# Patient Record
Sex: MALE | Race: WHITE | NOT HISPANIC OR LATINO | Employment: OTHER | URBAN - METROPOLITAN AREA
[De-identification: names, ages, dates, MRNs, and addresses within clinical notes are randomized per-mention and may not be internally consistent; named-entity substitution may affect disease eponyms.]

---

## 2017-02-06 ENCOUNTER — MYC REFILL (OUTPATIENT)
Dept: FAMILY MEDICINE | Facility: CLINIC | Age: 72
End: 2017-02-06

## 2017-02-06 DIAGNOSIS — N40.1 HYPERTROPHY OF PROSTATE WITH URINARY OBSTRUCTION: Primary | ICD-10-CM

## 2017-02-06 DIAGNOSIS — F41.9 ANXIETY: Primary | ICD-10-CM

## 2017-02-06 DIAGNOSIS — N13.8 HYPERTROPHY OF PROSTATE WITH URINARY OBSTRUCTION: Primary | ICD-10-CM

## 2017-02-06 DIAGNOSIS — F41.9 ANXIETY: ICD-10-CM

## 2017-02-06 DIAGNOSIS — F19.939 WITHDRAWAL FROM OTHER PSYCHOACTIVE SUBSTANCE (H): ICD-10-CM

## 2017-02-06 RX ORDER — ESCITALOPRAM OXALATE 10 MG/1
10 TABLET ORAL DAILY
Qty: 90 TABLET | Refills: 1 | Status: SHIPPED | OUTPATIENT
Start: 2017-02-06 | End: 2017-05-18

## 2017-02-06 RX ORDER — DIAZEPAM 5 MG
5 TABLET ORAL EVERY 12 HOURS PRN
Qty: 20 TABLET | Refills: 0 | Status: SHIPPED | OUTPATIENT
Start: 2017-02-06 | End: 2017-04-13

## 2017-02-06 RX ORDER — ESCITALOPRAM OXALATE 10 MG/1
10 TABLET ORAL DAILY
Qty: 90 TABLET | Refills: 1 | Status: CANCELLED | OUTPATIENT
Start: 2017-02-06

## 2017-02-06 NOTE — TELEPHONE ENCOUNTER
Doxazosin         Last Written Prescription Date: 08/11/2016  Last Fill Quantity: 90, # refills: 1    Last Office Visit with G, P or [facility] prescribing provider:  10/31/2016   Future Office Visit:      BP Readings from Last 3 Encounters:   10/31/16 138/81   03/01/16 136/84   01/08/16 136/83     Isrrael CUELLO (R)

## 2017-02-06 NOTE — TELEPHONE ENCOUNTER
"Message from [employer]:  Original authorizing provider: MD Josemanuel Tee would like a refill of the following medications:  escitalopram (LEXAPRO) 10 MG tablet [AVIS Martínez MD]  diazepam (VALIUM) 5 MG tablet [AVIS Martínez MD]    Preferred pharmacy: [facility] - [facility] [address]    Comment:  Dr Martínez, I didn\"t realize I had no refills for the Lexapro. I am almost out. Would you also prescribe 10 days of Diazepam.? Lots of stress going on right now. Thank you. Gavin  "

## 2017-02-06 NOTE — TELEPHONE ENCOUNTER
Escitalopram      Last Written Prescription Date:  06/08/2016  Last Fill Quantity: 90,   # refills: 1  Last Office Visit with [facility] or  Health prescribing provider: 10/30/2016  Future Office visit:       Routing refill request to provider for review/approval because:  Drug not on the [facility], [facility] or [facility] refill protocol or controlled substance    Isrrael CUELLO (R)

## 2017-02-09 RX ORDER — DOXAZOSIN 4 MG/1
4 TABLET ORAL DAILY
Qty: 90 TABLET | Refills: 1 | Status: SHIPPED | OUTPATIENT
Start: 2017-02-09 | End: 2017-05-18

## 2017-02-13 ENCOUNTER — MYC REFILL (OUTPATIENT)
Dept: FAMILY MEDICINE | Facility: CLINIC | Age: 72
End: 2017-02-13

## 2017-02-13 DIAGNOSIS — F51.05 INSOMNIA DUE TO OTHER MENTAL DISORDER: ICD-10-CM

## 2017-02-13 DIAGNOSIS — F99 INSOMNIA DUE TO OTHER MENTAL DISORDER: ICD-10-CM

## 2017-02-14 RX ORDER — ZOLPIDEM TARTRATE 10 MG/1
10 TABLET ORAL
Qty: 30 TABLET | Refills: 5 | Status: SHIPPED | OUTPATIENT
Start: 2017-02-14 | End: 2017-05-23 | Stop reason: ALTCHOICE

## 2017-02-14 NOTE — TELEPHONE ENCOUNTER
Message from [employer]:  Original authorizing provider: Carmen Abdalla MD    Josemanuel STEWART Feli would like a refill of the following medications:  zolpidem (AMBIEN) 10 MG tablet [Carmen Abdalla MD]    Preferred pharmacy: [facility] - [facility] [address] [address].    Comment:  Is it possible to get this by the 14th. we are leaving for [place]. Thanks Gavin

## 2017-04-13 ENCOUNTER — OFFICE VISIT (OUTPATIENT)
Dept: FAMILY MEDICINE | Facility: CLINIC | Age: 72
End: 2017-04-13
Payer: COMMERCIAL

## 2017-04-13 VITALS
TEMPERATURE: 97.6 F | WEIGHT: 191 LBS | HEIGHT: 71 IN | BODY MASS INDEX: 26.74 KG/M2 | DIASTOLIC BLOOD PRESSURE: 83 MMHG | SYSTOLIC BLOOD PRESSURE: 147 MMHG | HEART RATE: 79 BPM

## 2017-04-13 DIAGNOSIS — Z71.41 ALCOHOL ABUSE COUNSELING AND SURVEILLANCE: ICD-10-CM

## 2017-04-13 DIAGNOSIS — F41.9 ANXIETY: Primary | ICD-10-CM

## 2017-04-13 PROCEDURE — 99214 OFFICE O/P EST MOD 30 MIN: CPT | Performed by: FAMILY MEDICINE

## 2017-04-13 ASSESSMENT — ANXIETY QUESTIONNAIRES
1. FEELING NERVOUS, ANXIOUS, OR ON EDGE: MORE THAN HALF THE DAYS
7. FEELING AFRAID AS IF SOMETHING AWFUL MIGHT HAPPEN: NOT AT ALL
6. BECOMING EASILY ANNOYED OR IRRITABLE: SEVERAL DAYS
GAD7 TOTAL SCORE: 6
IF YOU CHECKED OFF ANY PROBLEMS ON THIS QUESTIONNAIRE, HOW DIFFICULT HAVE THESE PROBLEMS MADE IT FOR YOU TO DO YOUR WORK, TAKE CARE OF THINGS AT HOME, OR GET ALONG WITH OTHER PEOPLE: NOT DIFFICULT AT ALL
2. NOT BEING ABLE TO STOP OR CONTROL WORRYING: SEVERAL DAYS
5. BEING SO RESTLESS THAT IT IS HARD TO SIT STILL: NOT AT ALL
3. WORRYING TOO MUCH ABOUT DIFFERENT THINGS: MORE THAN HALF THE DAYS

## 2017-04-13 ASSESSMENT — PATIENT HEALTH QUESTIONNAIRE - PHQ9: 5. POOR APPETITE OR OVEREATING: NOT AT ALL

## 2017-04-13 NOTE — PATIENT INSTRUCTIONS
Thank you for choosing [facility].  You may be receiving a survey in the mail from Jennifer Minaya regarding your visit today.  Please take a few minutes to complete and return the survey to let us know how we are doing.      If you have questions or concerns, please contact us via [employer] or you can contact your care team at [phone].    Our Clinic hours are:  Monday 6:40 am  to 7:00 pm  Tuesday -Friday 6:40 am to 5:00 pm    The [place] outpatient lab hours are:  Monday - Friday 6:10 am to 4:45 pm  Saturdays 7:00 am to 11:00 am  Appointments are required, call [phone]    If you have clinical questions after hours or would like to schedule an appointment,  call the clinic at [phone].    (F41.9) Anxiety  (primary encounter diagnosis)  Comment:   Plan: We discussed the medications and he is not taking Valium this is removed from the med list. We discussed Ambien and Ativan are in the same family of medications and one should be stopped. Consider trying to use Ativan at bedtime about 30 minutes before the desired sleep time and do not use Ambien if taking Ativan. The Gabapentin could cause drowsiness and if this occurs, then also take this med before bedtime. Use the non drug therapies for insomnia. Avoid stimulants. The Lexapro works for both depression and anxiety. You were on 5 mg daily and this is a very low dose. Increase this to 10 mg daily and it will take several days to start working and several weeks for max effect. Stay on the Wellbutrin at the same dose. Recheck in 3-4 weeks in the clinic for evaluation.     (Z71.41) Alcohol abuse counseling and surveillance  Comment:   Plan: For the alcohol, use your support and counseling. You just had a chemical dependency evaluation about one month ago. You would likely benefit from outpatient treatment.   Consider a sponsor outside of the family.

## 2017-04-13 NOTE — PROGRESS NOTES
SUBJECTIVE:                                                    Josemanuel Edmond is a 72 year old male who presents to clinic today for the following health issues:    PERSONAL PROBLEM:  Here to discuss about a personal concern. He lost his son in 2004 in an MVA.   He started using alcohol after that. He has had trouble drinking and is interested in doing this.   Now he uses 4-5 drinks at night. He tried Corradum, a natural leaf. He did not drink for 8 months on this.   Once he stopped this he started drinking again. There are numerous stresses for him: his son is dealing   With quitting drug addiction. They are living with him and his wife. He is doing counseling with his .   He is starting to exercise daily.     Current Outpatient Prescriptions:      buPROPion (WELLBUTRIN XL) 150 MG 24 hr tablet, Take 1 tablet (150 mg) by mouth every morning, Disp: 90 tablet, Rfl: 0     zolpidem (AMBIEN) 10 MG tablet, Take 1 tablet (10 mg) by mouth nightly as needed for sleep, Disp: 30 tablet, Rfl: 5     doxazosin (CARDURA) 4 MG tablet, Take 1 tablet (4 mg) by mouth daily, Disp: 90 tablet, Rfl: 1     escitalopram (LEXAPRO) 10 MG tablet, Take 1 tablet (10 mg) by mouth daily, Disp: 90 tablet, Rfl: 1     LORazepam (ATIVAN) 1 MG tablet, Take 1 tablet (1 mg) by mouth every 6 hours as needed for anxiety Do not operate a vehicle after taking this medication, Disp: 120 tablet, Rfl: 5     gabapentin (NEURONTIN) 300 MG capsule, One am and midday, 2 at bedtime, Disp: 120 capsule, Rfl: 11     multivitamin, therapeutic with minerals (MULTI-VITAMIN) TABS, Take 1 tablet by mouth daily, Disp: 100 tablet, Rfl: 3     aspirin 81 MG tablet, Take 1 tablet by mouth daily., Disp: 100 tablet, Rfl: 3     CALCIUM + D 600-200 MG-UNIT OR TABS, 2 TABLETS DAILY, Disp: , Rfl:      atorvastatin (LIPITOR) 40 MG tablet, Take 1 tablet (40 mg) by mouth daily (Patient not taking: Reported on 4/13/2017), Disp: 90 tablet, Rfl: 1    Patient Active Problem List  "  Diagnosis     Cerebral infarction due to occlusion or stenosis of carotid artery     Hypertension goal BP (blood pressure) < 140/90     Hypertrophy of prostate with urinary obstruction     Osteoarthrosis, unspecified whether generalized or localized, pelvic region and thigh     Hyperlipidemia LDL goal <100     Allergic rhinitis     Conjunctivitis, allergic     Anxiety     GERD (gastroesophageal reflux disease)     Advanced care planning/counseling discussion     S/P knee replacement     Moderate major depression (H)     Alcohol abuse     ED (erectile dysfunction)       Blood pressure 147/83, pulse 79, temperature 97.6  F (36.4  C), temperature source Tympanic, height 5' 11\" (1.803 m), weight 191 lb (86.6 kg).    Exam:  GENERAL APPEARANCE: healthy, alert and no distress  PSYCH: mentation appears normal, anxious and worried    (F41.9) Anxiety  (primary encounter diagnosis)  Comment:   Plan: We discussed the medications and he is not taking Valium this is removed from the med list. We discussed Ambien and Ativan are in the same family of medications and one should be stopped. Consider trying to use Ativan at bedtime about 30 minutes before the desired sleep time and do not use Ambien if taking Ativan. The Gabapentin could cause drowsiness and if this occurs, then also take this med before bedtime. Use the non drug therapies for insomnia. Avoid stimulants. The Lexapro works for both depression and anxiety. You were on 5 mg daily and this is a very low dose. Increase this to 10 mg daily and it will take several days to start working and several weeks for max effect. Stay on the Wellbutrin at the same dose. Recheck in 3-4 weeks in the clinic for evaluation.     (Z71.41) Alcohol abuse counseling and surveillance  Comment:   Plan: For the alcohol, use your support and counseling. You just had a chemical dependency evaluation about one month ago. You would likely benefit from outpatient treatment.   Consider a sponsor " outside of the family.       Jesus Alcantar

## 2017-04-13 NOTE — MR AVS SNAPSHOT
After Visit Summary   4/13/2017    Josemanuel Edmond    MRN: [id number]           Patient Information     Date Of Birth          1945        Visit Information        Provider Department      4/13/2017 10:00 AM Jesus Alcantar MD [facility]        Today's Diagnoses     Anxiety    -  1    Alcohol abuse counseling and surveillance          Care Instructions          Thank you for choosing [facility].  You may be receiving a survey in the mail from [facility] your visit today.  Please take a few minutes to complete and return the survey to let us know how we are doing.      If you have questions or concerns, please contact us via [employer] or you can contact your care team at [phone].    Our Clinic hours are:  Monday 6:40 am  to 7:00 pm  Tuesday -Friday 6:40 am to 5:00 pm    The [place] outpatient lab hours are:  Monday - Friday 6:10 am to 4:45 pm  Saturdays 7:00 am to 11:00 am  Appointments are required, call [phone]    If you have clinical questions after hours or would like to schedule an appointment,  call the clinic at [phone].    (F41.9) Anxiety  (primary encounter diagnosis)  Comment:   Plan: We discussed the medications and he is not taking Valium this is removed from the med list. We discussed Ambien and Ativan are in the same family of medications and one should be stopped. Consider trying to use Ativan at bedtime about 30 minutes before the desired sleep time and do not use Ambien if taking Ativan. The Gabapentin could cause drowsiness and if this occurs, then also take this med before bedtime. Use the non drug therapies for insomnia. Avoid stimulants. The Lexapro works for both depression and anxiety. You were on 5 mg daily and this is a very low dose. Increase this to 10 mg daily and it will take several days to start working and several weeks for max effect. Stay on the Wellbutrin at the same dose. Recheck in 3-4 weeks in the clinic for  "evaluation.     (Z71.41) Alcohol abuse counseling and surveillance  Comment:   Plan: For the alcohol, use your support and counseling. You just had a chemical dependency evaluation about one month ago. You would likely benefit from outpatient treatment.   Consider a sponsor outside of the family.         Follow-ups after your visit        Who to contact     If you have questions or need follow up information about today's clinic visit or your schedule please contact [facility] directly at [phone].  Normal or non-critical lab and imaging results will be communicated to you by Presenthart, letter or phone within 4 business days after the clinic has received the results. If you do not hear from us within 7 days, please contact the clinic through [employer] or phone. If you have a critical or abnormal lab result, we will notify you by phone as soon as possible.  Submit refill requests through [employer] or call your pharmacy and they will forward the refill request to us. Please allow 3 business days for your refill to be completed.          Additional Information About Your Visit        [employer] Information     [employer] gives you secure access to your electronic health record. If you see a primary care provider, you can also send messages to your care team and make appointments. If you have questions, please call your primary care clinic.  If you do not have a primary care provider, please call [phone] and they will assist you.        Care EveryWhere ID     This is your Care EveryWhere ID. This could be used by other organizations to access your [facility] medical records  [id number]        Your Vitals Were     Pulse Temperature Height BMI (Body Mass Index)          79 97.6  F (36.4  C) (Tympanic) 5' 11\" (1.803 m) 26.64 kg/m2         Blood Pressure from Last 3 Encounters:   04/13/17 147/83   10/31/16 138/81   03/01/16 136/84    Weight from Last 3 Encounters:   04/13/17 191 lb (86.6 kg)   10/31/16 186 lb " (84.4 kg)   03/01/16 203 lb (92.1 kg)              Today, you had the following     No orders found for display         Today's Medication Changes          These changes are accurate as of: 4/13/17 11:09 AM.  If you have any questions, ask your nurse or doctor.               Stop taking these medicines if you haven't already. Please contact your care team if you have questions.     diazepam 5 MG tablet   Commonly known as:  VALIUM   Stopped by:  Jesus Alcantar MD                    Primary Care Provider Office Phone # Fax #    R Terry Martínez [phone]       [address] [address]        Thank you!     Thank you for choosing [facility]  for your care. Our goal is always to provide you with excellent care. Hearing back from our patients is one way we can continue to improve our services. Please take a few minutes to complete the written survey that you may receive in the mail after your visit with us. Thank you!             Your Updated Medication List - Protect others around you: Learn how to safely use, store and throw away your medicines at www.disposemymeds.org.          This list is accurate as of: 4/13/17 11:09 AM.  Always use your most recent med list.                   Brand Name Dispense Instructions for use    aspirin 81 MG tablet     100 tablet    Take 1 tablet by mouth daily.       atorvastatin 40 MG tablet    LIPITOR    90 tablet    Take 1 tablet (40 mg) by mouth daily       buPROPion 150 MG 24 hr tablet    WELLBUTRIN XL    90 tablet    Take 1 tablet (150 mg) by mouth every morning       calcium + D 600-200 MG-UNIT Tabs   Generic drug:  calcium carbonate-vitamin D      2 TABLETS DAILY       doxazosin 4 MG tablet    CARDURA    90 tablet    Take 1 tablet (4 mg) by mouth daily       escitalopram 10 MG tablet    LEXAPRO    90 tablet    Take 1 tablet (10 mg) by mouth daily       gabapentin 300 MG capsule    NEURONTIN    120  capsule    One am and midday, 2 at bedtime       LORazepam 1 MG tablet    ATIVAN    120 tablet    Take 1 tablet (1 mg) by mouth every 6 hours as needed for anxiety Do not operate a vehicle after taking this medication       Multi-vitamin Tabs tablet     100 tablet    Take 1 tablet by mouth daily       zolpidem 10 MG tablet    AMBIEN    30 tablet    Take 1 tablet (10 mg) by mouth nightly as needed for sleep

## 2017-04-14 ASSESSMENT — PATIENT HEALTH QUESTIONNAIRE - PHQ9: SUM OF ALL RESPONSES TO PHQ QUESTIONS 1-9: 4

## 2017-04-14 ASSESSMENT — ANXIETY QUESTIONNAIRES: GAD7 TOTAL SCORE: 6

## 2017-04-17 ENCOUNTER — OFFICE VISIT (OUTPATIENT)
Dept: SURGERY | Facility: CLINIC | Age: 72
End: 2017-04-17
Payer: COMMERCIAL

## 2017-04-17 VITALS
TEMPERATURE: 97.8 F | DIASTOLIC BLOOD PRESSURE: 85 MMHG | SYSTOLIC BLOOD PRESSURE: 150 MMHG | BODY MASS INDEX: 26.74 KG/M2 | HEIGHT: 71 IN | HEART RATE: 76 BPM | WEIGHT: 191 LBS

## 2017-04-17 DIAGNOSIS — K40.20 BILATERAL INGUINAL HERNIA WITHOUT OBSTRUCTION OR GANGRENE, RECURRENCE NOT SPECIFIED: Primary | ICD-10-CM

## 2017-04-17 PROCEDURE — 99214 OFFICE O/P EST MOD 30 MIN: CPT | Performed by: SURGERY

## 2017-04-17 ASSESSMENT — PAIN SCALES - GENERAL: PAINLEVEL: NO PAIN (0)

## 2017-04-17 NOTE — NURSING NOTE
"Initial /85 (BP Location: Right arm, Patient Position: Chair, Cuff Size: Adult Regular)  Pulse 76  Temp 97.8  F (36.6  C) (Oral)  Ht 1.803 m (5' 11\")  Wt 86.6 kg (191 lb)  BMI 26.64 kg/m2 Estimated body mass index is 26.64 kg/(m^2) as calculated from the following:    Height as of this encounter: 1.803 m (5' 11\").    Weight as of this encounter: 86.6 kg (191 lb). .    Angeles Paez CMA    "

## 2017-04-17 NOTE — MR AVS SNAPSHOT
"              After Visit Summary   4/17/2017    Josemanuel Edmond    MRN: [id number]           Patient Information     Date Of Birth          1945        Visit Information        Provider Department      4/17/2017 1:15 PM Jesus Muñoz MD [facility]        Today's Diagnoses     Bilateral inguinal hernia without obstruction or gangrene, recurrence not specified    -  1      Care Instructions    Per Physician's instructions          Follow-ups after your visit        Who to contact     If you have questions or need follow up information about today's clinic visit or your schedule please contact [facility] directly at [phone].  Normal or non-critical lab and imaging results will be communicated to you by MyChart, letter or phone within 4 business days after the clinic has received the results. If you do not hear from us within 7 days, please contact the clinic through [employer] or phone. If you have a critical or abnormal lab result, we will notify you by phone as soon as possible.  Submit refill requests through [employer] or call your pharmacy and they will forward the refill request to us. Please allow 3 business days for your refill to be completed.          Additional Information About Your Visit        MyChart Information     [employer] gives you secure access to your electronic health record. If you see a primary care provider, you can also send messages to your care team and make appointments. If you have questions, please call your primary care clinic.  If you do not have a primary care provider, please call [phone] and they will assist you.        Care EveryWhere ID     This is your Care EveryWhere ID. This could be used by other organizations to access your [facility] medical records  [id number]        Your Vitals Were     Pulse Temperature Height BMI (Body Mass Index)          76 97.8  F (36.6  C) (Oral) 1.803 m (5' 11\") 26.64 kg/m2         Blood Pressure from Last 3 " Encounters:   04/17/17 150/85   04/13/17 147/83   10/31/16 138/81    Weight from Last 3 Encounters:   04/17/17 86.6 kg (191 lb)   04/13/17 86.6 kg (191 lb)   10/31/16 84.4 kg (186 lb)              Today, you had the following     No orders found for display       Primary Care Provider Office Phone # Fax #    R Terry Martínez [phone]       [address] [address]        Thank you!     Thank you for choosing [facility]  for your care. Our goal is always to provide you with excellent care. Hearing back from our patients is one way we can continue to improve our services. Please take a few minutes to complete the written survey that you may receive in the mail after your visit with us. Thank you!             Your Updated Medication List - Protect others around you: Learn how to safely use, store and throw away your medicines at www.disposemymeds.org.          This list is accurate as of: 4/17/17  1:54 PM.  Always use your most recent med list.                   Brand Name Dispense Instructions for use    aspirin 81 MG tablet     100 tablet    Take 1 tablet by mouth daily.       atorvastatin 40 MG tablet    LIPITOR    90 tablet    Take 1 tablet (40 mg) by mouth daily       buPROPion 150 MG 24 hr tablet    WELLBUTRIN XL    90 tablet    Take 1 tablet (150 mg) by mouth every morning       calcium + D 600-200 MG-UNIT Tabs   Generic drug:  calcium carbonate-vitamin D      2 TABLETS DAILY       doxazosin 4 MG tablet    CARDURA    90 tablet    Take 1 tablet (4 mg) by mouth daily       escitalopram 10 MG tablet    LEXAPRO    90 tablet    Take 1 tablet (10 mg) by mouth daily       gabapentin 300 MG capsule    NEURONTIN    120 capsule    One am and midday, 2 at bedtime       LORazepam 1 MG tablet    ATIVAN    120 tablet    Take 1 tablet (1 mg) by mouth every 6 hours as needed for anxiety Do not operate a vehicle after taking this medication        Multi-vitamin Tabs tablet     100 tablet    Take 1 tablet by mouth daily       zolpidem 10 MG tablet    AMBIEN    30 tablet    Take 1 tablet (10 mg) by mouth nightly as needed for sleep

## 2017-04-17 NOTE — PROGRESS NOTES
PCP:  AVIS Martínez    Chief complaint: Inguinal hernia    History of Present Illness: Josemanuel is 72 years old and presents for evaluation of inguinal hernia. He was seen by Dr. Martínez in December 2015 complaining of a bulge in his left groin. He noted when he was drying off after shower 1 day. It has probably gotten a little bit larger over the last year and a half. He's had some issues with the cough and some changes in his bowel habits which have caused him to have to strain more. He thinks that that might have precipitated these hernias.    Dr. Martínez recommended surgical evaluation back in December 2015. The patient has delayed evaluation until now. The hernias don't really bother him a lot, but he is interested in having this repaired to prevent problems.    The patient is a fairly heavy drinker and is admitted that he drinks 4-5 drinks per night.    He had a colonoscopy done recently. He's never had hernia repair on his groin, but has had an umbilical hernia repair. Other surgical history includes joint replacements.    Histories:  Past Medical History:   Diagnosis Date     Benign neoplasm of prostate 2000    Benign Prostate Nodule       Past Surgical History:   Procedure Laterality Date     COLONOSCOPY N/A 12/10/2015    Procedure: COMBINED COLONOSCOPY, SINGLE OR MULTIPLE BIOPSY/POLYPECTOMY BY BIOPSY;  Surgeon: Jyoti Figueroa MD;  Location: [facility]     JOINT REPLACEMENT, HIP RT/LT  10/07    Joint Replacement Hip LT     JOINT REPLACEMTN, KNEE RT/LT  8/2011    Joint Replacement knee /LT, [facility]     SURGICAL HISTORY OF -   12/1/1999    Umbilical Herniorrhaphy with mesh       Family History   Problem Relation Age of Onset     Breast Cancer Mother      Neurologic Disorder Mother      parkinsons     Respiratory Father      emphyzema       Social History   Substance Use Topics     Smoking status: Former Smoker     Packs/day: 1.00     Years: 15.00     Types: Cigarettes     Quit date: 10/13/1975     Smokeless  tobacco: Never Used     Alcohol use Yes      Comment: 2 beers or 2 glasses of wine most evenings       Current Outpatient Prescriptions   Medication Sig Dispense Refill     buPROPion (WELLBUTRIN XL) 150 MG 24 hr tablet Take 1 tablet (150 mg) by mouth every morning 90 tablet 0     zolpidem (AMBIEN) 10 MG tablet Take 1 tablet (10 mg) by mouth nightly as needed for sleep 30 tablet 5     doxazosin (CARDURA) 4 MG tablet Take 1 tablet (4 mg) by mouth daily 90 tablet 1     escitalopram (LEXAPRO) 10 MG tablet Take 1 tablet (10 mg) by mouth daily 90 tablet 1     LORazepam (ATIVAN) 1 MG tablet Take 1 tablet (1 mg) by mouth every 6 hours as needed for anxiety Do not operate a vehicle after taking this medication 120 tablet 5     gabapentin (NEURONTIN) 300 MG capsule One am and midday, 2 at bedtime 120 capsule 11     multivitamin, therapeutic with minerals (MULTI-VITAMIN) TABS Take 1 tablet by mouth daily 100 tablet 3     aspirin 81 MG tablet Take 1 tablet by mouth daily. 100 tablet 3     CALCIUM + D 600-200 MG-UNIT OR TABS 2 TABLETS DAILY       atorvastatin (LIPITOR) 40 MG tablet Take 1 tablet (40 mg) by mouth daily (Patient not taking: Reported on 4/17/2017) 90 tablet 1       Allergies   Allergen Reactions     Bactrim [Sulfamethoxazole W/Trimethoprim] Nausea and Vomiting       Images:  No results found for this or any previous visit (from the past 744 hour(s)).    Labs:  Results for orders placed or performed in visit on 01/08/16   UA reflex to Microscopic and Culture   Result Value Ref Range    Color Urine Yellow     Appearance Urine Clear     Glucose Urine Negative NEG mg/dL    Bilirubin Urine Negative NEG    Ketones Urine Negative NEG mg/dL    Specific Gravity Urine 1.010 1.003 - 1.035    Blood Urine Negative NEG    pH Urine 6.0 5.0 - 7.0 pH    Protein Albumin Urine Negative NEG mg/dL    Urobilinogen Urine 0.2 0.2 - 1.0 EU/dL    Nitrite Urine Negative NEG    Leukocyte Esterase Urine Negative NEG    Source Midstream Urine   "      ROS:  Constitutional - Denies fevers, weight loss, malaise, lethargy  Neuro - Denies tremors or seizures  Pulmon - Denies SOB, dyspnea, hemoptysis, chronic cough or use of an inhaler  CV - Denies CP, SOB, lower extremity edema, difficulty w/ stairs, has never used NTG  GI - Denies hematemesis, BRBPR, melena, chronic diarrhea or epigastric pain   - Denies hematuria, difficulty voiding, h/o STDs  Hematology - Denies blood clotting disorders, chronic anemias  Dermatology - No melanomas or skin cancers  Rheumatology - No h/o RA    /85 (BP Location: Right arm, Patient Position: Chair, Cuff Size: Adult Regular)  Pulse 76  Temp 97.8  F (36.6  C) (Oral)  Ht 1.803 m (5' 11\")  Wt 86.6 kg (191 lb)  BMI 26.64 kg/m2    Exam:  General - Alert and Oriented X4, NAD, well nourished  HEENT - Normocephalic, atraumatic,  Neck - supple, no LAD  Lungs - respirations unlabored  CV - Heart RRR,   Abdomen - Soft, non-tender, +BS, no hepatosplenomegaly, no palpable masses  Groins - obvious visible left inguinal hernia which is reducible. He has a hernia on the right side as well which is reducible, but less obvious. He has small testicles, but otherwise scrotal anatomy is normal.  Rectal - deferred  Neuro - Full ROM, Strength 5/5 and major muscle groups, sensation intact  Extremities - No cyanosis, clubbing or edema.      Assessment and Plan: Bilateral inguinal hernias. I explained to him that these do not have to be repaired as they're asymptomatic, but I feel like he is at some risk of incarceration given that there clearly is bowel present in at least the left one. He had some questions about the laparoscopic approach. I explained how that worked, and that I don't do those. I would be willing and happy to arrange a consultation with one of my colleagues if he so desires.    Relevant anatomy, technical aspects of open repair with mesh, risks, benefits, and alternatives were discussed with the patient in detail. We did " not schedule surgery at this point. He like to think over his options and he will get back to us. I explained the need for a physical.    He will inform us of his decision once he makes it.      Jesus Muñoz MD FACS

## 2017-05-01 DIAGNOSIS — F41.9 ANXIETY: ICD-10-CM

## 2017-05-01 RX ORDER — LORAZEPAM 1 MG/1
1 TABLET ORAL EVERY 6 HOURS PRN
Qty: 120 TABLET | Refills: 5 | Status: SHIPPED | OUTPATIENT
Start: 2017-05-01 | End: 2017-05-23

## 2017-05-01 NOTE — TELEPHONE ENCOUNTER
Lorazepam      Last Written Prescription Date:  12/05/2016  Last Fill Quantity: 120,   # refills: 5  Last Office Visit with [facility], [facility] or  Health prescribing provider: 04/13/2017  Future Office visit:       Routing refill request to provider for review/approval because:  Drug not on the [facility], [facility] or [facility] refill protocol or controlled substance    Isrrael CUELLO (R)

## 2017-05-18 ENCOUNTER — TELEPHONE (OUTPATIENT)
Dept: SURGERY | Facility: CLINIC | Age: 72
End: 2017-05-18

## 2017-05-18 ENCOUNTER — MYC REFILL (OUTPATIENT)
Dept: FAMILY MEDICINE | Facility: CLINIC | Age: 72
End: 2017-05-18

## 2017-05-18 DIAGNOSIS — N13.8 HYPERTROPHY OF PROSTATE WITH URINARY OBSTRUCTION: ICD-10-CM

## 2017-05-18 DIAGNOSIS — F41.9 ANXIETY: ICD-10-CM

## 2017-05-18 DIAGNOSIS — N40.1 HYPERTROPHY OF PROSTATE WITH URINARY OBSTRUCTION: ICD-10-CM

## 2017-05-18 RX ORDER — DOXAZOSIN 4 MG/1
4 TABLET ORAL DAILY
Qty: 90 TABLET | Refills: 3 | Status: SHIPPED | OUTPATIENT
Start: 2017-05-18 | End: 2018-06-21

## 2017-05-18 RX ORDER — ESCITALOPRAM OXALATE 10 MG/1
10 TABLET ORAL DAILY
Qty: 90 TABLET | Refills: 1 | Status: CANCELLED | OUTPATIENT
Start: 2017-05-18

## 2017-05-18 RX ORDER — ESCITALOPRAM OXALATE 10 MG/1
10 TABLET ORAL DAILY
Qty: 90 TABLET | Refills: 1 | Status: SHIPPED | OUTPATIENT
Start: 2017-05-18 | End: 2017-10-09

## 2017-05-18 NOTE — TELEPHONE ENCOUNTER
Escitalopram     Last Written Prescription Date: 02/06/17  Last Fill Quantity: 90, # refills: 1  Last Office Visit with [facility] primary care provider:  10/31/16        Last PHQ-9 score on record=   PHQ-9 SCORE 4/13/2017   Total Score -   Total Score 4

## 2017-05-18 NOTE — TELEPHONE ENCOUNTER
Message from MyChart:  Original authorizing provider: MD Gavin Tee would like a refill of the following medications:  escitalopram (LEXAPRO) 10 MG tablet [AVIS Martínez MD]  doxazosin (CARDURA) 4 MG tablet [AVIS Martínez MD]    Preferred pharmacy: [facility] - [place], [place] - [address]    Comment:

## 2017-05-18 NOTE — TELEPHONE ENCOUNTER
Spoke with pt and discussed surgery options with pt and pt stated he would like to make appt with Dr. Resendiz. Pt stated that he will call back and make appt after making appt with PCP.  Andree MCADAMS RN BSN PHN  Specialty Clinics

## 2017-05-18 NOTE — TELEPHONE ENCOUNTER
Patient notified Cardura sent to pharmacy  Patient due for appt for refill on Lexapro  Appt scheduled 5/23/17  Patient reports he will wait until appt for refill on Lexapro, he does not feel like 10 mg is working any different than 5 mg dose      Mary MUNSON Rn

## 2017-05-18 NOTE — TELEPHONE ENCOUNTER
Reason for Call:  Patient is calling in states that he was in and saw Dr Muñoz for hernia surgery consult and was informed that Dr Muñoz doesn;t do laparoscopic technique    Patient would like to discuss the laparoscopic technique as well as risks and benefit vs open surgery    Detailed comments: see above    Phone Number Patient can be reached at: Home number on file [phone] (home)    Best Time: any    Can we leave a detailed message on this number? YES    Call taken on 5/18/2017 at 9:39 AM by Rose Martinez

## 2017-05-23 ENCOUNTER — OFFICE VISIT (OUTPATIENT)
Dept: FAMILY MEDICINE | Facility: CLINIC | Age: 72
End: 2017-05-23
Payer: COMMERCIAL

## 2017-05-23 VITALS
HEART RATE: 76 BPM | SYSTOLIC BLOOD PRESSURE: 132 MMHG | HEIGHT: 71 IN | WEIGHT: 196 LBS | BODY MASS INDEX: 27.44 KG/M2 | DIASTOLIC BLOOD PRESSURE: 68 MMHG

## 2017-05-23 DIAGNOSIS — F33.1 MAJOR DEPRESSIVE DISORDER, RECURRENT EPISODE, MODERATE (H): ICD-10-CM

## 2017-05-23 DIAGNOSIS — F41.9 ANXIETY: ICD-10-CM

## 2017-05-23 DIAGNOSIS — F10.10 ALCOHOL ABUSE: Primary | ICD-10-CM

## 2017-05-23 PROCEDURE — 99214 OFFICE O/P EST MOD 30 MIN: CPT | Performed by: FAMILY MEDICINE

## 2017-05-23 RX ORDER — LORAZEPAM 1 MG/1
TABLET ORAL
Qty: 120 TABLET | Refills: 5 | Status: SHIPPED | OUTPATIENT
Start: 2017-05-23 | End: 2017-11-28

## 2017-05-23 RX ORDER — BUPROPION HYDROCHLORIDE 150 MG/1
150 TABLET ORAL EVERY MORNING
Qty: 90 TABLET | Refills: 3 | Status: SHIPPED | OUTPATIENT
Start: 2017-05-23 | End: 2017-07-21

## 2017-05-23 NOTE — MR AVS SNAPSHOT
After Visit Summary   5/23/2017    Josemanuel Edmond    MRN: [id number]           Patient Information     Date Of Birth          1945        Visit Information        Provider Department      5/23/2017 10:20 AM Tanya, AVIS Stewart MD [facility]        Today's Diagnoses     Alcohol abuse    -  1    Major depressive disorder, recurrent episode, moderate (H)        Anxiety           Follow-ups after your visit        Your next 10 appointments already scheduled     May 24, 2017  1:00 PM CDT   New Visit with Josemanuel Resendiz MD   [facility]    [address] [address]   [phone]              Who to contact     If you have questions or need follow up information about today's clinic visit or your schedule please contact [facility] directly at [phone].  Normal or non-critical lab and imaging results will be communicated to you by MyChart, letter or phone within 4 business days after the clinic has received the results. If you do not hear from us within 7 days, please contact the clinic through [employer] or phone. If you have a critical or abnormal lab result, we will notify you by phone as soon as possible.  Submit refill requests through [employer] or call your pharmacy and they will forward the refill request to us. Please allow 3 business days for your refill to be completed.          Additional Information About Your Visit        MyChart Information     GoingOn gives you secure access to your electronic health record. If you see a primary care provider, you can also send messages to your care team and make appointments. If you have questions, please call your primary care clinic.  If you do not have a primary care provider, please call [phone] and they will assist you.        Care EveryWhere ID     This is your Care EveryWhere ID. This could be used by other organizations to access your [facility]  "records  [id number]        Your Vitals Were     Pulse Height BMI (Body Mass Index)             76 5' 11\" (1.803 m) 27.34 kg/m2          Blood Pressure from Last 3 Encounters:   05/23/17 132/68   04/17/17 150/85   04/13/17 147/83    Weight from Last 3 Encounters:   05/23/17 196 lb (88.9 kg)   04/17/17 191 lb (86.6 kg)   04/13/17 191 lb (86.6 kg)              Today, you had the following     No orders found for display         Today's Medication Changes          These changes are accurate as of: 5/23/17  4:32 PM.  If you have any questions, ask your nurse or doctor.               These medicines have changed or have updated prescriptions.        Dose/Directions    LORazepam 1 MG tablet   Commonly known as:  ATIVAN   This may have changed:    - how much to take  - how to take this  - when to take this  - reasons to take this  - additional instructions   Used for:  Anxiety   Changed by:  AVIS Martínez MD        1 - 2 tablets every 6 hrs to help with alcohol withdrawal. Then go back to 1 every 6 hrs when off the alcohol. Do not operate a vehicle after taking this medication   Quantity:  120 tablet   Refills:  5         Stop taking these medicines if you haven't already. Please contact your care team if you have questions.     zolpidem 10 MG tablet   Commonly known as:  AMBIEN   Stopped by:  AVIS Martínez MD                Where to get your medicines      Some of these will need a paper prescription and others can be bought over the counter.  Ask your nurse if you have questions.     Bring a paper prescription for each of these medications     buPROPion 150 MG 24 hr tablet    LORazepam 1 MG tablet                Primary Care Provider Office Phone # Fax #    AVIS Martínez [phone]       [address] [address]        Thank you!     Thank you for choosing [facility]  for your care. Our goal is always to provide you with excellent care. " Hearing back from our patients is one way we can continue to improve our services. Please take a few minutes to complete the written survey that you may receive in the mail after your visit with us. Thank you!             Your Updated Medication List - Protect others around you: Learn how to safely use, store and throw away your medicines at www.disposemymeds.org.          This list is accurate as of: 5/23/17  4:32 PM.  Always use your most recent med list.                   Brand Name Dispense Instructions for use    aspirin 81 MG tablet     100 tablet    Take 1 tablet by mouth daily.       buPROPion 150 MG 24 hr tablet    WELLBUTRIN XL    90 tablet    Take 1 tablet (150 mg) by mouth every morning       calcium + D 600-200 MG-UNIT Tabs   Generic drug:  calcium carbonate-vitamin D      2 TABLETS DAILY       doxazosin 4 MG tablet    CARDURA    90 tablet    Take 1 tablet (4 mg) by mouth daily       escitalopram 10 MG tablet    LEXAPRO    90 tablet    Take 1 tablet (10 mg) by mouth daily       gabapentin 300 MG capsule    NEURONTIN    120 capsule    One am and midday, 2 at bedtime       LORazepam 1 MG tablet    ATIVAN    120 tablet    1 - 2 tablets every 6 hrs to help with alcohol withdrawal. Then go back to 1 every 6 hrs when off the alcohol. Do not operate a vehicle after taking this medication       Multi-vitamin Tabs tablet     100 tablet    Take 1 tablet by mouth daily

## 2017-05-23 NOTE — PROGRESS NOTES
Subjective:  Josemanuel Edmond is a 72 year old male   Chief Complaint   Patient presents with     Medication Question     pt. is here today to go over his medicaitons with Dr. Martínez.      Personal Problem     alcohol issues     Anxiety      X ongoing.      He continues to have significant stressors in his personal life (a son with substance abuse issues, conflicts with his son's ex-, concerns about his grandson who may be put into foster care, etc.). He was able to abstain from alcohol for a long period of time using some type of herbal remedy. Then he found he went through withdrawal when he tried to go off the herbal remedy. Now he is back to drinking on a daily basis up to 5 beers and/or drinks of hard liquor. He has a lot of shame and embarrassment about these issues. He is wondering what she could use to help him go through withdrawal if he tries to abstain from the alcohol        Encounter Diagnoses   Name Primary?     Alcohol abuse Yes     Major depressive disorder, recurrent episode, moderate (H)      Anxiety        ROS:other than noted above, general, HEENT, respiratory, cardiac, gastrointestinal systems are negative    Medical, surgical, social, and family histories, medications and allergies reviewed and updated.    Objective:  Exam:    GENERAL APPEARANCE ADULT: Alert, no acute distress  EYES: PERRL, EOM normal, conjunctiva and lids normal  PSYCH: mentation appears normal., affect flat and appears sad      ASSESSMENT:  1. Alcohol abuse    2. Major depressive disorder, recurrent episode, moderate (H)    3. Anxiety        PLAN:  Orders Placed This Encounter     buPROPion (WELLBUTRIN XL) 150 MG 24 hr tablet     LORazepam (ATIVAN) 1 MG tablet     I strongly recommend that he abstain from alcohol, either by stopping cold turkey and using extra doses of his lorazepam for the withdrawal, or by tapering off slowly over a week. I think he needs to get involved in AA or ideally go through outpatient chemical  dependency treatment program. He should not be using zolpidem with lorazepam so I did not renew the zolpidem. He also should not be mixing lorazepam with alcohol.      This was a 30 minute appointment and 25 minutes were spent in education, counseling and coordination of care for this problem.

## 2017-05-23 NOTE — NURSING NOTE
"Chief Complaint   Patient presents with     Medication Question     pt. is here today to go over his medicaitons with Dr. Martínez.      Personal Problem     alcohol issues     Anxiety      X ongoing.        Initial /68 (BP Location: Right arm, Patient Position: Chair, Cuff Size: Adult Regular)  Pulse 76  Ht 5' 11\" (1.803 m)  Wt 196 lb (88.9 kg)  BMI 27.34 kg/m2 Estimated body mass index is 27.34 kg/(m^2) as calculated from the following:    Height as of this encounter: 5' 11\" (1.803 m).    Weight as of this encounter: 196 lb (88.9 kg).  Medication Reconciliation: complete     Gretel Rodriguez, CMA      "

## 2017-05-24 ENCOUNTER — OFFICE VISIT (OUTPATIENT)
Dept: SURGERY | Facility: CLINIC | Age: 72
End: 2017-05-24
Payer: COMMERCIAL

## 2017-05-24 VITALS
WEIGHT: 196 LBS | DIASTOLIC BLOOD PRESSURE: 71 MMHG | SYSTOLIC BLOOD PRESSURE: 145 MMHG | HEIGHT: 71 IN | HEART RATE: 77 BPM | BODY MASS INDEX: 27.44 KG/M2 | TEMPERATURE: 97.5 F

## 2017-05-24 DIAGNOSIS — K40.20 BILATERAL INGUINAL HERNIA WITHOUT OBSTRUCTION OR GANGRENE, RECURRENCE NOT SPECIFIED: Primary | ICD-10-CM

## 2017-05-24 PROCEDURE — 99212 OFFICE O/P EST SF 10 MIN: CPT | Performed by: SURGERY

## 2017-05-24 NOTE — PROGRESS NOTES
"72-year-old male seen with a left groin bulge of several years duration. Patient reports that he noticed this a while back but it is always been reducible and nonpainful. It does not interfere with his activities of daily living. He also reports a small palpable impulse on the right and this is also asymptomatic. Patient is here to discuss surgical options.    Exam:  Patient Vitals for the past 24 hrs:   BP Temp Temp src Pulse Height Weight   05/24/17 1002 145/71 97.5  F (36.4  C) Oral 77 1.803 m (5' 11\") 88.9 kg (196 lb)     Left groin with small palpable reducible mass, right groin with palpable impulse    Assessment and plan: 72-year-old male with reducible left inguinal hernia and possible early hernia on the right. As the patient is asymptomatic, I do not recommend repair at this time. I did discuss the difference between laparoscopic versus open repair as well as incarceration and strangulation. Should the patient become more symptomatic or these hernias circumflex interfere with his activities of daily living, he is also welcome to return at any time and we can discuss surgical options.    Josemanuel Resendiz MD   "

## 2017-05-24 NOTE — NURSING NOTE
"Initial /71 (BP Location: Right arm, Patient Position: Chair, Cuff Size: Adult Regular)  Pulse 77  Temp 97.5  F (36.4  C) (Oral)  Ht 1.803 m (5' 11\")  Wt 88.9 kg (196 lb)  BMI 27.34 kg/m2 Estimated body mass index is 27.34 kg/(m^2) as calculated from the following:    Height as of this encounter: 1.803 m (5' 11\").    Weight as of this encounter: 88.9 kg (196 lb). .    Carmen Morales MA    "

## 2017-05-24 NOTE — MR AVS SNAPSHOT
After Visit Summary   5/24/2017    Josemanuel Edmond    MRN: [id number]           Patient Information     Date Of Birth          1945        Visit Information        Provider Department      5/24/2017 1:00 PM Josemanuel Resendiz MD [facility]        Today's Diagnoses     Bilateral inguinal hernia without obstruction or gangrene, recurrence not specified    -  1      Care Instructions    Per Dr. Resendiz's instructions          Follow-ups after your visit        Your next 10 appointments already scheduled     May 24, 2017  1:00 PM CDT   New Visit with Josemanuel Resendiz MD   [facility]    [address] [address]   [phone]              Who to contact     If you have questions or need follow up information about today's clinic visit or your schedule please contact [facility] directly at [phone].  Normal or non-critical lab and imaging results will be communicated to you by MyChart, letter or phone within 4 business days after the clinic has received the results. If you do not hear from us within 7 days, please contact the clinic through SimpleTuitionhart or phone. If you have a critical or abnormal lab result, we will notify you by phone as soon as possible.  Submit refill requests through [employer] or call your pharmacy and they will forward the refill request to us. Please allow 3 business days for your refill to be completed.          Additional Information About Your Visit        MyChart Information     [employer] gives you secure access to your electronic health record. If you see a primary care provider, you can also send messages to your care team and make appointments. If you have questions, please call your primary care clinic.  If you do not have a primary care provider, please call [phone] and they will assist you.        Care EveryWhere ID     This is your Care EveryWhere ID. This could be used by other  "organizations to access your [place] medical records  [id number]        Your Vitals Were     Pulse Temperature Height BMI (Body Mass Index)          77 97.5  F (36.4  C) (Oral) 1.803 m (5' 11\") 27.34 kg/m2         Blood Pressure from Last 3 Encounters:   05/24/17 145/71   05/23/17 132/68   04/17/17 150/85    Weight from Last 3 Encounters:   05/24/17 88.9 kg (196 lb)   05/23/17 88.9 kg (196 lb)   04/17/17 86.6 kg (191 lb)              Today, you had the following     No orders found for display       Primary Care Provider Office Phone # Fax #    R Terry Martínez [phone]       [place] [address]        Thank you!     Thank you for choosing [facility]  for your care. Our goal is always to provide you with excellent care. Hearing back from our patients is one way we can continue to improve our services. Please take a few minutes to complete the written survey that you may receive in the mail after your visit with us. Thank you!             Your Updated Medication List - Protect others around you: Learn how to safely use, store and throw away your medicines at www.disposemymeds.org.          This list is accurate as of: 5/24/17 10:16 AM.  Always use your most recent med list.                   Brand Name Dispense Instructions for use    aspirin 81 MG tablet     100 tablet    Take 1 tablet by mouth daily.       buPROPion 150 MG 24 hr tablet    WELLBUTRIN XL    90 tablet    Take 1 tablet (150 mg) by mouth every morning       calcium + D 600-200 MG-UNIT Tabs   Generic drug:  calcium carbonate-vitamin D      2 TABLETS DAILY       doxazosin 4 MG tablet    CARDURA    90 tablet    Take 1 tablet (4 mg) by mouth daily       escitalopram 10 MG tablet    LEXAPRO    90 tablet    Take 1 tablet (10 mg) by mouth daily       gabapentin 300 MG capsule    NEURONTIN    120 capsule    One am and midday, 2 at bedtime       LORazepam 1 MG tablet    ATIVAN    120 " tablet    1 - 2 tablets every 6 hrs to help with alcohol withdrawal. Then go back to 1 every 6 hrs when off the alcohol. Do not operate a vehicle after taking this medication       Multi-vitamin Tabs tablet     100 tablet    Take 1 tablet by mouth daily

## 2017-07-11 ENCOUNTER — OFFICE VISIT (OUTPATIENT)
Dept: FAMILY MEDICINE | Facility: CLINIC | Age: 72
End: 2017-07-11
Payer: COMMERCIAL

## 2017-07-11 VITALS
HEIGHT: 71 IN | WEIGHT: 201.4 LBS | SYSTOLIC BLOOD PRESSURE: 120 MMHG | HEART RATE: 88 BPM | BODY MASS INDEX: 28.19 KG/M2 | TEMPERATURE: 98.1 F | DIASTOLIC BLOOD PRESSURE: 62 MMHG

## 2017-07-11 DIAGNOSIS — R13.19 ESOPHAGEAL DYSPHAGIA: ICD-10-CM

## 2017-07-11 DIAGNOSIS — K21.00 GASTROESOPHAGEAL REFLUX DISEASE WITH ESOPHAGITIS: ICD-10-CM

## 2017-07-11 DIAGNOSIS — J20.9 ACUTE BRONCHITIS WITH SYMPTOMS GREATER THAN 10 DAYS: Primary | ICD-10-CM

## 2017-07-11 PROCEDURE — 99214 OFFICE O/P EST MOD 30 MIN: CPT | Performed by: FAMILY MEDICINE

## 2017-07-11 RX ORDER — AZITHROMYCIN 250 MG/1
TABLET, FILM COATED ORAL
Qty: 6 TABLET | Refills: 0 | Status: SHIPPED | OUTPATIENT
Start: 2017-07-11 | End: 2017-10-09

## 2017-07-11 RX ORDER — ALBUTEROL SULFATE 90 UG/1
2 AEROSOL, METERED RESPIRATORY (INHALATION) EVERY 4 HOURS PRN
Qty: 1 INHALER | Refills: 1 | Status: SHIPPED | OUTPATIENT
Start: 2017-07-11 | End: 2017-12-20

## 2017-07-11 NOTE — MR AVS SNAPSHOT
After Visit Summary   7/11/2017    Josemanuel Edmond    MRN: [id number]           Patient Information     Date Of Birth          1945        Visit Information        Provider Department      7/11/2017 2:20 PM AVIS Martínez MD [facility]        Today's Diagnoses     Acute bronchitis with symptoms greater than 10 days    -  1    Gastroesophageal reflux disease with esophagitis        Esophageal dysphagia          Care Instructions    Take the antibiotic as directed, the inhaler to help with the cough.                  Heartburn/GERD   What is heartburn?   Heartburn refers to the symptoms you feel when acids in your stomach flow back into the esophagus. (The esophagus is the tube that carries food from your throat to your stomach.) This backward movement of stomach acid is called reflux. The acid can burn and irritate the esophagus, throat, and vocal cords.   Heartburn is a common problem. Despite its name, it has nothing to do with the heart.   When you have heartburn often, you may have a condition called gastroesophageal reflux disease, or GERD.   How does it occur?   At the bottom of the esophagus there is a ring of muscle called a sphincter. It acts like a valve. When you swallow food, the sphincter opens to let the food pass into the stomach. The ring then closes to keep the stomach contents from going back into the esophagus. If the sphincter is weak or too relaxed, stomach acid and food flow backward into the esophagus. Because the esophagus does not have the protective lining that the stomach has, the acid causes pain.   The sphincter muscle sometimes does not work properly if:   You are overweight.   You are pregnant.   You have a hiatal hernia (a condition in which part of the stomach protrudes through the diaphragm into the chest).   You eat too much.   You lie down soon after eating.   You wear tight clothes that push on your stomach.   Foods that may make heartburn  worse are:   foods high in fat   sugar   chocolate   peppermint   onions   citrus foods such as orange juice   tomato-based foods   spicy foods   coffee and other drinks with caffeine, such as tea and reba   alcohol.   Heartburn can also be made worse by:   taking certain medicines, such as aspirin   smoking cigarettes.   Anyone can have an attack of heartburn from overeating or eating foods that are high in acid. Most of the time heartburn is mild and lasts for a short time. There is usually not a problem when heartburn occurs just once in a while. You should see your healthcare provider if:   You have heartburn nearly every day for 2 weeks.   The heartburn comes back when the antacid wears off.   Heartburn wakes you up at night.   What are the symptoms?   The main symptom of heartburn is a burning pain in the lower chest, usually close to the bottom of the breastbone. Other symptoms you may have are:   acid or sour taste in your mouth   belching   a feeling of bloating or fullness in the stomach.   These symptoms tend to happen after very large meals and especially with activity such as bending or lifting after meals. The symptoms may be made worse by lying down or by wearing tight clothing.   Heartburn is very common during the last few months of pregnancy. The weight of the baby pushes on the stomach and can cause the sphincter to relax and let acid to flow back into the esophagus.   How is it diagnosed?   Usually heartburn can be diagnosed from your medical history.   If there is any question about the diagnosis, you may have the following tests to check for ulcers or other problems that might cause your symptoms:   barium swallow X-ray study of the esophagus   complete upper GI (gastrointestinal) barium X-ray study of the esophagus, stomach, and upper intestine   endoscopy, a procedure in which a thin flexible tube with a tiny camera is placed in your mouth and down into your stomach so your provider can see  your esophagus and stomach.   How is it treated?   To help reduce the symptoms of heartburn you can:   Try not to put a lot of pressure on the sphincter muscle. Eating light meals and wearing loose clothing will help.   Lose weight if you are overweight.   Take nonprescription antacids (tablets or liquid) after meals and at bedtime.   Raise the head of your bed or use more than one pillow so your head is higher than your stomach. This may allow gravity to help keep food from backing up.   If you find that certain foods or drinks seem to cause your symptoms or make them worse, avoid those foods.   If the simple measures described above do not relieve the symptoms, your healthcare provider may prescribe medicine. The prescription medicines help reduce stomach acid. They also help stomach emptying. A very few people who are not helped with medicines may need surgery.   Get emergency care if the following symptoms occur with the heartburn and do not go away within 15 minutes of treatment for heartburn: shortness of breath; sweating; light-headedness, weakness; or jaw, arm, back, or chest pain.   How long will the effects last?   Heartburn symptoms are usually relieved by treatment in just a few hours or less. If you are having heartburn every day, starting treatment will usually relieve the symptoms in a few days. However, the symptoms may come back from time to time, especially if you gain weight.   Heartburn can sometimes make asthma worse. If you have asthma, preventing or controlling heartburn may help control your asthma symptoms.   How can I help prevent heartburn?   The best prevention is to:   Keep a healthy weight. Lose weight if you are overweight.   Sleep with your head elevated at least 4 to 6 inches. (It's usually most comfortable to put the head of your bed on blocks.)   It may also help if you:   Wait an hour or longer after eating before you lie down. If you have to lie down after a meal, lie on your  "left side. Keep your head and shoulders slightly higher than the rest of your body. It's best to not eat for 2 to 3 hours before you go to bed.   Eat smaller, more frequent meals.   Avoid wearing tight clothing or belts.   Don't smoke. Smoking relaxes the sphincter leading to your stomach.   Avoid foods and other things that seem to cause heartburn or make it worse.   Developed by [employer].   Published by [employer].   Last modified: 2009-01-14   Last reviewed: 2008-12-02             Follow-ups after your visit        Who to contact     If you have questions or need follow up information about today's clinic visit or your schedule please contact [facility] directly at [phone].  Normal or non-critical lab and imaging results will be communicated to you by [employer], letter or phone within 4 business days after the clinic has received the results. If you do not hear from us within 7 days, please contact the clinic through [employer] or phone. If you have a critical or abnormal lab result, we will notify you by phone as soon as possible.  Submit refill requests through [employer] or call your pharmacy and they will forward the refill request to us. Please allow 3 business days for your refill to be completed.          Additional Information About Your Visit        [employer] Information     [employer] gives you secure access to your electronic health record. If you see a primary care provider, you can also send messages to your care team and make appointments. If you have questions, please call your primary care clinic.  If you do not have a primary care provider, please call [phone] and they will assist you.        Care EveryWhere ID     This is your Care EveryWhere ID. This could be used by other organizations to access your [place] medical records  [id number]        Your Vitals Were     Pulse Temperature Height BMI (Body Mass Index)          88 98.1  F (36.7  C) (Tympanic) 5' 11\" (1.803 m) " 28.09 kg/m2         Blood Pressure from Last 3 Encounters:   07/11/17 120/62   05/24/17 145/71   05/23/17 132/68    Weight from Last 3 Encounters:   07/11/17 201 lb 6.4 oz (91.4 kg)   05/24/17 196 lb (88.9 kg)   05/23/17 196 lb (88.9 kg)              Today, you had the following     No orders found for display         Today's Medication Changes          These changes are accurate as of: 7/11/17  2:42 PM.  If you have any questions, ask your nurse or doctor.               Start taking these medicines.        Dose/Directions    albuterol 108 (90 BASE) MCG/ACT Inhaler   Commonly known as:  PROAIR HFA/PROVENTIL HFA/VENTOLIN HFA   Used for:  Acute bronchitis with symptoms greater than 10 days   Started by:  AVIS Martínez MD        Dose:  2 puff   Inhale 2 puffs into the lungs every 4 hours as needed for shortness of breath / dyspnea or wheezing   Quantity:  1 Inhaler   Refills:  1       azithromycin 250 MG tablet   Commonly known as:  ZITHROMAX   Used for:  Acute bronchitis with symptoms greater than 10 days   Started by:  AVIS Martínez MD        2 tabs the first day and one daily for 4 days   Quantity:  6 tablet   Refills:  0       omeprazole 20 MG CR capsule   Commonly known as:  priLOSEC   Used for:  Gastroesophageal reflux disease with esophagitis, Esophageal dysphagia   Started by:  AVIS Martínez MD        Dose:  20 mg   Take 1 capsule (20 mg) by mouth daily   Quantity:  30 capsule   Refills:  5            Where to get your medicines      [facility]  [id number]  [address], [place] [address]    H[facility]:  [facility] [place] Phone:  [phone]     albuterol 108 (90 BASE) MCG/ACT Inhaler    azithromycin 250 MG tablet    omeprazole 20 MG CR capsule                Primary Care Provider Office Phone # Fax #    AVIS Martínez [phone]       [address] [address]         Equal Access to Services     [facility] : Hadii aad ku hadricalvin Juiceali, watreda luqadaha, qaybta kakatharineolya diaz, bree silver. So [facility] [phone].    ATENCIÓN: Si habla esprogelio, tiene a sen disposición servicios gratuitos de asistencia lingüística. Dionicioame al [phone].    We comply with applicable federal civil rights laws and [place] laws. We do not discriminate on the basis of race, color, national origin, age, disability sex, sexual orientation or gender identity.            Thank you!     Thank you for choosing [facility]  for your care. Our goal is always to provide you with excellent care. Hearing back from our patients is one way we can continue to improve our services. Please take a few minutes to complete the written survey that you may receive in the mail after your visit with us. Thank you!             Your Updated Medication List - Protect others around you: Learn how to safely use, store and throw away your medicines at www.disposemymeds.org.          This list is accurate as of: 7/11/17  2:42 PM.  Always use your most recent med list.                   Brand Name Dispense Instructions for use Diagnosis    albuterol 108 (90 BASE) MCG/ACT Inhaler    PROAIR HFA/PROVENTIL HFA/VENTOLIN HFA    1 Inhaler    Inhale 2 puffs into the lungs every 4 hours as needed for shortness of breath / dyspnea or wheezing    Acute bronchitis with symptoms greater than 10 days       aspirin 81 MG tablet     100 tablet    Take 1 tablet by mouth daily.    Occlusion and stenosis of carotid artery without mention of cerebral infarction       azithromycin 250 MG tablet    ZITHROMAX    6 tablet    2 tabs the first day and one daily for 4 days    Acute bronchitis with symptoms greater than 10 days       buPROPion 150 MG 24 hr tablet    WELLBUTRIN XL    90 tablet    Take 1 tablet (150 mg) by mouth every morning    Major depressive disorder, recurrent episode, moderate (H)        calcium + D 600-200 MG-UNIT Tabs   Generic drug:  calcium carbonate-vitamin D      2 TABLETS DAILY        doxazosin 4 MG tablet    CARDURA    90 tablet    Take 1 tablet (4 mg) by mouth daily    Hypertrophy of prostate with urinary obstruction       escitalopram 10 MG tablet    LEXAPRO    90 tablet    Take 1 tablet (10 mg) by mouth daily    Anxiety       gabapentin 300 MG capsule    NEURONTIN    120 capsule    One am and midday, 2 at bedtime    Left lumbar radiculopathy       LORazepam 1 MG tablet    ATIVAN    120 tablet    1 - 2 tablets every 6 hrs to help with alcohol withdrawal. Then go back to 1 every 6 hrs when off the alcohol. Do not operate a vehicle after taking this medication    Anxiety       Multi-vitamin Tabs tablet     100 tablet    Take 1 tablet by mouth daily        omeprazole 20 MG CR capsule    priLOSEC    30 capsule    Take 1 capsule (20 mg) by mouth daily    Gastroesophageal reflux disease with esophagitis, Esophageal dysphagia

## 2017-07-11 NOTE — PATIENT INSTRUCTIONS
Take the antibiotic as directed, the inhaler to help with the cough.                  Heartburn/GERD   What is heartburn?   Heartburn refers to the symptoms you feel when acids in your stomach flow back into the esophagus. (The esophagus is the tube that carries food from your throat to your stomach.) This backward movement of stomach acid is called reflux. The acid can burn and irritate the esophagus, throat, and vocal cords.   Heartburn is a common problem. Despite its name, it has nothing to do with the heart.   When you have heartburn often, you may have a condition called gastroesophageal reflux disease, or GERD.   How does it occur?   At the bottom of the esophagus there is a ring of muscle called a sphincter. It acts like a valve. When you swallow food, the sphincter opens to let the food pass into the stomach. The ring then closes to keep the stomach contents from going back into the esophagus. If the sphincter is weak or too relaxed, stomach acid and food flow backward into the esophagus. Because the esophagus does not have the protective lining that the stomach has, the acid causes pain.   The sphincter muscle sometimes does not work properly if:   You are overweight.   You are pregnant.   You have a hiatal hernia (a condition in which part of the stomach protrudes through the diaphragm into the chest).   You eat too much.   You lie down soon after eating.   You wear tight clothes that push on your stomach.   Foods that may make heartburn worse are:   foods high in fat   sugar   chocolate   peppermint   onions   citrus foods such as orange juice   tomato-based foods   spicy foods   coffee and other drinks with caffeine, such as tea and reba   alcohol.   Heartburn can also be made worse by:   taking certain medicines, such as aspirin   smoking cigarettes.   Anyone can have an attack of heartburn from overeating or eating foods that are high in acid. Most of the time heartburn is mild and lasts for a short  time. There is usually not a problem when heartburn occurs just once in a while. You should see your healthcare provider if:   You have heartburn nearly every day for 2 weeks.   The heartburn comes back when the antacid wears off.   Heartburn wakes you up at night.   What are the symptoms?   The main symptom of heartburn is a burning pain in the lower chest, usually close to the bottom of the breastbone. Other symptoms you may have are:   acid or sour taste in your mouth   belching   a feeling of bloating or fullness in the stomach.   These symptoms tend to happen after very large meals and especially with activity such as bending or lifting after meals. The symptoms may be made worse by lying down or by wearing tight clothing.   Heartburn is very common during the last few months of pregnancy. The weight of the baby pushes on the stomach and can cause the sphincter to relax and let acid to flow back into the esophagus.   How is it diagnosed?   Usually heartburn can be diagnosed from your medical history.   If there is any question about the diagnosis, you may have the following tests to check for ulcers or other problems that might cause your symptoms:   barium swallow X-ray study of the esophagus   complete upper GI (gastrointestinal) barium X-ray study of the esophagus, stomach, and upper intestine   endoscopy, a procedure in which a thin flexible tube with a tiny camera is placed in your mouth and down into your stomach so your provider can see your esophagus and stomach.   How is it treated?   To help reduce the symptoms of heartburn you can:   Try not to put a lot of pressure on the sphincter muscle. Eating light meals and wearing loose clothing will help.   Lose weight if you are overweight.   Take nonprescription antacids (tablets or liquid) after meals and at bedtime.   Raise the head of your bed or use more than one pillow so your head is higher than your stomach. This may allow gravity to help keep food  from backing up.   If you find that certain foods or drinks seem to cause your symptoms or make them worse, avoid those foods.   If the simple measures described above do not relieve the symptoms, your healthcare provider may prescribe medicine. The prescription medicines help reduce stomach acid. They also help stomach emptying. A very few people who are not helped with medicines may need surgery.   Get emergency care if the following symptoms occur with the heartburn and do not go away within 15 minutes of treatment for heartburn: shortness of breath; sweating; light-headedness, weakness; or jaw, arm, back, or chest pain.   How long will the effects last?   Heartburn symptoms are usually relieved by treatment in just a few hours or less. If you are having heartburn every day, starting treatment will usually relieve the symptoms in a few days. However, the symptoms may come back from time to time, especially if you gain weight.   Heartburn can sometimes make asthma worse. If you have asthma, preventing or controlling heartburn may help control your asthma symptoms.   How can I help prevent heartburn?   The best prevention is to:   Keep a healthy weight. Lose weight if you are overweight.   Sleep with your head elevated at least 4 to 6 inches. (It's usually most comfortable to put the head of your bed on blocks.)   It may also help if you:   Wait an hour or longer after eating before you lie down. If you have to lie down after a meal, lie on your left side. Keep your head and shoulders slightly higher than the rest of your body. It's best to not eat for 2 to 3 hours before you go to bed.   Eat smaller, more frequent meals.   Avoid wearing tight clothing or belts.   Don't smoke. Smoking relaxes the sphincter leading to your stomach.   Avoid foods and other things that seem to cause heartburn or make it worse.   Developed by [employer].   Published by [employer].   Last modified: 2009-01-14   Last reviewed:  2008-12-02

## 2017-07-11 NOTE — PROGRESS NOTES
SUBJECTIVE:  Josemanuel Edmond is a 72 year old male who presents with the following concerns;              Symptoms: cc Present Absent Comment   Fever/Chills   X    Fatigue  X     Muscle Aches   X    Eye Irritation   X    Sneezing   X    Nasal [place]/Drg  X     Sinus Pressure/Pain   X    Loss of smell   X    Dental pain   X    Sore Throat   X    Swollen Glands   X    Ear Pain/Fullness   X    Cough X X  PRODUCTIVE COUGH   Wheeze   X    Chest Pain   X    Shortness of breath   X    Rash   X    Other         Symptom duration:  X 10 DAYS   Sympom severity:  NOT GETTING ANY BETTER   Treatments tried:  NONE   Contacts:  FAMILY     Additional problem: He has intermittent episodes where dry food (rice or chicken) will get stuck in his lower esophagus. He will need to expel this food and then he seems to be fine. He denies having any symptoms of heartburn, water brash or pain in the esophageal area except when this episode occurs.      Medications updated and reviewed.  Past, family and surgical history is updated and reviewed in the record.  ROS:  Other than noted above, general, HEENT, respiratory, cardiac and gastrointestinal systems are negative.  OBJECTIVE:  GENERAL:  Alert, no acute distress  EYES:  PERRL, EOM normal, conjunctiva and lids normal  HEENT:  Ears and TMs normal, oral mucosa and posterior oropharynx normal  NECK:  No adenopathy,masses or thyromegaly.  RESP: A few subtle rales noted at both lung bases  CV: normal rate, regular rhythm, no murmur or gallop.  ABDOMEN:  Soft, no organomegaly, masses or tenderness  ASSESSMENT:  1. Acute bronchitis with symptoms greater than 10 days    2. Gastroesophageal reflux disease with esophagitis    3. Esophageal dysphagia        PLAN:  Orders Placed This Encounter     azithromycin (ZITHROMAX) 250 MG tablet     albuterol (PROAIR HFA/PROVENTIL HFA/VENTOLIN HFA) 108 (90 BASE) MCG/ACT Inhaler     omeprazole (PRILOSEC) 20 MG CR capsule     I explained to him how there are  people who will get significant esophageal stricture and dysphasia even though they do not have the typical symptoms of heartburn. Will need to treat him with lifestyle changes and the acid blocker and if not improving would need to consider upper GI endoscopy. He continues to have significant stress in his life with a son who is a narcotic addict and having difficulty getting off the narcotics        Patient Instructions   Take the antibiotic as directed, the inhaler to help with the cough.                  Heartburn/GERD   What is heartburn?   Heartburn refers to the symptoms you feel when acids in your stomach flow back into the esophagus. (The esophagus is the tube that carries food from your throat to your stomach.) This backward movement of stomach acid is called reflux. The acid can burn and irritate the esophagus, throat, and vocal cords.   Heartburn is a common problem. Despite its name, it has nothing to do with the heart.   When you have heartburn often, you may have a condition called gastroesophageal reflux disease, or GERD.   How does it occur?   At the bottom of the esophagus there is a ring of muscle called a sphincter. It acts like a valve. When you swallow food, the sphincter opens to let the food pass into the stomach. The ring then closes to keep the stomach contents from going back into the esophagus. If the sphincter is weak or too relaxed, stomach acid and food flow backward into the esophagus. Because the esophagus does not have the protective lining that the stomach has, the acid causes pain.   The sphincter muscle sometimes does not work properly if:   You are overweight.   You are pregnant.   You have a hiatal hernia (a condition in which part of the stomach protrudes through the diaphragm into the chest).   You eat too much.   You lie down soon after eating.   You wear tight clothes that push on your stomach.   Foods that may make heartburn worse are:   foods high in fat   sugar    chocolate   peppermint   onions   citrus foods such as orange juice   tomato-based foods   spicy foods   coffee and other drinks with caffeine, such as tea and reba   alcohol.   Heartburn can also be made worse by:   taking certain medicines, such as aspirin   smoking cigarettes.   Anyone can have an attack of heartburn from overeating or eating foods that are high in acid. Most of the time heartburn is mild and lasts for a short time. There is usually not a problem when heartburn occurs just once in a while. You should see your healthcare provider if:   You have heartburn nearly every day for 2 weeks.   The heartburn comes back when the antacid wears off.   Heartburn wakes you up at night.   What are the symptoms?   The main symptom of heartburn is a burning pain in the lower chest, usually close to the bottom of the breastbone. Other symptoms you may have are:   acid or sour taste in your mouth   belching   a feeling of bloating or fullness in the stomach.   These symptoms tend to happen after very large meals and especially with activity such as bending or lifting after meals. The symptoms may be made worse by lying down or by wearing tight clothing.   Heartburn is very common during the last few months of pregnancy. The weight of the baby pushes on the stomach and can cause the sphincter to relax and let acid to flow back into the esophagus.   How is it diagnosed?   Usually heartburn can be diagnosed from your medical history.   If there is any question about the diagnosis, you may have the following tests to check for ulcers or other problems that might cause your symptoms:   barium swallow X-ray study of the esophagus   complete upper GI (gastrointestinal) barium X-ray study of the esophagus, stomach, and upper intestine   endoscopy, a procedure in which a thin flexible tube with a tiny camera is placed in your mouth and down into your stomach so your provider can see your esophagus and stomach.   How is it  treated?   To help reduce the symptoms of heartburn you can:   Try not to put a lot of pressure on the sphincter muscle. Eating light meals and wearing loose clothing will help.   Lose weight if you are overweight.   Take nonprescription antacids (tablets or liquid) after meals and at bedtime.   Raise the head of your bed or use more than one pillow so your head is higher than your stomach. This may allow gravity to help keep food from backing up.   If you find that certain foods or drinks seem to cause your symptoms or make them worse, avoid those foods.   If the simple measures described above do not relieve the symptoms, your healthcare provider may prescribe medicine. The prescription medicines help reduce stomach acid. They also help stomach emptying. A very few people who are not helped with medicines may need surgery.   Get emergency care if the following symptoms occur with the heartburn and do not go away within 15 minutes of treatment for heartburn: shortness of breath; sweating; light-headedness, weakness; or jaw, arm, back, or chest pain.   How long will the effects last?   Heartburn symptoms are usually relieved by treatment in just a few hours or less. If you are having heartburn every day, starting treatment will usually relieve the symptoms in a few days. However, the symptoms may come back from time to time, especially if you gain weight.   Heartburn can sometimes make asthma worse. If you have asthma, preventing or controlling heartburn may help control your asthma symptoms.   How can I help prevent heartburn?   The best prevention is to:   Keep a healthy weight. Lose weight if you are overweight.   Sleep with your head elevated at least 4 to 6 inches. (It's usually most comfortable to put the head of your bed on blocks.)   It may also help if you:   Wait an hour or longer after eating before you lie down. If you have to lie down after a meal, lie on your left side. Keep your head and shoulders  slightly higher than the rest of your body. It's best to not eat for 2 to 3 hours before you go to bed.   Eat smaller, more frequent meals.   Avoid wearing tight clothing or belts.   Don't smoke. Smoking relaxes the sphincter leading to your stomach.   Avoid foods and other things that seem to cause heartburn or make it worse.   Developed by [employer].   Published by [employer].   Last modified: 2009-01-14   Last reviewed: 2008-12-02

## 2017-07-11 NOTE — NURSING NOTE
"Chief Complaint   Patient presents with     URI     X 10 days. productive cough      Patient Request     concerns with dry foods gettting stuck X 1 month.       Initial /62 (BP Location: Right arm, Patient Position: Chair, Cuff Size: Adult Large)  Pulse 88  Temp 98.1  F (36.7  C) (Tympanic)  Ht 5' 11\" (1.803 m)  Wt 201 lb 6.4 oz (91.4 kg)  BMI 28.09 kg/m2 Estimated body mass index is 28.09 kg/(m^2) as calculated from the following:    Height as of this encounter: 5' 11\" (1.803 m).    Weight as of this encounter: 201 lb 6.4 oz (91.4 kg).  Medication Reconciliation: complete     Gretel Rodriguez, CMA      "

## 2017-07-20 ENCOUNTER — MYC MEDICAL ADVICE (OUTPATIENT)
Dept: FAMILY MEDICINE | Facility: CLINIC | Age: 72
End: 2017-07-20

## 2017-07-20 ENCOUNTER — MYC REFILL (OUTPATIENT)
Dept: FAMILY MEDICINE | Facility: CLINIC | Age: 72
End: 2017-07-20

## 2017-07-20 DIAGNOSIS — M54.16 LEFT LUMBAR RADICULOPATHY: ICD-10-CM

## 2017-07-20 DIAGNOSIS — F33.1 MAJOR DEPRESSIVE DISORDER, RECURRENT EPISODE, MODERATE (H): ICD-10-CM

## 2017-07-21 RX ORDER — GABAPENTIN 300 MG/1
CAPSULE ORAL
Qty: 120 CAPSULE | Refills: 11 | Status: SHIPPED | OUTPATIENT
Start: 2017-07-21 | End: 2018-03-13

## 2017-07-21 RX ORDER — BUPROPION HYDROCHLORIDE 300 MG/1
300 TABLET ORAL EVERY MORNING
Qty: 30 TABLET | Refills: 1 | Status: SHIPPED | OUTPATIENT
Start: 2017-07-21 | End: 2017-09-13

## 2017-07-21 NOTE — TELEPHONE ENCOUNTER
Dr. Martínez,    Patient asking for refill of gabapentin.  LOV 7/11/17.  Routing refill request to provider for review/approval because:  Drug not on the [facility] refill protocol Alexandra QUESADA RN

## 2017-07-21 NOTE — TELEPHONE ENCOUNTER
Message from [employer]:  Original authorizing provider: MD Gavin Ramirez would like a refill of the following medications:  gabapentin (NEURONTIN) 300 MG capsule [Carmen Abdalla MD]    Preferred pharmacy: [facility] - [place], [place] - [address]    Comment:

## 2017-08-09 DIAGNOSIS — F41.9 ANXIETY: ICD-10-CM

## 2017-08-09 RX ORDER — ESCITALOPRAM OXALATE 10 MG/1
10 TABLET ORAL DAILY
Qty: 90 TABLET | Refills: 1 | OUTPATIENT
Start: 2017-08-09

## 2017-08-09 NOTE — TELEPHONE ENCOUNTER
Escitalopram       Last Written Prescription Date: 05/18/2017  Last Fill Quantity: 90; # refills: 1  Last Office Visit with G, P or [facility] prescribing provider:  07/11/2017        Last PHQ-9 score on record=   PHQ-9 SCORE 4/13/2017   Total Score -   Total Score 4     PHQ-9 SCORE 10/1/2015 6/8/2016 4/13/2017   Total Score - - -   Total Score 2 3 4     CAROLYNN-7 SCORE 10/1/2015 6/8/2016 4/13/2017   Total Score - - -   Total Score 4 3 6       Lab Results   Component Value Date    AST 31 07/12/2010     Lab Results   Component Value Date    ALT 30 12/09/2010       Isrrael MajorR)

## 2017-08-16 ENCOUNTER — MYC REFILL (OUTPATIENT)
Dept: FAMILY MEDICINE | Facility: CLINIC | Age: 72
End: 2017-08-16

## 2017-08-16 DIAGNOSIS — M54.16 LEFT LUMBAR RADICULOPATHY: ICD-10-CM

## 2017-08-16 RX ORDER — GABAPENTIN 300 MG/1
CAPSULE ORAL
Qty: 120 CAPSULE | Refills: 11 | Status: CANCELLED | OUTPATIENT
Start: 2017-08-16

## 2017-08-16 NOTE — TELEPHONE ENCOUNTER
Message from [employer]:  Original authorizing provider: MD Gavin Tee would like a refill of the following medications:  gabapentin (NEURONTIN) 300 MG capsule [AVIS Martínez MD]    Preferred pharmacy: [facility] - [place], [place] - [address].    Comment:  also zolpidem

## 2017-09-13 DIAGNOSIS — F33.1 MAJOR DEPRESSIVE DISORDER, RECURRENT EPISODE, MODERATE (H): ICD-10-CM

## 2017-09-13 NOTE — TELEPHONE ENCOUNTER
Bupropion     Last Written Prescription Date: 07/21/2017  Last Fill Quantity: 30, # refills: 1  Last Office Visit with G primary care provider:  07/11/2017        Last PHQ-9 score on record=   PHQ-9 SCORE 4/13/2017   Total Score -   Total Score 4     PHQ-9 SCORE 10/1/2015 6/8/2016 4/13/2017   Total Score - - -   Total Score 2 3 4     CAROLYNN-7 SCORE 10/1/2015 6/8/2016 4/13/2017   Total Score - - -   Total Score 4 3 6       Isrrael CUELLO (R)

## 2017-09-19 RX ORDER — BUPROPION HYDROCHLORIDE 300 MG/1
300 TABLET ORAL EVERY MORNING
Qty: 90 TABLET | Refills: 1 | Status: SHIPPED | OUTPATIENT
Start: 2017-09-19 | End: 2017-11-28

## 2017-10-09 ENCOUNTER — OFFICE VISIT (OUTPATIENT)
Dept: FAMILY MEDICINE | Facility: CLINIC | Age: 72
End: 2017-10-09
Payer: COMMERCIAL

## 2017-10-09 VITALS
HEIGHT: 71 IN | BODY MASS INDEX: 27.35 KG/M2 | WEIGHT: 195.4 LBS | DIASTOLIC BLOOD PRESSURE: 72 MMHG | SYSTOLIC BLOOD PRESSURE: 122 MMHG | HEART RATE: 80 BPM

## 2017-10-09 DIAGNOSIS — F41.9 ANXIETY: ICD-10-CM

## 2017-10-09 DIAGNOSIS — K29.20 ACUTE ALCOHOLIC GASTRITIS WITHOUT HEMORRHAGE: Primary | ICD-10-CM

## 2017-10-09 DIAGNOSIS — K70.9 ALCOHOLIC LIVER DISEASE (H): ICD-10-CM

## 2017-10-09 DIAGNOSIS — I10 HYPERTENSION GOAL BP (BLOOD PRESSURE) < 140/90: ICD-10-CM

## 2017-10-09 DIAGNOSIS — E78.5 HYPERLIPIDEMIA LDL GOAL <100: ICD-10-CM

## 2017-10-09 PROCEDURE — 80076 HEPATIC FUNCTION PANEL: CPT | Performed by: FAMILY MEDICINE

## 2017-10-09 PROCEDURE — 82977 ASSAY OF GGT: CPT | Performed by: FAMILY MEDICINE

## 2017-10-09 PROCEDURE — 99214 OFFICE O/P EST MOD 30 MIN: CPT | Performed by: FAMILY MEDICINE

## 2017-10-09 PROCEDURE — 80048 BASIC METABOLIC PNL TOTAL CA: CPT | Performed by: FAMILY MEDICINE

## 2017-10-09 PROCEDURE — 36415 COLL VENOUS BLD VENIPUNCTURE: CPT | Performed by: FAMILY MEDICINE

## 2017-10-09 PROCEDURE — 80061 LIPID PANEL: CPT | Performed by: FAMILY MEDICINE

## 2017-10-09 RX ORDER — ESCITALOPRAM OXALATE 10 MG/1
10 TABLET ORAL DAILY
Qty: 90 TABLET | Refills: 1 | Status: SHIPPED | OUTPATIENT
Start: 2017-10-09 | End: 2018-03-19

## 2017-10-09 ASSESSMENT — ANXIETY QUESTIONNAIRES
7. FEELING AFRAID AS IF SOMETHING AWFUL MIGHT HAPPEN: NOT AT ALL
GAD7 TOTAL SCORE: 4
3. WORRYING TOO MUCH ABOUT DIFFERENT THINGS: SEVERAL DAYS
5. BEING SO RESTLESS THAT IT IS HARD TO SIT STILL: NOT AT ALL
6. BECOMING EASILY ANNOYED OR IRRITABLE: NOT AT ALL
IF YOU CHECKED OFF ANY PROBLEMS ON THIS QUESTIONNAIRE, HOW DIFFICULT HAVE THESE PROBLEMS MADE IT FOR YOU TO DO YOUR WORK, TAKE CARE OF THINGS AT HOME, OR GET ALONG WITH OTHER PEOPLE: SOMEWHAT DIFFICULT
2. NOT BEING ABLE TO STOP OR CONTROL WORRYING: SEVERAL DAYS
1. FEELING NERVOUS, ANXIOUS, OR ON EDGE: SEVERAL DAYS

## 2017-10-09 ASSESSMENT — PATIENT HEALTH QUESTIONNAIRE - PHQ9
5. POOR APPETITE OR OVEREATING: SEVERAL DAYS
SUM OF ALL RESPONSES TO PHQ QUESTIONS 1-9: 4

## 2017-10-09 NOTE — MR AVS SNAPSHOT
After Visit Summary   10/9/2017    Josemanuel Edmond    MRN: [id number]           Patient Information     Date Of Birth          1945        Visit Information        Provider Department      10/9/2017 4:00 PM AVIS Martínez MD [facility]        Today's Diagnoses     Acute alcoholic gastritis without hemorrhage    -  1    Hypertension goal BP (blood pressure) < 140/90        Hyperlipidemia LDL goal <100        Alcoholic liver disease (H)         Anxiety          Care Instructions    You need to get off the alcohol and probably go to a detox center initially.  Then outpatient treatment. The stomach pain will subside when you are off the alcohol and taking the omeprazole.          Follow-ups after your visit        Who to contact     If you have questions or need follow up information about today's clinic visit or your schedule please contact [facility] directly at [phone].  Normal or non-critical lab and imaging results will be communicated to you by [employer], letter or phone within 4 business days after the clinic has received the results. If you do not hear from us within 7 days, please contact the clinic through [employer] or phone. If you have a critical or abnormal lab result, we will notify you by phone as soon as possible.  Submit refill requests through "[employer]" or call your pharmacy and they will forward the refill request to us. Please allow 3 business days for your refill to be completed.          Additional Information About Your Visit        MyChart Information     "[employer] gives you secure access to your electronic health record. If you see a primary care provider, you can also send messages to your care team and make appointments. If you have questions, please call your primary care clinic.  If you do not have a primary care provider, please call [phone] and they will assist you.        Care EveryWhere ID     This is your Care EveryWhere ID.  "This could be used by other organizations to access your [place] medical records  [id number]        Your Vitals Were     Pulse Height BMI (Body Mass Index)             80 5' 11\" (1.803 m) 27.25 kg/m2          Blood Pressure from Last 3 Encounters:   10/09/17 122/72   07/11/17 120/62   05/24/17 145/71    Weight from Last 3 Encounters:   10/09/17 195 lb 6.4 oz (88.6 kg)   07/11/17 201 lb 6.4 oz (91.4 kg)   05/24/17 196 lb (88.9 kg)              We Performed the Following     Basic metabolic panel     GGT     Hepatic panel     Lipid panel reflex to direct LDL          Where to get your medicines      [facility] [place] - [address]  [address], [place] [address]    Hours:  [facility] [facility] Phone:  [phone]     escitalopram 10 MG tablet          Primary Care Provider Office Phone # Fax #    R Terry Martínez [phone] [address] [facility] [address]        Equal Access to Services     [facility] AH: Hadii aad ku hadasho Soomaali, waaxda luqadaha, qaybta kaalmada adeegyada, bree medina . So [facility] [phone].    ATENCIÓN: Si habla español, tiene a sen disposición servicios gratuitos de asistencia lingüística. [facility] [phone].    We comply with applicable federal civil rights laws and [place] laws. We do not discriminate on the basis of race, color, national origin, age, disability, sex, sexual orientation, or gender identity.            Thank you!     Thank you for choosing [facility]  for your care. Our goal is always to provide you with excellent care. Hearing back from our patients is one way we can continue to improve our services. Please take a few minutes to complete the written survey that you may receive in the mail after your visit with us. Thank you!             Your Updated Medication List - Protect others around you: Learn how to " safely use, store and throw away your medicines at www.disposemymeds.org.          This list is accurate as of: 10/9/17  4:48 PM.  Always use your most recent med list.                   Brand Name Dispense Instructions for use Diagnosis    albuterol 108 (90 BASE) MCG/ACT Inhaler    PROAIR HFA/PROVENTIL HFA/VENTOLIN HFA    1 Inhaler    Inhale 2 puffs into the lungs every 4 hours as needed for shortness of breath / dyspnea or wheezing    Acute bronchitis with symptoms greater than 10 days       aspirin 81 MG tablet     100 tablet    Take 1 tablet by mouth daily.    Occlusion and stenosis of carotid artery without mention of cerebral infarction       buPROPion 300 MG 24 hr tablet    WELLBUTRIN XL    90 tablet    Take 1 tablet (300 mg) by mouth every morning    Major depressive disorder, recurrent episode, moderate (H)       calcium + D 600-200 MG-UNIT Tabs   Generic drug:  calcium carbonate-vitamin D      2 TABLETS DAILY        doxazosin 4 MG tablet    CARDURA    90 tablet    Take 1 tablet (4 mg) by mouth daily    Hypertrophy of prostate with urinary obstruction       escitalopram 10 MG tablet    LEXAPRO    90 tablet    Take 1 tablet (10 mg) by mouth daily    Anxiety       gabapentin 300 MG capsule    NEURONTIN    120 capsule    One am and midday, 2 at bedtime    Left lumbar radiculopathy       LORazepam 1 MG tablet    ATIVAN    120 tablet    1 - 2 tablets every 6 hrs to help with alcohol withdrawal. Then go back to 1 every 6 hrs when off the alcohol. Do not operate a vehicle after taking this medication    Anxiety       Multi-vitamin Tabs tablet     100 tablet    Take 1 tablet by mouth daily        omeprazole 20 MG CR capsule    priLOSEC    30 capsule    Take 1 capsule (20 mg) by mouth daily    Gastroesophageal reflux disease with esophagitis, Esophageal dysphagia       zolpidem 10 MG tablet    AMBIEN    30 tablet    Take 1 tablet (10 mg) by mouth nightly as needed for sleep    Insomnia due to other mental disorder

## 2017-10-09 NOTE — PATIENT INSTRUCTIONS
You need to get off the alcohol and probably go to a detox center initially.  Then outpatient or residential treatment. The stomach pain will subside when you are off the alcohol and taking the omeprazole.

## 2017-10-09 NOTE — NURSING NOTE
"Chief Complaint   Patient presents with     Patient Request     stomach pains X 3-4 months. ? alcohol related. pt. states stomach feels better when he drinks alcohol the pain goes away.        Initial /72 (BP Location: Right arm, Patient Position: Chair, Cuff Size: Adult Regular)  Pulse 80  Ht 5' 11\" (1.803 m)  Wt 195 lb 6.4 oz (88.6 kg)  BMI 27.25 kg/m2 Estimated body mass index is 27.25 kg/(m^2) as calculated from the following:    Height as of this encounter: 5' 11\" (1.803 m).    Weight as of this encounter: 195 lb 6.4 oz (88.6 kg).  Medication Reconciliation: complete     Gretel Rodriguez, KATIE      "

## 2017-10-10 LAB
ALBUMIN SERPL-MCNC: 3.9 G/DL (ref 3.4–5)
ALP SERPL-CCNC: 70 U/L (ref 40–150)
ALT SERPL W P-5'-P-CCNC: 42 U/L (ref 0–70)
ANION GAP SERPL CALCULATED.3IONS-SCNC: 11 MMOL/L (ref 3–14)
AST SERPL W P-5'-P-CCNC: 33 U/L (ref 0–45)
BILIRUB DIRECT SERPL-MCNC: 0.2 MG/DL (ref 0–0.2)
BILIRUB SERPL-MCNC: 0.5 MG/DL (ref 0.2–1.3)
BUN SERPL-MCNC: 11 MG/DL (ref 7–30)
CALCIUM SERPL-MCNC: 8.5 MG/DL (ref 8.5–10.1)
CHLORIDE SERPL-SCNC: 98 MMOL/L (ref 94–109)
CHOLEST SERPL-MCNC: 231 MG/DL
CO2 SERPL-SCNC: 23 MMOL/L (ref 20–32)
CREAT SERPL-MCNC: 1.01 MG/DL (ref 0.66–1.25)
GFR SERPL CREATININE-BSD FRML MDRD: 72 ML/MIN/1.7M2
GGT SERPL-CCNC: 69 U/L (ref 0–75)
GLUCOSE SERPL-MCNC: 85 MG/DL (ref 70–99)
HDLC SERPL-MCNC: 71 MG/DL
LDLC SERPL CALC-MCNC: 112 MG/DL
NONHDLC SERPL-MCNC: 160 MG/DL
POTASSIUM SERPL-SCNC: 3.9 MMOL/L (ref 3.4–5.3)
PROT SERPL-MCNC: 7.4 G/DL (ref 6.8–8.8)
SODIUM SERPL-SCNC: 132 MMOL/L (ref 133–144)
TRIGL SERPL-MCNC: 242 MG/DL

## 2017-10-10 ASSESSMENT — ANXIETY QUESTIONNAIRES: GAD7 TOTAL SCORE: 4

## 2017-10-12 NOTE — PROGRESS NOTES
Josemanuel,  Your labs were all acceptable except for the cholesterol levels. I think you should get back on your cholesterol lowering medication.      Terry Martínez MD

## 2017-10-30 ENCOUNTER — TRANSFERRED RECORDS (OUTPATIENT)
Dept: HEALTH INFORMATION MANAGEMENT | Facility: CLINIC | Age: 72
End: 2017-10-30

## 2017-11-07 ENCOUNTER — TRANSFERRED RECORDS (OUTPATIENT)
Dept: HEALTH INFORMATION MANAGEMENT | Facility: CLINIC | Age: 72
End: 2017-11-07

## 2017-11-28 ENCOUNTER — TELEPHONE (OUTPATIENT)
Dept: CARE COORDINATION | Facility: CLINIC | Age: 72
End: 2017-11-28

## 2017-11-28 ENCOUNTER — OFFICE VISIT (OUTPATIENT)
Dept: FAMILY MEDICINE | Facility: CLINIC | Age: 72
End: 2017-11-28
Payer: COMMERCIAL

## 2017-11-28 VITALS
WEIGHT: 192.5 LBS | DIASTOLIC BLOOD PRESSURE: 60 MMHG | HEIGHT: 71 IN | HEART RATE: 64 BPM | SYSTOLIC BLOOD PRESSURE: 90 MMHG | BODY MASS INDEX: 26.95 KG/M2

## 2017-11-28 DIAGNOSIS — J18.9 COMMUNITY ACQUIRED PNEUMONIA OF LEFT LOWER LOBE OF LUNG: Primary | ICD-10-CM

## 2017-11-28 DIAGNOSIS — F33.1 MAJOR DEPRESSIVE DISORDER, RECURRENT EPISODE, MODERATE (H): ICD-10-CM

## 2017-11-28 DIAGNOSIS — F10.21 ALCOHOLISM IN REMISSION (H): ICD-10-CM

## 2017-11-28 DIAGNOSIS — F51.01 PRIMARY INSOMNIA: ICD-10-CM

## 2017-11-28 DIAGNOSIS — J18.9 COMMUNITY ACQUIRED PNEUMONIA OF LEFT LOWER LOBE OF LUNG: ICD-10-CM

## 2017-11-28 DIAGNOSIS — J86.9 EMPYEMA OF LUNG (H): Primary | ICD-10-CM

## 2017-11-28 PROBLEM — Z71.41 ALCOHOL ABUSE COUNSELING AND SURVEILLANCE: Status: RESOLVED | Noted: 2017-04-13 | Resolved: 2017-11-28

## 2017-11-28 PROCEDURE — 99214 OFFICE O/P EST MOD 30 MIN: CPT | Performed by: FAMILY MEDICINE

## 2017-11-28 RX ORDER — NORTRIPTYLINE HCL 10 MG
CAPSULE ORAL
Qty: 60 CAPSULE | Refills: 5 | Status: SHIPPED | OUTPATIENT
Start: 2017-11-28 | End: 2018-03-30 | Stop reason: ALTCHOICE

## 2017-11-28 RX ORDER — BUPROPION HYDROCHLORIDE 150 MG/1
150 TABLET ORAL EVERY MORNING
Qty: 90 TABLET | Refills: 1
Start: 2017-11-28 | End: 2017-12-11

## 2017-11-28 NOTE — PATIENT INSTRUCTIONS
Attached is the current up to date list of medications.    Recheck in a month for a repeat chest xray

## 2017-11-28 NOTE — TELEPHONE ENCOUNTER
DC'd from [facility] on 11/22 to [facility]   Primary Problem: Community acquired pneumonia of left lower lobe of lung  LOS: 14.8

## 2017-11-28 NOTE — NURSING NOTE
"Chief Complaint   Patient presents with     Hospital F/U     pneumonia and surgery on a pus pocket removal. surgery was at [facility]      Medication Reconciliation     pt. states there has been a few changes in his medications to go over with Dr. Martínez.        Initial BP 90/60 (BP Location: Right arm, Patient Position: Chair, Cuff Size: Adult Regular)  Pulse 64  Ht 5' 11\" (1.803 m)  Wt 192 lb 8 oz (87.3 kg)  BMI 26.85 kg/m2 Estimated body mass index is 26.85 kg/(m^2) as calculated from the following:    Height as of this encounter: 5' 11\" (1.803 m).    Weight as of this encounter: 192 lb 8 oz (87.3 kg).  Medication Reconciliation: complete       Gretel Rodriguez, KATIE      "

## 2017-11-28 NOTE — MR AVS SNAPSHOT
"              After Visit Summary   11/28/2017    Josemanuel Edmond    MRN: [id number]           Patient Information     Date Of Birth          1945        Visit Information        Provider Department      11/28/2017 12:40 PM AVIS Martínez MD [facility]        Today's Diagnoses     Primary insomnia    -  1    Major depressive disorder, recurrent episode, moderate (H)          Care Instructions    Attached is the current up to date list of medications.    Recheck in a month for a repeat chest xray          Follow-ups after your visit        Who to contact     If you have questions or need follow up information about today's clinic visit or your schedule please contact [facility] directly at [phone].  Normal or non-critical lab and imaging results will be communicated to you by [employer], letter or phone within 4 business days after the clinic has received the results. If you do not hear from us within 7 days, please contact the clinic through [employer] or phone. If you have a critical or abnormal lab result, we will notify you by phone as soon as possible.  Submit refill requests through [employer] or call your pharmacy and they will forward the refill request to us. Please allow 3 business days for your refill to be completed.          Additional Information About Your Visit        MyChart Information     [employer] gives you secure access to your electronic health record. If you see a primary care provider, you can also send messages to your care team and make appointments. If you have questions, please call your primary care clinic.  If you do not have a primary care provider, please call [phone] and they will assist you.        Care EveryWhere ID     This is your Care EveryWhere ID. This could be used by other organizations to access your [place] medical records  [id number]        Your Vitals Were     Pulse Height BMI (Body Mass Index)             64 5' 11\" (1.803 m) " 26.85 kg/m2          Blood Pressure from Last 3 Encounters:   11/28/17 90/60   10/09/17 122/72   07/11/17 120/62    Weight from Last 3 Encounters:   11/28/17 192 lb 8 oz (87.3 kg)   10/09/17 195 lb 6.4 oz (88.6 kg)   07/11/17 201 lb 6.4 oz (91.4 kg)              Today, you had the following     No orders found for display         Today's Medication Changes          These changes are accurate as of: 11/28/17  1:27 PM.  If you have any questions, ask your nurse or doctor.               Start taking these medicines.        Dose/Directions    nortriptyline 10 MG capsule   Commonly known as:  PAMELOR   Used for:  Primary insomnia   Started by:  AVIS Martínez MD        2 capsules at bedtime   Quantity:  60 capsule   Refills:  5         These medicines have changed or have updated prescriptions.        Dose/Directions    buPROPion 150 MG 24 hr tablet   Commonly known as:  WELLBUTRIN XL   This may have changed:    - medication strength  - how much to take   Used for:  Major depressive disorder, recurrent episode, moderate (H)   Changed by:  AVIS Martínez MD        Dose:  150 mg   Take 1 tablet (150 mg) by mouth every morning   Quantity:  90 tablet   Refills:  1            Where to get your medicines      These medications were sent to [place] [facility] - [place] - [id number]  [address], [place] [address]    Hours:  [facility] [place] [facility] Phone:  [phone]     nortriptyline 10 MG capsule         Some of these will need a paper prescription and others can be bought over the counter.  Ask your nurse if you have questions.     You don't need a prescription for these medications     buPROPion 150 MG 24 hr tablet                Primary Care Provider Office Phone # Fax #    AVIS Martínez [phone] [address] [facility] [address]        Equal Access to Services     MERARI NASCIMENTO AH: Kyleigh Juarez, jef adamson, paul diaz,  bree dickerson estefany macias'aan ah. So [facility] [phone].    ATENCIÓN: Si richyla ortiz, tiene a sen disposición servicios gratuitos de asistencia lingüística. Saira monaco [phone].    We comply with applicable federal civil rights laws and [place] laws. We do not discriminate on the basis of race, color, national origin, age, disability, sex, sexual orientation, or gender identity.            Thank you!     Thank you for choosing [facility]  for your care. Our goal is always to provide you with excellent care. Hearing back from our patients is one way we can continue to improve our services. Please take a few minutes to complete the written survey that you may receive in the mail after your visit with us. Thank you!             Your Updated Medication List - Protect others around you: Learn how to safely use, store and throw away your medicines at www.disposemymeds.org.          This list is accurate as of: 11/28/17  1:27 PM.  Always use your most recent med list.                   Brand Name Dispense Instructions for use Diagnosis    albuterol 108 (90 BASE) MCG/ACT Inhaler    PROAIR HFA/PROVENTIL HFA/VENTOLIN HFA    1 Inhaler    Inhale 2 puffs into the lungs every 4 hours as needed for shortness of breath / dyspnea or wheezing    Acute bronchitis with symptoms greater than 10 days       aspirin 81 MG tablet     100 tablet    Take 1 tablet by mouth daily.    Occlusion and stenosis of carotid artery without mention of cerebral infarction       buPROPion 150 MG 24 hr tablet    WELLBUTRIN XL    90 tablet    Take 1 tablet (150 mg) by mouth every morning    Major depressive disorder, recurrent episode, moderate (H)       calcium + D 600-200 MG-UNIT Tabs   Generic drug:  calcium carbonate-vitamin D      2 TABLETS DAILY        doxazosin 4 MG tablet    CARDURA    90 tablet    Take 1 tablet (4 mg) by mouth daily    Hypertrophy of prostate with urinary obstruction       escitalopram 10 MG tablet     LEXAPRO    90 tablet    Take 1 tablet (10 mg) by mouth daily    Anxiety       gabapentin 300 MG capsule    NEURONTIN    120 capsule    One am and midday, 2 at bedtime    Left lumbar radiculopathy       Multi-vitamin Tabs tablet     100 tablet    Take 1 tablet by mouth daily        nortriptyline 10 MG capsule    PAMELOR    60 capsule    2 capsules at bedtime    Primary insomnia

## 2017-11-28 NOTE — PROGRESS NOTES
SUBJECTIVE:   Josemanuel Edmond is a 72 year old male who presents to clinic today for the following health issues:          Hospital Follow-up Visit:    Hospital/Nursing Home/IP Rehab Facility: [facility]  Date of Admission: 10/30/2017  Date of Discharge: 11/22/2017  Reason(s) for Admission: alcohol abuse, pneumonia            Problems taking medications regularly:  None       Medication changes since discharge: None       Problems adhering to non-medication therapy:  None    Summary of hospitalization:  See outside records, reviewed and scanned  Diagnostic Tests/Treatments reviewed.  Follow up needed: Follow-up chest x-ray scheduled next week with his surgeon  Other Healthcare Providers Involved in Patient s Care:         Specialist appointment - Chest surgeon December 5  Update since discharge: improved.     Post Discharge Medication Reconciliation: discharge medications reconciled and changed, per note/orders (see AVS).  Plan of care communicated with patient     Coding guidelines for this visit:  Type of Medical   Decision Making Face-to-Face Visit       within 7 Days of discharge Face-to-Face Visit        within 14 days of discharge   Moderate Complexity [id number]   High Complexity [id number]                  Problem list and histories reviewed & adjusted, as indicated.  Additional history: His drinking had gotten out of control so that he was starting to drink in the morning. After discussing with his son and his wife he admitted himself to the residential treatment program at [facility]. This was going well and he was making good progress when he became ill with a fever and cough. He was found to have a severe community-acquired pneumonia and was transferred to [facility]. There he was found to have an empyema on the left side that was not responding to intravenous antibiotics so he was taken to the operating room for a thoracotomy and decortication of empyema. He got home from the hospital  "the day before Thanksgiving and is doing well. He is finally able to take a fairly deep breath without pain. During the inpatient treatment program for his alcohol abuse he was taken off the lorazepam and Ambien, was switched to nortriptyline to help with sleep        Reviewed and updated as needed this visit by clinical staff  Allergies       Reviewed and updated as needed this visit by Provider               ROS:  Constitutional, HEENT, cardiovascular, pulmonary, gi and gu systems are negative, except as otherwise noted.          OBJECTIVE:                                                    BP 90/60 (BP Location: Right arm, Patient Position: Chair, Cuff Size: Adult Regular)  Pulse 64  Ht 5' 11\" (1.803 m)  Wt 192 lb 8 oz (87.3 kg)  BMI 26.85 kg/m2    GENERAL: healthy, alert and no distress  EYES: Eyes grossly normal to inspection, extraocular movements - intact, and PERRL  NECK: no tenderness, no adenopathy, no asymmetry, no masses, no stiffness; thyroid- normal to palpation  RESP: lungs clear to auscultation - no rales, rhonchi or wheezes and healing scar from his thoracotomy on the left posterolateral chest  CV: regular rates and rhythm, normal S1 S2, no S3 or S4 and no murmur, no click or rub -  MS: extremities- no gross deformities noted, no edema  PSYCH: Alert and oriented times 3; coherent speech, normal rate and volume, able to articulate logical thoughts, able to abstract reason, no tangential thoughts, no hallucinations or delusions. Affect is normal.         ASSESSMENT/PLAN:                                                    ASSESSMENT:  1. Empyema of lung (H)    2. Community acquired pneumonia of left lower lobe of lung (H)    3. 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 Major depressive disorder, recurrent episode, moderate (H)    5. Primary insomnia      He is recovering nicely from the severe respiratory infection. He also states he has no craving for alcohol and is encouraged about that    PLAN:  Orders " Placed This Encounter     buPROPion (WELLBUTRIN XL) 150 MG 24 hr tablet     nortriptyline (PAMELOR) 10 MG capsule   He has a small support group that he meets with every Monday night and is also planning to attend some local AA groups. He has the appointment next week with the surgeon for a follow-up chest x-ray.    Strongly reinforced for initiating the steps to get sober. We'll continue to work with him on maintaining his sobriety      Patient Instructions   Attached is the current up to date list of medications.    Recheck in a month for a repeat chest xrpeg [facility]  [facility]

## 2017-12-06 ENCOUNTER — TRANSFERRED RECORDS (OUTPATIENT)
Dept: HEALTH INFORMATION MANAGEMENT | Facility: CLINIC | Age: 72
End: 2017-12-06

## 2017-12-11 DIAGNOSIS — F33.1 MAJOR DEPRESSIVE DISORDER, RECURRENT EPISODE, MODERATE (H): ICD-10-CM

## 2017-12-11 NOTE — TELEPHONE ENCOUNTER
Requested Prescriptions   Pending Prescriptions Disp Refills     buPROPion (WELLBUTRIN XL) 150 MG 24 hr tablet 90 tablet 1     Sig: Take 1 tablet (150 mg) by mouth every morning    There is no refill protocol information for this order      11/28/2017

## 2017-12-12 RX ORDER — BUPROPION HYDROCHLORIDE 150 MG/1
150 TABLET ORAL EVERY MORNING
Qty: 90 TABLET | Refills: 1 | Status: SHIPPED | OUTPATIENT
Start: 2017-12-12 | End: 2017-12-20

## 2017-12-20 ENCOUNTER — HOSPITAL ENCOUNTER (EMERGENCY)
Facility: CLINIC | Age: 72
Discharge: HOME | End: 2017-12-20
Attending: EMERGENCY MEDICINE | Admitting: EMERGENCY MEDICINE
Payer: MEDICARE

## 2017-12-20 ENCOUNTER — APPOINTMENT (OUTPATIENT)
Dept: CT IMAGING | Facility: CLINIC | Age: 72
End: 2017-12-20
Attending: EMERGENCY MEDICINE
Payer: MEDICARE

## 2017-12-20 ENCOUNTER — TELEPHONE (OUTPATIENT)
Dept: NURSING | Facility: CLINIC | Age: 72
End: 2017-12-20

## 2017-12-20 ENCOUNTER — TELEPHONE (OUTPATIENT)
Dept: FAMILY MEDICINE | Facility: CLINIC | Age: 72
End: 2017-12-20

## 2017-12-20 VITALS
SYSTOLIC BLOOD PRESSURE: 148 MMHG | RESPIRATION RATE: 17 BRPM | TEMPERATURE: 97.5 F | HEART RATE: 69 BPM | BODY MASS INDEX: 26.36 KG/M2 | OXYGEN SATURATION: 95 % | WEIGHT: 189 LBS | DIASTOLIC BLOOD PRESSURE: 90 MMHG

## 2017-12-20 DIAGNOSIS — R06.09 DOE (DYSPNEA ON EXERTION): ICD-10-CM

## 2017-12-20 DIAGNOSIS — R93.89 ABNORMAL CT SCAN: ICD-10-CM

## 2017-12-20 DIAGNOSIS — R06.09 DOE (DYSPNEA ON EXERTION): Primary | ICD-10-CM

## 2017-12-20 LAB
ALBUMIN SERPL-MCNC: 3.8 G/DL (ref 3.4–5)
ALP SERPL-CCNC: 66 U/L (ref 40–150)
ALT SERPL W P-5'-P-CCNC: 17 U/L (ref 0–70)
ANION GAP SERPL CALCULATED.3IONS-SCNC: 7 MMOL/L (ref 3–14)
AST SERPL W P-5'-P-CCNC: 24 U/L (ref 0–45)
BASOPHILS # BLD AUTO: 0 10E9/L (ref 0–0.2)
BASOPHILS NFR BLD AUTO: 0.6 %
BILIRUB SERPL-MCNC: 0.7 MG/DL (ref 0.2–1.3)
BUN SERPL-MCNC: 8 MG/DL (ref 7–30)
CALCIUM SERPL-MCNC: 8.2 MG/DL (ref 8.5–10.1)
CHLORIDE SERPL-SCNC: 100 MMOL/L (ref 94–109)
CO2 SERPL-SCNC: 26 MMOL/L (ref 20–32)
CREAT SERPL-MCNC: 0.84 MG/DL (ref 0.66–1.25)
DIFFERENTIAL METHOD BLD: ABNORMAL
DIGOXIN SERPL-MCNC: 0.8 UG/L (ref 0.5–2)
EOSINOPHIL # BLD AUTO: 0.1 10E9/L (ref 0–0.7)
EOSINOPHIL NFR BLD AUTO: 2.3 %
ERYTHROCYTE [DISTWIDTH] IN BLOOD BY AUTOMATED COUNT: 13.8 % (ref 10–15)
GFR SERPL CREATININE-BSD FRML MDRD: 89 ML/MIN/1.7M2
GLUCOSE SERPL-MCNC: 124 MG/DL (ref 70–99)
HCT VFR BLD AUTO: 35.2 % (ref 40–53)
HGB BLD-MCNC: 11.7 G/DL (ref 13.3–17.7)
IMM GRANULOCYTES # BLD: 0 10E9/L (ref 0–0.4)
IMM GRANULOCYTES NFR BLD: 0 %
LYMPHOCYTES # BLD AUTO: 1.4 10E9/L (ref 0.8–5.3)
LYMPHOCYTES NFR BLD AUTO: 26.7 %
MCH RBC QN AUTO: 32.3 PG (ref 26.5–33)
MCHC RBC AUTO-ENTMCNC: 33.2 G/DL (ref 31.5–36.5)
MCV RBC AUTO: 97 FL (ref 78–100)
MONOCYTES # BLD AUTO: 0.5 10E9/L (ref 0–1.3)
MONOCYTES NFR BLD AUTO: 9.1 %
NEUTROPHILS # BLD AUTO: 3.2 10E9/L (ref 1.6–8.3)
NEUTROPHILS NFR BLD AUTO: 61.3 %
NT-PROBNP SERPL-MCNC: 957 PG/ML (ref 0–900)
PLATELET # BLD AUTO: 155 10E9/L (ref 150–450)
POTASSIUM SERPL-SCNC: 4.1 MMOL/L (ref 3.4–5.3)
PROT SERPL-MCNC: 7.5 G/DL (ref 6.8–8.8)
RBC # BLD AUTO: 3.62 10E12/L (ref 4.4–5.9)
SODIUM SERPL-SCNC: 133 MMOL/L (ref 133–144)
TROPONIN I SERPL-MCNC: <0.015 UG/L (ref 0–0.04)
WBC # BLD AUTO: 5.1 10E9/L (ref 4–11)

## 2017-12-20 PROCEDURE — 99285 EMERGENCY DEPT VISIT HI MDM: CPT | Mod: 25 | Performed by: EMERGENCY MEDICINE

## 2017-12-20 PROCEDURE — 85025 COMPLETE CBC W/AUTO DIFF WBC: CPT | Performed by: EMERGENCY MEDICINE

## 2017-12-20 PROCEDURE — 25000125 ZZHC RX 250: Performed by: EMERGENCY MEDICINE

## 2017-12-20 PROCEDURE — 84484 ASSAY OF TROPONIN QUANT: CPT | Performed by: EMERGENCY MEDICINE

## 2017-12-20 PROCEDURE — 80053 COMPREHEN METABOLIC PANEL: CPT | Performed by: EMERGENCY MEDICINE

## 2017-12-20 PROCEDURE — 71260 CT THORAX DX C+: CPT

## 2017-12-20 PROCEDURE — 25000128 H RX IP 250 OP 636: Performed by: EMERGENCY MEDICINE

## 2017-12-20 PROCEDURE — 83880 ASSAY OF NATRIURETIC PEPTIDE: CPT | Performed by: EMERGENCY MEDICINE

## 2017-12-20 PROCEDURE — 96360 HYDRATION IV INFUSION INIT: CPT | Mod: 59 | Performed by: EMERGENCY MEDICINE

## 2017-12-20 PROCEDURE — 80162 ASSAY OF DIGOXIN TOTAL: CPT | Performed by: EMERGENCY MEDICINE

## 2017-12-20 PROCEDURE — 93010 ELECTROCARDIOGRAM REPORT: CPT | Mod: Z6 | Performed by: EMERGENCY MEDICINE

## 2017-12-20 PROCEDURE — 93005 ELECTROCARDIOGRAM TRACING: CPT | Performed by: EMERGENCY MEDICINE

## 2017-12-20 PROCEDURE — 96361 HYDRATE IV INFUSION ADD-ON: CPT | Performed by: EMERGENCY MEDICINE

## 2017-12-20 RX ORDER — IOPAMIDOL 755 MG/ML
81 INJECTION, SOLUTION INTRAVASCULAR ONCE
Status: COMPLETED | OUTPATIENT
Start: 2017-12-20 | End: 2017-12-20

## 2017-12-20 RX ADMIN — SODIUM CHLORIDE 500 ML: 9 INJECTION, SOLUTION INTRAVENOUS at 14:02

## 2017-12-20 RX ADMIN — IOPAMIDOL 81 ML: 755 INJECTION, SOLUTION INTRAVENOUS at 14:34

## 2017-12-20 RX ADMIN — SODIUM CHLORIDE 104 ML: 9 INJECTION, SOLUTION INTRAVENOUS at 14:34

## 2017-12-20 ASSESSMENT — ENCOUNTER SYMPTOMS
EYES NEGATIVE: 1
PSYCHIATRIC NEGATIVE: 1
CARDIOVASCULAR NEGATIVE: 1
ALLERGIC/IMMUNOLOGIC NEGATIVE: 1
SHORTNESS OF BREATH: 1
MUSCULOSKELETAL NEGATIVE: 1
GASTROINTESTINAL NEGATIVE: 1
ENDOCRINE NEGATIVE: 1
HEMATOLOGIC/LYMPHATIC NEGATIVE: 1
NEUROLOGICAL NEGATIVE: 1

## 2017-12-20 NOTE — ED AVS SNAPSHOT
[facility] Emergency Department    [address] [place] [address]    Phone:  [phone]    Fax:  [phone]                                       Josemanuel Edmond   MRN: [id number]    Department:  [facility] Emergency Department   Date of Visit:  12/20/2017           Patient Information     Date Of Birth          1945        Your diagnoses for this visit were:     MONTANA (dyspnea on exertion)     Abnormal CT scan Patient has CT findings suggestive of right heart dysfunction.  He also has perihepatic ascites on CT chest imaging. Ultrasound follow-up is recommended       You were seen by Satinder Souza MD.      Follow-up Information     Follow up with AVIS Martínez MD. Call in 1 day.    Specialty:  Family Practice    Why:  Call Dr Martínez to review your echocardiogram results once completed your symptoms and to help to compare with your echo from [facility] from November 2017    Contact information:    [address] [facility] [address]  [phone]          Follow up with [facility] Emergency Department.    Specialty:  EMERGENCY MEDICINE    Why:  If symptoms worsen, including chest pain, pressure, tightness,.or any new symptoms. An order was placed for an echocardiogram and you need to discuss the result with your primary care doctor    Contact information:    [address] [place] [id number] [phone]    Additional information:    The medical center is located at   [address] [place] [address] (between [address] and   [address] in [place], [place] miles north   of [place]).        Follow up with AVIS Martínez MD.    Specialty:  Family Practice    Why:  May also have an abdominal ultrasound obtained to compare with the last ultrasound from October 2017 for comparison if you continue to feel like you feel bloated or distended    Contact information:    [address] [facility] [id number] [phone]          Discharge Instructions         Shortness of Breath  (Dyspnea)  Shortness of breath is the feeling that you can't catch your breath or get enough air. It is also known as dyspnea.  Dyspnea can be caused by many different conditions. They include:    Acute asthma attack.    Worsening of chronic lung diseases such as chronic bronchitis and emphysema.    Heart failure. This is when weak heart muscle allows extra fluid to collect in the lungs.    Panic attacks or anxiety. Fear can cause rapid breathing (hyperventilation).    Pneumonia, or an infection in the lung tissue.    Exposure to toxic substances, fumes, smoke, or certain medicines.    Blood clot in the lung (pulmonary embolism). This is often from a piece of blood clot in a deep vein of the leg (deep vein thrombosis) that breaks off and travels to the lungs.    Heart attack or heart-related chest pain (angina).    Anemia.    Collapsed lung (pneumothorax).    Dehydration.    Pregnancy.  Based on your visit today, the exact cause of your shortness of breath is not certain. Your tests don t show any of the serious causes of dyspnea. You may need other tests to find out if you have a serious problem. It s important to watch for any new symptoms or symptoms that get worse. Follow up with your healthcare provider as directed.  Home care  Follow these tips to take care of yourself at home:    When your symptoms are better, go back to your usual activities.    If you smoke, you should stop. Join a quit-smoking program or ask your healthcare provider for help.    Eat a healthy diet and get plenty of sleep.    Get regular exercise. Talk with your healthcare provider before starting to exercise, especially if you have other medical problems.    Cut down on the amount of caffeine and stimulants you consume.  Follow-up care  Follow up with your healthcare provider, or as advised.  If tests were done, you will be told if your treatment needs to be changed. You can call as directed for the results.  (Note: If an X-ray was taken,  a specialist will review it. You will be notified of any new findings that may affect your care.)  Call 911 or get immediate medical care  Shortness of breath may be a sign of a serious medical problem. For example, it may be a problem with your heart or lungs. Call 911 if you have worsening shortness of breath or trouble breathing, especially with any of the symptoms below:    You are confused or it s difficult to wake you.    You faint or lose consciousness.    You have a fast heartbeat, or your heartbeat is irregular.    You are coughing up blood.    You have pain in your chest, arm, shoulder, neck, or upper back.    You break out in a sweat.  When to seek medical advice  Call your healthcare provider right away if any of these occur:    Slight shortness of breath or wheezing    Redness, pain or swelling in your leg, arm, or other body area    Swelling in both legs or ankles    Fast weight gain    Dizziness or weakness    Fever of 100.4 F (38 C) or higher, or as directed by your healthcare provider  Date Last Reviewed: 9/13/2015 2000-2017 [facility]. [address] [place] [address]. All rights reserved. This information is not intended as a substitute for professional medical care. Always follow your healthcare professional's instructions.          24 Hour Appointment Hotline       To make an appointment at any [facility] clinic, call [phone] ([phone]). If you don't have a family doctor or clinic, we will help you find one. [facility] are conveniently located to serve the needs of you and your family.          ED Discharge Orders     Echocardiogram Complete       Administration of IV contrast will be tailored to this examination per the appropriate written protocol listed in the Echocardiography department Protocol Book, or by the supervising Cardiologist. This may result in an order change.    Use of contrast is at the discretion of the supervising Cardiologist.                      Review of your medicines      Our records show that you are taking the medicines listed below. If these are incorrect, please call your family doctor or clinic.        Dose / Directions Last dose taken    doxazosin 4 MG tablet   Commonly known as:  CARDURA   Dose:  4 mg   Quantity:  90 tablet        Take 1 tablet (4 mg) by mouth daily   Refills:  3        escitalopram 10 MG tablet   Commonly known as:  LEXAPRO   Dose:  10 mg   Quantity:  90 tablet        Take 1 tablet (10 mg) by mouth daily   Refills:  1        gabapentin 300 MG capsule   Commonly known as:  NEURONTIN   Quantity:  120 capsule        One am and midday, 2 at bedtime   Refills:  11        nortriptyline 10 MG capsule   Commonly known as:  PAMELOR   Quantity:  60 capsule        2 capsules at bedtime   Refills:  5                Procedures and tests performed during your visit     CBC with platelets differential    Chest CT - IV contrast only - PE protocol    Comprehensive metabolic panel    Digoxin level    EKG 12 lead    Nt probnp inpatient    Peripheral IV catheter    Troponin I      Orders Needing Specimen Collection     None      Pending Results     No orders found from 12/18/2017 to 12/21/2017.            Pending Culture Results     No orders found from 12/18/2017 to 12/21/2017.            Pending Results Instructions     If you had any lab results that were not finalized at the time of your Discharge, you can call the ED Lab Result RN at [phone]. You will be contacted by this team for any positive Lab results or changes in treatment. The nurses are available 7 days a week from 10A to 6:30P.  You can leave a message 24 hours per day and they will return your call.        Test Results From Your Hospital Stay        12/20/2017  1:39 PM      Component Results     Component Value Ref Range & Units Status    WBC 5.1 4.0 - 11.0 10e9/L Final    RBC Count 3.62 (L) 4.4 - 5.9 10e12/L Final    Hemoglobin 11.7 (L) 13.3 - 17.7 g/dL Final     Hematocrit 35.2 (L) 40.0 - 53.0 % Final    MCV 97 78 - 100 fl Final    MCH 32.3 26.5 - 33.0 pg Final    MCHC 33.2 31.5 - 36.5 g/dL Final    RDW 13.8 10.0 - 15.0 % Final    Platelet Count 155 150 - 450 10e9/L Final    Diff Method Automated Method  Final    % Neutrophils 61.3 % Final    % Lymphocytes 26.7 % Final    % Monocytes 9.1 % Final    % Eosinophils 2.3 % Final    % Basophils 0.6 % Final    % Immature Granulocytes 0.0 % Final    Absolute Neutrophil 3.2 1.6 - 8.3 10e9/L Final    Absolute Lymphocytes 1.4 0.8 - 5.3 10e9/L Final    Absolute Monocytes 0.5 0.0 - 1.3 10e9/L Final    Absolute Eosinophils 0.1 0.0 - 0.7 10e9/L Final    Absolute Basophils 0.0 0.0 - 0.2 10e9/L Final    Abs Immature Granulocytes 0.0 0 - 0.4 10e9/L Final         12/20/2017  1:48 PM      Component Results     Component Value Ref Range & Units Status    Sodium 133 133 - 144 mmol/L Final    Potassium 4.1 3.4 - 5.3 mmol/L Final    Chloride 100 94 - 109 mmol/L Final    Carbon Dioxide 26 20 - 32 mmol/L Final    Anion Gap 7 3 - 14 mmol/L Final    Glucose 124 (H) 70 - 99 mg/dL Final    Urea Nitrogen 8 7 - 30 mg/dL Final    Creatinine 0.84 0.66 - 1.25 mg/dL Final    GFR Estimate 89 >60 mL/min/1.7m2 Final    Non  GFR Calc    GFR Estimate If Black >90 >60 mL/min/1.7m2 Final    African American GFR Calc    Calcium 8.2 (L) 8.5 - 10.1 mg/dL Final    Bilirubin Total 0.7 0.2 - 1.3 mg/dL Final    Albumin 3.8 3.4 - 5.0 g/dL Final    Protein Total 7.5 6.8 - 8.8 g/dL Final    Alkaline Phosphatase 66 40 - 150 U/L Final    ALT 17 0 - 70 U/L Final    AST 24 0 - 45 U/L Final         12/20/2017  1:48 PM      Component Results     Component Value Ref Range & Units Status    Troponin I ES <0.015 0.000 - 0.045 ug/L Final    The 99th percentile for upper reference range is 0.045 ug/L.  Troponin values   in the range of 0.045 - 0.120 ug/L may be associated with risks of adverse   clinical events.           12/20/2017  2:56 PM      Narrative     CT CHEST  PULMONARY EMBOLISM WITH CONTRAST  12/20/2017 2:41 PM     HISTORY: Dyspnea on exertion. Recent hospitalization for pneumonia and  status post DC cardioversion for persistent atrial fibrillation.  Evaluate for PE vs other acute cardiothoracic process.    TECHNIQUE:  81 mL Isovue 370. Radiation dose for this scan was reduced  using automated exposure control, adjustment of the mA and/or kV  according to patient size, or iterative reconstruction technique.    COMPARISON: None.    FINDINGS:  There is no pulmonary embolism. There are small bilateral  pleural effusions. No pneumothorax. Reflux of contrast noted into the  hepatic veins, compatible with right heart dysfunction. There are  enlarged mediastinal lymph nodes, the largest of which is a right  paratracheal lymph node that measures 1.5 x 1.6 cm (series 4, image  44). No hilar or axillary adenopathy. There are bilateral pleural  calcifications, compatible with prior asbestos exposure. No pulmonary  nodule or mass.    There is a nondisplaced fracture of the left seventh rib laterally.    There is mild haziness about the gallbladder. Trace perihepatic  ascites is present.        Impression     IMPRESSION:  1. No pulmonary embolism.  2. Small bilateral pleural effusions.  3. Evidence of right heart dysfunction.  4. Mildly enlarged mediastinal lymph nodes.  5. Evidence of prior asbestos exposure.  6. Nondisplaced left rib fracture.  7. Trace perihepatic ascites. Mild haziness around the gallbladder may  be related to adjacent liver disease. Consider further evaluation with  ultrasound.    SORAYA FERNANDO MD         12/20/2017  2:36 PM      Component Results     Component Value Ref Range & Units Status    Digoxin Level 0.8 0.5 - 2.0 ug/L Final         12/20/2017  2:22 PM      Component Results     Component Value Ref Range & Units Status    N-Terminal Pro BNP Inpatient 957 (H) 0 - 900 pg/mL Final       Reference range shown and results flagged as abnormal are suggested  inpatient   cut points for confirming diagnosis if CHF in an acute setting. Establishing a   baseline value for each individual patient is useful for follow-up. An   inpatient or emergency department NT-proPBNP <300 pg/mL effectively rules out   acute CHF, with 99% negative predictive value.  The outpatient non-acute reference range for ruling out CHF is:   0-125 pg/mL (age 18 to less than 75)   0-450 pg/mL (age 75 yrs and older)                  Thank you for choosing [facility]       Thank you for choosing [facility] for your care. Our goal is always to provide you with excellent care. Hearing back from our patients is one way we can continue to improve our services. Please take a few minutes to complete the written survey that you may receive in the mail after you visit with us. Thank you!        [employer] Information     [employer] gives you secure access to your electronic health record. If you see a primary care provider, you can also send messages to your care team and make appointments. If you have questions, please call your primary care clinic.  If you do not have a primary care provider, please call [phone] and they will assist you.        Care EveryWhere ID     This is your Care EveryWhere ID. This could be used by other organizations to access your [facility] medical records  [id number]        Equal Access to Services     MERARI NASCIMENTO : Kyleigh Juarez, jef adamson, paul diaz, bree silver. So [facility] [phone].    ATENCIÓN: Si habla español, tiene a sen disposición servicios gratuitos de asistencia lingüística. Llame al [phone].    We comply with applicable federal civil rights laws and [place] laws. We do not discriminate on the basis of race, color, national origin, age, disability, sex, sexual orientation, or gender identity.            After Visit Summary       This is your record. Keep this with you and show to your community  pharmacist(s) and doctor(s) at your next visit.

## 2017-12-20 NOTE — ED PROVIDER NOTES
History     Chief Complaint   Patient presents with     Shortness of Breath     cardiovert monday at [facility] . FEeels weak and tired     HPI  Josemanuel Edmond is a 72 year old male with a history of hypertension and hyperlipidemia who presents to the emergency department for evaluation of shortness of breath. The patient had a cardioversion at [facility] on 12/18/17 and reports that his shortness of breath has been worse since this procedure. He feels short of breath from climbing one flight of stairs or walking short distances. He has a history of alcoholism and has been in remission since November 2017. In November he developed pneumonia and was hospitalized for 3 weeks and required a thoracoscopy. He was started on blood thinners during his hospitalization and he has continued taking this medication. He denies any coughing or swelling in his legs. He did not get the flu vaccine this year.         Social History: The patient lives in [place], [place]. He presents by private vehicle with his wife.      Problem List:    Patient Active Problem List    Diagnosis Date Noted     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) 11/28/2017     Priority: Medium     ED (erectile dysfunction) 12/23/2014     Priority: Medium     Alcohol abuse 07/29/2014     Priority: Medium     Moderate major depression (H) 12/17/2013     Priority: Medium     S/P knee replacement 11/06/2012     Priority: Medium     Advanced care planning/counseling discussion 10/25/2012     Priority: Medium     Patient has completed an Advance/Health Care Directive (HCD), scanned into Epic Media tab, entry date of 2001Macario KANG POST  October 25, 2012         GERD (gastroesophageal reflux disease) 08/03/2012     Priority: Medium     Anxiety 02/16/2012     Priority: Medium     Allergic rhinitis 04/21/2011     Priority: Medium     Conjunctivitis, allergic 04/21/2011     Priority: Medium     Hyperlipidemia LDL goal <100 10/31/2010     Priority: Medium     Osteoarthrosis, unspecified  whether generalized or localized, pelvic region and thigh 09/28/2007     Priority: Medium     Hypertrophy of prostate with urinary obstruction 08/03/2006     Priority: Medium     Problem list name updated by automated process. Provider to review       Cerebral infarction due to occlusion or stenosis of carotid artery 12/12/2005     Priority: Medium     Right Carotid Occlusion  Problem list name updated by automated process. Provider to review       Hypertension goal BP (blood pressure) < 140/90 12/12/2005     Priority: Medium        Past Medical History:    Past Medical History:   Diagnosis Date     Benign neoplasm of prostate 2000       Past Surgical History:    Past Surgical History:   Procedure Laterality Date     COLONOSCOPY N/A 12/10/2015    Procedure: COMBINED COLONOSCOPY, SINGLE OR MULTIPLE BIOPSY/POLYPECTOMY BY BIOPSY;  Surgeon: Jyoti Figueroa MD;  Location: [facility]     JOINT REPLACEMENT, HIP RT/LT  10/07    Joint Replacement Hip LT     JOINT REPLACEMTN, KNEE RT/LT  8/2011    Joint Replacement knee /LT, [facility]     SURGICAL HISTORY OF -   12/1/1999    Umbilical Herniorrhaphy with mesh       Family History:    Family History   Problem Relation Age of Onset     Breast Cancer Mother      Neurologic Disorder Mother      parkinsons     Respiratory Father      emphyzema       Social History:  Marital Status:   [2]  Social History   Substance Use Topics     Smoking status: Former Smoker     Packs/day: 1.00     Years: 15.00     Types: Cigarettes     Quit date: 10/13/1975     Smokeless tobacco: Never Used     Alcohol use Yes      Comment: 6 cans of beer daily or 3-4 drinks daily        Medications:      nortriptyline (PAMELOR) 10 MG capsule   escitalopram (LEXAPRO) 10 MG tablet   gabapentin (NEURONTIN) 300 MG capsule   doxazosin (CARDURA) 4 MG tablet         Review of Systems   Constitutional:        Increasing exercise intolerance with dyspnea on exertion   HENT: Negative.    Eyes: Negative.     Respiratory: Positive for shortness of breath.    Cardiovascular: Negative.    Gastrointestinal: Negative.    Endocrine: Negative.    Genitourinary: Negative.    Musculoskeletal: Negative.    Skin: Negative.    Allergic/Immunologic: Negative.    Neurological: Negative.    Hematological: Negative.    Psychiatric/Behavioral: Negative.        Physical Exam   BP: 134/75  Pulse: 69  Heart Rate: 60  Temp: 97.5  F (36.4  C)  Resp: 16  Weight: 85.7 kg (189 lb)  SpO2: 95 %      Physical Exam   Constitutional: He is oriented to person, place, and time. He appears well-developed and well-nourished. No distress.   HENT:   Head: Normocephalic and atraumatic.   Eyes: Conjunctivae and EOM are normal. Pupils are equal, round, and reactive to light. Right eye exhibits no discharge. Left eye exhibits no discharge. No scleral icterus.   Neck: Normal range of motion. Neck supple. No JVD present. No tracheal deviation present. No thyromegaly present.   Cardiovascular: Normal rate.  Exam reveals no gallop and no friction rub.    Pulmonary/Chest: Effort normal. No stridor. No respiratory distress. He has rhonchi in the left lower field. He has rales in the left lower field.   Abdominal: Soft. Bowel sounds are normal.   Lymphadenopathy:     He has no cervical adenopathy.   Neurological: He is alert and oriented to person, place, and time. He displays normal reflexes. No cranial nerve deficit. He exhibits normal muscle tone. Coordination normal.   Skin: No rash noted. He is not diaphoretic. No erythema. No pallor.   Psychiatric: He has a normal mood and affect. His behavior is normal. Judgment and thought content normal.       ED Course     ED Course     Procedures               EKG Interpretation:      Interpreted by Satinder Souza  Time reviewed:13:15  Symptoms at time of EKG: dyspnea on exertion   Rhythm: normal sinus   Rate: Normal  Axis: Normal  Ectopy: premature ventricular contractions (unifocal)  Conduction: 1st degree AV  block  ST Segments/ T Waves: Non-specific ST-T wave changes  Q Waves: nonspecific  Comparison to prior: when compared with EKG dated 10/25/12-PVCs appear present.    Clinical Impression: no acute changes      Critical Care time:  none               ED Medications:  Medications   0.9% sodium chloride BOLUS (0 mLs Intravenous Stopped 12/20/17 1701)   iopamidol (ISOVUE-370) solution 81 mL (81 mLs Intravenous Given 12/20/17 1434)   sodium chloride 0.9 % bag 500mL for CT scan flush use (104 mLs Intravenous Given 12/20/17 1434)       ED Vitals:  Vitals:    12/20/17 1600 12/20/17 1615 12/20/17 1630 12/20/17 1645   BP: 133/84 140/80 146/88 148/90   Pulse:       Resp:       Temp:       TempSrc:       SpO2: 97% 96% 95%    Weight:           ED Labs and Imaging:  Results for orders placed or performed during the hospital encounter of 12/20/17 (from the past 24 hour(s))   CBC with platelets differential   Result Value Ref Range    WBC 5.1 4.0 - 11.0 10e9/L    RBC Count 3.62 (L) 4.4 - 5.9 10e12/L    Hemoglobin 11.7 (L) 13.3 - 17.7 g/dL    Hematocrit 35.2 (L) 40.0 - 53.0 %    MCV 97 78 - 100 fl    MCH 32.3 26.5 - 33.0 pg    MCHC 33.2 31.5 - 36.5 g/dL    RDW 13.8 10.0 - 15.0 %    Platelet Count 155 150 - 450 10e9/L    Diff Method Automated Method     % Neutrophils 61.3 %    % Lymphocytes 26.7 %    % Monocytes 9.1 %    % Eosinophils 2.3 %    % Basophils 0.6 %    % Immature Granulocytes 0.0 %    Absolute Neutrophil 3.2 1.6 - 8.3 10e9/L    Absolute Lymphocytes 1.4 0.8 - 5.3 10e9/L    Absolute Monocytes 0.5 0.0 - 1.3 10e9/L    Absolute Eosinophils 0.1 0.0 - 0.7 10e9/L    Absolute Basophils 0.0 0.0 - 0.2 10e9/L    Abs Immature Granulocytes 0.0 0 - 0.4 10e9/L   Comprehensive metabolic panel   Result Value Ref Range    Sodium 133 133 - 144 mmol/L    Potassium 4.1 3.4 - 5.3 mmol/L    Chloride 100 94 - 109 mmol/L    Carbon Dioxide 26 20 - 32 mmol/L    Anion Gap 7 3 - 14 mmol/L    Glucose 124 (H) 70 - 99 mg/dL    Urea Nitrogen 8 7 - 30 mg/dL     Creatinine 0.84 0.66 - 1.25 mg/dL    GFR Estimate 89 >60 mL/min/1.7m2    GFR Estimate If Black >90 >60 mL/min/1.7m2    Calcium 8.2 (L) 8.5 - 10.1 mg/dL    Bilirubin Total 0.7 0.2 - 1.3 mg/dL    Albumin 3.8 3.4 - 5.0 g/dL    Protein Total 7.5 6.8 - 8.8 g/dL    Alkaline Phosphatase 66 40 - 150 U/L    ALT 17 0 - 70 U/L    AST 24 0 - 45 U/L   Troponin I   Result Value Ref Range    Troponin I ES <0.015 0.000 - 0.045 ug/L   Digoxin level   Result Value Ref Range    Digoxin Level 0.8 0.5 - 2.0 ug/L   Nt probnp inpatient   Result Value Ref Range    N-Terminal Pro BNP Inpatient 957 (H) 0 - 900 pg/mL   Chest CT - IV contrast only - PE protocol    Narrative    CT CHEST PULMONARY EMBOLISM WITH CONTRAST  12/20/2017 2:41 PM     HISTORY: Dyspnea on exertion. Recent hospitalization for pneumonia and  status post DC cardioversion for persistent atrial fibrillation.  Evaluate for PE vs other acute cardiothoracic process.    TECHNIQUE:  81 mL Isovue 370. Radiation dose for this scan was reduced  using automated exposure control, adjustment of the mA and/or kV  according to patient size, or iterative reconstruction technique.    COMPARISON: None.    FINDINGS:  There is no pulmonary embolism. There are small bilateral  pleural effusions. No pneumothorax. Reflux of contrast noted into the  hepatic veins, compatible with right heart dysfunction. There are  enlarged mediastinal lymph nodes, the largest of which is a right  paratracheal lymph node that measures 1.5 x 1.6 cm (series 4, image  44). No hilar or axillary adenopathy. There are bilateral pleural  calcifications, compatible with prior asbestos exposure. No pulmonary  nodule or mass.    There is a nondisplaced fracture of the left seventh rib laterally.    There is mild haziness about the gallbladder. Trace perihepatic  ascites is present.      Impression    IMPRESSION:  1. No pulmonary embolism.  2. Small bilateral pleural effusions.  3. Evidence of right heart dysfunction.  4.  Mildly enlarged mediastinal lymph nodes.  5. Evidence of prior asbestos exposure.  6. Nondisplaced left rib fracture.  7. Trace perihepatic ascites. Mild haziness around the gallbladder may  be related to adjacent liver disease. Consider further evaluation with  ultrasound.    SORAYA FERNANDO MD         Prior or recent diagnostic imaging and work-up (via [facility]):  ECHO COMPLETE W CONTRAST RUPERTO IRELAND 2017 07:58     [facility]   [address], [address], [place] [address]   Main: [phone]  [email or web address]                                                 Transthoracic Echo Report   RUPERTO IRELAND   Excellian ID: [id number] Age: 72 : 1945 Ordering Provider: CECI HILL   Exam Date: 2017 07:58 Gender: M Sonographer: GELA   Accession #: [id number] Height: 70.9 in BSA: 2.1 m  BP: 119 / 58   Weight: 198 lbs BMI: 27.8 kg/m          Site: [facility]   Location: Inpatient (Portable) Rhythm: Regular   Procedure Components: 2D imaging with contrast, Color Doppler, Spectral Doppler   Indications: Chest pain   Technical Quality: Technically difficult study Contrast: Lumason     Final Conclusion   Technically difficult study - limited conclusions; contrast was used to enhance endocardial   definition due to suboptimal image quality.     Normal left ventricular size; normal to hyperdynamic left ventricular systolic function with   an estimated ejection fraction of 65-70%.   No obvious regional wall motion abnormalities.   Moderate left atrial dilatation.   The right ventricle is normal in size and function.   Mild aortic regurgitation.   Mild aortic dilatation at the level of the sinuses of Valsalva (about 4.1 cm).   Estimated EF: 65-70%   FINDINGS   Left Ventricle Not well visualized. Normal left ventricular size. Normal left ventricular   systolic function. No obvious regional   wall motion abnormalities.  Abnormal  "septal motion consistent with an intraventricular   conduction defect.   Normal left ventricular wall thickness. Normal to hyperdynamic left ventricular systolic   function with an   estimated ejection fraction of 65-70%.   Diastolic Function \"Pseudonormal\" left ventricular filling pattern. E/e' ratio 8-15 is   indeterminate for filling pressure.   Right Ventricle Right ventricle not well visualized. The right ventricle is normal in size and   function.   Left Atrium Left atrium not well visualized. Moderate left atrial dilatation.   Right Atrium Right atrium not well visualized. Normal right atrial size.   Aortic Valve Aortic valve not well visualized. No aortic stenosis. Mild aortic regurgitation.   Mitral Valve Mitral valve not well visualized. Mild thickening/calcification of the anterior   mitral valve leaflet. Structurally   normal mitral valve without significant stenosis or prolapse.  There is no significant mitral   regurgitation.   Tricuspid Valve Tricuspid valve not well visualized. Mild tricuspid regurgitation. Estimated   pulmonary artery pressure of 26.6   mmHg + RA pressure.   Pulmonic Valve Pulmonic valve not visualized. No pulmonic stenosis. No significant pulmonic   regurgitation.   Pericardium Pericardium is not well visualized. No significant pericardial effusion.   Aorta Aortic root is not well visualized. Mild aortic dilatation at the level of the sinuses   of Valsalva (about 4.1 cm).   Ascending aorta was not well visualized.   Inferior Vena Cava Normal inferior vena cava (IVC) size.   Other None.   MEASUREMENTS  (Male / Female) Normal Values   2D MEASUREMENTS AND LV FUNCTION   IVS Diastolic Thickness           0.859 cm              < 1.1 cm / < 1.0 cm   LV Diastolic Diameter PLAX        5.75 cm               4.2 - 5.9 / 3.9 - 5.3 cm   LVPW Diastolic Thickness          0.89 cm               < 1.1 cm / < 1.0 cm   LV Systolic Diameter PLAX         3.7 cm   LVOT Diameter                     2.07 " cm   LVOT Stroke Volume                61.6 ml   LA Systolic Diameter LX           5.08 cm               3.0 - 4.0 / 2.7 - 3.8 cm   Sinuses of Valsalva Diameter(d)   3.18 cm     DIASTOLOGY   Mitral E Point Velocity           0.51 m/sec            0.70 - 1.02 m/sec   Mitral A Point Velocity           0.57 m/sec            0.06 - 1.06 m/sec   Mitral E to A Ratio               0.895                 1.1 - 2.1   MV Deceleration Time              189 msec              167 - 231 msec   LV E' Lateral Velocity            0.107 m/sec   Mitral E to LV E' Lateral Ratio   4.77   LV E' Septal Velocity             0.0932 m/sec   Mitral E to LV E' Septal Ratio    5.47     AORTIC VALVE   LVOT Peak Velocity                1.19 m/sec   LVOT Velocity Time Integral       18.3 cm     TRICUSPID VALVE AND ESTIMATED PRESSURES   TR Peak Velocity                  2.58 m/sec   TR Peak Gradient                  26.6 mmHg       Alondra Montana MD     [facility]   (Electronically Signed)   Final Date: 08 November 2017 10:37        Clinical History: Abdominal pain.    Technique: Complete abdominal ultrasound.    Comparison: None.    Findings:     Liver: The liver is echogenic and difficult to penetrate with sound and also has a coarsened echotexture, consistent with fatty infiltration.  The hepatic surface does not demonstrate any nodularity. No focal masses are present. There is no biliary dilatation. The common duct measures 5.8 mm.    Gallbladder: There is adenomyomatosis of the gallbladder. There is gallbladder sludge. No gallstones are evident. No pericholecystic fluid or sonographic Fatima's sign. The total gallbladder wall is 1.7 mm.    Kidneys: The kidneys have normal size, shape and position. There is no hydronephrosis. There are no renal masses or cysts. Dromedary hump incidentally noted in the left kidney. Tiny echogenic foci in the kidneys are consistent with nonobstructing stones.    Spleen: The spleen appears normal. There  is a small accessory spleen measuring about 2 cm in diameter, of doubtful significance.    Other Findings: The pancreas could not be seen because of overlying bowel gas. The aorta and IVC are not well seen.  No abnormal masses or enlarged nodes are seen. There is no ascites.       Impression:   1. Gallbladder sludge and adenomyomatosis of the gallbladder.  2. Small nonobstructing renal stones.  3. Probable fatty infiltration of the liver.  4. Incidentally noted accessory spleen and dromedary hump in the left kidney.  [facility], [facility].  1:28 PM Patient Assessed    Assessments & Plan (with Medical Decision Making)   Clinical Impression: 72-year-old male with a history of alcohol abuse, persistent atrial fibrillation on digoxin who presented for evaluation for weakness, fatigue, shortness of breath after a cardioversion.  The exact cause of symptoms is unclear, cannot exclude heart failure versus abdominal distention and bloating due to mild ascites from underlying history of chronic alcohol use now sober for about 6 weeks.   Patient had cardioversion at [facility] on December 16 for persistent atrial fibrillation.  Patient also reports he was hospitalized November 2017 for course of pneumonia that required thoracoscopy.  He reports since his cardioversion he has had shortness of breath with activity.  He denies any cough he also reports no fever.  He did not get an influenza vaccine.  He has no lower extremity swelling.  Patient reports no palpitations and no chest tightness or pressure.  On my exam he has a well-healed fluoroscopy incision over the left lateral chest, he has some mild rhonchi with crackles in the left chest.  He is afebrile and otherwise hemodynamically stable he is 95% on room air.  His EKG on ED arrival shows sinus rhythm with first-degree AV block with unifocal PVCs.  Nonspecific T-wave changes but no true acute ischemic changes from EKG compared from October 25,  2012.      ED Course and Plan:   I reviewed his recent DC cardioversion completed at [facility] through care everywhere on December 18.  Please see details in his medical records for additional details.  His last echocardiogram on file was from November 2017 by care everywhere.  See detailed report above.  It was a technically difficult study but there was preserved left ventricular function and no severe acute abnormalities were noted there was mild aortic dilatation at the level of the sinus of Valsalva. (See report above)  Patient admits that he is currently taking Xarelto.  Because he was recently hospitalized and now has dyspnea on exertion without any chest pain or palpitations a CT chest PE protocol was obtained to exclude PE versus residual sequela from his recent pneumonia requiring thorascopic surgery.    Digoxin level is therapeutic today.  BNP is mildly elevated at 957.  Troponin is within normal limits.  CT chest PE protocol today is negative for PE.  CT did show concern for evidence for right heart dysfunction.  There is also trace perihepatic ascites with haziness around the gallbladder which may be related to adjacent liver disease.  Further evaluation with ultrasound was recommended by the radiologist.  Please see detailed report above.  Patient's CT findings were reviewed with him.  Exact cause of his dyspnea on exertion is unclear at this time.  It could be related to underlying heart failure with his prior history of alcoholism (sober since November 2017)and recent evaluation and care for persistent atrial fibrillation.  Patient does not have a primary cardiologist.  I recommended that he would benefit from a non-emergent echocardiogram to compare with his last echo from November 2017.  Patient tells me he has been sober from alcohol use since November 17.     We discussed that he can have his primary care provider- Dr. Martínez- to order a comparison echocardiogram so it can be compared  with the echo from November 2017.  He also complained of some abdominal distention during my course of care I reviewed his last ultrasound completed at [facility] which showed gallbladder sludge with adenomyomatosis of the gallbladder.  There is fatty liver infiltration noted but there is no documentation of any ascites.  With CT today of the chest PE protocol showing concern for possible trace perihepatic ascites and mild haziness around the gallbladder and patient's report of abdominal bloating this may be related to some of his reported symptoms.    Patient is discharged home because he is otherwise stable with an unremarkable course of care in emergency department.  We reviewed reasons to return to the emergency department for care he expressed understanding.  Patient is to call his primary care provider in the morning to review his ED course of care and my recommendations include a comparison echocardiogram for further and complete workup for dyspnea on exertion.  If concern about abdominal bloating and distention persists a comparison ultrasound of his abdomen can also be completed to evaluate for any sequela relating to his liver disease.    I did order an outpatient complete echocardiogram to help facilitate follow-up care.  Results should be forwarded to his primary care provider Dr. Martínez    I have reviewed the nursing notes.    I have reviewed the findings, diagnosis, plan and need for follow up with the patient.       New Prescriptions    No medications on file       Final diagnoses:   MONTANA (dyspnea on exertion)   Abnormal CT scan - Patient has CT findings suggestive of right heart dysfunction.  He also has perihepatic ascites on CT chest imaging. Ultrasound follow-up is recommended     Disclaimer: This note consists of symbols derived from keyboarding, dictation and/or voice recognition software. As a result, there may be errors in the script that have gone undetected. Please consider this when  interpreting information found in this chart.    This document serves as a record of the services and decisions personally performed and made by Satinder Souza, *. The HPI was created on his behalf by Konstantin Reaves, a trained medical scribe. The creation of this document is based the provider's statements to the medical scribe.  Konstantin Reaves 1:28 PM 12/20/2017    Provider:   The information in this document, created by the medical scribe for me, accurately reflects the services I personally performed and the decisions made by me. I have reviewed and approved this document for accuracy prior to leaving the patient care area.  Satinder Souza, Oleksandr 1:28 PM 12/20/2017 12/20/2017   [facility] EMERGENCY DEPARTMENT     Satinder Souza MD  12/20/17 1359

## 2017-12-20 NOTE — TELEPHONE ENCOUNTER
Spoke with pt and suggested he go to f/u on his Cardioversion @ [facility] where it was done 2 days ago.  Pt and wife have care of 1 yo grandchild and to hard to go there.  Recommended [facility] and pt agreed that his wife would drive him there.Santos

## 2017-12-20 NOTE — DISCHARGE INSTRUCTIONS
Shortness of Breath (Dyspnea)  Shortness of breath is the feeling that you can't catch your breath or get enough air. It is also known as dyspnea.  Dyspnea can be caused by many different conditions. They include:    Acute asthma attack.    Worsening of chronic lung diseases such as chronic bronchitis and emphysema.    Heart failure. This is when weak heart muscle allows extra fluid to collect in the lungs.    Panic attacks or anxiety. Fear can cause rapid breathing (hyperventilation).    Pneumonia, or an infection in the lung tissue.    Exposure to toxic substances, fumes, smoke, or certain medicines.    Blood clot in the lung (pulmonary embolism). This is often from a piece of blood clot in a deep vein of the leg (deep vein thrombosis) that breaks off and travels to the lungs.    Heart attack or heart-related chest pain (angina).    Anemia.    Collapsed lung (pneumothorax).    Dehydration.    Pregnancy.  Based on your visit today, the exact cause of your shortness of breath is not certain. Your tests don t show any of the serious causes of dyspnea. You may need other tests to find out if you have a serious problem. It s important to watch for any new symptoms or symptoms that get worse. Follow up with your healthcare provider as directed.  Home care  Follow these tips to take care of yourself at home:    When your symptoms are better, go back to your usual activities.    If you smoke, you should stop. Join a quit-smoking program or ask your healthcare provider for help.    Eat a healthy diet and get plenty of sleep.    Get regular exercise. Talk with your healthcare provider before starting to exercise, especially if you have other medical problems.    Cut down on the amount of caffeine and stimulants you consume.  Follow-up care  Follow up with your healthcare provider, or as advised.  If tests were done, you will be told if your treatment needs to be changed. You can call as directed for the results.  (Note:  If an X-ray was taken, a specialist will review it. You will be notified of any new findings that may affect your care.)  Call 911 or get immediate medical care  Shortness of breath may be a sign of a serious medical problem. For example, it may be a problem with your heart or lungs. Call 911 if you have worsening shortness of breath or trouble breathing, especially with any of the symptoms below:    You are confused or it s difficult to wake you.    You faint or lose consciousness.    You have a fast heartbeat, or your heartbeat is irregular.    You are coughing up blood.    You have pain in your chest, arm, shoulder, neck, or upper back.    You break out in a sweat.  When to seek medical advice  Call your healthcare provider right away if any of these occur:    Slight shortness of breath or wheezing    Redness, pain or swelling in your leg, arm, or other body area    Swelling in both legs or ankles    Fast weight gain    Dizziness or weakness    Fever of 100.4 F (38 C) or higher, or as directed by your healthcare provider  Date Last Reviewed: 9/13/2015 2000-2017 [employer]. [address] [place] [address]. All rights reserved. This information is not intended as a substitute for professional medical care. Always follow your healthcare professional's instructions.

## 2017-12-20 NOTE — ED AVS SNAPSHOT
[facility] Emergency Department    [address] [address]    Phone:  [phone]    Fax:  [phone]                                       Josemanuel Edmond   MRN: [id number]    Department:  [facility] Emergency Department   Date of Visit:  12/20/2017           After Visit Summary Signature Page     I have received my discharge instructions, and my questions have been answered. I have discussed any challenges I see with this plan with the nurse or doctor.    ..........................................................................................................................................  Patient/Patient Representative Signature      ..........................................................................................................................................  Patient Representative Print Name and Relationship to Patient    ..................................................               ................................................  Date                                            Time    ..........................................................................................................................................  Reviewed by Signature/Title    ...................................................              ..............................................  Date                                                            Time

## 2017-12-21 ENCOUNTER — HOSPITAL ENCOUNTER (OUTPATIENT)
Dept: CARDIOLOGY | Facility: CLINIC | Age: 72
Discharge: HOME | End: 2017-12-21
Attending: EMERGENCY MEDICINE | Admitting: EMERGENCY MEDICINE
Payer: MEDICARE

## 2017-12-21 DIAGNOSIS — R93.89 ABNORMAL CT SCAN: ICD-10-CM

## 2017-12-21 DIAGNOSIS — R06.09 DOE (DYSPNEA ON EXERTION): ICD-10-CM

## 2017-12-21 PROCEDURE — 93306 TTE W/DOPPLER COMPLETE: CPT | Mod: 26 | Performed by: INTERNAL MEDICINE

## 2017-12-21 PROCEDURE — 93306 TTE W/DOPPLER COMPLETE: CPT

## 2017-12-21 NOTE — TELEPHONE ENCOUNTER
Patient has echo scheduled for 12-26-17.  Is this too far out?    Routing to provider.  Kayleen MUNSON RN

## 2017-12-21 NOTE — TELEPHONE ENCOUNTER
Left message for patient to return call to clinic.  CSS - ok to deliver msg below.  Kayleen MUNSON RN

## 2017-12-21 NOTE — PROGRESS NOTES
Josemanuel,  Your echocardiogram today appears normal with no decrease in the pumping function of the heart.  I understand this was done to compare to the one you had done at [facility] in November.  Hopefully they gave you a CD of the echo that you can bring with you to the cardiologist      Terry Martínez MD

## 2017-12-21 NOTE — TELEPHONE ENCOUNTER
Patient called back, I had already talked to him and told him to call the Cardiac Clinic to make the ECHO appt. He did and cannot get in until Tuesday. He is wondering if that is soon enough.  Jennifer Stallworth  Clinic Station [place] [facility]

## 2017-12-21 NOTE — TELEPHONE ENCOUNTER
Patient went to ER yesterday for [place].  ER MD recommends echo to compare to echo 11/2017 (ER excerpt below - please review full notes for further information.  Please note the Echo from Nov can be found under care everywhere):  We discussed that he can have his primary care provider- Dr. Martínez- to order a comparison echocardiogram so it can be compared with the echo from November 2017.      Echo ready - there are a few questions MD has to answer.    Routing to provider.  Kayleen MUNSON RN

## 2017-12-22 ENCOUNTER — TELEPHONE (OUTPATIENT)
Dept: FAMILY MEDICINE | Facility: CLINIC | Age: 72
End: 2017-12-22

## 2017-12-22 NOTE — TELEPHONE ENCOUNTER
Patient is contacted.  He is confused on who he is seeing for cardiology but states it is in January.   I do not show an appt here in our system.  I have explained that [facility] and what they do we can not see here at .  Patient will get a copy of the Echo to take to [facility] for comparison. Patient states he does still use my chart but has not looked at it today. Alexandra QUESADA RN

## 2017-12-22 NOTE — TELEPHONE ENCOUNTER
I sent him a my chart message yesterday.  If he does not want to use my chart to get his results he should be deactivated.  At any rate, the echocardiogram was essentially normal with no decrease in the pumping action of the ventricle.  There was nothing on the echo that would explain symptoms of shortness of breath.  I understand he had an echo done at [facility] and they wanted to compare it to the one done yesterday.  He could get a copy of the echo on a CD  and bring it with him when he goes to see the cardiologist again so they can compare the 2.

## 2017-12-22 NOTE — TELEPHONE ENCOUNTER
Dr. Martínez,    Patient seen in the ED on 12/20/17 and a chest CT was performed and negative.  Patient is told the Echo is not read yet.  He continues with SOA and abd bloating (ongoing for a month).  Last BM was yesterday and a little soft for him but he is taking metamucil.  Urinating ok.  Eating and drinking ok.  Still refraining from ETOH.  SOA is all the time and he is out of breath with walking up a set of steps.  No CP or diaphoresis.  No nausea either.  The CT and the ED doctor did mention trace of ascites and maybe a US should be done.  Patient has poor understanding what a Echo is.  Please advise. Alexandra QUESADA RN

## 2017-12-22 NOTE — TELEPHONE ENCOUNTER
Reason for Call:  Abdominal bloating, and results from CT and Echo    Detailed comments: patient is calling, first wanting his results for his CT and the ECHO that was done yesterday, which I looked and it is not resulted yet, wondering why Dr. Martínez has not called with his results. He is stating that he should be seen by Dr. Martínez today, as this is serious, and there are no appt's. I told him someone would contact him, when the results are in, he then stated that he has had abdominal bloating for a month, and needs to know why.    Phone Number Patient can be reached at: Cell number on file:    Telephone Information:   Mobile [phone]       Best Time: any    Can we leave a detailed message on this number? YES   .Jennifer Stallworth  Clinic Station  [facility]      Call taken on 12/22/2017 at 9:03 AM by Jennifer Stallworth

## 2017-12-29 ENCOUNTER — OFFICE VISIT (OUTPATIENT)
Dept: FAMILY MEDICINE | Facility: CLINIC | Age: 72
End: 2017-12-29
Payer: COMMERCIAL

## 2017-12-29 VITALS
SYSTOLIC BLOOD PRESSURE: 128 MMHG | WEIGHT: 183.4 LBS | RESPIRATION RATE: 16 BRPM | BODY MASS INDEX: 25.68 KG/M2 | HEIGHT: 71 IN | DIASTOLIC BLOOD PRESSURE: 76 MMHG | HEART RATE: 76 BPM

## 2017-12-29 DIAGNOSIS — R14.0 ABDOMINAL BLOATING: Primary | ICD-10-CM

## 2017-12-29 DIAGNOSIS — I48.20 CHRONIC ATRIAL FIBRILLATION (H): ICD-10-CM

## 2017-12-29 DIAGNOSIS — I10 HYPERTENSION GOAL BP (BLOOD PRESSURE) < 140/90: ICD-10-CM

## 2017-12-29 DIAGNOSIS — F10.21 ALCOHOLISM IN REMISSION (H): ICD-10-CM

## 2017-12-29 DIAGNOSIS — F32.1 MODERATE MAJOR DEPRESSION (H): ICD-10-CM

## 2017-12-29 PROCEDURE — 99214 OFFICE O/P EST MOD 30 MIN: CPT | Performed by: FAMILY MEDICINE

## 2017-12-29 RX ORDER — ACETAMINOPHEN 500 MG
500-1000 TABLET ORAL EVERY 6 HOURS PRN
COMMUNITY
End: 2018-09-14

## 2017-12-29 NOTE — PROGRESS NOTES
"  SUBJECTIVE:   Josemanuel Edmond is a 72 year old male who presents to clinic today for the following health issues:      ED/UC Followup:    Facility:  [facility]  Date of visit: 12/20/2017  Reason for visit: [place]  Current Status: Just in the last couple days he has noticed that the dyspnea on exertion has improved.  Yesterday he was able to use the snowblower and clean off his driveway and did not get short of breath doing this.  In the ER they had arranged for him to have an echocardiogram and this showed normal LV function, with mild diastolic dysfunction         He also has been complaining of abdominal bloating and distention.  He was put on Metamucil when he left the hospital with his pneumonia and he is having regular stools that are somewhat soft.  He has not noticed excessive burping or flatulence but did read that bloating can be a side effect of the Metamucil    Problem list and histories reviewed & adjusted, as indicated.  Additional history: Alcoholism in remission: He has not had anything to drink for over a month now and does not have any cravings or desire for alcohol.  He has some additional stress related to his grandson who has been living with him.  The  may be trying to take him from gym and his wife and given to a different foster family.        Reviewed and updated as needed this visit by clinical staff  Tobacco  Allergies  Med Hx  Surg Hx  Fam Hx  Soc Hx      Reviewed and updated as needed this visit by Provider               ROS:  Constitutional, HEENT, cardiovascular, pulmonary, gi and gu systems are negative, except as otherwise noted.          OBJECTIVE:                                                    /76  Pulse 76  Resp 16  Ht 5' 11\" (1.803 m)  Wt 183 lb 6.4 oz (83.2 kg)  BMI 25.58 kg/m2    GENERAL: healthy, alert and no distress  EYES: Eyes grossly normal to inspection, extraocular movements - intact, and PERRL  NECK: no tenderness, no adenopathy, no " asymmetry, no masses, no stiffness; thyroid- normal to palpation  RESP: lungs clear to auscultation - no rhonchi, no wheezes; a few coarse rales at both bases  CV: no murmur, click or rub and irregularly irregular rhythm  ABDOMEN: soft, no tenderness, no  hepatosplenomegaly, no masses, normal bowel sounds  MS: extremities- no gross deformities noted, no edema         ASSESSMENT/PLAN:                                                    ASSESSMENT:  1. Abdominal bloating    2. Chronic atrial fibrillation (H)    3. Hypertension goal BP (blood pressure) < 140/90    4. 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)    5. Moderate major depression (H)      It does not appear that he has ascites or significant hepatomegaly.  Review of the CT and ultrasound done at [facility] just showed some mild fatty liver and his liver function tests were just slightly elevated.  I suspect the bloating could be a side effect of the Metamucil    PLAN:  We will have him discontinue the Metamucil and use MiraLAX instead.  The improvement in his shortness of breath is probably continued healing from the pneumonia and empyema which was quite severe.  His conditioning is probably slowly improving also      Orders Placed This Encounter     METOPROLOL TARTRATE PO     BuPROPion HCl (WELLBUTRIN PO)     DIGOXIN PO     rivaroxaban ANTICOAGULANT (XARELTO) 20 MG TABS tablet     acetaminophen (TYLENOL) 500 MG tablet     ASPIRIN NOT PRESCRIBED (INTENTIONAL)     STATIN NOT PRESCRIBED, INTENTIONAL,     Recheck if this does not help      AVIS Martínez  [facility]

## 2017-12-29 NOTE — NURSING NOTE
"Chief Complaint   Patient presents with     ER F/U     12/20/2017      Patient Request     pt. has concerns with ongoing bloating X 1 month.       Initial /76  Pulse 76  Resp 16  Ht 5' 11\" (1.803 m)  Wt 183 lb 6.4 oz (83.2 kg)  BMI 25.58 kg/m2 Estimated body mass index is 25.58 kg/(m^2) as calculated from the following:    Height as of this encounter: 5' 11\" (1.803 m).    Weight as of this encounter: 183 lb 6.4 oz (83.2 kg).  Medication Reconciliation: complete     Gretel Rodriguez, CMA      "

## 2017-12-29 NOTE — MR AVS SNAPSHOT
"              After Visit Summary   12/29/2017    Josemanuel Edmond    MRN: [id number]           Patient Information     Date Of Birth          1945        Visit Information        Provider Department      12/29/2017 3:40 PM Tanya, AVIS Stewart MD [facility][note 2]        Today's Diagnoses     Abdominal bloating    -  1    Chronic atrial fibrillation (H)        Hypertension goal BP (blood pressure) < 140/90        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)        Moderate major depression (H)           Follow-ups after your visit        Who to contact     If you have questions or need follow up information about today's clinic visit or your schedule please contact [facility] directly at [phone].  Normal or non-critical lab and imaging results will be communicated to you by MyChart, letter or phone within 4 business days after the clinic has received the results. If you do not hear from us within 7 days, please contact the clinic through [employer] or phone. If you have a critical or abnormal lab result, we will notify you by phone as soon as possible.  Submit refill requests through [employer] or call your pharmacy and they will forward the refill request to us. Please allow 3 business days for your refill to be completed.          Additional Information About Your Visit        MyChart Information     [employer] gives you secure access to your electronic health record. If you see a primary care provider, you can also send messages to your care team and make appointments. If you have questions, please call your primary care clinic.  If you do not have a primary care provider, please call [phone] and they will assist you.        Care EveryWhere ID     This is your Care EveryWhere ID. This could be used by other organizations to access your [facility] medical records  [id number]        Your Vitals Were     Pulse Respirations Height BMI (Body Mass Index)          76 16 5' 11\" (1.803 m) 25.58 kg/m2         " Blood Pressure from Last 3 Encounters:   12/29/17 128/76   12/20/17 148/90   11/28/17 90/60    Weight from Last 3 Encounters:   12/29/17 183 lb 6.4 oz (83.2 kg)   12/20/17 189 lb (85.7 kg)   11/28/17 192 lb 8 oz (87.3 kg)              Today, you had the following     No orders found for display         Today's Medication Changes          These changes are accurate as of: 12/29/17  5:29 PM.  If you have any questions, ask your nurse or doctor.               Start taking these medicines.        Dose/Directions    ASPIRIN NOT PRESCRIBED   Commonly known as:  INTENTIONAL   Used for:  Chronic atrial fibrillation (H)   Started by:  AVIS Martínez MD        Please choose reason not prescribed, below   Quantity:  1 each   Refills:  0       STATIN NOT PRESCRIBED (INTENTIONAL)   Used for:  Chronic atrial fibrillation (H)   Started by:  AVIS Martínez MD        Please choose reason not prescribed, below   Refills:  0            Where to get your medicines      Some of these will need a paper prescription and others can be bought over the counter.  Ask your nurse if you have questions.     You don't need a prescription for these medications     ASPIRIN NOT PRESCRIBED    STATIN NOT PRESCRIBED (INTENTIONAL)                Primary Care Provider Office Phone # Fax #    AVIS Martínez [phone] [address] [address]        Equal Access to Services     MERARI NASCIMENTO AH: Hadii toney cortez hadasho Soomaali, waaxda luqadaha, qaybta kaalmada adeegyada, bree dickerson haytran silver. So [facility] [phone].    ATENCIÓN: Si habla español, tiene a sen disposición servicios gratuitos de asistencia lingüística. Llame al [phone].    We comply with applicable federal civil rights laws and [place] laws. We do not discriminate on the basis of race, color, national origin, age, disability, sex, sexual orientation, or gender identity.            Thank you!     Thank you for choosing [facility]  for your care. Our goal is always to provide you with excellent care. Hearing back from our patients is one way we can continue to improve our services. Please take a few minutes to complete the written survey that you may receive in the mail after your visit with us. Thank you!             Your Updated Medication List - Protect others around you: Learn how to safely use, store and throw away your medicines at www.disposemymeds.org.          This list is accurate as of: 12/29/17  5:29 PM.  Always use your most recent med list.                   Brand Name Dispense Instructions for use Diagnosis    ASPIRIN NOT PRESCRIBED    INTENTIONAL    1 each    Please choose reason not prescribed, below    Chronic atrial fibrillation (H)       DIGOXIN PO      Take 250 mcg by mouth daily        doxazosin 4 MG tablet    CARDURA    90 tablet    Take 1 tablet (4 mg) by mouth daily    Hypertrophy of prostate with urinary obstruction       escitalopram 10 MG tablet    LEXAPRO    90 tablet    Take 1 tablet (10 mg) by mouth daily    Anxiety       gabapentin 300 MG capsule    NEURONTIN    120 capsule    One am and midday, 2 at bedtime    Left lumbar radiculopathy       METOPROLOL TARTRATE PO      Take 50 mg by mouth 2 times daily        nortriptyline 10 MG capsule    PAMELOR    60 capsule    2 capsules at bedtime    Primary insomnia       rivaroxaban ANTICOAGULANT 20 MG Tabs tablet    XARELTO     Take 20 mg by mouth daily (with dinner)        STATIN NOT PRESCRIBED (INTENTIONAL)      Please choose reason not prescribed, below    Chronic atrial fibrillation (H)       TYLENOL 500 MG tablet   Generic drug:  acetaminophen      Take 500-1,000 mg by mouth every 6 hours as needed for mild pain        WELLBUTRIN PO      Take 150 mg by mouth daily

## 2018-01-08 PROBLEM — I48.20 CHRONIC ATRIAL FIBRILLATION (H): Status: ACTIVE | Noted: 2018-01-08

## 2018-01-09 ENCOUNTER — MYC MEDICAL ADVICE (OUTPATIENT)
Dept: FAMILY MEDICINE | Facility: CLINIC | Age: 73
End: 2018-01-09

## 2018-01-12 NOTE — TELEPHONE ENCOUNTER
I would proceed with the appointment on the 18th.  Come into this clinic sooner if symptoms worsen significantly or if new symptoms develop.    Lucina

## 2018-01-18 ENCOUNTER — TRANSFERRED RECORDS (OUTPATIENT)
Dept: HEALTH INFORMATION MANAGEMENT | Facility: CLINIC | Age: 73
End: 2018-01-18

## 2018-01-22 ENCOUNTER — OFFICE VISIT (OUTPATIENT)
Dept: FAMILY MEDICINE | Facility: CLINIC | Age: 73
End: 2018-01-22
Payer: COMMERCIAL

## 2018-01-22 VITALS
HEART RATE: 67 BPM | DIASTOLIC BLOOD PRESSURE: 63 MMHG | SYSTOLIC BLOOD PRESSURE: 126 MMHG | OXYGEN SATURATION: 98 % | BODY MASS INDEX: 26.36 KG/M2 | TEMPERATURE: 98.2 F | HEIGHT: 69 IN | WEIGHT: 178 LBS

## 2018-01-22 DIAGNOSIS — K40.90 LEFT INGUINAL HERNIA: ICD-10-CM

## 2018-01-22 DIAGNOSIS — R14.0 BLOATING: ICD-10-CM

## 2018-01-22 DIAGNOSIS — B37.0 THRUSH: ICD-10-CM

## 2018-01-22 DIAGNOSIS — R63.4 WEIGHT LOSS: Primary | ICD-10-CM

## 2018-01-22 DIAGNOSIS — R25.1 TREMOR: ICD-10-CM

## 2018-01-22 LAB
ALBUMIN SERPL-MCNC: 3.8 G/DL (ref 3.4–5)
ALP SERPL-CCNC: 60 U/L (ref 40–150)
ALT SERPL W P-5'-P-CCNC: 32 U/L (ref 0–70)
ANION GAP SERPL CALCULATED.3IONS-SCNC: 5 MMOL/L (ref 3–14)
AST SERPL W P-5'-P-CCNC: 15 U/L (ref 0–45)
BILIRUB SERPL-MCNC: 0.5 MG/DL (ref 0.2–1.3)
BUN SERPL-MCNC: 15 MG/DL (ref 7–30)
CALCIUM SERPL-MCNC: 8.1 MG/DL (ref 8.5–10.1)
CHLORIDE SERPL-SCNC: 99 MMOL/L (ref 94–109)
CO2 SERPL-SCNC: 28 MMOL/L (ref 20–32)
CREAT SERPL-MCNC: 0.81 MG/DL (ref 0.66–1.25)
ERYTHROCYTE [DISTWIDTH] IN BLOOD BY AUTOMATED COUNT: 13.8 % (ref 10–15)
GFR SERPL CREATININE-BSD FRML MDRD: >90 ML/MIN/1.7M2
GLUCOSE SERPL-MCNC: 104 MG/DL (ref 70–99)
HCT VFR BLD AUTO: 38.6 % (ref 40–53)
HGB BLD-MCNC: 12.9 G/DL (ref 13.3–17.7)
MCH RBC QN AUTO: 32.3 PG (ref 26.5–33)
MCHC RBC AUTO-ENTMCNC: 33.4 G/DL (ref 31.5–36.5)
MCV RBC AUTO: 97 FL (ref 78–100)
PLATELET # BLD AUTO: 201 10E9/L (ref 150–450)
POTASSIUM SERPL-SCNC: 4.2 MMOL/L (ref 3.4–5.3)
PROT SERPL-MCNC: 7.7 G/DL (ref 6.8–8.8)
RBC # BLD AUTO: 3.99 10E12/L (ref 4.4–5.9)
SODIUM SERPL-SCNC: 132 MMOL/L (ref 133–144)
WBC # BLD AUTO: 8.3 10E9/L (ref 4–11)

## 2018-01-22 PROCEDURE — 36415 COLL VENOUS BLD VENIPUNCTURE: CPT | Performed by: INTERNAL MEDICINE

## 2018-01-22 PROCEDURE — 80053 COMPREHEN METABOLIC PANEL: CPT | Performed by: INTERNAL MEDICINE

## 2018-01-22 PROCEDURE — 85027 COMPLETE CBC AUTOMATED: CPT | Performed by: INTERNAL MEDICINE

## 2018-01-22 PROCEDURE — 83516 IMMUNOASSAY NONANTIBODY: CPT | Mod: 59 | Performed by: INTERNAL MEDICINE

## 2018-01-22 PROCEDURE — 99214 OFFICE O/P EST MOD 30 MIN: CPT | Performed by: INTERNAL MEDICINE

## 2018-01-22 PROCEDURE — 83516 IMMUNOASSAY NONANTIBODY: CPT | Performed by: INTERNAL MEDICINE

## 2018-01-22 RX ORDER — NYSTATIN 100000/ML
500000 SUSPENSION, ORAL (FINAL DOSE FORM) ORAL 4 TIMES DAILY
Qty: 60 ML | Refills: 0 | Status: SHIPPED | OUTPATIENT
Start: 2018-01-22 | End: 2018-02-21

## 2018-01-22 NOTE — PROGRESS NOTES
SUBJECTIVE:   Josemanuel Edmond is a 72 year old male who presents to clinic today for the following health issues:  Chief Complaint   Patient presents with     Weight Loss     x 3 months 198# down to 178# in clinic today     Bloated     x 2 months, no abdominal pain, occasional soft stool      Derm Problem     Referral     hernia      Ear Problem     Concern - weight loss  Onset: constant weight loss since November.      Description:    Started at 198# then and down to 178 in clinic today.  No changes to diet at all.     Intensity: severe    Progression of Symptoms:  worsening    Accompanying Signs & Symptoms:  None     Previous history of similar problem:   None     Precipitating factors:   Worsened by: none     Alleviating factors:  Improved by: none    Therapies Tried and outcome: tries to eat more, drinking nutritional shakes on occasion.      Abdominal bloating and discomfort      Duration: x 2 months     Description (location/character/radiation): abdominal bloating        Associated flank pain: None    Intensity:      Accompanying signs and symptoms:        Fever/Chills: no        Gas/Bloating: YES       Nausea/vomitting: no        Diarrhea: YES- only on occasion.  Stool has been softer other times.  He did have mucus and red blood in the stool last year, spring or summer.         Dysuria or Hematuria: no     History (previous similar pain/trauma/previous testing): none     Precipitating or alleviating factors:       Pain worse with eating/BM/urination: bloating feels worse w/ eating but not with any particular foods.  Stomach feels hard.         Pain relieved by BM: no     Therapies tried and outcome: Miralax, metamucil     LMP:  not applicable      Derm issue       Duration: x 1 month, on/off     Description (location/character/radiation): white spots outside, by corner of mouth.     Patient reports he will take a Q-tip with Vaseline and pull out thick, white discharge.     Intensity:  Mild, almost gone  now.      Accompanying signs and symptoms: none, no pain.     History (similar episodes/previous evaluation): None    Precipitating or alleviating factors: None    Therapies tried and outcome: antibiotic cream, cleans w/ peroxide.        Left inguinal hernia- this is reducible per his report.  He would like to get a hernia belt    Has been having hand tremors with rest and activity and has a family history of Parkinson's.          Problem list and histories reviewed & adjusted, as indicated.  Additional history: as documented    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)     Chronic atrial fibrillation (H)     Past Surgical History:   Procedure Laterality Date     COLONOSCOPY N/A 12/10/2015    Procedure: COMBINED COLONOSCOPY, SINGLE OR MULTIPLE BIOPSY/POLYPECTOMY BY BIOPSY;  Surgeon: Jyoti Figueroa MD;  Location: [facility]     JOINT REPLACEMENT, HIP RT/LT  10/07    Joint Replacement Hip LT     JOINT REPLACEMTN, KNEE RT/LT  8/2011    Joint Replacement knee /LT, [facility]     SURGICAL HISTORY OF -   12/1/1999    Umbilical Herniorrhaphy with mesh       Social History   Substance Use Topics     Smoking status: Former Smoker     Packs/day: 1.00     Years: 15.00     Types: Cigarettes     Quit date: 10/13/1975     Smokeless tobacco: Never Used     Alcohol use No      Comment: quit 11/29/2017     Family History   Problem Relation Age of Onset     Breast Cancer Mother      Neurologic Disorder Mother       "parkinsons     Respiratory Father      emphyzema     DIABETES Brother          Current Outpatient Prescriptions   Medication Sig Dispense Refill     nystatin (MYCOSTATIN) [id number] UNIT/ML suspension Take 5 mLs (500,000 Units) by mouth 4 times daily 60 mL 0     order for DME Equipment being ordered: inguinal hernia belt 1 Units 0     METOPROLOL TARTRATE PO Take 50 mg by mouth 2 times daily       BuPROPion HCl (WELLBUTRIN PO) Take 150 mg by mouth daily       rivaroxaban ANTICOAGULANT (XARELTO) 20 MG TABS tablet Take 20 mg by mouth daily (with dinner)       nortriptyline (PAMELOR) 10 MG capsule 2 capsules at bedtime 60 capsule 5     escitalopram (LEXAPRO) 10 MG tablet Take 1 tablet (10 mg) by mouth daily 90 tablet 1     gabapentin (NEURONTIN) 300 MG capsule One am and midday, 2 at bedtime 120 capsule 11     doxazosin (CARDURA) 4 MG tablet Take 1 tablet (4 mg) by mouth daily 90 tablet 3     DIGOXIN PO Take 250 mcg by mouth daily       acetaminophen (TYLENOL) 500 MG tablet Take 500-1,000 mg by mouth every 6 hours as needed for mild pain       ASPIRIN NOT PRESCRIBED (INTENTIONAL) Please choose reason not prescribed, below 1 each 0     STATIN NOT PRESCRIBED, INTENTIONAL, Please choose reason not prescribed, below       Allergies   Allergen Reactions     Bactrim [Sulfamethoxazole W/Trimethoprim] Nausea and Vomiting         Reviewed and updated as needed this visit by clinical staffTobacco  Allergies  Med Hx  Surg Hx  Fam Hx  Soc Hx      Reviewed and updated as needed this visit by Provider         ROS:  Constitutional, HEENT, cardiovascular, pulmonary, gi and gu systems are negative, except as otherwise noted.      OBJECTIVE:   /63 (BP Location: Right arm, Patient Position: Chair, Cuff Size: Adult Regular)  Pulse 67  Temp 98.2  F (36.8  C) (Tympanic)  Ht 5' 8.5\" (1.74 m)  Wt 178 lb (80.7 kg)  SpO2 98%  BMI 26.67 kg/m2  Body mass index is 26.67 kg/(m^2).  GENERAL: healthy, alert and no distress  HENT: mouth " without ulcers or lesions  RESP: lungs clear to auscultation - no rales, rhonchi or wheezes  CV: regular rate and rhythm, normal S1 S2, no S3 or S4, no murmur, click or rub  ABDOMEN: soft, mild distension without tenderness, no rebound or guarding, no hepatosplenomegaly, no masses and bowel sounds normal, bulging of left abdominal wall with sitting  NEURO: right hand tremor noted at rest      ASSESSMENT/PLAN:       1. Weight loss  2. Bloating    He's had recent abdominal CT and U/S as well as chest imaging after having recent pneumonia with empyema requiring drainage.  No chest or abdominal masses noted.  Did have some fatty liver on imaging.  Does not necessarily seem food related but advised keeping a food diary and we will check TTG.  Will check for H pylori and repeat blood work to see if anything has changed.  Recommend colonoscopy for further evaluation.  He is on Xarelto for atrial fibrillation, but it sounds like he has been in sinus rhythm and CHADS-Vasc score is not high, so okay to hold for colonoscopy.     - GASTROENTEROLOGY ADULT REF PROCEDURE ONLY  - Tissue transglutaminase camilla IgA and IgG  - CBC with platelets  - Comprehensive metabolic panel  - H Pylori antigen stool; Future  - GASTROENTEROLOGY ADULT REF PROCEDURE ONLY    3. Thrush    No lesions present now, but what he describes seems like it may have been thrust.  Rx for nystatin given in case this recurs.      - nystatin (MYCOSTATIN) [id number] UNIT/ML suspension; Take 5 mLs (500,000 Units) by mouth 4 times daily  Dispense: 60 mL; Refill: 0    4. Left inguinal hernia    Reducible but enlarging, he would like to try a hernia belt.  He has seen surgery for this in the past.  I don't think they'd necessarily recommend surgical repair, but he can follow-up with them if he wants to discuss this further.  He is currently not interested in surgery.    - order for DME; Equipment being ordered: inguinal hernia belt  Dispense: 1 Units; Refill: 0    5.  Tremor    Due to time limitation, we were not able to get into this in depth, but he did have a resting tremor in his right hand and has a family history of Parkinson's.  He can either return to  for further eval or schedule with neurology.      - NEUROLOGY ADULT REFERRAL    Derik Aleman MD  [facility]

## 2018-01-22 NOTE — MR AVS SNAPSHOT
After Visit Summary   1/22/2018    Josemanuel Edmond    MRN: [id number]           Patient Information     Date Of Birth          1945        Visit Information        Provider Department      1/22/2018 12:40 PM Derik Aleman MD [facility]        Today's Diagnoses     Thrush    -  1    Tremor        Bloating        Weight loss          Care Instructions    Keep a food diary for a least two weeks to see if you can identify any food triggers of your symptoms.  Write down all of the foods that you eat, and then also right down when you experience symptoms and what those symptoms are.  Try to identify any possible triggers.  If you identify a possible trigger, next eliminate that food from your diet for two weeks and see if symptoms improve.  If they do, you can add that food back into your diet to see if symptoms resume or you can just continue to avoid that food.      You'll have to hold the Xarelto before the colonoscopy.            Follow-ups after your visit        Additional Services     GASTROENTEROLOGY ADULT REF PROCEDURE ONLY       Last Lab Result: Creatinine (mg/dL)       Date                     Value                 12/20/2017               0.84             ----------  Body mass index is 26.67 kg/(m^2).     Needed:  No  Language:  English    Patient will be contacted to schedule procedure.     Please be aware that coverage of these services is subject to the terms and limitations of your health insurance plan.  Call member services at your health plan with any benefit or coverage questions.  Any procedures must be performed at a [place] facility OR coordinated by your clinic's referral office.    Please bring the following with you to your appointment:    (1) Any X-Rays, CTs or MRIs which have been performed.  Contact the facility where they were done to arrange for  prior to your scheduled appointment.    (2) List of current medications   (3) This referral  request   (4) Any documents/labs given to you for this referral            NEUROLOGY ADULT REFERRAL       Your provider has referred you for the following:   [place] [facility] [facility] [phone]   [email or web address]  [facility]: [facility] [phone]   [email or web address]  [facility]: [facility] [phone]   [email or web address]    Please be aware that coverage of these services is subject to the terms and limitations of your health insurance plan.  Call member services at your health plan with any benefit or coverage questions.      Please bring the following with you to your appointment:    (1) Any X-Rays, CTs or MRIs which have been performed.  Contact the facility where they were done to arrange for  prior to your scheduled appointment.    (2) List of current medications  (3) This referral request   (4) Any documents/labs given to you for this referral                  Future tests that were ordered for you today     Open Future Orders        Priority Expected Expires Ordered    H Pylori antigen stool Routine  2/21/2018 1/22/2018            Who to contact     If you have questions or need follow up information about today's clinic visit or your schedule please contact [facility] directly at [phone].  Normal or non-critical lab and imaging results will be communicated to you by MyChart, letter or phone within 4 business days after the clinic has received the results. If you do not hear from us within 7 days, please contact the clinic through MyChart or phone. If you have a critical or abnormal lab result, we will notify you by phone as soon as possible.  Submit refill requests through [employer] or call your pharmacy and they will forward the refill request to us. Please allow 3 business days for  "your refill to be completed.          Additional Information About Your Visit        MyChart Information     [employer] gives you secure access to your electronic health record. If you see a primary care provider, you can also send messages to your care team and make appointments. If you have questions, please call your primary care clinic.  If you do not have a primary care provider, please call [phone] and they will assist you.        Care EveryWhere ID     This is your Care EveryWhere ID. This could be used by other organizations to access your [facility] medical records  [id number]        Your Vitals Were     Pulse Temperature Height Pulse Oximetry BMI (Body Mass Index)       67 98.2  F (36.8  C) (Tympanic) 5' 8.5\" (1.74 m) 98% 26.67 kg/m2        Blood Pressure from Last 3 Encounters:   01/22/18 126/63   12/29/17 128/76   12/20/17 148/90    Weight from Last 3 Encounters:   01/22/18 178 lb (80.7 kg)   12/29/17 183 lb 6.4 oz (83.2 kg)   12/20/17 189 lb (85.7 kg)              We Performed the Following     CBC with platelets     Comprehensive metabolic panel     GASTROENTEROLOGY ADULT REF PROCEDURE ONLY     NEUROLOGY ADULT REFERRAL     Tissue transglutaminase camilla IgA and IgG          Today's Medication Changes          These changes are accurate as of: 1/22/18  1:33 PM.  If you have any questions, ask your nurse or doctor.               Start taking these medicines.        Dose/Directions    nystatin [id number] UNIT/ML suspension   Commonly known as:  MYCOSTATIN   Used for:  Thrush   Started by:  Derik Aleman MD        Dose:  [id number] Units   Take 5 mLs (500,000 Units) by mouth 4 times daily   Quantity:  60 mL   Refills:  0            Where to get your medicines      [facility] [place] - [address]  [address], [place] [address]    Hours:  [facility] Phone:  [phone]     nystatin [id number] UNIT/ML suspension                " Primary Care Provider Office Phone # Fax #    R Terry Martínez [phone] [address] [facility] [address]        Equal Access to Services     SHANAEESEQUIEL AZAM : Kyleigh toney cortez cornelioo Ann, waaxda luqadaha, qaybta kaalmada joe, bree vincent latashabeka adrianne. So [facility] [phone].    ATENCIÓN: Si habla español, tiene a sen disposición servicios gratuitos de asistencia lingüística. Llame al [phone].    We comply with applicable federal civil rights laws and [place] laws. We do not discriminate on the basis of race, color, national origin, age, disability, sex, sexual orientation, or gender identity.            Thank you!     Thank you for choosing [facility]  for your care. Our goal is always to provide you with excellent care. Hearing back from our patients is one way we can continue to improve our services. Please take a few minutes to complete the written survey that you may receive in the mail after your visit with us. Thank you!             Your Updated Medication List - Protect others around you: Learn how to safely use, store and throw away your medicines at www.disposemymeds.org.          This list is accurate as of: 1/22/18  1:33 PM.  Always use your most recent med list.                   Brand Name Dispense Instructions for use Diagnosis    ASPIRIN NOT PRESCRIBED    INTENTIONAL    1 each    Please choose reason not prescribed, below    Chronic atrial fibrillation (H)       DIGOXIN PO      Take 250 mcg by mouth daily        doxazosin 4 MG tablet    CARDURA    90 tablet    Take 1 tablet (4 mg) by mouth daily    Hypertrophy of prostate with urinary obstruction       escitalopram 10 MG tablet    LEXAPRO    90 tablet    Take 1 tablet (10 mg) by mouth daily    Anxiety       gabapentin 300 MG capsule    NEURONTIN    120 capsule    One am and midday, 2 at bedtime    Left lumbar radiculopathy       METOPROLOL TARTRATE PO      Take 50 mg by mouth 2  times daily        nortriptyline 10 MG capsule    PAMELOR    60 capsule    2 capsules at bedtime    Primary insomnia       nystatin [id number] UNIT/ML suspension    MYCOSTATIN    60 mL    Take 5 mLs (500,000 Units) by mouth 4 times daily    Thrush       rivaroxaban ANTICOAGULANT 20 MG Tabs tablet    XARELTO     Take 20 mg by mouth daily (with dinner)        STATIN NOT PRESCRIBED (INTENTIONAL)      Please choose reason not prescribed, below    Chronic atrial fibrillation (H)       TYLENOL 500 MG tablet   Generic drug:  acetaminophen      Take 500-1,000 mg by mouth every 6 hours as needed for mild pain        WELLBUTRIN PO      Take 150 mg by mouth daily

## 2018-01-22 NOTE — PATIENT INSTRUCTIONS
Keep a food diary for a least two weeks to see if you can identify any food triggers of your symptoms.  Write down all of the foods that you eat, and then also right down when you experience symptoms and what those symptoms are.  Try to identify any possible triggers.  If you identify a possible trigger, next eliminate that food from your diet for two weeks and see if symptoms improve.  If they do, you can add that food back into your diet to see if symptoms resume or you can just continue to avoid that food.      You'll have to hold the Xarelto before the colonoscopy.

## 2018-01-22 NOTE — NURSING NOTE
"Chief Complaint   Patient presents with     Weight Loss     x 3 months 198# down to 178# in clinic today     Bloated     x 2 months, no abdominal pain, occasional soft stool      Derm Problem     Referral     hernia      Ear Problem       Initial /63 (BP Location: Right arm, Patient Position: Chair, Cuff Size: Adult Regular)  Pulse 67  Temp 98.2  F (36.8  C) (Tympanic)  Ht 5' 8.5\" (1.74 m)  Wt 178 lb (80.7 kg)  SpO2 98%  BMI 26.67 kg/m2 Estimated body mass index is 26.67 kg/(m^2) as calculated from the following:    Height as of this encounter: 5' 8.5\" (1.74 m).    Weight as of this encounter: 178 lb (80.7 kg).  Medication Reconciliation: complete   Rosita MCADAMS CMA (AAMA)    "

## 2018-01-23 ENCOUNTER — TELEPHONE (OUTPATIENT)
Dept: FAMILY MEDICINE | Facility: CLINIC | Age: 73
End: 2018-01-23

## 2018-01-23 DIAGNOSIS — F99 INSOMNIA DUE TO OTHER MENTAL DISORDER: ICD-10-CM

## 2018-01-23 DIAGNOSIS — F51.05 INSOMNIA DUE TO OTHER MENTAL DISORDER: ICD-10-CM

## 2018-01-23 LAB
TTG IGA SER-ACNC: 1 U/ML
TTG IGG SER-ACNC: 1 U/ML

## 2018-01-23 NOTE — TELEPHONE ENCOUNTER
Last filled 8/17/17; 30 plus 5 refills.    He would be due for refill soon; early Feb but there is a note that pt stopped taking it dated 11/28/17    Pt saw Dr Aleman yesterday.  Routed to provider for consideration.    Becca Ruggiero RN

## 2018-01-23 NOTE — TELEPHONE ENCOUNTER
Zolpidem 10 mg      Last Written Prescription Date:  Not on med list  Last Fill Quantity: 0,   # refills: 0  Last Office Visit: 01/22/2018  Future Office visit:       Routing refill request to provider for review/approval because:  Drug not active on patient's medication list

## 2018-01-23 NOTE — TELEPHONE ENCOUNTER
I did not discuss this med or insomnia with Josemanuel at all yesterday, so I'd recommend this be filled by his PCP if he feels that the refill is appropriate.  Thanks.    Derik Aleman MD

## 2018-01-24 RX ORDER — ZOLPIDEM TARTRATE 10 MG/1
10 TABLET ORAL
Qty: 30 TABLET | Refills: 5 | OUTPATIENT
Start: 2018-01-24

## 2018-01-24 NOTE — TELEPHONE ENCOUNTER
I declined refill. Now that he is in recovery from his alcoholism, should not be using any mood altering or sedating medications. Notify pt and pharmacy

## 2018-01-25 DIAGNOSIS — R14.0 BLOATING: ICD-10-CM

## 2018-01-25 PROCEDURE — 87338 HPYLORI STOOL AG IA: CPT | Performed by: INTERNAL MEDICINE

## 2018-01-25 NOTE — TELEPHONE ENCOUNTER
Patient returned call to [facility].  [facility] unable to transfer call - phones down.  RN unable to call patient - CL phones down.    Will attempt to reach patient later.  Kayleen MUNSON RN

## 2018-01-26 LAB
H PYLORI AG STL QL IA: NORMAL
SPECIMEN SOURCE: NORMAL

## 2018-02-02 ENCOUNTER — OFFICE VISIT (OUTPATIENT)
Dept: FAMILY MEDICINE | Facility: CLINIC | Age: 73
End: 2018-02-02
Payer: COMMERCIAL

## 2018-02-02 VITALS
BODY MASS INDEX: 25.09 KG/M2 | WEIGHT: 179.2 LBS | SYSTOLIC BLOOD PRESSURE: 120 MMHG | HEART RATE: 68 BPM | HEIGHT: 71 IN | RESPIRATION RATE: 16 BRPM | DIASTOLIC BLOOD PRESSURE: 60 MMHG

## 2018-02-02 DIAGNOSIS — R63.4 WEIGHT LOSS: Primary | ICD-10-CM

## 2018-02-02 PROCEDURE — 99214 OFFICE O/P EST MOD 30 MIN: CPT | Performed by: FAMILY MEDICINE

## 2018-02-02 ASSESSMENT — PAIN SCALES - GENERAL: PAINLEVEL: NO PAIN (0)

## 2018-02-02 NOTE — MR AVS SNAPSHOT
After Visit Summary   2/2/2018    Josemanuel Edmond    MRN: [id number]           Patient Information     Date Of Birth          1945        Visit Information        Provider Department      2/2/2018 2:40 PM Post, AVIS Stewart MD [facility]        Today's Diagnoses     Weight loss    -  1      Care Instructions    Call to see if they can work you in for the colonoscopy if someone cancels.  Collect the stool specimens in the meantime.          Follow-ups after your visit        Your next 10 appointments already scheduled     Feb 26, 2018   Procedure with Josemanuel Resendiz MD   [facility] Endoscopy ([facility])    [address] [address]   [phone]           The medical center is located at [address] (between [address] in [place], [place] north of [place]).              Future tests that were ordered for you today     Open Future Orders        Priority Expected Expires Ordered    Giardia antigen Routine  2/2/2019 2/2/2018    Enteric Bacteria and Virus Panel by AURA Stool Routine  2/2/2019 2/2/2018    Ova and Parasite Exam Routine Routine  2/2/2019 2/2/2018            Who to contact     If you have questions or need follow up information about today's clinic visit or your schedule please contact [facility] directly at [phone].  Normal or non-critical lab and imaging results will be communicated to you by MyChart, letter or phone within 4 business days after the clinic has received the results. If you do not hear from us within 7 days, please contact the clinic through MyChart or phone. If you have a critical or abnormal lab result, we will notify you by phone as soon as possible.  Submit refill requests through [employer] or call your pharmacy and they will forward the refill request to us. Please allow 3 business days for your refill to be completed.          Additional Information About Your Visit       "  MyChart Information     [email or web address] gives you secure access to your electronic health record. If you see a primary care provider, you can also send messages to your care team and make appointments. If you have questions, please call your primary care clinic.  If you do not have a primary care provider, please call [phone] and they will assist you.        Care EveryWhere ID     This is your Care EveryWhere ID. This could be used by other organizations to access your [place] medical records  [id number]        Your Vitals Were     Pulse Respirations Height BMI (Body Mass Index)          68 16 5' 11\" (1.803 m) 24.99 kg/m2         Blood Pressure from Last 3 Encounters:   02/02/18 120/60   01/22/18 126/63   12/29/17 128/76    Weight from Last 3 Encounters:   02/02/18 179 lb 3.2 oz (81.3 kg)   01/22/18 178 lb (80.7 kg)   12/29/17 183 lb 6.4 oz (83.2 kg)               Primary Care Provider Office Phone # Fax #    R Terry Martínez [phone] [address] [address]        Equal Access to Services     MERARI NASCIMENTO AH: Hadii aad ku hadasho Soomaali, waaxda luqadaha, qaybta kaalmada adeegyada, waxay veroin hayaan adeeg kerenarajob la'aan ah. So [facility] [phone].    ATENCIÓN: Si habla español, tiene a sen disposición servicios gratuitos de asistencia lingüística. Llame al [phone].    We comply with applicable federal civil rights laws and [place] laws. We do not discriminate on the basis of race, color, national origin, age, disability, sex, sexual orientation, or gender identity.            Thank you!     Thank you for choosing [facility]  for your care. Our goal is always to provide you with excellent care. Hearing back from our patients is one way we can continue to improve our services. Please take a few minutes to complete the written survey that you may receive in the mail after your visit with us. Thank you!             Your Updated Medication List - Protect " others around you: Learn how to safely use, store and throw away your medicines at www.disposemymeds.org.          This list is accurate as of 2/2/18  3:15 PM.  Always use your most recent med list.                   Brand Name Dispense Instructions for use Diagnosis    ASPIRIN NOT PRESCRIBED    INTENTIONAL    1 each    Please choose reason not prescribed, below    Chronic atrial fibrillation (H)       DIGOXIN PO      Take 250 mcg by mouth daily        doxazosin 4 MG tablet    CARDURA    90 tablet    Take 1 tablet (4 mg) by mouth daily    Hypertrophy of prostate with urinary obstruction       escitalopram 10 MG tablet    LEXAPRO    90 tablet    Take 1 tablet (10 mg) by mouth daily    Anxiety       gabapentin 300 MG capsule    NEURONTIN    120 capsule    One am and midday, 2 at bedtime    Left lumbar radiculopathy       METOPROLOL TARTRATE PO      Take 50 mg by mouth 2 times daily        nortriptyline 10 MG capsule    PAMELOR    60 capsule    2 capsules at bedtime    Primary insomnia       nystatin [id number] UNIT/ML suspension    MYCOSTATIN    60 mL    Take 5 mLs (500,000 Units) by mouth 4 times daily    Thrush       order for DME     1 Units    Equipment being ordered: inguinal hernia belt    Left inguinal hernia       rivaroxaban ANTICOAGULANT 20 MG Tabs tablet    XARELTO     Take 20 mg by mouth daily (with dinner)        STATIN NOT PRESCRIBED (INTENTIONAL)      Please choose reason not prescribed, below    Chronic atrial fibrillation (H)       TYLENOL 500 MG tablet   Generic drug:  acetaminophen      Take 500-1,000 mg by mouth every 6 hours as needed for mild pain        WELLBUTRIN PO      Take 150 mg by mouth daily

## 2018-02-02 NOTE — PROGRESS NOTES
Subjective:  Josemanuel Edmond is a 72 year old male   Chief Complaint   Patient presents with     Weight Loss     bloating X 2 1/2 months. weight loss X 1 month, twitching involentary, dizziness, ringing in the ears X 1 month. pt. states he wants colonoscopy.     In November, the patient developed a severe pneumonia with empyema and underwent thoracotomy for drainage of the empyema.  Since that time he has lost about 20 pounds and is puzzled as to why he is losing the weight.  He has discontinued his alcohol consumption, and states that he is eating about the same amount of food that he usually has.  Recently he is tried to increase his food intake because of concerns about the weight loss.  He has a lot of other nonspecific symptoms as discussed above.  He had seen  recently for these problems and a colonoscopy was requested.  Apparently he cannot get this on the schedule until February 22 and was concerned about waiting that long.  She had checked for H pylori and celiac disease with screening test and these were negative.  He states that his stools are fairly formed at this time, had been somewhat soft and mushy earlier          Encounter Diagnosis   Name Primary?     Weight loss Yes       ROS:other than noted above, general, HEENT, respiratory, cardiac, gastrointestinal systems are negative    Medical, surgical, social, and family histories, medications and allergies reviewed and updated.    Objective:  Exam:    GENERAL APPEARANCE ADULT: Alert, no acute distress  EYES: PERRL, EOM normal, conjunctiva and lids normal  NECK: No adenopathy,masses or thyromegaly  RESP: lungs clear to auscultation   CV: normal rate, regular rhythm, no murmur or gallop  ABDOMEN: soft, no organomegaly, masses or tenderness  MS: extremities normal, no peripheral edema  PSYCH: mentation appears normal., anxious      ASSESSMENT:  1. Weight loss    Etiology undetermined at this time.  Differential diagnosis would include a decrease  in caloric intake with his abstaining from alcohol, rule out occult malignancy, gastrointestinal infection, anorexia from persistent alcoholic gastritis    PLAN:  We will collect stool for O&P, enteric pathogens and Giardia  He was reassured that the tremulousness is probably an underlying tendency that is now on mask with being off the alcohol.  Many of his other symptoms are probably related to his anxiety    Patient Instructions   Call to see if they can work you in for the colonoscopy if someone cancels.  Collect the stool specimens in the meantime.

## 2018-02-02 NOTE — PATIENT INSTRUCTIONS
Call to see if they can work you in for the colonoscopy if someone cancels.  Collect the stool specimens in the meantime.

## 2018-02-02 NOTE — NURSING NOTE
"Chief Complaint   Patient presents with     Weight Loss     bloating X 2 1/2 months. weight loss X 1 month, twitching involentary, dizziness, ringing in the ears X 1 month. pt. states he wants colonoscopy.       Initial /60  Pulse 68  Resp 16  Ht 5' 11\" (1.803 m)  Wt 179 lb 3.2 oz (81.3 kg)  BMI 24.99 kg/m2 Estimated body mass index is 24.99 kg/(m^2) as calculated from the following:    Height as of this encounter: 5' 11\" (1.803 m).    Weight as of this encounter: 179 lb 3.2 oz (81.3 kg).  Medication Reconciliation: complete     Gretel Rodriguez, CMA      "

## 2018-02-07 PROCEDURE — 87329 GIARDIA AG IA: CPT | Performed by: FAMILY MEDICINE

## 2018-02-07 PROCEDURE — 87209 SMEAR COMPLEX STAIN: CPT | Performed by: FAMILY MEDICINE

## 2018-02-07 PROCEDURE — 87177 OVA AND PARASITES SMEARS: CPT | Performed by: FAMILY MEDICINE

## 2018-02-07 PROCEDURE — 87506 IADNA-DNA/RNA PROBE TQ 6-11: CPT | Performed by: FAMILY MEDICINE

## 2018-02-08 DIAGNOSIS — R63.4 WEIGHT LOSS: ICD-10-CM

## 2018-02-09 LAB
G LAMBLIA AG STL QL IA: NORMAL
O+P STL MICRO: NORMAL
O+P STL MICRO: NORMAL
SPECIMEN SOURCE: NORMAL
SPECIMEN SOURCE: NORMAL

## 2018-02-09 NOTE — PROGRESS NOTES
Josemanuel,  All of your stool tests came back negative.  This does not appear to be an infectious problem causing the weight loss and change in your stools    Terry Martínez MD

## 2018-02-22 ENCOUNTER — ANESTHESIA EVENT (OUTPATIENT)
Dept: GASTROENTEROLOGY | Facility: CLINIC | Age: 73
End: 2018-02-22
Payer: MEDICARE

## 2018-02-22 ASSESSMENT — LIFESTYLE VARIABLES: TOBACCO_USE: 1

## 2018-02-22 ASSESSMENT — ENCOUNTER SYMPTOMS: DYSRHYTHMIAS: 1

## 2018-02-22 NOTE — ANESTHESIA PREPROCEDURE EVALUATION
Anesthesia Evaluation     .             ROS/MED HX    ENT/Pulmonary:     (+)allergic rhinitis, tobacco use, Past use , . .    Neurologic:       Cardiovascular:     (+) Dyslipidemia, hypertension----. : . . . :. dysrhythmias a-fib, .       METS/Exercise Tolerance:     Hematologic:         Musculoskeletal:   (+) arthritis, , , -       GI/Hepatic:     (+) GERD Other GI/Hepatic ETOH      Renal/Genitourinary:     (+) BPH,       Endo:         Psychiatric:     (+) psychiatric history anxiety and depression      Infectious Disease:         Malignancy:         Other:                     Physical Exam  Normal systems: cardiovascular, pulmonary and dental    Airway   Mallampati: II  TM distance: >3 FB  Neck ROM: full    Dental     Cardiovascular       Pulmonary                     Anesthesia Plan      History & Physical Review  History and physical reviewed and following examination; no interval change.    ASA Status:  3 .    NPO Status:  > 6 hours    Plan for MAC Reason for MAC:  Deep or markedly invasive procedure (G8)         Postoperative Care      Consents  Anesthetic plan, risks, benefits and alternatives discussed with:  Patient..                          .

## 2018-02-26 ENCOUNTER — ANESTHESIA (OUTPATIENT)
Dept: GASTROENTEROLOGY | Facility: CLINIC | Age: 73
End: 2018-02-26
Payer: MEDICARE

## 2018-02-26 ENCOUNTER — SURGERY (OUTPATIENT)
Age: 73
End: 2018-02-26

## 2018-02-26 ENCOUNTER — HOSPITAL ENCOUNTER (OUTPATIENT)
Facility: CLINIC | Age: 73
Discharge: HOME | End: 2018-02-26
Attending: SURGERY | Admitting: SURGERY
Payer: MEDICARE

## 2018-02-26 VITALS
BODY MASS INDEX: 24.22 KG/M2 | DIASTOLIC BLOOD PRESSURE: 84 MMHG | HEIGHT: 71 IN | SYSTOLIC BLOOD PRESSURE: 126 MMHG | RESPIRATION RATE: 16 BRPM | TEMPERATURE: 98 F | OXYGEN SATURATION: 95 % | HEART RATE: 84 BPM | WEIGHT: 173 LBS

## 2018-02-26 LAB — COLONOSCOPY: NORMAL

## 2018-02-26 PROCEDURE — 45384 COLONOSCOPY W/LESION REMOVAL: CPT | Performed by: SURGERY

## 2018-02-26 PROCEDURE — 37000008 ZZH ANESTHESIA TECHNICAL FEE, 1ST 30 MIN: Performed by: SURGERY

## 2018-02-26 PROCEDURE — 25000125 ZZHC RX 250: Performed by: SURGERY

## 2018-02-26 PROCEDURE — 25000128 H RX IP 250 OP 636: Performed by: SURGERY

## 2018-02-26 PROCEDURE — 88305 TISSUE EXAM BY PATHOLOGIST: CPT | Performed by: SURGERY

## 2018-02-26 PROCEDURE — 25000128 H RX IP 250 OP 636: Performed by: NURSE ANESTHETIST, CERTIFIED REGISTERED

## 2018-02-26 PROCEDURE — 88305 TISSUE EXAM BY PATHOLOGIST: CPT | Mod: 26 | Performed by: SURGERY

## 2018-02-26 RX ORDER — SODIUM CHLORIDE, SODIUM LACTATE, POTASSIUM CHLORIDE, CALCIUM CHLORIDE 600; 310; 30; 20 MG/100ML; MG/100ML; MG/100ML; MG/100ML
INJECTION, SOLUTION INTRAVENOUS CONTINUOUS
Status: DISCONTINUED | OUTPATIENT
Start: 2018-02-26 | End: 2018-02-26 | Stop reason: HOSPADM

## 2018-02-26 RX ORDER — LIDOCAINE 40 MG/G
CREAM TOPICAL
Status: DISCONTINUED | OUTPATIENT
Start: 2018-02-26 | End: 2018-02-26 | Stop reason: HOSPADM

## 2018-02-26 RX ORDER — PROPOFOL 10 MG/ML
INJECTION, EMULSION INTRAVENOUS CONTINUOUS PRN
Status: DISCONTINUED | OUTPATIENT
Start: 2018-02-26 | End: 2018-02-26

## 2018-02-26 RX ORDER — PROPOFOL 10 MG/ML
INJECTION, EMULSION INTRAVENOUS PRN
Status: DISCONTINUED | OUTPATIENT
Start: 2018-02-26 | End: 2018-02-26

## 2018-02-26 RX ORDER — ONDANSETRON 2 MG/ML
4 INJECTION INTRAMUSCULAR; INTRAVENOUS
Status: DISCONTINUED | OUTPATIENT
Start: 2018-02-26 | End: 2018-02-26 | Stop reason: HOSPADM

## 2018-02-26 RX ADMIN — SODIUM CHLORIDE, POTASSIUM CHLORIDE, SODIUM LACTATE AND CALCIUM CHLORIDE: 600; 310; 30; 20 INJECTION, SOLUTION INTRAVENOUS at 09:24

## 2018-02-26 RX ADMIN — LIDOCAINE HYDROCHLORIDE: 10 INJECTION, SOLUTION EPIDURAL; INFILTRATION; INTRACAUDAL; PERINEURAL at 09:24

## 2018-02-26 RX ADMIN — PROPOFOL 50 MG: 10 INJECTION, EMULSION INTRAVENOUS at 09:45

## 2018-02-26 RX ADMIN — PROPOFOL 150 MCG/KG/MIN: 10 INJECTION, EMULSION INTRAVENOUS at 09:45

## 2018-02-26 NOTE — ANESTHESIA POSTPROCEDURE EVALUATION
Patient: Josemanuel Edmond    Procedure(s):  colonoscopy - Wound Class: II-Clean Contaminated    Diagnosis:bloating, weight loss    diagnostic  Diagnosis Additional Information: No value filed.    Anesthesia Type:  MAC    Note:  Anesthesia Post Evaluation    Patient location during evaluation: Bedside  Patient participation: Able to fully participate in evaluation  Level of consciousness: awake and alert  Pain management: adequate  Airway patency: patent  Cardiovascular status: acceptable  Respiratory status: acceptable  Hydration status: acceptable  PONV: none     Anesthetic complications: None          Last vitals:  Vitals:    02/26/18 0903   BP: (!) 128/92   Pulse: 100   Resp: 18   Temp: 36.7  C (98  F)   SpO2: 98%         Electronically Signed By: Jose Kat CRNA, KAYLA SHAW  February 26, 2018  10:09 AM

## 2018-02-26 NOTE — ANESTHESIA CARE TRANSFER NOTE
Patient: Josemanuel Edmond    Procedure(s):  colonoscopy - Wound Class: II-Clean Contaminated    Diagnosis: bloating, weight loss    diagnostic  Diagnosis Additional Information: No value filed.    Anesthesia Type:   MAC     Note:    Patient transferred to:Phase II  Handoff Report: Identifed the Patient, Identified the Reponsible Provider, Reviewed the pertinent medical history, Discussed the surgical course, Reviewed Intra-OP anesthesia mangement and issues during anesthesia, Set expectations for post-procedure period and Allowed opportunity for questions and acknowledgement of understanding      Vitals: (Last set prior to Anesthesia Care Transfer)    CRNA VITALS  2/26/2018 0939 - 2/26/2018 1009      2/26/2018             Pulse: 84    SpO2: 98 %                Electronically Signed By: Jose Kat CRNA, KAYLA SHAW  February 26, 2018  10:09 AM

## 2018-02-26 NOTE — H&P
"72 year old year old male here for colonoscopy for changes in bowel habits.    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)     Chronic atrial fibrillation (H)       Past Medical History:   Diagnosis Date     Benign neoplasm of prostate 2000    Benign Prostate Nodule       Past Surgical History:   Procedure Laterality Date     COLONOSCOPY N/A 12/10/2015    Procedure: COMBINED COLONOSCOPY, SINGLE OR MULTIPLE BIOPSY/POLYPECTOMY BY BIOPSY;  Surgeon: Jyoti Figueroa MD;  Location: [facility]     JOINT REPLACEMENT, HIP RT/LT  10/07    Joint Replacement Hip LT     JOINT REPLACEMTN, KNEE RT/LT  8/2011    Joint Replacement knee /LT, [facility]     SURGICAL HISTORY OF -   12/1/1999    Umbilical Herniorrhaphy with mesh     SURGICAL HISTORY OF -  Left 11/2017    Thoracotomy and drainage of empyema       @[facility]@    No current outpatient prescriptions on file.       Allergies   Allergen Reactions     Bactrim [Sulfamethoxazole W/Trimethoprim] Nausea and Vomiting       Pt reports that he quit smoking about 42 years ago. His smoking use included Cigarettes. He has a 15.00 pack-year smoking history. He has never used smokeless tobacco. He reports that he does not drink alcohol or use illicit drugs.    Exam:  BP (!) 128/92 (BP Location: Right arm)  Pulse 100  Temp 98  F (36.7  C) (Oral)  Resp 18  Ht 1.803 m (5' 10.98\")  Wt 78.5 kg (173 lb)  SpO2 98%  BMI 24.14 kg/m2    Awake, Alert OX3  Lungs - CTA bilaterally  CV " - RRR, no murmurs, distal pulses intact  Abd - soft, non-distended, non-tender, +BS  Extr - No cyanosis or edema    A/P 72 year old year old male in need of colonoscopy for changes in bowel habits.. Risks, benefits, alternatives, and complications were discussed including the possibility of perforation and the patient agreed to proceed.    Josemanuel Resendiz MD

## 2018-03-02 ENCOUNTER — TRANSFERRED RECORDS (OUTPATIENT)
Dept: HEALTH INFORMATION MANAGEMENT | Facility: CLINIC | Age: 73
End: 2018-03-02

## 2018-03-02 LAB — COPATH REPORT: NORMAL

## 2018-03-05 PROBLEM — Z86.0100 HISTORY OF COLONIC POLYPS: Status: ACTIVE | Noted: 2018-03-05

## 2018-03-13 ENCOUNTER — OFFICE VISIT (OUTPATIENT)
Dept: FAMILY MEDICINE | Facility: CLINIC | Age: 73
End: 2018-03-13
Payer: COMMERCIAL

## 2018-03-13 VITALS
HEIGHT: 70 IN | HEART RATE: 88 BPM | TEMPERATURE: 98.7 F | WEIGHT: 175.2 LBS | BODY MASS INDEX: 25.08 KG/M2 | SYSTOLIC BLOOD PRESSURE: 118 MMHG | DIASTOLIC BLOOD PRESSURE: 71 MMHG

## 2018-03-13 DIAGNOSIS — H93.13 TINNITUS, BILATERAL: ICD-10-CM

## 2018-03-13 DIAGNOSIS — I10 HYPERTENSION GOAL BP (BLOOD PRESSURE) < 140/90: ICD-10-CM

## 2018-03-13 DIAGNOSIS — J20.9 ACUTE BRONCHITIS, UNSPECIFIED ORGANISM: Primary | ICD-10-CM

## 2018-03-13 DIAGNOSIS — R41.3 SHORT-TERM MEMORY LOSS: ICD-10-CM

## 2018-03-13 DIAGNOSIS — R42 ORTHOSTATIC LIGHTHEADEDNESS: ICD-10-CM

## 2018-03-13 DIAGNOSIS — Z72.821 POOR SLEEP HYGIENE: ICD-10-CM

## 2018-03-13 DIAGNOSIS — F41.9 ANXIETY: ICD-10-CM

## 2018-03-13 DIAGNOSIS — F32.1 MODERATE MAJOR DEPRESSION (H): ICD-10-CM

## 2018-03-13 PROCEDURE — 99214 OFFICE O/P EST MOD 30 MIN: CPT | Performed by: FAMILY MEDICINE

## 2018-03-13 RX ORDER — AZITHROMYCIN 250 MG/1
TABLET, FILM COATED ORAL
Qty: 6 TABLET | Refills: 0 | Status: SHIPPED | OUTPATIENT
Start: 2018-03-13 | End: 2018-03-30

## 2018-03-13 RX ORDER — METOPROLOL TARTRATE 25 MG/1
25 TABLET, FILM COATED ORAL 2 TIMES DAILY
Qty: 180 TABLET | Refills: 3 | Status: SHIPPED | OUTPATIENT
Start: 2018-03-13 | End: 2018-03-30

## 2018-03-13 RX ORDER — BUPROPION HYDROCHLORIDE 150 MG/1
150 TABLET, EXTENDED RELEASE ORAL 2 TIMES DAILY
Qty: 60 TABLET | Refills: 3 | Status: SHIPPED | OUTPATIENT
Start: 2018-03-13 | End: 2018-06-27

## 2018-03-13 ASSESSMENT — PAIN SCALES - GENERAL: PAINLEVEL: NO PAIN (0)

## 2018-03-13 NOTE — MR AVS SNAPSHOT
After Visit Summary   3/13/2018    Josemanuel Edmond    MRN: [id number]           Patient Information     Date Of Birth          1945        Visit Information        Provider Department      3/13/2018 11:20 AM AVIS Martínez MD [facility]        Today's Diagnoses     Anxiety    -  1    Moderate major depression (H)        Acute bronchitis, unspecified organism        Hypertension goal BP (blood pressure) < 140/90          Care Instructions    Artificial tears for the dry eyes, lozenges for dry mouth.    Increasing bupropion to 150 mg twice daily.    Decrease metoprolol to 25 mg twice daily due to the positional lightheadedness    It looks like you have short term memory loss, but not Alzheimers or dementia.    If you wake up at night, go in the other room and read or watch TV until you are drowsy     Taper off the gabapentin by taking 3 per day for 2 weeks, then 2 per day for 2 weeks, then one a day for 2 weeks and then stop    Thank you for choosing [facility].  You may be receiving a survey in the mail from [email or web address] regarding your visit today.  Please take a few minutes to complete and return the survey to let us know how we are doing.      Our Clinic hours are:  Mondays    7:20 am - 7 pm  Tues -  Fri  7:20 am - 5 pm    Clinic Phone: [phone]    The clinic lab opens at 7:30 am Mon - Fri and appointments are required.    [facility]  Ph. [phone]  Monday-Thursday 8 am - 7pm  Tues/Wed/Fri 8 am - 5:30 pm                 Follow-ups after your visit        Who to contact     If you have questions or need follow up information about today's clinic visit or your schedule please contact [facility] directly at [phone].  Normal or non-critical lab and imaging results will be communicated to you by MyChart, letter or phone within 4 business days after the clinic has received the results. If you do not hear from us within 7 days,  "please contact the clinic through [employer] or phone. If you have a critical or abnormal lab result, we will notify you by phone as soon as possible.  Submit refill requests through [employer] or call your pharmacy and they will forward the refill request to us. Please allow 3 business days for your refill to be completed.          Additional Information About Your Visit        [employer] Information     [employer] gives you secure access to your electronic health record. If you see a primary care provider, you can also send messages to your care team and make appointments. If you have questions, please call your primary care clinic.  If you do not have a primary care provider, please call [phone] and they will assist you.        Care EveryWhere ID     This is your Care EveryWhere ID. This could be used by other organizations to access your [place] medical records  [id number]        Your Vitals Were     Pulse Temperature Height BMI (Body Mass Index)          88 98.7  F (37.1  C) (Tympanic) 5' 10\" (1.778 m) 25.14 kg/m2         Blood Pressure from Last 3 Encounters:   03/13/18 118/71   02/26/18 126/84   02/02/18 120/60    Weight from Last 3 Encounters:   03/13/18 175 lb 3.2 oz (79.5 kg)   02/26/18 173 lb (78.5 kg)   02/02/18 179 lb 3.2 oz (81.3 kg)              Today, you had the following     No orders found for display         Today's Medication Changes          These changes are accurate as of 3/13/18 12:08 PM.  If you have any questions, ask your nurse or doctor.               Start taking these medicines.        Dose/Directions    azithromycin 250 MG tablet   Commonly known as:  ZITHROMAX   Used for:  Acute bronchitis, unspecified organism   Started by:  AVIS Martínez MD        2 tabs the first day and one daily for 4 days   Quantity:  6 tablet   Refills:  0         These medicines have changed or have updated prescriptions.        Dose/Directions    metoprolol tartrate 25 MG tablet   Commonly known as:  LOPRESSOR "   This may have changed:  how much to take   Used for:  Hypertension goal BP (blood pressure) < 140/90   Changed by:  AVIS Martínez MD        Dose:  25 mg   Take 1 tablet (25 mg) by mouth 2 times daily   Quantity:  180 tablet   Refills:  3       * WELLBUTRIN PO   This may have changed:  Another medication with the same name was added. Make sure you understand how and when to take each.   Changed by:  AVIS Martínez MD        Dose:  150 mg   Take 150 mg by mouth daily   Refills:  0       * buPROPion 150 MG 12 hr tablet   Commonly known as:  WELLBUTRIN SR   This may have changed:  You were already taking a medication with the same name, and this prescription was added. Make sure you understand how and when to take each.   Used for:  Moderate major depression (H), Anxiety   Changed by:  AVIS Martínez MD        Dose:  150 mg   Take 1 tablet (150 mg) by mouth 2 times daily   Quantity:  60 tablet   Refills:  3       * Notice:  This list has 2 medication(s) that are the same as other medications prescribed for you. Read the directions carefully, and ask your doctor or other care provider to review them with you.         Where to get your medicines      These medications were sent to [place] [place] - [address]  [address], [place] [address]    Hours:  [facility] [place] [facility] Phone:  [phone]     azithromycin 250 MG tablet    buPROPion 150 MG 12 hr tablet    metoprolol tartrate 25 MG tablet                Primary Care Provider Office Phone # Fax #    AVIS Martínez [phone] [address] [address]        Equal Access to Services     [facility] AH: Hadii aad ku hadasho Soomaali, waaxda luqadaha, qaybta kaalmada adeegyada, bree silver. So [facility] [phone].    ATENCIÓN: Si habla español, tiene a sen disposición servicios gratuitos de asistencia lingüística. Llame al [phone].    We comply with  applicable federal civil rights laws and [place] laws. We do not discriminate on the basis of race, color, national origin, age, disability, sex, sexual orientation, or gender identity.            Thank you!     Thank you for choosing [facility]  for your care. Our goal is always to provide you with excellent care. Hearing back from our patients is one way we can continue to improve our services. Please take a few minutes to complete the written survey that you may receive in the mail after your visit with us. Thank you!             Your Updated Medication List - Protect others around you: Learn how to safely use, store and throw away your medicines at www.disposemymeds.org.          This list is accurate as of 3/13/18 12:08 PM.  Always use your most recent med list.                   Brand Name Dispense Instructions for use Diagnosis    ASPIRIN NOT PRESCRIBED    INTENTIONAL    1 each    Please choose reason not prescribed, below    Chronic atrial fibrillation (H)       azithromycin 250 MG tablet    ZITHROMAX    6 tablet    2 tabs the first day and one daily for 4 days    Acute bronchitis, unspecified organism       DIGOXIN PO      Take 250 mcg by mouth daily        doxazosin 4 MG tablet    CARDURA    90 tablet    Take 1 tablet (4 mg) by mouth daily    Hypertrophy of prostate with urinary obstruction       escitalopram 10 MG tablet    LEXAPRO    90 tablet    Take 1 tablet (10 mg) by mouth daily    Anxiety       metoprolol tartrate 25 MG tablet    LOPRESSOR    180 tablet    Take 1 tablet (25 mg) by mouth 2 times daily    Hypertension goal BP (blood pressure) < 140/90       nortriptyline 10 MG capsule    PAMELOR    60 capsule    2 capsules at bedtime    Primary insomnia       order for DME     1 Units    Equipment being ordered: inguinal hernia belt    Left inguinal hernia       rivaroxaban ANTICOAGULANT 20 MG Tabs tablet    XARELTO     Take 20 mg by mouth daily (with dinner)        STATIN NOT  PRESCRIBED (INTENTIONAL)      Please choose reason not prescribed, below    Chronic atrial fibrillation (H)       TYLENOL 500 MG tablet   Generic drug:  acetaminophen      Take 500-1,000 mg by mouth every 6 hours as needed for mild pain        * WELLBUTRIN PO      Take 150 mg by mouth daily        * buPROPion 150 MG 12 hr tablet    WELLBUTRIN SR    60 tablet    Take 1 tablet (150 mg) by mouth 2 times daily    Moderate major depression (H), Anxiety       * Notice:  This list has 2 medication(s) that are the same as other medications prescribed for you. Read the directions carefully, and ask your doctor or other care provider to review them with you.

## 2018-03-13 NOTE — PROGRESS NOTES
"  SUBJECTIVE:   Josemanuel Edmond is a 72 year old male who presents to clinic today for the following health issues:        Chief Complaint   Patient presents with     Cough     x 1 1/2 weeks   & dizziness/ringing in ears x 2 months.     Weight Loss     has noticed weight loss the last 2 months       ENT Symptoms             Symptoms: cc Present Absent Comment   Fever/Chills   x    Fatigue  x     Muscle Aches   x    Eye Irritation   x    Sneezing   x    Nasal [place]ley/Drg  x     Sinus Pressure/Pain   x    Loss of smell   x    Dental pain   x    Sore Throat   x    Swollen Glands   x    Ear Pain/Fullness x x  Ringing in ears x 1 month   Cough x x   productive of yellow sputum   Wheeze  x     Chest Pain   x    Shortness of breath   x    Rash   x    Other  x   orthostatic lightheadedness     Symptom duration:   1 - 2 weeks   Symptom severity:  -   Treatments tried:  delsym, tylenol   Contacts:  grandson has cough         PROBLEMS TO ADD ON...  1) will fall asleep but wake up and have difficulty going back to sleep  2) short-term memory is poor, but has not had trouble with getting lost while driving or carrying out executive functions  3) dry eyes and dry mouth  4) was put on gabapentin because of sciatica pain and that has resolved, wondering if he could go off of it  5) feels like his depression is worse and that his emotions are flat      Problem list and histories reviewed & adjusted, as indicated.  Additional history: none        Reviewed and updated as needed this visit by clinical staff  Tobacco  Allergies  Med Hx  Surg Hx  Fam Hx  Soc Hx      Reviewed and updated as needed this visit by Provider               ROS:  Constitutional, HEENT, cardiovascular, pulmonary, gi and gu systems are negative, except as otherwise noted.        OBJECTIVE:                                                    /71 (BP Location: Right arm, Cuff Size: Adult Regular)  Pulse 88  Temp 98.7  F (37.1  C) (Tympanic)  Ht 5' 10\" " (1.778 m)  Wt 175 lb 3.2 oz (79.5 kg)  BMI 25.14 kg/m2    GENERAL: healthy, alert and no distress  EYES: Eyes grossly normal to inspection, extraocular movements - intact, and PERRL  HENT: ear canals- normal; TMs- normal; Nose- normal; Mouth- no ulcers, no lesions  NECK: no tenderness, no adenopathy, no asymmetry, no masses, no stiffness; thyroid- normal to palpation  RESP: lungs clear to auscultation - no rales, no rhonchi, no wheezes; deep productive sounding cough  CV: regular rates and rhythm, normal S1 S2, no S3 or S4 and no murmur, no click or rub -  MS: extremities- no gross deformities noted, no edema  PSYCH: Alert and oriented times 3; coherent speech, normal rate and volume, able to articulate logical thoughts, able to abstract reason, no tangential thoughts, no hallucinations or delusions. Affect is flat         ASSESSMENT/PLAN:                                                    ASSESSMENT:  1. Acute bronchitis, unspecified organism    2. Anxiety    3. Moderate major depression (H)    4. Hypertension goal BP (blood pressure) < 140/90    5. Orthostatic lightheadedness    6. Short-term memory loss    7. Poor sleep hygiene    8. Tinnitus, bilateral        PLAN:  Orders Placed This Encounter     buPROPion (WELLBUTRIN SR) 150 MG 12 hr tablet     azithromycin (ZITHROMAX) 250 MG tablet     metoprolol tartrate (LOPRESSOR) 25 MG tablet   Will increase the bupropion to 150 mg twice a day to try to improve his depression  We will taper off the gabapentin and lower the dose of metoprolol from 50 mg twice daily to 25 mg twice daily since his blood pressure is lower now with the lower weight and is causing orthostatic lightheadedness.  Reassured about the short-term memory loss and that there is nothing to help prevent that.  Perhaps treating his depression will help improve the memory also    Patient Instructions   Artificial tears for the dry eyes, lozenges for dry mouth.    Increasing bupropion to 150 mg twice  daily.    Decrease metoprolol to 25 mg twice daily due to the positional lightheadedness    It looks like you have short term memory loss, but not Alzheimers or dementia.    If you wake up at night, go in the other room and read or watch TV until you are drowsy     Taper off the gabapentin by taking 3 per day for 2 weeks, then 2 per day for 2 weeks, then one a day for 2 weeks and then stop    Thank you for choosing [facility].  You may be receiving a survey in the mail from [employer] regarding your visit today.  Please take a few minutes to complete and return the survey to let us know how we are doing.      Our Clinic hours are:  Mondays    7:20 am - 7 pm  Tues -  Fri  7:20 am - 5 pm    Clinic Phone: [phone]    The clinic lab opens at 7:30 am Mon - Fri and appointments are required.    [facility] [place]  Ph. [phone]  Monday-Thursday 8 am - 7pm  Tues/Wed/Fri 8 am - 5:30 pm             AVIS Stewart Post  [facility]

## 2018-03-13 NOTE — PATIENT INSTRUCTIONS
Artificial tears for the dry eyes, lozenges for dry mouth.    Increasing bupropion to 150 mg twice daily.    Decrease metoprolol to 25 mg twice daily due to the positional lightheadedness    It looks like you have short term memory loss, but not Alzheimers or dementia.    If you wake up at night, go in the other room and read or watch TV until you are drowsy     Taper off the gabapentin by taking 3 per day for 2 weeks, then 2 per day for 2 weeks, then one a day for 2 weeks and then stop    Thank you for choosing [facility].  You may be receiving a survey in the mail from [employer] regarding your visit today.  Please take a few minutes to complete and return the survey to let us know how we are doing.      Our Clinic hours are:  Mondays    7:20 am - 7 pm  Tues -  Fri  7:20 am - 5 pm    Clinic Phone: [phone]    The clinic lab opens at 7:30 am Mon - Fri and appointments are required.    [facility] [place]  Ph. [phone]  Monday-Thursday 8 am - 7pm  Tues/Wed/Fri 8 am - 5:30 pm

## 2018-03-19 DIAGNOSIS — F41.9 ANXIETY: ICD-10-CM

## 2018-03-19 NOTE — TELEPHONE ENCOUNTER
"Requested Prescriptions   Pending Prescriptions Disp Refills     escitalopram (LEXAPRO) 10 MG tablet  Last Written Prescription Date:  10/09/2017  Last Fill Quantity: 90,  # refills: 1   Last office visit: 3/13/2018 with prescribing provider:  post   Future Office Visit:     90 tablet 1     Sig: Take 1 tablet (10 mg) by mouth daily    SSRIs Protocol Passed    3/19/2018  1:06 PM       Passed - Recent (12 mo) or future (30 days) visit within the authorizing provider's specialty    Patient had office visit in the last 12 months or has a visit in the next 30 days with authorizing provider or within the authorizing provider's specialty.  See \"Patient Info\" tab in inbasket, or \"Choose Columns\" in Meds & Orders section of the refill encounter.           Passed - Patient is age 18 or older        Isrrael CUELLO (R)    "

## 2018-03-20 ENCOUNTER — MYC MEDICAL ADVICE (OUTPATIENT)
Dept: FAMILY MEDICINE | Facility: CLINIC | Age: 73
End: 2018-03-20

## 2018-03-20 DIAGNOSIS — B37.0 ORAL THRUSH: Primary | ICD-10-CM

## 2018-03-20 RX ORDER — NYSTATIN 100000/ML
500000 SUSPENSION, ORAL (FINAL DOSE FORM) ORAL 4 TIMES DAILY
Qty: 280 ML | Refills: 1 | Status: SHIPPED | OUTPATIENT
Start: 2018-03-20 | End: 2018-09-14

## 2018-03-20 NOTE — TELEPHONE ENCOUNTER
Dr. Martínez,    Patient my charted us stating he has thrush asking for Nystatin.  I have pended for your approval.  Alexandra QUESADA RN

## 2018-03-21 NOTE — TELEPHONE ENCOUNTER
Routing refill request to provider for review/approval because:  Not sure if patient is to be on this and the Wellbutrin. Thank you!    Nikki Mancilla RN

## 2018-03-22 RX ORDER — ESCITALOPRAM OXALATE 10 MG/1
10 TABLET ORAL DAILY
Qty: 90 TABLET | Refills: 1 | Status: SHIPPED | OUTPATIENT
Start: 2018-03-22 | End: 2018-09-14

## 2018-03-30 ENCOUNTER — OFFICE VISIT (OUTPATIENT)
Dept: FAMILY MEDICINE | Facility: CLINIC | Age: 73
End: 2018-03-30
Payer: COMMERCIAL

## 2018-03-30 VITALS
TEMPERATURE: 97.9 F | HEIGHT: 70 IN | WEIGHT: 175.8 LBS | DIASTOLIC BLOOD PRESSURE: 83 MMHG | BODY MASS INDEX: 25.17 KG/M2 | RESPIRATION RATE: 16 BRPM | HEART RATE: 77 BPM | SYSTOLIC BLOOD PRESSURE: 131 MMHG

## 2018-03-30 DIAGNOSIS — G47.00 PERSISTENT INSOMNIA: ICD-10-CM

## 2018-03-30 DIAGNOSIS — N40.1 BENIGN PROSTATIC HYPERPLASIA WITH WEAK URINARY STREAM: Primary | ICD-10-CM

## 2018-03-30 DIAGNOSIS — N41.0 ACUTE BACTERIAL PROSTATITIS: ICD-10-CM

## 2018-03-30 DIAGNOSIS — R39.12 BENIGN PROSTATIC HYPERPLASIA WITH WEAK URINARY STREAM: Primary | ICD-10-CM

## 2018-03-30 DIAGNOSIS — F41.9 ANXIETY: ICD-10-CM

## 2018-03-30 PROCEDURE — 99214 OFFICE O/P EST MOD 30 MIN: CPT | Performed by: FAMILY MEDICINE

## 2018-03-30 RX ORDER — NAPROXEN 500 MG/1
500 TABLET ORAL 2 TIMES DAILY WITH MEALS
Qty: 28 TABLET | Refills: 1 | Status: SHIPPED | OUTPATIENT
Start: 2018-03-30 | End: 2018-04-13

## 2018-03-30 RX ORDER — TRAZODONE HYDROCHLORIDE 50 MG/1
25 TABLET, FILM COATED ORAL AT BEDTIME
Qty: 45 TABLET | Refills: 3 | Status: SHIPPED | OUTPATIENT
Start: 2018-03-30 | End: 2018-05-08

## 2018-03-30 RX ORDER — CIPROFLOXACIN 500 MG/1
500 TABLET, FILM COATED ORAL 2 TIMES DAILY
Qty: 28 TABLET | Refills: 0 | Status: SHIPPED | OUTPATIENT
Start: 2018-03-30 | End: 2018-05-07

## 2018-03-30 RX ORDER — FINASTERIDE 5 MG/1
5 TABLET, FILM COATED ORAL DAILY
Qty: 90 TABLET | Refills: 1 | Status: SHIPPED | OUTPATIENT
Start: 2018-03-30 | End: 2018-09-14

## 2018-03-30 ASSESSMENT — ANXIETY QUESTIONNAIRES
1. FEELING NERVOUS, ANXIOUS, OR ON EDGE: SEVERAL DAYS
7. FEELING AFRAID AS IF SOMETHING AWFUL MIGHT HAPPEN: NOT AT ALL
GAD7 TOTAL SCORE: 6
3. WORRYING TOO MUCH ABOUT DIFFERENT THINGS: MORE THAN HALF THE DAYS
2. NOT BEING ABLE TO STOP OR CONTROL WORRYING: MORE THAN HALF THE DAYS
6. BECOMING EASILY ANNOYED OR IRRITABLE: NOT AT ALL
5. BEING SO RESTLESS THAT IT IS HARD TO SIT STILL: NOT AT ALL
IF YOU CHECKED OFF ANY PROBLEMS ON THIS QUESTIONNAIRE, HOW DIFFICULT HAVE THESE PROBLEMS MADE IT FOR YOU TO DO YOUR WORK, TAKE CARE OF THINGS AT HOME, OR GET ALONG WITH OTHER PEOPLE: SOMEWHAT DIFFICULT

## 2018-03-30 ASSESSMENT — PATIENT HEALTH QUESTIONNAIRE - PHQ9: 5. POOR APPETITE OR OVEREATING: SEVERAL DAYS

## 2018-03-30 NOTE — NURSING NOTE
"Chief Complaint   Patient presents with     Patient Request     pt. is here today with concerns about hard time getting started with urinating X 1 week.      Weight Loss     pt. has concerns       Initial /83  Pulse 77  Temp 97.9  F (36.6  C) (Tympanic)  Resp 16  Ht 5' 10\" (1.778 m)  Wt 175 lb 12.8 oz (79.7 kg)  BMI 25.22 kg/m2 Estimated body mass index is 25.22 kg/(m^2) as calculated from the following:    Height as of this encounter: 5' 10\" (1.778 m).    Weight as of this encounter: 175 lb 12.8 oz (79.7 kg).  Medication Reconciliation: complete     Gretel Rodriguez, CMA      "

## 2018-03-30 NOTE — PROGRESS NOTES
"Subjective:  Josemanuel Edmond is a 72 year old male   Chief Complaint   Patient presents with     Patient Request     pt. is here today with concerns about hard time getting started with urinating X 1 week.      Weight Loss     pt. has concerns     He has a history of benign prostatic hyperplasia going back a number of years.  At one point he had a biopsy performed which was negative and was placed on doxazosin which seemed to help improve his urinary stream.  Gradually he has noted a worsening of his symptoms over the last few months and especially in the last week he has had difficulty initiating urinary stream and feels like he does not empty adequately.  His nocturia has increased to 4-5 times per night where as it has been at about 3 times per night for the last few years.  He denies dysuria, urgency or cloudy urine    He continues to worry about weight loss although his weight has stabilized somewhat.  He has been trying to eat as much as he can and yet has not put back any of the weight he lost    He does not seem to be sleeping well with the nortriptyline and wondered about trying trazodone.  This did work for him many years ago but he did not like the fact that he felt somewhat groggy the next day    Encounter Diagnoses   Name Primary?     Benign prostatic hyperplasia with weak urinary stream Yes     Acute bacterial prostatitis      Persistent insomnia      Anxiety        ROS:other than noted above, general, HEENT, respiratory, cardiac, gastrointestinal systems are negative    Medical, surgical, social, and family histories, medications and allergies reviewed and updated.    Objective:  Exam:/83  Pulse 77  Temp 97.9  F (36.6  C) (Tympanic)  Resp 16  Ht 5' 10\" (1.778 m)  Wt 175 lb 12.8 oz (79.7 kg)  BMI 25.22 kg/m2     Wt Readings from Last 5 Encounters:   03/30/18 175 lb 12.8 oz (79.7 kg)   03/13/18 175 lb 3.2 oz (79.5 kg)   02/26/18 173 lb (78.5 kg)   02/02/18 179 lb 3.2 oz (81.3 kg) "   01/22/18 178 lb (80.7 kg)         GENERAL APPEARANCE ADULT: Alert, no acute distress, anxious  EYES: PERRL, EOM normal, conjunctiva and lids normal   (male): prostate abnormal with diffuse enlargement and bogginess/tenderness  SKIN: no suspicious lesions or rashes  PSYCH: mentation appears normal., patient appearance neat and well kempt, speech normal, anxious, oriented X 3      ASSESSMENT:  1. Benign prostatic hyperplasia with weak urinary stream    2. Acute bacterial prostatitis    3. Persistent insomnia    4. Anxiety        PLAN:  Orders Placed This Encounter     finasteride (PROSCAR) 5 MG tablet     traZODone (DESYREL) 50 MG tablet     ciprofloxacin (CIPRO) 500 MG tablet     naproxen (NAPROSYN) 500 MG tablet   We will try a very low-dose of trazodone at 25 mg which will hopefully help him sleep and yet not make him groggy the next day.    We will add finasteride to his regimen to try to shrink the prostate more and improve his voiding; Cipro and naproxen for the acute prostatitis.    He was reassured about the weight loss which seems to have stabilized        Patient Instructions   Stop the nortriptyline and use 1/2 tablet of trazodone instead.     For the prostate infection, take the cipro and naproxen for two weeks with food    To shrink the prostate we are adding finasteride daily.

## 2018-03-30 NOTE — PATIENT INSTRUCTIONS
Stop the nortriptyline and use 1/2 tablet of trazodone instead.     For the prostate infection, take the cipro and naproxen for two weeks with food    To shrink the prostate we are adding finasteride daily.

## 2018-03-30 NOTE — MR AVS SNAPSHOT
After Visit Summary   3/30/2018    Josemanuel Edmond    MRN: [id number]           Patient Information     Date Of Birth          1945        Visit Information        Provider Department      3/30/2018 3:40 PM Tanya, AVIS Stewart MD [facility]        Today's Diagnoses     Benign prostatic hyperplasia with weak urinary stream    -  1    Persistent insomnia        Acute bacterial prostatitis          Care Instructions    Stop the nortriptyline and use 1/2 tablet of trazodone instead.     For the prostate infection, take the cipro and naproxen for two weeks with food    To shrink the prostate we are adding finasteride daily.          Follow-ups after your visit        Who to contact     If you have questions or need follow up information about today's clinic visit or your schedule please contact [facility] directly at [phone].  Normal or non-critical lab and imaging results will be communicated to you by [employer], letter or phone within 4 business days after the clinic has received the results. If you do not hear from us within 7 days, please contact the clinic through [employer] or phone. If you have a critical or abnormal lab result, we will notify you by phone as soon as possible.  Submit refill requests through [employer] or call your pharmacy and they will forward the refill request to us. Please allow 3 business days for your refill to be completed.          Additional Information About Your Visit        MyChart Information     [employer] gives you secure access to your electronic health record. If you see a primary care provider, you can also send messages to your care team and make appointments. If you have questions, please call your primary care clinic.  If you do not have a primary care provider, please call [phone] and they will assist you.        Care EveryWhere ID     This is your Care EveryWhere ID. This could be used by other organizations to access your  "[place] medical records  [id number]        Your Vitals Were     Pulse Temperature Respirations Height BMI (Body Mass Index)       77 97.9  F (36.6  C) (Tympanic) 16 5' 10\" (1.778 m) 25.22 kg/m2        Blood Pressure from Last 3 Encounters:   03/30/18 131/83   03/13/18 118/71   02/26/18 126/84    Weight from Last 3 Encounters:   03/30/18 175 lb 12.8 oz (79.7 kg)   03/13/18 175 lb 3.2 oz (79.5 kg)   02/26/18 173 lb (78.5 kg)              Today, you had the following     No orders found for display         Today's Medication Changes          These changes are accurate as of 3/30/18  4:13 PM.  If you have any questions, ask your nurse or doctor.               Start taking these medicines.        Dose/Directions    ciprofloxacin 500 MG tablet   Commonly known as:  CIPRO   Used for:  Acute bacterial prostatitis   Started by:  AVIS Martínez MD        Dose:  500 mg   Take 1 tablet (500 mg) by mouth 2 times daily   Quantity:  28 tablet   Refills:  0       finasteride 5 MG tablet   Commonly known as:  PROSCAR   Used for:  Benign prostatic hyperplasia with weak urinary stream   Started by:  AVIS Martínez MD        Dose:  5 mg   Take 1 tablet (5 mg) by mouth daily   Quantity:  90 tablet   Refills:  1       naproxen 500 MG tablet   Commonly known as:  NAPROSYN   Used for:  Acute bacterial prostatitis   Started by:  AVIS Martínez MD        Dose:  500 mg   Take 1 tablet (500 mg) by mouth 2 times daily (with meals)   Quantity:  28 tablet   Refills:  1       traZODone 50 MG tablet   Commonly known as:  DESYREL   Used for:  Persistent insomnia   Started by:  AVIS Martínez MD        Dose:  25 mg   Take 0.5 tablets (25 mg) by mouth At Bedtime   Quantity:  45 tablet   Refills:  3         Stop taking these medicines if you haven't already. Please contact your care team if you have questions.     nortriptyline 10 MG capsule   Commonly known as:  PAMELOR   Stopped by:  AVIS Martínez MD                Where to get your medicines    "   These medications were sent to [place] [facility] - [place], [place] - [address]  [address], [place] [address]    Hours:  [facility] [place] [facility] Phone:  [phone]     ciprofloxacin 500 MG tablet    finasteride 5 MG tablet    naproxen 500 MG tablet    traZODone 50 MG tablet                Primary Care Provider Office Phone # Fax #    R Terry Martínez [phone] [address] [facility] [address]        Equal Access to Services     [facility]: Hadii aad ku hadasho Soomaali, waaxda luqadaha, qaybta kaalmada adeegyada, waxay idiin haytran jorge kharajob medina . So [facility] [phone].    ATENCIÓN: Si habla español, tiene a sen disposición servicios gratuitos de asistencia lingüística. [facility] [phone].    We comply with applicable federal civil rights laws and [place] laws. We do not discriminate on the basis of race, color, national origin, age, disability, sex, sexual orientation, or gender identity.            Thank you!     Thank you for choosing [facility]  for your care. Our goal is always to provide you with excellent care. Hearing back from our patients is one way we can continue to improve our services. Please take a few minutes to complete the written survey that you may receive in the mail after your visit with us. Thank you!             Your Updated Medication List - Protect others around you: Learn how to safely use, store and throw away your medicines at www.disposemymeds.org.          This list is accurate as of 3/30/18  4:13 PM.  Always use your most recent med list.                   Brand Name Dispense Instructions for use Diagnosis    ASPIRIN NOT PRESCRIBED    INTENTIONAL    1 each    Please choose reason not prescribed, below    Chronic atrial fibrillation (H)       buPROPion 150 MG 12 hr tablet    WELLBUTRIN SR    60 tablet    Take 1 tablet (150 mg) by mouth 2 times daily    Moderate major depression (H),  Anxiety       ciprofloxacin 500 MG tablet    CIPRO    28 tablet    Take 1 tablet (500 mg) by mouth 2 times daily    Acute bacterial prostatitis       doxazosin 4 MG tablet    CARDURA    90 tablet    Take 1 tablet (4 mg) by mouth daily    Hypertrophy of prostate with urinary obstruction       escitalopram 10 MG tablet    LEXAPRO    90 tablet    Take 1 tablet (10 mg) by mouth daily    Anxiety       finasteride 5 MG tablet    PROSCAR    90 tablet    Take 1 tablet (5 mg) by mouth daily    Benign prostatic hyperplasia with weak urinary stream       naproxen 500 MG tablet    NAPROSYN    28 tablet    Take 1 tablet (500 mg) by mouth 2 times daily (with meals)    Acute bacterial prostatitis       nystatin 379626 UNIT/ML suspension    MYCOSTATIN    280 mL    Take 5 mLs (500,000 Units) by mouth 4 times daily    Oral thrush       order for DME     1 Units    Equipment being ordered: inguinal hernia belt    Left inguinal hernia       rivaroxaban ANTICOAGULANT 20 MG Tabs tablet    XARELTO     Take 20 mg by mouth daily (with dinner)        STATIN NOT PRESCRIBED (INTENTIONAL)      Please choose reason not prescribed, below    Chronic atrial fibrillation (H)       traZODone 50 MG tablet    DESYREL    45 tablet    Take 0.5 tablets (25 mg) by mouth At Bedtime    Persistent insomnia       TYLENOL 500 MG tablet   Generic drug:  acetaminophen      Take 500-1,000 mg by mouth every 6 hours as needed for mild pain

## 2018-03-31 ASSESSMENT — ANXIETY QUESTIONNAIRES: GAD7 TOTAL SCORE: 6

## 2018-03-31 ASSESSMENT — PATIENT HEALTH QUESTIONNAIRE - PHQ9: SUM OF ALL RESPONSES TO PHQ QUESTIONS 1-9: 7

## 2018-04-10 ENCOUNTER — MYC MEDICAL ADVICE (OUTPATIENT)
Dept: FAMILY MEDICINE | Facility: CLINIC | Age: 73
End: 2018-04-10

## 2018-04-10 NOTE — TELEPHONE ENCOUNTER
S-(situation): Patient reports he is having trouble starting urination and the feeling of not emptying the bladder.    B-(background): patient was seen on 3/30/18    A-(assessment): Patient reports that the symptoms have returned yesterday and today. Patient reports the symptoms had improved until then. Patient reports he is still taking the antibiotics.  Patient denies any fevers or pain.  Patient states it is just like when he was seen in clinic.     R-(recommendations): Will send to provider for review and advise.    Thank you  Clara FRANCISCO RN

## 2018-04-11 ENCOUNTER — TELEPHONE (OUTPATIENT)
Dept: UROLOGY | Facility: CLINIC | Age: 73
End: 2018-04-11

## 2018-04-11 ENCOUNTER — OFFICE VISIT (OUTPATIENT)
Dept: SURGERY | Facility: CLINIC | Age: 73
End: 2018-04-11
Payer: COMMERCIAL

## 2018-04-11 VITALS
DIASTOLIC BLOOD PRESSURE: 81 MMHG | HEIGHT: 70 IN | SYSTOLIC BLOOD PRESSURE: 145 MMHG | RESPIRATION RATE: 12 BRPM | HEART RATE: 80 BPM | BODY MASS INDEX: 25.2 KG/M2 | TEMPERATURE: 97.9 F | WEIGHT: 176 LBS

## 2018-04-11 DIAGNOSIS — K40.20 NON-RECURRENT BILATERAL INGUINAL HERNIA WITHOUT OBSTRUCTION OR GANGRENE: Primary | ICD-10-CM

## 2018-04-11 PROCEDURE — 99214 OFFICE O/P EST MOD 30 MIN: CPT | Performed by: SURGERY

## 2018-04-11 NOTE — PROGRESS NOTES
Patient to follow up with Primary Care provider regarding elevated blood pressure.    Asked to see patient by Dr. Martínez regarding his groin masses.    73-year-old male complaining of left groin mass which is been increasing slowly over the past year. Patient reports the mass is reducible but causes localized discomfort 3 out of 10 with radiation to the left testicle. Aggravated by straining and alleviate by rest. Pain is dull and achy. Patient denies fevers chills nausea and vomiting. No other associated symptoms.    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)     Chronic atrial fibrillation (H)     Hyperplastic Polyp       Past Medical History:   Diagnosis Date     Benign neoplasm of prostate 2000    Benign Prostate Nodule       Past Surgical History:   Procedure Laterality Date     COLONOSCOPY N/A 12/10/2015    Procedure: COMBINED COLONOSCOPY, SINGLE OR MULTIPLE BIOPSY/POLYPECTOMY BY BIOPSY;  Surgeon: Jyoti Figueroa MD;  Location: [facility]     JOINT REPLACEMENT, HIP RT/LT  10/07    Joint Replacement Hip LT     JOINT REPLACEMTN, KNEE RT/LT  8/2011    Joint Replacement knee /LT, [facility]     SURGICAL HISTORY OF -   12/1/1999    Umbilical Herniorrhaphy with mesh     SURGICAL HISTORY OF -  Left 11/2017    Thoracotomy and drainage of empyema       Family History   Problem Relation Age of Onset     Breast Cancer Mother      Neurologic Disorder Mother      parkinsons     Respiratory Father       emphyzema     DIABETES Brother        Social History   Substance Use Topics     Smoking status: Former Smoker     Packs/day: 1.00     Years: 15.00     Types: Cigarettes     Quit date: 10/13/1975     Smokeless tobacco: Never Used     Alcohol use No      Comment: quit 11/29/2017        History   Drug Use No       Current Outpatient Prescriptions   Medication Sig Dispense Refill     finasteride (PROSCAR) 5 MG tablet Take 1 tablet (5 mg) by mouth daily 90 tablet 1     traZODone (DESYREL) 50 MG tablet Take 0.5 tablets (25 mg) by mouth At Bedtime 45 tablet 3     ciprofloxacin (CIPRO) 500 MG tablet Take 1 tablet (500 mg) by mouth 2 times daily 28 tablet 0     naproxen (NAPROSYN) 500 MG tablet Take 1 tablet (500 mg) by mouth 2 times daily (with meals) 28 tablet 1     escitalopram (LEXAPRO) 10 MG tablet Take 1 tablet (10 mg) by mouth daily 90 tablet 1     nystatin (MYCOSTATIN) 288887 UNIT/ML suspension Take 5 mLs (500,000 Units) by mouth 4 times daily 280 mL 1     buPROPion (WELLBUTRIN SR) 150 MG 12 hr tablet Take 1 tablet (150 mg) by mouth 2 times daily 60 tablet 3     order for DME Equipment being ordered: inguinal hernia belt 1 Units 0     rivaroxaban ANTICOAGULANT (XARELTO) 20 MG TABS tablet Take 20 mg by mouth daily (with dinner)       acetaminophen (TYLENOL) 500 MG tablet Take 500-1,000 mg by mouth every 6 hours as needed for mild pain       STATIN NOT PRESCRIBED, INTENTIONAL, Please choose reason not prescribed, below       doxazosin (CARDURA) 4 MG tablet Take 1 tablet (4 mg) by mouth daily 90 tablet 3       Allergies   Allergen Reactions     Bactrim [Sulfamethoxazole W/Trimethoprim] Nausea and Vomiting       CBC  Recent Labs   Lab Test  01/22/18   1354   WBC  8.3   RBC  3.99*   HGB  12.9*   HCT  38.6*   MCV  97   MCH  32.3   MCHC  33.4   RDW  13.8   PLT  201       BMP  Recent Labs   Lab Test  01/22/18   1354   NA  132*   POTASSIUM  4.2   LUIS  8.1*   CHLORIDE  99   CO2  28   BUN  15   CR  0.81   GLC  104*  "      LFTs  Recent Labs   Lab Test  01/22/18   1354   PROTTOTAL  7.7   ALBUMIN  3.8   BILITOTAL  0.5   ALKPHOS  60   AST  15   ALT  32     ROS  Constitutional - Denies fevers, weight loss, malaise, lethargy  Neuro - Denies tremors or seizures  Pulmon - Denies SOB, dyspnea, hemoptysis, chronic cough or use of an inhaler  CV - Denies CP, SOB, lower extremity edema, difficulty w/ stairs, has never used NTG  GI - Denies hematemesis, BRBPR, melena, chronic diarrhea or epigastric pain   - Denies hematuria, difficulty voiding, h/o STDs  Hematology - Denies blood clotting disorders, chronic anemias  Dermatology - No melanomas or skin cancers  Rheumatology - No h/o RA  Pysch - Denies depression, bipolar d/o or schizophrenia    Exam:  Patient Vitals for the past 24 hrs:   BP Temp Pulse Resp Height Weight   04/11/18 1534 145/81 97.9  F (36.6  C) 80 12 1.778 m (5' 10\") 79.8 kg (176 lb)       General - Alert and Oriented X4, NAD, well nourished  HEENT - Normocephalic, atraumatic, PERRLA, Nose midline, Throat without lesions  Neck - supple, no LAD, Thyroid normal, Carotids without bruits  Lungs - Clear to auscultation bilaterally with good inspiratory effort, no tactile fremitus  CV - Heart RRR, no lift's, thrills, murmurs, rubs, or gallops. Carotid, radial, and femoral pulses 2+ bilaterally  Abdomen - Soft, non-tender, +BS, no hepatosplenomegaly, no palpable masses  Groins - 2+ pulses bilaterally and no LAD, palpable reducible masses bilaterally  Neuro - Full ROM, Strength 5/5 and major muscle groups, sensation intact  Extremities - No cyanosis, clubbing or edema      Assessment and plan: 73-year-old male with bilateral inguinal hernias. Patient is a good candidate for laparoscopic bilateral inguinal hernia repair. Risks benefits alternatives and complications were discussed with the patient including the possibility of infection bleeding or hernia recurrence. Patient understood and wished to proceed. Patient will need to be " off his Xarelto for at least 5 days before surgery. PATIENT IS CLEARED FOR SURGERY.    Josemanuel Resendiz MD

## 2018-04-11 NOTE — TELEPHONE ENCOUNTER
Type of surgery: Laparoscopic bilateral inguinal hernia repair  Location of surgery: [facility]  Date and time of surgery: 4/24/18 @0900  Surgeon: Dr Josemanuel Resendiz  Pre-Op Appt Date: 4/11/18 completed by Dr Resendiz  Post-Op Appt Date: patient will call to schedule   Packet sent out: Yes  Pre-cert/Authorization completed:  Not Applicable  Date: 4-11-18    Tanisha Rodriguez MA

## 2018-04-11 NOTE — PATIENT INSTRUCTIONS
If you have questions or concerns on any instructions given to you by your provider today or if you need to schedule an appointment, you can reach us at [phone].

## 2018-04-11 NOTE — LETTER
4/11/2018         RE: Josemanuel Edmond  [address] [facility] [address]        Dear Colleague,    Thank you for referring your patient, Josemanuel Edmond, to the [facility]. Please see a copy of my visit note below.    Patient to follow up with Primary Care provider regarding elevated blood pressure.    Asked to see patient by Dr. Martínez regarding his groin masses.    73-year-old male complaining of left groin mass which is been increasing slowly over the past year. Patient reports the mass is reducible but causes localized discomfort 3 out of 10 with radiation to the left testicle. Aggravated by straining and alleviate by rest. Pain is dull and achy. Patient denies fevers chills nausea and vomiting. No other associated symptoms.    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)     Chronic atrial fibrillation (H)     Hyperplastic Polyp       Past Medical History:   Diagnosis Date     Benign neoplasm of prostate 2000    Benign Prostate Nodule       Past Surgical History:   Procedure Laterality Date     COLONOSCOPY N/A 12/10/2015    Procedure: COMBINED COLONOSCOPY, SINGLE OR MULTIPLE BIOPSY/POLYPECTOMY BY BIOPSY;  Surgeon: Jyoti Figueroa MD;  Location: [facility]     JOINT REPLACEMENT, HIP RT/LT  10/07    Joint Replacement Hip LT     JOINT REPLACEMTN, KNEE RT/LT  8/2011    Joint Replacement knee /LT, [facility]     SURGICAL HISTORY OF -   12/1/1999    Umbilical  Herniorrhaphy with mesh     SURGICAL HISTORY OF -  Left 11/2017    Thoracotomy and drainage of empyema       Family History   Problem Relation Age of Onset     Breast Cancer Mother      Neurologic Disorder Mother      parkinsons     Respiratory Father      emphyzema     DIABETES Brother        Social History   Substance Use Topics     Smoking status: Former Smoker     Packs/day: 1.00     Years: 15.00     Types: Cigarettes     Quit date: 10/13/1975     Smokeless tobacco: Never Used     Alcohol use No      Comment: quit 11/29/2017        History   Drug Use No       Current Outpatient Prescriptions   Medication Sig Dispense Refill     finasteride (PROSCAR) 5 MG tablet Take 1 tablet (5 mg) by mouth daily 90 tablet 1     traZODone (DESYREL) 50 MG tablet Take 0.5 tablets (25 mg) by mouth At Bedtime 45 tablet 3     ciprofloxacin (CIPRO) 500 MG tablet Take 1 tablet (500 mg) by mouth 2 times daily 28 tablet 0     naproxen (NAPROSYN) 500 MG tablet Take 1 tablet (500 mg) by mouth 2 times daily (with meals) 28 tablet 1     escitalopram (LEXAPRO) 10 MG tablet Take 1 tablet (10 mg) by mouth daily 90 tablet 1     nystatin (MYCOSTATIN) 959250 UNIT/ML suspension Take 5 mLs (500,000 Units) by mouth 4 times daily 280 mL 1     buPROPion (WELLBUTRIN SR) 150 MG 12 hr tablet Take 1 tablet (150 mg) by mouth 2 times daily 60 tablet 3     order for DME Equipment being ordered: inguinal hernia belt 1 Units 0     rivaroxaban ANTICOAGULANT (XARELTO) 20 MG TABS tablet Take 20 mg by mouth daily (with dinner)       acetaminophen (TYLENOL) 500 MG tablet Take 500-1,000 mg by mouth every 6 hours as needed for mild pain       STATIN NOT PRESCRIBED, INTENTIONAL, Please choose reason not prescribed, below       doxazosin (CARDURA) 4 MG tablet Take 1 tablet (4 mg) by mouth daily 90 tablet 3       Allergies   Allergen Reactions     Bactrim [Sulfamethoxazole W/Trimethoprim] Nausea and Vomiting       CBC  Recent Labs   Lab Test  01/22/18   1354   WBC  8.3  "  RBC  3.99*   HGB  12.9*   HCT  38.6*   MCV  97   MCH  32.3   MCHC  33.4   RDW  13.8   PLT  201       BMP  Recent Labs   Lab Test  01/22/18   1354   NA  132*   POTASSIUM  4.2   LUIS  8.1*   CHLORIDE  99   CO2  28   BUN  15   CR  0.81   GLC  104*       LFTs  Recent Labs   Lab Test  01/22/18   1354   PROTTOTAL  7.7   ALBUMIN  3.8   BILITOTAL  0.5   ALKPHOS  60   AST  15   ALT  32     ROS  Constitutional - Denies fevers, weight loss, malaise, lethargy  Neuro - Denies tremors or seizures  Pulmon - Denies SOB, dyspnea, hemoptysis, chronic cough or use of an inhaler  CV - Denies CP, SOB, lower extremity edema, difficulty w/ stairs, has never used NTG  GI - Denies hematemesis, BRBPR, melena, chronic diarrhea or epigastric pain   - Denies hematuria, difficulty voiding, h/o STDs  Hematology - Denies blood clotting disorders, chronic anemias  Dermatology - No melanomas or skin cancers  Rheumatology - No h/o RA  Pysch - Denies depression, bipolar d/o or schizophrenia    Exam:  Patient Vitals for the past 24 hrs:   BP Temp Pulse Resp Height Weight   04/11/18 1534 145/81 97.9  F (36.6  C) 80 12 1.778 m (5' 10\") 79.8 kg (176 lb)       General - Alert and Oriented X4, NAD, well nourished  HEENT - Normocephalic, atraumatic, PERRLA, Nose midline, Throat without lesions  Neck - supple, no LAD, Thyroid normal, Carotids without bruits  Lungs - Clear to auscultation bilaterally with good inspiratory effort, no tactile fremitus  CV - Heart RRR, no lift's, thrills, murmurs, rubs, or gallops. Carotid, radial, and femoral pulses 2+ bilaterally  Abdomen - Soft, non-tender, +BS, no hepatosplenomegaly, no palpable masses  Groins - 2+ pulses bilaterally and no LAD, palpable reducible masses bilaterally  Neuro - Full ROM, Strength 5/5 and major muscle groups, sensation intact  Extremities - No cyanosis, clubbing or edema      Assessment and plan: 73-year-old male with bilateral inguinal hernias. Patient is a good candidate for laparoscopic " bilateral inguinal hernia repair. Risks benefits alternatives and complications were discussed with the patient including the possibility of infection bleeding or hernia recurrence. Patient understood and wished to proceed. Patient will need to be off his Xarelto for at least 5 days before surgery. PATIENT IS CLEARED FOR SURGERY.    Josemanuel Resendiz MD     Again, thank you for allowing me to participate in the care of your patient.        Sincerely,        Josemanuel Resendiz MD

## 2018-04-13 ENCOUNTER — MYC MEDICAL ADVICE (OUTPATIENT)
Dept: FAMILY MEDICINE | Facility: CLINIC | Age: 73
End: 2018-04-13

## 2018-04-13 DIAGNOSIS — N41.0 ACUTE BACTERIAL PROSTATITIS: ICD-10-CM

## 2018-04-13 NOTE — TELEPHONE ENCOUNTER
Attempted to reach patient to get more information.  Will send to provider to review.    Thank you  Clara FRANCISCO RN

## 2018-04-17 RX ORDER — NAPROXEN 500 MG/1
500 TABLET ORAL 2 TIMES DAILY WITH MEALS
Qty: 28 TABLET | Refills: 1 | Status: SHIPPED | OUTPATIENT
Start: 2018-04-17 | End: 2018-05-07

## 2018-04-23 ENCOUNTER — ANESTHESIA EVENT (OUTPATIENT)
Dept: SURGERY | Facility: CLINIC | Age: 73
End: 2018-04-23
Payer: MEDICARE

## 2018-04-23 ASSESSMENT — LIFESTYLE VARIABLES: TOBACCO_USE: 1

## 2018-04-23 ASSESSMENT — ENCOUNTER SYMPTOMS: DYSRHYTHMIAS: 1

## 2018-04-23 NOTE — ANESTHESIA PREPROCEDURE EVALUATION
Anesthesia Evaluation     . Pt has had prior anesthetic. Type: General and MAC    No history of anesthetic complications          ROS/MED HX    ENT/Pulmonary:     (+)allergic rhinitis, tobacco use, Past use , . .    Neurologic:  - neg neurologic ROS     Cardiovascular:     (+) Dyslipidemia, hypertension----. : . . . :. dysrhythmias a-fib, . Previous cardiac testing Echodate:12/21/2017results:Interpretation Summary     1. Normal left ventricle size and systolic function. LV ejection fraction 60-  65%. Grade 2 diastolic dysfunction.  2. No regional wall motion abnormalities.  3. Normal right ventricular size and systolic function.  4. Trileaflet aortic valve with sclerosis, no stenosis. Mild to moderate  aortic regurgitation.  5. Mild aortic root dilatation (4.0 centimeters). Mild ascending aortic  dilatation (4.0 centimeters).  6. Moderate mitral and tricuspid regurgitation.  7. Severe biatrial enlargement.     There is no comparison study available.  _____________________________________________________________________________  __        Left Ventricle  The left ventricle is normal in size. There is normal left ventricular wall  thickness. Left ventricular systolic function is normal. The visual ejection  fraction is estimated at 60-65%. Grade II or moderate diastolic dysfunction.  No regional wall motion abnormalities noted.     Right Ventricle  The right ventricle is normal in size and function.     Atria  The left atrium is severely dilated. The right atrium is severely dilated.  There is no color Doppler evidence of an atrial shunt.     Mitral Valve  The mitral valve leaflets appear normal. There is no evidence of stenosis,  fluttering, or prolapse. There is moderate (2+) mitral regurgitation.        Tricuspid Valve  Normal tricuspid valve. There is moderate (2+) tricuspid regurgitation. The  right ventricular systolic pressure is approximated at 30.2 mmHg plus the  right atrial pressure.     Aortic Valve  The  aortic valve is trileaflet with aortic valve sclerosis. There is mild to  moderate (1-2+) aortic regurgitation. No aortic stenosis is present.     Pulmonic Valve  The pulmonic valve is not well seen, but is grossly normal. There is trace  pulmonic valvular regurgitation. Normal pulmonic valve velocity.     Vessels  Mild aortic root dilatation. (4.0 centimeters). The ascending aorta is Mildly  dilated. (4.0 centimeters). Dilation of the inferior vena cava is present with  normal respiratory variation in diameter.     Pericardium  There is no pericardial effusion.        Rhythm  Sinus rhythm was noted.  _____________________________________________________________________________  __  MMode/2D Measurements & Calculations  IVSd: 1.1 cm     LVIDd: 5.4 cm  LVIDs: 3.8 cm  LVPWd: 1.1 cm  FS: 29.2 %  EDV(Teich): 139.2 ml  ESV(Teich): 61.9 ml  LV mass(C)d: 233.4 grams  LV mass(C)dI: 160.0 grams/m2  Ao root diam: 4.0 cm  LA dimension: 5.2 cm  asc Aorta Diam: 4.0 cm  LA/Ao: 1.3  LA Volume (BP): 134.0 ml  LA Volume Index (BP): 65.7 ml/m2  RWT: 0.42           Doppler Measurements & Calculations  MV E max ana luisa: 81.8 cm/sec  MV A max ana luisa: 22.5 cm/sec  MV E/A: 3.6  MV dec time: 0.15 sec  Ao V2 max: 114.7 cm/sec  Ao max P.3 mmHg  AI P1/2t: 512.7 msec  LV V1 max P.7 mmHg  LV V1 max: 108.6 cm/sec  LV V1 VTI: 20.4 cm  MR ERO: 0.26 cm2  MR volume: 44.3 ml  TR max ana luisa: 274.7 cm/sec  TR max P.2 mmHg  E/E' av.5  Lateral E/e': 7.8  Medial E/e': 11.3              _____________________________________________________________________________  __        Report approved by: Dr Kelley Wilson 2017 04:38 PMdate: results: date: results: date: results:          METS/Exercise Tolerance:  >4 METS   Hematologic:  - neg hematologic  ROS       Musculoskeletal:   (+) arthritis, , , -       GI/Hepatic:     (+) GERD Other GI/Hepatic history of ETOH abuse      Renal/Genitourinary:  - ROS Renal section negative       Endo:          Psychiatric:     (+) psychiatric history anxiety and depression      Infectious Disease:  - neg infectious disease ROS       Malignancy:      - no malignancy   Other:    - neg other ROS                 Physical Exam  Normal systems: cardiovascular, pulmonary and dental    Airway   Mallampati: II  TM distance: >3 FB  Neck ROM: full    Dental     Cardiovascular   Rhythm and rate: regular and normal      Pulmonary    breath sounds clear to auscultation                    Anesthesia Plan      History & Physical Review  History and physical reviewed and following examination; no interval change.    ASA Status:  2 .    NPO Status:  > 8 hours    Plan for General and ETT with Intravenous induction. Maintenance will be Balanced.    PONV prophylaxis:  Ondansetron (or other 5HT-3) and Dexamethasone or Solumedrol  Additional equipment: Videolaryngoscope      Postoperative Care  Postoperative pain management:  IV analgesics and Oral pain medications.      Consents  Anesthetic plan, risks, benefits and alternatives discussed with:  Patient..                          .

## 2018-04-24 ENCOUNTER — SURGERY (OUTPATIENT)
Age: 73
End: 2018-04-24

## 2018-04-24 ENCOUNTER — HOSPITAL ENCOUNTER (OUTPATIENT)
Facility: CLINIC | Age: 73
Discharge: HOME | End: 2018-04-24
Attending: SURGERY | Admitting: SURGERY
Payer: MEDICARE

## 2018-04-24 ENCOUNTER — ANESTHESIA (OUTPATIENT)
Dept: SURGERY | Facility: CLINIC | Age: 73
End: 2018-04-24
Payer: MEDICARE

## 2018-04-24 VITALS
TEMPERATURE: 98.2 F | RESPIRATION RATE: 16 BRPM | DIASTOLIC BLOOD PRESSURE: 67 MMHG | BODY MASS INDEX: 25.2 KG/M2 | WEIGHT: 176 LBS | OXYGEN SATURATION: 98 % | HEIGHT: 70 IN | SYSTOLIC BLOOD PRESSURE: 125 MMHG

## 2018-04-24 DIAGNOSIS — G89.18 POST-OP PAIN: Primary | ICD-10-CM

## 2018-04-24 PROCEDURE — 25000566 ZZH SEVOFLURANE, EA 15 MIN: Performed by: SURGERY

## 2018-04-24 PROCEDURE — 36000056 ZZH SURGERY LEVEL 3 1ST 30 MIN: Performed by: SURGERY

## 2018-04-24 PROCEDURE — 49650 LAP ING HERNIA REPAIR INIT: CPT | Mod: 50 | Performed by: SURGERY

## 2018-04-24 PROCEDURE — 71000027 ZZH RECOVERY PHASE 2 EACH 15 MINS: Performed by: SURGERY

## 2018-04-24 PROCEDURE — 40000306 ZZH STATISTIC PRE PROC ASSESS II: Performed by: SURGERY

## 2018-04-24 PROCEDURE — 36000058 ZZH SURGERY LEVEL 3 EA 15 ADDTL MIN: Performed by: SURGERY

## 2018-04-24 PROCEDURE — C1781 MESH (IMPLANTABLE): HCPCS | Performed by: SURGERY

## 2018-04-24 PROCEDURE — C9399 UNCLASSIFIED DRUGS OR BIOLOG: HCPCS | Performed by: NURSE ANESTHETIST, CERTIFIED REGISTERED

## 2018-04-24 PROCEDURE — 25000125 ZZHC RX 250: Performed by: SURGERY

## 2018-04-24 PROCEDURE — 25000128 H RX IP 250 OP 636: Performed by: SURGERY

## 2018-04-24 PROCEDURE — 37000008 ZZH ANESTHESIA TECHNICAL FEE, 1ST 30 MIN: Performed by: SURGERY

## 2018-04-24 PROCEDURE — 71000012 ZZH RECOVERY PHASE 1 LEVEL 1 FIRST HR: Performed by: SURGERY

## 2018-04-24 PROCEDURE — 37000009 ZZH ANESTHESIA TECHNICAL FEE, EACH ADDTL 15 MIN: Performed by: SURGERY

## 2018-04-24 PROCEDURE — 49650 LAP ING HERNIA REPAIR INIT: CPT | Mod: 50 | Performed by: PHYSICIAN ASSISTANT

## 2018-04-24 PROCEDURE — 25000128 H RX IP 250 OP 636: Performed by: NURSE ANESTHETIST, CERTIFIED REGISTERED

## 2018-04-24 PROCEDURE — 27210794 ZZH OR GENERAL SUPPLY STERILE: Performed by: SURGERY

## 2018-04-24 DEVICE — MESH PROGRIP LAPAROSCOPIC 5.9X3.9" PARIETEX SELF-FIX LPG1510: Type: IMPLANTABLE DEVICE | Site: INGUINAL | Status: FUNCTIONAL

## 2018-04-24 RX ORDER — BUPIVACAINE HYDROCHLORIDE AND EPINEPHRINE 5; 5 MG/ML; UG/ML
INJECTION, SOLUTION PERINEURAL PRN
Status: DISCONTINUED | OUTPATIENT
Start: 2018-04-24 | End: 2018-04-24 | Stop reason: HOSPADM

## 2018-04-24 RX ORDER — ONDANSETRON 4 MG/1
4 TABLET, ORALLY DISINTEGRATING ORAL EVERY 30 MIN PRN
Status: DISCONTINUED | OUTPATIENT
Start: 2018-04-24 | End: 2018-04-24 | Stop reason: HOSPADM

## 2018-04-24 RX ORDER — FENTANYL CITRATE 50 UG/ML
INJECTION, SOLUTION INTRAMUSCULAR; INTRAVENOUS PRN
Status: DISCONTINUED | OUTPATIENT
Start: 2018-04-24 | End: 2018-04-24

## 2018-04-24 RX ORDER — HYDROCODONE BITARTRATE AND ACETAMINOPHEN 5; 325 MG/1; MG/1
1-2 TABLET ORAL EVERY 4 HOURS PRN
Status: DISCONTINUED | OUTPATIENT
Start: 2018-04-24 | End: 2018-04-24 | Stop reason: HOSPADM

## 2018-04-24 RX ORDER — PROPOFOL 10 MG/ML
INJECTION, EMULSION INTRAVENOUS PRN
Status: DISCONTINUED | OUTPATIENT
Start: 2018-04-24 | End: 2018-04-24

## 2018-04-24 RX ORDER — HYDROMORPHONE HYDROCHLORIDE 1 MG/ML
.3-.5 INJECTION, SOLUTION INTRAMUSCULAR; INTRAVENOUS; SUBCUTANEOUS EVERY 10 MIN PRN
Status: DISCONTINUED | OUTPATIENT
Start: 2018-04-24 | End: 2018-04-24 | Stop reason: HOSPADM

## 2018-04-24 RX ORDER — HYDROCODONE BITARTRATE AND ACETAMINOPHEN 5; 325 MG/1; MG/1
1-2 TABLET ORAL EVERY 4 HOURS PRN
Qty: 45 TABLET | Refills: 0 | Status: SHIPPED | OUTPATIENT
Start: 2018-04-24 | End: 2018-09-14

## 2018-04-24 RX ORDER — SODIUM CHLORIDE, SODIUM LACTATE, POTASSIUM CHLORIDE, CALCIUM CHLORIDE 600; 310; 30; 20 MG/100ML; MG/100ML; MG/100ML; MG/100ML
INJECTION, SOLUTION INTRAVENOUS CONTINUOUS
Status: DISCONTINUED | OUTPATIENT
Start: 2018-04-24 | End: 2018-04-24 | Stop reason: HOSPADM

## 2018-04-24 RX ORDER — DEXAMETHASONE SODIUM PHOSPHATE 4 MG/ML
INJECTION, SOLUTION INTRA-ARTICULAR; INTRALESIONAL; INTRAMUSCULAR; INTRAVENOUS; SOFT TISSUE PRN
Status: DISCONTINUED | OUTPATIENT
Start: 2018-04-24 | End: 2018-04-24

## 2018-04-24 RX ORDER — MEPERIDINE HYDROCHLORIDE 50 MG/ML
12.5 INJECTION INTRAMUSCULAR; INTRAVENOUS; SUBCUTANEOUS
Status: DISCONTINUED | OUTPATIENT
Start: 2018-04-24 | End: 2018-04-24 | Stop reason: HOSPADM

## 2018-04-24 RX ORDER — FENTANYL CITRATE 50 UG/ML
25-50 INJECTION, SOLUTION INTRAMUSCULAR; INTRAVENOUS
Status: DISCONTINUED | OUTPATIENT
Start: 2018-04-24 | End: 2018-04-24 | Stop reason: HOSPADM

## 2018-04-24 RX ORDER — ONDANSETRON 2 MG/ML
INJECTION INTRAMUSCULAR; INTRAVENOUS PRN
Status: DISCONTINUED | OUTPATIENT
Start: 2018-04-24 | End: 2018-04-24

## 2018-04-24 RX ORDER — NALOXONE HYDROCHLORIDE 0.4 MG/ML
.1-.4 INJECTION, SOLUTION INTRAMUSCULAR; INTRAVENOUS; SUBCUTANEOUS
Status: DISCONTINUED | OUTPATIENT
Start: 2018-04-24 | End: 2018-04-24 | Stop reason: HOSPADM

## 2018-04-24 RX ORDER — CEFAZOLIN SODIUM 1 G/50ML
1 INJECTION, SOLUTION INTRAVENOUS SEE ADMIN INSTRUCTIONS
Status: DISCONTINUED | OUTPATIENT
Start: 2018-04-24 | End: 2018-04-24 | Stop reason: HOSPADM

## 2018-04-24 RX ORDER — ONDANSETRON 2 MG/ML
4 INJECTION INTRAMUSCULAR; INTRAVENOUS EVERY 30 MIN PRN
Status: DISCONTINUED | OUTPATIENT
Start: 2018-04-24 | End: 2018-04-24 | Stop reason: HOSPADM

## 2018-04-24 RX ORDER — CEFAZOLIN SODIUM 2 G/100ML
2 INJECTION, SOLUTION INTRAVENOUS
Status: COMPLETED | OUTPATIENT
Start: 2018-04-24 | End: 2018-04-24

## 2018-04-24 RX ADMIN — ONDANSETRON 4 MG: 2 INJECTION INTRAMUSCULAR; INTRAVENOUS at 08:53

## 2018-04-24 RX ADMIN — ROCURONIUM BROMIDE 50 MG: 10 INJECTION INTRAVENOUS at 08:53

## 2018-04-24 RX ADMIN — LIDOCAINE HYDROCHLORIDE 50 MG: 10 INJECTION, SOLUTION EPIDURAL; INFILTRATION; INTRACAUDAL; PERINEURAL at 08:53

## 2018-04-24 RX ADMIN — CEFAZOLIN SODIUM 2 G: 2 INJECTION, SOLUTION INTRAVENOUS at 08:48

## 2018-04-24 RX ADMIN — FENTANYL CITRATE 150 MCG: 50 INJECTION, SOLUTION INTRAMUSCULAR; INTRAVENOUS at 08:53

## 2018-04-24 RX ADMIN — SODIUM CHLORIDE, POTASSIUM CHLORIDE, SODIUM LACTATE AND CALCIUM CHLORIDE: 600; 310; 30; 20 INJECTION, SOLUTION INTRAVENOUS at 08:36

## 2018-04-24 RX ADMIN — DEXAMETHASONE SODIUM PHOSPHATE 8 MG: 4 INJECTION, SOLUTION INTRA-ARTICULAR; INTRALESIONAL; INTRAMUSCULAR; INTRAVENOUS; SOFT TISSUE at 08:53

## 2018-04-24 RX ADMIN — MIDAZOLAM 2 MG: 1 INJECTION INTRAMUSCULAR; INTRAVENOUS at 08:51

## 2018-04-24 RX ADMIN — BUPIVACAINE HYDROCHLORIDE AND EPINEPHRINE BITARTRATE 23 ML: 5; .005 INJECTION, SOLUTION PERINEURAL at 09:41

## 2018-04-24 RX ADMIN — PROPOFOL 200 MG: 10 INJECTION, EMULSION INTRAVENOUS at 08:53

## 2018-04-24 RX ADMIN — FENTANYL CITRATE 100 MCG: 50 INJECTION, SOLUTION INTRAMUSCULAR; INTRAVENOUS at 09:42

## 2018-04-24 RX ADMIN — PHENYLEPHRINE HYDROCHLORIDE 100 MCG: 10 INJECTION, SOLUTION INTRAMUSCULAR; INTRAVENOUS; SUBCUTANEOUS at 09:25

## 2018-04-24 RX ADMIN — LIDOCAINE HYDROCHLORIDE 1 ML: 10 INJECTION, SOLUTION EPIDURAL; INFILTRATION; INTRACAUDAL; PERINEURAL at 08:36

## 2018-04-24 RX ADMIN — PHENYLEPHRINE HYDROCHLORIDE 100 MCG: 10 INJECTION, SOLUTION INTRAMUSCULAR; INTRAVENOUS; SUBCUTANEOUS at 09:42

## 2018-04-24 RX ADMIN — PHENYLEPHRINE HYDROCHLORIDE 100 MCG: 10 INJECTION, SOLUTION INTRAMUSCULAR; INTRAVENOUS; SUBCUTANEOUS at 09:06

## 2018-04-24 RX ADMIN — SUGAMMADEX 200 MG: 100 INJECTION, SOLUTION INTRAVENOUS at 09:40

## 2018-04-24 NOTE — IP AVS SNAPSHOT
[facility] PreOP/Phase II    [address] [facility] [address] [address]    Phone:  [phone]    Fax:  [phone]                                       After Visit Summary   4/24/2018    Josemanuel Edmond    MRN: [id number]           After Visit Summary Signature Page     I have received my discharge instructions, and my questions have been answered. I have discussed any challenges I see with this plan with the nurse or doctor.    ..........................................................................................................................................  Patient/Patient Representative Signature      ..........................................................................................................................................  Patient Representative Print Name and Relationship to Patient    ..................................................               ................................................  Date                                            Time    ..........................................................................................................................................  Reviewed by Signature/Title    ...................................................              ..............................................  Date                                                            Time

## 2018-04-24 NOTE — ANESTHESIA CARE TRANSFER NOTE
Patient: Josemanuel Edmond    Procedure(s):  Laparoscopic bilateral inguinal hernia repair - Wound Class: I-Clean    Diagnosis: Bilateral inguinal hernia  Diagnosis Additional Information: No value filed.    Anesthesia Type:   General, ETT     Note:  Airway :Oral Airway and Face Mask  Patient transferred to:PACU  Handoff Report: Identifed the Patient, Identified the Reponsible Provider, Reviewed the pertinent medical history, Discussed the surgical course, Reviewed Intra-OP anesthesia mangement and issues during anesthesia, Set expectations for post-procedure period and Allowed opportunity for questions and acknowledgement of understanding      Vitals: (Last set prior to Anesthesia Care Transfer)    CRNA VITALS  4/24/2018 0922 - 4/24/2018 0957      4/24/2018             Pulse: 76    SpO2: 98 %                Electronically Signed By: Dirk Olivas CRNA, APRN CRNA  April 24, 2018  9:57 AM

## 2018-04-24 NOTE — DISCHARGE INSTRUCTIONS
Wash incisions daily with soap and water. Some mild redness or swelling is expected. If draining, cover with dry gauze.    Restart Xarelto on Thursday, April 26th, 2018.    No lifting restrictions.  Ok to lift as pain allows.    Okay to use ice pack over wound as necessary for comfort.    Use pain medication as necessary but avoid constipating side effects. Ibuprofen okay but avoid Tylenol as your pain medication already contains this.    Diet as tolerated. No restrictions.    Follow up with Dr Resendiz in 1-2 weeks.    Most people take the rest of the week off and return to work the following Monday. You may return sooner as pain allows. During your follow-up appointment, Dr. Resendiz will give you a formal letter for your work with any restrictions detailed.  All disability or other such paperwork will be addressed at that time.        Break through Bleeding  As instructed per Surgeon or Nurse.    Post Op Infection  Be alert for signs of infection: redness, swelling, heat, drainage of pus, and/or elevated temperature.  Contact your surgeon if these occur.    Nausea   If post op nausea occurs, at first rest your stomach for a few hours by eating nothing solid and sipping only clear liquids.  Call your Surgeon if nausea does not resolve in 24 hours.                        Same Day Surgery Discharge Instructions  Special Precautions After Surgery - Adult    1. It is not unusual to feel lightheaded or faint, up to 24 hours after surgery or while taking pain medication.  If you have these symptoms; sit for a few minutes before standing and have someone assist you when getting up.  2. You should rest and relax for the next 24 hours and must have someone stay with you for at least 24 hours after your discharge.  3. DO NOT DRIVE any vehicle or operate mechanical equipment for 24 hours following the end of your surgery.  DO NOT DRIVE while taking narcotic pain medications that have been prescribed by your physician.  If you had a  limb operated on, you must be able to use it fully to drive.  4. DO NOT drink alcoholic beverages for 24 hours following surgery or while taking prescription pain medication.  5. Drink clear liquids (apple juice, ginger ale, broth, 7-Up, etc.).  Progress to your regular diet as you feel able.  6. Any questions call your physician and do not make important decisions for 24 hours.    Medications:  ? Hydrocodone (Norco, Vicodin):  Next dose: anytime.  ? Follow the instructions on the bottle.    __________________________________________________________________________________________________________________________________  IMPORTANT NUMBERS:    [facility] [place]:  [phone], [phone]  Pharmacy:  [phone]  Same Day Surgery:  [phone], Monday - Friday until 8:30 p.m.  Urgent Care:  [phone]  Emergency Room:  [phone]      [facility]:  [phone]

## 2018-04-24 NOTE — OP NOTE
Preoperative diagnosis: Bilateral inguinal hernias    Postop diagnosis: Same    Procedure: Laparoscopic bilateral inguinal hernia repair    Surgeon: Martín Waldrop. surgeon: Angel Parham PA-C (need for expertise and camera operation retraction hemostasis and suctioning)    Anesthesia: Gen. endotracheal, [place] CRNA    Procedure: Patient's lower abdomen cleaned and draped in a sterile manner. 2 g of Ancef used as perioperative antibiotics. 1/2% Marcaine with epinephrine used to anesthetize all port sites. Small subumbilical curvilinear incision made and subcutaneous tissues dissected to fascia. Anterior fascia of right rectus muscle opened revealing muscle beneath. Dissecting balloon trocar inserted into preperitoneal space and insufflated under direct vision. Balloon trocar removed and 10 mm trocar placed into preperitoneal space. Common dioxide insufflated to pressure of 15 mmHg. Under direct vision, 2 midline 5 mm trochars placed. Patient placed into Trendelenburg position. Attention turned to the midline. The loose areolar tissue covering the pubic arch was  revealing the bony structure. This dissection continued laterally to the right pelvic sidewall musculature and then down to the psoas muscle. The hernia defect in the side was indirect and the cord was  from the sac which was swept to the bottom of the operative field. A piece of Covidien Progrip mesh was then used for repair. It was unrolled from superior to inferior completely covering Hesselbach's triangle as well as the indirect defect. The inferior margin of the mesh tucked easily under the parietal peritoneum. Attention was then turned to the left side. The patient had a direct and indirect hernia on this side. The direct hernia sac was removed from Hesselbach's triangle and swept to the bottom of the operative field. The indirect sac was removed from the cord structures and also swept to the bottom of the field. A small cord lipoma  was removed from the inguinal canal. A second piece of mesh was used for repair. It was unrolled from superior to inferior completely covering the triangle as well as overlapping the right-sided mesh at midline. The indirect defect was covered as well and the inferior margin of the mesh was tucked easily under the parietal peritoneum. An absorbent tack device was used to anchor the mesh along Baljinder's ligament to prevent recurrence at the triangle. This was done bilaterally.  All trochars were removed under direct vision and the air was allowed to desufflate. The fascial defect of the subumbilical port was closed using an 0 Vicryl suture in a figure-of-eight fashion and this was reinjected with Marcaine. All wounds were irrigated with normal saline and the skin closed using 4-0 Vicryl in a subcuticular fashion and dressed with Dermabond.    Estimated blood loss: 5 mL    IV fluid: 500 mL

## 2018-04-24 NOTE — IP AVS SNAPSHOT
MRN:[id number]                      After Visit Summary   4/24/2018    Josemanuel Edmond    MRN: [id number]           Thank you!     Thank you for choosing [facility] for your care. Our goal is always to provide you with excellent care. Hearing back from our patients is one way we can continue to improve our services. Please take a few minutes to complete the written survey that you may receive in the mail after you visit with us. Thank you!        Patient Information     Date Of Birth          1945        About your hospital stay     You were admitted on:  April 24, 2018 You last received care in the:  [facility] PreOP/Phase II    You were discharged on:  April 24, 2018       Who to Call     For medical emergencies, please call 911.  For non-urgent questions about your medical care, please call your primary care provider or clinic, [phone]  For questions related to your surgery, please call your surgery clinic        Attending Provider     Provider Specialty    Josemanuel Resendiz MD Surgery       Primary Care Provider Office Phone # Fax #    R Terry Martínez [phone]      Further instructions from your care team       Wash incisions daily with soap and water. Some mild redness or swelling is expected. If draining, cover with dry gauze.    Restart Xarelto on Thursday, April 26th, 2018.    No lifting restrictions.  Ok to lift as pain allows.    Okay to use ice pack over wound as necessary for comfort.    Use pain medication as necessary but avoid constipating side effects. Ibuprofen okay but avoid Tylenol as your pain medication already contains this.    Diet as tolerated. No restrictions.    Follow up with Dr Resendiz in 1-2 weeks.    Most people take the rest of the week off and return to work the following Monday. You may return sooner as pain allows. During your follow-up appointment, Dr. Resendiz will give you a formal letter for your work with any restrictions detailed.  All  disability or other such paperwork will be addressed at that time.        Break through Bleeding  As instructed per Surgeon or Nurse.    Post Op Infection  Be alert for signs of infection: redness, swelling, heat, drainage of pus, and/or elevated temperature.  Contact your surgeon if these occur.    Nausea   If post op nausea occurs, at first rest your stomach for a few hours by eating nothing solid and sipping only clear liquids.  Call your Surgeon if nausea does not resolve in 24 hours.                        Same Day Surgery Discharge Instructions  Special Precautions After Surgery - Adult    1. It is not unusual to feel lightheaded or faint, up to 24 hours after surgery or while taking pain medication.  If you have these symptoms; sit for a few minutes before standing and have someone assist you when getting up.  2. You should rest and relax for the next 24 hours and must have someone stay with you for at least 24 hours after your discharge.  3. DO NOT DRIVE any vehicle or operate mechanical equipment for 24 hours following the end of your surgery.  DO NOT DRIVE while taking narcotic pain medications that have been prescribed by your physician.  If you had a limb operated on, you must be able to use it fully to drive.  4. DO NOT drink alcoholic beverages for 24 hours following surgery or while taking prescription pain medication.  5. Drink clear liquids (apple juice, ginger ale, broth, 7-Up, etc.).  Progress to your regular diet as you feel able.  6. Any questions call your physician and do not make important decisions for 24 hours.    Medications:  ? Hydrocodone (Norco, Vicodin):  Next dose: anytime.  ? Follow the instructions on the bottle.    __________________________________________________________________________________________________________________________________  IMPORTANT NUMBERS:    [facility] [place]  [phone], [phone]  Pharmacy:  [phone]  Same Day Surgery:  [phone], Monday -  "Friday until 8:30 p.m.  Urgent Care:  [phone]  Emergency Room:  [phone]      Surgery Specialty Clinic:  [phone]         Pending Results     No orders found from 4/22/2018 to 4/25/2018.            Admission Information     Date & Time Provider Department Dept. Phone    4/24/2018 Josemanuel Resendiz MD [facility] PreOP/Phase [phone]      Your Vitals Were     Blood Pressure Temperature Respirations Height Weight Pulse Oximetry    123/64 98.2  F (36.8  C) (Oral) 16 1.778 m (5' 10\") 79.8 kg (176 lb) 98%    BMI (Body Mass Index)                   25.25 kg/m2           Phon[employer] - Touch[employer] Information     [employer] gives you secure access to your electronic health record. If you see a primary care provider, you can also send messages to your care team and make appointments. If you have questions, please call your primary care clinic.  If you do not have a primary care provider, please call [phone] and they will assist you.        Care EveryWhere ID     This is your Care EveryWhere ID. This could be used by other organizations to access your [place] medical records  [id number]        Equal Access to Services     MERARI NASCIMENTO AH: Kyleigh Juarez, wasari adamson, qanavdeepta kaalmada joe, bree silver. So [facility] [place] [facility] [phone].    ATENCIÓN: Si habla español, tiene a sen disposición servicios gratuitos de asistencia lingüística. Llame al [phone].    We comply with applicable federal civil rights laws and [place] laws. We do not discriminate on the basis of race, color, national origin, age, disability, sex, sexual orientation, or gender identity.               Review of your medicines      START taking        Dose / Directions    HYDROcodone-acetaminophen 5-325 MG per tablet   Commonly known as:  NORCO   Used for:  Post-op pain        Dose:  1-2 tablet   Take 1-2 tablets by mouth every 4 hours as needed for pain maximum 10 tablet(s) per day   Quantity:  45 tablet "   Refills:  0         CONTINUE these medicines which have NOT CHANGED        Dose / Directions    buPROPion 150 MG 12 hr tablet   Commonly known as:  WELLBUTRIN SR   Used for:  Moderate major depression (H), Anxiety        Dose:  150 mg   Take 1 tablet (150 mg) by mouth 2 times daily   Quantity:  60 tablet   Refills:  3       ciprofloxacin 500 MG tablet   Commonly known as:  CIPRO   Used for:  Acute bacterial prostatitis        Dose:  500 mg   Take 1 tablet (500 mg) by mouth 2 times daily   Quantity:  28 tablet   Refills:  0       doxazosin 4 MG tablet   Commonly known as:  CARDURA   Used for:  Hypertrophy of prostate with urinary obstruction        Dose:  4 mg   Take 1 tablet (4 mg) by mouth daily   Quantity:  90 tablet   Refills:  3       escitalopram 10 MG tablet   Commonly known as:  LEXAPRO   Used for:  Anxiety        Dose:  10 mg   Take 1 tablet (10 mg) by mouth daily   Quantity:  90 tablet   Refills:  1       finasteride 5 MG tablet   Commonly known as:  PROSCAR   Used for:  Benign prostatic hyperplasia with weak urinary stream        Dose:  5 mg   Take 1 tablet (5 mg) by mouth daily   Quantity:  90 tablet   Refills:  1       naproxen 500 MG tablet   Commonly known as:  NAPROSYN   Used for:  Acute bacterial prostatitis        Dose:  500 mg   Take 1 tablet (500 mg) by mouth 2 times daily (with meals)   Quantity:  28 tablet   Refills:  1       NORTRIPTYLINE HCL PO        Refills:  0       nystatin 428857 UNIT/ML suspension   Commonly known as:  MYCOSTATIN   Used for:  Oral thrush        Dose:  300923 Units   Take 5 mLs (500,000 Units) by mouth 4 times daily   Quantity:  280 mL   Refills:  1       order for DME   Used for:  Left inguinal hernia        Equipment being ordered: inguinal hernia belt   Quantity:  1 Units   Refills:  0       rivaroxaban ANTICOAGULANT 20 MG Tabs tablet   Commonly known as:  XARELTO        Dose:  20 mg   Take 20 mg by mouth daily (with dinner)   Refills:  0       STATIN NOT PRESCRIBED  (INTENTIONAL)   Used for:  Chronic atrial fibrillation (H)        Please choose reason not prescribed, below   Refills:  0       traZODone 50 MG tablet   Commonly known as:  DESYREL   Used for:  Persistent insomnia        Dose:  25 mg   Take 0.5 tablets (25 mg) by mouth At Bedtime   Quantity:  45 tablet   Refills:  3       TYLENOL 500 MG tablet   Generic drug:  acetaminophen        Dose:  500-1000 mg   Take 500-1,000 mg by mouth every 6 hours as needed for mild pain   Refills:  0            Where to get your medicines      Some of these will need a paper prescription and others can be bought over the counter. Ask your nurse if you have questions.     Bring a paper prescription for each of these medications     HYDROcodone-acetaminophen 5-325 MG per tablet                Protect others around you: Learn how to safely use, store and throw away your medicines at www.disposemymeds.org.        Information about OPIOIDS     PRESCRIPTION OPIOIDS: WHAT YOU NEED TO KNOW   You have a prescription for an opioid (narcotic) pain medicine. Opioids can cause addiction. If you have a history of chemical dependency of any type, you are at a higher risk of becoming addicted to opioids. Only take this medicine after all other options have been tried. Take it for as short a time and as few doses as possible.     Do not:    Drive. If you drive while taking these medicines, you could be arrested for driving under the influence (DUI).    Operate heavy machinery    Do any other dangerous activities while taking these medicines.     Drink any alcohol while taking these medicines.      Take with any other medicines that contain acetaminophen. Read all labels carefully. Look for the word  acetaminophen  or  Tylenol.  Ask your pharmacist if you have questions or are unsure.    Store your pills in a secure place, locked if possible. We will not replace any lost or stolen medicine. If you don t finish your medicine, please throw away (dispose)  as directed by your pharmacist. The [place] Pollution [employer] Agency has more information about safe disposal: https://www.pca.[facility].[place]/living-green/managing-unwanted-medications    All opioids tend to cause constipation. Drink plenty of water and eat foods that have a lot of fiber, such as fruits, vegetables, prune juice, apple juice and high-fiber cereal. Take a laxative (Miralax, milk of magnesia, Colace, Senna) if you don t move your bowels at least every other day.              Medication List: This is a list of all your medications and when to take them. Check marks below indicate your daily home schedule. Keep this list as a reference.      Medications           Morning Afternoon Evening Bedtime As Needed    buPROPion 150 MG 12 hr tablet   Commonly known as:  WELLBUTRIN SR   Take 1 tablet (150 mg) by mouth 2 times daily                                ciprofloxacin 500 MG tablet   Commonly known as:  CIPRO   Take 1 tablet (500 mg) by mouth 2 times daily                                doxazosin 4 MG tablet   Commonly known as:  CARDURA   Take 1 tablet (4 mg) by mouth daily                                escitalopram 10 MG tablet   Commonly known as:  LEXAPRO   Take 1 tablet (10 mg) by mouth daily                                finasteride 5 MG tablet   Commonly known as:  PROSCAR   Take 1 tablet (5 mg) by mouth daily                                HYDROcodone-acetaminophen 5-325 MG per tablet   Commonly known as:  NORCO   Take 1-2 tablets by mouth every 4 hours as needed for pain maximum 10 tablet(s) per day                                naproxen 500 MG tablet   Commonly known as:  NAPROSYN   Take 1 tablet (500 mg) by mouth 2 times daily (with meals)                                NORTRIPTYLINE HCL PO                                nystatin 377342 UNIT/ML suspension   Commonly known as:  MYCOSTATIN   Take 5 mLs (500,000 Units) by mouth 4 times daily                                order for DME   Equipment  being ordered: inguinal hernia belt                                rivaroxaban ANTICOAGULANT 20 MG Tabs tablet   Commonly known as:  XARELTO   Take 20 mg by mouth daily (with dinner)                                STATIN NOT PRESCRIBED (INTENTIONAL)   Please choose reason not prescribed, below                                traZODone 50 MG tablet   Commonly known as:  DESYREL   Take 0.5 tablets (25 mg) by mouth At Bedtime                                TYLENOL 500 MG tablet   Take 500-1,000 mg by mouth every 6 hours as needed for mild pain   Generic drug:  acetaminophen

## 2018-04-24 NOTE — ANESTHESIA POSTPROCEDURE EVALUATION
Patient: Josemanuel Edmond    Procedure(s):  Laparoscopic bilateral inguinal hernia repair - Wound Class: I-Clean    Diagnosis:Bilateral inguinal hernia  Diagnosis Additional Information: No value filed.    Anesthesia Type:  General, ETT    Note:  Anesthesia Post Evaluation    Patient location during evaluation: Phase 2 and Bedside  Patient participation: Able to fully participate in evaluation  Level of consciousness: awake and alert  Pain management: adequate  Airway patency: patent  Cardiovascular status: acceptable  Respiratory status: acceptable  Hydration status: acceptable  PONV: none     Anesthetic complications: None          Last vitals:  Vitals:    04/24/18 1000 04/24/18 1015 04/24/18 1030   BP: 121/73 119/78 113/65   Resp: 14 12 22   Temp:   36.4  C (97.6  F)   SpO2: 99% 98% 99%         Electronically Signed By: Dirk Olivas CRNA, APRN CRNA  April 24, 2018  10:43 AM

## 2018-05-07 ENCOUNTER — OFFICE VISIT (OUTPATIENT)
Dept: FAMILY MEDICINE | Facility: CLINIC | Age: 73
End: 2018-05-07
Payer: COMMERCIAL

## 2018-05-07 VITALS
HEART RATE: 73 BPM | TEMPERATURE: 97.7 F | SYSTOLIC BLOOD PRESSURE: 124 MMHG | BODY MASS INDEX: 25.48 KG/M2 | DIASTOLIC BLOOD PRESSURE: 67 MMHG | RESPIRATION RATE: 18 BRPM | HEIGHT: 70 IN | WEIGHT: 178 LBS

## 2018-05-07 DIAGNOSIS — G47.00 INSOMNIA, UNSPECIFIED TYPE: Primary | ICD-10-CM

## 2018-05-07 PROCEDURE — 84443 ASSAY THYROID STIM HORMONE: CPT | Performed by: FAMILY MEDICINE

## 2018-05-07 PROCEDURE — 36415 COLL VENOUS BLD VENIPUNCTURE: CPT | Performed by: FAMILY MEDICINE

## 2018-05-07 PROCEDURE — 99213 OFFICE O/P EST LOW 20 MIN: CPT | Performed by: FAMILY MEDICINE

## 2018-05-07 PROCEDURE — 80048 BASIC METABOLIC PNL TOTAL CA: CPT | Performed by: FAMILY MEDICINE

## 2018-05-07 ASSESSMENT — ANXIETY QUESTIONNAIRES
1. FEELING NERVOUS, ANXIOUS, OR ON EDGE: SEVERAL DAYS
3. WORRYING TOO MUCH ABOUT DIFFERENT THINGS: SEVERAL DAYS
6. BECOMING EASILY ANNOYED OR IRRITABLE: NOT AT ALL
2. NOT BEING ABLE TO STOP OR CONTROL WORRYING: SEVERAL DAYS
5. BEING SO RESTLESS THAT IT IS HARD TO SIT STILL: NOT AT ALL
7. FEELING AFRAID AS IF SOMETHING AWFUL MIGHT HAPPEN: NOT AT ALL
IF YOU CHECKED OFF ANY PROBLEMS ON THIS QUESTIONNAIRE, HOW DIFFICULT HAVE THESE PROBLEMS MADE IT FOR YOU TO DO YOUR WORK, TAKE CARE OF THINGS AT HOME, OR GET ALONG WITH OTHER PEOPLE: SOMEWHAT DIFFICULT
GAD7 TOTAL SCORE: 4

## 2018-05-07 ASSESSMENT — PATIENT HEALTH QUESTIONNAIRE - PHQ9: 5. POOR APPETITE OR OVEREATING: SEVERAL DAYS

## 2018-05-07 NOTE — PROGRESS NOTES
"  SUBJECTIVE:   Josemanuel Edmond is a 73 year old male who presents to clinic today for the following health issues:      Chief Complaint   Patient presents with     Fatigue     patient is feeling tired, not sleeping well, sweating and anxiety x 2 weeks or more .             Problem list and histories reviewed & adjusted, as indicated.  Additional history:         Reviewed and updated as needed this visit by clinical staff  Tobacco  Allergies  Meds       Reviewed and updated as needed this visit by Provider      Further history obtained, clarified or corrected by physician:  He had a distant past history of insomnia apparently related to some anxiety or depression and was put on Wellbutrin and at some point was given zolpidem for about a year and believes that helped things.  Now about a year ago he was taken off zolpidem and he has had trouble sleeping since and in the last month is getting quite a bit worse.  He also is having a little bit of escalation of anxiety but no depressive symptoms and he complains of sweating more than previously.  He says he cannot fall asleep until 3 AM after tossing and turning 4 hours.  Once he falls asleep he stays asleep.    OBJECTIVE:  /67  Pulse 73  Temp 97.7  F (36.5  C)  Resp 18  Ht 5' 10\" (1.778 m)  Wt 178 lb (80.7 kg)  BMI 25.54 kg/m2  LUNGS: clear to auscultation, normal breath sounds  CV: RRR without murmur  ABD: BS+, soft, nontender, no masses, no hepatosplenomegaly  EXTREMITIES: without joint tenderness, swelling or erythema.  No muscle tenderness or abnormality.  SKIN: No rashes or abnormalities  NEURO:non focal exam    ASSESSMENT:  Insomnia, unspecified type    PLAN:  Orders Placed This Encounter     TSH with free T4 reflex     Basic metabolic panel     SLEEP EVALUATION & MANAGEMENT REFERRAL - [facility]  [phone] (Age 2 and up)       "

## 2018-05-07 NOTE — MR AVS SNAPSHOT
After Visit Summary   5/7/2018    Josemanuel Edmond    MRN: [id number]           Patient Information     Date Of Birth          1945        Visit Information        Provider Department      5/7/2018 1:00 PM Dominguez Verdugo MD [facility]        Today's Diagnoses     Insomnia, unspecified type    -  1      Care Instructions          Thank you for choosing [facility].  You may be receiving a survey in the mail from [employer] regarding your visit today.  Please take a few minutes to complete and return the survey to let us know how we are doing.      Our Clinic hours are:  Mondays    7:20 am - 7 pm  Tues -  Fri  7:20 am - 5 pm    Clinic Phone: [phone]    The clinic lab opens at 7:30 am Mon - Fri and appointments are required.    [facility]  Ph. [phone]  Monday-Thursday 8 am - 7pm  Tues/Wed/Fri 8 am - 5:30 pm                 Follow-ups after your visit        Additional Services     SLEEP EVALUATION & MANAGEMENT REFERRAL - [facility]  [phone] (Age 2 and up)       Please be aware that coverage of these services is subject to the terms and limitations of your health insurance plan.  Call member services at your health plan with any benefit or coverage questions.      Please bring the following to your appointment:    >>   List of current medications   >>   This referral request   >>   Any documents/labs given to you for this referral                      Future tests that were ordered for you today     Open Future Orders        Priority Expected Expires Ordered    SLEEP EVALUATION & MANAGEMENT REFERRAL - [facility]  [phone] (Age 2 and up) Routine  5/7/2019 5/7/2018            Who to contact     If you have questions or need follow up information about today's clinic visit or your schedule please contact [facility] directly at [phone].  Normal or non-critical  "lab and imaging results will be communicated to you by MyChart, letter or phone within 4 business days after the clinic has received the results. If you do not hear from us within 7 days, please contact the clinic through [employer] or phone. If you have a critical or abnormal lab result, we will notify you by phone as soon as possible.  Submit refill requests through [employer] or call your pharmacy and they will forward the refill request to us. Please allow 3 business days for your refill to be completed.          Additional Information About Your Visit        GiveNext Information     [employer] gives you secure access to your electronic health record. If you see a primary care provider, you can also send messages to your care team and make appointments. If you have questions, please call your primary care clinic.  If you do not have a primary care provider, please call [phone] and they will assist you.        Care EveryWhere ID     This is your Care EveryWhere ID. This could be used by other organizations to access your [facility] medical records  [id number]        Your Vitals Were     Pulse Temperature Respirations Height BMI (Body Mass Index)       73 97.7  F (36.5  C) 18 5' 10\" (1.778 m) 25.54 kg/m2        Blood Pressure from Last 3 Encounters:   05/07/18 124/67   04/24/18 125/67   04/11/18 145/81    Weight from Last 3 Encounters:   05/07/18 178 lb (80.7 kg)   04/24/18 176 lb (79.8 kg)   04/11/18 176 lb (79.8 kg)              We Performed the Following     Basic metabolic panel     TSH with free T4 reflex          Today's Medication Changes          These changes are accurate as of 5/7/18  1:22 PM.  If you have any questions, ask your nurse or doctor.               Stop taking these medicines if you haven't already. Please contact your care team if you have questions.     ciprofloxacin 500 MG tablet   Commonly known as:  CIPRO   Stopped by:  Dominguez Verdugo MD           naproxen 500 MG tablet   Commonly known as:  " NAPROSYN   Stopped by:  Dominguez Verdugo MD           order for DME   Stopped by:  Dominguez Verdugo MD                    Primary Care Provider Office Phone # Fax #    R Terry Martínez [phone] [address] [facility] [address]        Equal Access to Services     [facility] : Hadii aad ku hadasho Soomaali, waaxda luqadaha, qaybta kaalmada adeegyada, waxay idiin hayaan adeeg melisa la'aan . So [facility] [place] [facility] [phone].    ATENCIÓN: Si habla español, tiene a sen disposición servicios gratuitos de asistencia lingüística. Llame al [phone].    We comply with applicable federal civil rights laws and [place] laws. We do not discriminate on the basis of race, color, national origin, age, disability, sex, sexual orientation, or gender identity.            Thank you!     Thank you for choosing [facility]  for your care. Our goal is always to provide you with excellent care. Hearing back from our patients is one way we can continue to improve our services. Please take a few minutes to complete the written survey that you may receive in the mail after your visit with us. Thank you!             Your Updated Medication List - Protect others around you: Learn how to safely use, store and throw away your medicines at www.disposemymeds.org.          This list is accurate as of 5/7/18  1:22 PM.  Always use your most recent med list.                   Brand Name Dispense Instructions for use Diagnosis    buPROPion 150 MG 12 hr tablet    WELLBUTRIN SR    60 tablet    Take 1 tablet (150 mg) by mouth 2 times daily    Moderate major depression (H), Anxiety       doxazosin 4 MG tablet    CARDURA    90 tablet    Take 1 tablet (4 mg) by mouth daily    Hypertrophy of prostate with urinary obstruction       escitalopram 10 MG tablet    LEXAPRO    90 tablet    Take 1 tablet (10 mg) by mouth daily    Anxiety       finasteride 5 MG tablet    PROSCAR    90 tablet    Take 1 tablet (5 mg) by mouth  daily    Benign prostatic hyperplasia with weak urinary stream       HYDROcodone-acetaminophen 5-325 MG per tablet    NORCO    45 tablet    Take 1-2 tablets by mouth every 4 hours as needed for pain maximum 10 tablet(s) per day    Post-op pain       NORTRIPTYLINE HCL PO           nystatin 629455 UNIT/ML suspension    MYCOSTATIN    280 mL    Take 5 mLs (500,000 Units) by mouth 4 times daily    Oral thrush       rivaroxaban ANTICOAGULANT 20 MG Tabs tablet    XARELTO     Take 20 mg by mouth daily (with dinner)        STATIN NOT PRESCRIBED (INTENTIONAL)      Please choose reason not prescribed, below    Chronic atrial fibrillation (H)       traZODone 50 MG tablet    DESYREL    45 tablet    Take 0.5 tablets (25 mg) by mouth At Bedtime    Persistent insomnia       TYLENOL 500 MG tablet   Generic drug:  acetaminophen      Take 500-1,000 mg by mouth every 6 hours as needed for mild pain

## 2018-05-07 NOTE — PATIENT INSTRUCTIONS
Thank you for choosing [facility].  You may be receiving a survey in the mail from [facility] regarding your visit today.  Please take a few minutes to complete and return the survey to let us know how we are doing.      Our Clinic hours are:  Mondays    7:20 am - 7 pm  Tues -  Fri  7:20 am - 5 pm    Clinic Phone: [phone]    The clinic lab opens at 7:30 am Mon - Fri and appointments are required.    [facility]. [phone]  Monday-Thursday 8 am - 7pm  Tues/Wed/Fri 8 am - 5:30 pm

## 2018-05-07 NOTE — LETTER
May 8, 2018      Josemanuel Edmond  [address] [address] [facility] [address] [facility] [address]        Dear ,    We are writing to inform you of your test results.    Your test results are acceptable.    Results for orders placed or performed in visit on 05/07/18   TSH with free T4 reflex   Result Value Ref Range    TSH 1.86 0.40 - 4.00 mU/L   Basic metabolic panel   Result Value Ref Range    Sodium 135 133 - 144 mmol/L    Potassium 4.4 3.4 - 5.3 mmol/L    Chloride 101 94 - 109 mmol/L    Carbon Dioxide 29 20 - 32 mmol/L    Anion Gap 5 3 - 14 mmol/L    Glucose 99 70 - 99 mg/dL    Urea Nitrogen 21 7 - 30 mg/dL    Creatinine 1.07 0.66 - 1.25 mg/dL    GFR Estimate 67 >60 mL/min/1.7m2    GFR Estimate If Black 82 >60 mL/min/1.7m2    Calcium 8.7 8.5 - 10.1 mg/dL     If you have any questions or concerns, please call the clinic at the number listed above.       Sincerely,      Dominguez Verdugo MD

## 2018-05-08 DIAGNOSIS — F51.01 PRIMARY INSOMNIA: Primary | ICD-10-CM

## 2018-05-08 LAB
ANION GAP SERPL CALCULATED.3IONS-SCNC: 5 MMOL/L (ref 3–14)
BUN SERPL-MCNC: 21 MG/DL (ref 7–30)
CALCIUM SERPL-MCNC: 8.7 MG/DL (ref 8.5–10.1)
CHLORIDE SERPL-SCNC: 101 MMOL/L (ref 94–109)
CO2 SERPL-SCNC: 29 MMOL/L (ref 20–32)
CREAT SERPL-MCNC: 1.07 MG/DL (ref 0.66–1.25)
GFR SERPL CREATININE-BSD FRML MDRD: 67 ML/MIN/1.7M2
GLUCOSE SERPL-MCNC: 99 MG/DL (ref 70–99)
POTASSIUM SERPL-SCNC: 4.4 MMOL/L (ref 3.4–5.3)
SODIUM SERPL-SCNC: 135 MMOL/L (ref 133–144)
TSH SERPL DL<=0.005 MIU/L-ACNC: 1.86 MU/L (ref 0.4–4)

## 2018-05-08 RX ORDER — NORTRIPTYLINE HCL 10 MG
CAPSULE ORAL
Qty: 60 CAPSULE | Refills: 5 | Status: SHIPPED | OUTPATIENT
Start: 2018-05-08 | End: 2018-05-25

## 2018-05-08 ASSESSMENT — ANXIETY QUESTIONNAIRES: GAD7 TOTAL SCORE: 4

## 2018-05-08 NOTE — TELEPHONE ENCOUNTER
Nortriptyline refill.  Last noted as historical on 4/19/18.  Pt states the Trazodone didn't work so he is no longer taking it and went back to the Nortriptyline.  Takes 2 tabs @ .  Advise.  Varghese

## 2018-05-08 NOTE — TELEPHONE ENCOUNTER
Nortriptyline 10 mg      Last Written Prescription Date:  historical  Last Fill Quantity: 0,   # refills: 0  Last Office Visit: 05/07/2018  jaclyn  Future Office visit:       Routing refill request to provider for review/approval because:  Medication is reported/historical      Isrrael CUELLO (R)

## 2018-05-16 ENCOUNTER — TRANSFERRED RECORDS (OUTPATIENT)
Dept: HEALTH INFORMATION MANAGEMENT | Facility: CLINIC | Age: 73
End: 2018-05-16

## 2018-05-22 ENCOUNTER — TRANSFERRED RECORDS (OUTPATIENT)
Dept: HEALTH INFORMATION MANAGEMENT | Facility: CLINIC | Age: 73
End: 2018-05-22

## 2018-05-25 ENCOUNTER — MYC MEDICAL ADVICE (OUTPATIENT)
Dept: FAMILY MEDICINE | Facility: CLINIC | Age: 73
End: 2018-05-25

## 2018-05-25 DIAGNOSIS — F51.01 PRIMARY INSOMNIA: Primary | ICD-10-CM

## 2018-05-25 RX ORDER — ZOLPIDEM TARTRATE 10 MG/1
10 TABLET ORAL
Qty: 30 TABLET | Refills: 5 | Status: SHIPPED | OUTPATIENT
Start: 2018-05-25 | End: 2018-09-14

## 2018-05-25 NOTE — TELEPHONE ENCOUNTER
Dr. Martínez,    Patient sent a my chart stating he is suffering from insomnia.  Looks like he saw Dr. Verdugo a few weeks ago and had some labs and given a sleep study referral.  I do not see any appt for sleep study yet.  Patient taking trazodone and nortriptyline are not helping.  Funny trazodone is not on his medication list.  Any further suggestions?. Alexandra QUESADA RN

## 2018-05-25 NOTE — TELEPHONE ENCOUNTER
Rx signed and faxed to Jeremy Martin in [place]. Patient notified.  Jennifer Stallworth  [facility] [place] [facility]

## 2018-06-18 ENCOUNTER — TRANSFERRED RECORDS (OUTPATIENT)
Dept: HEALTH INFORMATION MANAGEMENT | Facility: CLINIC | Age: 73
End: 2018-06-18

## 2018-06-21 DIAGNOSIS — N40.1 HYPERTROPHY OF PROSTATE WITH URINARY OBSTRUCTION: ICD-10-CM

## 2018-06-21 DIAGNOSIS — N13.8 HYPERTROPHY OF PROSTATE WITH URINARY OBSTRUCTION: ICD-10-CM

## 2018-06-21 NOTE — TELEPHONE ENCOUNTER
"Requested Prescriptions   Pending Prescriptions Disp Refills     doxazosin (CARDURA) 4 MG tablet  Last Written Prescription Date:  05/18/2017  Last Fill Quantity: 90,  # refills: 3   Last office visit: 3/30/2018 with prescribing provider:  Post   Future Office Visit:     90 tablet 3     Sig: Take 1 tablet (4 mg) by mouth daily    Alpha Blockers Passed    6/21/2018  3:04 PM       Passed - Blood pressure under 140/90 in past 12 months    BP Readings from Last 3 Encounters:   05/07/18 124/67   04/24/18 125/67   04/11/18 145/81                Passed - Recent (12 mo) or future (30 days) visit within the authorizing provider's specialty    Patient had office visit in the last 12 months or has a visit in the next 30 days with authorizing provider or within the authorizing provider's specialty.  See \"Patient Info\" tab in inbasket, or \"Choose Columns\" in Meds & Orders section of the refill encounter.           Passed - Patient does not have Tadalafil, Vardenafil, or Sildenafil on their medication list       Passed - Patient is 18 years of age or older          "

## 2018-06-22 RX ORDER — DOXAZOSIN 4 MG/1
4 TABLET ORAL DAILY
Qty: 90 TABLET | Refills: 3 | Status: SHIPPED | OUTPATIENT
Start: 2018-06-22 | End: 2019-02-07

## 2018-06-22 NOTE — TELEPHONE ENCOUNTER
Prescription approved per [facility] Refill Protocol.  Cass MORTON RN

## 2018-08-14 ENCOUNTER — MYC MEDICAL ADVICE (OUTPATIENT)
Dept: FAMILY MEDICINE | Facility: CLINIC | Age: 73
End: 2018-08-14

## 2018-08-14 DIAGNOSIS — I48.20 CHRONIC ATRIAL FIBRILLATION (H): Primary | ICD-10-CM

## 2018-08-15 NOTE — TELEPHONE ENCOUNTER
Routing refill request to provider for review/approval because:  Labs out of range:  Hgb    Cass MORTON RN

## 2018-08-15 NOTE — TELEPHONE ENCOUNTER
"Asking for Dr Martínez to consider \"refills\" of his xarelto. Waiting for detailed response   Lyubov Christy RNC    "

## 2018-08-15 NOTE — TELEPHONE ENCOUNTER
"Requested Prescriptions   Pending Prescriptions Disp Refills     rivaroxaban ANTICOAGULANT (XARELTO) 20 MG TABS tablet  Last Written Prescription Date:  Historical  Last Fill Quantity: 0,  # refills: 0   Last office visit: 5/7/2018 with prescribing provider:  Lucina   Future Office Visit:           Sig: Take 1 tablet (20 mg) by mouth daily (with dinner)    Platelet Inhibitors Failed    8/15/2018  2:10 PM       Failed - Normal HGB on file in past 12 months    Recent Labs   Lab Test  01/22/18   1354   HGB  12.9*              Passed - Normal ALT on file in past 12 months    Recent Labs   Lab Test  01/22/18   1354   ALT  32            Passed - Normal AST on file in past 12 months    Recent Labs   Lab Test  01/22/18   1354   AST  15            Passed - Normal Platelets on file in past 12 months    Recent Labs   Lab Test  01/22/18   1354   PLT  201              Passed - Recent (12 mo) or future (30 days) visit within the authorizing provider's specialty    Patient had office visit in the last 12 months or has a visit in the next 30 days with authorizing provider or within the authorizing provider's specialty.  See \"Patient Info\" tab in inbasket, or \"Choose Columns\" in Meds & Orders section of the refill encounter.           Passed - Patient is age 18 or older       Passed - Normal serum creatinine on file in past 12 months    Recent Labs   Lab Test  05/07/18   1325   CR  1.07               "

## 2018-09-08 ENCOUNTER — NURSE TRIAGE (OUTPATIENT)
Dept: NURSING | Facility: CLINIC | Age: 73
End: 2018-09-08

## 2018-09-08 NOTE — TELEPHONE ENCOUNTER
"  Reason for Disposition    Entire arm is swollen    Additional Information    Negative: Shock suspected (e.g., cold/pale/clammy skin, too weak to stand, low BP, rapid pulse)    Negative: [1] Similar pain previously AND [2] it was from \"heart attack\"    Negative: [1] Similar pain previously AND [2] it was from \"angina\" AND [3] not relieved by nitroglycerin    Negative: Sounds like a life-threatening emergency to the triager    Negative: Difficulty breathing or unusual sweating (e.g., sweating without exertion)    Negative: [1] Age > 40 AND [2] associated chest or jaw pain AND [3] pain lasts > 5 minutes    Negative: [1] Age > 40 AND [2] no obvious cause AND [3] pain even when not moving the arm    (Exception: pain is clearly made worse by moving arm or bending neck)    Negative: [1] SEVERE pain AND [2] not improved 2 hours after pain medicine    Negative: [1] Red area or streak AND [2] fever    Negative: [1] Swollen joint AND [2] fever    Negative: Patient sounds very sick or weak to the triager    Negative: [1] Red area or streak AND [2] large (> 2 in. or 5 cm)    Protocols used: ARM PAIN-ADULT-    "

## 2018-09-08 NOTE — TELEPHONE ENCOUNTER
Josemanuel has a bruise on right arm by shoulder and is swollen and hard and went away and now is back and is deep purple.    Arm is swollen and painful.

## 2018-09-10 ENCOUNTER — OFFICE VISIT (OUTPATIENT)
Dept: FAMILY MEDICINE | Facility: CLINIC | Age: 73
End: 2018-09-10
Payer: COMMERCIAL

## 2018-09-10 ENCOUNTER — TELEPHONE (OUTPATIENT)
Dept: FAMILY MEDICINE | Facility: CLINIC | Age: 73
End: 2018-09-10

## 2018-09-10 VITALS
TEMPERATURE: 97.3 F | SYSTOLIC BLOOD PRESSURE: 107 MMHG | OXYGEN SATURATION: 97 % | BODY MASS INDEX: 24.77 KG/M2 | WEIGHT: 173 LBS | RESPIRATION RATE: 18 BRPM | DIASTOLIC BLOOD PRESSURE: 71 MMHG | HEART RATE: 77 BPM | HEIGHT: 70 IN

## 2018-09-10 DIAGNOSIS — S40.021A SUPERFICIAL BRUISING OF ARM, RIGHT, INITIAL ENCOUNTER: Primary | ICD-10-CM

## 2018-09-10 PROCEDURE — 99213 OFFICE O/P EST LOW 20 MIN: CPT | Performed by: NURSE PRACTITIONER

## 2018-09-10 RX ORDER — GABAPENTIN 300 MG/1
CAPSULE ORAL
Refills: 11 | COMMUNITY
Start: 2018-07-18 | End: 2018-09-14

## 2018-09-10 ASSESSMENT — PAIN SCALES - GENERAL: PAINLEVEL: MILD PAIN (2)

## 2018-09-10 NOTE — PROGRESS NOTES
"  SUBJECTIVE:   Josemanuel Edmond is a 73 year old male who presents to clinic today for the following health issues:      Chief Complaint   Patient presents with     Bleeding/Bruising     patient  has been moving and may have bumped his right arm on something  his right arm is bruised x 3 days              Problem list and histories reviewed & adjusted, as indicated.  Additional history: states he's on Xarelto for atrial fibrillation, he has been moving and did bump his right arm on a box. He's developed \"quite a bruise\" so wanted it looked at.  Denies any pain now with bruise, it hasn't worsened since initial injury 3 days ago. He has full strength of right arm.  He denies numbness. He denies any other evidence of bleeding, nose, gums, etc      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)     Chronic atrial fibrillation (H)     Hyperplastic Polyp     Past Surgical History:   Procedure Laterality Date     COLONOSCOPY N/A 12/10/2015    Procedure: COMBINED COLONOSCOPY, SINGLE OR MULTIPLE BIOPSY/POLYPECTOMY BY BIOPSY;  Surgeon: Jyoti Figueroa MD;  Location: [facility]     JOINT REPLACEMENT, HIP RT/LT  10/07    Joint Replacement Hip LT     JOINT REPLACEMTN, KNEE RT/LT  8/2011    Joint Replacement knee /LT, [facility]     LAPAROSCOPIC HERNIORRHAPHY INGUINAL BILATERAL Bilateral 4/24/2018    Procedure: LAPAROSCOPIC HERNIORRHAPHY INGUINAL BILATERAL;  Laparoscopic bilateral inguinal hernia repair;  Surgeon: " Josemanuel Resendiz MD;  Location: [facility]     SURGICAL HISTORY OF -   12/1/1999    Umbilical Herniorrhaphy with mesh     SURGICAL HISTORY OF -  Left 11/2017    Thoracotomy and drainage of empyema       Social History   Substance Use Topics     Smoking status: Former Smoker     Packs/day: 1.00     Years: 15.00     Types: Cigarettes     Quit date: 10/13/1975     Smokeless tobacco: Never Used     Alcohol use No      Comment: quit 11/29/2017     Family History   Problem Relation Age of Onset     Breast Cancer Mother      Neurologic Disorder Mother      parkinsons     Respiratory Father      emphyzema     Diabetes Brother          Current Outpatient Prescriptions   Medication Sig Dispense Refill     acetaminophen (TYLENOL) 500 MG tablet Take 500-1,000 mg by mouth every 6 hours as needed for mild pain       buPROPion (WELLBUTRIN SR) 150 MG 12 hr tablet Take 1 tablet (150 mg) by mouth 2 times daily 60 tablet 5     doxazosin (CARDURA) 4 MG tablet Take 1 tablet (4 mg) by mouth daily 90 tablet 3     escitalopram (LEXAPRO) 10 MG tablet Take 1 tablet (10 mg) by mouth daily 90 tablet 1     finasteride (PROSCAR) 5 MG tablet Take 1 tablet (5 mg) by mouth daily 90 tablet 1     rivaroxaban ANTICOAGULANT (XARELTO) 20 MG TABS tablet Take 1 tablet (20 mg) by mouth daily (with dinner) 90 tablet 3     zolpidem (AMBIEN) 10 MG tablet Take 1 tablet (10 mg) by mouth nightly as needed for sleep 30 tablet 5     gabapentin (NEURONTIN) 300 MG capsule TAKE 1 CAPSULE BY MOUTH EVERY MORNING 1 AT MIDDAY AND 2 CAPS AT BEDTIME  11     HYDROcodone-acetaminophen (NORCO) 5-325 MG per tablet Take 1-2 tablets by mouth every 4 hours as needed for pain maximum 10 tablet(s) per day (Patient not taking: Reported on 5/7/2018) 45 tablet 0     nystatin (MYCOSTATIN) 358149 UNIT/ML suspension Take 5 mLs (500,000 Units) by mouth 4 times daily (Patient not taking: Reported on 5/7/2018) 280 mL 1     STATIN NOT PRESCRIBED, INTENTIONAL, Please choose reason not prescribed,  "below       Allergies   Allergen Reactions     Bactrim [Sulfamethoxazole W/Trimethoprim] Nausea and Vomiting     BP Readings from Last 3 Encounters:   09/10/18 107/71   05/07/18 124/67   04/24/18 125/67    Wt Readings from Last 3 Encounters:   09/10/18 173 lb (78.5 kg)   05/07/18 178 lb (80.7 kg)   04/24/18 176 lb (79.8 kg)                    Reviewed and updated as needed this visit by clinical staff  Tobacco  Allergies  Meds       Reviewed and updated as needed this visit by Provider          ROS: 10 point ROS neg other than the symptoms noted above in the HPI.    OBJECTIVE:     /71  Pulse 77  Temp 97.3  F (36.3  C)  Resp 18  Ht 5' 10\" (1.778 m)  Wt 173 lb (78.5 kg)  SpO2 97%  BMI 24.82 kg/m2  Body mass index is 24.82 kg/(m^2).  GENERAL: healthy, alert and no distress  NECK: no adenopathy, no asymmetry  RESP: lungs clear to auscultation - no rales, rhonchi or wheezes  CV: regular rate and rhythm, normal S1 S2, no S3 or S4, no murmur  MS: upper right arm with bruise from mid biceps to elbow, no area of firmness or specific swelling, he has FROM of right shoulder and elbow and good biceps strength without limitation, good distal CMS, no pain with palpation over entire right upper arm      Diagnostic Test Results:  No results found for this or any previous visit (from the past 24 hour(s)).    ASSESSMENT/PLAN:             1. Superficial bruising of arm, right, initial encounter    I would anticipate this will resolve without issue. He will continue to monitor and return if any further concerns.      See Patient Instructions  Patient Instructions   Continue to monitor and avoid further injury to arm.  This should resolve in time.  Follow up if symptoms persist or worsen and as needed.        Thank you for choosing [facility].  You may be receiving a survey in the mail from [employer] regarding your visit today.  Please take a few minutes to complete and return the survey to let us know how we are " doing.      Our Clinic hours are:  Mondays    7:20 am - 7 pm  Tues -  Fri  7:20 am - 5 pm    Clinic Phone: [phone]    The clinic lab opens at 7:30 am Mon - Fri and appointments are required.    [facility]  Ph. [phone]  Monday  8 am - 7pm  Tues - Fri 8 am - 5:30 pm             KAYLA Cam [facility]

## 2018-09-10 NOTE — TELEPHONE ENCOUNTER
S-(situation): bruising     B-(background): started 3 days ago and continues to grow    A-(assessment): been moving stuff the last few days off and on- he is moving currently. Showed up 3 days ago and he is bruised from shoulder to wrist. Hurts to move arm or touch. Swelling has decreased. He is on blood thinners. No fever. No dizziness. No shortness of breath. Not more tired than normal. Is icing the area but bruise continues to grow.     R-(recommendations): advised he needs clinic visit. appt scheduled.     Latha Barrett, MARCELN, RN

## 2018-09-10 NOTE — PATIENT INSTRUCTIONS
Continue to monitor and avoid further injury to arm.  This should resolve in time.  Follow up if symptoms persist or worsen and as needed.        Thank you for choosing [facility].  You may be receiving a survey in the mail from Jennifer Minaya regarding your visit today.  Please take a few minutes to complete and return the survey to let us know how we are doing.      Our Clinic hours are:  Mondays    7:20 am - 7 pm  Tues -  Fri  7:20 am - 5 pm    Clinic Phone: [phone]    The clinic lab opens at 7:30 am Mon - Fri and appointments are required.    [facility]. [phone]  Monday  8 am - 7pm  Tues - Fri 8 am - 5:30 pm

## 2018-09-10 NOTE — LETTER
My Depression Action Plan  Name: Josemanuel Edmond   Date of Birth 1945  Date: 9/10/2018    My doctor: AVIS Martínez   My clinic: [facility]  [address]  [facility] [id number] [phone]          GREEN    ZONE   Good Control    What it looks like:     Things are going generally well. You have normal up s and down s. You may even feel depressed from time to time, but bad moods usually last less than a day.   What you need to do:  1. Continue to care for yourself (see self care plan)  2. Check your depression survival kit and update it as needed  3. Follow your physician s recommendations including any medication.  4. Do not stop taking medication unless you consult with your physician first.           YELLOW         ZONE Getting Worse    What it looks like:     Depression is starting to interfere with your life.     It may be hard to get out of bed; you may be starting to isolate yourself from others.    Symptoms of depression are starting to last most all day and this has happened for several days.     You may have suicidal thoughts but they are not constant.   What you need to do:     1. Call your care team, your response to treatment will improve if you keep your care team informed of your progress. Yellow periods are signs an adjustment may need to be made.     2. Continue your self-care, even if you have to fake it!    3. Talk to someone in your support network    4. Open up your depression survival kit           RED    ZONE Medical Alert - Get Help    What it looks like:     Depression is seriously interfering with your life.     You may experience these or other symptoms: You can t get out of bed most days, can t work or engage in other necessary activities, you have trouble taking care of basic hygiene, or basic responsibilities, thoughts of suicide or death that will not go away, self-injurious behavior.     What you need to do:  1. Call your care team and  request a same-day appointment. If they are not available (weekends or after hours) call your local crisis line, emergency room or 911.            Depression Self Care Plan / Survival Kit    Self-Care for Depression  Here s the deal. Your body and mind are really not as separate as most people think.  What you do and think affects how you feel and how you feel influences what you do and think. This means if you do things that people who feel good do, it will help you feel better.  Sometimes this is all it takes.  There is also a place for medication and therapy depending on how severe your depression is, so be sure to consult with your medical provider and/ or Behavioral Health Consultant if your symptoms are worsening or not improving.     In order to better manage my stress, I will:    Exercise  Get some form of exercise, every day. This will help reduce pain and release endorphins, the  feel good  chemicals in your brain. This is almost as good as taking antidepressants!  This is not the same as joining a gym and then never going! (they count on that by the way ) It can be as simple as just going for a walk or doing some gardening, anything that will get you moving.      Hygiene   Maintain good hygiene (Get out of bed in the morning, Make your bed, Brush your teeth, Take a shower, and Get dressed like you were going to work, even if you are unemployed).  If your clothes don't fit try to get ones that do.    Diet  I will strive to eat foods that are good for me, drink plenty of water, and avoid excessive sugar, caffeine, alcohol, and other mood-altering substances.  Some foods that are helpful in depression are: complex carbohydrates, B vitamins, flaxseed, fish or fish oil, fresh fruits and vegetables.    Psychotherapy  I agree to participate in Individual Therapy (if recommended).    Medication  If prescribed medications, I agree to take them.  Missing doses can result in serious side effects.  I understand that  drinking alcohol, or other illicit drug use, may cause potential side effects.  I will not stop my medication abruptly without first discussing it with my provider.    Staying Connected With Others  I will stay in touch with my friends, family members, and my primary care provider/team.    Use your imagination  Be creative.  We all have a creative side; it doesn t matter if it s oil painting, sand castles, or mud pies! This will also kick up the endorphins.    Witness Beauty  (AKA stop and smell the roses) Take a look outside, even in mid-winter. Notice colors, textures. Watch the squirrels and birds.     Service to others  Be of service to others.  There is always someone else in need.  By helping others we can  get out of ourselves  and remember the really important things.  This also provides opportunities for practicing all the other parts of the program.    Humor  Laugh and be silly!  Adjust your TV habits for less news and crime-drama and more comedy.    Control your stress  Try breathing deep, massage therapy, biofeedback, and meditation. Find time to relax each day.     My support system    Clinic Contact:  Phone number:    Contact 1:  Phone number:    Contact 2:  Phone number:    Sikh/:  Phone number:    Therapist:  Phone number:    Local crisis center:    Phone number:    Other community support:  Phone number:

## 2018-09-10 NOTE — MR AVS SNAPSHOT
After Visit Summary   9/10/2018    Josemanuel Edmond    MRN: [id number]           Patient Information     Date Of Birth          1945        Visit Information        Provider Department      9/10/2018 1:20 PM Nikki Brown APRN CNP [facility] [place]        Today's Diagnoses     Superficial bruising of arm, right, initial encounter    -  1      Care Instructions    Continue to monitor and avoid further injury to arm.  This should resolve in time.  Follow up if symptoms persist or worsen and as needed.        Thank you for choosing [facility].  You may be receiving a survey in the mail from [employer] regarding your visit today.  Please take a few minutes to complete and return the survey to let us know how we are doing.      Our Clinic hours are:  Mondays    7:20 am - 7 pm  Tues -  Fri  7:20 am - 5 pm    Clinic Phone: [phone]    The clinic lab opens at 7:30 am Mon - Fri and appointments are required.    [facility]  Ph. [phone]  Monday  8 am - 7pm  Tues - Fri 8 am - 5:30 pm                 Follow-ups after your visit        Your next 10 appointments already scheduled     Sep 28, 2018   Procedure with Jeb Peralta MD   [facility] (--)    [address] [address] [phone]           The medical center is located at [address] (between [address] and [address] in [place], [place] miles north of [place]).              Who to contact     If you have questions or need follow up information about today's clinic visit or your schedule please contact [facility] directly at [phone].  Normal or non-critical lab and imaging results will be communicated to you by MyChart, letter or phone within 4 business days after the clinic has received the results. If you do not hear from us within 7 days, please contact the clinic through MyChart or phone. If you have a critical or abnormal  "lab result, we will notify you by phone as soon as possible.  Submit refill requests through [employer] or call your pharmacy and they will forward the refill request to us. Please allow 3 business days for your refill to be completed.          Additional Information About Your Visit        [facility] Information     [employer] gives you secure access to your electronic health record. If you see a primary care provider, you can also send messages to your care team and make appointments. If you have questions, please call your primary care clinic.  If you do not have a primary care provider, please call [phone] and they will assist you.        Care EveryWhere ID     This is your Care EveryWhere ID. This could be used by other organizations to access your [place] medical records  [id number]        Your Vitals Were     Pulse Temperature Respirations Height Pulse Oximetry BMI (Body Mass Index)    77 97.3  F (36.3  C) 18 5' 10\" (1.778 m) 97% 24.82 kg/m2       Blood Pressure from Last 3 Encounters:   09/10/18 107/71   05/07/18 124/67   04/24/18 125/67    Weight from Last 3 Encounters:   09/10/18 173 lb (78.5 kg)   05/07/18 178 lb (80.7 kg)   04/24/18 176 lb (79.8 kg)              We Performed the Following     DEPRESSION ACTION PLAN (DAP)        Primary Care Provider Office Phone # Fax #    R Terry Martínez [phone] [address] [address]        Equal Access to Services     MERARI NASCIMENTO AH: Hadii toney ku hadasho Soomaali, waaxda luqadaha, qaybta kaalmada luisitoyaolya, bree medina . So [facility] [phone].    ATENCIÓN: Si habla español, tiene a sen disposición servicios gratuitos de asistencia lingüística. Llame al [phone].    We comply with applicable federal civil rights laws and [place] laws. We do not discriminate on the basis of race, color, national origin, age, disability, sex, sexual orientation, or gender identity.            Thank you!     Thank " you for choosing [facility]  for your care. Our goal is always to provide you with excellent care. Hearing back from our patients is one way we can continue to improve our services. Please take a few minutes to complete the written survey that you may receive in the mail after your visit with us. Thank you!             Your Updated Medication List - Protect others around you: Learn how to safely use, store and throw away your medicines at www.disposemymeds.org.          This list is accurate as of 9/10/18  1:35 PM.  Always use your most recent med list.                   Brand Name Dispense Instructions for use Diagnosis    buPROPion 150 MG 12 hr tablet    WELLBUTRIN SR    60 tablet    Take 1 tablet (150 mg) by mouth 2 times daily    Moderate major depression (H), Anxiety       doxazosin 4 MG tablet    CARDURA    90 tablet    Take 1 tablet (4 mg) by mouth daily    Hypertrophy of prostate with urinary obstruction       escitalopram 10 MG tablet    LEXAPRO    90 tablet    Take 1 tablet (10 mg) by mouth daily    Anxiety       finasteride 5 MG tablet    PROSCAR    90 tablet    Take 1 tablet (5 mg) by mouth daily    Benign prostatic hyperplasia with weak urinary stream       gabapentin 300 MG capsule    NEURONTIN     TAKE 1 CAPSULE BY MOUTH EVERY MORNING 1 AT MIDDAY AND 2 CAPS AT BEDTIME        HYDROcodone-acetaminophen 5-325 MG per tablet    NORCO    45 tablet    Take 1-2 tablets by mouth every 4 hours as needed for pain maximum 10 tablet(s) per day    Post-op pain       nystatin [id number] UNIT/ML suspension    MYCOSTATIN    280 mL    Take 5 mLs (500,000 Units) by mouth 4 times daily    Oral thrush       rivaroxaban ANTICOAGULANT 20 MG Tabs tablet    XARELTO    90 tablet    Take 1 tablet (20 mg) by mouth daily (with dinner)    Chronic atrial fibrillation (H)       STATIN NOT PRESCRIBED (INTENTIONAL)      Please choose reason not prescribed, below    Chronic atrial fibrillation (H)       TYLENOL 500 MG  tablet   Generic drug:  acetaminophen      Take 500-1,000 mg by mouth every 6 hours as needed for mild pain        zolpidem 10 MG tablet    AMBIEN    30 tablet    Take 1 tablet (10 mg) by mouth nightly as needed for sleep    Primary insomnia

## 2018-09-11 ENCOUNTER — TELEPHONE (OUTPATIENT)
Dept: FAMILY MEDICINE | Facility: CLINIC | Age: 73
End: 2018-09-11

## 2018-09-12 ENCOUNTER — MYC MEDICAL ADVICE (OUTPATIENT)
Dept: FAMILY MEDICINE | Facility: CLINIC | Age: 73
End: 2018-09-12

## 2018-09-12 NOTE — TELEPHONE ENCOUNTER
Routing refill request to provider for review/approval because:  Drug not on the [facility] refill protocol     Albania Albarado RN

## 2018-09-13 RX ORDER — GABAPENTIN 300 MG/1
CAPSULE ORAL
Qty: 90 CAPSULE | Refills: 11 | Status: CANCELLED | OUTPATIENT
Start: 2018-09-13

## 2018-09-13 NOTE — TELEPHONE ENCOUNTER
This was ordered by a different provider and I am not sure what the indication is.  Please check with the patient to see if he knows

## 2018-09-13 NOTE — TELEPHONE ENCOUNTER
Patient reports:    He was prescribed Gabapentin July of 2017 from [facility] for back pain, he was taking 2 tablets a day and recently ran out of medication  Patient is having back pain that is radiating down his left leg  Having surgery on his knee 9/28/18  Has a bruise on his right arm  Advised patient to make an appointment to see a doctor for these symptoms and need of pre op physical  Patient verbalized understanding  Scheduled appt 9/14/18, patient will discuss refill of gabapentin at this appt    Mary MUNSON Rn

## 2018-09-14 ENCOUNTER — OFFICE VISIT (OUTPATIENT)
Dept: FAMILY MEDICINE | Facility: CLINIC | Age: 73
End: 2018-09-14
Payer: COMMERCIAL

## 2018-09-14 VITALS
DIASTOLIC BLOOD PRESSURE: 72 MMHG | RESPIRATION RATE: 12 BRPM | WEIGHT: 169 LBS | TEMPERATURE: 98.6 F | OXYGEN SATURATION: 98 % | HEIGHT: 70 IN | SYSTOLIC BLOOD PRESSURE: 120 MMHG | BODY MASS INDEX: 24.2 KG/M2 | HEART RATE: 87 BPM

## 2018-09-14 DIAGNOSIS — F32.1 MODERATE MAJOR DEPRESSION (H): ICD-10-CM

## 2018-09-14 DIAGNOSIS — Z01.818 PREOP GENERAL PHYSICAL EXAM: Primary | ICD-10-CM

## 2018-09-14 DIAGNOSIS — F10.21 ALCOHOLISM IN REMISSION (H): ICD-10-CM

## 2018-09-14 DIAGNOSIS — F41.9 ANXIETY: ICD-10-CM

## 2018-09-14 DIAGNOSIS — I48.20 CHRONIC ATRIAL FIBRILLATION (H): ICD-10-CM

## 2018-09-14 DIAGNOSIS — I10 HYPERTENSION GOAL BP (BLOOD PRESSURE) < 140/90: ICD-10-CM

## 2018-09-14 DIAGNOSIS — M17.11 PRIMARY OSTEOARTHRITIS OF RIGHT KNEE: ICD-10-CM

## 2018-09-14 DIAGNOSIS — R39.12 BENIGN PROSTATIC HYPERPLASIA WITH WEAK URINARY STREAM: ICD-10-CM

## 2018-09-14 DIAGNOSIS — N40.1 BENIGN PROSTATIC HYPERPLASIA WITH WEAK URINARY STREAM: ICD-10-CM

## 2018-09-14 DIAGNOSIS — G62.89 OTHER POLYNEUROPATHY: ICD-10-CM

## 2018-09-14 DIAGNOSIS — F51.01 PRIMARY INSOMNIA: ICD-10-CM

## 2018-09-14 LAB
ANION GAP SERPL CALCULATED.3IONS-SCNC: 4 MMOL/L (ref 3–14)
BASOPHILS # BLD AUTO: 0 10E9/L (ref 0–0.2)
BASOPHILS NFR BLD AUTO: 0.3 %
BUN SERPL-MCNC: 14 MG/DL (ref 7–30)
CALCIUM SERPL-MCNC: 9.1 MG/DL (ref 8.5–10.1)
CHLORIDE SERPL-SCNC: 100 MMOL/L (ref 94–109)
CO2 SERPL-SCNC: 30 MMOL/L (ref 20–32)
CREAT SERPL-MCNC: 0.93 MG/DL (ref 0.66–1.25)
DIFFERENTIAL METHOD BLD: ABNORMAL
EOSINOPHIL # BLD AUTO: 0.2 10E9/L (ref 0–0.7)
EOSINOPHIL NFR BLD AUTO: 2.9 %
ERYTHROCYTE [DISTWIDTH] IN BLOOD BY AUTOMATED COUNT: 12.9 % (ref 10–15)
GFR SERPL CREATININE-BSD FRML MDRD: 79 ML/MIN/1.7M2
GLUCOSE SERPL-MCNC: 96 MG/DL (ref 70–99)
HCT VFR BLD AUTO: 35.3 % (ref 40–53)
HGB BLD-MCNC: 12 G/DL (ref 13.3–17.7)
LYMPHOCYTES # BLD AUTO: 2.1 10E9/L (ref 0.8–5.3)
LYMPHOCYTES NFR BLD AUTO: 34 %
MCH RBC QN AUTO: 32.8 PG (ref 26.5–33)
MCHC RBC AUTO-ENTMCNC: 34 G/DL (ref 31.5–36.5)
MCV RBC AUTO: 96 FL (ref 78–100)
MONOCYTES # BLD AUTO: 0.6 10E9/L (ref 0–1.3)
MONOCYTES NFR BLD AUTO: 9.3 %
MRSA DNA SPEC QL NAA+PROBE: NEGATIVE
NEUTROPHILS # BLD AUTO: 3.3 10E9/L (ref 1.6–8.3)
NEUTROPHILS NFR BLD AUTO: 53.5 %
PLATELET # BLD AUTO: 193 10E9/L (ref 150–450)
POTASSIUM SERPL-SCNC: 4.2 MMOL/L (ref 3.4–5.3)
RBC # BLD AUTO: 3.66 10E12/L (ref 4.4–5.9)
SODIUM SERPL-SCNC: 134 MMOL/L (ref 133–144)
SPECIMEN SOURCE: NORMAL
WBC # BLD AUTO: 6.2 10E9/L (ref 4–11)

## 2018-09-14 PROCEDURE — 87640 STAPH A DNA AMP PROBE: CPT | Mod: 59 | Performed by: FAMILY MEDICINE

## 2018-09-14 PROCEDURE — 93000 ELECTROCARDIOGRAM COMPLETE: CPT | Performed by: FAMILY MEDICINE

## 2018-09-14 PROCEDURE — 87641 MR-STAPH DNA AMP PROBE: CPT | Performed by: FAMILY MEDICINE

## 2018-09-14 PROCEDURE — 36415 COLL VENOUS BLD VENIPUNCTURE: CPT | Performed by: FAMILY MEDICINE

## 2018-09-14 PROCEDURE — 80048 BASIC METABOLIC PNL TOTAL CA: CPT | Performed by: FAMILY MEDICINE

## 2018-09-14 PROCEDURE — 85025 COMPLETE CBC W/AUTO DIFF WBC: CPT | Performed by: FAMILY MEDICINE

## 2018-09-14 PROCEDURE — 99214 OFFICE O/P EST MOD 30 MIN: CPT | Performed by: FAMILY MEDICINE

## 2018-09-14 RX ORDER — GABAPENTIN 300 MG/1
300 CAPSULE ORAL
Qty: 60 CAPSULE | Refills: 11 | Status: ON HOLD | OUTPATIENT
Start: 2018-09-14 | End: 2018-10-12

## 2018-09-14 RX ORDER — LAMOTRIGINE 100 MG/1
TABLET ORAL
Qty: 30 TABLET | Refills: 1 | Status: SHIPPED | OUTPATIENT
Start: 2018-09-14 | End: 2018-11-08

## 2018-09-14 RX ORDER — FINASTERIDE 5 MG/1
5 TABLET, FILM COATED ORAL DAILY
Qty: 90 TABLET | Refills: 1 | Status: SHIPPED | OUTPATIENT
Start: 2018-09-14 | End: 2019-03-08

## 2018-09-14 RX ORDER — ZOLPIDEM TARTRATE 10 MG/1
10 TABLET ORAL
Qty: 30 TABLET | Refills: 5 | Status: SHIPPED | OUTPATIENT
Start: 2018-09-14 | End: 2019-03-04

## 2018-09-14 RX ORDER — NORTRIPTYLINE HCL 10 MG
10 CAPSULE ORAL DAILY
Refills: 5 | COMMUNITY
Start: 2018-08-25 | End: 2018-09-14 | Stop reason: ALTCHOICE

## 2018-09-14 RX ORDER — ESCITALOPRAM OXALATE 10 MG/1
10 TABLET ORAL DAILY
Qty: 90 TABLET | Refills: 1 | Status: SHIPPED | OUTPATIENT
Start: 2018-09-14 | End: 2019-04-11

## 2018-09-14 RX ORDER — METOPROLOL TARTRATE 25 MG/1
25 TABLET, FILM COATED ORAL 2 TIMES DAILY
Refills: 3 | COMMUNITY
Start: 2018-06-21 | End: 2019-02-07

## 2018-09-14 NOTE — PATIENT INSTRUCTIONS
Before Your Surgery      Call your surgeon if there is any change in your health. This includes signs of a cold or flu (such as a sore throat, runny nose, cough, rash or fever).    Do not smoke, drink alcohol or take over the counter medicine (unless your surgeon or primary care doctor tells you to) for the 24 hours before and after surgery.    If you take prescribed drugs: Follow your doctor s orders about which medicines to take and which to stop until after surgery. Stop the xarelto 4 days before surgery. Take all the others with a small sip of water    Eating and drinking prior to surgery: follow the instructions from your surgeon    Take a shower or bath the night before surgery. Use the soap your surgeon gave you to gently clean your skin. If you do not have soap from your surgeon, use your regular soap. Do not shave or scrub the surgery site.  Wear clean pajamas and have clean sheets on your bed.

## 2018-09-14 NOTE — PROGRESS NOTES
[facility]  [address]  [facility] [address]  [phone]  Dept: [phone]    PRE-OP EVALUATION:  Today's date: 2018    Josemanuel Edmond (: 1945) presents for pre-operative evaluation assessment as requested by Dr. Peralta.  He requires evaluation and anesthesia risk assessment prior to undergoing surgery/procedure for treatment of severe osteoarthritis, right knee    Proposed Surgery/ Procedure: Right total knee arthroplasty  Date of Surgery/ Procedure: 18  Time of Surgery/ Procedure: 9am  Hospital/Surgical Facility: [facility]   Fax number for surgical facility:   Primary Physician: AVIS Martínez  Type of Anesthesia Anticipated: General    Patient has a Health Care Directive or Living Will:  YES     1. NO - Do you have a history of heart attack, stroke, stent, bypass or surgery on an artery in the head, neck, heart or legs?  2. NO - Do you ever have any pain or discomfort in your chest?  3. NO - Do you have a history of  Heart Failure?  4. NO - Are you troubled by shortness of breath when: walking on the level, up a slight hill or at night?  5. NO - Do you currently have a cold, bronchitis or other respiratory infection?  6. NO - Do you have a cough, shortness of breath or wheezing?  7. NO - Do you sometimes get pains in the calves of your legs when you walk?  8. NO - Do you or anyone in your family have previous history of blood clots?  9. NO - Do you or does anyone in your family have a serious bleeding problem such as prolonged bleeding following surgeries or cuts?  10. NO - Have you ever had problems with anemia or been told to take iron pills?  11. NO - Have you had any abnormal blood loss such as black, tarry or bloody stools, or abnormal vaginal bleeding?  12. NO - Have you ever had a blood transfusion?  13. NO - Have you or any of your relatives ever had problems with anesthesia?  14. NO - Do you have sleep apnea, excessive snoring or daytime  drowsiness?  15. NO - Do you have any prosthetic heart valves?  16. NO - Do you have prosthetic joints?  17. NO - Is there any chance that you may be pregnant?      HPI:     HPI related to upcoming procedure: He has persistent severe pain in the right knee and well-documented osteoarthritis.  Several years ago underwent successful total knee arthroplasty in the left knee and so is requesting the same on the right      See problem list for active medical problems.  Problems all longstanding and stable, except as noted/documented.  See ROS for pertinent symptoms related to these conditions.                                                                                                                                                          .    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) 11/28/2017     Priority: Medium     ED (erectile dysfunction) 12/23/2014     Priority: Medium     Moderate major depression (H) 12/17/2013     Priority: Medium     S/P knee replacement 11/06/2012     Priority: Medium     Advanced care planning/counseling discussion 10/25/2012     Priority: Medium     Patient has completed an Advance/Health Care Directive (HCD), scanned into Epic Media tab, entry date of [address]  October 25, 2012         GERD (gastroesophageal reflux disease) 08/03/2012     Priority: Medium     Anxiety 02/16/2012     Priority: Medium     Allergic rhinitis 04/21/2011     Priority: Medium     Conjunctivitis, allergic 04/21/2011     Priority: Medium     Hyperlipidemia LDL goal <100 10/31/2010     Priority: Medium     Osteoarthrosis, unspecified whether generalized or localized, pelvic region and thigh 09/28/2007     Priority: Medium     Hypertrophy of prostate with urinary obstruction 08/03/2006     Priority: Medium     Problem  list name updated by automated process. Provider to review       Hypertension goal BP (blood pressure) < 140/90 12/12/2005     Priority: Medium      Past Medical History:   Diagnosis Date     Benign neoplasm of prostate 2000    Benign Prostate Nodule     Past Surgical History:   Procedure Laterality Date     COLONOSCOPY N/A 12/10/2015    Procedure: COMBINED COLONOSCOPY, SINGLE OR MULTIPLE BIOPSY/POLYPECTOMY BY BIOPSY;  Surgeon: Jyoti Figueroa MD;  Location: [facility]     JOINT REPLACEMENT, HIP RT/LT  10/07    Joint Replacement Hip LT     JOINT REPLACEMTN, KNEE RT/LT  8/2011    Joint Replacement knee /LT, [facility]     LAPAROSCOPIC HERNIORRHAPHY INGUINAL BILATERAL Bilateral 4/24/2018    Procedure: LAPAROSCOPIC HERNIORRHAPHY INGUINAL BILATERAL;  Laparoscopic bilateral inguinal hernia repair;  Surgeon: Josemanuel Resendiz MD;  Location: [facility]     SURGICAL HISTORY OF -   12/1/1999    Umbilical Herniorrhaphy with mesh     SURGICAL HISTORY OF -  Left 11/2017    Thoracotomy and drainage of empyema     Current Outpatient Prescriptions   Medication Sig Dispense Refill     buPROPion (WELLBUTRIN SR) 150 MG 12 hr tablet Take 1 tablet (150 mg) by mouth 2 times daily 60 tablet 5     doxazosin (CARDURA) 4 MG tablet Take 1 tablet (4 mg) by mouth daily 90 tablet 3     escitalopram (LEXAPRO) 10 MG tablet Take 1 tablet (10 mg) by mouth daily 90 tablet 1     finasteride (PROSCAR) 5 MG tablet Take 1 tablet (5 mg) by mouth daily 90 tablet 1     gabapentin (NEURONTIN) 300 MG capsule Take 1 capsule (300 mg) by mouth 2 times daily 60 capsule 11     lamoTRIgine (LAMICTAL) 100 MG tablet Take 1/2 tablet daily for 2 weeks, then 1/2 tablet twice daily 30 tablet 1     metoprolol tartrate (LOPRESSOR) 25 MG tablet Take 25 mg by mouth 2 times daily  3     rivaroxaban ANTICOAGULANT (XARELTO) 20 MG TABS tablet Take 1 tablet (20 mg) by mouth daily (with dinner) 90 tablet 3     STATIN NOT PRESCRIBED, INTENTIONAL, Please choose reason not  "prescribed, below       zolpidem (AMBIEN) 10 MG tablet Take 1 tablet (10 mg) by mouth nightly as needed for sleep 30 tablet 5        11     OTC products: None, except as noted above    Allergies   Allergen Reactions     Bactrim [Sulfamethoxazole W/Trimethoprim] Nausea and Vomiting      Latex Allergy: NO    Social History   Substance Use Topics     Smoking status: Former Smoker     Packs/day: 1.00     Years: 15.00     Types: Cigarettes     Quit date: 10/13/1975     Smokeless tobacco: Never Used     Alcohol use No      Comment: quit 11/29/2017     History   Drug Use No       REVIEW OF SYSTEMS:   Constitutional, HEENT, cardiovascular, pulmonary, gi and gu systems are negative, except as otherwise noted.    EXAM:   /72 (BP Location: Right arm, Patient Position: Chair, Cuff Size: Adult Regular)  Pulse 87  Temp 98.6  F (37  C) (Tympanic)  Resp 12  Ht 5' 10\" (1.778 m)  Wt 169 lb (76.7 kg)  SpO2 98%  BMI 24.25 kg/m2    GENERAL APPEARANCE: healthy, alert and no distress     EYES: EOMI,  PERRL     HENT: ear canals and TM's normal and nose and mouth without ulcers or lesions     NECK: no adenopathy, no asymmetry, masses, or scars and thyroid normal to palpation     RESP: lungs clear to auscultation - no rales, rhonchi or wheezes     CV: regular rates and rhythm, normal S1 S2, no S3 or S4 and no murmur, click or rub     ABDOMEN:  soft, nontender, no HSM or masses and bowel sounds normal     MS: Right knee has tenderness at the joint lines but no effusion or erythema     SKIN: no suspicious lesions or rashes; resolving ecchymosis in the right upper extremity from apparent muscle tear that caused extensive bleeding from his anticoagulant therapy     NEURO: Normal strength and tone, sensory exam grossly normal, mentation intact and speech normal     PSYCH: mentation appears normal, affect flat and anxious     LYMPHATICS: No cervical adenopathy    DIAGNOSTICS:   EKG: atrial fibrillation, rate 70, normal axis, normal " intervals, no acute ST/T changes c/w ischemia, no LVH by voltage criteria    Recent Labs   Lab Test  05/07/18   1325  01/22/18   1354  12/20/17   1315   11/15/12   1132  11/12/12   1110   HGB   --   12.9*  11.7*   --    --    --    PLT   --   201  155   --    --    --    INR   --    --    --    --   2.73*  2.77*   NA  135  132*  133   < >   --    --    POTASSIUM  4.4  4.2  4.1   < >   --    --    CR  1.07  0.81  0.84   < >   --    --     < > = values in this interval not displayed.   Labs drawn and pending: MRSA swab, CBC, BMP    IMPRESSION:   Reason for surgery/procedure: Severe osteoarthritis right knee  Diagnosis/reason for consult: Evaluate for anesthesia risk    The proposed surgical procedure is considered LOW risk.    REVISED CARDIAC RISK INDEX  The patient has the following serious cardiovascular risks for perioperative complications such as (MI, PE, VFib and 3  AV Block):  No serious cardiac risks  INTERPRETATION: 0 risks: Class I (very low risk - 0.4% complication rate)    The patient has the following additional risks for perioperative complications:  No identified additional risks      ICD-10-CM    1. Preop general physical exam Z01.818    2. Primary osteoarthritis of right knee M17.11 Methicillin Resist/Sens S. aureus PCR   3. Moderate major depression (H) F32.1 lamoTRIgine (LAMICTAL) 100 MG tablet   4. Other polyneuropathy G62.89 gabapentin (NEURONTIN) 300 MG capsule     CBC with platelets differential     Basic metabolic panel   5. Chronic atrial fibrillation (H) I48.2 EKG 12-lead complete w/read - Clinics   6. Anxiety F41.9 escitalopram (LEXAPRO) 10 MG tablet   7. Benign prostatic hyperplasia with weak urinary stream N40.1 finasteride (PROSCAR) 5 MG tablet    R39.12    8. Primary insomnia F51.01 zolpidem (AMBIEN) 10 MG tablet   9. 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) F10.21    10. Hypertension goal BP (blood pressure) < 140/90 I10        RECOMMENDATIONS:         --Patient is to take all scheduled medications  on the day of surgery EXCEPT for modifications listed below.  Stop Xarelto 4 days before surgery    APPROVAL GIVEN to proceed with proposed procedure, without further diagnostic evaluation       Signed Electronically by: AVIS Martínez MD    Copy of this evaluation report is provided to requesting physician.    [facility] Preop Guidelines    Revised Cardiac Risk Index

## 2018-09-14 NOTE — MR AVS SNAPSHOT
After Visit Summary   9/14/2018    Josemanuel Edmond    MRN: [id number]           Patient Information     Date Of Birth          1945        Visit Information        Provider Department      9/14/2018 3:20 PM Post, AVIS Stewart MD [facility]        Today's Diagnoses     Preop general physical exam    -  1    Moderate major depression (H)        Other polyneuropathy        Primary osteoarthritis of right knee        Chronic atrial fibrillation (H)        Anxiety        Benign prostatic hyperplasia with weak urinary stream        Primary insomnia          Care Instructions      Before Your Surgery      Call your surgeon if there is any change in your health. This includes signs of a cold or flu (such as a sore throat, runny nose, cough, rash or fever).    Do not smoke, drink alcohol or take over the counter medicine (unless your surgeon or primary care doctor tells you to) for the 24 hours before and after surgery.    If you take prescribed drugs: Follow your doctor s orders about which medicines to take and which to stop until after surgery. Stop the xarelto 4 days before surgery. Take all the others with a small sip of water    Eating and drinking prior to surgery: follow the instructions from your surgeon    Take a shower or bath the night before surgery. Use the soap your surgeon gave you to gently clean your skin. If you do not have soap from your surgeon, use your regular soap. Do not shave or scrub the surgery site.  Wear clean pajamas and have clean sheets on your bed.           Follow-ups after your visit        Follow-up notes from your care team     Return in about 2 months (around 11/14/2018).      Your next 10 appointments already scheduled     Sep 28, 2018   Procedure with Jeb Peralta MD   [facility] (--)    [address] [place] [id number] [phone]           The medical center is located at [address] [place] [address]. (between [address] and  "[address] in [place], [place] miles north of [place]).            Nov 15, 2018  1:00 PM CST   SHORT with AVIS Martínez MD   [facility] ([facility])    [address]  [facility] [address]   [phone]              Who to contact     If you have questions or need follow up information about today's clinic visit or your schedule please contact [facility] directly at [phone].  Normal or non-critical lab and imaging results will be communicated to you by [employer], letter or phone within 4 business days after the clinic has received the results. If you do not hear from us within 7 days, please contact the clinic through Donayt or phone. If you have a critical or abnormal lab result, we will notify you by phone as soon as possible.  Submit refill requests through ProPlan or call your pharmacy and they will forward the refill request to us. Please allow 3 business days for your refill to be completed.          Additional Information About Your Visit        [employer] Information     ProPlan gives you secure access to your electronic health record. If you see a primary care provider, you can also send messages to your care team and make appointments. If you have questions, please call your primary care clinic.  If you do not have a primary care provider, please call [phone] and they will assist you.        Care EveryWhere ID     This is your Care EveryWhere ID. This could be used by other organizations to access your [place] medical records  [id number]        Your Vitals Were     Pulse Temperature Respirations Height Pulse Oximetry BMI (Body Mass Index)    87 98.6  F (37  C) (Tympanic) 12 5' 10\" (1.778 m) 98% 24.25 kg/m2       Blood Pressure from Last 3 Encounters:   09/14/18 120/72   09/10/18 107/71   05/07/18 124/67    Weight from Last 3 Encounters:   09/14/18 169 lb (76.7 kg)   09/10/18 173 lb (78.5 kg)   05/07/18 178 lb (80.7 kg)            "   We Performed the Following     Basic metabolic panel     CBC with platelets differential     EKG 12-lead complete w/read - Clinics     Methicillin Resist/Sens S. aureus PCR          Today's Medication Changes          These changes are accurate as of 9/14/18  4:24 PM.  If you have any questions, ask your nurse or doctor.               Start taking these medicines.        Dose/Directions    lamoTRIgine 100 MG tablet   Commonly known as:  LAMICTAL   Used for:  Moderate major depression (H)   Started by:  AVIS Martínez MD        Take 1/2 tablet daily for 2 weeks, then 1/2 tablet twice daily   Quantity:  30 tablet   Refills:  1         These medicines have changed or have updated prescriptions.        Dose/Directions    gabapentin 300 MG capsule   Commonly known as:  NEURONTIN   This may have changed:  See the new instructions.   Used for:  Other polyneuropathy   Changed by:  AVIS Martínez MD        Dose:  300 mg   Take 1 capsule (300 mg) by mouth 2 times daily   Quantity:  60 capsule   Refills:  11         Stop taking these medicines if you haven't already. Please contact your care team if you have questions.     nortriptyline 10 MG capsule   Commonly known as:  PAMELOR   Stopped by:  AVIS Martínez MD                Where to get your medicines      These medications were sent to [facility] [id number] [address]    Hours:  [facility] Phone:  [phone]     escitalopram 10 MG tablet    finasteride 5 MG tablet    gabapentin 300 MG capsule    lamoTRIgine 100 MG tablet         Some of these will need a paper prescription and others can be bought over the counter.  Ask your nurse if you have questions.     Bring a paper prescription for each of these medications     zolpidem 10 MG tablet                Primary Care Provider Office Phone # Fax #    AVIS Martínez [phone] [address]  [facility] [address]        Equal Access to Services     MERARI NASCIMENTO : Hadii aad ku hadricalvin Juarez, watreda luqadaha, qaybta megmabree segundo. So [facility] [phone].    ATENCIÓN: Si habla español, tiene a sen disposición servicios gratuitos de asistencia lingüística. Saira al [phone].    We comply with applicable federal civil rights laws and [place] laws. We do not discriminate on the basis of race, color, national origin, age, disability, sex, sexual orientation, or gender identity.            Thank you!     Thank you for choosing [facility]  for your care. Our goal is always to provide you with excellent care. Hearing back from our patients is one way we can continue to improve our services. Please take a few minutes to complete the written survey that you may receive in the mail after your visit with us. Thank you!             Your Updated Medication List - Protect others around you: Learn how to safely use, store and throw away your medicines at www.disposemymeds.org.          This list is accurate as of 9/14/18  4:24 PM.  Always use your most recent med list.                   Brand Name Dispense Instructions for use Diagnosis    buPROPion 150 MG 12 hr tablet    WELLBUTRIN SR    60 tablet    Take 1 tablet (150 mg) by mouth 2 times daily    Moderate major depression (H), Anxiety       doxazosin 4 MG tablet    CARDURA    90 tablet    Take 1 tablet (4 mg) by mouth daily    Hypertrophy of prostate with urinary obstruction       escitalopram 10 MG tablet    LEXAPRO    90 tablet    Take 1 tablet (10 mg) by mouth daily    Anxiety       finasteride 5 MG tablet    PROSCAR    90 tablet    Take 1 tablet (5 mg) by mouth daily    Benign prostatic hyperplasia with weak urinary stream       gabapentin 300 MG capsule    NEURONTIN    60 capsule    Take 1 capsule (300 mg) by mouth 2 times daily    Other polyneuropathy       lamoTRIgine 100 MG tablet     LAMICTAL    30 tablet    Take 1/2 tablet daily for 2 weeks, then 1/2 tablet twice daily    Moderate major depression (H)       metoprolol tartrate 25 MG tablet    LOPRESSOR     Take 25 mg by mouth 2 times daily        rivaroxaban ANTICOAGULANT 20 MG Tabs tablet    XARELTO    90 tablet    Take 1 tablet (20 mg) by mouth daily (with dinner)    Chronic atrial fibrillation (H)       STATIN NOT PRESCRIBED (INTENTIONAL)      Please choose reason not prescribed, below    Chronic atrial fibrillation (H)       zolpidem 10 MG tablet    AMBIEN    30 tablet    Take 1 tablet (10 mg) by mouth nightly as needed for sleep    Primary insomnia

## 2018-09-17 ENCOUNTER — MYC MEDICAL ADVICE (OUTPATIENT)
Dept: FAMILY MEDICINE | Facility: CLINIC | Age: 73
End: 2018-09-17

## 2018-09-18 ENCOUNTER — MYC MEDICAL ADVICE (OUTPATIENT)
Dept: FAMILY MEDICINE | Facility: CLINIC | Age: 73
End: 2018-09-18

## 2018-09-19 ENCOUNTER — OFFICE VISIT (OUTPATIENT)
Dept: FAMILY MEDICINE | Facility: CLINIC | Age: 73
End: 2018-09-19
Payer: COMMERCIAL

## 2018-09-19 ENCOUNTER — RADIANT APPOINTMENT (OUTPATIENT)
Dept: GENERAL RADIOLOGY | Facility: CLINIC | Age: 73
End: 2018-09-19
Attending: NURSE PRACTITIONER
Payer: COMMERCIAL

## 2018-09-19 VITALS
HEART RATE: 73 BPM | OXYGEN SATURATION: 99 % | HEIGHT: 70 IN | TEMPERATURE: 97.5 F | SYSTOLIC BLOOD PRESSURE: 122 MMHG | DIASTOLIC BLOOD PRESSURE: 72 MMHG | WEIGHT: 173 LBS | BODY MASS INDEX: 24.77 KG/M2 | RESPIRATION RATE: 12 BRPM

## 2018-09-19 DIAGNOSIS — M79.89 PAIN AND SWELLING OF UPPER EXTREMITY, RIGHT: ICD-10-CM

## 2018-09-19 DIAGNOSIS — M79.601 PAIN AND SWELLING OF UPPER EXTREMITY, RIGHT: ICD-10-CM

## 2018-09-19 DIAGNOSIS — M79.621 PAIN OF RIGHT UPPER ARM: ICD-10-CM

## 2018-09-19 DIAGNOSIS — S46.201A UNSPECIFIED INJURY OF MUSCLE, FASCIA AND TENDON OF OTHER PARTS OF BICEPS, RIGHT ARM, INITIAL ENCOUNTER: ICD-10-CM

## 2018-09-19 DIAGNOSIS — M79.621 PAIN OF RIGHT UPPER ARM: Primary | ICD-10-CM

## 2018-09-19 PROCEDURE — 73060 X-RAY EXAM OF HUMERUS: CPT | Mod: RT

## 2018-09-19 PROCEDURE — 99213 OFFICE O/P EST LOW 20 MIN: CPT | Performed by: NURSE PRACTITIONER

## 2018-09-19 RX ORDER — GABAPENTIN 300 MG/1
600 CAPSULE ORAL 2 TIMES DAILY
Qty: 270 CAPSULE | Refills: 3 | Status: SHIPPED | OUTPATIENT
Start: 2018-09-19 | End: 2019-02-07

## 2018-09-19 ASSESSMENT — PAIN SCALES - GENERAL: PAINLEVEL: EXTREME PAIN (8)

## 2018-09-19 NOTE — MR AVS SNAPSHOT
After Visit Summary   9/19/2018    Josemanuel Edmond    MRN: [id number]           Patient Information     Date Of Birth          1945        Visit Information        Provider Department      9/19/2018 9:40 AM Carmen Reis APRN CNP [facility]        Today's Diagnoses     Pain of right upper arm    -  1    Pain and swelling of upper extremity, right          Care Instructions    Increase the gabapentin to 600 mg (2 - 300 mg capsules) twice daily.    We will call you with the results of your x ray          Follow-ups after your visit        Follow-up notes from your care team     Return if symptoms worsen or fail to improve.      Your next 10 appointments already scheduled     Oct 12, 2018   Procedure with Jeb Peralta MD   [facility]    [address] [facility] [address] [phone]           The medical center is located at [address] [facility]. (between [address] in [place], four miles north of [place]).            Nov 15, 2018  1:00 PM CST   SHORT with AVIS Martínez MD   [facility] ([facility])    [address] [facility] [id number] [phone]              Future tests that were ordered for you today     Open Future Orders        Priority Expected Expires Ordered    XR Humerus Right G/E 2 Views Routine 9/19/2018 9/19/2019 9/19/2018            Who to contact     If you have questions or need follow up information about today's clinic visit or your schedule please contact [facility] directly at [phone].  Normal or non-critical lab and imaging results will be communicated to you by MyChart, letter or phone within 4 business days after the clinic has received the results. If you do not hear from us within 7 days, please contact the clinic through MyChart or phone. If you have a critical or abnormal lab result, we will notify you by phone as soon  "as possible.  Submit refill requests through Open Silicon or call your pharmacy and they will forward the refill request to us. Please allow 3 business days for your refill to be completed.          Additional Information About Your Visit        [employer] Information     [employer] gives you secure access to your electronic health record. If you see a primary care provider, you can also send messages to your care team and make appointments. If you have questions, please call your primary care clinic.  If you do not have a primary care provider, please call [phone] and they will assist you.        Care EveryWhere ID     This is your Care EveryWhere ID. This could be used by other organizations to access your [place] medical records  [id number]        Your Vitals Were     Pulse Temperature Respirations Height Pulse Oximetry BMI (Body Mass Index)    73 97.5  F (36.4  C) (Tympanic) 12 5' 10\" (1.778 m) 99% 24.82 kg/m2       Blood Pressure from Last 3 Encounters:   09/19/18 122/72   09/14/18 120/72   09/10/18 107/71    Weight from Last 3 Encounters:   09/19/18 173 lb (78.5 kg)   09/14/18 169 lb (76.7 kg)   09/10/18 173 lb (78.5 kg)                 Today's Medication Changes          These changes are accurate as of 9/19/18 10:16 AM.  If you have any questions, ask your nurse or doctor.               These medicines have changed or have updated prescriptions.        Dose/Directions    * gabapentin 300 MG capsule   Commonly known as:  NEURONTIN   This may have changed:  Another medication with the same name was added. Make sure you understand how and when to take each.   Used for:  Other polyneuropathy   Changed by:  Carmen Reis, KAYLA CNP        Dose:  300 mg   Take 1 capsule (300 mg) by mouth 2 times daily   Quantity:  60 capsule   Refills:  11       * gabapentin 300 MG capsule   Commonly known as:  NEURONTIN   This may have changed:  You were already taking a medication with the same name, and this prescription was " added. Make sure you understand how and when to take each.   Used for:  Pain of right upper arm   Changed by:  Carmen Reis APRN CNP        Dose:  600 mg   Take 2 capsules (600 mg) by mouth 2 times daily   Quantity:  270 capsule   Refills:  3       * Notice:  This list has 2 medication(s) that are the same as other medications prescribed for you. Read the directions carefully, and ask your doctor or other care provider to review them with you.         Where to get your medicines      These medications were sent to [facility] [id number] [address]    Hours:  [facility] Phone:  [phone]     gabapentin 300 MG capsule                Primary Care Provider Office Phone # Fax #    R Terry Martínez [phone] [address] [facility] [address]        Equal Access to Services     [facility] Hospital[facility]IS : Hadii toney cortez hadasho Soomaali, waaxda luqadaha, qaybta kaalmada adeegyada, waxay tuan medina . So [facility] [phone].    ATENCIÓN: Si habla español, tiene a sen disposición servicios gratuitos de asistencia lingüística. Ll[facility] [phone].    We comply with applicable federal civil rights laws and [place] laws. We do not discriminate on the basis of race, color, national origin, age, disability, sex, sexual orientation, or gender identity.            Thank you!     Thank you for choosing [facility]  for your care. Our goal is always to provide you with excellent care. Hearing back from our patients is one way we can continue to improve our services. Please take a few minutes to complete the written survey that you may receive in the mail after your visit with us. Thank you!             Your Updated Medication List - Protect others around you: Learn how to safely use, store and throw away your medicines at www.disposemymeds.org.          This list is  accurate as of 9/19/18 10:16 AM.  Always use your most recent med list.                   Brand Name Dispense Instructions for use Diagnosis    buPROPion 150 MG 12 hr tablet    WELLBUTRIN SR    60 tablet    Take 1 tablet (150 mg) by mouth 2 times daily    Moderate major depression (H), Anxiety       doxazosin 4 MG tablet    CARDURA    90 tablet    Take 1 tablet (4 mg) by mouth daily    Hypertrophy of prostate with urinary obstruction       escitalopram 10 MG tablet    LEXAPRO    90 tablet    Take 1 tablet (10 mg) by mouth daily    Anxiety       finasteride 5 MG tablet    PROSCAR    90 tablet    Take 1 tablet (5 mg) by mouth daily    Benign prostatic hyperplasia with weak urinary stream       * gabapentin 300 MG capsule    NEURONTIN    60 capsule    Take 1 capsule (300 mg) by mouth 2 times daily    Other polyneuropathy       * gabapentin 300 MG capsule    NEURONTIN    270 capsule    Take 2 capsules (600 mg) by mouth 2 times daily    Pain of right upper arm       lamoTRIgine 100 MG tablet    LAMICTAL    30 tablet    Take 1/2 tablet daily for 2 weeks, then 1/2 tablet twice daily    Moderate major depression (H)       metoprolol tartrate 25 MG tablet    LOPRESSOR     Take 25 mg by mouth 2 times daily        rivaroxaban ANTICOAGULANT 20 MG Tabs tablet    XARELTO    90 tablet    Take 1 tablet (20 mg) by mouth daily (with dinner)    Chronic atrial fibrillation (H)       STATIN NOT PRESCRIBED (INTENTIONAL)      Please choose reason not prescribed, below    Chronic atrial fibrillation (H)       zolpidem 10 MG tablet    AMBIEN    30 tablet    Take 1 tablet (10 mg) by mouth nightly as needed for sleep    Primary insomnia       * Notice:  This list has 2 medication(s) that are the same as other medications prescribed for you. Read the directions carefully, and ask your doctor or other care provider to review them with you.

## 2018-09-19 NOTE — Clinical Note
Team, please call patient- no fracture on XR, would like to do US.  Nacho JONAS, any other suggestions?

## 2018-09-19 NOTE — PATIENT INSTRUCTIONS
Increase the gabapentin to 600 mg (2 - 300 mg capsules) twice daily.    We will call you with the results of your x ray

## 2018-09-19 NOTE — PROGRESS NOTES
SUBJECTIVE:   Josemanuel Edmond is a 73 year old male who presents to clinic today for the following health issues:      Joint Pain right shoulder pain    Onset: 5 days    Description:   Location: right shoulder  Character: when arm is at rest its ok but when he moves his arm its a sharp apin    Intensity: 8/10    Progression of Symptoms: worse    Accompanying Signs & Symptoms:  Other symptoms: redness and bruising all on right arm    History:   Previous similar pain: no       Precipitating factors:   Trauma or overuse: YES- possible overuse from moving things     Alleviating factors:  Improved by: nothing    Therapies Tried and outcome: tylenol, ice and nothing helps it       Problem list and histories reviewed & adjusted, as indicated.  Further history obtained, clarified or corrected by provider:  Right arm is bruised from shoulder to wrist.  First noticed 5 days ago.  Does not recall any injury or bumping his arm against anything.    He has been working hard with moving so thinks is a possibility he might of injured it in that way.  He is on Xarelto for atrial fibrillation.  When he was seen last week by Dr. Martínez for preop history and physical, he did have some bruising but not to the extent that it is today.  It was felt that the bruising may have been due to a muscle tear that caused excessive bleeding.  Reports that the pain is now worse when he lifts his arm or tries to reach behind his back buckle his seatbelt.  No pain when the arm is stationary.    Reports that he has been under a lot of stress lately with the upcoming surgery and was moving.  He is scheduled to have a right total knee replacement on 9/28/2018.    Patient Active Problem List   Diagnosis     Hypertension goal BP (blood pressure) < 140/90     Hypertrophy of prostate with urinary obstruction     Osteoarthrosis, unspecified whether generalized or localized, pelvic region and thigh     Hyperlipidemia LDL goal <100     Allergic rhinitis      Conjunctivitis, allergic     Anxiety     GERD (gastroesophageal reflux disease)     Advanced care planning/counseling discussion     S/P knee replacement     Moderate major depression (H)     ED (erectile dysfunction)     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)     Chronic atrial fibrillation (H)     Hyperplastic Polyp     Past Surgical History:   Procedure Laterality Date     COLONOSCOPY N/A 12/10/2015    Procedure: COMBINED COLONOSCOPY, SINGLE OR MULTIPLE BIOPSY/POLYPECTOMY BY BIOPSY;  Surgeon: Jyoti Figueroa MD;  Location: [facility]     JOINT REPLACEMENT, HIP RT/LT  10/07    Joint Replacement Hip LT     JOINT REPLACEMTN, KNEE RT/LT  8/2011    Joint Replacement knee /LT, [facility]     LAPAROSCOPIC HERNIORRHAPHY INGUINAL BILATERAL Bilateral 4/24/2018    Procedure: LAPAROSCOPIC HERNIORRHAPHY INGUINAL BILATERAL;  Laparoscopic bilateral inguinal hernia repair;  Surgeon: Josemanuel Resendiz MD;  Location: [facility]     SURGICAL HISTORY OF -   12/1/1999    Umbilical Herniorrhaphy with mesh     SURGICAL HISTORY OF -  Left 11/2017    Thoracotomy and drainage of empyema       Social History   Substance Use Topics     Smoking status: Former Smoker     Packs/day: 1.00     Years: 15.00     Types: Cigarettes     Quit date: 10/13/1975     Smokeless tobacco: Never Used     Alcohol use No      Comment: quit 11/29/2017     Family History   Problem Relation Age of Onset     Breast Cancer Mother      Neurologic Disorder Mother      parkinsons     Respiratory Father      emphyzema     Diabetes Brother          Current Outpatient Prescriptions   Medication Sig Dispense Refill     buPROPion (WELLBUTRIN SR) 150 MG 12 hr tablet Take 1 tablet (150 mg) by mouth 2 times daily 60 tablet 5     doxazosin (CARDURA) 4 MG tablet Take 1 tablet (4 mg) by mouth daily 90 tablet 3     escitalopram (LEXAPRO) 10 MG tablet Take 1 tablet (10 mg) by mouth daily 90 tablet 1     finasteride (PROSCAR) 5 MG tablet Take 1 tablet (5 mg) by mouth daily 90 tablet 1      "gabapentin (NEURONTIN) 300 MG capsule Take 2 capsules (600 mg) by mouth 2 times daily 270 capsule 3     gabapentin (NEURONTIN) 300 MG capsule Take 1 capsule (300 mg) by mouth 2 times daily 60 capsule 11     lamoTRIgine (LAMICTAL) 100 MG tablet Take 1/2 tablet daily for 2 weeks, then 1/2 tablet twice daily 30 tablet 1     rivaroxaban ANTICOAGULANT (XARELTO) 20 MG TABS tablet Take 1 tablet (20 mg) by mouth daily (with dinner) 90 tablet 3     STATIN NOT PRESCRIBED, INTENTIONAL, Please choose reason not prescribed, below       zolpidem (AMBIEN) 10 MG tablet Take 1 tablet (10 mg) by mouth nightly as needed for sleep 30 tablet 5     metoprolol tartrate (LOPRESSOR) 25 MG tablet Take 25 mg by mouth 2 times daily  3       Reviewed and updated as needed this visit by clinical staff  Tobacco  Allergies  Meds  Problems  Med Hx  Surg Hx  Fam Hx  Soc Hx        Reviewed and updated as needed this visit by Provider  Allergies  Meds  Problems           ROS:  Constitutional, HEENT, cardiovascular, pulmonary, GI, , musculoskeletal, neuro, skin, endocrine and psych systems are negative, except as otherwise noted.    OBJECTIVE:     /72 (BP Location: Right arm, Patient Position: Chair, Cuff Size: Adult Regular)  Pulse 73  Temp 97.5  F (36.4  C) (Tympanic)  Resp 12  Ht 5' 10\" (1.778 m)  Wt 173 lb (78.5 kg)  SpO2 99%  BMI 24.82 kg/m2  Body mass index is 24.82 kg/(m^2).  GENERAL: healthy, alert and no distress  EYES: Eyes grossly normal to inspection and conjunctivae and sclerae normal  RESP: lungs clear to auscultation - no rales, rhonchi or wheezes  CV: regular rate and rhythm, normal S1 S2, no S3 or S4, no murmur, click or rub, no peripheral edema and peripheral pulses strong  MS: no gross musculoskeletal defects noted, no edema  SKIN: Large ecchymotic area which extends from shoulder down to forearm.  Ecchymosis is fading and yellow in color from shoulder to mid humerus, remainder of bruising extending to lower " forearm is blue and purple.  Tenderness noted with palpation of upper arm.  No suspicious lesions or rashes    Diagnostic Test Results:  Xray - HUMERUS RIGHT TWO OR MORE VIEWS   9/19/2018 10:27 AM      HISTORY: Pain and swelling of upper extremity.     COMPARISON: None.         IMPRESSION: Negative for fracture or any acute process. There is some  benign bony thickening along the mid shaft humerus which is felt to be  due to a muscular insertion site. No evidence for osteomyelitis or any  intraosseous lesions.     MAYITO SUAREZ MD    ASSESSMENT/PLAN:     ASSESSMENT:  1. Pain of right upper arm.  X-ray results reviewed after clinic hours and is negative for fracture.  As noted above, benign bony thickening along the midshaft humerus felt to be due to a muscular insertion site.  I think we should proceed with an ultrasound.  Will place order and ask staff to call patient in the morning.   2. Pain and swelling of upper extremity, right        PLAN:  Orders Placed This Encounter     XR Humerus Right G/E 2 Views     gabapentin (NEURONTIN) 300 MG capsule       Patient Instructions   Increase the gabapentin to 600 mg (2 - 300 mg capsules) twice daily.    We will call you with the results of your x ray    Patient agrees with plan of care as outlined. Call or return to the clinic with any worsening of symptoms or no resolution. Medication side effects reviewed.      Carmen Reis, DEBO, APRN [facility]

## 2018-09-20 ENCOUNTER — TELEPHONE (OUTPATIENT)
Dept: FAMILY MEDICINE | Facility: CLINIC | Age: 73
End: 2018-09-20

## 2018-09-20 NOTE — TELEPHONE ENCOUNTER
Patient is called and told of negative xray and the need for US. Number to schedule is given. Alexandra QUESADA RN

## 2018-09-20 NOTE — TELEPHONE ENCOUNTER
Message  Received: Yesterday       Carmen Reis, APRN CNP  P Cl Provider Careteam Pool                   Team, please call patient- no fracture on XR, would like to do US.   Nacho JONAS, any other suggestions?

## 2018-09-25 ENCOUNTER — OFFICE VISIT (OUTPATIENT)
Dept: FAMILY MEDICINE | Facility: CLINIC | Age: 73
End: 2018-09-25
Payer: COMMERCIAL

## 2018-09-25 VITALS
TEMPERATURE: 98.6 F | RESPIRATION RATE: 12 BRPM | HEIGHT: 70 IN | HEART RATE: 74 BPM | OXYGEN SATURATION: 97 % | SYSTOLIC BLOOD PRESSURE: 120 MMHG | WEIGHT: 173 LBS | DIASTOLIC BLOOD PRESSURE: 68 MMHG | BODY MASS INDEX: 24.77 KG/M2

## 2018-09-25 DIAGNOSIS — S49.91XD INJURY OF RIGHT UPPER EXTREMITY, SUBSEQUENT ENCOUNTER: Primary | ICD-10-CM

## 2018-09-25 PROCEDURE — 99213 OFFICE O/P EST LOW 20 MIN: CPT | Performed by: FAMILY MEDICINE

## 2018-09-25 NOTE — MR AVS SNAPSHOT
After Visit Summary   9/25/2018    Josemanuel Edmond    MRN: [id number]           Patient Information     Date Of Birth          1945        Visit Information        Provider Department      9/25/2018 11:00 AM Dominguez Verdugo MD [facility]        Today's Diagnoses     Injury of right upper extremity, subsequent encounter    -  1       Follow-ups after your visit        Your next 10 appointments already scheduled     Oct 12, 2018   Procedure with Jeb Peralta MD   [facility] (--)    [address] [id number] [phone]           The medical center is located at [address] (between [address] in [place], [place] north of [place]).            Nov 15, 2018  1:00 PM CST   SHORT with AVIS Martínez MD   [facility] ([facility])    [address]  [place] [address] [phone]              Who to contact     If you have questions or need follow up information about today's clinic visit or your schedule please contact [facility] directly at [phone].  Normal or non-critical lab and imaging results will be communicated to you by MyChart, letter or phone within 4 business days after the clinic has received the results. If you do not hear from us within 7 days, please contact the clinic through MyChart or phone. If you have a critical or abnormal lab result, we will notify you by phone as soon as possible.  Submit refill requests through [employer] or call your pharmacy and they will forward the refill request to us. Please allow 3 business days for your refill to be completed.          Additional Information About Your Visit        MyChart Information     [employer] gives you secure access to your electronic health record. If you see a primary care provider, you can also send messages to your care team and make appointments. If you have questions,  "please call your primary care clinic.  If you do not have a primary care provider, please call [phone] and they will assist you.        Care EveryWhere ID     This is your Care EveryWhere ID. This could be used by other organizations to access your [facility] medical records  [id number]        Your Vitals Were     Pulse Temperature Respirations Height Pulse Oximetry BMI (Body Mass Index)    74 98.6  F (37  C) (Tympanic) 12 5' 10\" (1.778 m) 97% 24.82 kg/m2       Blood Pressure from Last 3 Encounters:   09/25/18 120/68   09/19/18 122/72   09/14/18 120/72    Weight from Last 3 Encounters:   09/25/18 173 lb (78.5 kg)   09/19/18 173 lb (78.5 kg)   09/14/18 169 lb (76.7 kg)              Today, you had the following     No orders found for display         Today's Medication Changes          These changes are accurate as of 9/25/18  1:20 PM.  If you have any questions, ask your nurse or doctor.               Start taking these medicines.        Dose/Directions    acetaminophen-codeine 300-30 MG per tablet   Commonly known as:  TYLENOL #3   Used for:  Injury of right upper extremity, subsequent encounter   Started by:  Dominguez Verdugo MD        Dose:  1-2 tablet   Take 1-2 tablets by mouth every 4 hours as needed for severe pain   Quantity:  20 tablet   Refills:  0            Where to get your medicines      Some of these will need a paper prescription and others can be bought over the counter.  Ask your nurse if you have questions.     Bring a paper prescription for each of these medications     acetaminophen-codeine 300-30 MG per tablet               Information about OPIOIDS     PRESCRIPTION OPIOIDS: WHAT YOU NEED TO KNOW   We gave you an opioid (narcotic) pain medicine. It is important to manage your pain, but opioids are not always the best choice. You should first try all the other options your care team gave you. Take this medicine for as short a time (and as few doses) as possible.    Some activities can " increase your pain, such as bandage changes or therapy sessions. It may help to take your pain medicine 30 to 60 minutes before these activities. Reduce your stress by getting enough sleep, working on hobbies you enjoy and practicing relaxation or meditation. Talk to your care team about ways to manage your pain beyond prescription opioids.    These medicines have risks:    DO NOT drive when on new or higher doses of pain medicine. These medicines can affect your alertness and reaction times, and you could be arrested for driving under the influence (DUI). If you need to use opioids long-term, talk to your care team about driving.    DO NOT operate heavy machinery    DO NOT do any other dangerous activities while taking these medicines.    DO NOT drink any alcohol while taking these medicines.     If the opioid prescribed includes acetaminophen, DO NOT take with any other medicines that contain acetaminophen. Read all labels carefully. Look for the word  acetaminophen  or  Tylenol.  Ask your pharmacist if you have questions or are unsure.    You can get addicted to pain medicines, especially if you have a history of addiction (chemical, alcohol or substance dependence). Talk to your care team about ways to reduce this risk.    All opioids tend to cause constipation. Drink plenty of water and eat foods that have a lot of fiber, such as fruits, vegetables, prune juice, apple juice and high-fiber cereal. Take a laxative (Miralax, milk of magnesia, Colace, Senna) if you don t move your bowels at least every other day. Other side effects include upset stomach, sleepiness, dizziness, throwing up, tolerance (needing more of the medicine to have the same effect), physical dependence and slowed breathing.    Store your pills in a secure place, locked if possible. We will not replace any lost or stolen medicine. If you don t finish your medicine, please throw away (dispose) as directed by your pharmacist. The [place]  Pollution Control Agency has more information about safe disposal: [email or web address]://[email or web address].[email or web address]/living-[email or web address]/managing-unwanted-medications         Primary Care Provider Office Phone # Fax #    R Terry Martínez [phone] [address] [address]        Equal Access to Services     MERARI NASCIMENTO : Hadii aad ku hadasho Soomaali, waaxda luqadaha, qaybta kaalmada adeegyada, waxpeg tuan terin adepraveena vincent adam . So [facility] [phone].    ATENCIÓN: Si habla español, tiene a sen disposición servicios gratuitos de asistencia lingüística. Saira al [phone].    We comply with applicable federal civil rights laws and [place] laws. We do not discriminate on the basis of race, color, national origin, age, disability, sex, sexual orientation, or gender identity.            Thank you!     Thank you for choosing [facility]  for your care. Our goal is always to provide you with excellent care. Hearing back from our patients is one way we can continue to improve our services. Please take a few minutes to complete the written survey that you may receive in the mail after your visit with us. Thank you!             Your Updated Medication List - Protect others around you: Learn how to safely use, store and throw away your medicines at www.disposemymeds.org.          This list is accurate as of 9/25/18  1:20 PM.  Always use your most recent med list.                   Brand Name Dispense Instructions for use Diagnosis    acetaminophen-codeine 300-30 MG per tablet    TYLENOL #3    20 tablet    Take 1-2 tablets by mouth every 4 hours as needed for severe pain    Injury of right upper extremity, subsequent encounter       buPROPion 150 MG 12 hr tablet    WELLBUTRIN SR    60 tablet    Take 1 tablet (150 mg) by mouth 2 times daily    Moderate major depression (H), Anxiety       doxazosin 4 MG tablet    CARDURA    90 tablet    Take 1 tablet (4 mg) by mouth daily    Hypertrophy of  prostate with urinary obstruction       escitalopram 10 MG tablet    LEXAPRO    90 tablet    Take 1 tablet (10 mg) by mouth daily    Anxiety       finasteride 5 MG tablet    PROSCAR    90 tablet    Take 1 tablet (5 mg) by mouth daily    Benign prostatic hyperplasia with weak urinary stream       * gabapentin 300 MG capsule    NEURONTIN    60 capsule    Take 1 capsule (300 mg) by mouth 2 times daily    Other polyneuropathy       * gabapentin 300 MG capsule    NEURONTIN    270 capsule    Take 2 capsules (600 mg) by mouth 2 times daily    Pain of right upper arm       lamoTRIgine 100 MG tablet    LAMICTAL    30 tablet    Take 1/2 tablet daily for 2 weeks, then 1/2 tablet twice daily    Moderate major depression (H)       metoprolol tartrate 25 MG tablet    LOPRESSOR     Take 25 mg by mouth 2 times daily        rivaroxaban ANTICOAGULANT 20 MG Tabs tablet    XARELTO    90 tablet    Take 1 tablet (20 mg) by mouth daily (with dinner)    Chronic atrial fibrillation (H)       STATIN NOT PRESCRIBED (INTENTIONAL)      Please choose reason not prescribed, below    Chronic atrial fibrillation (H)       zolpidem 10 MG tablet    AMBIEN    30 tablet    Take 1 tablet (10 mg) by mouth nightly as needed for sleep    Primary insomnia       * Notice:  This list has 2 medication(s) that are the same as other medications prescribed for you. Read the directions carefully, and ask your doctor or other care provider to review them with you.

## 2018-09-25 NOTE — PROGRESS NOTES
"  SUBJECTIVE:   Josemanuel Edmond is a 73 year old male who presents to clinic today for the following health issues:      Concern - pain in right arm  Onset: 3 weeks    Description:   Follow up on pain in right arm    Intensity: moderate    Progression of Symptoms:  worsening    Accompanying Signs & Symptoms:  Difficult to lift arm    Previous history of similar problem:   Yes, x ray done, and EkG done came back normal    Precipitating factors:   Worsened by: lifting, moving around makes pain in arm worse    Alleviating factors:  Improved by: tylenol helps for awhile    Therapies Tried and outcome: heat makes it worse         Problem list and histories reviewed & adjusted, as indicated.  Additional history:         Reviewed and updated as needed this visit by clinical staff  Tobacco  Allergies  Meds  Med Hx  Surg Hx  Fam Hx  Soc Hx      Reviewed and updated as needed this visit by Provider      Further history obtained, clarified or corrected by physician:  He continues to have pain in the right arm mostly in the upper arm near the glenohumeral joint.    OBJECTIVE:  /68 (BP Location: Right arm, Patient Position: Chair, Cuff Size: Adult Regular)  Pulse 74  Temp 98.6  F (37  C) (Tympanic)  Resp 12  Ht 5' 10\" (1.778 m)  Wt 173 lb (78.5 kg)  SpO2 97%  BMI 24.82 kg/m2  LUNGS: clear to auscultation, normal breath sounds  CV: RRR without murmur  ABD: BS+, soft, nontender, no masses, no hepatosplenomegaly  EXT: Ecchymosis persists in the right upper arm and somewhat in the right forearm.  There is tenderness at the proximal biceps near the glenohumeral joint.  He has full range of motion of the shoulder.    ASSESSMENT:  Injury of right upper extremity, subsequent encounter    PLAN:  I told him I think he has a muscle tear given the amount of bleeding that he had and it is likely that it the injury that is continuing to be so painful though the extravasated blood is likely causing pain also  He can use " Tylenol No. 3 for more severe pain though he is warned about maximal doses of acetaminophen per day  Motion for the arm is okay but he needs to avoid strain  I told him he likely has another 3 weeks for healing of the muscle tear.    Orders Placed This Encounter     acetaminophen-codeine (TYLENOL #3) 300-30 MG per tablet

## 2018-10-03 ENCOUNTER — ANESTHESIA EVENT (OUTPATIENT)
Dept: SURGERY | Facility: CLINIC | Age: 73
DRG: 470 | End: 2018-10-03
Payer: MEDICARE

## 2018-10-12 ENCOUNTER — HOSPITAL ENCOUNTER (INPATIENT)
Facility: CLINIC | Age: 73
LOS: 2 days | Discharge: HOME | DRG: 470 | End: 2018-10-14
Attending: ORTHOPAEDIC SURGERY | Admitting: ORTHOPAEDIC SURGERY
Payer: MEDICARE

## 2018-10-12 ENCOUNTER — ANESTHESIA (OUTPATIENT)
Dept: SURGERY | Facility: CLINIC | Age: 73
DRG: 470 | End: 2018-10-12
Payer: MEDICARE

## 2018-10-12 DIAGNOSIS — K59.03 DRUG-INDUCED CONSTIPATION: ICD-10-CM

## 2018-10-12 DIAGNOSIS — Z96.651 STATUS POST RIGHT KNEE REPLACEMENT: Primary | ICD-10-CM

## 2018-10-12 PROBLEM — M17.11 RIGHT KNEE DJD: Status: ACTIVE | Noted: 2018-10-12

## 2018-10-12 PROBLEM — G62.9 PERIPHERAL NEUROPATHY: Status: ACTIVE | Noted: 2018-10-12

## 2018-10-12 PROBLEM — F10.21 ALCOHOLISM IN REMISSION (H): Status: ACTIVE | Noted: 2017-11-28

## 2018-10-12 PROBLEM — G47.9 SLEEP DISTURBANCE: Status: ACTIVE | Noted: 2018-10-12

## 2018-10-12 PROCEDURE — 25000125 ZZHC RX 250: Performed by: ORTHOPAEDIC SURGERY

## 2018-10-12 PROCEDURE — 25000128 H RX IP 250 OP 636: Performed by: NURSE ANESTHETIST, CERTIFIED REGISTERED

## 2018-10-12 PROCEDURE — A9270 NON-COVERED ITEM OR SERVICE: HCPCS | Mod: GY | Performed by: ORTHOPAEDIC SURGERY

## 2018-10-12 PROCEDURE — 25000128 H RX IP 250 OP 636: Performed by: ORTHOPAEDIC SURGERY

## 2018-10-12 PROCEDURE — 71000015 ZZH RECOVERY PHASE 1 LEVEL 2 EA ADDTL HR: Performed by: ORTHOPAEDIC SURGERY

## 2018-10-12 PROCEDURE — 25000125 ZZHC RX 250: Performed by: NURSE ANESTHETIST, CERTIFIED REGISTERED

## 2018-10-12 PROCEDURE — 25000132 ZZH RX MED GY IP 250 OP 250 PS 637: Mod: GY | Performed by: NURSE ANESTHETIST, CERTIFIED REGISTERED

## 2018-10-12 PROCEDURE — C1776 JOINT DEVICE (IMPLANTABLE): HCPCS | Performed by: ORTHOPAEDIC SURGERY

## 2018-10-12 PROCEDURE — 36000063 ZZH SURGERY LEVEL 4 EA 15 ADDTL MIN: Performed by: ORTHOPAEDIC SURGERY

## 2018-10-12 PROCEDURE — 27810169 ZZH OR IMPLANT GENERAL: Performed by: ORTHOPAEDIC SURGERY

## 2018-10-12 PROCEDURE — 40000306 ZZH STATISTIC PRE PROC ASSESS II: Performed by: ORTHOPAEDIC SURGERY

## 2018-10-12 PROCEDURE — 71000014 ZZH RECOVERY PHASE 1 LEVEL 2 FIRST HR: Performed by: ORTHOPAEDIC SURGERY

## 2018-10-12 PROCEDURE — 0SRC0J9 REPLACEMENT OF RIGHT KNEE JOINT WITH SYNTHETIC SUBSTITUTE, CEMENTED, OPEN APPROACH: ICD-10-PCS | Performed by: ORTHOPAEDIC SURGERY

## 2018-10-12 PROCEDURE — 25000128 H RX IP 250 OP 636: Performed by: PHYSICIAN ASSISTANT

## 2018-10-12 PROCEDURE — 27110028 ZZH OR GENERAL SUPPLY NON-STERILE: Performed by: ORTHOPAEDIC SURGERY

## 2018-10-12 PROCEDURE — 25800025 ZZH RX 258: Performed by: ORTHOPAEDIC SURGERY

## 2018-10-12 PROCEDURE — 37000009 ZZH ANESTHESIA TECHNICAL FEE, EACH ADDTL 15 MIN: Performed by: ORTHOPAEDIC SURGERY

## 2018-10-12 PROCEDURE — A9270 NON-COVERED ITEM OR SERVICE: HCPCS | Mod: GY | Performed by: PHYSICIAN ASSISTANT

## 2018-10-12 PROCEDURE — 25000132 ZZH RX MED GY IP 250 OP 250 PS 637: Mod: GY | Performed by: ORTHOPAEDIC SURGERY

## 2018-10-12 PROCEDURE — 37000008 ZZH ANESTHESIA TECHNICAL FEE, 1ST 30 MIN: Performed by: ORTHOPAEDIC SURGERY

## 2018-10-12 PROCEDURE — 27210794 ZZH OR GENERAL SUPPLY STERILE: Performed by: ORTHOPAEDIC SURGERY

## 2018-10-12 PROCEDURE — 12000007 ZZH R&B INTERMEDIATE

## 2018-10-12 PROCEDURE — A9270 NON-COVERED ITEM OR SERVICE: HCPCS | Mod: GY | Performed by: NURSE ANESTHETIST, CERTIFIED REGISTERED

## 2018-10-12 PROCEDURE — 36000093 ZZH SURGERY LEVEL 4 1ST 30 MIN: Performed by: ORTHOPAEDIC SURGERY

## 2018-10-12 PROCEDURE — 25000132 ZZH RX MED GY IP 250 OP 250 PS 637: Mod: GY | Performed by: PHYSICIAN ASSISTANT

## 2018-10-12 DEVICE — BONE CEMENT DEPUY FAST SET 20GM CMW2: Type: IMPLANTABLE DEVICE | Site: KNEE | Status: FUNCTIONAL

## 2018-10-12 DEVICE — IMPLANTABLE DEVICE: Type: IMPLANTABLE DEVICE | Site: KNEE | Status: FUNCTIONAL

## 2018-10-12 RX ORDER — BUPROPION HYDROCHLORIDE 150 MG/1
150 TABLET, EXTENDED RELEASE ORAL 2 TIMES DAILY
Status: DISCONTINUED | OUTPATIENT
Start: 2018-10-12 | End: 2018-10-14 | Stop reason: HOSPADM

## 2018-10-12 RX ORDER — ONDANSETRON 2 MG/ML
4 INJECTION INTRAMUSCULAR; INTRAVENOUS EVERY 30 MIN PRN
Status: DISCONTINUED | OUTPATIENT
Start: 2018-10-12 | End: 2018-10-12 | Stop reason: HOSPADM

## 2018-10-12 RX ORDER — ONDANSETRON 4 MG/1
4 TABLET, ORALLY DISINTEGRATING ORAL EVERY 30 MIN PRN
Status: DISCONTINUED | OUTPATIENT
Start: 2018-10-12 | End: 2018-10-12 | Stop reason: HOSPADM

## 2018-10-12 RX ORDER — DIMENHYDRINATE 50 MG/ML
25 INJECTION, SOLUTION INTRAMUSCULAR; INTRAVENOUS
Status: DISCONTINUED | OUTPATIENT
Start: 2018-10-12 | End: 2018-10-12 | Stop reason: HOSPADM

## 2018-10-12 RX ORDER — LAMOTRIGINE 50 MG/1
50 TABLET, ORALLY DISINTEGRATING ORAL 2 TIMES DAILY
Status: DISCONTINUED | OUTPATIENT
Start: 2018-10-12 | End: 2018-10-14 | Stop reason: HOSPADM

## 2018-10-12 RX ORDER — DEXAMETHASONE SODIUM PHOSPHATE 4 MG/ML
INJECTION, SOLUTION INTRA-ARTICULAR; INTRALESIONAL; INTRAMUSCULAR; INTRAVENOUS; SOFT TISSUE PRN
Status: DISCONTINUED | OUTPATIENT
Start: 2018-10-12 | End: 2018-10-12

## 2018-10-12 RX ORDER — FENTANYL CITRATE 50 UG/ML
INJECTION, SOLUTION INTRAMUSCULAR; INTRAVENOUS PRN
Status: DISCONTINUED | OUTPATIENT
Start: 2018-10-12 | End: 2018-10-12

## 2018-10-12 RX ORDER — EPINEPHRINE 1 MG/ML
INJECTION, SOLUTION, CONCENTRATE INTRAVENOUS PRN
Status: DISCONTINUED | OUTPATIENT
Start: 2018-10-12 | End: 2018-10-12

## 2018-10-12 RX ORDER — HYDROMORPHONE HYDROCHLORIDE 2 MG/1
2-4 TABLET ORAL EVERY 4 HOURS PRN
Status: DISCONTINUED | OUTPATIENT
Start: 2018-10-12 | End: 2018-10-13 | Stop reason: ALTCHOICE

## 2018-10-12 RX ORDER — ACETAMINOPHEN 325 MG/1
650 TABLET ORAL EVERY 4 HOURS PRN
Status: DISCONTINUED | OUTPATIENT
Start: 2018-10-15 | End: 2018-10-14 | Stop reason: HOSPADM

## 2018-10-12 RX ORDER — LIDOCAINE HYDROCHLORIDE 10 MG/ML
INJECTION, SOLUTION INFILTRATION; PERINEURAL PRN
Status: DISCONTINUED | OUTPATIENT
Start: 2018-10-12 | End: 2018-10-12

## 2018-10-12 RX ORDER — HYDROXYZINE HYDROCHLORIDE 50 MG/1
50 TABLET, FILM COATED ORAL EVERY 6 HOURS PRN
Status: DISCONTINUED | OUTPATIENT
Start: 2018-10-12 | End: 2018-10-12 | Stop reason: DRUGHIGH

## 2018-10-12 RX ORDER — NALOXONE HYDROCHLORIDE 0.4 MG/ML
.1-.4 INJECTION, SOLUTION INTRAMUSCULAR; INTRAVENOUS; SUBCUTANEOUS
Status: ACTIVE | OUTPATIENT
Start: 2018-10-12 | End: 2018-10-13

## 2018-10-12 RX ORDER — CEFAZOLIN SODIUM 2 G/100ML
2 INJECTION, SOLUTION INTRAVENOUS
Status: COMPLETED | OUTPATIENT
Start: 2018-10-12 | End: 2018-10-12

## 2018-10-12 RX ORDER — AMOXICILLIN 250 MG
1 CAPSULE ORAL 2 TIMES DAILY
Status: DISCONTINUED | OUTPATIENT
Start: 2018-10-12 | End: 2018-10-14 | Stop reason: HOSPADM

## 2018-10-12 RX ORDER — FENTANYL CITRATE 50 UG/ML
25-50 INJECTION, SOLUTION INTRAMUSCULAR; INTRAVENOUS
Status: DISCONTINUED | OUTPATIENT
Start: 2018-10-12 | End: 2018-10-12 | Stop reason: HOSPADM

## 2018-10-12 RX ORDER — AMOXICILLIN 250 MG
2 CAPSULE ORAL 2 TIMES DAILY
Status: DISCONTINUED | OUTPATIENT
Start: 2018-10-12 | End: 2018-10-14 | Stop reason: HOSPADM

## 2018-10-12 RX ORDER — DEXAMETHASONE SODIUM PHOSPHATE 4 MG/ML
4 INJECTION, SOLUTION INTRA-ARTICULAR; INTRALESIONAL; INTRAMUSCULAR; INTRAVENOUS; SOFT TISSUE
Status: DISCONTINUED | OUTPATIENT
Start: 2018-10-12 | End: 2018-10-12 | Stop reason: HOSPADM

## 2018-10-12 RX ORDER — HYDROMORPHONE HYDROCHLORIDE 1 MG/ML
.3-.5 INJECTION, SOLUTION INTRAMUSCULAR; INTRAVENOUS; SUBCUTANEOUS EVERY 5 MIN PRN
Status: DISCONTINUED | OUTPATIENT
Start: 2018-10-12 | End: 2018-10-12 | Stop reason: HOSPADM

## 2018-10-12 RX ORDER — GABAPENTIN 100 MG/1
100 CAPSULE ORAL
Status: COMPLETED | OUTPATIENT
Start: 2018-10-12 | End: 2018-10-12

## 2018-10-12 RX ORDER — BUPIVACAINE HYDROCHLORIDE 7.5 MG/ML
INJECTION, SOLUTION INTRASPINAL PRN
Status: DISCONTINUED | OUTPATIENT
Start: 2018-10-12 | End: 2018-10-12

## 2018-10-12 RX ORDER — PROCHLORPERAZINE MALEATE 5 MG
5 TABLET ORAL EVERY 6 HOURS PRN
Status: DISCONTINUED | OUTPATIENT
Start: 2018-10-12 | End: 2018-10-14 | Stop reason: HOSPADM

## 2018-10-12 RX ORDER — CEFAZOLIN SODIUM 1 G/50ML
1 INJECTION, SOLUTION INTRAVENOUS EVERY 8 HOURS
Status: COMPLETED | OUTPATIENT
Start: 2018-10-12 | End: 2018-10-13

## 2018-10-12 RX ORDER — LIDOCAINE 40 MG/G
CREAM TOPICAL
Status: DISCONTINUED | OUTPATIENT
Start: 2018-10-12 | End: 2018-10-14 | Stop reason: HOSPADM

## 2018-10-12 RX ORDER — HYDROXYZINE HYDROCHLORIDE 25 MG/1
25 TABLET, FILM COATED ORAL EVERY 6 HOURS PRN
Status: DISCONTINUED | OUTPATIENT
Start: 2018-10-12 | End: 2018-10-12 | Stop reason: DRUGHIGH

## 2018-10-12 RX ORDER — PROPOFOL 10 MG/ML
INJECTION, EMULSION INTRAVENOUS CONTINUOUS PRN
Status: DISCONTINUED | OUTPATIENT
Start: 2018-10-12 | End: 2018-10-12

## 2018-10-12 RX ORDER — SODIUM CHLORIDE, SODIUM LACTATE, POTASSIUM CHLORIDE, CALCIUM CHLORIDE 600; 310; 30; 20 MG/100ML; MG/100ML; MG/100ML; MG/100ML
INJECTION, SOLUTION INTRAVENOUS CONTINUOUS
Status: DISCONTINUED | OUTPATIENT
Start: 2018-10-12 | End: 2018-10-12 | Stop reason: HOSPADM

## 2018-10-12 RX ORDER — ROPIVACAINE HYDROCHLORIDE 7.5 MG/ML
INJECTION, SOLUTION EPIDURAL; PERINEURAL PRN
Status: DISCONTINUED | OUTPATIENT
Start: 2018-10-12 | End: 2018-10-12

## 2018-10-12 RX ORDER — ACETAMINOPHEN 325 MG/1
975 TABLET ORAL EVERY 8 HOURS
Status: DISCONTINUED | OUTPATIENT
Start: 2018-10-12 | End: 2018-10-14 | Stop reason: HOSPADM

## 2018-10-12 RX ORDER — CEFAZOLIN SODIUM 1 G/50ML
1 INJECTION, SOLUTION INTRAVENOUS SEE ADMIN INSTRUCTIONS
Status: DISCONTINUED | OUTPATIENT
Start: 2018-10-12 | End: 2018-10-12 | Stop reason: HOSPADM

## 2018-10-12 RX ORDER — LIDOCAINE HYDROCHLORIDE AND EPINEPHRINE 15; 5 MG/ML; UG/ML
INJECTION, SOLUTION EPIDURAL PRN
Status: DISCONTINUED | OUTPATIENT
Start: 2018-10-12 | End: 2018-10-12

## 2018-10-12 RX ORDER — PROPOFOL 10 MG/ML
INJECTION, EMULSION INTRAVENOUS PRN
Status: DISCONTINUED | OUTPATIENT
Start: 2018-10-12 | End: 2018-10-12

## 2018-10-12 RX ORDER — ACETAMINOPHEN 500 MG
1000 TABLET ORAL EVERY 8 HOURS PRN
COMMUNITY
End: 2022-11-16

## 2018-10-12 RX ORDER — ONDANSETRON 4 MG/1
4 TABLET, ORALLY DISINTEGRATING ORAL EVERY 6 HOURS PRN
Status: DISCONTINUED | OUTPATIENT
Start: 2018-10-12 | End: 2018-10-14 | Stop reason: HOSPADM

## 2018-10-12 RX ORDER — DEXTROSE MONOHYDRATE, SODIUM CHLORIDE, AND POTASSIUM CHLORIDE 50; 1.49; 4.5 G/1000ML; G/1000ML; G/1000ML
INJECTION, SOLUTION INTRAVENOUS CONTINUOUS
Status: DISCONTINUED | OUTPATIENT
Start: 2018-10-12 | End: 2018-10-13

## 2018-10-12 RX ORDER — DOXAZOSIN 2 MG/1
4 TABLET ORAL DAILY
Status: DISCONTINUED | OUTPATIENT
Start: 2018-10-12 | End: 2018-10-14 | Stop reason: HOSPADM

## 2018-10-12 RX ORDER — ZOLPIDEM TARTRATE 5 MG/1
5 TABLET ORAL
Status: DISCONTINUED | OUTPATIENT
Start: 2018-10-12 | End: 2018-10-14 | Stop reason: HOSPADM

## 2018-10-12 RX ORDER — ONDANSETRON 2 MG/ML
4 INJECTION INTRAMUSCULAR; INTRAVENOUS EVERY 6 HOURS PRN
Status: DISCONTINUED | OUTPATIENT
Start: 2018-10-12 | End: 2018-10-14 | Stop reason: HOSPADM

## 2018-10-12 RX ORDER — LIDOCAINE 40 MG/G
CREAM TOPICAL
Status: DISCONTINUED | OUTPATIENT
Start: 2018-10-12 | End: 2018-10-12 | Stop reason: HOSPADM

## 2018-10-12 RX ORDER — HYDROMORPHONE HYDROCHLORIDE 1 MG/ML
.3-.5 INJECTION, SOLUTION INTRAMUSCULAR; INTRAVENOUS; SUBCUTANEOUS
Status: DISCONTINUED | OUTPATIENT
Start: 2018-10-12 | End: 2018-10-14 | Stop reason: HOSPADM

## 2018-10-12 RX ORDER — GABAPENTIN 100 MG/1
100 CAPSULE ORAL 3 TIMES DAILY
Status: DISCONTINUED | OUTPATIENT
Start: 2018-10-12 | End: 2018-10-12

## 2018-10-12 RX ORDER — MEPERIDINE HYDROCHLORIDE 25 MG/ML
12.5 INJECTION INTRAMUSCULAR; INTRAVENOUS; SUBCUTANEOUS EVERY 5 MIN PRN
Status: DISCONTINUED | OUTPATIENT
Start: 2018-10-12 | End: 2018-10-12 | Stop reason: HOSPADM

## 2018-10-12 RX ORDER — GABAPENTIN 300 MG/1
600 CAPSULE ORAL 2 TIMES DAILY
Status: DISCONTINUED | OUTPATIENT
Start: 2018-10-12 | End: 2018-10-14 | Stop reason: HOSPADM

## 2018-10-12 RX ORDER — NALOXONE HYDROCHLORIDE 0.4 MG/ML
.1-.4 INJECTION, SOLUTION INTRAMUSCULAR; INTRAVENOUS; SUBCUTANEOUS
Status: DISCONTINUED | OUTPATIENT
Start: 2018-10-12 | End: 2018-10-14 | Stop reason: HOSPADM

## 2018-10-12 RX ORDER — ONDANSETRON 2 MG/ML
INJECTION INTRAMUSCULAR; INTRAVENOUS PRN
Status: DISCONTINUED | OUTPATIENT
Start: 2018-10-12 | End: 2018-10-12

## 2018-10-12 RX ORDER — HYDROXYZINE HYDROCHLORIDE 10 MG/1
10 TABLET, FILM COATED ORAL EVERY 6 HOURS PRN
Status: DISCONTINUED | OUTPATIENT
Start: 2018-10-12 | End: 2018-10-14 | Stop reason: HOSPADM

## 2018-10-12 RX ORDER — LAMOTRIGINE 100 MG/1
50 TABLET ORAL 2 TIMES DAILY
Status: DISCONTINUED | OUTPATIENT
Start: 2018-10-12 | End: 2018-10-12

## 2018-10-12 RX ORDER — FINASTERIDE 5 MG/1
5 TABLET, FILM COATED ORAL DAILY
Status: DISCONTINUED | OUTPATIENT
Start: 2018-10-12 | End: 2018-10-14 | Stop reason: HOSPADM

## 2018-10-12 RX ORDER — ESCITALOPRAM OXALATE 10 MG/1
10 TABLET ORAL DAILY
Status: DISCONTINUED | OUTPATIENT
Start: 2018-10-12 | End: 2018-10-14 | Stop reason: HOSPADM

## 2018-10-12 RX ORDER — METOPROLOL TARTRATE 25 MG/1
25 TABLET, FILM COATED ORAL 2 TIMES DAILY
Status: DISCONTINUED | OUTPATIENT
Start: 2018-10-12 | End: 2018-10-14 | Stop reason: HOSPADM

## 2018-10-12 RX ADMIN — HYDROMORPHONE HYDROCHLORIDE 0.5 MG: 1 INJECTION, SOLUTION INTRAMUSCULAR; INTRAVENOUS; SUBCUTANEOUS at 13:33

## 2018-10-12 RX ADMIN — GABAPENTIN 100 MG: 100 CAPSULE ORAL at 15:32

## 2018-10-12 RX ADMIN — CEFAZOLIN SODIUM 1 G: 1 INJECTION, SOLUTION INTRAVENOUS at 20:42

## 2018-10-12 RX ADMIN — DEXAMETHASONE SODIUM PHOSPHATE 4 MG: 4 INJECTION, SOLUTION INTRA-ARTICULAR; INTRALESIONAL; INTRAMUSCULAR; INTRAVENOUS; SOFT TISSUE at 12:20

## 2018-10-12 RX ADMIN — HYDROXYZINE HYDROCHLORIDE 50 MG: 50 TABLET, FILM COATED ORAL at 13:51

## 2018-10-12 RX ADMIN — SODIUM CHLORIDE, POTASSIUM CHLORIDE, SODIUM LACTATE AND CALCIUM CHLORIDE: 600; 310; 30; 20 INJECTION, SOLUTION INTRAVENOUS at 12:41

## 2018-10-12 RX ADMIN — HYDROMORPHONE HYDROCHLORIDE 0.5 MG: 1 INJECTION, SOLUTION INTRAMUSCULAR; INTRAVENOUS; SUBCUTANEOUS at 18:54

## 2018-10-12 RX ADMIN — PROPOFOL 100 MCG/KG/MIN: 10 INJECTION, EMULSION INTRAVENOUS at 12:20

## 2018-10-12 RX ADMIN — BUPROPION HYDROCHLORIDE 150 MG: 150 TABLET, FILM COATED, EXTENDED RELEASE ORAL at 20:38

## 2018-10-12 RX ADMIN — FENTANYL CITRATE 50 MCG: 50 INJECTION, SOLUTION INTRAMUSCULAR; INTRAVENOUS at 13:33

## 2018-10-12 RX ADMIN — HYDROMORPHONE HYDROCHLORIDE 0.5 MG: 1 INJECTION, SOLUTION INTRAMUSCULAR; INTRAVENOUS; SUBCUTANEOUS at 20:36

## 2018-10-12 RX ADMIN — LIDOCAINE HYDROCHLORIDE AND EPINEPHRINE 5 ML: 15; 5 INJECTION, SOLUTION EPIDURAL at 13:25

## 2018-10-12 RX ADMIN — BUPIVACAINE HYDROCHLORIDE IN DEXTROSE 1.6 ML: 7.5 INJECTION, SOLUTION SUBARACHNOID at 12:17

## 2018-10-12 RX ADMIN — LAMOTRIGINE 50 MG: 50 TABLET, ORALLY DISINTEGRATING ORAL at 20:40

## 2018-10-12 RX ADMIN — ONDANSETRON 4 MG: 2 INJECTION INTRAMUSCULAR; INTRAVENOUS at 12:05

## 2018-10-12 RX ADMIN — FINASTERIDE 5 MG: 5 TABLET, FILM COATED ORAL at 16:09

## 2018-10-12 RX ADMIN — HYDROMORPHONE HYDROCHLORIDE 0.5 MG: 1 INJECTION, SOLUTION INTRAMUSCULAR; INTRAVENOUS; SUBCUTANEOUS at 13:51

## 2018-10-12 RX ADMIN — LIDOCAINE HYDROCHLORIDE 100 MG: 10 INJECTION, SOLUTION INFILTRATION; PERINEURAL at 12:20

## 2018-10-12 RX ADMIN — MIDAZOLAM 2 MG: 1 INJECTION INTRAMUSCULAR; INTRAVENOUS at 12:39

## 2018-10-12 RX ADMIN — ACETAMINOPHEN 975 MG: 325 TABLET, FILM COATED ORAL at 15:32

## 2018-10-12 RX ADMIN — CEFAZOLIN SODIUM 2 G: 2 INJECTION, SOLUTION INTRAVENOUS at 11:57

## 2018-10-12 RX ADMIN — MIDAZOLAM 2 MG: 1 INJECTION INTRAMUSCULAR; INTRAVENOUS at 11:57

## 2018-10-12 RX ADMIN — LIDOCAINE HYDROCHLORIDE 0.1 ML: 10 INJECTION, SOLUTION EPIDURAL; INFILTRATION; INTRACAUDAL; PERINEURAL at 10:58

## 2018-10-12 RX ADMIN — ROPIVACAINE HYDROCHLORIDE 20 ML: 7.5 INJECTION, SOLUTION EPIDURAL; PERINEURAL at 13:26

## 2018-10-12 RX ADMIN — FENTANYL CITRATE 50 MCG: 50 INJECTION, SOLUTION INTRAMUSCULAR; INTRAVENOUS at 13:36

## 2018-10-12 RX ADMIN — GABAPENTIN 100 MG: 100 CAPSULE ORAL at 11:23

## 2018-10-12 RX ADMIN — HYDROMORPHONE HYDROCHLORIDE 4 MG: 2 TABLET ORAL at 22:41

## 2018-10-12 RX ADMIN — POTASSIUM CHLORIDE, DEXTROSE MONOHYDRATE AND SODIUM CHLORIDE: 150; 5; 450 INJECTION, SOLUTION INTRAVENOUS at 15:33

## 2018-10-12 RX ADMIN — HYDROMORPHONE HYDROCHLORIDE 2 MG: 2 TABLET ORAL at 18:11

## 2018-10-12 RX ADMIN — GABAPENTIN 600 MG: 300 CAPSULE ORAL at 20:40

## 2018-10-12 RX ADMIN — ESCITALOPRAM OXALATE 10 MG: 10 TABLET ORAL at 16:09

## 2018-10-12 RX ADMIN — SENNOSIDES AND DOCUSATE SODIUM 2 TABLET: 8.6; 5 TABLET ORAL at 17:53

## 2018-10-12 RX ADMIN — EPINEPHRINE 0.2 MG: 1 INJECTION, SOLUTION INTRAMUSCULAR; SUBCUTANEOUS at 12:17

## 2018-10-12 RX ADMIN — PROPOFOL 40 MG: 10 INJECTION, EMULSION INTRAVENOUS at 12:20

## 2018-10-12 RX ADMIN — ACETAMINOPHEN 975 MG: 325 TABLET, FILM COATED ORAL at 22:40

## 2018-10-12 RX ADMIN — SODIUM CHLORIDE, POTASSIUM CHLORIDE, SODIUM LACTATE AND CALCIUM CHLORIDE: 600; 310; 30; 20 INJECTION, SOLUTION INTRAVENOUS at 10:57

## 2018-10-12 RX ADMIN — METOPROLOL TARTRATE 25 MG: 25 TABLET ORAL at 20:38

## 2018-10-12 RX ADMIN — DOXAZOSIN 4 MG: 2 TABLET ORAL at 16:09

## 2018-10-12 RX ADMIN — FENTANYL CITRATE 100 MCG: 50 INJECTION, SOLUTION INTRAMUSCULAR; INTRAVENOUS at 12:07

## 2018-10-12 ASSESSMENT — LIFESTYLE VARIABLES: TOBACCO_USE: 1

## 2018-10-12 ASSESSMENT — ACTIVITIES OF DAILY LIVING (ADL)
ADLS_ACUITY_SCORE: 12
RETIRED_EATING: 0-->INDEPENDENT
ADLS_ACUITY_SCORE: 10
WHICH_OF_THE_ABOVE_FUNCTIONAL_RISKS_HAD_A_RECENT_ONSET_OR_CHANGE?: AMBULATION;TRANSFERRING

## 2018-10-12 ASSESSMENT — ENCOUNTER SYMPTOMS: DYSRHYTHMIAS: 1

## 2018-10-12 NOTE — IP AVS SNAPSHOT
[facility]    [address] [address]    Phone:  [phone]    Fax:  [phone]                                       After Visit Summary   10/12/2018    Josemanuel Edmond    MRN: [id number]           After Visit Summary Signature Page     I have received my discharge instructions, and my questions have been answered. I have discussed any challenges I see with this plan with the nurse or doctor.    ..........................................................................................................................................  Patient/Patient Representative Signature      ..........................................................................................................................................  Patient Representative Print Name and Relationship to Patient    ..................................................               ................................................  Date                                   Time    ..........................................................................................................................................  Reviewed by Signature/Title    ...................................................              ..............................................  Date                                               Time          22EPIC Rev 08/18

## 2018-10-12 NOTE — ANESTHESIA CARE TRANSFER NOTE
Patient: Josemanuel Edmond    Procedure(s):  Right Total Knee Arthroplasty - Wound Class: I-Clean    Diagnosis: osteoarthritis right knee  Diagnosis Additional Information: No value filed.    Anesthesia Type:   Spinal, Periph. Nerve Block for postop pain     Note:  Airway :Nasal Cannula  Patient transferred to:PACU  Handoff Report: Identifed the Patient, Identified the Reponsible Provider, Reviewed the pertinent medical history, Discussed the surgical course, Reviewed Intra-OP anesthesia mangement and issues during anesthesia, Set expectations for post-procedure period and Allowed opportunity for questions and acknowledgement of understanding      Vitals: (Last set prior to Anesthesia Care Transfer)    CRNA VITALS  10/12/2018 1244 - 10/12/2018 1315      10/12/2018             Pulse: 68    SpO2: 98 %                Electronically Signed By: KAYLA Hargrove CRNA  October 12, 2018  1:15 PM

## 2018-10-12 NOTE — ANESTHESIA PREPROCEDURE EVALUATION
Anesthesia Evaluation     . Pt has had prior anesthetic. Type: General, Regional and MAC    No history of anesthetic complications          ROS/MED HX    ENT/Pulmonary:     (+)allergic rhinitis, tobacco use, Past use , . Other pulmonary disease Thoracotomy for tharacentesis secondary to pneumonia.    Neurologic:  - neg neurologic ROS     Cardiovascular:     (+) Dyslipidemia, hypertension----. Taking blood thinners : . . . :. dysrhythmias a-fib, . Previous cardiac testing Echodate:12/21/17results:Interpretation Summary     1. Normal left ventricle size and systolic function. LV ejection fraction 60-  65%. Grade 2 diastolic dysfunction.  2. No regional wall motion abnormalities.  3. Normal right ventricular size and systolic function.  4. Trileaflet aortic valve with sclerosis, no stenosis. Mild to moderate  aortic regurgitation.  5. Mild aortic root dilatation (4.0 centimeters). Mild ascending aortic  dilatation (4.0 centimeters).  6. Moderate mitral and tricuspid regurgitation.  7. Severe biatrial enlargement.     There is no comparison study available.  _____________________________________________________________________________  __        Left Ventricle  The left ventricle is normal in size. There is normal left ventricular wall  thickness. Left ventricular systolic function is normal. The visual ejection  fraction is estimated at 60-65%. Grade II or moderate diastolic dysfunction.  No regional wall motion abnormalities noted.     Right Ventricle  The right ventricle is normal in size and function.     Atria  The left atrium is severely dilated. The right atrium is severely dilated.  There is no color Doppler evidence of an atrial shunt.     Mitral Valve  The mitral valve leaflets appear normal. There is no evidence of stenosis,  fluttering, or prolapse. There is moderate (2+) mitral regurgitation.        Tricuspid Valve  Normal tricuspid valve. There is moderate (2+) tricuspid regurgitation. The  right  ventricular systolic pressure is approximated at 30.2 mmHg plus the  right atrial pressure.     Aortic Valve  The aortic valve is trileaflet with aortic valve sclerosis. There is mild to  moderate (1-2+) aortic regurgitation. No aortic stenosis is present.     Pulmonic Valve  The pulmonic valve is not well seen, but is grossly normal. There is trace  pulmonic valvular regurgitation. Normal pulmonic valve velocity.     Vessels  Mild aortic root dilatation. (4.0 centimeters). The ascending aorta is Mildly  dilated. (4.0 centimeters). Dilation of the inferior vena cava is present with  normal respiratory variation in diameter.     Pericardium  There is no pericardial effusion.        Rhythm  Sinus rhythm was noted.date: results:ECG reviewed date:12/20/17 results:Atrial Rhythm -First degree A-V block - occasional ectopic ventricular beat     P:QRS - 1:1, Abnormal P axis, H Rate 62   Judy = 294  -Nonspecific QRS widening.    -  Nonspecific T-abnormality. date: results:          METS/Exercise Tolerance:  >4 METS   Hematologic:  - neg hematologic  ROS       Musculoskeletal:   (+) arthritis, , , -       GI/Hepatic:     (+) GERD liver disease, Other GI/Hepatic ETOH remission      Renal/Genitourinary:     (+) BPH,       Endo:  - neg endo ROS       Psychiatric:     (+) psychiatric history anxiety and depression      Infectious Disease:  - neg infectious disease ROS       Malignancy:   (+) Malignancy History of Prostate  Prostate CA status post Surgery,         Other:    - neg other ROS                 Physical Exam  Normal systems: cardiovascular, pulmonary and dental    Airway   Mallampati: II  TM distance: >3 FB  Neck ROM: full    Dental     Cardiovascular       Pulmonary                     Anesthesia Plan      History & Physical Review  History and physical reviewed and following examination; no interval change.    ASA Status:  3 .    NPO Status:  > 6 hours    Plan for Spinal and Periph. Nerve Block for postop pain   PONV  prophylaxis:  Ondansetron (or other 5HT-3) and Dexamethasone or Solumedrol       Postoperative Care  Postoperative pain management:  IV analgesics, Oral pain medications, Multi-modal analgesia and Peripheral nerve block (Single Shot).      Consents  Anesthetic plan, risks, benefits and alternatives discussed with:  Patient..                          .

## 2018-10-12 NOTE — H&P (VIEW-ONLY)
[facility]  [address]  [facility] [address]  [phone]  Dept: [phone]    PRE-OP EVALUATION:  Today's date: 2018    Josemanuel Edmond (: 1945) presents for pre-operative evaluation assessment as requested by Dr. Peralta.  He requires evaluation and anesthesia risk assessment prior to undergoing surgery/procedure for treatment of severe osteoarthritis, right knee    Proposed Surgery/ Procedure: Right total knee arthroplasty  Date of Surgery/ Procedure: 18  Time of Surgery/ Procedure: 9am  Hospital/Surgical Facility: [facility]   Fax number for surgical facility:   Primary Physician: AVIS Martínez  Type of Anesthesia Anticipated: General    Patient has a Health Care Directive or Living Will:  YES     1. NO - Do you have a history of heart attack, stroke, stent, bypass or surgery on an artery in the head, neck, heart or legs?  2. NO - Do you ever have any pain or discomfort in your chest?  3. NO - Do you have a history of  Heart Failure?  4. NO - Are you troubled by shortness of breath when: walking on the level, up a slight hill or at night?  5. NO - Do you currently have a cold, bronchitis or other respiratory infection?  6. NO - Do you have a cough, shortness of breath or wheezing?  7. NO - Do you sometimes get pains in the calves of your legs when you walk?  8. NO - Do you or anyone in your family have previous history of blood clots?  9. NO - Do you or does anyone in your family have a serious bleeding problem such as prolonged bleeding following surgeries or cuts?  10. NO - Have you ever had problems with anemia or been told to take iron pills?  11. NO - Have you had any abnormal blood loss such as black, tarry or bloody stools, or abnormal vaginal bleeding?  12. NO - Have you ever had a blood transfusion?  13. NO - Have you or any of your relatives ever had problems with anesthesia?  14. NO - Do you have sleep apnea, excessive snoring or daytime  drowsiness?  15. NO - Do you have any prosthetic heart valves?  16. NO - Do you have prosthetic joints?  17. NO - Is there any chance that you may be pregnant?      HPI:     HPI related to upcoming procedure: He has persistent severe pain in the right knee and well-documented osteoarthritis.  Several years ago underwent successful total knee arthroplasty in the left knee and so is requesting the same on the right      See problem list for active medical problems.  Problems all longstanding and stable, except as noted/documented.  See ROS for pertinent symptoms related to these conditions.                                                                                                                                                          .    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) 11/28/2017     Priority: Medium     ED (erectile dysfunction) 12/23/2014     Priority: Medium     Moderate major depression (H) 12/17/2013     Priority: Medium     S/P knee replacement 11/06/2012     Priority: Medium     Advanced care planning/counseling discussion 10/25/2012     Priority: Medium     Patient has completed an Advance/Health Care Directive (HCD), scanned into Epic Media tab, entry date of 2001Macario KANG POST  October 25, 2012         GERD (gastroesophageal reflux disease) 08/03/2012     Priority: Medium     Anxiety 02/16/2012     Priority: Medium     Allergic rhinitis 04/21/2011     Priority: Medium     Conjunctivitis, allergic 04/21/2011     Priority: Medium     Hyperlipidemia LDL goal <100 10/31/2010     Priority: Medium     Osteoarthrosis, unspecified whether generalized or localized, pelvic region and thigh 09/28/2007     Priority: Medium     Hypertrophy of prostate with urinary obstruction 08/03/2006     Priority: Medium     Problem  list name updated by automated process. Provider to review       Hypertension goal BP (blood pressure) < 140/90 12/12/2005     Priority: Medium      Past Medical History:   Diagnosis Date     Benign neoplasm of prostate 2000    Benign Prostate Nodule     Past Surgical History:   Procedure Laterality Date     COLONOSCOPY N/A 12/10/2015    Procedure: COMBINED COLONOSCOPY, SINGLE OR MULTIPLE BIOPSY/POLYPECTOMY BY BIOPSY;  Surgeon: Jyoti Figueroa MD;  Location: [facility]     JOINT REPLACEMENT, HIP RT/LT  10/07    Joint Replacement Hip LT     JOINT REPLACEMTN, KNEE RT/LT  8/2011    Joint Replacement knee /LT, [facility]     LAPAROSCOPIC HERNIORRHAPHY INGUINAL BILATERAL Bilateral 4/24/2018    Procedure: LAPAROSCOPIC HERNIORRHAPHY INGUINAL BILATERAL;  Laparoscopic bilateral inguinal hernia repair;  Surgeon: Josemanuel Resendiz MD;  Location: [facility]     SURGICAL HISTORY OF -   12/1/1999    Umbilical Herniorrhaphy with mesh     SURGICAL HISTORY OF -  Left 11/2017    Thoracotomy and drainage of empyema     Current Outpatient Prescriptions   Medication Sig Dispense Refill     buPROPion (WELLBUTRIN SR) 150 MG 12 hr tablet Take 1 tablet (150 mg) by mouth 2 times daily 60 tablet 5     doxazosin (CARDURA) 4 MG tablet Take 1 tablet (4 mg) by mouth daily 90 tablet 3     escitalopram (LEXAPRO) 10 MG tablet Take 1 tablet (10 mg) by mouth daily 90 tablet 1     finasteride (PROSCAR) 5 MG tablet Take 1 tablet (5 mg) by mouth daily 90 tablet 1     gabapentin (NEURONTIN) 300 MG capsule Take 1 capsule (300 mg) by mouth 2 times daily 60 capsule 11     lamoTRIgine (LAMICTAL) 100 MG tablet Take 1/2 tablet daily for 2 weeks, then 1/2 tablet twice daily 30 tablet 1     metoprolol tartrate (LOPRESSOR) 25 MG tablet Take 25 mg by mouth 2 times daily  3     rivaroxaban ANTICOAGULANT (XARELTO) 20 MG TABS tablet Take 1 tablet (20 mg) by mouth daily (with dinner) 90 tablet 3     STATIN NOT PRESCRIBED, INTENTIONAL, Please choose reason not  "prescribed, below       zolpidem (AMBIEN) 10 MG tablet Take 1 tablet (10 mg) by mouth nightly as needed for sleep 30 tablet 5        11     OTC products: None, except as noted above    Allergies   Allergen Reactions     Bactrim [Sulfamethoxazole W/Trimethoprim] Nausea and Vomiting      Latex Allergy: NO    Social History   Substance Use Topics     Smoking status: Former Smoker     Packs/day: 1.00     Years: 15.00     Types: Cigarettes     Quit date: 10/13/1975     Smokeless tobacco: Never Used     Alcohol use No      Comment: quit 11/29/2017     History   Drug Use No       REVIEW OF SYSTEMS:   Constitutional, HEENT, cardiovascular, pulmonary, gi and gu systems are negative, except as otherwise noted.    EXAM:   /72 (BP Location: Right arm, Patient Position: Chair, Cuff Size: Adult Regular)  Pulse 87  Temp 98.6  F (37  C) (Tympanic)  Resp 12  Ht 5' 10\" (1.778 m)  Wt 169 lb (76.7 kg)  SpO2 98%  BMI 24.25 kg/m2    GENERAL APPEARANCE: healthy, alert and no distress     EYES: EOMI,  PERRL     HENT: ear canals and TM's normal and nose and mouth without ulcers or lesions     NECK: no adenopathy, no asymmetry, masses, or scars and thyroid normal to palpation     RESP: lungs clear to auscultation - no rales, rhonchi or wheezes     CV: regular rates and rhythm, normal S1 S2, no S3 or S4 and no murmur, click or rub     ABDOMEN:  soft, nontender, no HSM or masses and bowel sounds normal     MS: Right knee has tenderness at the joint lines but no effusion or erythema     SKIN: no suspicious lesions or rashes; resolving ecchymosis in the right upper extremity from apparent muscle tear that caused extensive bleeding from his anticoagulant therapy     NEURO: Normal strength and tone, sensory exam grossly normal, mentation intact and speech normal     PSYCH: mentation appears normal, affect flat and anxious     LYMPHATICS: No cervical adenopathy    DIAGNOSTICS:   EKG: atrial fibrillation, rate 70, normal axis, normal " intervals, no acute ST/T changes c/w ischemia, no LVH by voltage criteria    Recent Labs   Lab Test  05/07/18   1325  01/22/18   1354  12/20/17   1315   11/15/12   1132  11/12/12   1110   HGB   --   12.9*  11.7*   --    --    --    PLT   --   201  155   --    --    --    INR   --    --    --    --   2.73*  2.77*   NA  135  132*  133   < >   --    --    POTASSIUM  4.4  4.2  4.1   < >   --    --    CR  1.07  0.81  0.84   < >   --    --     < > = values in this interval not displayed.   Labs drawn and pending: MRSA swab, CBC, BMP    IMPRESSION:   Reason for surgery/procedure: Severe osteoarthritis right knee  Diagnosis/reason for consult: Evaluate for anesthesia risk    The proposed surgical procedure is considered LOW risk.    REVISED CARDIAC RISK INDEX  The patient has the following serious cardiovascular risks for perioperative complications such as (MI, PE, VFib and 3  AV Block):  No serious cardiac risks  INTERPRETATION: 0 risks: Class I (very low risk - 0.4% complication rate)    The patient has the following additional risks for perioperative complications:  No identified additional risks      ICD-10-CM    1. Preop general physical exam Z01.818    2. Primary osteoarthritis of right knee M17.11 Methicillin Resist/Sens S. aureus PCR   3. Moderate major depression (H) F32.1 lamoTRIgine (LAMICTAL) 100 MG tablet   4. Other polyneuropathy G62.89 gabapentin (NEURONTIN) 300 MG capsule     CBC with platelets differential     Basic metabolic panel   5. Chronic atrial fibrillation (H) I48.2 EKG 12-lead complete w/read - Clinics   6. Anxiety F41.9 escitalopram (LEXAPRO) 10 MG tablet   7. Benign prostatic hyperplasia with weak urinary stream N40.1 finasteride (PROSCAR) 5 MG tablet    R39.12    8. Primary insomnia F51.01 zolpidem (AMBIEN) 10 MG tablet   9. 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) F10.21    10. Hypertension goal BP (blood pressure) < 140/90 I10        RECOMMENDATIONS:         --Patient is to take all scheduled medications  on the day of surgery EXCEPT for modifications listed below.  Stop Xarelto 4 days before surgery    APPROVAL GIVEN to proceed with proposed procedure, without further diagnostic evaluation       Signed Electronically by: AVIS Martínez MD    Copy of this evaluation report is provided to requesting physician.    [facility] Preop Guidelines    Revised Cardiac Risk Index

## 2018-10-12 NOTE — BRIEF OP NOTE
[facility]  Brief Operative Note      Pre-operative diagnosis: osteoarthritis right knee   Post-operative diagnosis: Same   Procedure: Total knee arthoplasty (Right)   Surgeon: Jeb Pearlta MD     Assistant(s): Johan Turcios PA-C   Anesthesia: Spinal Anesthesia   Estimated blood loss: Minimal               Drains: Hemovac   Specimens: None       Findings: See full dictated operative note for details   Complications: None                   Comments: See dictated operative report for full details     Condition: Stable   Weight bearing status: Weight bearing as tolerated   Activity: Activity as tolerated  Patient may move about with assist as indicated or with supervision   Anticoagulation plan:                 Lovenox inpatient and then  mg daily at discharge  for 42 days  Follow up plan                           Follow up in 2 week(s)

## 2018-10-12 NOTE — OP NOTE
Procedure Date: 10/12/2018      PREOPERATIVE DIAGNOSIS:  Severe primary osteoarthrosis, right knee.      POSTOPERATIVE DIAGNOSIS:  Severe primary osteoarthrosis, right knee.      PROCEDURE:  Depuy right total knee arthroplasty.      COMPONENTS: Femur:  #6 cruciate retaining, cemented.  Tibia:  #7 Fixed bearing S cemented.  Insert:  #10.  Patella:  41.      SURGEON:  Jeb Peralta MD      ASSISTANT:  Lee Turcios PA-C      ANESTHESIA:  Spinal.      ESTIMATED BLOOD LOSS:  Minimal.      TOURNIQUET TIME:  Approximately 45 minutes at 275 mmHg.      COMPLICATIONS:  None apparent.      INDICATIONS:  Josemanuel is a very pleasant 73-year-old gentleman who presented with end-stage primary osteoarthrosis of the right knee recalcitrant to conservative treatment.  After alternatives, risks, and possible complications were carefully discussed, the patient desired surgical intervention; therefore, consent was obtained.      OPERATION:  The patient was brought to main operating room and after spinal anesthesia was obtained, positioned supine.  Tourniquet was placed on the right proximal thigh, 2 grams Ancef were given intravenously, right lower extremity was prepped and draped in the usual sterile fashion.  The limb was elevated and tourniquet inflated.      A linear longitudinal incision was made for an anterior approach to the right knee.  Subcutaneous tissue was divided sharply.  Paramedian arthrotomy was made.  Large effusion was evacuated.  I then resected the undersurface of the patella with oscillating saw, removed bony fragments, placed lug holes for a 41 mm insert.  A metal tray was placed over the cut surface as we retracted the patella laterally.      With the knee in flexion and retractors in place, cut the distal femur with the assistance of an intramedullary guide to fit a #6 femoral component.  Extramedullary guide was used to assist in cutting proximal tibial plateau.  Bony fragment was excised.  I then utilized the  laminar  to assist me in resection of the remains of the meniscus, and posterior osteophytes and debris was removed.      With trial components in place, no soft tissue releases were required.  Medially having good range of motion and good stability.  Therefore, we corrected for rotation, made cruciform cuts in the proximal tibial plateau, placed lug holes in the distal femur, and removed trial components.      While we mixed a batch of bone cement on the back table, we pulse-lavaged surfaces clean and meticulously dried them.  We then sequentially cemented tibial tray, femoral component and patella.  An 8 mm trial insert was placed in the knee and brought out in extension.  Axial compression was held while I removed excess cement in the doughy stage.      Once cement had solidified, we removed the trial spacer, removed the patellar clamp, pulse lavaged the surface clean, removed excess bone and bone cement debris.  Again performed trial reduction, elected to go with a 10-mm insert which was loaded and locked into place.  Once again we had full extension, full flexion, stable varus and valgus stress throughout the arc of motion.  The patella tracked centrally.  Therefore, we soaked the knee in weak Betadine solution, pulse-lavaged surfaces clean, placed a drain deep within the wound.  I then closed deep fascia with #1 Stratafix, closed the subcutaneous tissue with 2-0 Stratafix, and completed closure with 3-0 Stratafix.  Prineo was placed in the wound followed by sterile compression bandage.  Hemovac connected to suction.  Tourniquet was deflated.  The patient was [facility] to [facility] in stable condition, without apparent complication.         VANCE ARTEAGA MD             D: 10/12/2018   T: 10/12/2018   MT: ROBBIE      Name:     RUPERTO IRELAND   MRN:      [id number]        Account:        [id number]   :      1945           Procedure Date: 10/12/2018      Document: [id number]

## 2018-10-12 NOTE — PROGRESS NOTES
"[facility] [place]MISSION NOTE    Patient admitted to room 2401 at approximately 1445 via cart from surgery. Patient was accompanied by transport tech.     Verbal SBAR report received from Maggi LUNDY prior to patient arrival.     Patient trasferred to bed via air sugey. Patient alert and oriented X 3. Pain is controlled with current analgesics.  Medication(s) being used: narcotic analgesics including dilaudid. Admission vital signs: Blood pressure 146/79, pulse 68, temperature 97.4  F (36.3  C), temperature source Oral, resp. rate 16, height 1.803 m (5' 11\"), weight 77.1 kg (170 lb), SpO2 97 %. Patient and spouse were oriented to plan of care, call light, bed controls, tv, telephone, bathroom and visiting hours.     Risk Assessment    The following safety risks were identified during admission: fall. Yellow risk band applied: YES.     Skin Initial Assessment    This writer admitted this patient and completed a full skin assessment and Rex score in the Adult PCS flowsheet. Appropriate interventions initiated as needed.     Secondary skin check completed by RN.    Skin  Inspection of bony prominences: Procedural focused assessment (identify areas inspected)  Procedural focused assessment (identify areas inspected) : Knee, right  Skin WDL:  WDL except: incision right knee (covered), scratches/scabs, bruises.   Skin Color/Characteristics: bruised (ecchymotic) (bruised right upper arm)  Additional Documentation:  (bruise right shoulder)    Rex Risk Assessment  Sensory Perception: 2-->very limited  Moisture: 4-->rarely moist  Activity: 1-->bedfast  Mobility: 2-->very limited  Nutrition: 3-->adequate  Friction and Shear: 3-->no apparent problem  Rex Score: 15  Rex Intervention(s) Implemented: heels suspended, patient /family education on pressure injury prevention, patient education on pressure relief reinforced, repositioned/turned carloshr    Angeles Blas    "

## 2018-10-12 NOTE — ANESTHESIA POSTPROCEDURE EVALUATION
Patient: Josemanuel Edmond    Procedure(s):  Right Total Knee Arthroplasty - Wound Class: I-Clean    Diagnosis:osteoarthritis right knee  Diagnosis Additional Information: No value filed.    Anesthesia Type:  Spinal, Periph. Nerve Block for postop pain    Note:  Anesthesia Post Evaluation    Patient location during evaluation: PACU  Patient participation: Able to participate in evaluation but full recovery from regional anesthesia has not yet ocurrred but is anticipated to occur within 48 hours  Level of consciousness: awake and alert  Pain management: adequate  Airway patency: patent  Cardiovascular status: acceptable and hemodynamically stable  Respiratory status: acceptable  Hydration status: acceptable  PONV: none     Anesthetic complications: None    Comments: Pt C/O shoulder pain, which he injured 3 weeks ago (per pt report). No c/o knee pain        Last vitals:  Vitals:    10/12/18 1400 10/12/18 1415 10/12/18 1430   BP: 120/63 119/72 121/76   Pulse: 65  68   Resp: 16 16 16   Temp:  36.7  C (98  F)    SpO2: 99% 98% 96%         Electronically Signed By: KAYLA Hargrove CRNA  October 12, 2018  2:40 PM

## 2018-10-12 NOTE — PROGRESS NOTES
Skin affirmation note    Admitting nurse completed full skin assessment, Rex score and Rex interventions. This writer agrees with the initial skin assessment findings.

## 2018-10-12 NOTE — PROGRESS NOTES
SPIRITUAL HEALTH SERVICES  SPIRITUAL ASSESSMENT Progress Note  [facility] [place] - OR    Pt, Gavin, had requested  visit during pre-op admission.  Wife, Yoselyn, was present in room as he waited for surgery.  He stated that he attends [employer] and values the importance of zelda in his life.  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  He appreciates the excellent work that Dr Cordero does and welcomed prayer for a successful surgery and speedy / uneventful recovery.    Angel Agarwal M.A., [facility]  Staff   [facility]  Office: [phone]  Cell: [phone]  Pager [phone]

## 2018-10-12 NOTE — ANESTHESIA PROCEDURE NOTES
Peripheral nerve/Neuraxial procedure note : Adductor canal  Pre-Procedure  Performed by  JESUSITA MATUTE   Location: post-op      Pre-Anesthestic Checklist: patient identified, IV checked, site marked, risks and benefits discussed, informed consent, monitors and equipment checked, pre-op evaluation, at physician/surgeon's request and post-op pain management    Timeout  Correct Patient: Yes   Correct Procedure: Yes   Correct Site: Yes   Correct Laterality: Yes   Correct Position: Yes   Site Marked: Yes   .   Procedure Documentation    .    Procedure:  right  Adductor canal.     Ultrasound used to identify targeted nerve, plexus, or vascular marker and placed a needle adjacent to it., Ultrasound was used to visualize the spread of the anesthetic in close proximity to the above stated nerve. A permanent image is entered into the patient's record.  Patient Prep;mask, sterile gloves, chlorhexidine gluconate and isopropyl alcohol, patient draped.  .  Needle: insulated Needle Gauge: 20.    Needle Length (Inches) 4  Insertion Method: Single Shot.       Assessment/Narrative  Paresthesias: No.  .  The placement was negative for: blood aspirated, painful injection and site bleeding.  Bolus given via needle..   Secured via.   Complications: none. Test dose of 5 mL lidocaine 1.5% w/ 1:200,000 epinephrine at 13:25.  Test dose negative for signs of intravascular, subdural or intrathecal injection.

## 2018-10-12 NOTE — IP AVS SNAPSHOT
MRN:[id number]                      After Visit Summary   10/12/2018    Josemanuel Edmond    MRN: [id number]           Thank you!     Thank you for choosing [facility] for your care. Our goal is always to provide you with excellent care. Hearing back from our patients is one way we can continue to improve our services. Please take a few minutes to complete the written survey that you may receive in the mail after you visit with us. Thank you!        Patient Information     Date Of Birth          1945        Designated Caregiver       Most Recent Value    Caregiver    Will someone help with your care after discharge? yes    Name of designated caregiver Yoselyn - wife    Phone number of caregiver [phone]    Caregiver address Same as patient      About your hospital stay     You were admitted on:  October 12, 2018 You last received care in the:  [facility]    You were discharged on:  October 14, 2018        Reason for your hospital stay       TKa                  Who to Call     For medical emergencies, please call 911.  For non-urgent questions about your medical care, please call your primary care provider or clinic, [phone]  For questions related to your surgery, please call your surgery clinic        Attending Provider     Provider Specialty    Jeb Peralta MD Orthopaedic Surgery       Primary Care Provider Office Phone # Fax #    AVIS Martínez [phone]      Follow-up Appointments     Follow-up and recommended labs and tests        TCO 2 weeks                  Your next 10 appointments already scheduled     Nov 15, 2018  1:00 PM CST   SHORT with AVIS Martínez MD   [facility]    [address] [facility] [id number] [phone]              Additional Services     Home Care Referral       Your provider has referred you to: [facility]: [facility] [phone]   [email or web address]    Extended Emergency Contact Information  Primary Emergency Contact: Yoselyn Edmond  Address: [address] [place] [address] [facility]  Home Phone: [phone]  Mobile Phone: [phone]  Relation: Spouse  Secondary Emergency Contact: [place] [address] [facility]  Home Phone: [phone]  Relation: Friend    Patient Anticipated Discharge Date: 10/13/2018   RN, PT, HHA to begin 24 - 48 hours after discharge.  PLEASE EVALUATE AND TREAT (Evaluation timeline is 24 - 48 hrs. Please call if there is need for a variance to this timeline).    REASON FOR REFERRAL: Assessment & Treatment: PT    OTHER PERTINENT INFORMATION: Patient was last seen by provider on 10/13/2018 for S/P knee replacement.    No current outpatient prescriptions on file.    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    This encounter with the patient was in whole, or in part, for the  following medical condition, which is the primary reason for Home Health Care: Right S/P knee replacement.    I certify that, based on my findings, the following services are medically necessary Home Health Services: Physical Therapy    My clinical findings support the need for the above services because: Physical Therapy Services are needed to assess and treat the following functional impairments: S/P knee replacement.    Further, I certify that my clinical findings support that this patient is homebound (i.e. absences from home require considerable and taxing effort and are for medical reasons or Christianity services or infrequently or of short duration when for other reasons) because: Requires assistance of another person or specialized equipment to access medical services because patient: Requires supervision of another for safe transfer..    Based on the above findings, I certify that this patient is confined to the home and needs intermittent skilled nursing care, physical therapy and/or speech therapy.  The patient is under my care, and I have initiated the establishment of the plan of care.  This patient will be followed by a physician who will periodically review the plan of care.    Physician/Provider to provide follow up care: AVIS Martínez certified Physician at time of discharge: Yuri Peralta MD    Please be aware that coverage of these services is subject to the terms and limitations of your health insurance plan.  Call member services at your health plan with any benefit or coverage questions.            Physical Therapy Referral       If you have not heard from the scheduling office within 2 business days, please call [phone] for all locations, with the exception of [place], please call [phone] and [facility], please call [phone].    Please be aware that coverage of these services is subject to the terms and limitations of your health insurance plan.  Call member  services at your health plan with any benefit or coverage questions.                  Further instructions from your care team       Take miralax daily.  Take two senokot twice daily.  Use one dose MOM daily as needed.  When you start stooling regular---back off on the laxatives.                       Further instructions from your care team      Orthopedic Surgery Discharge Instructions     1. Follow up:  Follow up with Dr. Peralta or his Physician's Assistant  in 2 weeks for post op check and x rays as scheduled.  Call [phone] if appointment needed or questions. If you have questions or concerns while at home, please call [facility]. If it is an emergency, call [phone]. You may find the answers to questions in the included discharge packet from the hospital or your pre operative packet you received in clinic prior to surgery.     2. Pain Medication:  Use pain medication as directed. If you are prescribed narcotic pain medications (Oxycodone, Norco, Percocet, Tylenol #3, Dilaudid, or Tramadol) then you should try to wean off of them as tolerated. These are an AS NEEDED medication, so if you are not having significant pain you should try to take fewer pills at a time or spread out the doses out over a longer period of time than is written on the prescription. You should not drive a car or operate machinery if you are taking prescribed narcotic pain medication. You may not receive a refill on this medication by your surgical team until your follow up appointment, so try to wean off your narcotics as soon as possible. If you need a refill on this medication you may call your surgical team to discuss during business hours. Refills are not given on the weekend under any circumstances, so plan accordingly.     3. How to wean narcotics: Within 2-3 days of surgery you should be able to begin weaning your pain medications. If upon leaving the hospital you are taking 2 tabs every 3-4 hours, you should decrease  this to 1 tab every 3-4 hours. Around 4-5 days after surgery you should begin decreasing the frequency of your pain medication to every 4-5 hours. You may also cut your pills in half to decrease the dose as you begin the weaning process. Create a weaning schedule of your pain medication when you get home from the hospital, you may be expected to make the prescription last until your next appointment. Call your surgical team during business hours to discuss your pain medication if you have questions or concerns about the weaning process.     4. Tylenol and pain medications: If your pain pill contains Tylenol (i.e. Percocet, Norco, Tylenol #3) and you are also taking additional Tylenol/acetaminophen as a pain medication, ensure that you are not taking too much tylenol. Prescription narcotic medications that contain Tylenol usually have 325mg of Tylenol per pill and over-the-counter medications have variable doses of Tylenol. You should not exceed 4,000mg of Tylenol in a 24-hour period. Tylenol should be used in combination with your pain medication, if able, to help reduce the amount of pain medication you are taking.     5. Applying ice to your incision: ice can provide additional pain relief at home. Apply  ice in 20 minute intervals. Allow your skin to warm up to room temperature, approximately 40 minutes, before applying another ice pack.     6. Incision instructions:  Keep your incision clean, covered and dry until your post op appointment.  You may shower and get the incision wet if no drainage is present.Aquacel dressing to remain intact until follow up, this is a water proof dressing and should be left on for showering.     7. Physical therapy:  Continue physical therapy as soon as possible.  You will need to call a therapy department of your choice to arrange future appointments.  Your order for physical therapy is included in your discharge paperwork.      8. Blood Clot Prevention: Resume Xarelto 20mg daily.  " If you develop abdominal pain or have signs of bleeding - blood in stool or black stools, stop taking the medication and seek medical care. Walk often at home, walking will help reduce the risk of blood clots. If you have been prescribed ramy stockings or compression stockings, wear them during the day until you follow up with your orthopedic team in clinic. If you were not given Ramy Stockings in the hospital but would like them, they can be purchased over the counter at your local pharmacy.      9. Call the office if you have any questions/concerns or are experiencing the following:  - increasing drainage from the incision  - foul-smelling or malodorous drainage from the incision  - sudden increase or change in pain that is not controlled by your prescribed medications  - inability to urinate  - new onset of weakness, numbness or severe pain in the extremities  - bowel/bladder incontinence  - Fever greater than 101.5 degrees  - Nausea/vomitting causing inability to eat food or medications         Pending Results     No orders found from 10/10/2018 to 10/13/2018.            Statement of Approval     Ordered          10/14/18 0947  I have reviewed and agree with all the recommendations and orders detailed in this document.  EFFECTIVE NOW     Approved and electronically signed by:  Alex Santana MD           10/14/18 0937  I have reviewed and agree with all the recommendations and orders detailed in this document.  EFFECTIVE NOW     Approved and electronically signed by:  Alex Santana MD             Admission Information     Date & Time Provider Department Dept. Phone    10/12/2018 Jeb Peralta MD [facility] [phone]      Your Vitals Were     Blood Pressure Pulse Temperature Respirations Height Weight    148/84 (BP Location: Right arm) 93 97.8  F (36.6  C) (Oral) 18 1.803 m (5' 11\") 77.1 kg (170 lb)    Pulse Oximetry BMI (Body Mass Index)                95% 23.71 " kg/m2          [employer] Information     [employer] gives you secure access to your electronic health record. If you see a primary care provider, you can also send messages to your care team and make appointments. If you have questions, please call your primary care clinic.  If you do not have a primary care provider, please call [phone] and they will assist you.        Care EveryWhere ID     This is your Care EveryWhere ID. This could be used by other organizations to access your [facility] medical records  [id number]        Equal Access to Services     MERARI NASCIMENTO : Hadamairani grimeso Sovineetali, waaxda luqadaha, qaybta kaalmada adepraveenayaolya, bree silver. So [facility] [phone].    ATENCIÓN: Si habla español, tiene a sen disposición servicios gratuitos de asistencia lingüística. Llame al [phone].    We comply with applicable federal civil rights laws and [place] laws. We do not discriminate on the basis of race, color, national origin, age, disability, sex, sexual orientation, or gender identity.               Review of your medicines      START taking        Dose / Directions    hydrOXYzine 10 MG tablet   Commonly known as:  ATARAX   Used for:  Status post right knee replacement        Dose:  10 mg   Take 1 tablet (10 mg) by mouth every 6 hours as needed for itching   Quantity:  120 tablet   Refills:  0       magnesium hydroxide 400 MG/5ML suspension   Commonly known as:  MILK OF MAGNESIA   Used for:  Drug-induced constipation        Dose:  30 mL   Take 30 mLs by mouth daily as needed for constipation or heartburn   Quantity:  30 mL   Refills:  0       oxyCODONE IR 5 MG tablet   Commonly known as:  ROXICODONE   Used for:  Status post right knee replacement        Dose:  5-10 mg   Take 1-2 tablets (5-10 mg) by mouth every 4 hours as needed for moderate to severe pain   Quantity:  60 tablet   Refills:  0       senna-docusate 8.6-50 MG per tablet   Commonly known as:   SENOKOT-S;PERICOLACE        Dose:  2 tablet   Take 2 tablets by mouth 2 times daily   Quantity:  100 tablet   Refills:  0         CONTINUE these medicines which may have CHANGED, or have new prescriptions. If we are uncertain of the size of tablets/capsules you have at home, strength may be listed as something that might have changed.        Dose / Directions    lamoTRIgine 100 MG tablet   Commonly known as:  LAMICTAL   This may have changed:    - how much to take  - how to take this  - when to take this  - additional instructions   Used for:  Moderate major depression (H)        Take 1/2 tablet daily for 2 weeks, then 1/2 tablet twice daily   Quantity:  30 tablet   Refills:  1         CONTINUE these medicines which have NOT CHANGED        Dose / Directions    acetaminophen 500 MG tablet   Commonly known as:  TYLENOL        Dose:  1000 mg   Take 1,000 mg by mouth every 8 hours as needed for mild pain   Refills:  0       buPROPion 150 MG 12 hr tablet   Commonly known as:  WELLBUTRIN SR   Used for:  Moderate major depression (H), Anxiety        Dose:  150 mg   Take 1 tablet (150 mg) by mouth 2 times daily   Quantity:  60 tablet   Refills:  5       doxazosin 4 MG tablet   Commonly known as:  CARDURA   Used for:  Hypertrophy of prostate with urinary obstruction        Dose:  4 mg   Take 1 tablet (4 mg) by mouth daily   Quantity:  90 tablet   Refills:  3       escitalopram 10 MG tablet   Commonly known as:  LEXAPRO   Used for:  Anxiety        Dose:  10 mg   Take 1 tablet (10 mg) by mouth daily   Quantity:  90 tablet   Refills:  1       finasteride 5 MG tablet   Commonly known as:  PROSCAR   Used for:  Benign prostatic hyperplasia with weak urinary stream        Dose:  5 mg   Take 1 tablet (5 mg) by mouth daily   Quantity:  90 tablet   Refills:  1       gabapentin 300 MG capsule   Commonly known as:  NEURONTIN   Used for:  Pain of right upper arm        Dose:  600 mg   Take 2 capsules (600 mg) by mouth 2 times daily    Quantity:  270 capsule   Refills:  3       metoprolol tartrate 25 MG tablet   Commonly known as:  LOPRESSOR        Dose:  25 mg   Take 25 mg by mouth 2 times daily   Refills:  3       rivaroxaban ANTICOAGULANT 20 MG Tabs tablet   Commonly known as:  XARELTO   Used for:  Chronic atrial fibrillation (H)        Dose:  20 mg   Take 1 tablet (20 mg) by mouth daily (with dinner)   Quantity:  90 tablet   Refills:  3       STATIN NOT PRESCRIBED (INTENTIONAL)   Used for:  Chronic atrial fibrillation (H)        Please choose reason not prescribed, below   Refills:  0       zolpidem 10 MG tablet   Commonly known as:  AMBIEN   Used for:  Primary insomnia        Dose:  10 mg   Take 1 tablet (10 mg) by mouth nightly as needed for sleep   Quantity:  30 tablet   Refills:  5            Where to get your medicines      [id number] [address] [address]    Hours:  [facility] Phone:  [phone]     magnesium hydroxide 400 MG/5ML suspension         Some of these will need a paper prescription and others can be bought over the counter. Ask your nurse if you have questions.     Bring a paper prescription for each of these medications     hydrOXYzine 10 MG tablet    oxyCODONE IR 5 MG tablet                Protect others around you: Learn how to safely use, store and throw away your medicines at www.disposemymeds.org.        Information about OPIOIDS     PRESCRIPTION OPIOIDS: WHAT YOU NEED TO KNOW   We gave you an opioid (narcotic) pain medicine. It is important to manage your pain, but opioids are not always the best choice. You should first try all the other options your care team gave you. Take this medicine for as short a time (and as few doses) as possible.    Some activities can increase your pain, such as bandage changes or therapy sessions. It may help to take your pain medicine 30 to 60 minutes  before these activities. Reduce your stress by getting enough sleep, working on hobbies you enjoy and practicing relaxation or meditation. Talk to your care team about ways to manage your pain beyond prescription opioids.    These medicines have risks:    DO NOT drive when on new or higher doses of pain medicine. These medicines can affect your alertness and reaction times, and you could be arrested for driving under the influence (DUI). If you need to use opioids long-term, talk to your care team about driving.    DO NOT operate heavy machinery    DO NOT do any other dangerous activities while taking these medicines.    DO NOT drink any alcohol while taking these medicines.     If the opioid prescribed includes acetaminophen, DO NOT take with any other medicines that contain acetaminophen. Read all labels carefully. Look for the word  acetaminophen  or  Tylenol.  Ask your pharmacist if you have questions or are unsure.    You can get addicted to pain medicines, especially if you have a history of addiction (chemical, alcohol or substance dependence). Talk to your care team about ways to reduce this risk.    All opioids tend to cause constipation. Drink plenty of water and eat foods that have a lot of fiber, such as fruits, vegetables, prune juice, apple juice and high-fiber cereal. Take a laxative (Miralax, milk of magnesia, Colace, Senna) if you don t move your bowels at least every other day. Other side effects include upset stomach, sleepiness, dizziness, throwing up, tolerance (needing more of the medicine to have the same effect), physical dependence and slowed breathing.    Store your pills in a secure place, locked if possible. We will not replace any lost or stolen medicine. If you don t finish your medicine, please throw away (dispose) as directed by your pharmacist. The [place] Pollution Control Agency has more information about safe disposal:  https://www.pca.[facility].[place].us/living-green/managing-unwanted-medications             Medication List: This is a list of all your medications and when to take them. Check marks below indicate your daily home schedule. Keep this list as a reference.      Medications           Morning Afternoon Evening Bedtime As Needed    acetaminophen 500 MG tablet   Commonly known as:  TYLENOL   Take 1,000 mg by mouth every 8 hours as needed for mild pain   Last time this was given:  975 mg on 10/14/2018  6:38 AM                2 PM                   buPROPion 150 MG 12 hr tablet   Commonly known as:  WELLBUTRIN SR   Take 1 tablet (150 mg) by mouth 2 times daily   Last time this was given:  150 mg on 10/14/2018  7:44 AM                                      doxazosin 4 MG tablet   Commonly known as:  CARDURA   Take 1 tablet (4 mg) by mouth daily   Last time this was given:  4 mg on 10/13/2018  5:28 PM                                   escitalopram 10 MG tablet   Commonly known as:  LEXAPRO   Take 1 tablet (10 mg) by mouth daily   Last time this was given:  10 mg on 10/14/2018  7:44 AM                                   finasteride 5 MG tablet   Commonly known as:  PROSCAR   Take 1 tablet (5 mg) by mouth daily   Last time this was given:  5 mg on 10/14/2018  7:44 AM                                   gabapentin 300 MG capsule   Commonly known as:  NEURONTIN   Take 2 capsules (600 mg) by mouth 2 times daily   Last time this was given:  600 mg on 10/14/2018  7:44 AM                                      hydrOXYzine 10 MG tablet   Commonly known as:  ATARAX   Take 1 tablet (10 mg) by mouth every 6 hours as needed for itching   Last time this was given:  10 mg on 10/14/2018 10:14 AM                    4 PM               lamoTRIgine 100 MG tablet   Commonly known as:  LAMICTAL   Take 1/2 tablet daily for 2 weeks, then 1/2 tablet twice daily                                      magnesium hydroxide 400 MG/5ML suspension   Commonly known as:  MILK  OF MAGNESIA   Take 30 mLs by mouth daily as needed for constipation or heartburn   Last time this was given:  30 mLs on 10/14/2018 10:14 AM                                   metoprolol tartrate 25 MG tablet   Commonly known as:  LOPRESSOR   Take 25 mg by mouth 2 times daily   Last time this was given:  25 mg on 10/14/2018  7:44 AM                                   oxyCODONE IR 5 MG tablet   Commonly known as:  ROXICODONE   Take 1-2 tablets (5-10 mg) by mouth every 4 hours as needed for moderate to severe pain   Last time this was given:  10 mg on 10/14/2018 12:12 PM                    4 PM               rivaroxaban ANTICOAGULANT 20 MG Tabs tablet   Commonly known as:  XARELTO   Take 1 tablet (20 mg) by mouth daily (with dinner)   Last time this was given:  10 mg on 10/13/2018  5:28 PM                                   senna-docusate 8.6-50 MG per tablet   Commonly known as:  SENOKOT-S;PERICOLACE   Take 2 tablets by mouth 2 times daily   Last time this was given:  2 tablets on 10/14/2018  7:44 AM                                      STATIN NOT PRESCRIBED (INTENTIONAL)   Please choose reason not prescribed, below                                   zolpidem 10 MG tablet   Commonly known as:  AMBIEN   Take 1 tablet (10 mg) by mouth nightly as needed for sleep   Last time this was given:  5 mg on 10/13/2018 10:06 PM

## 2018-10-12 NOTE — ANESTHESIA PROCEDURE NOTES
Peripheral nerve/Neuraxial procedure note : intrathecal  Pre-Procedure  Performed by  LICHA THEODORE   Location: OR      Pre-Anesthestic Checklist: patient identified, IV checked, risks and benefits discussed, informed consent, monitors and equipment checked and pre-op evaluation    Timeout    Correct Procedure: Yes   Correct Site: Yes   Correct Laterality: N/A   Correct Position: Yes   Site Marked: N/A   .   Procedure Documentation  ASA 3  .    Procedure:    Intrathecal.  Insertion Site:L3-4  (right paramedian approach)      Patient Prep;mask, sterile gloves, povidone-iodine 7.5% surgical scrub, patient draped.  .  Needle: Eamon tip Spinal Needle (gauge): 22  Spinal/LP Needle Length (inches): 3.5 # of attempts: 3 and  # of redirects:  3 Introducer used Introducer: 20 G .       Assessment/Narrative  Paresthesias: No.  .  .  clear CSF fluid removed while sitting   . Time Injected: 12:17  Comments:  Pt has curved spine, found spot on the right and angled

## 2018-10-13 ENCOUNTER — APPOINTMENT (OUTPATIENT)
Dept: PHYSICAL THERAPY | Facility: CLINIC | Age: 73
DRG: 470 | End: 2018-10-13
Attending: ORTHOPAEDIC SURGERY
Payer: MEDICARE

## 2018-10-13 ENCOUNTER — APPOINTMENT (OUTPATIENT)
Dept: OCCUPATIONAL THERAPY | Facility: CLINIC | Age: 73
DRG: 470 | End: 2018-10-13
Attending: ORTHOPAEDIC SURGERY
Payer: MEDICARE

## 2018-10-13 LAB — HGB BLD-MCNC: 9.5 G/DL (ref 13.3–17.7)

## 2018-10-13 PROCEDURE — A9270 NON-COVERED ITEM OR SERVICE: HCPCS | Mod: GY | Performed by: ORTHOPAEDIC SURGERY

## 2018-10-13 PROCEDURE — A9270 NON-COVERED ITEM OR SERVICE: HCPCS | Mod: GY | Performed by: PHYSICIAN ASSISTANT

## 2018-10-13 PROCEDURE — 40000193 ZZH STATISTIC PT WARD VISIT: Performed by: PHYSICAL THERAPIST

## 2018-10-13 PROCEDURE — 36415 COLL VENOUS BLD VENIPUNCTURE: CPT | Performed by: ORTHOPAEDIC SURGERY

## 2018-10-13 PROCEDURE — 25000132 ZZH RX MED GY IP 250 OP 250 PS 637: Mod: GY | Performed by: FAMILY MEDICINE

## 2018-10-13 PROCEDURE — 25000132 ZZH RX MED GY IP 250 OP 250 PS 637: Mod: GY | Performed by: ORTHOPAEDIC SURGERY

## 2018-10-13 PROCEDURE — 97165 OT EVAL LOW COMPLEX 30 MIN: CPT | Mod: GO

## 2018-10-13 PROCEDURE — 40000193 ZZH STATISTIC PT WARD VISIT: Performed by: PHYSICAL THERAPY ASSISTANT

## 2018-10-13 PROCEDURE — A9270 NON-COVERED ITEM OR SERVICE: HCPCS | Mod: GY | Performed by: FAMILY MEDICINE

## 2018-10-13 PROCEDURE — 25000125 ZZHC RX 250

## 2018-10-13 PROCEDURE — 97110 THERAPEUTIC EXERCISES: CPT | Mod: GP | Performed by: PHYSICAL THERAPIST

## 2018-10-13 PROCEDURE — 97110 THERAPEUTIC EXERCISES: CPT | Mod: GP | Performed by: PHYSICAL THERAPY ASSISTANT

## 2018-10-13 PROCEDURE — 25000132 ZZH RX MED GY IP 250 OP 250 PS 637: Mod: GY | Performed by: PHYSICIAN ASSISTANT

## 2018-10-13 PROCEDURE — 99207 ZZC CDG-MDM COMPONENT: MEETS LOW - DOWN CODED: CPT | Performed by: FAMILY MEDICINE

## 2018-10-13 PROCEDURE — 97161 PT EVAL LOW COMPLEX 20 MIN: CPT | Mod: GP | Performed by: PHYSICAL THERAPIST

## 2018-10-13 PROCEDURE — 85018 HEMOGLOBIN: CPT | Performed by: ORTHOPAEDIC SURGERY

## 2018-10-13 PROCEDURE — 25800025 ZZH RX 258: Performed by: ORTHOPAEDIC SURGERY

## 2018-10-13 PROCEDURE — 25000128 H RX IP 250 OP 636: Performed by: ORTHOPAEDIC SURGERY

## 2018-10-13 PROCEDURE — 99232 SBSQ HOSP IP/OBS MODERATE 35: CPT | Performed by: FAMILY MEDICINE

## 2018-10-13 PROCEDURE — 97116 GAIT TRAINING THERAPY: CPT | Mod: GP | Performed by: PHYSICAL THERAPY ASSISTANT

## 2018-10-13 PROCEDURE — 40000133 ZZH STATISTIC OT WARD VISIT

## 2018-10-13 PROCEDURE — 12000000 ZZH R&B MED SURG/OB

## 2018-10-13 RX ORDER — OXYCODONE HYDROCHLORIDE 5 MG/1
5-10 TABLET ORAL EVERY 4 HOURS PRN
Status: DISCONTINUED | OUTPATIENT
Start: 2018-10-13 | End: 2018-10-14 | Stop reason: HOSPADM

## 2018-10-13 RX ORDER — KETOROLAC TROMETHAMINE 30 MG/ML
30 INJECTION, SOLUTION INTRAMUSCULAR; INTRAVENOUS ONCE
Status: COMPLETED | OUTPATIENT
Start: 2018-10-13 | End: 2018-10-13

## 2018-10-13 RX ORDER — POLYETHYLENE GLYCOL 3350 17 G/17G
17 POWDER, FOR SOLUTION ORAL DAILY
Status: DISCONTINUED | OUTPATIENT
Start: 2018-10-13 | End: 2018-10-14 | Stop reason: HOSPADM

## 2018-10-13 RX ADMIN — FINASTERIDE 5 MG: 5 TABLET, FILM COATED ORAL at 08:38

## 2018-10-13 RX ADMIN — HYDROXYZINE HYDROCHLORIDE 10 MG: 10 TABLET ORAL at 10:22

## 2018-10-13 RX ADMIN — LAMOTRIGINE 50 MG: 50 TABLET, ORALLY DISINTEGRATING ORAL at 08:39

## 2018-10-13 RX ADMIN — HYDROMORPHONE HYDROCHLORIDE 0.5 MG: 1 INJECTION, SOLUTION INTRAMUSCULAR; INTRAVENOUS; SUBCUTANEOUS at 10:22

## 2018-10-13 RX ADMIN — BUPROPION HYDROCHLORIDE 150 MG: 150 TABLET, FILM COATED, EXTENDED RELEASE ORAL at 08:38

## 2018-10-13 RX ADMIN — HYDROMORPHONE HYDROCHLORIDE 4 MG: 2 TABLET ORAL at 04:04

## 2018-10-13 RX ADMIN — POLYETHYLENE GLYCOL 3350 17 G: 17 POWDER, FOR SOLUTION ORAL at 13:22

## 2018-10-13 RX ADMIN — ACETAMINOPHEN 975 MG: 325 TABLET, FILM COATED ORAL at 22:37

## 2018-10-13 RX ADMIN — SENNOSIDES AND DOCUSATE SODIUM 2 TABLET: 8.6; 5 TABLET ORAL at 19:48

## 2018-10-13 RX ADMIN — OXYCODONE HYDROCHLORIDE 10 MG: 5 TABLET ORAL at 22:37

## 2018-10-13 RX ADMIN — ACETAMINOPHEN 975 MG: 325 TABLET, FILM COATED ORAL at 07:03

## 2018-10-13 RX ADMIN — DOXAZOSIN 4 MG: 2 TABLET ORAL at 17:28

## 2018-10-13 RX ADMIN — METOPROLOL TARTRATE 25 MG: 25 TABLET ORAL at 08:39

## 2018-10-13 RX ADMIN — ACETAMINOPHEN 975 MG: 325 TABLET, FILM COATED ORAL at 15:32

## 2018-10-13 RX ADMIN — ZOLPIDEM TARTRATE 5 MG: 5 TABLET, FILM COATED ORAL at 22:06

## 2018-10-13 RX ADMIN — LAMOTRIGINE 50 MG: 50 TABLET, ORALLY DISINTEGRATING ORAL at 19:49

## 2018-10-13 RX ADMIN — GABAPENTIN 600 MG: 300 CAPSULE ORAL at 08:38

## 2018-10-13 RX ADMIN — CEFAZOLIN SODIUM 1 G: 1 INJECTION, SOLUTION INTRAVENOUS at 04:37

## 2018-10-13 RX ADMIN — HYDROMORPHONE HYDROCHLORIDE 4 MG: 2 TABLET ORAL at 13:22

## 2018-10-13 RX ADMIN — OXYCODONE HYDROCHLORIDE 10 MG: 5 TABLET ORAL at 18:20

## 2018-10-13 RX ADMIN — HYDROXYZINE HYDROCHLORIDE 10 MG: 10 TABLET ORAL at 16:52

## 2018-10-13 RX ADMIN — GABAPENTIN 600 MG: 300 CAPSULE ORAL at 19:49

## 2018-10-13 RX ADMIN — SENNOSIDES AND DOCUSATE SODIUM 2 TABLET: 8.6; 5 TABLET ORAL at 08:38

## 2018-10-13 RX ADMIN — BUPROPION HYDROCHLORIDE 150 MG: 150 TABLET, FILM COATED, EXTENDED RELEASE ORAL at 19:48

## 2018-10-13 RX ADMIN — ZOLPIDEM TARTRATE 5 MG: 5 TABLET, FILM COATED ORAL at 02:06

## 2018-10-13 RX ADMIN — HYDROMORPHONE HYDROCHLORIDE 4 MG: 2 TABLET ORAL at 08:39

## 2018-10-13 RX ADMIN — RIVAROXABAN 10 MG: 10 TABLET, FILM COATED ORAL at 17:28

## 2018-10-13 RX ADMIN — KETOROLAC TROMETHAMINE 30 MG: 30 INJECTION, SOLUTION INTRAMUSCULAR at 18:20

## 2018-10-13 RX ADMIN — POTASSIUM CHLORIDE, DEXTROSE MONOHYDRATE AND SODIUM CHLORIDE: 150; 5; 450 INJECTION, SOLUTION INTRAVENOUS at 02:02

## 2018-10-13 RX ADMIN — METOPROLOL TARTRATE 25 MG: 25 TABLET ORAL at 19:48

## 2018-10-13 ASSESSMENT — ACTIVITIES OF DAILY LIVING (ADL)
ADLS_ACUITY_SCORE: 11
ADLS_ACUITY_SCORE: 12
ADLS_ACUITY_SCORE: 11

## 2018-10-13 NOTE — PROGRESS NOTES
Pt has been voiding, see I and O, still bs over 300. Pt refuses straight cath at this time, advised to continue to try voiding and will readdress in a few hours.

## 2018-10-13 NOTE — PLAN OF CARE
Problem: Knee Arthroplasty (Total, Partial) (Adult)  Goal: Signs and Symptoms of Listed Potential Problems Will be Absent, Minimized or Managed (Knee Arthroplasty)  Signs and symptoms of listed potential problems will be absent, minimized or managed by discharge/transition of care (reference Knee Arthroplasty (Total, Partial) (Adult) CPG).   Outcome: Improving  A/ox4, up ambulating to bathroom sba, right knee has had several changes of abd pads this shift, drainage has slowed way down, l0 ml out of drain. Pt does not empty bladder completely had to be straight cathed once, refused any more after, voiding about 300 ml frequently, when bs ususally has at least 300 ml still in bladder. Pt did not sleep last night, lots of anxiety and overthinking, even w/ambien. Receiving oral dilaudid 4 mg which brings pain down to 1/10.

## 2018-10-13 NOTE — CONSULTS
Care Transition Initial Assessment - RN  Reason For Consult: discharge planning   Met with: Patient and Family.    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  Resources List: Home Care  Lives With: spouse  Living Arrangements: condominium  Description of Support System: Supportive, Involved   Who is your support system?: Wife   Support Assessment: Adequate family and caregiver support   Insurance Concerns: No Insurance issues identified    This writer met with pt and spouse Yoselyn, introduced self and role.  This writer discussed discharge planning and Medicare guidelines in regards to home care. Pt lives in a condiminium in the community with Yoselyn his spouse. Pt has no services at home and he does have family support. Patients goal is to return upon discharge. Patient is in agreement with receiving home care for PT. Pt was provided with Medicare certified home care list. Pt chooses to use [facility] Phone: [phone]. Transportation provided by family.      PLAN  [facility]-PT        Siena Atkins RN Care Coordinator  [facility] [phone]  [facility] [phone]

## 2018-10-13 NOTE — PROGRESS NOTES
Discharge Planner PT   Patient plan for discharge: Home with Home Care  Current status: SBA with sit<>stands, CGA with amb 75ft with RW with step to gait. Pain issues  Barriers to return to prior living situation: pain control  Recommendations for discharge: home with homecare  Rationale for recommendations: support at home       Entered by: Odessa Tam 10/13/2018 10:58 AM        10/13/18 1000   Quick Adds   Type of Visit Initial PT Evaluation   Living Environment   Lives With spouse   Living Arrangements condominium   Living Environment Comment No steps, 1 level set up   Functional Level Prior   Prior Functional Level Comment Pt indep PLOF    General Information   Onset of Illness/Injury or Date of Surgery - Date 10/12/18   Patient/Family Goals Statement To go home with HC   Pertinent History of Current Problem (include personal factors and/or comorbidities that impact the POC) s/p RTKA, hx of B FAITH and L TKA   Precautions/Limitations fall precautions   Weight-Bearing Status - LLE full weight-bearing   Weight-Bearing Status - RLE weight-bearing as tolerated   General Observations In chair upon arrival   General Info Comments Drain   Cognitive Status Examination   Orientation orientation to person, place and time   Level of Consciousness alert   Follows Commands and Answers Questions 100% of the time   Personal Safety and Judgment intact   Memory intact   Range of Motion (ROM)   ROM Comment R Knee AROM 25-50 degs measured in supine   Transfer Skills   Transfer Comments SBA wiht sit<>stands, VC on keeping RLE forward for comfort   Gait   Gait Comments Pt amb with RW with step to gait and CGA 75ft. Increased fatigue and pain noted   General Therapy Interventions   Planned Therapy Interventions bed mobility training;gait training;ROM;strengthening   Clinical Impression   Criteria for Skilled Therapeutic Intervention yes, treatment indicated   PT Diagnosis Impaired mobility   Influenced by the following impairments  "pain, weakness and limited ROM   Functional limitations due to impairments mobility   Clinical Presentation Stable/Uncomplicated   Clinical Presentation Rationale clinical judgement   Clinical Decision Making (Complexity) Low complexity   Therapy Frequency` 2 times/day   Predicted Duration of Therapy Intervention (days/wks) 2-3 days   Anticipated Discharge Disposition Home with Assist;Home with Home Therapy   Clinical Impression Comments Pt would benefit from skilled PT to address strength, ROM and mobility   [facility] TM \"6 Clicks\"   2016, Trustees of [facility], under license to [employer].  All rights reserved.   6 Clicks Short Forms Basic Mobility Inpatient Short Form   [facility] AM-PAC  \"6 Clicks\" V.2 Basic Mobility Inpatient Short Form   1. Turning from your back to your side while in a flat bed without using bedrails? 3 - A Little   2. Moving from lying on your back to sitting on the side of a flat bed without using bedrails? 3 - A Little   3. Moving to and from a bed to a chair (including a wheelchair)? 3 - A Little   4. Standing up from a chair using your arms (e.g., wheelchair, or bedside chair)? 3 - A Little   5. To walk in hospital room? 3 - A Little   6. Climbing 3-5 steps with a railing? 2 - A Lot   Basic Mobility Raw Score (Score out of 24.Lower scores equate to lower levels of function) 17   Total Evaluation Time   Total Evaluation Time (Minutes) 10     See Care Plan for goals.    "

## 2018-10-13 NOTE — PROGRESS NOTES
Discharge Planner   Discharge Plans in progress: [facility]  Barriers to discharge plan: Medical stability  Follow up plan: PCP/ Ortho       Entered by: Siena Atkins 10/13/2018 2:35 PM

## 2018-10-13 NOTE — PLAN OF CARE
Problem: Patient Care Overview  Goal: Plan of Care/Patient Progress Review  Outcome: Improving  Patient having a moderate to high amount of pain, IV dilaudid given x 1 today prior to therapy.  Otherwise patient has been taking 4mg oral dilaudid about every 4 hours and atarax was added as well.  Ambulating with walker and sba.  Voiding adequate amounts today, bladder scanned following void this afternoon for 250cc.  Patient denies any bladder discomfort.  Dressing was changed and hemovac removed, old dried blood on dressings but no drainage from incision currently.

## 2018-10-13 NOTE — PROGRESS NOTES
[facility]      Subjective:  Had some urinary retention-straight cathed , now refusing further straight caths  No abd pain  Moderate post op pain  Had rectal impaction with first knee replacement    Review of Systems:  CONSTITUTIONAL: NEGATIVE for fever, chills, change in weight  INTEGUMENTARY/SKIN: NEGATIVE for worrisome rashes, moles or lesions  EYES: NEGATIVE for vision changes or irritation  ENT/MOUTH: NEGATIVE for ear, mouth and throat problems  RESP: NEGATIVE for significant cough or SOB  BREAST: NEGATIVE for masses, tenderness or discharge  CV: NEGATIVE for chest pain, palpitations or peripheral edema  GI: NEGATIVE for nausea, abdominal pain, heartburn, or change in bowel habits   male :above  MUSCULOSKELETAL:above  NEURO: NEGATIVE for weakness, dizziness or paresthesias  ENDOCRINE: NEGATIVE for temperature intolerance, skin/hair changes  HEME: NEGATIVE for bleeding problems  PSYCHIATRIC: NEGATIVE for changes in mood or affect    Physical Exam:  Vitals Were Reviewed    Patient Vitals for the past 16 hrs:   BP Temp Temp src Pulse Heart Rate Resp SpO2   10/13/18 1152 114/55 97.4  F (36.3  C) Oral - 67 18 93 %   10/13/18 0728 138/77 97.9  F (36.6  C) Oral 72 - 18 98 %   10/13/18 0348 139/75 97.3  F (36.3  C) Oral 81 - 18 94 %   10/12/18 2244 126/64 97.9  F (36.6  C) Oral - 85 18 97 %         Intake/Output Summary (Last 24 hours) at 10/13/18 1258  Last data filed at 10/13/18 0948   Gross per 24 hour   Intake             2282 ml   Output             3985 ml   Net            -1703 ml       GENERAL APPEARANCE: healthy, alert and no distress  EYES: conjunctiva clear, eyes grossly normal  RESP: lungs clear to auscultation - no rales, rhonchi or wheezes  CV: regular rate and rhythm, normal S1 S2, no S3 or S4 and no murmur, click or rub   ABDOMEN: soft, nontender, no HSM or masses and bowel sounds normal  MS: dressing in place right knee  SKIN: clear without significant rashes or  lesions    Lab:  Recent Labs   Lab Test  09/14/18   1629  05/07/18   1325   NA  134  135   POTASSIUM  4.2  4.4   CHLORIDE  100  101   CO2  30  29   ANIONGAP  4  5   GLC  96  99   BUN  14  21   CR  0.93  1.07   LUIS  9.1  8.7     CBC RESULTS:   Recent Labs   Lab Test  10/13/18   0545  09/14/18   1629  01/22/18   1354   WBC   --   6.2  8.3   RBC   --   3.66*  3.99*   HGB  9.5*  12.0*  12.9*   HCT   --   35.3*  38.6*   PLT   --   193  201       Results for orders placed or performed during the hospital encounter of 10/12/18 (from the past 24 hour(s))   Hemoglobin   Result Value Ref Range    Hemoglobin 9.5 (L) 13.3 - 17.7 g/dL       Assessment and Plan:    Right knee arthroplasty-day 1  hgb 9.5    Hypertension 140/90  metoprolol    Hypertrophy of prostate with urinary obstruction  On proscar and cardura, straight cathed once overnight-refusing more straight caths    Alcoholism in remission     Chronic atrial fibrillation     Peripheral neuropathy  gaqbapentin    Depression/anxiety  Lamictal, lexapro    Insomnia  Uses ambien    Prophylaxis-xarelto--apparently ordered as 10 mg per ortho    Plan- routine cares, add miralax, will talk to ortho tomorrow regarding what they recommend for the xarelto dose tomorrow, follow bladder scans

## 2018-10-13 NOTE — PROGRESS NOTES
"   10/13/18 1300   Quick Adds   Type of Visit Initial Occupational Therapy Evaluation   Living Environment   Lives With spouse   Living Arrangements condominium   Home Accessibility no concerns   General Information   Onset of Illness/Injury or Date of Surgery - Date 10/12/18   Referring Physician Fabian   Patient/Family Goals Statement Return home   Additional Occupational Profile Info/Pertinent History of Current Problem 74 yo M s/p R TKA   Precautions/Limitations no known precautions/limitations   Pain Assessment   Patient Currently in Pain (9/10)   Bed Mobility Skill: Sit to Supine   Level of [place]: Sit/Supine contact guard   Bed Mobility Skill: Supine to Sit   Level of [place]: Supine/Sit stand-by assist   Transfer Skill: Bed to Chair/Chair to Bed   Level of [place]: Bed to Chair stand-by assist   Transfer Skill: Sit to Stand   Level of [place]: Sit/Stand stand-by assist   Transfer Skill: Toilet Transfer   Level of [place]: Toilet stand-by assist   Toileting   Level of [place]: Toilet stand-by assist   Clinical Impression   Criteria for Skilled Therapeutic Interventions Met evaluation only   OT Diagnosis impaired ADLs   Influenced by the following impairments R TKA   Clinical Decision Making (Complexity) Low complexity   Anticipated Discharge Disposition Home with Assist;Home with Outpatient Therapy   Risks and Benefits of Treatment have been explained. Yes   Patient, Family & other staff in agreement with plan of care Yes   Clinical Impression Comments Pt appears to have no OT related skilled needs at this time   [facility] AM-PAC  \"6 Clicks\" Daily Activity Inpatient Short Form   1. Putting on and taking off regular lower body clothing? 3 - A Little   2. Bathing (including washing, rinsing, drying)? 3 - A Little   3. Toileting, which includes using toilet, bedpan or urinal? 4 - None   4. Putting on and taking off regular upper body clothing? 4 - None   5. Taking care of " personal grooming such as brushing teeth? 4 - None   6. Eating meals? 4 - None   Daily Activity Raw Score (Score out of 24.Lower scores equate to lower levels of function) 22   Total Evaluation Time   Total Evaluation Time (Minutes) 25     SUJEY Boateng

## 2018-10-13 NOTE — PLAN OF CARE
Problem: Patient Care Overview  Goal: Plan of Care/Patient Progress Review  Outcome: Improving  Vitals have been stable postop.  No nausea, tolerating a regular diet.  Out of bed and assisted to chair this evening with walker, tolerated activity well.  Attempted to urinate, only able to void 20cc, bladder scanned for >900.  Requesting to try again in a few minutes, otherwise will need to straight cath.  Some blood coming through dressing on back of knee, dressing was reinforced. 2mg oral dilaudid given for pain, then pain went up quickly so IV dilaudid was given, patient is comfortable at this time.

## 2018-10-14 ENCOUNTER — APPOINTMENT (OUTPATIENT)
Dept: PHYSICAL THERAPY | Facility: CLINIC | Age: 73
DRG: 470 | End: 2018-10-14
Attending: ORTHOPAEDIC SURGERY
Payer: MEDICARE

## 2018-10-14 VITALS
DIASTOLIC BLOOD PRESSURE: 84 MMHG | RESPIRATION RATE: 18 BRPM | BODY MASS INDEX: 23.8 KG/M2 | HEIGHT: 71 IN | WEIGHT: 170 LBS | TEMPERATURE: 97.8 F | SYSTOLIC BLOOD PRESSURE: 148 MMHG | HEART RATE: 93 BPM | OXYGEN SATURATION: 95 %

## 2018-10-14 LAB
GLUCOSE SERPL-MCNC: 98 MG/DL (ref 70–99)
HGB BLD-MCNC: 8.8 G/DL (ref 13.3–17.7)

## 2018-10-14 PROCEDURE — 90662 IIV NO PRSV INCREASED AG IM: CPT | Performed by: ORTHOPAEDIC SURGERY

## 2018-10-14 PROCEDURE — 25000128 H RX IP 250 OP 636: Performed by: ORTHOPAEDIC SURGERY

## 2018-10-14 PROCEDURE — 99232 SBSQ HOSP IP/OBS MODERATE 35: CPT | Performed by: FAMILY MEDICINE

## 2018-10-14 PROCEDURE — 85018 HEMOGLOBIN: CPT | Performed by: ORTHOPAEDIC SURGERY

## 2018-10-14 PROCEDURE — 25000132 ZZH RX MED GY IP 250 OP 250 PS 637: Mod: GY | Performed by: PHYSICIAN ASSISTANT

## 2018-10-14 PROCEDURE — 40000193 ZZH STATISTIC PT WARD VISIT: Performed by: PHYSICAL THERAPIST

## 2018-10-14 PROCEDURE — 25000132 ZZH RX MED GY IP 250 OP 250 PS 637: Mod: GY | Performed by: ORTHOPAEDIC SURGERY

## 2018-10-14 PROCEDURE — 25000132 ZZH RX MED GY IP 250 OP 250 PS 637: Mod: GY | Performed by: FAMILY MEDICINE

## 2018-10-14 PROCEDURE — A9270 NON-COVERED ITEM OR SERVICE: HCPCS | Mod: GY | Performed by: PHYSICIAN ASSISTANT

## 2018-10-14 PROCEDURE — 97116 GAIT TRAINING THERAPY: CPT | Mod: GP | Performed by: PHYSICAL THERAPIST

## 2018-10-14 PROCEDURE — A9270 NON-COVERED ITEM OR SERVICE: HCPCS | Mod: GY | Performed by: ORTHOPAEDIC SURGERY

## 2018-10-14 PROCEDURE — 36415 COLL VENOUS BLD VENIPUNCTURE: CPT | Performed by: ORTHOPAEDIC SURGERY

## 2018-10-14 PROCEDURE — 99207 ZZC CDG-MDM COMPONENT: MEETS LOW - DOWN CODED: CPT | Performed by: FAMILY MEDICINE

## 2018-10-14 PROCEDURE — 97110 THERAPEUTIC EXERCISES: CPT | Mod: GP | Performed by: PHYSICAL THERAPIST

## 2018-10-14 PROCEDURE — 82947 ASSAY GLUCOSE BLOOD QUANT: CPT | Performed by: ORTHOPAEDIC SURGERY

## 2018-10-14 PROCEDURE — A9270 NON-COVERED ITEM OR SERVICE: HCPCS | Mod: GY | Performed by: FAMILY MEDICINE

## 2018-10-14 RX ORDER — HYDROXYZINE HYDROCHLORIDE 10 MG/1
10 TABLET, FILM COATED ORAL EVERY 6 HOURS PRN
Qty: 120 TABLET | Refills: 0 | Status: SHIPPED | OUTPATIENT
Start: 2018-10-14 | End: 2018-11-15

## 2018-10-14 RX ORDER — OXYCODONE HYDROCHLORIDE 5 MG/1
5-10 TABLET ORAL EVERY 4 HOURS PRN
Qty: 60 TABLET | Refills: 0 | Status: SHIPPED | OUTPATIENT
Start: 2018-10-14 | End: 2018-11-15

## 2018-10-14 RX ORDER — AMOXICILLIN 250 MG
2 CAPSULE ORAL 2 TIMES DAILY
Qty: 100 TABLET | COMMUNITY
Start: 2018-10-14 | End: 2019-07-17

## 2018-10-14 RX ADMIN — BUPROPION HYDROCHLORIDE 150 MG: 150 TABLET, FILM COATED, EXTENDED RELEASE ORAL at 07:44

## 2018-10-14 RX ADMIN — LAMOTRIGINE 50 MG: 50 TABLET, ORALLY DISINTEGRATING ORAL at 07:47

## 2018-10-14 RX ADMIN — MAGNESIUM HYDROXIDE 30 ML: 400 SUSPENSION ORAL at 10:14

## 2018-10-14 RX ADMIN — ACETAMINOPHEN 975 MG: 325 TABLET, FILM COATED ORAL at 06:38

## 2018-10-14 RX ADMIN — GABAPENTIN 600 MG: 300 CAPSULE ORAL at 07:44

## 2018-10-14 RX ADMIN — HYDROXYZINE HYDROCHLORIDE 10 MG: 10 TABLET ORAL at 10:14

## 2018-10-14 RX ADMIN — ESCITALOPRAM OXALATE 10 MG: 10 TABLET ORAL at 07:44

## 2018-10-14 RX ADMIN — POLYETHYLENE GLYCOL 3350 17 G: 17 POWDER, FOR SOLUTION ORAL at 07:44

## 2018-10-14 RX ADMIN — SENNOSIDES AND DOCUSATE SODIUM 2 TABLET: 8.6; 5 TABLET ORAL at 07:44

## 2018-10-14 RX ADMIN — INFLUENZA A VIRUS A/MICHIGAN/45/2015 X-275 (H1N1) ANTIGEN (FORMALDEHYDE INACTIVATED), INFLUENZA A VIRUS A/SINGAPORE/INFIMH-16-0019/2016 IVR-186 (H3N2) ANTIGEN (FORMALDEHYDE INACTIVATED), AND INFLUENZA B VIRUS B/MARYLAND/15/2016 BX-69A (A B/COLORADO/6/2017-LIKE VIRUS) ANTIGEN (FORMALDEHYDE INACTIVATED) 0.5 ML: 60; 60; 60 INJECTION, SUSPENSION INTRAMUSCULAR at 12:53

## 2018-10-14 RX ADMIN — OXYCODONE HYDROCHLORIDE 10 MG: 5 TABLET ORAL at 12:12

## 2018-10-14 RX ADMIN — METOPROLOL TARTRATE 25 MG: 25 TABLET ORAL at 07:44

## 2018-10-14 RX ADMIN — FINASTERIDE 5 MG: 5 TABLET, FILM COATED ORAL at 07:44

## 2018-10-14 RX ADMIN — OXYCODONE HYDROCHLORIDE 10 MG: 5 TABLET ORAL at 07:44

## 2018-10-14 ASSESSMENT — ACTIVITIES OF DAILY LIVING (ADL)
ADLS_ACUITY_SCORE: 11

## 2018-10-14 NOTE — PLAN OF CARE
Problem: Patient Care Overview  Goal: Plan of Care/Patient Progress Review  Physical Therapy Discharge Summary    Reason for therapy discharge:    Discharged to home with home therapy.    Progress towards therapy goal(s). See goals on Care Plan in [facility] electronic health record for goal details.  Goals met    Therapy recommendation(s):    Continued therapy is recommended.  Rationale/Recommendations:  to progress mobility and ROM.

## 2018-10-14 NOTE — DISCHARGE INSTRUCTIONS
Take miralax daily.  Take two senokot twice daily.  Use one dose MOM daily as needed.  When you start stooling regular---back off on the laxatives.                       Further instructions from your care team      Orthopedic Surgery Discharge Instructions     1. Follow up:  Follow up with Dr. Peralta or his Physician's Assistant  in 2 weeks for post op check and x rays as scheduled.  Call [phone] if appointment needed or questions. If you have questions or concerns while at home, please call [facility]. If it is an emergency, call [phone]. You may find the answers to questions in the included discharge packet from the hospital or your pre operative packet you received in clinic prior to surgery.     2. Pain Medication:  Use pain medication as directed. If you are prescribed narcotic pain medications (Oxycodone, Norco, Percocet, Tylenol #3, Dilaudid, or Tramadol) then you should try to wean off of them as tolerated. These are an AS NEEDED medication, so if you are not having significant pain you should try to take fewer pills at a time or spread out the doses out over a longer period of time than is written on the prescription. You should not drive a car or operate machinery if you are taking prescribed narcotic pain medication. You may not receive a refill on this medication by your surgical team until your follow up appointment, so try to wean off your narcotics as soon as possible. If you need a refill on this medication you may call your surgical team to discuss during business hours. Refills are not given on the weekend under any circumstances, so plan accordingly.     3. How to wean narcotics: Within 2-3 days of surgery you should be able to begin weaning your pain medications. If upon leaving the hospital you are taking 2 tabs every 3-4 hours, you should decrease this to 1 tab every 3-4 hours. Around 4-5 days after surgery you should begin decreasing the frequency of your pain medication to  every 4-5 hours. You may also cut your pills in half to decrease the dose as you begin the weaning process. Create a weaning schedule of your pain medication when you get home from the hospital, you may be expected to make the prescription last until your next appointment. Call your surgical team during business hours to discuss your pain medication if you have questions or concerns about the weaning process.     4. Tylenol and pain medications: If your pain pill contains Tylenol (i.e. Percocet, Norco, Tylenol #3) and you are also taking additional Tylenol/acetaminophen as a pain medication, ensure that you are not taking too much tylenol. Prescription narcotic medications that contain Tylenol usually have 325mg of Tylenol per pill and over-the-counter medications have variable doses of Tylenol. You should not exceed 4,000mg of Tylenol in a 24-hour period. Tylenol should be used in combination with your pain medication, if able, to help reduce the amount of pain medication you are taking.     5. Applying ice to your incision: ice can provide additional pain relief at home. Apply  ice in 20 minute intervals. Allow your skin to warm up to room temperature, approximately 40 minutes, before applying another ice pack.     6. Incision instructions:  Keep your incision clean, covered and dry until your post op appointment.  You may shower and get the incision wet if no drainage is present.Aquacel dressing to remain intact until follow up, this is a water proof dressing and should be left on for showering.     7. Physical therapy:  Continue physical therapy as soon as possible.  You will need to call a therapy department of your choice to arrange future appointments.  Your order for physical therapy is included in your discharge paperwork.      8. Blood Clot Prevention: Resume Xarelto 20mg daily.  If you develop abdominal pain or have signs of bleeding - blood in stool or black stools, stop taking the medication and seek  medical care. Walk often at home, walking will help reduce the risk of blood clots. If you have been prescribed ramy stockings or compression stockings, wear them during the day until you follow up with your orthopedic team in clinic. If you were not given Ramy Stockings in the hospital but would like them, they can be purchased over the counter at your local pharmacy.      9. Call the office if you have any questions/concerns or are experiencing the following:  - increasing drainage from the incision  - foul-smelling or malodorous drainage from the incision  - sudden increase or change in pain that is not controlled by your prescribed medications  - inability to urinate  - new onset of weakness, numbness or severe pain in the extremities  - bowel/bladder incontinence  - Fever greater than 101.5 degrees  - Nausea/vomitting causing inability to eat food or medications

## 2018-10-14 NOTE — PLAN OF CARE
Problem: Knee Arthroplasty (Total, Partial) (Adult)  Goal: Signs and Symptoms of Listed Potential Problems Will be Absent, Minimized or Managed (Knee Arthroplasty)  Signs and symptoms of listed potential problems will be absent, minimized or managed by discharge/transition of care (reference Knee Arthroplasty (Total, Partial) (Adult) CPG).   Outcome: Improving  Patient received Ambien, Oxycdone, Tylenol @ BEDTIME  Patient resting quietly, no concerns at this time  BioFreeze used X1 on right upper shoulder area

## 2018-10-14 NOTE — PROGRESS NOTES
"[facility] Orthopa[facility]ics Progress Note      Post-operative Day: 2 Days Post-Op    Procedure(s):  Right Total Knee Arthroplasty - Wound Class: I-Clean      Subjective:    Pain: minimal  Chest pain, SOB:  No      Objective:  Blood pressure 148/84, pulse 93, temperature 97.8  F (36.6  C), temperature source Oral, resp. rate 18, height 1.803 m (5' 11\"), weight 77.1 kg (170 lb), SpO2 95 %.    Patient Vitals for the past 24 hrs:   BP Temp Temp src Pulse Heart Rate Resp SpO2   10/14/18 0647 148/84 97.8  F (36.6  C) Oral 93 - 18 95 %   10/13/18 2222 116/65 98.4  F (36.9  C) Oral 79 - 18 95 %   10/13/18 1454 125/67 97.8  F (36.6  C) Oral 70 - 18 92 %   10/13/18 1152 114/55 97.4  F (36.3  C) Oral - 67 18 93 %       Wt Readings from Last 4 Encounters:   10/12/18 77.1 kg (170 lb)   09/25/18 78.5 kg (173 lb)   09/19/18 78.5 kg (173 lb)   09/14/18 76.7 kg (169 lb)         Motor function, sensation, and circulation intact   Yes  Wound status: incisions are clean dry and intact. Yes  Calf tenderness: Bilateral  No    Pertinent Labs   Lab Results: personally reviewed.     Recent Labs   Lab Test  10/14/18   0503  10/13/18   0545  09/14/18   1629  05/07/18   1325  01/22/18   1354  12/20/17   1315   11/15/12   1132  11/12/12   1110  11/08/12   1400   INR   --    --    --    --    --    --    --   2.73*  2.77*  2.57*   HGB  8.8*  9.5*  12.0*   --   12.9*  11.7*   --    --    --    --    HCT   --    --   35.3*   --   38.6*  35.2*   --    --    --    --    MCV   --    --   96   --   97  97   --    --    --    --    PLT   --    --   193   --   201  155   --    --    --    --    NA   --    --   134  135  132*  133   < >   --    --    --     < > = values in this interval not displayed.       Plan: Anticoagulation protocol:  mg daily  x 42  days            Pain medications:  oxycodone            Weight bearing status:  WBAT            Disposition:  Home today             Continue cares and rehabilitation     Report completed by:  " Edgar Rudd MD  Date: 10/14/2018  Time: 8:47 AM

## 2018-10-14 NOTE — PLAN OF CARE
Problem: Knee Arthroplasty (Total, Partial) (Adult)  Goal: Signs and Symptoms of Listed Potential Problems Will be Absent, Minimized or Managed (Knee Arthroplasty)  Signs and symptoms of listed potential problems will be absent, minimized or managed by discharge/transition of care (reference Knee Arthroplasty (Total, Partial) (Adult) CPG).   Outcome: No Change  Patient requesting to try oxycodone, does not feel pain relief from oral dilaudid.  Order received for oxycodone and one time dose of toradol, both were given.  Patient reports better pain level at this time.

## 2018-10-14 NOTE — DISCHARGE SUMMARY
[facility] Orthopedics Discharge Summary                                  [facility]     RUPERTO IRELAND [id number]   Age: 73 year old  PCP: AVIS Martínez, [phone] 1945     Date of Admission:  10/12/2018  Date of Discharge::  10/14/2018  Discharge Physician:  Codi Toledo    Code status:  Full Code    Admission Information:  Admission Diagnosis:  osteoarthritis right knee  Right knee DJD    Post-Operative Day: 2 Days Post-Op     Reason for admission:  The patient was admitted for the following:Procedure(s) (LRB):  ARTHROPLASTY KNEE (Right)    Principal Problem:    Right knee DJD  Active Problems:    Hypertension goal BP (blood pressure) < 140/90    Hypertrophy of prostate with urinary obstruction    Anxiety    Moderate major depression (H)    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)    Chronic atrial fibrillation (H)    Peripheral neuropathy    Sleep disturbance      Allergies:  Bactrim [sulfamethoxazole w/trimethoprim]    Following the procedure noted above the patient was transferred to the post-op floor and started on:    Therapy:  physical therapy and occupational therapy  Anticoagulation Plan: Xarelto   for A Fib    Pain Management: oxycodone  Weight bearing status: Weight bearing as tolerated     The patient was followed and co-managed by the hospitalist service during the inpatient treatment course  Complications:  None  Consultations:  None     Pertinent Labs   Lab Results: personally reviewed.     Recent Labs   Lab Test  10/14/18   0503  10/13/18   0545  09/14/18   1629  05/07/18   1325  01/22/18   1354  12/20/17   1315   11/15/12   1132  11/12/12   1110  11/08/12   1400   INR   --    --    --    --    --    --    --   2.73*  2.77*  2.57*   HGB  8.8*  9.5*  12.0*   --   12.9*  11.7*   --    --    --    --    HCT   --    --   35.3*   --   38.6*  35.2*   --    --    --    --    MCV   --    --   96   --   97  97   --    --    --    --    PLT   --    --   193   --   201  155   --    --    --     --    NA   --    --   134  135  132*  133   < >   --    --    --     < > = values in this interval not displayed.          Discharge Information:  Condition at discharge: Stable  Discharge destination:  Discharged to home     Medications at discharge:  Current Discharge Medication List      START taking these medications    Details   hydrOXYzine (ATARAX) 10 MG tablet Take 1 tablet (10 mg) by mouth every 6 hours as needed for itching  Qty: 120 tablet, Refills: 0    Associated Diagnoses: Status post right knee replacement      oxyCODONE IR (ROXICODONE) 5 MG tablet Take 1-2 tablets (5-10 mg) by mouth every 4 hours as needed for moderate to severe pain  Qty: 60 tablet, Refills: 0    Associated Diagnoses: Status post right knee replacement         CONTINUE these medications which have NOT CHANGED    Details   acetaminophen (TYLENOL) 500 MG tablet Take 1,000 mg by mouth every 8 hours as needed for mild pain      buPROPion (WELLBUTRIN SR) 150 MG 12 hr tablet Take 1 tablet (150 mg) by mouth 2 times daily  Qty: 60 tablet, Refills: 5    Associated Diagnoses: Moderate major depression (H); Anxiety      doxazosin (CARDURA) 4 MG tablet Take 1 tablet (4 mg) by mouth daily  Qty: 90 tablet, Refills: 3    Associated Diagnoses: Hypertrophy of prostate with urinary obstruction      escitalopram (LEXAPRO) 10 MG tablet Take 1 tablet (10 mg) by mouth daily  Qty: 90 tablet, Refills: 1    Associated Diagnoses: Anxiety      finasteride (PROSCAR) 5 MG tablet Take 1 tablet (5 mg) by mouth daily  Qty: 90 tablet, Refills: 1    Associated Diagnoses: Benign prostatic hyperplasia with weak urinary stream      gabapentin (NEURONTIN) 300 MG capsule Take 2 capsules (600 mg) by mouth 2 times daily  Qty: 270 capsule, Refills: 3    Associated Diagnoses: Pain of right upper arm      lamoTRIgine (LAMICTAL) 100 MG tablet Take 1/2 tablet daily for 2 weeks, then 1/2 tablet twice daily  Qty: 30 tablet, Refills: 1    Associated Diagnoses: Moderate major  depression (H)      metoprolol tartrate (LOPRESSOR) 25 MG tablet Take 25 mg by mouth 2 times daily  Refills: 3      rivaroxaban ANTICOAGULANT (XARELTO) 20 MG TABS tablet Take 1 tablet (20 mg) by mouth daily (with dinner)  Qty: 90 tablet, Refills: 3    Associated Diagnoses: Chronic atrial fibrillation (H)      zolpidem (AMBIEN) 10 MG tablet Take 1 tablet (10 mg) by mouth nightly as needed for sleep  Qty: 30 tablet, Refills: 5    Associated Diagnoses: Primary insomnia      STATIN NOT PRESCRIBED, INTENTIONAL, Please choose reason not prescribed, below    Associated Diagnoses: Chronic atrial fibrillation (H)                        Follow-Up Care:  Patient should be seen in the office in 14 days by the Orthopedic Surgeon/Physician Assistant.  Call [phone] for appointment or questions.    Codi Toledo

## 2018-10-14 NOTE — PROGRESS NOTES
[facility] Hospitalist Service      Subjective:  No abd pain  No bm  Had impaction last joint replacement  Ortho has discharged    Review of Systems:  CONSTITUTIONAL: NEGATIVE for fever, chills, change in weight  ENT/MOUTH: NEGATIVE for ear, mouth and throat problems  RESP: NEGATIVE for significant cough or SOB  CV: NEGATIVE for chest pain, palpitations or peripheral edema    Physical Exam:  Vitals Were Reviewed    Patient Vitals for the past 16 hrs:   BP Temp Temp src Pulse Resp SpO2   10/14/18 0647 148/84 97.8  F (36.6  C) Oral 93 18 95 %   10/13/18 2222 116/65 98.4  F (36.9  C) Oral 79 18 95 %         Intake/Output Summary (Last 24 hours) at 10/14/18 0933  Last data filed at 10/14/18 0647   Gross per 24 hour   Intake              570 ml   Output             1560 ml   Net             -990 ml       GENERAL APPEARANCE: healthy, alert and no distress  EYES: conjunctiva clear, eyes grossly normal  RESP: lungs clear to auscultation - no rales, rhonchi or wheezes  CV: regular rate and rhythm, normal S1 S2, no S3 or S4 and no murmur, click or rub   ABDOMEN: soft, nontender, no HSM or masses and bowel sounds normal  SKIN: clear without significant rashes or lesions    Lab:  Recent Labs   Lab Test  10/14/18   0503  09/14/18   1629  05/07/18   1325   NA   --   134  135   POTASSIUM   --   4.2  4.4   CHLORIDE   --   100  101   CO2   --   30  29   ANIONGAP   --   4  5   GLC  98  96  99   BUN   --   14  21   CR   --   0.93  1.07   LUIS   --   9.1  8.7     CBC RESULTS:   Recent Labs   Lab Test  10/14/18   0503  10/13/18   0545  09/14/18   1629  01/22/18   1354   WBC   --    --   6.2  8.3   RBC   --    --   3.66*  3.99*   HGB  8.8*  9.5*  12.0*  12.9*   HCT   --    --   35.3*  38.6*   PLT   --    --   193  201       Results for orders placed or performed during the hospital encounter of 10/12/18 (from the past 24 hour(s))   Care Transition RN/SW IP Consult    Narrative    Siena Atkins RN     10/13/2018  2:30 PM  Care  Transition Initial Assessment - RN  Reason For Consult: discharge planning   Met with: Patient and Family.    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  Resources List: Home Care  Lives With: spouse  Living Arrangements: condominium  Description of Support System: Supportive, Involved   Who is your support system?: Wife   Support Assessment: Adequate family and caregiver support   Insurance Concerns: No Insurance issues identified    This writer met with pt and spouse Yoselyn, introduced self and   role.  This writer discussed discharge planning and Medicare   guidelines in regards to home care. Pt lives in a con[place]ium [place]   the community with Yoselyn his spouse. Pt has no services at home   and he does have family support. Patients goal is to return upon   discharge. Patient is in agreement with receiving home care for   PT. Pt was provided with Medicare certified home care list. Pt   chooses to use [facility] [employer] Phone: [phone].   Transportation provided by family.      PLAN  [facility]-PT        Siena Atkins RN Care Coordinator  [facility] [phone]  [facility] [phone]   Hemoglobin   Result Value Ref Range    Hemoglobin 8.8 (L) 13.3 - 17.7 g/dL   Glucose   Result Value Ref Range    Glucose 98 70 - 99 mg/dL       Assessment and Plan:    Right knee arthroplasty-day 2  hgb 8.8     Hypertension 140/90  On metoprolol     Hypertrophy of prostate with urinary obstruction  On proscar and cardura, straight cathed once 10/13/18 -refusing more straight caths     Alcoholism in remission  On Xarelto which was  restarted.     Chronic atrial fibrillation      Peripheral neuropathy  gaqbapentin     Depression/anxiety  Lamictal, lexapro     Insomnia  Uses ambien    Constipation  On miralax and senokot (two bid), add MOM       Prophylaxis-xarelto--apparently ordered as 10 mg per ortho--will change to the pta dose of 20 mg     Plan- discharging

## 2018-10-14 NOTE — PROGRESS NOTES
[facility] DISCHARGE NOTE    Patient discharged to home at 1:44 PM via wheel chair. Accompanied by spouse and staff. Discharge instructions reviewed with patient and spouse, opportunity offered to ask questions. Prescriptions sent to patients preferred pharmacy. All belongings sent with patient.    Angeles Blas

## 2018-10-15 ENCOUNTER — DOCUMENTATION ONLY (OUTPATIENT)
Dept: CARE COORDINATION | Facility: CLINIC | Age: 73
End: 2018-10-15

## 2018-10-15 NOTE — PROGRESS NOTES
[facility] now requests orders and shares plan of care/discharge summaries for some patients through [employer].  Please REPLY TO THIS MESSAGE OR ROUTE BACK TO THE AUTHOR in order to give authorization for orders when needed.  This is considered a verbal order, you will still receive a faxed copy of orders for signature.  Thank you for your assistance in improving collaboration for our patients.    ORDER    MD SUMMARY/PLAN OF CARE    PT 3w2 for ambulation, transfers, exercises, pain management with all other disciplines declined.    SEI pt has medication interactions with pt reporting he stated MD is aware.    Annika Clark MPT

## 2018-10-25 DIAGNOSIS — Z53.9 DIAGNOSIS NOT YET DEFINED: Primary | ICD-10-CM

## 2018-10-25 PROCEDURE — G0180 MD CERTIFICATION HHA PATIENT: HCPCS | Performed by: FAMILY MEDICINE

## 2018-10-29 ENCOUNTER — TRANSFERRED RECORDS (OUTPATIENT)
Dept: HEALTH INFORMATION MANAGEMENT | Facility: CLINIC | Age: 73
End: 2018-10-29

## 2018-10-29 ENCOUNTER — DOCUMENTATION ONLY (OUTPATIENT)
Dept: OTHER | Facility: CLINIC | Age: 73
End: 2018-10-29

## 2018-11-08 ENCOUNTER — TELEPHONE (OUTPATIENT)
Dept: FAMILY MEDICINE | Facility: CLINIC | Age: 73
End: 2018-11-08

## 2018-11-08 DIAGNOSIS — F32.1 MODERATE MAJOR DEPRESSION (H): ICD-10-CM

## 2018-11-08 RX ORDER — LAMOTRIGINE 100 MG/1
TABLET ORAL
Qty: 90 TABLET | Refills: 1 | Status: SHIPPED | OUTPATIENT
Start: 2018-11-08 | End: 2018-11-15

## 2018-11-08 NOTE — TELEPHONE ENCOUNTER
Routing refill request to provider for review/approval because:  Per RN protocol:  Failed - Review Authorizing provider's last note.       Refer to last progress notes: confirm request is for original authorizing provider (cannot be through other providers).     This was first prescribed 9-14-18, OV notes are not clear when patient is to f/u.    Routing to provider.  Kayleen MUNSON RN

## 2018-11-08 NOTE — TELEPHONE ENCOUNTER
"Requested Prescriptions   Pending Prescriptions Disp Refills     lamoTRIgine (LAMICTAL) 100 MG tablet  Last Written Prescription Date:  9/14/2018  Last Fill Quantity: 30,  # refills: 1   Last office visit: 9/25/2018 with prescribing provider:  Lucina   Future Office Visit:   Next 5 appointments (look out 90 days)     Nov 15, 2018  1:00 PM CST   SHORT with AVIS Martínez MD   [facility] ([facility]    [address]  [facility] [address]   [phone]                  30 tablet 1     Sig: Take 1/2 tablet daily for 2 weeks, then 1/2 tablet twice daily    Anti-Seizure Meds Protocol  Failed    11/8/2018  9:06 AM       Failed - Review Authorizing provider's last note.     Refer to last progress notes: confirm request is for original authorizing provider (cannot be through other providers).         Passed - Recent (12 mo) or future (30 days) visit within the authorizing provider's specialty    Patient had office visit in the last 12 months or has a visit in the next 30 days with authorizing provider or within the authorizing provider's specialty.  See \"Patient Info\" tab in inbasket, or \"Choose Columns\" in Meds & Orders section of the refill encounter.             Passed - Normal CBC on file in past 26 months    Recent Labs   Lab Test  10/14/18   0503   09/14/18   1629   WBC   --    --   6.2   RBC   --    --   3.66*   HGB  8.8*   < >  12.0*   HCT   --    --   35.3*   PLT   --    --   193    < > = values in this interval not displayed.                Passed - Normal ALT or AST on file in past 26 months    Recent Labs   Lab Test  01/22/18   1354   ALT  32     Recent Labs   Lab Test  01/22/18   1354   AST  15            Passed - Normal platelet count on file in past 26 months    Recent Labs   Lab Test  09/14/18   1629   PLT  193                 "

## 2018-11-15 ENCOUNTER — OFFICE VISIT (OUTPATIENT)
Dept: FAMILY MEDICINE | Facility: CLINIC | Age: 73
End: 2018-11-15
Payer: COMMERCIAL

## 2018-11-15 VITALS
DIASTOLIC BLOOD PRESSURE: 60 MMHG | HEART RATE: 68 BPM | SYSTOLIC BLOOD PRESSURE: 108 MMHG | TEMPERATURE: 97.8 F | BODY MASS INDEX: 24.28 KG/M2 | HEIGHT: 70 IN | WEIGHT: 169.6 LBS | RESPIRATION RATE: 16 BRPM | OXYGEN SATURATION: 99 %

## 2018-11-15 DIAGNOSIS — F10.21 ALCOHOLISM IN REMISSION (H): ICD-10-CM

## 2018-11-15 DIAGNOSIS — F32.1 MODERATE MAJOR DEPRESSION (H): ICD-10-CM

## 2018-11-15 DIAGNOSIS — F41.9 ANXIETY: Primary | ICD-10-CM

## 2018-11-15 PROCEDURE — 99214 OFFICE O/P EST MOD 30 MIN: CPT | Performed by: FAMILY MEDICINE

## 2018-11-15 RX ORDER — BUPROPION HYDROCHLORIDE 150 MG/1
150 TABLET, EXTENDED RELEASE ORAL 2 TIMES DAILY
Qty: 60 TABLET | Refills: 5 | Status: SHIPPED | OUTPATIENT
Start: 2018-11-15 | End: 2019-02-07

## 2018-11-15 RX ORDER — LAMOTRIGINE 100 MG/1
TABLET ORAL
Qty: 180 TABLET | Refills: 1 | Status: SHIPPED | OUTPATIENT
Start: 2018-11-15 | End: 2019-02-07

## 2018-11-15 ASSESSMENT — ANXIETY QUESTIONNAIRES
GAD7 TOTAL SCORE: 6
7. FEELING AFRAID AS IF SOMETHING AWFUL MIGHT HAPPEN: NOT AT ALL
2. NOT BEING ABLE TO STOP OR CONTROL WORRYING: SEVERAL DAYS
6. BECOMING EASILY ANNOYED OR IRRITABLE: SEVERAL DAYS
1. FEELING NERVOUS, ANXIOUS, OR ON EDGE: MORE THAN HALF THE DAYS
IF YOU CHECKED OFF ANY PROBLEMS ON THIS QUESTIONNAIRE, HOW DIFFICULT HAVE THESE PROBLEMS MADE IT FOR YOU TO DO YOUR WORK, TAKE CARE OF THINGS AT HOME, OR GET ALONG WITH OTHER PEOPLE: SOMEWHAT DIFFICULT
3. WORRYING TOO MUCH ABOUT DIFFERENT THINGS: SEVERAL DAYS
5. BEING SO RESTLESS THAT IT IS HARD TO SIT STILL: NOT AT ALL

## 2018-11-15 ASSESSMENT — PATIENT HEALTH QUESTIONNAIRE - PHQ9
SUM OF ALL RESPONSES TO PHQ QUESTIONS 1-9: 9
5. POOR APPETITE OR OVEREATING: SEVERAL DAYS

## 2018-11-15 NOTE — PROGRESS NOTES
Subjective:  Josemanuel Edmond is a 73 year old male   Chief Complaint   Patient presents with     Depression     Anxiety     He continues to have significant anxiety and depression.  Recently he has had more symptoms of social anxiety disorder where he does not want to go to public settings or social gatherings, things that he used to enjoy in the past.  There have been some additional stressors with his son getting in legal trouble.  He has not really noticed any significant improvement with adding the lamotrigine.  He does tolerate it without side effects.  Current symptoms include:    CAROLYNN-7   [employer], 2002; Used with Permission) 11/15/2018   Over the last 2 weeks, how often have you been bothered by feeling nervous, anxious or on edge?    Over the last 2 weeks, how often have you been bothered by not being able to stop or control worrying?    Over the last 2 weeks, how often have you been bothered by worrying too much about different things?    Over the last 2 weeks, how often have you been bothered by trouble relaxing?    Over the last 2 weeks, how often have you been bothered by being so restless that it is hard to sit still?    Over the last 2 weeks, how often have you been bothered by becoming easily annoyed or irritable?    Over the last 2 weeks, how often have you been bothered by feeling afraid as if something awful might happen?    CAROLYNN-7 Total Score =     1. Feeling nervous, anxious, or on edge 2   2. Not being able to stop or control worrying 1   3. Worrying too much about different things 1   4. Trouble relaxing 1   5. Being so restless that it is hard to sit still 0   6. Becoming easily annoyed or irritable 1   7. Feeling afraid, as if something awful might happen 0   CAROLYNN-7 Total Score 6   If you checked any problems, how difficult have they made it for you to do your work, take care of things at home, or get along with other people? Somewhat difficult     PHQ-9 ([employer]) 11/15/2018   No Interest In  Doing Things    Feeling Depressed    Trouble Sleeping    Tired / No Energy    No appetite or Over-Eating    Feeling Bad about Self    Trouble Concentrating    Moving Slow or Restless    Suicidal Thoughts    Total Score    1.  Little interest or pleasure in doing things 2   2.  Feeling down, depressed, or hopeless 2   3.  Trouble falling or staying asleep, or sleeping too much 3   4.  Feeling tired or having little energy 1   5.  Poor appetite or overeating 0   6.  Feeling bad about yourself 1   7.  Trouble concentrating 0   8.  Moving slowly or restless 0   9.  Suicidal or self-harm thoughts 0   PHQ-9 Total Score 9   Difficulty at work, home, or with people Somewhat difficult     Encounter Diagnoses   Name Primary?     Moderate major depression (H)      Anxiety      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) Yes       ROS:other than noted above, general, HEENT, respiratory, cardiac, gastrointestinal systems are negative    Medical, surgical, social, and family histories, medications and allergies reviewed and updated.    Objective:  Exam:    GENERAL APPEARANCE ADULT: Alert, no acute distress  EYES: PERRL, EOM normal, conjunctiva and lids normal  PSYCH: mentation appears normal., affect and mood normal      ASSESSMENT:  1. Anxiety    2. Moderate major depression (H)    3. 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)        PLAN:    We will try increasing the dose of lamotrigine to 100 mg twice a day and see if that will help.  Continue the other medications the same    Orders Placed This Encounter     buPROPion (WELLBUTRIN SR) 150 MG 12 hr tablet     lamoTRIgine (LAMICTAL) 100 MG tablet       Patient Instructions   Recheck in 3 months

## 2018-11-15 NOTE — MR AVS SNAPSHOT
"              After Visit Summary   11/15/2018    Josemanuel Edmond    MRN: [id number]           Patient Information     Date Of Birth          1945        Visit Information        Provider Department      11/15/2018 1:00 PM Tanya, AVIS Stewart MD [facility]        Today's Diagnoses     Moderate major depression (H)        Anxiety          Care Instructions    Recheck in 3 months          Follow-ups after your visit        Who to contact     If you have questions or need follow up information about today's clinic visit or your schedule please contact [facility] directly at [phone].  Normal or non-critical lab and imaging results will be communicated to you by Hello [employer]ichart, letter or phone within 4 business days after the clinic has received the results. If you do not hear from us within 7 days, please contact the clinic through Hello Musichart or phone. If you have a critical or abnormal lab result, we will notify you by phone as soon as possible.  Submit refill requests through trustedsafe or call your pharmacy and they will forward the refill request to us. Please allow 3 business days for your refill to be completed.          Additional Information About Your Visit        MyChart Information     trustedsafe gives you secure access to your electronic health record. If you see a primary care provider, you can also send messages to your care team and make appointments. If you have questions, please call your primary care clinic.  If you do not have a primary care provider, please call [phone] and they will assist you.        Care EveryWhere ID     This is your Care EveryWhere ID. This could be used by other organizations to access your [facility] medical records  [id number]        Your Vitals Were     Pulse Temperature Respirations Height Pulse Oximetry BMI (Body Mass Index)    68 97.8  F (36.6  C) (Tympanic) 16 5' 10\" (1.778 m) 99% 24.34 kg/m2       Blood Pressure from Last 3 Encounters: "   11/15/18 108/60   10/14/18 148/84   09/25/18 120/68    Weight from Last 3 Encounters:   11/15/18 169 lb 9.6 oz (76.9 kg)   10/12/18 170 lb (77.1 kg)   09/25/18 173 lb (78.5 kg)              Today, you had the following     No orders found for display         Today's Medication Changes          These changes are accurate as of 11/15/18  1:35 PM.  If you have any questions, ask your nurse or doctor.               These medicines have changed or have updated prescriptions.        Dose/Directions    lamoTRIgine 100 MG tablet   Commonly known as:  LAMICTAL   This may have changed:  additional instructions   Used for:  Moderate major depression (H)   Changed by:  AVIS Martínez MD        1 tablet twice daily   Quantity:  180 tablet   Refills:  1            Where to get your medicines      These medications were sent to [facility] - - [place], [place] - [address] [address]    Hours:  JABIER [facility] Phone:  [phone]     buPROPion 150 MG 12 hr tablet    lamoTRIgine 100 MG tablet                Primary Care Provider Office Phone # Fax #    AVIS Martínez [phone] [address] [facility] [address]        Equal Access to Services     MERARI NASCIMENTO AH: Hadii toney ku hadasho Soomaali, waaxda luqadaha, qaybta kaalmada adeegyada, waxay idiin hayaan luisito silver. So [facility] [phone].    ATENCIÓN: Si habla español, tiene a sen disposición servicios gratuitos de asistencia lingüística. Saira al [phone].    We comply with applicable federal civil rights laws and [place] laws. We do not discriminate on the basis of race, color, national origin, age, disability, sex, sexual orientation, or gender identity.            Thank you!     Thank you for choosing [facility]  for your care. Our goal is always to provide you with excellent care. Hearing back from our patients is one way we can continue  to improve our services. Please take a few minutes to complete the written survey that you may receive in the mail after your visit with us. Thank you!             Your Updated Medication List - Protect others around you: Learn how to safely use, store and throw away your medicines at www.disposemymeds.org.          This list is accurate as of 11/15/18  1:35 PM.  Always use your most recent med list.                   Brand Name Dispense Instructions for use Diagnosis    acetaminophen 500 MG tablet    TYLENOL     Take 1,000 mg by mouth every 8 hours as needed for mild pain        buPROPion 150 MG 12 hr tablet    WELLBUTRIN SR    60 tablet    Take 1 tablet (150 mg) by mouth 2 times daily    Moderate major depression (H), Anxiety       doxazosin 4 MG tablet    CARDURA    90 tablet    Take 1 tablet (4 mg) by mouth daily    Hypertrophy of prostate with urinary obstruction       escitalopram 10 MG tablet    LEXAPRO    90 tablet    Take 1 tablet (10 mg) by mouth daily    Anxiety       finasteride 5 MG tablet    PROSCAR    90 tablet    Take 1 tablet (5 mg) by mouth daily    Benign prostatic hyperplasia with weak urinary stream       gabapentin 300 MG capsule    NEURONTIN    270 capsule    Take 2 capsules (600 mg) by mouth 2 times daily    Pain of right upper arm       lamoTRIgine 100 MG tablet    LAMICTAL    180 tablet    1 tablet twice daily    Moderate major depression (H)       magnesium hydroxide 400 MG/5ML suspension    MILK OF MAGNESIA    30 mL    Take 30 mLs by mouth daily as needed for constipation or heartburn    Drug-induced constipation       metoprolol tartrate 25 MG tablet    LOPRESSOR     Take 25 mg by mouth 2 times daily        rivaroxaban ANTICOAGULANT 20 MG Tabs tablet    XARELTO    90 tablet    Take 1 tablet (20 mg) by mouth daily (with dinner)    Chronic atrial fibrillation (H)       senna-docusate 8.6-50 MG per tablet    SENOKOT-S;PERICOLACE    100 tablet    Take 2 tablets by mouth 2 times daily         STATIN NOT PRESCRIBED (INTENTIONAL)      Please choose reason not prescribed, below    Chronic atrial fibrillation (H)       zolpidem 10 MG tablet    AMBIEN    30 tablet    Take 1 tablet (10 mg) by mouth nightly as needed for sleep    Primary insomnia

## 2018-11-16 ASSESSMENT — ANXIETY QUESTIONNAIRES: GAD7 TOTAL SCORE: 6

## 2019-01-01 ENCOUNTER — MYC REFILL (OUTPATIENT)
Dept: FAMILY MEDICINE | Facility: CLINIC | Age: 74
End: 2019-01-01

## 2019-01-01 DIAGNOSIS — N40.1 BENIGN PROSTATIC HYPERPLASIA WITH WEAK URINARY STREAM: ICD-10-CM

## 2019-01-01 DIAGNOSIS — R39.12 BENIGN PROSTATIC HYPERPLASIA WITH WEAK URINARY STREAM: ICD-10-CM

## 2019-01-01 RX ORDER — FINASTERIDE 5 MG/1
5 TABLET, FILM COATED ORAL DAILY
Qty: 90 TABLET | Refills: 1 | Status: CANCELLED | OUTPATIENT
Start: 2019-01-01

## 2019-01-11 ENCOUNTER — TRANSFERRED RECORDS (OUTPATIENT)
Dept: HEALTH INFORMATION MANAGEMENT | Facility: CLINIC | Age: 74
End: 2019-01-11

## 2019-02-07 ENCOUNTER — MYC MEDICAL ADVICE (OUTPATIENT)
Dept: FAMILY MEDICINE | Facility: CLINIC | Age: 74
End: 2019-02-07

## 2019-02-07 DIAGNOSIS — N13.8 HYPERTROPHY OF PROSTATE WITH URINARY OBSTRUCTION: ICD-10-CM

## 2019-02-07 DIAGNOSIS — N40.1 HYPERTROPHY OF PROSTATE WITH URINARY OBSTRUCTION: ICD-10-CM

## 2019-02-07 DIAGNOSIS — F41.9 ANXIETY: ICD-10-CM

## 2019-02-07 DIAGNOSIS — F32.1 MODERATE MAJOR DEPRESSION (H): ICD-10-CM

## 2019-02-07 DIAGNOSIS — M79.621 PAIN OF RIGHT UPPER ARM: ICD-10-CM

## 2019-02-07 DIAGNOSIS — I48.20 CHRONIC ATRIAL FIBRILLATION (H): ICD-10-CM

## 2019-02-07 RX ORDER — METOPROLOL TARTRATE 25 MG/1
25 TABLET, FILM COATED ORAL 2 TIMES DAILY
Qty: 180 TABLET | Refills: 1 | Status: SHIPPED | OUTPATIENT
Start: 2019-02-07 | End: 2019-10-02

## 2019-02-07 RX ORDER — GABAPENTIN 300 MG/1
600 CAPSULE ORAL 2 TIMES DAILY
Qty: 270 CAPSULE | Refills: 1 | Status: SHIPPED | OUTPATIENT
Start: 2019-02-07 | End: 2019-08-27

## 2019-02-07 RX ORDER — LAMOTRIGINE 100 MG/1
TABLET ORAL
Qty: 180 TABLET | Refills: 0 | Status: SHIPPED | OUTPATIENT
Start: 2019-02-07 | End: 2019-04-03

## 2019-02-07 RX ORDER — DOXAZOSIN 4 MG/1
4 TABLET ORAL DAILY
Qty: 90 TABLET | Refills: 1 | Status: SHIPPED | OUTPATIENT
Start: 2019-02-07 | End: 2019-08-28

## 2019-02-07 RX ORDER — BUPROPION HYDROCHLORIDE 150 MG/1
150 TABLET, EXTENDED RELEASE ORAL 2 TIMES DAILY
Qty: 180 TABLET | Refills: 0 | Status: SHIPPED | OUTPATIENT
Start: 2019-02-07 | End: 2019-04-03

## 2019-02-26 ENCOUNTER — OFFICE VISIT (OUTPATIENT)
Dept: FAMILY MEDICINE | Facility: CLINIC | Age: 74
End: 2019-02-26
Payer: COMMERCIAL

## 2019-02-26 VITALS
HEIGHT: 70 IN | HEART RATE: 82 BPM | WEIGHT: 174 LBS | DIASTOLIC BLOOD PRESSURE: 70 MMHG | SYSTOLIC BLOOD PRESSURE: 128 MMHG | BODY MASS INDEX: 24.91 KG/M2 | RESPIRATION RATE: 18 BRPM | TEMPERATURE: 98.3 F

## 2019-02-26 DIAGNOSIS — F41.9 ANXIETY: ICD-10-CM

## 2019-02-26 DIAGNOSIS — F32.1 MODERATE MAJOR DEPRESSION (H): Primary | ICD-10-CM

## 2019-02-26 PROCEDURE — 99213 OFFICE O/P EST LOW 20 MIN: CPT | Performed by: FAMILY MEDICINE

## 2019-02-26 ASSESSMENT — MIFFLIN-ST. JEOR: SCORE: 1530.51

## 2019-02-26 ASSESSMENT — ANXIETY QUESTIONNAIRES
7. FEELING AFRAID AS IF SOMETHING AWFUL MIGHT HAPPEN: NOT AT ALL
3. WORRYING TOO MUCH ABOUT DIFFERENT THINGS: SEVERAL DAYS
5. BEING SO RESTLESS THAT IT IS HARD TO SIT STILL: SEVERAL DAYS
1. FEELING NERVOUS, ANXIOUS, OR ON EDGE: MORE THAN HALF THE DAYS
6. BECOMING EASILY ANNOYED OR IRRITABLE: NOT AT ALL
IF YOU CHECKED OFF ANY PROBLEMS ON THIS QUESTIONNAIRE, HOW DIFFICULT HAVE THESE PROBLEMS MADE IT FOR YOU TO DO YOUR WORK, TAKE CARE OF THINGS AT HOME, OR GET ALONG WITH OTHER PEOPLE: SOMEWHAT DIFFICULT
2. NOT BEING ABLE TO STOP OR CONTROL WORRYING: SEVERAL DAYS
GAD7 TOTAL SCORE: 5

## 2019-02-26 ASSESSMENT — PATIENT HEALTH QUESTIONNAIRE - PHQ9
SUM OF ALL RESPONSES TO PHQ QUESTIONS 1-9: 3
5. POOR APPETITE OR OVEREATING: NOT AT ALL

## 2019-02-26 NOTE — PROGRESS NOTES
"  SUBJECTIVE:   Josemanuel Edmond is a 73 year old male who presents to clinic today for the following health issues:      New Patient/Transfer of Care    Depression and Anxiety Follow-Up    Status since last visit: Worsened     Other associated symptoms:None    Complicating factors:     Significant life event: Yes-   Family      Current substance abuse: None    PHQ 7/2/2018 11/15/2018 2/26/2019   PHQ-9 Total Score 5 9 3   Q9: Suicide Ideation Not at all Not at all Not at all     CAROLYNN-7 SCORE 5/7/2018 11/15/2018 2/26/2019   Total Score - - -   Total Score 4 6 5       PHQ-9  English  PHQ-9   Any Language  CAROLYNN-7  Suicide Assessment Five-step Evaluation and Treatment (SAFE-T)    Problem list and histories reviewed & adjusted, as indicated.  Additional history:         Reviewed and updated as needed this visit by clinical staff  Tobacco  Allergies  Meds       Reviewed and updated as needed this visit by Provider       Further history obtained, clarified or corrected by physician:  He has had problems for several years with depression and anxiety.  He has had been on numerous medications and currently is taking Wellbutrin, Lexapro, and lamotrigine without enough benefit to his liking.  He continues to have mostly anxiety issues.  These have been going on for a number of years and he reports that prior to that he had struggled with alcohol and went through treatment.  That was all prompted after the death of his son 16 years ago.    OBJECTIVE:  /70   Pulse 82   Temp 98.3  F (36.8  C)   Resp 18   Ht 1.778 m (5' 10\")   Wt 78.9 kg (174 lb)   BMI 24.97 kg/m    LUNGS: clear to auscultation, normal breath sounds  CV: RRR without murmur  ABD: BS+, soft, nontender, no masses, no hepatosplenomegaly  EXTREMITIES: without joint tenderness, swelling or erythema.  No muscle tenderness or abnormality.  SKIN: No rashes or abnormalities  NEURO:non focal exam    ASSESSMENT:     Moderate major depression " (H)  Anxiety    PLAN:  He is never seen psychiatry about these issues and given his multiple medication trials he has agreed that appointment would make sense for consultation.    Orders Placed This Encounter     MENTAL HEALTH REFERRAL  - Adult; Psychiatry and Medication Management; Psychiatry; [facility]: [facility] [phone].  Medication management & future refills will be returned to [facility] PCP upon completion of evaluation; We nico...

## 2019-02-27 ASSESSMENT — ANXIETY QUESTIONNAIRES: GAD7 TOTAL SCORE: 5

## 2019-03-04 DIAGNOSIS — F51.01 PRIMARY INSOMNIA: ICD-10-CM

## 2019-03-04 RX ORDER — ZOLPIDEM TARTRATE 10 MG/1
10 TABLET ORAL
Qty: 30 TABLET | Refills: 5 | Status: SHIPPED | OUTPATIENT
Start: 2019-03-04 | End: 2019-09-02

## 2019-03-04 NOTE — TELEPHONE ENCOUNTER
Rx signed and faxed to [facility] in [place], patient notified.  Jennifer Stallworth  Clinic Station  [facility]

## 2019-03-08 DIAGNOSIS — N40.1 BENIGN PROSTATIC HYPERPLASIA WITH WEAK URINARY STREAM: ICD-10-CM

## 2019-03-08 DIAGNOSIS — R39.12 BENIGN PROSTATIC HYPERPLASIA WITH WEAK URINARY STREAM: ICD-10-CM

## 2019-03-08 RX ORDER — FINASTERIDE 5 MG/1
5 TABLET, FILM COATED ORAL DAILY
Qty: 90 TABLET | Refills: 1 | Status: SHIPPED | OUTPATIENT
Start: 2019-03-08 | End: 2019-10-02

## 2019-03-08 NOTE — TELEPHONE ENCOUNTER
Requested Prescriptions   Pending Prescriptions Disp Refills     finasteride (PROSCAR) 5 MG tablet 90 tablet 1     Sig: Take 1 tablet (5 mg) by mouth daily    There is no refill protocol information for this order        Last Written Prescription Date:  9/14/18  Last Fill Quantity: 90,  # refills: 1   Last office visit: 2/26/2019 with prescribing provider:  Dominguez Verdugo     Future Office Visit:

## 2019-03-08 NOTE — TELEPHONE ENCOUNTER
"Requested Prescriptions   Pending Prescriptions Disp Refills     finasteride (PROSCAR) 5 MG tablet 90 tablet 1     Sig: Take 1 tablet (5 mg) by mouth daily    Alpha Blockers Passed - 3/8/2019  9:24 AM       Passed - Blood pressure under 140/90 in past 12 months    BP Readings from Last 3 Encounters:   02/26/19 128/70   11/15/18 108/60   10/14/18 148/84                Passed - Recent (12 mo) or future (30 days) visit within the authorizing provider's specialty    Patient had office visit in the last 12 months or has a visit in the next 30 days with authorizing provider or within the authorizing provider's specialty.  See \"Patient Info\" tab in inbasket, or \"Choose Columns\" in Meds & Orders section of the refill encounter.             Passed - Patient does not have Tadalafil, Vardenafil, or Sildenafil on their medication list       Passed - Medication is active on med list       Passed - Patient is 18 years of age or older        Routing refill request to provider for review/approval because:  Prescribing provider has retired.        "

## 2019-04-03 DIAGNOSIS — F32.1 MODERATE MAJOR DEPRESSION (H): ICD-10-CM

## 2019-04-03 DIAGNOSIS — F41.9 ANXIETY: ICD-10-CM

## 2019-04-03 NOTE — TELEPHONE ENCOUNTER
"Requested Prescriptions   Pending Prescriptions Disp Refills     lamoTRIgine (LAMICTAL) 100 MG tablet [Pharmacy Med Name: LAMOTRIGINE  TAB 100MG] 180 tablet 0     Sig: TAKE 1 TABLET TWICE A DAY    Anti-Seizure Meds Protocol  Failed - 4/3/2019  1:01 AM       Failed - Review Authorizing provider's last note.     Refer to last progress notes: confirm request is for original authorizing provider (cannot be through other providers).         Failed - Normal CBC on file in past 26 months    Recent Labs   Lab Test 10/14/18  0503  09/14/18  1629   WBC  --   --  6.2   RBC  --   --  3.66*   HGB 8.8*   < > 12.0*   HCT  --   --  35.3*   PLT  --   --  193    < > = values in this interval not displayed.                Passed - Recent (12 mo) or future (30 days) visit within the authorizing provider's specialty    Patient had office visit in the last 12 months or has a visit in the next 30 days with authorizing provider or within the authorizing provider's specialty.  See \"Patient Info\" tab in inbasket, or \"Choose Columns\" in Meds & Orders section of the refill encounter.             Passed - Normal ALT or AST on file in past 26 months    Recent Labs   Lab Test 01/22/18  1354   ALT 32     Recent Labs   Lab Test 01/22/18  1354   AST 15            Passed - Normal platelet count on file in past 26 months    Recent Labs   Lab Test 09/14/18  1629                 Passed - Medication is active on med list   Last Written Prescription Date:  2/7/19  Last Fill Quantity: 180,  # refills: 0   Last office visit: 2/26/2019 with prescribing provider:  Lucina   Future Office Visit:         buPROPion (WELLBUTRIN SR) 150 MG 12 hr tablet [Pharmacy Med Name: BUPROP 12 SR TAB 150MG(W)] 180 tablet 0     Sig: TAKE 1 TABLET TWICE A DAY    SSRIs Protocol Passed - 4/3/2019  1:01 AM       Passed - PHQ-9 score less than 5 in past 6 months    Please review last PHQ-9 score.          Passed - Medication is Bupropion    If the medication is Bupropion " "(Wellbutrin), and the patient is taking for smoking cessation; OK to refill.         Passed - Medication is active on med list       Passed - Patient is age 18 or older       Passed - Recent (6 mo) or future (30 days) visit within the authorizing provider's specialty    Patient had office visit in the last 6 months or has a visit in the next 30 days with authorizing provider or within the authorizing provider's specialty.  See \"Patient Info\" tab in inbasket, or \"Choose Columns\" in Meds & Orders section of the refill encounter.          Last Written Prescription Date:  2/7/19  Last Fill Quantity: 180,  # refills: 0   Last office visit: 2/26/2019 with prescribing provider:  Lucina   Future Office Visit:        "

## 2019-04-04 RX ORDER — BUPROPION HYDROCHLORIDE 150 MG/1
TABLET, EXTENDED RELEASE ORAL
Qty: 180 TABLET | Refills: 1 | Status: SHIPPED | OUTPATIENT
Start: 2019-04-04 | End: 2020-01-07

## 2019-04-04 RX ORDER — LAMOTRIGINE 100 MG/1
TABLET ORAL
Qty: 180 TABLET | Refills: 3 | Status: SHIPPED | OUTPATIENT
Start: 2019-04-04 | End: 2020-04-17

## 2019-04-04 NOTE — TELEPHONE ENCOUNTER
Routing refill request to provider for review/approval because:  Labs out of range:  CBC  Associated Diagnoses   Moderate major depression (H) [F32.1]            MARCEL AlbertN, RN

## 2019-04-10 DIAGNOSIS — F41.9 ANXIETY: ICD-10-CM

## 2019-04-10 NOTE — TELEPHONE ENCOUNTER
"Requested Prescriptions   Pending Prescriptions Disp Refills     escitalopram (LEXAPRO) 10 MG tablet  Last Written Prescription Date:  9/14/18  Last Fill Quantity: 90,  # refills: 1   Last office visit: 2/26/2019 with prescribing provider:  Lucina   Future Office Visit:     90 tablet 1     Sig: Take 1 tablet (10 mg) by mouth daily       SSRIs Protocol Passed - 4/10/2019  4:29 PM        Passed - Recent (12 mo) or future (30 days) visit within the authorizing provider's specialty     Patient had office visit in the last 12 months or has a visit in the next 30 days with authorizing provider or within the authorizing provider's specialty.  See \"Patient Info\" tab in inbasket, or \"Choose Columns\" in Meds & Orders section of the refill encounter.              Passed - Medication is active on med list        Passed - Patient is age 18 or older          "

## 2019-04-11 RX ORDER — ESCITALOPRAM OXALATE 10 MG/1
10 TABLET ORAL DAILY
Qty: 90 TABLET | Refills: 1 | Status: SHIPPED | OUTPATIENT
Start: 2019-04-11 | End: 2019-09-29

## 2019-07-17 ENCOUNTER — TELEPHONE (OUTPATIENT)
Dept: FAMILY MEDICINE | Facility: CLINIC | Age: 74
End: 2019-07-17

## 2019-07-17 ENCOUNTER — OFFICE VISIT (OUTPATIENT)
Dept: FAMILY MEDICINE | Facility: CLINIC | Age: 74
End: 2019-07-17
Payer: COMMERCIAL

## 2019-07-17 VITALS
BODY MASS INDEX: 25.05 KG/M2 | OXYGEN SATURATION: 98 % | TEMPERATURE: 97.2 F | RESPIRATION RATE: 18 BRPM | WEIGHT: 175 LBS | HEIGHT: 70 IN | SYSTOLIC BLOOD PRESSURE: 130 MMHG | DIASTOLIC BLOOD PRESSURE: 74 MMHG | HEART RATE: 60 BPM

## 2019-07-17 DIAGNOSIS — D48.5 NEOPLASM OF UNCERTAIN BEHAVIOR OF SKIN: ICD-10-CM

## 2019-07-17 DIAGNOSIS — T14.8XXA HEMATOMA OF SKIN: Primary | ICD-10-CM

## 2019-07-17 DIAGNOSIS — S30.0XXA TRAUMATIC ECCHYMOSIS OF BUTTOCK, INITIAL ENCOUNTER: ICD-10-CM

## 2019-07-17 PROCEDURE — 99214 OFFICE O/P EST MOD 30 MIN: CPT | Performed by: FAMILY MEDICINE

## 2019-07-17 ASSESSMENT — PAIN SCALES - GENERAL: PAINLEVEL: SEVERE PAIN (6)

## 2019-07-17 ASSESSMENT — MIFFLIN-ST. JEOR: SCORE: 1530.04

## 2019-07-17 NOTE — PATIENT INSTRUCTIONS
Our Clinic hours are:  Mondays    7:20 am - 7 pm  Tues -  Fri  7:20 am - 5 pm    Clinic Phone: [phone]    The clinic lab opens at 7:30 am Mon - Fri and appointments are required.    [facility]. [phone]  Monday  8 am - 7pm  Tues - Fri 8 am - 5:30 pm

## 2019-07-17 NOTE — TELEPHONE ENCOUNTER
Reason for Call: left hip pain    Detailed comments: patient is calling and stating that 4 or 5 days ago he fell and landed on his left side, now his hip has a very large black and blue bump or lump, that will not go away. He would like to be seen if possible. He is up for UC if no one can see him.    Phone Number Patient can be reached at: Cell number on file:    Telephone Information:   Mobile [phone]       Best Time: any    Can we leave a detailed message on this number? YES   Jennifer Stallworth  Clinic Station [facility]      Call taken on 7/17/2019 at 8:49 AM by Jennifer Stallworth

## 2019-07-17 NOTE — PROGRESS NOTES
"Subjective     Josemanuel Edmond is a 74 year old male who presents to clinic today for the following health issues:    HPI   Chief Complaint   Patient presents with     Fall     Patient fell backwards off of pontoon and hit the dock x 5 days ago. Patient has bruising and pain on left hip. Patient rates discomfort at a 6/10. Patient is having some trouble getting up and moving around.             Review of Systems   ROS COMP: Constitutional, HEENT, cardiovascular, pulmonary, gi and gu systems are negative, except as otherwise noted.      Objective    /74   Pulse 60   Temp 97.2  F (36.2  C) (Tympanic)   Resp 18   Ht 1.778 m (5' 10\")   Wt 79.4 kg (175 lb)   SpO2 98%   BMI 25.11 kg/m    Body mass index is 25.11 kg/m .  Physical Exam   GENERAL: healthy, alert and no distress  MS: no pain on pressure of the pelvis.  There is full range of motion of the hip without pain.     SKIN: very large area of ecchymosis over the lumbar spine, left buttocks and hip.  There is a 8 cm hematoma of the left upper buttocks.      Right helix non-healing ulceration          Assessment & Plan       ICD-10-CM    1. Hematoma of skin T14.8XXA    2. Traumatic ecchymosis of buttock, initial encounter S30.0XXA    3. Neoplasm of uncertain behavior of skin D48.5 DERMATOLOGY REFERRAL     Hematoma of the left buttocks.  Ecchymosis.  No evidence of fracture, xrays not done today as not clinically indicated.  The bruising/hematoma made worse certainly by the anticoagulant.    No head injury or reason to image there either.      Follow up if there is evidence of compression on neural structures (radicular symptoms, etc).   Follow up if there is redness/warmth, etc.     Derm apt for the right helix lesion    BMI:   Estimated body mass index is 25.11 kg/m  as calculated from the following:    Height as of this encounter: 1.778 m (5' 10\").    Weight as of this encounter: 79.4 kg (175 lb).               No follow-ups on file.    Carmen STUART" MD Rm  [facility]

## 2019-07-17 NOTE — TELEPHONE ENCOUNTER
Patient states that he stepped off the edge of the pontoon and slipped.  Hit his left hip on the side of the dock.  Not super painful.  Large bruise and bump getting worse and not going away. He is concerned about a fracture.  He is able to walk.  Did not hit head or lose consciousness.  Appointment made for this afternoon.    Camilla Santos RN

## 2019-08-26 DIAGNOSIS — M79.621 PAIN OF RIGHT UPPER ARM: ICD-10-CM

## 2019-08-26 NOTE — TELEPHONE ENCOUNTER
Requested Prescriptions   Pending Prescriptions Disp Refills     gabapentin (NEURONTIN) 300 MG capsule 270 capsule 1     Sig: Take 2 capsules (600 mg) by mouth 2 times daily       There is no refill protocol information for this order        Last Written Prescription Date:  2/7/2019  Last Fill Quantity: 270,  # refills: 1   Last office visit: 2/26/2019 with prescribing provider:  Lucina  7/17/2019 with Rm    Future Office Visit:

## 2019-08-27 RX ORDER — GABAPENTIN 300 MG/1
600 CAPSULE ORAL 2 TIMES DAILY
Qty: 360 CAPSULE | Refills: 1 | Status: SHIPPED | OUTPATIENT
Start: 2019-08-27 | End: 2019-12-10

## 2019-08-27 NOTE — TELEPHONE ENCOUNTER
Gabapentin refill.  Pt was seen by  on 2/26/19 and seen by  on 7/17/19.  Visits were for other reasons.  Please review and advise on refill.  Santos

## 2019-08-28 DIAGNOSIS — N40.1 HYPERTROPHY OF PROSTATE WITH URINARY OBSTRUCTION: ICD-10-CM

## 2019-08-28 DIAGNOSIS — N13.8 HYPERTROPHY OF PROSTATE WITH URINARY OBSTRUCTION: ICD-10-CM

## 2019-08-28 RX ORDER — DOXAZOSIN 4 MG/1
4 TABLET ORAL DAILY
Qty: 90 TABLET | Refills: 1 | Status: SHIPPED | OUTPATIENT
Start: 2019-08-28 | End: 2020-01-14

## 2019-08-28 NOTE — TELEPHONE ENCOUNTER
"Requested Prescriptions   Pending Prescriptions Disp Refills     doxazosin (CARDURA) 4 MG tablet 90 tablet 1     Sig: Take 1 tablet (4 mg) by mouth daily   Last Written Prescription Date:  2/7/19  Last Fill Quantity: 90 tab,  # refills: 1   Last office visit: 7/17/2019 with prescribing provider:  CHRISTIANO Abdalla   Future Office Visit:        Alpha Blockers Passed - 8/28/2019 11:06 AM        Passed - Blood pressure under 140/90 in past 12 months     BP Readings from Last 3 Encounters:   07/17/19 130/74   02/26/19 128/70   11/15/18 108/60                 Passed - Recent (12 mo) or future (30 days) visit within the authorizing provider's specialty     Patient had office visit in the last 12 months or has a visit in the next 30 days with authorizing provider or within the authorizing provider's specialty.  See \"Patient Info\" tab in inbasket, or \"Choose Columns\" in Meds & Orders section of the refill encounter.              Passed - Patient does not have Tadalafil, Vardenafil, or Sildenafil on their medication list        Passed - Medication is active on med list        Passed - Patient is 18 years of age or older          "

## 2019-09-02 DIAGNOSIS — F51.01 PRIMARY INSOMNIA: ICD-10-CM

## 2019-09-03 NOTE — TELEPHONE ENCOUNTER
Requested Prescriptions   Pending Prescriptions Disp Refills     zolpidem (AMBIEN) 10 MG tablet [Pharmacy Med Name: ZOLPIDEM TARTRATE 10 MG TABLET] 30 tablet      Sig: TAKE 1 TABLET BY MOUTH nightly AS NEEDED for SLEEP       There is no refill protocol information for this order              Last Written Prescription Date:  3/4/2019  Last Fill Quantity: 30,   # refills: 5  Last Office Visit: 2/26/2019 with Lucina,   Future Office visit:       Routing refill request to provider for review/approval because:  Drug not on the [facility], [facility] or [facility] refill protocol or controlled substance

## 2019-09-04 ENCOUNTER — APPOINTMENT (OUTPATIENT)
Dept: GENERAL RADIOLOGY | Facility: CLINIC | Age: 74
End: 2019-09-04
Attending: EMERGENCY MEDICINE
Payer: COMMERCIAL

## 2019-09-04 ENCOUNTER — APPOINTMENT (OUTPATIENT)
Dept: CT IMAGING | Facility: CLINIC | Age: 74
End: 2019-09-04
Attending: EMERGENCY MEDICINE
Payer: COMMERCIAL

## 2019-09-04 ENCOUNTER — HOSPITAL ENCOUNTER (EMERGENCY)
Facility: CLINIC | Age: 74
Discharge: HOME | End: 2019-09-04
Attending: EMERGENCY MEDICINE | Admitting: EMERGENCY MEDICINE
Payer: COMMERCIAL

## 2019-09-04 VITALS
TEMPERATURE: 97.4 F | DIASTOLIC BLOOD PRESSURE: 79 MMHG | HEIGHT: 69 IN | OXYGEN SATURATION: 99 % | WEIGHT: 173 LBS | BODY MASS INDEX: 25.62 KG/M2 | SYSTOLIC BLOOD PRESSURE: 144 MMHG

## 2019-09-04 DIAGNOSIS — S20.211A CONTUSION OF RIGHT CHEST WALL, INITIAL ENCOUNTER: ICD-10-CM

## 2019-09-04 DIAGNOSIS — S30.1XXA CONTUSION OF ABDOMINAL WALL, INITIAL ENCOUNTER: ICD-10-CM

## 2019-09-04 DIAGNOSIS — S40.011A CONTUSION OF RIGHT SHOULDER, INITIAL ENCOUNTER: ICD-10-CM
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
## 2019-09-04 LAB
ANION GAP SERPL CALCULATED.3IONS-SCNC: 5 MMOL/L (ref 3–14)
BASOPHILS # BLD AUTO: 0 10E9/L (ref 0–0.2)
BASOPHILS NFR BLD AUTO: 0.6 %
BUN SERPL-MCNC: 13 MG/DL (ref 7–30)
CALCIUM SERPL-MCNC: 8.6 MG/DL (ref 8.5–10.1)
CHLORIDE SERPL-SCNC: 101 MMOL/L (ref 94–109)
CO2 SERPL-SCNC: 27 MMOL/L (ref 20–32)
CREAT SERPL-MCNC: 0.81 MG/DL (ref 0.66–1.25)
DIFFERENTIAL METHOD BLD: ABNORMAL
EOSINOPHIL # BLD AUTO: 0.1 10E9/L (ref 0–0.7)
EOSINOPHIL NFR BLD AUTO: 2.6 %
ERYTHROCYTE [DISTWIDTH] IN BLOOD BY AUTOMATED COUNT: 12 % (ref 10–15)
GFR SERPL CREATININE-BSD FRML MDRD: 86 ML/MIN/{1.73_M2}
GLUCOSE SERPL-MCNC: 111 MG/DL (ref 70–99)
HCT VFR BLD AUTO: 37.6 % (ref 40–53)
HGB BLD-MCNC: 12.7 G/DL (ref 13.3–17.7)
IMM GRANULOCYTES # BLD: 0 10E9/L (ref 0–0.4)
IMM GRANULOCYTES NFR BLD: 0.2 %
LYMPHOCYTES # BLD AUTO: 1.2 10E9/L (ref 0.8–5.3)
LYMPHOCYTES NFR BLD AUTO: 21.8 %
MCH RBC QN AUTO: 33.5 PG (ref 26.5–33)
MCHC RBC AUTO-ENTMCNC: 33.8 G/DL (ref 31.5–36.5)
MCV RBC AUTO: 99 FL (ref 78–100)
MONOCYTES # BLD AUTO: 0.5 10E9/L (ref 0–1.3)
MONOCYTES NFR BLD AUTO: 8.8 %
NEUTROPHILS # BLD AUTO: 3.6 10E9/L (ref 1.6–8.3)
NEUTROPHILS NFR BLD AUTO: 66 %
NRBC # BLD AUTO: 0 10*3/UL
NRBC BLD AUTO-RTO: 0 /100
PLATELET # BLD AUTO: 162 10E9/L (ref 150–450)
POTASSIUM SERPL-SCNC: 3.8 MMOL/L (ref 3.4–5.3)
RBC # BLD AUTO: 3.79 10E12/L (ref 4.4–5.9)
SODIUM SERPL-SCNC: 133 MMOL/L (ref 133–144)
WBC # BLD AUTO: 5.5 10E9/L (ref 4–11)

## 2019-09-04 PROCEDURE — 99284 EMERGENCY DEPT VISIT MOD MDM: CPT | Mod: Z6 | Performed by: EMERGENCY MEDICINE

## 2019-09-04 PROCEDURE — 85025 COMPLETE CBC W/AUTO DIFF WBC: CPT | Performed by: EMERGENCY MEDICINE

## 2019-09-04 PROCEDURE — 99285 EMERGENCY DEPT VISIT HI MDM: CPT | Mod: 25 | Performed by: EMERGENCY MEDICINE

## 2019-09-04 PROCEDURE — 25000128 H RX IP 250 OP 636: Performed by: EMERGENCY MEDICINE

## 2019-09-04 PROCEDURE — 25000125 ZZHC RX 250: Performed by: EMERGENCY MEDICINE

## 2019-09-04 PROCEDURE — 71260 CT THORAX DX C+: CPT

## 2019-09-04 PROCEDURE — 80048 BASIC METABOLIC PNL TOTAL CA: CPT | Performed by: EMERGENCY MEDICINE

## 2019-09-04 PROCEDURE — 74177 CT ABD & PELVIS W/CONTRAST: CPT

## 2019-09-04 PROCEDURE — 73030 X-RAY EXAM OF SHOULDER: CPT | Mod: RT

## 2019-09-04 RX ORDER — OXYCODONE AND ACETAMINOPHEN 5; 325 MG/1; MG/1
1 TABLET ORAL EVERY 4 HOURS PRN
Qty: 4 TABLET | Refills: 0 | Status: SHIPPED | OUTPATIENT
Start: 2019-09-04 | End: 2019-12-10

## 2019-09-04 RX ORDER — IOPAMIDOL 755 MG/ML
84 INJECTION, SOLUTION INTRAVASCULAR ONCE
Status: COMPLETED | OUTPATIENT
Start: 2019-09-04 | End: 2019-09-04

## 2019-09-04 RX ADMIN — SODIUM CHLORIDE 61 ML: 9 INJECTION, SOLUTION INTRAVENOUS at 11:12

## 2019-09-04 RX ADMIN — IOPAMIDOL 84 ML: 755 INJECTION, SOLUTION INTRAVENOUS at 11:11

## 2019-09-04 ASSESSMENT — ENCOUNTER SYMPTOMS
FEVER: 0
HEADACHES: 0
PALPITATIONS: 0
NECK PAIN: 0
VOMITING: 0
WEAKNESS: 0
CHILLS: 0
COUGH: 0
NUMBNESS: 0
NAUSEA: 0
BACK PAIN: 0
ARTHRALGIAS: 1
SHORTNESS OF BREATH: 0
ABDOMINAL PAIN: 0

## 2019-09-04 ASSESSMENT — MIFFLIN-ST. JEOR: SCORE: 1505.1

## 2019-09-04 NOTE — DISCHARGE INSTRUCTIONS
Return if symptoms worsen or new symptoms develop.  Follow-up with primary care physician next available.  Drink plenty of fluids.  If increased pain nausea vomiting shortness of breath chest pain abdominal pain focal numbness weakness any extremity care please return for further evaluation and care.

## 2019-09-04 NOTE — ED AVS SNAPSHOT
[facility] Emergency Department  [address] [address]  Phone:  [phone]  Fax:  [phone]                                    Josemanuel Edmond   MRN: [id number]    Department:  [facility] Emergency Department   Date of Visit:  9/4/2019           After Visit Summary Signature Page    I have received my discharge instructions, and my questions have been answered. I have discussed any challenges I see with this plan with the nurse or doctor.    ..........................................................................................................................................  Patient/Patient Representative Signature      ..........................................................................................................................................  Patient Representative Print Name and Relationship to Patient    ..................................................               ................................................  Date                                   Time    ..........................................................................................................................................  Reviewed by Signature/Title    ...................................................              ..............................................  Date                                               Time          22EPIC Rev 08/18

## 2019-09-04 NOTE — ED PROVIDER NOTES
History     Chief Complaint   Patient presents with     Fall     fell on rt shoulder and rt lateral rib area last pm on concrete      rt arm limited rom with pain   on blood thinner-did not hit head     HPI  Josemanuel Edmond is a 74 year old male with a history of chronic atrial fibrillation on Xarelto, hypertension, hyperlipidemia, alcoholism in remission, and osteoarthritis who presents to the Emergency Department for evaluation of right shoulder and right lateral rib pain. Patient had a mechanical fall while playing pickle ball last night. He fell landed on his right shoulder, and had his arm pinned under his body which pushed into his right lateral ribs. He complains of right shoulder pain and lateral rib pain. Pain with movement of the arm. Patient reports he did not have a head injury and denies LOC. He denies abdominal pain, neck, back or hip pain. He did strike his right knee which was very swollen last night, but seems to have improved. He is able to ambulate without significant difficulty. He does have a prior right knee replacement.  He has not had any nausea vomiting.  He denies any urinary symptoms.  He has not had any focal numbness weakness any extremity.  Currently rates his pain a 3 out of 10.    Allergies:  Allergies   Allergen Reactions     Bactrim [Sulfamethoxazole W/Trimethoprim] Nausea and Vomiting       Problem List:    Patient Active Problem List    Diagnosis Date Noted     Hematoma of skin 07/17/2019     Priority: Medium     Traumatic ecchymosis of buttock, initial encounter 07/17/2019     Priority: Medium     Right knee DJD 10/12/2018     Priority: Medium     Peripheral neuropathy 10/12/2018     Priority: Medium     Sleep disturbance 10/12/2018     Priority: Medium     Hyperplastic Polyp 03/05/2018     Priority: Medium     Chronic atrial fibrillation (H) 01/08/2018     Priority: Medium     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) 11/28/2017     Priority: Medium     ED (erectile dysfunction)  12/23/2014     Priority: Medium     Moderate major depression (H) 12/17/2013     Priority: Medium     S/P knee replacement 11/06/2012     Priority: Medium     GERD (gastroesophageal reflux disease) 08/03/2012     Priority: Medium     Anxiety 02/16/2012     Priority: Medium     Allergic rhinitis 04/21/2011     Priority: Medium     Conjunctivitis, allergic 04/21/2011     Priority: Medium     Hyperlipidemia LDL goal <100 10/31/2010     Priority: Medium     Osteoarthrosis, unspecified whether generalized or localized, pelvic region and thigh 09/28/2007     Priority: Medium     Hypertrophy of prostate with urinary obstruction 08/03/2006     Priority: Medium     Problem list name updated by automated process. Provider to review       Hypertension goal BP (blood pressure) < 140/90 12/12/2005     Priority: Medium        Past Medical History:    Past Medical History:   Diagnosis Date     Benign neoplasm of prostate 2000       Past Surgical History:    Past Surgical History:   Procedure Laterality Date     ARTHROPLASTY KNEE Right 10/12/2018    Procedure: ARTHROPLASTY KNEE;  Right Total Knee Arthroplasty;  Surgeon: Jeb Peralta MD;  Location: [facility]     COLONOSCOPY N/A 12/10/2015    Procedure: COMBINED COLONOSCOPY, SINGLE OR MULTIPLE BIOPSY/POLYPECTOMY BY BIOPSY;  Surgeon: Jyoti Figueroa MD;  Location: [facility]     JOINT REPLACEMENT, HIP RT/LT  10/07    Joint Replacement Hip LT     JOINT REPLACEMTN, KNEE RT/LT  8/2011    Joint Replacement knee /LT, [facility]     LAPAROSCOPIC HERNIORRHAPHY INGUINAL BILATERAL Bilateral 4/24/2018    Procedure: LAPAROSCOPIC HERNIORRHAPHY INGUINAL BILATERAL;  Laparoscopic bilateral inguinal hernia repair;  Surgeon: Josemanuel Resendiz MD;  Location: [facility] OR     SURGICAL HISTORY OF -   12/1/1999    Umbilical Herniorrhaphy with mesh     SURGICAL HISTORY OF -  Left 11/2017    Thoracotomy and drainage of empyema       Family History:    Family History   Problem Relation Age of Onset      "Breast Cancer Mother      Neurologic Disorder Mother         parkinsons     Respiratory Father         emphyzema     Diabetes Brother        Social History:  Marital Status:   [2]  Social History     Tobacco Use     Smoking status: Former Smoker     Packs/day: 1.00     Years: 15.00     Pack years: 15.00     Types: Cigarettes     Last attempt to quit: 10/13/1975     Years since quittin.9     Smokeless tobacco: Never Used   Substance Use Topics     Alcohol use: No     Comment: quit 2017     Drug use: No        Medications:      acetaminophen (TYLENOL) 500 MG tablet   buPROPion (WELLBUTRIN SR) 150 MG 12 hr tablet   doxazosin (CARDURA) 4 MG tablet   escitalopram (LEXAPRO) 10 MG tablet   finasteride (PROSCAR) 5 MG tablet   gabapentin (NEURONTIN) 300 MG capsule   lamoTRIgine (LAMICTAL) 100 MG tablet   metoprolol tartrate (LOPRESSOR) 25 MG tablet   STATIN NOT PRESCRIBED, INTENTIONAL,   XARELTO 20 MG TABS tablet   zolpidem (AMBIEN) 10 MG tablet     Review of Systems   Constitutional: Negative for chills and fever.   Respiratory: Negative for cough and shortness of breath.    Cardiovascular: Negative for palpitations and leg swelling.        Positive for right lateral chest wall/rib tenderness.    Gastrointestinal: Negative for abdominal pain, nausea and vomiting.   Musculoskeletal: Positive for arthralgias (right shoulder pain). Negative for back pain and neck pain.   Skin: Negative for rash.   Neurological: Negative for weakness, numbness and headaches.   All other systems reviewed and unremarkable.  Physical Exam   BP: (!) 144/79  Heart Rate: 68  Temp: 97.4  F (36.3  C)  Height: 175.3 cm (5' 9\")  Weight: 78.5 kg (173 lb)  SpO2: 99 %    Physical Exam   Constitutional: He appears well-developed and well-nourished. No distress.   Eyes: Conjunctivae are normal.   Psychiatric: He has a normal mood and affect.   Nursing note reviewed.    HENT: Atraumatic normocephalic. Oral mucosa moist. No lesions.  Neck: " Supple. No posterior neck tenderness.   Pulmonary/Chest: Lungs are clear to auscultation bilaterally. Tenderness to palpation of right anterolateral chest wall.   Cardiovascular: Heart is regular rate and rhythm. No murmur.  Abdomen: Soft, non-distended, tenderness to palpation of right upper lateral abdominal region.   Musculoskeletal: Right shoulder with limited movement and pain with any movement. No peripheral edema. Pulses and sensation symmetrical. No midline back tenderness.   Neurological: Alert. No focal neurologic deficit.   Skin: No rash. Abrasions noted to right shoulder and right lateral knee.     ED Course        Procedures               Critical Care time:  none               Results for orders placed or performed during the hospital encounter of 09/04/19   CT Chest/Abdomen/Pelvis w Contrast    Narrative    CT CHEST/ABDOMEN/PELVIS WITH CONTRAST  9/4/2019 11:23 AM    HISTORY:  Fall. Right rib pain and upper abdominal pain. On coumadin.    TECHNIQUE: CT scan obtained of the chest, abdomen, and pelvis with  oral and IV contrast. 84 mL Isovue-370 IV injected. Radiation dose for  this scan was reduced using automated exposure control, adjustment of  the mA and/or kV according to patient size, or iterative  reconstruction technique.    COMPARISON:  CT chest on 10/20/2017.    FINDINGS:  Chest/mediastinum: Mild cardiomegaly. No pericardial effusion. Few  prominent mediastinal and hilar lymph nodes, for example 1.1 cm short  axis right precarinal lymph node (series 2 image 27) and 1.0 cm short  axis right hilar lymph node (series 2 image 29).    Lung/pleura: No pleural effusion or pneumothorax. Mild bibasilar  atelectasis. Few calcified and noncalcified pleural plaques.    Abdomen/pelvis: Subcentimeter hypoattenuating focus in hepatic segment  VII/VIII (series 2 image 53), too small to characterize. Otherwise no  suspicious focal hepatic mass. Cholelithiasis. No CT evidence of acute  cholecystitis. No  splenomegaly. Small splenule near the splenic hilum.  No main pancreatic ductal dilatation or solid pancreatic mass. No  adrenal nodules. No radiodense kidney or ureteric stones. No  hydronephrosis in either kidney.    No abnormally dilated bowel loops. No significant free fluid in the  abdomen or pelvis. Limited evaluation of the pelvic organs due to  streak artifact from adjacent bilateral hip prostheses. Colonic  diverticulosis without definite CT evidence of acute diverticulitis.  Scattered predominantly aortobiiliac atherosclerotic vascular  calcification.    Bones and soft tissue: Postsurgical changes of bilateral hip  arthroplasties. No acute osseous pathology. Degenerative changes of  the shoulder joints.      Impression    IMPRESSION:   1. No acute osseous pathology.  2. Degenerative changes of the bilateral shoulder joints.  3. Calcified and noncalcified pleural plaques, likely related to prior  asbestos exposure.  4. Cholelithiasis. No CT evidence of acute cholecystitis.    FABRICIO HESS MD   Shoulder XR, 2 view, right    Narrative    RIGHT SHOULDER TWO VIEWS  9/4/2019 11:27 AM     HISTORY: Fall. Right shoulder pain.    COMPARISON: None.      Impression    IMPRESSION: Moderate subacromial spur. Acromioclavicular degenerative  changes. Glenohumeral joint is unremarkable. No acute fracture or  subluxation. Pleural calcifications seen in the upper lobe on the  right.    ASH JAVED MD         Medications   iopamidol (ISOVUE-370) solution 84 mL (84 mLs Intravenous Given 9/4/19 1111)   sodium chloride 0.9 % bag 500mL for CT scan flush use (61 mLs As instructed Given 9/4/19 1112)       10:05 AM Patient assessed.     Assessments & Plan (with Medical Decision Making) records were reviewed.  Labs were obtained.  Due to his fall CT scan of chest abdomen and pelvis was obtained.  There is also a right shoulder x-ray obtained.  Patient did not hit his head and did not lose consciousness.  He is not complaining  of any posterior neck pain is able to move his neck without difficulty.  Patient is white count was 5.5 hemoglobin 12.7 platelet count was 162.  There is no left shift.  Basic metabolic panel without any abnormality.  Patient had been offered pain medication but did not want any at this time.  CT scan of the chest abdomen and pelvis revealed no obvious acute abnormality.  Patient does have cholelithiasis without cholecystitis.  Right shoulder x-ray without fracture dislocation or other significant abnormality.  Findings were discussed in detail with the patient and his wife.  We will place the patient in a right arm sling and follow-up with orthopedics as needed.  He will be given a few pain pills.  He should return if symptoms worsen or new symptoms develop and follow-up with his primary care physician next available.  Patient understands if he has a headache, vomiting, visual changes, chest pain, shortness of breath, abdominal pain or focal numbness weakness in extremity he should return for further evaluation and care.     I have reviewed the nursing notes.    I have reviewed the findings, diagnosis, plan and need for follow up with the patient.       Discharge Medication List as of 9/4/2019  1:21 PM      START taking these medications    Details   oxyCODONE-acetaminophen (PERCOCET) 5-325 MG tablet Take 1 tablet by mouth every 4 hours as needed for breakthrough pain, Disp-4 tablet, R-0, Local Print             Final diagnoses:   Fall, initial encounter   Contusion of right shoulder, initial encounter   Contusion of right chest wall, initial encounter   Contusion of abdominal wall, initial encounter     This document serves as a record of the services and decisions personally performed and made by Jesus Gordon MD. It was created on his behalf by Anjana Grayson, a trained medical scribe. The creation of this document is based the provider's statements to the medical scribe.  Anjana Grayson  10:23 AM 9/4/2019    Provider:   The information in this document, created by the medical scribe for me, accurately reflects the services I personally performed and the decisions made by me. I have reviewed and approved this document for accuracy prior to leaving the patient care area.  Jesus Gordon MD 10:23 AM 9/4/2019 9/4/2019   [facility] EMERGENCY DEPARTMENT     Jesus Gordon MD  09/08/19 1050

## 2019-09-04 NOTE — TELEPHONE ENCOUNTER
Routing refill request to provider for review/approval because:  Drug not on the [facility] refill protocol     Kayleen MUNSON RN

## 2019-09-06 RX ORDER — ZOLPIDEM TARTRATE 10 MG/1
TABLET ORAL
Qty: 30 TABLET | Refills: 0 | Status: SHIPPED | OUTPATIENT
Start: 2019-09-06 | End: 2019-10-07

## 2019-09-06 NOTE — TELEPHONE ENCOUNTER
Rx signed and faxed to  in [place] at [phone]. Attempted to call patient, no answer.  Jennifer Stallworth  [facility]  Flex

## 2019-09-29 DIAGNOSIS — F41.9 ANXIETY: ICD-10-CM

## 2019-09-30 RX ORDER — ESCITALOPRAM OXALATE 10 MG/1
TABLET ORAL
Qty: 90 TABLET | Refills: 1 | Status: SHIPPED | OUTPATIENT
Start: 2019-09-30 | End: 2020-01-07

## 2019-09-30 NOTE — TELEPHONE ENCOUNTER
"Requested Prescriptions   Pending Prescriptions Disp Refills     escitalopram (LEXAPRO) 10 MG tablet [Pharmacy Med Name: ESCITALOPRAM TAB 10MG] 90 tablet 1     Sig: TAKE 1 TABLET DAILY       SSRIs Protocol Passed - 9/29/2019  6:52 AM  CAROLYNN-7 SCORE 5/7/2018 11/15/2018 2/26/2019   Total Score - - -   Total Score 4 6 5     PHQ-9 SCORE 7/2/2018 11/15/2018 2/26/2019   PHQ-9 Total Score - - -   PHQ-9 Total Score 5 9 3           Passed - Recent (12 mo) or future (30 days) visit within the authorizing provider's specialty     Patient has had an office visit with the authorizing provider or a provider within the authorizing providers department within the previous 12 mos or has a future within next 30 days. See \"Patient Info\" tab in inbasket, or \"Choose Columns\" in Meds & Orders section of the refill encounter.              Passed - Medication is active on med list        Passed - Patient is age 18 or older        Last Written Prescription Date:  4/11/2019  Last Fill Quantity: 90,  # refills: 1   Last office visit: 7/17/2019 with prescribing provider:  Rm   Future Office Visit:      "

## 2019-10-02 DIAGNOSIS — N40.1 BENIGN PROSTATIC HYPERPLASIA WITH WEAK URINARY STREAM: ICD-10-CM

## 2019-10-02 DIAGNOSIS — R39.12 BENIGN PROSTATIC HYPERPLASIA WITH WEAK URINARY STREAM: ICD-10-CM

## 2019-10-02 DIAGNOSIS — I48.20 CHRONIC ATRIAL FIBRILLATION (H): ICD-10-CM

## 2019-10-02 NOTE — TELEPHONE ENCOUNTER
"Requested Prescriptions   Pending Prescriptions Disp Refills     metoprolol tartrate (LOPRESSOR) 25 MG tablet [Pharmacy Med Name: METOPROLOL TAB TAR 25MG] 180 tablet 1     Sig: TAKE 1 TABLET TWICE A DAY       Beta-Blockers Protocol Failed - 10/2/2019  3:27 PM        Failed - Blood pressure under 140/90 in past 12 months     BP Readings from Last 3 Encounters:   09/04/19 (!) 144/79   07/17/19 130/74   02/26/19 128/70                 Passed - Patient is age 6 or older        Passed - Recent (12 mo) or future (30 days) visit within the authorizing provider's specialty     Patient has had an office visit with the authorizing provider or a provider within the authorizing providers department within the previous 12 mos or has a future within next 30 days. See \"Patient Info\" tab in inbasket, or \"Choose Columns\" in Meds & Orders section of the refill encounter.              Passed - Medication is active on med list        Last Written Prescription Date:  2/7/19  Last Fill Quantity: 180,  # refills: 1   Last office visit: 7/17/2019 with prescribing provider:  Lucina   Future Office Visit:      "

## 2019-10-03 RX ORDER — METOPROLOL TARTRATE 25 MG/1
TABLET, FILM COATED ORAL
Qty: 180 TABLET | Refills: 0 | Status: SHIPPED | OUTPATIENT
Start: 2019-10-03 | End: 2019-12-10

## 2019-10-03 RX ORDER — FINASTERIDE 5 MG/1
TABLET, FILM COATED ORAL
Qty: 90 TABLET | Refills: 0 | Status: SHIPPED | OUTPATIENT
Start: 2019-10-03 | End: 2019-12-10

## 2019-10-03 NOTE — TELEPHONE ENCOUNTER
Prescription faxed to pharmacy.  Patient due for office visit to re-check medications. Further refills will be addressed at office visit.

## 2019-10-03 NOTE — TELEPHONE ENCOUNTER
Routing refill request to provider for review/approval because: Last recorded BP elevated - however this was taken in ED after sustaining a fall.    BP Readings from Last 6 Encounters:   09/04/19 (!) 144/79   07/17/19 130/74   02/26/19 128/70   11/15/18 108/60   10/14/18 148/84   09/25/18 120/68       Cass MORTON RN

## 2019-10-03 NOTE — TELEPHONE ENCOUNTER
Routing refill request to provider for review/approval because: Last recorded BP elevated - however this was taken in ED after sustaining a fall.    BP Readings from Last 6 Encounters:   09/04/19 (!) 144/79   07/17/19 130/74   02/26/19 128/70   11/15/18 108/60   10/14/18 148/84   09/25/18 120/68       Cass MORTNO RN

## 2019-10-07 DIAGNOSIS — F51.01 PRIMARY INSOMNIA: ICD-10-CM

## 2019-10-07 NOTE — TELEPHONE ENCOUNTER
Requested Prescriptions   Pending Prescriptions Disp Refills     zolpidem (AMBIEN) 10 MG tablet [Pharmacy Med Name: ZOLPIDEM TARTRATE 10 MG TABLET] 30 tablet      Sig: TAKE 1 TABLET BY MOUTH AT BEDTIME AS NEEDED for SLEEP       There is no refill protocol information for this order              Last Written Prescription Date:  9/6/2019  Last Fill Quantity: 30,   # refills: 0  Last Office Visit: 2/26/2019 with Lucina  7/17/2019 with Rm   Future Office visit:       Routing refill request to provider for review/approval because:    Drug not on the [facility], [facility] or [facility] refill protocol or controlled substance

## 2019-10-08 ENCOUNTER — TELEPHONE (OUTPATIENT)
Dept: FAMILY MEDICINE | Facility: CLINIC | Age: 74
End: 2019-10-08

## 2019-10-08 RX ORDER — ZOLPIDEM TARTRATE 10 MG/1
TABLET ORAL
Qty: 30 TABLET | Refills: 0 | Status: SHIPPED | OUTPATIENT
Start: 2019-10-08 | End: 2019-11-05

## 2019-10-08 NOTE — TELEPHONE ENCOUNTER
Faxed prescription to patient's pharmacy.  Tried to notified patient, no answer. Jennifer Cruz on 10/8/2019 at 2:55 PM

## 2019-11-03 ENCOUNTER — HEALTH MAINTENANCE LETTER (OUTPATIENT)
Age: 74
End: 2019-11-03

## 2019-11-05 DIAGNOSIS — F51.01 PRIMARY INSOMNIA: ICD-10-CM

## 2019-11-05 RX ORDER — ZOLPIDEM TARTRATE 10 MG/1
TABLET ORAL
Qty: 30 TABLET | Refills: 0 | Status: SHIPPED | OUTPATIENT
Start: 2019-11-05 | End: 2019-12-10

## 2019-11-25 ENCOUNTER — TELEPHONE (OUTPATIENT)
Dept: FAMILY MEDICINE | Facility: CLINIC | Age: 74
End: 2019-11-25

## 2019-11-25 NOTE — TELEPHONE ENCOUNTER
Reason for call:  Patient reporting a symptom    Symptom or request: Pt feels that his anxiety med may no longer be working.  Please call patient and advise.      Duration (how long have symptoms been present): ongoing    Have you been treated for this before? Yes    Additional comments:     Phone Number patient can be reached at:  Home number on file [phone] (home)    Best Time:  any    Can we leave a detailed message on this number:  YES    Call taken on 11/25/2019 at 12:29 PM by Paola Moscoso

## 2019-11-26 NOTE — TELEPHONE ENCOUNTER
"Patient has appointment on 12/23 with Dr. Verdguo but is wondering if he can be seen any sooner?  Would like to discuss his meds with PCP and not another provider.  He is feeling increased depression/anxiety but states \"its been like this for awhile, I think I just need an adjustment\".  Declines PHQ 9 and CAROLYNN 7 over the phone, would rather do it in person.    Dr. Verdugo can you fit him in anywhere before Dec 23?    Camilla Santos RN    "

## 2019-12-09 ENCOUNTER — TRANSFERRED RECORDS (OUTPATIENT)
Dept: HEALTH INFORMATION MANAGEMENT | Facility: CLINIC | Age: 74
End: 2019-12-09

## 2019-12-10 ENCOUNTER — OFFICE VISIT (OUTPATIENT)
Dept: FAMILY MEDICINE | Facility: CLINIC | Age: 74
End: 2019-12-10
Payer: COMMERCIAL

## 2019-12-10 VITALS
TEMPERATURE: 97.3 F | BODY MASS INDEX: 25.48 KG/M2 | HEART RATE: 93 BPM | RESPIRATION RATE: 16 BRPM | WEIGHT: 172 LBS | HEIGHT: 69 IN | SYSTOLIC BLOOD PRESSURE: 128 MMHG | OXYGEN SATURATION: 97 % | DIASTOLIC BLOOD PRESSURE: 60 MMHG

## 2019-12-10 DIAGNOSIS — G47.00 INSOMNIA, UNSPECIFIED TYPE: ICD-10-CM

## 2019-12-10 DIAGNOSIS — F51.01 PRIMARY INSOMNIA: ICD-10-CM

## 2019-12-10 DIAGNOSIS — M79.621 PAIN OF RIGHT UPPER ARM: ICD-10-CM

## 2019-12-10 DIAGNOSIS — Z00.00 ENCOUNTER FOR MEDICARE ANNUAL WELLNESS EXAM: Primary | ICD-10-CM

## 2019-12-10 DIAGNOSIS — R39.12 BENIGN PROSTATIC HYPERPLASIA WITH WEAK URINARY STREAM: ICD-10-CM

## 2019-12-10 DIAGNOSIS — N40.1 BENIGN PROSTATIC HYPERPLASIA WITH WEAK URINARY STREAM: ICD-10-CM

## 2019-12-10 DIAGNOSIS — I48.20 CHRONIC ATRIAL FIBRILLATION (H): ICD-10-CM

## 2019-12-10 PROCEDURE — 90662 IIV NO PRSV INCREASED AG IM: CPT | Performed by: FAMILY MEDICINE

## 2019-12-10 PROCEDURE — G0008 ADMIN INFLUENZA VIRUS VAC: HCPCS | Performed by: FAMILY MEDICINE

## 2019-12-10 PROCEDURE — 99397 PER PM REEVAL EST PAT 65+ YR: CPT | Mod: 25 | Performed by: FAMILY MEDICINE

## 2019-12-10 PROCEDURE — 99207 C PAF COMPLETED  NO CHARGE: CPT | Mod: 25 | Performed by: FAMILY MEDICINE

## 2019-12-10 RX ORDER — ZOLPIDEM TARTRATE 10 MG/1
TABLET ORAL
Qty: 30 TABLET | Refills: 0 | Status: SHIPPED | OUTPATIENT
Start: 2019-12-10 | End: 2020-01-07

## 2019-12-10 RX ORDER — GABAPENTIN 300 MG/1
600 CAPSULE ORAL 2 TIMES DAILY
Qty: 360 CAPSULE | Refills: 3 | Status: SHIPPED | OUTPATIENT
Start: 2019-12-10 | End: 2020-11-19 | Stop reason: DRUGHIGH

## 2019-12-10 RX ORDER — FINASTERIDE 5 MG/1
1 TABLET, FILM COATED ORAL DAILY
Qty: 90 TABLET | Refills: 3 | Status: SHIPPED | OUTPATIENT
Start: 2019-12-10 | End: 2020-12-29

## 2019-12-10 RX ORDER — METOPROLOL TARTRATE 25 MG/1
25 TABLET, FILM COATED ORAL 2 TIMES DAILY
Qty: 180 TABLET | Refills: 3 | Status: SHIPPED | OUTPATIENT
Start: 2019-12-10 | End: 2020-12-29

## 2019-12-10 ASSESSMENT — MIFFLIN-ST. JEOR: SCORE: 1500.57

## 2019-12-10 ASSESSMENT — ANXIETY QUESTIONNAIRES
GAD7 TOTAL SCORE: 2
7. FEELING AFRAID AS IF SOMETHING AWFUL MIGHT HAPPEN: NOT AT ALL
1. FEELING NERVOUS, ANXIOUS, OR ON EDGE: SEVERAL DAYS
3. WORRYING TOO MUCH ABOUT DIFFERENT THINGS: SEVERAL DAYS
IF YOU CHECKED OFF ANY PROBLEMS ON THIS QUESTIONNAIRE, HOW DIFFICULT HAVE THESE PROBLEMS MADE IT FOR YOU TO DO YOUR WORK, TAKE CARE OF THINGS AT HOME, OR GET ALONG WITH OTHER PEOPLE: NOT DIFFICULT AT ALL
6. BECOMING EASILY ANNOYED OR IRRITABLE: NOT AT ALL
5. BEING SO RESTLESS THAT IT IS HARD TO SIT STILL: NOT AT ALL
2. NOT BEING ABLE TO STOP OR CONTROL WORRYING: NOT AT ALL

## 2019-12-10 ASSESSMENT — PATIENT HEALTH QUESTIONNAIRE - PHQ9
5. POOR APPETITE OR OVEREATING: NOT AT ALL
SUM OF ALL RESPONSES TO PHQ QUESTIONS 1-9: 4

## 2019-12-10 NOTE — PATIENT INSTRUCTIONS
Patient Education   Personalized Prevention Plan  You are due for the preventive services outlined below.  Your care team is available to assist you in scheduling these services.  If you have already completed any of these items, please share that information with your care team to update in your medical record.  Health Maintenance Due   Topic Date Due     Zoster (Shingles) Vaccine (1 of 2) 04/05/1993     Annual Wellness Visit  04/05/2008     Pneumococcal Vaccine (2 of 2 - PCV13) 10/25/2013     Depression Assessment  08/26/2019     Flu Vaccine (1) 09/01/2019

## 2019-12-10 NOTE — PROGRESS NOTES
"  SUBJECTIVE:   Josemanuel Edmond is a 74 year old male who presents for Preventive Visit.      Are you in the first 12 months of your Medicare Part B coverage?  No    Physical Health:    In general, how would you rate your overall physical health? fair    Outside of work, how many days during the week do you exercise? 1 day/week    Outside of work, approximately how many minutes a day do you exercise?less than 15 minutes    If you drink alcohol do you typically have >3 drinks per day or >7 drinks per week? No    Do you usually eat at least 4 servings of fruit and vegetables a day, include whole grains & fiber and avoid regularly eating high fat or \"junk\" foods? Yes    Do you have any problems taking medications regularly? No     Do you have any side effects from medications  Yes -Shaky, forgetful      Needs assistance for the following daily activities: no assistance needed    Which of the following safety concerns are present in your home?  none identified     Hearing impairment: No    In the past 6 months, have you been bothered by leaking of urine? no    Mental Health:    In general, how would you rate your overall mental or emotional health? fair  PHQ-2 Score:      Do you feel safe in your environment? Yes    Have you ever done Advance Care Planning? (For example, a Health Directive, POLST, or a discussion with a medical provider or your loved ones about your wishes): Yes, patient states has an Advance Care Planning document and will bring a copy to the clinic.    Additional concerns to address?  No    Fall risk:       Cognitive Screenin) Repeat 3 items (Leader, Season, Table)    2) Clock draw: NORMAL  3) 3 item recall: Recalls 3 objects  Results: 3 items recalled: COGNITIVE IMPAIRMENT LESS LIKELY    Mini-CogTM Copyright NICHOLE Cano. Licensed by the author for use in [facility]; reprinted with permission (charito@.[facility]). All rights reserved.      Do you have sleep apnea, excessive snoring or " daytime drowsiness?: no        Hypertension Follow-up      Do you check your blood pressure regularly outside of the clinic? No     Are you following a low salt diet? No    Are your blood pressures ever more than 140 on the top number (systolic) OR more   than 90 on the bottom number (diastolic), for example 140/90? No    Depression and Anxiety Follow-Up    How are you doing with your depression since your last visit? No change    How are you doing with your anxiety since your last visit?  No change    Are you having other symptoms that might be associated with depression or anxiety? No    Have you had a significant life event? No     Do you have any concerns with your use of alcohol or other drugs? No    Social History     Tobacco Use     Smoking status: Former Smoker     Packs/day: 1.00     Years: 15.00     Pack years: 15.00     Types: Cigarettes     Last attempt to quit: 10/13/1975     Years since quittin.1     Smokeless tobacco: Never Used   Substance Use Topics     Alcohol use: No     Comment: quit 2017     Drug use: No     PHQ 11/15/2018 2019 12/10/2019   PHQ-9 Total Score 9 3 4   Q9: Thoughts of better off dead/self-harm past 2 weeks Not at all Not at all Not at all     CAROLYNN-7 SCORE 11/15/2018 2019 12/10/2019   Total Score - - -   Total Score 6 5 2           Suicide Assessment Five-step Evaluation and Treatment (SAFE-T)      Reviewed and updated as needed this visit by clinical staff  Tobacco  Allergies  Meds         Reviewed and updated as needed this visit by Provider        Social History     Tobacco Use     Smoking status: Former Smoker     Packs/day: 1.00     Years: 15.00     Pack years: 15.00     Types: Cigarettes     Last attempt to quit: 10/13/1975     Years since quittin.1     Smokeless tobacco: Never Used   Substance Use Topics     Alcohol use: No     Comment: quit 2017                           Current providers sharing in care for this patient include:   Patient  "Care Team:  Dominguez Verdugo MD as PCP - General (Family Practice)  Dominguez Verdugo MD as Assigned PCP    The following health maintenance items are reviewed in Epic and correct as of today:  Health Maintenance   Topic Date Due     ZOSTER IMMUNIZATION (1 of 2) 04/05/1993     MEDICARE ANNUAL WELLNESS VISIT  04/05/2008     PNEUMOCOCCAL IMMUNIZATION 65+ LOW/MEDIUM RISK (2 of 2 - PCV13) 10/25/2013     PHQ-9  08/26/2019     INFLUENZA VACCINE (1) 09/01/2019     FALL RISK ASSESSMENT  07/17/2020     DTAP/TDAP/TD IMMUNIZATION (2 - Td) 08/03/2022     LIPID  10/09/2022     ADVANCE CARE PLANNING  10/29/2023     COLONOSCOPY  02/26/2028     DEPRESSION ACTION PLAN  Completed     IPV IMMUNIZATION  Aged Out     MENINGITIS IMMUNIZATION  Aged Out     Labs reviewed in EPIC      ROS:  Constitutional, HEENT, cardiovascular, pulmonary, GI, , musculoskeletal, neuro, skin, endocrine and psych systems are negative, except as otherwise noted.    OBJECTIVE:   /60   Pulse 93   Temp 97.3  F (36.3  C)   Resp 16   Ht 1.753 m (5' 9\")   Wt 78 kg (172 lb)   SpO2 97%   BMI 25.40 kg/m   Estimated body mass index is 25.4 kg/m  as calculated from the following:    Height as of this encounter: 1.753 m (5' 9\").    Weight as of this encounter: 78 kg (172 lb).  EXAM:   GENERAL: healthy, alert and no distress  EYES: Eyes grossly normal to inspection, PERRL and conjunctivae and sclerae normal  HENT: ear canals and TM's normal, nose and mouth without ulcers or lesions  NECK: no adenopathy, no asymmetry, masses, or scars and thyroid normal to palpation  RESP: lungs clear to auscultation - no rales, rhonchi or wheezes  CV: regular rate and rhythm, normal S1 S2, no S3 or S4, no murmur, click or rub, no peripheral edema and peripheral pulses strong  ABDOMEN: soft, nontender, no hepatosplenomegaly, no masses and bowel sounds normal  MS: no gross musculoskeletal defects noted, no edema  SKIN: no suspicious lesions or rashes  NEURO: Normal strength and " "tone, mentation intact and speech normal  PSYCH: mentation appears normal, affect normal/bright    Diagnostic Test Results:  Labs reviewed in Epic    ASSESSMENT / PLAN:   Josemanuel was seen today for depression, recheck medication and wellness visit.    Diagnoses and all orders for this visit:    Encounter for Medicare annual wellness exam    Pain of right upper arm  -     gabapentin (NEURONTIN) 300 MG capsule; Take 2 capsules (600 mg) by mouth 2 times daily    Chronic atrial fibrillation  -     metoprolol tartrate (LOPRESSOR) 25 MG tablet; Take 1 tablet (25 mg) by mouth 2 times daily    Primary insomnia  -     zolpidem (AMBIEN) 10 MG tablet; TAKE 1 TABLET BY MOUTH AT BEDTIME AS NEEDED for SLEEP    Benign prostatic hyperplasia with weak urinary stream  -     finasteride (PROSCAR) 5 MG tablet; Take 1 tablet (5 mg) by mouth daily    Insomnia, unspecified type  -     SLEEP EVALUATION & MANAGEMENT REFERRAL - [facility]  [phone] (Age 2 and up); Future    Other orders  -     HC FLU VACCINE, INCREASED ANTIGEN, PRESV FREE [[id number]]        COUNSELING:  Reviewed preventive health counseling, as reflected in patient instructions       Regular exercise       Healthy diet/nutrition    Estimated body mass index is 25.4 kg/m  as calculated from the following:    Height as of this encounter: 1.753 m (5' 9\").    Weight as of this encounter: 78 kg (172 lb).         reports that he quit smoking about 44 years ago. His smoking use included cigarettes. He has a 15.00 pack-year smoking history. He has never used smokeless tobacco.      Appropriate preventive services were discussed with this patient, including applicable screening as appropriate for cardiovascular disease, diabetes, osteopenia/osteoporosis, and glaucoma.  As appropriate for age/gender, discussed screening for colorectal cancer, prostate cancer, breast cancer, and cervical cancer. Checklist reviewing preventive services available has been given " to the patient.    Reviewed patients plan of care and provided an AVS. The Intermediate Care Plan ( asthma action plan, low back pain action plan, and migraine action plan) for Josemanuel meets the Care Plan requirement. This Care Plan has been established and reviewed with the Patient.    Counseling Resources:  ATP IV Guidelines  Pooled Cohorts Equation Calculator  Breast Cancer Risk Calculator  FRAX Risk Assessment  ICSI Preventive Guidelines  Dietary Guidelines for Americans, 2010  USDA's MyPlate  ASA Prophylaxis  Lung CA Screening    Dominguez Verdugo MD  [facility]

## 2019-12-11 ASSESSMENT — ANXIETY QUESTIONNAIRES: GAD7 TOTAL SCORE: 2

## 2020-01-07 DIAGNOSIS — F51.01 PRIMARY INSOMNIA: ICD-10-CM

## 2020-01-07 DIAGNOSIS — F41.9 ANXIETY: ICD-10-CM

## 2020-01-07 DIAGNOSIS — F32.1 MODERATE MAJOR DEPRESSION (H): ICD-10-CM

## 2020-01-07 RX ORDER — ZOLPIDEM TARTRATE 10 MG/1
TABLET ORAL
Qty: 30 TABLET | Refills: 0 | Status: SHIPPED | OUTPATIENT
Start: 2020-01-07 | End: 2020-01-31

## 2020-01-07 RX ORDER — ESCITALOPRAM OXALATE 10 MG/1
10 TABLET ORAL DAILY
Qty: 90 TABLET | Refills: 1 | Status: SHIPPED | OUTPATIENT
Start: 2020-01-07 | End: 2020-07-31

## 2020-01-07 RX ORDER — BUPROPION HYDROCHLORIDE 150 MG/1
150 TABLET, EXTENDED RELEASE ORAL 2 TIMES DAILY
Qty: 180 TABLET | Refills: 1 | Status: SHIPPED | OUTPATIENT
Start: 2020-01-07 | End: 2020-01-14

## 2020-01-07 NOTE — TELEPHONE ENCOUNTER
"Requested Prescriptions   Pending Prescriptions Disp Refills     buPROPion (WELLBUTRIN SR) 150 MG 12 hr tablet 180 tablet 1     Sig: Take 1 tablet (150 mg) by mouth 2 times daily  Last Written Prescription Date:  4/4/2019  Last Fill Quantity: 180,  # refills: 1   Last office visit: 12/10/2019 with prescribing provider:  Arnaud   Future Office Visit:           SSRIs Protocol Passed - 1/7/2020 12:13 PM  CAROLYNN-7 SCORE 11/15/2018 2/26/2019 12/10/2019   Total Score - - -   Total Score 6 5 2             Passed - PHQ-9 score less than 5 in past 6 months     Please review last PHQ-9 score.   PHQ-9 SCORE 11/15/2018 2/26/2019 12/10/2019   PHQ-9 Total Score - - -   PHQ-9 Total Score 9 3 4             Passed - Medication is Bupropion     If the medication is Bupropion (Wellbutrin), and the patient is taking for smoking cessation; OK to refill.          Passed - Medication is active on med list        Passed - Patient is age 18 or older        Passed - Recent (6 mo) or future (30 days) visit within the authorizing provider's specialty     Patient had office visit in the last 6 months or has a visit in the next 30 days with authorizing provider or within the authorizing provider's specialty.  See \"Patient Info\" tab in inbasket, or \"Choose Columns\" in Meds & Orders section of the refill encounter.            zolpidem (AMBIEN) 10 MG tablet 30 tablet 0     Sig: TAKE 1 TABLET BY MOUTH AT BEDTIME AS NEEDED for SLEEP       There is no refill protocol information for this order        Last Written Prescription Date:  12/10/2019  Last Fill Quantity: 30,   # refills: 0  Last Office Visit: 12/10/2019 with arnaud   Future Office visit:       Routing refill request to provider for review/approval because:  Drug not on the G, P or  Health refill protocol or controlled substance          escitalopram (LEXAPRO) 10 MG tablet 90 tablet 1     Sig: Take 1 tablet (10 mg) by mouth daily  Last Written Prescription Date:  9/30/2019  Last Fill Quantity: " "90,  # refills: 1   Last office visit: 12/10/2019 with prescribing provider:  Arnaud   Future Office Visit:           SSRIs Protocol Passed - 1/7/2020 12:13 PM  CAROLYNN-7 SCORE 11/15/2018 2/26/2019 12/10/2019   Total Score - - -   Total Score 6 5 2             Passed - PHQ-9 score less than 5 in past 6 months     Please review last PHQ-9 score.   PHQ-9 SCORE 11/15/2018 2/26/2019 12/10/2019   PHQ-9 Total Score - - -   PHQ-9 Total Score 9 3 4             Passed - Medication is Bupropion     If the medication is Bupropion (Wellbutrin), and the patient is taking for smoking cessation; OK to refill.          Passed - Medication is active on med list        Passed - Patient is age 18 or older        Passed - Recent (6 mo) or future (30 days) visit within the authorizing provider's specialty     Patient had office visit in the last 6 months or has a visit in the next 30 days with authorizing provider or within the authorizing provider's specialty.  See \"Patient Info\" tab in inbasket, or \"Choose Columns\" in Meds & Orders section of the refill encounter.              "

## 2020-01-07 NOTE — TELEPHONE ENCOUNTER
Routing refill request for zolpidem to provider for review/approval because: Drug not on the [facility] refill protocol     Prescriptions for bupropion and escitalopram approved per [facility] Refill Protocol.    Cass MORTON RN

## 2020-01-14 DIAGNOSIS — F41.9 ANXIETY: ICD-10-CM

## 2020-01-14 DIAGNOSIS — N13.8 HYPERTROPHY OF PROSTATE WITH URINARY OBSTRUCTION: ICD-10-CM

## 2020-01-14 DIAGNOSIS — F32.1 MODERATE MAJOR DEPRESSION (H): ICD-10-CM

## 2020-01-14 DIAGNOSIS — N40.1 HYPERTROPHY OF PROSTATE WITH URINARY OBSTRUCTION: ICD-10-CM

## 2020-01-14 RX ORDER — BUPROPION HYDROCHLORIDE 150 MG/1
150 TABLET, EXTENDED RELEASE ORAL 2 TIMES DAILY
Qty: 60 TABLET | Refills: 0 | Status: SHIPPED | OUTPATIENT
Start: 2020-01-14 | End: 2020-02-04

## 2020-01-14 RX ORDER — DOXAZOSIN 4 MG/1
4 TABLET ORAL DAILY
Qty: 90 TABLET | Refills: 2 | Status: SHIPPED | OUTPATIENT
Start: 2020-01-14 | End: 2020-12-29

## 2020-01-14 NOTE — TELEPHONE ENCOUNTER
Bupropion - Small refill to get him through until his mail order Rx refill come s- [facility]

## 2020-01-14 NOTE — TELEPHONE ENCOUNTER
Small refill sent to local pharm while pt awaits delivery from mail order.     PHQ-9 SCORE 11/15/2018 2/26/2019 12/10/2019   PHQ-9 Total Score - - -   PHQ-9 Total Score 9 3 4     CAROLYNN-7 SCORE 11/15/2018 2/26/2019 12/10/2019   Total Score - - -   Total Score 6 5 2       Cass MORTON RN

## 2020-01-14 NOTE — TELEPHONE ENCOUNTER
Reason for Call:  Medication or medication refill:    Do you use a [facility]?  Name of the pharmacy and phone number for the current request:  [facility] [phone]    Name of the medication requested:   Pt calling for 2 refills:  1.  Bupropion - Small refill to get him through until his mail order Rx refill come [facility]  2.   Doxazosin - Refill to [facility]  Please call patient and advise.       Doxazosin   Last Written Prescription Date:  8/28/19  Last Fill Quantity: 90,  # refills: 1   Last office visit: 12/10/2019 with prescribing provider:     Future Office Visit:      Other request:     Can we leave a detailed message on this number? YES    Phone number patient can be reached at: Cell number on file:    Telephone Information:   Mobile [phone]     Best Time: any    Call taken on 1/14/2020 at 2:59 PM by Paola Moscoso

## 2020-01-14 NOTE — TELEPHONE ENCOUNTER
Routing refill request to provider for review/approval because:  Drug not on the [facility] refill protocol     Requested Prescriptions   Pending Prescriptions Disp Refills     doxazosin (CARDURA) 4 MG tablet 90 tablet 1     Sig: Take 1 tablet (4 mg) by mouth daily       There is no refill protocol information for this order        Last Written Prescription Date: 8/28/19  Last Fill Quantity: 90 ,  # refills: 1   Last office visit: 12/10/2019 with prescribing provider:  Dr. Verdugo   Future Office Visit:      Cass MORTON RN

## 2020-01-20 DIAGNOSIS — F51.01 PRIMARY INSOMNIA: ICD-10-CM

## 2020-01-20 NOTE — TELEPHONE ENCOUNTER
Pharmacy is requesting 90 day supply    Requested Prescriptions   Pending Prescriptions Disp Refills     zolpidem (AMBIEN) 10 MG tablet 30 tablet 0     Sig: TAKE 1 TABLET BY MOUTH AT BEDTIME AS NEEDED for SLEEP       There is no refill protocol information for this order              Last Written Prescription Date:  1/7/2020  Last Fill Quantity: 30,   # refills: 0  Last Office Visit: 12/10/2019 with Lucina   Future Office visit:       Routing refill request to provider for review/approval because:  Drug not on the [facility], P or [facility] refill protocol or controlled substance

## 2020-01-23 ENCOUNTER — TELEPHONE (OUTPATIENT)
Dept: PSYCHIATRY | Facility: CLINIC | Age: 75
End: 2020-01-23

## 2020-01-23 NOTE — TELEPHONE ENCOUNTER
PSYCHIATRY CLINIC PHONE INTAKE     SERVICES REQUESTED / INTERESTED IN          Med Management    Presenting Problem and Brief History                              What would you like to be seen for? (brief description): Pt was recommended by a friend to see a psychiatrist. Pt's primary doctor has been prescribing medications.  Medications on file are to up date. Medications don't seem to work. He;s experiencing depression, anxiety, and worry. He's bothered in social situations and seeing friends, he wasn't like that before. If his wife suggests having friends around, he panics at the thought. He gets panic attacks, rarely and feels like he can't escape. No issues with sleep.   Have you received a mental health diagnosis? No   Which one (s): NA  Is there any history of developmental delay?  No   Are you currently seeing a mental health provider?  No            Who / month last seen:  NA  Do you have mental health records elsewhere?  No  Will you sign a release so we can obtain them?  No    Have you ever been hospitalized for psychiatric reasons?  No  Describe:  NA    Do you have current thoughts of self-harm?  No    Do you currently have thoughts of harming others?  No       Substance Use History     Do you have any history of alcohol / illicit drug use?  Yes  Describe: Alcohol - Its been 2 and half years since having a drink.   Have you ever received treatment for this?  Yes    Describe:  [facility]     Social History     Who is the patient's a guardian?  No    Name / number: NA  Have you had an ACT team in last 12 months?  No  Describe: NA   Do you have any current or past legal issues?  No  Describe: NA   OK to leave a detailed voicemail?  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)     Chronic atrial fibrillation     Hyperplastic Polyp     Right knee DJD     Peripheral neuropathy     Sleep disturbance     Hematoma of skin     Traumatic ecchymosis of buttock, initial encounter          Medications             Current Outpatient Medications   Medication Sig Dispense Refill     acetaminophen (TYLENOL) 500 MG tablet Take 1,000 mg by mouth every 8 hours as needed for mild pain       buPROPion (WELLBUTRIN SR) 150 MG 12 hr tablet Take 1 tablet (150 mg) by mouth 2 times daily 60 tablet 0     doxazosin (CARDURA) 4 MG tablet Take 1 tablet (4 mg) by mouth daily 90 tablet 2     escitalopram (LEXAPRO) 10 MG tablet Take 1 tablet (10 mg) by mouth daily 90 tablet 1     finasteride (PROSCAR) 5 MG tablet Take 1 tablet (5 mg) by mouth daily 90 tablet 3     gabapentin (NEURONTIN) 300 MG capsule Take 2 capsules (600 mg) by mouth 2 times daily 360 capsule 3     lamoTRIgine (LAMICTAL) 100 MG tablet TAKE 1 TABLET TWICE A  tablet 3     metoprolol tartrate (LOPRESSOR) 25 MG tablet Take 1 tablet (25 mg) by mouth 2 times daily 180 tablet 3     STATIN NOT PRESCRIBED, INTENTIONAL, Please choose reason not prescribed, below (Patient not taking: Reported on 12/10/2019)       XARELTO 20 MG TABS tablet TAKE 1 TABLET DAILY WITH   DINNER 90 tablet 3     zolpidem (AMBIEN) 10 MG tablet TAKE 1 TABLET BY MOUTH AT BEDTIME AS NEEDED for SLEEP 30 tablet 0         DISPOSITION      1/23/20 Intake completed. Prefers to see Rylee Kyle. Adding to resident/NP REILLY Gamino,

## 2020-01-31 RX ORDER — ZOLPIDEM TARTRATE 10 MG/1
TABLET ORAL
Qty: 30 TABLET | Refills: 0 | Status: SHIPPED | OUTPATIENT
Start: 2020-01-31 | End: 2020-02-07

## 2020-02-07 DIAGNOSIS — F51.01 PRIMARY INSOMNIA: ICD-10-CM

## 2020-02-07 RX ORDER — ZOLPIDEM TARTRATE 10 MG/1
TABLET ORAL
Qty: 30 TABLET | Refills: 0 | Status: SHIPPED | OUTPATIENT
Start: 2020-02-07 | End: 2020-03-20

## 2020-02-07 NOTE — TELEPHONE ENCOUNTER
Routing refill request to provider for review/approval because:  Drug not on the [facility] refill protocol   Needs to be ordered for x90 days at a time, mail order pharmacy      Last OV 12/10/19: Return in about 1 year (around 12/10/2020) for Annual Wellness Visit.     Cass MORTON RN

## 2020-02-07 NOTE — TELEPHONE ENCOUNTER
Requested Prescriptions   Pending Prescriptions Disp Refills     zolpidem (AMBIEN) 10 MG tablet 30 tablet 0     Sig: TAKE 1 TABLET BY MOUTH AT BEDTIME AS NEEDED for SLEEP       There is no refill protocol information for this order              Last Written Prescription Date:  1/31/2020  Last Fill Quantity: 30,   # refills: 0  Last Office Visit: 12/10/2019 with Lucina   Future Office visit:       Routing refill request to provider for review/approval because:  Drug not on the G, P or [facility] refill protocol or controlled substance

## 2020-02-27 ENCOUNTER — OFFICE VISIT (OUTPATIENT)
Dept: FAMILY MEDICINE | Facility: CLINIC | Age: 75
End: 2020-02-27
Payer: COMMERCIAL

## 2020-02-27 ENCOUNTER — TELEPHONE (OUTPATIENT)
Dept: FAMILY MEDICINE | Facility: CLINIC | Age: 75
End: 2020-02-27

## 2020-02-27 VITALS
SYSTOLIC BLOOD PRESSURE: 106 MMHG | RESPIRATION RATE: 16 BRPM | DIASTOLIC BLOOD PRESSURE: 72 MMHG | TEMPERATURE: 97.1 F | HEART RATE: 68 BPM | BODY MASS INDEX: 24.34 KG/M2 | WEIGHT: 170 LBS | HEIGHT: 70 IN | OXYGEN SATURATION: 98 %

## 2020-02-27 DIAGNOSIS — L60.0 INGROWN LEFT GREATER TOENAIL: Primary | ICD-10-CM

## 2020-02-27 PROCEDURE — 99213 OFFICE O/P EST LOW 20 MIN: CPT | Performed by: FAMILY MEDICINE

## 2020-02-27 RX ORDER — CEPHALEXIN 500 MG/1
500 CAPSULE ORAL 3 TIMES DAILY
Qty: 30 CAPSULE | Refills: 0 | Status: SHIPPED | OUTPATIENT
Start: 2020-02-27 | End: 2020-04-17

## 2020-02-27 ASSESSMENT — MIFFLIN-ST. JEOR: SCORE: 1507.36

## 2020-02-27 NOTE — PROGRESS NOTES
Subjective     Josemanuel Edmond is a 74 year old male who presents to clinic today for the following health issues:   74 yr old male here for ingrown toe nail infection . This has been ongoing for a couple of weeks. It is red and swollen and there has been some drainage from the toe. Patient has tried over the counter medication with no relief.    HPI   Concern - ingrown toenail  Onset: 3-4 weeks ago     Description:   Patient has an ingrown toenail L toe is red with drainage has had for 3-4 weeks and is not getting any better      Intensity: moderate, severe with touching     Progression of Symptoms:  worsening    Accompanying Signs & Symptoms:  Soreness on toe     Previous history of similar problem:   None     Precipitating factors:   Worsened by: touching and walking     Alleviating factors:  Improved by: toe spacers    Therapies Tried and outcome: Patient has been using Vaseline and toe spacers       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)     Chronic atrial fibrillation     Hyperplastic Polyp     Right knee DJD     Peripheral neuropathy     Sleep disturbance     Hematoma of skin     Traumatic ecchymosis of buttock, initial encounter     Past Surgical History:   Procedure Laterality Date     ARTHROPLASTY KNEE Right 10/12/2018    Procedure: ARTHROPLASTY KNEE;  Right Total Knee Arthroplasty;  Surgeon: Jeb Peralta MD;  Location: [facility]     COLONOSCOPY N/A 12/10/2015    Procedure: COMBINED COLONOSCOPY, SINGLE OR MULTIPLE BIOPSY/POLYPECTOMY BY BIOPSY;   Surgeon: Jyoti Figueroa MD;  Location: [facility]     JOINT REPLACEMENT, HIP RT/LT  10/07    Joint Replacement Hip LT     JOINT REPLACEMTN, KNEE RT/LT  2011    Joint Replacement knee /LT, [facility]     LAPAROSCOPIC HERNIORRHAPHY INGUINAL BILATERAL Bilateral 2018    Procedure: LAPAROSCOPIC HERNIORRHAPHY INGUINAL BILATERAL;  Laparoscopic bilateral inguinal hernia repair;  Surgeon: Josemanuel Resendiz MD;  Location: [facility]     SURGICAL HISTORY OF -   1999    Umbilical Herniorrhaphy with mesh     SURGICAL HISTORY OF -  Left 2017    Thoracotomy and drainage of empyema       Social History     Tobacco Use     Smoking status: Former Smoker     Packs/day: 1.00     Years: 15.00     Pack years: 15.00     Types: Cigarettes     Last attempt to quit: 10/13/1975     Years since quittin.4     Smokeless tobacco: Never Used   Substance Use Topics     Alcohol use: No     Comment: quit 2017     Family History   Problem Relation Age of Onset     Breast Cancer Mother      Neurologic Disorder Mother         parkinsons     Respiratory Father         emphyzema     Diabetes Brother          Current Outpatient Medications   Medication Sig Dispense Refill     acetaminophen (TYLENOL) 500 MG tablet Take 1,000 mg by mouth every 8 hours as needed for mild pain       buPROPion (WELLBUTRIN SR) 150 MG 12 hr tablet Take 1 tablet (150 mg) by mouth 2 times daily 180 tablet 1     cephALEXin (KEFLEX) 500 MG capsule Take 1 capsule (500 mg) by mouth 3 times daily 30 capsule 0     doxazosin (CARDURA) 4 MG tablet Take 1 tablet (4 mg) by mouth daily 90 tablet 2     escitalopram (LEXAPRO) 10 MG tablet Take 1 tablet (10 mg) by mouth daily 90 tablet 1     finasteride (PROSCAR) 5 MG tablet Take 1 tablet (5 mg) by mouth daily 90 tablet 3     gabapentin (NEURONTIN) 300 MG capsule Take 2 capsules (600 mg) by mouth 2 times daily 360 capsule 3     lamoTRIgine (LAMICTAL) 100 MG tablet TAKE 1 TABLET TWICE A  tablet 3     metoprolol  "tartrate (LOPRESSOR) 25 MG tablet Take 1 tablet (25 mg) by mouth 2 times daily 180 tablet 3     XARELTO 20 MG TABS tablet TAKE 1 TABLET DAILY WITH   DINNER 90 tablet 3     zolpidem (AMBIEN) 10 MG tablet TAKE 1 TABLET BY MOUTH AT BEDTIME AS NEEDED for SLEEP 30 tablet 0     STATIN NOT PRESCRIBED, INTENTIONAL, Please choose reason not prescribed, below (Patient not taking: Reported on 12/10/2019)       Allergies   Allergen Reactions     Bactrim [Sulfamethoxazole W/Trimethoprim] Nausea and Vomiting     BP Readings from Last 3 Encounters:   02/27/20 106/72   12/10/19 128/60   09/04/19 (!) 144/79    Wt Readings from Last 3 Encounters:   02/27/20 77.1 kg (170 lb)   12/10/19 78 kg (172 lb)   09/04/19 78.5 kg (173 lb)                      Reviewed and updated as needed this visit by Provider         Review of Systems   ROS COMP: Constitutional, HEENT, cardiovascular, pulmonary, gi and gu systems are negative, except as otherwise noted.      Objective    /72   Pulse 68   Temp 97.1  F (36.2  C)   Resp 16   Ht 1.778 m (5' 10\")   Wt 77.1 kg (170 lb)   SpO2 98%   BMI 24.39 kg/m    Body mass index is 24.39 kg/m .  Physical Exam   GENERAL: healthy, alert and no distress  NECK: no adenopathy, no asymmetry, masses, or scars and thyroid normal to palpation  RESP: lungs clear to auscultation - no rales, rhonchi or wheezes  CV: regular rate and rhythm, normal S1 S2, no S3 or S4, no murmur, click or rub, no peripheral edema and peripheral pulses strong  MS: Great toe- red and swollen , oozing some mild serosanguinous fluid.     Diagnostic Test Results:  none         Assessment & Plan       ICD-10-CM    1. Ingrown left greater toenail L60.0 cephALEXin (KEFLEX) 500 MG capsule     Orthopedic & Spine  Referral   Antibiotics faxed- also referred to podiatry       FUTURE APPOINTMENTS:       - Follow-up visit in one month   Patient Instructions         Thank you for choosing [facility].  You may be receiving an email " and/or telephone survey request from [facility] Customer Experience regarding your visit today.  Please take a few minutes to respond to the survey to let us know how we are doing.      If you have questions or concerns, please contact us via [employer] or you can contact your care team at [phone].    Our Clinic hours are:  Monday 6:40 am  to 7:00 pm  Tuesday -Friday 6:40 am to 5:00 pm    The [place] outpatient lab hours are:  Monday - Friday 6:10 am to 4:45 pm  Saturdays 7:00 am to 11:00 am  Appointments are required, call [phone]    If you have clinical questions after hours or would like to schedule an appointment,  call the clinic at [phone].  Patient Education     Ingrown Toenail, Infected (Antibiotics, No Excision)  An ingrown toenail occurs when the nail grows sideways into the skin alongside the nail. This can cause pain. It can also lead to an infection with redness, swelling, and sometimes drainage.  The most common cause of an ingrown toenail is trimming your nails wrong. Most people trim the nails too close to the skin and try to round the nail too tightly around the shape of the toe. When you do this, the nail can grow into the skin of your toe. It is safer to trim the nail ending in a straight line rather than a curve.  Other causes include injury or wearing shoes that are too short or tight. This can cause the same problem that happens when trimming your nails. Your genetics can also make this more likely to happen.  The following are the most common symptoms of an ingrown toenail:     Pain    Redness    Swelling    Drainage  If the infection is mild, you may be able to take care of it at home with the following measures:    Frequent warm water soaks    Keeping it clean    Wearing loose, comfortable shoes or sandals  Another method involves using a small piece of cotton or waxed dental floss to gently lift up the corner of the problem nail. Change the cotton or floss frequently, especially  if it gets dirty.  If your infection is mild, and the above methods aren t working, or if the infection gets worse, see your healthcare provider. Signs of worsening infection include:    Swelling    Redness    Pus drainage  In some cases, you may need antibiotics along with warm soaks. If after 2 to 3 days of antibiotics the toenail doesn't get better or gets worse, part of the nail may need to be removed to drain the infection. With treatment, it can take 1 to 2 weeks to clear up completely.  Home care  Wound care  For the next 3 days, soak and clean your toe in warm water a few times a day.    Twice a day for the first 3 days, clean and soak the toe as follows:  1. Soak your foot in a tub of warm water for 5 minutes. Or, hold your toe under a faucet of warm running water for 5 minute  2. Clean any remaining crust away with soap and water using a cotton swab.  3. Put a small amount of antibiotic ointment on the infected area.    Change the dressing or bandage every time you soak or clean it, or whenever it becomes wet or dirty.    If you were prescribed antibiotics, take them as directed until they are all gone.    Wear comfortable shoes with a lot of toe room, or open-toe sandals, while your toe is healing.  Medicines    You can take over-the-counter medicine for pain, unless you were given a different pain medicine to use. Note: Talk with your provider before using these medicines if you have chronic liver or kidney disease, ever had a stomach ulcer or GI (gastrointestinal) bleeding, or are taking blood thinner medicines.    If you were given antibiotics, take them until they are used up or your provider tells you to stop, even if the wound looks better. This ensures that the infection clears up.  Prevention  To prevent ingrown toenails:    Wear shoes that fit well. Avoid shoes that pinch the toes together.    When you trim your toenails, do not cut them too short. Cut straight across at the top and don t round  the edges.    Don t use a sharp object to clean under your nail since this might cause an infection.    If the toenail starts to grow into the skin again, put a small piece of waxed dental floss or cotton under that side of the nail to help it grow out straight.  Follow-up care  Follow up with your healthcare provider, or as advised. If the antibiotic doesn't work, or if the condition recurs, you may need a minor procedure to have part of the nail removed.  When to seek medical advice  Call your healthcare provider right away if any of the following occur:    Increasing redness, pain, or swelling of the toe    Red streaks in the skin leading away from the wound    Pus or fluid drainage    Fever of 100.4 F (38 C) or higher, or as directed by your provider  Date Last Reviewed: 12/1/2016 2000-2019 [employer]. [address] [place] [address]. All rights reserved. This information is not intended as a substitute for professional medical care. Always follow your healthcare professional's instructions.               No follow-ups on file.    Iesha Thomas MD  [facility]

## 2020-02-27 NOTE — PATIENT INSTRUCTIONS
Thank you for choosing [facility].  You may be receiving an email and/or telephone survey request from [facility] Customer [facility] regarding your visit today.  Please take a few minutes to respond to the survey to let us know how we are doing.      If you have questions or concerns, please contact us via [employer] or you can contact your care team at [phone].    Our Clinic hours are:  Monday 6:40 am  to 7:00 pm  Tuesday -Friday 6:40 am to 5:00 pm    The [place] outpatient lab hours are:  Monday - Friday 6:10 am to 4:45 pm  Saturdays 7:00 am to 11:00 am  Appointments are required, call [phone]    If you have clinical questions after hours or would like to schedule an appointment,  call the clinic at [phone].  Patient Education     Ingrown Toenail, Infected (Antibiotics, No Excision)  An ingrown toenail occurs when the nail grows sideways into the skin alongside the nail. This can cause pain. It can also lead to an infection with redness, swelling, and sometimes drainage.  The most common cause of an ingrown toenail is trimming your nails wrong. Most people trim the nails too close to the skin and try to round the nail too tightly around the shape of the toe. When you do this, the nail can grow into the skin of your toe. It is safer to trim the nail ending in a straight line rather than a curve.  Other causes include injury or wearing shoes that are too short or tight. This can cause the same problem that happens when trimming your nails. Your genetics can also make this more likely to happen.  The following are the most common symptoms of an ingrown toenail:     Pain    Redness    Swelling    Drainage  If the infection is mild, you may be able to take care of it at home with the following measures:    Frequent warm water soaks    Keeping it clean    Wearing loose, comfortable shoes or sandals  Another method involves using a small piece of cotton or waxed dental floss to gently lift up the  corner of the problem nail. Change the cotton or floss frequently, especially if it gets dirty.  If your infection is mild, and the above methods aren t working, or if the infection gets worse, see your healthcare provider. Signs of worsening infection include:    Swelling    Redness    Pus drainage  In some cases, you may need antibiotics along with warm soaks. If after 2 to 3 days of antibiotics the toenail doesn't get better or gets worse, part of the nail may need to be removed to drain the infection. With treatment, it can take 1 to 2 weeks to clear up completely.  Home care  Wound care  For the next 3 days, soak and clean your toe in warm water a few times a day.    Twice a day for the first 3 days, clean and soak the toe as follows:  1. Soak your foot in a tub of warm water for 5 minutes. Or, hold your toe under a faucet of warm running water for 5 minute  2. Clean any remaining crust away with soap and water using a cotton swab.  3. Put a small amount of antibiotic ointment on the infected area.    Change the dressing or bandage every time you soak or clean it, or whenever it becomes wet or dirty.    If you were prescribed antibiotics, take them as directed until they are all gone.    Wear comfortable shoes with a lot of toe room, or open-toe sandals, while your toe is healing.  Medicines    You can take over-the-counter medicine for pain, unless you were given a different pain medicine to use. Note: Talk with your provider before using these medicines if you have chronic liver or kidney disease, ever had a stomach ulcer or GI (gastrointestinal) bleeding, or are taking blood thinner medicines.    If you were given antibiotics, take them until they are used up or your provider tells you to stop, even if the wound looks better. This ensures that the infection clears up.  Prevention  To prevent ingrown toenails:    Wear shoes that fit well. Avoid shoes that pinch the toes together.    When you trim your  toenails, do not cut them too short. Cut straight across at the top and don t round the edges.    Don t use a sharp object to clean under your nail since this might cause an infection.    If the toenail starts to grow into the skin again, put a small piece of waxed dental floss or cotton under that side of the nail to help it grow out straight.  Follow-up care  Follow up with your healthcare provider, or as advised. If the antibiotic doesn't work, or if the condition recurs, you may need a minor procedure to have part of the nail removed.  When to seek medical advice  Call your healthcare provider right away if any of the following occur:    Increasing redness, pain, or swelling of the toe    Red streaks in the skin leading away from the wound    Pus or fluid drainage    Fever of 100.4 F (38 C) or higher, or as directed by your provider  Date Last Reviewed: 12/1/2016 2000-2019 [employer]. [address] [place] [address]. All rights reserved. This information is not intended as a substitute for professional medical care. Always follow your healthcare professional's instructions.

## 2020-03-19 DIAGNOSIS — F51.01 PRIMARY INSOMNIA: ICD-10-CM

## 2020-03-20 RX ORDER — ZOLPIDEM TARTRATE 10 MG/1
TABLET ORAL
Qty: 30 TABLET | Refills: 0 | Status: SHIPPED | OUTPATIENT
Start: 2020-03-20 | End: 2020-04-29

## 2020-03-20 NOTE — TELEPHONE ENCOUNTER
Routing refill request to provider for review/approval because:  Drug not on the [facility] refill protocol   Last OV 12/10/19 with Dr. Verdugo: Return in about 1 year (around 12/10/2020) for Annual Wellness Visit.   Cass MORTON RN

## 2020-04-14 ENCOUNTER — TELEPHONE (OUTPATIENT)
Dept: FAMILY MEDICINE | Facility: CLINIC | Age: 75
End: 2020-04-14

## 2020-04-14 DIAGNOSIS — F32.1 MODERATE MAJOR DEPRESSION (H): ICD-10-CM

## 2020-04-14 NOTE — TELEPHONE ENCOUNTER
"Requested Prescriptions   Pending Prescriptions Disp Refills     lamoTRIgine (LAMICTAL) 100 MG tablet [Pharmacy Med Name: LAMOTRIGINE TABS 100MG] 180 tablet 3     Sig: TAKE 1 TABLET TWICE A DAY       Anti-Seizure Meds Protocol  Failed - 4/14/2020 12:05 PM        Failed - Review Authorizing provider's last note.      Refer to last progress notes: confirm request is for original authorizing provider (cannot be through other providers).          Failed - Normal CBC on file in past 26 months     Recent Labs   Lab Test 09/04/19  1020   WBC 5.5   RBC 3.79*   HGB 12.7*   HCT 37.6*                    Failed - Normal ALT or AST on file in past 26 months     Recent Labs   Lab Test 01/22/18  1354   ALT 32     Recent Labs   Lab Test 01/22/18  1354   AST 15             Passed - Recent (12 mo) or future (30 days) visit within the authorizing provider's specialty     Patient has had an office visit with the authorizing provider or a provider within the authorizing providers department within the previous 12 mos or has a future within next 30 days. See \"Patient Info\" tab in inbasket, or \"Choose Columns\" in Meds & Orders section of the refill encounter.              Passed - Normal platelet count on file in past 26 months     Recent Labs   Lab Test 09/04/19  1020                  Passed - Medication is active on med list           Last Written Prescription Date:  4/4/19  Last Fill Quantity: 180,  # refills: 3   Last office visit: 2/27/2020 with prescribing provider:  Lucina   Future Office Visit:      "

## 2020-04-15 NOTE — TELEPHONE ENCOUNTER
Looks like this has not been formally addressed via an office visit on some time. Please schedule for virtual visit with PCP or covering provider.

## 2020-04-15 NOTE — TELEPHONE ENCOUNTER
Routing refill request to provider for review/approval because:  Drug not on the [facility] refill protocol     Nikki Mancilla RN

## 2020-04-16 RX ORDER — LAMOTRIGINE 100 MG/1
TABLET ORAL
Qty: 180 TABLET | Refills: 3 | OUTPATIENT
Start: 2020-04-16

## 2020-04-16 NOTE — TELEPHONE ENCOUNTER
LM to call clinic/RN to schedule virtual appt to address refill.[email or web address]  Virtual visit scheduled for tomorrow concerning Lamictal refill.  KPaana luisaRN

## 2020-04-17 ENCOUNTER — VIRTUAL VISIT (OUTPATIENT)
Dept: FAMILY MEDICINE | Facility: CLINIC | Age: 75
End: 2020-04-17
Payer: COMMERCIAL

## 2020-04-17 DIAGNOSIS — F32.1 MODERATE MAJOR DEPRESSION (H): ICD-10-CM

## 2020-04-17 DIAGNOSIS — F41.9 ANXIETY: Primary | ICD-10-CM

## 2020-04-17 PROCEDURE — 99214 OFFICE O/P EST MOD 30 MIN: CPT | Mod: 95 | Performed by: NURSE PRACTITIONER

## 2020-04-17 RX ORDER — BUSPIRONE HYDROCHLORIDE 7.5 MG/1
TABLET ORAL
Qty: 60 TABLET | Refills: 1 | Status: SHIPPED | OUTPATIENT
Start: 2020-04-17 | End: 2020-05-07 | Stop reason: DRUGHIGH

## 2020-04-17 RX ORDER — LAMOTRIGINE 100 MG/1
TABLET ORAL
Qty: 180 TABLET | Refills: 3 | Status: SHIPPED | OUTPATIENT
Start: 2020-04-17 | End: 2020-12-02

## 2020-04-17 NOTE — PROGRESS NOTES
"Josemanuel Edmond is a 75 year old male who is being evaluated via a billable telephone visit.      The patient has been notified of following:     \"This telephone visit will be conducted via a call between you and your physician/provider. We have found that certain health care needs can be provided without the need for a physical exam.  This service lets us provide the care you need with a short phone conversation.  If a prescription is necessary we can send it directly to your pharmacy.  If lab work is needed we can place an order for that and you can then stop by our lab to have the test done at a later time.    Telephone visits are billed at different rates depending on your insurance coverage. During this emergency period, for some insurers they may be billed the same as an in-person visit.  Please reach out to your insurance provider with any questions.    If during the course of the call the physician/provider feels a telephone visit is not appropriate, you will not be charged for this service.\"    Patient has given verbal consent for Telephone visit?  Yes    How would you like to obtain your AVS? Mail a copy     This patient was recommended a virtual visit as opposed to an office visit as part of the social distancing recommendations to prevent the spread of COVID19 per CDC recommendations.       Subjective     Josemanuel Edmond is a 75 year old male who presents to clinic today for the following health issues:    Depression Followup    How are you doing with your depression since your last visit? No change    Are you having other symptoms that might be associated with depression? No    Have you had a significant life event?  No     Are you feeling anxious or having panic attacks?   Yes:  social anxiety, doesn't want to see people or be around anyone    Do you have any concerns with your use of alcohol or other drugs? No    Social History     Tobacco Use     Smoking status: Former Smoker     Packs/day: 1.00 "     Years: 15.00     Pack years: 15.00     Types: Cigarettes     Last attempt to quit: 10/13/1975     Years since quittin.5     Smokeless tobacco: Never Used   Substance Use Topics     Alcohol use: No     Comment: quit 2017     Drug use: No     PHQ 11/15/2018 2019 12/10/2019   PHQ-9 Total Score 9 3 4   Q9: Thoughts of better off dead/self-harm past 2 weeks Not at all Not at all Not at all     CAROLYNN-7 SCORE 11/15/2018 2019 12/10/2019   Total Score - - -   Total Score 6 5 2     Last PHQ-9 12/10/2019   1.  Little interest or pleasure in doing things 1   2.  Feeling down, depressed, or hopeless 1   3.  Trouble falling or staying asleep, or sleeping too much 1   4.  Feeling tired or having little energy 0   5.  Poor appetite or overeating 0   6.  Feeling bad about yourself 1   7.  Trouble concentrating 0   8.  Moving slowly or restless 0   Q9: Thoughts of better off dead/self-harm past 2 weeks 0   PHQ-9 Total Score 4   Difficulty at work, home, or with people Somewhat difficult     CAROLYNN-7  12/10/2019   1. Feeling nervous, anxious, or on edge 1   2. Not being able to stop or control worrying 0   3. Worrying too much about different things 1   4. Trouble relaxing 0   5. Being so restless that it is hard to sit still 0   6. Becoming easily annoyed or irritable 0   7. Feeling afraid, as if something awful might happen 0   CAROLYNN-7 Total Score 2   If you checked any problems, how difficult have they made it for you to do your work, take care of things at home, or get along with other people? Not difficult at all     In the past two weeks have you had thoughts of suicide or self-harm?  No.    Do you have concerns about your personal safety or the safety of others?   No    Suicide Assessment Five-step Evaluation and Treatment (SAFE-T)      How many servings of fruits and vegetables do you eat daily?  0-1    On average, how many sweetened beverages do you drink each day (Examples: soda, juice, sweet tea, etc.  Do  NOT count diet or artificially sweetened beverages)?   0    How many days per week do you exercise enough to make your heart beat faster? 3 or less    How many minutes a day do you exercise enough to make your heart beat faster? 9 or less    How many days per week do you miss taking your medication? 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)     Chronic atrial fibrillation     Hyperplastic Polyp     Right knee DJD     Peripheral neuropathy     Sleep disturbance     Hematoma of skin     Traumatic ecchymosis of buttock, initial encounter     Past Surgical History:   Procedure Laterality Date     ARTHROPLASTY KNEE Right 10/12/2018    Procedure: ARTHROPLASTY KNEE;  Right Total Knee Arthroplasty;  Surgeon: Jeb Peralta MD;  Location: [facility]     COLONOSCOPY N/A 12/10/2015    Procedure: COMBINED COLONOSCOPY, SINGLE OR MULTIPLE BIOPSY/POLYPECTOMY BY BIOPSY;  Surgeon: Jyoti Figueroa MD;  Location: [facility]     JOINT REPLACEMENT, HIP RT/LT  10/07    Joint Replacement Hip LT     JOINT REPLACEMTN, KNEE RT/LT  8/2011    Joint Replacement knee /LT, [facility]     LAPAROSCOPIC HERNIORRHAPHY INGUINAL BILATERAL Bilateral 4/24/2018    Procedure: LAPAROSCOPIC HERNIORRHAPHY INGUINAL BILATERAL;  Laparoscopic bilateral inguinal hernia repair;  Surgeon: Josemanuel Resendiz MD;  Location: [facility]     SURGICAL HISTORY OF -   12/1/1999    Umbilical Herniorrhaphy with mesh     SURGICAL HISTORY OF -  Left 11/2017    Thoracotomy and drainage of empyema       Social History      Tobacco Use     Smoking status: Former Smoker     Packs/day: 1.00     Years: 15.00     Pack years: 15.00     Types: Cigarettes     Last attempt to quit: 10/13/1975     Years since quittin.5     Smokeless tobacco: Never Used   Substance Use Topics     Alcohol use: No     Comment: quit 2017     Family History   Problem Relation Age of Onset     Breast Cancer Mother      Neurologic Disorder Mother         parkinsons     Respiratory Father         emphyzema     Diabetes Brother            Reviewed and updated as needed this visit by Provider         Review of Systems   ROS COMP: Constitutional, HEENT, cardiovascular, pulmonary, gi and gu systems are negative, except as otherwise noted.       Objective   Reported vitals:  There were no vitals taken for this visit.   alert and no distress  PSYCH: Alert and oriented times 3; coherent speech, normal   rate and volume, able to articulate logical thoughts, able   to abstract reason, no tangential thoughts, no hallucinations   or delusions  His affect is normal and pleasant  RESP: No cough, no audible wheezing, able to talk in full sentences  Remainder of exam unable to be completed due to telephone visits    Diagnostic Test Results:  Labs reviewed in Epic        Assessment/Plan:  1. Moderate major depression (H)    - lamoTRIgine (LAMICTAL) 100 MG tablet; TAKE 1 TABLET TWICE A DAY  Dispense: 180 tablet; Refill: 3  - busPIRone (BUSPAR) 7.5 MG tablet; Take 1 tab by mouth daily for one week, then take 1 tab by mouth twice daily.  Dispense: 60 tablet; Refill: 1  - MENTAL HEALTH REFERRAL  - Adult; Psychiatry; Psychiatry; [facility]: Collaborative Care Psychiatry Service/Bridge to Long-Term Psychiatry as indicated ([phone]); No - Patient must be evaluated prior to placing referral OR document reason for refer...    2. Anxiety    - busPIRone (BUSPAR) 7.5 MG tablet; Take 1 tab by mouth daily for one week, then take 1 tab by mouth twice daily.  Dispense: 60 tablet;  Refill: 1  - MENTAL HEALTH REFERRAL  - Adult; Psychiatry; Psychiatry; [facility]: Collaborative Care Psychiatry Service/Bridge to Long-Term Psychiatry as indicated ([phone]); No - Patient must be evaluated prior to placing referral OR document reason for refer...    No follow-ups on file.    Uncontrolled anxiety, depression in partial remission. Trial of Buspar for anxiety. Will have psychiatry review depression medications and see if we can wean off, patient is asking about weaning off Lamictal, appreciate recommendations.     Phone call duration:  15 minutes    KAYLA Valle CNP

## 2020-04-17 NOTE — PATIENT INSTRUCTIONS
Patient Education     Depression: Tips to Help Yourself    As your healthcare providers help treat your depression, you can also help yourself. Keep in mind that your illness affects you emotionally, physically, mentally, and socially. So full recovery will take time. Take care of your body and your soul, and be patient with yourself as you get better.  Self-care    Educate yourself. Read about treatment and medicine options. If you have the energy, attend local conferences or support groups. Keep a list of useful websites and helpful books and use them as needed. This illness is not your fault. Don t blame yourself for your depression.    Manage early symptoms. If you notice symptoms returning, experience triggers, or identify other factors that may lead to a depressive episode, get help as soon as possible. Ask trusted friends and family to monitor your behavior and let you know if they see anything of concern.    Work with your provider. Find a provider you can trust. Communicate honestly with that person and share information on your treatment for depression and your reaction to medicines.    Be prepared for a crisis. Know what to do if you experience a crisis. Keep the phone number of a crisis hotline and know the location of your community's urgent care centers and the closest emergency department.    Hold off on big decisions. Depression can cloud your judgment. So wait until you feel better before making major life decisions, such as changing jobs, moving, or getting  or .    Be patient. Recovering from depression is a process. Don t be discouraged if it takes some time to feel better.    Keep it simple. Depression saps your energy and concentration. So you won t be able to do all the things you used to do. Set small goals and do what you can.    Be with others. Don t isolate yourself--you ll only feel worse. Try to be with other people. And take part in fun activities when you can. Go to a  movie, ballgame, Religion service, or social event. Talk openly with people you can trust. And accept help when it s offered.  Take care of your body  People with depression often lose the desire to take care of themselves. That only makes their problems worse. During treatment and afterward, make a point to:    Exercise. It s a great way to take care of your body. And studies have shown that exercise helps fight depression.    Avoid drugs and alcohol. These may ease the pain in the short term. But they ll only make your problems worse in the long run.    Get relief from stress. Ask your healthcare provider for relaxation exercises and techniques to help relieve stress.    Eat right. A balanced and healthy diet helps keep your body healthy.  Date Last Reviewed: 1/1/2017 2000-2019 [employer]. [address] [place] [address], [place] [address]. All rights reserved. This information is not intended as a substitute for professional medical care. Always follow your healthcare professional's instructions.           Patient Education     Understanding Anxiety Disorders  Almost everyone gets nervous now and then. It s normal to have knots in your stomach before a test, or for your heart to race on a first date. But an anxiety disorder is much more than a case of nerves. In fact, its symptoms may be overwhelming. But treatment can relieve many of these symptoms. Talking to your healthcare provider is the first step.    What are anxiety disorders?  An anxiety disorder causes intense feelings of panic and fear. These feelings may arise for no apparent reason. And they tend to recur again and again. They may prevent you from coping with life and cause you great distress. As a result, you may avoid anything that triggers your fear. In extreme cases, you may never leave the house. Anxiety disorders may cause other symptoms, such as:    Obsessive thoughts that are unwanted and you can t control    Constant nightmares or  painful thoughts of the past    Nausea, sweating, and muscle tension    Trouble sleeping or concentrating  What causes anxiety disorders?  Anxiety disorders tend to run in families. For some people, childhood abuse or neglect may play a role. For others, stressful life events or trauma may trigger anxiety disorders. Anxiety can trigger low self-esteem and poor coping skills.    Panic disorder. This causes an intense fear of being in danger.    Phobias. These are extreme fears of certain objects, places, or events.    Obsessive-compulsive disorder. This causes you to have unwanted thoughts and urges. You also may perform certain actions over and over.    Posttraumatic stress disorder. This occurs in people who have survived a terrible ordeal. It can cause nightmares and flashbacks about the event.    Generalized anxiety disorder. This causes constant worry that can greatly disrupt your life.    Getting better  You may believe that nothing can help you. Or, you might fear what others may think. But most anxiety symptoms can be eased. Having an anxiety disorder is nothing to be ashamed of. Most people do best with treatment that combines medicine and individual and group therapy. These aren t cures. But they can help you live a healthier life.  Date Last Reviewed: 2/1/2017 2000-2019 [employer]. [address] [place] [address], [place] [address]. All rights reserved. This information is not intended as a substitute for professional medical care. Always follow your healthcare professional's instructions.           Patient Education     Treating Anxiety Disorders with Medicine  An anxiety disorder can make you feel nervous or apprehensive, even without a clear reason. In people age 65 and older, generalized anxiety disorder is one of the most commonly diagnosed anxiety disorders. Many times it occurs with depression. Certain anxiety disorders can cause intense feelings of fear or panic. You may even have physical  symptoms such as a racing heartbeat, sweating, or dizziness. If you have these feelings, you don t have to suffer anymore. Treatment to help you overcome your fears will likely include therapy (also called counseling). Medicine may also be prescribed to help control your symptoms.    Medicines  Certain medicines may be prescribed to help control your symptoms. So you may feel less anxious. You may also feel able to move forward with therapy. At first, medicines and dosages may need to be adjusted to find what works best for you. Try to be patient. Tell your healthcare provider how a medicine makes you feel. This way, you can work together to find the treatment that s best for you. Keep in mind that medicines can have side effects. Talk with your provider about any side effects that are bothering you. Changing the dose or type of medicine may help. Don t stop taking medicine on your own. That can cause symptoms to come back or cause dangerous withdrawal symptoms.    Anti-anxiety medicine. This medicine eases symptoms and helps you relax. Your healthcare provider will explain when and how to use it. It may be prescribed for use before situations that make you anxious. You may also be told to take medicine on a regular schedule. Anti-anxiety medicine may make you feel a little sleepy or  out of it.  Don t drive a car or operate machinery while on this medicine, until you know how it affects you.  Never use alcohol or other drugs with anti-anxiety medicines. This could result in loss of muscular control, sedation, coma, or death. Also, use only the amount of medicine prescribed for you. If you think you may have taken too much, get emergency care right away. Never share your medications with others. Store these medications in a safe place that can't be accessed by children or visitors.  Keep taking medicines as prescribed  Never change your dosage, share or use another person's medicine, or stop taking your medicines  without talking to your healthcare provider first. Keep the following in mind:    Some medicines must be taken on a schedule. Make this part of your daily routine. For instance, always take your pill before brushing your teeth. A pillbox can help you remember if you ve taken your medicine each day.    Medicines are often taken for 6 to 12 months. Your healthcare provider will then evaluate whether you need to stay on them. Many people who have also had therapy may no longer need medicine to manage anxiety.    You may need to stop taking medicine slowly to give your body time to adjust. When it s time to stop, your healthcare provider will tell you more. Remember: Never stop taking your medicine without talking to your provider first.    If symptoms return, you may need to start taking medicines again. This isn t your fault. It s just the nature of your anxiety disorder.    Side effects. Medicines may cause side effects. Ask your healthcare provider or pharmacist what you can expect. They may have ideas for avoiding some side effects.    Sexual problems. Some antidepressants can affect your desire for sex or your ability to have an orgasm. A change in dosage or medicine often solves the problem. If you have a sexual side effect that concerns you, tell your healthcare provider.    Addiction. If you ve never had a problem with drugs or alcohol, you may not have a problem with medicines used to treat anxiety disorders. But always discuss the medicines with your healthcare provider before taking them. If you have a history of addiction, you may not be able to use certain medicines used to treat anxiety disorders.    Medicine interactions. Always check with your pharmacist before using any over-the-counter medicines (OTCs), including herbal supplements. Some OTCs may interact with your anti-anxiety medications and increase or decrease their effectiveness.    Date Last Reviewed: 5/1/2017 2000-2019 [employer],  [employer]. [address] [place] [address]. All rights reserved. This information is not intended as a substitute for professional medical care. Always follow your healthcare professional's instructions.           Patient Education     Treating Anxiety Disorders with Therapy    If you have an anxiety disorder, you don t have to suffer anymore. Treatment is available. Therapy (also called counseling) is often a helpful treatment for anxiety disorders. With therapy, a specially trained professional (therapist) helps you face and learn to manage your anxiety. Therapy can be short-term or long-term depending on your needs. In some cases, medicine may also be prescribed with therapy. It may take time before you notice how much therapy is helping, but stick with it. With therapy, you can feel better.  Cognitive behavioral therapy (CBT)  Cognitive behavioral therapy (CBT) teaches you to manage anxiety. It does this by helping you understand how you think and act when you re anxious. Research has shown CBT to be a very effective treatment for anxiety disorders. How CBT is run is almost like a class. It involves homework and activities to build skills that teach you to cope with anxiety step by step. It can be done in a group or one-on-one, and often takes place for a set number of sessions. CBT has two main parts:    Cognitive therapy helps you identify the negative, irrational thoughts that occur with your anxiety. You ll learn to replace these with more positive, realistic thoughts.    Behavioral therapy helps you change how you react to anxiety. You ll learn coping skills and methods for relaxing to help you better deal with anxiety.  Other forms of therapy  Other therapy methods may work better for you than CBT. Or, you may move from CBT to another form of therapy as your treatment needs change. This may mean meeting with a therapist by yourself or in a group. Therapy can also help you work through problems in your  life, such as drug or alcohol dependence, that may be making your anxiety worse.  Getting better takes time  Therapy will help you feel better and teach you skills to help manage anxiety long term. But change doesn t happen right away. It takes a commitment from you. And treatment only works if you learn to face the causes of your anxiety. So, you might feel worse before you feel better. This can sometimes make it hard to stick with it. But remember: Therapy is a very effective treatment. The results will be well worth it.  Helping yourself  If anxiety is wearing you down, here are some things you can do to cope:    Check with your doctor and rule out any physical problems that may be causing the anxiety symptoms.    If an anxiety disorder is diagnosed, seek mental healthcare. This is an illness and it can respond to treatment. Most types of anxiety disorders will respond to talk therapy and medicine.    Educate yourself about anxiety disorders. Keep track of helpful online resources and books you can use during stressful periods.    Try stress management techniques such as meditation.    Consider online or in-person support groups.    Don t fight your feelings. Anxiety feeds itself. The more you worry about it, the worse it gets. Instead, try to identify what might have triggered your anxiety. Then try to put this threat in perspective.    Keep in mind that you can t control everything about a situation. Change what you can and let the rest take its course.    Exercise -- it s a great way to relieve tension and help your body feel relaxed.    Examine your life for stress, and try to find ways to reduce it.    Avoid caffeine and nicotine, which can make anxiety symptoms worse.    Fight the temptation to turn to alcohol or unprescribed drugs for relief. They only make things worse in the long run.   Date Last Reviewed: 1/1/2017 2000-2019 [employer]. [address] [facility], [place] [address]. All  rights reserved. This information is not intended as a substitute for professional medical care. Always follow your healthcare professional's instructions.

## 2020-04-28 DIAGNOSIS — F51.01 PRIMARY INSOMNIA: ICD-10-CM

## 2020-04-28 NOTE — TELEPHONE ENCOUNTER
Reason for Call:  Medication or medication refill:    Do you use a [facility]?  Name of the pharmacy and phone number for the current request:  [facility] [phone]    Name of the medication requested: Zolpidem  Last Written Prescription Date:  3/20/20  Last Fill Quantity: 30,  # refills: 0   Last office visit: 4/17/2020 with prescribing provider:     Future Office Visit:   Next 5 appointments (look out 90 days)    May 13, 2020  1:45 PM CDT  Telephone Visit with Arleth Armstrong NP  [facility] [address] [facility] [id number]  [phone]         Other request:     Call taken on 4/28/2020 at 9:09 AM by Paola Moscoso

## 2020-04-29 RX ORDER — ZOLPIDEM TARTRATE 10 MG/1
TABLET ORAL
Qty: 30 TABLET | Refills: 0 | Status: SHIPPED | OUTPATIENT
Start: 2020-04-29 | End: 2020-05-07

## 2020-04-29 NOTE — TELEPHONE ENCOUNTER
Routing refill request to provider for review/approval because:  Drug not on the [facility] refill protocol   Last Written Prescription Date:  3/20/20  Last Fill Quantity: 30,  # refills: 0   Last office visit: 4/17/2020 with prescribing provider:     Future Office Visit:       Next 5 appointments (look out 90 days)    May 13, 2020  1:45 PM CDT  Telephone Visit with Arleth Armstrong NP  [facility]      MARCEL AlbertN, RN

## 2020-05-07 ENCOUNTER — VIRTUAL VISIT (OUTPATIENT)
Dept: PSYCHIATRY | Facility: CLINIC | Age: 75
End: 2020-05-07
Attending: NURSE PRACTITIONER
Payer: COMMERCIAL

## 2020-05-07 DIAGNOSIS — F33.41 RECURRENT MAJOR DEPRESSIVE DISORDER, IN PARTIAL REMISSION (H): ICD-10-CM

## 2020-05-07 DIAGNOSIS — F40.10 SOCIAL ANXIETY DISORDER: Primary | ICD-10-CM

## 2020-05-07 DIAGNOSIS — F51.01 PRIMARY INSOMNIA: ICD-10-CM

## 2020-05-07 RX ORDER — BUSPIRONE HYDROCHLORIDE 10 MG/1
10 TABLET ORAL 2 TIMES DAILY
Qty: 60 TABLET | Refills: 1 | Status: SHIPPED | OUTPATIENT
Start: 2020-05-07 | End: 2020-05-28 | Stop reason: DRUGHIGH

## 2020-05-07 RX ORDER — ZOLPIDEM TARTRATE 10 MG/1
5-10 TABLET ORAL
Qty: 30 TABLET | Refills: 0 | COMMUNITY
Start: 2020-05-07 | End: 2020-05-28

## 2020-05-07 ASSESSMENT — PAIN SCALES - GENERAL: PAINLEVEL: NO PAIN (0)

## 2020-05-07 NOTE — PROGRESS NOTES
"VIDEO VISIT  Josemanuel Edmond is a 75 year old patient who is being evaluated via a billable video visit.      The patient has been notified of following:   \"This video visit will be conducted via a call between you and your physician/provider. We have found that certain health care needs can be provided without the need for an in-person physical exam. This service lets us provide the care you need with a video conversation. If a prescription is necessary we can send it directly to your pharmacy. If lab work is needed we can place an order for that and you can then stop by our lab to have the test done at a later time. Insurers are generally covering virtual visits as they would in-office visits so billing should not be different than normal.  If for some reason you do get billed incorrectly, you should contact the billing office to correct it and that number is in the AVS .    Patient has given verbal consent for video visit?:  Yes     How would you like to obtain your AVS?:  [employer]    Video invitation for patient:  DOXY provider, invite not needed      AVS SmartPhrase [PsychAVS] has been placed in 'Patient Instructions':  Yes     Start Time: 1055         End Time: 1145    Telemedicine Visit: The patient's condition can be safely assessed and treated via synchronous audio and visual telemedicine encounter.      Reason for Telemedicine Visit: Due to COVID 19 pandemic, clinic switching all appointments to telemedicine     Originating Site (Patient Location): Patient's home    Distant Site (Provider Location): Provider Remote Setting    Consent:  The patient/guardian has verbally consented to: the potential risks and benefits of telemedicine (video visit) versus in person care; bill my insurance or make self-payment for services provided; and responsibility for payment of non-covered services.     Mode of Communication:  Video Conference via Doxy.    As the provider I attest to compliance with applicable laws and " "regulations related to telemedicine.    Outpatient Psychiatry Diagnostic Assessment       Josemanuel Edmond is a 75 year old person assigned male at birth, identifies as cisgender male who uses the name Gavin and pronoun pat, presenting for Diagnostic Assessment.       Therapist: None  PCP: Dominguez Verdugo.  Other Providers: None  Referred by self for evaluation of anxiety.     History was provided by patient who was a fair historian.       Chief Complaint                                                                                                        \" My social anxiety is bad.\"     History of Present Illness                                                                                4, 4     Pertinent Background:  Reports depression started 2002 after his son was killed in MVA.  Anxiety also started around the same time, but social anxiety started 6-7 years ago.  Denies any hx of SI, SIB, HI, psych hospitalization.  After son was killed, pt had heavy ETOH use on and off.      Most Recent History: Notes social anxiety is significant despite of being on numerous medications managed by PCP.  Pt notes when his spouse suggests having friends over, going out or even going to Jew that he enjoys, he gets significant anxiety.  Pt initially reports he did not have any patterns of anxiety changes throughout the day, but later noted that he feels generally better in AM.  Pt does not have problem with going to grocery stores and other chores.   Pt is concerned about this because he does not hold up his spouse, if this is not for his spouse, he would be happy staying home and this will not affect him.  PCP added Buspar 7.5 mg daily to taper up to BID, but has not noted any difference.  Pt is prescribed Gabapentin for neuropathy of leg in the past and since he no longer has this condition, reports being tapered off of this and only taking 600 mg daily \"for a while.\"  Pt denies anxiety exacerbation with tapering off " "Gabapentin.    Notes depression is present, but this is fairly well managed though he reports \"I guess I'm kind of down\" x 5-6 years.  Pt also noted that he does not have \"much emotion\" for couple years.  Though pt does not feel significantly depressed or sad, pt also does not feel much jamie.  Denies anhedonia.  Continues to enjoy yard work and work in [employer].  Pt is unsure why his Wellbutrin was changed from XR to SR at one point, thought that was just dose change.  Currently taking Wellbutrin  mg BID at 9am and 6pm.  Notes he goes to bed around 11:30pm and takes Ambien 10 mg HS PRN every night, able to fall asleep easily as long as he takes Ambien, but wakes up around 5am and cannot go back to sleep frequently, so just start his day.  Also notes he wakes up x2-3/night to use bathroom and easily goes back to sleep.  PCP note indicates pt wants to be tapered off Lamictal as he is unsure why he is taking the medication.  Pt denies having seizure.  Pt notes he had lost 20 lbs without trying to lose weight last 3-4 years, but finally \"leveled off\" where he is comfortable currently.    Pt denies any symptoms of PTSD.    Yoselyn, his spouse, notes that she is concerned about pt's random shaking/twitch in hands x 1 year that is worsening.  This almost appears also like tapping and his writing has been \"awful.\"  Yoselyn also notes his poor balance and had few falls or near miss falls over 1 year now.  This concern has been addressed to PCP, but they could not find any reason for this.  Pt also notes some memory declining x1-2 year.  Does not have any problem with driving, missing personal items and Yoselyn is also not concerned about this, feels this is age appropriate memory declining.    Denies any symptoms suggestive of hypomania or psychosis.    Medication current trials: Wellbutrin  mg BID, Lexapro 10 mg daily, Gabapentin 600 mg daily (for neuropathy), Lamictal 100 mg BID, Ambien 10 mg HS PRN, Buspar 7.5 mg " "BID  Current Suicidality/Hx of Suicide Attempts: Denies both  CoCominent Medical concerns: occasional fall and random shaking/twitching of bilateral hands      Medical Review of Systems      Apart from the symptoms mentioned int he HPI, the 14 point review of systems, including constitutional, HEENT, cardiovascular, respiratory, gastrointestinal, genitourinary, musculoskeletal, skin, endocrine, neurologic, hematologic and allergic is entirely negative except for occasional fall and random shaking/twitching of bilateral hands.       Past Psychiatric History     Past Diagnosis and Age of Onset: Depression:2002 and Anxiety:2002  Outpatient Programs [ DBT, Day Treatment, Eating Disorder Tx etc]- None   Previous  admissions:  None  Previous providers:  None, PCP managed  Current Therapist:  None  Previous Psychiatric Meds: Wellbutrin XL, Xanax, Klonopin, Valium, Ativan, Remeron, Nortriptyline and Vistaril  ECT: None  Suicidal ideation: None   Suicide Attempt: None  Most Recent- N/A  Self-injurious behavior: None  Violent behavior: None       Substance Use History     Denies any use of alcohol x 3 years.  Had heavy ETOH use on and off after his son was killed by MVA in 2002.  Pt just stopped drinking \"it was spiritual moment.\"  The patient has not had treatment for chemical dependence nor have gone to AA or any other support group.        Past Medical/Surgical History      The patient s primary care provider is as listed in the medical record.    Allergies are listed in the medical record.       Prior hospitalization:  Past Surgical History:   Procedure Laterality Date     ARTHROPLASTY KNEE Right 10/12/2018    Procedure: ARTHROPLASTY KNEE;  Right Total Knee Arthroplasty;  Surgeon: Jeb Peralta MD;  Location: [facility]     COLONOSCOPY N/A 12/10/2015    Procedure: COMBINED COLONOSCOPY, SINGLE OR MULTIPLE BIOPSY/POLYPECTOMY BY BIOPSY;  Surgeon: Jyoti Figueroa MD;  Location: [facility]     JOINT REPLACEMENT, " HIP RT/LT  10/07    Joint Replacement Hip LT     JOINT REPLACEMTN, KNEE RT/LT  8/2011    Joint Replacement knee /LT, [facility]     LAPAROSCOPIC HERNIORRHAPHY INGUINAL BILATERAL Bilateral 4/24/2018    Procedure: LAPAROSCOPIC HERNIORRHAPHY INGUINAL BILATERAL;  Laparoscopic bilateral inguinal hernia repair;  Surgeon: Josemanuel Resendiz MD;  Location: [facility]     SURGICAL HISTORY OF -   12/1/1999    Umbilical Herniorrhaphy with mesh     SURGICAL HISTORY OF -  Left 11/2017    Thoracotomy and drainage of empyema        The patient reports no history of head injury.   The patient reports no history of loss of consciousness.   The patient reports no history of seizures.   The patient reports no history of of other neurological concerns.        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)     Chronic atrial fibrillation     Hyperplastic Polyp     Right knee DJD     Peripheral neuropathy     Sleep disturbance     Hematoma of skin     Traumatic ecchymosis of buttock, initial encounter     Current Outpatient Medications Ordered in Epic   Medication Sig Dispense Refill     acetaminophen (TYLENOL) 500 MG tablet Take 1,000 mg by mouth every 8 hours as needed for mild pain       buPROPion (WELLBUTRIN SR) 150 MG 12 hr tablet Take 1 tablet (150 mg) by mouth 2 times daily 180 tablet 1     busPIRone (BUSPAR) 7.5 MG tablet Take 1 tab by mouth daily for one week, then take 1 tab by mouth twice daily. 60 tablet 1     doxazosin (CARDURA) 4 MG tablet Take 1 tablet (4 mg) by mouth daily 90 tablet 2      escitalopram (LEXAPRO) 10 MG tablet Take 1 tablet (10 mg) by mouth daily 90 tablet 1     finasteride (PROSCAR) 5 MG tablet Take 1 tablet (5 mg) by mouth daily 90 tablet 3     gabapentin (NEURONTIN) 300 MG capsule Take 2 capsules (600 mg) by mouth 2 times daily 360 capsule 3     lamoTRIgine (LAMICTAL) 100 MG tablet TAKE 1 TABLET TWICE A  tablet 3     metoprolol tartrate (LOPRESSOR) 25 MG tablet Take 1 tablet (25 mg) by mouth 2 times daily 180 tablet 3     XARELTO 20 MG TABS tablet TAKE 1 TABLET DAILY WITH   DINNER 90 tablet 3     zolpidem (AMBIEN) 10 MG tablet TAKE 1 TABLET BY MOUTH AT BEDTIME AS NEEDED for SLEEP 30 tablet 0     STATIN NOT PRESCRIBED, INTENTIONAL, Please choose reason not prescribed, below (Patient not taking: Reported on 12/10/2019)       No current [facility]-ordered facility-administered medications on file.            Social History       The patient was raised in [place]. Grew up with 2 parents and 2 significant older brothers (+20 yrs with middle brother), pt is the youngest of 3.  Reports he almost grew up as an only child as age difference were significant with 2 older brothers, reports growing up felt safe.  Trauma history includes loss of son by MVA in .   The patient is  and has 2 children (1  in , 1 living child in 30's).    The patient s social support system includes spouse.  The patient lives with spouse and feels safe.    The patient completed high school and did not participate in special education classes.   The patient is currently unemployed, retired in .  The patient has not had involvement with the legal system.   The patient has not served in the .   Access to Gun: yes, owns a gun  The patient reports the following spiritual and/or cultural history related to care: Religious.  Finances are comfortable and basic needs are met.      Family History      Psychiatric:  None  Chemical Dependency:  None  Suicide:  None  Hereditary Major Medical:  DM:  "B, Stroke: M (80's), Cancer: M (breast >40)    Family History   Problem Relation Age of Onset     Breast Cancer Mother      Neurologic Disorder Mother         parkinsons     Respiratory Father         emphyzema     Diabetes Brother           Allergy   Bactrim [sulfamethoxazole w/trimethoprim]     Current Medications     Current Outpatient Medications   Medication Sig Dispense Refill     acetaminophen (TYLENOL) 500 MG tablet Take 1,000 mg by mouth every 8 hours as needed for mild pain       buPROPion (WELLBUTRIN SR) 150 MG 12 hr tablet Take 1 tablet (150 mg) by mouth 2 times daily 180 tablet 1     busPIRone (BUSPAR) 7.5 MG tablet Take 1 tab by mouth daily for one week, then take 1 tab by mouth twice daily. 60 tablet 1     doxazosin (CARDURA) 4 MG tablet Take 1 tablet (4 mg) by mouth daily 90 tablet 2     escitalopram (LEXAPRO) 10 MG tablet Take 1 tablet (10 mg) by mouth daily 90 tablet 1     finasteride (PROSCAR) 5 MG tablet Take 1 tablet (5 mg) by mouth daily 90 tablet 3     gabapentin (NEURONTIN) 300 MG capsule Take 2 capsules (600 mg) by mouth 2 times daily 360 capsule 3     lamoTRIgine (LAMICTAL) 100 MG tablet TAKE 1 TABLET TWICE A  tablet 3     metoprolol tartrate (LOPRESSOR) 25 MG tablet Take 1 tablet (25 mg) by mouth 2 times daily 180 tablet 3     XARELTO 20 MG TABS tablet TAKE 1 TABLET DAILY WITH   DINNER 90 tablet 3     zolpidem (AMBIEN) 10 MG tablet TAKE 1 TABLET BY MOUTH AT BEDTIME AS NEEDED for SLEEP 30 tablet 0     STATIN NOT PRESCRIBED, INTENTIONAL, Please choose reason not prescribed, below (Patient not taking: Reported on 12/10/2019)          Mental Status Exam                                                                                   9, 14 cog        Alertness: alert  and oriented  Appearance:  Casually dressed and Adequately groomed  Behavior/Demeanor: cooperative and calm, with fair  eye contact   Speech: regular rate and rhythm  Mood :  \"okay now, but anxious if I need to " "socialize\"  Affect: slightly blunted; was not congruent to mood; was not congruent to content  Thought Process (Associations):  Linear and Goal directed  Thought process (Rate):  Normal  Thought content:  no overt psychosis, denies suicidal ideation, intent or thoughts and patient does not appear to be responding to internal stimuli  Perception:  Reports none;  Denies auditory hallucinations, visual hallucinations, depersonalization and derealization  Attention/Concentration:  Fair  Memory:  Immediate recall intact and Short-term memory intact  Language: intact  Fund of Knowledge/Intelligence:  Average  Abstraction:  Normal  Insight:  Fair  Judgment:  Fair  Cognition: (6) does  appear grossly intact; formal cognitive testing was not done    Physical Exam     Motor activity/EPS:  Normal  Gait:  Normal  Psychomotor: normal or unremarkable    Labs and Results      Pertinent findings on review include: Review of records with relevant information reported in the HPI.  Reviewed pt's past medical record and obtained collateral information.    [place] PRESCRIPTION MONITORING PROGRAM [] was checked today:  indicates Ambien 3/22/2020, 2/18/2020, 2/3/2020, 1/10/2020, 12/10/2019, Gabapentin 3/19/2020, 12/10/2019.    PHQ9 Today:  N/A  PHQ 11/15/2018 2/26/2019 12/10/2019   PHQ-9 Total Score 9 3 4   Q9: Thoughts of better off dead/self-harm past 2 weeks Not at all Not at all Not at all       CAROLYNN 7 Today: N/A    Recent Labs   Lab Test 09/04/19  1020 09/14/18  1629 05/07/18  1325   CR 0.81 0.93 1.07   GFRESTIMATED 86 79 67     Recent Labs   Lab Test 01/22/18  1354 12/20/17  1315   AST 15 24   ALT 32 17   ALKPHOS 60 66       PSYCHOTROPIC DRUG INTERACTIONS:   Buspar---Gabapentin: Concurrent use of GABAPENTIN and CNS DEPRESSANTS may result in respiratory depression.    Buspar---Lexapro: Concurrent use of ESCITALOPRAM and SEROTONERGIC DRUGS may result in increased risk of serotonin syndrome.   Wellbutrin---Lexapro: Concurrent use of " BUPROPION and AGENTS LOWERING SEIZURE THRESHOLD may result in lower seizure threshold.   LExapro---Xarelto: Concurrent use of RIVAROXABAN and SEROTONIN NOREPINEPHRINE REUPTAKE INHIBITORS AND SSRIS may result in increased risk of bleeding  Ambien---Buspar---Gabapentin: Concurrent use of ZOLPIDEM and SEDATIVES OR HYPNOTICS may result in an increase in CNS depressant effects.   Doxazosin---Metoprolol: Concurrent use of ALPHA-1 ADRENERGIC BLOCKERS and BETA-ADRENERGIC BLOCKERS may result in an exaggerated hypotensive response to the first dose of the alpha blocker.   Metoprolol---Wellbutrin: Concurrent use of METOPROLOL and CYP2D6 INHIBITORS may result in increased metoprolol exposure.   Ambien---Wellbutrin: Concurrent use of ZOLPIDEM and BUPROPION may result in an increased risk of hallucinations.     MANAGEMENT:  Monitoring for adverse effects, routine vitals and patient is aware of risks    Impression/Assessment      Gavin Edmond is a 75 year old adult  who presents for diagnostic assessment and establishment of care.  Pt appears somewhat depressed, but not anxious today, denies SI, SIB or HI.  Pt notes anxiety is only in context of pressure of needing to socialize.  Pt did not have any bilateral hand twitching or shaking during the appointment today and pt's gait appears to be stable when he was walking towards his spouse during the appointment.  However, pt is on numerous medications that could be increasing fall such as Gabapentin, Ambien and 2 BP medications could be causing hypotensive symptom.  Pt's BP is WNL currently, but maybe need to monitor closely by PCP.  Pt denied, but previous record indicates family hx of Parkinson's with mother, if symptoms can be observed, pt may benefit from neurological consult.    Also polypharmacy could be causing possible cognitive worsening though this may be also age related memory change.  Concurrent use of 2 anti seizure medications could be also contributing to memory  change.  Pt reports PCP is tapering down/off of Gabapentin as pt no longer is having neuropathy.  Pt is also open for tapering off Lamictal as pt is unsure why he is taking this mediation.        Pt also reported emotional numbness x couple years, this may be due to depression not well controlled or due to side effects.  Concerning his age, it will be recommended to avoid polypharmacy.  However, until his anxiety is better controlled, will not taper off/down any other medication.  Since pt was started on Buspar 1 month ago, will optimize Buspar first and will increase Buspar to 10 mg BID at this time.  Buspar increase may augment Lexapro to help with both anxiety and depression.  However, pt was instructed to monitor any sign of bleeding as this could cause increased risk of bleeding.    Also instructed pt to change administration time of Wellbutrin SR as he is currently taking the medication in AM and 6pm.  Pt was encourage to take 2nd dose no later than noon to avoid sleep difficulties.  If this change could improve his sleep, pt may not need Ambien, thus Ambien was changed to 5-10 mg HS PRN.  Pt was instructed to try Ambien 5 mg if needed and if this is not sufficient, can try additional 5 mg.  Discussed Ambien could be causing increased fall risk.      In the future, it may be beneficial to explore if Wellbutrin could be exacerbating social anxiety.  Pt has not tried SNRI or other serotonin specific reuptake inhibitor except Lexapro, thus may be worth trying while his BP appears to be WNL currently.  It may be also helpful to talk to PCP to switch Metoprolol to Propranolol if pt has hand tremor which was not witnessed today.    It was not discussed today, but therapy may be also helpful to manage anxiety.      Diagnosis                                                                   social anxiety disorder  MDD    Treatment Recommendation & Plan       Medication Ordered/Consults/Labs/tests Ordered:      Medication:   -Change Bupropion (Wellbutrin SR) administration time to 9am and no later than noon to avoid difficulties with sleep  -May change Zolpidem (Ambien) to 5-10 mg at bedtime as needed for sleep.  As administration time of Bupropion changes, you may find it easier to sleep without Zolpidem.  You may want to try 5 mg first and if this is not helping you, you may add another 5 mg for sleep   -Increase Buspar to 10 mg 2 times a day for anxiety.  Monitor any sign of bleeding such as bruising.  -Continue all other medications for now  OTC Recommendations: none  Lab Orders:  none  Referrals: none  Release of Information: none  Future Treatment Considerations: per symptoms.   Return for Follow Up: in 3 weeks    -Discussed safety plan for suicidal thoughts  -Discussed plan for suicidality  -Discussed available emergency services  -Patient agrees with the treatment plan  -Encouraged to continue outpatient therapy to gain more coping mechanism for stress.    Treatment Risk Statement: Discussed with the patient my impressions, as well as recommended studies. I educated patient on the differential diagnosis and prognosis. I discussed with the patient the risks and benefits of medications versus no interventions, including efficacy, dose, possible side effects and length of treatment and the importance of medication compliance.  The patient understands the risks, benefits, adverse effects and alternatives. Agrees to treatment with the capacity to do so. No medical contraindications to treatment. The patient also understands the risks of using street drugs or alcohol.    CRISIS NUMBERS:   Provided routinely in AVS.      Rylee Kyle CNP,  5/7/2020

## 2020-05-07 NOTE — PATIENT INSTRUCTIONS
-Change Bupropion (Wellbutrin SR) administration time to 9am and no later than noon to avoid difficulties with sleep  -May change Zolpidem (Ambien) to 5-10 mg at bedtime as needed for sleep.  As administration time of Bupropion changes, you may find it easier to sleep without Zolpidem.  You may want to try 5 mg first and if this is not helping you, you may add another 5 mg for sleep   -Increase Buspar to 10 mg 2 times a day for anxiety.  Monitor any sign of bleeding such as bruising.  -Continue all other medications for now    Your next appointment is scheduled on 5/28 (Thu) at 10:30am.    To access your telemedicine visit:     Open a web browser, like [employer], and type [email or web address]     You will see a box asking you to check in to let Rylee Saroj know that you are here.     Type in your name and press Check In. That will let Rylee see you in the virtual waiting room. At your scheduled appointment time, your provider will initiate the visit and connect you.     When your visit is done, you can simply close the browser window.        Please Note:  Ideally, you will connect from a desktop, laptop, or tablet with a WiFi connection. Your computer/tablet must have a camera and microphone. You can use a cell phone, if it has a camera, and if you can connect to WiFi. However, if you connect your phone over a cellular network, it is of lower quality and less reliable      Thank you for coming to the PSYCHIATRY CLINIC.    Lab Testing:  If you had lab testing today and your results are reassuring or normal they will be mailed to you or sent through TRA within 7 days.   If the lab tests need quick action we will call you with the results.  The phone number we will call with results is # [phone] (home) . If this is not the best number please call our clinic and change the number.    Medication Refills:  If you need any refills please call your pharmacy and they will contact us. Our fax number for refills  is [phone]. Please allow three business for refill processing.   If you need to  your refill at a new pharmacy, please contact the new pharmacy directly. The new pharmacy will help you get your medications transferred.     Scheduling:  If you have any concerns about today's visit or wish to schedule another appointment please call our office during normal business hours [phone] (8-5:00 M-F)    Contact Us:  Please call [phone] during business hours (8-5:00 M-F).  If after clinic hours, or on the weekend, please call [phone].    Financial Assistance: [phone]  [phone]  [place] Billing [facility], [place]: [phone]  [phone]  Medical Records: [phone]    MENTAL HEALTH CRISIS NUMBERS:  [facility]:   [facility]: [phone]  Crisis Residence [facility] Residence: [phone]   Walk-In Counseling Center [facility]: [phone]   COPE  [place] Mobile Team: [phone] (adults) [phone] (child)     [facility]:   [facility]: [phone]   Walk-in counseling [facility]: [phone]   Walk-in counseling [facility]: [phone]   Crisis Residence [facility] Residence: [phone]   Urgent Care Adult Mental Health: [phone] Mobile team AND  crisis line    Other Crisis Numbers:   National Suicide Prevention Lifeline: 365-549-GCRC (988-038-1028)  CRISIS TEXT LINE: Text to 238694 for any crisis ; OR www.crisistextline.org   Poison Control Center: 1-998.521.8485  CHILD: [facility] needs assessment team: [phone]   Trans Lifeline: 1-552.867.8753  [facility] Lifeline: 1-857.445.9396    For a medical emergency please call 911 or go to the nearest ER.         _____________________________________________    Again thank you for choosing PSYCHIATRY CLINIC and please let us know how we can best partner with you to improve you and your  family's health.    You may be receiving a survey regarding this appointment. We would love to have your feedback, both positive and negative. The survey is done by an external company, so your answers are anonymous.

## 2020-05-28 ENCOUNTER — VIRTUAL VISIT (OUTPATIENT)
Dept: PSYCHIATRY | Facility: CLINIC | Age: 75
End: 2020-05-28
Attending: NURSE PRACTITIONER
Payer: COMMERCIAL

## 2020-05-28 DIAGNOSIS — F40.10 SOCIAL ANXIETY DISORDER: Primary | ICD-10-CM

## 2020-05-28 DIAGNOSIS — F51.01 PRIMARY INSOMNIA: ICD-10-CM

## 2020-05-28 DIAGNOSIS — F33.41 RECURRENT MAJOR DEPRESSIVE DISORDER, IN PARTIAL REMISSION (H): ICD-10-CM

## 2020-05-28 RX ORDER — BUSPIRONE HYDROCHLORIDE 15 MG/1
15 TABLET ORAL 2 TIMES DAILY
Qty: 60 TABLET | Refills: 1 | Status: SHIPPED | OUTPATIENT
Start: 2020-05-28 | End: 2020-07-02 | Stop reason: DRUGHIGH

## 2020-05-28 RX ORDER — ZOLPIDEM TARTRATE 10 MG/1
5-10 TABLET ORAL
Qty: 30 TABLET | Refills: 1 | Status: SHIPPED | OUTPATIENT
Start: 2020-05-28 | End: 2020-07-13

## 2020-05-28 ASSESSMENT — PAIN SCALES - GENERAL: PAINLEVEL: NO PAIN (0)

## 2020-05-28 NOTE — PROGRESS NOTES
"VIDEO VISIT  Josemanuel Edmond is a 75 year old patient who is being evaluated via a billable video visit.      The patient has been notified of following:   \"We have found that certain health care needs can be provided without the need for an in-person physical exam. This service lets us provide the care you need with a video conversation. If a prescription is necessary we can send it directly to your pharmacy. If lab work is needed we can place an order for that and you can then stop by our lab to have the test done at a later time. Insurers are generally covering virtual visits as they would in-office visits so billing should not be different than normal.  If for some reason you do get billed incorrectly, you should contact the billing office to correct it and that number is in the AVS .    Patient has given verbal consent for video visit?: Yes   How would you like to obtain your AVS?: [employer] SmartPhrase [PsychAVS] has been placed in 'Patient Instructions': Yes      Video- Visit Details  Type of service:  video visit for medication management      Start Time:  1028         End Time: 1047    Telemedicine Visit: The patient's condition can be safely assessed and treated via synchronous audio and visual telemedicine encounter.      Reason for Telemedicine Visit: Due to COVID 19 pandemic, clinic switching all appointments to telemedicine     Originating Site (Patient Location): Patient's home    Distant Site (Provider Location): Provider Remote Setting    Consent:  The patient/guardian has verbally consented to: the potential risks and benefits of telemedicine (video visit) versus in person care; bill my insurance or make self-payment for services provided; and responsibility for payment of non-covered services.     Mode of Communication:  Video Conference via Doxy.    As the provider I attest to compliance with applicable laws and regulations related to telemedicine.    Psychiatry Clinic Progress Note          "                                                         Patient Name: Josemanuel Edmond  YOB: 1945  MRN: [id number]  Date of Service:  5/28/2020  Last Seen:5/7/2020    Josemanuel Edmond is a 75 year old male who uses the name Gavin and pronoun pat.        Josemanuel Edmond is a 75 year old year old adult who presents for ongoing psychiatric care.  Josemanuel Edmond was last seen on 5/7/2020.     At that time,     Medication Ordered/Consults/Labs/tests Ordered:      Medication:   -Change Bupropion (Wellbutrin SR) administration time to 9am and no later than noon to avoid difficulties with sleep  -May change Zolpidem (Ambien) to 5-10 mg at bedtime as needed for sleep.  As administration time of Bupropion changes, you may find it easier to sleep without Zolpidem.  You may want to try 5 mg first and if this is not helping you, you may add another 5 mg for sleep   -Increase Buspar to 10 mg 2 times a day for anxiety.  Monitor any sign of bleeding such as bruising.  -Continue all other medications for now  OTC Recommendations: none  Lab Orders:  none  Referrals: none  Release of Information: none  Future Treatment Considerations: per symptoms.   Return for Follow Up: in 3 weeks      Pertinent Background:  Diagnoses include social anxiety disorder and MDD.  Psych critical item history includes [no critical items].     Previous Psychiatric Meds: Wellbutrin XL, Xanax, Klonopin, Valium, Ativan, Remeron, Nortriptyline and Vistaril      Interim History                                                                                                        4, 4     Since the last visit, reports sleeping better, continues to take Ambien 10 mg most of the nights.  Notes no significant changes in anxiety initially, but later reported slight improvement of anxiety in AM.  Taking Buspar AM and HS.  Denies SI, SIB or HI.    Denies any symptoms suggestive of hypomania or psychosis.    Current Suicidality/Hx of Suicide Attempts:  Denies both  CoCominent Medical concerns: occasional fall and random shaking/twitching of bilateral hands    Medication Side Effects: The patient denies all medication side effects.      Medical Review of Systems     Apart from the symptoms mentioned int he HPI, the 14 point review of systems, including constitutional, HEENT, cardiovascular, respiratory, gastrointestinal, genitourinary, musculoskeletal, integumentary, endocrine, neurological, hematologic and allergic is entirely negative except occasional fall and random shaking/twitching of bilateral hands.      Substance Use   Pt has been staying substance free since last seen.     Medical / Surgical History                                                                                                                  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)     Chronic atrial fibrillation     Hyperplastic Polyp     Right knee DJD     Peripheral neuropathy     Sleep disturbance     Hematoma of skin     Traumatic ecchymosis of buttock, initial encounter       Past Surgical History:   Procedure Laterality Date     ARTHROPLASTY KNEE Right 10/12/2018    Procedure: ARTHROPLASTY KNEE;  Right Total Knee Arthroplasty;  Surgeon: Jeb Peralta MD;  Location: [facility] OR     COLONOSCOPY N/A 12/10/2015    Procedure: COMBINED COLONOSCOPY, SINGLE OR MULTIPLE BIOPSY/POLYPECTOMY BY BIOPSY;  Surgeon: Jyoti Figueroa MD;  Location: [facility] GI     JOINT REPLACEMENT, HIP RT/LT  10/07    Joint Replacement Hip LT     JOINT  REPLACEMTN, KNEE RT/LT  8/2011    Joint Replacement knee /LT, [facility]     LAPAROSCOPIC HERNIORRHAPHY INGUINAL BILATERAL Bilateral 4/24/2018    Procedure: LAPAROSCOPIC HERNIORRHAPHY INGUINAL BILATERAL;  Laparoscopic bilateral inguinal hernia repair;  Surgeon: Josemanuel Resendiz MD;  Location: [facility] OR     SURGICAL HISTORY OF -   12/1/1999    Umbilical Herniorrhaphy with mesh     SURGICAL HISTORY OF -  Left 11/2017    Thoracotomy and drainage of empyema        Social/ Family History                                  [per patient report]                                 1ea,1ea     Living arrangements: lives with spouse and feels safe  Social Support: spouse  Access to gun: owns a gun  Retired in 2008.    Allergy                                Bactrim [sulfamethoxazole w/trimethoprim]    Current Medications                                                                                                       Current Outpatient Medications   Medication Sig Dispense Refill     acetaminophen (TYLENOL) 500 MG tablet Take 1,000 mg by mouth every 8 hours as needed for mild pain       buPROPion (WELLBUTRIN SR) 150 MG 12 hr tablet Take 1 tablet (150 mg) by mouth 2 times daily 180 tablet 1     busPIRone (BUSPAR) 10 MG tablet Take 1 tablet (10 mg) by mouth 2 times daily 60 tablet 1     doxazosin (CARDURA) 4 MG tablet Take 1 tablet (4 mg) by mouth daily 90 tablet 2     escitalopram (LEXAPRO) 10 MG tablet Take 1 tablet (10 mg) by mouth daily 90 tablet 1     finasteride (PROSCAR) 5 MG tablet Take 1 tablet (5 mg) by mouth daily 90 tablet 3     gabapentin (NEURONTIN) 300 MG capsule Take 2 capsules (600 mg) by mouth 2 times daily 360 capsule 3     lamoTRIgine (LAMICTAL) 100 MG tablet TAKE 1 TABLET TWICE A  tablet 3     metoprolol tartrate (LOPRESSOR) 25 MG tablet Take 1 tablet (25 mg) by mouth 2 times daily 180 tablet 3     XARELTO 20 MG TABS tablet TAKE 1 TABLET DAILY WITH   DINNER 90 tablet 3     zolpidem (AMBIEN) 10 MG tablet Take  "0.5-1 tablets (5-10 mg) by mouth nightly as needed for sleep 30 tablet 0     STATIN NOT PRESCRIBED, INTENTIONAL, Please choose reason not prescribed, below (Patient not taking: Reported on 12/10/2019)            Mental Status Exam                                                                                   9, 14 cog        Alertness: alert  and oriented  Appearance:  Casually dressed and Well groomed  Behavior/Demeanor: cooperative, pleasant and calm, with good  eye contact   Speech: regular rate and rhythm  Mood :  \"not much change, maybe little less anxious\"  Affect: slightly subdued, but not anxious, mostly full range; was not congruent to mood; was not congruent to content  Thought Process (Associations):  Linear and Goal directed  Thought process (Rate):  Normal  Thought content:  no overt psychosis, denies suicidal ideation, intent or thoughts and patient does not appear to be responding to internal stimuli  Perception:  Reports none;  Denies auditory hallucinations and visual hallucinations  Attention/Concentration:  Fair  Memory:  Immediate recall intact  Language: intact  Fund of Knowledge/Intelligence:  Average  Abstraction:  Normal  Insight:  Fair  Judgment:  Fair  Cognition: (6) does  appear grossly intact; formal cognitive testing was not done    Physical Exam     Motor activity/EPS:  Normal  Gait:  Normal  Psychomotor: normal or unremarkable    Labs and Results      Pertinent findings on review include: Review of records with relevant information reported in the HPI.  Reviewed pt's past medical record and obtained collateral information.      [place] PRESCRIPTION MONITORING PROGRAM [] was checked today:  indicates no refills since last seen.    PHQ9 Today:  N/A  PHQ 11/15/2018 2/26/2019 12/10/2019   PHQ-9 Total Score 9 3 4   Q9: Thoughts of better off dead/self-harm past 2 weeks Not at all Not at all Not at all       CAROLYNN 7 Today: N/A  CAROLYNN-7 SCORE 11/15/2018 2/26/2019 12/10/2019   Total Score - - - "   Total Score 6 5 2       Recent Labs   Lab Test 09/04/19  1020 09/14/18  1629 05/07/18  1325   CR 0.81 0.93 1.07   GFRESTIMATED 86 79 67     Recent Labs   Lab Test 01/22/18  1354 12/20/17  1315   AST 15 24   ALT 32 17   ALKPHOS 60 66     PSYCHOTROPIC DRUG INTERACTIONS:   Buspar---Gabapentin: Concurrent use of GABAPENTIN and CNS DEPRESSANTS may result in respiratory depression.    Buspar---Lexapro: Concurrent use of ESCITALOPRAM and SEROTONERGIC DRUGS may result in increased risk of serotonin syndrome.   Wellbutrin---Lexapro: Concurrent use of BUPROPION and AGENTS LOWERING SEIZURE THRESHOLD may result in lower seizure threshold.   LExapro---Xarelto: Concurrent use of RIVAROXABAN and SEROTONIN NOREPINEPHRINE REUPTAKE INHIBITORS AND SSRIS may result in increased risk of bleeding  Ambien---Buspar---Gabapentin: Concurrent use of ZOLPIDEM and SEDATIVES OR HYPNOTICS may result in an increase in CNS depressant effects.   Doxazosin---Metoprolol: Concurrent use of ALPHA-1 ADRENERGIC BLOCKERS and BETA-ADRENERGIC BLOCKERS may result in an exaggerated hypotensive response to the first dose of the alpha blocker.   Metoprolol---Wellbutrin: Concurrent use of METOPROLOL and CYP2D6 INHIBITORS may result in increased metoprolol exposure.   Ambien---Wellbutrin: Concurrent use of ZOLPIDEM and BUPROPION may result in an increased risk of hallucinations.      MANAGEMENT:  Monitoring for adverse effects, routine vitals and patient is aware of risks       Impression/Assessment      Gavin Edmond is a 75 year old adult  who presents for med management follow up.  Pt appears to be better mood than since last seen, more pleasant, does not appear anxious, denies SI, SIB or HI.  Pt noted no significant changes, maybe slight improvement in anxiety since last seen and sleep may be slightly better.  Since pt had some improvement in anxiety, will increase Buspar to 15 mg BID and change administration time to AM and afternoon as his anxiety appear  to exacerbate in AM to early afternoon.  Will continue all other mediations at this time, but to prevent polypharmacy that could be clouding his cognition, may consider tapering of Lamictal while Gabapentin is tapered down/off by PCP.  Also, Wellbutrin may be exacerbating anxiety and may be beneficial to cross taper Wellbutrin to SNRI or other SSRIs as he has not tried anything except Lexapro.      Diagnosis                                                                   social anxiety disorder  MDD    Treatment Recommendation & Plan       Medication Ordered/Consults/Labs/tests Ordered:     Medication:   -Increase Buspar to 15 mg 2 times a day (AM and afternoon)  -Continue all other medications  OTC Recommendations: none  Lab Orders:  none  Referrals: none  Release of Information: none  Future Treatment Considerations: Per symptoms. May consider taper off Lamictal (pt is not taking this for seizure), Wellbutrin, try another serotonin specific reuptake inhibitor or SNRI?  Return for Follow Up: in 3-4 weeks, but this writer does not have any opening until 6 weeks, pt will be placed on waitlist for earlier follow up.    -Discussed safety plan for suicidal thoughts  -Discussed plan for suicidality  -Discussed available emergency services  -Patient agrees with the treatment plan  -Encouraged to continue outpatient therapy to gain more coping mechanism for stress.      Treatment Risk Statement: Discussed with the patient my impressions, as well as recommended studies. I educated patient on the differential diagnosis and prognosis. I discussed with the patient the risks and benefits of medications versus no interventions, including efficacy, dose, possible side effects and length of treatment and the importance of medication compliance.  The patient understands the risks, benefits, adverse effects and alternatives. Agrees to treatment with the capacity to do so. No medical contraindications to treatment. The patient also  understands the risks of using street drugs or alcohol.   CRISIS NUMBERS:   Provided routinely in AVS.      Rylee Kyle, MONICA,  5/28/2020

## 2020-05-28 NOTE — PATIENT INSTRUCTIONS
-Increase Buspar to 15 mg 2 times a day (AM and afternoon)  -Continue all other medications    Your next appointment is scheduled on 7/2 (Thu) at 10:30am.    To access your telemedicine visit:     Open a web browser, like [employer] and type [email or web address]     You will see a box asking you to check in to let Rylee Kyle know that you are here.     Type in your name and press Check In. That will let Rylee see you in the virtual waiting room. At your scheduled appointment time, your provider will initiate the visit and connect you.     When your visit is done, you can simply close the browser window.        Please Note:  Ideally, you will connect from a desktop, laptop, or tablet with a WiFi connection. Your computer/tablet must have a camera and microphone. You can use a cell phone, if it has a camera, and if you can connect to WiFi. However, if you connect your phone over a cellular network, it is of lower quality and less reliable.    Thank you for coming to the PSYCHIATRY CLINIC.    Lab Testing:  **If you had lab testing today and your results are reassuring or normal they will be mailed to you or sent through Fotolia within 7 days.   **If the lab tests need quick action we will call you with the results.  The phone number we will call with results is # [phone] (home) . If this is not the best number please call our clinic and change the number.    Medication Refills:  If you need any refills please call your pharmacy and they will contact us. Please allow three business for refill processing.   If you need to  your refill at a new pharmacy, please contact the new pharmacy directly. The new pharmacy will help you get your medications transferred.     Scheduling:  If you have any concerns about today's visit or wish to schedule another appointment please call our office during normal business hours [phone] (8-5:00 M-F)    Contact Us:  Please call [phone] during business hours  (8-5:00 M-F).  If after clinic hours, or on the weekend, please call  [phone].      MENTAL HEALTH CRISIS NUMBERS:  [facility]:   [facility] - [phone]   Crisis Residence [facility] [place] [facility] - [phone]   Walk-In Counseling Center [facility] - [phone]   COPE 24/7 [place] Mobile Team for Adults - [[phone]]; Child - [[phone]]     Crisis Connection - [phone]     [facility]:   [facility] - [phone]   Walk-in counseling [facility] - [phone]   Walk-in counseling [facility] - [phone]   Crisis Residence [facility] - [phone]   Urgent Care Adult Mental Health:   --Drop-in, 24/7 crisis line, and hospitals Mobile Team [[phone]]    CRISIS TEXT LINE: Text 741-743 from anywhere, anytime, any crisis 24/7;    OR SEE www.crisistextline.org     Poison Control Center - [phone]    CHILD: [facility] needs assessment team - [phone]     [facility] - [phone]; or [facility] - [phone]    If you have a medical emergency please call 911or go to the nearest ER.                    _____________________________________________    Again thank you for choosing PSYCHIATRY CLINIC and please let us know how we can best partner with you to improve you and your family's health.  You may be receiving a survey in the mail regarding this appointment. We would love to have your feedback, both positive and negative, so please fill out the survey and return it using the provided envolpe. The survey is done by an external company, so your answers are anonymous.

## 2020-06-02 ENCOUNTER — TELEPHONE (OUTPATIENT)
Dept: FAMILY MEDICINE | Facility: CLINIC | Age: 75
End: 2020-06-02

## 2020-06-02 DIAGNOSIS — F51.01 PRIMARY INSOMNIA: ICD-10-CM

## 2020-06-02 RX ORDER — ZOLPIDEM TARTRATE 10 MG/1
5-10 TABLET ORAL
Qty: 90 TABLET | Refills: 1 | Status: CANCELLED | OUTPATIENT
Start: 2020-06-02

## 2020-06-02 NOTE — TELEPHONE ENCOUNTER
Reason for Call:  Medication or medication refill:    Do you use a [facility]?  Name of the pharmacy and phone number for the current request:  [facility] [phone]    Name of the medication requested: Zolpidem - Pt calling to ask that 90-day Rx be sent to [facility].  No need to call patient back, unless there are questions or problems.      Zolpidem  Last Written Prescription Date:  5/28/20 - Local Pharmacy  Last Fill Quantity: 30,  # refills: 1   Last office visit: 12/10/2019 with prescribing provider:     Future Office Visit:      Other request:     Can we leave a detailed message on this number? YES    Phone number patient can be reached at: Home number on file [phone] (home)    Best Time: any    Call taken on 6/2/2020 at 11:01 AM by Paola Moscoso

## 2020-06-24 DIAGNOSIS — F40.10 SOCIAL ANXIETY DISORDER: ICD-10-CM

## 2020-06-25 DIAGNOSIS — F51.01 PRIMARY INSOMNIA: ICD-10-CM

## 2020-06-25 NOTE — TELEPHONE ENCOUNTER
Last Written Prescription Date:  Ambien 5/28/20  Last Fill Quantity: 30,  # refills: 1   Last office visit: 12/10/2019 with prescribing provider:  Lucina   Future Office Visit:          Incoming fax from [facility]. I see the last refill was sent to [facility] in [place]. [facility] is asking for a 90 day fill.

## 2020-06-26 RX ORDER — BUSPIRONE HYDROCHLORIDE 10 MG/1
10 TABLET ORAL 2 TIMES DAILY
Qty: 60 TABLET | Refills: 1 | OUTPATIENT
Start: 2020-06-26

## 2020-06-26 RX ORDER — ZOLPIDEM TARTRATE 10 MG/1
5-10 TABLET ORAL
Qty: 30 TABLET | Refills: 1 | OUTPATIENT
Start: 2020-06-26

## 2020-06-26 NOTE — TELEPHONE ENCOUNTER
This was prescribed by another provider on 5/28/20 with 1 refill and sent to local pharmacy.      Camilla Santos RN

## 2020-07-01 DIAGNOSIS — F40.10 SOCIAL ANXIETY DISORDER: ICD-10-CM

## 2020-07-02 ENCOUNTER — VIRTUAL VISIT (OUTPATIENT)
Dept: PSYCHIATRY | Facility: CLINIC | Age: 75
End: 2020-07-02
Attending: NURSE PRACTITIONER
Payer: COMMERCIAL

## 2020-07-02 DIAGNOSIS — F51.01 PRIMARY INSOMNIA: ICD-10-CM

## 2020-07-02 DIAGNOSIS — F40.10 SOCIAL ANXIETY DISORDER: Primary | ICD-10-CM

## 2020-07-02 DIAGNOSIS — F33.41 RECURRENT MAJOR DEPRESSIVE DISORDER, IN PARTIAL REMISSION (H): ICD-10-CM

## 2020-07-02 RX ORDER — BUSPIRONE HYDROCHLORIDE 30 MG/1
30 TABLET ORAL 2 TIMES DAILY
Qty: 60 TABLET | Refills: 1 | Status: SHIPPED | OUTPATIENT
Start: 2020-07-02 | End: 2020-07-31

## 2020-07-02 RX ORDER — BUSPIRONE HYDROCHLORIDE 15 MG/1
15 TABLET ORAL 2 TIMES DAILY
Qty: 60 TABLET | Refills: 1 | OUTPATIENT
Start: 2020-07-02

## 2020-07-02 ASSESSMENT — PAIN SCALES - GENERAL: PAINLEVEL: NO PAIN (0)

## 2020-07-02 NOTE — PROGRESS NOTES
"VIDEO VISIT  Josemanuel Edmond is a 75 year old patient who is being evaluated via a billable video visit.      The patient has been notified of following:   \"This video visit will be conducted via a call between you and your physician/provider. We have found that certain health care needs can be provided without the need for an in-person physical exam. This service lets us provide the care you need with a video conversation. If a prescription is necessary we can send it directly to your pharmacy. If lab work is needed we can place an order for that and you can then stop by our lab to have the test done at a later time. Insurers are generally covering virtual visits as they would in-office visits so billing should not be different than normal.  If for some reason you do get billed incorrectly, you should contact the billing office to correct it and that number is in the AVS .    Patient has given verbal consent for video visit?:  Yes    How would you like to obtain your AVS?:  Ezequiel    Patient would prefer that any video invitations be sent by: Send to e-mail at: [email or web address]      AVS SmartPhrase [PsychAVS] has been placed in 'Patient Instructions':  Yes     Start Time:  1033         End Time: 1052    Telemedicine Visit: The patient's condition can be safely assessed and treated via synchronous audio and visual telemedicine encounter.      Reason for Telemedicine Visit: Due to COVID 19 pandemic, clinic switching all appointments to telemedicine     Originating Site (Patient Location): Patient's home    Distant Site (Provider Location): Provider Remote Setting    Consent:  The patient/guardian has verbally consented to: the potential risks and benefits of telemedicine (video visit) versus in person care; bill my insurance or make self-payment for services provided; and responsibility for payment of non-covered services.     Mode of Communication:  Video Conference via Doxy.    As the provider I attest to " compliance with applicable laws and regulations related to telemedicine.    Psychiatry Clinic Progress Note                                                                  Patient Name: Josemanuel Edmond  YOB: 1945  MRN: [id number]  Date of Service:  7/2/2020  Last Seen:5/28/2020    Josemanuel Edmond is a 75 year old male who uses the name Gavin and pronoun pat.        Gavin Edmond is a 75 year old year old adult who presents for ongoing psychiatric care.  Gavin Edmond was last seen on 5/28/2020.     At that time,     Medication:   -Increase Buspar to 15 mg 2 times a day (AM and afternoon)  -Continue all other medications  OTC Recommendations: none  Lab Orders:  none  Referrals: none  Release of Information: none  Future Treatment Considerations: Per symptoms. May consider taper off Lamictal (pt is not taking this for seizure), Wellbutrin, try another serotonin specific reuptake inhibitor or SNRI?  Return for Follow Up: in 3-4 weeks, but this writer does not have any opening until 6 weeks, pt will be placed on waitlist for earlier follow up.    Pertinent Background:  Diagnoses include social anxiety disorder and MDD.  Psych critical item history includes [no critical items].      Previous Psychiatric Meds: Wellbutrin XL, Xanax, Klonopin, Valium, Ativan, Remeron, Nortriptyline and Vistaril      Interim History                                                                                                        4, 4     Since the last visit, reports no changes in anxiety.  Continues to occur when he needs to socialize with wife's friends or go out. Tried Ambien 5 mg, but reduced dose disturb sleep, thus has been taking 10 mg mostly.  Denies SI, SIB or HI.  Pt has not discussed PCP about Gabapentin taper off/down as he does not think this works well for pain.    Denies any symptoms suggestive of hypomania or psychosis.    Current Suicidality/Hx of Suicide Attempts: Denies both  CoCominent Medical  concerns: occasional fall and random shaking/twitching of bilateral hands     Medication Side Effects: The patient denies all medication side effects.      Medical Review of Systems     Apart from the symptoms mentioned int he HPI, the 14 point review of systems, including constitutional, HEENT, cardiovascular, respiratory, gastrointestinal, genitourinary, musculoskeletal, integumentary, endocrine, neurological, hematologic and allergic is entirely negative except occasional fall and random shaking/twitching of bilateral hands.      Substance Use   Pt has been staying substance free since last seen.      Medical / Surgical History                                                                                                                  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)     Chronic atrial fibrillation (H)     Hyperplastic Polyp     Right knee DJD     Peripheral neuropathy     Sleep disturbance     Hematoma of skin     Traumatic ecchymosis of buttock, initial encounter       Past Surgical History:   Procedure Laterality Date     ARTHROPLASTY KNEE Right 10/12/2018    Procedure: ARTHROPLASTY KNEE;  Right Total Knee Arthroplasty;  Surgeon: Jeb Peralta MD;  Location: [facility]     COLONOSCOPY N/A 12/10/2015    Procedure: COMBINED COLONOSCOPY, SINGLE OR MULTIPLE BIOPSY/POLYPECTOMY BY BIOPSY;  Surgeon: Jyoti Figueroa MD;  Location: [facility]     JOINT REPLACEMENT, HIP RT/LT  10/07    Joint Replacement Hip LT     JOINT REPLACEMTN, KNEE RT/LT  8/2011     Joint Replacement knee /LT, [facility]     LAPAROSCOPIC HERNIORRHAPHY INGUINAL BILATERAL Bilateral 4/24/2018    Procedure: LAPAROSCOPIC HERNIORRHAPHY INGUINAL BILATERAL;  Laparoscopic bilateral inguinal hernia repair;  Surgeon: Josemanuel Resendiz MD;  Location: [facility]     SURGICAL HISTORY OF -   12/1/1999    Umbilical Herniorrhaphy with mesh     SURGICAL HISTORY OF -  Left 11/2017    Thoracotomy and drainage of empyema        Social/ Family History                                  [per patient report]                                 1ea,1ea   Living arrangements: lives with spouse and feels safe  Social Support: spouse  Access to gun: owns a gun  Retired in 2008.      Allergy                                Bactrim [sulfamethoxazole w/trimethoprim]    Current Medications                                                                                                       Current Outpatient Medications   Medication Sig Dispense Refill     acetaminophen (TYLENOL) 500 MG tablet Take 1,000 mg by mouth every 8 hours as needed for mild pain       buPROPion (WELLBUTRIN SR) 150 MG 12 hr tablet Take 1 tablet (150 mg) by mouth 2 times daily 180 tablet 1     busPIRone (BUSPAR) 15 MG tablet Take 1 tablet (15 mg) by mouth 2 times daily 60 tablet 1     doxazosin (CARDURA) 4 MG tablet Take 1 tablet (4 mg) by mouth daily 90 tablet 2     escitalopram (LEXAPRO) 10 MG tablet Take 1 tablet (10 mg) by mouth daily 90 tablet 1     finasteride (PROSCAR) 5 MG tablet Take 1 tablet (5 mg) by mouth daily 90 tablet 3     gabapentin (NEURONTIN) 300 MG capsule Take 2 capsules (600 mg) by mouth 2 times daily 360 capsule 3     lamoTRIgine (LAMICTAL) 100 MG tablet TAKE 1 TABLET TWICE A  tablet 3     metoprolol tartrate (LOPRESSOR) 25 MG tablet Take 1 tablet (25 mg) by mouth 2 times daily 180 tablet 3     XARELTO 20 MG TABS tablet TAKE 1 TABLET DAILY WITH   DINNER 90 tablet 3     zolpidem (AMBIEN) 10 MG tablet Take 0.5-1 tablets (5-10 mg) by mouth  "nightly as needed for sleep 30 tablet 1     STATIN NOT PRESCRIBED, INTENTIONAL, Please choose reason not prescribed, below (Patient not taking: Reported on 12/10/2019)          Mental Status Exam                                                                                   9, 14 cog        Alertness: alert  and oriented  Appearance:  Casually dressed and Well groomed  Behavior/Demeanor: cooperative, pleasant and calm, with fair  eye contact   Speech: regular rate and rhythm  Mood :  \"okay\"  Affect: slightly subdued, but not anxious; was congruent to mood; was congruent to content  Thought Process (Associations):  Linear and Goal directed  Thought process (Rate):  Normal  Thought content:  no overt psychosis, denies suicidal ideation, intent or thoughts and patient does not appear to be responding to internal stimuli  Perception:  Reports none;  Denies auditory hallucinations, visual hallucinations, depersonalization and derealization  Attention/Concentration:  Fair  Memory:  Immediate recall intact and Short-term memory intact  Language: intact  Fund of Knowledge/Intelligence:  Average  Abstraction:  [facility]  Insight:  Fair  Judgment:  Fair  Cognition: (6) does  appear grossly intact; formal cognitive testing was not done    Physical Exam     Motor activity/EPS:  Normal  Gait:  Normal  Psychomotor: normal or unremarkable    Labs and Results      Pertinent findings on review include: Review of records with relevant information reported in the HPI.  Reviewed pt's past medical record and obtained collateral information.      [place] PRESCRIPTION MONITORING PROGRAM [] was checked today:  indicates Ambien 6/2/2020..    PHQ9 Today:  N/A  PHQ 11/15/2018 2/26/2019 12/10/2019   PHQ-9 Total Score 9 3 4   Q9: Thoughts of better off dead/self-harm past 2 weeks Not at all Not at all Not at all       CAROLYNN 7 Today: N/A  CAROLYNN-7 SCORE 11/15/2018 2/26/2019 12/10/2019   Total Score - - -   Total Score 6 5 2       Recent Labs   Lab " Test 09/04/19  1020 09/14/18  1629 05/07/18  1325   CR 0.81 0.93 1.07   GFRESTIMATED 86 79 67     Recent Labs   Lab Test 01/22/18  1354 12/20/17  1315   AST 15 24   ALT 32 17   ALKPHOS 60 66     PSYCHOTROPIC DRUG INTERACTIONS:   Buspar---Gabapentin: Concurrent use of GABAPENTIN and CNS DEPRESSANTS may result in respiratory depression.    Buspar---Lexapro: Concurrent use of ESCITALOPRAM and SEROTONERGIC DRUGS may result in increased risk of serotonin syndrome.   Wellbutrin---Lexapro: Concurrent use of BUPROPION and AGENTS LOWERING SEIZURE THRESHOLD may result in lower seizure threshold.   LExapro---Xarelto: Concurrent use of RIVAROXABAN and SEROTONIN NOREPINEPHRINE REUPTAKE INHIBITORS AND SSRIS may result in increased risk of bleeding  Ambien---Buspar---Gabapentin: Concurrent use of ZOLPIDEM and SEDATIVES OR HYPNOTICS may result in an increase in CNS depressant effects.   Doxazosin---Metoprolol: Concurrent use of ALPHA-1 ADRENERGIC BLOCKERS and BETA-ADRENERGIC BLOCKERS may result in an exaggerated hypotensive response to the first dose of the alpha blocker.   Metoprolol---Wellbutrin: Concurrent use of METOPROLOL and CYP2D6 INHIBITORS may result in increased metoprolol exposure.   Ambien---Wellbutrin: Concurrent use of ZOLPIDEM and BUPROPION may result in an increased risk of hallucinations.      MANAGEMENT:  Monitoring for adverse effects, routine vitals and patient is aware of risks      Impression/Assessment      Gavin Edmond is a 75 year old adult  who presents for med management follow up.  Pt appears not anxious, stable in his mood, denies SI, SIB or HI.  Pt denies any changes in anxiety and this is only in context of socializing with spouse's friends.  Discussed pharmacological approach with therapy would be strongly recommended to help with social anxiety.  Pt lives outside of [facility] thus instructed pt to contact his insurance to explore therapist nearby that is covered.  Also discussed to  maximize Buspar to 30 mg BID to help with anxiety.    Discussed ok to continue Ambien 10 mg since 5 mg was not helpful for his sleep, however, with his occasional fall and medication interactions, strongly recommended to talk to PCP to possibly change Gabapentin as he does not feel this was helpful for pain.  Discussed risk of polypharmacy which may be clouding his cognition, may consider tapering of Lamictal while Gabapentin is tapered down/off by PCP.  Also, Wellbutrin may be exacerbating anxiety and may be beneficial to cross taper Wellbutrin to SNRI or other SSRIs as he has not tried anything except Lexapro in the future.      Diagnosis                                                                    social anxiety disorder  MDD    Treatment Recommendation & Plan       Medication Ordered/Consults/Labs/tests Ordered:     Medication:   -Increase Buspar to 30 mg 2 times a day, I ordered 30 mg tablet for next refill, please make sure to read the label carefully  -Continue all other medication regimen for now  OTC Recommendations: none  Lab Orders:  none  Referrals: therapy  Release of Information: none  Future Treatment Considerations: Per symptoms.   Return for Follow Up: in 1 month    -Discussed safety plan for suicidal thoughts  -Discussed plan for suicidality  -Discussed available emergency services  -Patient agrees with the treatment plan  -Encouraged to continue outpatient therapy to gain more coping mechanism for stress.      Treatment Risk Statement: Discussed with the patient my impressions, as well as recommended studies. I educated patient on the differential diagnosis and prognosis. I discussed with the patient the risks and benefits of medications versus no interventions, including efficacy, dose, possible side effects and length of treatment and the importance of medication compliance.  The patient understands the risks, benefits, adverse effects and alternatives. Agrees to treatment with the  capacity to do so. No medical contraindications to treatment. The patient also understands the risks of using street drugs or alcohol.   CRISIS NUMBERS:   Provided routinely in AVS.           Rylee Kyle CNP,  7/2/2020

## 2020-07-02 NOTE — PATIENT INSTRUCTIONS
-Increase Buspar to 30 mg 2 times a day, I ordered 30 mg tablet for next refill, please make sure to read the label carefully  -Continue all other medication regimen for now  -Contact your insurance to inquire therapist nearby    Your next appointment is scheduled on 7/31 (Fri) at 9:30am.    To access your telemedicine visit:     Open a web browser, like [employer], and type [email or web address]     You will see a box asking you to check in to let Rylee Kyle know that you are here.     Type in your name and press Check In. That will let Rylee see you in the virtual waiting room. At your scheduled appointment time, your provider will initiate the visit and connect you.     When your visit is done, you can simply close the browser window.        Please Note:  Ideally, you will connect from a desktop, laptop, or tablet with a WiFi connection. Your computer/tablet must have a camera and microphone. You can use a cell phone, if it has a camera, and if you can connect to WiFi. However, if you connect your phone over a cellular network, it is of lower quality and less reliable        Thank you for coming to the PSYCHIATRY CLINIC.    Lab Testing:  If you had lab testing today and your results are reassuring or normal they will be mailed to you or sent through [place] within 7 days. If the lab tests need quick action we will call you with the results. The phone number we will call with results is # [phone] (home) . If this is not the best number please call our clinic and change the number.    Medication Refills:  If you need any refills please call your pharmacy and they will contact us. Our fax number for refills is [phone]. Please allow three business for refill processing. If you need to  your refill at a new pharmacy, please contact the new pharmacy directly. The new pharmacy will help you get your medications transferred.     Scheduling:  If you have any concerns about today's visit or wish to  schedule another appointment please call our office during normal business hours [phone] (8-5:00 M-F)    Contact Us:  Please call [phone] during business hours (8-5:00 M-F).  If after clinic hours, or on the weekend, please call  [phone].    Financial Assistance [phone]  [facility] Billing [phone]  [place] Billing Office, [facility]: [phone]  [place] Billing [phone]  Medical Records [phone]      MENTAL HEALTH CRISIS NUMBERS:  For a medical emergency please call  911 or go to the nearest ER.     [facility]:   [facility] -[phone]   Crisis Residence [facility]idence -[phone]   Walk-In Counseling [facility] -[phone]   COPE 24/7 [place] Mobile Team -[phone] (adults)/[phone] (child)  CH[facility]: [facility] needs assessment team - [phone]      The Medical Center:   [facility] - [phone]   Walk-in counseling [facility] - [phone]   Walk-in counseling  - [phone]   Crisis Residence [facility] - [phone]  Urgent Care Adult Mental Txikkc-[phone] mobile unit/ 24/7 crisis line    National Crisis Numbers:   National Suicide Prevention Lifeline: 1-720-287-TALK (560-469-4702)  Poison Control Center - 1-402.250.3992  [employer]Schoo/resources for a list of additional resources (SOS)  Trans Lifeline a hotline for transgender people 1-459.375.9904  The [employer] a hotline for LGBT youth 1-254.911.9484  Crisis Text Line: For any crisis 24/7   To: 284284  see www.crisistextline.org  - IF MAKING A CALL FEELS TOO HARD, send a text!         Again thank you for choosing PSYCHIATRY CLINIC and please let us know how we can best partner with you to improve you and your family's health.    You may be receiving a survey regarding this appointment. We would love to have your feedback, both positive and negative. The survey is done by an external  company, so your answers are anonymous.

## 2020-07-03 DIAGNOSIS — F41.9 ANXIETY: ICD-10-CM

## 2020-07-03 DIAGNOSIS — F32.1 MODERATE MAJOR DEPRESSION (H): ICD-10-CM

## 2020-07-03 RX ORDER — BUPROPION HYDROCHLORIDE 150 MG/1
TABLET, EXTENDED RELEASE ORAL
Qty: 180 TABLET | Refills: 2 | Status: SHIPPED | OUTPATIENT
Start: 2020-07-03 | End: 2021-03-09

## 2020-07-03 NOTE — TELEPHONE ENCOUNTER
Followed by psychiatry.  See in 4/20 in clinic for anxiety and depression.  Will refill for 9 months.  Will need to follow-up in 9 months for refills or sooner if any changes in mental health.  Clara Kirkland NP on 7/3/2020 at 9:42 AM

## 2020-07-13 DIAGNOSIS — F51.01 PRIMARY INSOMNIA: ICD-10-CM

## 2020-07-13 RX ORDER — ZOLPIDEM TARTRATE 10 MG/1
5-10 TABLET ORAL
Qty: 90 TABLET | Refills: 0 | Status: SHIPPED | OUTPATIENT
Start: 2020-07-13 | End: 2020-08-06

## 2020-07-13 NOTE — TELEPHONE ENCOUNTER
Reason for Call:  Medication or medication refill:    Do you use a [facility]?  Name of the pharmacy and phone number for the current request:  [facility]    Name of the medication requested:   Zolpidem  Last Written Prescription Date:  5/28/20  Last Fill Quantity: 30,  # refills: 1  Last office visit: 12/10/2019 with prescribing provider:     Future Office Visit:      Other request:     Call taken on 7/13/2020 at 8:23 AM by Paola Moscoso

## 2020-07-16 DIAGNOSIS — F51.01 PRIMARY INSOMNIA: ICD-10-CM

## 2020-07-16 NOTE — TELEPHONE ENCOUNTER
Last seen: 7/2/20  RTC: 1 month  Cancel: none  No-show: none  Next appt: 7/31/20     Incoming refill from [facility] pharmacy via electronic request     Medication requested: zolpidem (AMBIEN) 10 MG tablet   Directions: Take 0.5-1 tablets (5-10 mg) by mouth nightly as needed for sleep  Qty: 90  Last refilled: per [place]  6/29/20 (Saroj), 6/2/20 (Saroj), and 5/5/20 (Dominguez Verdugo)    Reviewed pt's chart and it appears Clara Kirkland NP filled Zolpidem 10 mg tabs on 7/13 and [facility]. Appears this provider may work with pt's PCP, Dominguez Verdugo.    Will discuss with psychiatric provider further.

## 2020-07-17 RX ORDER — ZOLPIDEM TARTRATE 10 MG/1
5-10 TABLET ORAL
Qty: 30 TABLET | Refills: 1 | OUTPATIENT
Start: 2020-07-17

## 2020-07-17 NOTE — TELEPHONE ENCOUNTER
Rylee Kyle, Heather Trammell CNP, RN    Caller: Unspecified (Yesterday,  8:00 AM)               It's up to pt.  If pt [facility], then I would not refill this to local pharmacy unless for some reason he is not able [facility].  Would you ask pt where he should be getting Ambien from (both provider and location).  If he needs local, [facility] need to [facility].     thanks      Writer called pt. No answer. LVM requesting a c/b to confirm if he needs this medication e-prescribed to his local pharmacy.

## 2020-07-17 NOTE — TELEPHONE ENCOUNTER
Received return phone call from pt. He does not need Rx to go to [facility] pharmacy. He is fine with just filling the Rx sent to [facility] by PCP on Monday, 7/13.    Refused request in this encounter with note that pt will fill med with [facility].

## 2020-07-31 ENCOUNTER — VIRTUAL VISIT (OUTPATIENT)
Dept: PSYCHIATRY | Facility: CLINIC | Age: 75
End: 2020-07-31
Attending: NURSE PRACTITIONER
Payer: COMMERCIAL

## 2020-07-31 ENCOUNTER — TELEPHONE (OUTPATIENT)
Dept: PSYCHIATRY | Facility: CLINIC | Age: 75
End: 2020-07-31

## 2020-07-31 DIAGNOSIS — F40.10 SOCIAL ANXIETY DISORDER: ICD-10-CM

## 2020-07-31 DIAGNOSIS — F41.9 ANXIETY: ICD-10-CM

## 2020-07-31 DIAGNOSIS — F51.01 PRIMARY INSOMNIA: Primary | ICD-10-CM

## 2020-07-31 RX ORDER — ESCITALOPRAM OXALATE 10 MG/1
10 TABLET ORAL DAILY
Qty: 90 TABLET | Refills: 0 | Status: SHIPPED | OUTPATIENT
Start: 2020-07-31 | End: 2020-10-15

## 2020-07-31 RX ORDER — MIRTAZAPINE 15 MG/1
7.5-15 TABLET, FILM COATED ORAL
Qty: 30 TABLET | Refills: 1 | Status: SHIPPED | OUTPATIENT
Start: 2020-07-31 | End: 2020-08-06

## 2020-07-31 RX ORDER — BUSPIRONE HYDROCHLORIDE 30 MG/1
30 TABLET ORAL 2 TIMES DAILY
Qty: 180 TABLET | Refills: 0 | Status: SHIPPED | OUTPATIENT
Start: 2020-07-31 | End: 2020-11-19

## 2020-07-31 NOTE — PATIENT INSTRUCTIONS
-Try Remeron (Mirtazapine) 7.5-15 mg at bedtime as needed for sleep.  Please do not take it together with Ambien.  If this does not work for sleep, you may restart Ambien.  -Continue all other medications for now    Your next appointment is scheduled on 8/6 (Thu) at 8am.    To access your telemedicine visit:     Open a web browser, like wiseri, and type [email or web address]     You will see a box asking you to check in to let Rylee Kyle know that you are here.     Type in your name and press Check In. That will let Rylee see you in the virtual waiting room. At your scheduled appointment time, your provider will initiate the visit and connect you.     When your visit is done, you can simply close the browser window.        Please Note:  Ideally, you will connect from a desktop, laptop, or tablet with a WiFi connection. Your computer/tablet must have a camera and microphone. You can use a cell phone, if it has a camera, and if you can connect to WiFi. However, if you connect your phone over a cellular network, it is of lower quality and less reliable.    Thank you for coming to the PSYCHIATRY CLINIC.    Lab Testing:  If you had lab testing today and your results are reassuring or normal they will be mailed to you or sent through [employer] within 7 days. If the lab tests need quick action we will call you with the results. The phone number we will call with results is # [phone] (home) . If this is not the best number please call our clinic and change the number.    Medication Refills:  If you need any refills please call your pharmacy and they will contact us. Our fax number for refills is [phone]. Please allow three business for refill processing. If you need to  your refill at a new pharmacy, please contact the new pharmacy directly. The new pharmacy will help you get your medications transferred.     Scheduling:  If you have any concerns about today's visit or wish to schedule another  appointment please call our office during normal business hours [phone] (8-5:00 M-F)    Contact Us:  Please call [phone] during business hours (8-5:00 M-F).  If after clinic hours, or on the weekend, please call  [phone].    Financial Assistance [phone]  [facility] Billing [phone]  [place] Billing [facility], [facility]: [phone]  [place] Billing [phone]  Medical Records [phone]      MENTAL HEALTH CRISIS NUMBERS:  For a medical emergency please call  911 or go to the nearest ER.     [facility]:   [facility] -[phone]   Crisis Residence [facility] -[phone]   Walk-In Counseling [facility] -[phone]   COPE 24/7 [place] Mobile Team -[phone] (adults)/[phone] (child)  [facility]: [facility] - [phone]      [facility]:   [facility] - [phone]   Walk-in counseling [facility] - [phone]   Walk-in counseling [facility] - [phone]   Crisis Residence [facility] - [phone]  Urgent Care Adult Mental [facility]pt[facility]-[phone] mobile unit/ 24/7 crisis line    National Crisis Numbers:   National Suicide Prevention Lifeline: 8-153-824-TALK (641-639-6029)  Poison Control Center - 1-552.706.9760  [employer]/resources for a list of additional resources (SOS)  Trans Lifeline a hotline for transgender people 1-538.728.1443  [employer] a hotline for LGBT youth 1-912.253.2172  Crisis Text Line: For any crisis 24/7   To: 633302  see www.crisistextline.org  - IF MAKING A CALL FEELS TOO HARD, send a text!         Again thank you for choosing PSYCHIATRY CLINIC and please let us know how we can best partner with you to improve you and your family's health.    You may be receiving a survey regarding this appointment. We would love to have your feedback, both positive and negative. The survey is done by an external company, so your  answers are anonymous.

## 2020-07-31 NOTE — PROGRESS NOTES
Start Time:  0939         End Time: 1000    Telemedicine Visit: The patient's condition can be safely assessed and treated via synchronous audio and visual telemedicine encounter.      Reason for Telemedicine Visit: Due to COVID 19 pandemic, clinic switching all appointments to telemedicine     Originating Site (Patient Location): Patient's home    Distant Site (Provider Location): Provider Remote Setting    Consent:  The patient/guardian has verbally consented to: the potential risks and benefits of telemedicine (video visit) versus in person care; bill my insurance or make self-payment for services provided; and responsibility for payment of non-covered services.     Mode of Communication:  Video Conference via Doxy.    As the provider I attest to compliance with applicable laws and regulations related to telemedicine.    Psychiatry Clinic Progress Note                                                                  Patient Name: Josemanuel Edmond  YOB: 1945  MRN: [id number]  Date of Service:  7/31/2020  Last Seen:7/2/2020    Josemanuel Edmond is a 75 year old male who uses the name Gavin and pronoun pat.        Gavin Edmond is a 75 year old year old adult who presents for ongoing psychiatric care.  Gavin Edmond was last seen on 7/2/2020.     At that time,     Medication Ordered/Consults/Labs/tests Ordered:      Medication:   -Increase Buspar to 30 mg 2 times a day, I ordered 30 mg tablet for next refill, please make sure to read the label carefully  -Continue all other medication regimen for now  OTC Recommendations: none  Lab Orders:  none  Referrals: therapy  Release of Information: none  Future Treatment Considerations: Per symptoms.   Return for Follow Up: in 1 month    Pertinent Background:  Diagnoses include social anxiety disorder and MDD.  Psych critical item history includes [no critical items].     Previous Psychiatric Meds: Wellbutrin XL, Xanax, Klonopin, Valium, Ativan, Remeron,  "Nortriptyline and Vistaril    Interim History                                                                                                        4, 4     Since the last visit, reports he had \"a few good days\" where he was able to socialize with new people without any anxiety.  Then, anxiety recurred with worry about what to say and how to say things to people when socializing.  Pt could not identify any difference when things went well.  Pt has been taking Ambien 7.5 mg to sleep every night.  Pt also noted difficulties with lack of emotion for over 10 years.  Denies SI, SIB or HI.    Denies any symptoms suggestive of hypomania or psychosis.    Current Suicidality/Hx of Suicide Attempts: Denies both  CoCominent Medical concerns: occasional fall and random shaking/twitching of bilateral hands       Medication Side Effects: The patient denies all medication side effects.      Medical Review of Systems     Apart from the symptoms mentioned int he HPI, the 14 point review of systems, including constitutional, HEENT, cardiovascular, respiratory, gastrointestinal, genitourinary, musculoskeletal, integumentary, endocrine, neurological, hematologic and allergic is entirely negative except occasional fall and random shaking/twitching of bilateral hands.      Substance Use   Pt has been staying substance free since last seen.      Medical / Surgical History                                                                                                                  Patient Active Problem List   Diagnosis     Hypertension goal BP (blood pressure) < 140/90     Hypertrophy of prostate with urinary obstruction     Osteoarthrosis, unspecified whether generalized or localized, pelvic region and thigh     Hyperlipidemia LDL goal <100     Allergic rhinitis     Conjunctivitis, allergic     Anxiety     GERD (gastroesophageal reflux disease)     S/P knee replacement     Moderate major depression (H)     ED (erectile dysfunction) "     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)     Chronic atrial fibrillation (H)     Hyperplastic Polyp     Right knee DJD     Peripheral neuropathy     Sleep disturbance     Hematoma of skin     Traumatic ecchymosis of buttock, initial encounter       Past Surgical History:   Procedure Laterality Date     ARTHROPLASTY KNEE Right 10/12/2018    Procedure: ARTHROPLASTY KNEE;  Right Total Knee Arthroplasty;  Surgeon: Jeb Peralta MD;  Location: [facility] OR     COLONOSCOPY N/A 12/10/2015    Procedure: COMBINED COLONOSCOPY, SINGLE OR MULTIPLE BIOPSY/POLYPECTOMY BY BIOPSY;  Surgeon: Jyoti Figueroa MD;  Location: [facility]     JOINT REPLACEMENT, HIP RT/LT  10/07    Joint Replacement Hip LT     JOINT REPLACEMTN, KNEE RT/LT  8/2011    Joint Replacement knee /LT, [facility]     LAPAROSCOPIC HERNIORRHAPHY INGUINAL BILATERAL Bilateral 4/24/2018    Procedure: LAPAROSCOPIC HERNIORRHAPHY INGUINAL BILATERAL;  Laparoscopic bilateral inguinal hernia repair;  Surgeon: Josemanuel Resendiz MD;  Location: [facility] OR     SURGICAL HISTORY OF -   12/1/1999    Umbilical Herniorrhaphy with mesh     SURGICAL HISTORY OF -  Left 11/2017    Thoracotomy and drainage of empyema        Social/ Family History                                  [per patient report]                                 1ea,1ea   Living arrangements: lives with spouse and feels safe  Social Support: spouse  Access to gun: owns a gun  Retired in 2008.    Allergy                                Bactrim [sulfamethoxazole w/trimethoprim]    Current Medications                                                                                                       Current Outpatient Medications   Medication Sig Dispense Refill     acetaminophen (TYLENOL) 500 MG tablet Take 1,000 mg by mouth every 8 hours as needed for mild pain       buPROPion (WELLBUTRIN SR) 150 MG 12 hr tablet TAKE 1 TABLET TWICE A  tablet 2     busPIRone HCl (BUSPAR) 30 MG tablet Take 1 tablet (30 mg) by mouth 2 times  "daily 60 tablet 1     doxazosin (CARDURA) 4 MG tablet Take 1 tablet (4 mg) by mouth daily 90 tablet 2     escitalopram (LEXAPRO) 10 MG tablet Take 1 tablet (10 mg) by mouth daily 90 tablet 1     finasteride (PROSCAR) 5 MG tablet Take 1 tablet (5 mg) by mouth daily 90 tablet 3     gabapentin (NEURONTIN) 300 MG capsule Take 2 capsules (600 mg) by mouth 2 times daily 360 capsule 3     lamoTRIgine (LAMICTAL) 100 MG tablet TAKE 1 TABLET TWICE A  tablet 3     metoprolol tartrate (LOPRESSOR) 25 MG tablet Take 1 tablet (25 mg) by mouth 2 times daily 180 tablet 3     STATIN NOT PRESCRIBED, INTENTIONAL, Please choose reason not prescribed, below (Patient not taking: Reported on 12/10/2019)       XARELTO 20 MG TABS tablet TAKE 1 TABLET DAILY WITH   DINNER 90 tablet 3     zolpidem (AMBIEN) 10 MG tablet Take 0.5-1 tablets (5-10 mg) by mouth nightly as needed for sleep 90 tablet 0        Mental Status Exam                                                                                   9, 14 cog        Alertness: alert  and oriented  Appearance:  Casually dressed and Adequately groomed  Behavior/Demeanor: cooperative, pleasant and calm, with more spontaneous smile with fair  eye contact   Speech: regular rate and rhythm  Mood :  \"no change, except I had few good days with anxiety\"  Affect: slightly subdued, but with more spontaneous smile, not anxious; was congruent to mood; was congruent to content  Thought Process (Associations):  Linear and Goal directed  Thought process (Rate):  Normal  Thought content:  no overt psychosis, denies suicidal ideation, intent or thoughts and patient does not appear to be responding to internal stimuli  Perception:  Reports none;  Denies auditory hallucinations, visual hallucinations, depersonalization and derealization  Attention/Concentration:  Fair  Memory:  Immediate recall intact and Short-term memory intact  Language: intact  Fund of Knowledge/Intelligence:  Average  Abstraction:  " Normal  Insight:  Fair  Judgment:  Fair  Cognition: (6) does  appear grossly intact; formal cognitive testing was not done    Physical Exam     Motor activity/EPS:  Normal  Gait:  Normal  Psychomotor: normal or unremarkable    Labs and Results      Pertinent findings on review include: Review of records with relevant information reported in the HPI.  Reviewed pt's past medical record and obtained collateral information.      [place] PRESCRIPTION MONITORING PROGRAM [] was checked today:  indicates Gabapentin 7/6/2020.    PHQ9 Today:  N/A  PHQ 11/15/2018 2/26/2019 12/10/2019   PHQ-9 Total Score 9 3 4   Q9: Thoughts of better off dead/self-harm past 2 weeks Not at all Not at all Not at all       CAROLYNN 7 Today: N/A  CAROLYNN-7 SCORE 11/15/2018 2/26/2019 12/10/2019   Total Score - - -   Total Score 6 5 2       Recent Labs   Lab Test 09/04/19  1020 09/14/18  1629 05/07/18  1325   CR 0.81 0.93 1.07   GFRESTIMATED 86 79 67     Recent Labs   Lab Test 01/22/18  1354 12/20/17  1315   AST 15 24   ALT 32 17   ALKPHOS 60 66     PSYCHOTROPIC DRUG INTERACTIONS:   Buspar---Gabapentin: Concurrent use of GABAPENTIN and CNS DEPRESSANTS may result in respiratory depression.    Buspar---Lexapro: Concurrent use of ESCITALOPRAM and SEROTONERGIC DRUGS may result in increased risk of serotonin syndrome.   Wellbutrin---Lexapro: Concurrent use of BUPROPION and AGENTS LOWERING SEIZURE THRESHOLD may result in lower seizure threshold.   LExapro---Xarelto: Concurrent use of RIVAROXABAN and SEROTONIN NOREPINEPHRINE REUPTAKE INHIBITORS AND SSRIS may result in increased risk of bleeding  Ambien---Buspar---Gabapentin: Concurrent use of ZOLPIDEM and SEDATIVES OR HYPNOTICS may result in an increase in CNS depressant effects.   Doxazosin---Metoprolol: Concurrent use of ALPHA-1 ADRENERGIC BLOCKERS and BETA-ADRENERGIC BLOCKERS may result in an exaggerated hypotensive response to the first dose of the alpha blocker.   Metoprolol---Wellbutrin: Concurrent use of  METOPROLOL and CYP2D6 INHIBITORS may result in increased metoprolol exposure.   Ambien---Wellbutrin: Concurrent use of ZOLPIDEM and BUPROPION may result in an increased risk of hallucinations.      MANAGEMENT:  Monitoring for adverse effects, routine vitals and patient is aware of risks      Impression/Assessment      Gavin Edmond is a 75 year old adult  who presents for med management follow up.  Pt appears stable in his mood, does not appear anxious during the appointment, but had more spontaneous appropriate smile today, denies SI, SIB or HI.  Pt noted some improvement in anxiety for few days though that did not last.  Pt also continues to take Ambien 7.5 mg for sleep.  Discussed that Buspar may be finally working and pt may be noticing some improvement for anxiety.  Reiterated risks of long term Ambien use particularly since he had occasional fall.  Though pt has tried Remeron in the past, looks like this was only tried with 30 mg dose in [id number], discussed possibility of trying Remeron in lower dose to help with sleep and possibly with anxiety, but don't use together with Ambien.  Pt is open to try Remeron 7.5-15 mg HS PRN while monitoring oversedation and dizziness and was instructed not to take it with Ambien, but if Remeron does not work for sleep, may switch back to Ambien.  Propranolol was not recommended as he has lower WNL BP for social anxiety.    Also discussed long term lack of emotion.  Discussed possibility of overmedication.  Pt does not have seizure and taking Lamictal for depression.  Discussed possibility of medication reconciliation in the future since we have been focused on social anxiety at this time.  Discussed possibility of tapering off/down Wellbutrin as first adjustment as this may be exacerbating anxiety.  May also consider tapering of Lamictal while Gabapentin is tapered down/off by PCP.  Also, Wellbutrin may be exacerbating anxiety and may be beneficial to cross taper Wellbutrin  to SNRI or other SSRIs as he has not tried anything except Lexapro in the future.       Diagnosis                                                                    social anxiety disorder  MDD    Treatment Recommendation & Plan       Medication Ordered/Consults/Labs/tests Ordered:     Medication:   -Try Remeron (Mirtazapine) 7.5-15 mg at bedtime as needed for sleep.  Please do not take it together with Ambien.  If this does not work for sleep, you may restart Ambien.  -Continue all other medications for now  OTC Recommendations: none  Lab Orders:  none  Referrals: none  Release of Information: none  Future Treatment Considerations: Per symptoms.   Return for Follow Up: on 8/6 and 8/28    -Discussed safety plan for suicidal thoughts  -Discussed plan for suicidality  -Discussed available emergency services  -Patient agrees with the treatment plan  -Encouraged to continue outpatient therapy to gain more coping mechanism for stress.      Treatment Risk Statement: Discussed with the patient my impressions, as well as recommended studies. I educated patient on the differential diagnosis and prognosis. I discussed with the patient the risks and benefits of medications versus no interventions, including efficacy, dose, possible side effects and length of treatment and the importance of medication compliance.  The patient understands the risks, benefits, adverse effects and alternatives. Agrees to treatment with the capacity to do so. No medical contraindications to treatment. The patient also understands the risks of using street drugs or alcohol.    CRISIS NUMBERS:   Provided routinely in AVS.         Rylee Kyle CNP,  7/31/2020

## 2020-07-31 NOTE — TELEPHONE ENCOUNTER
On 7/31/2020, at 9:07, writer called patient at [phone] to confirm Virtual Visit. Writer unable to make contact with patient. Writer left detailed voice message for call back. [phone] left as call back number. Olga Lidia Campbell, [facility]

## 2020-08-06 ENCOUNTER — VIRTUAL VISIT (OUTPATIENT)
Dept: PSYCHIATRY | Facility: CLINIC | Age: 75
End: 2020-08-06
Attending: NURSE PRACTITIONER
Payer: COMMERCIAL

## 2020-08-06 DIAGNOSIS — F33.41 RECURRENT MAJOR DEPRESSIVE DISORDER, IN PARTIAL REMISSION (H): ICD-10-CM

## 2020-08-06 DIAGNOSIS — F51.01 PRIMARY INSOMNIA: Primary | ICD-10-CM

## 2020-08-06 DIAGNOSIS — F40.10 SOCIAL ANXIETY DISORDER: ICD-10-CM

## 2020-08-06 RX ORDER — MIRTAZAPINE 15 MG/1
15 TABLET, FILM COATED ORAL
Qty: 30 TABLET | Refills: 1 | Status: SHIPPED | OUTPATIENT
Start: 2020-08-06 | End: 2020-08-28

## 2020-08-06 NOTE — PROGRESS NOTES
Start Time:  0759         End Time: 0807    Telemedicine Visit: The patient's condition can be safely assessed and treated via synchronous audio and visual telemedicine encounter.      Reason for Telemedicine Visit: Due to COVID 19 pandemic, clinic switching all appointments to telemedicine     Originating Site (Patient Location): Patient's home    Distant Site (Provider Location): Provider Remote Setting    Consent:  The patient/guardian has verbally consented to: the potential risks and benefits of telemedicine (video visit) versus in person care; bill my insurance or make self-payment for services provided; and responsibility for payment of non-covered services.     Mode of Communication:  Video Conference via Doxy.    As the provider I attest to compliance with applicable laws and regulations related to telemedicine.    Psychiatry Clinic Progress Note                                                                  Patient Name: Josemanuel Edmond  YOB: 1945  MRN: [id number]  Date of Service:  8/6/2020  Last Seen:7/31/2020    Josemanuel Edmond is a 75 year old male who uses the name Chalino and pronoun pat.        Chalino Edmond is a 75 year old year old adult who presents for ongoing psychiatric care.  Chalino Edmond was last seen on 7/31/2020.     At that time,     Medication Ordered/Consults/Labs/tests Ordered:      Medication:   -Try Remeron (Mirtazapine) 7.5-15 mg at bedtime as needed for sleep.  Please do not take it together with Ambien.  If this does not work for sleep, you may restart Ambien.  -Continue all other medications for now  OTC Recommendations: none  Lab Orders:  none  Referrals: none  Release of Information: none  Future Treatment Considerations: Per symptoms.   Return for Follow Up: on 8/6 and 8/28      Pertinent Background:  Diagnoses include social anxiety disorder and MDD.  Psych critical item history includes [no critical items].      Previous Psychiatric Meds: Wellbutrin XL,  "Xanax, Klonopin, Valium, Ativan, Remeron (30 mg only), Nortriptyline and Vistaril    Interim History                                                                                                        4, 4     Since the last visit, pt reports stopped taking Ambien and tried Remeron 15 mg HS, it oversedated him.  Then next day when he tried 7.5 mg, he could not sleep.  However, on 2nd trial of 15 mg, he slept well without oversedation on next day.  Since off of Ambien, he reports sleeps better and feeling better overall sleeps better though there are \"weird\" dreams though this is not scary and bother some.    Denies any symptoms suggestive of hypomania or psychosis.    Current Suicidality/Hx of Suicide Attempts: Denies both  CoCominent Medical concerns: Denies    Medication Side Effects: The patient denies all medication side effects.      Medical Review of Systems     Apart from the symptoms mentioned int he HPI, the 14 point review of systems, including constitutional, HEENT, cardiovascular, respiratory, gastrointestinal, genitourinary, musculoskeletal, integumentary, endocrine, neurological, hematologic and allergic is entirely negative.      Substance Use   Pt has been staying substance free since last seen.      Medical / Surgical History                                                                                                                  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)     Chronic atrial fibrillation (H)     Hyperplastic Polyp     Right knee " DJD     Peripheral neuropathy     Sleep disturbance     Hematoma of skin     Traumatic ecchymosis of buttock, initial encounter       Past Surgical History:   Procedure Laterality Date     ARTHROPLASTY KNEE Right 10/12/2018    Procedure: ARTHROPLASTY KNEE;  Right Total Knee Arthroplasty;  Surgeon: Jeb Peralta MD;  Location: [facility]     COLONOSCOPY N/A 12/10/2015    Procedure: COMBINED COLONOSCOPY, SINGLE OR MULTIPLE BIOPSY/POLYPECTOMY BY BIOPSY;  Surgeon: Jyoti Figueroa MD;  Location: [facility]     JOINT REPLACEMENT, HIP RT/LT  10/07    Joint Replacement Hip LT     JOINT REPLACEMTN, KNEE RT/LT  8/2011    Joint Replacement knee /LT, [facility]     LAPAROSCOPIC HERNIORRHAPHY INGUINAL BILATERAL Bilateral 4/24/2018    Procedure: LAPAROSCOPIC HERNIORRHAPHY INGUINAL BILATERAL;  Laparoscopic bilateral inguinal hernia repair;  Surgeon: Josemanuel Resendiz MD;  Location: [facility] OR     SURGICAL HISTORY OF -   12/1/1999    Umbilical Herniorrhaphy with mesh     SURGICAL HISTORY OF -  Left 11/2017    Thoracotomy and drainage of empyema        Social/ Family History                                  [per patient report]                                 1ea,1ea   Living arrangements: lives with spouse and feels safe  Social Support: spouse  Access to gun: owns a gun  Retired in 2008.    Allergy                                Bactrim [sulfamethoxazole w/trimethoprim]    Current Medications                                                                                                       Current Outpatient Medications   Medication Sig Dispense Refill     acetaminophen (TYLENOL) 500 MG tablet Take 1,000 mg by mouth every 8 hours as needed for mild pain       buPROPion (WELLBUTRIN SR) 150 MG 12 hr tablet TAKE 1 TABLET TWICE A  tablet 2     busPIRone HCl (BUSPAR) 30 MG tablet Take 1 tablet (30 mg) by mouth 2 times daily 180 tablet 0     doxazosin (CARDURA) 4 MG tablet Take 1 tablet (4 mg) by mouth daily 90 tablet 2      escitalopram (LEXAPRO) 10 MG tablet Take 1 tablet (10 mg) by mouth daily 90 tablet 0     finasteride (PROSCAR) 5 MG tablet Take 1 tablet (5 mg) by mouth daily 90 tablet 3     gabapentin (NEURONTIN) 300 MG capsule Take 2 capsules (600 mg) by mouth 2 times daily 360 capsule 3     lamoTRIgine (LAMICTAL) 100 MG tablet TAKE 1 TABLET TWICE A  tablet 3     metoprolol tartrate (LOPRESSOR) 25 MG tablet Take 1 tablet (25 mg) by mouth 2 times daily 180 tablet 3     mirtazapine (REMERON) 15 MG tablet Take 0.5-1 tablets (7.5-15 mg) by mouth nightly as needed (sleep and anxiety, don't take it with ambien) 30 tablet 1     STATIN NOT PRESCRIBED, INTENTIONAL, Please choose reason not prescribed, below (Patient not taking: Reported on 12/10/2019)       XARELTO 20 MG TABS tablet TAKE 1 TABLET DAILY WITH   DINNER 90 tablet 3     zolpidem (AMBIEN) 10 MG tablet Take 0.5-1 tablets (5-10 mg) by mouth nightly as needed for sleep 90 tablet 0          Mental Status Exam                                                                                   9, 14 cog        Alertness: alert  and oriented  Appearance:  Casually dressed and Well groomed  Behavior/Demeanor: cooperative, pleasant and calm, with good  eye contact   Speech: regular rate and rhythm  Mood :  better  Affect: full range and appropriate; was congruent to mood; was congruent to content  Thought Process (Associations):  Logical, Linear and Goal directed  Thought process (Rate):  Normal  Thought content:  no overt psychosis, denies suicidal ideation, intent or thoughts and patient does not appear to be responding to internal stimuli  Perception:  Reports none;  Denies depersonalization and derealization  Attention/Concentration:  Normal  Memory:  Immediate recall intact and Short-term memory intact  Language: intact  Fund of Knowledge/Intelligence:  Average  Abstraction:  Normal  Insight:  Good  Judgment:  Good  Cognition: (6) does  appear grossly intact; formal cognitive  testing was not done    Physical Exam     Motor activity/EPS:  Normal  Gait:  Normal  Psychomotor: normal or unremarkable    Labs and Results      Pertinent findings on review include: Review of records with relevant information reported in the HPI.  Reviewed pt's past medical record and obtained collateral information.      [place] PRESCRIPTION MONITORING PROGRAM [] was checked today:  indicates no refills since last seen.    PHQ9 Today:  N/A  PHQ 11/15/2018 2/26/2019 12/10/2019   PHQ-9 Total Score 9 3 4   Q9: Thoughts of better off dead/self-harm past 2 weeks Not at all Not at all Not at all       CAROLYNN 7 Today: N/A  CAROLYNN-7 SCORE 11/15/2018 2/26/2019 12/10/2019   Total Score - - -   Total Score 6 5 2       Recent Labs   Lab Test 09/04/19  1020 09/14/18  1629 05/07/18  1325   CR 0.81 0.93 1.07   GFRESTIMATED 86 79 67     Recent Labs   Lab Test 01/22/18  1354 12/20/17  1315   AST 15 24   ALT 32 17   ALKPHOS 60 66     PSYCHOTROPIC DRUG INTERACTIONS:   Buspar---Gabapentin: Concurrent use of GABAPENTIN and CNS DEPRESSANTS may result in respiratory depression.    Buspar---Lexapro: Concurrent use of ESCITALOPRAM and SEROTONERGIC DRUGS may result in increased risk of serotonin syndrome.   Wellbutrin---Lexapro: Concurrent use of BUPROPION and AGENTS LOWERING SEIZURE THRESHOLD may result in lower seizure threshold.   LExapro---Xarelto: Concurrent use of RIVAROXABAN and SEROTONIN NOREPINEPHRINE REUPTAKE INHIBITORS AND SSRIS may result in increased risk of bleeding  Doxazosin---Metoprolol: Concurrent use of ALPHA-1 ADRENERGIC BLOCKERS and BETA-ADRENERGIC BLOCKERS may result in an exaggerated hypotensive response to the first dose of the alpha blocker.   Metoprolol---Wellbutrin: Concurrent use of METOPROLOL and CYP2D6 INHIBITORS may result in increased metoprolol exposure.        MANAGEMENT:  Monitoring for adverse effects, routine vitals and patient is aware of risks         Impression/Assessment      Gavin Edmond is a 75 year  "old adult  who presents for med management follow up.  Pt appears stable in his mood and not anxious, denies SI, SIB or HI.  Pt reports stopping Ambien and has been sleeping well with Remeron 15 mg HS though first trial of 15 mg oversedated him and 7.5 mg did not sufficiently help with sleep.  Though he has \"weird\" dream, this does not bother him and sleep quality is better and overall happy with the change as he noted his mood is better as well.  Reiterated though we are using this as sleep medication, this may also help with mood and anxiety if taking consistently.  In next visit, will reassess need for other medications such as Wellbutrin, Lamictal and even consider cross tapering Lexapro to other SSRI or SNRI in the future while Remeron could stabilize his anxiety and depression better.  Will discontinue Ambien at this time and change Remeron to 15 mg HS PRN.      Diagnosis                                                                    social anxiety disorder  MDD       Treatment Recommendation & Plan       Medication Ordered/Consults/Labs/tests Ordered:     Medication:   -Discontinue Ambien as you no longer need the medication  -Continue all other medications for now  OTC Recommendations: none  Lab Orders:  none  Referrals: none  Release of Information: none  Future Treatment Considerations: Per symptoms. Discontinue Wellbutrin?  Lamictal?  Cross taper Lexapro?  Return for Follow Up: on 8/28/2020    -Discussed safety plan for suicidal thoughts  -Discussed plan for suicidality  -Discussed available emergency services  -Patient agrees with the treatment plan  -Encouraged to continue outpatient therapy to gain more coping mechanism for stress.      Treatment Risk Statement: Discussed with the patient my impressions, as well as recommended studies. I educated patient on the differential diagnosis and prognosis. I discussed with the patient the risks and benefits of medications versus no interventions, including " efficacy, dose, possible side effects and length of treatment and the importance of medication compliance.  The patient understands the risks, benefits, adverse effects and alternatives. Agrees to treatment with the capacity to do so. No medical contraindications to treatment. The patient also understands the risks of using street drugs or alcohol.    CRISIS NUMBERS:   Provided routinely in AVS.      Rylee Kyle CNP,  8/6/2020

## 2020-08-06 NOTE — PATIENT INSTRUCTIONS
-Discontinue Ambien as you no longer need the medication  -Continue all other medications for now    Your next appointment is scheduled on 8/28 (Fri) at 10:30am.    To access your telemedicine visit:     Open a web browser, like [facility], and type [email or web address]     You will see a box asking you to check in to let Rylee Kyle know that you are here.     Type in your name and press Check In. That will let Rylee see you in the virtual waiting room. At your scheduled appointment time, your provider will initiate the visit and connect you.     When your visit is done, you can simply close the browser window.        Please Note:  Ideally, you will connect from a desktop, laptop, or tablet with a WiFi connection. Your computer/tablet must have a camera and microphone. You can use a cell phone, if it has a camera, and if you can connect to WiFi. However, if you connect your phone over a cellular network, it is of lower quality and less reliable.    Thank you for coming to the PSYCHIATRY CLINIC.    Lab Testing:  If you had lab testing today and your results are reassuring or normal they will be mailed to you or sent through Loudr within 7 days. If the lab tests need quick action we will call you with the results. The phone number we will call with results is # [phone] (home) . If this is not the best number please call our clinic and change the number.    Medication Refills:  If you need any refills please call your pharmacy and they will contact us. Our fax number for refills is [phone]. Please allow three business for refill processing. If you need to  your refill at a new pharmacy, please contact the new pharmacy directly. The new pharmacy will help you get your medications transferred.     Scheduling:  If you have any concerns about today's visit or wish to schedule another appointment please call our office during normal business hours [phone] (8-5:00 M-F)    Contact Us:  Please call  [phone] during business hours (8-5:00 M-F).  If after clinic hours, or on the weekend, please call  [phone].    Financial Assistance [phone]  [facility] Billing [phone]  [facility] Billing Office, [facility]: [phone]  [facility] Billing [phone]  Medical Records [phone]      MENTAL HEALTH CRISIS NUMBERS:  For a medical emergency please call  911 or go to the nearest ER.     [facility]:   [facility] -[phone]   Crisis Residence [facility] Residence -[phone]   Walk-In Counseling Center [facility] -[phone]   COPE 24/7 [facility] Mobile Team -[phone] (adults)/[phone] (child)  CHILD: [facility] needs assessment team - [phone]      [facility]:   [facility] - [phone]   Walk-in counseling [facility] - [phone]   Walk-in counseling [facility] - [phone]   Crisis Residence [facility] Residence - [phone]  Urgent Care Adult Mental [facility]-[phone] mobile unit/ 24/7 crisis line    National Crisis Numbers:   National Suicide Prevention Lifeline: 0-521-097-TALK (478-044-5005)  Poison Control Center - 1-929.819.8216  [employer] Online/resources for a list of additional resources (SOS)  Trans Lifeline a hotline for transgender people 1-981.225.6736  The [employer] a hotline for LGBT youth 1-478.360.4188  Crisis Text Line: For any crisis 24/7   To: 737115  see www.crisistextline.org  - IF MAKING A CALL FEELS TOO HARD, send a text!         Again thank you for choosing PSYCHIATRY CLINIC and please let us know how we can best partner with you to improve you and your family's health.    You may be receiving a survey regarding this appointment. We would love to have your feedback, both positive and negative. The survey is done by an external company, so your answers are anonymous.

## 2020-08-22 DIAGNOSIS — I48.20 CHRONIC ATRIAL FIBRILLATION (H): ICD-10-CM

## 2020-08-24 RX ORDER — RIVAROXABAN 20 MG/1
TABLET, FILM COATED ORAL
Qty: 90 TABLET | Refills: 3 | Status: SHIPPED | OUTPATIENT
Start: 2020-08-24 | End: 2021-09-30

## 2020-08-28 ENCOUNTER — VIRTUAL VISIT (OUTPATIENT)
Dept: PSYCHIATRY | Facility: CLINIC | Age: 75
End: 2020-08-28
Attending: NURSE PRACTITIONER
Payer: COMMERCIAL

## 2020-08-28 ENCOUNTER — VIRTUAL VISIT (OUTPATIENT)
Dept: FAMILY MEDICINE | Facility: OTHER | Age: 75
End: 2020-08-28
Payer: COMMERCIAL

## 2020-08-28 DIAGNOSIS — F40.10 SOCIAL ANXIETY DISORDER: ICD-10-CM

## 2020-08-28 DIAGNOSIS — F33.41 RECURRENT MAJOR DEPRESSIVE DISORDER, IN PARTIAL REMISSION (H): ICD-10-CM

## 2020-08-28 DIAGNOSIS — Z20.822 SUSPECTED COVID-19 VIRUS INFECTION: Primary | ICD-10-CM

## 2020-08-28 DIAGNOSIS — F51.01 PRIMARY INSOMNIA: Primary | ICD-10-CM

## 2020-08-28 PROCEDURE — 99421 OL DIG E/M SVC 5-10 MIN: CPT | Performed by: FAMILY MEDICINE

## 2020-08-28 RX ORDER — MIRTAZAPINE 15 MG/1
30 TABLET, FILM COATED ORAL
Qty: 60 TABLET | Refills: 1 | Status: SHIPPED | OUTPATIENT
Start: 2020-08-28 | End: 2020-09-18 | Stop reason: DRUGHIGH

## 2020-08-28 NOTE — PROGRESS NOTES
"Date: 2020 10:15:30  Clinician: Bryan Wooten  Clinician NPI: [id number]  Patient: RUPERTO EDMOND  Patient : 1945  [place] [id number]  Patient Phone: [phone]  Visit Protocol: URI  Patient Summary:  RUPERTO is a 75 year old ( : 1945 ) male who initiated a Visit for COVID-19 (Coronavirus) evaluation and screening. When asked the question \"Please sign me up to receive news, health information and promotions from [employer].\", RUPERTO responded \"No\".    RUPERTO states his symptoms started 1-2 days ago.   His symptoms consist of malaise and myalgia. RUPERTO also feels feverish.   Symptom details   Temperature: His current temperature is 100.4 degrees Fahrenheit. RUPERTO has had a temperature over 100 degrees Fahrenheit for 1-2 days.    RUPERTO denies having ear pain, headache, chills, enlarged lymph nodes, wheezing, sore throat, teeth pain, ageusia, diarrhea, cough, nasal congestion, vomiting, rhinitis, nausea, anosmia, and facial pain or pressure. He also denies having recent facial or sinus surgery in the past 60 days and taking antibiotic medication in the past month. He is not experiencing dyspnea.   Precipitating events  He has not recently been exposed to someone with influenza. RUPERTO has been in close contact with the following high risk individuals: adults 65 or older.   Pertinent COVID-19 (Coronavirus) information  In the past 14 days, RUPERTO has not worked in a congregate living setting.   He does not work or volunteer as healthcare worker or a  and does not work or volunteer in a healthcare facility.   RUPERTO also has not lived in a congregate living setting in the past 14 days. He does not live with a healthcare worker.   RUPERTO has not had a close contact with a laboratory-confirmed COVID-19 patient within 14 days of symptom onset.   Since 2019, RUPERTO and has not had upper respiratory infection or influenza-like illness. Has not been " diagnosed with lab-confirmed COVID-19 test   Pertinent medical history  RUPERTO does not need a return to work/school note.   Weight: 174 lbs   RUPERTO does not smoke or use smokeless tobacco.   Weight: 174 lbs    MEDICATIONS: Remeron oral, doxazosin oral, gabapentin oral, Lexapro oral, ALLERGIES: NKDA  Clinician Response:  Dear RUPERTO,   Your symptoms show that you may have coronavirus (COVID-19). This illness can cause fever, cough and trouble breathing. Many people get a mild case and get better on their own. Some people can get very sick.  What should I do?  We would like to test you for this virus.   1. Please call [phone] to schedule your visit. Explain that you were referred by [facility] to have a COVID-19 test. Be ready to share your [facility] visit ID number.  The following will serve as your written order for this COVID Test, ordered by me, for the indication of suspected COVID [Z20.828]: The test will be ordered in [employer], our electronic health record, after you are scheduled. It will show as ordered and authorized by Ray More MD.  Order: COVID-19 (Coronavirus) PCR for SYMPTOMATIC testing from [facility].      2. When it's time for your COVID test:  Stay at least 6 feet away from others. (If someone will drive you to your test, stay in the backseat, as far away from the  as you can.)   Cover your mouth and nose with a mask, tissue or washcloth.  Go straight to the testing site. Don't make any stops on the way there or back.      3.Starting now: Stay home and away from others (self-isolate) until:   You've had no fever---and no medicine that reduces fever---for one full day (24 hours). And...   Your other symptoms have gotten better. For example, your cough or breathing has improved. And...   At least 10 days have passed since your symptoms started.       During this time, don't leave the house except for testing or medical care.   Stay in your own room, even for meals. Use your own bathroom if you can.    "Stay away from others in your home. No hugging, kissing or shaking hands. No visitors.  Don't go to work, school or anywhere else.    Clean \"high touch\" surfaces often (doorknobs, counters, handles, etc.). Use a household cleaning spray or wipes. You'll find a full list of  on the EPA website: www.epa.gov/pesticide-registration/list-n-disinfectants-use-against-sars-cov-2.   Cover your mouth and nose with a mask, tissue or washcloth to avoid spreading germs.  Wash your hands and face often. Use soap and water.  Caregivers in these groups are at risk for severe illness due to COVID-19:  o People 65 years and older  o People who live in a nursing home or long-term care facility  o People with chronic disease (lung, heart, cancer, diabetes, kidney, liver, immunologic)  o People who have a weakened immune system, including those who:   Are in cancer treatment  Take medicine that weakens the immune system, such as corticosteroids  Had a bone marrow or organ transplant  Have an immune deficiency  Have poorly controlled HIV or AIDS  Are obese (body mass index of 40 or higher)  Smoke regularly   o Caregivers should wear gloves while washing dishes, handling laundry and cleaning bedrooms and bathrooms.  o Use caution when washing and drying laundry: Don't shake dirty laundry, and use the warmest water setting that you can.  o For more tips, go to www.cdc.gov/coronavirus/2019-ncov/downloads/10Things.pdf.    4.Sign up for [employer]. We know it's scary to hear that you might have COVID-19. We want to track your symptoms to make sure you're okay over the next 2 weeks. Please look for an email from [employer]---this is a free, online program that we'll use to keep in touch. To sign up, follow the link in the email. Learn more at [email or web address]  How can I take care of myself?   Get lots of rest. Drink extra fluids (unless a doctor has told you not to).   Take Tylenol (acetaminophen) for fever or " pain. If you have liver or kidney problems, ask your family doctor if it's okay to take Tylenol.   Adults can take either:    650 mg (two 325 mg pills) every 4 to 6 hours, or...   1,000 mg (two 500 mg pills) every 8 hours as needed.    Note: Don't take more than 3,000 mg in one day. Acetaminophen is found in many medicines (both prescribed and over-the-counter medicines). Read all labels to be sure you don't take too much.   For children, check the Tylenol bottle for the right dose. The dose is based on the child's age or weight.    If you have other health problems (like cancer, heart failure, an organ transplant or severe kidney disease): Call your specialty clinic if you don't feel better in the next 2 days.       Know when to call 911. Emergency warning signs include:    Trouble breathing or shortness of breath Pain or pressure in the chest that doesn't go away Feeling confused like you haven't felt before, or not being able to wake up Bluish-colored lips or face.  Where can I get more information?   [facility] -- About COVID-19: [email or web address]   CDC -- What to Do If You're Sick: www.cdc.gov/coronavirus/2019-ncov/about/steps-when-sick.html   [facility] -- Ending Home Isolation: www.cdc.gov/coronavirus/2019-ncov/hcp/disposition-in-home-patients.html   [facility] -- Caring for Someone: www.cdc.gov/coronavirus/2019-ncov/if-you-are-sick/care-for-someone.html   [facility] -- Interim Guidance for Hospital Discharge to Home: www.health.[facility].[place].us/diseases/coronavirus/hcp/hospdischarge.pdf   [facility] clinical trials (COVID-19 research studies): clinicalaffairs.[facility]/umn-clinical-trials    Below are the COVID-19 hotlines at the [place] Department of Health ([facility]). Interpreters are available.    For health questions: Call 755-715-3352 or 1-796.385.2407 (7 a.m. to 7 p.m.) For questions about schools and childcare: Call 107-492-6312 or 1-105.794.6335 (7 a.m. to 7 p.m.)    COVID-19 (Coronavirus)  General Information  Because there is currently no vaccine to prevent infection, the best way to protect yourself is to avoid being exposed to this virus. Common symptoms of COVID-19 include but are not limited to fever, cough, and shortness of breath. These symptoms appear 2-14 days after you are exposed to the virus that causes COVID-19. Click here for more information from the CDC on how to protect yourself.  If you are sick with COVID-19 or suspect you are infected with the virus that causes COVID-19, follow the steps here from the CDC to help prevent the disease from spreading to people in your home and community.  Click here for general information from the CDC on testing.  If you develop any of these emergency warning signs for COVID-19, get medical attention immediately:     Trouble breathing    Persistent pain or pressure in the chest    New confusion or inability to arouse    Bluish lips or face      Call your doctor or clinic before going in. Call 911 if you have a medical emergency and notify the  you have or think you may have COVID-19.  For more detailed and up to date information on COVID-19 (Coronavirus), please visit the CDC website.   Diagnosis: Other malaise  Diagnosis ICD: R53.81

## 2020-08-28 NOTE — PROGRESS NOTES
Josemanuel Edmond is a 75 year old year old adult who is being evaluated via a billable telephone visit for ongoing psychiatric care.     The patient has been notified of the following:    We have found that certain health care needs can be provided without the need for a physical exam. This service lets us provide the care you need with a short phone conversation. If a prescription is necessary we can send it directly to your pharmacy. If lab work is needed we can place an order for that and you can then stop by our lab to have the test done at a later time. Insurers are generally covering virtual visits as they would in-office visits so billing should not be different than normal.  If for some reason you do get billed incorrectly, you should contact the billing office to correct it and that number is in the AVS .      Patient has given verbal consent for Telephone visit?: Yes    Time Service Began: 1036    Time Service Ended: 1052    Phone Call Duration:  16 minutes    Psychiatry Clinic Progress Note                                                                  Patient Name: Josemanuel Edmond  YOB: 1945  MRN: [id number]  Date of Service:  8/28/2020  Last Seen:8/6/2020    Josemanuel Edmond is a 75 year old male who uses the name Gavin and pronoun pat.        Gavin Edmond is a 75 year old year old adult who presents for ongoing psychiatric care.  Gavin Edmond was last seen on 8/6/2020.     At that time,     Medication Ordered/Consults/Labs/tests Ordered:      Medication:   -Discontinue Ambien as you no longer need the medication  -Continue all other medications for now  OTC Recommendations: none  Lab Orders:  none  Referrals: none  Release of Information: none  Future Treatment Considerations: Per symptoms. Discontinue Wellbutrin?  Lamictal?  Cross taper Lexapro?  Return for Follow Up: on 8/28/2020        Pertinent Background:  Diagnoses include social anxiety disorder and MDD.  Psych critical  item history includes [no critical items].      Previous Psychiatric Meds: Wellbutrin XL, Xanax, Klonopin, Valium, Ativan, Remeron (30 mg only), Nortriptyline and Vistaril    Interim History                                                                                                        4, 4     Since the last visit, feeling body ache x 2 days and feels he is having a flu.  Pt is following up with PCP on this and waiting for call back.  Pt reports taking Remeron 15 mg daily around 10:45pm with target bedtime of 11:30pm.  Since last seen, having occasional difficulties with sleeping throughout the night.  Also notes he had good mood with good anxiety x 1 day last week, but since then, almost feels like his social anxiety has been worse. Denies SI, SIB or HI. Pt has not established therapist.    Denies any symptoms suggestive of hypomania or psychosis.    Current Suicidality/Hx of Suicide Attempts: Denies both  CoCominent Medical concerns: Denies    Medication Side Effects: The patient denies all medication side effects.      Medical Review of Systems     Apart from the symptoms mentioned int he HPI, the 14 point review of systems, including constitutional, HEENT, cardiovascular, respiratory, gastrointestinal, genitourinary, musculoskeletal, integumentary, endocrine, neurological, hematologic and allergic is entirely negative.      Substance Use   Pt has been staying substance free since last seen.        Medical / Surgical History                                                                                                                  Patient Active Problem List   Diagnosis     Hypertension goal BP (blood pressure) < 140/90     Hypertrophy of prostate with urinary obstruction     Osteoarthrosis, unspecified whether generalized or localized, pelvic region and thigh     Hyperlipidemia LDL goal <100     Allergic rhinitis     Conjunctivitis, allergic     Anxiety     GERD (gastroesophageal reflux disease)      S/P knee replacement     Moderate major depression (H)     ED (erectile dysfunction)     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)     Chronic atrial fibrillation (H)     Hyperplastic Polyp     Right knee DJD     Peripheral neuropathy     Sleep disturbance     Hematoma of skin     Traumatic ecchymosis of buttock, initial encounter       Past Surgical History:   Procedure Laterality Date     ARTHROPLASTY KNEE Right 10/12/2018    Procedure: ARTHROPLASTY KNEE;  Right Total Knee Arthroplasty;  Surgeon: Jeb Peralta MD;  Location: [facility]     COLONOSCOPY N/A 12/10/2015    Procedure: COMBINED COLONOSCOPY, SINGLE OR MULTIPLE BIOPSY/POLYPECTOMY BY BIOPSY;  Surgeon: Jyoti Figueroa MD;  Location: [facility]     JOINT REPLACEMENT, HIP RT/LT  10/07    Joint Replacement Hip LT     JOINT REPLACEMTN, KNEE RT/LT  8/2011    Joint Replacement knee /LT, [facility]     LAPAROSCOPIC HERNIORRHAPHY INGUINAL BILATERAL Bilateral 4/24/2018    Procedure: LAPAROSCOPIC HERNIORRHAPHY INGUINAL BILATERAL;  Laparoscopic bilateral inguinal hernia repair;  Surgeon: Josemanuel Resendiz MD;  Location: [facility] OR     SURGICAL HISTORY OF -   12/1/1999    Umbilical Herniorrhaphy with mesh     SURGICAL HISTORY OF -  Left 11/2017    Thoracotomy and drainage of empyema        Social/ Family History                                  [per patient report]                                 1ea,1ea   Living arrangements: lives with spouse and feels safe  Social Support: spouse  Access to gun: owns a gun  Retired in 2008.    Allergy                                Bactrim [sulfamethoxazole w/trimethoprim]    Current Medications                                                                                                       Current Outpatient Medications   Medication Sig Dispense Refill     acetaminophen (TYLENOL) 500 MG tablet Take 1,000 mg by mouth every 8 hours as needed for mild pain       buPROPion (WELLBUTRIN SR) 150 MG 12 hr tablet TAKE 1 TABLET TWICE A   "tablet 2     busPIRone HCl (BUSPAR) 30 MG tablet Take 1 tablet (30 mg) by mouth 2 times daily 180 tablet 0     doxazosin (CARDURA) 4 MG tablet Take 1 tablet (4 mg) by mouth daily 90 tablet 2     escitalopram (LEXAPRO) 10 MG tablet Take 1 tablet (10 mg) by mouth daily 90 tablet 0     finasteride (PROSCAR) 5 MG tablet Take 1 tablet (5 mg) by mouth daily 90 tablet 3     gabapentin (NEURONTIN) 300 MG capsule Take 2 capsules (600 mg) by mouth 2 times daily 360 capsule 3     lamoTRIgine (LAMICTAL) 100 MG tablet TAKE 1 TABLET TWICE A  tablet 3     metoprolol tartrate (LOPRESSOR) 25 MG tablet Take 1 tablet (25 mg) by mouth 2 times daily 180 tablet 3     mirtazapine (REMERON) 15 MG tablet Take 1 tablet (15 mg) by mouth nightly as needed (sleep and anxiety, don't take it with ambien) 30 tablet 1     STATIN NOT PRESCRIBED, INTENTIONAL, Please choose reason not prescribed, below (Patient not taking: Reported on 12/10/2019)       XARELTO ANTICOAGULANT 20 MG TABS tablet TAKE 1 TABLET DAILY WITH   DINNER 90 tablet 3        Mental Status Exam                                                                                   9, 14 cog        Alertness: alert  and oriented  Behavior/Demeanor: cooperative, pleasant and calm  Speech: regular rate and rhythm  Mood :  \"feels my social anxiety is worse\"  Affect: full range and appropriate; was not congruent to mood; was not congruent to content  Thought Process (Associations):  Linear and Goal directed  Thought process (Rate):  Normal  Thought content:  no overt psychosis, denies suicidal ideation, intent or thoughts and patient does not appear to be responding to internal stimuli  Perception:  Reports none;  Denies auditory hallucinations, visual hallucinations, depersonalization and derealization  Attention/Concentration:  Normal  Memory:  Immediate recall intact and Short-term memory intact  Language: intact  Fund of Knowledge/Intelligence:  Average  Abstraction:  " Normal  Insight:  Fair  Judgment:  Fair  Cognition: (6) does  appear grossly intact; formal cognitive testing was not done    Labs and Results      Pertinent findings on review include: Review of records with relevant information reported in the HPI.  Reviewed pt's past medical record and obtained collateral information.      [place] PRESCRIPTION MONITORING PROGRAM [] was checked today:  indicates Ambien 7/28 (PCP).    PHQ9 Today:  N/A  PHQ 11/15/2018 2/26/2019 12/10/2019   PHQ-9 Total Score 9 3 4   Q9: Thoughts of better off dead/self-harm past 2 weeks Not at all Not at all Not at all       CAROLYNN 7 Today: N/A  CAROLYNN-7 SCORE 11/15/2018 2/26/2019 12/10/2019   Total Score - - -   Total Score 6 5 2       Recent Labs   Lab Test 09/04/19  1020 09/14/18  1629 05/07/18  1325   CR 0.81 0.93 1.07   GFRESTIMATED 86 79 67     Recent Labs   Lab Test 01/22/18  1354 12/20/17  1315   AST 15 24   ALT 32 17   ALKPHOS 60 66     PSYCHOTROPIC DRUG INTERACTIONS:   Buspar---Gabapentin: Concurrent use of GABAPENTIN and CNS DEPRESSANTS may result in respiratory depression.    Buspar---Lexapro---Remeron: Concurrent use of ESCITALOPRAM and SEROTONERGIC DRUGS may result in increased risk of serotonin syndrome.   Wellbutrin---Lexapro: Concurrent use of BUPROPION and AGENTS LOWERING SEIZURE THRESHOLD may result in lower seizure threshold.   LExapro---Remeron---Xarelto: Concurrent use of RIVAROXABAN and SEROTONIN NOREPINEPHRINE REUPTAKE INHIBITORS AND SSRIS may result in increased risk of bleeding  Doxazosin---Metoprolol: Concurrent use of ALPHA-1 ADRENERGIC BLOCKERS and BETA-ADRENERGIC BLOCKERS may result in an exaggerated hypotensive response to the first dose of the alpha blocker.   Metoprolol---Wellbutrin: Concurrent use of METOPROLOL and CYP2D6 INHIBITORS may result in increased metoprolol exposure.         MANAGEMENT:  Monitoring for adverse effects, routine vitals and patient is aware of risks      Impression/Assessment      Gavin Edmond is  a 75 year old adult  who presents for med management follow up.  Pt sounds stable in his mood and anxiety, denies SI, SIB or HI.  However, pt noted expect one day last week, feels his mood and anxiety are worse while also his middle insomnia recurred since last seen.  Pt also notes not feeling well physically x 2 days and following up with PCP.  Discussed possibility of increasing Remeron to 30 mg HS PRN and see if this would continue to improve his sleep consistently.  Also strongly recommended establishing therapist to manage social anxiety.  Once when he is stable on his sleep with Remeron, may consider medication reconciliation soon.      Diagnosis                                                                   Social anxiety disorder  MDD    Treatment Recommendation & Plan       Medication Ordered/Consults/Labs/tests Ordered:     Medication:   -Increase Mirtazapine to 30 mg at bedtime as needed for sleep and anxiety.  If 30 mg is oversedating, may take 22.5 mg (1.5 tabs)  -Continue all other medications for now  OTC Recommendations: none  Lab Orders:  none  Referrals: none  Release of Information: none  Future Treatment Considerations: Per symptoms.   Return for Follow Up: in 1 week    -Discussed safety plan for suicidal thoughts  -Discussed plan for suicidality  -Discussed available emergency services  -Patient agrees with the treatment plan  -Encouraged to continue outpatient therapy to gain more coping mechanism for stress.      Treatment Risk Statement: Discussed with the patient my impressions, as well as recommended studies. I educated patient on the differential diagnosis and prognosis. I discussed with the patient the risks and benefits of medications versus no interventions, including efficacy, dose, possible side effects and length of treatment and the importance of medication compliance.  The patient understands the risks, benefits, adverse effects and alternatives. Agrees to treatment with the  capacity to do so. No medical contraindications to treatment. The patient also understands the risks of using street drugs or alcohol.     CRISIS NUMBERS:   Provided routinely in AVS.       Rylee Kyle CNP,  8/28/2020

## 2020-08-28 NOTE — PATIENT INSTRUCTIONS
-Increase Mirtazapine to 30 mg at bedtime as needed for sleep and anxiety.  If 30 mg is oversedating, may take 22.5 mg (1.5 tabs)  -Continue all other medications for now    Your next appointment is scheduled on 9/4 (Fri) at 10am.    To access your telemedicine visit:     Open a web browser, like [employer], and type [email or web address]     You will see a box asking you to check in to let Rylee Kyle know that you are here.     Type in your name and press Check In. That will let Rylee see you in the virtual waiting room. At your scheduled appointment time, your provider will initiate the visit and connect you.     When your visit is done, you can simply close the browser window.        Please Note:  Ideally, you will connect from a desktop, laptop, or tablet with a WiFi connection. Your computer/tablet must have a camera and microphone. You can use a cell phone, if it has a camera, and if you can connect to WiFi. However, if you connect your phone over a cellular network, it is of lower quality and less reliable.    Thank you for coming to the PSYCHIATRY CLINIC.    Lab Testing:  If you had lab testing today and your results are reassuring or normal they will be mailed to you or sent through [place] within 7 days. If the lab tests need quick action we will call you with the results. The phone number we will call with results is # [phone] (home) . If this is not the best number please call our clinic and change the number.    Medication Refills:  If you need any refills please call your pharmacy and they will contact us. Our fax number for refills is [phone]. Please allow three business for refill processing. If you need to  your refill at a new pharmacy, please contact the new pharmacy directly. The new pharmacy will help you get your medications transferred.     Scheduling:  If you have any concerns about today's visit or wish to schedule another appointment please call our office during normal  business hours [phone] (8-5:00 M-F)    Contact Us:  Please call [phone] during business hours (8-5:00 M-F).  If after clinic hours, or on the weekend, please call  [phone].    Financial Assistance [phone]  [facility] Billing [phone]  [place] Billing Office, [facility]: [phone]  [place] Billing [phone]  Medical Records [phone]      MENTAL HEALTH CRISIS NUMBERS:  For a medical emergency please call  911 or go to the nearest ER.     [facility]:   [facility] -[phone]   Crisis Residence [facility]idence -[phone]   Walk-In Counseling Center [facility] -[phone]   COPE 24/7 [place] Mobile Team -[phone] (adults)/[phone] (child)  CHILD: [facility] needs assessment team - [phone]      [facility] - [phone]   Walk-in counseling [facility] - [phone]   Walk-in counseling [facility] - [phone]   Crisis Residence [facility] - [phone]  Urgent Care Adult Mental Wsumrs-[phone] mobile unit/ 24/7 crisis line    National Crisis Numbers:   National Suicide Prevention Lifeline: 4-209-776-TALK (540-978-9796)  Poison Control Center - 1-544.182.4911  Bravofly.BioTime/resources for a list of additional resources (SOS)  Trans Lifeline a hotline for transgender people 1-629.270.5421  The Armando Project a hotline for LGBT youth 1-628.917.5718  Crisis Text Line: For any crisis 24/7   To: 433187  see www.crisistextline.org  - IF MAKING A CALL FEELS TOO HARD, send a text!         Again thank you for choosing PSYCHIATRY CLINIC and please let us know how we can best partner with you to improve you and your family's health.    You may be receiving a survey regarding this appointment. We would love to have your feedback, both positive and negative. The survey is done by an external company, so your answers are anonymous.

## 2020-08-29 DIAGNOSIS — Z20.822 ENCOUNTER FOR LABORATORY TESTING FOR COVID-19 VIRUS: Primary | ICD-10-CM

## 2020-08-29 DIAGNOSIS — Z20.822 SUSPECTED COVID-19 VIRUS INFECTION: ICD-10-CM

## 2020-08-29 PROCEDURE — U0003 INFECTIOUS AGENT DETECTION BY NUCLEIC ACID (DNA OR RNA); SEVERE ACUTE RESPIRATORY SYNDROME CORONAVIRUS 2 (SARS-COV-2) (CORONAVIRUS DISEASE [COVID-19]), AMPLIFIED PROBE TECHNIQUE, MAKING USE OF HIGH THROUGHPUT TECHNOLOGIES AS DESCRIBED BY CMS-2020-01-R: HCPCS | Performed by: FAMILY MEDICINE

## 2020-08-30 LAB
SARS-COV-2 RNA SPEC QL NAA+PROBE: NOT DETECTED
SPECIMEN SOURCE: NORMAL

## 2020-09-04 ENCOUNTER — VIRTUAL VISIT (OUTPATIENT)
Dept: PSYCHIATRY | Facility: CLINIC | Age: 75
End: 2020-09-04
Attending: NURSE PRACTITIONER
Payer: COMMERCIAL

## 2020-09-04 DIAGNOSIS — F40.10 SOCIAL ANXIETY DISORDER: Primary | ICD-10-CM

## 2020-09-04 DIAGNOSIS — F33.41 RECURRENT MAJOR DEPRESSIVE DISORDER, IN PARTIAL REMISSION (H): ICD-10-CM

## 2020-09-04 ASSESSMENT — PAIN SCALES - GENERAL: PAINLEVEL: NO PAIN (0)

## 2020-09-04 NOTE — PROGRESS NOTES
"VIDEO VISIT  Josemanuel Edmond is a 75 year old patient who is being evaluated via a billable video visit.      The patient has been notified of following:   \"This video visit will be conducted via a call between you and your physician/provider. We have found that certain health care needs can be provided without the need for an in-person physical exam. This service lets us provide the care you need with a video conversation. If a prescription is necessary we can send it directly to your pharmacy. If lab work is needed we can place an order for that and you can then stop by our lab to have the test done at a later time. Insurers are generally covering virtual visits as they would in-office visits so billing should not be different than normal.  If for some reason you do get billed incorrectly, you should contact the billing office to correct it and that number is in the AVS .    Video Conference to be completed via:  [email or web address]    Patient has given verbal consent for video visit?:  Yes    Patient would prefer that any video invitations be sent by: Send to e-mail at: [email or web address]      How would patient like to obtain AVS?:  Ezequiel    AVS SmartPhrase [PsychAVS] has been placed in 'Patient Instructions':  Yes    "

## 2020-09-04 NOTE — PATIENT INSTRUCTIONS
-Continue on current medication regimen for now    Therapist    Jessica Simons  [email or web address]  [facility]  [address] [place] [address]  [phone]    Clara Kahler [email or web address]  [address] [address] [address]  [phone]    Your next appointment is scheduled on 9/18 (Fri) at 11:15am.    To access your telemedicine visit:     Open a web browser, like [employer], and type [email or web address]     You will see a box asking you to check in to let Rylee Kyle know that you are here.     Type in your name and press Check In. That will let Rylee see you in the virtual waiting room. At your scheduled appointment time, your provider will initiate the visit and connect you.     When your visit is done, you can simply close the browser window.        Please Note:  Ideally, you will connect from a desktop, laptop, or tablet with a WiFi connection. Your computer/tablet must have a camera and microphone. You can use a cell phone, if it has a camera, and if you can connect to WiFi. However, if you connect your phone over a cellular network, it is of lower quality and less reliable      Thank you for coming to the PSYCHIATRY CLINIC.    Lab Testing:  If you had lab testing today and your results are reassuring or normal they will be mailed to you or sent through [employer] within 7 days. If the lab tests need quick action we will call you with the results. The phone number we will call with results is # [phone] (home) . If this is not the best number please call our clinic and change the number.    Medication Refills:  If you need any refills please call your pharmacy and they will contact us. Our fax number for refills is  [phone]. Please allow three business for refill processing. If you need to  your refill at a new pharmacy, please contact the new pharmacy directly. The new pharmacy will help you get your medications transferred.     Scheduling:  If you have any concerns about today's visit or wish to schedule another appointment please call our office during normal business hours [phone] (8-5:00 M-F)    Contact Us:  Please call [phone] during business hours (8-5:00 M-F).  If after clinic hours, or on the weekend, please call  [phone].    Financial Assistance [phone]  [facility] Billing [phone]  [place] Billing Office, [employer]: [phone]  [place] Billing [phone]  Medical Records [phone]      MENTAL HEALTH CRISIS NUMBERS:  For a medical emergency please call  911 or go to the nearest ER.     [facility]:   [facility] -[phone]   Crisis Residence [facility] -[phone]   Walk-In Counseling [facility] -[phone]   COPE 24/7 [place] Mobile Team -[phone] (adults)/[phone] (child)  CHILD: [facility] needs assessment team - [phone]      [facility]:   [facility] - [phone]   Walk-in counseling [facility] - [phone]   Walk-in counseling [facility] - [phone]   Crisis Residence [facility] Residence - [phone]  Urgent Care Adult Mental Dvkurr-[phone] mobile unit/ 24/7 crisis line    National Crisis Numbers:   National Suicide Prevention Lifeline: 9-190-660-TALK (017-720-5168)  Poison Control Center - 1-618.382.8963  bop.fm.Ofuz/resources for a list of additional resources (SOS)  Trans Lifeline a hotline for transgender people 4-959-329-5595  The Armando Project a hotline for LGBT youth 1-993.643.4286  Crisis Text Line: For any crisis 24/7   To: 416991  see www.crisistextline.org  - IF MAKING A CALL FEELS TOO HARD, send a text!         Again  thank you for choosing PSYCHIATRY CLINIC and please let us know how we can best partner with you to improve you and your family's health.    You may be receiving a survey regarding this appointment. We would love to have your feedback, both positive and negative. The survey is done by an external company, so your answers are anonymous.
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
## 2020-09-18 ENCOUNTER — VIRTUAL VISIT (OUTPATIENT)
Dept: PSYCHIATRY | Facility: CLINIC | Age: 75
End: 2020-09-18
Attending: NURSE PRACTITIONER
Payer: COMMERCIAL

## 2020-09-18 DIAGNOSIS — F51.01 PRIMARY INSOMNIA: Primary | ICD-10-CM

## 2020-09-18 DIAGNOSIS — F40.10 SOCIAL ANXIETY DISORDER: ICD-10-CM

## 2020-09-18 DIAGNOSIS — F33.41 RECURRENT MAJOR DEPRESSIVE DISORDER, IN PARTIAL REMISSION (H): ICD-10-CM

## 2020-09-18 RX ORDER — MIRTAZAPINE 45 MG/1
45 TABLET, FILM COATED ORAL AT BEDTIME
Qty: 30 TABLET | Refills: 1 | Status: SHIPPED | OUTPATIENT
Start: 2020-09-18 | End: 2020-11-19 | Stop reason: DRUGHIGH

## 2020-09-18 ASSESSMENT — PAIN SCALES - GENERAL: PAINLEVEL: NO PAIN (0)

## 2020-09-18 NOTE — PATIENT INSTRUCTIONS
-Increase Remeron to 45 mg at bedtime for sleep and anxiety.  Monitor for sign of bleeding such as new bruise, nose bleeding, bleeding from gum  -Continue all other medications for now    Your next appointment is scheduled on 10/15 (Thu) at 10am.    To access your telemedicine visit:     Open a web browser, like Xeround, and type [email or web address]     You will see a box asking you to check in to let Rylee Kyle know that you are here.     Type in your name and press Check In. That will let Rylee see you in the virtual waiting room. At your scheduled appointment time, your provider will initiate the visit and connect you.     When your visit is done, you can simply close the browser window.        Please Note:  Ideally, you will connect from a desktop, laptop, or tablet with a WiFi connection. Your computer/tablet must have a camera and microphone. You can use a cell phone, if it has a camera, and if you can connect to WiFi. However, if you connect your phone over a cellular network, it is of lower quality and less reliable      Thank you for coming to the PSYCHIATRY CLINIC.    Lab Testing:  If you had lab testing today and your results are reassuring or normal they will be mailed to you or sent through idiag within 7 days. If the lab tests need quick action we will call you with the results. The phone number we will call with results is # [phone] (home) . If this is not the best number please call our clinic and change the number.    Medication Refills:  If you need any refills please call your pharmacy and they will contact us. Our fax number for refills is [phone]. Please allow three business for refill processing. If you need to  your refill at a new pharmacy, please contact the new pharmacy directly. The new pharmacy will help you get your medications transferred.     Scheduling:  If you have any concerns about today's visit or wish to schedule another appointment please call our  office during normal business hours [phone] (8-5:00 M-F)    Contact Us:  Please call [phone] during business hours (8-5:00 M-F).  If after clinic hours, or on the weekend, please call  [phone].    Financial Assistance [phone]  [facility] Billing [phone]  [place] Billing [facility], [facility]: [phone]  [place] Billing [phone]  Medical Records [phone]      MENTAL HEALTH CRISIS NUMBERS:  For a medical emergency please call  911 or go to the nearest ER.     [facility]:   [facility] -[phone]   Crisis Residence [facility] -[phone]   Walk-In Counseling Center [facility] -[phone]   COPE 24/7 [place] Mobile Team -[phone] (adults)/[phone] (child)  CHILD: [facility] needs assessment team - [phone]      [facility]:   [facility] - [phone]   Walk-in counseling [facility] - [phone]   Walk-in counseling [facility] - [phone]   Crisis Residence [facility] - [phone]  Urgent Care Adult Mental [place]-[phone] mobile unit/ 24/7 crisis line    National Crisis Numbers:   National Suicide Prevention Lifeline: 8-032-259-TALK (590-817-1539)  Poison Control Center - 1-317.739.5129  [place]RAVIDI/resources for a list of additional resources (SOS)  Trans Lifeline a hotline for transgender people 1-435.378.4243  The [employer] Project a hotline for LGBT youth 1-633.464.8787  Crisis Text Line: For any crisis 24/7   To: 108592  see www.crisistextline.org  - IF MAKING A CALL FEELS TOO HARD, send a text!         Again thank you for choosing PSYCHIATRY CLINIC and please let us know how we can best partner with you to improve you and your family's health.    You may be receiving a survey regarding this appointment. We would love to have your feedback, both positive and negative. The survey is done by an external company, so your answers are anonymous.

## 2020-09-18 NOTE — PROGRESS NOTES
"VIDEO VISIT  Josemanuel Edmond is a 75 year old patient who is being evaluated via a billable video visit.      The patient has been notified of following:   \"This video visit will be conducted via a call between you and your physician/provider. We have found that certain health care needs can be provided without the need for an in-person physical exam. This service lets us provide the care you need with a video conversation. If a prescription is necessary we can send it directly to your pharmacy. If lab work is needed we can place an order for that and you can then stop by our lab to have the test done at a later time. Insurers are generally covering virtual visits as they would in-office visits so billing should not be different than normal.  If for some reason you do get billed incorrectly, you should contact the billing office to correct it and that number is in the AVS .    Video Conference to be completed via:  [email or web address]    Patient has given verbal consent for video visit?:  Yes    Patient would prefer that any video invitations be sent by: Send to e-mail at: [email or web address]      How would patient like to obtain AVS?:  [employer]    AVS SmartPhrase [PsychAVS] has been placed in 'Patient Instructions':  Yes     Start Time:  1120         End Time: 1140    Telemedicine Visit: The patient's condition can be safely assessed and treated via synchronous audio and visual telemedicine encounter.      Reason for Telemedicine Visit: Due to COVID 19 pandemic, clinic switching all appointments to telemedicine     Originating Site (Patient Location): Patient's home    Distant Site (Provider Location): Provider Remote Setting    Consent:  The patient/guardian has verbally consented to: the potential risks and benefits of telemedicine (video visit) versus in person care; bill my insurance or make self-payment for services provided; and responsibility for payment of non-covered services.     Mode of Communication:  Video Conference " via Doxy.    As the provider I attest to compliance with applicable laws and regulations related to telemedicine.    Psychiatry Clinic Progress Note                                                                  Patient Name: Josemanuel Edmond  YOB: 1945  MRN: [id number]  Date of Service:  9/18/2020  Last Seen:9/4/2020    Josemanuel Edmond is a 75 year old person assigned male at birth, identifies as cisgender male who uses the name Gavin and pronoun pat.      Gavin Edmond is a 75 year old year old adult who presents for ongoing psychiatric care.  Gavin Edmond was last seen on 9/4/2020.     At that time,     Medication Ordered/Consults/Labs/tests Ordered:      Medication: Continue on current medication regimen for now  OTC Recommendations: none  Lab Orders:  none  Referrals:  Jessica Simons  [email or web address]  [facility]  [address] [place] [address]  [phone]     Michelle Kahler [email or web address]  [address] [address] [address]  [phone]        Release of Information: none  Future Treatment Considerations: Per symptoms.   Return for Follow Up: in 2 weeks      Pertinent Background:  Reports depression started 2002 after his son was killed in MVA.  Anxiety also started around the same time, but social anxiety started 6-7 years ago.  Denies any hx of SI, SIB, HI, psych hospitalization.  After son was killed, pt had heavy ETOH use on and off. Psych critical item history includes [no critical items].      Previous Psychiatric Meds: Wellbutrin XL, Xanax, Klonopin, Valium, Ativan, Remeron (30 mg only), Nortriptyline and Vistaril    Interim History                                                                                                         4, 4     Since the last visit, no significant changes in anxiety and mood.  Was anxious about the plan to visit friend in [facility] this weekend and staying over night, but now feels ok to try this.  Has not established therapist, but considering [facility] close home, but has not checked with insurance and availability.  Noted Remeron 30 mg worked well for sleep first 2-3 nights and then started not to work.  Feels Ambien worked the best for sleep, having initial insomnia x 3 years, middle insomnia has resolved.  Denies SI, SIB or HI.    Denies any symptoms suggestive of hypomania or psychosis.    Current Suicidality/Hx of Suicide Attempts: Denies both  CoCominent Medical concerns: Denies    Medication Side Effects: The patient denies all medication side effects.      Medical Review of Systems     Apart from the symptoms mentioned int he HPI, the 14 point review of systems, including constitutional, HEENT, cardiovascular, respiratory, gastrointestinal, genitourinary, musculoskeletal, integumentary, endocrine, neurological, hematologic and allergic is entirely negative.      Substance Use   Pt has been staying substance free since last seen.      Medical / Surgical History                                                                                                                  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)     Chronic atrial fibrillation (H)     Hyperplastic Polyp     Right knee DJD     Peripheral  neuropathy     Sleep disturbance     Hematoma of skin     Traumatic ecchymosis of buttock, initial encounter       Past Surgical History:   Procedure Laterality Date     ARTHROPLASTY KNEE Right 10/12/2018    Procedure: ARTHROPLASTY KNEE;  Right Total Knee Arthroplasty;  Surgeon: Jeb Peralta MD;  Location: [facility] OR     COLONOSCOPY N/A 12/10/2015    Procedure: COMBINED COLONOSCOPY, SINGLE OR MULTIPLE BIOPSY/POLYPECTOMY BY BIOPSY;  Surgeon: Jyoti Figueroa MD;  Location: [facility] GI     JOINT REPLACEMENT, HIP RT/LT  10/07    Joint Replacement Hip LT     JOINT REPLACEMTN, KNEE RT/LT  8/2011    Joint Replacement knee /LT, [facility]     LAPAROSCOPIC HERNIORRHAPHY INGUINAL BILATERAL Bilateral 4/24/2018    Procedure: LAPAROSCOPIC HERNIORRHAPHY INGUINAL BILATERAL;  Laparoscopic bilateral inguinal hernia repair;  Surgeon: Josemanuel Resendiz MD;  Location: [facility] OR     SURGICAL HISTORY OF -   12/1/1999    Umbilical Herniorrhaphy with mesh     SURGICAL HISTORY OF -  Left 11/2017    Thoracotomy and drainage of empyema        Social/ Family History                                  [per patient report]                                 1ea,1ea   Living arrangements: lives with spouse and feels safe  Social Support: spouse  Access to gun: owns a gun  Retired in 2008.  Trauma history includes loss of son by MVA in 2002.       Allergy                                Bactrim [sulfamethoxazole w/trimethoprim]    Current Medications                                                                                                       Current Outpatient Medications   Medication Sig Dispense Refill     acetaminophen (TYLENOL) 500 MG tablet Take 1,000 mg by mouth every 8 hours as needed for mild pain       buPROPion (WELLBUTRIN SR) 150 MG 12 hr tablet TAKE 1 TABLET TWICE A  tablet 2     busPIRone HCl (BUSPAR) 30 MG tablet Take 1 tablet (30 mg) by mouth 2 times daily 180 tablet 0     doxazosin (CARDURA) 4 MG tablet Take 1 tablet  "(4 mg) by mouth daily 90 tablet 2     escitalopram (LEXAPRO) 10 MG tablet Take 1 tablet (10 mg) by mouth daily 90 tablet 0     finasteride (PROSCAR) 5 MG tablet Take 1 tablet (5 mg) by mouth daily 90 tablet 3     gabapentin (NEURONTIN) 300 MG capsule Take 2 capsules (600 mg) by mouth 2 times daily 360 capsule 3     lamoTRIgine (LAMICTAL) 100 MG tablet TAKE 1 TABLET TWICE A  tablet 3     metoprolol tartrate (LOPRESSOR) 25 MG tablet Take 1 tablet (25 mg) by mouth 2 times daily 180 tablet 3     mirtazapine (REMERON) 15 MG tablet Take 2 tablets (30 mg) by mouth nightly as needed (sleep and anxiety) 60 tablet 1     STATIN NOT PRESCRIBED, INTENTIONAL, Please choose reason not prescribed, below       XARELTO ANTICOAGULANT 20 MG TABS tablet TAKE 1 TABLET DAILY WITH   DINNER 90 tablet 3          Mental Status Exam                                                                                   9, 14 cog        Alertness: alert  and oriented  Appearance:  Casually dressed and Adequately groomed  Behavior/Demeanor: cooperative, pleasant and calm, with good  eye contact   Speech: regular rate and rhythm  Mood :  \"okay\"  Affect: full range and appropriate; was congruent to mood; was congruent to content  Thought Process (Associations):  Linear and Goal directed  Thought process (Rate):  Normal  Thought content:  no overt psychosis, denies suicidal ideation, intent or thoughts and patient does not appear to be responding to internal stimuli  Perception:  Reports none;  Denies depersonalization and derealization  Attention/Concentration:  Normal  Memory:  Immediate recall intact and Short-term memory intact  Language: intact  Fund of Knowledge/Intelligence:  Average  Abstraction:  Normal  Insight:  Fair  Judgment:  Fair  Cognition: (6) does  appear grossly intact; formal cognitive testing was not done    Physical Exam     Motor activity/EPS:  Normal  Psychomotor: normal or unremarkable    Labs and Results      Pertinent " findings on review include: Review of records with relevant information reported in the HPI.  Reviewed pt's past medical record and obtained collateral information.      [place] PRESCRIPTION MONITORING PROGRAM [] was checked today:  indicates no refills since last seen.    PHQ9 Today:  N/A  PHQ 11/15/2018 2/26/2019 12/10/2019   PHQ-9 Total Score 9 3 4   Q9: Thoughts of better off dead/self-harm past 2 weeks Not at all Not at all Not at all       CAROLYNN 7 Today: N/A  CAROLYNN-7 SCORE 11/15/2018 2/26/2019 12/10/2019   Total Score - - -   Total Score 6 5 2       Recent Labs   Lab Test 09/04/19  1020 09/14/18  1629 05/07/18  1325   CR 0.81 0.93 1.07   GFRESTIMATED 86 79 67     Recent Labs   Lab Test 01/22/18  1354 12/20/17  1315   AST 15 24   ALT 32 17   ALKPHOS 60 66     PSYCHOTROPIC DRUG INTERACTIONS:   Buspar---Gabapentin: Concurrent use of GABAPENTIN and CNS DEPRESSANTS may result in respiratory depression.    Buspar---Lexapro---Remeron: Concurrent use of ESCITALOPRAM and SEROTONERGIC DRUGS may result in increased risk of serotonin syndrome.   Wellbutrin---Lexapro: Concurrent use of BUPROPION and AGENTS LOWERING SEIZURE THRESHOLD may result in lower seizure threshold.   LExapro---Remeron---Xarelto: Concurrent use of RIVAROXABAN and SEROTONIN NOREPINEPHRINE REUPTAKE INHIBITORS AND SSRIS may result in increased risk of bleeding  Doxazosin---Metoprolol: Concurrent use of ALPHA-1 ADRENERGIC BLOCKERS and BETA-ADRENERGIC BLOCKERS may result in an exaggerated hypotensive response to the first dose of the alpha blocker.   Metoprolol---Wellbutrin: Concurrent use of METOPROLOL and CYP2D6 INHIBITORS may result in increased metoprolol exposure.         MANAGEMENT:  Monitoring for adverse effects, routine vitals and patient is aware of risks      Impression/Assessment      Gavin Edmond is a 75 year old adult  who presents for med management follow up.  Pt appears fairly stable in his mood and anxiety, denies SI, SIB or HI.  Pt noted he  has not noted significant improvement in his mood or anxiety, but has a plan to stay overnight at friend's this weekend.  Initially anxious about this, but now feels ok to go and try.  Pt has not established therapist, but found a zelda based therapy that he is considering.  Encouraged strongly to follow up with therapist establishment.  Pt also noted Remeron 30 mg helped with sleep for first few days, but then initial insomnia recurred though continues to not to have middle insomnia.  Discussed pt had best luck with Ambien for initial and middle insomnia.  Discussed risks of sedative and fall due to his age though there's also risk in polypharmacy.  If Remeron 45 mg does not work, may consider referral to sleep medicine since he has been having difficulties with initial and middle insomnia x 3 years.  However, for the mood and anxiety, maybe helpful to keep Remeron and add Effexor in the future while medication reconciliation of other medications.  In depth discussed about possible increased risk of bleeding with Xalerto and serotonergic medication.      Diagnosis                                                                    Social anxiety disorder  MDD  Insomnia    Treatment Recommendation & Plan       Medication Ordered/Consults/Labs/tests Ordered:     Medication:   -Increase Remeron to 45 mg at bedtime for sleep and anxiety  -Continue all other medications for now  OTC Recommendations: none  Lab Orders:  none  Referrals: follow up with establishing therapist  Release of Information: none  Future Treatment Considerations: Per symptoms. Sleep medicine consult?  Effexor?  Return for Follow Up: in 1 month    -Discussed safety plan for suicidal thoughts  -Discussed plan for suicidality  -Discussed available emergency services  -Patient agrees with the treatment plan  -Encouraged to continue outpatient therapy to gain more coping mechanism for stress.    Treatment Risk Statement: Discussed with the patient my  impressions, as well as recommended studies. I educated patient on the differential diagnosis and prognosis. I discussed with the patient the risks and benefits of medications versus no interventions, including efficacy, dose, possible side effects and length of treatment and the importance of medication compliance.  The patient understands the risks, benefits, adverse effects and alternatives. Agrees to treatment with the capacity to do so. No medical contraindications to treatment. The patient also understands the risks of using street drugs or alcohol.     CRISIS NUMBERS:   Provided routinely in AVS.    Rylee Kyle CNP,  9/18/2020

## 2020-10-05 ENCOUNTER — MYC MEDICAL ADVICE (OUTPATIENT)
Dept: FAMILY MEDICINE | Facility: CLINIC | Age: 75
End: 2020-10-05

## 2020-10-05 DIAGNOSIS — Z20.822 ENCOUNTER FOR LABORATORY TESTING FOR COVID-19 VIRUS: ICD-10-CM

## 2020-10-05 DIAGNOSIS — Z71.84 COUNSELING ABOUT TRAVEL: Primary | ICD-10-CM

## 2020-10-15 ENCOUNTER — TELEPHONE (OUTPATIENT)
Dept: PSYCHIATRY | Facility: CLINIC | Age: 75
End: 2020-10-15

## 2020-10-15 ENCOUNTER — VIRTUAL VISIT (OUTPATIENT)
Dept: PSYCHIATRY | Facility: CLINIC | Age: 75
End: 2020-10-15
Attending: NURSE PRACTITIONER
Payer: COMMERCIAL

## 2020-10-15 DIAGNOSIS — F33.41 RECURRENT MAJOR DEPRESSIVE DISORDER, IN PARTIAL REMISSION (H): ICD-10-CM

## 2020-10-15 DIAGNOSIS — F41.9 ANXIETY: Primary | ICD-10-CM

## 2020-10-15 DIAGNOSIS — F51.01 PRIMARY INSOMNIA: ICD-10-CM

## 2020-10-15 PROCEDURE — 99214 OFFICE O/P EST MOD 30 MIN: CPT | Mod: 95 | Performed by: NURSE PRACTITIONER

## 2020-10-15 RX ORDER — ESCITALOPRAM OXALATE 10 MG/1
10 TABLET ORAL DAILY
Qty: 90 TABLET | Refills: 0 | Status: SHIPPED | OUTPATIENT
Start: 2020-10-15 | End: 2020-12-29

## 2020-10-15 NOTE — PATIENT INSTRUCTIONS
-Continue on current medication regimen    Your next appointment is scheduled on 11/19 (Thu) at 10am.    To access your telemedicine visit:     Open a web browser, like IDbyME, and type [email or web address]     You will see a box asking you to check in to let Rylee Kyle know that you are here.     Type in your name and press Check In. That will let Rylee see you in the virtual waiting room. At your scheduled appointment time, your provider will initiate the visit and connect you.     When your visit is done, you can simply close the browser window.        Please Note:  Ideally, you will connect from a desktop, laptop, or tablet with a WiFi connection. Your computer/tablet must have a camera and microphone. You can use a cell phone, if it has a camera, and if you can connect to WiFi. However, if you connect your phone over a cellular network, it is of lower quality and less reliable.    Thank you for coming to the [facility] MENTAL HEALTH & ADDICTION [place] [facility].    Lab Testing:  If you had lab testing today and your results are reassuring or normal they will be mailed to you or sent through Semafone within 7 days. If the lab tests need quick action we will call you with the results. The phone number we will call with results is # [phone] (home) . If this is not the best number please call our clinic and change the number.    Medication Refills:  If you need any refills please call your pharmacy and they will contact us. Our fax number for refills is [phone]. Please allow three business for refill processing. If you need to  your refill at a new pharmacy, please contact the new pharmacy directly. The new pharmacy will help you get your medications transferred.     Scheduling:  If you have any concerns about today's visit or wish to schedule another appointment please call our office during normal business hours [phone] (8-5:00 M-F)    Contact Us:  Please call [phone]  during business hours (8-5:00 M-F).  If after clinic hours, or on the weekend, please call  [phone].    Financial Assistance [phone]  [facility] Billing [phone]  [facility] Billing [facility], [facility]: [phone]  [place] Billing [phone]  Medical Records [phone]      MENTAL HEALTH CRISIS NUMBERS:  For a medical emergency please call  911 or go to the nearest ER.     [facility]:   [facility] -[phone]   Crisis Residence [facility] -[phone]   Walk-In Counseling Center \[facility] [place] [facility]\"" -[phone]   COPE 24/7 [place] Mobile Team -[phone] (adults)/[phone] (child)  [facility] - [phone]      [facility]:   [facility] - [phone]   Walk-in counseling [facility] - [phone]   Walk-in counseling [facility] - [phone]   Crisis Residence [facility] - [phone]  Urgent Care Adult Mental [place]-[phone] mobile unit/ 24/7 crisis line    National Crisis Numbers:   National Suicide Prevention Lifeline: 7-575-477-TALK (511-285-5171)  Poison Control Center - 1-149.646.5302  [employer]/resources for a list of additional resources (SOS)  Trans Lifeline a hotline for transgender people 1-374.752.5343  The Armando Project a hotline for LGBT youth 1-830.772.4177  Crisis Text Line: For any crisis 24/7   To: 361833  see www.crisistextline.org  - IF MAKING A CALL FEELS TOO HARD, send a text!         Again thank you for choosing [facility] MENTAL HEALTH & ADDICTION [facility] and please let us know how we can best partner with you to improve you and your family's health.    You may be receiving a survey regarding this appointment. We would love to have your feedback, both positive and negative. The survey is done by an external company, so your answers are anonymous.
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
## 2020-10-15 NOTE — TELEPHONE ENCOUNTER
On 10/15/2020, at 0946, writer called patient at [phone] to confirm Virtual Visit. Writer unable to make contact with patient. Writer left detailed voice message for call back. [phone] left as call back number. Rasheeda Handy MA

## 2020-10-22 DIAGNOSIS — F41.9 ANXIETY: Primary | ICD-10-CM

## 2020-10-22 RX ORDER — QUETIAPINE FUMARATE 25 MG/1
25-50 TABLET, FILM COATED ORAL 2 TIMES DAILY PRN
Qty: 100 TABLET | Refills: 0 | Status: SHIPPED | OUTPATIENT
Start: 2020-10-22 | End: 2020-10-23

## 2020-11-16 ENCOUNTER — HEALTH MAINTENANCE LETTER (OUTPATIENT)
Age: 75
End: 2020-11-16

## 2020-11-19 ENCOUNTER — TELEPHONE (OUTPATIENT)
Dept: PSYCHIATRY | Facility: CLINIC | Age: 75
End: 2020-11-19

## 2020-11-19 ENCOUNTER — VIRTUAL VISIT (OUTPATIENT)
Dept: PSYCHIATRY | Facility: CLINIC | Age: 75
End: 2020-11-19
Attending: NURSE PRACTITIONER
Payer: COMMERCIAL

## 2020-11-19 DIAGNOSIS — M79.621 PAIN OF RIGHT UPPER ARM: ICD-10-CM

## 2020-11-19 DIAGNOSIS — F40.10 SOCIAL ANXIETY DISORDER: ICD-10-CM

## 2020-11-19 DIAGNOSIS — F41.9 ANXIETY: ICD-10-CM

## 2020-11-19 DIAGNOSIS — F51.01 PRIMARY INSOMNIA: Primary | ICD-10-CM

## 2020-11-19 DIAGNOSIS — F33.41 RECURRENT MAJOR DEPRESSIVE DISORDER, IN PARTIAL REMISSION (H): ICD-10-CM

## 2020-11-19 PROCEDURE — 99214 OFFICE O/P EST MOD 30 MIN: CPT | Mod: 95 | Performed by: NURSE PRACTITIONER

## 2020-11-19 RX ORDER — MIRTAZAPINE 15 MG/1
15 TABLET, FILM COATED ORAL
Qty: 30 TABLET | Refills: 1 | Status: SHIPPED | OUTPATIENT
Start: 2020-11-19 | End: 2020-12-02

## 2020-11-19 RX ORDER — GABAPENTIN 600 MG/1
600 TABLET ORAL 3 TIMES DAILY
Qty: 270 TABLET | Refills: 0 | Status: SHIPPED | OUTPATIENT
Start: 2020-11-19 | End: 2020-12-02
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
## 2020-11-19 NOTE — PATIENT INSTRUCTIONS
-Change Gabapentin to 600 mg 3 times a day for anxiety.  Please note I ordered the refill with 600 mg tablet, so please read the label carefully  -Start Belsomra 5 mg at bedtime for sleep.  If your insurance does not cover the medication, please contact Rylee via [employer]  -Change Mirtazapine to 15 mg at bedtime as needed.  If you do not receive Belsomra due to insurance coverage, continue on 15 mg at bedtime for sleep, however, if you can start Belsomra, then stop Mirtazapine.  If Belsomra is not sufficient, you may also take Mirtazapine together with Belsomra while monitoring for oversedation and dizziness  -Continue all other medications for now    Your next appointment is scheduled on 12/16 (Wed) at 3:30pm, but will also put you on a waitlist to be seen sooner if there's any cancellation.    To access your telemedicine visit:     Open a web browser, like [employer], and type [email or web address]     You will see a box asking you to check in to let Rylee Kyle know that you are here.     Type in your name and press Check In. That will let Rylee see you in the virtual waiting room. At your scheduled appointment time, your provider will initiate the visit and connect you.     When your visit is done, you can simply close the browser window.        Please Note:  Ideally, you will connect from a desktop, laptop, or tablet with a WiFi connection. Your computer/tablet must have a camera and microphone. You can use a cell phone, if it has a camera, and if you can connect to WiFi. However, if you connect your phone over a cellular network, it is of lower quality and less reliable

## 2020-11-19 NOTE — TELEPHONE ENCOUNTER
On November 19, 2020, at 9:38 AM, writer called patient at [phone] to confirm Virtual Visit. Writer unable to make contact with patient. Writer left detailed voice message for call back. [phone] left as call back number. Rasheeda Handy, CMA

## 2020-11-29 DIAGNOSIS — Z11.59 ENCOUNTER FOR SCREENING FOR OTHER VIRAL DISEASES: Primary | ICD-10-CM

## 2020-12-01 DIAGNOSIS — Z11.59 ENCOUNTER FOR SCREENING FOR OTHER VIRAL DISEASES: Primary | ICD-10-CM

## 2020-12-02 ENCOUNTER — VIRTUAL VISIT (OUTPATIENT)
Dept: PSYCHIATRY | Facility: CLINIC | Age: 75
End: 2020-12-02
Attending: NURSE PRACTITIONER
Payer: COMMERCIAL

## 2020-12-02 ENCOUNTER — TELEPHONE (OUTPATIENT)
Dept: PSYCHIATRY | Facility: CLINIC | Age: 75
End: 2020-12-02

## 2020-12-02 DIAGNOSIS — F40.10 SOCIAL ANXIETY DISORDER: ICD-10-CM

## 2020-12-02 DIAGNOSIS — F51.01 PRIMARY INSOMNIA: Primary | ICD-10-CM

## 2020-12-02 DIAGNOSIS — F41.9 ANXIETY: ICD-10-CM

## 2020-12-02 DIAGNOSIS — F32.1 MODERATE MAJOR DEPRESSION (H): ICD-10-CM

## 2020-12-02 PROCEDURE — 99214 OFFICE O/P EST MOD 30 MIN: CPT | Mod: 95 | Performed by: NURSE PRACTITIONER

## 2020-12-02 RX ORDER — MIRTAZAPINE 15 MG/1
7.5 TABLET, FILM COATED ORAL
Qty: 30 TABLET | Refills: 1 | Status: SHIPPED | DISCHARGE
Start: 2020-12-02 | End: 2020-12-29

## 2020-12-02 RX ORDER — GABAPENTIN 600 MG/1
TABLET ORAL
Qty: 270 TABLET | Refills: 0 | Status: SHIPPED | DISCHARGE
Start: 2020-12-02 | End: 2020-12-29

## 2020-12-02 RX ORDER — LAMOTRIGINE 100 MG/1
TABLET ORAL
Qty: 180 TABLET | Refills: 3 | Status: SHIPPED | DISCHARGE
Start: 2020-12-02 | End: 2020-12-29

## 2020-12-02 RX ORDER — MIRTAZAPINE 15 MG/1
7.5-15 TABLET, FILM COATED ORAL
Qty: 30 TABLET | Refills: 1 | Status: SHIPPED | DISCHARGE
Start: 2020-12-02 | End: 2020-12-02

## 2020-12-02 NOTE — TELEPHONE ENCOUNTER
On 12/2/2020, at 1441, writer called patient at mobile to confirm Virtual Visit. Writer made contact with patient, who then hung up. Writer unable to confirm appointment. JR Counsell, EMT

## 2020-12-02 NOTE — PROGRESS NOTES
Start Time:  1504         End Time: 1530    Telemedicine Visit: The patient's condition can be safely assessed and treated via synchronous audio and visual telemedicine encounter.      Reason for Telemedicine Visit: Due to COVID 19 pandemic, clinic switching all appointments to telemedicine     Originating Site (Patient Location): Patient's home    Distant Site (Provider Location): Provider Remote Setting    Consent:  The patient/guardian has verbally consented to: the potential risks and benefits of telemedicine (video visit) versus in person care; bill my insurance or make self-payment for services provided; and responsibility for payment of non-covered services.     Mode of Communication:  Video Conference via [email or web address]    As the provider I attest to compliance with applicable laws and regulations related to telemedicine.    Psychiatry Clinic Progress Note                                                                  Patient Name: Josemanuel Edmond  YOB: 1945  MRN: [id number]  Date of Service:  12/2/2020  Last Seen:11/19/2020    Josemanuel Edmond is a 75 year old person assigned male at birth, identifies as cisgender male who uses the name Gavin and pronoun pat.      Gavin Edmond is a 75 year old year old adult who presents for ongoing psychiatric care.  Gavin Edmond was last seen on 11/19/2020.    At that time,     Medication Ordered/Consults/Labs/tests Ordered:     Medication:   -Change Gabapentin to 600 mg 3 times a day for anxiety.  Please note I ordered the refill with 600 mg tablet, so please read the label carefully  -Start Belsomra 5 mg at bedtime for sleep.  If your insurance does not cover the medication, please contact [facility] via [employer]  -Change Mirtazapine to 15 mg at bedtime as needed.  If you do not receive Belsomra due to insurance coverage, continue on 15 mg at bedtime for sleep, however, if you can start Belsomra, then stop Mirtazapine.  If Belsomra is not sufficient, you may  also take Mirtazapine together with Belsomra while monitoring for oversedation and dizziness  -Continue all other medications for now  OTC Recommendations: none  Lab Orders:  none  Referrals: none  Release of Information: none  Future Treatment Considerations: Per symptoms. Sleep consult if Belsomra is not effective?  Return for Follow Up: in 2 weeks, pt is placed on waitlist      Pertinent Background:  Reports depression started 2002 after his son was killed in MVA.  Anxiety also started around the same time, but social anxiety started 6-7 years ago.  Denies any hx of SI, SIB, HI, psych hospitalization.  After son was killed, pt had heavy ETOH use on and off. Psych critical item history includes [no critical items].     Previous Psychiatric Meds: Wellbutrin XL, Xanax, Klonopin, Valium, Ativan, Remeron (30 mg only), Nortriptyline, Vistaril, Remeron (oversedation at 45 mg, 22.5mg and 15 mg and wt gain)    Interim History                                                                                                        4, 4     Since the last visit, noted anxiety fluctuates.  Couple days, feeling fine, socializing with others without any problem and some days have significant anxiety socializling with others.  Did not notice significant improvement in anxiety with Gabapentin increase.  Noted has not taken Belsomra as he was told his copay is $90/month and wondering if it would be worth to try.  Since has not tried Belsomra, has been taking Remeron 7.5-15 mg and sleeps OK, but is bothered by 6 lb weight gain that he has not lost.  Oversedation seemed to be better with lower dose of Remeron.  Has not seen therapist and notes it was uncomfortable and does not plan to return to see therapist, but has been considering to go to AA meeting as some of friends in AA group seemed to find it beneficial.  Has not used any alcohol x 3 years, but wondering if he misses out on socialization without alcohol and need to learn  different ways to socialize.    Also wondering if his memory is diminishing as he ages, denies specific memory loss, but noted sometimes when he goes to across the room, he forgets what he was going there for.    Pt wants to reduce number of medications and feels he is ready to come off Lamictal as he reconfirms that he is not taking the medication for seizure.    Denies any symptoms suggestive of hypomania or psychosis.    Current Suicidality/Hx of Suicide Attempts: Denies both  CoCominent Medical concerns: Denies    Medication Side Effects: weight gain with Remeron    Medical Review of Systems     Apart from the symptoms mentioned int he HPI, the 14 point review of systems, including constitutional, HEENT, cardiovascular, respiratory, gastrointestinal, genitourinary, musculoskeletal, integumentary, endocrine, neurological, hematologic and allergic is entirely negative.      Substance Use   Pt has been staying substance free since last seen.  Notes has not used any ETOH x 3 years.      Medical / Surgical History                                                                                                                  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)     Chronic atrial fibrillation (H)     Hyperplastic Polyp     Right knee DJD     Peripheral neuropathy     Sleep disturbance     Hematoma of skin     Traumatic ecchymosis of buttock, initial encounter       Past Surgical History:   Procedure Laterality Date     ARTHROPLASTY KNEE Right 10/12/2018    Procedure:  ARTHROPLASTY KNEE;  Right Total Knee Arthroplasty;  Surgeon: Jeb Peralta MD;  Location: [facility]     COLONOSCOPY N/A 12/10/2015    Procedure: COMBINED COLONOSCOPY, SINGLE OR MULTIPLE BIOPSY/POLYPECTOMY BY BIOPSY;  Surgeon: Jyoti Figueroa MD;  Location: [facility]     JOINT REPLACEMENT, HIP RT/LT  10/07    Joint Replacement Hip LT     JOINT REPLACEMTN, KNEE RT/LT  8/2011    Joint Replacement knee /LT, [facility]     LAPAROSCOPIC HERNIORRHAPHY INGUINAL BILATERAL Bilateral 4/24/2018    Procedure: LAPAROSCOPIC HERNIORRHAPHY INGUINAL BILATERAL;  Laparoscopic bilateral inguinal hernia repair;  Surgeon: Josemanuel Resendiz MD;  Location: [facility]     SURGICAL HISTORY OF -   12/1/1999    Umbilical Herniorrhaphy with mesh     SURGICAL HISTORY OF -  Left 11/2017    Thoracotomy and drainage of empyema        Social/ Family History                                  [per patient report]                                 1ea,1ea   Living arrangements: lives with spouse and feels safe  Social Support: spouse  Access to gun: owns a gun  Retired in 2008.  Trauma history includes loss of son by MVA in 2002.     Allergy                                Bactrim [sulfamethoxazole w/trimethoprim]    Current Medications                                                                                                       Current Outpatient Medications   Medication Sig Dispense Refill     acetaminophen (TYLENOL) 500 MG tablet Take 1,000 mg by mouth every 8 hours as needed for mild pain       buPROPion (WELLBUTRIN SR) 150 MG 12 hr tablet TAKE 1 TABLET TWICE A  tablet 2     busPIRone HCl (BUSPAR) 30 MG tablet Take 1 tablet (30 mg) by mouth 2 times daily 60 tablet 1     doxazosin (CARDURA) 4 MG tablet Take 1 tablet (4 mg) by mouth daily 90 tablet 2     escitalopram (LEXAPRO) 10 MG tablet Take 1 tablet (10 mg) by mouth daily 90 tablet 0     finasteride (PROSCAR) 5 MG tablet Take 1 tablet (5 mg) by mouth daily 90 tablet 3      "gabapentin (NEURONTIN) 600 MG tablet Take 1 tablet (600 mg) by mouth 3 times daily 270 tablet 0     lamoTRIgine (LAMICTAL) 100 MG tablet TAKE 1 TABLET TWICE A  tablet 3     metoprolol tartrate (LOPRESSOR) 25 MG tablet Take 1 tablet (25 mg) by mouth 2 times daily 180 tablet 3     mirtazapine (REMERON) 15 MG tablet Take 1 tablet (15 mg) by mouth nightly as needed (sleep) 30 tablet 1     STATIN NOT PRESCRIBED, INTENTIONAL, Please choose reason not prescribed, below       Suvorexant (BELSOMRA) 5 MG tablet Take 1 tablet (5 mg) by mouth nightly as needed for sleep 30 tablet 1     XARELTO ANTICOAGULANT 20 MG TABS tablet TAKE 1 TABLET DAILY WITH   DINNER 90 tablet 3        Mental Status Exam                                                                                   9, 14 cog        Alertness: alert  and oriented  Appearance:  Casually dressed and Adequately groomed  Behavior/Demeanor: cooperative, pleasant and calm, with good  eye contact   Speech: regular rate and rhythm  Mood :  \"okay\"  Affect: almost full range; was congruent to mood; was congruent to content  Thought Process (Associations):  Linear and Goal directed  Thought process (Rate):  Normal  Thought content:  no overt psychosis, denies suicidal ideation, intent or thoughts and patient does not appear to be responding to internal stimuli  Perception:  Reports none;  Denies depersonalization and derealization  Attention/Concentration:  Normal  Memory:  Immediate recall intact, Short-term memory intact and Long-term memory intact  Language: intact  Fund of Knowledge/Intelligence:  Average  Abstraction:  Normal  Insight:  Fair, Good  Judgment:  Fair, Good  Cognition: (6) does  appear grossly intact; formal cognitive testing was not done    Physical Exam     Motor activity/EPS:  Normal  Gait:  Normal  Psychomotor: normal or unremarkable    Labs and Results      Pertinent findings on review include: Review of records with relevant information reported in " the HPI.  Reviewed pt's past medical record and obtained collateral information.      [place] PRESCRIPTION MONITORING PROGRAM [] was checked today:  indicates Gabapentin 11/19.    PHQ9 Today:  N/A  PHQ 11/15/2018 2/26/2019 12/10/2019   PHQ-9 Total Score 9 3 4   Q9: Thoughts of better off dead/self-harm past 2 weeks Not at all Not at all Not at all       CAROLYNN 7 Today: N/A  CAROLYNN-7 SCORE 11/15/2018 2/26/2019 12/10/2019   Total Score - - -   Total Score 6 5 2       Recent Labs   Lab Test 09/04/19  1020 09/14/18  1629 05/07/18  1325   CR 0.81 0.93 1.07   GFRESTIMATED 86 79 67     Recent Labs   Lab Test 01/22/18  1354 12/20/17  1315   AST 15 24   ALT 32 17   ALKPHOS 60 66     PSYCHOTROPIC DRUG INTERACTIONS:   Buspar---Gabapentin: Concurrent use of GABAPENTIN and CNS DEPRESSANTS may result in respiratory depression.    Buspar---Lexapro---Remeron: Concurrent use of ESCITALOPRAM and SEROTONERGIC DRUGS may result in increased risk of serotonin syndrome.   Wellbutrin---Lexapro: Concurrent use of BUPROPION and AGENTS LOWERING SEIZURE THRESHOLD may result in lower seizure threshold.   LExapro---Remeron---Xarelto: Concurrent use of RIVAROXABAN and SEROTONIN NOREPINEPHRINE REUPTAKE INHIBITORS AND SSRIS may result in increased risk of bleeding  Doxazosin---Metoprolol: Concurrent use of ALPHA-1 ADRENERGIC BLOCKERS and BETA-ADRENERGIC BLOCKERS may result in an exaggerated hypotensive response to the first dose of the alpha blocker.   Metoprolol---Wellbutrin: Concurrent use of METOPROLOL and CYP2D6 INHIBITORS may result in increased metoprolol exposure.         MANAGEMENT:  Monitoring for adverse effects, routine vitals and patient is aware of risks      Impression/Assessment      Gavin Edmond is a 75 year old adult  who presents for med management follow up.  Pt appears mostly stable in his mood and anxiety, denies SI, SIB or HI.  Notes his social anxiety fluctuates and wondering if it would be helpful to attend AA to talk with others  though he does not want to go to individual therapy.  Discussed this may be very helpful and encouraged attendance.  Discussed some meeting may be online, make sure to check before going to actual location.    Discussed Belsomra will be cost prohibitive, thus recommended not to  the medication and discontinued.  Pt has not tried higher dose of Gabapentin at HS, thus encouraged to try 900 mg HS while continuing 600 mg in AM and afternoon.  If Gabapentin sufficiently manages sleep, pt was instructed not to take Remeron, but if it's not sufficiently helpful, then pt may take Remeron 7.5 mg HS PRN together with Gabapentin 900 mg HS.  If this does not work for sleep, then will send to sleep medicine consult.    Also discussed polypharmacy may be causing some memory difficulties while certainly neuropsych testing could be ordered.  At this time, pt feels he is stable enough in his mood, thus will try to taper off Lamictal while monitoring his mood and anxiety.  Will start from decreasing Lamictal to 100 mg daily x 2 weeks, then further decrease it to 50 mg daily x 2 weeks and then stop the medication.  Will continue all other medications for now.      Diagnosis                                                                    Social Anxiety disorder  MDD  Insomnia    Treatment Recommendation & Plan       Medication Ordered/Consults/Labs/tests Ordered:     Medication:   -Increase bedtime Gabapentin to 900 mg while continuing 600 mg in AM and afternoon for sleep and anxiety  -If increased Gabapentin dose is sufficient, you don't have to take Mirtazapine.  If increased dose of Gabapentin is not helpful, may take Mirtazapine 7.5 mg at bedtime as needed for sleep  -Decrease Lamotrigine to 100 mg daily for 2 weeks, then further decrease it to 50 mg daily for 2 weeks then stop the medication.  Monitor your mood and anxiety  -Discontinued Belsomra  OTC Recommendations: none  Lab Orders:  none  Referrals: none  Release of  Information: none  Future Treatment Considerations: Per symptoms. Sleep consult?  neuropsych testing?  Return for Follow Up: in 2 weeks    -Discussed safety plan for suicidal thoughts  -Discussed plan for suicidality  -Discussed available emergency services  -Patient agrees with the treatment plan  -Encouraged to continue outpatient therapy to gain more coping mechanism for stress.      Treatment Risk Statement: Discussed with the patient my impressions, as well as recommended studies. I educated patient on the differential diagnosis and prognosis. I discussed with the patient the risks and benefits of medications versus no interventions, including efficacy, dose, possible side effects and length of treatment and the importance of medication compliance.  The patient understands the risks, benefits, adverse effects and alternatives. Agrees to treatment with the capacity to do so. No medical contraindications to treatment. The patient also understands the risks of using street drugs or alcohol.    CRISIS NUMBERS:   Provided routinely in AVS.      Rylee Kyle CNP,  12/2/2020

## 2020-12-02 NOTE — PATIENT INSTRUCTIONS
-Increase bedtime Gabapentin to 900 mg while continuing 600 mg in AM and afternoon for sleep and anxiety  -If increased Gabapentin dose is sufficient, you don't have to take Mirtazapine.  If increased dose of Gabapentin is not helpful, may take Mirtazapine 7.5 mg at bedtime as needed for sleep  -Decrease Lamotrigine to 100 mg daily for 2 weeks, then further decrease it to 50 mg daily for 2 weeks then stop the medication.  Monitor your mood and anxiety  -Do not  Belsomra, this was discontinued as it is cost prohibitive    Your next appointment is scheduled on 12/16 (Wed) 3:30pm and 12/29 (Tue) at 10am.    To access your telemedicine visit:     Open a web browser, like [employer], and type [email or web address]     You will see a box asking you to check in to let Rylee Kyle know that you are here.     Type in your name and press Check In. That will let Rylee see you in the virtual waiting room. At your scheduled appointment time, your provider will initiate the visit and connect you.     When your visit is done, you can simply close the browser window.        Please Note:  Ideally, you will connect from a desktop, laptop, or tablet with a WiFi connection. Your computer/tablet must have a camera and microphone. You can use a cell phone, if it has a camera, and if you can connect to WiFi. However, if you connect your phone over a cellular network, it is of lower quality and less reliable.    Thank you for coming to the [facility] MENTAL HEALTH & ADDICTION [place] [facility].    Lab Testing:  If you had lab testing today and your results are reassuring or normal they will be mailed to you or sent through NumberFour within 7 days. If the lab tests need quick action we will call you with the results. The phone number we will call with results is # [phone] (home) . If this is not the best number please call our clinic and change the number.    Medication Refills:  If you need any refills please call your  pharmacy and they will contact us. Our fax number for refills is [phone]. Please allow three business for refill processing. If you need to  your refill at a new pharmacy, please contact the new pharmacy directly. The new pharmacy will help you get your medications transferred.     Scheduling:  If you have any concerns about today's visit or wish to schedule another appointment please call our office during normal business hours [phone] (8-5:00 M-F)    Contact Us:  Please call [phone] during business hours (8-5:00 M-F).  If after clinic hours, or on the weekend, please call  [phone].    Financial Assistance [phone]  [facility] Billing [phone]  [facility] Billing Office, [facility]: [phone]  [facility]illing [phone]  Medical Records [phone]      MENTAL HEALTH CRISIS NUMBERS:  For a medical emergency please call  911 or go to the nearest ER.     [facility]:   [facility] -[phone]   Crisis Residence [facility] Residence -[phone]   Walk-In Counseling [facility] -[phone]   COPE 24/7 [facility] Mobile Team -[phone] (adults)/[phone] (child)  CHILD: [facility] needs assessment team - [phone]      [facility]:   [facility] - [phone]   Walk-in counseling [facility] - [phone]   Walk-in counseling [facility] - [phone]   Crisis Residence [facility] Residence - [phone]  Urgent Care Adult Mental Vqwerx-[phone] mobile unit/ 24/7 crisis line    National Crisis Numbers:   National Suicide Prevention Lifeline: 0-146-186-TALK (375-767-0287)  Poison Control Center - 1-303.188.9757  [employer]/resources for a list of additional resources (SOS)  Trans Lifeline a hotline for transgender people 0-194-604-4128  The [employer] a hotline for LGBT youth 2-193-967-3060  Crisis Text Line: For any crisis 24/7   To: 696590  see www.crisistextline.org  -  IF MAKING A CALL FEELS TOO HARD, send a text!         Again thank you for choosing [facility] MENTAL HEALTH & ADDICTION [facility] and please let us know how we can best partner with you to improve you and your family's health.    You may be receiving a survey regarding this appointment. We would love to have your feedback, both positive and negative. The survey is done by an external company, so your answers are anonymous.

## 2020-12-03 DIAGNOSIS — F40.10 SOCIAL ANXIETY DISORDER: ICD-10-CM

## 2020-12-03 DIAGNOSIS — F51.01 PRIMARY INSOMNIA: ICD-10-CM

## 2020-12-07 RX ORDER — MIRTAZAPINE 45 MG/1
45 TABLET, FILM COATED ORAL AT BEDTIME
Qty: 30 TABLET | Refills: 1 | OUTPATIENT
Start: 2020-12-07

## 2020-12-08 ENCOUNTER — VIRTUAL VISIT (OUTPATIENT)
Dept: PSYCHIATRY | Facility: CLINIC | Age: 75
End: 2020-12-08
Attending: NURSE PRACTITIONER
Payer: COMMERCIAL

## 2020-12-08 DIAGNOSIS — F51.01 PRIMARY INSOMNIA: Primary | ICD-10-CM

## 2020-12-08 DIAGNOSIS — F33.41 RECURRENT MAJOR DEPRESSIVE DISORDER, IN PARTIAL REMISSION (H): ICD-10-CM

## 2020-12-08 DIAGNOSIS — F40.10 SOCIAL ANXIETY DISORDER: ICD-10-CM

## 2020-12-08 PROCEDURE — 99214 OFFICE O/P EST MOD 30 MIN: CPT | Mod: 95 | Performed by: NURSE PRACTITIONER

## 2020-12-08 RX ORDER — TRAZODONE HYDROCHLORIDE 50 MG/1
50 TABLET, FILM COATED ORAL AT BEDTIME
COMMUNITY
End: 2020-12-08

## 2020-12-08 RX ORDER — TRAZODONE HYDROCHLORIDE 50 MG/1
25-50 TABLET, FILM COATED ORAL
COMMUNITY
End: 2020-12-29

## 2020-12-08 NOTE — PATIENT INSTRUCTIONS
-Try Trazodone 25-50 mg at bedtime as needed for sleep.  Do not take Trazodone with Mirtazapine.  If Trazodone is not working well for sleep, then you may switch back to Mirtazapine 15 mg at bedtime as needed for sleep  -Continue all other medications for now    Your next appointment is scheduled on 12/16 (Wed) at 3:30pm.    To access your telemedicine visit:     Open a web browser, like [employer], and type [email or web address]     You will see a box asking you to check in to let Rylee Kyle know that you are here.     Type in your name and press Check In. That will let Rylee see you in the virtual waiting room. At your scheduled appointment time, your provider will initiate the visit and connect you.     When your visit is done, you can simply close the browser window.        Please Note:  Ideally, you will connect from a desktop, laptop, or tablet with a WiFi connection. Your computer/tablet must have a camera and microphone. You can use a cell phone, if it has a camera, and if you can connect to WiFi. However, if you connect your phone over a cellular network, it is of lower quality and less reliable.    Thank you for coming to the [facility] MENTAL HEALTH & ADDICTION [place] [facility].    Lab Testing:  If you had lab testing today and your results are reassuring or normal they will be mailed to you or sent through [employer] within 7 days. If the lab tests need quick action we will call you with the results. The phone number we will call with results is # [phone] (home) . If this is not the best number please call our clinic and change the number.    Medication Refills:  If you need any refills please call your pharmacy and they will contact us. Our fax number for refills is [phone]. Please allow three business for refill processing. If you need to  your refill at a new pharmacy, please contact the new pharmacy directly. The new pharmacy will help you get your medications transferred.      Scheduling:  If you have any concerns about today's visit or wish to schedule another appointment please call our office during normal business hours [phone] (8-5:00 M-F)    Contact Us:  Please call [phone] during business hours (8-5:00 M-F).  If after clinic hours, or on the weekend, please call  [phone].    Financial Assistance [phone]  [facility] Billing [phone]  [facility] Billing Office, [facility]: [phone]  [facility] Billing [phone]  Medical Records [phone]      MENTAL HEALTH CRISIS NUMBERS:  For a medical emergency please call  911 or go to the nearest ER.     [facility]:   [facility] -[phone]   Crisis Residence [facility] Residence -[phone]   Walk-In Counseling [facility] -[phone]   COPE 24/7 [place] Mobile Team -[phone] (adults)/[phone] (child)  CHILD: [facility] team - [phone]      [facility]:   [facility] - [phone]   Walk-in counseling [facility] - [phone]   Walk-in counseling [facility] - [phone]   Crisis Residence [facility] Residence - [phone]  Urgent Care Adult Mental Fgkffd-[phone] mobile unit/ 24/7 crisis line    National Crisis Numbers:   National Suicide Prevention Lifeline: 7-097-547-TALK (972-213-8676)  Poison Control Center - 7-130-760-9845  [employer]/resources for a list of additional resources (SOS)  Trans Lifeline a hotline for transgender people 1-958.858.8548  The [employer] a hotline for LGBT youth 1-417.398.6253  Crisis Text Line: For any crisis 24/7   To: 999347  see www.crisistextline.org  - IF MAKING A CALL FEELS TOO HARD, send a text!         Again thank you for choosing [facility] MENTAL HEALTH & ADDICTION [facility] and please let us know how we can best partner with you to improve you and your family's health.    You may be receiving a survey regarding this  appointment. We would love to have your feedback, both positive and negative. The survey is done by an external company, so your answers are anonymous.

## 2020-12-08 NOTE — PROGRESS NOTES
Start Time:  1430         End Time: 1444    Telemedicine Visit: The patient's condition can be safely assessed and treated via synchronous audio and visual telemedicine encounter.      Reason for Telemedicine Visit: Due to COVID 19 pandemic, clinic switching all appointments to telemedicine     Originating Site (Patient Location): Patient's home    Distant Site (Provider Location): Provider Remote Setting    Consent:  The patient/guardian has verbally consented to: the potential risks and benefits of telemedicine (video visit) versus in person care; bill my insurance or make self-payment for services provided; and responsibility for payment of non-covered services.     Mode of Communication:  Video Conference via [email or web address]    As the provider I attest to compliance with applicable laws and regulations related to telemedicine.    Psychiatry Clinic Progress Note                                                                  Patient Name: Josemanuel Edmond  YOB: 1945  MRN: [id number]  Date of Service:  12/8/2020  Last Seen:12/2/2020    Josemanuel Edmond is a 75 year old person assigned male at birth, identifies as cisgender male who uses the name Gavin and pronoun pat.       Gavin Edmond is a 75 year old year old adult who presents for ongoing psychiatric care.  Gavin Edmond was last seen on 12/2/2020.    At that time,     Medication Ordered/Consults/Labs/tests Ordered:      Medication:   -Increase bedtime Gabapentin to 900 mg while continuing 600 mg in AM and afternoon for sleep and anxiety  -If increased Gabapentin dose is sufficient, you don't have to take Mirtazapine.  If increased dose of Gabapentin is not helpful, may take Mirtazapine 7.5 mg at bedtime as needed for sleep  -Decrease Lamotrigine to 100 mg daily for 2 weeks, then further decrease it to 50 mg daily for 2 weeks then stop the medication.  Monitor your mood and anxiety  -Discontinued Belsomra  OTC Recommendations: none  Lab Orders:   none  Referrals: none  Release of Information: none  Future Treatment Considerations: Per symptoms. Sleep consult?  neuropsych testing?  Return for Follow Up: in 2 weeks    Pertinent Background:  Reports depression started 2002 after his son was killed in MVA.  Anxiety also started around the same time, but social anxiety started 6-7 years ago.  Denies any hx of SI, SIB, HI, psych hospitalization.  After son was killed, pt had heavy ETOH use on and off. Psych critical item history includes [no critical items].      Previous Psychiatric Meds: Wellbutrin XL, Xanax, Klonopin, Valium, Ativan, Remeron (30 mg only), Nortriptyline, Vistaril, Remeron (oversedation at 45 mg, 22.5mg and 15 mg and wt gain), Trazodone (oversedate at 50 mg), Ambien (fall)    Interim History                                                                                                        4, 4     Since the last visit, noted Gabapentin 900 mg HS only does not help with sleep, thus continued to take Remeron 15 mg.  Noted with this combination, pt sleeps well and anxiety seemed to decrease, but anxiety fluctuates and pt is concerned about weight gain with Remeron.  Pt noted that he has also tried Trazodone 50 mg HS in the past, but had oversedation in AM, but open to trying as he has numerous medications still left.    Has decreased Lamictal to 100 mg daily for 1 week, has not noticed any changes in mood or anxiety.  Reports mood continues to be well, denies SI, SIB or HI.  Has not gone to AA meeting as he learned this changed to online only. Also noted he plans to start walking this week as he needs exercise.    Denies any symptoms suggestive of hypomania or psychosis.    Current Suicidality/Hx of Suicide Attempts: Denies both  CoCominent Medical concerns: Denies    Medication Side Effects: weight gain with Remeron      Medical Review of Systems     Apart from the symptoms mentioned int he HPI, the 14 point review of systems, including  constitutional, HEENT, cardiovascular, respiratory, gastrointestinal, genitourinary, musculoskeletal, integumentary, endocrine, neurological, hematologic and allergic is entirely negative.    Substance Use   Pt has been staying substance free since last seen.  Notes has not used any ETOH x 3 years.      Medical / Surgical History                                                                                                                  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)     Chronic atrial fibrillation (H)     Hyperplastic Polyp     Right knee DJD     Peripheral neuropathy     Sleep disturbance     Hematoma of skin     Traumatic ecchymosis of buttock, initial encounter       Past Surgical History:   Procedure Laterality Date     ARTHROPLASTY KNEE Right 10/12/2018    Procedure: ARTHROPLASTY KNEE;  Right Total Knee Arthroplasty;  Surgeon: Jeb Peralta MD;  Location: [facility]     COLONOSCOPY N/A 12/10/2015    Procedure: COMBINED COLONOSCOPY, SINGLE OR MULTIPLE BIOPSY/POLYPECTOMY BY BIOPSY;  Surgeon: Jyoti Figueroa MD;  Location: [facility]     JOINT REPLACEMENT, HIP RT/LT  10/07    Joint Replacement Hip LT     JOINT REPLACEMTN, KNEE RT/LT  8/2011    Joint Replacement knee /LT, [facility]     LAPAROSCOPIC HERNIORRHAPHY INGUINAL BILATERAL Bilateral 4/24/2018    Procedure: LAPAROSCOPIC HERNIORRHAPHY INGUINAL BILATERAL;  Laparoscopic bilateral inguinal hernia repair;  Surgeon: Josemanuel Resendiz MD;  Location: [facility]     SURGICAL HISTORY OF -   12/1/1999     Umbilical Herniorrhaphy with mesh     SURGICAL HISTORY OF -  Left 11/2017    Thoracotomy and drainage of empyema        Social/ Family History                                  [per patient report]                                 1ea,1ea     Living arrangements: living with spouse and feels safe  Social Support: spouse  Access to gun: owns a gun  Retired in 2008.  Trauma hx includes loss of son by MVA in 2002.    Allergy                                Bactrim [sulfamethoxazole w/trimethoprim]    Current Medications                                                                                                       Current Outpatient Medications   Medication Sig Dispense Refill     acetaminophen (TYLENOL) 500 MG tablet Take 1,000 mg by mouth every 8 hours as needed for mild pain       buPROPion (WELLBUTRIN SR) 150 MG 12 hr tablet TAKE 1 TABLET TWICE A  tablet 2     busPIRone HCl (BUSPAR) 30 MG tablet Take 1 tablet (30 mg) by mouth 2 times daily 60 tablet 1     doxazosin (CARDURA) 4 MG tablet Take 1 tablet (4 mg) by mouth daily 90 tablet 2     escitalopram (LEXAPRO) 10 MG tablet Take 1 tablet (10 mg) by mouth daily 90 tablet 0     finasteride (PROSCAR) 5 MG tablet Take 1 tablet (5 mg) by mouth daily 90 tablet 3     gabapentin (NEURONTIN) 600 MG tablet Take 1 tab in AM and afternoon and 1.5 tab at bedtime 270 tablet 0     lamoTRIgine (LAMICTAL) 100 MG tablet Take 1 tab daily x 2 weeks, then decrease it to 0.5 tab daily x 2 weeks, then stop the medication 180 tablet 3     metoprolol tartrate (LOPRESSOR) 25 MG tablet Take 1 tablet (25 mg) by mouth 2 times daily 180 tablet 3     mirtazapine (REMERON) 15 MG tablet Take 0.5 tablets (7.5 mg) by mouth nightly as needed (sleep) 30 tablet 1     STATIN NOT PRESCRIBED, INTENTIONAL, Please choose reason not prescribed, below       XARELTO ANTICOAGULANT 20 MG TABS tablet TAKE 1 TABLET DAILY WITH   DINNER 90 tablet 3        Mental Status Exam                                    "                                                9, 14 cog        Alertness: alert  and oriented  Appearance:  Casually dressed and Adequately groomed  Behavior/Demeanor: cooperative, pleasant and calm, with good  eye contact   Speech: regular rate and rhythm  Mood :  \"fine\"  Affect: almost full range; was congruent to mood; was congruent to content  Thought Process (Associations):  Linear and Goal directed  Thought process (Rate):  Normal  Thought content:  no overt psychosis, denies suicidal ideation, intent or thoughts and patient does not appear to be responding to internal stimuli  Perception:  Reports none;  Denies depersonalization and derealization  Attention/Concentration:  Normal  Memory:  Immediate recall intact and Short-term memory intact  Language: intact  Fund of Knowledge/Intelligence:  Average  Abstraction:  Normal  Insight:  Good and Fair  Judgment:  Good and Fair  Cognition: (6) does  appear grossly intact; formal cognitive testing was not done    Physical Exam     Motor activity/EPS:  Normal  Gait:  Normal  Psychomotor: normal or unremarkable    Labs and Results      Pertinent findings on review include: Review of records with relevant information reported in the HPI.  Reviewed pt's past medical record and obtained collateral information.      [place] PRESCRIPTION MONITORING PROGRAM [] was checked today:  indicates Gabapentin 11/19.    PHQ9 Today:  N/A  PHQ 11/15/2018 2/26/2019 12/10/2019   PHQ-9 Total Score 9 3 4   Q9: Thoughts of better off dead/self-harm past 2 weeks Not at all Not at all Not at all       CAROLYNN 7 Today: N/A  CAROLYNN-7 SCORE 11/15/2018 2/26/2019 12/10/2019   Total Score - - -   Total Score 6 5 2       Recent Labs   Lab Test 09/04/19  1020 09/14/18  1629 05/07/18  1325   CR 0.81 0.93 1.07   GFRESTIMATED 86 79 67     Recent Labs   Lab Test 01/22/18  1354 12/20/17  1315   AST 15 24   ALT 32 17   ALKPHOS 60 66     PSYCHOTROPIC DRUG INTERACTIONS:   Buspar---Gabapentin: Concurrent use of " GABAPENTIN and CNS DEPRESSANTS may result in respiratory depression.    Buspar---Lexapro---Remeron: Concurrent use of ESCITALOPRAM and SEROTONERGIC DRUGS may result in increased risk of serotonin syndrome.   Wellbutrin---Lexapro: Concurrent use of BUPROPION and AGENTS LOWERING SEIZURE THRESHOLD may result in lower seizure threshold.   LExapro---Remeron---Xarelto: Concurrent use of RIVAROXABAN and SEROTONIN NOREPINEPHRINE REUPTAKE INHIBITORS AND SSRIS may result in increased risk of bleeding  Doxazosin---Metoprolol: Concurrent use of ALPHA-1 ADRENERGIC BLOCKERS and BETA-ADRENERGIC BLOCKERS may result in an exaggerated hypotensive response to the first dose of the alpha blocker.   Metoprolol---Wellbutrin: Concurrent use of METOPROLOL and CYP2D6 INHIBITORS may result in increased metoprolol exposure.         MANAGEMENT:  Monitoring for adverse effects, routine vitals and patient is aware of risks      Impression/Assessment      Gavin Edmond is a 75 year old adult  who presents for med management follow up.  Pt appears mostly stable in his mood and anxiety, denies SI, SIB or HI.  Notes his social anxiety continues fluctuates.  Has not attended AA since it was changed to remote only.  Pt noted Gabapentin 900 mg HS only does not sufficiently help with sleep, so has been taking Remeron 15 mg which helps sleep, but pt is concerned with weight gain.  Pt noted he has tried Trazodone 50 mg in the past with oversedation.  Discussed pt may try Trazodone 25-50 mg HS PRN for sleep, but not to take it with Remeron.  If Trazodone sufficiently helps with sleep, then will discontinue Remeron.  If this is not helpful, will send to sleep medicine consult.    Pt is doing OK with Lamictal titration although it has been only 1 week.  Pt already has appointments weekly in next 2 weeks to see how he does with Lamictal taper.  Will continue all other medications for now.      Diagnosis                                                                    Social Anxiety disorder  MDD  Insomnia    Treatment Recommendation & Plan       Medication Ordered/Consults/Labs/tests Ordered:     Medication:   -Try Trazodone 25-50 mg at bedtime as needed for sleep.  Do not take Trazodone with Mirtazapine.  If Trazodone is not working well for sleep, then you may switch back to Mirtazapine 15 mg at bedtime as needed for sleep  -Continue all other medications for now  OTC Recommendations: none  Lab Orders:  none  Referrals: none  Release of Information: none  Future Treatment Considerations: Per symptoms.   Return for Follow Up: in 1 week    -Discussed safety plan for suicidal thoughts  -Discussed plan for suicidality  -Discussed available emergency services  -Patient agrees with the treatment plan  -Encouraged to continue outpatient therapy to gain more coping mechanism for stress.    Treatment Risk Statement: Discussed with the patient my impressions, as well as recommended studies. I educated patient on the differential diagnosis and prognosis. I discussed with the patient the risks and benefits of medications versus no interventions, including efficacy, dose, possible side effects and length of treatment and the importance of medication compliance.  The patient understands the risks, benefits, adverse effects and alternatives. Agrees to treatment with the capacity to do so. No medical contraindications to treatment. The patient also understands the risks of using street drugs or alcohol.   CRISIS NUMBERS:   Provided routinely in AVS.             Rylee Kyle CNP,  12/8/2020

## 2020-12-25 DIAGNOSIS — R39.12 BENIGN PROSTATIC HYPERPLASIA WITH WEAK URINARY STREAM: ICD-10-CM

## 2020-12-25 DIAGNOSIS — N40.1 BENIGN PROSTATIC HYPERPLASIA WITH WEAK URINARY STREAM: ICD-10-CM

## 2020-12-26 DIAGNOSIS — I48.20 CHRONIC ATRIAL FIBRILLATION (H): ICD-10-CM

## 2020-12-26 DIAGNOSIS — N40.1 HYPERTROPHY OF PROSTATE WITH URINARY OBSTRUCTION: ICD-10-CM

## 2020-12-26 DIAGNOSIS — N13.8 HYPERTROPHY OF PROSTATE WITH URINARY OBSTRUCTION: ICD-10-CM

## 2020-12-28 NOTE — TELEPHONE ENCOUNTER
"Requested Prescriptions   Pending Prescriptions Disp Refills     doxazosin (CARDURA) 4 MG tablet [Pharmacy Med Name: DOXAZOSIN MESYLATE TABS 4MG] 90 tablet 3     Sig: TAKE 1 TABLET DAILY       Antiadrenergic Antihypertensives Failed - 12/26/2020  1:11 PM        Failed - Blood pressure less than 140/90 in past 6 months     BP Readings from Last 3 Encounters:   02/27/20 106/72   12/10/19 128/60   09/04/19 (!) 144/79                 Failed - Normal serum creatinine on file in past 12 months     Recent Labs   Lab Test 09/04/19  1020   CR 0.81       Ok to refill medication if creatinine is low          Passed - Medication is active on med list        Passed - Patient is age 18 or older        Passed - Recent (6 mo) or future (30 days) visit within the authorizing provider's specialty     Patient had office visit in the last 6 months or has a visit in the next 30 days with authorizing provider or within the authorizing provider's specialty.  See \"Patient Info\" tab in inbasket, or \"Choose Columns\" in Meds & Orders section of the refill encounter.           Alpha Blockers Passed - 12/26/2020  1:11 PM        Passed - Blood pressure under 140/90 in past 12 months     BP Readings from Last 3 Encounters:   02/27/20 106/72   12/10/19 128/60   09/04/19 (!) 144/79                 Passed - Recent (12 mo) or future (30 days) visit within the authorizing provider's specialty     Patient has had an office visit with the authorizing provider or a provider within the authorizing providers department within the previous 12 mos or has a future within next 30 days. See \"Patient Info\" tab in inbasket, or \"Choose Columns\" in Meds & Orders section of the refill encounter.              Passed - Patient does not have Tadalafil, Vardenafil, or Sildenafil on their medication list        Passed - Medication is active on med list        Passed - Patient is 18 years of age or older           metoprolol tartrate (LOPRESSOR) 25 MG tablet [Pharmacy Med " "Name: METOPROLOL TARTRATE TABS 25MG] 180 tablet 3     Sig: TAKE 1 TABLET TWICE A DAY       Beta-Blockers Protocol Passed - 12/26/2020  1:11 PM        Passed - Blood pressure under 140/90 in past 12 months     BP Readings from Last 3 Encounters:   02/27/20 106/72   12/10/19 128/60   09/04/19 (!) 144/79                 Passed - Patient is age 6 or older        Passed - Recent (12 mo) or future (30 days) visit within the authorizing provider's specialty     Patient has had an office visit with the authorizing provider or a provider within the authorizing providers department within the previous 12 mos or has a future within next 30 days. See \"Patient Info\" tab in inbasket, or \"Choose Columns\" in Meds & Orders section of the refill encounter.              Passed - Medication is active on med list             "

## 2020-12-28 NOTE — TELEPHONE ENCOUNTER
"Requested Prescriptions   Pending Prescriptions Disp Refills     finasteride (PROSCAR) 5 MG tablet [Pharmacy Med Name: finasteride 5 mg tablet] 90 tablet 3     Sig: Take 1 tablet (5 mg) by mouth daily       BPH Agents Passed - 12/25/2020  8:00 AM        Passed - Recent (12 mo) or future (30 days) visit within the authorizing provider's department     Patient has had an office visit with the authorizing provider or a provider within the authorizing providers department within the previous 12 mos or has a future within next 30 days. See \"Patient Info\" tab in inbasket, or \"Choose Columns\" in Meds & Orders section of the refill encounter.              Passed - Medication is active on med list        Passed - Patient is 18 years of age or older             "

## 2020-12-29 ENCOUNTER — TELEPHONE (OUTPATIENT)
Dept: PSYCHIATRY | Facility: CLINIC | Age: 75
End: 2020-12-29

## 2020-12-29 ENCOUNTER — VIRTUAL VISIT (OUTPATIENT)
Dept: PSYCHIATRY | Facility: CLINIC | Age: 75
End: 2020-12-29
Attending: NURSE PRACTITIONER
Payer: COMMERCIAL

## 2020-12-29 DIAGNOSIS — F32.1 MODERATE MAJOR DEPRESSION (H): ICD-10-CM

## 2020-12-29 DIAGNOSIS — F41.9 ANXIETY: ICD-10-CM

## 2020-12-29 DIAGNOSIS — F33.41 RECURRENT MAJOR DEPRESSIVE DISORDER, IN PARTIAL REMISSION (H): Primary | ICD-10-CM

## 2020-12-29 DIAGNOSIS — F40.10 SOCIAL ANXIETY DISORDER: ICD-10-CM

## 2020-12-29 PROCEDURE — 99214 OFFICE O/P EST MOD 30 MIN: CPT | Mod: 95 | Performed by: NURSE PRACTITIONER

## 2020-12-29 RX ORDER — DOXAZOSIN 4 MG/1
4 TABLET ORAL DAILY
Qty: 90 TABLET | Refills: 0 | Status: SHIPPED | OUTPATIENT
Start: 2020-12-29 | End: 2021-04-27

## 2020-12-29 RX ORDER — BUSPIRONE HYDROCHLORIDE 30 MG/1
30 TABLET ORAL 2 TIMES DAILY
Qty: 180 TABLET | Refills: 0 | Status: SHIPPED | OUTPATIENT
Start: 2020-12-29 | End: 2021-04-09

## 2020-12-29 RX ORDER — ESCITALOPRAM OXALATE 10 MG/1
5 TABLET ORAL DAILY
Qty: 90 TABLET | Refills: 0
Start: 2020-12-29 | End: 2021-02-11

## 2020-12-29 RX ORDER — GABAPENTIN 600 MG/1
TABLET ORAL
Qty: 315 TABLET | Refills: 0 | Status: SHIPPED | OUTPATIENT
Start: 2020-12-29 | End: 2021-03-19

## 2020-12-29 RX ORDER — FINASTERIDE 5 MG/1
1 TABLET, FILM COATED ORAL DAILY
Qty: 30 TABLET | Refills: 0 | Status: SHIPPED | OUTPATIENT
Start: 2020-12-29 | End: 2021-02-11

## 2020-12-29 RX ORDER — METOPROLOL TARTRATE 25 MG/1
25 TABLET, FILM COATED ORAL 2 TIMES DAILY
Qty: 180 TABLET | Refills: 0 | Status: SHIPPED | OUTPATIENT
Start: 2020-12-29 | End: 2021-03-22

## 2020-12-29 RX ORDER — LAMOTRIGINE 100 MG/1
50 TABLET ORAL 2 TIMES DAILY
Qty: 180 TABLET | Refills: 3
Start: 2020-12-29 | End: 2021-03-09

## 2020-12-29 RX ORDER — FLUOXETINE 10 MG/1
10 TABLET, FILM COATED ORAL DAILY
Qty: 30 TABLET | Refills: 1 | Status: SHIPPED | OUTPATIENT
Start: 2020-12-29 | End: 2021-02-11 | Stop reason: DRUGHIGH

## 2020-12-29 NOTE — TELEPHONE ENCOUNTER
On December 29, 2020, at 9:17 AM, writer called patient at [phone] to confirm Virtual Visit. Writer unable to make contact with patient. Writer left detailed voice message for call back. [phone] left as call back number. Rasheeda Handy, [facility]

## 2020-12-29 NOTE — TELEPHONE ENCOUNTER
Medication is being filled for 1 time refill only due to:  Patient needs to be seen because due for Medicare Annual Wellness with Dr. Verudgo. [employer] message sent,.   Cass MORTON RN, BSN

## 2020-12-29 NOTE — PROGRESS NOTES
Start Time:  1000         End Time: 1024    Telemedicine Visit: The patient's condition can be safely assessed and treated via synchronous audio and visual telemedicine encounter.      Reason for Telemedicine Visit: Due to COVID 19 pandemic, clinic switching all appointments to telemedicine     Originating Site (Patient Location): Patient's home    Distant Site (Provider Location): Provider Remote Setting    Consent:  The patient/guardian has verbally consented to: the potential risks and benefits of telemedicine (video visit) versus in person care; bill my insurance or make self-payment for services provided; and responsibility for payment of non-covered services.     Mode of Communication:  Video Conference via [email or web address]    As the provider I attest to compliance with applicable laws and regulations related to telemedicine.    Psychiatry Clinic Progress Note                                                                  Patient Name: Josemanuel Edmond  YOB: 1945  MRN: [id number]  Date of Service:  12/29/2020  Last Seen:12/8/2020    Josemanuel Edmond is a 75 year old person assigned male at birth, identifies as cisgender male who uses the name Gavin and pronoun pat.       Gavin Edmond is a 75 year old year old adult who presents for ongoing psychiatric care.  Gavin Edmond was last seen on 12/8/2020.      At that time,     Medication Ordered/Consults/Labs/tests Ordered:      Medication:   -Try Trazodone 25-50 mg at bedtime as needed for sleep.  Do not take Trazodone with Mirtazapine.  If Trazodone is not working well for sleep, then you may switch back to Mirtazapine 15 mg at bedtime as needed for sleep  -Continue all other medications for now  OTC Recommendations: none  Lab Orders:  none  Referrals: none  Release of Information: none  Future Treatment Considerations: Per symptoms.   Return for Follow Up: in 1 week      Pertinent Background:  Reports depression started 2002 after his son was killed  "in MVA.  Anxiety also started around the same time, but social anxiety started 6-7 years ago.  Denies any hx of SI, SIB, HI, psych hospitalization.  After son was killed, pt had heavy ETOH use on and off. Psych critical item history includes [no critical items].      Previous Psychiatric Meds: Wellbutrin XL, Xanax, Klonopin, Valium, Ativan, Remeron (30 mg only, worked well for sleep and anxiety, but weight gain), Nortriptyline, Vistaril, Remeron (oversedation at 45 mg, 22.5mg and 15 mg and wt gain), Trazodone (oversedate at 50 mg), Ambien (fall)    Interim History                                                                                                        4, 4     Since the last visit, notes he continues to take Lamictal 50 mg BID.  Notes some increase in depressed mood.  Notes he has been feeling like \"under the cloud\" for long time, but no exacerbation since Lamictal was decreased. Denies SI, SIB or HI.  Has been sleeping well with OTC Unisom 25 mg HS with slight oversedation in AM for couple weeks.  Denies any dizziness or fall.  Also denies any dryness or difficulties with urination. Has not taken Remeron or Trazodone.  Sleeping 8 hours/night.  Anxiety has been fairly stable.  Has heard that Prozac works well for people and wants to try.  Will be in [place] 1/26/2021-2/11/2021, but wants to start medication adjustment before he leaves for the trip.    Denies any symptoms suggestive of hypomania or psychosis.    Current Suicidality/Hx of Suicide Attempts: Denies both  CoCominent Medical concerns: Denies      Medication Side Effects: The patient denies all medication side effects.      Medical Review of Systems     Apart from the symptoms mentioned int he HPI, the 14 point review of systems, including constitutional, HEENT, cardiovascular, respiratory, gastrointestinal, genitourinary, musculoskeletal, integumentary, endocrine, neurological, hematologic and allergic is entirely negative.    Substance Use "   Pt has been staying substance free since last seen.  Notes has not used any ETOH x 3 years.    Medical / Surgical History                                                                                                                  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)     Chronic atrial fibrillation (H)     Hyperplastic Polyp     Right knee DJD     Peripheral neuropathy     Sleep disturbance     Hematoma of skin     Traumatic ecchymosis of buttock, initial encounter       Past Surgical History:   Procedure Laterality Date     ARTHROPLASTY KNEE Right 10/12/2018    Procedure: ARTHROPLASTY KNEE;  Right Total Knee Arthroplasty;  Surgeon: Jeb Peralta MD;  Location: [facility]     COLONOSCOPY N/A 12/10/2015    Procedure: COMBINED COLONOSCOPY, SINGLE OR MULTIPLE BIOPSY/POLYPECTOMY BY BIOPSY;  Surgeon: Jyoti Figueroa MD;  Location: [facility]     JOINT REPLACEMENT, HIP RT/LT  10/07    Joint Replacement Hip LT     JOINT REPLACEMTN, KNEE RT/LT  8/2011    Joint Replacement knee /LT, [facility]     LAPAROSCOPIC HERNIORRHAPHY INGUINAL BILATERAL Bilateral 4/24/2018    Procedure: LAPAROSCOPIC HERNIORRHAPHY INGUINAL BILATERAL;  Laparoscopic bilateral inguinal hernia repair;  Surgeon: Josemanuel Resendiz MD;  Location: [facility]     SURGICAL HISTORY OF -   12/1/1999    Umbilical Herniorrhaphy with mesh     SURGICAL HISTORY OF -  Left 11/2017    Thoracotomy and drainage of empyema        Social/ Family History                                  [per patient report]                                  1ea,1ea   Living arrangements: living with spouse and feels safe  Social Support: spouse  Access to gun: owns a gun  Retired in 2008.  Trauma hx includes loss of son by MVA in 2002.    Allergy                                Bactrim [sulfamethoxazole w/trimethoprim]    Current Medications                                                                                                       Current Outpatient Medications   Medication Sig Dispense Refill     acetaminophen (TYLENOL) 500 MG tablet Take 1,000 mg by mouth every 8 hours as needed for mild pain       buPROPion (WELLBUTRIN SR) 150 MG 12 hr tablet TAKE 1 TABLET TWICE A  tablet 2     busPIRone HCl (BUSPAR) 30 MG tablet Take 1 tablet (30 mg) by mouth 2 times daily 60 tablet 1     doxazosin (CARDURA) 4 MG tablet Take 1 tablet (4 mg) by mouth daily 90 tablet 2     escitalopram (LEXAPRO) 10 MG tablet Take 1 tablet (10 mg) by mouth daily 90 tablet 0     finasteride (PROSCAR) 5 MG tablet Take 1 tablet (5 mg) by mouth daily 90 tablet 3     gabapentin (NEURONTIN) 600 MG tablet Take 1 tab in AM and afternoon and 1.5 tab at bedtime 270 tablet 0     lamoTRIgine (LAMICTAL) 100 MG tablet Take 1 tab daily x 2 weeks, then decrease it to 0.5 tab daily x 2 weeks, then stop the medication 180 tablet 3     metoprolol tartrate (LOPRESSOR) 25 MG tablet Take 1 tablet (25 mg) by mouth 2 times daily 180 tablet 3     mirtazapine (REMERON) 15 MG tablet Take 0.5 tablets (7.5 mg) by mouth nightly as needed (sleep) 30 tablet 1     STATIN NOT PRESCRIBED, INTENTIONAL, Please choose reason not prescribed, below       traZODone (DESYREL) 50 MG tablet Take 25-50 mg by mouth nightly as needed       XARELTO ANTICOAGULANT 20 MG TABS tablet TAKE 1 TABLET DAILY WITH   DINNER 90 tablet 3        Mental Status Exam                                                                                   9, 14 cog        Alertness: alert  and oriented  Appearance:  Casually dressed and  "Well groomed  Behavior/Demeanor: cooperative, pleasant and calm, with good  eye contact   Speech: regular rate and rhythm  Mood :  \"feels under the cloud for long time\" and \"okay\"  Affect: full range and appropriate; was congruent to mood; was congruent to content  Thought Process (Associations):  Linear and Goal directed  Thought process (Rate):  Normal  Thought content:  no overt psychosis, denies suicidal ideation, intent or thoughts and patient does not appear to be responding to internal stimuli  Perception:  Reports none;  Denies depersonalization and derealization  Attention/Concentration:  Fair  Memory:  Immediate recall intact and Short-term memory intact  Language: intact  Fund of Knowledge/Intelligence:  Average  Abstraction:  Normal  Insight:  Fair  Judgment:  Fair  Cognition: (6) does  appear grossly intact; formal cognitive testing was not done    Physical Exam     Motor activity/EPS:  Normal  Psychomotor: normal or unremarkable    Labs and Results      Pertinent findings on review include: Review of records with relevant information reported in the HPI.  Reviewed pt's past medical record and obtained collateral information.      [place] PRESCRIPTION MONITORING PROGRAM [] was checked today:  indicates no refills since last seen.    PHQ9 Today:  N/A  PHQ 11/15/2018 2/26/2019 12/10/2019   PHQ-9 Total Score 9 3 4   Q9: Thoughts of better off dead/self-harm past 2 weeks Not at all Not at all Not at all       CAROLYNN 7 Today: N/A  CAROLYNN-7 SCORE 11/15/2018 2/26/2019 12/10/2019   Total Score - - -   Total Score 6 5 2       Recent Labs   Lab Test 09/04/19  1020 09/14/18  1629 05/07/18  1325   CR 0.81 0.93 1.07   GFRESTIMATED 86 79 67     Recent Labs   Lab Test 01/22/18  1354 12/20/17  1315   AST 15 24   ALT 32 17   ALKPHOS 60 66     PSYCHOTROPIC DRUG INTERACTIONS:   Buspar---Gabapentin: Concurrent use of GABAPENTIN and CNS DEPRESSANTS may result in respiratory depression.    Buspar---Lexapro---: Concurrent use of " ESCITALOPRAM and SEROTONERGIC DRUGS may result in increased risk of serotonin syndrome.   Wellbutrin---Lexapro: Concurrent use of BUPROPION and AGENTS LOWERING SEIZURE THRESHOLD may result in lower seizure threshold.   LExapro---Prozac---Xarelto: Concurrent use of RIVAROXABAN and SEROTONIN NOREPINEPHRINE REUPTAKE INHIBITORS AND SSRIS may result in increased risk of bleeding  Doxazosin---Metoprolol: Concurrent use of ALPHA-1 ADRENERGIC BLOCKERS and BETA-ADRENERGIC BLOCKERS may result in an exaggerated hypotensive response to the first dose of the alpha blocker.   Metoprolol---Wellbutrin---Prozac: Concurrent use of METOPROLOL and CYP2D6 INHIBITORS may result in increased metoprolol exposure.   Lexapro---Prozac: Concurrent use of ESCITALOPRAM and FLUOXETINE may result in an increased risk of QT-interval prolongation and serotonin syndrome (hypertension, hyperthermia, myoclonus, mental status changes).   Prozac---Wellbutrin: Concurrent use of BUPROPION and SELECTED SEIZURE THRESHOLD LOWERING CYP2D6 SUBSTRATES may result in increased exposure of CYP2D6 substrates; increased risk of seizure.   Prozac---Buspar: Concurrent use of FLUOXETINE and BUSPIRONE may result in worsening of psychiatric symptoms.         MANAGEMENT:  Monitoring for adverse effects, routine vitals and patient is aware of risks      Impression/Assessment      Gavin Edmond is a 75 year old adult  who presents for med management follow up.  Pt appears mostly stable in his mood and anxiety, denies SI, SIB or HI.  Pt continued on Lamictal 50 mg BID with slight increase in depressed mood.  Pt initially wanted medication reconciliation to discontinue Lamictal, however, wanted to try Prozac as he heard this is helpful.  Discussed that Prozac may be helpful for depression, but it could be activating and exacerbate anxiety.  Also discussed medication interactions.  Pt wants to try Prozac prior to his trip to [place] at the end of January.  Thus will start  cross taper of Lexapro to Prozac, but due to his age and medication interaction, will start this slow.  Will decrease Lexapro 5 mg daily and start Prozac 10 mg daily while monitoring anxiety and serotonin syndrome closely.  Since pt is sleeping well without Trazodone or Remeron, both medications are discontinued.  Though was working on discontinuing Lacmital, since pt noted some exacerbation of depression and pt wants to try Prozac, will continue on Lacmital 50 mg BID at this time.  Will continue all other medications at this time to make one change at a time.      Diagnosis                                                                   Social Anxiety disorder  MDD  Insomnia    Treatment Recommendation & Plan       Medication Ordered/Consults/Labs/tests Ordered:     Medication:   -Start Fluoxetine 10 mg daily for your mood and anxiety.  Monitor your anxiety, confusion, difficulties managing your body temperature, muscle rigidity  -Decrease Escitalopram to 5 mg daily  -Trazodone and Mirtazapine are discontinued as you are not taking them  -Continue on Lamotrigine 50 mg 2 times a day for now  -Continue all other medication regimen  OTC Recommendations: none  Lab Orders:  none  Referrals: none  Release of Information: none  Future Treatment Considerations: Per symptoms.   Return for Follow Up: in 3 weeks    -Discussed safety plan for suicidal thoughts  -Discussed plan for suicidality  -Discussed available emergency services  -Patient agrees with the treatment plan  -Encouraged to continue outpatient therapy to gain more coping mechanism for stress.      Treatment Risk Statement: Discussed with the patient my impressions, as well as recommended studies. I educated patient on the differential diagnosis and prognosis. I discussed with the patient the risks and benefits of medications versus no interventions, including efficacy, dose, possible side effects and length of treatment and the importance of medication  compliance.  The patient understands the risks, benefits, adverse effects and alternatives. Agrees to treatment with the capacity to do so. No medical contraindications to treatment. The patient also understands the risks of using street drugs or alcohol.   CRISIS NUMBERS:   Provided routinely in AVS.      Rylee Kyle CNP,  12/29/2020

## 2020-12-29 NOTE — TELEPHONE ENCOUNTER
Medication is being filled for 1 time refill only due to:  Patient needs to be seen because due for Medicare Annual Wellness with Dr. Verdugo. [employer] system[employer] message sent,.   Nereyda Lynn RN

## 2020-12-29 NOTE — PATIENT INSTRUCTIONS
-Start Fluoxetine 10 mg daily for your mood and anxiety.  Monitor your anxiety, confusion, difficulties managing your body temperature, muscle rigidity  -Decrease Escitalopram to 5 mg daily  -Trazodone and Mirtazapine are discontinued as you are not taking them  -Continue on Lamotrigine 50 mg 2 times a day for now  -Continue all other medication regimen    Your next appointment is scheduled on 1/26/2021 (Tue) at 9:30am.    To access your telemedicine visit:     Open a web browser, like [employer], and type [email or web address]     You will see a box asking you to check in to let Rylee Kyle know that you are here.     Type in your name and press Check In. That will let Rylee see you in the virtual waiting room. At your scheduled appointment time, your provider will initiate the visit and connect you.     When your visit is done, you can simply close the browser window.        Please Note:  Ideally, you will connect from a desktop, laptop, or tablet with a WiFi connection. Your computer/tablet must have a camera and microphone. You can use a cell phone, if it has a camera, and if you can connect to WiFi. However, if you connect your phone over a cellular network, it is of lower quality and less reliable

## 2021-01-02 DIAGNOSIS — Z11.59 ENCOUNTER FOR SCREENING FOR OTHER VIRAL DISEASES: ICD-10-CM

## 2021-01-02 PROCEDURE — U0003 INFECTIOUS AGENT DETECTION BY NUCLEIC ACID (DNA OR RNA); SEVERE ACUTE RESPIRATORY SYNDROME CORONAVIRUS 2 (SARS-COV-2) (CORONAVIRUS DISEASE [COVID-19]), AMPLIFIED PROBE TECHNIQUE, MAKING USE OF HIGH THROUGHPUT TECHNOLOGIES AS DESCRIBED BY CMS-2020-01-R: HCPCS | Performed by: OPHTHALMOLOGY

## 2021-01-02 PROCEDURE — U0005 INFEC AGEN DETEC AMPLI PROBE: HCPCS | Performed by: OPHTHALMOLOGY

## 2021-01-03 LAB
SARS-COV-2 RNA SPEC QL NAA+PROBE: NOT DETECTED
SPECIMEN SOURCE: NORMAL

## 2021-01-04 ENCOUNTER — ANESTHESIA EVENT (OUTPATIENT)
Dept: SURGERY | Facility: CLINIC | Age: 76
End: 2021-01-04
Payer: COMMERCIAL

## 2021-01-04 DIAGNOSIS — F51.01 PRIMARY INSOMNIA: ICD-10-CM

## 2021-01-05 NOTE — H&P
[facility]  [facility]  [address]  [facility] [address]  [phone]  Dept: [phone]    OPHTHALMOLOGY PRE-OPERATIVE  HISTORY AND PHYSICAL    DATE OF H/P:  2020    DATE OF SURGERY:  2021  PROCEDURE:  Procedure(s):  Cataract Removal with Implant, Right Eye  LENS IMPLANT:  ZCB00 +22.0  REFRACTIVE GOAL:  PL Sph  SURGEON:  Regan Kaufman MD    ANESTHESIA:  TOPICAL / MAC    OR CASE REQUIREMENTS:    DEMOGRAPHICS:  Demographic Information on Josemanuel Edmond:    Josemanuel Edmond  Gender: male  : 1945  [address]   [place] [address]  [phone] (home)     Medical Record: [id number]  Social Security Number: [id number]  Pharmacy:    [facility] - [facility] [address] [facility] - [facility] [address] PHARMACY [facility] - [place] [address] NO.  [facility] - [place] [address] AT PORTAL TO REGISTERED [facility] SITES  [facility] - [address]  Primary Care Provider: Dominguez Verdugo    Parent's names are: Data Unavailable (mother) and Data Unavailable (father).    Insurance: Payor: MEDICA / Plan: MED[employer] ADVANT[employer] OLD / Product Type: HMO /     OCULAR HISTORY:  Cataracts, each eye.    HISTORIES:  Past Medical History:   Diagnosis Date     Benign neoplasm of prostate 2000    Benign Prostate Nodule       Past Surgical History:   Procedure Laterality Date     ARTHROPLASTY KNEE Right 10/12/2018    Procedure: ARTHROPLASTY KNEE;  Right Total Knee Arthroplasty;  Surgeon: Jeb Peralta MD;  Location: [facility]     COLONOSCOPY N/A 12/10/2015    Procedure: COMBINED COLONOSCOPY, SINGLE OR MULTIPLE BIOPSY/POLYPECTOMY BY BIOPSY;  Surgeon: Jyoti Figueroa MD;  Location: [facility]     JOINT REPLACEMENT, HIP RT/LT  10/07    Joint Replacement Hip LT     JOINT REPLACEMTN,  KNEE RT/LT  2011    Joint Replacement knee /LT, [facility]     LAPAROSCOPIC HERNIORRHAPHY INGUINAL BILATERAL Bilateral 2018    Procedure: LAPAROSCOPIC HERNIORRHAPHY INGUINAL BILATERAL;  Laparoscopic bilateral inguinal hernia repair;  Surgeon: Josemanuel Resendiz MD;  Location: [facility]     SURGICAL HISTORY OF -   1999    Umbilical Herniorrhaphy with mesh     SURGICAL HISTORY OF -  Left 2017    Thoracotomy and drainage of empyema       Family History   Problem Relation Age of Onset     Breast Cancer Mother      Neurologic Disorder Mother         parkinsons     Respiratory Father         emphyzema     Diabetes Brother        Social History     Tobacco Use     Smoking status: Former Smoker     Packs/day: 1.00     Years: 15.00     Pack years: 15.00     Types: Cigarettes     Quit date: 10/13/1975     Years since quittin.2     Smokeless tobacco: Never Used   Substance Use Topics     Alcohol use: No     Comment: quit 2017       MEDICATIONS:  No current facility-administered medications for this encounter.      Current Outpatient Medications   Medication Sig     acetaminophen (TYLENOL) 500 MG tablet Take 1,000 mg by mouth every 8 hours as needed for mild pain     buPROPion (WELLBUTRIN SR) 150 MG 12 hr tablet TAKE 1 TABLET TWICE A DAY     busPIRone HCl (BUSPAR) 30 MG tablet Take 1 tablet (30 mg) by mouth 2 times daily     doxazosin (CARDURA) 4 MG tablet Take 1 tablet (4 mg) by mouth daily No further refills. Needs appt     escitalopram (LEXAPRO) 10 MG tablet Take 0.5 tablets (5 mg) by mouth daily     finasteride (PROSCAR) 5 MG tablet Take 1 tablet (5 mg) by mouth daily     FLUoxetine (PROZAC) 10 MG tablet Take 1 tablet (10 mg) by mouth daily     gabapentin (NEURONTIN) 600 MG tablet Take 1 tab in AM and afternoon and 1.5 tab at bedtime     lamoTRIgine (LAMICTAL) 100 MG tablet Take 0.5 tablets (50 mg) by mouth 2 times daily     metoprolol tartrate (LOPRESSOR) 25 MG tablet Take 1 tablet (25 mg) by mouth 2  times daily No further refills. Needs appt     XARELTO ANTICOAGULANT 20 MG TABS tablet TAKE 1 TABLET DAILY WITH   DINNER     STATIN NOT PRESCRIBED, INTENTIONAL, Please choose reason not prescribed, below       ALLERGIES:     Allergies   Allergen Reactions     Bactrim [Sulfamethoxazole W/Trimethoprim] Nausea and Vomiting       PERTINENT SYSTEMS REVIEW:    1. No - Do you have a history of heart attack, stroke, stent, bypass or surgery on an artery in the head, neck, heart or legs?  2. No - Do you ever have any pain or discomfort in your chest?  3. No - Do you have a history of  Heart Failure?  4. No - Are you troubled by shortness of breath when walking: On the level, up a slight hill or at night?  5. No - Do you currently have a cold, bronchitis or other respiratory infection?  6. No - Do you have a cough, shortness of breath or wheezing?  7. No - Do you sometimes get pains in the calves of your legs when you walk?  8. No - Do you or anyone in your family have previous history of blood clots?  9. No - Do you or does anyone in your family have a serious bleeding problem such as prolonged bleeding following surgeries or cuts?  10. No - Have you ever had problems with anemia or been told to take iron pills?  11. No - Have you had any abnormal blood loss such as black, tarry or bloody stools, or abnormal vaginal bleeding?  12. No - Have you ever had a blood transfusion?  13. No - Have you or any of your relatives ever had problems with anesthesia?  14. No - Do you have sleep apnea, excessive snoring or daytime drowsiness?  15. No - Do you have any prosthetic heart valves?  16. Yes: hip / knee replacements - Do you have prosthetic joints?    EXAMINATION:  Vitals were reviewed                     Vison:  Va, right - 20/30, left - 20/25;   BAT, right - 20/80, left - 20/70;  HEENT:  Cataract, otherwise unremarkable.  LUNGS:  Clear  CV:  Regular rate and rhythm without murmur  ABD:  Soft and nontender  NEURO:  Alert and  nonfocal    IMPRESSION:  Patient cleared for ophthalmic surgery.  Low risk with monitored, light sedation.  I have assessed the patient's DVT risk, and no additional orders necessary.    PLAN:  Procedure(s):  Cataract Removal with Implant, Right Eye      Regan Kaufman MD

## 2021-01-06 ENCOUNTER — ANESTHESIA (OUTPATIENT)
Dept: SURGERY | Facility: CLINIC | Age: 76
End: 2021-01-06
Payer: COMMERCIAL

## 2021-01-06 ENCOUNTER — HOSPITAL ENCOUNTER (OUTPATIENT)
Facility: CLINIC | Age: 76
Discharge: HOME | End: 2021-01-06
Attending: OPHTHALMOLOGY | Admitting: OPHTHALMOLOGY
Payer: COMMERCIAL

## 2021-01-06 VITALS
HEIGHT: 70 IN | HEART RATE: 86 BPM | WEIGHT: 170 LBS | RESPIRATION RATE: 14 BRPM | DIASTOLIC BLOOD PRESSURE: 70 MMHG | TEMPERATURE: 98.9 F | OXYGEN SATURATION: 97 % | BODY MASS INDEX: 24.34 KG/M2 | SYSTOLIC BLOOD PRESSURE: 132 MMHG

## 2021-01-06 PROCEDURE — 370N000004 HC ANESTHESIA CATARACT PACKAGE: Performed by: OPHTHALMOLOGY

## 2021-01-06 PROCEDURE — V2632 POST CHMBR INTRAOCULAR LENS: HCPCS | Performed by: OPHTHALMOLOGY

## 2021-01-06 PROCEDURE — 250N000011 HC RX IP 250 OP 636: Performed by: NURSE ANESTHETIST, CERTIFIED REGISTERED

## 2021-01-06 PROCEDURE — 360N000007 HC CATARACT SURGICAL PACKAGE: Performed by: OPHTHALMOLOGY

## 2021-01-06 PROCEDURE — 250N000009 HC RX 250: Performed by: OPHTHALMOLOGY

## 2021-01-06 PROCEDURE — 250N000011 HC RX IP 250 OP 636: Performed by: OPHTHALMOLOGY

## 2021-01-06 PROCEDURE — 761N000008 HC RECOVERY CATRACT PACKAGE: Performed by: OPHTHALMOLOGY

## 2021-01-06 PROCEDURE — 258N000003 HC RX IP 258 OP 636: Performed by: NURSE ANESTHETIST, CERTIFIED REGISTERED

## 2021-01-06 PROCEDURE — 250N000009 HC RX 250: Performed by: NURSE ANESTHETIST, CERTIFIED REGISTERED

## 2021-01-06 DEVICE — EYE IMP IOL AMO PCL TECNIS ZCB00 22.0: Type: IMPLANTABLE DEVICE | Site: EYE | Status: FUNCTIONAL

## 2021-01-06 RX ORDER — CYCLOPENTOLATE HYDROCHLORIDE 10 MG/ML
1 SOLUTION/ DROPS OPHTHALMIC
Status: DISCONTINUED | OUTPATIENT
Start: 2021-01-06 | End: 2021-01-06 | Stop reason: HOSPADM

## 2021-01-06 RX ORDER — LIDOCAINE HYDROCHLORIDE 20 MG/ML
JELLY TOPICAL PRN
Status: DISCONTINUED | OUTPATIENT
Start: 2021-01-06 | End: 2021-01-06 | Stop reason: HOSPADM

## 2021-01-06 RX ORDER — TROPICAMIDE 10 MG/ML
1 SOLUTION/ DROPS OPHTHALMIC
Status: COMPLETED | OUTPATIENT
Start: 2021-01-06 | End: 2021-01-06

## 2021-01-06 RX ORDER — PHENYLEPHRINE HYDROCHLORIDE 25 MG/ML
1 SOLUTION/ DROPS OPHTHALMIC
Status: COMPLETED | OUTPATIENT
Start: 2021-01-06 | End: 2021-01-06

## 2021-01-06 RX ORDER — SODIUM CHLORIDE, SODIUM LACTATE, POTASSIUM CHLORIDE, CALCIUM CHLORIDE 600; 310; 30; 20 MG/100ML; MG/100ML; MG/100ML; MG/100ML
INJECTION, SOLUTION INTRAVENOUS CONTINUOUS
Status: DISCONTINUED | OUTPATIENT
Start: 2021-01-06 | End: 2021-01-06 | Stop reason: HOSPADM

## 2021-01-06 RX ORDER — LIDOCAINE 40 MG/G
CREAM TOPICAL
Status: DISCONTINUED | OUTPATIENT
Start: 2021-01-06 | End: 2021-01-06 | Stop reason: HOSPADM

## 2021-01-06 RX ORDER — BALANCED SALT SOLUTION 6.4; .75; .48; .3; 3.9; 1.7 MG/ML; MG/ML; MG/ML; MG/ML; MG/ML; MG/ML
SOLUTION OPHTHALMIC PRN
Status: DISCONTINUED | OUTPATIENT
Start: 2021-01-06 | End: 2021-01-06 | Stop reason: HOSPADM

## 2021-01-06 RX ORDER — PROPARACAINE HYDROCHLORIDE 5 MG/ML
SOLUTION/ DROPS OPHTHALMIC PRN
Status: DISCONTINUED | OUTPATIENT
Start: 2021-01-06 | End: 2021-01-06 | Stop reason: HOSPADM

## 2021-01-06 RX ADMIN — PHENYLEPHRINE HYDROCHLORIDE 1 DROP: 25 SOLUTION/ DROPS OPHTHALMIC at 09:42

## 2021-01-06 RX ADMIN — SODIUM CHLORIDE, POTASSIUM CHLORIDE, SODIUM LACTATE AND CALCIUM CHLORIDE: 600; 310; 30; 20 INJECTION, SOLUTION INTRAVENOUS at 09:42

## 2021-01-06 RX ADMIN — MIDAZOLAM 2 MG: 1 INJECTION INTRAMUSCULAR; INTRAVENOUS at 10:44

## 2021-01-06 RX ADMIN — TROPICAMIDE 1 DROP: 10 SOLUTION/ DROPS OPHTHALMIC at 09:54

## 2021-01-06 RX ADMIN — TROPICAMIDE 1 DROP: 10 SOLUTION/ DROPS OPHTHALMIC at 09:42

## 2021-01-06 RX ADMIN — TROPICAMIDE 1 DROP: 10 SOLUTION/ DROPS OPHTHALMIC at 10:12

## 2021-01-06 RX ADMIN — LIDOCAINE HYDROCHLORIDE 0.1 ML: 10 INJECTION, SOLUTION EPIDURAL; INFILTRATION; INTRACAUDAL; PERINEURAL at 09:42

## 2021-01-06 RX ADMIN — PHENYLEPHRINE HYDROCHLORIDE 1 DROP: 25 SOLUTION/ DROPS OPHTHALMIC at 10:11

## 2021-01-06 RX ADMIN — PHENYLEPHRINE HYDROCHLORIDE 1 DROP: 25 SOLUTION/ DROPS OPHTHALMIC at 09:54

## 2021-01-06 ASSESSMENT — LIFESTYLE VARIABLES: TOBACCO_USE: 1

## 2021-01-06 ASSESSMENT — ENCOUNTER SYMPTOMS: DYSRHYTHMIAS: 1

## 2021-01-06 ASSESSMENT — MIFFLIN-ST. JEOR: SCORE: 1502.36

## 2021-01-06 NOTE — ANESTHESIA PREPROCEDURE EVALUATION
Anesthesia Pre-Procedure Evaluation    Patient: Josemanuel Edmond   MRN: [id number] : 1945          Preoperative Diagnosis: Cataract [H26.9]    Procedure(s):  Cataract Removal with Implant    Past Medical History:   Diagnosis Date     Benign neoplasm of prostate     Benign Prostate Nodule     Past Surgical History:   Procedure Laterality Date     ARTHROPLASTY KNEE Right 10/12/2018    Procedure: ARTHROPLASTY KNEE;  Right Total Knee Arthroplasty;  Surgeon: Jeb Peralta MD;  Location: [facility]     COLONOSCOPY N/A 12/10/2015    Procedure: COMBINED COLONOSCOPY, SINGLE OR MULTIPLE BIOPSY/POLYPECTOMY BY BIOPSY;  Surgeon: Jyoti Figueroa MD;  Location: [facility]     JOINT REPLACEMENT, HIP RT/LT  10/07    Joint Replacement Hip LT     JOINT REPLACEMTN, KNEE RT/LT  2011    Joint Replacement knee /LT, [facility]     LAPAROSCOPIC HERNIORRHAPHY INGUINAL BILATERAL Bilateral 2018    Procedure: LAPAROSCOPIC HERNIORRHAPHY INGUINAL BILATERAL;  Laparoscopic bilateral inguinal hernia repair;  Surgeon: Josemanuel Resendiz MD;  Location: [facility] OR     SURGICAL HISTORY OF -   1999    Umbilical Herniorrhaphy with mesh     SURGICAL HISTORY OF -  Left 2017    Thoracotomy and drainage of empyema       Anesthesia Evaluation     . Pt has had prior anesthetic. Type: General, Regional and MAC    No history of anesthetic complications          ROS/MED HX    ENT/Pulmonary:     (+)allergic rhinitis, tobacco use, Past use , . Other pulmonary disease Thoracotomy for tharacentesis secondary to pneumonia.    Neurologic:  - neg neurologic ROS     Cardiovascular:     (+) Dyslipidemia, hypertension----. Taking blood thinners : . . . :. dysrhythmias a-fib, . Previous cardiac testing Echodate:17results:Interpretation Summary     1. Normal left ventricle size and systolic function. LV ejection fraction 60-  65%. Grade 2 diastolic dysfunction.  2. No regional wall motion abnormalities.  3. Normal right ventricular size and  systolic function.  4. Trileaflet aortic valve with sclerosis, no stenosis. Mild to moderate  aortic regurgitation.  5. Mild aortic root dilatation (4.0 centimeters). Mild ascending aortic  dilatation (4.0 centimeters).  6. Moderate mitral and tricuspid regurgitation.  7. Severe biatrial enlargement.     There is no comparison study available.  _____________________________________________________________________________  __        Left Ventricle  The left ventricle is normal in size. There is normal left ventricular wall  thickness. Left ventricular systolic function is normal. The visual ejection  fraction is estimated at 60-65%. Grade II or moderate diastolic dysfunction.  No regional wall motion abnormalities noted.     Right Ventricle  The right ventricle is normal in size and function.     Atria  The left atrium is severely dilated. The right atrium is severely dilated.  There is no color Doppler evidence of an atrial shunt.     Mitral Valve  The mitral valve leaflets appear normal. There is no evidence of stenosis,  fluttering, or prolapse. There is moderate (2+) mitral regurgitation.        Tricuspid Valve  Normal tricuspid valve. There is moderate (2+) tricuspid regurgitation. The  right ventricular systolic pressure is approximated at 30.2 mmHg plus the  right atrial pressure.     Aortic Valve  The aortic valve is trileaflet with aortic valve sclerosis. There is mild to  moderate (1-2+) aortic regurgitation. No aortic stenosis is present.     Pulmonic Valve  The pulmonic valve is not well seen, but is grossly normal. There is trace  pulmonic valvular regurgitation. Normal pulmonic valve velocity.     Vessels  Mild aortic root dilatation. (4.0 centimeters). The ascending aorta is Mildly  dilated. (4.0 centimeters). Dilation of the inferior vena cava is present with  normal respiratory variation in diameter.     Pericardium  There is no pericardial effusion.        Rhythm  Sinus rhythm was noted.date:  results:ECG reviewed date:12/20/17 results:Atrial Rhythm -First degree A-V block - occasional ectopic ventricular beat     P:QRS - 1:1, Abnormal P axis, H Rate 62   Judy = 294  -Nonspecific QRS widening.    -  Nonspecific T-abnormality. date: results:          METS/Exercise Tolerance:  >4 METS   Hematologic:  - neg hematologic  ROS       Musculoskeletal:   (+) arthritis,  -       GI/Hepatic:     (+) GERD liver disease, Other GI/Hepatic ETOH remission      Renal/Genitourinary:     (+) BPH,       Endo:  - neg endo ROS       Psychiatric:     (+) psychiatric history anxiety and depression      Infectious Disease:  - neg infectious disease ROS       Malignancy:   (+) Malignancy History of Prostate  Prostate CA status post Surgery,         Other:    - neg other ROS                      Physical Exam  Normal systems: cardiovascular, pulmonary and dental    Airway   Mallampati: II  TM distance: >3 FB  Neck ROM: full    Dental     Cardiovascular       Pulmonary             Lab Results   Component Value Date    WBC 5.5 09/04/2019    HGB 12.7 (L) 09/04/2019    HCT 37.6 (L) 09/04/2019     09/04/2019    SED 10 10/25/2012     09/04/2019    POTASSIUM 3.8 09/04/2019    CHLORIDE 101 09/04/2019    CO2 27 09/04/2019    BUN 13 09/04/2019    CR 0.81 09/04/2019     (H) 09/04/2019    LUIS 8.6 09/04/2019    ALBUMIN 3.8 01/22/2018    PROTTOTAL 7.7 01/22/2018    ALT 32 01/22/2018    AST 15 01/22/2018    GGT 69 10/09/2017    ALKPHOS 60 01/22/2018    BILITOTAL 0.5 01/22/2018    PTT 30 02/09/2005    INR 2.73 (H) 11/15/2012    TSH 1.86 05/07/2018       Preop Vitals  BP Readings from Last 3 Encounters:   02/27/20 106/72   12/10/19 128/60   09/04/19 (!) 144/79    Pulse Readings from Last 3 Encounters:   02/27/20 68   12/10/19 93   07/17/19 60      Resp Readings from Last 3 Encounters:   02/27/20 16   12/10/19 16   07/17/19 18    SpO2 Readings from Last 3 Encounters:   02/27/20 98%   12/10/19 97%   09/04/19 99%      Temp Readings  "from Last 1 Encounters:   02/27/20 36.2  C (97.1  F)    Ht Readings from Last 1 Encounters:   02/27/20 1.778 m (5' 10\")      Wt Readings from Last 1 Encounters:   02/27/20 77.1 kg (170 lb)    Estimated body mass index is 24.39 kg/m  as calculated from the following:    Height as of 2/27/20: 1.778 m (5' 10\").    Weight as of 2/27/20: 77.1 kg (170 lb).       Anesthesia Plan      History & Physical Review  History and physical reviewed and following examination; no interval change.    ASA Status:  3 .    NPO Status:  > 6 hours    Plan for MAC Reason for MAC:  Procedure to face, neck, head or breast           Postoperative Care      Consents  Anesthetic plan, risks, benefits and alternatives discussed with:  Patient..                 Jose Kat CRNA, APRN CRNA  "

## 2021-01-06 NOTE — OP NOTE
OPHTHALMOLOGY OPERATIVE NOTE    PATIENT: Josemanuel Edmond  DATE OF SURGERY: 1/6/2021  PREOPERATIVE DIAGNOSIS:  Senile Nuclear Cataract, Right eye  POSTOPERATIVE DIAGNOSIS:  Senile Nuclear Cataract, Right eye  OPERATIVE PROCEDURE:  Phacoemulsification with placement of intraocular lens  SURGEON:  Regan Kaufman MD  ANESTHESIA:  Topical / MAC  EBL:  None  SPECIMENS:  None  COMPLICATIONS:  None    PROCEDURE:  The patient was brought to the operating room at [facility].  The right eye was prepped and draped in the usual fashion for cataract surgery.  A wire lid speculum was inserted.  A super sharp blade was used to make a paracentesis at the 11 O'clock position.  The super sharp blade was used to make a partial thickness temporal groove, which was 3 mm in length.  0.8 mL of non-preserved epi-Shugarcaine was injected into the anterior chamber.  Viscoelastic was used to inflate the anterior chamber through a cannula.  A 2.5 mm microkeratome was used to make a temporal clear corneal incision in a two-plane fashion.  A cystotome needle and forceps were used to make a capsulorrhexis.  Hydrodissection and hydrodelineation were performed with Balance Salt Solution.  The lens was then phacoemulsified and removed without complications.  The cortical material was removed with bimanual irrigation and aspiration.  The capsular bag was filled with viscoelastic.  A posterior chamber intraocular lens, preselected and recorded, was folded and inserted into the capsular bag.  The viscoelastic was removed with the irrigation and aspiration tip.  Balanced Salt Solution with Vigamox, 150mg/0.1mL, was used to refill the anterior chamber.  The wounds were checked for water tightness and required no suture.  The wire lid speculum was removed.  The patient's right eye was cleaned and a drop of each post-operative drop was placed, followed by a ervin shield.  The patient tolerated the procedure well, and there  were no complications.      Regan Kaufman MD

## 2021-01-06 NOTE — ANESTHESIA POSTPROCEDURE EVALUATION
Patient: Josemanuel Edmond    Procedure(s):  Cataract Removal with Implant    Diagnosis:Cataract [H26.9]  Diagnosis Additional Information: No value filed.    Anesthesia Type:  MAC    Note:  Anesthesia Post Evaluation    Patient location during evaluation: Bedside  Patient participation: Able to fully participate in evaluation  Level of consciousness: awake and alert  Pain management: adequate  Airway patency: patent  Cardiovascular status: acceptable  Respiratory status: acceptable  Hydration status: acceptable  PONV: none     Anesthetic complications: None          Last vitals:  Vitals:    01/06/21 0920   BP: (!) 151/82   Pulse: 86   Resp: 18   Temp: 37.2  C (98.9  F)   SpO2: 99%         Electronically Signed By: Jose Kat CRNA, APRN CRNA  January 6, 2021  10:51 AM

## 2021-01-06 NOTE — ANESTHESIA CARE TRANSFER NOTE
Patient: Josemanuel Edmond    Procedure(s):  Cataract Removal with Implant    Diagnosis: Cataract [H26.9]  Diagnosis Additional Information: No value filed.    Anesthesia Type:   MAC     Note:  Airway :Room Air  Patient transferred to:Phase II  Handoff Report: Identifed the Patient, Identified the Reponsible Provider, Reviewed the pertinent medical history, Discussed the surgical course, Reviewed Intra-OP anesthesia mangement and issues during anesthesia, Set expectations for post-procedure period and Allowed opportunity for questions and acknowledgement of understanding      Vitals: (Last set prior to Anesthesia Care Transfer)    CRNA VITALS  1/6/2021 1033 - 1/6/2021 1103      1/6/2021             Pulse:  63    SpO2:  96 %    EKG:  NSR                Electronically Signed By: Jose Kat CRNA, APRN CRNA  January 6, 2021  11:03 AM

## 2021-01-07 RX ORDER — MIRTAZAPINE 15 MG/1
15 TABLET, FILM COATED ORAL
Qty: 30 TABLET | Refills: 1 | OUTPATIENT
Start: 2021-01-07

## 2021-01-14 ENCOUNTER — VIRTUAL VISIT (OUTPATIENT)
Dept: PSYCHIATRY | Facility: CLINIC | Age: 76
End: 2021-01-14
Attending: NURSE PRACTITIONER
Payer: COMMERCIAL

## 2021-01-14 DIAGNOSIS — F40.10 SOCIAL ANXIETY DISORDER: Primary | ICD-10-CM

## 2021-01-14 DIAGNOSIS — F33.41 RECURRENT MAJOR DEPRESSIVE DISORDER, IN PARTIAL REMISSION (H): ICD-10-CM

## 2021-01-14 PROCEDURE — 99214 OFFICE O/P EST MOD 30 MIN: CPT | Mod: 95 | Performed by: NURSE PRACTITIONER

## 2021-01-14 ASSESSMENT — PAIN SCALES - GENERAL: PAINLEVEL: NO PAIN (0)

## 2021-01-14 NOTE — PATIENT INSTRUCTIONS
-Continue on current medication regimen.  You may take Gabapentin up to 900 mg 3 times a day for anxiety during your trip.    Your next appointment is scheduled on 2/11/2021 (Thu) at 1pm, your 1/19 appointment is cancelled.    To access your telemedicine visit:     Open a web browser, like 2U, and type [email or web address]     You will see a box asking you to check in to let Rylee Kyle know that you are here.     Type in your name and press Check In. That will let Rylee see you in the virtual waiting room. At your scheduled appointment time, your provider will initiate the visit and connect you.     When your visit is done, you can simply close the browser window.        Please Note:  Ideally, you will connect from a desktop, laptop, or tablet with a WiFi connection. Your computer/tablet must have a camera and microphone. You can use a cell phone, if it has a camera, and if you can connect to WiFi. However, if you connect your phone over a cellular network, it is of lower quality and less reliable      Thank you for coming to the [facility] [place] [facility].    Lab Testing:  If you had lab testing today and your results are reassuring or normal they will be mailed to you or sent through [employer] within 7 days. If the lab tests need quick action we will call you with the results. The phone number we will call with results is # [phone] (home) . If this is not the best number please call our clinic and change the number.    Medication Refills:  If you need any refills please call your pharmacy and they will contact us. Our fax number for refills is [phone]. Please allow three business for refill processing. If you need to  your refill at a new pharmacy, please contact the new pharmacy directly. The new pharmacy will help you get your medications transferred.     Scheduling:  If you have any concerns about today's visit or wish to schedule another  appointment please call our office during normal business hours [phone] (8-5:00 M-F)    Contact Us:  Please call [phone] during business hours (8-5:00 M-F).  If after clinic hours, or on the weekend, please call  [phone].    Financial Assistance [phone]  [facility] Billing [phone]  Central Billing Office, [facility]: [phone]  [facility] Billing [phone]  Medical Records [phone]  [facility] Patient Bill of Rights [email or web address]       MENTAL HEALTH CRISIS NUMBERS:  For a medical emergency please call  911 or go to the nearest ER.     [facility]:   [facility] -[phone]   Crisis Residence [facility] -[phone]   Walk-In Counseling [facility] -[phone]   COPE 24/7 [place] Mobile Team -[phone] (adults)/[phone] (child)  CHILD: [facility] needs assessment team - [phone]      [facility]:   [facility] - [phone]   Walk-in counseling [facility] - [phone]   Walk-in counseling [facility] - [phone]   Crisis Residence [facility] - [phone]  Urgent Care Adult Mental Eqzsse-[phone] mobile unit/ 24/7 crisis line    National Crisis Numbers:   National Suicide Prevention Lifeline: 7-654-134-TALK (560-963-3476)  Poison Control Center - 1-660.443.4416  [employer]/resources for a list of additional resources (SOS)  Trans Lifeline a hotline for transgender people 6-785-812-3344  The [employer] a hotline for LGBT youth 1-707.417.8763  Crisis Text Line: For any crisis 24/7   To: 988537  see www.crisistextline.org  - IF MAKING A CALL FEELS TOO HARD, send a text!         Again thank you for choosing [facility] and please let us know how we can best partner with you to improve you and your family's health.    You may be  receiving a survey regarding this appointment. We would love to have your feedback, both positive and negative. The survey is done by an external company, so your answers are anonymous.

## 2021-01-14 NOTE — PROGRESS NOTES
"VIDEO VISIT  Josemanuel Edmond is a 75 year old patient who is being evaluated via a billable video visit.      The patient has been notified of following:   \"This video visit will be conducted via a call between you and your physician/provider. We have found that certain health care needs can be provided without the need for an in-person physical exam. This service lets us provide the care you need with a video conversation. If a prescription is necessary we can send it directly to your pharmacy. If lab work is needed we can place an order for that and you can then stop by our lab to have the test done at a later time. Insurers are generally covering virtual visits as they would in-office visits so billing should not be different than normal.  If for some reason you do get billed incorrectly, you should contact the billing office to correct it and that number is in the AVS .    Video Conference to be completed via:  [email or web address]    Patient has given verbal consent for video visit?:  Yes    Patient would prefer that any video invitations be sent by: Send to e-mail at: [email or web address]      How would patient like to obtain AVS?:  Soundl.ly    AVS SmartPhrase [PsychAVS] has been placed in 'Patient Instructions':  Yes     Start Time:  1304         End Time: 1324    Telemedicine Visit: The patient's condition can be safely assessed and treated via synchronous audio and visual telemedicine encounter.      Reason for Telemedicine Visit: Due to COVID 19 pandemic, clinic switching all appointments to telemedicine     Originating Site (Patient Location): Patient's home    Distant Site (Provider Location): Provider Remote Setting    Consent:  The patient/guardian has verbally consented to: the potential risks and benefits of telemedicine (video visit) versus in person care; bill my insurance or make self-payment for services provided; and responsibility for payment of non-covered services.     Mode of Communication:  Video Conference " via [email or web address]    As the provider I attest to compliance with applicable laws and regulations related to telemedicine.    Psychiatry Clinic Progress Note                                                                  Patient Name: Josemanuel Edmond  YOB: 1945  MRN: [id number]  Date of Service:  1/14/2021  Last Seen:12/29/2020    Josemanuel Edmond is a 75 year old person assigned male at birth, identifies as cisgender male who uses the name Gavin and pronoun pat.       Gavin Edmond is a 75 year old year old adult who presents for ongoing psychiatric care.  Gavin Edmond was last seen on 12/29/2020.    At that time,     Medication Ordered/Consults/Labs/tests Ordered:      Medication:   -Start Fluoxetine 10 mg daily for your mood and anxiety.  Monitor your anxiety, confusion, difficulties managing your body temperature, muscle rigidity  -Decrease Escitalopram to 5 mg daily  -Trazodone and Mirtazapine are discontinued as you are not taking them  -Continue on Lamotrigine 50 mg 2 times a day for now  -Continue all other medication regimen  OTC Recommendations: none  Lab Orders:  none  Referrals: none  Release of Information: none  Future Treatment Considerations: Per symptoms.   Return for Follow Up: in 3 weeks    Pertinent Background:  Reports depression started 2002 after his son was killed in MVA.  Anxiety also started around the same time, but social anxiety started 6-7 years ago.  Denies any hx of SI, SIB, HI, psych hospitalization.  After son was killed, pt had heavy ETOH use on and off. Psych critical item history includes [no critical items].      Previous Psychiatric Meds: Wellbutrin XL, Xanax, Klonopin, Valium, Ativan, Remeron (30 mg only, worked well for sleep and anxiety, but weight gain), Nortriptyline, Vistaril, Remeron (oversedation at 45 mg, 22.5mg and 15 mg and wt gain), Trazodone (oversedate at 50 mg), Ambien (fall)    Interim History                                                                                                         4, 4     On 1/5/2021, pt sent [employer] message continuation of depression anxiety, particularly for upcoming [place] trip. This appeared to be very similar to his reaction when initially [place] trip was scheduled and cancelled in Oct 2020.    Since the last visit, notes anxiety and depression fluctuates day to day, not consistent.  Denies SI, SIB or HI.  Pt wondering why his symptoms have not improved.  Leaving for [place] on 1/25 and returning on 2/8.  Pt also had a cataract surgery on 1/6 and also have another one on 2/17.  Notes healing well from surgery.  Sleeping well and eating consistently. Has not taken Remeron or Trazodone from old prescription.    Denies any symptoms suggestive of hypomania or psychosis.    Current Suicidality/Hx of Suicide Attempts: Denies both  CoCominent Medical concerns: Denies    Medication Side Effects: The patient denies all medication side effects.      Medical Review of Systems     Apart from the symptoms mentioned int he HPI, the 14 point review of systems, including constitutional, HEENT, cardiovascular, respiratory, gastrointestinal, genitourinary, musculoskeletal, integumentary, endocrine, neurological, hematologic and allergic is entirely negative.      Substance Use   Pt has been staying substance free since last seen.  Notes has not used any ETOH x 3 years.    Social/ Family History                                  [per patient report]                                 1ea,1ea   Living arrangements: living with spouse and feels safe  Social Support: spouse  Access to gun: owns a gun  Retired in 2008.  Trauma hx includes loss of son by MVA in 2002.    Allergy                                Bactrim [sulfamethoxazole w/trimethoprim]    Current Medications                                                                                                       Current Outpatient Medications   Medication Sig Dispense Refill      "acetaminophen (TYLENOL) 500 MG tablet Take 1,000 mg by mouth every 8 hours as needed for mild pain       buPROPion (WELLBUTRIN SR) 150 MG 12 hr tablet TAKE 1 TABLET TWICE A  tablet 2     busPIRone HCl (BUSPAR) 30 MG tablet Take 1 tablet (30 mg) by mouth 2 times daily 180 tablet 0     doxazosin (CARDURA) 4 MG tablet Take 1 tablet (4 mg) by mouth daily No further refills. Needs appt 90 tablet 0     escitalopram (LEXAPRO) 10 MG tablet Take 0.5 tablets (5 mg) by mouth daily 90 tablet 0     finasteride (PROSCAR) 5 MG tablet Take 1 tablet (5 mg) by mouth daily 30 tablet 0     FLUoxetine (PROZAC) 10 MG tablet Take 1 tablet (10 mg) by mouth daily 30 tablet 1     gabapentin (NEURONTIN) 600 MG tablet Take 1 tab in AM and afternoon and 1.5 tab at bedtime 315 tablet 0     lamoTRIgine (LAMICTAL) 100 MG tablet Take 0.5 tablets (50 mg) by mouth 2 times daily 180 tablet 3     metoprolol tartrate (LOPRESSOR) 25 MG tablet Take 1 tablet (25 mg) by mouth 2 times daily No further refills. Needs appt 180 tablet 0     STATIN NOT PRESCRIBED, INTENTIONAL, Please choose reason not prescribed, below       XARELTO ANTICOAGULANT 20 MG TABS tablet TAKE 1 TABLET DAILY WITH   DINNER 90 tablet 3        Mental Status Exam                                                                                   9, 14 cog        Alertness: alert  and oriented  Appearance:  Casually dressed and Adequately groomed  Behavior/Demeanor: cooperative, pleasant and calm, with good  eye contact   Speech: regular rate and rhythm  Mood :  \"up and down\" and \"okay\"  Affect: mostly full range; was congruent to mood; was congruent to content  Thought Process (Associations):  Linear and Goal directed  Thought process (Rate):  Normal  Thought content:  no overt psychosis, denies suicidal ideation, intent or thoughts and patient does not appear to be responding to internal stimuli  Perception:  Reports none;  Denies auditory hallucinations, visual hallucinations, " depersonalization and derealization  Attention/Concentration:  Fair  Memory:  Immediate recall intact and Short-term memory intact  Language: intact  Fund of Knowledge/Intelligence:  Average  Abstraction:  [place]  Insight:  Fair  Judgment:  Fair  Cognition: (6) does  appear grossly intact; formal cognitive testing was not done    Physical Exam     Motor activity/EPS:  Normal  Psychomotor: normal or unremarkable    Labs and Results      Pertinent findings on review include: Review of records with relevant information reported in the HPI.  Reviewed pt's past medical record and obtained collateral information.      [place] PRESCRIPTION MONITORING PROGRAM [] was checked today:  indicates Gabapentin 12/29.    PHQ9 Today:  N/A  PHQ 11/15/2018 2/26/2019 12/10/2019   PHQ-9 Total Score 9 3 4   Q9: Thoughts of better off dead/self-harm past 2 weeks Not at all Not at all Not at all       CAROLYNN 7 Today: N/A  CAROLYNN-7 SCORE 11/15/2018 2/26/2019 12/10/2019   Total Score - - -   Total Score 6 5 2       Recent Labs   Lab Test 09/04/19  1020 09/14/18  1629 05/07/18  1325   CR 0.81 0.93 1.07   GFRESTIMATED 86 79 67     Recent Labs   Lab Test 01/22/18  1354 12/20/17  1315   AST 15 24   ALT 32 17   ALKPHOS 60 66     PSYCHOTROPIC DRUG INTERACTIONS:   Buspar---Gabapentin: Concurrent use of GABAPENTIN and CNS DEPRESSANTS may result in respiratory depression.    Buspar---Lexapro---: Concurrent use of ESCITALOPRAM and SEROTONERGIC DRUGS may result in increased risk of serotonin syndrome.   Wellbutrin---Lexapro: Concurrent use of BUPROPION and AGENTS LOWERING SEIZURE THRESHOLD may result in lower seizure threshold.   LExapro---Prozac---Xarelto: Concurrent use of RIVAROXABAN and SEROTONIN NOREPINEPHRINE REUPTAKE INHIBITORS AND SSRIS may result in increased risk of bleeding  Doxazosin---Metoprolol: Concurrent use of ALPHA-1 ADRENERGIC BLOCKERS and BETA-ADRENERGIC BLOCKERS may result in an exaggerated hypotensive response to the first dose of the  alpha blocker.   Metoprolol---Wellbutrin---Prozac: Concurrent use of METOPROLOL and CYP2D6 INHIBITORS may result in increased metoprolol exposure.   Lexapro---Prozac: Concurrent use of ESCITALOPRAM and FLUOXETINE may result in an increased risk of QT-interval prolongation and serotonin syndrome (hypertension, hyperthermia, myoclonus, mental status changes).   Prozac---Wellbutrin: Concurrent use of BUPROPION and SELECTED SEIZURE THRESHOLD LOWERING CYP2D6 SUBSTRATES may result in increased exposure of CYP2D6 substrates; increased risk of seizure.   Prozac---Buspar: Concurrent use of FLUOXETINE and BUSPIRONE may result in worsening of psychiatric symptoms.         MANAGEMENT:  Monitoring for adverse effects, routine vitals and patient is aware of risks      Impression/Assessment      Gavin Edmond is a 75 year old adult  who presents for med management follow up.  Pt appears mostly stable in his mood and anxiety, denies SI, SIB or HI during the appointment.  Pt noted fluctuation of mood and anxiety since last seen.  Reiterated that Prozac does not become effective 4-6 weeks and if he is not consistently having anxiety, it's unlikely Prozac causing anxiety.  Reminded pt that prior to his initial plan of [place] trip, pt became very anxious prior to leaving.  Reiterated due to his age and risk of fall in the past, will not recommend benzodiazapine unless this minimum use such as flight.  Pt does not have anxiety about flight, but social interaction with friends that are going to [employer] with.  Also discussed many medications for anxiety that works quickly tends to be sedating which pt does not want.  Discussed that though we do not have recent lab, if it's for temporary during the trip, pt may take Gabapentin up to 900 mg TID for anxiety while continuing all other medications.    Also discussed during the healing process post surgery, there may be fluctuation of BG that could fluctuate anxiety and mood.  Since pt just  had a cataract surgery, fluctuation of mood and anxiety may be due to this.  Encouraged pt to eat consistent protein intake and reduce sugar/carbohydrate intake if possible to stabilize blood glucose.  Pt also has not had any TSH checked since 2018.  May benefit from checking this in the future to explore medical r/out.      Diagnosis                                                                   Social Anxiety disorder  MDD    Treatment Recommendation & Plan       Medication Ordered/Consults/Labs/tests Ordered:     Medication: Continue on current medication regimen.  You may take Gabapentin up to 900 mg 3 times a day for anxiety during your trip.  OTC Recommendations: none  Lab Orders:  none  Referrals: none  Release of Information: none  Future Treatment Considerations: Per symptoms.   Return for Follow Up: in 4 weeks after [place] trip    -Discussed safety plan for suicidal thoughts  -Discussed plan for suicidality  -Discussed available emergency services  -Patient agrees with the treatment plan  -Encouraged to continue outpatient therapy to gain more coping mechanism for stress.      Treatment Risk Statement: Discussed with the patient my impressions, as well as recommended studies. I educated patient on the differential diagnosis and prognosis. I discussed with the patient the risks and benefits of medications versus no interventions, including efficacy, dose, possible side effects and length of treatment and the importance of medication compliance.  The patient understands the risks, benefits, adverse effects and alternatives. Agrees to treatment with the capacity to do so. No medical contraindications to treatment. The patient also understands the risks of using street drugs or alcohol.    CRISIS NUMBERS:   Provided routinely in AVS.      28 minutes spent on the date of the encounter doing chart review, history and exam, documentation and further activities as noted above      Rylee Kyle CNP,   1/14/2021

## 2021-01-15 ENCOUNTER — HEALTH MAINTENANCE LETTER (OUTPATIENT)
Age: 76
End: 2021-01-15

## 2021-02-01 ENCOUNTER — DOCUMENTATION ONLY (OUTPATIENT)
Dept: OTHER | Facility: CLINIC | Age: 76
End: 2021-02-01

## 2021-02-10 DIAGNOSIS — R39.12 BENIGN PROSTATIC HYPERPLASIA WITH WEAK URINARY STREAM: ICD-10-CM

## 2021-02-10 DIAGNOSIS — N40.1 BENIGN PROSTATIC HYPERPLASIA WITH WEAK URINARY STREAM: ICD-10-CM

## 2021-02-10 NOTE — TELEPHONE ENCOUNTER
"Requested Prescriptions   Pending Prescriptions Disp Refills     finasteride (PROSCAR) 5 MG tablet [Pharmacy Med Name: finasteride 5 mg tablet] 30 tablet 0     Sig: Take 1 tablet (5 mg) by mouth daily       BPH Agents Passed - 2/10/2021  4:16 PM        Passed - Recent (12 mo) or future (30 days) visit within the authorizing provider's department     Patient has had an office visit with the authorizing provider or a provider within the authorizing providers department within the previous 12 mos or has a future within next 30 days. See \"Patient Info\" tab in inbasket, or \"Choose Columns\" in Meds & Orders section of the refill encounter.              Passed - Medication is active on med list        Passed - Patient is 18 years of age or older             "

## 2021-02-11 ENCOUNTER — TELEPHONE (OUTPATIENT)
Dept: PSYCHIATRY | Facility: CLINIC | Age: 76
End: 2021-02-11

## 2021-02-11 ENCOUNTER — VIRTUAL VISIT (OUTPATIENT)
Dept: PSYCHIATRY | Facility: CLINIC | Age: 76
End: 2021-02-11
Attending: NURSE PRACTITIONER
Payer: COMMERCIAL

## 2021-02-11 DIAGNOSIS — F33.41 RECURRENT MAJOR DEPRESSIVE DISORDER, IN PARTIAL REMISSION (H): ICD-10-CM

## 2021-02-11 DIAGNOSIS — F40.10 SOCIAL ANXIETY DISORDER: Primary | ICD-10-CM

## 2021-02-11 PROCEDURE — 99214 OFFICE O/P EST MOD 30 MIN: CPT | Mod: 95 | Performed by: NURSE PRACTITIONER

## 2021-02-11 RX ORDER — FINASTERIDE 5 MG/1
1 TABLET, FILM COATED ORAL DAILY
Qty: 30 TABLET | Refills: 0 | Status: SHIPPED | OUTPATIENT
Start: 2021-02-11 | End: 2021-03-09

## 2021-02-11 NOTE — PATIENT INSTRUCTIONS
-Increase Fluoxetine to 20 mg daily for your mood and anxiety.  Please note new dose is ordered with 20 mg capsule.  Please read the label well.  -Discontinue Escitalopram  -Continue all other medications for now    Your next appointment is scheduled on 3/9/2021 (Tue) at 2pm.    To access your telemedicine visit:     Open a web browser, like [employer], and type [email or web address]     You will see a box asking you to check in to let Rylee Kyle know that you are here.     Type in your name and press Check In. That will let Rylee see you in the virtual waiting room. At your scheduled appointment time, your provider will initiate the visit and connect you.     When your visit is done, you can simply close the browser window.        Please Note:  Ideally, you will connect from a desktop, laptop, or tablet with a WiFi connection. Your computer/tablet must have a camera and microphone. You can use a cell phone, if it has a camera, and if you can connect to WiFi. However, if you connect your phone over a cellular network, it is of lower quality and less reliable.    Thank you for coming to the [facility] [place] [facility].    Lab Testing:  If you had lab testing today and your results are reassuring or normal they will be mailed to you or sent through [employer] within 7 days. If the lab tests need quick action we will call you with the results. The phone number we will call with results is # [phone] (home) . If this is not the best number please call our clinic and change the number.    Medication Refills:  If you need any refills please call your pharmacy and they will contact us. Our fax number for refills is [phone]. Please allow three business for refill processing. If you need to  your refill at a new pharmacy, please contact the new pharmacy directly. The new pharmacy will help you get your medications transferred.     Scheduling:  If you have any concerns about  today's visit or wish to schedule another appointment please call our office during normal business hours [phone] (8-5:00 M-F)    Contact Us:  Please call [phone] during business hours (8-5:00 M-F).  If after clinic hours, or on the weekend, please call  [phone].    Financial Assistance [phone]  [facility] Billing [phone]  [facility] Billing Office, [facility]: [phone]  [facility] Billing [phone]  Medical Records [phone]      MENTAL HEALTH CRISIS NUMBERS:  For a medical emergency please call  911 or go to the nearest ER.     [facility]:   [facility] -[phone]   Crisis Residence [facility] Residence -[phone]   Walk-In Counseling [facility] -[phone]   COPE 24/7 [place] Mobile Team -[phone] (adults)/[phone] (child)  CHILD: [facility] needs assessment team - [phone]      [facility]:   [facility] - [phone]   Walk-in counseling [facility] - [phone]   Walk-in counseling [facility] - [phone]   Crisis Residence [facility] Residence - [phone]  Urgent Care Adult Mental [facility]-[phone] mobile unit/ 24/7 crisis line    National Crisis Numbers:   National Suicide Prevention Lifeline: 1-012-865-TALK (210-958-8899)  Poison Control Center - 4-049-371-3779  159.com.Mad Mimi/resources for a list of additional resources (SOS)  Trans Lifeline a hotline for transgender people 1-505.186.3518  [employer] a hotline for LGBT youth 1-292.824.5168  Crisis Text Line: For any crisis 24/7   To: 321540  see www.crisistextline.org  - IF MAKING A CALL FEELS TOO HARD, send a text!         Again thank you for choosing [facility] [place] [facility] and please let us know how we can best partner with you to improve you and your family's health.    You may be receiving a survey regarding this appointment. We would love to have your  feedback, both positive and negative. The survey is done by an external company, so your answers are anonymous.

## 2021-02-11 NOTE — PROGRESS NOTES
Pharmacy faxed in request for medication refill. Medication(s) set up as pending orders from medication list.    Preferred pharmacy has been set up and verified      NOTE:  Pharmacy is asking for a 90 day supply.      Form is being faxed to [phone] at [facility]'s      Preferred contact number#    Mobile:    Mobile [phone]            Start Time:  1300         End Time: 1322    Telemedicine Visit: The patient's condition can be safely assessed and treated via synchronous audio and visual telemedicine encounter.      Reason for Telemedicine Visit: Due to COVID 19 pandemic, clinic switching all appointments to telemedicine     Originating Site (Patient Location): Patient's home    Distant Site (Provider Location): Provider Remote Setting    Consent:  The patient/guardian has verbally consented to: the potential risks and benefits of telemedicine (video visit) versus in person care; bill my insurance or make self-payment for services provided; and responsibility for payment of non-covered services.     Mode of Communication:  Video Conference via [email or web address]    As the provider I attest to compliance with applicable laws and regulations related to telemedicine.    Psychiatry Clinic Progress Note                                                                  Patient Name: Josemanuel Edmond  YOB: 1945  MRN: [id number]  Date of Service:  2/11/2021  Last Seen:1/14/2021    Josemanuel Edmond is a 75 year old person assigned male at birth, identifies as cisgender male who uses the name Gavin and pronoun pat.       Gavin Edmond is a 75 year old year old adult who presents for ongoing psychiatric care.  Gavin Edmond was last seen on 1/14/2021.    At that time,     Medication Ordered/Consults/Labs/tests Ordered:      Medication: Continue on current medication regimen.  You may take Gabapentin up to 900 mg 3 times a day for anxiety during your trip.  OTC Recommendations: none  Lab Orders:  none  Referrals: none  Release of Information: none  Future Treatment Considerations: Per symptoms.   [facility]    Pertinent Background:  Reports depression started 2002 after his son was killed in MVA.  Anxiety also started around the same time, but social anxiety started 6-7 years ago.  Denies any hx of SI, SIB, HI,  psych hospitalization.  After son was killed, pt had heavy ETOH use on and off. Psych critical item history includes [no critical items].      Previous Psychiatric Meds: Wellbutrin XL, Xanax, Klonopin, Valium, Ativan, Remeron (30 mg only, worked well for sleep and anxiety, but weight gain), Nortriptyline, Vistaril, Remeron (oversedation at 45 mg, 22.5mg and 15 mg and wt gain), Trazodone (oversedate at 50 mg), Ambien (fall)    Interim History                                                                                                        4, 4     Since the last visit,  -Returned from [place] trip on 2/9.  Though he was anxious prior to trip, flying was great, had some moments of anxiety participating in conversation during the trip, but mostly went well.  -Has been taking Gabapentin [phone] mg pre and after trip, but does not think this is working for anxiety and wants to taper down/off.  -Mood is OK.  Has been on Prozac 10 mg daily since 12/29/2020, unsure if this is working or not.  Denies SI, SIB or HI.  -[facility] well [facility]  Ace trip was really great.    Denies any symptoms suggestive of hypomania or psychosis.    Current Suicidality/Hx of Suicide Attempts: Denies both  CoCominent Medical concerns: Denies    Medication Side Effects: The patient denies all medication side effects.      Medical Review of Systems     Apart from the symptoms mentioned int he HPI, the 14 point review of systems, including constitutional, HEENT, cardiovascular, respiratory, gastrointestinal, genitourinary, musculoskeletal, integumentary, endocrine, neurological, hematologic and allergic is entirely negative.    Substance Use   Pt has been staying substance free since last seen.  Notes has not used any ETOH x 3 years.    Social/ Family History                                  [per patient report]                                 1ea,1ea   Living arrangements: living with spouse and feels  "safe  Social Support: spouse  Access to gun: owns a gun  Retired in 2008.  Trauma hx includes loss of son by MVA in 2002.    Allergy                                Bactrim [sulfamethoxazole w/trimethoprim]    Current Medications                                                                                                       Current Outpatient Medications   Medication Sig Dispense Refill     acetaminophen (TYLENOL) 500 MG tablet Take 1,000 mg by mouth every 8 hours as needed for mild pain       buPROPion (WELLBUTRIN SR) 150 MG 12 hr tablet TAKE 1 TABLET TWICE A  tablet 2     busPIRone HCl (BUSPAR) 30 MG tablet Take 1 tablet (30 mg) by mouth 2 times daily 180 tablet 0     doxazosin (CARDURA) 4 MG tablet Take 1 tablet (4 mg) by mouth daily No further refills. Needs appt 90 tablet 0     escitalopram (LEXAPRO) 10 MG tablet Take 0.5 tablets (5 mg) by mouth daily 90 tablet 0     finasteride (PROSCAR) 5 MG tablet Take 1 tablet (5 mg) by mouth daily 30 tablet 0     FLUoxetine (PROZAC) 10 MG tablet Take 1 tablet (10 mg) by mouth daily 30 tablet 1     gabapentin (NEURONTIN) 600 MG tablet Take 1 tab in AM and afternoon and 1.5 tab at bedtime 315 tablet 0     lamoTRIgine (LAMICTAL) 100 MG tablet Take 0.5 tablets (50 mg) by mouth 2 times daily 180 tablet 3     metoprolol tartrate (LOPRESSOR) 25 MG tablet Take 1 tablet (25 mg) by mouth 2 times daily No further refills. Needs appt 180 tablet 0     STATIN NOT PRESCRIBED, INTENTIONAL, Please choose reason not prescribed, below       XARELTO ANTICOAGULANT 20 MG TABS tablet TAKE 1 TABLET DAILY WITH   DINNER 90 tablet 3        Mental Status Exam                                                                                   9, 14 cog        Alertness: alert  and oriented  Appearance:  Casually dressed and Adequately groomed  Behavior/Demeanor: cooperative, pleasant and calm, with good  eye contact   Speech: regular rate and rhythm  Mood :  \"okay\"  Affect: full range and " appropriate; was congruent to mood; was congruent to content  Thought Process (Associations):  Linear and Goal directed  Thought process (Rate):  Normal  Thought content:  no overt psychosis, denies suicidal ideation, intent or thoughts and patient does not appear to be responding to internal stimuli  Perception:  Reports none;  Denies auditory hallucinations and visual hallucinations  Attention/Concentration:  Fair  Memory:  Immediate recall intact and Short-term memory intact  Language: intact  Fund of Knowledge/Intelligence:  Average  Abstraction:  Normal  Insight:  Fair  Judgment:  Fair  Cognition: (6) does  appear grossly intact; formal cognitive testing was not done    Physical Exam     Motor activity/EPS:  Normal  Gait:  Normal  Psychomotor: normal or unremarkable    Labs and Results      Pertinent findings on review include: Review of records with relevant information reported in the HPI.  Reviewed pt's past medical record and obtained collateral information.      [place] PRESCRIPTION MONITORING PROGRAM [] was checked today:  indicates Gabapentin 12/31 (600mg), 12/10 (300mg).    PHQ9 Today:  N/A  PHQ 11/15/2018 2/26/2019 12/10/2019   PHQ-9 Total Score 9 3 4   Q9: Thoughts of better off dead/self-harm past 2 weeks Not at all Not at all Not at all       CAROLYNN 7 Today: N/A  CAROLYNN-7 SCORE 11/15/2018 2/26/2019 12/10/2019   Total Score - - -   Total Score 6 5 2       Recent Labs   Lab Test 09/04/19  1020 09/14/18  1629 05/07/18  1325   CR 0.81 0.93 1.07   GFRESTIMATED 86 79 67     Recent Labs   Lab Test 01/22/18  1354 12/20/17  1315   AST 15 24   ALT 32 17   ALKPHOS 60 66     PSYCHOTROPIC DRUG INTERACTIONS:   Buspar---Gabapentin: Concurrent use of GABAPENTIN and CNS DEPRESSANTS may result in respiratory depression.    Buspar---Lexapro---: Concurrent use of ESCITALOPRAM and SEROTONERGIC DRUGS may result in increased risk of serotonin syndrome.   Wellbutrin---Lexapro: Concurrent use of BUPROPION and AGENTS LOWERING  SEIZURE THRESHOLD may result in lower seizure threshold.   LExapro---Prozac---Xarelto: Concurrent use of RIVAROXABAN and SEROTONIN NOREPINEPHRINE REUPTAKE INHIBITORS AND SSRIS may result in increased risk of bleeding  Doxazosin---Metoprolol: Concurrent use of ALPHA-1 ADRENERGIC BLOCKERS and BETA-ADRENERGIC BLOCKERS may result in an exaggerated hypotensive response to the first dose of the alpha blocker.   Metoprolol---Wellbutrin---Prozac: Concurrent use of METOPROLOL and CYP2D6 INHIBITORS may result in increased metoprolol exposure.   Lexapro---Prozac: Concurrent use of ESCITALOPRAM and FLUOXETINE may result in an increased risk of QT-interval prolongation and serotonin syndrome (hypertension, hyperthermia, myoclonus, mental status changes).   Prozac---Wellbutrin: Concurrent use of BUPROPION and SELECTED SEIZURE THRESHOLD LOWERING CYP2D6 SUBSTRATES may result in increased exposure of CYP2D6 substrates; increased risk of seizure.   Prozac---Buspar: Concurrent use of FLUOXETINE and BUSPIRONE may result in worsening of psychiatric symptoms.         MANAGEMENT:  Monitoring for adverse effects, routine vitals and patient is aware of risks      Impression/Assessment      Gavin Edmond is a 75 year old adult  who presents for med management follow up.  Pt appears stable in his mood and anxiety, denies Si, SIB or HI.  Pt noted though there were moments of social anxiety during [place] trip, trip went well.  Pt noted though this went well, does not think Gabapentin is helping anxiety and wants to taper off while also considering to increase Prozac further.  Discussed to make one change at a time and start from increasing Prozac to 20 mg daily and discontinue Lexapro.  Pt was instructed to monitor possible anxiety exacerbation with Prozac increase.  Will continue all other medications at this time, but to avoid polypharmacy, would likely either reduce Gabapentin or taper off Lamictal next.    May also be beneficial to check  TSH since this has not checked since 2018.      Diagnosis                                                                    Social Anxiety disorder  MDD    Treatment Recommendation & Plan       Medication Ordered/Consults/Labs/tests Ordered:     Medication:   -Increase Fluoxetine to 20 mg daily for your mood and anxiety.  Please note new dose is ordered with 20 mg capsule.  Please read the label well.  -Discontinue Escitalopram  -Continue all other medications for now  OTC Recommendations: none  Lab Orders:  TSH in the future  Referrals: none  Release of Information: none  Future Treatment Considerations: Per symptoms.   Return for Follow Up: in 4 weeks    -Discussed safety plan for suicidal thoughts  -Discussed plan for suicidality  -Discussed available emergency services  -Patient agrees with the treatment plan  -Encouraged to continue outpatient therapy to gain more coping mechanism for stress.    Treatment Risk Statement: Discussed with the patient my impressions, as well as recommended studies. I educated patient on the differential diagnosis and prognosis. I discussed with the patient the risks and benefits of medications versus no interventions, including efficacy, dose, possible side effects and length of treatment and the importance of medication compliance.  The patient understands the risks, benefits, adverse effects and alternatives. Agrees to treatment with the capacity to do so. No medical contraindications to treatment. The patient also understands the risks of using street drugs or alcohol.     CRISIS NUMBERS:   Provided routinely in AVS.    31 minutes spent on the date of the encounter doing chart review, history and exam, documentation and further activities as noted above      Rylee Kyle CNP,  2/11/2021

## 2021-02-11 NOTE — TELEPHONE ENCOUNTER
On 2/11/2021, at 1208, writer called patient at mobile to confirm Virtual Visit. Writer unable to make contact with patient. Writer left detailed voice message for callback. [phone], left as call back number. JR Olivas, EMT

## 2021-02-11 NOTE — TELEPHONE ENCOUNTER
Medication is being filled for 1 time refill only due to:  Patient needs to be seen because due to see Dr. Verdugo. MyChart sent..     Cass MORTON RN, BSN

## 2021-02-13 DIAGNOSIS — Z11.59 ENCOUNTER FOR SCREENING FOR OTHER VIRAL DISEASES: ICD-10-CM

## 2021-02-13 LAB
SARS-COV-2 RNA RESP QL NAA+PROBE: NORMAL
SPECIMEN SOURCE: NORMAL

## 2021-02-13 PROCEDURE — U0003 INFECTIOUS AGENT DETECTION BY NUCLEIC ACID (DNA OR RNA); SEVERE ACUTE RESPIRATORY SYNDROME CORONAVIRUS 2 (SARS-COV-2) (CORONAVIRUS DISEASE [COVID-19]), AMPLIFIED PROBE TECHNIQUE, MAKING USE OF HIGH THROUGHPUT TECHNOLOGIES AS DESCRIBED BY CMS-2020-01-R: HCPCS | Performed by: OPHTHALMOLOGY

## 2021-02-13 PROCEDURE — U0005 INFEC AGEN DETEC AMPLI PROBE: HCPCS | Performed by: OPHTHALMOLOGY

## 2021-02-14 LAB
LABORATORY COMMENT REPORT: NORMAL
SARS-COV-2 RNA RESP QL NAA+PROBE: NEGATIVE
SPECIMEN SOURCE: NORMAL

## 2021-02-14 RX ORDER — FLUOXETINE 10 MG/1
10 TABLET, FILM COATED ORAL DAILY
Qty: 30 TABLET | Refills: 1 | OUTPATIENT
Start: 2021-02-14

## 2021-02-16 ENCOUNTER — ANESTHESIA EVENT (OUTPATIENT)
Dept: SURGERY | Facility: CLINIC | Age: 76
End: 2021-02-16
Payer: COMMERCIAL

## 2021-02-16 ASSESSMENT — ENCOUNTER SYMPTOMS: DYSRHYTHMIAS: 1

## 2021-02-16 ASSESSMENT — LIFESTYLE VARIABLES: TOBACCO_USE: 1

## 2021-02-16 NOTE — H&P
[facility]  [facility]  [address]  [facility] [address]  [phone]  Dept: [phone]    OPHTHALMOLOGY PRE-OPERATIVE  HISTORY AND PHYSICAL    DATE OF H/P:  2021    DATE OF SURGERY:  2021  PROCEDURE:  Procedure(s):  Cataract Removal with Implant, Left Eye  LENS IMPLANT:  [id number] +22.0  REFRACTIVE GOAL:  PL Sph  SURGEON:  Regan Kaufman MD    ANESTHESIA:  TOPICAL / MAC    OR CASE REQUIREMENTS:    DEMOGRAPHICS:  Demographic Information on Josemanuel Edmond:    Josemanuel Edmond  Gender: male  : 1945  [address] [address] [facility]   [place] [place] [address]  [phone] (home)     Medical Record: [id number]  Social Security Number: [id number]  Pharmacy:    [facility] - [facility] [address] [facility] - [facility] [address] PH[facility] 1364 - [facility] [address] [place] [address] NO.  [facility] [place] [address] AT PORTAL TO REGISTERED [facility] SITES  [facility] - [address]  Primary Care Provider: Dominguez Verdugo    Parent's names are: Data Unavailable (mother) and Data Unavailable (father).    Insurance: Payor: MEDICA / Plan: MEDICA ADVANT[employer] SOLUTIONS OLD / Product Type: HMO /     OCULAR HISTORY:  Cataracts, s/p IOL right eye.    HISTORIES:  Past Medical History:   Diagnosis Date     Benign neoplasm of prostate 2000    Benign Prostate Nodule       Past Surgical History:   Procedure Laterality Date     ARTHROPLASTY KNEE Right 10/12/2018    Procedure: ARTHROPLASTY KNEE;  Right Total Knee Arthroplasty;  Surgeon: Jeb Peralta MD;  Location: [facility] OR     COLONOSCOPY N/A 12/10/2015    Procedure: COMBINED COLONOSCOPY, SINGLE OR MULTIPLE BIOPSY/POLYPECTOMY BY BIOPSY;  Surgeon: Jyoti Figueroa MD;  Location: [facility] GI     JOINT REPLACEMENT, HIP RT/LT  10/07    Joint Replacement Hip LT     JOINT  REPLACEMTN, KNEE RT/LT  2011    Joint Replacement knee /LT, [facility]     LAPAROSCOPIC HERNIORRHAPHY INGUINAL BILATERAL Bilateral 2018    Procedure: LAPAROSCOPIC HERNIORRHAPHY INGUINAL BILATERAL;  Laparoscopic bilateral inguinal hernia repair;  Surgeon: Josemanuel Resendiz MD;  Location: [facility] OR     PHACOEMULSIFICATION WITH STANDARD INTRAOCULAR LENS IMPLANT Right 2021    Procedure: Cataract Removal with Implant;  Surgeon: Regan Kaufman MD;  Location: [facility] OR     SURGICAL HISTORY OF -   1999    Umbilical Herniorrhaphy with mesh     SURGICAL HISTORY OF -  Left 2017    Thoracotomy and drainage of empyema       Family History   Problem Relation Age of Onset     Breast Cancer Mother      Neurologic Disorder Mother         parkinsons     Respiratory Father         emphyzema     Diabetes Brother        Social History     Tobacco Use     Smoking status: Former Smoker     Packs/day: 1.00     Years: 15.00     Pack years: 15.00     Types: Cigarettes     Quit date: 10/13/1975     Years since quittin.3     Smokeless tobacco: Never Used   Substance Use Topics     Alcohol use: No     Comment: quit 2017       MEDICATIONS:  No current facility-administered medications for this encounter.      Current Outpatient Medications   Medication Sig     acetaminophen (TYLENOL) 500 MG tablet Take 1,000 mg by mouth every 8 hours as needed for mild pain     buPROPion (WELLBUTRIN SR) 150 MG 12 hr tablet TAKE 1 TABLET TWICE A DAY     busPIRone HCl (BUSPAR) 30 MG tablet Take 1 tablet (30 mg) by mouth 2 times daily     doxazosin (CARDURA) 4 MG tablet Take 1 tablet (4 mg) by mouth daily No further refills. Needs appt     finasteride (PROSCAR) 5 MG tablet Take 1 tablet (5 mg) by mouth daily     FLUoxetine (PROZAC) 20 MG capsule Take 1 capsule (20 mg) by mouth daily     gabapentin (NEURONTIN) 600 MG tablet Take 1 tab in AM and afternoon and 1.5 tab at bedtime     lamoTRIgine (LAMICTAL) 100 MG tablet Take 0.5 tablets (50  mg) by mouth 2 times daily     metoprolol tartrate (LOPRESSOR) 25 MG tablet Take 1 tablet (25 mg) by mouth 2 times daily No further refills. Needs appt     STATIN NOT PRESCRIBED, INTENTIONAL, Please choose reason not prescribed, below     XARELTO ANTICOAGULANT 20 MG TABS tablet TAKE 1 TABLET DAILY WITH   DINNER       ALLERGIES:     Allergies   Allergen Reactions     Bactrim [Sulfamethoxazole W/Trimethoprim] Nausea and Vomiting       PERTINENT SYSTEMS REVIEW:    1. No - Do you have a history of heart attack, stroke, stent, bypass or surgery on an artery in the head, neck, heart or legs?  2. No - Do you ever have any pain or discomfort in your chest?  3. No - Do you have a history of  Heart Failure?  4. No - Are you troubled by shortness of breath when walking: On the level, up a slight hill or at night?  5. No - Do you currently have a cold, bronchitis or other respiratory infection?  6. No - Do you have a cough, shortness of breath or wheezing?  7. No - Do you sometimes get pains in the calves of your legs when you walk?  8. No - Do you or anyone in your family have previous history of blood clots?  9. No - Do you or does anyone in your family have a serious bleeding problem such as prolonged bleeding following surgeries or cuts?  10. No - Have you ever had problems with anemia or been told to take iron pills?  11. No - Have you had any abnormal blood loss such as black, tarry or bloody stools, or abnormal vaginal bleeding?  12. No - Have you ever had a blood transfusion?  13. No - Have you or any of your relatives ever had problems with anesthesia?  14. No - Do you have sleep apnea, excessive snoring or daytime drowsiness?  15. No - Do you have any prosthetic heart valves?  16. Yes: hip / knee replacements - Do you have prosthetic joints?    EXAMINATION:  Vitals were reviewed  Vison:  Va, left - 20/25;   BAT, left - 20/70;  HEENT:  Cataract, otherwise unremarkable.  LUNGS:  Clear  CV:  Regular rate and rhythm  without murmur  ABD:  Soft and nontender  NEURO:  Alert and nonfocal    IMPRESSION:  Patient cleared for ophthalmic surgery.  Low risk with monitored, light sedation.  I have assessed the patient's DVT risk, and no additional orders necessary.    PLAN:  Procedure(s):  Cataract Removal with Implant, Left Eye      Regan Kaufman MD

## 2021-02-16 NOTE — ANESTHESIA PREPROCEDURE EVALUATION
Anesthesia Pre-Procedure Evaluation    Patient: Josemanuel Edmond   MRN: [id number] : 1945          Preoperative Diagnosis: Cataract [H26.9]    Procedure(s):  Cataract Removal with Implant    Past Medical History:   Diagnosis Date     Benign neoplasm of prostate 2000    Benign Prostate Nodule     Past Surgical History:   Procedure Laterality Date     ARTHROPLASTY KNEE Right 10/12/2018    Procedure: ARTHROPLASTY KNEE;  Right Total Knee Arthroplasty;  Surgeon: Jeb Peralta MD;  Location: [facility]     COLONOSCOPY N/A 12/10/2015    Procedure: COMBINED COLONOSCOPY, SINGLE OR MULTIPLE BIOPSY/POLYPECTOMY BY BIOPSY;  Surgeon: Jyoti Figueroa MD;  Location: [facility]     JOINT REPLACEMENT, HIP RT/LT  10/07    Joint Replacement Hip LT     JOINT REPLACEMTN, KNEE RT/LT  2011    Joint Replacement knee /LT, [facility]     LAPAROSCOPIC HERNIORRHAPHY INGUINAL BILATERAL Bilateral 2018    Procedure: LAPAROSCOPIC HERNIORRHAPHY INGUINAL BILATERAL;  Laparoscopic bilateral inguinal hernia repair;  Surgeon: Josemanuel Resendiz MD;  Location: [facility]     PHACOEMULSIFICATION WITH STANDARD INTRAOCULAR LENS IMPLANT Right 2021    Procedure: Cataract Removal with Implant;  Surgeon: Regan Kaufman MD;  Location: [facility]     SURGICAL HISTORY OF -   1999    Umbilical Herniorrhaphy with mesh     SURGICAL HISTORY OF -  Left 2017    Thoracotomy and drainage of empyema       Anesthesia Evaluation     . Pt has had prior anesthetic. Type: Regional, MAC and General.    No history of anesthetic complications          ROS/MED HX    ENT/Pulmonary: Comment: Cataract      (+) allergic rhinitis, tobacco use, Past use, Other pulmonary disease, Thoracotomy for thoracentesis secondary to pneumonia.    Neurologic:  - neg neurologic ROS   (+) peripheral neuropathy,     Cardiovascular:     (+) Dyslipidemia hypertension-----Taking blood thinners dysrhythmias, a-fib, Previous cardiac testing   Echo: Date: 17  Results:  Interpretation Summary     1. Normal left ventricle size and systolic function. LV ejection fraction 60-  65%. Grade 2 diastolic dysfunction.  2. No regional wall motion abnormalities.  3. Normal right ventricular size and systolic function.  4. Trileaflet aortic valve with sclerosis, no stenosis. Mild to moderate  aortic regurgitation.  5. Mild aortic root dilatation (4.0 centimeters). Mild ascending aortic  dilatation (4.0 centimeters).  6. Moderate mitral and tricuspid regurgitation.  7. Severe biatrial enlargement.     There is no comparison study available.  _____________________________________________________________________________  __        Left Ventricle  The left ventricle is normal in size. There is normal left ventricular wall  thickness. Left ventricular systolic function is normal. The visual ejection  fraction is estimated at 60-65%. Grade II or moderate diastolic dysfunction.  No regional wall motion abnormalities noted.     Right Ventricle  The right ventricle is normal in size and function.     Atria  The left atrium is severely dilated. The right atrium is severely dilated.  There is no color Doppler evidence of an atrial shunt.     Mitral Valve  The mitral valve leaflets appear normal. There is no evidence of stenosis,  fluttering, or prolapse. There is moderate (2+) mitral regurgitation.        Tricuspid Valve  Normal tricuspid valve. There is moderate (2+) tricuspid regurgitation. The  right ventricular systolic pressure is approximated at 30.2 mmHg plus the  right atrial pressure.     Aortic Valve  The aortic valve is trileaflet with aortic valve sclerosis. There is mild to  moderate (1-2+) aortic regurgitation. No aortic stenosis is present.     Pulmonic Valve  The pulmonic valve is not well seen, but is grossly normal. There is trace  pulmonic valvular regurgitation. Normal pulmonic valve velocity.     Vessels  Mild aortic root dilatation. (4.0 centimeters). The ascending aorta  is Mildly  dilated. (4.0 centimeters). Dilation of the inferior vena cava is present with  normal respiratory variation in diameter.     Pericardium  There is no pericardial effusion.        Rhythm  Sinus rhythm was noted.  Stress Test: Date: Results:    ECG Reviewed: Date: 12/20/17 Results:  Atrial Rhythm -First degree A-V block  - occasional ectopic ventricular beat     P:QRS - 1:1, Abnormal P axis, H Rate 62   Judy = 294  -Nonspecific QRS widening.    -  Nonspecific T-abnormality.   Cath:  Date: Results:        METS/Exercise Tolerance:  >4 METS   Hematologic:  - neg hematologic  ROS       Musculoskeletal:   (+) arthritis,       GI/Hepatic:     (+) GERD, liver disease, Other GI/Hepatic ETOH remission       Renal/Genitourinary:     (+) BPH,       Endo:  - neg endo ROS       Psychiatric:     (+) psychiatric history anxiety and depression       Infectious Disease:  - neg infectious disease ROS       Malignancy:       Other:    - neg other ROS                      Physical Exam  Normal systems: cardiovascular, pulmonary and dental    Airway   Mallampati: II  TM distance: >3 FB  Neck ROM: full    Dental     Cardiovascular       Pulmonary             Lab Results   Component Value Date    WBC 5.5 09/04/2019    HGB 12.7 (L) 09/04/2019    HCT 37.6 (L) 09/04/2019     09/04/2019    SED 10 10/25/2012     09/04/2019    POTASSIUM 3.8 09/04/2019    CHLORIDE 101 09/04/2019    CO2 27 09/04/2019    BUN 13 09/04/2019    CR 0.81 09/04/2019     (H) 09/04/2019    LUIS 8.6 09/04/2019    ALBUMIN 3.8 01/22/2018    PROTTOTAL 7.7 01/22/2018    ALT 32 01/22/2018    AST 15 01/22/2018    GGT 69 10/09/2017    ALKPHOS 60 01/22/2018    BILITOTAL 0.5 01/22/2018    PTT 30 02/09/2005    INR 2.73 (H) 11/15/2012    TSH 1.86 05/07/2018       Preop Vitals  BP Readings from Last 3 Encounters:   01/06/21 132/70   02/27/20 106/72   12/10/19 128/60    Pulse Readings from Last 3 Encounters:   01/06/21 86   02/27/20 68   12/10/19 93     "  Resp Readings from Last 3 Encounters:   01/06/21 14   02/27/20 16   12/10/19 16    SpO2 Readings from Last 3 Encounters:   01/06/21 97%   02/27/20 98%   12/10/19 97%      Temp Readings from Last 1 Encounters:   01/06/21 37.2  C (98.9  F) (Oral)    Ht Readings from Last 1 Encounters:   01/06/21 1.778 m (5' 10\")      Wt Readings from Last 1 Encounters:   01/06/21 77.1 kg (170 lb)    Estimated body mass index is 24.39 kg/m  as calculated from the following:    Height as of 1/6/21: 1.778 m (5' 10\").    Weight as of 1/6/21: 77.1 kg (170 lb).       Anesthesia Plan     ASA Status:  3  H&P review: History and physical reviewed and following examination; no interval change.     NPO Status: > 8 hours  Anesthesia Plan Type Discussed: MAC  Justification for MAC: Procedure to face, neck, head or breastMaintenance: TIVA.  PONV Management: Ondansetron (or other 5HT-3) and Dexamethasone or Solumedrol      Postoperative Care  Pain management: IV analgesics, Oral pain medications.    Consents  Anesthetic plan, risks, benefits and alternatives discussed with:  Patient..                 KAYLA Cano CRNA    Anesthesia Evaluation   Pt has had prior anesthetic. Type: Regional, MAC and General.    No history of anesthetic complications       ROS/MED HX  ENT/Pulmonary: Comment: Cataract      (+) allergic rhinitis, tobacco use, Past use, Other pulmonary disease, Thoracotomy for thoracentesis secondary to pneumonia.    Neurologic:  - neg neurologic ROS   (+) peripheral neuropathy,     Cardiovascular:     (+) Dyslipidemia hypertension-----Taking blood thinners dysrhythmias, a-fib, Previous cardiac testing   Echo: Date: 12/21/17 Results:  Interpretation Summary     1. Normal left ventricle size and systolic function. LV ejection fraction 60-  65%. Grade 2 diastolic dysfunction.  2. No regional wall motion abnormalities.  3. Normal right ventricular size and systolic function.  4. Trileaflet aortic valve with sclerosis, no stenosis. " Mild to moderate  aortic regurgitation.  5. Mild aortic root dilatation (4.0 centimeters). Mild ascending aortic  dilatation (4.0 centimeters).  6. Moderate mitral and tricuspid regurgitation.  7. Severe biatrial enlargement.     There is no comparison study available.  _____________________________________________________________________________  __        Left Ventricle  The left ventricle is normal in size. There is normal left ventricular wall  thickness. Left ventricular systolic function is normal. The visual ejection  fraction is estimated at 60-65%. Grade II or moderate diastolic dysfunction.  No regional wall motion abnormalities noted.     Right Ventricle  The right ventricle is normal in size and function.     Atria  The left atrium is severely dilated. The right atrium is severely dilated.  There is no color Doppler evidence of an atrial shunt.     Mitral Valve  The mitral valve leaflets appear normal. There is no evidence of stenosis,  fluttering, or prolapse. There is moderate (2+) mitral regurgitation.        Tricuspid Valve  Normal tricuspid valve. There is moderate (2+) tricuspid regurgitation. The  right ventricular systolic pressure is approximated at 30.2 mmHg plus the  right atrial pressure.     Aortic Valve  The aortic valve is trileaflet with aortic valve sclerosis. There is mild to  moderate (1-2+) aortic regurgitation. No aortic stenosis is present.     Pulmonic Valve  The pulmonic valve is not well seen, but is grossly normal. There is trace  pulmonic valvular regurgitation. Normal pulmonic valve velocity.     Vessels  Mild aortic root dilatation. (4.0 centimeters). The ascending aorta is Mildly  dilated. (4.0 centimeters). Dilation of the inferior vena cava is present with  normal respiratory variation in diameter.     Pericardium  There is no pericardial effusion.        Rhythm  Sinus rhythm was noted.  Stress Test: Date: Results:    ECG Reviewed: Date: 12/20/17 Results:  Atrial Rhythm  -First degree A-V block  - occasional ectopic ventricular beat     P:QRS - 1:1, Abnormal P axis, H Rate 62   Judy = 294  -Nonspecific QRS widening.    -  Nonspecific T-abnormality.   Cath:  Date: Results:      METS/Exercise Tolerance: >4 METS    Hematologic:  - neg hematologic  ROS     Musculoskeletal:   (+) arthritis,     GI/Hepatic:     (+) GERD, liver disease, Other GI/Hepatic ETOH remission     Renal/Genitourinary:     (+) BPH,     Endo:  - neg endo ROS     Psychiatric/Substance Use:     (+) psychiatric history anxiety and depression     Infectious Disease:  - neg infectious disease ROS     Malignancy:  - neg malignancy ROS     Other:  - neg other ROS            Physical Exam    Airway        Mallampati: II   TM distance: >3 FB   Neck ROM: full     Respiratory Devices and Support         Dental  no notable dental history         Cardiovascular   cardiovascular exam normal          Pulmonary   pulmonary exam normal                  Anesthesia Plan    ASA Status:  3      Anesthesia Type: MAC.     - Reason for MAC: Procedure to face, neck, head or breast      Maintenance: TIVA.        Consents    Anesthesia Plan(s) and associated risks, benefits, and realistic alternatives discussed. Questions answered and patient/representative(s) expressed understanding.     - Discussed with:  Patient         Postoperative Care    Pain management: IV analgesics, Oral pain medications.   PONV prophylaxis: Ondansetron (or other 5HT-3), Dexamethasone or Solumedrol     Comments:       H&P review: History and physical reviewed and following examination; no interval change.

## 2021-02-17 ENCOUNTER — HOSPITAL ENCOUNTER (OUTPATIENT)
Facility: CLINIC | Age: 76
Discharge: HOME | End: 2021-02-17
Attending: OPHTHALMOLOGY | Admitting: OPHTHALMOLOGY
Payer: COMMERCIAL

## 2021-02-17 ENCOUNTER — ANESTHESIA (OUTPATIENT)
Dept: SURGERY | Facility: CLINIC | Age: 76
End: 2021-02-17
Payer: COMMERCIAL

## 2021-02-17 VITALS
SYSTOLIC BLOOD PRESSURE: 114 MMHG | OXYGEN SATURATION: 96 % | WEIGHT: 170 LBS | HEIGHT: 70 IN | BODY MASS INDEX: 24.34 KG/M2 | RESPIRATION RATE: 16 BRPM | TEMPERATURE: 98.7 F | HEART RATE: 60 BPM | DIASTOLIC BLOOD PRESSURE: 68 MMHG

## 2021-02-17 PROCEDURE — 250N000011 HC RX IP 250 OP 636: Performed by: OPHTHALMOLOGY

## 2021-02-17 PROCEDURE — 250N000009 HC RX 250: Performed by: NURSE ANESTHETIST, CERTIFIED REGISTERED

## 2021-02-17 PROCEDURE — 370N000004 HC ANESTHESIA CATARACT PACKAGE: Performed by: OPHTHALMOLOGY

## 2021-02-17 PROCEDURE — 761N000008 HC RECOVERY CATRACT PACKAGE: Performed by: OPHTHALMOLOGY

## 2021-02-17 PROCEDURE — V2632 POST CHMBR INTRAOCULAR LENS: HCPCS | Performed by: OPHTHALMOLOGY

## 2021-02-17 PROCEDURE — 250N000011 HC RX IP 250 OP 636: Performed by: NURSE ANESTHETIST, CERTIFIED REGISTERED

## 2021-02-17 PROCEDURE — 258N000003 HC RX IP 258 OP 636: Performed by: NURSE ANESTHETIST, CERTIFIED REGISTERED

## 2021-02-17 PROCEDURE — 360N000007 HC CATARACT SURGICAL PACKAGE: Performed by: OPHTHALMOLOGY

## 2021-02-17 PROCEDURE — 250N000009 HC RX 250: Performed by: OPHTHALMOLOGY

## 2021-02-17 DEVICE — EYE IMP IOL AMO PCL TECNIS ZCB00 22.0: Type: IMPLANTABLE DEVICE | Site: EYE | Status: FUNCTIONAL

## 2021-02-17 RX ORDER — SODIUM CHLORIDE, SODIUM LACTATE, POTASSIUM CHLORIDE, CALCIUM CHLORIDE 600; 310; 30; 20 MG/100ML; MG/100ML; MG/100ML; MG/100ML
INJECTION, SOLUTION INTRAVENOUS CONTINUOUS
Status: DISCONTINUED | OUTPATIENT
Start: 2021-02-17 | End: 2021-02-17 | Stop reason: HOSPADM

## 2021-02-17 RX ORDER — LIDOCAINE HYDROCHLORIDE 20 MG/ML
JELLY TOPICAL PRN
Status: DISCONTINUED | OUTPATIENT
Start: 2021-02-17 | End: 2021-02-17 | Stop reason: HOSPADM

## 2021-02-17 RX ORDER — LIDOCAINE 40 MG/G
CREAM TOPICAL
Status: DISCONTINUED | OUTPATIENT
Start: 2021-02-17 | End: 2021-02-17 | Stop reason: HOSPADM

## 2021-02-17 RX ORDER — PROPARACAINE HYDROCHLORIDE 5 MG/ML
SOLUTION/ DROPS OPHTHALMIC PRN
Status: DISCONTINUED | OUTPATIENT
Start: 2021-02-17 | End: 2021-02-17 | Stop reason: HOSPADM

## 2021-02-17 RX ORDER — BALANCED SALT SOLUTION 6.4; .75; .48; .3; 3.9; 1.7 MG/ML; MG/ML; MG/ML; MG/ML; MG/ML; MG/ML
SOLUTION OPHTHALMIC PRN
Status: DISCONTINUED | OUTPATIENT
Start: 2021-02-17 | End: 2021-02-17 | Stop reason: HOSPADM

## 2021-02-17 RX ORDER — TROPICAMIDE 10 MG/ML
1 SOLUTION/ DROPS OPHTHALMIC
Status: COMPLETED | OUTPATIENT
Start: 2021-02-17 | End: 2021-02-17

## 2021-02-17 RX ADMIN — TROPICAMIDE 1 DROP: 10 SOLUTION/ DROPS OPHTHALMIC at 08:53

## 2021-02-17 RX ADMIN — TROPICAMIDE 1 DROP: 10 SOLUTION/ DROPS OPHTHALMIC at 09:03

## 2021-02-17 RX ADMIN — CYCLOPENTOLATE HYDROCHLORIDE AND PHENYLEPHRINE HYDROCHLORIDE 1 DROP: 2; 10 SOLUTION/ DROPS OPHTHALMIC at 08:53

## 2021-02-17 RX ADMIN — CYCLOPENTOLATE HYDROCHLORIDE AND PHENYLEPHRINE HYDROCHLORIDE 1 DROP: 2; 10 SOLUTION/ DROPS OPHTHALMIC at 08:58

## 2021-02-17 RX ADMIN — LIDOCAINE HYDROCHLORIDE 0.1 ML: 10 INJECTION, SOLUTION EPIDURAL; INFILTRATION; INTRACAUDAL; PERINEURAL at 09:02

## 2021-02-17 RX ADMIN — MIDAZOLAM 1 MG: 1 INJECTION INTRAMUSCULAR; INTRAVENOUS at 09:43

## 2021-02-17 RX ADMIN — MIDAZOLAM 1 MG: 1 INJECTION INTRAMUSCULAR; INTRAVENOUS at 09:49

## 2021-02-17 RX ADMIN — SODIUM CHLORIDE, POTASSIUM CHLORIDE, SODIUM LACTATE AND CALCIUM CHLORIDE: 600; 310; 30; 20 INJECTION, SOLUTION INTRAVENOUS at 09:02

## 2021-02-17 RX ADMIN — TROPICAMIDE 1 DROP: 10 SOLUTION/ DROPS OPHTHALMIC at 08:58

## 2021-02-17 RX ADMIN — CYCLOPENTOLATE HYDROCHLORIDE AND PHENYLEPHRINE HYDROCHLORIDE 1 DROP: 2; 10 SOLUTION/ DROPS OPHTHALMIC at 09:03

## 2021-02-17 ASSESSMENT — MIFFLIN-ST. JEOR: SCORE: 1502.36

## 2021-02-17 NOTE — ANESTHESIA CARE TRANSFER NOTE
Patient: Josemanuel Edmond    Procedure(s):  Cataract Removal with Implant    Diagnosis: Cataract [H26.9]  Diagnosis Additional Information: No value filed.    Anesthesia Type:   MAC     Note:    Oropharynx: spontaneously breathing  Level of Consciousness: awake  Oxygen Supplementation: room air    Independent Airway: airway patency satisfactory and stable  Dentition: dentition unchanged  Vital Signs Stable: post-procedure vital signs reviewed and stable  Report to RN Given: handoff report given  Patient transferred to: Phase II          Vitals: (Last set prior to Anesthesia Care Transfer)  CRNA VITALS  2/17/2021 0923 - 2/17/2021 0953      2/17/2021             Pulse:  58    SpO2:  97 %        Electronically Signed By: KAYLA Sprague CRNA  February 17, 2021  9:53 AM

## 2021-02-17 NOTE — ANESTHESIA POSTPROCEDURE EVALUATION
Patient: Josemanuel Edmond    Procedure(s):  Cataract Removal with Implant    Diagnosis:Cataract [H26.9]  Diagnosis Additional Information: No value filed.    Anesthesia Type:  MAC    Note:  Disposition: Outpatient   Postop Pain Control: Uneventful            Sign Out: Well controlled pain   PONV: No   Neuro/Psych: Uneventful            Sign Out: Acceptable/Baseline neuro status   Airway/Respiratory: Uneventful            Sign Out: Acceptable/Baseline resp. status   CV/Hemodynamics: Uneventful            Sign Out: Acceptable CV status   Other NRE: NONE   DID A NON-ROUTINE EVENT OCCUR? No         Last vitals:  Vitals:    02/17/21 0835   BP: (!) 142/77   Pulse: 59   Temp: 36.5  C (97.7  F)   SpO2: 100%       Last vitals prior to Anesthesia Care Transfer:  CRNA VITALS  2/17/2021 0923 - 2/17/2021 0953      2/17/2021             Pulse:  58    SpO2:  97 %          Electronically Signed By: KAYLA Sprague CRNA  February 17, 2021  9:53 AM

## 2021-02-17 NOTE — OP NOTE
CATARACT OPERATIVE NOTE    PATIENT: Josemanuel Edmond  DATE OF SURGERY: 2/17/2021  PREOPERATIVE DIAGNOSIS:  Senile Nuclear Cataract, Left eye  POSTOPERATIVE DIAGNOSIS:  Senile Nuclear Cataract, Left eye  OPERATIVE PROCEDURE:  Phacoemulsification and placement of intraocular lens  SURGEON:  Regan Kaufman MD  ANESTHESIA:  Topical / MAC  EBL:  None  SPECIMENS:  None  COMPLICATIONS:  None    PROCEDURE:  The patient was brought to the operating room at [facility].  The left eye was prepped and draped in the usual fashion for cataract surgery.  A wire lid speculum was inserted.  A super sharp blade was used to make a paracentesis at the 5 O'clock position.  The super sharp blade was used to make a partial thickness temporal groove, which was 3 mm in length.  0.8 mL of non-preserved epi-Shugarcaine was injected into the anterior chamber.  Viscoelastic was used to inflate the anterior chamber through a cannula.  A 2.5 mm microkeratome was used to make a temporal clear corneal incision in a two-plane fashion.  A cystotome needle and forceps were used to make a capsulorrhexis.  Hydrodissection and hydrodelineation were performed with Balance Salt Solution.  The lens was then phacoemulsified and removed without complications.  The cortical material was removed with bimanual irrigation and aspiration.  The capsular bag was filled with viscoelastic.  A posterior chamber intraocular lens, preselected and recorded, was folded and inserted into the capsular bag.  The viscoelastic was removed with the irrigation and aspiration tip.  Balanced Salt Solution with Vigamox, 150mg/0.1mL, was used to refill the anterior chamber.  The wounds were checked for water tightness and required no suture.  The wire lid speculum was removed.  The patient's left eye was cleaned and a drop of each post-operative drop was placed, followed by a ervin shield.  The patient tolerated the procedure well, and there were no  complications.      Regan Kaufman MD

## 2021-03-01 ENCOUNTER — TRANSFERRED RECORDS (OUTPATIENT)
Dept: HEALTH INFORMATION MANAGEMENT | Facility: CLINIC | Age: 76
End: 2021-03-01

## 2021-03-02 ENCOUNTER — MYC MEDICAL ADVICE (OUTPATIENT)
Dept: FAMILY MEDICINE | Facility: CLINIC | Age: 76
End: 2021-03-02

## 2021-03-05 NOTE — TELEPHONE ENCOUNTER
On going medication? [id number]  
Requested Prescriptions   Pending Prescriptions Disp Refills     naproxen (NAPROSYN) 500 MG tablet 28 tablet 1     Sig: Take 1 tablet (500 mg) by mouth 2 times daily (with meals)    There is no refill protocol information for this order        Last Written Prescription Date:  3/30/18  Last Fill Quantity: 28,  # refills: 1   Last office visit: 3/30/2018 with prescribing provider:  3/30/18   Future Office Visit:      
yes

## 2021-03-09 ENCOUNTER — VIRTUAL VISIT (OUTPATIENT)
Dept: PSYCHIATRY | Facility: CLINIC | Age: 76
End: 2021-03-09
Attending: NURSE PRACTITIONER
Payer: COMMERCIAL

## 2021-03-09 DIAGNOSIS — F40.10 SOCIAL ANXIETY DISORDER: ICD-10-CM

## 2021-03-09 DIAGNOSIS — F32.1 MODERATE MAJOR DEPRESSION (H): ICD-10-CM

## 2021-03-09 DIAGNOSIS — N40.1 BENIGN PROSTATIC HYPERPLASIA WITH WEAK URINARY STREAM: ICD-10-CM

## 2021-03-09 DIAGNOSIS — R39.12 BENIGN PROSTATIC HYPERPLASIA WITH WEAK URINARY STREAM: ICD-10-CM

## 2021-03-09 DIAGNOSIS — F33.1 MODERATE EPISODE OF RECURRENT MAJOR DEPRESSIVE DISORDER (H): Primary | ICD-10-CM

## 2021-03-09 DIAGNOSIS — F41.9 ANXIETY: ICD-10-CM

## 2021-03-09 PROCEDURE — 99215 OFFICE O/P EST HI 40 MIN: CPT | Mod: 95 | Performed by: NURSE PRACTITIONER

## 2021-03-09 RX ORDER — FINASTERIDE 5 MG/1
1 TABLET, FILM COATED ORAL DAILY
Qty: 30 TABLET | Refills: 0 | Status: SHIPPED | OUTPATIENT
Start: 2021-03-09 | End: 2021-04-08

## 2021-03-09 RX ORDER — LAMOTRIGINE 100 MG/1
50 TABLET ORAL 2 TIMES DAILY
Qty: 30 TABLET | Refills: 1 | Status: SHIPPED | OUTPATIENT
Start: 2021-03-09 | End: 2021-04-09

## 2021-03-09 RX ORDER — ARIPIPRAZOLE 2 MG/1
2 TABLET ORAL DAILY
Qty: 30 TABLET | Refills: 1 | Status: SHIPPED | OUTPATIENT
Start: 2021-03-09 | End: 2021-04-27

## 2021-03-09 RX ORDER — FLUOXETINE 10 MG/1
10 TABLET, FILM COATED ORAL DAILY
Qty: 30 TABLET | Refills: 1 | Status: SHIPPED | OUTPATIENT
Start: 2021-03-09 | End: 2021-04-27

## 2021-03-09 RX ORDER — BUPROPION HYDROCHLORIDE 150 MG/1
150 TABLET, EXTENDED RELEASE ORAL 2 TIMES DAILY
Qty: 180 TABLET | Refills: 0 | Status: SHIPPED | OUTPATIENT
Start: 2021-03-09 | End: 2021-05-14

## 2021-03-09 ASSESSMENT — PAIN SCALES - GENERAL: PAINLEVEL: NO PAIN (0)

## 2021-03-09 NOTE — PROGRESS NOTES
"VIDEO VISIT  Josemanuel Edmond is a 75 year old patient who is being evaluated via a billable video visit.      The patient has been notified of following:   \"This video visit will be conducted via a call between you and your physician/provider. We have found that certain health care needs can be provided without the need for an in-person physical exam. This service lets us provide the care you need with a video conversation. If a prescription is necessary we can send it directly to your pharmacy. If lab work is needed we can place an order for that and you can then stop by our lab to have the test done at a later time. Insurers are generally covering virtual visits as they would in-office visits so billing should not be different than normal.  If for some reason you do get billed incorrectly, you should contact the billing office to correct it and that number is in the AVS .    Video Conference to be completed via:  [email or web address]    Patient has given verbal consent for video visit?:  Yes    Patient would prefer that any video invitations be sent by: Send to e-mail at: [email or web address]      How would patient like to obtain AVS?:  Ezequiel    AVS SmartPhrase [PsychAVS] has been placed in 'Patient Instructions':  Yes    "

## 2021-03-09 NOTE — PATIENT INSTRUCTIONS
-Start Abilify 2 mg daily for mood and anxiety.  Monitor anxiety and depression and if you are not feeling well prior to trip, you may stop the medication  -Continue all other medications for now.  Fluoxetine (Prozac) is back to 10 mg daily as this is how you have been taking the medication    Your next appointment is scheduled on 4/9/2021 (Fri) at 9:30am.    To access your telemedicine visit:     Open a web browser, like [employer], and type [email or web address]     You will see a box asking you to check in to let Rylee Kyle know that you are here.     Type in your name and press Check In. That will let Rylee see you in the virtual waiting room. At your scheduled appointment time, your provider will initiate the visit and connect you.     When your visit is done, you can simply close the browser window.        Please Note:  Ideally, you will connect from a desktop, laptop, or tablet with a WiFi connection. Your computer/tablet must have a camera and microphone. You can use a cell phone, if it has a camera, and if you can connect to WiFi. However, if you connect your phone over a cellular network, it is of lower quality and less reliable      **For crisis resources, please see the information at the end of this document**     Patient Education      Thank you for coming to the [facility] [place] [facility].    Lab Testing:  If you had lab testing today and your results are reassuring or normal they will be mailed to you or sent through [employer] within 7 days. If the lab tests need quick action we will call you with the results. The phone number we will call with results is # [phone] (home) . If this is not the best number please call our clinic and change the number.    Medication Refills:  If you need any refills please call your pharmacy and they will contact us. Our fax number for refills is [phone]. Please allow three business for refill processing. If you need to   your refill at a new pharmacy, please contact the new pharmacy directly. The new pharmacy will help you get your medications transferred.     Scheduling:  If you have any concerns about today's visit or wish to schedule another appointment please call our office during normal business hours [phone] (8-5:00 M-F)    Contact Us:  Please call [phone] during business hours (8-5:00 M-F).  If after clinic hours, or on the weekend, please call  [phone].    Financial Assistance [phone]  [facility] Billing [phone]  Central Billing Office, [facility]: [phone]  [facility] Billing [phone]  Medical Records [phone]  [facility] Patient Bill of Rights [email or web address]       MENTAL HEALTH CRISIS NUMBERS:  For a medical emergency please call  911 or go to the nearest ER.     [facility]:   [facility] -[phone]   Crisis Residence [facility] Residence -[phone]   Walk-In Counseling [facility] -[phone]   COPE 24/7 [place] Mobile Team -[phone] (adults)/[phone] (child)  CHILD: [facility] needs assessment team - [phone]      [facility]:   [facility] - [phone]   Walk-in counseling [facility] - [phone]   Walk-in counseling [facility] - [phone]   Crisis Residence [facility] Residence - [phone]  Urgent Care Adult Mental Jlrxcy-[phone] mobile unit/ 24/7 crisis line    National Crisis Numbers:   National Suicide Prevention Lifeline: 0-157-398-TALK (771-896-1342)  Poison Control Center - 1-659.937.7440  [employer]/resources for a list of additional resources (SOS)  Trans Lifeline a hotline for transgender people 1-153.424.8857  The [employer] a hotline for LGBT youth 8-775-735-0652  Crisis Text Line: For any crisis 24/7   To: 866822  see www.crisistextline.org  - IF MAKING A CALL FEELS  TOO HARD, send a text!         Again thank you for choosing [facility] and please let us know how we can best partner with you to improve you and your family's health.    You may be receiving a survey regarding this appointment. We would love to have your feedback, both positive and negative. The survey is done by an external company, so your answers are anonymous.

## 2021-03-09 NOTE — PROGRESS NOTES
Start Time:  1359         End Time: 1429    Telemedicine Visit: The patient's condition can be safely assessed and treated via synchronous audio and visual telemedicine encounter.      Reason for Telemedicine Visit: Due to COVID 19 pandemic, clinic switching all appointments to telemedicine     Originating Site (Patient Location): Patient's home    Distant Site (Provider Location): Provider Remote Setting    Consent:  The patient/guardian has verbally consented to: the potential risks and benefits of telemedicine (video visit) versus in person care; bill my insurance or make self-payment for services provided; and responsibility for payment of non-covered services.     Mode of Communication:  Video Conference via [email or web address]    As the provider I attest to compliance with applicable laws and regulations related to telemedicine.    Psychiatry Clinic Progress Note                                                                  Patient Name: Josemanuel Edmond  YOB: 1945  MRN: [id number]  Date of Service:  3/9/2021  Last Seen:2/11/2020    Josemanuel Edmond is a 75 year old person assigned male at birth, identifies as cisgender male who uses the name Gavin and pronoun pat.       Gavin Edmond is a 75 year old year old adult who presents for ongoing psychiatric care.  Gavin Edmond was last seen on 2/11/2020.    At that time,     Medication Ordered/Consults/Labs/tests Ordered:      Medication:   -Increase Fluoxetine to 20 mg daily for your mood and anxiety.  Please note new dose is ordered with 20 mg capsule.  Please read the label well.  -Discontinue Escitalopram  -Continue all other medications for now  OTC Recommendations: none  Lab Orders:  TSH in the future  Referrals: none  Release of Information: none  Future Treatment Considerations: Per symptoms.   Return for Follow Up: in 4 weeks    Pertinent Background:  Reports depression started 2002 after his son was killed in MVA.  Anxiety also started around the  "same time, but social anxiety started 6-7 years ago.  Denies any hx of SI, SIB, HI, psych hospitalization.  After son was killed, pt had heavy ETOH use on and off. Psych critical item history includes [no critical items].      Previous Psychiatric Meds: Wellbutrin XL, Xanax, Klonopin, Valium, Ativan, Remeron (30 mg only, worked well for sleep and anxiety, but weight gain), Nortriptyline, Vistaril, Remeron (oversedation at 45 mg, 22.5mg and 15 mg and wt gain), Trazodone (oversedate at 50 mg), Ambien (fall)    Interim History                                                                                                        4, 4     On 2/23/2021, pt contacted via [employer] noting worsening depression or anxiety though he can't tell what it is.  Replied that Prozac increase may not have taken effect yet, but anxiety exacerbation may be due to Prozac and recommend either to change medication if anxiety exacerbation is significant and strongly recommended long term therapy for anxiety as he was doing well last time seen (this was after his vacation to [place]).    Since the last visit,  -Decreased Prozac to 10 mg daily due to worsening depression and anxiety 4 days ago.  Feels \"something is better\" but can't tell if he is having depression and/or anxiety.  -Notes some anhedonia, baseline sense of doom and social anxiety, feels depression and anxiety comes on and off.  Notes usually feels good in AM, but after noon, symptoms start to return.  Also notes started to have specific time in AM to talk with wife and this helps.  Started this in [place] as friend in [place] has been doing it and recommended.  -Sleeps about 7 hour night, does not have difficulties with initial insomnia, but wakes up around 4:30/5am, sometimes cannot go back to sleep.  -Started to take Vitamin D couple days ago, also takes Vitamin B12, Fish oil, Fe, Citracal (Mg and Ca).  -Is going to [place] to see niece from 3/22-4/9.    Denies any symptoms " suggestive of hypomania or psychosis.    Current Suicidality/Hx of Suicide Attempts: Denies both  CoCominent Medical concerns: Denies    Medication Side Effects: The patient denies all medication side effects.      Medical Review of Systems     Apart from the symptoms mentioned int he HPI, the 14 point review of systems, including constitutional, HEENT, cardiovascular, respiratory, gastrointestinal, genitourinary, musculoskeletal, integumentary, endocrine, neurological, hematologic and allergic is entirely negative.    Substance Use   Pt has been staying substance free since last seen.  Notes has not used any ETOH x 3 years.    Social/ Family History                                  [per patient report]                                 1ea,1ea   Living arrangements: living with spouse and feels safe  Social Support: spouse  Access to gun: owns a gun  Retired in 2008.  Trauma hx includes loss of son by MVA in 2002.    Allergy                                Bactrim [sulfamethoxazole w/trimethoprim]    Current Medications                                                                                                       Current Outpatient Medications   Medication Sig Dispense Refill     acetaminophen (TYLENOL) 500 MG tablet Take 1,000 mg by mouth every 8 hours as needed for mild pain       buPROPion (WELLBUTRIN SR) 150 MG 12 hr tablet TAKE 1 TABLET TWICE A  tablet 2     busPIRone HCl (BUSPAR) 30 MG tablet Take 1 tablet (30 mg) by mouth 2 times daily 180 tablet 0     doxazosin (CARDURA) 4 MG tablet Take 1 tablet (4 mg) by mouth daily No further refills. Needs appt 90 tablet 0     finasteride (PROSCAR) 5 MG tablet Take 1 tablet (5 mg) by mouth daily 30 tablet 0     FLUoxetine (PROZAC) 20 MG capsule Take 1 capsule (20 mg) by mouth daily 30 capsule 1     gabapentin (NEURONTIN) 600 MG tablet Take 1 tab in AM and afternoon and 1.5 tab at bedtime 315 tablet 0     lamoTRIgine (LAMICTAL) 100 MG tablet Take 0.5 tablets  "(50 mg) by mouth 2 times daily 180 tablet 3     metoprolol tartrate (LOPRESSOR) 25 MG tablet Take 1 tablet (25 mg) by mouth 2 times daily No further refills. Needs appt 180 tablet 0     STATIN NOT PRESCRIBED, INTENTIONAL, Please choose reason not prescribed, below       XARELTO ANTICOAGULANT 20 MG TABS tablet TAKE 1 TABLET DAILY WITH   DINNER 90 tablet 3        Mental Status Exam                                                                                   9, 14 cog        Alertness: alert  and oriented  Appearance:  Casually dressed and Adequately groomed  Behavior/Demeanor: cooperative, pleasant and calm, with good  eye contact   Speech: regular rate and rhythm  Mood :  \"depressed and anxious on and off\"  Affect: slightly subdued; was congruent to mood; was congruent to content  Thought Process (Associations):  Linear and Goal directed  Thought process (Rate):  Normal  Thought content:  no overt psychosis, denies suicidal ideation, intent or thoughts and patient does not appear to be responding to internal stimuli  Perception:  Reports none;  Denies auditory hallucinations and visual hallucinations  Attention/Concentration:  Fair  Memory:  Immediate recall intact and Short-term memory intact  Language: intact  Fund of Knowledge/Intelligence:  Average  Abstraction:  Normal  Insight:  Fair  Judgment:  Fair  Cognition: (6) does  appear grossly intact; formal cognitive testing was not done    Physical Exam     Motor activity/EPS:  Normal  Gait:  Normal  Psychomotor: normal or unremarkable    Labs and Results      Pertinent findings on review include: Review of records with relevant information reported in the HPI.  Reviewed pt's past medical record and obtained collateral information.      [place] PRESCRIPTION MONITORING PROGRAM [] was checked today:  indicates Gabapentin 12/31.    PHQ9 Today:  N/A  PHQ 11/15/2018 2/26/2019 12/10/2019   PHQ-9 Total Score 9 3 4   Q9: Thoughts of better off dead/self-harm past 2 weeks " Not at all Not at all Not at all       CAROLYNN 7 Today: N/A  CAROLYNN-7 SCORE 11/15/2018 2/26/2019 12/10/2019   Total Score - - -   Total Score 6 5 2       Recent Labs   Lab Test 09/04/19  1020 09/14/18  1629 05/07/18  1325   CR 0.81 0.93 1.07   GFRESTIMATED 86 79 67     Recent Labs   Lab Test 01/22/18  1354 12/20/17  1315   AST 15 24   ALT 32 17   ALKPHOS 60 66       PSYCHOTROPIC DRUG INTERACTIONS:   Abilify---Buspar---Gabapentin: Concurrent use of GABAPENTIN and CNS DEPRESSANTS may result in respiratory depression.    Prozac---Xarelto: Concurrent use of RIVAROXABAN and SEROTONIN NOREPINEPHRINE REUPTAKE INHIBITORS AND SSRIS may result in increased risk of bleeding  Prozac---Wellbutrin: Concurrent use of BUPROPION and SELECTED SEIZURE THRESHOLD LOWERING CYP2D6 SUBSTRATES may result in increased exposure of CYP2D6 substrates; increased risk of seizure.   Prozac---Buspar: Concurrent use of FLUOXETINE and BUSPIRONE may result in worsening of psychiatric symptoms.   Abilify---Prozac: Concurrent use of ARIPIPRAZOLE and STRONG CYP2D6 INHIBITORS WITH QT-INTERVAL PROLONGATION may result in increased exposure of aripiprazole and increased risk of QT prolongation or torsades de pointes.   Abilify---Buspar: Concurrent use of ARIPIPRAZOLE and STRONG CYP2D6 INHIBITORS may result in increased exposure of aripiprazole  Abilify---Wellbutrin: Concurrent use of ARIPIPRAZOLE and STRONG CYP2D6 INHIBITORS may result in increased exposure of aripiprazole.     MANAGEMENT:  Monitoring for adverse effects, routine vitals and patient is aware of risks      Impression/Assessment      Gavin Edmond is a 75 year old adult  who presents for med management follow up.  Pt appears slightly depressed, but not anxious, denies SI, SIB or HI.  Pt did not tolerate Prozac 20 mg daily as this exacerbated depression and anxiety.  Reviewed previous medication trials and pt has not had positive response from serotonin modulators.  Since pt is only tolerating Prozac  10 mg daily with minimum improvement, discussed possible trial of Abilify 2 mg daily to see if this would improve anxiety and depression.  Will continue all other medications for now, but will plan to taper off Prozac as pt can only tolerate minimum dosage and to prevent polypharmacy.  Will also look into tapering off/down Lamictal and Gabapentin in the future to prevent polypharmacy.    Pt declined a refill of Gabapentin today.      Diagnosis                                                                   Social Anxiety disorder  MDD    Treatment Recommendation & Plan       Medication Ordered/Consults/Labs/tests Ordered:     Medication:   -Start Abilify 2 mg daily for mood and anxiety.  Monitor anxiety and depression and if you are not feeling well prior to trip, you may stop the medication  -Continue all other medications for now.  Fluoxetine (Prozac) is back to 10 mg daily as this is how you have been taking the medication  OTC Recommendations: none  Lab Orders:  none  Referrals: none  Release of Information: none  Future Treatment Considerations: Per symptoms.   Return for Follow Up: in 4 weeks (due to out of state trip)    -Discussed safety plan for suicidal thoughts  -Discussed plan for suicidality  -Discussed available emergency services  -Patient agrees with the treatment plan  -Encouraged to continue outpatient therapy to gain more coping mechanism for stress.      Treatment Risk Statement: Discussed with the patient my impressions, as well as recommended studies. I educated patient on the differential diagnosis and prognosis. I discussed with the patient the risks and benefits of medications versus no interventions, including efficacy, dose, possible side effects and length of treatment and the importance of medication compliance.  The patient understands the risks, benefits, adverse effects and alternatives. Agrees to treatment with the capacity to do so. No medical contraindications to treatment. The  patient also understands the risks of using street drugs or alcohol. I also discussed the potential metabolic side effects of antipsychotics including weight gain, diabetes and lipid abnormalities, risk of tardive dyskinesia and indicates understanding of this and agrees to regular medical monitoring      CRISIS NUMBERS:   Provided routinely in AVS.      42 minutes spent on the date of the encounter doing chart review, history and exam, documentation and further activities as noted above      Rylee Kyle CNP,  3/9/2021

## 2021-03-19 ENCOUNTER — MYC MEDICAL ADVICE (OUTPATIENT)
Dept: PSYCHIATRY | Facility: CLINIC | Age: 76
End: 2021-03-19

## 2021-03-19 DIAGNOSIS — F41.9 ANXIETY: ICD-10-CM

## 2021-03-19 RX ORDER — GABAPENTIN 600 MG/1
TABLET ORAL
Qty: 315 TABLET | Refills: 0 | Status: SHIPPED | OUTPATIENT
Start: 2021-03-19 | End: 2021-04-09

## 2021-03-19 NOTE — TELEPHONE ENCOUNTER
Last seen: 3/9  RTC: 4 weeks   Cancel: none   No-show: none   Next appt: 4/9    Incoming refill from patient via [employer]     Medication requested: gabapentin (NEURONTIN) 600 MG tablet  Directions: Take 1 tab in AM and afternoon and 1.5 tab at bedtime  Qty: 315  Last refilled: 12/31 #315, 11/19 #270, 10/11 # 360    Will route to provider to confirm the dose and approval to refill

## 2021-03-21 DIAGNOSIS — I48.20 CHRONIC ATRIAL FIBRILLATION (H): ICD-10-CM

## 2021-03-22 RX ORDER — METOPROLOL TARTRATE 25 MG/1
TABLET, FILM COATED ORAL
Qty: 60 TABLET | Refills: 0 | Status: SHIPPED | OUTPATIENT
Start: 2021-03-22 | End: 2021-05-11

## 2021-03-22 NOTE — TELEPHONE ENCOUNTER
"Requested Prescriptions   Pending Prescriptions Disp Refills     metoprolol tartrate (LOPRESSOR) 25 MG tablet [Pharmacy Med Name: METOPROLOL TARTRATE TABS 25MG] 180 tablet 3     Sig: TAKE 1 TABLET TWICE A DAY (NO FURTHER REFILLS. NEED APPOINTMENT)       Beta-Blockers Protocol Passed - 3/21/2021  4:05 PM        Passed - Blood pressure under 140/90 in past 12 months     BP Readings from Last 3 Encounters:   02/17/21 114/68   01/06/21 132/70   02/27/20 106/72                 Passed - Patient is age 6 or older        Passed - Recent (12 mo) or future (30 days) visit within the authorizing provider's specialty     Patient has had an office visit with the authorizing provider or a provider within the authorizing providers department within the previous 12 mos or has a future within next 30 days. See \"Patient Info\" tab in inbasket, or \"Choose Columns\" in Meds & Orders section of the refill encounter.              Passed - Medication is active on med list             " You may receive a survey regarding the care you received during your visit. Your input is valuable to us. We encourage you to complete and return your survey. We hope you will choose us in the future for your healthcare needs.

## 2021-04-08 DIAGNOSIS — R39.12 BENIGN PROSTATIC HYPERPLASIA WITH WEAK URINARY STREAM: ICD-10-CM

## 2021-04-08 DIAGNOSIS — N40.1 BENIGN PROSTATIC HYPERPLASIA WITH WEAK URINARY STREAM: ICD-10-CM

## 2021-04-08 RX ORDER — FINASTERIDE 5 MG/1
1 TABLET, FILM COATED ORAL DAILY
Qty: 30 TABLET | Refills: 0 | Status: SHIPPED | OUTPATIENT
Start: 2021-04-08 | End: 2021-05-17

## 2021-04-08 NOTE — TELEPHONE ENCOUNTER
finasteride 5 mg tablet  Last Written Prescription Date:  3/9/2021  Last Fill Quantity: 30,  # refills: 0   Last office visit: 8/28/2020 with prescribing provider:  soham   Future Office Visit:

## 2021-04-09 ENCOUNTER — VIRTUAL VISIT (OUTPATIENT)
Dept: PSYCHIATRY | Facility: CLINIC | Age: 76
End: 2021-04-09
Attending: NURSE PRACTITIONER
Payer: COMMERCIAL

## 2021-04-09 ENCOUNTER — TELEPHONE (OUTPATIENT)
Dept: PSYCHIATRY | Facility: CLINIC | Age: 76
End: 2021-04-09

## 2021-04-09 DIAGNOSIS — F40.10 SOCIAL ANXIETY DISORDER: ICD-10-CM

## 2021-04-09 DIAGNOSIS — F33.1 MODERATE EPISODE OF RECURRENT MAJOR DEPRESSIVE DISORDER (H): ICD-10-CM

## 2021-04-09 DIAGNOSIS — F41.9 ANXIETY: ICD-10-CM

## 2021-04-09 DIAGNOSIS — F51.01 PRIMARY INSOMNIA: Primary | ICD-10-CM

## 2021-04-09 PROCEDURE — 99215 OFFICE O/P EST HI 40 MIN: CPT | Mod: 95 | Performed by: NURSE PRACTITIONER

## 2021-04-09 RX ORDER — BUSPIRONE HYDROCHLORIDE 30 MG/1
30 TABLET ORAL 2 TIMES DAILY
Qty: 180 TABLET | Refills: 0 | Status: SHIPPED | OUTPATIENT
Start: 2021-04-09 | End: 2021-07-15

## 2021-04-09 RX ORDER — GABAPENTIN 600 MG/1
TABLET ORAL
Qty: 360 TABLET | Refills: 0 | Status: SHIPPED | OUTPATIENT
Start: 2021-04-09 | End: 2021-06-14

## 2021-04-09 NOTE — TELEPHONE ENCOUNTER
On April 9, 2021 at 9:07 AM writer called patient at [phone] to confirm Virtual Visit. Writer unable to make contact with patient. No voice mail left due to call back. Writer  Rasheeda Handy CMA
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
## 2021-04-09 NOTE — PATIENT INSTRUCTIONS
-Change evening Gabapentin administration time to 30 min before bedtime for sleep  -Change evening Buspirone administration time from 7pm to 3pm to cover early evening anxiety  -Discontinue Lamotrigine.  Monitor changes in anxiety and/or mood.  -Continue all other medications for now.    Your next appointment is scheduled on 4/27/2021 (Tue) at 11:30am.    To access your telemedicine visit:     Open a web browser, like [employer], and type [email or web address]     You will see a box asking you to check in to let Ryleekarley Kyle know that you are here.     Type in your name and press Check In. That will let Rylee see you in the virtual waiting room. At your scheduled appointment time, your provider will initiate the visit and connect you.     When your visit is done, you can simply close the browser window.        Please Note:  Ideally, you will connect from a desktop, laptop, or tablet with a WiFi connection. Your computer/tablet must have a camera and microphone. You can use a cell phone, if it has a camera, and if you can connect to WiFi. However, if you connect your phone over a cellular network, it is of lower quality and less reliable.    Thank you for coming to the [facility] [place] [facility].    Lab Testing:  If you had lab testing today and your results are reassuring or normal they will be mailed to you or sent through [employer] within 7 days. If the lab tests need quick action we will call you with the results. The phone number we will call with results is # [phone] (home) . If this is not the best number please call our clinic and change the number.    Medication Refills:  If you need any refills please call your pharmacy and they will contact us. Our fax number for refills is [phone]. Please allow three business for refill processing. If you need to  your refill at a new pharmacy, please contact the new pharmacy directly. The new pharmacy will help you get  your medications transferred.     Scheduling:  If you have any concerns about today's visit or wish to schedule another appointment please call our office during normal business hours [phone] (8-5:00 M-F)    Contact Us:  Please call [phone] during business hours (8-5:00 M-F).  If after clinic hours, or on the weekend, please call  [phone].    Financial Assistance [phone]  [facility] Billing [phone]  Central Billing Office, [facility]: [phone]  [place] Billing [phone]  Medical Records [phone]      MENTAL HEALTH CRISIS NUMBERS:  For a medical emergency please call  911 or go to the nearest ER.     [facility]:   [facility] -[phone]   Crisis Residence [facility] -[phone]   Walk-In Counseling [facility] -[phone]   COPE 24/7 [place] Mobile Team -[phone] (adults)/[phone] (child)  CHILD: [facility] needs assessment team - [phone]      [facility]:   [facility] - [phone]   Walk-in counseling [facility] - [phone]   Walk-in counseling [facility] - [phone]   Crisis Residence [facility] - [phone]  Urgent Care Adult Mental [facility]-[phone] mobile unit/ 24/7 crisis line    National Crisis Numbers:   National Suicide Prevention Lifeline: 6-448-250-TALK (456-282-5760)  Poison Control Center - 7-078-385-8260  [employer] Radio/resources for a list of additional resources (SOS)  Trans Lifeline a hotline for transgender people 1-559.168.4870  The [employer] a hotline for LGBT youth 1-821.706.3967  Crisis Text Line: For any crisis 24/7   To: 850493  see www.crisistextline.org  - IF MAKING A CALL FEELS TOO HARD, send a text!         Again thank you for choosing [facility] and please let us know how we can best partner with you to improve you and your family's health.    You may be  receiving a survey regarding this appointment. We would love to have your feedback, both positive and negative. The survey is done by an external company, so your answers are anonymous.

## 2021-04-09 NOTE — TELEPHONE ENCOUNTER
On April 9, 2021, at 8:42 AM, writer called patient at [phone] to confirm Virtual Visit. Writer unable to make contact with patient. Writer unable to leave detailed voice mail message due to voice mail box full. Rasheeda Handy, [facility]

## 2021-04-12 ENCOUNTER — TRANSFERRED RECORDS (OUTPATIENT)
Dept: HEALTH INFORMATION MANAGEMENT | Facility: CLINIC | Age: 76
End: 2021-04-12

## 2021-04-22 ENCOUNTER — HOSPITAL ENCOUNTER (OUTPATIENT)
Dept: PHYSICAL THERAPY | Facility: CLINIC | Age: 76
Setting detail: THERAPIES SERIES
End: 2021-04-22
Attending: ORTHOPAEDIC SURGERY
Payer: COMMERCIAL

## 2021-04-22 PROCEDURE — 97110 THERAPEUTIC EXERCISES: CPT | Mod: GP

## 2021-04-22 PROCEDURE — 97140 MANUAL THERAPY 1/> REGIONS: CPT | Mod: GP

## 2021-04-22 PROCEDURE — 97161 PT EVAL LOW COMPLEX 20 MIN: CPT | Mod: GP
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
## 2021-04-24 DIAGNOSIS — N13.8 HYPERTROPHY OF PROSTATE WITH URINARY OBSTRUCTION: ICD-10-CM

## 2021-04-24 DIAGNOSIS — N40.1 HYPERTROPHY OF PROSTATE WITH URINARY OBSTRUCTION: ICD-10-CM

## 2021-04-24 NOTE — LETTER
[facility]  [address]  [place] [address]  [phone]        04/28/21    Josemanuel Edmond    [address]   [place] [address]            Dear Josemanuel Edmond       APPOINTMENT REMINDER:   Our records indicates that it is time for you to be seen for a recheck.     Your current medication request will be approved for one refill but you will need to be seen before any additional refills can be approved.    Taking care of your health is important to us, and ongoing visits with your provider are vital to your care.    We look forward to seeing you in the near future.  You may call our office at [phone] to schedule a visit.    Please disregard this notice if you have already made an appointment.          Sincerely,        KAYLA Thomas CNP /klp

## 2021-04-27 ENCOUNTER — VIRTUAL VISIT (OUTPATIENT)
Dept: PSYCHIATRY | Facility: CLINIC | Age: 76
End: 2021-04-27
Attending: NURSE PRACTITIONER
Payer: COMMERCIAL

## 2021-04-27 DIAGNOSIS — F33.1 MODERATE EPISODE OF RECURRENT MAJOR DEPRESSIVE DISORDER (H): ICD-10-CM

## 2021-04-27 DIAGNOSIS — F41.9 ANXIETY: ICD-10-CM

## 2021-04-27 DIAGNOSIS — F51.01 PRIMARY INSOMNIA: Primary | ICD-10-CM

## 2021-04-27 PROCEDURE — 99215 OFFICE O/P EST HI 40 MIN: CPT | Mod: 95 | Performed by: NURSE PRACTITIONER

## 2021-04-27 RX ORDER — ARIPIPRAZOLE 2 MG/1
2 TABLET ORAL DAILY
Qty: 90 TABLET | Refills: 0 | Status: SHIPPED | OUTPATIENT
Start: 2021-04-27 | End: 2021-07-15 | Stop reason: DRUGHIGH

## 2021-04-27 RX ORDER — DOXAZOSIN 4 MG/1
TABLET ORAL
Qty: 30 TABLET | Refills: 0 | Status: SHIPPED | OUTPATIENT
Start: 2021-04-27 | End: 2021-05-11

## 2021-04-27 RX ORDER — FLUOXETINE 10 MG/1
10 TABLET, FILM COATED ORAL DAILY
Qty: 30 TABLET | Refills: 1 | Status: SHIPPED | OUTPATIENT
Start: 2021-04-27 | End: 2021-06-03

## 2021-04-27 ASSESSMENT — PAIN SCALES - GENERAL: PAINLEVEL: NO PAIN (0)

## 2021-04-27 NOTE — PATIENT INSTRUCTIONS
-Continue on current medication regimen.  -May take over the counter Unisom half dose when you wake up in 4-5AM and cannot return to sleep.  Monitor oversedation.    -Make an appointment for sleep medicine consult.  [phone]  There's Dr Ranjith Yanez at [facility] [email or web address]    Your next appointment is scheduled on 5/14/2021 (Fri) at 10:30am.    To access your telemedicine visit:     Open a web browser, like [employer], and type [email or web address]     You will see a box asking you to check in to let Rylee Saroj know that you are here.     Type in your name and press Check In. That will let Rylee see you in the virtual waiting room. At your scheduled appointment time, your provider will initiate the visit and connect you.     When your visit is done, you can simply close the browser window.        Please Note:  Ideally, you will connect from a desktop, laptop, or tablet with a WiFi connection. Your computer/tablet must have a camera and microphone. You can use a cell phone, if it has a camera, and if you can connect to WiFi. However, if you connect your phone over a cellular network, it is of lower quality and less reliable      **For crisis resources, please see the information at the end of this document**     Patient Education      Thank you for coming to the [facility] [place] [facility].    Lab Testing:  If you had lab testing today and your results are reassuring or normal they will be mailed to you or sent through [employer] within 7 days. If the lab tests need quick action we will call you with the results. The phone number we will call with results is # [phone] (home) . If this is not the best number please call our clinic and change the number.    Medication Refills:  If you need any refills please call your pharmacy and they will contact us. Our fax number for refills is [phone]. Please allow three  business for refill processing. If you need to  your refill at a new pharmacy, please contact the new pharmacy directly. The new pharmacy will help you get your medications transferred.     Scheduling:  If you have any concerns about today's visit or wish to schedule another appointment please call our office during normal business hours [phone] (8-5:00 M-F)    Contact Us:  Please call [phone] during business hours (8-5:00 M-F).  If after clinic hours, or on the weekend, please call  [phone].    Financial Assistance [phone]  [facility] Billing [phone]  Central Billing Office, [facility]: [phone]  [place] Billing [phone]  Medical Records [phone]  [place] Patient Bill of Rights [email or web address]       MENTAL HEALTH CRISIS NUMBERS:  For a medical emergency please call  911 or go to the nearest ER.     [facility]:   [facility] -[phone]   Crisis Residence [facility] Residence -[phone]   Walk-In Counseling [facility] -[phone]   COPE 24/7 [place] Mobile Team -[phone] (adults)/[phone] (child)  CHILD: [facility] needs assessment team - [phone]      [facility]:   [facility] - [phone]   Walk-in counseling [facility] - [phone]   Walk-in counseling [facility] - [phone]   Crisis Residence [facility] Residence - [phone]  Urgent Care Adult Mental Hpmgnm-[phone] mobile unit/ 24/7 crisis line    National Crisis Numbers:   National Suicide Prevention Lifeline: 3-981-949-TALK (401-948-0690)  Poison Control Center - 1-332.446.8027  [employer]/resources for a list of additional resources (SOS)  Trans Lifeline a hotline for transgender people 2-107-313-4668  The [employer] a hotline for LGBT youth 1-566.382.4596  Crisis Text Line: For any crisis 24/7   To: 091913  see  www.crisistextline.org  - IF MAKING A CALL FEELS TOO HARD, send a text!         Again thank you for choosing [facility] M[facility] HE[facility] & ADDI[facility] and please let us know how we can best partner with you to improve you and your family's health.    You may be receiving a survey regarding this appointment. We would love to have your feedback, both positive and negative. The survey is done by an external company, so your answers are anonymous.

## 2021-04-27 NOTE — PROGRESS NOTES
"VIDEO VISIT  Josemanuel Edmond is a 76 year old patient who is being evaluated via a billable video visit.      The patient has been notified of following:   \"This video visit will be conducted via a call between you and your physician/provider. We have found that certain health care needs can be provided without the need for an in-person physical exam. This service lets us provide the care you need with a video conversation. If a prescription is necessary we can send it directly to your pharmacy. If lab work is needed we can place an order for that and you can then stop by our lab to have the test done at a later time. Insurers are generally covering virtual visits as they would in-office visits so billing should not be different than normal.  If for some reason you do get billed incorrectly, you should contact the billing office to correct it and that number is in the AVS .    Video Conference to be completed via:  [email or web address]    Patient has given verbal consent for video visit?:  Yes    Patient would prefer that any video invitations be sent by: Send to e-mail at: [email or web address]      How would patient like to obtain AVS?:  Ezequiel    AVS SmartPhrase [PsychAVS] has been placed in 'Patient Instructions':  Yes  "

## 2021-04-27 NOTE — PROGRESS NOTES
Start Time:  1142         End Time: 1206    Telemedicine Visit: The patient's condition can be safely assessed and treated via synchronous audio and visual telemedicine encounter.      Reason for Telemedicine Visit: Due to COVID 19 pandemic, clinic switching all appointments to telemedicine     Originating Site (Patient Location): Patient's home    Distant Site (Provider Location): Provider Remote Setting    Consent:  The patient/guardian has verbally consented to: the potential risks and benefits of telemedicine (video visit) versus in person care; bill my insurance or make self-payment for services provided; and responsibility for payment of non-covered services.     Mode of Communication:  Video Conference via [email or web address]    As the provider I attest to compliance with applicable laws and regulations related to telemedicine.    Psychiatry Clinic Progress Note                                                                  Patient Name: Josemanuel Edmond  YOB: 1945  MRN: [id number]  Date of Service:  4/27/2021  Last Seen:4/9/2021    Josemanuel Edmond is a 76 year old person assigned male at birth, identifies as cisgender male who uses the name Gavin and pronoun pat.       Gavin Edmond is a 76 year old year old adult who presents for ongoing psychiatric care.  Gavin Edmond was last seen on 4/9/2021.    At that time,     Medication Ordered/Consults/Labs/tests Ordered:      Medication:   -Change evening Gabapentin administration time to 30 min before bedtime for sleep  -Change evening Buspirone administration time from 7pm to 3pm to cover early evening anxiety  -Discontinue Lamotrigine.  Monitor changes in anxiety and/or mood.  -Continue all other medications for now.  OTC Recommendations: none  Lab Orders:  none  Referrals: none  Release of Information: none  Future Treatment Considerations: Per symptoms.   Return for Follow Up: in 3 weeks      Pertinent Background:  Reports depression started 2002  "after his son was killed in MVA.  Anxiety also started around the same time, but social anxiety started 6-7 years ago.  Denies any hx of SI, SIB, HI, psych hospitalization.  After son was killed, pt had heavy ETOH use on and off. Psych critical item history includes [no critical items].      Previous Psychiatric Meds: Wellbutrin XL, Xanax, Klonopin, Valium, Ativan, Remeron (30 mg only, worked well for sleep and anxiety, but weight gain), Nortriptyline, Vistaril (dry mouth, nightmares), Remeron (oversedation at 45 mg, 22.5mg and 15 mg and wt gain), Trazodone (oversedate at 50 mg), Ambien (effective for sleep, fall), Belsomra not covered with insurance.    Interim History                                                                                                        4, 4     Since the last visit,  -Had some \"really good days and just go down.\"  Notes having really good day where his mood and anxiety are well managed during daytime and next day has depression.  Notes x1-2/week of good day.  Denies SI, SIB or HI.  Has not noted exacerbation of mood since discontinued Lamictal.  -thinks depression not well managed is partly because he is still not sleeping well.  Continues to wakes up 4:30/5am and cannot gall back to sleep while going to bed 11:30pm and falls asleep easily.  Taking OTC Unisom and wondering if taking additional dose when he wakes up too early would be helpful.  -Adjusted HS Gabapentin to 30 min prior to HS.  -Evening anxiety is better since adjusted Buspar 2nd dose.    Denies any symptoms suggestive of hypomania or psychosis.    Current Suicidality/Hx of Suicide Attempts: Denies both  CoCominent Medical concerns: Denies    Medication Side Effects: The patient denies all medication side effects.      Medical Review of Systems     Apart from the symptoms mentioned int he HPI, the 14 point review of systems, including constitutional, HEENT, cardiovascular, respiratory, gastrointestinal, genitourinary, " musculoskeletal, integumentary, endocrine, neurological, hematologic and allergic is entirely negative.    Substance Use   Pt has been staying substance free since last seen.  Notes has not used any ETOH x 3 years.    Social/ Family History                                  [per patient report]                                 1ea,1ea   Living arrangements: living with spouse and feels safe  Social Support: spouse  Access to gun: owns a gun  Retired in 2008.  Trauma hx includes loss of son by MVA in 2002.    Allergy                                Bactrim [sulfamethoxazole w/trimethoprim]    Current Medications                                                                                                       Current Outpatient Medications   Medication Sig Dispense Refill     acetaminophen (TYLENOL) 500 MG tablet Take 1,000 mg by mouth every 8 hours as needed for mild pain       ARIPiprazole (ABILIFY) 2 MG tablet Take 1 tablet (2 mg) by mouth daily 30 tablet 1     buPROPion (WELLBUTRIN SR) 150 MG 12 hr tablet Take 1 tablet (150 mg) by mouth 2 times daily 180 tablet 0     busPIRone HCl (BUSPAR) 30 MG tablet Take 1 tablet (30 mg) by mouth 2 times daily 180 tablet 0     doxazosin (CARDURA) 4 MG tablet TAKE 1 TABLET DAILY (NO FURTHER REFILLS. NEED APPOINTMENT) 30 tablet 0     finasteride (PROSCAR) 5 MG tablet Take 1 tablet (5 mg) by mouth daily 30 tablet 0     FLUoxetine (PROZAC) 10 MG tablet Take 1 tablet (10 mg) by mouth daily 30 tablet 1     gabapentin (NEURONTIN) 600 MG tablet Take 1.5 tab in AM and bedtime and take 1 tab afternoon. 360 tablet 0     metoprolol tartrate (LOPRESSOR) 25 MG tablet TAKE 1 TABLET TWICE A DAY (NO FURTHER REFILLS. NEED APPOINTMENT) 60 tablet 0     STATIN NOT PRESCRIBED, INTENTIONAL, Please choose reason not prescribed, below       XARELTO ANTICOAGULANT 20 MG TABS tablet TAKE 1 TABLET DAILY WITH   DINNER 90 tablet 3        Mental Status Exam                                                      "                              9, 14 cog        Alertness: alert  and oriented  Appearance:  Casually dressed and Adequately groomed  Behavior/Demeanor: cooperative, pleasant and calm, with good  eye contact   Speech: regular rate and rhythm  Mood :  \"better some days and not good some days\"  Affect: mostly full range; was congruent to mood; was congruent to content  Thought Process (Associations):  Linear and Goal directed  Thought process (Rate):  Normal  Thought content:  no overt psychosis, denies suicidal ideation, intent or thoughts and patient does not appear to be responding to internal stimuli  Perception:  Reports none;  Denies depersonalization and derealization  Attention/Concentration:  Normal and Fair  Memory:  Immediate recall intact, Short-term memory intact and Long-term memory intact  Language: intact  Fund of Knowledge/Intelligence:  Average  Abstraction:  Normal  Insight:  Good and Fair  Judgment:  Good and Fair  Cognition: (6) does  appear grossly intact; formal cognitive testing was not done    Physical Exam     Motor activity/EPS:  Normal  Psychomotor: normal or unremarkable    Labs and Results      Pertinent findings on review include: Review of records with relevant information reported in the HPI.  Reviewed pt's past medical record and obtained collateral information.      [place] PRESCRIPTION MONITORING PROGRAM [] was checked today:  indicates Gabapentin 4/12.    PHQ9 Today:  N/A  PHQ 11/15/2018 2/26/2019 12/10/2019   PHQ-9 Total Score 9 3 4   Q9: Thoughts of better off dead/self-harm past 2 weeks Not at all Not at all Not at all       CAROLYNN 7 Today: N/A  CAROLYNN-7 SCORE 11/15/2018 2/26/2019 12/10/2019   Total Score - - -   Total Score 6 5 2       Recent Labs   Lab Test 09/04/19  1020 09/14/18  1629 05/07/18  1325   CR 0.81 0.93 1.07   GFRESTIMATED 86 79 67     Recent Labs   Lab Test 01/22/18  1354 12/20/17  1315   AST 15 24   ALT 32 17   ALKPHOS 60 66     PSYCHOTROPIC DRUG " "INTERACTIONS:   Abilify---Buspar---Gabapentin: Concurrent use of GABAPENTIN and CNS DEPRESSANTS may result in respiratory depression.    Prozac---Xarelto: Concurrent use of RIVAROXABAN and SEROTONIN NOREPINEPHRINE REUPTAKE INHIBITORS AND SSRIS may result in increased risk of bleeding  Prozac---Wellbutrin: Concurrent use of BUPROPION and SELECTED SEIZURE THRESHOLD LOWERING CYP2D6 SUBSTRATES may result in increased exposure of CYP2D6 substrates; increased risk of seizure.   Prozac---Buspar: Concurrent use of FLUOXETINE and BUSPIRONE may result in worsening of psychiatric symptoms.   Abilify---Prozac: Concurrent use of ARIPIPRAZOLE and STRONG CYP2D6 INHIBITORS WITH QT-INTERVAL PROLONGATION may result in increased exposure of aripiprazole and increased risk of QT prolongation or torsades de pointes.   Abilify---Buspar: Concurrent use of ARIPIPRAZOLE and STRONG CYP2D6 INHIBITORS may result in increased exposure of aripiprazole  Abilify---Wellbutrin: Concurrent use of ARIPIPRAZOLE and STRONG CYP2D6 INHIBITORS may result in increased exposure of aripiprazole.      MANAGEMENT:  Monitoring for adverse effects, routine vitals and patient is aware of risks    Impression/Assessment      Gavin Edmond is a 76 year old adult  who presents for med management follow up.  Pt appears mostly full range, not anxious, denies SI, SI or HI during the appointment.  Pt notes fluctuation from \"very good day\" to depression from day to day with some sundown like depression.  Pt thinks part of the reason is that he is still not sleeping well.  Reviewed previous medication trials, insurance not covering Belsomra and discussed at this time to have sleep medicine consult.  But pt only wants it to closer to his home.  Sleep medicine consult ordered for pt to see Dr Ranjith Yanez.  Referral information given. For the meantime, ok to take OTC Unisom when he wakes up 4/5am while monitoring for oversedation for next day.  Will continue all other " medications for now.     Diagnosis                                                                    Social Anxiety disorder  MDD  Terminal insomnia    Treatment Recommendation & Plan       Medication Ordered/Consults/Labs/tests Ordered:     Medication: Continue on current medication regimen.  OTC Recommendations: May take over the counter Unisom half dose when you wake up in 4-5AM and cannot return to sleep.  Monitor oversedation.  Lab Orders:  none  Referrals: sleep medicine consult  Release of Information: none  Future Treatment Considerations: Per symptoms.   Return for Follow Up: in 3 weeks (soonest availability)    -Discussed safety plan for suicidal thoughts  -Discussed plan for suicidality  -Discussed available emergency services  -Patient agrees with the treatment plan  -Encouraged to continue outpatient therapy to gain more coping mechanism for stress.    Treatment Risk Statement: Discussed with the patient my impressions, as well as recommended studies. I educated patient on the differential diagnosis and prognosis. I discussed with the patient the risks and benefits of medications versus no interventions, including efficacy, dose, possible side effects and length of treatment and the importance of medication compliance.  The patient understands the risks, benefits, adverse effects and alternatives. Agrees to treatment with the capacity to do so. No medical contraindications to treatment. The patient also understands the risks of using street drugs or alcohol. I also discussed the potential metabolic side effects of antipsychotics including weight gain, diabetes and lipid abnormalities, risk of tardive dyskinesia and indicates understanding of this and agrees to regular medical monitoring      CRISIS NUMBERS:   Provided routinely in AVS.    41 minutes spent on the date of the encounter doing chart review, history and exam, documentation and further activities per the note      Rylee Kyle CNP,   4/27/2021

## 2021-04-27 NOTE — TELEPHONE ENCOUNTER
Filled Rx for 1 month. Patient is due for annual office visit, please notify. Thank you. KAYLA Thomas CNP

## 2021-05-07 ENCOUNTER — TELEPHONE (OUTPATIENT)
Dept: FAMILY MEDICINE | Facility: CLINIC | Age: 76
End: 2021-05-07

## 2021-05-07 DIAGNOSIS — I48.20 CHRONIC ATRIAL FIBRILLATION (H): ICD-10-CM

## 2021-05-07 DIAGNOSIS — N13.8 HYPERTROPHY OF PROSTATE WITH URINARY OBSTRUCTION: ICD-10-CM

## 2021-05-07 DIAGNOSIS — F32.1 MODERATE MAJOR DEPRESSION (H): ICD-10-CM

## 2021-05-07 DIAGNOSIS — N40.1 HYPERTROPHY OF PROSTATE WITH URINARY OBSTRUCTION: ICD-10-CM

## 2021-05-11 RX ORDER — DOXAZOSIN 4 MG/1
TABLET ORAL
Qty: 30 TABLET | Refills: 0 | Status: SHIPPED | OUTPATIENT
Start: 2021-05-11 | End: 2021-06-03

## 2021-05-11 RX ORDER — LAMOTRIGINE 100 MG/1
TABLET ORAL
Qty: 180 TABLET | Refills: 3 | OUTPATIENT
Start: 2021-05-11

## 2021-05-11 RX ORDER — METOPROLOL TARTRATE 25 MG/1
TABLET, FILM COATED ORAL
Qty: 60 TABLET | Refills: 0 | Status: SHIPPED | OUTPATIENT
Start: 2021-05-11 | End: 2021-06-03

## 2021-05-11 NOTE — TELEPHONE ENCOUNTER
"Requested Prescriptions   Pending Prescriptions Disp Refills     doxazosin (CARDURA) 4 MG tablet [Pharmacy Med Name: DOXAZOSIN MESYLATE TABS 4MG] 30 tablet 11     Sig: TAKE 1 TABLET DAILY (NO FURTHER REFILLS. NEED APPOINTMENT)       Antiadrenergic Antihypertensives Failed - 5/7/2021  9:56 AM        Failed - Normal serum creatinine on file in past 12 months     Recent Labs   Lab Test 09/04/19  1020   CR 0.81       Ok to refill medication if creatinine is low          Failed - Recent (6 mo) or future (30 days) visit within the authorizing provider's specialty     Patient had office visit in the last 6 months or has a visit in the next 30 days with authorizing provider or within the authorizing provider's specialty.  See \"Patient Info\" tab in inbasket, or \"Choose Columns\" in Meds & Orders section of the refill encounter.            Passed - Blood pressure less than 140/90 in past 6 months     BP Readings from Last 3 Encounters:   02/17/21 114/68   01/06/21 132/70   02/27/20 106/72                 Passed - Medication is active on med list        Passed - Patient is age 18 or older       Alpha Blockers Passed - 5/7/2021  9:56 AM        Passed - Blood pressure under 140/90 in past 12 months     BP Readings from Last 3 Encounters:   02/17/21 114/68   01/06/21 132/70   02/27/20 106/72                 Passed - Recent (12 mo) or future (30 days) visit within the authorizing provider's specialty     Patient has had an office visit with the authorizing provider or a provider within the authorizing providers department within the previous 12 mos or has a future within next 30 days. See \"Patient Info\" tab in inbasket, or \"Choose Columns\" in Meds & Orders section of the refill encounter.              Passed - Patient does not have Tadalafil, Vardenafil, or Sildenafil on their medication list        Passed - Medication is active on med list        Passed - Patient is 18 years of age or older           metoprolol tartrate " "(LOPRESSOR) 25 MG tablet [Pharmacy Med Name: METOPROLOL TARTRATE TABS 25MG] 60 tablet 11     Sig: TAKE 1 TABLET TWICE A DAY (NO FURTHER REFILLS, NEED APPOINTMENT)       Beta-Blockers Protocol Passed - 5/7/2021  9:56 AM        Passed - Blood pressure under 140/90 in past 12 months     BP Readings from Last 3 Encounters:   02/17/21 114/68   01/06/21 132/70   02/27/20 106/72                 Passed - Patient is age 6 or older        Passed - Recent (12 mo) or future (30 days) visit within the authorizing provider's specialty     Patient has had an office visit with the authorizing provider or a provider within the authorizing providers department within the previous 12 mos or has a future within next 30 days. See \"Patient Info\" tab in inbasket, or \"Choose Columns\" in Meds & Orders section of the refill encounter.              Passed - Medication is active on med list         '  "

## 2021-05-14 ENCOUNTER — TELEPHONE (OUTPATIENT)
Dept: PSYCHIATRY | Facility: CLINIC | Age: 76
End: 2021-05-14

## 2021-05-14 ENCOUNTER — VIRTUAL VISIT (OUTPATIENT)
Dept: PSYCHIATRY | Facility: CLINIC | Age: 76
End: 2021-05-14
Attending: NURSE PRACTITIONER
Payer: COMMERCIAL

## 2021-05-14 DIAGNOSIS — F41.9 ANXIETY: ICD-10-CM

## 2021-05-14 DIAGNOSIS — F32.1 MODERATE MAJOR DEPRESSION (H): ICD-10-CM

## 2021-05-14 DIAGNOSIS — F40.10 SOCIAL ANXIETY DISORDER: Primary | ICD-10-CM

## 2021-05-14 PROCEDURE — 99215 OFFICE O/P EST HI 40 MIN: CPT | Mod: GT | Performed by: NURSE PRACTITIONER

## 2021-05-14 RX ORDER — BUPROPION HYDROCHLORIDE 150 MG/1
150 TABLET, EXTENDED RELEASE ORAL 2 TIMES DAILY
Qty: 180 TABLET | Refills: 0 | Status: SHIPPED | OUTPATIENT
Start: 2021-05-14 | End: 2021-07-15

## 2021-05-14 NOTE — PROGRESS NOTES
Start Time:  1030         End Time: 1056    Telemedicine Visit: The patient's condition can be safely assessed and treated via synchronous audio and visual telemedicine encounter.      Reason for Telemedicine Visit: Due to COVID 19 pandemic, clinic switching all appointments to telemedicine     Originating Site (Patient Location): Patient's home    Distant Site (Provider Location): Provider Remote Setting    Consent:  The patient/guardian has verbally consented to: the potential risks and benefits of telemedicine (video visit) versus in person care; bill my insurance or make self-payment for services provided; and responsibility for payment of non-covered services.     Mode of Communication:  Video Conference via Doxy.me    As the provider I attest to compliance with applicable laws and regulations related to telemedicine.    Psychiatry Clinic Progress Note                                                                  Patient Name: Josemanuel Edmond  YOB: 1945  MRN: [id number]  Date of Service:  5/14/2021  Last Seen:4/27/2021    Josemanuel Edmond is a 76 year old person assigned male at birth, identifies as cisgender male who uses the name Gavin and pronoun pat.       Gavin Edmond is a 76 year old year old adult who presents for ongoing psychiatric care.  Gavin Edmond was last seen on 4/27/2021.    At that time,     Medication Ordered/Consults/Labs/tests Ordered:      Medication: Continue on current medication regimen.  OTC Recommendations: May take over the counter Unisom half dose when you wake up in 4-5AM and cannot return to sleep.  Monitor oversedation.  Lab Orders:  none  Referrals: sleep medicine consult  Release of Information: none  Future Treatment Considerations: Per symptoms.   Return for Follow Up: in 3 weeks (soonest availability)    Pertinent Background:  Reports depression started 2002 after his son was killed in MVA.  Anxiety also started around the same time, but social anxiety  started 6-7 years ago.  Denies any hx of SI, SIB, HI, psych hospitalization.  After son was killed, pt had heavy ETOH use on and off. Psych critical item history includes [no critical items].      Previous Psychiatric Meds: Wellbutrin XL, Xanax, Klonopin, Valium, Ativan, Remeron (30 mg only, worked well for sleep and anxiety, but weight gain), Nortriptyline, Vistaril (dry mouth, nightmares), Remeron (oversedation at 45 mg, 22.5mg and 15 mg and wt gain), Trazodone (oversedate at 50 mg), Ambien (effective for sleep, fall), Belsomra not covered with insurance.    Interim History                                                                                                        4, 4     On 5/12/2021, pt requested refill of Lamictal as he is taking 50 mg BID.  Instructed pt to discontinue the medication as this was discontinued on 4/9/2021 and pt reported he was doing well without the medication on 4/27/2021.    Since the last visit,  -Stopped taking Lacmital couple days ago, has not noted any changes in mood or anxiety.  -Has been taking Gabapentin [id number] mg for 2 weeks as he thought this worked better for anxiety.  This was not changed by any prescriber.  -Notes anxiety is mostly social anxiety.  For example, pt has [employer] pancake breakfast this weekend and has been anxious.  Does not think baseline anxiety is high.  -Notes has been only sleeping 5 hours, waking up at 3:30am, changes location, has not been to go back to sleep well.  Found a sleep medicine nearby him, is going to call this afternoon to make an appointment.  -Also notes depression may be also not well managed and maybe this is the reason he is feeling anxious.  -Finally open to therapy as he realized there's no magical medication that fixes anxiety immediately.    Denies any symptoms suggestive of hypomania or psychosis.    Current Suicidality/Hx of Suicide Attempts: Denies both  CoCominent Medical concerns: Denies    Medication Side  Effects: The patient denies all medication side effects.      Medical Review of Systems     Apart from the symptoms mentioned int he HPI, the 14 point review of systems, including constitutional, HEENT, cardiovascular, respiratory, gastrointestinal, genitourinary, musculoskeletal, integumentary, endocrine, neurological, hematologic and allergic is entirely negative.    Substance Use   Pt has been staying substance free since last seen.  Notes has not used any ETOH x 3 years.    Social/ Family History                                  [per patient report]                                 1ea,1ea   Living arrangements: living with spouse and feels safe  Social Support: spouse  Access to gun: owns a gun  Retired in 2008.  Trauma hx includes loss of son by MVA in 2002.    Allergy                                Bactrim [sulfamethoxazole w/trimethoprim]    Current Medications                                                                                                       Current Outpatient Medications   Medication Sig Dispense Refill     acetaminophen (TYLENOL) 500 MG tablet Take 1,000 mg by mouth every 8 hours as needed for mild pain       ARIPiprazole (ABILIFY) 2 MG tablet Take 1 tablet (2 mg) by mouth daily 90 tablet 0     buPROPion (WELLBUTRIN SR) 150 MG 12 hr tablet Take 1 tablet (150 mg) by mouth 2 times daily 180 tablet 0     busPIRone HCl (BUSPAR) 30 MG tablet Take 1 tablet (30 mg) by mouth 2 times daily 180 tablet 0     doxazosin (CARDURA) 4 MG tablet TAKE 1 TABLET DAILY (NO FURTHER REFILLS. NEED APPOINTMENT) 30 tablet 0     finasteride (PROSCAR) 5 MG tablet Take 1 tablet (5 mg) by mouth daily 30 tablet 0     FLUoxetine (PROZAC) 10 MG tablet Take 1 tablet (10 mg) by mouth daily 30 tablet 1     gabapentin (NEURONTIN) 600 MG tablet Take 1.5 tab in AM and bedtime and take 1 tab afternoon. 360 tablet 0     metoprolol tartrate (LOPRESSOR) 25 MG tablet TAKE 1 TABLET TWICE A DAY (NO FURTHER REFILLS, NEED APPOINTMENT)  "60 tablet 0     STATIN NOT PRESCRIBED, INTENTIONAL, Please choose reason not prescribed, below       XARELTO ANTICOAGULANT 20 MG TABS tablet TAKE 1 TABLET DAILY WITH   DINNER 90 tablet 3        Mental Status Exam                                                                                   9, 14 cog        Alertness: alert  and oriented  Appearance:  Casually dressed and Adequately groomed  Behavior/Demeanor: cooperative, pleasant and calm, with good  eye contact   Speech: regular rate and rhythm  Mood :  \"maybe depressed than anxious, I don't know\"  Affect: mostly full range; was congruent to mood; was congruent to content  Thought Process (Associations):  Linear and Goal directed  Thought process (Rate):  Normal  Thought content:  no overt psychosis, denies suicidal ideation, intent or thoughts and patient does not appear to be responding to internal stimuli  Perception:  Reports none;  Denies auditory hallucinations and visual hallucinations  Attention/Concentration:  Fair  Memory:  Immediate recall intact  Language: intact  Fund of Knowledge/Intelligence:  Average  Abstraction:  [place]  Insight:  Fair  Judgment:  Fair  Cognition: (6) does  appear grossly intact; formal cognitive testing was not done    Physical Exam     Motor activity/EPS:  Normal  Psychomotor: normal or unremarkable    Labs and Results      Pertinent findings on review include: Review of records with relevant information reported in the HPI.  Reviewed pt's past medical record and obtained collateral information.      [place] PRESCRIPTION MONITORING PROGRAM [] was checked today:  indicates Gabapentin 4/12.    PHQ9 Today:  N/A  PHQ 11/15/2018 2/26/2019 12/10/2019   PHQ-9 Total Score 9 3 4   Q9: Thoughts of better off dead/self-harm past 2 weeks Not at all Not at all Not at all       CAROLYNN 7 Today: N/A  CAROLYNN-7 SCORE 11/15/2018 2/26/2019 12/10/2019   Total Score - - -   Total Score 6 5 2       Recent Labs   Lab Test 09/04/19  1020 09/14/18  1629 " 05/07/18  1325   CR 0.81 0.93 1.07   GFRESTIMATED 86 79 67     Recent Labs   Lab Test 01/22/18  1354 12/20/17  1315   AST 15 24   ALT 32 17   ALKPHOS 60 66     PSYCHOTROPIC DRUG INTERACTIONS:   Abilify---Buspar---Gabapentin: Concurrent use of GABAPENTIN and CNS DEPRESSANTS may result in respiratory depression.    Prozac---Xarelto: Concurrent use of RIVAROXABAN and SEROTONIN NOREPINEPHRINE REUPTAKE INHIBITORS AND SSRIS may result in increased risk of bleeding  Prozac---Wellbutrin: Concurrent use of BUPROPION and SELECTED SEIZURE THRESHOLD LOWERING CYP2D6 SUBSTRATES may result in increased exposure of CYP2D6 substrates; increased risk of seizure.   Prozac---Buspar: Concurrent use of FLUOXETINE and BUSPIRONE may result in worsening of psychiatric symptoms.   Abilify---Prozac: Concurrent use of ARIPIPRAZOLE and STRONG CYP2D6 INHIBITORS WITH QT-INTERVAL PROLONGATION may result in increased exposure of aripiprazole and increased risk of QT prolongation or torsades de pointes.   Abilify---Buspar: Concurrent use of ARIPIPRAZOLE and STRONG CYP2D6 INHIBITORS may result in increased exposure of aripiprazole  Abilify---Wellbutrin: Concurrent use of ARIPIPRAZOLE and STRONG CYP2D6 INHIBITORS may result in increased exposure of aripiprazole.      MANAGEMENT:  Monitoring for adverse effects, routine vitals and patient is aware of risks    Impression/Assessment      Gavin Edmond is a 76 year old adult  who presents for med management follow up.  Pt appears mostly stable in his mood and anxiety, denies SI, SIB or HI during the appointment.  However, he has been taking more than prescribed dose of Gabapentin.  Discussed this is not safe and recommended to taper down Gabapentin.  Pt agreed with reducing to [phone] mg x 3 days and 900 mg TID x3 days and 900-600-900 mg.  Will continue all other medications for now to make only one change at a time. Discussed since pt did not tolerate higher than Prozac 10 mg daily due to  anxiety, in the future, likely discontinue Prozac.  May further increase Abilify and taper off Wellbutrin if his mood and anxiety improve with Abilify taper in the future.      Since pt is finally agreeing with therapy, pt requested information on therapist on [facility] and/or [place].  Pt also reported he had one therapist he liked in [place].  Also recommended to contact her as well.    Diagnosis                                                                    Social Anxiety disorder  MDD    Treatment Recommendation & Plan       Medication Ordered/Consults/Labs/tests Ordered:     Medication:   -Decrease Gabapentin to [phone] mg x 3 days, then  Decrease further to 900-900-900 mg x 3 days, then  Decrease further to 900-600-900 mg while monitoring anxiety.  -Change 2nd dose of Bupriopion administration time to latest 3pm to help with sleep.  -Continue all other medications for now (do not restart Lamotrigine)  OTC Recommendations: none  Lab Orders:  none  Referrals:   -Follow up with making an appointment with sleep medicine  -Make an appointment with therapist.    [email or web address]  [facility] or [place] Therapist [phone]  Release of Information: none  Future Treatment Considerations: Per symptoms.  Taper off Prozac, increase Abilify, taper off Wellbutrin if Abilify improves his sxs.  Return for Follow Up: in 3 weeks    -Discussed safety plan for suicidal thoughts  -Discussed plan for suicidality  -Discussed available emergency services  -Patient agrees with the treatment plan  -Encouraged to continue outpatient therapy to gain more coping mechanism for stress.    Treatment Risk Statement: Discussed with the patient my impressions, as well as recommended studies. I educated patient on the differential diagnosis and prognosis. I discussed with the patient the risks and benefits of medications versus no interventions,  including efficacy, dose, possible side effects and length of treatment and the importance of medication compliance.  The patient understands the risks, benefits, adverse effects and alternatives. Agrees to treatment with the capacity to do so. No medical contraindications to treatment. The patient also understands the risks of using street drugs or alcohol. I also discussed the potential metabolic side effects of antipsychotics including weight gain, diabetes and lipid abnormalities, risk of tardive dyskinesia and indicates understanding of this and agrees to regular medical monitoring      CRISIS NUMBERS:   Provided routinely in AVS.      41 minutes spent on the date of the encounter doing chart review, history and exam, documentation and further activities per the note      Rylee Kyle, MONICA,  5/14/2021

## 2021-05-14 NOTE — TELEPHONE ENCOUNTER
On May 14, 2021, at 10:03 AM, writer called patient at [phone] to confirm Virtual Visit. Writer unable to make contact with patient. Writer left detailed voice message for call back. [phone] left as call back number. Rasheeda Handy, [facility]

## 2021-05-14 NOTE — PATIENT INSTRUCTIONS
-Decrease Gabapentin to [phone] mg x 3 days, then  Decrease further to 900-900-900 mg x 3 days, then  Decrease further to 900-600-900 mg while monitoring anxiety.  -Change 2nd dose of Bupriopion administration time to latest 3pm to help with sleep.  -Continue all other medications for now (do not restart Lamotrigine)    -Follow up with making an appointment with sleep medicine  -Make an appointment with therapist.    [email or web address]  [facility] or [place] Therapist [phone]    Your next appointment is scheduled on 6/3/2021 (Thu) at 9am.    To access your telemedicine visit:     Open a web browser, like Spare to Share, and type [email or web address]     You will see a box asking you to check in to let Rylee Kyle know that you are here.     Type in your name and press Check In. That will let Rylee see you in the virtual waiting room. At your scheduled appointment time, your provider will initiate the visit and connect you.     When your visit is done, you can simply close the browser window.        Please Note:  Ideally, you will connect from a desktop, laptop, or tablet with a WiFi connection. Your computer/tablet must have a camera and microphone. You can use a cell phone, if it has a camera, and if you can connect to WiFi. However, if you connect your phone over a cellular network, it is of lower quality and less reliable.    Thank you for coming to the [facility] MENTAL HEALTH & ADDICTION [place] CLINIC.    Lab Testing:  If you had lab testing today and your results are reassuring or normal they will be mailed to you or sent through Cellrox within 7 days. If the lab tests need quick action we will call you with the results. The phone number we will call with results is # [phone] (home) . If this is not the best number please call our clinic and change the number.    Medication Refills:  If you need any refills please call  your pharmacy and they will contact us. Our fax number for refills is [phone]. Please allow three business for refill processing. If you need to  your refill at a new pharmacy, please contact the new pharmacy directly. The new pharmacy will help you get your medications transferred.     Scheduling:  If you have any concerns about today's visit or wish to schedule another appointment please call our office during normal business hours [phone] (8-5:00 M-F)    Contact Us:  Please call [phone] during business hours (8-5:00 M-F).  If after clinic hours, or on the weekend, please call  [phone].    Financial Assistance [phone]  [facility] Billing [phone]  [place] Billing Office, [facility]: [phone]  [place] Billing [phone]  Medical Records [phone]      MENTAL HEALTH CRISIS NUMBERS:  For a medical emergency please call  911 or go to the nearest ER.     [facility]:   [facility] -[phone]   Crisis Residence [facility] Residence -[phone]   Walk-In Counseling [facility] -[phone]   COPE 24/7 [place] Mobile Team -[phone] (adults)/[phone] (child)  CHILD: [facility] needs assessment team - [phone]      [facility]:   [facility] - [phone]   Walk-in counseling [facility] - [phone]   Walk-in counseling [facility] - [phone]   Crisis Residence [facility] Residence - [phone]  Urgent Care Adult Mental [place]-[phone] mobile unit/ 24/7 crisis line    National Crisis Numbers:   National Suicide Prevention Lifeline: 1-501-094-TALK (204-753-5036)  Poison Control Center - 1-652.334.2113  [employer]/resources for a list of additional resources (SOS)  Trans Lifeline a hotline for transgender people 4-603-547-9786  The Armando Project a hotline for LGBT youth 5-375-183-0506  Crisis Text Line: For any crisis 24/7   To: 266061  see  www.crisistextline.org  - IF MAKING A CALL FEELS TOO HARD, send a text!         Again thank you for choosing [facility] MENTAL HEALTH & ADDICTION [facility] and please let us know how we can best partner with you to improve you and your family's health.    You may be receiving a survey regarding this appointment. We would love to have your feedback, both positive and negative. The survey is done by an external company, so your answers are anonymous.

## 2021-05-17 ENCOUNTER — TELEPHONE (OUTPATIENT)
Dept: NURSING | Facility: CLINIC | Age: 76
End: 2021-05-17

## 2021-05-17 DIAGNOSIS — N40.1 BENIGN PROSTATIC HYPERPLASIA WITH WEAK URINARY STREAM: ICD-10-CM

## 2021-05-17 DIAGNOSIS — R39.12 BENIGN PROSTATIC HYPERPLASIA WITH WEAK URINARY STREAM: ICD-10-CM

## 2021-05-17 RX ORDER — FINASTERIDE 5 MG/1
1 TABLET, FILM COATED ORAL DAILY
Qty: 30 TABLET | Refills: 0 | OUTPATIENT
Start: 2021-05-17

## 2021-05-17 RX ORDER — FINASTERIDE 5 MG/1
1 TABLET, FILM COATED ORAL DAILY
Qty: 30 TABLET | Refills: 0 | Status: SHIPPED | OUTPATIENT
Start: 2021-05-17 | End: 2021-06-03

## 2021-05-17 NOTE — TELEPHONE ENCOUNTER
Patient calls for a refill of Finasteride 5 mg tablet. Patient does have an appointment set up for June 3rd. He is completely out of medication and is requesting a refill to get him by until upcoming appointment. Pharmacy verified. Please advise.    Beatrice Vera RN  [facility]

## 2021-05-19 DIAGNOSIS — F32.1 MODERATE MAJOR DEPRESSION (H): Primary | ICD-10-CM

## 2021-05-19 RX ORDER — LAMOTRIGINE 25 MG/1
25 TABLET ORAL DAILY
Qty: 30 TABLET | Refills: 1 | Status: SHIPPED | OUTPATIENT
Start: 2021-05-19 | End: 2021-06-03

## 2021-06-03 ENCOUNTER — VIRTUAL VISIT (OUTPATIENT)
Dept: PSYCHIATRY | Facility: CLINIC | Age: 76
End: 2021-06-03
Attending: NURSE PRACTITIONER
Payer: COMMERCIAL

## 2021-06-03 ENCOUNTER — TELEPHONE (OUTPATIENT)
Dept: PSYCHIATRY | Facility: CLINIC | Age: 76
End: 2021-06-03

## 2021-06-03 ENCOUNTER — OFFICE VISIT (OUTPATIENT)
Dept: FAMILY MEDICINE | Facility: CLINIC | Age: 76
End: 2021-06-03
Payer: COMMERCIAL

## 2021-06-03 ENCOUNTER — TELEPHONE (OUTPATIENT)
Dept: FAMILY MEDICINE | Facility: CLINIC | Age: 76
End: 2021-06-03

## 2021-06-03 VITALS
OXYGEN SATURATION: 97 % | SYSTOLIC BLOOD PRESSURE: 134 MMHG | HEART RATE: 68 BPM | BODY MASS INDEX: 25.91 KG/M2 | TEMPERATURE: 98.1 F | RESPIRATION RATE: 18 BRPM | HEIGHT: 70 IN | WEIGHT: 181 LBS | DIASTOLIC BLOOD PRESSURE: 80 MMHG

## 2021-06-03 DIAGNOSIS — R39.12 BENIGN PROSTATIC HYPERPLASIA WITH WEAK URINARY STREAM: ICD-10-CM

## 2021-06-03 DIAGNOSIS — Z00.00 MEDICARE ANNUAL WELLNESS VISIT, SUBSEQUENT: Primary | ICD-10-CM

## 2021-06-03 DIAGNOSIS — F40.10 SOCIAL ANXIETY DISORDER: ICD-10-CM

## 2021-06-03 DIAGNOSIS — Z79.899 HIGH RISK MEDICATIONS (NOT ANTICOAGULANTS) LONG-TERM USE: ICD-10-CM

## 2021-06-03 DIAGNOSIS — F40.10 SOCIAL ANXIETY DISORDER: Primary | ICD-10-CM

## 2021-06-03 DIAGNOSIS — F10.21 ALCOHOLISM IN REMISSION (H): ICD-10-CM

## 2021-06-03 DIAGNOSIS — N13.8 HYPERTROPHY OF PROSTATE WITH URINARY OBSTRUCTION: ICD-10-CM

## 2021-06-03 DIAGNOSIS — N40.1 HYPERTROPHY OF PROSTATE WITH URINARY OBSTRUCTION: ICD-10-CM

## 2021-06-03 DIAGNOSIS — N40.1 BENIGN PROSTATIC HYPERPLASIA WITH WEAK URINARY STREAM: ICD-10-CM

## 2021-06-03 DIAGNOSIS — I48.20 CHRONIC ATRIAL FIBRILLATION (H): ICD-10-CM

## 2021-06-03 DIAGNOSIS — F32.1 MODERATE MAJOR DEPRESSION (H): ICD-10-CM

## 2021-06-03 LAB
ALBUMIN SERPL-MCNC: 3.9 G/DL (ref 3.4–5)
ALP SERPL-CCNC: 51 U/L (ref 40–150)
ALT SERPL W P-5'-P-CCNC: 23 U/L (ref 0–70)
ANION GAP SERPL CALCULATED.3IONS-SCNC: 5 MMOL/L (ref 3–14)
AST SERPL W P-5'-P-CCNC: 18 U/L (ref 0–45)
BILIRUB SERPL-MCNC: 0.6 MG/DL (ref 0.2–1.3)
BUN SERPL-MCNC: 14 MG/DL (ref 7–30)
CALCIUM SERPL-MCNC: 8.9 MG/DL (ref 8.5–10.1)
CHLORIDE SERPL-SCNC: 97 MMOL/L (ref 94–109)
CO2 SERPL-SCNC: 30 MMOL/L (ref 20–32)
CREAT SERPL-MCNC: 0.91 MG/DL (ref 0.66–1.25)
ERYTHROCYTE [DISTWIDTH] IN BLOOD BY AUTOMATED COUNT: 13.5 % (ref 10–15)
GFR SERPL CREATININE-BSD FRML MDRD: 80 ML/MIN/{1.73_M2}
GLUCOSE SERPL-MCNC: 96 MG/DL (ref 70–99)
HCT VFR BLD AUTO: 35.6 % (ref 40–53)
HGB BLD-MCNC: 12.3 G/DL (ref 13.3–17.7)
MCH RBC QN AUTO: 34 PG (ref 26.5–33)
MCHC RBC AUTO-ENTMCNC: 34.6 G/DL (ref 31.5–36.5)
MCV RBC AUTO: 98 FL (ref 78–100)
PLATELET # BLD AUTO: 184 10E9/L (ref 150–450)
POTASSIUM SERPL-SCNC: 4.5 MMOL/L (ref 3.4–5.3)
PROT SERPL-MCNC: 7.1 G/DL (ref 6.8–8.8)
RBC # BLD AUTO: 3.62 10E12/L (ref 4.4–5.9)
SODIUM SERPL-SCNC: 132 MMOL/L (ref 133–144)
WBC # BLD AUTO: 5.7 10E9/L (ref 4–11)

## 2021-06-03 PROCEDURE — 99214 OFFICE O/P EST MOD 30 MIN: CPT | Mod: 25 | Performed by: NURSE PRACTITIONER

## 2021-06-03 PROCEDURE — 85027 COMPLETE CBC AUTOMATED: CPT | Performed by: NURSE PRACTITIONER

## 2021-06-03 PROCEDURE — 36415 COLL VENOUS BLD VENIPUNCTURE: CPT | Performed by: NURSE PRACTITIONER

## 2021-06-03 PROCEDURE — 80053 COMPREHEN METABOLIC PANEL: CPT | Performed by: NURSE PRACTITIONER

## 2021-06-03 PROCEDURE — 99214 OFFICE O/P EST MOD 30 MIN: CPT | Mod: GT | Performed by: NURSE PRACTITIONER

## 2021-06-03 PROCEDURE — G0438 PPPS, INITIAL VISIT: HCPCS | Performed by: NURSE PRACTITIONER

## 2021-06-03 RX ORDER — FLUOXETINE 10 MG/1
10 TABLET, FILM COATED ORAL DAILY
COMMUNITY
Start: 2021-04-30 | End: 2021-07-02

## 2021-06-03 RX ORDER — FINASTERIDE 5 MG/1
1 TABLET, FILM COATED ORAL DAILY
Qty: 90 TABLET | Refills: 3 | Status: SHIPPED | OUTPATIENT
Start: 2021-06-03 | End: 2021-09-09

## 2021-06-03 RX ORDER — LAMOTRIGINE 25 MG/1
25 TABLET ORAL 2 TIMES DAILY
Qty: 180 TABLET | Refills: 0 | Status: SHIPPED | OUTPATIENT
Start: 2021-06-03 | End: 2021-08-05

## 2021-06-03 RX ORDER — DOXAZOSIN 4 MG/1
TABLET ORAL
Qty: 30 TABLET | Refills: 11 | OUTPATIENT
Start: 2021-06-03

## 2021-06-03 RX ORDER — METOPROLOL TARTRATE 25 MG/1
TABLET, FILM COATED ORAL
Qty: 180 TABLET | Refills: 3 | Status: SHIPPED | OUTPATIENT
Start: 2021-06-03 | End: 2021-10-29

## 2021-06-03 RX ORDER — DOXAZOSIN 8 MG/1
8 TABLET ORAL AT BEDTIME
Qty: 90 TABLET | Refills: 3 | Status: SHIPPED | OUTPATIENT
Start: 2021-06-03 | End: 2022-02-28

## 2021-06-03 RX ORDER — HYDROXYZINE HYDROCHLORIDE 50 MG/1
50 TABLET, FILM COATED ORAL 3 TIMES DAILY PRN
Qty: 90 TABLET | Refills: 1 | Status: SHIPPED | OUTPATIENT
Start: 2021-06-03 | End: 2021-07-01 | Stop reason: SINTOL

## 2021-06-03 RX ORDER — DOXAZOSIN 4 MG/1
TABLET ORAL
Qty: 90 TABLET | Refills: 3 | Status: SHIPPED | OUTPATIENT
Start: 2021-06-03 | End: 2021-06-03

## 2021-06-03 ASSESSMENT — ANXIETY QUESTIONNAIRES
GAD7 TOTAL SCORE: 2
7. FEELING AFRAID AS IF SOMETHING AWFUL MIGHT HAPPEN: NOT AT ALL
3. WORRYING TOO MUCH ABOUT DIFFERENT THINGS: NOT AT ALL
2. NOT BEING ABLE TO STOP OR CONTROL WORRYING: SEVERAL DAYS
6. BECOMING EASILY ANNOYED OR IRRITABLE: NOT AT ALL
5. BEING SO RESTLESS THAT IT IS HARD TO SIT STILL: NOT AT ALL
1. FEELING NERVOUS, ANXIOUS, OR ON EDGE: SEVERAL DAYS

## 2021-06-03 ASSESSMENT — PATIENT HEALTH QUESTIONNAIRE - PHQ9
SUM OF ALL RESPONSES TO PHQ QUESTIONS 1-9: 3
5. POOR APPETITE OR OVEREATING: NOT AT ALL

## 2021-06-03 ASSESSMENT — MIFFLIN-ST. JEOR: SCORE: 1547.26

## 2021-06-03 NOTE — TELEPHONE ENCOUNTER
Question set received. Placed in [facility] folder for completion.    Prior Authorization Retail Medication Request    Medication/Dose: hydrOXYzine (ATARAX) 50 MG tablet  ICD code (if different than what is on RX):  Social anxiety disorder [F40.10]   Previously Tried and Failed:    Rationale:      Insurance Name:  [employer]  Insurance ID: [id number]    Pharmacy Information (if different than what is on RX)  Name: [facility] - [address]  Phone:  [phone]

## 2021-06-03 NOTE — PATIENT INSTRUCTIONS
-Discontinue Prozac (fluoxetine).  Monitor changes in mood and anxiety  -Continue all other medications.  You can continue Lamotrigine 25 mg 2 times a day.    -Follow up with sleep medicine nearby you to see if they are covered under your insurance.  If they are not covered, contact  [phone] for sleep medicine consult.   There's Dr Ranjith Yanez at [facility] [email or web address]  -Follow up with your preferred therapist, if this is not financially feasible, you may also try [facility] or [place] Therapist [phone]    Your next appointment is scheduled on 7/1/2021 (Thu) at 10am.    To access your telemedicine visit:     Open a web browser, like [employer], and type [email or web address]     You will see a box asking you to check in to let Rylee Kyle know that you are here.     Type in your name and press Check In. That will let Rylee see you in the virtual waiting room. At your scheduled appointment time, your provider will initiate the visit and connect you.     When your visit is done, you can simply close the browser window.        Please Note:  Ideally, you will connect from a desktop, laptop, or tablet with a WiFi connection. Your computer/tablet must have a camera and microphone. You can use a cell phone, if it has a camera, and if you can connect to WiFi. However, if you connect your phone over a cellular network, it is of lower quality and less reliable.    Thank you for coming to the [facility] MENTAL HEALTH & ADDICTION [place] CLINIC.    Lab Testing:  If you had lab testing today and your results are reassuring or normal they will be mailed to you or sent through Rewardable within 7 days. If the lab tests need quick action we will call you with the results. The phone number we will call with results is # [phone] (home) . If this is not the best number please call our clinic and change the number.    Medication Refills:  If  you need any refills please call your pharmacy and they will contact us. Our fax number for refills is [phone]. Please allow three business for refill processing. If you need to  your refill at a new pharmacy, please contact the new pharmacy directly. The new pharmacy will help you get your medications transferred.     Scheduling:  If you have any concerns about today's visit or wish to schedule another appointment please call our office during normal business hours [phone] (8-5:00 M-F)    Contact Us:  Please call [phone] during business hours (8-5:00 M-F).  If after clinic hours, or on the weekend, please call  [phone].    Financial Assistance [phone]  [facility] Billing [phone]  [place] Billing Office, [facility]: [phone]  [place] Billing [phone]  Medical Records [phone]      MENTAL HEALTH CRISIS NUMBERS:  For a medical emergency please call  911 or go to the nearest ER.     [facility]:   [facility] -[phone]   Crisis Residence [facility] Residence -[phone]   Walk-In Counseling [facility] -[phone]   COPE 24/7 [place] Mobile Team -[phone] (adults)/[phone] (child)  CHILD: [facility] needs assessment team - [phone]      [facility]:   [facility] - [phone]   Walk-in counseling [facility] - [phone]   Walk-in counseling [facility] - [phone]   Crisis Residence [facility] Residence - [phone]  Urgent Care Adult Mental [place]-[phone] mobile unit/ 24/7 crisis line    National Crisis Numbers:   National Suicide Prevention Lifeline: 3-652-513-TALK (943-359-8485)  Poison Control Center - 1-733.562.9207  [employer]/resources for a list of additional resources (SOS)  Trans Lifeline a hotline for transgender people 2-236-134-0309  The Armando Project a hotline for LGBT youth 1-295.328.3023  Crisis Text Line: For any crisis 24/7   To:  303169  see www.crisistextline.org  - IF MAKING A CALL FEELS TOO HARD, send a text!         Again thank you for choosing [facility] MENTAL HEALTH & ADDICTION [facility] and please let us know how we can best partner with you to improve you and your family's health.    You may be receiving a survey regarding this appointment. We would love to have your feedback, both positive and negative. The survey is done by an external company, so your answers are anonymous.

## 2021-06-03 NOTE — PATIENT INSTRUCTIONS
Patient Education     Treating Anxiety Disorders with Therapy    If you have an anxiety disorder, you don t have to suffer needlessly. Treatment is available. Therapy (also called counseling) is often a helpful treatment for anxiety disorders. With therapy, a trained professional (therapist) helps you face and learn to manage your anxiety. Therapy can be short-term or long-term based on your needs. In some cases, medicine may also be prescribed with therapy. It may take time before you notice how much therapy is helping, but stick with it. With therapy, you can feel better.   Cognitive behavioral therapy (CBT)  Cognitive behavioral therapy (CBT) teaches you to manage anxiety. It does this by helping you understand how you think and act when you re anxious. Research has shown CBT to be a very effective treatment for anxiety disorders. CBT involves homework and activities to build skills that teach you to cope with anxiety step by step. It can be done in a group or 1-on-1, and often takes place for a set number of sessions. CBT has 2 main parts:     Cognitive therapy. This helps you identify the negative, irrational thoughts that occur with your anxiety. You ll learn to replace these with more positive, realistic thoughts.    Behavioral therapy. This helps you change how you react to anxiety. You ll learn coping skills and methods for relaxing to help you better deal with anxiety.  Other forms of therapy  Other therapy methods may work better for you than CBT. Or, you may move from CBT to another form of therapy as your treatment needs change. This may mean meeting with a therapist by yourself or in a group. Therapy can also help you work through problems in your life, such as drug or alcohol dependence, that may be making your anxiety worse.   Getting better takes time  Therapy will help you feel better and teach you skills to help manage anxiety long term. But change doesn t happen right away. It takes a  commitment from you. And treatment only works if you learn to face the causes of your anxiety. So, you might feel worse before you feel better. This can sometimes make it hard to stick with it. But remember: Therapy is a very effective treatment. The results will be well worth it.   Helping yourself  If anxiety is wearing you down, here are some things you can do to cope:    Check with your healthcare provider and rule out any physical problems that may be causing the anxiety symptoms.    If you are diagnosed with an anxiety disorder, seek mental healthcare. This is an illness and it can respond to treatment. Most types of anxiety disorders will respond to talk therapy and medicine.    Educate yourself about anxiety disorders. Keep track of helpful online resources and books you can use during stressful periods.    Try stress management methods such as meditation.    Consider online or in-person support groups.    Don t fight your feelings. Anxiety feeds itself. The more you worry about it, the worse it gets. Instead, try to identify what might have triggered your anxiety. Then try to put this threat in perspective.    Keep in mind that you can t control everything about a situation. Change what you can and let the rest take its course.    Exercise -- it s a great way to relieve tension and help your body feel relaxed.    Examine your life for stress, and try to find ways to reduce it.    Avoid caffeine and nicotine. These can make anxiety symptoms worse.    Don't turn to alcohol or unprescribed medicines for relief. They only make things worse in the long run.  [employer] last reviewed this educational content on 5/1/2020 2000-2021 [employer]. All rights reserved. This information is not intended as a substitute for professional medical care. Always follow your healthcare professional's instructions.           Patient Education     Treating Anxiety Disorders with Medicine  An anxiety disorder can  make you feel nervous or apprehensive, even without a clear reason. In people age 65 and older, generalized anxiety disorder is one of the most commonly diagnosed anxiety disorders. Many times it occurs with depression. Certain anxiety disorders can cause intense feelings of fear or panic. You may even have physical symptoms such as a racing heartbeat, sweating, or dizziness. If you have these feelings, you don t have to suffer anymore. Treatment to help you overcome your fears will likely include therapy (also called counseling). Medicine may also be prescribed to help control your symptoms.     Medicines  Certain medicines may be prescribed to help control your symptoms. So you may feel less anxious. You may also feel able to move forward with therapy. At first, medicines and dosages may need to be adjusted to find what works best for you. Try to be patient. Tell your healthcare provider how a medicine makes you feel. This way, you can work together to find the treatment that s best for you. Keep in mind that medicines can have side effects. Talk with your provider about any side effects that are bothering you. Changing the dose or type of medicine may help. Don t stop taking medicine on your own. That can cause symptoms to come back or cause dangerous withdrawal symptoms.     Anti-anxiety medicine. This medicine eases symptoms and helps you relax. Your healthcare provider will explain when and how to use it. It may be prescribed for use before situations that make you anxious. You may also be told to take medicine on a regular schedule. Anti-anxiety medicine may make you feel a little sleepy or  out of it.  Don t drive a car or operate machinery while on this medicine, until you know how it affects you.  Never use alcohol or other drugs with anti-anxiety medicines. This could result in loss of muscular control, sedation, coma, or death. Also, use only the amount of medicine prescribed for you. If you think you  may have taken too much, get emergency care right away. Never share your medicines with others. Store these medicines in a safe place that can't be reached by children or visitors.   Keep taking medicines as prescribed  Never change your dosage, share or use another person's medicine, or stop taking your medicines without talking to your healthcare provider first. Keep the following in mind:     Some medicines must be taken on a schedule. Make this part of your daily routine. For instance, always take your pill before brushing your teeth. A pillbox can help you remember if you ve taken your medicine each day.    Medicines are often taken for 6 to 12 months. Your healthcare provider will then evaluate whether you need to stay on them. Many people who have also had therapy may no longer need medicine to manage anxiety.    You may need to stop taking medicine slowly to give your body time to adjust. When it s time to stop, your healthcare provider will tell you more. Remember: Never stop taking your medicine without talking to your provider first.    If symptoms return, you may need to start taking medicines again.  This isn t your fault. It s just the nature of your anxiety disorder.  What to think about    Side effects. Medicines may cause side effects. Ask your healthcare provider or pharmacist what you can expect. They may have ideas for avoiding some side effects.    Sexual problems. Some antidepressants can affect your desire for sex or your ability to have an orgasm. A change in dosage or medicine often solves the problem. If you have a sexual side effect that concerns you, tell your healthcare provider.    Addiction. If you ve never had a problem with drugs or alcohol, you may not have a problem with medicines used to treat anxiety disorders. But always discuss the medicines with your healthcare provider before taking them. If you have a history of addiction, you may not be able to use certain medicines used to  treat anxiety disorders.    Medicine interactions. Always check with your pharmacist before using any over-the-counter medicines (OTCs), including herbal supplements. Some OTCs may interact with your anti-anxiety medicines and increase or decrease their effectiveness.    [employer] last reviewed this educational content on 12/1/2019 2000-2021 [employer]. All rights reserved. This information is not intended as a substitute for professional medical care. Always follow your healthcare professional's instructions.

## 2021-06-03 NOTE — TELEPHONE ENCOUNTER
On Marialuisa 3, 2021, at 9:01 AM, writer called patient at [phone] to confirm Virtual Visit. Writer unable to make contact with patient. Writer left detailed voice message for call back. [phone] left as call back number. Rasheeda Handy, [facility]

## 2021-06-03 NOTE — PROGRESS NOTES
[facility]          OUTPATIENT PHYSICAL THERAPY ORTHOPEDIC EVALUATION  PLAN OF TREATMENT FOR OUTPATIENT REHABILITATION  (COMPLETE FOR INITIAL CLAIMS ONLY)  Patient's Last Name, First Name, M.I.  YOB: 1945  Josemanuel Edmond    Provider s Name:  [facility]   Medical Record No.  [id number]   Start of Care Date:  04/22/21   Onset Date:  04/01/21   Type:     _X__PT   ___OT   ___SLP Medical Diagnosis:  L SI pain     PT Diagnosis:  L LBP   Visits from SOC:  1      _________________________________________________________________________________  Plan of Treatment/Functional Goals:  manual therapy, ROM, strengthening, stretching           Goals  Goal Identifier: 1  Goal Description: Pt will be able to sleep through the night without waking with pain.  Target Date: 05/20/21    Goal Identifier: 2  Goal Description: Pt will be able to lift heavy weights with < 2/10 pain.  Target Date: 06/03/21    Goal Identifier: 3  Goal Description: Pt will be able to sit to stand with < 2/10 pain.  Target Date: 06/17/21    Goal Identifier: 4  Goal Description: Pt will be independent with HEP for optimal functional recovery.  Target Date: 06/17/21                                                Therapy Frequency:  1 time/week  Predicted Duration of Therapy Intervention:  8 weeks    Jennifer Simons, PT                 I CERTIFY THE NEED FOR THESE SERVICES FURNISHED UNDER        THIS PLAN OF TREATMENT AND WHILE UNDER MY CARE .             Physician Signature               Date    X_____________________________________________________                             Certification Date From:  04/22/21   Certification Date To:  06/17/21    Referring Provider:  Dr Peralta    Initial Assessment        See Epic Evaluation Start of Care Date: 04/22/21

## 2021-06-03 NOTE — TELEPHONE ENCOUNTER
Central Prior Authorization Team   Phone: [phone]      PA Initiation    Medication: hydrOXYzine (ATARAX) 50 MG tablet  Insurance Company: [employer] - Phone [phone] Fax [phone]  Pharmacy Filling the Rx: [facility] - [address]  Filling Pharmacy Phone: [phone]  Filling Pharmacy Fax:    Start Date: 6/3/2021

## 2021-06-03 NOTE — PROGRESS NOTES
Start Time:  0930         End Time: 0948    Telemedicine Visit: The patient's condition can be safely assessed and treated via synchronous audio and visual telemedicine encounter.      Reason for Telemedicine Visit: Due to COVID 19 pandemic, clinic switching all appointments to telemedicine     Originating Site (Patient Location): Patient's home    Distant Site (Provider Location): Provider Remote Setting    Consent:  The patient/guardian has verbally consented to: the potential risks and benefits of telemedicine (video visit) versus in person care; bill my insurance or make self-payment for services provided; and responsibility for payment of non-covered services.     Mode of Communication:  Video Conference via [email or web address]    As the provider I attest to compliance with applicable laws and regulations related to telemedicine.    Psychiatry Clinic Progress Note                                                                  Patient Name: Josemanuel Edmond  YOB: 1945  MRN: [id number]  Date of Service:  6/3/2021  Last Seen:5/14/2021    Josemanuel Edmond is a 76 year old person assigned male at birth, identifies as cisgender male who uses the name Gavin and pronoun pat.       Gavin Edmond is a 76 year old year old adult who presents for ongoing psychiatric care.  Gavin Edmond was last seen on 5/14/2021.      At that time,     Medication Ordered/Consults/Labs/tests Ordered:      Medication:   -Decrease Gabapentin to [phone] mg x 3 days, then  Decrease further to 900-900-900 mg x 3 days, then  Decrease further to 900-600-900 mg while monitoring anxiety.  -Change 2nd dose of Bupriopion administration time to latest 3pm to help with sleep.  -Continue all other medications for now (do not restart Lamotrigine)  OTC Recommendations: none  Lab Orders:  none  Referrals:   -Follow up with making an appointment with sleep medicine  -Make an appointment with  therapist.     [email or web address]  [facility] or [place] [facility] [phone]  Release of Information: none  Future Treatment Considerations: Per symptoms.  Taper off Prozac, increase Abilify, taper off Wellbutrin if Abilify improves his sxs.  Return for Follow Up: in 3 weeks    Pertinent Background:  Reports depression started 2002 after his son was killed in MVA.  Anxiety also started around the same time, but social anxiety started 6-7 years ago.  Denies any hx of SI, SIB, HI, psych hospitalization.  After son was killed, pt had heavy ETOH use on and off. Psych critical item history includes [no critical items].      Previous Psychiatric Meds: Wellbutrin XL, Xanax, Klonopin, Valium, Ativan, Remeron (30 mg only, worked well for sleep and anxiety, but weight gain), Nortriptyline, Vistaril (dry mouth, nightmares), Remeron (oversedation at 45 mg, 22.5mg and 15 mg and wt gain), Trazodone (oversedate at 50 mg), Ambien (effective for sleep, fall), Belsomra not covered with insurance.    Interim History                                                                                                        4, 4     On 5/19/2021, pt contacted via [employer] noting exacerbated depression without Lamictal.  Restarted Lamictal 25 mg daily.    Since the last visit,  -Noted he has been taking Lamictal 25 mg BID, did not start from 25 mg daily.  Denies any rash or burn like sxs.  -Since restarted Lamictal, mood has been optimally managed.  -Taking Gabapentin 900-600-900 mg x2-3 weeks.  Some anxiety is present and notes when he was taking [phone] mg, his anxiety was better.  Notes anxiety fluctuates, some days anxiety are well managed, some days, not well managed.  -Saw one therapist he wanted to see near his residence, but found his insurance does not cover this service.  Therapist is open to seeing him with sliding scale, wondering if this is  sustainable.  -Also has not contacted sleep medicine clinic nearby as he is not sure if insurance covers their practice.  -Changed 2nd Wellbutrin dose to afternoon, sleep improved slightly only.  Going to bed 11pm, wakes up 4:30am, difficulties going back to bed, sleep on couch for about 1 hour after waking up.      Denies any symptoms suggestive of hypomania or psychosis.    Current Suicidality/Hx of Suicide Attempts: Denies both  CoCominent Medical concerns: Denies    Medication Side Effects: The patient denies all medication side effects.      Medical Review of Systems     Apart from the symptoms mentioned int he HPI, the 14 point review of systems, including constitutional, HEENT, cardiovascular, respiratory, gastrointestinal, genitourinary, musculoskeletal, integumentary, endocrine, neurological, hematologic and allergic is entirely negative.    Substance Use   Pt has been staying substance free since last seen.  Notes has not used any ETOH x 3 years.    Social/ Family History                                  [per patient report]                                 1ea,1ea   Living arrangements: living with spouse and feels safe  Social Support: spouse  Access to gun: owns a gun  Retired in 2008.  Trauma hx includes loss of son by MVA in 2002.    Allergy                                Bactrim [sulfamethoxazole w/trimethoprim]    Current Medications                                                                                                       Current Outpatient Medications   Medication Sig Dispense Refill     acetaminophen (TYLENOL) 500 MG tablet Take 1,000 mg by mouth every 8 hours as needed for mild pain       ARIPiprazole (ABILIFY) 2 MG tablet Take 1 tablet (2 mg) by mouth daily 90 tablet 0     buPROPion (WELLBUTRIN SR) 150 MG 12 hr tablet Take 1 tablet (150 mg) by mouth 2 times daily 180 tablet 0     busPIRone HCl (BUSPAR) 30 MG tablet Take 1 tablet (30 mg) by mouth 2 times daily 180 tablet 0      "doxazosin (CARDURA) 4 MG tablet TAKE 1 TABLET DAILY (NO FURTHER REFILLS. NEED APPOINTMENT) 30 tablet 0     finasteride (PROSCAR) 5 MG tablet Take 1 tablet (5 mg) by mouth daily 30 tablet 0     FLUoxetine (PROZAC) 10 MG tablet Take 1 tablet (10 mg) by mouth daily 30 tablet 1     gabapentin (NEURONTIN) 600 MG tablet Take 1.5 tab in AM and bedtime and take 1 tab afternoon. 360 tablet 0     lamoTRIgine (LAMICTAL) 25 MG tablet Take 1 tablet (25 mg) by mouth daily 30 tablet 1     metoprolol tartrate (LOPRESSOR) 25 MG tablet TAKE 1 TABLET TWICE A DAY (NO FURTHER REFILLS, NEED APPOINTMENT) 60 tablet 0     STATIN NOT PRESCRIBED, INTENTIONAL, Please choose reason not prescribed, below       XARELTO ANTICOAGULANT 20 MG TABS tablet TAKE 1 TABLET DAILY WITH   DINNER 90 tablet 3        Mental Status Exam                                                                                   9, 14 cog      Alertness: alert  and oriented  Appearance:  Casually dressed and Adequately groomed  Behavior/Demeanor: cooperative, pleasant and calm, with good  eye contact   Speech: regular rate and rhythm  Mood :  \"okay\"  Affect: mostly full range; was congruent to mood; was congruent to content  Thought Process (Associations):  Linear and Goal directed  Thought process (Rate):  Normal  Thought content:  no overt psychosis, denies suicidal ideation, intent or thoughts and patient does not appear to be responding to internal stimuli  Perception:  Reports none;  Denies auditory hallucinations, visual hallucinations, depersonalization and derealization  Attention/Concentration:  Fair  Memory:  Immediate recall intact  Language: intact  Fund of Knowledge/Intelligence:  Average  Abstraction:  [place]  Insight:  Adequate and Fair  Judgment:  Fair and Adequate for safety  Cognition: (6) does  appear grossly intact; formal cognitive testing was not done    Physical Exam     Motor activity/EPS:  Normal  Psychomotor: normal or unremarkable    Labs and " Results      Pertinent findings on review include: Review of records with relevant information reported in the HPI.  Reviewed pt's past medical record and obtained collateral information.      [place] PRESCRIPTION MONITORING PROGRAM [] was checked today:  indicates No refills since last seen.    PHQ9 Today:  N/A  PHQ 11/15/2018 2/26/2019 12/10/2019   PHQ-9 Total Score 9 3 4   Q9: Thoughts of better off dead/self-harm past 2 weeks Not at all Not at all Not at all       CAROLYNN 7 Today: N/A  CAROLYNN-7 SCORE 11/15/2018 2/26/2019 12/10/2019   Total Score - - -   Total Score 6 5 2       Recent Labs   Lab Test 09/04/19  1020 09/14/18  1629 05/07/18  1325   CR 0.81 0.93 1.07   GFRESTIMATED 86 79 67     Recent Labs   Lab Test 01/22/18  1354 12/20/17  1315   AST 15 24   ALT 32 17   ALKPHOS 60 66     PSYCHOTROPIC DRUG INTERACTIONS:   Abilify---Buspar---Gabapentin: Concurrent use of GABAPENTIN and CNS DEPRESSANTS may result in respiratory depression.    Prozac---Xarelto: Concurrent use of RIVAROXABAN and SEROTONIN NOREPINEPHRINE REUPTAKE INHIBITORS AND SSRIS may result in increased risk of bleeding  Prozac---Wellbutrin: Concurrent use of BUPROPION and SELECTED SEIZURE THRESHOLD LOWERING CYP2D6 SUBSTRATES may result in increased exposure of CYP2D6 substrates; increased risk of seizure.   Prozac---Buspar: Concurrent use of FLUOXETINE and BUSPIRONE may result in worsening of psychiatric symptoms.   Abilify---Prozac: Concurrent use of ARIPIPRAZOLE and STRONG CYP2D6 INHIBITORS WITH QT-INTERVAL PROLONGATION may result in increased exposure of aripiprazole and increased risk of QT prolongation or torsades de pointes.   Abilify---Buspar: Concurrent use of ARIPIPRAZOLE and STRONG CYP2D6 INHIBITORS may result in increased exposure of aripiprazole  Abilify---Wellbutrin: Concurrent use of ARIPIPRAZOLE and STRONG CYP2D6 INHIBITORS may result in increased exposure of aripiprazole.      MANAGEMENT:  Monitoring for adverse effects, routine vitals and  patient is aware of risks    Impression/Assessment      Gavin Edmond is a 76 year old adult  who presents for med management follow up.  Pt appears mostly stable in his mood and anxiety, denies SI, SIB or HI during the appointment.   Instead of restarting Lamictal 25 mg daily, pt started Lamictal 25 mg BID.  Pt denied any rash, in detail discussed about risk of Shaggy Ronak syndrome and strongly reminded that he cannot change Lamictal dose on his own and if he forgets to take Lamictal 3 days in a row, he needs to contact this writer for restart as it should be started from 25 mg daily only.  Pt noted improvement of mood with Lamictal restart. Since pt did not tolerate Prozac higher than current dose and Prozac could be causing anxiety exacerbation, discussed to discontinue Prozac at this time while monitoring for mood.  Will continue all other medications for now.    Strongly recommended to follow up with therapist he wanted to work with or seek another therapist in [place] system.  Also strongly recommended to follow up with sleep medicine.    Diagnosis                                                                   Social Anxiety disorder  MDD    Treatment Recommendation & Plan       Medication Ordered/Consults/Labs/tests Ordered:     Medication:   -Discontinue Prozac (fluoxetine).  Monitor changes in mood and anxiety  -Continue all other medications.  You can continue Lamotrigine 25 mg 2 times a day.  OTC Recommendations: none  Lab Orders:  none  Referrals:   -Follow up with sleep medicine nearby you to see if they are covered under your insurance.  If they are not covered, contact  [phone] for sleep medicine consult.   There's Dr Ranjith Yanez at [facility] [email or web address]  -Follow up with your preferred therapist, if this is not financially feasible, you may also try [facility] or [place] [facility] [phone]  Release of Information:  none  Future Treatment Considerations: Per symptoms.   Return for Follow Up: in 4 weeks    -Discussed safety plan for suicidal thoughts  -Discussed plan for suicidality  -Discussed available emergency services  -Patient agrees with the treatment plan  -Encouraged to continue outpatient therapy to gain more coping mechanism for stress.    Treatment Risk Statement: Discussed with the patient my impressions, as well as recommended studies. I educated patient on the differential diagnosis and prognosis. I discussed with the patient the risks and benefits of medications versus no interventions, including efficacy, dose, possible side effects and length of treatment and the importance of medication compliance.  The patient understands the risks, benefits, adverse effects and alternatives. Agrees to treatment with the capacity to do so. No medical contraindications to treatment. The patient also understands the risks of using street drugs or alcohol. I also discussed the potential metabolic side effects of antipsychotics including weight gain, diabetes and lipid abnormalities, risk of tardive dyskinesia and indicates understanding of this and agrees to regular medical monitoring      CRISIS NUMBERS:   Provided routinely in AVS.    Diagnosis or treatment significantly limited by social determinants of health.    36 minutes spent on the date of the encounter doing chart review, history and exam, documentation and further activities per the note      Rylee Kyle, MONICA,  6/3/2021

## 2021-06-03 NOTE — PROGRESS NOTES
"    Assessment & Plan     Medicare annual wellness visit, subsequent      Benign prostatic hyperplasia with weak urinary stream  Continue:  - finasteride (PROSCAR) 5 MG tablet; Take 1 tablet (5 mg) by mouth daily    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)  In remission, monitoring    Chronic atrial fibrillation (H)  On rate control and Xarelto  - metoprolol tartrate (LOPRESSOR) 25 MG tablet; 1 tab by mouth twice daily    Hypertrophy of prostate with urinary obstruction  Increase dose:  - doxazosin (CARDURA) 8 MG tablet; Take 1 tablet (8 mg) by mouth At Bedtime    High risk medications (not anticoagulants) long-term use  Monitoring labs for psych meds  - Comprehensive metabolic panel (BMP + Alb, Alk Phos, ALT, AST, Total. Bili, TP)  - CBC with platelets    Social anxiety disorder  Trial:  - hydrOXYzine (ATARAX) 50 MG tablet; Take 1 tablet (50 mg) by mouth 3 times daily as needed for anxiety             BMI:   Estimated body mass index is 25.97 kg/m  as calculated from the following:    Height as of this encounter: 1.778 m (5' 10\").    Weight as of this encounter: 82.1 kg (181 lb).       FUTURE APPOINTMENTS:       - Follow-up for annual visit or as needed. Encouraged therapy, patient states he has a contact he would use.     See Patient Instructions    No follow-ups on file.    KAYLA Valle CNP  [facility]    Greg Alonso is a 76 year old who presents for the following health issues     HPI     Hypertension Follow-up      Do you check your blood pressure regularly outside of the clinic? No     Are you following a low salt diet? No    Are your blood pressures ever more than 140 on the top number (systolic) OR more   than 90 on the bottom number (diastolic), for example 140/90? unknown    Depression Followup    How are you doing with your depression since your last visit? Improved     Are you having other symptoms that might be associated with depression? No    Have you had a significant " life event?  No     Are you feeling anxious or having panic attacks?   Yes:  occasional anxiety- social situations    Do you have any concerns with your use of alcohol or other drugs? No    Social History     Tobacco Use     Smoking status: Former Smoker     Packs/day: 1.00     Years: 15.00     Pack years: 15.00     Types: Cigarettes     Quit date: 10/13/1975     Years since quittin.6     Smokeless tobacco: Never Used   Substance Use Topics     Alcohol use: No     Comment: quit 2017     Drug use: No     PHQ 2019 12/10/2019 6/3/2021   PHQ-9 Total Score 3 4 3   Q9: Thoughts of better off dead/self-harm past 2 weeks Not at all Not at all Not at all     CAROLYNN-7 SCORE 2019 12/10/2019 6/3/2021   Total Score - - -   Total Score 5 2 2         Suicide Assessment Five-step Evaluation and Treatment (SAFE-T)      How many servings of fruits and vegetables do you eat daily?  0-1    On average, how many sweetened beverages do you drink each day (Examples: soda, juice, sweet tea, etc.  Do NOT count diet or artificially sweetened beverages)?   0    How many days per week do you exercise enough to make your heart beat faster? 3 or less    How many minutes a day do you exercise enough to make your heart beat faster? 9 or less    How many days per week do you miss taking your medication? 0    Annual Wellness Visit    Patient has been advised of split billing requirements and indicates understanding: Yes     Are you in the first 12 months of your Medicare Part B coverage?  No    Physical Health:    In general, how would you rate your overall physical health? good    Outside of work, how many days during the week do you exercise?none    Outside of work, approximately how many minutes a day do you exercise?not applicable    If you drink alcohol do you typically have >3 drinks per day or >7 drinks per week? No    Do you usually eat at least 4 servings of fruit and vegetables a day, include whole grains & fiber and avoid  "regularly eating high fat or \"junk\" foods? NO    Do you have any problems taking medications regularly? No    Do you have any side effects from medications? none    Needs assistance for the following daily activities: no assistance needed    Which of the following safety concerns are present in your home?  none identified     Hearing impairment: Yes, Difficulty understanding soft or whispered speech.    In the past 6 months, have you been bothered by leaking of urine? no    Mental Health:    In general, how would you rate your overall mental or emotional health? Fair- currently seeing psychiatry, wants to move his med fills to primary care, feels stable, not in therapy  PHQ-2 Score:  0    Do you feel safe in your environment? Yes    Have you ever done Advance Care Planning? (For example, a Health Directive, POLST, or a discussion with a medical provider or your loved ones about your wishes)? Yes, patient states has an Advance Care Planning document and will bring a copy to the clinic.    Fall risk:  Fallen 2 or more times in the past year?: Yes  Any fall with injury in the past year?: No    Cognitive Screenin) Repeat 3 items (Leader, Season, Table)    2) Clock draw: NORMAL  3) 3 item recall: Recalls NO objects   Results: 0 items recalled: PROBABLE COGNITIVE IMPAIRMENT, **INFORM PROVIDER**    Mini-CogTM Copyright NICHOLE Cano. Licensed by the author for use in [facility]; reprinted with permission ([email or web address]). All rights reserved.      Do you have sleep apnea, excessive snoring or daytime drowsiness?: no    Current providers sharing in care for this patient include:   Patient Care Team:  Marilynn Bliss APRN CNP as PCP - General (Family Medicine)  Rylee Kyle APRN CNP as Nurse Practitioner (Nurse Practitioner)  Rylee Kyle APRN CNP as Assigned Behavioral Health Provider  Dominguez Verdugo MD as Assigned PCP    Patient has been advised of split billing requirements and indicates " "understanding: Yes  Medication Followup of Doxazosin and Proscar    Taking Medication as prescribed: yes    Side Effects:  None    Medication Helping Symptoms:  Somewhat- could be better but much improved from before he was on meds       Review of Systems   Constitutional, HEENT, cardiovascular, pulmonary, gi and gu systems are negative, except as otherwise noted.      Objective    /80   Pulse 68   Temp 98.1  F (36.7  C) (Tympanic)   Resp 18   Ht 1.778 m (5' 10\")   Wt 82.1 kg (181 lb)   SpO2 97%   BMI 25.97 kg/m    Body mass index is 25.97 kg/m .  Physical Exam   GENERAL: alert and no distress  RESP: lungs clear to auscultation - no rales, rhonchi or wheezes  CV: regular rates and rhythm, normal S1 S2, no S3 or S4, no murmur, click or rub and no peripheral edema  MS: no gross musculoskeletal defects noted, no edema  NEURO: Normal strength and tone, mentation intact and speech normal  PSYCH: mentation appears normal, affect normal/bright                "

## 2021-06-04 ENCOUNTER — MYC MEDICAL ADVICE (OUTPATIENT)
Dept: FAMILY MEDICINE | Facility: CLINIC | Age: 76
End: 2021-06-04

## 2021-06-04 ASSESSMENT — ANXIETY QUESTIONNAIRES: GAD7 TOTAL SCORE: 2

## 2021-06-04 NOTE — RESULT ENCOUNTER NOTE
Susana Abernathy    Your lab results came back within normal/acceptable limits. Blood counts are stable. Please let us know if you have any questions.     Take care,    KAYLA Thomas CNP

## 2021-06-07 NOTE — TELEPHONE ENCOUNTER
Prior Authorization Approval    Authorization Effective Date: 5/4/2021  Authorization Expiration Date: 6/3/2022  Medication: hydrOXYzine (ATARAX) 50 MG tablet  Approved Dose/Quantity:    Reference #:     Insurance Company: [employer] - Phone [phone] Fax [phone]  Expected CoPay:       CoPay Card Available:      Foundation Assistance Needed:    Which Pharmacy is filling the prescription (Not needed for infusion/clinic administered): [facility] - [address]  Pharmacy Notified: Yes  Patient Notified: Yes  **Instructed pharmacy to notify patient when script is ready to /ship.**

## 2021-06-08 ENCOUNTER — HOSPITAL ENCOUNTER (OUTPATIENT)
Dept: PHYSICAL THERAPY | Facility: CLINIC | Age: 76
Setting detail: THERAPIES SERIES
End: 2021-06-08
Attending: ORTHOPAEDIC SURGERY
Payer: COMMERCIAL

## 2021-06-08 PROCEDURE — 97140 MANUAL THERAPY 1/> REGIONS: CPT | Mod: GP

## 2021-06-08 PROCEDURE — 97110 THERAPEUTIC EXERCISES: CPT | Mod: GP

## 2021-06-15 ENCOUNTER — HOSPITAL ENCOUNTER (OUTPATIENT)
Dept: PHYSICAL THERAPY | Facility: CLINIC | Age: 76
Setting detail: THERAPIES SERIES
End: 2021-06-15
Attending: ORTHOPAEDIC SURGERY
Payer: COMMERCIAL

## 2021-06-15 PROCEDURE — 97140 MANUAL THERAPY 1/> REGIONS: CPT | Mod: GP

## 2021-06-15 PROCEDURE — 97110 THERAPEUTIC EXERCISES: CPT | Mod: GP

## 2021-06-15 NOTE — PROGRESS NOTES
Rehabilitation Services      OUTPATIENT PHYSICAL THERAPY  PLAN OF TREATMENT FOR OUTPATIENT REHABILITATION    Patient's Last Name, First Name, M.I.                YOB: 1945  Josemanuel Edmond                        Provider's Name  Jennifer Simons, PT Medical Record No.  [id number]                               Onset Date: 4/1/2021   Start of Care Date: 4/12/2021   Type:     _X_PT   ___OT   ___SLP Medical Diagnosis: L SI pain                       PT Diagnosis: L LBP      _________________________________________________________________________________  Plan of Treatment:    Frequency/Duration: 1x/week     Goals:  Goal Identifier 1   Goal Description Pt will be able to sleep through the night without waking with pain.   Target Date 05/20/21   Date Met  06/08/21   Progress:     Goal Identifier 2   Goal Description Pt will be able to lift heavy weights with < 2/10 pain.   Target Date 07/06/21   Date Met      Progress:     Goal Identifier 3   Goal Description Pt will be able to sit to stand with < 2/10 pain.   Target Date 07/27/21   Date Met      Progress:     Goal Identifier 4   Goal Description Pt will be independent with HEP for optimal functional recovery.   Target Date 07/27/21   Date Met      Progress:         Progress Toward Goals:   Progress this reporting period: Pt was unable to come to PT for 6 weeks due to  various conflicts, so he has only been seen for 3 sessions. He is making some progress towards decreased pain and increased function. Target dates for the unmet goals have been extended as pt would benefit from more PT to address his goals.          Certification date from 6/15/2021 to 7/27/2021.    Jennifer Simons, PT          I CERTIFY THE NEED FOR THESE SERVICES FURNISHED UNDER        THIS PLAN OF TREATMENT AND WHILE UNDER MY CARE .             Physician Signature                Date    X_____________________________________________________                      Referring Provider: Dr Peralta       06/15/21 1000   Signing Clinician's Name / Credentials   Signing clinician's name / credentials Jennifer Simons PT   Session Number   Session Number 3 Medica   Progress Note/Recertification   Progress Note Due Date 06/17/21   Recertification Due Date 06/17/21   Adult Goals   PT Ortho Eval Goals 1;2;3;4   Ortho Goal 1   Goal Identifier 1   Goal Description Pt will be able to sleep through the night without waking with pain.   Target Date 05/20/21   Date Met 06/08/21   Ortho Goal 2   Goal Identifier 2   Goal Description Pt will be able to lift heavy weights with < 2/10 pain.   Target Date 07/06/21   Ortho Goal 3   Goal Identifier 3   Goal Description Pt will be able to sit to stand with < 2/10 pain.   Target Date 07/27/21   Ortho Goal 4   Goal Identifier 4   Goal Description Pt will be independent with HEP for optimal functional recovery.   Target Date 07/27/21   Subjective Report   Subjective Report Pt reports less pain standing from sitting (rates pain at 4/10), less pain with walking. Would like to continue PT.   Objective Measures   Objective Measures Objective Measure 1;Objective Measure 2   Objective Measure 1   Objective Measure Trunk ROM   Details WFL with slight pain with L SBing and with sitting flex   Objective Measure 2   Objective Measure Spring testing   Details painful with PA glides to L5 SP and R TP   Treatment Interventions   Interventions Therapeutic Procedure/Exercise;Manual Therapy   Therapeutic Procedure/exercise   Therapeutic Procedures: strength, endurance, ROM, flexibillity minutes (86241) 10   Skilled Intervention strengthening   Patient Response demo well   Treatment Detail Li exs #1-4 x 10. Added LTR, partial sit up, diagonal sit up   Manual Therapy   Manual Therapy: Mobilization, MFR, MLD, friction massage minutes (61458) 10   Skilled Intervention MET   Patient  Response less pain after   Treatment Detail MET correction to L5 R ERS x 2   Self Care/home Management   ADL/Home Mgmt Training (33562) 10   Skilled Intervention posture ed   Patient Response understood and agreed   Treatment Detail Educated pt in neutral spine in stand, sitting and driving. Discussed the patient's pathology and how maintaining neutral spine would help alleviate his pain. Instructed patient to use a towel roll in sitting and driving.   Education   Learner Patient   Readiness Acceptance   Method Booklet/handout;Explanation;Demonstration   Response Verbalizes Understanding;Demonstrates Understanding   Plan   Home program above exs   Plan for next session assess L post innominate, ham stretch, hip ext   Total Session Time   Timed Code Treatment Minutes 30   Total Treatment Time (sum of timed and untimed services) 30   AMBULATORY CLINICS ONLY-MEDICAL AND TREATMENT DIAGNOSIS   PT Diagnosis L LBP

## 2021-06-22 ENCOUNTER — HOSPITAL ENCOUNTER (OUTPATIENT)
Dept: PHYSICAL THERAPY | Facility: CLINIC | Age: 76
Setting detail: THERAPIES SERIES
End: 2021-06-22
Attending: ORTHOPAEDIC SURGERY
Payer: COMMERCIAL

## 2021-06-22 PROCEDURE — 97110 THERAPEUTIC EXERCISES: CPT | Mod: GP

## 2021-06-22 PROCEDURE — 97140 MANUAL THERAPY 1/> REGIONS: CPT | Mod: GP

## 2021-06-23 ENCOUNTER — OFFICE VISIT (OUTPATIENT)
Dept: FAMILY MEDICINE | Facility: CLINIC | Age: 76
End: 2021-06-23
Payer: COMMERCIAL

## 2021-06-23 VITALS
BODY MASS INDEX: 25.91 KG/M2 | SYSTOLIC BLOOD PRESSURE: 138 MMHG | HEART RATE: 66 BPM | WEIGHT: 181 LBS | DIASTOLIC BLOOD PRESSURE: 78 MMHG | OXYGEN SATURATION: 97 % | TEMPERATURE: 97.6 F | HEIGHT: 70 IN | RESPIRATION RATE: 16 BRPM

## 2021-06-23 DIAGNOSIS — F10.21 ALCOHOLISM IN REMISSION (H): ICD-10-CM

## 2021-06-23 DIAGNOSIS — K64.8 HEMORRHOIDS, INTERNAL: ICD-10-CM

## 2021-06-23 DIAGNOSIS — F32.1 MODERATE MAJOR DEPRESSION (H): Primary | ICD-10-CM

## 2021-06-23 DIAGNOSIS — F41.9 ANXIETY: ICD-10-CM

## 2021-06-23 PROCEDURE — 99214 OFFICE O/P EST MOD 30 MIN: CPT | Performed by: FAMILY MEDICINE

## 2021-06-23 RX ORDER — HYDROCORTISONE 25 MG/G
CREAM TOPICAL 2 TIMES DAILY PRN
Qty: 28 G | Refills: 1 | Status: SHIPPED | OUTPATIENT
Start: 2021-06-23 | End: 2021-06-23

## 2021-06-23 RX ORDER — HYDROCORTISONE 25 MG/G
CREAM TOPICAL 2 TIMES DAILY PRN
Qty: 28 G | Refills: 1 | Status: SHIPPED | OUTPATIENT
Start: 2021-06-23 | End: 2022-04-06

## 2021-06-23 ASSESSMENT — MIFFLIN-ST. JEOR: SCORE: 1547.26

## 2021-06-23 NOTE — NURSING NOTE
"Initial /78   Pulse 66   Temp 97.6  F (36.4  C) (Tympanic)   Resp 16   Ht 1.778 m (5' 10\")   Wt 82.1 kg (181 lb)   SpO2 97%   BMI 25.97 kg/m   Estimated body mass index is 25.97 kg/m  as calculated from the following:    Height as of this encounter: 1.778 m (5' 10\").    Weight as of this encounter: 82.1 kg (181 lb). .      "

## 2021-06-23 NOTE — PROGRESS NOTES
Assessment & Plan     Moderate major depression (H)  Following with psychiatry although he is wondering about switching to primary care for management. Discussed since symptoms not well controlled and multiple failed medications and currently on 5 medications 2 of which I don't frequently use, I recommend or now he continue to follow with psychiatry. He understands this rationale. I did recommend he start psychotherapy. Also recommend he follow through with sleep study that psychiatry recommended.    Anxiety  See above.     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     Hemorrhoids, internal  Trial hydrocortisone. Limited time due to length of visit regarding above issues. Did not examine today. Will reassess at follow-up. Normal colonoscopy 2018.  - hydrocortisone, Perianal, (HYDROCORTISONE) 2.5 % cream; Place rectally 2 times daily as needed for hemorrhoids    Briefly discussed tremor today but did not go into depth due to time constraints. Will reassess this at follow-up as well.           Return in about 1 month (around 7/23/2021) for re-check.    Lizzy Leiva MD  [facility]    Subjective   Gavin is a 76 year old who presents for the following health issues  accompanied by his :    HPI     He has noticed a tremor. Mom had Parkinson's Tremor is worse with intention. Witting has gotten worse.    Also wanting referral for hemorrhoid treatment discussed symptoms. No bleeding, pain, itching. Feels a tissue bulge protruding from anus after straining. Can manually reduce this.      Depression and Anxiety Follow-Up    How are you doing with your depression since your last visit? Worsened     How are you doing with your anxiety since your last visit?  Worsened     Are you having other symptoms that might be associated with depression or anxiety? No    Have you had a significant life event?  No     Do you have any concerns with your use of alcohol or other drugs? No    Social History     Tobacco Use     Smoking status: Former Smoker     Packs/day: 1.00     Years: 15.00     Pack years: 15.00     Types: Cigarettes     Quit date: 10/13/1975     Years since quittin.7     Smokeless tobacco: Never Used   Substance Use Topics     Alcohol use: No     Comment: quit 2017     Drug use: No     PHQ 2019 12/10/2019 6/3/2021   PHQ-9 Total Score 3 4 3   Q9: Thoughts of better off dead/self-harm past 2 weeks Not at all Not at all Not at all     CAROLYNN-7 SCORE 2019 12/10/2019 6/3/2021   Total Score - - -   Total Score 5 2 2     Last PHQ-9 6/3/2021   1.  Little interest or pleasure in doing things 0   2.  Feeling down, depressed, or hopeless 0   3.  Trouble falling or staying asleep, or sleeping too much 2   4.  Feeling tired or having little energy 1   5.  Poor appetite or overeating 0   6.  Feeling bad about yourself 0   7.  Trouble concentrating 0   8.  Moving slowly or restless 0   Q9: Thoughts of better off dead/self-harm past 2 weeks 0   PHQ-9 Total Score 3   Difficulty at work, home, or with people -     CAROLYNN-7  6/3/2021   1. Feeling nervous, anxious, or on edge 1   2. Not being able to stop or control worrying 1   3. Worrying too much about different things 0   4. Trouble relaxing 0   5. Being so restless that it is hard to sit still 0   6. Becoming easily annoyed or irritable 0   7. Feeling afraid, as if something awful might happen 0   CAROLYNN-7 Total Score 2   If you checked any problems, how difficult have they made it for you to do your work, take care of things at home, or get along with other people? -       Suicide Assessment Five-step Evaluation and Treatment (SAFE-T)      How many servings of fruits and vegetables do you eat daily?  0-1    On average, how many sweetened beverages do you drink each day (Examples: soda, juice, sweet tea, etc.  Do NOT count diet or artificially sweetened  "beverages)?   0    How many days per week do you exercise enough to make your heart beat faster? 3 or less    How many minutes a day do you exercise enough to make your heart beat faster? 9 or less    How many days per week do you miss taking your medication? 0    Patient also has concerns with his weight and  reports having some concerns with his memory.    Review of Systems   Constitutional, neuro, ENT, endocrine, pulmonary, cardiac, gastrointestinal, genitourinary, musculoskeletal, integument and psychiatric systems are negative, except as otherwise noted.       Objective    /78   Pulse 66   Temp 97.6  F (36.4  C) (Tympanic)   Resp 16   Ht 1.778 m (5' 10\")   Wt 82.1 kg (181 lb)   SpO2 97%   BMI 25.97 kg/m    Body mass index is 25.97 kg/m .  Physical Exam   GENERAL: Pleasant, well appearing male.  PSYCH: Alert and oriented x3, neatly dressed and well groomed, makes good eye contact, fluid speech - non-pressured, no psychomotor agitation or slowing, good memory, judgement and insight, no auditory or visual hallucinations, flat affect which is mood congruent. No suicidal ideation.                 "

## 2021-06-23 NOTE — PATIENT INSTRUCTIONS
Call to schedule with counselor Nichelle Smith [phone].     Call the sleep clinic to set up an appointment [phone].

## 2021-06-25 DIAGNOSIS — F33.1 MAJOR DEPRESSIVE DISORDER, RECURRENT, MODERATE (H): ICD-10-CM

## 2021-06-28 RX ORDER — FLUOXETINE 10 MG/1
TABLET, FILM COATED ORAL
Qty: 30 TABLET | Refills: 1 | OUTPATIENT
Start: 2021-06-28

## 2021-07-01 ENCOUNTER — VIRTUAL VISIT (OUTPATIENT)
Dept: PSYCHIATRY | Facility: CLINIC | Age: 76
End: 2021-07-01
Attending: NURSE PRACTITIONER
Payer: COMMERCIAL

## 2021-07-01 DIAGNOSIS — F33.1 MAJOR DEPRESSIVE DISORDER, RECURRENT, MODERATE (H): Primary | ICD-10-CM

## 2021-07-01 DIAGNOSIS — F40.10 SOCIAL ANXIETY DISORDER: ICD-10-CM

## 2021-07-01 PROCEDURE — 99214 OFFICE O/P EST MOD 30 MIN: CPT | Mod: 95 | Performed by: NURSE PRACTITIONER

## 2021-07-01 ASSESSMENT — PAIN SCALES - GENERAL: PAINLEVEL: NO PAIN (0)

## 2021-07-01 NOTE — PROGRESS NOTES
Start Time:  1000         End Time: 1015    Telemedicine Visit: The patient's condition can be safely assessed and treated via synchronous audio and visual telemedicine encounter.      Reason for Telemedicine Visit: Due to COVID 19 pandemic, clinic switching all appointments to telemedicine     Originating Site (Patient Location): Patient's home    Distant Site (Provider Location): Provider Remote Setting    Consent:  The patient/guardian has verbally consented to: the potential risks and benefits of telemedicine (video visit) versus in person care; bill my insurance or make self-payment for services provided; and responsibility for payment of non-covered services.     Mode of Communication:  Video Conference via [email or web address]    As the provider I attest to compliance with applicable laws and regulations related to telemedicine.    Psychiatry Clinic Progress Note                                                                  Patient Name: Josemanuel Edmond  YOB: 1945  MRN: [id number]  Date of Service:  7/1/2021  Last Seen:6/3/2021    Josemanuel Edmond is a 76 year old person assigned male at birth, identifies as cisgender male who uses the name Gavin and pronoun pat.       Gavin Edmond is a 76 year old year old adult who presents for ongoing psychiatric care.  Gavin Edmond was last seen on 6/3/2021.    At that time,     Medication Ordered/Consults/Labs/tests Ordered:      Medication:   -Discontinue Prozac (fluoxetine).  Monitor changes in mood and anxiety  -Continue all other medications.  You can continue Lamotrigine 25 mg 2 times a day.  OTC Recommendations: none  Lab Orders:  none  Referrals:   -Follow up with sleep medicine nearby you to see if they are covered under your insurance.  If they are not covered, contact  [phone] for sleep medicine consult.   There's Dr Ranjith Yanez at [facility] [email or web address]  -Follow up with your preferred  therapist, if this is not financially feasible, you may also try [facility] [place] or [place] [facility] [phone]  Release of Information: none  Future Treatment Considerations: Per symptoms.   Return for Follow Up: in 4 weeks      Pertinent Background:  Reports depression started 2002 after his son was killed in MVA.  Anxiety also started around the same time, but social anxiety started 6-7 years ago.  Denies any hx of SI, SIB, HI, psych hospitalization.  After son was killed, pt had heavy ETOH use on and off. Psych critical item history includes [no critical items].      Previous Psychiatric Meds: Hydroxyzine (oversedation, PCP trial), Wellbutrin XL, Xanax, Klonopin, Valium, Ativan, Remeron (30 mg only, worked well for sleep and anxiety, but weight gain), Nortriptyline, Vistaril (dry mouth, nightmares), Remeron (oversedation at 45 mg, 22.5mg and 15 mg and wt gain), Trazodone (oversedate at 50 mg), Ambien (effective for sleep, fall), Belsomra not covered with insurance.    Interim History                                                                                                        4, 4     Since the last visit,  -Notes feeling up and down.  -Did not stop Prozac, but taking 5 mg daily.  Has not noted significant changes in mood or anxiety.  Denies SI, SIB or  HI.  -Reduced Gabapentin to 600 mg TID x 1.5 week as he thought he was taking too much.  Has not noticed any changes in anxiety.  -Has not used Hydroxyzine.  This was prescribed by PCP to help with anxiety, but caused oversedation.  -Was told sleep medicine consult initially will be telehealth, but if recommended sleep study, there's no place nearby, the closest is [place].  -Sleep continues to be difficult.  Going to bed around 11pm, wakes up 4:30am, then goes to couch to sleep for additional 1 hour.  -Finally made an appointment for therapy at [facility], but appt is not until 8/2.    Denies any symptoms suggestive of hypomania or  psychosis.    Current Suicidality/Hx of Suicide Attempts: Denies both  CoCominent Medical concerns: Denies    Medication Side Effects: The patient denies all medication side effects.      Medical Review of Systems     Apart from the symptoms mentioned int he HPI, the 14 point review of systems, including constitutional, HEENT, cardiovascular, respiratory, gastrointestinal, genitourinary, musculoskeletal, integumentary, endocrine, neurological, hematologic and allergic is entirely negative.    Substance Use     Pt has been staying substance free since last seen.  Notes has not used any ETOH x 3 years.    Social/ Family History                                  [per patient report]                                 1ea,1ea   Living arrangements: living with spouse and feels safe  Social Support: spouse  Access to gun: owns a gun  Retired in 2008.  Trauma hx includes loss of son by MVA in 2002.    Allergy                                Bactrim [sulfamethoxazole w/trimethoprim]    Current Medications                                                                                                       Current Outpatient Medications   Medication Sig Dispense Refill     acetaminophen (TYLENOL) 500 MG tablet Take 1,000 mg by mouth every 8 hours as needed for mild pain       ARIPiprazole (ABILIFY) 2 MG tablet Take 1 tablet (2 mg) by mouth daily 90 tablet 0     buPROPion (WELLBUTRIN SR) 150 MG 12 hr tablet Take 1 tablet (150 mg) by mouth 2 times daily 180 tablet 0     busPIRone HCl (BUSPAR) 30 MG tablet Take 1 tablet (30 mg) by mouth 2 times daily 180 tablet 0     doxazosin (CARDURA) 8 MG tablet Take 1 tablet (8 mg) by mouth At Bedtime 90 tablet 3     finasteride (PROSCAR) 5 MG tablet Take 1 tablet (5 mg) by mouth daily 90 tablet 3     FLUoxetine (PROZAC) 10 MG tablet Take 10 mg by mouth daily       gabapentin (NEURONTIN) 600 MG tablet Take one and one-half tablets (900 mg) by mouth every morning and at bedtime and take one  "tablet (600 mg) every afternoon 360 tablet 0     hydrocortisone, Perianal, (HYDROCORTISONE) 2.5 % cream Place rectally 2 times daily as needed for hemorrhoids 28 g 1     hydrOXYzine (ATARAX) 50 MG tablet Take 1 tablet (50 mg) by mouth 3 times daily as needed for anxiety (Patient not taking: Reported on 6/23/2021) 90 tablet 1     lamoTRIgine (LAMICTAL) 25 MG tablet Take 1 tablet (25 mg) by mouth 2 times daily 180 tablet 0     metoprolol tartrate (LOPRESSOR) 25 MG tablet 1 tab by mouth twice daily 180 tablet 3     XARELTO ANTICOAGULANT 20 MG TABS tablet TAKE 1 TABLET DAILY WITH   DINNER 90 tablet 3        Mental Status Exam                                                                                   9, 14 cog      Alertness: alert  and oriented  Appearance:  Casually dressed and Adequately groomed  Behavior/Demeanor: cooperative, pleasant and calm, with good  eye contact   Speech: regular rate and rhythm  Mood :  \"up and down\"  Affect: mostly full range; was congruent to mood; was congruent to content  Thought Process (Associations):  Linear and Goal directed  Thought process (Rate):  Normal  Thought content:  no overt psychosis, denies suicidal ideation, intent or thoughts and patient does not appear to be responding to internal stimuli  Perception:  Reports none;  Denies auditory hallucinations, visual hallucinations, depersonalization and derealization  Attention/Concentration:  Fair  Memory:  Immediate recall intact and Short-term memory intact  Language: intact  Fund of Knowledge/Intelligence:  Average  Abstraction:  [place]  Insight:  Fair  Judgment:  Fair  Cognition: (6) does  appear grossly intact; formal cognitive testing was not done    Physical Exam     Motor activity/EPS:  Normal  Psychomotor: normal or unremarkable    Labs and Results      Pertinent findings on review include: Review of records with relevant information reported in the HPI.  Reviewed pt's past medical record and obtained collateral " information.      [place] PRESCRIPTION MONITORING PROGRAM [] was checked today:  indicates Gabapentin 6/15.    PHQ9 Today:  N/A  PHQ 2/26/2019 12/10/2019 6/3/2021   PHQ-9 Total Score 3 4 3   Q9: Thoughts of better off dead/self-harm past 2 weeks Not at all Not at all Not at all       CAROLYNN 7 Today: N/A  CAROLYNN-7 SCORE 2/26/2019 12/10/2019 6/3/2021   Total Score - - -   Total Score 5 2 2       Recent Labs   Lab Test 06/03/21  1402 09/04/19  1020 09/14/18  1629   CR 0.91 0.81 0.93   GFRESTIMATED 80 86 79     Recent Labs   Lab Test 06/03/21  1402 01/22/18  1354   AST 18 15   ALT 23 32   ALKPHOS 51 60     PSYCHOTROPIC DRUG INTERACTIONS:   Abilify---Buspar---Gabapentin: Concurrent use of GABAPENTIN and CNS DEPRESSANTS may result in respiratory depression.    Prozac---Xarelto: Concurrent use of RIVAROXABAN and SEROTONIN NOREPINEPHRINE REUPTAKE INHIBITORS AND SSRIS may result in increased risk of bleeding  Prozac---Wellbutrin: Concurrent use of BUPROPION and SELECTED SEIZURE THRESHOLD LOWERING CYP2D6 SUBSTRATES may result in increased exposure of CYP2D6 substrates; increased risk of seizure.   Prozac---Buspar: Concurrent use of FLUOXETINE and BUSPIRONE may result in worsening of psychiatric symptoms.   Abilify---Prozac: Concurrent use of ARIPIPRAZOLE and STRONG CYP2D6 INHIBITORS WITH QT-INTERVAL PROLONGATION may result in increased exposure of aripiprazole and increased risk of QT prolongation or torsades de pointes.   Abilify---Buspar: Concurrent use of ARIPIPRAZOLE and STRONG CYP2D6 INHIBITORS may result in increased exposure of aripiprazole  Abilify---Wellbutrin: Concurrent use of ARIPIPRAZOLE and STRONG CYP2D6 INHIBITORS may result in increased exposure of aripiprazole.      MANAGEMENT:  Monitoring for adverse effects, routine vitals and patient is aware of risks    Impression/Assessment      Gavin Edmond is a 76 year old adult  who presents for med management follow up.  Pt appears mostly stable in his mood and anxiety,  denies SI, SIB or HI during the appointment.   However, pt noted mood fluctuation though anxiety seemed to be fairly well managed at this point, but this may be due to lack of social interactions.  Pt did not discontinue Prozac, but continued at 5 mg daily and also changed Gabapentin to 600 mg TID.  Discussed importance of following recommendations and if he does not like how he feels to contact this writer immediately.  At this time, since Prozac may be causing exacerbation in anxiety, will discontinue Prozac. Discussed to monitor sign of bleeding with Prozac discontinue. Will also Epic message PCP as pt is taking Xalerto. Will also change Gabapentin dose to how pt has been taking; 600 mg TID.  Will also discontinue Hydroxyzine prescribed by PCP as he felt oversedated.  Will continue all other medications for now.  If pt's mood feels worse, may consider increase Abilify in the future.  Pt declined Buspar refill today.    Also strongly recommended to follow up with sleep medicine.      Diagnosis                                                                    Social Anxiety disorder  MDD    Treatment Recommendation & Plan       Medication Ordered/Consults/Labs/tests Ordered:     Medication:   -Discontinue Fluoxetine.  Monitor your mood, if mood is exacerbating, please contact mk  -Hydroxyzine is also discontinued as you are not taking the medication  -Continue all other medication.  Gabapentin is now changed to 600 mg 3 times a day as this is how you have been taking the medication.  OTC Recommendations: none  Lab Orders:  none  Referrals: none  Release of Information: none  Future Treatment Considerations: Per symptoms.   Return for Follow Up: in 1 month    -Discussed safety plan for suicidal thoughts  -Discussed plan for suicidality  -Discussed available emergency services  -Patient agrees with the treatment plan  -Encouraged to continue outpatient therapy to gain more coping mechanism for  stress.    Treatment Risk Statement: Discussed with the patient my impressions, as well as recommended studies. I educated patient on the differential diagnosis and prognosis. I discussed with the patient the risks and benefits of medications versus no interventions, including efficacy, dose, possible side effects and length of treatment and the importance of medication compliance.  The patient understands the risks, benefits, adverse effects and alternatives. Agrees to treatment with the capacity to do so. No medical contraindications to treatment. The patient also understands the risks of using street drugs or alcohol. I also discussed the potential metabolic side effects of antipsychotics including weight gain, diabetes and lipid abnormalities, risk of tardive dyskinesia and indicates understanding of this and agrees to regular medical monitoring      CRISIS NUMBERS:   Provided routinely in AVS.      Diagnosis or treatment significantly limited by social determinants of health.        Rylee Kyle, MONICA,  7/1/2021

## 2021-07-01 NOTE — PATIENT INSTRUCTIONS
-Discontinue Fluoxetine.  Monitor your mood, if mood is exacerbating, please contact mk  -Hydroxyzine is also discontinued as you are not taking the medication  -Continue all other medication.  Gabapentin is now changed to 600 mg 3 times a day as this is how you have been taking the medication.    Your next appointment is scheduled on 7/21/2021 (Wed) at 9am.    To access your telemedicine visit:     Open a web browser, like L8 SmartLight, and type [email or web address]     You will see a box asking you to check in to let Mkkarley Kyle know that you are here.     Type in your name and press Check In. That will let Mk see you in the virtual waiting room. At your scheduled appointment time, your provider will initiate the visit and connect you.     When your visit is done, you can simply close the browser window.        Please Note:  Ideally, you will connect from a desktop, laptop, or tablet with a WiFi connection. Your computer/tablet must have a camera and microphone. You can use a cell phone, if it has a camera, and if you can connect to WiFi. However, if you connect your phone over a cellular network, it is of lower quality and less reliable    **For crisis resources, please see the information at the end of this document**     Patient Education      Thank you for coming to the [facility] MENTAL HEALTH & ADDICTION [place] [facility].    Lab Testing:  If you had lab testing today and your results are reassuring or normal they will be mailed to you or sent through Brandcast within 7 days. If the lab tests need quick action we will call you with the results. The phone number we will call with results is # [phone] (home) . If this is not the best number please call our clinic and change the number.    Medication Refills:  If you need any refills please call your pharmacy and they will contact us. Our fax number for refills is [phone]. Please allow three business for refill processing. If you need  to  your refill at a new pharmacy, please contact the new pharmacy directly. The new pharmacy will help you get your medications transferred.     Scheduling:  If you have any concerns about today's visit or wish to schedule another appointment please call our office during normal business hours [phone] (8-5:00 M-F)    Contact Us:  Please call [phone] during business hours (8-5:00 M-F).  If after clinic hours, or on the weekend, please call  [phone].    Financial Assistance [phone]  [facility] Billing [phone]  [facility]: [phone]  [place] Billing [phone]  Medical Records [phone]  [place] Patient Bill of Rights [email or web address]       MENTAL HEALTH CRISIS NUMBERS:  For a medical emergency please call  911 or go to the nearest ER.     [facility]:   [facility] -[phone]   Crisis Residence [facility]idence -[phone]   Walk-In Counseling [facility] -[phone]   COPE 24/7 [place] Mobile Team -[phone] (adults)/[phone] (child)  CHILD: [facility] needs assessment team - [phone]      [facility]:   [facility] - [phone]   Walk-in counseling [facility] - [phone]   Walk-in counseling [facility] - [phone]   Crisis Residence [facility] Residence - [phone]  Urgent Care Adult Mental Sdnpdf-[phone] mobile unit/ 24/7 crisis line    National Crisis Numbers:   National Suicide Prevention Lifeline: 4-802-881-TALK (597-618-9612)  Poison Control Center - 1-990.988.6669  Mobiplex.Zostel/resources for a list of additional resources (SOS)  Trans Lifeline a hotline for transgender people 1-761.108.2763  The Armando Project a hotline for LGBT youth 2-659-914-8065  Crisis Text Line: For any crisis 24/7   To: 890144  see www.crisistextline.org  - IF MAKING A CALL  FEELS TOO HARD, send a text!         Again thank you for choosing [facility] MENTAL HEALTH & ADDICTION [facility] and please let us know how we can best partner with you to improve you and your family's health.    You may be receiving a survey regarding this appointment. We would love to have your feedback, both positive and negative. The survey is done by an external company, so your answers are anonymous.

## 2021-07-01 NOTE — PROGRESS NOTES
"VIDEO VISIT  Josemanuel Edmond is a 76 year old patient who is being evaluated via a billable video visit.      The patient has been notified of following:   \"This video visit will be conducted via a call between you and your physician/provider. We have found that certain health care needs can be provided without the need for an in-person physical exam. This service lets us provide the care you need with a video conversation. If a prescription is necessary we can send it directly to your pharmacy. If lab work is needed we can place an order for that and you can then stop by our lab to have the test done at a later time. Insurers are generally covering virtual visits as they would in-office visits so billing should not be different than normal.  If for some reason you do get billed incorrectly, you should contact the billing office to correct it and that number is in the AVS .    Video Conference to be completed via:  [email or web address]    Patient has given verbal consent for video visit?:  Yes    Patient would prefer that any video invitations be sent by: Send to e-mail at: [email or web address]      How would patient like to obtain AVS?:  Ezequiel    AVS SmartPhrase [PsychAVS] has been placed in 'Patient Instructions':  Yes    "

## 2021-07-02 RX ORDER — GABAPENTIN 600 MG/1
600 TABLET ORAL 3 TIMES DAILY
Qty: 360 TABLET | Refills: 0 | Status: SHIPPED | DISCHARGE
Start: 2021-07-02 | End: 2021-09-27

## 2021-07-15 ENCOUNTER — VIRTUAL VISIT (OUTPATIENT)
Dept: PSYCHIATRY | Facility: CLINIC | Age: 76
End: 2021-07-15
Attending: NURSE PRACTITIONER
Payer: COMMERCIAL

## 2021-07-15 DIAGNOSIS — F33.1 MAJOR DEPRESSIVE DISORDER, RECURRENT, MODERATE (H): Primary | ICD-10-CM

## 2021-07-15 DIAGNOSIS — F40.10 SOCIAL ANXIETY DISORDER: ICD-10-CM

## 2021-07-15 DIAGNOSIS — F41.9 ANXIETY: ICD-10-CM

## 2021-07-15 DIAGNOSIS — F32.1 MODERATE MAJOR DEPRESSION (H): ICD-10-CM

## 2021-07-15 PROCEDURE — 99214 OFFICE O/P EST MOD 30 MIN: CPT | Mod: 95 | Performed by: NURSE PRACTITIONER

## 2021-07-15 RX ORDER — ARIPIPRAZOLE 5 MG/1
2.5 TABLET ORAL DAILY
Qty: 15 TABLET | Refills: 1 | Status: SHIPPED | OUTPATIENT
Start: 2021-07-15 | End: 2021-08-05

## 2021-07-15 RX ORDER — BUSPIRONE HYDROCHLORIDE 30 MG/1
30 TABLET ORAL 2 TIMES DAILY
Qty: 180 TABLET | Refills: 0 | Status: SHIPPED | OUTPATIENT
Start: 2021-07-15 | End: 2021-10-27

## 2021-07-15 RX ORDER — BUPROPION HYDROCHLORIDE 150 MG/1
150 TABLET, EXTENDED RELEASE ORAL 2 TIMES DAILY
Qty: 180 TABLET | Refills: 0 | Status: SHIPPED | OUTPATIENT
Start: 2021-07-15 | End: 2021-12-21

## 2021-07-15 ASSESSMENT — PAIN SCALES - GENERAL: PAINLEVEL: NO PAIN (0)

## 2021-07-15 NOTE — PROGRESS NOTES
Start Time:  1030         End Time: 1051    Telemedicine Visit: The patient's condition can be safely assessed and treated via synchronous audio and visual telemedicine encounter.      Reason for Telemedicine Visit: Due to COVID 19 pandemic, clinic switching all appointments to telemedicine     Originating Site (Patient Location): Patient's home    Distant Site (Provider Location): Provider Remote Setting    Consent:  The patient/guardian has verbally consented to: the potential risks and benefits of telemedicine (video visit) versus in person care; bill my insurance or make self-payment for services provided; and responsibility for payment of non-covered services.     Mode of Communication:  Video Conference via [email or web address]    As the provider I attest to compliance with applicable laws and regulations related to telemedicine.    Psychiatry Clinic Progress Note                                                                  Patient Name: Josemanuel Edmond  YOB: 1945  MRN: [id number]  Date of Service:  7/15/2021  Last Seen:7/1/2021    Josemanuel Edmond is a 76 year old person assigned male at birth, identifies as cisgender male who uses the name Gavin and pronoun pat.       Gavin Edmond is a 76 year old year old adult who presents for ongoing psychiatric care.  Gavin Edmond was last seen on 7/1/2021.    At that time,     Medication Ordered/Consults/Labs/tests Ordered:      Medication:   -Discontinue Fluoxetine.  Monitor your mood, if mood is exacerbating, please contact mk  -Hydroxyzine is also discontinued as you are not taking the medication  -Continue all other medication.  Gabapentin is now changed to 600 mg 3 times a day as this is how you have been taking the medication.  OTC Recommendations: none  Lab Orders:  none  Referrals: none  Release of Information: none  Future Treatment Considerations: Per symptoms.   Return for Follow Up: in 1 month    Pertinent Background:  Reports depression  "started 2002 after his son was killed in MVA.  Anxiety also started around the same time, but social anxiety started 6-7 years ago.  Denies any hx of SI, SIB, HI, psych hospitalization.  After son was killed, pt had heavy ETOH use on and off. Psych critical item history includes [no critical items].      Previous Psychiatric Meds: Lexapro (ineffective), Prozac (helps depression, but anxiety increase), Hydroxyzine (oversedation, PCP trial), Wellbutrin XL, Xanax, Klonopin, Valium, Ativan, Remeron (30 mg only, worked well for sleep and anxiety, but weight gain), Nortriptyline, Vistaril (dry mouth, nightmares), Remeron (oversedation at 45 mg, 22.5mg and 15 mg and wt gain), Trazodone (oversedate at 50 mg), Ambien (effective for sleep, fall), Belsomra not covered with insurance.    Interim History                                                                                                        4, 4     On 7/9/2021, pt contacted via [employer] noting exacerbation of depression after discontinuing Prozac. While anxiety seemed to be not significant though pt is anxious about exacerbation of depression. Discussed pt may resume Prozac 5 mg daily until seen next.      Since the last visit,  -No longer thinks depression is worse, it is about as same as when he was taking Prozac 5 mg daily.  Anxiety level is about the same for social anxiety.  Baseline anxiety is OK.  Denies Si, SIB or HI.  -Did not restart Prozac 5 mg as he did not think depression is worse.  -However, notes he no longer has very good days, but mood is more ok and depressed.  -Notes having brain zap since discontinued Prozac 5 mg daily.  Has some more dosage.  -States \"I should be happy with good wife and home and I'm just not happy.\"  -Wondering if increase in Abilify would help.    Denies any symptoms suggestive of hypomania or psychosis.    Current Suicidality/Hx of Suicide Attempts: Denies both  CoCominent Medical concerns: Denies    Medication Side " Effects: The patient denies all medication side effects.      Medical Review of Systems     Apart from the symptoms mentioned int he HPI, the 14 point review of systems, including constitutional, HEENT, cardiovascular, respiratory, gastrointestinal, genitourinary, musculoskeletal, integumentary, endocrine, neurological, hematologic and allergic is entirely negative.    Substance Use   Pt has been staying substance free since last seen.  Notes has not used any ETOH x 3 years.    Social/ Family History                                  [per patient report]                                 1ea,1ea     Living arrangements: living with spouse and feels safe  Social Support: spouse  Access to gun: owns a gun  Retired in 2008.  Trauma hx includes loss of son by MVA in 2002.    Allergy                                Bactrim [sulfamethoxazole w/trimethoprim]    Current Medications                                                                                                       Current Outpatient Medications   Medication Sig Dispense Refill     acetaminophen (TYLENOL) 500 MG tablet Take 1,000 mg by mouth every 8 hours as needed for mild pain       ARIPiprazole (ABILIFY) 2 MG tablet Take 1 tablet (2 mg) by mouth daily 90 tablet 0     buPROPion (WELLBUTRIN SR) 150 MG 12 hr tablet Take 1 tablet (150 mg) by mouth 2 times daily 180 tablet 0     busPIRone HCl (BUSPAR) 30 MG tablet Take 1 tablet (30 mg) by mouth 2 times daily 180 tablet 0     doxazosin (CARDURA) 8 MG tablet Take 1 tablet (8 mg) by mouth At Bedtime 90 tablet 3     finasteride (PROSCAR) 5 MG tablet Take 1 tablet (5 mg) by mouth daily 90 tablet 3     gabapentin (NEURONTIN) 600 MG tablet Take 1 tablet (600 mg) by mouth 3 times daily 360 tablet 0     hydrocortisone, Perianal, (HYDROCORTISONE) 2.5 % cream Place rectally 2 times daily as needed for hemorrhoids 28 g 1     lamoTRIgine (LAMICTAL) 25 MG tablet Take 1 tablet (25 mg) by mouth 2 times daily 180 tablet 0      "metoprolol tartrate (LOPRESSOR) 25 MG tablet 1 tab by mouth twice daily 180 tablet 3     XARELTO ANTICOAGULANT 20 MG TABS tablet TAKE 1 TABLET DAILY WITH   DINNER 90 tablet 3        Mental Status Exam                                                                                   9, 14 cog        Alertness: alert  and oriented  Appearance:  Casually dressed and Adequately groomed  Behavior/Demeanor: cooperative, pleasant and calm, with good  eye contact   Speech: regular rate and rhythm  Mood :  \"okay\"  Affect: mostly full range; was congruent to mood; was congruent to content  Thought Process (Associations):  Linear and Goal directed  Thought process (Rate):  Normal  Thought content:  no overt psychosis, denies suicidal ideation, intent or thoughts and patient does not appear to be responding to internal stimuli  Perception:  Reports none;  Denies depersonalization and derealization  Attention/Concentration:  Fair  Memory:  Immediate recall intact, Short-term memory intact and Long-term memory intact  Language: intact  Fund of Knowledge/Intelligence:  Average  Abstraction:  [place]  Insight:  Fair  Judgment:  Fair  Cognition: (6) does  appear grossly intact; formal cognitive testing was not done    Physical Exam     Motor activity/EPS:  Normal  Psychomotor: normal or unremarkable    Labs and Results      Pertinent findings on review include: Review of records with relevant information reported in the HPI.  Reviewed pt's past medical record and obtained collateral information.      [place] PRESCRIPTION MONITORING PROGRAM [] was checked today:  indicates no refills since last seen.    PHQ9 Today:  N/A  PHQ 2/26/2019 12/10/2019 6/3/2021   PHQ-9 Total Score 3 4 3   Q9: Thoughts of better off dead/self-harm past 2 weeks Not at all Not at all Not at all       CAROLYNN 7 Today: N/A  CAROLYNN-7 SCORE 2/26/2019 12/10/2019 6/3/2021   Total Score - - -   Total Score 5 2 2       Recent Labs   Lab Test 06/03/21  1402 09/04/19  1020 " "09/14/18  1629   CR 0.91 0.81 0.93   GFRESTIMATED 80 86 79     Recent Labs   Lab Test 06/03/21  1402 01/22/18  1354   AST 18 15   ALT 23 32   ALKPHOS 51 60     PSYCHOTROPIC DRUG INTERACTIONS:   Abilify---Buspar---Gabapentin: Concurrent use of GABAPENTIN and CNS DEPRESSANTS may result in respiratory depression.    Prozac---Xarelto: Concurrent use of RIVAROXABAN and SEROTONIN NOREPINEPHRINE REUPTAKE INHIBITORS AND SSRIS may result in increased risk of bleeding  Prozac---Wellbutrin: Concurrent use of BUPROPION and SELECTED SEIZURE THRESHOLD LOWERING CYP2D6 SUBSTRATES may result in increased exposure of CYP2D6 substrates; increased risk of seizure.   Prozac---Buspar: Concurrent use of FLUOXETINE and BUSPIRONE may result in worsening of psychiatric symptoms.   Abilify---Prozac: Concurrent use of ARIPIPRAZOLE and STRONG CYP2D6 INHIBITORS WITH QT-INTERVAL PROLONGATION may result in increased exposure of aripiprazole and increased risk of QT prolongation or torsades de pointes.   Abilify---Buspar: Concurrent use of ARIPIPRAZOLE and STRONG CYP2D6 INHIBITORS may result in increased exposure of aripiprazole  Abilify---Wellbutrin: Concurrent use of ARIPIPRAZOLE and STRONG CYP2D6 INHIBITORS may result in increased exposure of aripiprazole.      MANAGEMENT:  Monitoring for adverse effects, routine vitals and patient is aware of risks    Impression/Assessment      Gavin Edmond is a 76 year old adult  who presents for med management follow up.  Pt appears mostly stable in his mood and anxiety, denies SI, SIB or HI.  Pt no longer feels exacerbation of depression since discontinued Prozac 5 mg, feels mood and anxiety are about the same when he was taking Prozac, but reports he no longer has \"very good days.\"  Pt is also reporting brain zaps after discontinuing Prozac 5 mg daily.  Discussed Prozac does not come in smaller dosage than 5 mg, but he can resume 5 mg every other day for 1 week, every 3 days for 1 week and once a week on " 3rd week and discontinue to see if this would improve brain zap.  Pt also wanted to try Abilify increase to see if this would improve his mood and anxiety.  Discussed Abilify increase may help with mood and baseline anxiety, but social anxiety may not resolve and this is where therapy may be helpful.  Pt agreed to increase Abilify to 2.5 mg daily on the 2nd week Prozac taper so that we will only make one change at a time.  Will continue all other medications for now.    Diagnosis                                                                   Social Anxiety disorder  MDD    Treatment Recommendation & Plan       Medication Ordered/Consults/Labs/tests Ordered:     Medication:   -Restart Prozac 5 mg daily for every other day for this week.    Next week: Take Prozac 5 mg daily every 3 days.  3rd week: Take Prozac 5 mg daily x 1 and discontinue  Monitor for brain zap and send Dotflux message to mk to see if this improves brain zap.  -Increase Abilify to 2.5 mg daily for mood and anxiety.  Make this change when you finish Prozac 5 mg daily every other day.  -Continue all other medications for now  OTC Recommendations: none  Lab Orders:  none  Referrals: none  Release of Information: none  Future Treatment Considerations: Per symptoms.   Return for Follow Up: in 3 weeks per pt's request    -Discussed safety plan for suicidal thoughts  -Discussed plan for suicidality  -Discussed available emergency services  -Patient agrees with the treatment plan  -Encouraged to continue outpatient therapy to gain more coping mechanism for stress.      Treatment Risk Statement: Discussed with the patient my impressions, as well as recommended studies. I educated patient on the differential diagnosis and prognosis. I discussed with the patient the risks and benefits of medications versus no interventions, including efficacy, dose, possible side effects and length of treatment and the importance of medication compliance.  The patient  understands the risks, benefits, adverse effects and alternatives. Agrees to treatment with the capacity to do so. No medical contraindications to treatment. The patient also understands the risks of using street drugs or alcohol. I also discussed the potential metabolic side effects of antipsychotics including weight gain, diabetes and lipid abnormalities, risk of tardive dyskinesia and indicates understanding of this and agrees to regular medical monitoring      CRISIS NUMBERS:   Provided routinely in AVS.    Diagnosis or treatment significantly limited by social determinants of health.    Rylee Kyle, CNP,  7/15/2021

## 2021-07-15 NOTE — PATIENT INSTRUCTIONS
-Restart Prozac 5 mg daily for every other day for this week.    Next week: Take Prozac 5 mg daily every 3 days.  3rd week: Take Prozac 5 mg daily x 1 and discontinue  Monitor for brain zap and send Viewsy message to mk to see if this improves brain zap.  -Increase Abilify to 2.5 mg daily for mood and anxiety.  Make this change when you finish Prozac 5 mg daily every other day.  -Continue all other medications for now    Your next appointment is scheduled on 8/5/2021 (Thu) at 9:30am.    To access your telemedicine visit:     Open a web browser, like Argyle [employer], and type [email or web address]     You will see a box asking you to check in to let Mk Kyle know that you are here.     Type in your name and press Check In. That will let kM see you in the virtual waiting room. At your scheduled appointment time, your provider will initiate the visit and connect you.     When your visit is done, you can simply close the browser window.        Please Note:  Ideally, you will connect from a desktop, laptop, or tablet with a WiFi connection. Your computer/tablet must have a camera and microphone. You can use a cell phone, if it has a camera, and if you can connect to WiFi. However, if you connect your phone over a cellular network, it is of lower quality and less reliable      **For crisis resources, please see the information at the end of this document**     Patient Education      Thank you for coming to the [facility] MENTAL HEALTH & ADDICTION [place] [facility].    Lab Testing:  If you had lab testing today and your results are reassuring or normal they will be mailed to you or sent through Jentro [employer] within 7 days. If the lab tests need quick action we will call you with the results. The phone number we will call with results is # [phone] (home) . If this is not the best number please call our clinic and change the number.    Medication Refills:  If you need any refills please call your pharmacy and  they will contact us. Our fax number for refills is [phone]. Please allow three business for refill processing. If you need to  your refill at a new pharmacy, please contact the new pharmacy directly. The new pharmacy will help you get your medications transferred.     Scheduling:  If you have any concerns about today's visit or wish to schedule another appointment please call our office during normal business hours [phone] (8-5:00 M-F)    Contact Us:  Please call [phone] during business hours (8-5:00 M-F).  If after clinic hours, or on the weekend, please call  [phone].    Financial Assistance [phone]  [facility] Billing [phone]  Central Billing Office, [facility]: [phone]  [place] Billing [phone]  Medical Records [phone]  [place] Patient Bill of Rights [email or web address]       MENTAL HEALTH CRISIS NUMBERS:  For a medical emergency please call  911 or go to the nearest ER.     [facility]:   [facility] -[phone]   Crisis Residence [facility] -[phone]   Walk-In Counseling [facility] -[phone]   COPE 24/7 [place] Mobile Team -[phone] (adults)/[phone] (child)  CHILD: [facility] needs assessment team - [phone]      [facility]:   [facility] - [phone]   Walk-in counseling [facility] - [phone]   Walk-in counseling [facility] - [phone]   Crisis Residence [facility] Residence - [phone]  Urgent Care Adult Mental Cgqjlq-[phone] mobile unit/ 24/7 crisis line    National Crisis Numbers:   National Suicide Prevention Lifeline: 6-331-213-TALK (318-387-8150)  Poison Control Center - 1-887.788.8866  Medicine in Practice/resources for a list of additional resources (SOS)  Trans Lifeline a hotline for transgender people 1-813.636.1418  The Armando Project a hotline for  LGBT youth 2-065-978-7908  Crisis Text Line: For any crisis 24/7   To: 204348  see www.crisistextline.org  - IF MAKING A CALL FEELS TOO HARD, send a text!         Again thank you for choosing [facility] MENTAL HEALTH & ADDICTION [facility] and please let us know how we can best partner with you to improve you and your family's health.    You may be receiving a survey regarding this appointment. We would love to have your feedback, both positive and negative. The survey is done by an external company, so your answers are anonymous.

## 2021-07-15 NOTE — PROGRESS NOTES
"VIDEO VISIT  Josemanuel Edmond is a 76 year old patient who is being evaluated via a billable video visit.      The patient has been notified of following:   \"This video visit will be conducted via a call between you and your physician/provider. We have found that certain health care needs can be provided without the need for an in-person physical exam. This service lets us provide the care you need with a video conversation. If a prescription is necessary we can send it directly to your pharmacy. If lab work is needed we can place an order for that and you can then stop by our lab to have the test done at a later time. Insurers are generally covering virtual visits as they would in-office visits so billing should not be different than normal.  If for some reason you do get billed incorrectly, you should contact the billing office to correct it and that number is in the AVS .    Video Conference to be completed via:  [email or web address]    Patient has given verbal consent for video visit?:  Yes    Patient would prefer that any video invitations be sent by: Send to e-mail at: [email or web address]      How would patient like to obtain AVS?:  Ezequiel    AVS SmartPhrase [PsychAVS] has been placed in 'Patient Instructions':  Yes    "

## 2021-07-29 ASSESSMENT — ANXIETY QUESTIONNAIRES
3. WORRYING TOO MUCH ABOUT DIFFERENT THINGS: SEVERAL DAYS
7. FEELING AFRAID AS IF SOMETHING AWFUL MIGHT HAPPEN: NOT AT ALL
GAD7 TOTAL SCORE: 4
GAD7 TOTAL SCORE: 4
1. FEELING NERVOUS, ANXIOUS, OR ON EDGE: SEVERAL DAYS
8. IF YOU CHECKED OFF ANY PROBLEMS, HOW DIFFICULT HAVE THESE MADE IT FOR YOU TO DO YOUR WORK, TAKE CARE OF THINGS AT HOME, OR GET ALONG WITH OTHER PEOPLE?: NOT DIFFICULT AT ALL
GAD7 TOTAL SCORE: 4
7. FEELING AFRAID AS IF SOMETHING AWFUL MIGHT HAPPEN: NOT AT ALL
5. BEING SO RESTLESS THAT IT IS HARD TO SIT STILL: NOT AT ALL
4. TROUBLE RELAXING: NOT AT ALL
2. NOT BEING ABLE TO STOP OR CONTROL WORRYING: SEVERAL DAYS
6. BECOMING EASILY ANNOYED OR IRRITABLE: SEVERAL DAYS

## 2021-07-30 ASSESSMENT — ANXIETY QUESTIONNAIRES: GAD7 TOTAL SCORE: 4

## 2021-08-02 ENCOUNTER — VIRTUAL VISIT (OUTPATIENT)
Dept: BEHAVIORAL HEALTH | Facility: CLINIC | Age: 76
End: 2021-08-02
Payer: COMMERCIAL

## 2021-08-02 DIAGNOSIS — F10.21 ALCOHOLISM IN REMISSION (H): ICD-10-CM

## 2021-08-02 DIAGNOSIS — F41.1 GAD (GENERALIZED ANXIETY DISORDER): ICD-10-CM

## 2021-08-02 DIAGNOSIS — F32.1 MODERATE MAJOR DEPRESSION (H): Primary | ICD-10-CM

## 2021-08-02 PROCEDURE — 90834 PSYTX W PT 45 MINUTES: CPT | Mod: 95 | Performed by: SOCIAL WORKER

## 2021-08-02 ASSESSMENT — PATIENT HEALTH QUESTIONNAIRE - PHQ9
SUM OF ALL RESPONSES TO PHQ QUESTIONS 1-9: 3
SUM OF ALL RESPONSES TO PHQ QUESTIONS 1-9: 3
10. IF YOU CHECKED OFF ANY PROBLEMS, HOW DIFFICULT HAVE THESE PROBLEMS MADE IT FOR YOU TO DO YOUR WORK, TAKE CARE OF THINGS AT HOME, OR GET ALONG WITH OTHER PEOPLE: SOMEWHAT DIFFICULT

## 2021-08-02 ASSESSMENT — COLUMBIA-SUICIDE SEVERITY RATING SCALE - C-SSRS
2. HAVE YOU ACTUALLY HAD ANY THOUGHTS OF KILLING YOURSELF?: NO
6. HAVE YOU EVER DONE ANYTHING, STARTED TO DO ANYTHING, OR PREPARED TO DO ANYTHING TO END YOUR LIFE?: NO
ATTEMPT PAST THREE MONTHS: NO
1. IN THE PAST MONTH, HAVE YOU WISHED YOU WERE DEAD OR WISHED YOU COULD GO TO SLEEP AND NOT WAKE UP?: NO
1. IN THE PAST MONTH, HAVE YOU WISHED YOU WERE DEAD OR WISHED YOU COULD GO TO SLEEP AND NOT WAKE UP?: NO
TOTAL  NUMBER OF ABORTED OR SELF INTERRUPTED ATTEMPTS PAST LIFETIME: NO
ATTEMPT LIFETIME: NO
TOTAL  NUMBER OF INTERRUPTED ATTEMPTS PAST 3 MONTHS: NO
TOTAL  NUMBER OF INTERRUPTED ATTEMPTS LIFETIME: NO
TOTAL  NUMBER OF ABORTED OR SELF INTERRUPTED ATTEMPTS PAST 3 MONTHS: NO
2. HAVE YOU ACTUALLY HAD ANY THOUGHTS OF KILLING YOURSELF LIFETIME?: NO
6. HAVE YOU EVER DONE ANYTHING, STARTED TO DO ANYTHING, OR PREPARED TO DO ANYTHING TO END YOUR LIFE?: NO

## 2021-08-02 NOTE — PROGRESS NOTES
"Josemanuel Edmond is a 76 year old male who is being evaluated via a telephone visit.      The patient has been notified of the following:     \"We have found that certain health care needs can be provided without the need for a face to face visit.  This service lets us provide the care you need with a short phone conversation.      I will have full access to your [place] medical record during this entire phone call.   I will be taking notes for your medical record.     Since this is like an office visit, we will bill your insurance company for this service.  Please check with your medical insurance if this type of telephone visit/virtual care is covered.  You may be responsible for the cost of this service if insurance coverage is denied.      There are potential benefits and risks of telephone visits (e.g. limits to patient confidentiality) that differ from in-person visits.?  Confidentiality still applies for telephone services, and nobody will record the visit.  It is important to be in a quiet, private space that is free of distractions (including cell phone or other devices) during the visit.??     If during the course of the call I believe a telephone visit is not appropriate, you will not be charged for this service\"    Consent has been obtained for this service by care team member: yes.    Start time: 1040 am    End time: 1125 am    [facility] Primary Care: Integrated Behavioral Health  August 3, 2021      Behavioral Health Clinician Progress Note    Patient Name: Josemanuel Edmond           Service Type: Phone Visit      Service Location:  at the patient's home      Session Start Time: 1040 am  Session End Time: 1125 am      Session Length: 38 - 52      Attendees: Patient    Visit Activities (Refresh list every visit): [facility] and [facility] Only    Diagnostic Assessment Date: not completed  Treatment Plan Review Date: not completed  See Flowsheets for today's PHQ-9 and CAROLYNN-7 results  Previous " "PHQ-9:   PHQ-9 SCORE 12/10/2019 6/3/2021 8/2/2021   PHQ-9 Total Score - - -   PHQ-9 Total Score MyChart - - 3 (Minimal depression)   PHQ-9 Total Score 4 3 3     Previous CAROLYNN-7:   CAROLYNN-7 SCORE 12/10/2019 6/3/2021 7/29/2021   Total Score - - -   Total Score - - 4 (minimal anxiety)   Total Score 2 2 4       BALDEMAR LEVEL:  BALDEMAR Score (Last Two) 12/9/2010   BALDEMAR Raw Score 38   Activation Score 52.9   BALDEMAR Level 2       DATA  Extended Session (60+ minutes): No  Interactive Complexity: No  Crisis: No    Treatment Objective(s) Addressed in This Session:  Target Behavior(s): disease management/lifestyle changes decrease depression    Depressed Mood: Increase interest, engagement, and pleasure in doing things  Decrease frequency and intensity of feeling down, depressed, hopeless  Identify negative self-talk and behaviors: challenge core beliefs, myths, and actions  Improve concentration, focus, and mindfulness in daily activities   Anxiety: will experience a reduction in anxiety, will develop more effective coping skills to manage anxiety symptoms, will develop healthy cognitive patterns and beliefs and will increase ability to function adaptively    Current Stressors / Issues:  First session with patient. He is referred by PCP to increase coping skills to manage anxiety. Patient reports his depression and anxiety are worse since he stopped using alcohol and ativan. Patient reports he would like to \"feel good\" again. Discussed situations where his anxiety gets in the way of his ability to feel comfortable and enjoy what is happening. Discussed possible referral for [facility] therapist Alex Batista. Provided contact information along with making referral. Patient will continue to follow up with PCP regarding his medication.       Progress on Treatment Objective(s) / Homework:  none established    Motivational Interviewing    MI Intervention: Expressed Empathy/Understanding, Supported Autonomy, Collaboration, Evocation, Open-ended " questions, Reflections: simple and complex and Reframe     Change Talk Expressed by the Patient: Desire to change Reasons to change Need to change    Provider Response to Change Talk: E - Evoked more info from patient about behavior change, A - Affirmed patient's thoughts, decisions, or attempts at behavior change, R - Reflected patient's change talk and S - Summarized patient's change talk statements      Care Plan review completed: No    Medication Review:  No changes to current psychiatric medication(s)    Medication Compliance:  Yes    Changes in Health Issues:   None reported    Chemical Use Review:   Substance Use: Chemical use reviewed, no active concerns identified      Tobacco Use: No current tobacco use.      Assessment: Current Emotional / Mental Status (status of significant symptoms):  Risk status (Self / Other harm or suicidal ideation)  Patient denies a history of suicidal ideation, suicide attempts, self-injurious behavior, homicidal ideation, homicidal behavior and and other safety concerns  Patient denies current fears or concerns for personal safety.  Patient denies current or recent suicidal ideation or behaviors.  Patient denies current or recent homicidal ideation or behaviors.  Patient denies current or recent self injurious behavior or ideation.  Patient denies other safety concerns.  A safety and risk management plan has not been developed at this time, however patient was encouraged to call [place] [phone] should there be a change in any of these risk factors.    Appearance:   phone visit   Eye Contact:   phone visit   Psychomotor Behavior: phone visit   Attitude:   Cooperative  Pleasant Attentive Rudy  Orientation:   All  Speech   Rate / Production: Normal    Volume:  Normal   Mood:    Normal Ambivalence  Affect:    phone visit   Thought Content:  Clear   Thought Form:  Coherent  Logical   Insight:    Good     Diagnoses:  1. Moderate major depression (H)    2. CAROLYNN (generalized anxiety  disorder)    3. 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  He was also given information about mental health symptoms and treatment options .  CD Recommendations: Maintain Sobriety. Wesley)Luis,SH[place],[facility]

## 2021-08-02 NOTE — Clinical Note
Fernando Ball!  This patient reports he will be seeing you soon as his new PCP. He is not interested in returning to me as I questioned his perception of how well  he was really doing in the past when he was taking ativan and using a lot of alcohol. He would like to feel that way again. Have referred to Alex Batista :)  Hope you are doing well! Nichelle

## 2021-08-03 ASSESSMENT — PATIENT HEALTH QUESTIONNAIRE - PHQ9: SUM OF ALL RESPONSES TO PHQ QUESTIONS 1-9: 3

## 2021-08-05 ENCOUNTER — VIRTUAL VISIT (OUTPATIENT)
Dept: PSYCHIATRY | Facility: CLINIC | Age: 76
End: 2021-08-05
Attending: NURSE PRACTITIONER
Payer: COMMERCIAL

## 2021-08-05 DIAGNOSIS — F33.1 MAJOR DEPRESSIVE DISORDER, RECURRENT, MODERATE (H): Primary | ICD-10-CM

## 2021-08-05 DIAGNOSIS — F40.10 SOCIAL ANXIETY DISORDER: ICD-10-CM

## 2021-08-05 PROCEDURE — 99214 OFFICE O/P EST MOD 30 MIN: CPT | Mod: 95 | Performed by: NURSE PRACTITIONER

## 2021-08-05 RX ORDER — LAMOTRIGINE 25 MG/1
25 TABLET ORAL 2 TIMES DAILY
Qty: 180 TABLET | Refills: 0 | Status: SHIPPED | OUTPATIENT
Start: 2021-08-05 | End: 2021-11-09

## 2021-08-05 RX ORDER — ARIPIPRAZOLE 5 MG/1
5 TABLET ORAL DAILY
Qty: 30 TABLET | Refills: 1 | Status: SHIPPED | OUTPATIENT
Start: 2021-08-05 | End: 2021-10-14

## 2021-08-05 ASSESSMENT — PAIN SCALES - GENERAL: PAINLEVEL: NO PAIN (0)

## 2021-08-05 NOTE — PROGRESS NOTES
Start Time:  0930         End Time: 0950    Telemedicine Visit: The patient's condition can be safely assessed and treated via synchronous audio and visual telemedicine encounter.      Reason for Telemedicine Visit: Due to COVID 19 pandemic, clinic switching all appointments to telemedicine     Originating Site (Patient Location): Patient's home    Distant Site (Provider Location): Provider Remote Setting    Consent:  The patient/guardian has verbally consented to: the potential risks and benefits of telemedicine (video visit) versus in person care; bill my insurance or make self-payment for services provided; and responsibility for payment of non-covered services.     Mode of Communication:  Video Conference via Other: pt's microphone was not working, thus changed into phone only visit.    As the provider I attest to compliance with applicable laws and regulations related to telemedicine.    Psychiatry Clinic Progress Note                                                                  Patient Name: Josemanuel Edmond  YOB: 1945  MRN: [id number]  Date of Service:  8/5/2021  Last Seen:7/15/2021    Josemanuel Edmond is a 76 year old person assigned male at birth, identifies as cisgender male who uses the name Gavin and pronoun pat.       Gavin Edmond is a 76 year old year old adult who presents for ongoing psychiatric care.  Gavin Edmond was last seen on 7/15/2021.    At that time,        Medication Ordered/Consults/Labs/tests Ordered:      Medication:   -Restart Prozac 5 mg daily for every other day for this week.    Next week: Take Prozac 5 mg daily every 3 days.  3rd week: Take Prozac 5 mg daily x 1 and discontinue  Monitor for brain zap and send [employer] message to mk to see if this improves brain zap.  -Increase Abilify to 2.5 mg daily for mood and anxiety.  Make this change when you finish Prozac 5 mg daily every other day.  -Continue all other medications for now  OTC Recommendations:  none  Lab Orders:  none  Referrals: none  Release of Information: none  Future Treatment Considerations: Per symptoms.   Return for Follow Up: in 3 weeks per pt's request    Pertinent Background:  Reports depression started 2002 after his son was killed in MVA.  Anxiety also started around the same time, but social anxiety started 6-7 years ago.  Denies any hx of SI, SIB, HI, psych hospitalization.  After son was killed, pt had heavy ETOH use on and off. Psych critical item history includes [no critical items].      Previous Psychiatric Meds: Lexapro (ineffective), Prozac (helps depression, but anxiety increase), Hydroxyzine (oversedation, PCP trial), Wellbutrin XL, Xanax, Klonopin, Valium, Ativan, Remeron (30 mg only, worked well for sleep and anxiety, but weight gain), Nortriptyline, Vistaril (dry mouth, nightmares), Remeron (oversedation at 45 mg, 22.5mg and 15 mg and wt gain), Trazodone (oversedate at 50 mg), Ambien (effective for sleep, fall), Belsomra not covered with insurance.    Interim History                                                                                                        4, 4     Since the last visit,  -Therapist he saw was not a good fit.  Is referred to different therapist.  Need to make an appt.  -Taking Prozac 5 mg every other day and will be off of Prozac after 1 dose next week.  Brain zap has resolved.  -Continues to feel depressed, denies SI, SIB or HI.  Wondering if his social anxiety is actually due to anhedonia from depression.  -Also reports he realized that he started anxiety after stopping ETOH and Ativan.  Wondering if he was masking social anxiety with these.  -Going to AA group every other week.  -Also talking to friend who is trustworthy and this is helpful.  -Wondering if he should stay on Prozac or even go back to Lexapro or try Paxil.    Denies any symptoms suggestive of hypomania or psychosis.    Current Suicidality/Hx of Suicide  Attempts: Denies both  CoCominent Medical concerns: Denies    Medication Side Effects: The patient denies all medication side effects.      Medical Review of Systems     Apart from the symptoms mentioned int he HPI, the 14 point review of systems, including constitutional, HEENT, cardiovascular, respiratory, gastrointestinal, genitourinary, musculoskeletal, integumentary, endocrine, neurological, hematologic and allergic is entirely negative.    Substance Use   Pt has been staying substance free since last seen.  Notes has not used any ETOH x 3 years.    Social/ Family History                                  [per patient report]                                 1ea,1ea   Living arrangements: living with spouse and feels safe  Social Support: spouse  Access to gun: owns a gun  Retired in 2008.  Trauma hx includes loss of son by MVA in 2002.    Allergy                                Bactrim [sulfamethoxazole w/trimethoprim]    Current Medications                                                                                                       Current Outpatient Medications   Medication Sig Dispense Refill     acetaminophen (TYLENOL) 500 MG tablet Take 1,000 mg by mouth every 8 hours as needed for mild pain       ARIPiprazole (ABILIFY) 5 MG tablet Take 0.5 tablets (2.5 mg) by mouth daily 15 tablet 1     buPROPion (WELLBUTRIN SR) 150 MG 12 hr tablet Take 1 tablet (150 mg) by mouth 2 times daily 180 tablet 0     busPIRone HCl (BUSPAR) 30 MG tablet Take 1 tablet (30 mg) by mouth 2 times daily 180 tablet 0     doxazosin (CARDURA) 8 MG tablet Take 1 tablet (8 mg) by mouth At Bedtime 90 tablet 3     finasteride (PROSCAR) 5 MG tablet Take 1 tablet (5 mg) by mouth daily 90 tablet 3     gabapentin (NEURONTIN) 600 MG tablet Take 1 tablet (600 mg) by mouth 3 times daily 360 tablet 0     hydrocortisone, Perianal, (HYDROCORTISONE) 2.5 % cream Place rectally 2 times daily as needed for hemorrhoids 28 g 1     lamoTRIgine  (LAMICTAL) 25 MG tablet Take 1 tablet (25 mg) by mouth 2 times daily 180 tablet 0     metoprolol tartrate (LOPRESSOR) 25 MG tablet 1 tab by mouth twice daily 180 tablet 3     XARELTO ANTICOAGULANT 20 MG TABS tablet TAKE 1 TABLET DAILY WITH   DINNER 90 tablet 3        Mental Status Exam                                                                                   9, 14 cog        Alertness: alert  and oriented  Appearance:  Casually dressed and Adequately groomed  Behavior/Demeanor: cooperative, pleasant and calm, with good  eye contact   Speech: regular rate and rhythm  Mood :  anhedonia  Affect: mostly full range; was congruent to mood; was congruent to content  Thought Process (Associations):  Linear and Goal directed  Thought process (Rate):  Normal  Thought content:  no overt psychosis, denies suicidal ideation, intent or thoughts and patient does not appear to be responding to internal stimuli  Perception:  Reports none;  Denies depersonalization and derealization  Attention/Concentration:  Fair  Memory:  Immediate recall intact, Short-term memory intact and Long-term memory intact  Language: intact  Fund of Knowledge/Intelligence:  Average  Abstraction:  Normal  Insight:  Good and Fair  Judgment:  Good and Fair  Cognition: (6) does  appear grossly intact; formal cognitive testing was not done    Physical Exam     Motor activity/EPS:  Normal  Psychomotor: normal or unremarkable    Labs and Results      Pertinent findings on review include: Review of records with relevant information reported in the HPI.  Reviewed pt's past medical record and obtained collateral information.      [place] PRESCRIPTION MONITORING PROGRAM [] was checked today:  indicates no refills since last seen.    PHQ9 Today:  N/A  PHQ 12/10/2019 6/3/2021 8/2/2021   PHQ-9 Total Score 4 3 3   Q9: Thoughts of better off dead/self-harm past 2 weeks Not at all Not at all Not at all       CAROLYNN 7 Today: N/A  CAROLYNN-7 SCORE 12/10/2019 6/3/2021 7/29/2021    Total Score - - -   Total Score - - 4 (minimal anxiety)   Total Score 2 2 4       Recent Labs   Lab Test 06/03/21  1402 09/04/19  1020 09/14/18  1629   CR 0.91 0.81 0.93   GFRESTIMATED 80 86 79     Recent Labs   Lab Test 06/03/21  1402 01/22/18  1354   AST 18 15   ALT 23 32   ALKPHOS 51 60     PSYCHOTROPIC DRUG INTERACTIONS:   Abilify---Buspar---Gabapentin: Concurrent use of GABAPENTIN and CNS DEPRESSANTS may result in respiratory depression.    Prozac---Xarelto: Concurrent use of RIVAROXABAN and SEROTONIN NOREPINEPHRINE REUPTAKE INHIBITORS AND SSRIS may result in increased risk of bleeding  Prozac---Wellbutrin: Concurrent use of BUPROPION and SELECTED SEIZURE THRESHOLD LOWERING CYP2D6 SUBSTRATES may result in increased exposure of CYP2D6 substrates; increased risk of seizure.   Prozac---Buspar: Concurrent use of FLUOXETINE and BUSPIRONE may result in worsening of psychiatric symptoms.   Abilify---Prozac: Concurrent use of ARIPIPRAZOLE and STRONG CYP2D6 INHIBITORS WITH QT-INTERVAL PROLONGATION may result in increased exposure of aripiprazole and increased risk of QT prolongation or torsades de pointes.   Abilify---Buspar: Concurrent use of ARIPIPRAZOLE and STRONG CYP2D6 INHIBITORS may result in increased exposure of aripiprazole  Abilify---Wellbutrin: Concurrent use of ARIPIPRAZOLE and STRONG CYP2D6 INHIBITORS may result in increased exposure of aripiprazole.      MANAGEMENT:  Monitoring for adverse effects, routine vitals and patient is aware of risks    Impression/Assessment      Gavin Edmond is a 76 year old adult  who presents for med management follow up.  Pt appears mostly stable in his mood and anxiety, denies SI, SIB or HI during the appointment.  However, pt continues to report social anxiety and now wondering if anhedonia is causing social anxiety.  Also noting he started to have social anxiety after stopping ETOH and Ativan.  Discussed good insight onto ETOH masking social anxiety and it is common  people to notice symptoms after stopping ETOH.  Discussed risk of long term Ativan use and also he fell with Ativan.  Pt also wondered about increasing Prozac, retrial of Lexapro and trial of Paxil.  Discussed Prozac exacerbated anxiety while Lexapro was ineffective.  Paxil may be possible trial in the future, but for the meantime, will increase Abilify to see if this would help mood and anxiety as selective serotonin reuptake inhibitor use could increase risk of bleeding with Xarelto.  Pt agreed to increase Abilify to 5 mg daily and will continue on taper off schedule with Prozac.  Will continue on all other medication regimen for now.  Encouraged to make an appt with referred therapist and attend AA meetings.    Diagnosis                                                                   Social Anxiety disorder  MDD     Treatment Recommendation & Plan       Medication Ordered/Consults/Labs/tests Ordered:     Medication:   -Increase Abilify to 5 mg daily for mood and anxiety.  Monitor for restlessness and abnormal muscle movement.  -Continue with taper off of Fluoxetine. If depression gets worse after stopping Fluoxetine, please contact the clinic.  -Continue all other medications for now.  OTC Recommendations: none  Lab Orders:  none  Referrals: none  Release of Information: none  Future Treatment Considerations: Per symptoms.   Return for Follow Up: in 4 weeks    -Discussed safety plan for suicidal thoughts  -Discussed plan for suicidality  -Discussed available emergency services  -Patient agrees with the treatment plan  -Encouraged to continue outpatient therapy to gain more coping mechanism for stress.    Treatment Risk Statement: Discussed with the patient my impressions, as well as recommended studies. I educated patient on the differential diagnosis and prognosis. I discussed with the patient the risks and benefits of medications versus no interventions, including efficacy, dose, possible side effects and  length of treatment and the importance of medication compliance.  The patient understands the risks, benefits, adverse effects and alternatives. Agrees to treatment with the capacity to do so. No medical contraindications to treatment. The patient also understands the risks of using street drugs or alcohol. I also discussed the potential metabolic side effects of antipsychotics including weight gain, diabetes and lipid abnormalities, risk of tardive dyskinesia and indicates understanding of this and agrees to regular medical monitoring      CRISIS NUMBERS:   Provided routinely in AVS.    Diagnosis or treatment significantly limited by social determinants of health.      Rylee Kyle, MONICA,  8/5/2021

## 2021-08-05 NOTE — PATIENT INSTRUCTIONS
-Increase Abilify to 5 mg daily for mood and anxiety.  Monitor for restlessness and abnormal muscle movement.  -Continue with taper off of Fluoxetine. If depression gets worse after stopping Fluoxetine, please contact the clinic.  -Continue all other medications for now.    Your next appointment is scheduled on 9/2/2021 (Thu) at 10am.      **For crisis resources, please see the information at the end of this document**     Patient Education      Thank you for coming to the [facility] [place] [facility].    Lab Testing:  If you had lab testing today and your results are reassuring or normal they will be mailed to you or sent through [employer] within 7 days. If the lab tests need quick action we will call you with the results. The phone number we will call with results is # [phone] (home) . If this is not the best number please call our clinic and change the number.    Medication Refills:  If you need any refills please call your pharmacy and they will contact us. Our fax number for refills is [phone]. Please allow three business for refill processing. If you need to  your refill at a new pharmacy, please contact the new pharmacy directly. The new pharmacy will help you get your medications transferred.     Scheduling:  If you have any concerns about today's visit or wish to schedule another appointment please call our office during normal business hours [phone] (8-5:00 M-F)    Contact Us:  Please call [phone] during business hours (8-5:00 M-F).  If after clinic hours, or on the weekend, please call  [phone].    Financial Assistance [phone]  [facility] Billing [phone]  Central Billing Office, [facility]: [phone]  [place] Billing [phone]  Medical Records [phone]  [place] Patient Bill of Rights [email or web address]       MENTAL HEALTH CRISIS NUMBERS:  For a medical  emergency please call  911 or go to the nearest ER.     [facility]:   [facility] -[phone]   Crisis Residence [facility] -[phone]   Walk-In Counseling Center [facility] -[phone]   COPE 24/7 [place] Mobile Team -[phone] (adults)/[phone] (child)  CHILD: [facility] needs assessment team - [phone]      [facility]:   [facility] - [phone]   Walk-in counseling [facility] - [phone]   Walk-in counseling [facility] - [phone]   Crisis Residence [facility] - [phone]  Urgent Care Adult Mental [facility]-[phone] mobile unit/ 24/7 crisis line    National Crisis Numbers:   National Suicide Prevention Lifeline: 0-720-611-TALK (822-968-1639)  Poison Control Center - 3-815-353-5407  [employer]/resources for a list of additional resources (SOS)  Trans Lifeline a hotline for transgender people 3-686-107-2114  [employer] a hotline for LGBT youth 1-909.191.7141  Crisis Text Line: For any crisis 24/7   To: 299998  see www.crisistextline.org  - IF MAKING A CALL FEELS TOO HARD, send a text!         Again thank you for choosing [facility] and please let us know how we can best partner with you to improve you and your family's health.    You may be receiving a survey regarding this appointment. We would love to have your feedback, both positive and negative. The survey is done by an external company, so your answers are anonymous.

## 2021-08-05 NOTE — PROGRESS NOTES
"VIDEO VISIT  Josemanuel Edmond is a 76 year old patient who is being evaluated via a billable video visit.      The patient has been notified of following:   \"This video visit will be conducted via a call between you and your physician/provider. We have found that certain health care needs can be provided without the need for an in-person physical exam. This service lets us provide the care you need with a video conversation. If a prescription is necessary we can send it directly to your pharmacy. If lab work is needed we can place an order for that and you can then stop by our lab to have the test done at a later time. Insurers are generally covering virtual visits as they would in-office visits so billing should not be different than normal.  If for some reason you do get billed incorrectly, you should contact the billing office to correct it and that number is in the AVS .    Video Conference to be completed via:  [email or web address]    Patient has given verbal consent for video visit?:  Yes    Patient would prefer that any video invitations be sent by: Send to e-mail at: [email or web address]      How would patient like to obtain AVS?:  Ezequiel    AVS SmartPhrase [PsychAVS] has been placed in 'Patient Instructions':  Yes    "

## 2021-08-09 ENCOUNTER — E-VISIT (OUTPATIENT)
Dept: FAMILY MEDICINE | Facility: CLINIC | Age: 76
End: 2021-08-09
Payer: COMMERCIAL

## 2021-08-09 ENCOUNTER — MYC MEDICAL ADVICE (OUTPATIENT)
Dept: FAMILY MEDICINE | Facility: CLINIC | Age: 76
End: 2021-08-09

## 2021-08-09 DIAGNOSIS — B96.89 ACUTE BACTERIAL SINUSITIS: Primary | ICD-10-CM

## 2021-08-09 DIAGNOSIS — J01.90 ACUTE BACTERIAL SINUSITIS: Primary | ICD-10-CM

## 2021-08-09 PROCEDURE — 99421 OL DIG E/M SVC 5-10 MIN: CPT | Performed by: NURSE PRACTITIONER

## 2021-08-09 NOTE — TELEPHONE ENCOUNTER
Pt has had congestion, facial pain,pressure x 7 days.  Mucous is thick and blood tinged.  No fever.  Pt is taking Benadryl, Flonase and pushing fluids.  Pt is asking for ABXs?  Ph[facility]acy is [facility].  Advise.  Santos

## 2021-08-09 NOTE — PATIENT INSTRUCTIONS
Dear Josemanuel Edmond    After reviewing your responses, I've been able to diagnose you with?a sinus infection caused by bacteria.?     Based on your responses and diagnosis, I have prescribed antibiotic to treat your symptoms. I have sent this to your pharmacy.?     It is also important to stay well hydrated, get lots of rest and take over-the-counter decongestants,?tylenol?or ibuprofen if you?are able to?take those medications per your primary care provider to help relieve discomfort.?     It is important that you take?all of?your prescribed medication even if your symptoms are improving after a few doses.? Taking?all of?your medicine helps prevent the symptoms from returning.?     If your symptoms worsen, you develop severe headache, vomiting, high fever (>102), or are not improving in 7 days, please contact your primary care provider for an appointment or visit any of our convenient Walk-in Care or Urgent Care Centers to be seen which can be found on our website?here.?     Thanks again for choosing?us?as your health care partner,?   ?  KAYLA Valle CNP?     When to Use Antibiotics    Antibiotics are medicines used to treat infections caused by bacteria. They don t work for an illness caused by a virus. And they don't work for an allergic reaction. In fact, taking antibiotics for reasons other than an infection by bacteria can cause problems. You may have side effects from the medicine. And if you need an antibiotic in the future, it may not work well. This is because the bacteria can become immune to the medicine. You can also get a type of diarrhea that's hard to treat. This diarrhea is called C. diff.   When antibiotics likely won t help  Your healthcare provider won t usually give you antibiotics for the conditions listed below. You can help by not asking for them if you have:     A cold. This type of illness is caused by a virus. It can cause a runny nose, stuffed-up nose, sneezing, coughing,  and headache. You may also have mild body aches and low fever. A cold gets better on its own in a few days to a week.    The flu (influenza). This is a respiratory illness caused by a virus. The flu usually goes away on its own in a week or so. It can cause fever, body aches, sore throat, and tiredness.    Bronchitis. This is an infection in the lungs. It is most often caused by a virus. You may have coughing, phlegm, body aches, and a low fever. A common type of bronchitis is known as a chest cold. This is called acute bronchitis. This often happens after you have a respiratory infection like a cold. Bronchitis can take weeks to go away. Antibiotics often don t help.    Most sore throats. Sore throats are most often caused by viruses. Your throat may feel scratchy or achy. It may hurt to swallow. You may also have a low fever and body aches. A sore throat usually gets better in a few days.    Most outer ear infections. An ear infection may be caused by a virus or bacteria. It causes pain in the ear. Antibiotics by mouth usually don t help. Low-dose antibiotic ear drops work much better.    Some inner ear infections. An inner ear infection (otitis media) can be caused by a virus in the ear. It can also cause pain and a high fever. Most older children with low-grade fever don't need to be treated with antibiotics.    Most sinus infections. This is also known as sinusitis. This kind of infection causes sinus pain and swelling, and a runny nose. In most cases, it goes away on its own. Antibiotics don t make recovery quicker.    Allergic rhinitis. This is a set of symptoms caused by an allergic reaction. You may have sneezing, a runny nose, itchy or watery eyes, or a sore throat. Allergies are not treated with antibiotics.    Low fever. A mild fever that s less than 100.4 F (38 C) most likely doesn t need to be treated with antibiotics.   When antibiotics can help  Antibiotics can be used to  treat:                                                       Strep throat. This is a throat infection caused by a certain type of bacteria. Symptoms of strep throat include a sore throat, white patches on the tonsils, red spots on the roof of the mouth, fever, body aches, and nausea and vomiting. Strep throat almost never causes a cough.    Urinary tract infection (UTI). This is an infection of the bladder and the tube that takes urine out of the body. It is caused by bacteria. It can cause burning pain and urine that s cloudy or tinted with blood. UTIs are very common. Antibiotics usually help treat them.    Some outer ear infections. In some cases, a healthcare provider may prescribe antibiotics by mouth for an ear infection. You may need a test to show the cause of the ear infection.    Some sinus infections. In some cases, your healthcare provider may give you antibiotics. He or she may first need to make sure your symptoms aren t caused by something else. This may be a virus, fungus, allergies, or air pollutants such as smoke.   Your healthcare provider may give you antibiotics if you have a condition that can affect your immune system. This includes diabetes or cancer.  Self-care at home  If your infection can t be treated with antibiotics, you can take other steps to feel better. Try the remedies below. In general:     Rest and sleep as much as needed.    Drink water and other clear fluids.    Don t smoke. Stay away from smoke from other people.    Use over-the-counter medicine such as acetaminophen or ibuprofen to ease pain or fever, as directed by your healthcare provider.  To treat sinus pain or nasal stuffiness:    Put a warm, moist cloth on your face where you feel sinus pain or pressure.    Try a nasal spray with medicine or saline. Use as directed by your healthcare provider.    Breathe in steam from a hot shower.    Use a humidifier or cool mist vaporizer.   To quiet a cough:     Use a humidifier or  cool mist vaporizer.    Breathe in steam from a hot shower.    Suck on cough lozenges.   To sooth a sore throat:     Suck on ice chips, frozen ice pops, or lozenges.    Use a sore throat spray.    Use a humidifier or cool mist vaporizer.    Gargle with saltwater.    Drink warm liquids.    Take ibuprofen to reduce swelling and pain.  To ease ear pain:     Hold a warm, moist washcloth on the ear for 10 minutes at a time.  [employer] last reviewed this educational content on 12/1/2019 2000-2021 [employer]. All rights reserved. This information is not intended as a substitute for professional medical care. Always follow your healthcare professional's instructions.

## 2021-08-19 NOTE — PROGRESS NOTES
Outpatient Physical Therapy Discharge Note     Patient: Josemanuel Edmond  : 1945    Beginning/End Dates of Reporting Period:  21 to 21    Referring Provider: Dr Peralta    Therapy Diagnosis: L LBP     Client Self Report: Pt reports less pain standing from sitting (rates pain at 2/10) On 21, pt called to report that he is feeling better and is no longer in need of PT.    Objective Measurements:  Objective Measure: Trunk ROM  Details: WFL with slight pain with ext only    Objective Measure: Spring testing  Details: painful with PA glides to L4 SP and L TP, L SI jt            Goals:  Goal Identifier 1   Goal Description Pt will be able to sleep through the night without waking with pain.   Target Date 21   Date Met  21   Progress (detail required for progress note):     Goal Identifier 2   Goal Description Pt will be able to lift heavy weights with < 2/10 pain.   Target Date 21   Date Met      Progress (detail required for progress note):     Goal Identifier 3   Goal Description Pt will be able to sit to stand with < 2/10 pain.   Target Date 21   Date Met      Progress (detail required for progress note):     Goal Identifier 4   Goal Description Pt will be independent with HEP for optimal functional recovery.   Target Date 21   Date Met      Progress (detail required for progress note):               Plan:  Discharge from therapy.    Discharge:    Reason for Discharge: Patient chooses to discontinue therapy.      Discharge Plan: Patient to continue home program.    Jennifer Simons PT

## 2021-08-24 DIAGNOSIS — F40.10 SOCIAL ANXIETY DISORDER: ICD-10-CM

## 2021-08-24 DIAGNOSIS — F33.1 MAJOR DEPRESSIVE DISORDER, RECURRENT, MODERATE (H): Primary | ICD-10-CM

## 2021-08-24 RX ORDER — VILAZODONE HYDROCHLORIDE 10 MG/1
10 TABLET ORAL DAILY
Qty: 30 TABLET | Refills: 1 | Status: SHIPPED | OUTPATIENT
Start: 2021-08-24 | End: 2021-10-14

## 2021-09-02 ENCOUNTER — VIRTUAL VISIT (OUTPATIENT)
Dept: PSYCHIATRY | Facility: CLINIC | Age: 76
End: 2021-09-02
Attending: NURSE PRACTITIONER
Payer: COMMERCIAL

## 2021-09-02 DIAGNOSIS — F40.10 SOCIAL ANXIETY DISORDER: ICD-10-CM

## 2021-09-02 DIAGNOSIS — F33.1 MAJOR DEPRESSIVE DISORDER, RECURRENT, MODERATE (H): Primary | ICD-10-CM

## 2021-09-02 PROCEDURE — 99214 OFFICE O/P EST MOD 30 MIN: CPT | Mod: GT | Performed by: NURSE PRACTITIONER

## 2021-09-02 ASSESSMENT — PAIN SCALES - GENERAL: PAINLEVEL: NO PAIN (0)

## 2021-09-02 NOTE — PROGRESS NOTES
Start Time:  1000         End Time: 1026    Telemedicine Visit: The patient's condition can be safely assessed and treated via synchronous audio and visual telemedicine encounter.      Reason for Telemedicine Visit: Due to COVID 19 pandemic, clinic switching all appointments to telemedicine     Originating Site (Patient Location): Patient's home    Distant Site (Provider Location): Provider Remote Setting    Consent:  The patient/guardian has verbally consented to: the potential risks and benefits of telemedicine (video visit) versus in person care; bill my insurance or make self-payment for services provided; and responsibility for payment of non-covered services.     Mode of Communication:  Video Conference via Other: Doxy audio did not work and turned into audio only visit.    As the provider I attest to compliance with applicable laws and regulations related to telemedicine.    Psychiatry Clinic Progress Note                                                                  Patient Name: Josemanuel Edmond  YOB: 1945  MRN: [id number]  Date of Service:  9/2/2021  Last Seen:8/5/2021    Josemanuel Edmond is a 76 year old person assigned male at birth, identifies as cisgender male who uses the name Gavin and pronoun pat.       Gavin Edmond is a 76 year old year old adult who presents for ongoing psychiatric care.  Gavin Edmond was last seen on 8/5/2021.    At that time,     Medication Ordered/Consults/Labs/tests Ordered:      Medication:   -Increase Abilify to 5 mg daily for mood and anxiety.  Monitor for restlessness and abnormal muscle movement.  -Continue with taper off of Fluoxetine. If depression gets worse after stopping Fluoxetine, please contact the clinic.  -Continue all other medications for now.  OTC Recommendations: none  Lab Orders:  none  Referrals: none  Release of Information: none  Future Treatment Considerations: Per symptoms.   Return for Follow Up: in 4 weeks    Pertinent  "Background:  Reports depression started 2002 after his son was killed in MVA.  Anxiety also started around the same time, but social anxiety started 6-7 years ago.  Denies any hx of SI, SIB, HI, psych hospitalization.  After son was killed, pt had heavy ETOH use on and off. Psych critical item history includes [no critical items].      Previous Psychiatric Meds: Lexapro (ineffective), Prozac (helps depression, but anxiety increase), Hydroxyzine (oversedation, PCP trial), Wellbutrin XL, Xanax, Klonopin, Valium, Ativan, Remeron (30 mg only, worked well for sleep and anxiety, but weight gain), Nortriptyline, Vistaril (dry mouth, nightmares), Remeron (oversedation at 45 mg, 22.5mg and 15 mg and wt gain), Trazodone (oversedate at 50 mg), Ambien (effective for sleep, fall), Belsomra not covered with insurance.    Interim History                                                                                                        4, 4     On 8/17/2021, pt sent [employer] message noting anxiety remains the same, but depression exacerbation.  Also noted 12 lbs wt gain in last 6 weeks with increased appetite.  Discussed possible trial of Viibryd while continuing all other medications for now, but the plan is to discontinue/decrease Abilify.  Started Viibryd 10 mg daily.    Since the last visit,  -Notes he started Viibryd about 1 week ago, tolerating medication OK.  -Notes continued depressed mood, exacerbation of memory difficulties.  Feels memory difficulties have been present for long time, but felt it got worse recently.  Cant' remember why he came into the room.  Does not have concern about fire safety or forgetting to lock the house.  -Taking Gabapentin 600 mg BID only as he felt this did not change his mood.  -Has not contacted new therapist \"just being a bad boy.\"  -Denies SI, SIB or HI.  -Anxiety has been present, but not as significant than before.  -Wondering if going back to Prozac would be helpful as he felt his " mood was better.    Denies any symptoms suggestive of hypomania or psychosis.    Current Suicidality/Hx of Suicide Attempts: Denies both  CoCominent Medical concerns: Denies    Medication Side Effects: The patient denies all medication side effects.      Medical Review of Systems     Apart from the symptoms mentioned int he HPI, the 14 point review of systems, including constitutional, HEENT, cardiovascular, respiratory, gastrointestinal, genitourinary, musculoskeletal, integumentary, endocrine, neurological, hematologic and allergic is entirely negative.    Substance Use   Pt has been staying substance free since last seen.  Notes has not used any ETOH x 3 years.    Social/ Family History                                  [per patient report]                                 1ea,1ea   Living arrangements: living with spouse and feels safe  Social Support: spouse  Access to gun: owns a gun  Retired in 2008.  Trauma hx includes loss of son by MVA in 2002.    Allergy                                Bactrim [sulfamethoxazole w/trimethoprim]    Current Medications                                                                                                       Current Outpatient Medications   Medication Sig Dispense Refill     acetaminophen (TYLENOL) 500 MG tablet Take 1,000 mg by mouth every 8 hours as needed for mild pain       ARIPiprazole (ABILIFY) 5 MG tablet Take 1 tablet (5 mg) by mouth daily 30 tablet 1     buPROPion (WELLBUTRIN SR) 150 MG 12 hr tablet Take 1 tablet (150 mg) by mouth 2 times daily 180 tablet 0     busPIRone HCl (BUSPAR) 30 MG tablet Take 1 tablet (30 mg) by mouth 2 times daily 180 tablet 0     doxazosin (CARDURA) 8 MG tablet Take 1 tablet (8 mg) by mouth At Bedtime 90 tablet 3     finasteride (PROSCAR) 5 MG tablet Take 1 tablet (5 mg) by mouth daily 90 tablet 3     gabapentin (NEURONTIN) 600 MG tablet Take 1 tablet (600 mg) by mouth 3 times daily 360 tablet 0     hydrocortisone, Perianal,  (HYDROCORTISONE) 2.5 % cream Place rectally 2 times daily as needed for hemorrhoids 28 g 1     lamoTRIgine (LAMICTAL) 25 MG tablet Take 1 tablet (25 mg) by mouth 2 times daily 180 tablet 0     metoprolol tartrate (LOPRESSOR) 25 MG tablet 1 tab by mouth twice daily 180 tablet 3     vilazodone (VIIBRYD) 10 MG TABS tablet Take 1 tablet (10 mg) by mouth daily 30 tablet 1     XARELTO ANTICOAGULANT 20 MG TABS tablet TAKE 1 TABLET DAILY WITH   DINNER 90 tablet 3        Mental Status Exam                                                                                   9, 14 cog      Alertness: alert  and oriented  Appearance:  Casually dressed and Adequately groomed  Behavior/Demeanor: cooperative, pleasant and calm, with good  eye contact   Speech: regular rate and rhythm  Mood :  depressed  Affect: slightly subdued; was congruent to mood; was congruent to content  Thought Process (Associations):  Linear and Goal directed  Thought process (Rate):  Normal  Thought content:  no overt psychosis, denies suicidal ideation, intent or thoughts and patient does not appear to be responding to internal stimuli  Perception:  Reports none;  Denies depersonalization and derealization  Attention/Concentration:  Fair  Memory:  Immediate recall intact and Short-term memory intact  Language: intact  Fund of Knowledge/Intelligence:  Average  Abstraction:  Normal  Insight:  Fair  Judgment:  Fair  Cognition: (6) does  appear grossly intact; formal cognitive testing was not done    Labs and Results      Pertinent findings on review include: Review of records with relevant information reported in the HPI.  Reviewed pt's past medical record and obtained collateral information.      [place] [facility] MONITORING PROGRAM [] was checked today:  indicates no refills since last seen.    PHQ9 Today:  N/A  PHQ 12/10/2019 6/3/2021 8/2/2021   PHQ-9 Total Score 4 3 3   Q9: Thoughts of better off dead/self-harm past 2 weeks Not at all Not at all Not at  all       CAROLYNN 7 Today: N/A  CAROLYNN-7 SCORE 12/10/2019 6/3/2021 7/29/2021   Total Score - - -   Total Score - - 4 (minimal anxiety)   Total Score 2 2 4       Recent Labs   Lab Test 06/03/21  1402 09/04/19  1020 09/14/18  1629   CR 0.91 0.81 0.93   GFRESTIMATED 80 86 79     Recent Labs   Lab Test 06/03/21  1402 01/22/18  1354   AST 18 15   ALT 23 32   ALKPHOS 51 60     PSYCHOTROPIC DRUG INTERACTIONS:   Abilify---Buspar---Gabapentin: Concurrent use of GABAPENTIN and CNS DEPRESSANTS may result in respiratory depression.    Viibryd---Xarelto: Concurrent use of RIVAROXABAN and SEROTONIN NOREPINEPHRINE REUPTAKE INHIBITORS AND SSRIS may result in increased risk of bleeding  Prozac---Wellbutrin: Concurrent use of BUPROPION and SELECTED SEIZURE THRESHOLD LOWERING CYP2D6 SUBSTRATES may result in increased exposure of CYP2D6 substrates; increased risk of seizure.   VIibryd---Buspar: Concurrent use of VILAZODONE and SEROTONERGIC AGENTS may result in increased risk for serotonin syndrome (hypertension, hyperthermia, myoclonus, mental status changes).   Abilify---Buspar: Concurrent use of ARIPIPRAZOLE and STRONG CYP2D6 INHIBITORS may result in increased exposure of aripiprazole  Abilify---Wellbutrin: Concurrent use of ARIPIPRAZOLE and STRONG CYP2D6 INHIBITORS may result in increased exposure of aripiprazole.      MANAGEMENT:  Monitoring for adverse effects, routine vitals and patient is aware of risks    Impression/Assessment      Gavin Edmond is a 76 year old adult  who presents for med management follow up.  Pt sounds slightly depressed, but not anxious, denies SI, sIB or HI during the appointment.  Pt has only tried Viibryd 10 mg daily x 1 week and tolerating medication well.  Continues to note difficulties with depressed mood, and today noted exacerbation of memory difficulties.  Notes this is not due to Viibryd as this has been ongoing problem.  Pt revisited restart of Prozac as he felt better.  Reiterated that Prozac  exacerbated anxiety and pt did not tolerate higher than 10 mg daily.  Also discussed we also tried to eliminate polypharmacy with eliminating low Lamictal and Wellbutrin SR, but this was not successful.  Also discussed switching medications frequently also is not helpful and encouraged to continue on one mediation without adjusting it on his own.  At this time, encouraged to continue all medications and plan is once Viibryd becomes effective to discontinue Abilify as he felt he gained wt with Abilify.    Also strongly encouraged to establish therapist.  Discussed he has tried numerous medication and this may not be medication that would help improve his mood.  Also discussed 55+ program, but pt declined.    Also discussed possibility of seeing geriatric psychiatry, but in our clinic, there's no geriatric psych provider available.  Discussed possibility of providers in [facility], but since he needs to establish some care in [facility], he declined today.    Diagnosis                                                                   Social Anxiety disorder  MDD    Treatment Recommendation & Plan       Medication Ordered/Consults/Labs/tests Ordered:     Medication: Continue on current medication regimen.  OTC Recommendations: none  Lab Orders:  none  Referrals: none  Release of Information: none  Future Treatment Considerations: Per symptoms.   Return for Follow Up: in 1 month    -Discussed safety plan for suicidal thoughts  -Discussed plan for suicidality  -Discussed available emergency services  -Patient agrees with the treatment plan  -Encouraged to continue outpatient therapy to gain more coping mechanism for stress.    Treatment Risk Statement: Discussed with the patient my impressions, as well as recommended studies. I educated patient on the differential diagnosis and prognosis. I discussed with the patient the risks and benefits of medications versus no interventions, including efficacy, dose, possible  side effects and length of treatment and the importance of medication compliance.  The patient understands the risks, benefits, adverse effects and alternatives. Agrees to treatment with the capacity to do so. No medical contraindications to treatment. The patient also understands the risks of using street drugs or alcohol. I also discussed the potential metabolic side effects of antipsychotics including weight gain, diabetes and lipid abnormalities, risk of tardive dyskinesia and indicates understanding of this and agrees to regular medical monitoring      CRISIS NUMBERS:   Provided routinely in AVS.    Diagnosis or treatment significantly limited by social determinants of health.    Rylee Kyle, MONICA,  9/2/2021

## 2021-09-02 NOTE — PATIENT INSTRUCTIONS
-Continue on current medication regimen.    Your next appointment is scheduled on 9/29/2021 (Wed) at 8:30am.     **For crisis resources, please see the information at the end of this document**     Patient Education      Thank you for coming to the [facility] [place] [facility].    Lab Testing:  If you had lab testing today and your results are reassuring or normal they will be mailed to you or sent through [employer] within 7 days. If the lab tests need quick action we will call you with the results. The phone number we will call with results is # [phone] (home) . If this is not the best number please call our clinic and change the number.    Medication Refills:  If you need any refills please call your pharmacy and they will contact us. Our fax number for refills is [phone]. Please allow three business for refill processing. If you need to  your refill at a new pharmacy, please contact the new pharmacy directly. The new pharmacy will help you get your medications transferred.     Scheduling:  If you have any concerns about today's visit or wish to schedule another appointment please call our office during normal business hours [phone] (8-5:00 M-F)    Contact Us:  Please call [phone] during business hours (8-5:00 M-F).  If after clinic hours, or on the weekend, please call  [phone].    Financial Assistance [phone]  [facility] Billing [phone]  Central Billing Office, [facility]: [phone]  [facility] Billing [phone]  Medical Records [phone]  [facility] Patient Bill of Rights [email or web address]       MENTAL HEALTH CRISIS NUMBERS:  For a medical emergency please call  911 or go to the nearest ER.     [facility]:   [facility] -[phone]   Crisis Residence [facility] -[phone]   Walk-In Counseling Center [facility] -[phone]   COPE  24/7 [facility] Mobile Team -[phone] (adults)/[phone] (child)  CHILD: [facility] needs assessment team - [phone]      [facility]:   [facility] - [phone]   Walk-in counseling [facility] - [phone]   Walk-in counseling [facility] - [phone]   Crisis Residence [facility] Residence - [phone]  Urgent Care Adult Mental [facility]-[phone] mobile unit/ 24/7 crisis line    National Crisis Numbers:   National Suicide Prevention Lifeline: 3-552-912-TALK (219-475-9271)  Poison Control Center - 8-331-117-1362  [employer]/resources for a list of additional resources (SOS)  Trans Lifeline a hotline for transgender people 8-473-058-8516  The [employer] Project a hotline for LGBT youth 1-599.726.1917  Crisis Text Line: For any crisis 24/7   To: 090743  see www.crisistextline.org  - IF MAKING A CALL FEELS TOO HARD, send a text!         Again thank you for choosing [facility] [place] [facility] and please let us know how we can best partner with you to improve you and your family's health.    You may be receiving a survey regarding this appointment. We would love to have your feedback, both positive and negative. The survey is done by an external company, so your answers are anonymous.

## 2021-09-02 NOTE — PROGRESS NOTES
"VIDEO VISIT  Josemaneul Edmond is a 76 year old patient who is being evaluated via a billable video visit.      The patient has been notified of following:   \"This video visit will be conducted via a call between you and your physician/provider. We have found that certain health care needs can be provided without the need for an in-person physical exam. This service lets us provide the care you need with a video conversation. If a prescription is necessary we can send it directly to your pharmacy. If lab work is needed we can place an order for that and you can then stop by our lab to have the test done at a later time. Insurers are generally covering virtual visits as they would in-office visits so billing should not be different than normal.  If for some reason you do get billed incorrectly, you should contact the billing office to correct it and that number is in the AVS .    Video Conference to be completed via:  [email or web address]    Patient has given verbal consent for video visit?:  Yes    Patient would prefer that any video invitations be sent by: Send to e-mail at: [email or web address]      How would patient like to obtain AVS?:  Ezequiel    AVS SmartPhrase [PsychAVS] has been placed in 'Patient Instructions':  Yes      "

## 2021-09-18 ENCOUNTER — HEALTH MAINTENANCE LETTER (OUTPATIENT)
Age: 76
End: 2021-09-18

## 2021-09-24 ENCOUNTER — MYC MEDICAL ADVICE (OUTPATIENT)
Dept: FAMILY MEDICINE | Facility: CLINIC | Age: 76
End: 2021-09-24

## 2021-09-24 ENCOUNTER — TELEPHONE (OUTPATIENT)
Dept: FAMILY MEDICINE | Facility: CLINIC | Age: 76
End: 2021-09-24

## 2021-09-24 ENCOUNTER — E-VISIT (OUTPATIENT)
Dept: FAMILY MEDICINE | Facility: CLINIC | Age: 76
End: 2021-09-24
Payer: COMMERCIAL

## 2021-09-24 DIAGNOSIS — Z20.822 SUSPECTED COVID-19 VIRUS INFECTION: Primary | ICD-10-CM

## 2021-09-24 DIAGNOSIS — F33.1 MAJOR DEPRESSIVE DISORDER, RECURRENT, MODERATE (H): ICD-10-CM

## 2021-09-24 PROCEDURE — 99421 OL DIG E/M SVC 5-10 MIN: CPT | Performed by: FAMILY MEDICINE

## 2021-09-24 NOTE — TELEPHONE ENCOUNTER
Received call from patient    He is experiencing low grade temp- 99.1, body aches and cough. He is asking how to proceed with covid testing.    No appointments available.    He is advised to go to [facility] urgent care for evaluation. [facility] also [facility] and [facility] sites for covid teating.    He thinks he will go to urgent care.

## 2021-09-24 NOTE — PATIENT INSTRUCTIONS
Instructions for Patients  It is recommended that you have a test for coronavirus (COVID-19). This illness can cause fever, cough and trouble breathing. Many people get a mild case and get better on their own. Some people can get very sick.     Please follow these steps:    1. We will call to schedule your test.  2. A member of our care team will ask you some questions. Then, they will use a swab to collect samples from your nose and throat.     Our testing team will send you your test results.    How can I protect others?    Stay home and away from others (self-isolate) until:    You ve had no fever--and no medicine that reduces fever--for 1 full day (24 hours). And      Your other symptoms have resolved (gotten better). For example, your cough or breathing has improved. And     At least 10 days have passed since your symptoms started.    Stay at least 6 feet away from others. (If someone will drive you to your test, stay in the backseat, as far away from the  as you can.)     Don t go to work, school or anywhere else. When it s time for your test, go straight to the testing site. Don t make any stops on the way there or back.     Wash your hands and face often. Use soap and water.     Cover your mouth and nose with a mask, tissue or washcloth.     Don t touch anyone. No hugging, kissing or handshakes.    How can I take care of myself?    1. Get lots of rest. Drink extra fluids (unless a doctor has told you not to).     2. Take Tylenol (acetaminophen) for fever or pain. If you have liver or kidney problems, ask your family doctor if it's okay to take Tylenol.     Adults can take either:     650 mg (two 325 mg pills) every 4 to 6 hours, or     1,000 mg (two 500 mg pills) every 8 hours as needed.     Note: Don't take more than 3,000 mg in one day.   Acetaminophen is found in many medicines (both prescribed and over-the-counter medicines). Read all labels to be sure you don't take too much.   For children,  check the Tylenol bottle for the right dose. The dose is based on  the child's age or weight.    3. If you have other health problems (like cancer, heart failure, an organ transplant or severe kidney disease): Call your specialty clinic if you don't feel better in the next 2 days.    4. Know when to call 911: If your breathing is so bad that it keeps you from doing normal activities, call 911 or go to the emergency room. Tell them that you've been staying home and may have COVID-19.      Thank you for limiting contact with others, wearing a simple mask to cover your cough, practice good hand hygiene habits and accessing our virtual services where possible to limit the spread of this virus.    For more information about COVID19 and options for caring for yourself at home, please visit the CDC website at https://www.cdc.gov/coronavirus/2019-ncov/about/steps-when-sick.html  For more options for care at [facility], please visit our website at [email or web address]

## 2021-09-26 ENCOUNTER — LAB (OUTPATIENT)
Dept: FAMILY MEDICINE | Facility: CLINIC | Age: 76
End: 2021-09-26
Attending: FAMILY MEDICINE
Payer: COMMERCIAL

## 2021-09-26 DIAGNOSIS — Z20.822 SUSPECTED COVID-19 VIRUS INFECTION: ICD-10-CM

## 2021-09-26 PROCEDURE — U0003 INFECTIOUS AGENT DETECTION BY NUCLEIC ACID (DNA OR RNA); SEVERE ACUTE RESPIRATORY SYNDROME CORONAVIRUS 2 (SARS-COV-2) (CORONAVIRUS DISEASE [COVID-19]), AMPLIFIED PROBE TECHNIQUE, MAKING USE OF HIGH THROUGHPUT TECHNOLOGIES AS DESCRIBED BY CMS-2020-01-R: HCPCS

## 2021-09-26 PROCEDURE — U0005 INFEC AGEN DETEC AMPLI PROBE: HCPCS

## 2021-09-27 ENCOUNTER — MYC MEDICAL ADVICE (OUTPATIENT)
Dept: FAMILY MEDICINE | Facility: CLINIC | Age: 76
End: 2021-09-27

## 2021-09-27 ENCOUNTER — TELEPHONE (OUTPATIENT)
Dept: FAMILY MEDICINE | Facility: CLINIC | Age: 76
End: 2021-09-27

## 2021-09-27 DIAGNOSIS — I48.20 CHRONIC ATRIAL FIBRILLATION (H): ICD-10-CM

## 2021-09-27 DIAGNOSIS — U07.1 INFECTION DUE TO 2019 NOVEL CORONAVIRUS: Primary | ICD-10-CM

## 2021-09-27 DIAGNOSIS — F41.9 ANXIETY: Primary | ICD-10-CM

## 2021-09-27 LAB — SARS-COV-2 RNA RESP QL NAA+PROBE: POSITIVE

## 2021-09-27 RX ORDER — GABAPENTIN 600 MG/1
600 TABLET ORAL 3 TIMES DAILY
Qty: 270 TABLET | Refills: 0 | Status: SHIPPED | OUTPATIENT
Start: 2021-09-27 | End: 2021-12-15

## 2021-09-27 RX ORDER — ARIPIPRAZOLE 5 MG/1
5 TABLET ORAL DAILY
Qty: 30 TABLET | Refills: 1 | OUTPATIENT
Start: 2021-09-27

## 2021-09-27 NOTE — TELEPHONE ENCOUNTER
Last seen: 9/2  RTC: 1 month  Non-provider cancel: None  No-show: None  Next appt: 9/29     Incoming refill from [facility] - [place], [place] - [address] [place] [address] via electronic request     Medication requested:   Disp Refills Start End JOSUE   gabapentin (NEURONTIN) 600 MG tablet 360 tablet 0 7/2/2021  No   Sig - Route: Take 1 tablet (600 mg) by mouth 3 times daily     Class: Transitional   Notes to Pharmacy: Just change of dose, pt does not need refill.  thanks       From 9/2/21 virtual visit note:  -Taking Gabapentin 600 mg BID only as he felt this did not change his mood.    From 7/15/21 virtual visit note:  -Continue all other medication.  Gabapentin is now changed to 600 mg 3 times a day as this is how you have been taking the medication.    From 7/1/21 virtual visit note:  Will also change Gabapentin dose to how pt has been taking; 600 mg TID.     Medication refill approved per refill protocol.  Writer e-prescribed 90-day supply to [facility] ([phone]). Qty 270, refills 0.

## 2021-09-27 NOTE — TELEPHONE ENCOUNTER
Please call. Covid positive. If having shortness of breath then ER. He can apply for monoclonal antibody treatment via the state website. We can try to help set that up if he would like.    [email or web address]     He was also scheduled with me for Oct 1. Please help him reschedule that for a week later or so. Ok to use a same day slot so his visit doesn't get scheduled out too far.

## 2021-09-28 NOTE — TELEPHONE ENCOUNTER
Routing refill request to provider for review/approval because:  Labs out of range:      Creatinine Clearance greater than 50 ml/min on file in past 3 mos    No lab results found.      Serum creatinine less than or equal to 1.4 on file in past 3 mos        Recent Labs   Lab Test 06/03/21  1402   CR 0.91      Brian Martins RN

## 2021-09-30 RX ORDER — RIVAROXABAN 20 MG/1
TABLET, FILM COATED ORAL
Qty: 90 TABLET | Refills: 3 | Status: SHIPPED | OUTPATIENT
Start: 2021-09-30 | End: 2022-09-02

## 2021-10-11 DIAGNOSIS — F33.1 MAJOR DEPRESSIVE DISORDER, RECURRENT, MODERATE (H): ICD-10-CM

## 2021-10-11 DIAGNOSIS — F40.10 SOCIAL ANXIETY DISORDER: ICD-10-CM

## 2021-10-12 DIAGNOSIS — F40.10 SOCIAL ANXIETY DISORDER: ICD-10-CM

## 2021-10-12 DIAGNOSIS — F33.1 MAJOR DEPRESSIVE DISORDER, RECURRENT, MODERATE (H): ICD-10-CM

## 2021-10-14 RX ORDER — ARIPIPRAZOLE 5 MG/1
5 TABLET ORAL DAILY
Qty: 30 TABLET | Refills: 0 | Status: SHIPPED | OUTPATIENT
Start: 2021-10-14 | End: 2021-11-12

## 2021-10-14 RX ORDER — VILAZODONE HYDROCHLORIDE 10 MG/1
10 TABLET ORAL DAILY
Qty: 30 TABLET | Refills: 0 | Status: SHIPPED | OUTPATIENT
Start: 2021-10-14 | End: 2021-11-12

## 2021-10-14 NOTE — TELEPHONE ENCOUNTER
Medication requested:   vilazodone (VIIBRYD) 10 MG TABS tablet  Last refilled: 8/24/21  Qty: 30/1    ARIPiprazole (ABILIFY) 5 MG tablet  Last refilled: 8/5/21  Qty: 30/1      Last seen: 9/2/21  RTC: 1 month  Cancel: 0  No-show: 0  Next appt: 0    Refill decision:   30 day carroll refill sent to the pharmacy - including instructions for patient to call the clinic and schedule an appointment.  Scheduling has been notified to contact the pt for appointment.

## 2021-10-15 RX ORDER — VILAZODONE HYDROCHLORIDE 10 MG/1
TABLET ORAL
Qty: 30 TABLET | Refills: 1 | OUTPATIENT
Start: 2021-10-15

## 2021-10-19 ENCOUNTER — ALLIED HEALTH/NURSE VISIT (OUTPATIENT)
Dept: FAMILY MEDICINE | Facility: CLINIC | Age: 76
End: 2021-10-19
Payer: COMMERCIAL

## 2021-10-19 VITALS — HEART RATE: 73 BPM | SYSTOLIC BLOOD PRESSURE: 99 MMHG | OXYGEN SATURATION: 97 % | DIASTOLIC BLOOD PRESSURE: 65 MMHG

## 2021-10-19 DIAGNOSIS — I49.9 IRREGULAR HEART BEAT: Primary | ICD-10-CM

## 2021-10-19 PROCEDURE — 99207 PR NO CHARGE NURSE ONLY: CPT

## 2021-10-19 PROCEDURE — 93000 ELECTROCARDIOGRAM COMPLETE: CPT

## 2021-10-19 NOTE — PROGRESS NOTES
Josemanuel Edmond is a 76 year old male who presents with heart flutter.  Blood pressure has been low and heart rate high for 4-5 days.  Dizzy when he stands.  HR on arrival 110 BPM.      NURSING ASSESSMENT:  Description:  Low blood pressure today at home.  Onset/duration:  4-5 days ago  Precip. factors:  none  Associated symptoms:  dizzy  Improves/worsens symptoms:  Worse when he stands up  Pain scale (0-10)   0/10  LMP/preg/breast feeding:  n/a  Last exam/Treatment:  June 2021  Allergies:   Allergies   Allergen Reactions     Bactrim [Sulfamethoxazole W/Trimethoprim] Nausea and Vomiting       MEDICATIONS:   Taking medication(s) as prescribed? Yes  Taking over the counter medication(s?) No  Any medication side effects? No significant side effects    Any barriers to taking medication(s) as prescribed?  No  Medication(s) improving/managing symptoms?  Yes  Medication reconciliation completed: Yes      NURSING PLAN: EKG ordered by NARCISO Brown NP.   Huddle with provider, plan includes - D/C to home to monitor    RECOMMENDED DISPOSITION:  Home care advice - follow up with Dr. Leiva 10/29 as scheduled  Will comply with recommendation: Yes  If further questions/concerns or if symptoms do not improve, worsen or new symptoms develop, call your PCP or [facility] as soon as possible.    Camilla Santos RN

## 2021-10-25 DIAGNOSIS — F40.10 SOCIAL ANXIETY DISORDER: ICD-10-CM

## 2021-10-25 DIAGNOSIS — F33.1 MAJOR DEPRESSIVE DISORDER, RECURRENT, MODERATE (H): ICD-10-CM

## 2021-10-27 RX ORDER — FLUOXETINE 10 MG/1
10 CAPSULE ORAL DAILY
Qty: 30 CAPSULE | Refills: 0 | Status: SHIPPED | OUTPATIENT
Start: 2021-10-27 | End: 2021-11-12

## 2021-10-27 RX ORDER — BUSPIRONE HYDROCHLORIDE 30 MG/1
30 TABLET ORAL 2 TIMES DAILY
Qty: 60 TABLET | Refills: 0 | Status: SHIPPED | OUTPATIENT
Start: 2021-10-27 | End: 2021-12-03

## 2021-10-27 NOTE — TELEPHONE ENCOUNTER
Medication requested: BUSPIRONE HCL TABS 30MG  Last refilled: 7/21/21  Qty: 180      Last seen: 9/2/21  RTC: 4 weeks  Cancel: x 2  No-show: 0  Next appt: 11/12/21    Refill decision:   Refill pended and routed to the provider for review/determination due to   Cancel x 2  Scheduled for follow-up 11/12/21

## 2021-10-27 NOTE — TELEPHONE ENCOUNTER
Medication requestedfluoxetine 10 mg capsule:   Last refilled: 9/29/21  Qty: 30/0      Last seen: 9/2/21  RTC: 1 month  Cancel: 1 COVID cancel  No-show: 0  Next appt: 11/12/21    Refill decision:   Refill pended and routed to the provider for review/determination due to  Cancel x 1, MyChart since last appt-? Restlessness due to Prozac. Next appt 11/12/21

## 2021-10-29 ENCOUNTER — OFFICE VISIT (OUTPATIENT)
Dept: FAMILY MEDICINE | Facility: CLINIC | Age: 76
End: 2021-10-29
Payer: COMMERCIAL

## 2021-10-29 VITALS
TEMPERATURE: 97.5 F | SYSTOLIC BLOOD PRESSURE: 128 MMHG | HEIGHT: 70 IN | RESPIRATION RATE: 16 BRPM | DIASTOLIC BLOOD PRESSURE: 88 MMHG | BODY MASS INDEX: 26.34 KG/M2 | OXYGEN SATURATION: 96 % | HEART RATE: 81 BPM | WEIGHT: 184 LBS

## 2021-10-29 DIAGNOSIS — I48.20 CHRONIC ATRIAL FIBRILLATION (H): Primary | ICD-10-CM

## 2021-10-29 DIAGNOSIS — R00.2 PALPITATIONS: ICD-10-CM

## 2021-10-29 PROCEDURE — 93000 ELECTROCARDIOGRAM COMPLETE: CPT | Performed by: FAMILY MEDICINE

## 2021-10-29 PROCEDURE — 99214 OFFICE O/P EST MOD 30 MIN: CPT | Performed by: FAMILY MEDICINE

## 2021-10-29 RX ORDER — METOPROLOL TARTRATE 50 MG
50 TABLET ORAL 2 TIMES DAILY
Qty: 60 TABLET | Refills: 1 | Status: SHIPPED | OUTPATIENT
Start: 2021-10-29 | End: 2021-11-12

## 2021-10-29 ASSESSMENT — MIFFLIN-ST. JEOR: SCORE: 1560.87

## 2021-10-29 ASSESSMENT — PAIN SCALES - GENERAL: PAINLEVEL: NO PAIN (0)

## 2021-10-29 NOTE — NURSING NOTE
"Initial /88   Pulse 81   Temp 97.5  F (36.4  C) (Tympanic)   Resp 16   Ht 1.778 m (5' 10\")   Wt 83.5 kg (184 lb)   SpO2 96%   BMI 26.40 kg/m   Estimated body mass index is 26.4 kg/m  as calculated from the following:    Height as of this encounter: 1.778 m (5' 10\").    Weight as of this encounter: 83.5 kg (184 lb). .      "

## 2021-10-29 NOTE — PROGRESS NOTES
Assessment & Plan     Chronic atrial fibrillation (H)  History of A. fib.  He was cardioverted in 2017 and then ended up in normal sinus rhythm.  It is unclear to me whether he is been in sinus rhythm long-term since then or whether he has had some paroxysmal versus chronic A. fib.  He did come to the clinic on 10/19 with palpitations and nurse triage visit had an EKG done.  This showed atrial fibrillation.  He was scheduled for a follow-up appointment with me as a result.  He is on metoprolol 25 mg twice daily.  We will have him increase this to 50 mg twice daily.  Hopefully this will slow his rate and potentially even revert him to normal sinus rhythm.  I like to see him back in 2 weeks for recheck.  Discussed if at any point he is worse or having associated symptoms such as chest pain, diaphoresis, shortness of breath he should go to the ER.  We did not end up getting an EKG today since symptoms really have not changed since he was seen on the 19th and that EKG already showed atrial fibrillation.  We will plan however get an EKG at the next visit to see if he is perhaps rhythm converted.  - metoprolol tartrate (LOPRESSOR) 50 MG tablet; Take 1 tablet (50 mg) by mouth 2 times daily 1 tab by mouth twice daily    Palpitations  See above.   - EKG 12-lead complete w/read - Clinics                 No follow-ups on file.    Lizzy Leiva MD  [facility]    Greg Alonso is a 76 year old who presents for the following health issues     HPI     Concern - Palpitations  Onset: 3 weeks ago  Description: palpitations  Intensity: moderate  Progression of Symptoms:  constant  Accompanying Signs & Symptoms: Sporatic involuntary gasping   Previous history of similar problem: yes Patient went into Afib 4 years ago  Precipitating factors:        Worsened by: unknown  Alleviating factors:        Improved by: unknown  Therapies tried and outcome: None    Depression and Anxiety Follow-Up    How  are you doing with your depression since your last visit? Worsened in evenings    How are you doing with your anxiety since your last visit?  Worsened in evenings    Are you having other symptoms that might be associated with depression or anxiety? Yes:  heart palpitations    Have you had a significant life event? No     Do you have any concerns with your use of alcohol or other drugs? No    Social History     Tobacco Use     Smoking status: Former Smoker     Packs/day: 1.00     Years: 15.00     Pack years: 15.00     Types: Cigarettes     Quit date: 10/13/1975     Years since quittin.0     Smokeless tobacco: Never Used   Substance Use Topics     Alcohol use: No     Comment: quit 2017     Drug use: No     PHQ 12/10/2019 6/3/2021 2021   PHQ-9 Total Score 4 3 3   Q9: Thoughts of better off dead/self-harm past 2 weeks Not at all Not at all Not at all     CAROLYNN-7 SCORE 12/10/2019 6/3/2021 2021   Total Score - - -   Total Score - - 4 (minimal anxiety)   Total Score 2 2 4     Last PHQ-9 2021   1.  Little interest or pleasure in doing things 1   2.  Feeling down, depressed, or hopeless 1   3.  Trouble falling or staying asleep, or sleeping too much 0   4.  Feeling tired or having little energy 0   5.  Poor appetite or overeating 0   6.  Feeling bad about yourself 0   7.  Trouble concentrating 1   8.  Moving slowly or restless 0   Q9: Thoughts of better off dead/self-harm past 2 weeks 0   PHQ-9 Total Score 3   Difficulty at work, home, or with people -     CAROLYNN-7  2021   1. Feeling nervous, anxious, or on edge 1   2. Not being able to stop or control worrying 1   3. Worrying too much about different things 1   4. Trouble relaxing 0   5. Being so restless that it is hard to sit still 0   6. Becoming easily annoyed or irritable 1   7. Feeling afraid, as if something awful might happen 0   CAROLYNN-7 Total Score 4   If you checked any problems, how difficult have they made it for you to do your work, take  "care of things at home, or get along with other people? -       Suicide Assessment Five-step Evaluation and Treatment (SAFE-T)      How many servings of fruits and vegetables do you eat daily?  2-3    On average, how many sweetened beverages do you drink each day (Examples: soda, juice, sweet tea, etc.  Do NOT count diet or artificially sweetened beverages)?   0    How many days per week do you exercise enough to make your heart beat faster? 3 or less    How many minutes a day do you exercise enough to make your heart beat faster? 9 or less    How many days per week do you miss taking your medication? 0      Review of Systems   Constitutional, neuro, ENT, endocrine, pulmonary, cardiac, gastrointestinal, genitourinary, musculoskeletal, integument and psychiatric systems are negative, except as otherwise noted.       Objective    /88   Pulse 81   Temp 97.5  F (36.4  C) (Tympanic)   Resp 16   Ht 1.778 m (5' 10\")   Wt 83.5 kg (184 lb)   SpO2 96%   BMI 26.40 kg/m    Body mass index is 26.4 kg/m .  Physical Exam   GENERAL: healthy, alert and no distress  RESP: lungs clear to auscultation - no rales, rhonchi or wheezes  CV: irregularly irregular rhythm, normal S1 S2, no S3 or S4, no murmur, click or rub, peripheral pulses strong and no peripheral edema  MS: no gross musculoskeletal defects noted, no edema  SKIN: no suspicious lesions or rashes  NEURO: Normal strength and tone, mentation intact and speech normal  PSYCH: mentation appears normal, affect normal/bright         "

## 2021-10-29 NOTE — PATIENT INSTRUCTIONS
Your BMI is Body mass index is Body mass index is 26.4 kg/m .     A BMI of 18.5 to 24.9 is in the healthy range. A person with a BMI of 25 to 29.9 is considered overweight, and someone with a BMI of 30 or greater is considered obese. More than two-thirds of American adults are considered overweight or obese.  Weight management is a personal decision.  If you are interested in exploring weight loss strategies, the following discussion covers the approaches that may be successful. Body mass index (BMI) is one way to tell whether you are at a healthy weight, overweight, or obese. It measures your weight in relation to your height.  Being overweight or obese increases the risk for further weight gain. Excess weight may lead to heart disease and diabetes.  Creating and following plans for healthy eating and physical activity may help you improve your health.  Weight control is part of healthy lifestyle and includes exercise, emotional health, and healthy eating habits. Careful eating habits lifelong are the mainstay of weight control. Though there are significant health benefits from weight loss, long-term weight loss with diet alone may be very difficult to achieve- studies show long-term success with dietary management alone in less than 10% of people. Attaining a healthy weight may be especially difficult to achieve in those with severe obesity. In some cases, medications, devices and surgical management might be considered.  What can you do?    Keep a food journal to help with mindful eating and finding ways to modify your diet.      Reduce the amount of processed food in your diet. Focus on adding vegetables, and lean proteins.    Reduce dietary carbohydrates. Limiting to  gm of carbohydrates per day has been shows to help boost weight loss.  If you have diabetes or are on diabetic medications do not do this without talking to your physician or healthcare provider.    Diet combined with exercise helps maintain  muscle while optimizing fat loss. Strength training is particularly important for building and maintaining muscle mass. Exercise helps reduce stress, increase energy, and improves fitness. Increasing exercise without diet control, however, may not burn enough calories to loose weight.         Start walking three days a week 10-20 minutes at a time    Work towards walking thirty minutes five days a week      Eventually, increase the speed of your walking for 1-2 minutes at time    In addition, we recommend that you review healthy lifestyles and methods for weight loss available through the National Institutes of Health patient information sites:  http://win.niddk.nih.gov/publications/index.htm    Also look into health and wellness programs that may be available through your health insurance provider, employer, local community center, or nini club.

## 2021-11-07 DIAGNOSIS — F32.1 MODERATE MAJOR DEPRESSION (H): Primary | ICD-10-CM

## 2021-11-09 RX ORDER — LAMOTRIGINE 25 MG/1
TABLET ORAL
Qty: 60 TABLET | Refills: 0 | Status: SHIPPED | OUTPATIENT
Start: 2021-11-09 | End: 2021-12-10

## 2021-11-09 NOTE — TELEPHONE ENCOUNTER
Called and left v/m to rtc to resched appt. cm   lamoTRIgine (LAMICTAL) 25 MG   Last refilled: 8/5/21  Qty: 180    Last seen: 9/2/21  RTC: 1MOS  Cancel: 9/29  No-show: 0  Next appt: 11/12/21  Refill pended and routed to the provider for review/determination due to : Pt outside of RTC timeframe WITH CANCEL X1

## 2021-11-12 ENCOUNTER — TELEPHONE (OUTPATIENT)
Dept: PSYCHIATRY | Facility: CLINIC | Age: 76
End: 2021-11-12
Payer: COMMERCIAL

## 2021-11-12 ENCOUNTER — OFFICE VISIT (OUTPATIENT)
Dept: FAMILY MEDICINE | Facility: CLINIC | Age: 76
End: 2021-11-12
Payer: COMMERCIAL

## 2021-11-12 ENCOUNTER — VIRTUAL VISIT (OUTPATIENT)
Dept: PSYCHIATRY | Facility: CLINIC | Age: 76
End: 2021-11-12
Attending: NURSE PRACTITIONER
Payer: COMMERCIAL

## 2021-11-12 VITALS
SYSTOLIC BLOOD PRESSURE: 134 MMHG | TEMPERATURE: 97.2 F | WEIGHT: 187 LBS | RESPIRATION RATE: 10 BRPM | HEART RATE: 79 BPM | OXYGEN SATURATION: 98 % | DIASTOLIC BLOOD PRESSURE: 88 MMHG | HEIGHT: 70 IN | BODY MASS INDEX: 26.77 KG/M2

## 2021-11-12 DIAGNOSIS — F40.10 SOCIAL ANXIETY DISORDER: ICD-10-CM

## 2021-11-12 DIAGNOSIS — I48.20 CHRONIC ATRIAL FIBRILLATION (H): ICD-10-CM

## 2021-11-12 DIAGNOSIS — F32.1 MODERATE MAJOR DEPRESSION (H): Primary | ICD-10-CM

## 2021-11-12 DIAGNOSIS — F33.1 MAJOR DEPRESSIVE DISORDER, RECURRENT, MODERATE (H): ICD-10-CM

## 2021-11-12 PROCEDURE — 99214 OFFICE O/P EST MOD 30 MIN: CPT | Performed by: FAMILY MEDICINE

## 2021-11-12 PROCEDURE — 99213 OFFICE O/P EST LOW 20 MIN: CPT | Mod: 95 | Performed by: NURSE PRACTITIONER

## 2021-11-12 RX ORDER — METOPROLOL TARTRATE 50 MG
75 TABLET ORAL 2 TIMES DAILY
Qty: 60 TABLET | Refills: 1
Start: 2021-11-12 | End: 2021-12-20

## 2021-11-12 RX ORDER — ARIPIPRAZOLE 10 MG/1
10 TABLET ORAL DAILY
Qty: 30 TABLET | Refills: 1 | Status: SHIPPED | OUTPATIENT
Start: 2021-11-12 | End: 2021-12-10

## 2021-11-12 ASSESSMENT — PAIN SCALES - GENERAL: PAINLEVEL: NO PAIN (0)

## 2021-11-12 ASSESSMENT — MIFFLIN-ST. JEOR: SCORE: 1574.48

## 2021-11-12 NOTE — NURSING NOTE
"Initial /88   Pulse 79   Temp 97.2  F (36.2  C) (Tympanic)   Resp 10   Ht 1.778 m (5' 10\")   Wt 84.8 kg (187 lb)   SpO2 98%   BMI 26.83 kg/m   Estimated body mass index is 26.83 kg/m  as calculated from the following:    Height as of this encounter: 1.778 m (5' 10\").    Weight as of this encounter: 84.8 kg (187 lb). .      "

## 2021-11-12 NOTE — TELEPHONE ENCOUNTER
----- Message from KAYLA Willett CNP sent at 11/12/2021  9:46 AM CST -----  Regarding: transfer to PCP  Schedule: pt is transferring care to PCP.  No follow up with me.    [place]: He wanted to transfer care to his PCP which he has an appt in 1 hour and I guess PCP agreed with the plan.  So all further refill should go to his PCP.  Would you also let refill team know?  I did not refill any medications today, discontinued Prozac as he stopped taking medication.    Thank you.

## 2021-11-12 NOTE — PROGRESS NOTES
Start Time:  0930         End Time: 0944    Telemedicine Visit: The patient's condition can be safely assessed and treated via synchronous audio and visual telemedicine encounter.      Reason for Telemedicine Visit: Due to COVID 19 pandemic, clinic switching all appointments to telemedicine     Originating Site (Patient Location): Patient's home    Distant Site (Provider Location): Provider Remote Setting    Consent:  The patient/guardian has verbally consented to: the potential risks and benefits of telemedicine (video visit) versus in person care; bill my insurance or make self-payment for services provided; and responsibility for payment of non-covered services.     Mode of Communication:  pt requested phone only visit.    As the provider I attest to compliance with applicable laws and regulations related to telemedicine.    Psychiatry Clinic Progress Note                                                                  Patient Name: Josemanuel Edmond  YOB: 1945  MRN: [id number]  Date of Service:  11/12/2021  Last Seen:9/2/2021    Josemanuel Edmond is a 76 year old person assigned male at birth, identifies as cisgender male who uses the name Gavin and pronoun pat.       Gavin Edmond is a 76 year old year old adult who presents for ongoing psychiatric care.  Gavin Edmond was last seen on 9/2/2021.    At that time,     Medication Ordered/Consults/Labs/tests Ordered:     Medication: Continue on current medication regimen.  OTC Recommendations: none  Lab Orders:  none  Referrals: none  Release of Information: none  Future Treatment Considerations: Per symptoms.   Return for Follow Up: in 1 month    Pertinent Background:  Reports depression started 2002 after his son was killed in MVA.  Anxiety also started around the same time, but social anxiety started 6-7 years ago.  Denies any hx of SI, SIB, HI, psych hospitalization.  After son was killed, pt had heavy ETOH use on and off. Psych critical item  history includes [no critical items].      Previous Psychiatric Meds: Lexapro (ineffective), Prozac (helps depression, but anxiety increase), Hydroxyzine (oversedation, PCP trial), Wellbutrin XL, Xanax, Klonopin, Valium, Ativan, Remeron (30 mg only, worked well for sleep and anxiety, but weight gain), Nortriptyline, Vistaril (dry mouth, nightmares), Remeron (oversedation at 45 mg, 22.5mg and 15 mg and wt gain), Trazodone (oversedate at 50 mg), Ambien (effective for sleep, fall), Belsomra not covered with insurance.    Interim History                                                                                                        4, 4     On 11/4/2021, pt sent [employer] message noting he feels very depressed and anhedonia.  Asking if he should stop Prozac.  Discussed that pt did not tolerate Prozac and asked again if he wants to switch Prozac to different medication.    On 10/13/2021, pt sent [employer] message noting restlessness and depression increases around 7pm while sleep improved somewhat.  Discussed restlessness could be due to Prozac and asked even if with this if he wants to continue Prozac.    On 9/28/2021, sent [employer] message for last minute cancellation apology and since he wanted to restart Prozac, ordered Prozac 10 mg daily while monitoring for anxiety and continue Viibryd for now.    On 9/27/2021, pt sent [employer] message noting Viibryd is not working, worsening depression and anhedonia and also having COVID.  Felt Prozac worked the best.  Discussed pt increasing Viibryd may help as his current dose is low while pt felt Prozac felt best, but this exacerbated his anxiety.  Encouraged pt to keep an appt to discuss this.  Pt sent another message same day noting depression is significant.    Since the last visit,  -Stopped Prozac 4 days ago as he did not think medication was working well.  -Notes depression is more significant than anxiety.  Denies SI, SIB or HI.  -Unsure if anxiety improved after  stopping Prozac.  -Does not think his symptoms improved last 1 year while changes of medications  -Established new PCP and has good rapport and wants to transfer care to PCP.  Discussed this with PCP and has an appt in 1 hour.  -Recovered from COVID well.    Denies any symptoms suggestive of hypomania or psychosis.    Current Suicidality/Hx of Suicide Attempts: Denies both  CoCominent Medical concerns: Denies    Medication Side Effects: The patient denies all medication side effects.      Medical Review of Systems     Apart from the symptoms mentioned int he HPI, the 14 point review of systems, including constitutional, HEENT, cardiovascular, respiratory, gastrointestinal, genitourinary, musculoskeletal, integumentary, endocrine, neurological, hematologic and allergic is entirely negative.    Substance Use   Pt has been staying substance free since last seen.  Notes has not used any ETOH x 3 years.    Social/ Family History                                  [per patient report]                                 1ea,1ea   Living arrangements: living with spouse and feels safe  Social Support: spouse  Access to gun: owns a gun  Retired in 2008.  Trauma hx includes loss of son by MVA in 2002.    Allergy                                Bactrim [sulfamethoxazole w/trimethoprim]    Current Medications                                                                                                       Current Outpatient Medications   Medication Sig Dispense Refill     acetaminophen (TYLENOL) 500 MG tablet Take 1,000 mg by mouth every 8 hours as needed for mild pain       ARIPiprazole (ABILIFY) 5 MG tablet Take 1 tablet (5 mg) by mouth daily For more refills,schedule an appointment at [phone] 30 tablet 0     buPROPion (WELLBUTRIN SR) 150 MG 12 hr tablet Take 1 tablet (150 mg) by mouth 2 times daily 180 tablet 0     busPIRone HCl (BUSPAR) 30 MG tablet Take 1 tablet (30 mg) by mouth 2 times daily 60 tablet 0     doxazosin  "(CARDURA) 8 MG tablet Take 1 tablet (8 mg) by mouth At Bedtime 90 tablet 3     finasteride (PROSCAR) 5 MG tablet Take 1 tablet (5 mg) by mouth daily 30 tablet 3     FLUoxetine (PROZAC) 10 MG capsule Take 1 capsule (10 mg) by mouth daily 30 capsule 0     gabapentin (NEURONTIN) 600 MG tablet Take 1 tablet (600 mg) by mouth 3 times daily 270 tablet 0     hydrocortisone, Perianal, (HYDROCORTISONE) 2.5 % cream Place rectally 2 times daily as needed for hemorrhoids (Patient not taking: Reported on 10/29/2021) 28 g 1     lamoTRIgine (LAMICTAL) 25 MG tablet TAKE 1 TABLET TWICE A DAY 60 tablet 0     metoprolol tartrate (LOPRESSOR) 50 MG tablet Take 1 tablet (50 mg) by mouth 2 times daily 1 tab by mouth twice daily 60 tablet 1     vilazodone (VIIBRYD) 10 MG TABS tablet Take 1 tablet (10 mg) by mouth daily For more refills,schedule an appointment at [phone] 30 tablet 0     XARELTO ANTICOAGULANT 20 MG TABS tablet TAKE 1 TABLET DAILY WITH   DINNER 90 tablet 3        Mental Status Exam                                                                                   9, 14 cog      Alertness: alert  and oriented  Behavior/Demeanor: cooperative, pleasant and calm  Speech: regular rate and rhythm  Mood :  \"depressed\"  Affect: slightly subdued; was congruent to mood; was congruent to content  Thought Process (Associations):  Linear and Goal directed  Thought process (Rate):  Normal  Thought content:  no overt psychosis, denies suicidal ideation, intent or thoughts and patient does not appear to be responding to internal stimuli  Perception:  Reports none;  Denies depersonalization and derealization  Attention/Concentration:  Fair  Memory:  Immediate recall intact and Short-term memory intact  Language: intact  Fund of Knowledge/Intelligence:  Average  Abstraction:  Normal  Insight:  Fair  Judgment:  Fair  Cognition: (6) does  appear grossly intact; formal cognitive testing was not done    Labs and Results      Pertinent findings " on review include: Review of records with relevant information reported in the HPI.  Reviewed pt's past medical record and obtained collateral information.      [place] PRESCRIPTION MONITORING PROGRAM [] was checked today: Gabapentin 9/27.    PHQ9 Today:  N/A  PHQ 12/10/2019 6/3/2021 8/2/2021   PHQ-9 Total Score 4 3 3   Q9: Thoughts of better off dead/self-harm past 2 weeks Not at all Not at all Not at all       CAROLYNN 7 Today: N/A  CAROLYNN-7 SCORE 12/10/2019 6/3/2021 7/29/2021   Total Score - - -   Total Score - - 4 (minimal anxiety)   Total Score 2 2 4       Recent Labs   Lab Test 06/03/21  1402 09/04/19  1020 09/14/18  1629   CR 0.91 0.81 0.93   GFRESTIMATED 80 86 79     Recent Labs   Lab Test 06/03/21  1402 01/22/18  1354   AST 18 15   ALT 23 32   ALKPHOS 51 60     PSYCHOTROPIC DRUG INTERACTIONS:   Abilify---Buspar---Gabapentin: Concurrent use of GABAPENTIN and CNS DEPRESSANTS may result in respiratory depression.    Viibryd---Xarelto: Concurrent use of RIVAROXABAN and SEROTONIN NOREPINEPHRINE REUPTAKE INHIBITORS AND SSRIS may result in increased risk of bleeding  Prozac---Wellbutrin: Concurrent use of BUPROPION and SELECTED SEIZURE THRESHOLD LOWERING CYP2D6 SUBSTRATES may result in increased exposure of CYP2D6 substrates; increased risk of seizure.   VIibryd---Buspar: Concurrent use of VILAZODONE and SEROTONERGIC AGENTS may result in increased risk for serotonin syndrome (hypertension, hyperthermia, myoclonus, mental status changes).   Abilify---Buspar: Concurrent use of ARIPIPRAZOLE and STRONG CYP2D6 INHIBITORS may result in increased exposure of aripiprazole  Abilify---Wellbutrin: Concurrent use of ARIPIPRAZOLE and STRONG CYP2D6 INHIBITORS may result in increased exposure of aripiprazole.      MANAGEMENT:  Monitoring for adverse effects, routine vitals and patient is aware of risks    Impression/Assessment      Gavin PAT Edmond is a 76 year old adult  who presents for med management follow up.  Pt sounds slightly  depressed, but not anxious, denies SI, sIB or HI during the appointment.  Pt noted exacerbating depression, but anxiety is OK.  Stopped Prozac 10 mg daily 4 days ago as he thought this was ineffective, but has not noted any changes.  Pt wanted to transfer care to PCP as he did not feel medication changes in last 1 year improved his sxs.  Recommended to discuss cross taper of Viibryd before completely coming off of Viibryd as it could exacerbate depression.  Discontinued Prozac as he stopped taking medication.  Sent an Epic message to his PCP.  No further refills today from this writer as he is transferring care to PCP.    Diagnosis                                                                   Social Anxiety disorder  MDD    Treatment Recommendation & Plan       Medication Ordered/Consults/Labs/tests Ordered:     Medication:  -Discontinued Prozac as you stopped the medication  -Continue all other medications for now until you discuss the medication with Dr Leiva  OTC Recommendations: none  Lab Orders:  none  Referrals: none  Release of Information: none  Future Treatment Considerations: Per symptoms.   Return for Follow Up: transfer care to PCP, no need to follow up with this writer.    -Discussed safety plan for suicidal thoughts  -Discussed plan for suicidality  -Discussed available emergency services  -Patient agrees with the treatment plan  -Encouraged to continue outpatient therapy to gain more coping mechanism for stress.    Treatment Risk Statement: Discussed with the patient my impressions, as well as recommended studies. I educated patient on the differential diagnosis and prognosis. I discussed with the patient the risks and benefits of medications versus no interventions, including efficacy, dose, possible side effects and length of treatment and the importance of medication compliance.  The patient understands the risks, benefits, adverse effects and alternatives. Agrees to treatment with the  capacity to do so. No medical contraindications to treatment. The patient also understands the risks of using street drugs or alcohol. I also discussed the potential metabolic side effects of antipsychotics including weight gain, diabetes and lipid abnormalities, risk of tardive dyskinesia and indicates understanding of this and agrees to regular medical monitoring      CRISIS NUMBERS:   Provided routinely in AVS.    Diagnosis or treatment significantly limited by social determinants of health.    Rylee Kyle, CNP,  11/12/2021

## 2021-11-12 NOTE — PATIENT INSTRUCTIONS
Your BMI is Body mass index is Body mass index is 26.83 kg/m .     A BMI of 18.5 to 24.9 is in the healthy range. A person with a BMI of 25 to 29.9 is considered overweight, and someone with a BMI of 30 or greater is considered obese. More than two-thirds of American adults are considered overweight or obese.  Weight management is a personal decision.  If you are interested in exploring weight loss strategies, the following discussion covers the approaches that may be successful. Body mass index (BMI) is one way to tell whether you are at a healthy weight, overweight, or obese. It measures your weight in relation to your height.  Being overweight or obese increases the risk for further weight gain. Excess weight may lead to heart disease and diabetes.  Creating and following plans for healthy eating and physical activity may help you improve your health.  Weight control is part of healthy lifestyle and includes exercise, emotional health, and healthy eating habits. Careful eating habits lifelong are the mainstay of weight control. Though there are significant health benefits from weight loss, long-term weight loss with diet alone may be very difficult to achieve- studies show long-term success with dietary management alone in less than 10% of people. Attaining a healthy weight may be especially difficult to achieve in those with severe obesity. In some cases, medications, devices and surgical management might be considered.  What can you do?    Keep a food journal to help with mindful eating and finding ways to modify your diet.      Reduce the amount of processed food in your diet. Focus on adding vegetables, and lean proteins.    Reduce dietary carbohydrates. Limiting to  gm of carbohydrates per day has been shows to help boost weight loss.  If you have diabetes or are on diabetic medications do not do this without talking to your physician or healthcare provider.    Diet combined with exercise helps  maintain muscle while optimizing fat loss. Strength training is particularly important for building and maintaining muscle mass. Exercise helps reduce stress, increase energy, and improves fitness. Increasing exercise without diet control, however, may not burn enough calories to loose weight.         Start walking three days a week 10-20 minutes at a time    Work towards walking thirty minutes five days a week      Eventually, increase the speed of your walking for 1-2 minutes at time    In addition, we recommend that you review healthy lifestyles and methods for weight loss available through the National Institutes of Health patient information sites:  http://win.niddk.nih.gov/publications/index.htm    Also look into health and wellness programs that may be available through your health insurance provider, employer, local community center, or nini club.

## 2021-11-12 NOTE — PROGRESS NOTES
"  Assessment & Plan     Major depressive disorder, recurrent, moderate (H)  Uncontrolled. Increase Abilify to 10mg daily. Follow-up for re-check in 1 month.   - ARIPiprazole (ABILIFY) 10 MG tablet; Take 1 tablet (10 mg) by mouth daily    Chronic atrial fibrillation (H)  In a.fib now. Was previously cardioverted,. Unclear how long he's been in a.fib. Will obtain holter monitor to determine a.fib burden. Increase metoprolol to 75mg every day. Titrate up PRN. Needs to follow-up with EP cardiology after holter.  - metoprolol tartrate (LOPRESSOR) 50 MG tablet; Take 1.5 tablets (75 mg) by mouth 2 times daily 1 tab by mouth twice daily  - Adult Cardiology Eval Referral; Future  - Leadless EKG Monitor 3 to 7 Days; Future    Social anxiety disorder                 BMI:   Estimated body mass index is 26.83 kg/m  as calculated from the following:    Height as of this encounter: 1.778 m (5' 10\").    Weight as of this encounter: 84.8 kg (187 lb).           Return in about 1 month (around 12/12/2021) for medication re-check.    Lizzy Leiva MD  [facility]    Greg   Gavin is a 76 year old who presents for the following health issues     HPI     Hypertension Follow-up      Do you check your blood pressure regularly outside of the clinic? Yes     Are you following a low salt diet? Yes    Are your blood pressures ever more than 140 on the top number (systolic) OR more   than 90 on the bottom number (diastolic), for example 140/90? No    Depression and Anxiety Follow-Up    How are you doing with your depression since your last visit? Worsened Are you having other symptoms that might be associated with depression or anxiety? Yes:  lack of interest, wants to be alone    Have you had a significant life event? No     Do you have any concerns with your use of alcohol or other drugs? No    Social History     Tobacco Use     Smoking status: Former Smoker     Packs/day: 1.00     Years: 15.00     Pack " years: 15.00     Types: Cigarettes     Quit date: 10/13/1975     Years since quittin.1     Smokeless tobacco: Never Used   Substance Use Topics     Alcohol use: No     Comment: quit 2017     Drug use: No     PHQ 12/10/2019 6/3/2021 2021   PHQ-9 Total Score 4 3 3   Q9: Thoughts of better off dead/self-harm past 2 weeks Not at all Not at all Not at all     CAROLYNN-7 SCORE 12/10/2019 6/3/2021 2021   Total Score - - -   Total Score - - 4 (minimal anxiety)   Total Score 2 2 4     Last PHQ-9 2021   1.  Little interest or pleasure in doing things 1   2.  Feeling down, depressed, or hopeless 1   3.  Trouble falling or staying asleep, or sleeping too much 0   4.  Feeling tired or having little energy 0   5.  Poor appetite or overeating 0   6.  Feeling bad about yourself 0   7.  Trouble concentrating 1   8.  Moving slowly or restless 0   Q9: Thoughts of better off dead/self-harm past 2 weeks 0   PHQ-9 Total Score 3   Difficulty at work, home, or with people -     CAROLYNN-7  2021   1. Feeling nervous, anxious, or on edge 1   2. Not being able to stop or control worrying 1   3. Worrying too much about different things 1   4. Trouble relaxing 0   5. Being so restless that it is hard to sit still 0   6. Becoming easily annoyed or irritable 1   7. Feeling afraid, as if something awful might happen 0   CAROLYNN-7 Total Score 4   If you checked any problems, how difficult have they made it for you to do your work, take care of things at home, or get along with other people? -       Suicide Assessment Five-step Evaluation and Treatment (SAFE-T)      How many servings of fruits and vegetables do you eat daily?  0-1    On average, how many sweetened beverages do you drink each day (Examples: soda, juice, sweet tea, etc.  Do NOT count diet or artificially sweetened beverages)?   0    How many days per week do you exercise enough to make your heart beat faster? 3 or less    How many minutes a day do you exercise enough to  "make your heart beat faster? 9 or less    How many days per week do you miss taking your medication? 0        Review of Systems   Constitutional, neuro, ENT, endocrine, pulmonary, cardiac, gastrointestinal, genitourinary, musculoskeletal, integument and psychiatric systems are negative, except as otherwise noted.      Objective    /88   Pulse 79   Temp 97.2  F (36.2  C) (Tympanic)   Resp 10   Ht 1.778 m (5' 10\")   Wt 84.8 kg (187 lb)   SpO2 98%   BMI 26.83 kg/m    Body mass index is 26.83 kg/m .  Physical Exam   GENERAL: Pleasant, well appearing male.   CV: irr irr, no murmurs, rubs or gallops.   PSYCH: Alert and oriented x3, neatly dressed and well groomed, makes good eye contact, fluid speech - non-pressured, no psychomotor agitation or slowing, good memory, judgement and insight, no auditory or visual hallucinations, flat affect which is mood congruent. No suicidal ideation.               "

## 2021-11-12 NOTE — PATIENT INSTRUCTIONS
-Discontinued Prozac as you stopped the medication  -Continue all other medications for now until you discuss the medication with Dr Leiva    -Follow up with Dr Leiva as you are transferring care to her.    Thank you for coming to the [facility] [place] [facility].    Lab Testing:  If you had lab testing today and your results are reassuring or normal they will be mailed to you or sent through [email or web address] within 7 days. If the lab tests need quick action we will call you with the results. The phone number we will call with results is # [phone] (home) . If this is not the best number please call our clinic and change the number.    Medication Refills:  If you need any refills please call your pharmacy and they will contact us. Our fax number for refills is [phone]. Please allow three business for refill processing. If you need to  your refill at a new pharmacy, please contact the new pharmacy directly. The new pharmacy will help you get your medications transferred.     Scheduling:  If you have any concerns about today's visit or wish to schedule another appointment please call our office during normal business hours [phone] (8-5:00 M-F)    Contact Us:  Please call [phone] during business hours (8-5:00 M-F).  If after clinic hours, or on the weekend, please call  [phone].    Financial Assistance [phone]  [facility] Billing [phone]  [place] Billing Office, [facility]: [phone]  [facility] Billing [phone]  Medical Records [phone]      MENTAL HEALTH CRISIS NUMBERS:  For a medical emergency please call  911 or go to the nearest ER.     [facility] [place]:   [facility] -[phone]   Crisis Residence [facility] Residence -[phone]   Walk-In Counseling Center [facility] -[phone]   COPE 24/7 [place] Mobile Team -[phone] (adults)/[phone] (child)  CHILD: [facility] needs assessment team - [phone]       [facility]:   [facility] - [phone]   Walk-in counseling [facility] - [phone]   Walk-in counseling [facility] - [phone]   Crisis Residence [facility] - [phone]  [facility]-[phone] mobile unit/ 24/7 crisis line    National Crisis Numbers:   National Suicide Prevention Lifeline: 4-448-230-TALK (861-529-0949)  Poison Control Center - 6-528-771-8640  FABPulous/resources for a list of additional resources (SOS)  Trans Lifeline a hotline for transgender people 4-869-404-1911  The [employer] Project a hotline for LGBT youth 1-653.861.6950  Crisis Text Line: For any crisis 24/7   To: 827151  see www.crisistextline.org  - IF MAKING A CALL FEELS TOO HARD, send a text!         Again thank you for choosing [facility] and please let us know how we can best partner with you to improve you and your family's health.    You may be receiving a survey regarding this appointment. We would love to have your feedback, both positive and negative. The survey is done by an external company, so your answers are anonymous.

## 2021-11-13 ASSESSMENT — PATIENT HEALTH QUESTIONNAIRE - PHQ9: SUM OF ALL RESPONSES TO PHQ QUESTIONS 1-9: 7

## 2021-11-15 DIAGNOSIS — F32.1 MODERATE MAJOR DEPRESSION (H): ICD-10-CM

## 2021-11-16 RX ORDER — LAMOTRIGINE 25 MG/1
TABLET ORAL
Qty: 30 TABLET | Refills: 1 | OUTPATIENT
Start: 2021-11-16

## 2021-11-16 NOTE — TELEPHONE ENCOUNTER
"Medication requested: lamotrigine 25 mg tablet  Take 1 tablet (25 mg) by mouth daily         Last seen:  11/12/21  RTC: 1 month  Cancel: 0  No-show: 0  Next appt: None    Refill decision:   Denied dose change:   lamoTRIgine (LAMICTAL) 25 MG tablet 60 tablet 0 11/9/2021     Per Dr Kyle 11/12/21\"Recommended to discuss cross taper of Viibryd before completely coming off of Viibryd as it could exacerbate depression.  Discontinued Prozac as he stopped taking medication.  Sent an Epic message to his PCP.  No further refills today from this writer as he is transferring care to PCP.\"    "

## 2021-11-17 ENCOUNTER — HOSPITAL ENCOUNTER (OUTPATIENT)
Dept: CARDIOLOGY | Facility: CLINIC | Age: 76
Discharge: HOME | End: 2021-11-17
Attending: FAMILY MEDICINE | Admitting: FAMILY MEDICINE
Payer: COMMERCIAL

## 2021-11-17 DIAGNOSIS — I48.20 CHRONIC ATRIAL FIBRILLATION (H): ICD-10-CM

## 2021-11-17 PROCEDURE — 93242 EXT ECG>48HR<7D RECORDING: CPT

## 2021-11-17 PROCEDURE — 93248 EXT ECG>7D<15D REV&INTERPJ: CPT | Performed by: INTERNAL MEDICINE

## 2021-11-22 ENCOUNTER — MYC MEDICAL ADVICE (OUTPATIENT)
Dept: FAMILY MEDICINE | Facility: CLINIC | Age: 76
End: 2021-11-22
Payer: COMMERCIAL

## 2021-11-26 ENCOUNTER — OFFICE VISIT (OUTPATIENT)
Dept: FAMILY MEDICINE | Facility: CLINIC | Age: 76
End: 2021-11-26
Payer: COMMERCIAL

## 2021-11-26 VITALS
TEMPERATURE: 96.7 F | RESPIRATION RATE: 16 BRPM | DIASTOLIC BLOOD PRESSURE: 84 MMHG | HEIGHT: 70 IN | BODY MASS INDEX: 27.06 KG/M2 | OXYGEN SATURATION: 98 % | HEART RATE: 88 BPM | WEIGHT: 189 LBS | SYSTOLIC BLOOD PRESSURE: 132 MMHG

## 2021-11-26 DIAGNOSIS — F33.1 MAJOR DEPRESSIVE DISORDER, RECURRENT, MODERATE (H): Primary | ICD-10-CM

## 2021-11-26 PROCEDURE — 99214 OFFICE O/P EST MOD 30 MIN: CPT | Performed by: FAMILY MEDICINE

## 2021-11-26 ASSESSMENT — MIFFLIN-ST. JEOR: SCORE: 1583.55

## 2021-11-26 NOTE — NURSING NOTE
"Initial /84   Pulse 88   Temp (!) 96.7  F (35.9  C) (Tympanic)   Resp 16   Ht 1.778 m (5' 10\")   Wt 85.7 kg (189 lb)   SpO2 98%   BMI 27.12 kg/m   Estimated body mass index is 27.12 kg/m  as calculated from the following:    Height as of this encounter: 1.778 m (5' 10\").    Weight as of this encounter: 85.7 kg (189 lb). .      "

## 2021-11-26 NOTE — PATIENT INSTRUCTIONS
Alex Batista - therapist at [place]   Health Maintenance Summary     Topic Due On Due Status Completed On    MAMMOGRAM - BREAST CANCER SCREENING Aug 24, 2019 Not Due Aug 24, 2017    Colorectal Cancer Screening - Colonoscopy Jun 11, 2020 Not Due Jun 11, 2010    Pap Smear - Cervical Cancer Screening  Jun 19, 2022 Not Due Jun 19, 2017    Immunization - TDAP Pregnancy  Hidden     IMMUNIZATION - DTaP/Tdap/Td Aug 29, 2023 Not Due Aug 29, 2013    Immunization-Influenza  Completed Oct 6, 2017     Jun 22, 1971 Overdue     Hepatitis C Screening Jun 22, 2010 Overdue           Patient is due for topics as listed above, she wishes to decline at this time .

## 2021-11-26 NOTE — PROGRESS NOTES
"  Assessment & Plan     Major depressive disorder, recurrent, moderate (H)  Discussing his mood since increasing Abilify 2 weeks ago he reports he has had some good days and some bad days.  Last several days he has had low mood which prompted him to follow-up.  However, on further discussion this really is not any worse than his low mood prior to adjusting the Abilify.  No suicidal ideation.  Discussed having him give it a little bit more time since it 2 weeks I would not expect a response to the dose change yet.  Hopefully mood will start to improve within the next 2-3 weeks.  If at any point mood worsens significantly or he develop suicidal ideation then he is to let me know and we can back off on the Abilify dose and try something else.  Cymbalta would likely be my next go to.  We also discussed at some point possibly increasing Wellbutrin although I do not want to make that change right now in the event that he has worsening mood we do not know whether to attribute that to the Abilify or the Wellbutrin.  Also recommended that he establish with a therapist.  He did see Nichelle but would like to try a different therapist.  Recommended Alex Batista at [place].  - MENTAL HEALTH REFERRAL  - Adult; Outpatient Treatment; Individual/Couples/Family/Group Therapy/Health Psychology; [facility] [phone]; We will contact you to schedule the appointment or please call with any questions; Future             BMI:   Estimated body mass index is 27.12 kg/m  as calculated from the following:    Height as of this encounter: 1.778 m (5' 10\").    Weight as of this encounter: 85.7 kg (189 lb).           No follow-ups on file.    Lizzy Leiva MD  [facility]    Greg Alonso is a 76 year old who presents for the following health issues     HPI     Depression and Anxiety Follow-Up    How are you doing with your depression since your last visit? Worsened     How are you doing " with your anxiety since your last visit?  Worsened     Are you having other symptoms that might be associated with depression or anxiety? Yes:  feeling of panic    Have you had a significant life event? No     Do you have any concerns with your use of alcohol or other drugs? No    Social History     Tobacco Use     Smoking status: Former Smoker     Packs/day: 1.00     Years: 15.00     Pack years: 15.00     Types: Cigarettes     Quit date: 10/13/1975     Years since quittin.1     Smokeless tobacco: Never Used   Substance Use Topics     Alcohol use: No     Comment: quit 2017     Drug use: No     PHQ 6/3/2021 2021 2021   PHQ-9 Total Score 3 3 7   Q9: Thoughts of better off dead/self-harm past 2 weeks Not at all Not at all Not at all     CAROLYNN-7 SCORE 12/10/2019 6/3/2021 2021   Total Score - - -   Total Score - - 4 (minimal anxiety)   Total Score 2 2 4     Last PHQ-9 2021   1.  Little interest or pleasure in doing things 2   2.  Feeling down, depressed, or hopeless 2   3.  Trouble falling or staying asleep, or sleeping too much 0   4.  Feeling tired or having little energy 1   5.  Poor appetite or overeating 0   6.  Feeling bad about yourself 0   7.  Trouble concentrating 2   8.  Moving slowly or restless 0   Q9: Thoughts of better off dead/self-harm past 2 weeks 0   PHQ-9 Total Score 7   Difficulty at work, home, or with people Somewhat difficult     CAROLYNN-7  2021   1. Feeling nervous, anxious, or on edge 1   2. Not being able to stop or control worrying 1   3. Worrying too much about different things 1   4. Trouble relaxing 0   5. Being so restless that it is hard to sit still 0   6. Becoming easily annoyed or irritable 1   7. Feeling afraid, as if something awful might happen 0   CAROLYNN-7 Total Score 4   If you checked any problems, how difficult have they made it for you to do your work, take care of things at home, or get along with other people? -       Suicide Assessment Five-step  "Evaluation and Treatment (SAFE-T)      How many servings of fruits and vegetables do you eat daily?  0-1    On average, how many sweetened beverages do you drink each day (Examples: soda, juice, sweet tea, etc.  Do NOT count diet or artificially sweetened beverages)?   0    How many days per week do you exercise enough to make your heart beat faster? 3 or less    How many minutes a day do you exercise enough to make your heart beat faster? 9 or less    How many days per week do you miss taking your medication? 0        Review of Systems   Constitutional, neuro, ENT, endocrine, pulmonary, cardiac, gastrointestinal, genitourinary, musculoskeletal, integument and psychiatric systems are negative, except as otherwise noted.       Objective    /84   Pulse 88   Temp (!) 96.7  F (35.9  C) (Tympanic)   Resp 16   Ht 1.778 m (5' 10\")   Wt 85.7 kg (189 lb)   SpO2 98%   BMI 27.12 kg/m    Body mass index is 27.12 kg/m .  Physical Exam   GENERAL: Pleasant, well appearing male.  PSYCH: Alert and oriented x3, neatly dressed and well groomed, makes good eye contact, fluid speech - non-pressured, no psychomotor agitation or slowing, good memory, judgement and insight, no auditory or visual hallucinations, flat affect which is mood congruent. No suicidal ideation.                 "

## 2021-11-27 DIAGNOSIS — F40.10 SOCIAL ANXIETY DISORDER: ICD-10-CM

## 2021-11-30 ENCOUNTER — MYC MEDICAL ADVICE (OUTPATIENT)
Dept: FAMILY MEDICINE | Facility: CLINIC | Age: 76
End: 2021-11-30
Payer: COMMERCIAL

## 2021-11-30 RX ORDER — BUSPIRONE HYDROCHLORIDE 30 MG/1
TABLET ORAL
Qty: 60 TABLET | Refills: 11 | OUTPATIENT
Start: 2021-11-30

## 2021-12-02 ENCOUNTER — MYC MEDICAL ADVICE (OUTPATIENT)
Dept: FAMILY MEDICINE | Facility: CLINIC | Age: 76
End: 2021-12-02
Payer: COMMERCIAL

## 2021-12-02 DIAGNOSIS — F40.10 SOCIAL ANXIETY DISORDER: ICD-10-CM

## 2021-12-03 RX ORDER — BUSPIRONE HYDROCHLORIDE 30 MG/1
30 TABLET ORAL 2 TIMES DAILY
Qty: 180 TABLET | Refills: 3 | Status: SHIPPED | OUTPATIENT
Start: 2021-12-03 | End: 2021-12-15

## 2021-12-03 NOTE — TELEPHONE ENCOUNTER
,    Patient sends my chart stating he needs his buspirone refilled.    Routing refill request to provider for review/approval because:  Prescribed by another provider but patient needs it prescribed by you now. Pended for your consideration. Alexandra QUESADA RN

## 2021-12-07 DIAGNOSIS — F32.1 MODERATE MAJOR DEPRESSION (H): ICD-10-CM

## 2021-12-08 RX ORDER — LAMOTRIGINE 25 MG/1
TABLET ORAL
Qty: 60 TABLET | Refills: 11 | OUTPATIENT
Start: 2021-12-08

## 2021-12-10 ENCOUNTER — MYC MEDICAL ADVICE (OUTPATIENT)
Dept: FAMILY MEDICINE | Facility: CLINIC | Age: 76
End: 2021-12-10
Payer: COMMERCIAL

## 2021-12-10 DIAGNOSIS — F32.1 MODERATE MAJOR DEPRESSION (H): ICD-10-CM

## 2021-12-10 RX ORDER — LAMOTRIGINE 25 MG/1
25 TABLET ORAL 2 TIMES DAILY
Qty: 60 TABLET | Refills: 0 | Status: SHIPPED | OUTPATIENT
Start: 2021-12-10 | End: 2022-01-13

## 2021-12-14 ENCOUNTER — MYC MEDICAL ADVICE (OUTPATIENT)
Dept: FAMILY MEDICINE | Facility: CLINIC | Age: 76
End: 2021-12-14
Payer: COMMERCIAL

## 2021-12-14 DIAGNOSIS — F41.9 ANXIETY: ICD-10-CM

## 2021-12-14 DIAGNOSIS — F40.10 SOCIAL ANXIETY DISORDER: ICD-10-CM

## 2021-12-14 NOTE — TELEPHONE ENCOUNTER
Reason for Call:  Medication or medication refill:BUSPAR    Do you use a [facility]?  Name of the pharmacy and phone number for the current request:  [facility]    Name of the medication requested: buspar    Can we leave a detailed message on this number? YES    Phone number patient can be reached at: Home number on file [phone] (home)    Best Time: ANY    Call taken on 12/14/2021 at 9:21 AM by Josselin Sierra

## 2021-12-15 RX ORDER — GABAPENTIN 600 MG/1
TABLET ORAL
Qty: 270 TABLET | Refills: 3 | OUTPATIENT
Start: 2021-12-15

## 2021-12-15 RX ORDER — BUSPIRONE HYDROCHLORIDE 30 MG/1
30 TABLET ORAL 2 TIMES DAILY
Qty: 180 TABLET | Refills: 0 | Status: SHIPPED | OUTPATIENT
Start: 2021-12-15 | End: 2022-06-22

## 2021-12-15 RX ORDER — GABAPENTIN 600 MG/1
600 TABLET ORAL 3 TIMES DAILY
Qty: 270 TABLET | Refills: 0 | Status: SHIPPED | OUTPATIENT
Start: 2021-12-15 | End: 2022-03-09

## 2021-12-15 NOTE — TELEPHONE ENCOUNTER
Pending Prescriptions:                       Disp   Refills    gabapentin (NEURONTIN) 600 MG tablet      270 ta*0            Sig: Take 1 tablet (600 mg) by mouth 3 times daily    Routing refill request to provider for review/approval because:  Drug not on the [facility] refill protocol     Brian Martins RN

## 2021-12-16 NOTE — TELEPHONE ENCOUNTER
Pending Prescriptions:                       Disp   Refills    busPIRone HCl (BUSPAR) 30 MG tablet       180 ta*0            Sig: Take 1 tablet (30 mg) by mouth 2 times daily    Prescription approved per [facility] Refill Protocol.  Becca Ruggiero RN

## 2021-12-19 DIAGNOSIS — I48.20 CHRONIC ATRIAL FIBRILLATION (H): ICD-10-CM

## 2021-12-20 ENCOUNTER — MYC MEDICAL ADVICE (OUTPATIENT)
Dept: FAMILY MEDICINE | Facility: CLINIC | Age: 76
End: 2021-12-20
Payer: COMMERCIAL

## 2021-12-20 DIAGNOSIS — F32.1 MODERATE MAJOR DEPRESSION (H): ICD-10-CM

## 2021-12-20 DIAGNOSIS — F41.9 ANXIETY: ICD-10-CM

## 2021-12-20 RX ORDER — METOPROLOL TARTRATE 50 MG
TABLET ORAL
Qty: 60 TABLET | Refills: 1 | Status: SHIPPED | OUTPATIENT
Start: 2021-12-20 | End: 2022-02-14

## 2021-12-20 RX ORDER — BUPROPION HYDROCHLORIDE 150 MG/1
150 TABLET, EXTENDED RELEASE ORAL 2 TIMES DAILY
Qty: 180 TABLET | Refills: 0 | Status: CANCELLED | OUTPATIENT
Start: 2021-12-20

## 2021-12-20 NOTE — TELEPHONE ENCOUNTER
"Requested Prescriptions   Pending Prescriptions Disp Refills     buPROPion (WELLBUTRIN SR) 150 MG 12 hr tablet 180 tablet 0     Sig: Take 1 tablet (150 mg) by mouth 2 times daily       [facility] Protocol Failed - 12/20/2021 12:55 PM        Failed - PHQ-9 score less than 5 in past 6 months     Please review last PHQ-9 score.           Passed - Medication is Bupropion     If the medication is Bupropion (Wellbutrin), and the patient is taking for smoking cessation; OK to refill.          Passed - Medication is active on med list        Passed - Patient is age 18 or older        Passed - Recent (6 mo) or future (30 days) visit within the authorizing provider's specialty     Patient had office visit in the last 6 months or has a visit in the next 30 days with authorizing provider or within the authorizing provider's specialty.  See \"Patient Info\" tab in inbasket, or \"Choose Columns\" in Meds & Orders section of the refill encounter.                 "

## 2021-12-21 RX ORDER — BUPROPION HYDROCHLORIDE 150 MG/1
TABLET, EXTENDED RELEASE ORAL
Qty: 180 TABLET | Refills: 3 | OUTPATIENT
Start: 2021-12-21

## 2021-12-21 RX ORDER — BUPROPION HYDROCHLORIDE 150 MG/1
150 TABLET, EXTENDED RELEASE ORAL 2 TIMES DAILY
Qty: 180 TABLET | Refills: 0 | Status: SHIPPED | OUTPATIENT
Start: 2021-12-21 | End: 2022-03-24

## 2021-12-23 ENCOUNTER — MYC MEDICAL ADVICE (OUTPATIENT)
Dept: FAMILY MEDICINE | Facility: CLINIC | Age: 76
End: 2021-12-23
Payer: COMMERCIAL

## 2021-12-28 DIAGNOSIS — F33.1 MAJOR DEPRESSIVE DISORDER, RECURRENT, MODERATE (H): ICD-10-CM

## 2021-12-29 RX ORDER — ARIPIPRAZOLE 10 MG/1
TABLET ORAL
Qty: 30 TABLET | Refills: 1 | OUTPATIENT
Start: 2021-12-29

## 2021-12-30 ENCOUNTER — OFFICE VISIT (OUTPATIENT)
Dept: CARDIOLOGY | Facility: CLINIC | Age: 76
End: 2021-12-30
Attending: FAMILY MEDICINE
Payer: COMMERCIAL

## 2021-12-30 VITALS
HEIGHT: 68 IN | TEMPERATURE: 97.4 F | BODY MASS INDEX: 29.34 KG/M2 | HEART RATE: 87 BPM | WEIGHT: 193.6 LBS | DIASTOLIC BLOOD PRESSURE: 91 MMHG | SYSTOLIC BLOOD PRESSURE: 136 MMHG

## 2021-12-30 DIAGNOSIS — R06.09 DYSPNEA ON EXERTION: Primary | ICD-10-CM

## 2021-12-30 DIAGNOSIS — I48.20 CHRONIC ATRIAL FIBRILLATION (H): ICD-10-CM

## 2021-12-30 PROCEDURE — 99204 OFFICE O/P NEW MOD 45 MIN: CPT | Performed by: INTERNAL MEDICINE

## 2021-12-30 RX ORDER — FUROSEMIDE 20 MG
20 TABLET ORAL DAILY
Qty: 60 TABLET | Refills: 3 | Status: SHIPPED | OUTPATIENT
Start: 2021-12-30 | End: 2022-09-22

## 2021-12-30 RX ORDER — FUROSEMIDE 20 MG
20 TABLET ORAL DAILY
Qty: 60 TABLET | Refills: 3 | Status: SHIPPED | OUTPATIENT
Start: 2021-12-30 | End: 2021-12-30

## 2021-12-30 ASSESSMENT — PAIN SCALES - GENERAL: PAINLEVEL: NO PAIN (0)

## 2021-12-30 ASSESSMENT — MIFFLIN-ST. JEOR: SCORE: 1572.66

## 2021-12-30 NOTE — NURSING NOTE
"Initial BP (!) 136/91 (BP Location: Right arm, Patient Position: Sitting, Cuff Size: Adult Regular)   Pulse 87   Temp 97.4  F (36.3  C) (Tympanic)   Ht 1.727 m (5' 8\")   Wt 87.8 kg (193 lb 9.6 oz)   BMI 29.44 kg/m   Estimated body mass index is 29.44 kg/m  as calculated from the following:    Height as of this encounter: 1.727 m (5' 8\").    Weight as of this encounter: 87.8 kg (193 lb 9.6 oz). .    Cass Haynes LPN on 12/30/2021 at 10:03 AM    "

## 2021-12-30 NOTE — PATIENT INSTRUCTIONS
1. Echocardiogram   2. Nuclear stress test  3. Continue current medications, add lasix 20 mg daily   4. Chemistry panel (blood work) in 2 months and then 2 month follow up with KRYSTEN   5. 6 month follow up Dr. Chapin

## 2021-12-30 NOTE — LETTER
12/30/2021    Lizzy Leiva MD  [address]  [facility] [id number]    RE: Josemanuel Edmond       Dear Colleague,     I had the pleasure of seeing Josemanuel Edmond in the [facility].        HPI:     This is a new patient visit for 76 yr old with PMH AF, HTN, HLD, COVID + in September, GERD, prior etoh. Last seen by [facility] 5/22/18--developed AFib after undergoing thoractomy for empyema 11/2017. Was started on Xarelto and cardioverted. Came to clinic 10/19/21 with palpitations--EKG done and showed AFib. PCP in October then increased metoprolol to 50 mg bid then 75 mg bid. Underwent 9 day Zio 11/17/21: AFib (100% burden) with avg HR 83. Echocardiogram last done in 2017, normal LV function. TSH was wnl from past. Stable kidney function.     ROS negative for chest pain. + palpitations, [place], mild LE edema. Had 20 lb weight gain in the past 5 months unexplained.       ASSESSMENT/PLAN:    1. AFIB: Average HR 80s which is tolerable, no significant episodes of RVR but he is getting winded easily   -continue rate control for now, 100% burden   -repeat echocardiogram given [place] and evaluate for tachy-mediated cardiomyopathy   -continue rivaroxaban indefinitely   -if echo shows decreased LV function then would consider rhythm control strategy with antiarrhythmic loading plus cardioversion (compliant with rivaroxaban - been on for a couple years)   -will start 20  Mg po lasix and check chemistry panel    2. HTN: controlled, on metoprolol     Follow up KRYSTEN in 2 months.     Sarah Chapin MD MS      PAST MEDICAL HISTORY  Past Medical History:   Diagnosis Date     Benign neoplasm of prostate 2000    Benign Prostate Nodule       CURRENT MEDICATIONS  Current Outpatient Medications   Medication Sig Dispense Refill     acetaminophen (TYLENOL) 500 MG tablet Take 1,000 mg by mouth every 8 hours as needed for mild pain       buPROPion (WELLBUTRIN SR) 150 MG 12 hr tablet Take 1 tablet (150 mg) by mouth 2  times daily 180 tablet 0     busPIRone HCl (BUSPAR) 30 MG tablet Take 1 tablet (30 mg) by mouth 2 times daily 180 tablet 0     doxazosin (CARDURA) 8 MG tablet Take 1 tablet (8 mg) by mouth At Bedtime 90 tablet 3     DULoxetine (CYMBALTA) 30 MG capsule Take 1 capsule (30 mg) by mouth daily Take 1 capsule daily x1 week and then increase to 2 capsules daily. 60 capsule 1     finasteride (PROSCAR) 5 MG tablet Take 1 tablet (5 mg) by mouth daily 30 tablet 3     gabapentin (NEURONTIN) 600 MG tablet Take 1 tablet (600 mg) by mouth 3 times daily 270 tablet 0     hydrocortisone, Perianal, (HYDROCORTISONE) 2.5 % cream Place rectally 2 times daily as needed for hemorrhoids 28 g 1     lamoTRIgine (LAMICTAL) 25 MG tablet Take 1 tablet (25 mg) by mouth 2 times daily 60 tablet 0     metoprolol tartrate (LOPRESSOR) 50 MG tablet Take 1 tablet (50 mg) by mouth 2 times daily 1 tab by mouth twice daily 60 tablet 1     XARELTO ANTICOAGULANT 20 MG TABS tablet TAKE 1 TABLET DAILY WITH   DINNER 90 tablet 3       PAST SURGICAL HISTORY:  Past Surgical History:   Procedure Laterality Date     ARTHROPLASTY KNEE Right 10/12/2018    Procedure: ARTHROPLASTY KNEE;  Right Total Knee Arthroplasty;  Surgeon: Jeb Peralta MD;  Location: [facility] OR     COLONOSCOPY N/A 12/10/2015    Procedure: COMBINED COLONOSCOPY, SINGLE OR MULTIPLE BIOPSY/POLYPECTOMY BY BIOPSY;  Surgeon: Jyoti Figueroa MD;  Location: [facility]     JOINT REPLACEMENT, HIP RT/LT  10/07    Joint Replacement Hip LT     JOINT REPLACEMTN, KNEE RT/LT  8/2011    Joint Replacement knee /LT, [facility]     LAPAROSCOPIC HERNIORRHAPHY INGUINAL BILATERAL Bilateral 4/24/2018    Procedure: LAPAROSCOPIC HERNIORRHAPHY INGUINAL BILATERAL;  Laparoscopic bilateral inguinal hernia repair;  Surgeon: Josemanuel Resendiz MD;  Location: [facility] OR     PHACOEMULSIFICATION WITH STANDARD INTRAOCULAR LENS IMPLANT Right 1/6/2021    Procedure: Cataract Removal with Implant;  Surgeon: Regan aKufman MD;   Location: [facility] OR     PHACOEMULSIFICATION WITH STANDARD INTRAOCULAR LENS IMPLANT Left 2021    Procedure: Cataract Removal with Implant;  Surgeon: Regan Kaufman MD;  Location: [facility] OR     SURGICAL HISTORY OF -   1999    Umbilical Herniorrhaphy with mesh     SURGICAL HISTORY OF -  Left 2017    Thoracotomy and drainage of empyema       ALLERGIES     Allergies   Allergen Reactions     Bactrim [Sulfamethoxazole W/Trimethoprim] Nausea and Vomiting       FAMILY HISTORY  Family History   Problem Relation Age of Onset     Breast Cancer Mother      Neurologic Disorder Mother         parkinsons     Respiratory Father         emphyzema     Diabetes Brother          SOCIAL HISTORY  Social History     Socioeconomic History     Marital status:      Spouse name: Not on file     Number of children: Not on file     Years of education: Not on file     Highest education level: Not on file   Occupational History     Not on file   Tobacco Use     Smoking status: Former Smoker     Packs/day: 1.00     Years: 15.00     Pack years: 15.00     Types: Cigarettes     Quit date: 10/13/1975     Years since quittin.2     Smokeless tobacco: Never Used   Substance and Sexual Activity     Alcohol use: No     Comment: quit 2017     Drug use: No     Sexual activity: Not Currently   Other Topics Concern     Parent/sibling w/ CABG, MI or angioplasty before 65F 55M? No   Social History Narrative     Not on file     Social Determinants of Health     Financial Resource Strain: Not on file   Food Insecurity: Not on file   Transportation Needs: Not on file   Physical Activity: Not on file   Stress: Not on file   Social Connections: Not on file   Intimate Partner Violence: Not on file   Housing Stability: Not on file       ROS:   Constitutional: No fever, chills, or sweats. No weight gain/loss   ENT: No visual disturbance, ear ache, epistaxis, sore throat  Allergies/Immunologic: Negative  Respiratory: No cough,  "hemoptysia  Cardiovascular: As per HPI  GI: No nausea, vomiting, hematemesis, melena, or hematochezia  : No urinary frequency, dysuria, or hematuria  Integument: Negative  Psychiatric: Negative  Neuro: Negative  Endocrinology: Negative   Musculoskeletal: Negative  Vascular: No walking impairment, claudication, ischemic rest pain or nonhealing wounds    EXAM:  BP (!) 136/91 (BP Location: Right arm, Patient Position: Sitting, Cuff Size: Adult Regular)   Pulse 87   Temp 97.4  F (36.3  C) (Tympanic)   Ht 1.727 m (5' 8\")   Wt 87.8 kg (193 lb 9.6 oz)   BMI 29.44 kg/m    In general, the patient is a pleasant male in no apparent distress.    HEENT: NC/AT.  PERRLA.  EOMI.  Sclerae white, not injected.  Nares clear.   Neck: No adenopathy.  No thyromegaly. Carotids +2/2 bilaterally without bruits.  No jugular venous distension.   Heart: RRR. Normal S1, S2 splits physiologically. No murmur, rub, click, or gallop.   Lungs: CTA.  No ronchi, wheezes, rales.  No dullness to percussion.   Abdomen: Soft, nontender, nondistended.   Extremities: No clubbing, cyanosis, or edema.  No wounds. .   Vascular: No bruits are noted.    Labs:  LIPID RESULTS:  Lab Results   Component Value Date    CHOL 231 (H) 10/09/2017    HDL 71 10/09/2017     (H) 10/09/2017    TRIG 242 (H) 10/09/2017    CHOLHDLRATIO 3.0 10/07/2013    NHDL 160 (H) 10/09/2017       LIVER ENZYME RESULTS:  Lab Results   Component Value Date    AST 18 06/03/2021    ALT 23 06/03/2021       CBC RESULTS:  Lab Results   Component Value Date    WBC 5.7 06/03/2021    RBC 3.62 (L) 06/03/2021    HGB 12.3 (L) 06/03/2021    HCT 35.6 (L) 06/03/2021    MCV 98 06/03/2021    MCH 34.0 (H) 06/03/2021    MCHC 34.6 06/03/2021    RDW 13.5 06/03/2021     06/03/2021       BMP RESULTS:  Lab Results   Component Value Date     (L) 06/03/2021    POTASSIUM 4.5 06/03/2021    CHLORIDE 97 06/03/2021    CO2 30 06/03/2021    ANIONGAP 5 06/03/2021    GLC 96 06/03/2021    BUN 14 " 06/03/2021    CR 0.91 06/03/2021    GFRESTIMATED 80 06/03/2021    GFRESTBLACK >90 06/03/2021    LUIS 8.9 06/03/2021        A1C RESULTS:  No results found for: A1C          Thank you for allowing me to participate in the care of your patient.      Sincerely,     Sarah Chapin MD     [facility]

## 2021-12-30 NOTE — PROGRESS NOTES
HPI:     This is a new patient visit for 76 yr old with PMH AF, HTN, HLD, COVID + in September, GERD, prior etoh. Last seen by [facility] 5/22/18--developed AFib after undergoing thoractomy for empyema 11/2017. Was started on Xarelto and cardioverted. Came to clinic 10/19/21 with palpitations--EKG done and showed AFib. PCP in October then increased metoprolol to 50 mg bid then 75 mg bid. Underwent 9 day Zio 11/17/21: AFib (100% burden) with avg HR 83. Echocardiogram last done in 2017, normal LV function. TSH was wnl from past. Stable kidney function.     ROS negative for chest pain. + palpitations, [place], mild LE edema. Had 20 lb weight gain in the past 5 months unexplained.       ASSESSMENT/PLAN:    1. AFIB: Average HR 80s which is tolerable, no significant episodes of RVR but he is getting winded easily   -continue rate control for now, 100% burden   -repeat echocardiogram given [place] and evaluate for tachy-mediated cardiomyopathy   -continue rivaroxaban indefinitely   -if echo shows decreased LV function then would consider rhythm control strategy with antiarrhythmic loading plus cardioversion (compliant with rivaroxaban - been on for a couple years)   -will start 20  Mg po lasix and check chemistry panel    2. HTN: controlled, on metoprolol     Follow up KRYSTEN in 2 months.     Sarah Chapin MD MS      PAST MEDICAL HISTORY  Past Medical History:   Diagnosis Date     Benign neoplasm of prostate 2000    Benign Prostate Nodule       CURRENT MEDICATIONS  Current Outpatient Medications   Medication Sig Dispense Refill     acetaminophen (TYLENOL) 500 MG tablet Take 1,000 mg by mouth every 8 hours as needed for mild pain       buPROPion (WELLBUTRIN SR) 150 MG 12 hr tablet Take 1 tablet (150 mg) by mouth 2 times daily 180 tablet 0     busPIRone HCl (BUSPAR) 30 MG tablet Take 1 tablet (30 mg) by mouth 2 times daily 180 tablet 0     doxazosin (CARDURA) 8 MG tablet Take 1 tablet (8 mg) by mouth At Bedtime 90 tablet 3      DULoxetine (CYMBALTA) 30 MG capsule Take 1 capsule (30 mg) by mouth daily Take 1 capsule daily x1 week and then increase to 2 capsules daily. 60 capsule 1     finasteride (PROSCAR) 5 MG tablet Take 1 tablet (5 mg) by mouth daily 30 tablet 3     gabapentin (NEURONTIN) 600 MG tablet Take 1 tablet (600 mg) by mouth 3 times daily 270 tablet 0     hydrocortisone, Perianal, (HYDROCORTISONE) 2.5 % cream Place rectally 2 times daily as needed for hemorrhoids 28 g 1     lamoTRIgine (LAMICTAL) 25 MG tablet Take 1 tablet (25 mg) by mouth 2 times daily 60 tablet 0     metoprolol tartrate (LOPRESSOR) 50 MG tablet Take 1 tablet (50 mg) by mouth 2 times daily 1 tab by mouth twice daily 60 tablet 1     XARELTO ANTICOAGULANT 20 MG TABS tablet TAKE 1 TABLET DAILY WITH   DINNER 90 tablet 3       PAST SURGICAL HISTORY:  Past Surgical History:   Procedure Laterality Date     ARTHROPLASTY KNEE Right 10/12/2018    Procedure: ARTHROPLASTY KNEE;  Right Total Knee Arthroplasty;  Surgeon: Jeb Peralta MD;  Location: [facility]     COLONOSCOPY N/A 12/10/2015    Procedure: COMBINED COLONOSCOPY, SINGLE OR MULTIPLE BIOPSY/POLYPECTOMY BY BIOPSY;  Surgeon: Jyoti Figueroa MD;  Location: [facility]     JOINT REPLACEMENT, HIP RT/LT  10/07    Joint Replacement Hip LT     JOINT REPLACEMTN, KNEE RT/LT  8/2011    Joint Replacement knee /LT, [facility]     LAPAROSCOPIC HERNIORRHAPHY INGUINAL BILATERAL Bilateral 4/24/2018    Procedure: LAPAROSCOPIC HERNIORRHAPHY INGUINAL BILATERAL;  Laparoscopic bilateral inguinal hernia repair;  Surgeon: Josemanuel Resendiz MD;  Location: [facility]     PHACOEMULSIFICATION WITH STANDARD INTRAOCULAR LENS IMPLANT Right 1/6/2021    Procedure: Cataract Removal with Implant;  Surgeon: Regan Kaufman MD;  Location: [facility]     PHACOEMULSIFICATION WITH STANDARD INTRAOCULAR LENS IMPLANT Left 2/17/2021    Procedure: Cataract Removal with Implant;  Surgeon: Regan Kaufman MD;  Location: [facility]     SURGICAL  HISTORY OF -   1999    Umbilical Herniorrhaphy with mesh     SURGICAL HISTORY OF -  Left 2017    Thoracotomy and drainage of empyema       ALLERGIES     Allergies   Allergen Reactions     Bactrim [Sulfamethoxazole W/Trimethoprim] Nausea and Vomiting       FAMILY HISTORY  Family History   Problem Relation Age of Onset     Breast Cancer Mother      Neurologic Disorder Mother         parkinsons     Respiratory Father         emphyzema     Diabetes Brother          SOCIAL HISTORY  Social History     Socioeconomic History     Marital status:      Spouse name: Not on file     Number of children: Not on file     Years of education: Not on file     Highest education level: Not on file   Occupational History     Not on file   Tobacco Use     Smoking status: Former Smoker     Packs/day: 1.00     Years: 15.00     Pack years: 15.00     Types: Cigarettes     Quit date: 10/13/1975     Years since quittin.2     Smokeless tobacco: Never Used   Substance and Sexual Activity     Alcohol use: No     Comment: quit 2017     Drug use: No     Sexual activity: Not Currently   Other Topics Concern     Parent/sibling w/ CABG, MI or angioplasty before 65F 55M? No   Social History Narrative     Not on file     Social Determinants of Health     Financial Resource Strain: Not on file   Food Insecurity: Not on file   Transportation Needs: Not on file   Physical Activity: Not on file   Stress: Not on file   Social Connections: Not on file   Intimate Partner Violence: Not on file   Housing Stability: Not on file       ROS:   Constitutional: No fever, chills, or sweats. No weight gain/loss   ENT: No visual disturbance, ear ache, epistaxis, sore throat  Allergies/Immunologic: Negative  Respiratory: No cough, hemoptysia  Cardiovascular: As per HPI  GI: No nausea, vomiting, hematemesis, melena, or hematochezia  : No urinary frequency, dysuria, or hematuria  Integument: Negative  Psychiatric: Negative  Neuro:  "Negative  Endocrinology: Negative   Musculoskeletal: Negative  Vascular: No walking impairment, claudication, ischemic rest pain or nonhealing wounds    EXAM:  BP (!) 136/91 (BP Location: Right arm, Patient Position: Sitting, Cuff Size: Adult Regular)   Pulse 87   Temp 97.4  F (36.3  C) (Tympanic)   Ht 1.727 m (5' 8\")   Wt 87.8 kg (193 lb 9.6 oz)   BMI 29.44 kg/m    In general, the patient is a pleasant male in no apparent distress.    HEENT: NC/AT.  PERRLA.  EOMI.  Sclerae white, not injected.  Nares clear.   Neck: No adenopathy.  No thyromegaly. Carotids +2/2 bilaterally without bruits.  No jugular venous distension.   Heart: RRR. Normal S1, S2 splits physiologically. No murmur, rub, click, or gallop.   Lungs: CTA.  No ronchi, wheezes, rales.  No dullness to percussion.   Abdomen: Soft, nontender, nondistended.   Extremities: No clubbing, cyanosis, or edema.  No wounds. .   Vascular: No bruits are noted.    Labs:  LIPID RESULTS:  Lab Results   Component Value Date    CHOL 231 (H) 10/09/2017    HDL 71 10/09/2017     (H) 10/09/2017    TRIG 242 (H) 10/09/2017    CHOLHDLRATIO 3.0 10/07/2013    NHDL 160 (H) 10/09/2017       LIVER ENZYME RESULTS:  Lab Results   Component Value Date    AST 18 06/03/2021    ALT 23 06/03/2021       CBC RESULTS:  Lab Results   Component Value Date    WBC 5.7 06/03/2021    RBC 3.62 (L) 06/03/2021    HGB 12.3 (L) 06/03/2021    HCT 35.6 (L) 06/03/2021    MCV 98 06/03/2021    MCH 34.0 (H) 06/03/2021    MCHC 34.6 06/03/2021    RDW 13.5 06/03/2021     06/03/2021       BMP RESULTS:  Lab Results   Component Value Date     (L) 06/03/2021    POTASSIUM 4.5 06/03/2021    CHLORIDE 97 06/03/2021    CO2 30 06/03/2021    ANIONGAP 5 06/03/2021    GLC 96 06/03/2021    BUN 14 06/03/2021    CR 0.91 06/03/2021    GFRESTIMATED 80 06/03/2021    GFRESTBLACK >90 06/03/2021    LUIS 8.9 06/03/2021        A1C RESULTS:  No results found for: A1C      "

## 2022-01-10 ENCOUNTER — MYC MEDICAL ADVICE (OUTPATIENT)
Dept: FAMILY MEDICINE | Facility: CLINIC | Age: 77
End: 2022-01-10
Payer: COMMERCIAL

## 2022-01-10 DIAGNOSIS — F32.1 MODERATE MAJOR DEPRESSION (H): ICD-10-CM

## 2022-01-11 ENCOUNTER — MYC MEDICAL ADVICE (OUTPATIENT)
Dept: FAMILY MEDICINE | Facility: CLINIC | Age: 77
End: 2022-01-11
Payer: COMMERCIAL

## 2022-01-12 RX ORDER — LAMOTRIGINE 25 MG/1
TABLET ORAL
Qty: 180 TABLET | Refills: 3 | OUTPATIENT
Start: 2022-01-12

## 2022-01-13 RX ORDER — LAMOTRIGINE 25 MG/1
25 TABLET ORAL 2 TIMES DAILY
Qty: 60 TABLET | Refills: 0 | Status: SHIPPED | OUTPATIENT
Start: 2022-01-13 | End: 2022-01-21

## 2022-01-14 ENCOUNTER — E-VISIT (OUTPATIENT)
Dept: FAMILY MEDICINE | Facility: CLINIC | Age: 77
End: 2022-01-14
Payer: COMMERCIAL

## 2022-01-14 DIAGNOSIS — F33.1 MAJOR DEPRESSIVE DISORDER, RECURRENT, MODERATE (H): Primary | ICD-10-CM

## 2022-01-14 PROCEDURE — 99422 OL DIG E/M SVC 11-20 MIN: CPT | Performed by: FAMILY MEDICINE

## 2022-01-14 ASSESSMENT — ANXIETY QUESTIONNAIRES
GAD7 TOTAL SCORE: 3
3. WORRYING TOO MUCH ABOUT DIFFERENT THINGS: SEVERAL DAYS
GAD7 TOTAL SCORE: 3
4. TROUBLE RELAXING: NOT AT ALL
8. IF YOU CHECKED OFF ANY PROBLEMS, HOW DIFFICULT HAVE THESE MADE IT FOR YOU TO DO YOUR WORK, TAKE CARE OF THINGS AT HOME, OR GET ALONG WITH OTHER PEOPLE?: SOMEWHAT DIFFICULT
5. BEING SO RESTLESS THAT IT IS HARD TO SIT STILL: SEVERAL DAYS
1. FEELING NERVOUS, ANXIOUS, OR ON EDGE: NOT AT ALL
GAD7 TOTAL SCORE: 3
6. BECOMING EASILY ANNOYED OR IRRITABLE: NOT AT ALL
7. FEELING AFRAID AS IF SOMETHING AWFUL MIGHT HAPPEN: NOT AT ALL
7. FEELING AFRAID AS IF SOMETHING AWFUL MIGHT HAPPEN: NOT AT ALL
2. NOT BEING ABLE TO STOP OR CONTROL WORRYING: SEVERAL DAYS

## 2022-01-14 ASSESSMENT — PATIENT HEALTH QUESTIONNAIRE - PHQ9
SUM OF ALL RESPONSES TO PHQ QUESTIONS 1-9: 9
SUM OF ALL RESPONSES TO PHQ QUESTIONS 1-9: 9
10. IF YOU CHECKED OFF ANY PROBLEMS, HOW DIFFICULT HAVE THESE PROBLEMS MADE IT FOR YOU TO DO YOUR WORK, TAKE CARE OF THINGS AT HOME, OR GET ALONG WITH OTHER PEOPLE: SOMEWHAT DIFFICULT

## 2022-01-15 ASSESSMENT — ANXIETY QUESTIONNAIRES: GAD7 TOTAL SCORE: 3

## 2022-01-15 ASSESSMENT — PATIENT HEALTH QUESTIONNAIRE - PHQ9: SUM OF ALL RESPONSES TO PHQ QUESTIONS 1-9: 9

## 2022-01-19 ENCOUNTER — HOSPITAL ENCOUNTER (OUTPATIENT)
Dept: CARDIOLOGY | Facility: CLINIC | Age: 77
End: 2022-01-19
Attending: INTERNAL MEDICINE
Payer: COMMERCIAL

## 2022-01-19 ENCOUNTER — HOSPITAL ENCOUNTER (OUTPATIENT)
Dept: NUCLEAR MEDICINE | Facility: CLINIC | Age: 77
Setting detail: NUCLEAR MEDICINE
End: 2022-01-19
Attending: INTERNAL MEDICINE
Payer: COMMERCIAL

## 2022-01-19 VITALS — WEIGHT: 196 LBS | HEIGHT: 68 IN | BODY MASS INDEX: 29.7 KG/M2

## 2022-01-19 DIAGNOSIS — R06.09 DYSPNEA ON EXERTION: ICD-10-CM

## 2022-01-19 DIAGNOSIS — I48.20 CHRONIC ATRIAL FIBRILLATION (H): ICD-10-CM

## 2022-01-19 LAB
CV BLOOD PRESSURE: 53 MMHG
CV STRESS MAX HR HE: 95
NUC STRESS EJECTION FRACTION: 54 %
RATE PRESSURE PRODUCT: NORMAL
STRESS ECHO BASELINE DIASTOLIC HE: 80
STRESS ECHO BASELINE HR: 82 BPM
STRESS ECHO BASELINE SYSTOLIC BP: 120
STRESS ECHO CALCULATED PERCENT HR: 67 %
STRESS ECHO LAST STRESS DIASTOLIC BP: 70
STRESS ECHO LAST STRESS SYSTOLIC BP: 110
STRESS ECHO TARGET HR: 142

## 2022-01-19 PROCEDURE — 343N000001 HC RX 343: Performed by: INTERNAL MEDICINE

## 2022-01-19 PROCEDURE — 93016 CV STRESS TEST SUPVJ ONLY: CPT | Performed by: INTERNAL MEDICINE

## 2022-01-19 PROCEDURE — 93018 CV STRESS TEST I&R ONLY: CPT | Performed by: INTERNAL MEDICINE

## 2022-01-19 PROCEDURE — 93017 CV STRESS TEST TRACING ONLY: CPT

## 2022-01-19 PROCEDURE — 78452 HT MUSCLE IMAGE SPECT MULT: CPT | Mod: 26 | Performed by: INTERNAL MEDICINE

## 2022-01-19 PROCEDURE — A9502 TC99M TETROFOSMIN: HCPCS | Performed by: INTERNAL MEDICINE

## 2022-01-19 PROCEDURE — 78452 HT MUSCLE IMAGE SPECT MULT: CPT

## 2022-01-19 PROCEDURE — 250N000011 HC RX IP 250 OP 636: Performed by: INTERNAL MEDICINE

## 2022-01-19 RX ORDER — REGADENOSON 0.08 MG/ML
0.4 INJECTION, SOLUTION INTRAVENOUS ONCE
Status: COMPLETED | OUTPATIENT
Start: 2022-01-19 | End: 2022-01-19

## 2022-01-19 RX ADMIN — TETROFOSMIN 11.2 MCI.: 1.38 INJECTION, POWDER, LYOPHILIZED, FOR SOLUTION INTRAVENOUS at 12:45

## 2022-01-19 RX ADMIN — TETROFOSMIN 30.6 MCI.: 1.38 INJECTION, POWDER, LYOPHILIZED, FOR SOLUTION INTRAVENOUS at 14:30

## 2022-01-19 RX ADMIN — REGADENOSON 0.4 MG: 0.08 INJECTION, SOLUTION INTRAVENOUS at 14:24

## 2022-01-19 ASSESSMENT — MIFFLIN-ST. JEOR: SCORE: 1583.55

## 2022-01-19 NOTE — TELEPHONE ENCOUNTER
Care team: Please contact him to get him scheduled for an office visit with me in about 2 weeks to recheck mood symptoms and medication changes.  If I do not have any availability is okay to use my same-day slots.

## 2022-01-21 DIAGNOSIS — F32.1 MODERATE MAJOR DEPRESSION (H): ICD-10-CM

## 2022-01-21 RX ORDER — LAMOTRIGINE 25 MG/1
50 TABLET ORAL 2 TIMES DAILY
Qty: 120 TABLET | Refills: 1 | Status: SHIPPED | OUTPATIENT
Start: 2022-01-21 | End: 2022-02-24

## 2022-01-27 ENCOUNTER — OFFICE VISIT (OUTPATIENT)
Dept: FAMILY MEDICINE | Facility: CLINIC | Age: 77
End: 2022-01-27
Payer: COMMERCIAL

## 2022-01-27 VITALS
SYSTOLIC BLOOD PRESSURE: 122 MMHG | RESPIRATION RATE: 16 BRPM | WEIGHT: 182 LBS | BODY MASS INDEX: 27.58 KG/M2 | OXYGEN SATURATION: 98 % | DIASTOLIC BLOOD PRESSURE: 76 MMHG | TEMPERATURE: 97.2 F | HEART RATE: 70 BPM | HEIGHT: 68 IN

## 2022-01-27 DIAGNOSIS — F33.1 MODERATE EPISODE OF RECURRENT MAJOR DEPRESSIVE DISORDER (H): Primary | ICD-10-CM

## 2022-01-27 PROCEDURE — 99214 OFFICE O/P EST MOD 30 MIN: CPT | Performed by: FAMILY MEDICINE

## 2022-01-27 ASSESSMENT — ANXIETY QUESTIONNAIRES
2. NOT BEING ABLE TO STOP OR CONTROL WORRYING: SEVERAL DAYS
6. BECOMING EASILY ANNOYED OR IRRITABLE: NOT AT ALL
7. FEELING AFRAID AS IF SOMETHING AWFUL MIGHT HAPPEN: NOT AT ALL
GAD7 TOTAL SCORE: 3
GAD7 TOTAL SCORE: 3
7. FEELING AFRAID AS IF SOMETHING AWFUL MIGHT HAPPEN: NOT AT ALL
4. TROUBLE RELAXING: NOT AT ALL
3. WORRYING TOO MUCH ABOUT DIFFERENT THINGS: SEVERAL DAYS
1. FEELING NERVOUS, ANXIOUS, OR ON EDGE: SEVERAL DAYS
GAD7 TOTAL SCORE: 3
5. BEING SO RESTLESS THAT IT IS HARD TO SIT STILL: NOT AT ALL

## 2022-01-27 ASSESSMENT — PAIN SCALES - GENERAL: PAINLEVEL: NO PAIN (0)

## 2022-01-27 ASSESSMENT — MIFFLIN-ST. JEOR: SCORE: 1520.05

## 2022-01-27 ASSESSMENT — PATIENT HEALTH QUESTIONNAIRE - PHQ9
10. IF YOU CHECKED OFF ANY PROBLEMS, HOW DIFFICULT HAVE THESE PROBLEMS MADE IT FOR YOU TO DO YOUR WORK, TAKE CARE OF THINGS AT HOME, OR GET ALONG WITH OTHER PEOPLE: NOT DIFFICULT AT ALL
SUM OF ALL RESPONSES TO PHQ QUESTIONS 1-9: 8
SUM OF ALL RESPONSES TO PHQ QUESTIONS 1-9: 8

## 2022-01-27 NOTE — PROGRESS NOTES
"  Assessment & Plan     Moderate major depression (H)  He has struggled long-term with poor control of depression.  We recently increased his Lamictal.  He is also on Wellbutrin, BuSpar.  Stopped duloxetine secondary to side effects.  Discussed given failure of multiple medications and option might be GeneSight testing.  Swab obtained today and he will check with his insurance prior to sending the sample.  Advised also scheduling a psychiatry consult.  Discussed that those appointments do tend to book out quite a ways and would like to be specific on the books as we have not had much success despite numerous medication adjustments.  - Adult Mental Health  Referral; Future       BMI:   Estimated body mass index is 27.67 kg/m  as calculated from the following:    Height as of this encounter: 1.727 m (5' 8\").    Weight as of this encounter: 82.6 kg (182 lb).       No follow-ups on file.    Lizzy Leiva MD  [facility]    Greg Alonso is a 76 year old who presents for the following health issues   History of Present Illness       Mental Health Follow-up:  Patient presents to follow-up on Depression.Patient's depression since last visit has been:  Bad  The patient is not having other symptoms associated with depression.      Any significant life events: No  Patient is not feeling anxious or having panic attacks.  Patient has no concerns about alcohol or drug use.     Social History  Tobacco Use    Smoking status: Former Smoker      Packs/day: 1.00      Years: 15.00      Pack years: 15      Types: Cigarettes      Quit date: 10/13/1975      Years since quittin.3    Smokeless tobacco: Never Used  Alcohol use: No    Comment: quit 2017  Drug use: No      Today's PHQ-9         PHQ-9 Total Score:     (P) 8   PHQ-9 Q9 Thoughts of better off dead/self-harm past 2 weeks :   (P) Not at all   Thoughts of suicide or self harm:      Self-harm Plan:        Self-harm Action:  " "        Safety concerns for self or others:                   Review of Systems   Constitutional, neuro, ENT, endocrine, pulmonary, cardiac, gastrointestinal, genitourinary, musculoskeletal, integument and psychiatric systems are negative, except as otherwise noted.       Objective    /76   Pulse 70   Temp 97.2  F (36.2  C) (Tympanic)   Resp 16   Ht 1.727 m (5' 8\")   Wt 82.6 kg (182 lb)   SpO2 98%   BMI 27.67 kg/m    Body mass index is 27.67 kg/m .  Physical Exam   GENERAL: Pleasant, well appearing male.  PSYCH: Alert and oriented x3, neatly dressed and well groomed, makes good eye contact, fluid speech - non-pressured, no psychomotor agitation or slowing, good memory, judgement and insight, no auditory or visual hallucinations, flat affect which is mood congruent. No suicidal ideation.                 Answers for HPI/ROS submitted by the patient on 1/27/2022  If you checked off any problems, how difficult have these problems made it for you to do your work, take care of things at home, or get along with other people?: Not difficult at all  PHQ9 TOTAL SCORE: 8  CAROLYNN 7 TOTAL SCORE: 3      "

## 2022-01-27 NOTE — NURSING NOTE
"Initial /76   Pulse 70   Temp 97.2  F (36.2  C) (Tympanic)   Resp 16   Ht 1.727 m (5' 8\")   Wt 82.6 kg (182 lb)   SpO2 98%   BMI 27.67 kg/m   Estimated body mass index is 27.67 kg/m  as calculated from the following:    Height as of this encounter: 1.727 m (5' 8\").    Weight as of this encounter: 82.6 kg (182 lb). .      "

## 2022-01-27 NOTE — PATIENT INSTRUCTIONS
Your BMI is Body mass index is Body mass index is 27.67 kg/m .     A BMI of 18.5 to 24.9 is in the healthy range. A person with a BMI of 25 to 29.9 is considered overweight, and someone with a BMI of 30 or greater is considered obese. More than two-thirds of American adults are considered overweight or obese.  Weight management is a personal decision.  If you are interested in exploring weight loss strategies, the following discussion covers the approaches that may be successful. Body mass index (BMI) is one way to tell whether you are at a healthy weight, overweight, or obese. It measures your weight in relation to your height.  Being overweight or obese increases the risk for further weight gain. Excess weight may lead to heart disease and diabetes.  Creating and following plans for healthy eating and physical activity may help you improve your health.  Weight control is part of healthy lifestyle and includes exercise, emotional health, and healthy eating habits. Careful eating habits lifelong are the mainstay of weight control. Though there are significant health benefits from weight loss, long-term weight loss with diet alone may be very difficult to achieve- studies show long-term success with dietary management alone in less than 10% of people. Attaining a healthy weight may be especially difficult to achieve in those with severe obesity. In some cases, medications, devices and surgical management might be considered.  What can you do?    Keep a food journal to help with mindful eating and finding ways to modify your diet.      Reduce the amount of processed food in your diet. Focus on adding vegetables, and lean proteins.    Reduce dietary carbohydrates. Limiting to  gm of carbohydrates per day has been shows to help boost weight loss.  If you have diabetes or are on diabetic medications do not do this without talking to your physician or healthcare provider.    Diet combined with exercise helps  maintain muscle while optimizing fat loss. Strength training is particularly important for building and maintaining muscle mass. Exercise helps reduce stress, increase energy, and improves fitness. Increasing exercise without diet control, however, may not burn enough calories to loose weight.         Start walking three days a week 10-20 minutes at a time    Work towards walking thirty minutes five days a week      Eventually, increase the speed of your walking for 1-2 minutes at time    In addition, we recommend that you review healthy lifestyles and methods for weight loss available through the National Institutes of Health patient information sites:  http://win.niddk.nih.gov/publications/index.htm    Also look into health and wellness programs that may be available through your health insurance provider, employer, local community center, or nini club.

## 2022-01-28 ASSESSMENT — PATIENT HEALTH QUESTIONNAIRE - PHQ9: SUM OF ALL RESPONSES TO PHQ QUESTIONS 1-9: 8

## 2022-01-28 ASSESSMENT — ANXIETY QUESTIONNAIRES: GAD7 TOTAL SCORE: 3

## 2022-01-31 DIAGNOSIS — F33.1 MAJOR DEPRESSIVE DISORDER, RECURRENT, MODERATE (H): ICD-10-CM

## 2022-01-31 RX ORDER — DULOXETIN HYDROCHLORIDE 30 MG/1
CAPSULE, DELAYED RELEASE ORAL
Qty: 60 CAPSULE | Refills: 1 | OUTPATIENT
Start: 2022-01-31

## 2022-01-31 NOTE — TELEPHONE ENCOUNTER
Left message to call clinic back.  Alivia Castano [facility] on 1/31/2022 at 12:01 PM

## 2022-01-31 NOTE — TELEPHONE ENCOUNTER
This medication is currently not on the patient's medication list.  Recommend follow-up with provider if he would like to discuss medication restart.  Clara Kirkland NP on 1/31/2022 at 8:50 AM

## 2022-02-03 ENCOUNTER — HOSPITAL ENCOUNTER (OUTPATIENT)
Dept: CARDIOLOGY | Facility: CLINIC | Age: 77
Discharge: HOME | End: 2022-02-03
Attending: INTERNAL MEDICINE | Admitting: INTERNAL MEDICINE
Payer: COMMERCIAL

## 2022-02-03 DIAGNOSIS — R06.09 DYSPNEA ON EXERTION: ICD-10-CM

## 2022-02-03 DIAGNOSIS — I48.20 CHRONIC ATRIAL FIBRILLATION (H): ICD-10-CM

## 2022-02-03 LAB — LVEF ECHO: NORMAL

## 2022-02-03 PROCEDURE — 93306 TTE W/DOPPLER COMPLETE: CPT | Mod: 26 | Performed by: INTERNAL MEDICINE

## 2022-02-03 PROCEDURE — 93306 TTE W/DOPPLER COMPLETE: CPT

## 2022-02-04 ENCOUNTER — MYC MEDICAL ADVICE (OUTPATIENT)
Dept: FAMILY MEDICINE | Facility: CLINIC | Age: 77
End: 2022-02-04
Payer: COMMERCIAL

## 2022-02-04 NOTE — RESULT ENCOUNTER NOTE
EF 50-55%; mod MR, TR, mild to mod AI; asc aorta mildly dilated at 4.1 cm; no significant change from previous echo in 2017. Follow up with Kimmy YANES on 2/28/22. Pt notified of results via his [employer] account

## 2022-02-08 ENCOUNTER — TRANSFERRED RECORDS (OUTPATIENT)
Dept: HEALTH INFORMATION MANAGEMENT | Facility: CLINIC | Age: 77
End: 2022-02-08
Payer: COMMERCIAL

## 2022-02-08 ENCOUNTER — MYC MEDICAL ADVICE (OUTPATIENT)
Dept: FAMILY MEDICINE | Facility: CLINIC | Age: 77
End: 2022-02-08
Payer: COMMERCIAL

## 2022-02-08 DIAGNOSIS — F33.1 MAJOR DEPRESSIVE DISORDER, RECURRENT, MODERATE (H): ICD-10-CM

## 2022-02-09 DIAGNOSIS — F33.1 MAJOR DEPRESSIVE DISORDER, RECURRENT, MODERATE (H): ICD-10-CM

## 2022-02-09 RX ORDER — DULOXETIN HYDROCHLORIDE 30 MG/1
30 CAPSULE, DELAYED RELEASE ORAL DAILY
Qty: 30 CAPSULE | Refills: 1 | Status: SHIPPED | OUTPATIENT
Start: 2022-02-09 | End: 2022-02-11

## 2022-02-09 RX ORDER — DULOXETIN HYDROCHLORIDE 30 MG/1
30 CAPSULE, DELAYED RELEASE ORAL DAILY
Qty: 60 CAPSULE | Refills: 1 | Status: CANCELLED | OUTPATIENT
Start: 2022-02-09

## 2022-02-10 DIAGNOSIS — F33.1 MAJOR DEPRESSIVE DISORDER, RECURRENT, MODERATE (H): Primary | ICD-10-CM

## 2022-02-11 ENCOUNTER — MYC MEDICAL ADVICE (OUTPATIENT)
Dept: FAMILY MEDICINE | Facility: CLINIC | Age: 77
End: 2022-02-11
Payer: COMMERCIAL

## 2022-02-11 DIAGNOSIS — F33.1 MAJOR DEPRESSIVE DISORDER, RECURRENT, MODERATE (H): ICD-10-CM

## 2022-02-11 RX ORDER — DULOXETIN HYDROCHLORIDE 30 MG/1
60 CAPSULE, DELAYED RELEASE ORAL DAILY
Qty: 60 CAPSULE | Refills: 1 | Status: SHIPPED | OUTPATIENT
Start: 2022-02-11 | End: 2022-02-24

## 2022-02-14 DIAGNOSIS — I48.20 CHRONIC ATRIAL FIBRILLATION (H): ICD-10-CM

## 2022-02-14 RX ORDER — METOPROLOL TARTRATE 50 MG
TABLET ORAL
Qty: 60 TABLET | Refills: 1 | Status: SHIPPED | OUTPATIENT
Start: 2022-02-14 | End: 2022-02-25

## 2022-02-28 ENCOUNTER — MYC MEDICAL ADVICE (OUTPATIENT)
Dept: FAMILY MEDICINE | Facility: CLINIC | Age: 77
End: 2022-02-28

## 2022-02-28 ENCOUNTER — OFFICE VISIT (OUTPATIENT)
Dept: CARDIOLOGY | Facility: CLINIC | Age: 77
End: 2022-02-28
Attending: INTERNAL MEDICINE
Payer: COMMERCIAL

## 2022-02-28 ENCOUNTER — LAB (OUTPATIENT)
Dept: LAB | Facility: CLINIC | Age: 77
End: 2022-02-28
Payer: COMMERCIAL

## 2022-02-28 VITALS
HEART RATE: 85 BPM | SYSTOLIC BLOOD PRESSURE: 108 MMHG | DIASTOLIC BLOOD PRESSURE: 74 MMHG | OXYGEN SATURATION: 99 % | BODY MASS INDEX: 27.37 KG/M2 | WEIGHT: 180 LBS

## 2022-02-28 DIAGNOSIS — N40.1 HYPERTROPHY OF PROSTATE WITH URINARY OBSTRUCTION: ICD-10-CM

## 2022-02-28 DIAGNOSIS — I48.20 CHRONIC ATRIAL FIBRILLATION (H): ICD-10-CM

## 2022-02-28 DIAGNOSIS — N13.8 HYPERTROPHY OF PROSTATE WITH URINARY OBSTRUCTION: ICD-10-CM

## 2022-02-28 DIAGNOSIS — F33.1 MAJOR DEPRESSIVE DISORDER, RECURRENT, MODERATE (H): ICD-10-CM

## 2022-02-28 DIAGNOSIS — R06.09 DYSPNEA ON EXERTION: ICD-10-CM

## 2022-02-28 PROBLEM — I48.19 PERSISTENT ATRIAL FIBRILLATION (H): Status: ACTIVE | Noted: 2017-12-15

## 2022-02-28 PROBLEM — J86.9 EMPYEMA (H): Status: ACTIVE | Noted: 2022-02-28

## 2022-02-28 PROBLEM — K40.90 UNILATERAL INGUINAL HERNIA WITHOUT OBSTRUCTION OR GANGRENE: Status: ACTIVE | Noted: 2017-11-07

## 2022-02-28 PROBLEM — Z77.090 HISTORY OF ASBESTOS EXPOSURE: Status: ACTIVE | Noted: 2022-02-28

## 2022-02-28 PROBLEM — I48.91 ATRIAL FIBRILLATION STATUS POST CARDIOVERSION (H): Status: ACTIVE | Noted: 2018-01-18

## 2022-02-28 PROBLEM — D64.9 ANEMIA: Status: ACTIVE | Noted: 2022-02-28

## 2022-02-28 PROBLEM — R10.12 LEFT UPPER QUADRANT PAIN: Status: ACTIVE | Noted: 2017-11-07

## 2022-02-28 PROBLEM — J18.9 COMMUNITY ACQUIRED PNEUMONIA: Status: ACTIVE | Noted: 2017-11-05

## 2022-02-28 PROBLEM — Z98.890 STATUS POST LUNG SURGERY: Status: ACTIVE | Noted: 2017-11-18

## 2022-02-28 PROBLEM — F13.20 BENZODIAZEPINE DEPENDENCE (H): Status: ACTIVE | Noted: 2017-10-31

## 2022-02-28 PROBLEM — F41.8 DEPRESSION WITH ANXIETY: Status: ACTIVE | Noted: 2022-02-28

## 2022-02-28 LAB
ALBUMIN SERPL-MCNC: 3.8 G/DL (ref 3.4–5)
ANION GAP SERPL CALCULATED.3IONS-SCNC: 5 MMOL/L (ref 3–14)
BUN SERPL-MCNC: 13 MG/DL (ref 7–30)
CALCIUM SERPL-MCNC: 8.7 MG/DL (ref 8.5–10.1)
CHLORIDE BLD-SCNC: 97 MMOL/L (ref 94–109)
CO2 SERPL-SCNC: 28 MMOL/L (ref 20–32)
CREAT SERPL-MCNC: 1.08 MG/DL (ref 0.66–1.25)
GFR SERPL CREATININE-BSD FRML MDRD: 70 ML/MIN/1.73M2
GLUCOSE BLD-MCNC: 99 MG/DL (ref 70–99)
PHOSPHATE SERPL-MCNC: 3.1 MG/DL (ref 2.5–4.5)
POTASSIUM BLD-SCNC: 4 MMOL/L (ref 3.4–5.3)
SODIUM SERPL-SCNC: 130 MMOL/L (ref 133–144)

## 2022-02-28 PROCEDURE — 99215 OFFICE O/P EST HI 40 MIN: CPT | Performed by: PHYSICIAN ASSISTANT

## 2022-02-28 PROCEDURE — 36415 COLL VENOUS BLD VENIPUNCTURE: CPT

## 2022-02-28 PROCEDURE — 80069 RENAL FUNCTION PANEL: CPT

## 2022-02-28 RX ORDER — DOXAZOSIN 8 MG/1
4 TABLET ORAL AT BEDTIME
Qty: 90 TABLET | Refills: 3 | COMMUNITY
Start: 2022-02-28 | End: 2022-04-13

## 2022-02-28 RX ORDER — METOPROLOL TARTRATE 75 MG/1
75 TABLET, FILM COATED ORAL 2 TIMES DAILY
COMMUNITY
Start: 2022-02-28 | End: 2022-03-07

## 2022-02-28 NOTE — PROGRESS NOTES
Cardiology Clinic Progress Note    Josemanuel Edmond MRN# [id number]   YOB: 1945 Age: 76 year old   Primary cardiologist: Dr. Chapin         Assessment and Plan:     In summary, Josemanuel Edmond presents today for follow-up on exertional dyspnea, peripheral edema and weight gain.    1. Acute on chronic HFpEF.  Symptoms significantly improved following initiation of low-dose furosemide, with a 13 lb wt loss.  2. Recent stress nuclear study negative for ischemia or infarct.  3. Chronic atrial fibrillation, rate controlled and anticoagulated.  4. Reported orthostatic hypotension.   5. Low normal ejection fraction at 50-55%.  6. Moderate MR, TR, mild-mod AI-stable from 2017.    Plan:  - Reduce doxazosin to 4 mg nightly.  He will notify me if either his urination significantly reduces with this change, and likewise if his positional lightheadedness does not improve.  -Continue current dose of furosemide and metoprolol.  -Return to see Dr. Chapin in about 3 months.  -I encouraged him to discuss his noncardiac complaints of depression and tremors with his PCP.  -Repeat echocardiogram in 1 year.        History of Presenting Illness:      Josemanuel Edmond is a pleasant 76 year old patient who presents today for 2-month follow-up visit.    PMH includes AF, HTN, HLD, COVID + in September, GERD, prior etoh. Last seen by I 5/22/18--developed AFib after undergoing thoractomy for empyema 11/2017. Was started on Xarelto and cardioverted. Came to clinic 10/19/21 with palpitations--EKG done and showed AFib. PCP in October then increased metoprolol to 50 mg bid then 75 mg bid. Underwent 9 day Zio 11/17/21: AFib (100% burden) with avg HR 83. Echocardiogram last done in 2017, normal LV function.    In brief, this patient recently established with Dr. Chapin in December, previously followed by I.  He described intermittent palpitations, exertional dyspnea, mild lower extremity edema, and had 20 pound weight gain  "unintentional over the past 5 months.  Echocardiogram, stress nuclear study were ordered, furosemide 20 mg daily was initiated.    Stress nuclear study was negative for ischemia or infarct.  Echocardiogram showed low normal LVEF at 50-55%, normal RV size and function, moderate MR, moderate TR, mild to moderate AI, and mildly dilated ascending aorta at 4.1 cm.  The IVC was dilated with abnormal respiratory variation.  Compared to echocardiogram from 2017, the LVEF is down slightly.    Today, he returns to clinic with his wife Yoselyn stating his breathing has significantly improved. His weight is down 13 lbs from December, at 180 lbs. BMP is stable.  However, he now reports noticing intermittent hypotension and lightheadedness with positional changes. He's taken doxazosin for BPH for \"years\" at 8 mg nightly.  He reports that his home weight over the past week has been stable. No ETOH use since 2017.  Blood pressure in clinic is marginal.  His wife also mentions concerns of daytime somnolence, poor appetite, muscle twitching and tremors, all of which Gavin thinks may be related to his depression.  He used to see a psychiatrist, but is now having these issues followed by his primary care doctor.  They both deny that he snores at night.  He believes he sleeps soundly.         Review of Systems:     12-pt ROS is negative except for as noted in the HPI.          Physical Exam:     Vitals: /74   Pulse 85   Wt 81.6 kg (180 lb)   SpO2 99%   BMI 27.37 kg/m    Wt Readings from Last 10 Encounters:   02/28/22 81.6 kg (180 lb)   01/27/22 82.6 kg (182 lb)   01/19/22 88.9 kg (196 lb)   12/30/21 87.8 kg (193 lb 9.6 oz)   11/26/21 85.7 kg (189 lb)   11/12/21 84.8 kg (187 lb)   10/29/21 83.5 kg (184 lb)   06/23/21 82.1 kg (181 lb)   06/03/21 82.1 kg (181 lb)   02/17/21 77.1 kg (170 lb)       Constitutional:  Patient is pleasant, alert, cooperative, and in NAD.  HEENT:  NCAT. PERRLA. EOM's intact.   Neck:  CVP appears normal. No " carotid bruits.   Pulmonary: Normal respiratory effort. CTAB.   Cardiac: Irregularly irregular rhythm, variable S1, no murmur or rub.   Abdomen:  Non-tender abdomen, no hepatosplenomegaly appreciated.   Vascular: Pulses in the upper and lower extremities are 2+ and equal bilaterally.  Extremities: No edema, erythema, cyanosis or tenderness appreciated.  Skin:  No rashes or lesions appreciated.   Neurological:  No gross motor or sensory deficits.   Psych: Appropriate affect.        Data:     Labs reviewed:  Recent Labs   Lab Test 05/07/18  1325 10/09/17  1646 10/01/15  1032 12/23/14  1309   LDL  --  112* 94 104   HDL  --  71  --   --    NHDL  --  160*  --   --    CHOL  --  231*  --   --    TRIG  --  242*  --   --    TSH 1.86  --   --   --        Lab Results   Component Value Date    WBC 5.7 06/03/2021    RBC 3.62 (L) 06/03/2021    HGB 12.3 (L) 06/03/2021    HCT 35.6 (L) 06/03/2021    MCV 98 06/03/2021    MCH 34.0 (H) 06/03/2021    MCHC 34.6 06/03/2021    RDW 13.5 06/03/2021     06/03/2021       Lab Results   Component Value Date     (L) 06/03/2021    POTASSIUM 4.5 06/03/2021    CHLORIDE 97 06/03/2021    CO2 30 06/03/2021    ANIONGAP 5 06/03/2021    GLC 96 06/03/2021    BUN 14 06/03/2021    CR 0.91 06/03/2021    GFRESTIMATED 80 06/03/2021    GFRESTBLACK >90 06/03/2021    LUIS 8.9 06/03/2021      Lab Results   Component Value Date    AST 18 06/03/2021    ALT 23 06/03/2021       No results found for: A1C    Lab Results   Component Value Date    INR 2.73 (H) 11/15/2012    INR 2.77 (H) 11/12/2012           Problem List:     Patient Active Problem List   Diagnosis     Hypertension goal BP (blood pressure) < 140/90     Hypertrophy of prostate with urinary obstruction     Osteoarthrosis, unspecified whether generalized or localized, pelvic region and thigh     Hyperlipidemia LDL goal <100     Allergic rhinitis     Conjunctivitis, allergic     Anxiety     GERD (gastroesophageal reflux disease)     S/P knee  replacement     Moderate major depression (H)     ED (erectile dysfunction)     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)     Chronic atrial fibrillation (H)     Hyperplastic Polyp     Right knee DJD     Peripheral neuropathy     Sleep disturbance     Hematoma of skin     Traumatic ecchymosis of buttock, initial encounter     Infection due to 2019 novel coronavirus     Anemia     Benzodiazepine dependence (H)     Community acquired pneumonia     Empyema (H)     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     Left upper quadrant pain     Unilateral inguinal hernia without obstruction or gangrene     Atrial fibrillation status post cardioversion (H)     Persistent atrial fibrillation (H)     Depression with anxiety     Hyperlipidemia     S/P left knee arthroscopy     Status post lung surgery           Medications:     Current Outpatient Medications   Medication Sig Dispense Refill     acetaminophen (TYLENOL) 500 MG tablet Take 1,000 mg by mouth every 8 hours as needed for mild pain       buPROPion (WELLBUTRIN SR) 150 MG 12 hr tablet Take 1 tablet (150 mg) by mouth 2 times daily 180 tablet 0     busPIRone HCl (BUSPAR) 30 MG tablet Take 1 tablet (30 mg) by mouth 2 times daily 180 tablet 0     doxazosin (CARDURA) 8 MG tablet Take 1 tablet (8 mg) by mouth At Bedtime 90 tablet 3     DULoxetine (CYMBALTA) 30 MG capsule Take 2 capsules (60 mg) by mouth daily 180 capsule 4     finasteride (PROSCAR) 5 MG tablet Take 1 tablet (5 mg) by mouth daily 30 tablet 3     furosemide (LASIX) 20 MG tablet Take 1 tablet (20 mg) by mouth daily 60 tablet 3     gabapentin (NEURONTIN) 600 MG tablet Take 1 tablet (600 mg) by mouth 3 times daily 270 tablet 0     lamoTRIgine (LAMICTAL) 25 MG tablet Take 2 tablets (50 mg) by mouth 2 times daily 360 tablet 4     metoprolol tartrate (LOPRESSOR) 25 MG tablet Take 1 tablet (25 mg) by mouth 2 times daily 180 tablet 4     XARELTO ANTICOAGULANT 20 MG TABS tablet TAKE 1 TABLET DAILY WITH   DINNER 90 tablet 3     hydrocortisone,  Perianal, (HYDROCORTISONE) 2.5 % cream Place rectally 2 times daily as needed for hemorrhoids (Patient not taking: Reported on 1/27/2022) 28 g 1           Past Medical History:     Past Medical History:   Diagnosis Date     Benign neoplasm of prostate 2000    Benign Prostate Nodule     Past Surgical History:   Procedure Laterality Date     ARTHROPLASTY KNEE Right 10/12/2018    Procedure: ARTHROPLASTY KNEE;  Right Total Knee Arthroplasty;  Surgeon: Jeb Peralta MD;  Location: [facility]     COLONOSCOPY N/A 12/10/2015    Procedure: COMBINED COLONOSCOPY, SINGLE OR MULTIPLE BIOPSY/POLYPECTOMY BY BIOPSY;  Surgeon: Jyoti Figueroa MD;  Location: [facility]     JOINT REPLACEMENT, HIP RT/LT  10/07    Joint Replacement Hip LT     JOINT REPLACEMTN, KNEE RT/LT  8/2011    Joint Replacement knee /LT, [facility]     LAPAROSCOPIC HERNIORRHAPHY INGUINAL BILATERAL Bilateral 4/24/2018    Procedure: LAPAROSCOPIC HERNIORRHAPHY INGUINAL BILATERAL;  Laparoscopic bilateral inguinal hernia repair;  Surgeon: Josemanuel Resendiz MD;  Location: [facility]     PHACOEMULSIFICATION WITH STANDARD INTRAOCULAR LENS IMPLANT Right 1/6/2021    Procedure: Cataract Removal with Implant;  Surgeon: Regan Kaufman MD;  Location: [facility]     PHACOEMULSIFICATION WITH STANDARD INTRAOCULAR LENS IMPLANT Left 2/17/2021    Procedure: Cataract Removal with Implant;  Surgeon: Regan Kaufman MD;  Location: [facility]     SURGICAL HISTORY OF -   12/1/1999    Umbilical Herniorrhaphy with mesh     SURGICAL HISTORY OF -  Left 11/2017    Thoracotomy and drainage of empyema     Family History   Problem Relation Age of Onset     Breast Cancer Mother      Neurologic Disorder Mother         parkinsons     Respiratory Father         emphyzema     Diabetes Brother      Social History     Socioeconomic History     Marital status:      Spouse name: Not on file     Number of children: Not on file     Years of education: Not on file     Highest education  level: Not on file   Occupational History     Not on file   Tobacco Use     Smoking status: Former Smoker     Packs/day: 1.00     Years: 15.00     Pack years: 15.00     Types: Cigarettes     Quit date: 10/13/1975     Years since quittin.4     Smokeless tobacco: Never Used   Substance and Sexual Activity     Alcohol use: No     Comment: quit 2017     Drug use: No     Sexual activity: Not Currently   Other Topics Concern     Parent/sibling w/ CABG, MI or angioplasty before 65F 55M? No   Social History Narrative     Not on file     Social Determinants of Health     Financial Resource Strain: Not on file   Food Insecurity: Not on file   Transportation Needs: Not on file   Physical Activity: Not on file   Stress: Not on file   Social Connections: Not on file   Intimate Partner Violence: Not on file   Housing Stability: Not on file           Allergies:   Bactrim [sulfamethoxazole w/trimethoprim]      Kimmy West PA-C  [facility]  Pager: [phone]    Today's clinic visit entailed:  Review of the result(s) of each unique test - BMP, echocardiogram, stress nuclear study  I spent a total of 45 minutes on the day of the visit.   Time spent doing chart review, history and exam, documentation and further activities per the note  Provider  Link to MDM Help Grid     The level of medical decision making during this visit was of moderate complexity.

## 2022-02-28 NOTE — PATIENT INSTRUCTIONS
Today's Plan:   - Let's try reducing the doxazosin from 8 to 4 mg nightly.    If your urination reduces with this change, please let me know.   If your lightheadedness with positional changes doesn't improve, please let me know.   - Discuss the fatigue and twitching with Dr. Leiva.   - Return to see Dr. Chapin in about 3 months.     If you have questions or concerns please call my nurse team at [phone]  Scheduling phone number: [phone]  Reminder: Please bring in all current medications, over the counter supplements and vitamin bottles to your next appointment.    It was a pleasure seeing you today!     Kimmy West PA-C

## 2022-02-28 NOTE — NURSING NOTE
Patient states that he is taking metoprolol 25 mg 1 tab twice a day.  10:02 AM 02/28/22 M. Clara Sesay, CMA

## 2022-02-28 NOTE — LETTER
2/28/2022    Lizzy Leiva MD  [address]  [facility] [address]    RE: Josemanuel Edmond       Dear Colleague,     I had the pleasure of seeing Josemanuel Edmond in the [facility].    Cardiology Clinic Progress Note    Josemanuel Edmond MRN# [id number]   YOB: 1945 Age: 76 year old   Primary cardiologist: Dr. Chapin         Assessment and Plan:     In summary, Josemanuel Edmond presents today for follow-up on exertional dyspnea, peripheral edema and weight gain.    1. Acute on chronic HFpEF.  Symptoms significantly improved following initiation of low-dose furosemide, with a 13 lb wt loss.  2. Recent stress nuclear study negative for ischemia or infarct.  3. Chronic atrial fibrillation, rate controlled and anticoagulated.  4. Reported orthostatic hypotension.   5. Low normal ejection fraction at 50-55%.  6. Moderate MR, TR, mild-mod AI-stable from 2017.    Plan:  - Reduce doxazosin to 4 mg nightly.  He will notify me if either his urination significantly reduces with this change, and likewise if his positional lightheadedness does not improve.  -Continue current dose of furosemide and metoprolol.  -Return to see Dr. Chapin in about 3 months.  -I encouraged him to discuss his noncardiac complaints of depression and tremors with his PCP.  -Repeat echocardiogram in 1 year.        History of Presenting Illness:      Josemanuel Edmond is a pleasant 76 year old patient who presents today for 2-month follow-up visit.    PMH includes AF, HTN, HLD, COVID + in September, GERD, prior etoh. Last seen by I 5/22/18--developed AFib after undergoing thoractomy for empyema 11/2017. Was started on Xarelto and cardioverted. Came to clinic 10/19/21 with palpitations--EKG done and showed AFib. PCP in October then increased metoprolol to 50 mg bid then 75 mg bid. Underwent 9 day Zio 11/17/21: AFib (100% burden) with avg HR 83. Echocardiogram last done in 2017, normal LV function.    In  "brief, this patient recently established with Dr. Chapin in December, previously followed by [facility].  He described intermittent palpitations, exertional dyspnea, mild lower extremity edema, and had 20 pound weight gain unintentional over the past 5 months.  Echocardiogram, stress nuclear study were ordered, furosemide 20 mg daily was initiated.    Stress nuclear study was negative for ischemia or infarct.  Echocardiogram showed low normal LVEF at 50-55%, normal RV size and function, moderate MR, moderate TR, mild to moderate AI, and mildly dilated ascending aorta at 4.1 cm.  The IVC was dilated with abnormal respiratory variation.  Compared to echocardiogram from 2017, the LVEF is down slightly.    Today, he returns to clinic with his wife Yoselyn stating his breathing has significantly improved. His weight is down 13 lbs from December, at 180 lbs. BMP is stable.  However, he now reports noticing intermittent hypotension and lightheadedness with positional changes. He's taken doxazosin for BPH for \"years\" at 8 mg nightly.  He reports that his home weight over the past week has been stable. No ETOH use since 2017.  Blood pressure in clinic is marginal.  His wife also mentions concerns of daytime somnolence, poor appetite, muscle twitching and tremors, all of which Gavin thinks may be related to his depression.  He used to see a psychiatrist, but is now having these issues followed by his primary care doctor.  They both deny that he snores at night.  He believes he sleeps soundly.         Review of Systems:     12-pt ROS is negative except for as noted in the HPI.          Physical Exam:     Vitals: /74   Pulse 85   Wt 81.6 kg (180 lb)   SpO2 99%   BMI 27.37 kg/m    Wt Readings from Last 10 Encounters:   02/28/22 81.6 kg (180 lb)   01/27/22 82.6 kg (182 lb)   01/19/22 88.9 kg (196 lb)   12/30/21 87.8 kg (193 lb 9.6 oz)   11/26/21 85.7 kg (189 lb)   11/12/21 84.8 kg (187 lb)   10/29/21 83.5 kg (184 lb)   06/23/21 " 82.1 kg (181 lb)   06/03/21 82.1 kg (181 lb)   02/17/21 77.1 kg (170 lb)       Constitutional:  Patient is pleasant, alert, cooperative, and in NAD.  HEENT:  NCAT. PERRLA. EOM's intact.   Neck:  CVP appears normal. No carotid bruits.   Pulmonary: Normal respiratory effort. CTAB.   Cardiac: Irregularly irregular rhythm, variable S1, no murmur or rub.   Abdomen:  Non-tender abdomen, no hepatosplenomegaly appreciated.   Vascular: Pulses in the upper and lower extremities are 2+ and equal bilaterally.  Extremities: No edema, erythema, cyanosis or tenderness appreciated.  Skin:  No rashes or lesions appreciated.   Neurological:  No gross motor or sensory deficits.   Psych: Appropriate affect.        Data:     Labs reviewed:  Recent Labs   Lab Test 05/07/18  1325 10/09/17  1646 10/01/15  1032 12/23/14  1309   LDL  --  112* 94 104   HDL  --  71  --   --    NHDL  --  160*  --   --    CHOL  --  231*  --   --    TRIG  --  242*  --   --    TSH 1.86  --   --   --        Lab Results   Component Value Date    WBC 5.7 06/03/2021    RBC 3.62 (L) 06/03/2021    HGB 12.3 (L) 06/03/2021    HCT 35.6 (L) 06/03/2021    MCV 98 06/03/2021    MCH 34.0 (H) 06/03/2021    MCHC 34.6 06/03/2021    RDW 13.5 06/03/2021     06/03/2021       Lab Results   Component Value Date     (L) 06/03/2021    POTASSIUM 4.5 06/03/2021    CHLORIDE 97 06/03/2021    CO2 30 06/03/2021    ANIONGAP 5 06/03/2021    GLC 96 06/03/2021    BUN 14 06/03/2021    CR 0.91 06/03/2021    GFRESTIMATED 80 06/03/2021    GFRESTBLACK >90 06/03/2021    LUIS 8.9 06/03/2021      Lab Results   Component Value Date    AST 18 06/03/2021    ALT 23 06/03/2021       No results found for: A1C    Lab Results   Component Value Date    INR 2.73 (H) 11/15/2012    INR 2.77 (H) 11/12/2012           Problem List:     Patient Active Problem List   Diagnosis     Hypertension goal BP (blood pressure) < 140/90     Hypertrophy of prostate with urinary obstruction     Osteoarthrosis, unspecified  whether generalized or localized, pelvic region and thigh     Hyperlipidemia LDL goal <100     Allergic rhinitis     Conjunctivitis, allergic     Anxiety     GERD (gastroesophageal reflux disease)     S/P knee replacement     Moderate major depression (H)     ED (erectile dysfunction)     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)     Chronic atrial fibrillation (H)     Hyperplastic Polyp     Right knee DJD     Peripheral neuropathy     Sleep disturbance     Hematoma of skin     Traumatic ecchymosis of buttock, initial encounter     Infection due to 2019 novel coronavirus     Anemia     Benzodiazepine dependence (H)     Community acquired pneumonia     Empyema (H)     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     Left upper quadrant pain     Unilateral inguinal hernia without obstruction or gangrene     Atrial fibrillation status post cardioversion (H)     Persistent atrial fibrillation (H)     Depression with anxiety     Hyperlipidemia     S/P left knee arthroscopy     Status post lung surgery           Medications:     Current Outpatient Medications   Medication Sig Dispense Refill     acetaminophen (TYLENOL) 500 MG tablet Take 1,000 mg by mouth every 8 hours as needed for mild pain       buPROPion (WELLBUTRIN SR) 150 MG 12 hr tablet Take 1 tablet (150 mg) by mouth 2 times daily 180 tablet 0     busPIRone HCl (BUSPAR) 30 MG tablet Take 1 tablet (30 mg) by mouth 2 times daily 180 tablet 0     doxazosin (CARDURA) 8 MG tablet Take 1 tablet (8 mg) by mouth At Bedtime 90 tablet 3     DULoxetine (CYMBALTA) 30 MG capsule Take 2 capsules (60 mg) by mouth daily 180 capsule 4     finasteride (PROSCAR) 5 MG tablet Take 1 tablet (5 mg) by mouth daily 30 tablet 3     furosemide (LASIX) 20 MG tablet Take 1 tablet (20 mg) by mouth daily 60 tablet 3     gabapentin (NEURONTIN) 600 MG tablet Take 1 tablet (600 mg) by mouth 3 times daily 270 tablet 0     lamoTRIgine (LAMICTAL) 25 MG tablet Take 2 tablets (50 mg) by mouth 2 times daily 360 tablet 4      metoprolol tartrate (LOPRESSOR) 25 MG tablet Take 1 tablet (25 mg) by mouth 2 times daily 180 tablet 4     XARELTO ANTICOAGULANT 20 MG TABS tablet TAKE 1 TABLET DAILY WITH   DINNER 90 tablet 3     hydrocortisone, Perianal, (HYDROCORTISONE) 2.5 % cream Place rectally 2 times daily as needed for hemorrhoids (Patient not taking: Reported on 1/27/2022) 28 g 1           Past Medical History:     Past Medical History:   Diagnosis Date     Benign neoplasm of prostate 2000    Benign Prostate Nodule     Past Surgical History:   Procedure Laterality Date     ARTHROPLASTY KNEE Right 10/12/2018    Procedure: ARTHROPLASTY KNEE;  Right Total Knee Arthroplasty;  Surgeon: Jeb Peralta MD;  Location: [facility]     COLONOSCOPY N/A 12/10/2015    Procedure: COMBINED COLONOSCOPY, SINGLE OR MULTIPLE BIOPSY/POLYPECTOMY BY BIOPSY;  Surgeon: Jyoti Figueroa MD;  Location: [facility]     JOINT REPLACEMENT, HIP RT/LT  10/07    Joint Replacement Hip LT     JOINT REPLACEMTN, KNEE RT/LT  8/2011    Joint Replacement knee /LT, [facility]     LAPAROSCOPIC HERNIORRHAPHY INGUINAL BILATERAL Bilateral 4/24/2018    Procedure: LAPAROSCOPIC HERNIORRHAPHY INGUINAL BILATERAL;  Laparoscopic bilateral inguinal hernia repair;  Surgeon: Josemanuel Resendiz MD;  Location: [facility]     PHACOEMULSIFICATION WITH STANDARD INTRAOCULAR LENS IMPLANT Right 1/6/2021    Procedure: Cataract Removal with Implant;  Surgeon: Regan Kaufman MD;  Location: [facility]     PHACOEMULSIFICATION WITH STANDARD INTRAOCULAR LENS IMPLANT Left 2/17/2021    Procedure: Cataract Removal with Implant;  Surgeon: Regan Kaufman MD;  Location: [facility]     SURGICAL HISTORY OF -   12/1/1999    Umbilical Herniorrhaphy with mesh     SURGICAL HISTORY OF -  Left 11/2017    Thoracotomy and drainage of empyema     Family History   Problem Relation Age of Onset     Breast Cancer Mother      Neurologic Disorder Mother         parkinsons     Respiratory Father         emphyzema      Diabetes Brother      Social History     Socioeconomic History     Marital status:      Spouse name: Not on file     Number of children: Not on file     Years of education: Not on file     Highest education level: Not on file   Occupational History     Not on file   Tobacco Use     Smoking status: Former Smoker     Packs/day: 1.00     Years: 15.00     Pack years: 15.00     Types: Cigarettes     Quit date: 10/13/1975     Years since quittin.4     Smokeless tobacco: Never Used   Substance and Sexual Activity     Alcohol use: No     Comment: quit 2017     Drug use: No     Sexual activity: Not Currently   Other Topics Concern     Parent/sibling w/ CABG, MI or angioplasty before 65F 55M? No   Social History Narrative     Not on file     Social Determinants of Health     Financial Resource Strain: Not on file   Food Insecurity: Not on file   Transportation Needs: Not on file   Physical Activity: Not on file   Stress: Not on file   Social Connections: Not on file   Intimate Partner Violence: Not on file   Housing Stability: Not on file           Allergies:   Bactrim [sulfamethoxazole w/trimethoprim]      BHARGAVI Estrada [facility]  Pager: [phone]    Today's clinic visit entailed:  Review of the result(s) of each unique test - BMP, echocardiogram, stress nuclear study  I spent a total of 45 minutes on the day of the visit.   Time spent doing chart review, history and exam, documentation and further activities per the note  Provider  Link to [facility] Help Grid     The level of medical decision making during this visit was of moderate complexity.    Thank you for allowing me to participate in the care of your patient.      Sincerely,     BHARGAVI Estrada [facility]  cc:   Sarah Chapin MD  [address] [place] [id number]

## 2022-02-28 NOTE — TELEPHONE ENCOUNTER
Routing refill request to provider for review/approval because:  A break in medication-see medical message    Pending Prescriptions:                       Disp   Refills    DULoxetine (CYMBALTA) 30 MG capsule       180 ca*4            Sig: Take 2 capsules (60 mg) by mouth daily      Annette Simons RN on 2/28/2022 at 4:02 PM

## 2022-03-01 ENCOUNTER — MYC MEDICAL ADVICE (OUTPATIENT)
Dept: FAMILY MEDICINE | Facility: CLINIC | Age: 77
End: 2022-03-01
Payer: COMMERCIAL

## 2022-03-02 RX ORDER — DULOXETIN HYDROCHLORIDE 30 MG/1
60 CAPSULE, DELAYED RELEASE ORAL DAILY
Qty: 180 CAPSULE | Refills: 4 | Status: SHIPPED | OUTPATIENT
Start: 2022-03-02 | End: 2022-03-03

## 2022-03-03 ENCOUNTER — VIRTUAL VISIT (OUTPATIENT)
Dept: FAMILY MEDICINE | Facility: CLINIC | Age: 77
End: 2022-03-03
Payer: COMMERCIAL

## 2022-03-03 DIAGNOSIS — F33.1 MAJOR DEPRESSIVE DISORDER, RECURRENT, MODERATE (H): ICD-10-CM

## 2022-03-03 DIAGNOSIS — F10.21 ALCOHOLISM IN REMISSION (H): ICD-10-CM

## 2022-03-03 PROCEDURE — 99214 OFFICE O/P EST MOD 30 MIN: CPT | Mod: 95 | Performed by: FAMILY MEDICINE

## 2022-03-03 RX ORDER — DULOXETIN HYDROCHLORIDE 30 MG/1
90 CAPSULE, DELAYED RELEASE ORAL DAILY
Qty: 180 CAPSULE | Refills: 4 | COMMUNITY
Start: 2022-03-03 | End: 2022-05-05

## 2022-03-03 NOTE — PROGRESS NOTES
"Gavin is a 76 year old who is being evaluated via a billable telephone visit.      What phone number would you like to be contacted at? [phone]  How would you like to obtain your AVS? MyChart    Assessment & Plan     Major depressive disorder, recurrent, moderate (H)  Depression and anxiety symptoms still not well controlled.  We did GeneSight testing which showed a very limited use as directed list including Pristiq, Fetzima and Viibryd of the meds he is currently on Wellbutrin and Cymbalta indicated that serum levels may be too low and higher doses may be required.  He is currently on 60 mg of Cymbalta.  Has a large quantity of capsules at home.  Discussed options of tapering off of this onto Pristiq or increasing the Cymbalta dose.  He would like to try increasing the Cymbalta dose first.  We will have him up this to 90 mg daily.  I would like to recheck in 3-4 weeks and we can consider increasing to 120 mg if still not well controlled.  If side effects or suboptimal control can try cross tapering to Pristiq at that point.  - DULoxetine (CYMBALTA) 30 MG capsule; Take 3 capsules (90 mg) by mouth daily       HCC chart reconciliation and known higher risk conditions that I take into account for medical decision making:   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)  Maintains sobriety but feels that when he stopped drinking this was a trigger for his mood symptoms.       BMI:   Estimated body mass index is 27.37 kg/m  as calculated from the following:    Height as of 1/27/22: 1.727 m (5' 8\").    Weight as of 2/28/22: 81.6 kg (180 lb).         No follow-ups on file.    Lizzy Leiva MD  [facility]   Gavin is a 76 year old who presents for the following health issues     HPI     Patient presents to go over recent Genesight testing        Review of Systems   Constitutional, neuro, ENT, endocrine, pulmonary, cardiac, gastrointestinal, genitourinary, musculoskeletal, integument and " psychiatric systems are negative, except as otherwise noted.       Objective           Vitals:  No vitals were obtained today due to virtual visit.    Physical Exam   healthy, alert and no distress  PSYCH: Alert and oriented times 3; coherent speech, normal   rate and volume, able to articulate logical thoughts, able   to abstract reason, no tangential thoughts, no hallucinations   or delusions  His affect is normal  RESP: No cough, no audible wheezing, able to talk in full sentences  Remainder of exam unable to be completed due to telephone visits                Phone call duration: 18 minutes

## 2022-03-03 NOTE — PATIENT INSTRUCTIONS
Your BMI is Body mass index is There is no height or weight on file to calculate BMI.     A BMI of 18.5 to 24.9 is in the healthy range. A person with a BMI of 25 to 29.9 is considered overweight, and someone with a BMI of 30 or greater is considered obese. More than two-thirds of American adults are considered overweight or obese.  Weight management is a personal decision.  If you are interested in exploring weight loss strategies, the following discussion covers the approaches that may be successful. Body mass index (BMI) is one way to tell whether you are at a healthy weight, overweight, or obese. It measures your weight in relation to your height.  Being overweight or obese increases the risk for further weight gain. Excess weight may lead to heart disease and diabetes.  Creating and following plans for healthy eating and physical activity may help you improve your health.  Weight control is part of healthy lifestyle and includes exercise, emotional health, and healthy eating habits. Careful eating habits lifelong are the mainstay of weight control. Though there are significant health benefits from weight loss, long-term weight loss with diet alone may be very difficult to achieve- studies show long-term success with dietary management alone in less than 10% of people. Attaining a healthy weight may be especially difficult to achieve in those with severe obesity. In some cases, medications, devices and surgical management might be considered.  What can you do?    Keep a food journal to help with mindful eating and finding ways to modify your diet.      Reduce the amount of processed food in your diet. Focus on adding vegetables, and lean proteins.    Reduce dietary carbohydrates. Limiting to  gm of carbohydrates per day has been shows to help boost weight loss.  If you have diabetes or are on diabetic medications do not do this without talking to your physician or healthcare provider.    Diet combined with  exercise helps maintain muscle while optimizing fat loss. Strength training is particularly important for building and maintaining muscle mass. Exercise helps reduce stress, increase energy, and improves fitness. Increasing exercise without diet control, however, may not burn enough calories to loose weight.         Start walking three days a week 10-20 minutes at a time    Work towards walking thirty minutes five days a week      Eventually, increase the speed of your walking for 1-2 minutes at time    In addition, we recommend that you review healthy lifestyles and methods for weight loss available through the National Institutes of Health patient information sites:  http://win.niddk.nih.gov/publications/index.htm    Also look into health and wellness programs that may be available through your health insurance provider, employer, local community center, or nini club.

## 2022-03-07 DIAGNOSIS — I48.20 CHRONIC ATRIAL FIBRILLATION (H): ICD-10-CM

## 2022-03-08 ENCOUNTER — MYC MEDICAL ADVICE (OUTPATIENT)
Dept: FAMILY MEDICINE | Facility: CLINIC | Age: 77
End: 2022-03-08
Payer: COMMERCIAL

## 2022-03-08 DIAGNOSIS — F41.9 ANXIETY: ICD-10-CM

## 2022-03-08 RX ORDER — METOPROLOL TARTRATE 75 MG/1
75 TABLET, FILM COATED ORAL 2 TIMES DAILY
Qty: 180 TABLET | Refills: 3 | Status: SHIPPED | OUTPATIENT
Start: 2022-03-08 | End: 2022-05-23

## 2022-03-08 NOTE — TELEPHONE ENCOUNTER
"Routing refill request to provider for review/approval because:  Medication is reported/historical    Requested Prescriptions   Pending Prescriptions Disp Refills     Metoprolol Tartrate 75 MG TABS       Sig: Take 75 mg by mouth 2 times daily       Beta-Blockers Protocol Passed - 3/7/2022  4:08 PM        Passed - Blood pressure under 140/90 in past 12 months     BP Readings from Last 3 Encounters:   02/28/22 108/74   01/27/22 122/76   12/30/21 (!) 136/91                 Passed - Patient is age 6 or older        Passed - Recent (12 mo) or future (30 days) visit within the authorizing provider's specialty     Patient has had an office visit with the authorizing provider or a provider within the authorizing providers department within the previous 12 mos or has a future within next 30 days. See \"Patient Info\" tab in inbasket, or \"Choose Columns\" in Meds & Orders section of the refill encounter.              Passed - Medication is active on med list                   "

## 2022-03-08 NOTE — TELEPHONE ENCOUNTER
Bp is controlled.  Just saw cardiology and recommended no change to this medication.  Sincerely,  Shaggy Chen MD

## 2022-03-08 NOTE — TELEPHONE ENCOUNTER
Called and spoke to Gavin. Gavin has been taking 75mg twice a day. Gavin stated that Dr. Leiva is the one prescribing this. I asked him who a Kimmy West was and he said that was from the heart clinic but Dr. Leiva is the one that prescribed it.     Justyna Bower RN BSN

## 2022-03-08 NOTE — TELEPHONE ENCOUNTER
Reported as historical.  In medication reconciliation from outside sources, it appears that he's been prescribed Metoprolol tartrate 50 mg twice daily.     Please confirm that the medication ordered is the correct dose.     Who has been prescribing this?    Carmen Abdalla M.D.   for. Dr. Leiva

## 2022-03-09 RX ORDER — GABAPENTIN 600 MG/1
600 TABLET ORAL 3 TIMES DAILY
Qty: 270 TABLET | Refills: 0 | Status: SHIPPED | OUTPATIENT
Start: 2022-03-09 | End: 2022-05-20

## 2022-03-14 ENCOUNTER — MYC MEDICAL ADVICE (OUTPATIENT)
Dept: FAMILY MEDICINE | Facility: CLINIC | Age: 77
End: 2022-03-14
Payer: COMMERCIAL

## 2022-03-15 NOTE — TELEPHONE ENCOUNTER
Informed patient of his recent medication refill and asking him questions regarding the rest of his message.     Justyna Bower RN BSN

## 2022-03-22 DIAGNOSIS — F41.9 ANXIETY: ICD-10-CM

## 2022-03-22 DIAGNOSIS — F32.1 MODERATE MAJOR DEPRESSION (H): ICD-10-CM

## 2022-03-24 RX ORDER — BUPROPION HYDROCHLORIDE 150 MG/1
TABLET, EXTENDED RELEASE ORAL
Qty: 180 TABLET | Refills: 3 | Status: SHIPPED | OUTPATIENT
Start: 2022-03-24 | End: 2023-01-03 | Stop reason: DRUGHIGH

## 2022-03-24 NOTE — TELEPHONE ENCOUNTER
Routing refill request to provider for review/approval because:  PHQ9: 8 on 1/2022    Kwadwo Babin RN

## 2022-03-25 RX ORDER — LAMOTRIGINE 25 MG/1
TABLET ORAL
Qty: 120 TABLET | Refills: 1 | OUTPATIENT
Start: 2022-03-25

## 2022-03-31 ENCOUNTER — TELEPHONE (OUTPATIENT)
Dept: FAMILY MEDICINE | Facility: CLINIC | Age: 77
End: 2022-03-31

## 2022-03-31 ENCOUNTER — VIRTUAL VISIT (OUTPATIENT)
Dept: FAMILY MEDICINE | Facility: CLINIC | Age: 77
End: 2022-03-31
Payer: COMMERCIAL

## 2022-03-31 DIAGNOSIS — F32.1 MODERATE MAJOR DEPRESSION (H): Primary | ICD-10-CM

## 2022-03-31 PROCEDURE — 99214 OFFICE O/P EST MOD 30 MIN: CPT | Mod: TEL | Performed by: FAMILY MEDICINE

## 2022-03-31 RX ORDER — DESVENLAFAXINE 50 MG/1
50 TABLET, FILM COATED, EXTENDED RELEASE ORAL DAILY
Qty: 30 TABLET | Refills: 1 | Status: SHIPPED | OUTPATIENT
Start: 2022-03-31 | End: 2022-05-24

## 2022-03-31 NOTE — PATIENT INSTRUCTIONS
Reduce Cymbalta to 1 tablet (30 mg) daily.  At the same time start Pristiq 50 mg daily.  After 2 weeks discontinue Cymbalta and increase Pristiq to 100 mg daily.    Follow-up in 1 month for medication recheck.

## 2022-03-31 NOTE — TELEPHONE ENCOUNTER
Called and spoke to Gavin and was able to help him be scheduled in a month for 40 minutes.     Justyna Bower RN BSN PHN

## 2022-03-31 NOTE — PROGRESS NOTES
"Gavin is a 76 year old who is being evaluated via a billable telephone visit.      What phone number would you like to be contacted at? [phone]  How would you like to obtain your AVS? MyChart    Assessment & Plan     Moderate major depression (H)  Reviewed GeneSight testing results.  We will plan to reduce Cymbalta to 1 tablet (30 mg) daily.  At the same time start Pristiq 50 mg daily.  After 2 weeks discontinue Cymbalta and increase Pristiq to 100 mg daily.  Follow-up in 1 month for medication recheck.  - desvenlafaxine (PRISTIQ) 50 MG 24 hr tablet; Take 1 tablet (50 mg) by mouth daily             BMI:   Estimated body mass index is 27.37 kg/m  as calculated from the following:    Height as of 1/27/22: 1.727 m (5' 8\").    Weight as of 2/28/22: 81.6 kg (180 lb).           Return in about 1 month (around 4/30/2022) for medication re-check.    Lizzy Leiva MD  [facility]    Subjective   Gavin is a 76 year old who presents for the following health issues     HPI       Patient is wanting to discuss ongoing twitching and jerking that has been getting worse recently.     -He also said his anxiety and depression has been bothering him more recently.     Review of Systems   Constitutional, neuro, ENT, endocrine, pulmonary, cardiac, gastrointestinal, genitourinary, musculoskeletal, integument and psychiatric systems are negative, except as otherwise noted.       Objective           Vitals:  No vitals were obtained today due to virtual visit.    Physical Exam   healthy, alert and no distress  PSYCH: Alert and oriented times 3; coherent speech, normal   rate and volume, able to articulate logical thoughts, able   to abstract reason, no tangential thoughts, no hallucinations   or delusions  His affect is normal  RESP: No cough, no audible wheezing, able to talk in full sentences  Remainder of exam unable to be completed due to telephone visits                Phone call duration: 18 " minutes

## 2022-03-31 NOTE — TELEPHONE ENCOUNTER
Patient seen today for virtual visit.  Please call him to get him scheduled for a face-to-face follow-up visit in about a month for medication follow-up and memory testing.  If there are 40-minute appointment slots that would be ideal.  If not okay to use a 20-minute slot.  Okay to use same days if no other appointment times available.

## 2022-04-11 ENCOUNTER — MYC MEDICAL ADVICE (OUTPATIENT)
Dept: FAMILY MEDICINE | Facility: CLINIC | Age: 77
End: 2022-04-11
Payer: COMMERCIAL

## 2022-04-11 DIAGNOSIS — N13.8 HYPERTROPHY OF PROSTATE WITH URINARY OBSTRUCTION: ICD-10-CM

## 2022-04-11 DIAGNOSIS — N40.1 HYPERTROPHY OF PROSTATE WITH URINARY OBSTRUCTION: ICD-10-CM

## 2022-04-11 NOTE — TELEPHONE ENCOUNTER
Routing refill request to provider for review/approval because:  Medication is reported/historical    Pending Prescriptions:                       Disp   Refills    doxazosin (CARDURA) 8 MG tablet            90 tab*3        Sig: Take 0.5 tablets (4 mg) by mouth At Bedtime     Alpha Blockers Passed 04/11/2022 02:14 PM   Protocol Details  Blood pressure under 140/90 in past 12 months    Recent (12 mo) or future (30 days) visit within the authorizing provider's specialty    Patient does not have Tadalafil, Vardenafil, or Sildenafil on their medication list    Medication is active on med list    Patient is 18 years of age or older   Antiadrenergic Antihypertensives Passed   Protocol Details  Blood pressure less than 140/90 in past 6 months    Medication is active on med list    Patient is age 18 or older    Normal serum creatinine on file in past 12 months    Recent (6 mo) or future (30 days) visit within the authorizing provider's specialty        Annette Simons RN on 4/11/2022 at 2:17 PM

## 2022-04-13 RX ORDER — DOXAZOSIN 8 MG/1
4 TABLET ORAL AT BEDTIME
Qty: 90 TABLET | Refills: 3 | Status: SHIPPED | OUTPATIENT
Start: 2022-04-13 | End: 2023-05-05

## 2022-04-14 ENCOUNTER — MYC MEDICAL ADVICE (OUTPATIENT)
Dept: FAMILY MEDICINE | Facility: CLINIC | Age: 77
End: 2022-04-14
Payer: COMMERCIAL

## 2022-04-15 NOTE — TELEPHONE ENCOUNTER
Susana Leiva,     Please see medical message.  There are 2 MH referrals, however I cannot tell if they are for psychiatry.    Annette Simons RN on 4/15/2022 at 7:35 AM

## 2022-04-23 NOTE — PROGRESS NOTES
"[facility] Progress Note      Post-operative Day: 1 Day Post-Op    Procedure(s):  Right Total Knee Arthroplasty - Wound Class: I-Clean      Subjective:    Pain: minimal  Chest pain, SOB:  No      Objective:  Blood pressure 138/77, pulse 72, temperature 97.9  F (36.6  C), temperature source Oral, resp. rate 18, height 1.803 m (5' 11\"), weight 77.1 kg (170 lb), SpO2 98 %.    Patient Vitals for the past 24 hrs:   BP Temp Temp src Pulse Heart Rate Resp SpO2   10/13/18 0728 138/77 97.9  F (36.6  C) Oral 72 - 18 98 %   10/13/18 0348 139/75 97.3  F (36.3  C) Oral 81 - 18 94 %   10/12/18 2244 126/64 97.9  F (36.6  C) Oral - 85 18 97 %   10/12/18 1933 102/61 98.4  F (36.9  C) Oral - 104 16 96 %   10/12/18 1754 133/65 - - - 91 - 95 %   10/12/18 1630 - - - - - - 94 %   10/12/18 1615 148/72 - - - 77 - 96 %   10/12/18 1600 148/68 97  F (36.1  C) Axillary - 84 16 93 %   10/12/18 1545 138/78 - - - - - 92 %   10/12/18 1530 151/74 - - - 72 - 95 %   10/12/18 1515 151/84 - - - - - 97 %   10/12/18 1500 146/79 - - - 69 - 97 %   10/12/18 1455 147/78 97.4  F (36.3  C) Oral - 68 16 99 %   10/12/18 1430 121/76 - - 68 - 16 96 %   10/12/18 1415 119/72 98  F (36.7  C) Oral - 63 16 98 %   10/12/18 1400 120/63 - - 65 65 16 99 %   10/12/18 1351 - - - - 64 12 97 %   10/12/18 1345 107/60 - - 70 - 16 96 %   10/12/18 1336 - - - - - 16 -   10/12/18 1333 - - - - 61 13 97 %   10/12/18 1330 106/63 - - - 60 26 97 %   10/12/18 1322 102/55 - - 60 - 14 96 %   10/12/18 1318 - - - - - - 96 %   10/12/18 1317 95/60 97.4  F (36.3  C) Oral 63 - 16 96 %       Wt Readings from Last 4 Encounters:   10/12/18 77.1 kg (170 lb)   09/25/18 78.5 kg (173 lb)   09/19/18 78.5 kg (173 lb)   09/14/18 76.7 kg (169 lb)         Motor function, sensation, and circulation intact   Yes  Wound status: incisions are clean dry and intact. Yes  Calf tenderness: Bilateral  No    Pertinent Labs   Lab Results: personally reviewed.     Recent Labs   Lab Test  10/13/18   0545  " 09/14/18   1629  05/07/18   1325  01/22/18   1354  12/20/17   1315   11/15/12   1132  11/12/12   1110  11/08/12   1400   INR   --    --    --    --    --    --   2.73*  2.77*  2.57*   HGB  9.5*  12.0*   --   12.9*  11.7*   --    --    --    --    HCT   --   35.3*   --   38.6*  35.2*   --    --    --    --    MCV   --   96   --   97  97   --    --    --    --    PLT   --   193   --   201  155   --    --    --    --    NA   --   134  135  132*  133   < >   --    --    --     < > = values in this interval not displayed.       Plan: Anticoagulation protocol: Xarelto  for AFIb            Pain medications:  Norco            Weight bearing status:  WBAT            Disposition:  Home tomorrow             Continue cares and rehabilitation     Report completed by:  Codi Toledo PA-C  Date: 10/13/2018  Time: 10:55 AM   Fall risk

## 2022-05-05 ENCOUNTER — OFFICE VISIT (OUTPATIENT)
Dept: FAMILY MEDICINE | Facility: CLINIC | Age: 77
End: 2022-05-05
Payer: COMMERCIAL

## 2022-05-05 VITALS
RESPIRATION RATE: 16 BRPM | WEIGHT: 177 LBS | TEMPERATURE: 97.9 F | OXYGEN SATURATION: 96 % | BODY MASS INDEX: 26.83 KG/M2 | HEART RATE: 90 BPM | DIASTOLIC BLOOD PRESSURE: 68 MMHG | SYSTOLIC BLOOD PRESSURE: 116 MMHG | HEIGHT: 68 IN

## 2022-05-05 DIAGNOSIS — F32.1 MODERATE MAJOR DEPRESSION (H): Primary | ICD-10-CM

## 2022-05-05 DIAGNOSIS — R41.3 MEMORY DIFFICULTIES: ICD-10-CM

## 2022-05-05 DIAGNOSIS — R80.9 PROTEINURIA, UNSPECIFIED TYPE: ICD-10-CM

## 2022-05-05 LAB
CREAT UR-MCNC: 74 MG/DL
MICROALBUMIN UR-MCNC: 21 MG/L
MICROALBUMIN/CREAT UR: 28.38 MG/G CR (ref 0–17)

## 2022-05-05 PROCEDURE — 99214 OFFICE O/P EST MOD 30 MIN: CPT | Performed by: FAMILY MEDICINE

## 2022-05-05 PROCEDURE — 82043 UR ALBUMIN QUANTITATIVE: CPT | Performed by: FAMILY MEDICINE

## 2022-05-05 ASSESSMENT — ANXIETY QUESTIONNAIRES
4. TROUBLE RELAXING: NOT AT ALL
GAD7 TOTAL SCORE: 2
5. BEING SO RESTLESS THAT IT IS HARD TO SIT STILL: NOT AT ALL
8. IF YOU CHECKED OFF ANY PROBLEMS, HOW DIFFICULT HAVE THESE MADE IT FOR YOU TO DO YOUR WORK, TAKE CARE OF THINGS AT HOME, OR GET ALONG WITH OTHER PEOPLE?: SOMEWHAT DIFFICULT
1. FEELING NERVOUS, ANXIOUS, OR ON EDGE: NOT AT ALL
7. FEELING AFRAID AS IF SOMETHING AWFUL MIGHT HAPPEN: NOT AT ALL
GAD7 TOTAL SCORE: 2
4. TROUBLE RELAXING: NOT AT ALL
4. TROUBLE RELAXING: NOT AT ALL
6. BECOMING EASILY ANNOYED OR IRRITABLE: NOT AT ALL
5. BEING SO RESTLESS THAT IT IS HARD TO SIT STILL: NOT AT ALL
GAD7 TOTAL SCORE: 2
GAD7 TOTAL SCORE: 2
2. NOT BEING ABLE TO STOP OR CONTROL WORRYING: SEVERAL DAYS
1. FEELING NERVOUS, ANXIOUS, OR ON EDGE: NOT AT ALL
3. WORRYING TOO MUCH ABOUT DIFFERENT THINGS: SEVERAL DAYS
7. FEELING AFRAID AS IF SOMETHING AWFUL MIGHT HAPPEN: NOT AT ALL
5. BEING SO RESTLESS THAT IT IS HARD TO SIT STILL: NOT AT ALL
GAD7 TOTAL SCORE: 2
7. FEELING AFRAID AS IF SOMETHING AWFUL MIGHT HAPPEN: NOT AT ALL
2. NOT BEING ABLE TO STOP OR CONTROL WORRYING: SEVERAL DAYS
7. FEELING AFRAID AS IF SOMETHING AWFUL MIGHT HAPPEN: NOT AT ALL
3. WORRYING TOO MUCH ABOUT DIFFERENT THINGS: SEVERAL DAYS
3. WORRYING TOO MUCH ABOUT DIFFERENT THINGS: SEVERAL DAYS
6. BECOMING EASILY ANNOYED OR IRRITABLE: NOT AT ALL
2. NOT BEING ABLE TO STOP OR CONTROL WORRYING: SEVERAL DAYS
1. FEELING NERVOUS, ANXIOUS, OR ON EDGE: NOT AT ALL
GAD7 TOTAL SCORE: 2
GAD7 TOTAL SCORE: 2
6. BECOMING EASILY ANNOYED OR IRRITABLE: NOT AT ALL

## 2022-05-05 ASSESSMENT — PATIENT HEALTH QUESTIONNAIRE - PHQ9
SUM OF ALL RESPONSES TO PHQ QUESTIONS 1-9: 4

## 2022-05-05 ASSESSMENT — PAIN SCALES - GENERAL: PAINLEVEL: NO PAIN (0)

## 2022-05-05 NOTE — NURSING NOTE
"Initial /68   Pulse 90   Temp 97.9  F (36.6  C) (Tympanic)   Resp 16   Ht 1.727 m (5' 8\")   Wt 80.3 kg (177 lb)   SpO2 96%   BMI 26.91 kg/m   Estimated body mass index is 26.91 kg/m  as calculated from the following:    Height as of this encounter: 1.727 m (5' 8\").    Weight as of this encounter: 80.3 kg (177 lb). .      "

## 2022-05-05 NOTE — PROGRESS NOTES
"  Assessment & Plan     Moderate major depression (H)  He has cross titrated now to Pristiq.  He feels that anxiety has improved.  Depression still not significantly improved.  He does have an appointment with psychiatry tomorrow so we will not make any further adjustments at this time.    Memory difficulties  He scores 21/30 on the MOCA test today.  Discussed that there certainly could be some underlying dementia but with his severe/refractory depression, mood symptoms could certainly be contributing as well.  I would like to see what happens over the next several months with psychiatry interventions to help manage his depression.  Discussed now at least have a baseline/benchmark MOCA and we can repeat testing in the future.  If no significant change then consider additional testing including imaging, labs, neuropsych/neurology follow-up.    Proteinuria, unspecified type  - Albumin Random Urine Quantitative with Creat Ratio; Future  - Albumin Random Urine Quantitative with Creat Ratio           BMI:   Estimated body mass index is 26.91 kg/m  as calculated from the following:    Height as of this encounter: 1.727 m (5' 8\").    Weight as of this encounter: 80.3 kg (177 lb).       Return in about 3 months (around 8/5/2022) for re-check of memory.    Lizzy Leiva MD  [facility]    Greg Alonso is a 77 year old who presents for the following health issues     History of Present Illness       Mental Health Follow-up:  Patient presents to follow-up on Depression & Anxiety.Patient's depression since last visit has been:  No change  The patient is not having other symptoms associated with depression.  Patient's anxiety since last visit has been:  Better  The patient is having other symptoms associated with anxiety.  Any significant life events: grief or loss  Patient is not feeling anxious or having panic attacks.  Patient has no concerns about alcohol or drug use.       Today's " "PHQ-9         PHQ-9 Total Score: 4  PHQ-9 Q9 Thoughts of better off dead/self-harm past 2 weeks :   (P) Not at all        Today's CAROLYNN-7 Score: 2    He eats 2-3 servings of fruits and vegetables daily.He consumes 0 sweetened beverage(s) daily.He exercises with enough effort to increase his heart rate 9 or less minutes per day.  He exercises with enough effort to increase his heart rate 3 or less days per week.   He is taking medications regularly.             Review of Systems   Constitutional, neuro, ENT, endocrine, pulmonary, cardiac, gastrointestinal, genitourinary, musculoskeletal, integument and psychiatric systems are negative, except as otherwise noted.       Objective    /68   Pulse 90   Temp 97.9  F (36.6  C) (Tympanic)   Resp 16   Ht 1.727 m (5' 8\")   Wt 80.3 kg (177 lb)   SpO2 96%   BMI 26.91 kg/m    Body mass index is 26.91 kg/m .  Physical Exam   GENERAL: Pleasant, well appearing male.  PSYCH: Alert and oriented x3, neatly dressed and well groomed, makes good eye contact, fluid speech - non-pressured, no psychomotor agitation or slowing, good memory, judgement and insight, no auditory or visual hallucinations, flat affect which is mood congruent. No suicidal ideation.                 "

## 2022-05-06 ENCOUNTER — VIRTUAL VISIT (OUTPATIENT)
Dept: PSYCHIATRY | Facility: CLINIC | Age: 77
End: 2022-05-06
Attending: FAMILY MEDICINE
Payer: COMMERCIAL

## 2022-05-06 ENCOUNTER — VIRTUAL VISIT (OUTPATIENT)
Dept: PSYCHOLOGY | Facility: CLINIC | Age: 77
End: 2022-05-06
Payer: COMMERCIAL

## 2022-05-06 DIAGNOSIS — F40.10 SOCIAL ANXIETY DISORDER: ICD-10-CM

## 2022-05-06 DIAGNOSIS — F34.1 PERSISTENT DEPRESSIVE DISORDER: Primary | ICD-10-CM

## 2022-05-06 DIAGNOSIS — F33.1 MODERATE EPISODE OF RECURRENT MAJOR DEPRESSIVE DISORDER (H): ICD-10-CM

## 2022-05-06 PROCEDURE — 90791 PSYCH DIAGNOSTIC EVALUATION: CPT | Mod: 95 | Performed by: PSYCHOLOGIST

## 2022-05-06 PROCEDURE — 99214 OFFICE O/P EST MOD 30 MIN: CPT | Mod: 25 | Performed by: PSYCHIATRY & NEUROLOGY

## 2022-05-06 ASSESSMENT — COLUMBIA-SUICIDE SEVERITY RATING SCALE - C-SSRS
TOTAL  NUMBER OF INTERRUPTED ATTEMPTS LIFETIME: NO
6. HAVE YOU EVER DONE ANYTHING, STARTED TO DO ANYTHING, OR PREPARED TO DO ANYTHING TO END YOUR LIFE?: NO
1. HAVE YOU WISHED YOU WERE DEAD OR WISHED YOU COULD GO TO SLEEP AND NOT WAKE UP?: NO
ATTEMPT LIFETIME: NO
2. HAVE YOU ACTUALLY HAD ANY THOUGHTS OF KILLING YOURSELF?: NO
TOTAL  NUMBER OF ABORTED OR SELF INTERRUPTED ATTEMPTS LIFETIME: NO

## 2022-05-06 ASSESSMENT — PATIENT HEALTH QUESTIONNAIRE - PHQ9
SUM OF ALL RESPONSES TO PHQ QUESTIONS 1-9: 4

## 2022-05-06 ASSESSMENT — ANXIETY QUESTIONNAIRES
GAD7 TOTAL SCORE: 2

## 2022-05-06 NOTE — PATIENT INSTRUCTIONS
Treatment Plan:  Continue Wellbutrin  mg twice daily for depression.  Continue BuSpar/buspirone 30 mg twice daily for anxiety.  Continue Pristiq/desvenlafaxine 50 mg daily for depression.  I do not want to increase this medication at this time given psychiatric polypharmacy.  Continue lamotrigine/Lamictal 50 mg twice daily for mood and to augment antidepressants.  Strongly recommend individual psychotherapy for additional support and ongoing development of nonpharmacologic coping skills and strategies.  Referral placed for interventional psychiatry clinic for difficult to treat depression/evaluation for appropriateness of ketamine or TMS therapies.  Continue all other cares per primary care provider.   Continue all other medications as reviewed per electronic medical record today.   Safety plan reviewed. To the Emergency Department as needed or call after hours crisis line at [phone] or [phone]. [place] Crisis Text Line: Text [place] to 833801  or  Suicide LifeLine Chat: suicidepreventionLumetricsline.org/chat  Schedule an appointment with me after you have met with interventional psychiatry clinic providers or sooner as needed.  Call [facility] at [phone] to schedule.  Follow up with primary care provider as planned or sooner if needed for acute medical concerns.  Call the psychiatric nurse line with medication questions or concerns at [phone].  [employer] may be used to communicate with your provider, but this is not intended to be used for emergencies.    Serotonin syndrome (SS) has occurred with serotonergic antidepressants (eg, SSRIs, SNRIs), particularly when used in combination with other serotonergic agents (eg, triptans, TCAs, fentanyl, lithium, tramadol, buspirone, Cosmo's wort, tryptophan) or agents that impair metabolism of serotonin (eg, MAO inhibitors intended to treat psychiatric disorders, other MAO inhibitors [ie, linezolid and intravenous methylene blue]).  "Antipsychotic Agents may also enhance the serotonergic effect of Serotonin Modulators.Signs and symptoms include: agitation or restlessness, confusion, rapid heart rate and high blood pressure, dilated pupils, loss of muscle coordination or twitching muscles, heavy sweating, diarrhea, headaches, shivering and/or goose bumps.  Patient was educated on signs and symptoms of serotonin syndrome.  Symptoms typically occur within several hours of taking a new drug or increasing the dose.  Patient was notified to report symptoms immediately.    Lamictal (lamotrigine) can cause serious rashes including Lora-Ronak syndrome which may requiring hospitalization and discontinuation of treatment. If any signs of a rash occur, please see your Primary Care Provider or a dermatologist immediately.     Patient Education:  Therapy Resources:  Www.psychologytoday.com  Also, see Dr. Galeana's message.    Get Out of Your Mind & Into Your Life   By Shaggy Moreira    The Happiness Trap: How to Stop Struggling and Start Living  By Rigoberto Haley    The Reality Slap: Finding Peace & Fulfillment When Life Hurts  By Rigoberto Haley    Things Might Go Terribly, Horribly Wrong: A Guide to Life Liberated from Anxiety  By Camilla Lakhani, PhD    Stress Less, Live More: How Acceptance and Commitment Therapy Can Help You Live a Busy Yet Balanced Life  By Velasquez Menjivar    Finding Life Beyond Trauma: Using Acceptance and Commitment Therapy to Heal From Post-Traumatic Stress and Trauma-Related Problems  By Sherly Wilkins and Edda Medrano    Other ACT Skills References     Rigoberto Haley on [employer] - Demonstrates several different skills to deal with difficult thoughts and feelings     Book:  \"A Liberated Mind: How to Pivot Toward What Matters\" by Shaggy Moreira     Psychological flexibility: How love turns pain into purpose  Tedx Talk by Dr. Moreira discussing his struggle with anxiety and panic disorder which motivated him to develop ACT therapy " (Acceptance and Commitment Therapy)     You Are Not Your Thoughts      Community Resources:    National Suicide Prevention Lifeline: 449.122.8668 (TTY: 595.811.5881). Call anytime for help.  (www.suicidepreventionlifeline.org)  National [employer] Mental Illness (www.leila.org): 598.198.5973 or 769-780-1402.   Mental Health Association (www.mentalhealth.org): 236.468.8025 or 867-425-9370.  [place] Crisis Text Line: Text [place] to 843111  Suicide LifeLine Chat: suicidepreCleverlizeline.org/chat

## 2022-05-06 NOTE — PROGRESS NOTES
[facility] Psychiatry Service  Provider Name:  Los Galeana     Credentials:  KARMA Braga    PATIENT'S NAME: Josemanuel Edmond  PREFERRED NAME: Gavin  PRONOUNS: he/him/his     MRN: [id number]  : 1945  ADDRESS: [address] [place] [address]   [place] [address]  ACCT. NUMBER:  [id number]  DATE OF SERVICE: 22  START TIME: 08:30am  END TIME: 09:10am  PREFERRED PHONE: [phone]  May we leave a program related message: Yes  SERVICE MODALITY:  Video Visit:      Provider verified identity through the following two step process.  Patient provided:  Patient     Telemedicine Visit: The patient's condition can be safely assessed and treated via synchronous audio and visual telemedicine encounter.      Reason for Telemedicine Visit: Services only offered telehealth    Originating Site (Patient Location): Patient's home    Distant Site (Provider Location): Provider Remote Setting- Home Office    Consent:  The patient/guardian has verbally consented to: the potential risks and benefits of telemedicine (video visit) versus in person care; bill my insurance or make self-payment for services provided; and responsibility for payment of non-covered services.     Patient would like the video invitation sent by:  My Chart    Mode of Communication:  Video Conference via well    As the provider I attest to compliance with applicable laws and regulations related to telemedicine.    UNIVERSAL ADULT Mental Health DIAGNOSTIC ASSESSMENT    Identifying Information:  Patient is a 77 year old,   .  The pronoun use throughout this assessment reflects the patient's chosen pronoun.  Patient was referred for an assessment by  primary care clinic.  Patient attended the session alone.    Chief Complaint:   First appointment with patient in [facility] and was advised of the short-term, team based structure of the model including role of C and provider. Patient indicated understanding of the model and agreed to  "proceed with services as described.    The reason for seeking services at this time is: \"Depressed\". Reported that he is here for help with depression. \"It's not a deep dark hole, but I don't have much jamie.\" He noted that he has been more reluctant to socialize with people, even people he knows. \"That's really not like me. I used to be pretty open and life of the party kind of gisselle.\" He believes some of this started in 10/2017 after he went to alcohol treatment (has stayed sober since). He did not finish because he got pneumonia and was moved to the medical side. He suspects that he misses alcohol helped him be social. In , his son was killed in a motorcycle accident. He had been sober for 20+ years but began drinking after his son's death. \"I don't think that's lingering anymore but I'm not sure.\" He believes his medications were more beneficial in the past than they are now. He has never tried therapy.     The problem(s) began 2022.    Patient has attempted to resolve these concerns in the past through medication.    Social/Family History:  Patient reported they grew up in [facility]  .  They were raised by biological parents  , had 2 older brothers who were 20 years older than him. Parents were always together.  Patient reported that their childhood was \"it was great. Safe neighborhood. It was good.\" He was raised as an only child because his brothers were so much older than him. Patient's parents and brothers are .    The patient describes their cultural background as .  Cultural influences and impact on patient's life structure, values, norms, and healthcare: None.  Contextual influences on patient's health include: Individual Factors difficulty with depression following sobriety.    These factors will be addressed in the Preliminary Treatment plan. Patient identified their preferred language to be English. Patient reported they does not need the assistance of an  or other " "support involved in therapy.     Patient reported had no significant delays in developmental tasks.   Patient's highest education level was high school graduate  .  Patient identified the following learning problems: none reported.  Modifications will not be used to assist communication in therapy. Patient reports they are  able to understand written materials.    Patient reported the following relationship history: Patient's current relationship status is  for 56 years. Patient identified their sexual orientation as heterosexual.  Patient reported having 2 child(samuel). Patient identified spouse as part of their support system.  Patient identified the quality of these relationships as good. He has a lot of friends in [employer]. \"I don't want to socialize with them but I do and I enjoy it when I do.\"    Patient's current living/housing situation involves staying in own home/apartment. Lives in a condo. The immediate members of family and household include Yoselyn Edmond, 77,wife and they report that housing is stable.    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 Patient reports their finances are obtained through other. Patient does not identify finances as a current stressor.      Patient reported that they have not been involved with the legal system. Patient does not report being under probation/ parole/ jurisdiction. They are not under any current court jurisdiction. .    Patient's Strengths and Limitations:  Patient identified the following strengths or resources that will help them succeed in treatment: Anglican / Lutheran, commitment to health and well being, zelda / spirituality, friends / good social support, family support, insight and intelligence. Things that may interfere with the patient's success in treatment include: none identified.     Assessments:  PHQ2:   PHQ-2 ( 1999 [employer]) 5/5/2022 5/5/2022 1/27/2022 6/3/2021 2/27/2020 7/17/2019 11/15/2018   Q1: " Little interest or pleasure in doing things - - - 0 0 0 2   Q2: Feeling down, depressed or hopeless - - - 0 1 0 2   PHQ-2 Score - - - 0 1 0 4   PHQ-2 Total Score (12-17 Years)- Positive if 3 or more points; Administer PHQ-A if positive - - - 0 1 0 4   PHQ-2 Score Incomplete Incomplete Incomplete - - - -     PHQ9:   PHQ-9 SCORE 8/2/2021 11/12/2021 1/14/2022 1/27/2022 5/5/2022 5/5/2022 5/5/2022   PHQ-9 Total Score - - - - - - -   PHQ-9 Total Score MyChart 3 (Minimal depression) - 9 (Mild depression) 8 (Mild depression) - - 4 (Minimal depression)   PHQ-9 Total Score 3 7 9 8 4 4 4     GAD2: No flowsheet data found.  GAD7:   CAROLYNN-7 SCORE 6/3/2021 7/29/2021 1/14/2022 1/27/2022 5/5/2022 5/5/2022 5/5/2022   Total Score - - - - - - -   Total Score - 4 (minimal anxiety) 3 (minimal anxiety) 3 (minimal anxiety) - - 2 (minimal anxiety)   Total Score 2 4 3 3 2 2 2     CAGE-AID:   CAGE-AID Total Score 7/29/2021 5/6/2022 5/6/2022   Total Score 1 0 0   Total Score MyChart 1 (A total score of 2 or greater is considered clinically significant) - 0 (A total score of 2 or greater is considered clinically significant)     PROMIS 10-Global Health (all questions and answers displayed): No flowsheet data found.    PROMIS 10-Global Health (only subscores and total score): No flowsheet data found.    [place] Suicide Severity Rating Scale (Lifetime/Recent)  [place] Suicide Severity Rating (Lifetime/Recent) 8/2/2021 5/6/2022   Wish to be Dead (Lifetime) No -   Non-Specific Active Suicidal Thoughts (Lifetime) No -   RETIRED: 1. Wish to be Dead (Recent) No -   RETIRED: 2. Non-Specific Active Suicidal Thoughts (Recent) No -   Actual Attempt (Lifetime) No -   Actual Attempt (Past 3 Months) No -   Has subject engaged in non-suicidal self-injurious behavior? (Lifetime) No -   Has subject engaged in non-suicidal self-injurious behavior? (Past 3 Months) No -   Interrupted Attempts (Lifetime) No -   Interrupted Attempts (Past 3 Months) No -   Aborted  or Self-Interrupted Attempt (Lifetime) No -   Aborted or Self-Interrupted Attempt (Past 3 Months) No -   Preparatory Acts or Behavior (Lifetime) No -   Preparatory Acts or Behavior (Past 3 Months) No -   1. Wish to be Dead (Lifetime) - 0   2. Non-Specific Active Suicidal Thoughts (Lifetime) - 0   Actual Attempt (Lifetime) - 0   Has subject engaged in non-suicidal self-injurious behavior? (Lifetime) - 0   Interrupted Attempts (Lifetime) - 0   Aborted or Self-Interrupted Attempt (Lifetime) - 0   Preparatory Acts or Behavior (Lifetime) - 0   Calculated C-SSRS Risk Score (Lifetime/Recent) - No Risk Indicated       Personal and Family Medical History:  Patient does report a family history of mental health concerns.  Patient reports family history includes Breast Cancer in his mother; Diabetes in his brother; Neurologic Disorder in his mother; Respiratory in his father..     Patient does report Mental Health Diagnosis and/or Treatment.  Patient Patient reported the following previous diagnoses which include(s): depression .  Patient reported symptoms began 2017.  Patient has received mental health services in the past:  psychiatry  .  Psychiatric Hospitalizations: [facility] when  2017,  ,  ,  ,  ,  ,  ,  ,  ,  ,  .  Patient denies a history of civil commitment.  Currently, patient none  receiving other mental health services.  These include none.         Patient has had a physical exam to rule out medical causes for current symptoms.  Date of last physical exam was within the past year. Client was encouraged to follow up with PCP if symptoms were to develop. The patient has a [facility] Provider, who is named Lizzy Leiva..  Patient reports no current medical concerns.  Patient denies any issues with pain..   There are not significant appetite / nutritional concerns / weight changes.   Patient does not report a history of head injury / trauma / cognitive impairment.    Patient  "reports current meds as:   Outpatient Medications Marked as Taking for the 5/6/22 encounter (Virtual Visit) with Victor Manuel Galeana PsyD   Medication Sig     buPROPion (WELLBUTRIN SR) 150 MG 12 hr tablet TAKE 1 TABLET TWICE A DAY     busPIRone HCl (BUSPAR) 30 MG tablet Take 1 tablet (30 mg) by mouth 2 times daily     desvenlafaxine (PRISTIQ) 50 MG 24 hr tablet Take 1 tablet (50 mg) by mouth daily     gabapentin (NEURONTIN) 600 MG tablet Take 1 tablet (600 mg) by mouth 3 times daily     lamoTRIgine (LAMICTAL) 25 MG tablet Take 2 tablets (50 mg) by mouth 2 times daily       Medication Adherence:  Patient reports taking.  taking prescribed medications as prescribed.    Patient Allergies:    Allergies   Allergen Reactions     Bactrim [Sulfamethoxazole W/Trimethoprim] Nausea and Vomiting       Medical History:    Past Medical History:   Diagnosis Date     Benign neoplasm of prostate 2000    Benign Prostate Nodule         Current Mental Status Exam:   Appearance:  Appropriate    Eye Contact:  Good   Psychomotor:  Normal       Gait / station:  no problem  Attitude / Demeanor: Cooperative   Speech      Rate / Production: Normal/ Responsive      Volume:  Normal  volume      Language:  no problems  Mood:   Dysphoric \"Kind of up and down. Mostly struggling to regain some of my jamie.\"  Affect:   Appropriate    Thought Content: Clear   Thought Process: Goal Directed  Logical       Associations: No loosening of associations  Insight:   Good   Judgment:  Intact   Orientation:  All  Attention/concentration: Good      Substance Use:  Patient did not report a family history of substance use concerns; see medical history section for details.  Patient has received chemical dependency treatment in the past at [facility] in 2017.  Patient has ever been to detox.      Patient is not currently receiving any chemical dependency treatment.           Substance History of use Age of first use Date of last use     Pattern and " duration of use (include amounts and frequency)   Alcohol used in the past   18 10/2/2017 REPORTS SUBSTANCE USE: N/A   Cannabis   never used     REPORTS SUBSTANCE USE: N/A     Amphetamines   never used     REPORTS SUBSTANCE USE: N/A   Cocaine/crack    never used       REPORTS SUBSTANCE USE: N/A   Hallucinogens never used         REPORTS SUBSTANCE USE: N/A   Inhalants never used         REPORTS SUBSTANCE USE: N/A   Heroin never used         REPORTS SUBSTANCE USE: N/A   Other Opiates never used     REPORTS SUBSTANCE USE: N/A   Benzodiazepine   never used     REPORTS SUBSTANCE USE: N/A   Barbiturates never used     REPORTS SUBSTANCE USE: N/A   Over the counter meds never used     REPORTS SUBSTANCE USE: N/A   Caffeine currently use 23   REPORTS SUBSTANCE USE: N/A   Nicotine  never used     REPORTS SUBSTANCE USE: N/A   Other substances not listed above:  Identify:  never used     REPORTS SUBSTANCE USE: N/A     Patient reported the following problems as a result of their substance use: family problems.    Substance Use: No symptoms    Based on the negative CAGE score and clinical interview there  are not indications of drug or alcohol abuse.      Significant Losses / Trauma / Abuse / Neglect Issues:   Patient did not serve in the .  There are indications or report of significant loss, trauma, abuse or neglect issues related to: death of son in 2003.  Concerns for possible neglect are not present.    Safety Assessment:   Patient denies current homicidal ideation and behaviors.  Patient denies current self-injurious ideation and behaviors.    Patient denied risk behaviors associated with substance use.  Patient denies any high risk behaviors associated with mental health symptoms.  Patient reports the following current concerns for their personal safety: None.  Patient reports there are firearms in the house.     yes, they are secured.  .    History of Safety Concerns:  Patient denied a history of homicidal  "ideation.     Patient denied a history of personal safety concerns.    Patient denied a history of assaultive behaviors.    Patient denied a history of sexual assault behaviors.     Patient denied a history of risk behaviors associated with substance use.  Patient denies any history of high risk behaviors associated with mental health symptoms.  Patient reports the following protective factors: forward or future oriented thinking; dedication to family or friends; safe and stable environment; regular sleep; effectively controls impulses; sense of belonging; secure attachment; adherence with prescribed medication; commitment to well being; healthy fear of risky behaviors or pain; financial stability    Risk Plan:  See Recommendations for Safety and Risk Management Plan    Review of Symptoms per patient report:  Depression: Lack of interest, Feeling sad, down, or depressed and Withdrawn  Stacy:  No Symptoms  Psychosis: No Symptoms  Anxiety: No Symptoms  Panic:  No symptoms  Post Traumatic Stress Disorder:  No Symptoms   Eating Disorder: No Symptoms  ADD / ADHD:  No symptoms  Conduct Disorder: No symptoms  Autism Spectrum Disorder: No symptoms  Obsessive Compulsive Disorder: No Symptoms    Patient reports the following compulsive behaviors and treatment history:  .      Sleep: \"I used to have trouble sleeping waking up too early but that's better now.\" Said that he sleeps very well.     Diagnostic Criteria:   Persistent Depressive Disorder  A. Depressed mood for most of the day, for more days than not, as indicated either by subjective account or observation by others, for at least 2 years. Note: In children and adolescents, mood can be irritable and duration must be at least 1 year.   B. Presence, while depressed, of two (or more) of the following:        - low energy or fatigue   C. During the 2-year period (1 year for children or adolescents) of the disturbance, the person has never been without the symptoms in " Criteria A and B for more than 2 months at a time.  D. Criteria for a major depressive disorder may be continously present for 2 years  E. There has never been a Manic Episode, a Mixed Episode, or a Hypomanic Episode, and criteria have never been met for Cyclothymic Disorder.   F. The disturbance is not better explained by a persistent schizoaffective disorder, schizophrenia, delusional disorder, or other specified or unspecified schizophrenia spectrum and other psychotic disorder  G. The symptoms are not attributable to the physiological effects of a substance (e.g., a drug of abuse, a medication) or another medical condition (e.g., hypothyroidism).   H. The symptoms cause clinically significant distress or impairment in social, occupational, or other important areas of functioning.        - Late Onset: if onset is age 21 years or older     Functional Status:  Patient reports the following functional impairments:  home life with wife.     Nonprogrammatic care:  Patient is requesting basic services to address current mental health concerns.    Clinical Summary:  1. Reason for assessment: second opinion regarding depression  .  2. Psychosocial, Cultural and Contextual Factors: chronic low mood and disinterest  .  3. Principal DSM5 Diagnoses  (Sustained by DSM5 Criteria Listed Above):   300.4 (F34.1) Persistent Depressive Disorder, Late onset.  4. Other Diagnoses that is relevant to services:   .  5. Provisional Diagnosis:   as evidenced by  .  6. Prognosis: Expect Improvement.  7. Likely consequences of symptoms if not treated: further deterioration of functoining.  8. Client strengths include:  committed to sobriety, educated, motivated and support of family, friends and providers .     Recommendations:     1. Plan for Safety and Risk Management:   Recommended that patient call 911 or go to the local ED should there be a change in any of these risk factors..          Report to child / adult protection services was  NA.     2. Patient's identified mental health concerns with a cultural influence will be addressed by making reasonable accomodations at the request of the patient.     3. Initial Treatment will focus on:    Depressed Mood -  .     4. Resources/Service Plan:    services are not indicated.   Modifications to assist communication are not indicated.   Additional disability accommodations are not indicated.      5. Collaboration:   Collaboration / coordination of treatment will be initiated with the following  support professionals: psychiatry.      6.  Referrals:   The following referral(s) will be initiated: Outpatient [facility]. Next Scheduled Appointment: TBD.     A Release of Information has been obtained for the following: n/a.    7. LOUISE:    LOUISE:  Discussed the general effects of drugs and alcohol on health and well-being. Provider gave patient printed information about the effects of chemical use on their health and well being. Recommendations:  Maintain sobriety .     8. Records:   These were reviewed at time of assessment.   Information in this assessment was obtained from the medical record and  provided by patient who is a good historian.    Patient will have open access to their mental health medical record.        Provider Name/ Credentials:  Los Galeana PsyD, KARMA  May 6, 2022

## 2022-05-06 NOTE — PROGRESS NOTES
"Josemanuel Edmond is a 77 year old year old who is being evaluated via a billable video visit.      How would you like to obtain your AVS? MyChart  If you are dropped from the video visit, the video invite should be resent to: Text to cell phone: see Epic  Will anyone else be joining your video visit? No       Telemedicine Visit: The patient's condition can be safely assessed and treated via synchronous audio and visual telemedicine encounter.      Reason for Telemedicine Visit: Covid-19 Pandemic    Originating Site (Patient Location): Patient's home     Distant Site (Provider Location): Provider Remote Setting    Mode of Communication:  Video Conference via Towi    As the provider I attest to compliance with applicable laws and regulations related to telemedicine.                                                             Outpatient Psychiatric Evaluation- Standard  Adult    Name:  Josemanuel Edmond  : 1945    Source of Referral:  Primary Care Provider: Lizzy Leiva MD   Current Psychotherapist: None. No past hx of psychotherapy.     Identifying Data:  Patient is a 77 year old,   White Not  or  male  who presents for initial visit with me.  Patient is currently retired. Patient attended the phone/video session alone. Patient prefers to be called: \"Gavin\"    Chief Complaint:  Patient presents with:  Consult      HPI:  Josemanuel Edmond is a 77 year old male with past history including depression, social anxiety, insomnia who presents today for psychiatric assessment. Had recently been seeing Rylee Kyle CNP at the [place] [facility] since 2020.     Patient with long history of psychiatric symptoms.  Has struggled primarily with depression and anxiety over the years.  Has had low-grade depression for as long as he can remember.  When asked when he last felt well he did not have an answer/could not remember.  However, he does report \"I was worse off than I " "am now.\"  He also states \"I am not really in a dark hole.\"  He states his depression has been quite a bit worse in the past.  He has a history of alcohol abuse.  He admits to some social anxiety and states it was easier to deal with when he is drinking alcohol.  \"I have social anxiety, that really bothers me.\"  He will often get nervous before social outings but then states \"once I get there I am better.\"  He also reports \"kind of a lack of emotions.\"  Around 1975 he reports he had \"a zelda journey I started.\"  He did not drink alcohol for 20+ years.  Unfortunately his son passed away in the early 2000's and patient started drinking again.  \"That kind of got me back on the wrong path.\"  He attended chemical dependency treatment in 2017 which was the last time he drank alcohol.  He admits he does miss it at times.  He occasionally has cravings but states \"nothing I do not overcome.\"  Regarding psychiatric medications- he is pretty sure they help at least somewhat.  Apart from Pristiq, he has been on his other psychiatric medications for quite some time.  He states he does feel better on them.  \"We have tried everything.\"  He has not noticed a significant change yet from starting Pristiq a couple months ago.  He tolerates his medications well.  No acute safety concerns.  No SI.  No drug or alcohol use.    Per [facility], Dr. Los Galeana, during today's team-based visit:  First appointment with patient in [facility] and was advised of the short-term, team based structure of the model including role of B[facility] and provider. Patient indicated understanding of the model and agreed to proceed with services as described.     The reason for seeking services at this time is: \"Depressed\". Reported that he is here for help with depression. \"It's not a deep dark hole, but I don't have much jamie.\" He noted that he has been more reluctant to socialize with people, even people he knows. \"That's really not like me. I used to be pretty open and life of " "the party kind of gisselle.\" He believes some of this started in 10/2017 after he went to alcohol treatment (has stayed sober since). He did not finish because he got pneumonia and was moved to the medical side. He suspects that he misses alcohol helped him be social. In 2003, his son was killed in a motorcycle accident. He had been sober for 20+ years but began drinking after his son's death. \"I don't think that's lingering anymore but I'm not sure.\" He believes his medications were more beneficial in the past than they are now. He has never tried therapy.      The problem(s) began 5/5/2022.     Patient has attempted to resolve these concerns in the past through medication.    From primary care provider note 1/27/22:  He has struggled long-term with poor control of depression.  We recently increased his Lamictal.  He is also on Wellbutrin, BuSpar.  Stopped duloxetine secondary to side effects.  Discussed given failure of multiple medications and option might be GeneSight testing.  Swab obtained today and he will check with his insurance prior to sending the sample.  Advised also scheduling a psychiatry consult.  Discussed that those appointments do tend to book out quite a ways and would like to be specific on the books as we have not had much success despite numerous medication adjustments.    HPI from Rylee Kyle CNP, 5/7/2020:  Pertinent Background:  Reports depression started 2002 after his son was killed in MVA.  Anxiety also started around the same time, but social anxiety started 6-7 years ago.  Denies any hx of SI, SIB, HI, psych hospitalization.  After son was killed, pt had heavy ETOH use on and off.        Most Recent History: Notes social anxiety is significant despite of being on numerous medications managed by PCP.  Pt notes when his spouse suggests having friends over, going out or even going to Zoroastrianism that he enjoys, he gets significant anxiety.  Pt initially reports he did not have any patterns of " "anxiety changes throughout the day, but later noted that he feels generally better in AM.  Pt does not have problem with going to grocery stores and other chores.   Pt is concerned about this because he does not hold up his spouse, if this is not for his spouse, he would be happy staying home and this will not affect him.  PCP added Buspar 7.5 mg daily to taper up to BID, but has not noted any difference.  Pt is prescribed Gabapentin for neuropathy of leg in the past and since he no longer has this condition, reports being tapered off of this and only taking 600 mg daily \"for a while.\"  Pt denies anxiety exacerbation with tapering off Gabapentin.     Notes depression is present, but this is fairly well managed though he reports \"I guess I'm kind of down\" x 5-6 years.  Pt also noted that he does not have \"much emotion\" for couple years.  Though pt does not feel significantly depressed or sad, pt also does not feel much jamie.  Denies anhedonia.  Continues to enjoy yard work and work in [employer].  Pt is unsure why his Wellbutrin was changed from XR to SR at one point, thought that was just dose change.  Currently taking Wellbutrin  mg BID at 9am and 6pm.  Notes he goes to bed around 11:30pm and takes Ambien 10 mg HS PRN every night, able to fall asleep easily as long as he takes Ambien, but wakes up around 5am and cannot go back to sleep frequently, so just start his day.  Also notes he wakes up x2-3/night to use bathroom and easily goes back to sleep.  PCP note indicates pt wants to be tapered off Lamictal as he is unsure why he is taking the medication.  Pt denies having seizure.  Pt notes he had lost 20 lbs without trying to lose weight last 3-4 years, but finally \"leveled off\" where he is comfortable currently.     Pt denies any symptoms of PTSD.     Yoselyn, his spouse, notes that she is concerned about pt's random shaking/twitch in hands x 1 year that is worsening.  This almost appears also like tapping and his " "writing has been \"awful.\"  Yoselyn also notes his poor balance and had few falls or near miss falls over 1 year now.  This concern has been addressed to PCP, but they could not find any reason for this.  Pt also notes some memory declining x1-2 year.  Does not have any problem with driving, missing personal items and Yoselyn is also not concerned about this, feels this is age appropriate memory declining.     Denies any symptoms suggestive of hypomania or psychosis.    Past diagnoses include: Depression, social anxiety, insomnia, alcohol use disorder  Current medications include: has a current medication list which includes the following prescription(s): acetaminophen, bupropion, buspirone hcl, desvenlafaxine, doxazosin, finasteride, furosemide, gabapentin, lamotrigine, metoprolol tartrate, and xarelto anticoagulant.   Medication side effects: Denies  Current stressors include: Symptoms and see HPI above  Coping mechanisms and supports include: Family, Hobbies and Friends    Psychiatric Review of Symptoms Per [facility], Dr. Los Galeana, during today's team-based visit:  Depression:     Lack of interest, Feeling sad, down, or depressed and Withdrawn  Stacy:             No Symptoms  Psychosis:       No Symptoms  Anxiety:           No Symptoms  Panic:              No symptoms  Post Traumatic Stress Disorder:  No Symptoms   Eating Disorder:          No Symptoms  ADD / ADHD:              No symptoms  Conduct Disorder:       No symptoms  Autism Spectrum Disorder:     No symptoms  Obsessive Compulsive Disorder:       No Symptoms     Patient reports the following compulsive behaviors and treatment history:  .       Sleep: \"I used to have trouble sleeping waking up too early but that's better now.\" Said that he sleeps very well.      All other ROS negative.     PHQ-9 scores:   PHQ-9 SCORE 5/5/2022 5/5/2022 5/5/2022   PHQ-9 Total Score - - -   PHQ-9 Total Score MyChart - - 4 (Minimal depression)   PHQ-9 Total Score 4 4 4       CAROLYNN-7 " "scores:    CAROLYNN-7 SCORE 5/5/2022 5/5/2022 5/5/2022   Total Score - - -   Total Score - - 2 (minimal anxiety)   Total Score 2 2 2       Medical Review of Systems:  10 systems (general, cardiovascular, respiratory, eyes, ENT, endocrine, GI, , M/S, neurological) were reviewed. Most pertinent finding(s) is/are: Chronic pains. The remaining systems are all unremarkable.    A 12-item WHODAS 2.0 assessment was not completed.    Psychiatric History:   Hospitalizations: None  History of Commitment? No   Past Treatment: medication(s) from physician / PCP and psychiatry  Suicide Attempts: No   Current Suicide Risk: Suicide Assessment Completed Today.  Self-injurious Behavior: Denies  Electroconvulsive Therapy (ECT) or Transcranial Magnetic Stimulation (TMS): No   GeneSight Genetic Testing: No     Past medication trials include but are not limited to:   Psych Meds at Intake:  Pristiq 50 mg daily   BusPar 30 mg twice daily  Wellbutrin  mg BID  Lamotrigine 50 mg BID  Gabapentin 600 mg TID    Past Psych Meds:  Lexapro  Hydroxyzine  Nortriptyline  Trazodone  Duloxetine - had days when I felt fine and on top of things  Alprazolam  Mirtazapine  Ambien - falls  Lorazepam  Ramelteon  Wellbutrin XL  Diazepam  Fluoxetine  Aripiprazole  Viibryd  Effexor-XR - \"it didn't agree with me\"     Additional information Per Rylee Kyle, CNP note:   Lexapro (ineffective), Prozac (helps depression, but anxiety increase), Hydroxyzine (oversedation, PCP trial), Wellbutrin XL, Xanax, Klonopin, Valium, Ativan, Remeron (30 mg only, worked well for sleep and anxiety, but weight gain), Nortriptyline, Vistaril (dry mouth, nightmares), Remeron (oversedation at 45 mg, 22.5mg and 15 mg and wt gain), Trazodone (oversedate at 50 mg), Ambien (effective for sleep, fall), Belsomra not covered with insurance.    Substance Use History:  Current Use of Drugs/Alcohol: Denies   Past Use of Drugs/Alcohol: History of excessive alcohol use: Drank pretty heavily " up until 1975 at which time he quit for about 20+ years; started drinking again after his son passed away in 2003 but then quit again in 2017.  Sober since October 2017.  Patient reported the following problems as a result of drinking: family problems.   Patient has received chemical dependency treatment in the past at [facility]  Recovery Programming Involvement: None    Tobacco use: History quit 1975    Based on the clinical interview, there  Are no indications of current alcohol or drug abuse. Continue to monitor.   Discussed effect of substance use on overall health.     Past Medical History:  Past Medical History:   Diagnosis Date     Benign neoplasm of prostate 2000    Benign Prostate Nodule      Surgery:   Past Surgical History:   Procedure Laterality Date     ARTHROPLASTY KNEE Right 10/12/2018    Procedure: ARTHROPLASTY KNEE;  Right Total Knee Arthroplasty;  Surgeon: Jeb Peralta MD;  Location: [facility]     COLONOSCOPY N/A 12/10/2015    Procedure: COMBINED COLONOSCOPY, SINGLE OR MULTIPLE BIOPSY/POLYPECTOMY BY BIOPSY;  Surgeon: Jyoti Figueroa MD;  Location: [facility]     JOINT REPLACEMENT, HIP RT/LT  10/07    Joint Replacement Hip LT     JOINT REPLACEMTN, KNEE RT/LT  8/2011    Joint Replacement knee /LT, [facility]     LAPAROSCOPIC HERNIORRHAPHY INGUINAL BILATERAL Bilateral 4/24/2018    Procedure: LAPAROSCOPIC HERNIORRHAPHY INGUINAL BILATERAL;  Laparoscopic bilateral inguinal hernia repair;  Surgeon: Josemanuel Resendiz MD;  Location: [facility] OR     PHACOEMULSIFICATION WITH STANDARD INTRAOCULAR LENS IMPLANT Right 1/6/2021    Procedure: Cataract Removal with Implant;  Surgeon: Regan Kaufman MD;  Location: [facility]     PHACOEMULSIFICATION WITH STANDARD INTRAOCULAR LENS IMPLANT Left 2/17/2021    Procedure: Cataract Removal with Implant;  Surgeon: Regan Kaufman MD;  Location: [facility] OR     SURGICAL HISTORY OF -   12/1/1999    Umbilical Herniorrhaphy with mesh     SURGICAL  HISTORY OF -  Left 11/2017    Thoracotomy and drainage of empyema     Food and Medicine Allergies:     Allergies   Allergen Reactions     Bactrim [Sulfamethoxazole W/Trimethoprim] Nausea and Vomiting     Seizures or Head Injury: No  Diet: No Restrictions  Exercise: Not discussed  Supplements: Reviewed per Electronic Medical Record Today    Current Medications:    Current Outpatient Medications:      acetaminophen (TYLENOL) 500 MG tablet, Take 1,000 mg by mouth every 8 hours as needed for mild pain, Disp: , Rfl:      buPROPion (WELLBUTRIN SR) 150 MG 12 hr tablet, TAKE 1 TABLET TWICE A DAY, Disp: 180 tablet, Rfl: 3     busPIRone HCl (BUSPAR) 30 MG tablet, Take 1 tablet (30 mg) by mouth 2 times daily, Disp: 180 tablet, Rfl: 0     desvenlafaxine (PRISTIQ) 50 MG 24 hr tablet, Take 1 tablet (50 mg) by mouth daily, Disp: 30 tablet, Rfl: 1     doxazosin (CARDURA) 8 MG tablet, Take 0.5 tablets (4 mg) by mouth At Bedtime, Disp: 90 tablet, Rfl: 3     finasteride (PROSCAR) 5 MG tablet, Take 1 tablet (5 mg) by mouth daily, Disp: 30 tablet, Rfl: 3     furosemide (LASIX) 20 MG tablet, Take 1 tablet (20 mg) by mouth daily, Disp: 60 tablet, Rfl: 3     gabapentin (NEURONTIN) 600 MG tablet, Take 1 tablet (600 mg) by mouth 3 times daily, Disp: 270 tablet, Rfl: 0     lamoTRIgine (LAMICTAL) 25 MG tablet, Take 2 tablets (50 mg) by mouth 2 times daily, Disp: 360 tablet, Rfl: 4     Metoprolol Tartrate 75 MG TABS, Take 75 mg by mouth 2 times daily, Disp: 180 tablet, Rfl: 3     XARELTO ANTICOAGULANT 20 MG TABS tablet, TAKE 1 TABLET DAILY WITH   DINNER, Disp: 90 tablet, Rfl: 3    Vital Signs:  None since this is a phone/video visit.     Labs:  Most recent laboratory results reviewed and the pertinent results include:   Office Visit on 05/05/2022   Component Date Value Ref Range Status     Creatinine Urine mg/dL 05/05/2022 74  mg/dL Final     Albumin Urine mg/L 05/05/2022 21  mg/L Final     Albumin Urine mg/g Cr 05/05/2022 28.38 (A) 0.00 -  17.00 mg/g Cr Final   Lab on 02/28/2022   Component Date Value Ref Range Status     Sodium 02/28/2022 130 (A) 133 - 144 mmol/L Final     Potassium 02/28/2022 4.0  3.4 - 5.3 mmol/L Final     Chloride 02/28/2022 97  94 - 109 mmol/L Final     Carbon Dioxide (CO2) 02/28/2022 28  20 - 32 mmol/L Final     Anion Gap 02/28/2022 5  3 - 14 mmol/L Final     Urea Nitrogen 02/28/2022 13  7 - 30 mg/dL Final     Creatinine 02/28/2022 1.08  0.66 - 1.25 mg/dL Final     Calcium 02/28/2022 8.7  8.5 - 10.1 mg/dL Final     Glucose 02/28/2022 99  70 - 99 mg/dL Final     Albumin 02/28/2022 3.8  3.4 - 5.0 g/dL Final     Phosphorus 02/28/2022 3.1  2.5 - 4.5 mg/dL Final     GFR Estimate 02/28/2022 70  >60 mL/min/1.73m2 Final    Effective December 21, 2021 eGFRcr in adults is calculated using the 2021 CKD-EPI creatinine equation which includes age and gender (Ludy et al., NEJ, DOI: 10.1056/ZNDEkn6074224)   Lab on 09/26/2021   Component Date Value Ref Range Status     SARS CoV2 PCR 09/26/2021 Positive (A) Negative Final    POSITIVE: SARS-CoV-2 (COVID-19) RNA detected, presumed positive.   Office Visit on 06/03/2021   Component Date Value Ref Range Status     Sodium 06/03/2021 132 (A) 133 - 144 mmol/L Final     Potassium 06/03/2021 4.5  3.4 - 5.3 mmol/L Final     Chloride 06/03/2021 97  94 - 109 mmol/L Final     Carbon Dioxide 06/03/2021 30  20 - 32 mmol/L Final     Anion Gap 06/03/2021 5  3 - 14 mmol/L Final     Glucose 06/03/2021 96  70 - 99 mg/dL Final     Urea Nitrogen 06/03/2021 14  7 - 30 mg/dL Final     Creatinine 06/03/2021 0.91  0.66 - 1.25 mg/dL Final     GFR Estimate 06/03/2021 80  >60 mL/min/[1.73_m2] Final    Comment: Non  GFR Calc  Starting 12/18/2018, serum creatinine based estimated GFR (eGFR) will be   calculated using the Chronic Kidney Disease Epidemiology Collaboration   (CKD-EPI) equation.       GFR Estimate If Black 06/03/2021 >90  >60 mL/min/[1.73_m2] Final    Comment:  GFR Calc  Starting  "2018, serum creatinine based estimated GFR (eGFR) will be   calculated using the Chronic Kidney Disease Epidemiology Collaboration   (CKD-EPI) equation.       Calcium 2021 8.9  8.5 - 10.1 mg/dL Final     Bilirubin Total 2021 0.6  0.2 - 1.3 mg/dL Final     Albumin 2021 3.9  3.4 - 5.0 g/dL Final     Protein Total 2021 7.1  6.8 - 8.8 g/dL Final     Alkaline Phosphatase 2021 51  40 - 150 U/L Final     ALT 2021 23  0 - 70 U/L Final     AST 2021 18  0 - 45 U/L Final     WBC 2021 5.7  4.0 - 11.0 10e9/L Final     RBC Count 2021 3.62 (A) 4.4 - 5.9 10e12/L Final     Hemoglobin 2021 12.3 (A) 13.3 - 17.7 g/dL Final     Hematocrit 2021 35.6 (A) 40.0 - 53.0 % Final     MCV 2021 98  78 - 100 fl Final     MCH 2021 34.0 (A) 26.5 - 33.0 pg Final     MCHC 2021 34.6  31.5 - 36.5 g/dL Final     RDW 2021 13.5  10.0 - 15.0 % Final     Platelet Count 2021 184  150 - 450 10e9/L Final      EKG from 10/19/2021 with atrial fibrillation and .    Family History:   Patient reported family history includes:   Family History   Problem Relation Age of Onset     Breast Cancer Mother      Neurologic Disorder Mother         parkinsons     Respiratory Father         emphyzema     Diabetes Brother      Mental Illness History: Yes: Per EPIC Electronic Medical Record  Substance Abuse History: Denies  Suicide History: Denies  Medications: Unknown     Social History Per [facility], Dr. Los Galeana, during today's team-based visit:   Patient reported they grew up in [facility]  .  They were raised by biological parents  , had 2 older brothers who were 20 years older than him. Parents were always together.  Patient reported that their childhood was \"it was great. Safe neighborhood. It was good.\" He was raised as an only child because his brothers were so much older than him. Patient's parents and brothers are .     The patient describes their " "cultural background as .  Cultural influences and impact on patient's life structure, values, norms, and healthcare: None.  Contextual influences on patient's health include: Individual Factors difficulty with depression following sobriety.    These factors will be addressed in the Preliminary Treatment plan. Patient identified their preferred language to be English. Patient reported they does not need the assistance of an  or other support involved in therapy.      Patient reported had no significant delays in developmental tasks.   Patient's highest education level was high school graduate  .  Patient identified the following learning problems: none reported.  Modifications will not be used to assist communication in therapy. Patient reports they are  able to understand written materials.     Patient reported the following relationship history: Patient's current relationship status is  for 56 years. Patient identified their sexual orientation as heterosexual.  Patient reported having 2 child(samuel). Patient identified spouse as part of their support system.  Patient identified the quality of these relationships as good. He has a lot of friends in [employer]. \"I don't want to socialize with them but I do and I enjoy it when I do.\"     Patient's current living/housing situation involves staying in own home/apartment. Lives in a condo. The immediate members of family and household include Yoselyn Edmond, 77,wife and they report that housing is stable.     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 Patient reports their finances are obtained through other. Patient does not identify finances as a current stressor.      Firearms/Weapons Access: Yes Locked up   Service: No    Legal History:  No: Patient denies any legal history    Significant Losses / Trauma / Abuse / Neglect Issues:  There are indications or report of significant loss, trauma, abuse " "or neglect issues related to: See HPI and social history above.   Issues of possible neglect are not present.     Comprehensive Examination (limited due to virtual visit format, phone/video):  Vital Signs:  Vitals: There were no vitals taken for this visit.  General/Constitutional:  Appearance: awake, alert, adequately groomed, appeared stated age and no apparent distress  Attitude:  cooperative   Eye Contact:  good  Musculoskeletal:  Muscle Strength and Tone: no gross abnormalities by observation  Psychomotor Behavior:  no evidence of tardive dyskinesia, dystonia, or tics; no obvious tremor observed via video-not formally tested  Gait and Station: Sitting during interview, not formally tested and gait not observed   Psychiatric:  Speech:  clear, coherent, regular rate, rhythm, and volume  Associations:  no loose associations  Thought Process:  logical, linear and goal oriented  Thought Content:  no evidence of suicidal ideation or homicidal ideation, no evidence of psychotic thought, no auditory hallucinations present and no visual hallucinations present  Mood:  \" A lack of emotions\"  Affect:  Maybe slightly blunted but overall appropriate and seemingly mood congruent.  Insight:  fair  Judgment:  intact, adequate for safety  Impulse Control:  intact  Neurological:  Oriented to:  person, place, time, and situation  Attention Span and Concentration:  normal  Language: intact  Recent and Remote Memory:  Intact to interview.  Was pretty sharp with historical events and dates in his life and also medication details.  Not formally assessed.  Fund of Knowledge: appropriate    Strengths and Opportunities Per [facility], Dr. Los Galeana, during today's team-based visit:   Patient identified the following strengths or resources that will help them succeed in treatment: Zoroastrianism / Judaism, commitment to health and well being, zelda / spirituality, friends / good social support, family support, insight and intelligence. Things that may " interfere with the patient's success in treatment include: none identified.     Suicide Risk Assessment:  Today Josemanuel Edmond reports no suicidal ideation. Based on all available evidence including the factors cited above, Josemanuel Edmond does not appear to be at imminent risk for self-harm, does not meet criteria for a 72-hr hold, and therefore remains appropriate for ongoing outpatient level of care.  A thorough assessment of risk factors related to suicide and self-harm have been reviewed and are noted above. The patient convincingly denies acute suicidality on several occasions. Local community safety resources reviewed and printed for patient to use if needed. There was no deceit detected, and the patient presented in a manner that was believable.     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 02/28/2022     Priority: Medium     Depression with anxiety 02/28/2022     Priority: Medium     Infection due to 2019 novel coronavirus 09/27/2021     Priority: Medium     Hematoma of skin 07/17/2019     Priority: Medium     Traumatic ecchymosis of buttock, initial encounter 07/17/2019     Priority: Medium     Right knee DJD 10/12/2018     Priority: Medium     Peripheral neuropathy 10/12/2018     Priority: Medium     Sleep disturbance 10/12/2018     Priority: Medium     Hyperplastic Polyp 03/05/2018     Priority: Medium     Atrial fibrillation status post cardioversion (H) 01/18/2018     Priority: Medium     Chronic atrial fibrillation (H) 01/08/2018     Priority: Medium     Persistent atrial fibrillation (H) 12/15/2017     Priority: Medium     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)  11/28/2017     Priority: Medium     Status post lung surgery 11/18/2017     Priority: Medium     Left upper quadrant pain 11/07/2017     Priority: Medium     Unilateral inguinal hernia without obstruction or gangrene 11/07/2017     Priority: Medium     Community acquired pneumonia 11/05/2017     Priority: Medium     Benzodiazepine dependence (H) 10/31/2017     Priority: Medium     ED (erectile dysfunction) 12/23/2014     Priority: Medium     Moderate major depression (H) 12/17/2013     Priority: Medium     S/P knee replacement 11/06/2012     Priority: Medium     GERD (gastroesophageal reflux disease) 08/03/2012     Priority: Medium     Anxiety 02/16/2012     Priority: Medium     Hyperlipidemia 08/15/2011     Priority: Medium     S/P left knee arthroscopy 08/15/2011     Priority: Medium     Allergic rhinitis 04/21/2011     Priority: Medium     Conjunctivitis, allergic 04/21/2011     Priority: Medium     Hyperlipidemia LDL goal <100 10/31/2010     Priority: Medium     Osteoarthrosis, unspecified whether generalized or localized, pelvic region and thigh 09/28/2007     Priority: Medium     Hypertrophy of prostate with urinary obstruction 08/03/2006     Priority: Medium     Problem list name updated by automated process. Provider to review       Hypertension goal BP (blood pressure) < 140/90 12/12/2005     Priority: Medium       Impression:  Josemanuel Edmond is a 77-year-old male with past psychiatric history including depression, social anxiety disorder, and alcohol use disorder-in sustained remission who presents today for psychiatric evaluation.  Patient presents for medication evaluation and ongoing depression symptoms.  Patient meets criteria for persistent depressive disorder.  He feels like he has had pretty low-grade depression for as long as he can remember.  Symptoms are improved on psychiatric medication but still present.  Patient is hopeful to improve further.  He has had multiple psychiatric medication  trials.  He has tried medications from different classes and has also had various augmentation strategies trialed.  Given his medication trial history and his age of 77, I am hesitant to make too many changes at this time since he does feel as though his symptoms have been improved on current regimen.  If anything, it would be nice to decrease psychiatric polypharmacy.  We did discuss referral to interventional psychiatry clinic to discuss possibility of ketamine therapy and/or TMS therapy.  Perhaps one of these treatment modalities could be quite helpful and would allow us to decrease psychiatric polypharmacy.  Patient also has history of pretty chronic and severe insomnia which is currently under good control-another reason I am quite hesitant to make any changes today.  Social anxiety also seems to be a challenge for the patient.  His previous psychiatric prescriber did suggest possibility of switching his metoprolol to propranolol (but pcp or cardiology recommended against).  For patient's ongoing depression symptoms and social anxiety, individual psychotherapy was very strongly recommended, especially since there is no history of psychotherapy.  No major cognitive or memory complaints noted today-no formal testing. Patient was agreeable and referral placed. Could consider possibility of prodromal neurocognitive disease -mother with history of Parkinson's disease and patient has history of falls and tremor within the past couple years (patient also reports essentially ongoing apathy-will let PCP know could consider neurology consult).  No acute safety concerns.  No SI.  No drug or alcohol use.  No psychosis.    Medication side effects and alternatives reviewed. Health promotion activities recommended and reviewed today. All questions addressed. Education and counseling completed regarding risks and benefits of medications and psychotherapy options. Recommend therapy for additional support.     Treatment  Plan:    Continue Wellbutrin  mg twice daily for depression.    Continue BuSpar/buspirone 30 mg twice daily for anxiety.    Continue Pristiq/desvenlafaxine 50 mg daily for depression.  I do not want to increase this medication at this time given psychiatric polypharmacy.    Continue lamotrigine/Lamictal 50 mg twice daily for mood and to augment antidepressants.    Strongly recommend individual psychotherapy for additional support and ongoing development of nonpharmacologic coping skills and strategies.    Referral placed for interventional psychiatry clinic for difficult to treat depression/evaluation for appropriateness of ketamine or TMS therapies.    Continue all other cares per primary care provider.     Continue all other medications as reviewed per electronic medical record today.     Safety plan reviewed. To the Emergency Department as needed or call after hours crisis line at [phone] or [phone]. [place] Crisis Text Line: Text [place] to 421387  or  Suicide LifeLine Chat: suicidepreventionLeanplumline.org/chat    Schedule an appointment with me after you have met with interventional psychiatry clinic providers or sooner as needed.  Call [facility] at [phone] to schedule.    Follow up with primary care provider as planned or sooner if needed for acute medical concerns.    Call the psychiatric nurse line with medication questions or concerns at [phone].    [employer] may be used to communicate with your provider, but this is not intended to be used for emergencies.    Serotonin syndrome (SS) has occurred with serotonergic antidepressants (eg, SSRIs, SNRIs), particularly when used in combination with other serotonergic agents (eg, triptans, TCAs, fentanyl, lithium, tramadol, buspirone, Cosmo's wort, tryptophan) or agents that impair metabolism of serotonin (eg, MAO inhibitors intended to treat psychiatric disorders, other MAO inhibitors [ie, linezolid and intravenous methylene  "blue]). Antipsychotic Agents may also enhance the serotonergic effect of Serotonin Modulators.Signs and symptoms include: agitation or restlessness, confusion, rapid heart rate and high blood pressure, dilated pupils, loss of muscle coordination or twitching muscles, heavy sweating, diarrhea, headaches, shivering and/or goose bumps.  Patient was educated on signs and symptoms of serotonin syndrome.  Symptoms typically occur within several hours of taking a new drug or increasing the dose.  Patient was notified to report symptoms immediately.    Lamictal (lamotrigine) can cause serious rashes including Lora-Ronak syndrome which may requiring hospitalization and discontinuation of treatment. If any signs of a rash occur, please see your Primary Care Provider or a dermatologist immediately.     Patient Education:  Therapy Resources:  Www.psychologytoday.com  Also, see Dr. Galeana's message.    Get Out of Your Mind & Into Your Life   By Shaggy Moreira    The Happiness Trap: How to Stop Struggling and Start Living  By Rigoberto Haley    The Reality Slap: Finding Peace & Fulfillment When Life Hurts  By Rigoberto Haley    Things Might Go Terribly, Horribly Wrong: A Guide to Life Liberated from Anxiety  By Camilla Lakhani, PhD    Stress Less, Live More: How Acceptance and Commitment Therapy Can Help You Live a Busy Yet Balanced Life  By Velasquez Menjivar    Finding Life Beyond Trauma: Using Acceptance and Commitment Therapy to Heal From Post-Traumatic Stress and Trauma-Related Problems  By Sherly Wilkins and Edda Medrano    Other ACT Skills References     Rigoberto Haley on YouTube - Demonstrates several different skills to deal with difficult thoughts and feelings     Book:  \"A Liberated Mind: How to Pivot Toward What Matters\" by Shaggy Moreira     Psychological flexibility: How love turns pain into purpose  Tedx Talk by Dr. Moreira discussing his struggle with anxiety and panic disorder which motivated him to develop ACT " therapy (Acceptance and Commitment Therapy)     You Are Not Your Thoughts      Community Resources:    National Suicide Prevention Lifeline: 386.295.6402 (TTY: 826.770.2742). Call anytime for help.  (www.suicidepreventionlifeline.org)  National [place] [employer] Mental Illness (www.leila.org): 165.125.1639 or 582-169-4843.   Mental Health Association (www.mentalhealth.org): 374.458.9058 or 999-474-4194.  [place] Crisis Text Line: Text [place] to 806904  Suicide LifeLine Chat: suicideTraNet'te.org/chat    Administrative Billing:   Phone Call/Video Duration: 34 Minutes  Start: 9:40a  Stop: 10:14a      Patient Status:  Patient will continue to be seen for ongoing consultation and stabilization.    Signed:   Veronica Carreon DO  [facility]    Disclaimer: This note consists of symbols derived from keyboarding, dictation and/or voice recognition software. As a result, there may be errors in the script that have gone undetected. Please consider this when interpreting information found in this chart.

## 2022-05-06 NOTE — Clinical Note
Hi there! Saw Gavin today. Nice gisselle. I referred to Interventional Psychiatry Clinic and psychotherapy. Any thoughts/concerns about possible Parkinson's with him? His ongoing apathy, hx of tremor, falls, and family history of PD makes me wonder? Just thought I would throw it out there. Let me know if you have any questions or concerns. Thanks!   Veronica Carreon, DO [facility]

## 2022-05-19 ENCOUNTER — MYC MEDICAL ADVICE (OUTPATIENT)
Dept: FAMILY MEDICINE | Facility: CLINIC | Age: 77
End: 2022-05-19
Payer: COMMERCIAL

## 2022-05-19 DIAGNOSIS — F41.9 ANXIETY: ICD-10-CM

## 2022-05-20 RX ORDER — GABAPENTIN 600 MG/1
600 TABLET ORAL 4 TIMES DAILY
Qty: 360 TABLET | Refills: 0 | Status: SHIPPED | OUTPATIENT
Start: 2022-05-20 | End: 2022-08-17

## 2022-05-20 NOTE — TELEPHONE ENCOUNTER
Routing refill request to provider for review/approval because:  Drug not on the [facility] refill protocol   This was sent to Dr. Leiva, please advise if you would like her to take over refill. Covered by you in the past.     Pending Prescriptions:                       Disp   Refills    gabapentin (NEURONTIN) 600 MG tablet      270 ta*0            Sig: Take 1 tablet (600 mg) by mouth 3 times daily      Annette Simons RN on 5/20/2022 at 8:52 AM

## 2022-05-23 ENCOUNTER — LAB (OUTPATIENT)
Dept: LAB | Facility: CLINIC | Age: 77
End: 2022-05-23
Payer: COMMERCIAL

## 2022-05-23 ENCOUNTER — TELEPHONE (OUTPATIENT)
Dept: PSYCHIATRY | Facility: CLINIC | Age: 77
End: 2022-05-23

## 2022-05-23 ENCOUNTER — OFFICE VISIT (OUTPATIENT)
Dept: CARDIOLOGY | Facility: CLINIC | Age: 77
End: 2022-05-23
Attending: PHYSICIAN ASSISTANT
Payer: COMMERCIAL

## 2022-05-23 VITALS
OXYGEN SATURATION: 99 % | WEIGHT: 175.6 LBS | BODY MASS INDEX: 26.7 KG/M2 | HEART RATE: 94 BPM | SYSTOLIC BLOOD PRESSURE: 123 MMHG | DIASTOLIC BLOOD PRESSURE: 80 MMHG

## 2022-05-23 DIAGNOSIS — I48.20 CHRONIC ATRIAL FIBRILLATION (H): ICD-10-CM

## 2022-05-23 DIAGNOSIS — R06.09 DYSPNEA ON EXERTION: ICD-10-CM

## 2022-05-23 LAB
ANION GAP SERPL CALCULATED.3IONS-SCNC: 4 MMOL/L (ref 3–14)
BUN SERPL-MCNC: 12 MG/DL (ref 7–30)
CALCIUM SERPL-MCNC: 8.8 MG/DL (ref 8.5–10.1)
CHLORIDE BLD-SCNC: 103 MMOL/L (ref 94–109)
CO2 SERPL-SCNC: 30 MMOL/L (ref 20–32)
CREAT SERPL-MCNC: 1.05 MG/DL (ref 0.66–1.25)
GFR SERPL CREATININE-BSD FRML MDRD: 72 ML/MIN/1.73M2
GLUCOSE BLD-MCNC: 101 MG/DL (ref 70–99)
POTASSIUM BLD-SCNC: 3.7 MMOL/L (ref 3.4–5.3)
SODIUM SERPL-SCNC: 137 MMOL/L (ref 133–144)

## 2022-05-23 PROCEDURE — 99213 OFFICE O/P EST LOW 20 MIN: CPT | Performed by: INTERNAL MEDICINE

## 2022-05-23 PROCEDURE — 36415 COLL VENOUS BLD VENIPUNCTURE: CPT | Performed by: PHYSICIAN ASSISTANT

## 2022-05-23 PROCEDURE — 80048 BASIC METABOLIC PNL TOTAL CA: CPT | Performed by: PHYSICIAN ASSISTANT

## 2022-05-23 RX ORDER — METOPROLOL TARTRATE 100 MG
100 TABLET ORAL 2 TIMES DAILY
Qty: 120 TABLET | Refills: 3 | Status: SHIPPED | OUTPATIENT
Start: 2022-05-23 | End: 2022-06-16

## 2022-05-23 NOTE — LETTER
5/23/2022    Lizzy Leiva MD  [address]  [facility] [address]    RE: Josemanuel Edmond       Dear Colleague,     I had the pleasure of seeing Josemanuel Edmond in the [facility].        HPI:       PMH reviewed:     1. Acute on chronic HFpEF.  Symptoms significantly improved following initiation of low-dose furosemide, with a 13 lb wt loss.  2. Recent stress nuclear study negative for ischemia or infarct.  3. Chronic atrial fibrillation, rate controlled and anticoagulated.  4. Reported orthostatic hypotension.   5. Low normal ejection fraction at 50-55%.  6. Moderate MR, TR, mild-mod AI-stable from 2017.       This is a pleasant 76 yr old with PMH AF, HTN, HLD, COVID + in September, GERD, prior etoh. Last seen by [facility] 5/22/18--developed AFib after undergoing thoractomy for empyema 11/2017. Was started on Xarelto and cardioverted. Came to clinic 10/19/21 with palpitations--EKG done and showed AFib. PCP in October then increased metoprolol to 50 mg bid then 75 mg bid. Underwent 9 day Zio 11/17/21: AFib (100% burden) with avg HR 83. Echocardiogram last done in 2017, normal LV function. TSH was wnl from past. Stable kidney function.      ROS negative for chest pain. + palpitations, [place], mild LE edema. Today he is here to review studies, prior labs. He is active daily with getting boat ready (has a pontoon). His breathing overall stable since last visit, with mild [place] on exertion. He has noted his heart rates have been climbing, with resting at 98 bpm recently.       ASSESSMENT/PLAN:     1. AFIB: Average heart rate seems to be climbing.   -continue rate control for now, 100% burden and severe LA  -increase metoprolol 100 mg twice a day  -repeated echocardiogram given [place] and evaluate for tachy-mediated cardiomyopathy which he doesn't have  -continue rivaroxaban indefinitely   -will continue torsemide and check chemistry panel q6-12 months     2. HTN: controlled, on metoprolol but  will increase to 100 mg twice a day    3. HLD: on statin, yearly lipids     Follow up KRYSTEN in 2 months.      Sarah Chapin MD MSc  [facility]         PAST MEDICAL HISTORY  Past Medical History:   Diagnosis Date     Benign neoplasm of prostate 2000    Benign Prostate Nodule       CURRENT MEDICATIONS  Current Outpatient Medications   Medication Sig Dispense Refill     acetaminophen (TYLENOL) 500 MG tablet Take 1,000 mg by mouth every 8 hours as needed for mild pain       buPROPion (WELLBUTRIN SR) 150 MG 12 hr tablet TAKE 1 TABLET TWICE A  tablet 3     busPIRone HCl (BUSPAR) 30 MG tablet Take 1 tablet (30 mg) by mouth 2 times daily 180 tablet 0     desvenlafaxine (PRISTIQ) 50 MG 24 hr tablet Take 1 tablet (50 mg) by mouth daily 30 tablet 1     doxazosin (CARDURA) 8 MG tablet Take 0.5 tablets (4 mg) by mouth At Bedtime 90 tablet 3     finasteride (PROSCAR) 5 MG tablet TAKE 1 TABLET DAILY 90 tablet 0     furosemide (LASIX) 20 MG tablet Take 1 tablet (20 mg) by mouth daily 60 tablet 3     gabapentin (NEURONTIN) 600 MG tablet Take 1 tablet (600 mg) by mouth 4 times daily 360 tablet 0     lamoTRIgine (LAMICTAL) 25 MG tablet Take 2 tablets (50 mg) by mouth 2 times daily 360 tablet 4     Metoprolol Tartrate 75 MG TABS Take 75 mg by mouth 2 times daily 180 tablet 3     XARELTO ANTICOAGULANT 20 MG TABS tablet TAKE 1 TABLET DAILY WITH   DINNER 90 tablet 3       PAST SURGICAL HISTORY:  Past Surgical History:   Procedure Laterality Date     ARTHROPLASTY KNEE Right 10/12/2018    Procedure: ARTHROPLASTY KNEE;  Right Total Knee Arthroplasty;  Surgeon: Jeb Peralta MD;  Location: [facility] OR     COLONOSCOPY N/A 12/10/2015    Procedure: COMBINED COLONOSCOPY, SINGLE OR MULTIPLE BIOPSY/POLYPECTOMY BY BIOPSY;  Surgeon: Jyoti Figueroa MD;  Location: [facility]     JOINT REPLACEMENT, HIP RT/LT  10/07    Joint Replacement Hip LT     JOINT REPLACEMTN, KNEE RT/LT  8/2011    Joint Replacement knee /LT, [facility]      LAPAROSCOPIC HERNIORRHAPHY INGUINAL BILATERAL Bilateral 2018    Procedure: LAPAROSCOPIC HERNIORRHAPHY INGUINAL BILATERAL;  Laparoscopic bilateral inguinal hernia repair;  Surgeon: Josemanuel Resendiz MD;  Location: [facility] OR     PHACOEMULSIFICATION WITH STANDARD INTRAOCULAR LENS IMPLANT Right 2021    Procedure: Cataract Removal with Implant;  Surgeon: Regan Kaufman MD;  Location: [facility]     PHACOEMULSIFICATION WITH STANDARD INTRAOCULAR LENS IMPLANT Left 2021    Procedure: Cataract Removal with Implant;  Surgeon: Regan Kaufman MD;  Location: [facility] OR     SURGICAL HISTORY OF -   1999    Umbilical Herniorrhaphy with mesh     SURGICAL HISTORY OF -  Left 2017    Thoracotomy and drainage of empyema       ALLERGIES     Allergies   Allergen Reactions     Bactrim [Sulfamethoxazole W/Trimethoprim] Nausea and Vomiting       FAMILY HISTORY  Family History   Problem Relation Age of Onset     Breast Cancer Mother      Neurologic Disorder Mother         parkinsons     Respiratory Father         emphyzema     Diabetes Brother      SOCIAL HISTORY  Social History     Socioeconomic History     Marital status:      Spouse name: Not on file     Number of children: Not on file     Years of education: Not on file     Highest education level: Not on file   Occupational History     Not on file   Tobacco Use     Smoking status: Former Smoker     Packs/day: 1.00     Years: 15.00     Pack years: 15.00     Types: Cigarettes     Quit date: 10/13/1975     Years since quittin.6     Smokeless tobacco: Never Used   Substance and Sexual Activity     Alcohol use: No     Comment: quit 2017     Drug use: No     Sexual activity: Not Currently   Other Topics Concern     Parent/sibling w/ CABG, MI or angioplasty before 65F 55M? No   Social History Narrative     Not on file     Social Determinants of Health     Financial Resource Strain: Not on file   Food Insecurity: Not on file   Transportation Needs:  Not on file   Physical Activity: Not on file   Stress: Not on file   Social Connections: Not on file   Intimate Partner Violence: Not on file   Housing Stability: Not on file       ROS:   See HPI       EXAM:  /80   Pulse 94   Wt 79.7 kg (175 lb 9.6 oz)   SpO2 99%   BMI 26.70 kg/m    In general, the patient is a pleasant male in no apparent distress.    HEENT: NC/AT.  PERRLA.  EOMI.  Sclerae white, not injected.     Neck: No adenopathy.  No thyromegaly.   Heart: RRR. Normal S1, S2 splits physiologically. No murmur, rub, click, or gallop  Lungs: CTA.  No ronchi, wheezes, rales.  No dullness to percussion.   Abdomen: Soft, nontender, nondistended.   Extremities: No clubbing, cyanosis, or edema.  No wounds.   Vascular: No bruits are noted.    Labs:  LIPID RESULTS:  Lab Results   Component Value Date    CHOL 231 (H) 10/09/2017    HDL 71 10/09/2017     (H) 10/09/2017    TRIG 242 (H) 10/09/2017    CHOLHDLRATIO 3.0 10/07/2013    NHDL 160 (H) 10/09/2017       LIVER ENZYME RESULTS:  Lab Results   Component Value Date    AST 18 06/03/2021    ALT 23 06/03/2021       CBC RESULTS:  Lab Results   Component Value Date    WBC 5.7 06/03/2021    RBC 3.62 (L) 06/03/2021    HGB 12.3 (L) 06/03/2021    HCT 35.6 (L) 06/03/2021    MCV 98 06/03/2021    MCH 34.0 (H) 06/03/2021    MCHC 34.6 06/03/2021    RDW 13.5 06/03/2021     06/03/2021       BMP RESULTS:  Lab Results   Component Value Date     (L) 02/28/2022     (L) 06/03/2021    POTASSIUM 4.0 02/28/2022    POTASSIUM 4.5 06/03/2021    CHLORIDE 97 02/28/2022    CHLORIDE 97 06/03/2021    CO2 28 02/28/2022    CO2 30 06/03/2021    ANIONGAP 5 02/28/2022    ANIONGAP 5 06/03/2021    GLC 99 02/28/2022    GLC 96 06/03/2021    BUN 13 02/28/2022    BUN 14 06/03/2021    CR 1.08 02/28/2022    CR 0.91 06/03/2021    GFRESTIMATED 70 02/28/2022    GFRESTIMATED 80 06/03/2021    GFRESTBLACK >90 06/03/2021    LUIS 8.7 02/28/2022    LUIS 8.9 06/03/2021        A1C RESULTS:  No  results found for: A1C    Thank you for allowing me to participate in the care of your patient.      Sincerely,     Sarah Chapin MD     [facility]  cc:   Kimmy West PA-C  [address]  [place] [address]

## 2022-05-23 NOTE — PATIENT INSTRUCTIONS
Echocardiogram in 6 months   Follow up with Dr. Chapin in 6 months  Increase metoprolol to 100 mg twice a day

## 2022-05-23 NOTE — PROGRESS NOTES
HPI:       PMH reviewed:     1. Acute on chronic HFpEF.  Symptoms significantly improved following initiation of low-dose furosemide, with a 13 lb wt loss.  2. Recent stress nuclear study negative for ischemia or infarct.  3. Chronic atrial fibrillation, rate controlled and anticoagulated.  4. Reported orthostatic hypotension.   5. Low normal ejection fraction at 50-55%.  6. Moderate MR, TR, mild-mod AI-stable from 2017.       This is a pleasant 76 yr old with PMH AF, HTN, HLD, COVID + in September, GERD, prior etoh. Last seen by [facility] 5/22/18--developed AFib after undergoing thoractomy for empyema 11/2017. Was started on Xarelto and cardioverted. Came to clinic 10/19/21 with palpitations--EKG done and showed AFib. PCP in October then increased metoprolol to 50 mg bid then 75 mg bid. Underwent 9 day Zio 11/17/21: AFib (100% burden) with avg HR 83. Echocardiogram last done in 2017, normal LV function. TSH was wnl from past. Stable kidney function.      ROS negative for chest pain. + palpitations, [place], mild LE edema. Today he is here to review studies, prior labs. He is active daily with getting boat ready (has a pontoon). His breathing overall stable since last visit, with mild [place] on exertion. He has noted his heart rates have been climbing, with resting at 98 bpm recently.       ASSESSMENT/PLAN:     1. AFIB: Average heart rate seems to be climbing.   -continue rate control for now, 100% burden and severe LA  -increase metoprolol 100 mg twice a day  -repeated echocardiogram given [place] and evaluate for tachy-mediated cardiomyopathy which he doesn't have  -continue rivaroxaban indefinitely   -will continue torsemide and check chemistry panel q6-12 months     2. HTN: controlled, on metoprolol but will increase to 100 mg twice a day    3. HLD: on statin, yearly lipids     Follow up KRYSTEN in 2 months.      Sarah Chapin MD MSc  [facility]         PAST MEDICAL HISTORY  Past Medical History:   Diagnosis Date      Benign neoplasm of prostate 2000    Benign Prostate Nodule       CURRENT MEDICATIONS  Current Outpatient Medications   Medication Sig Dispense Refill     acetaminophen (TYLENOL) 500 MG tablet Take 1,000 mg by mouth every 8 hours as needed for mild pain       buPROPion (WELLBUTRIN SR) 150 MG 12 hr tablet TAKE 1 TABLET TWICE A  tablet 3     busPIRone HCl (BUSPAR) 30 MG tablet Take 1 tablet (30 mg) by mouth 2 times daily 180 tablet 0     desvenlafaxine (PRISTIQ) 50 MG 24 hr tablet Take 1 tablet (50 mg) by mouth daily 30 tablet 1     doxazosin (CARDURA) 8 MG tablet Take 0.5 tablets (4 mg) by mouth At Bedtime 90 tablet 3     finasteride (PROSCAR) 5 MG tablet TAKE 1 TABLET DAILY 90 tablet 0     furosemide (LASIX) 20 MG tablet Take 1 tablet (20 mg) by mouth daily 60 tablet 3     gabapentin (NEURONTIN) 600 MG tablet Take 1 tablet (600 mg) by mouth 4 times daily 360 tablet 0     lamoTRIgine (LAMICTAL) 25 MG tablet Take 2 tablets (50 mg) by mouth 2 times daily 360 tablet 4     Metoprolol Tartrate 75 MG TABS Take 75 mg by mouth 2 times daily 180 tablet 3     XARELTO ANTICOAGULANT 20 MG TABS tablet TAKE 1 TABLET DAILY WITH   DINNER 90 tablet 3       PAST SURGICAL HISTORY:  Past Surgical History:   Procedure Laterality Date     ARTHROPLASTY KNEE Right 10/12/2018    Procedure: ARTHROPLASTY KNEE;  Right Total Knee Arthroplasty;  Surgeon: Jeb Peralta MD;  Location: [facility] OR     COLONOSCOPY N/A 12/10/2015    Procedure: COMBINED COLONOSCOPY, SINGLE OR MULTIPLE BIOPSY/POLYPECTOMY BY BIOPSY;  Surgeon: Jyoti Figueroa MD;  Location: [facility] GI     JOINT REPLACEMENT, HIP RT/LT  10/07    Joint Replacement Hip LT     JOINT REPLACEMTN, KNEE RT/LT  8/2011    Joint Replacement knee /LT, [facility]     LAPAROSCOPIC HERNIORRHAPHY INGUINAL BILATERAL Bilateral 4/24/2018    Procedure: LAPAROSCOPIC HERNIORRHAPHY INGUINAL BILATERAL;  Laparoscopic bilateral inguinal hernia repair;  Surgeon: Josemanuel Resendiz MD;  Location: [facility]  OR     PHACOEMULSIFICATION WITH STANDARD INTRAOCULAR LENS IMPLANT Right 2021    Procedure: Cataract Removal with Implant;  Surgeon: Regan Kaufman MD;  Location: [facility]     PHACOEMULSIFICATION WITH STANDARD INTRAOCULAR LENS IMPLANT Left 2021    Procedure: Cataract Removal with Implant;  Surgeon: Regan Kaufman MD;  Location: [facility] OR     SURGICAL HISTORY OF -   1999    Umbilical Herniorrhaphy with mesh     SURGICAL HISTORY OF -  Left 2017    Thoracotomy and drainage of empyema       ALLERGIES     Allergies   Allergen Reactions     Bactrim [Sulfamethoxazole W/Trimethoprim] Nausea and Vomiting       FAMILY HISTORY  Family History   Problem Relation Age of Onset     Breast Cancer Mother      Neurologic Disorder Mother         parkinsons     Respiratory Father         emphyzema     Diabetes Brother      SOCIAL HISTORY  Social History     Socioeconomic History     Marital status:      Spouse name: Not on file     Number of children: Not on file     Years of education: Not on file     Highest education level: Not on file   Occupational History     Not on file   Tobacco Use     Smoking status: Former Smoker     Packs/day: 1.00     Years: 15.00     Pack years: 15.00     Types: Cigarettes     Quit date: 10/13/1975     Years since quittin.6     Smokeless tobacco: Never Used   Substance and Sexual Activity     Alcohol use: No     Comment: quit 2017     Drug use: No     Sexual activity: Not Currently   Other Topics Concern     Parent/sibling w/ CABG, MI or angioplasty before 65F 55M? No   Social History Narrative     Not on file     Social Determinants of Health     Financial Resource Strain: Not on file   Food Insecurity: Not on file   Transportation Needs: Not on file   Physical Activity: Not on file   Stress: Not on file   Social Connections: Not on file   Intimate Partner Violence: Not on file   Housing Stability: Not on file       ROS:   See HPI       EXAM:  /80    Pulse 94   Wt 79.7 kg (175 lb 9.6 oz)   SpO2 99%   BMI 26.70 kg/m    In general, the patient is a pleasant male in no apparent distress.    HEENT: NC/AT.  PERRLA.  EOMI.  Sclerae white, not injected.     Neck: No adenopathy.  No thyromegaly.   Heart: RRR. Normal S1, S2 splits physiologically. No murmur, rub, click, or gallop  Lungs: CTA.  No ronchi, wheezes, rales.  No dullness to percussion.   Abdomen: Soft, nontender, nondistended.   Extremities: No clubbing, cyanosis, or edema.  No wounds.   Vascular: No bruits are noted.    Labs:  LIPID RESULTS:  Lab Results   Component Value Date    CHOL 231 (H) 10/09/2017    HDL 71 10/09/2017     (H) 10/09/2017    TRIG 242 (H) 10/09/2017    CHOLHDLRATIO 3.0 10/07/2013    NHDL 160 (H) 10/09/2017       LIVER ENZYME RESULTS:  Lab Results   Component Value Date    AST 18 06/03/2021    ALT 23 06/03/2021       CBC RESULTS:  Lab Results   Component Value Date    WBC 5.7 06/03/2021    RBC 3.62 (L) 06/03/2021    HGB 12.3 (L) 06/03/2021    HCT 35.6 (L) 06/03/2021    MCV 98 06/03/2021    MCH 34.0 (H) 06/03/2021    MCHC 34.6 06/03/2021    RDW 13.5 06/03/2021     06/03/2021       BMP RESULTS:  Lab Results   Component Value Date     (L) 02/28/2022     (L) 06/03/2021    POTASSIUM 4.0 02/28/2022    POTASSIUM 4.5 06/03/2021    CHLORIDE 97 02/28/2022    CHLORIDE 97 06/03/2021    CO2 28 02/28/2022    CO2 30 06/03/2021    ANIONGAP 5 02/28/2022    ANIONGAP 5 06/03/2021    GLC 99 02/28/2022    GLC 96 06/03/2021    BUN 13 02/28/2022    BUN 14 06/03/2021    CR 1.08 02/28/2022    CR 0.91 06/03/2021    GFRESTIMATED 70 02/28/2022    GFRESTIMATED 80 06/03/2021    GFRESTBLACK >90 06/03/2021    LUIS 8.7 02/28/2022    LUIS 8.9 06/03/2021        A1C RESULTS:  No results found for: A1C

## 2022-05-24 DIAGNOSIS — F32.1 MODERATE MAJOR DEPRESSION (H): ICD-10-CM

## 2022-05-24 RX ORDER — DESVENLAFAXINE 50 MG/1
50 TABLET, FILM COATED, EXTENDED RELEASE ORAL DAILY
Qty: 30 TABLET | Refills: 1 | Status: SHIPPED | OUTPATIENT
Start: 2022-05-24 | End: 2022-06-03

## 2022-05-31 ENCOUNTER — MYC MEDICAL ADVICE (OUTPATIENT)
Dept: PSYCHIATRY | Facility: CLINIC | Age: 77
End: 2022-05-31
Payer: COMMERCIAL

## 2022-05-31 DIAGNOSIS — F34.1 PERSISTENT DEPRESSIVE DISORDER: Primary | ICD-10-CM

## 2022-06-03 ENCOUNTER — TELEPHONE (OUTPATIENT)
Dept: FAMILY MEDICINE | Facility: CLINIC | Age: 77
End: 2022-06-03
Payer: COMMERCIAL

## 2022-06-03 ENCOUNTER — MYC MEDICAL ADVICE (OUTPATIENT)
Dept: FAMILY MEDICINE | Facility: CLINIC | Age: 77
End: 2022-06-03
Payer: COMMERCIAL

## 2022-06-03 DIAGNOSIS — F32.1 MODERATE MAJOR DEPRESSION (H): ICD-10-CM

## 2022-06-03 RX ORDER — DESVENLAFAXINE 100 MG/1
100 TABLET, EXTENDED RELEASE ORAL DAILY
Qty: 90 TABLET | Refills: 4 | Status: SHIPPED | OUTPATIENT
Start: 2022-06-03 | End: 2022-08-18

## 2022-06-03 NOTE — TELEPHONE ENCOUNTER
"Patient sent following message to [facility] via [employer].    \"I get wiped out when walking a short distance and doing strenuous activates. I'm trying to get in to see you. They told me to speak to a triage nurse. Do you think this is something to be concerned about? Also you can up thr dose of Pristiq\"      Writer called patient's home and cell number.  Writer left a message requesting a call back to the clinic.    When patient calls back to clinic he should be sent to a Triage Nurse to be triaged for a     Intolerance of exercise  Fatigue    Writer will respond to patient via related [employer] message too.    Santino LNUDY [facility]    "

## 2022-06-06 NOTE — TELEPHONE ENCOUNTER
RN called and read patient the message below as written. RN was able to make him an appointment for next Thursday (6/16/22) at 11:20am to see Dr. Leiva.     Patient was not out of breath when talking with him, and was not slow to respond. He says he is feeling fine, just when he exerts himself he feels worn out which is not a normal feeling for him.     LIN Pastrana, RN, KIKI Leiva, Lizzy Francis MD 3 days ago     SP       He should be evaluated for this shortness of breath.  Okay to use a future same-day appointment.  I did fax a prescription for the higher strength of Pristiq to [facility].  You can refax that elsewhere if he desires.

## 2022-06-06 NOTE — TELEPHONE ENCOUNTER
He should be evaluated for this shortness of breath.  Okay to use a future same-day appointment.  I did fax a prescription for the higher strength of Pristiq to [facility].  You can refax that elsewhere if he desires.  
Please see patient's MyChart message.  Writer did respond to patient.  Writer also created a tele encounter and called patient in an attempt to triage patient, writer left message at both of patient's phone numbers requesting a call back to clinic.    Routed to provider for review.    Santino LUNDY [facility]    
Refer to telephone encounter from 6/3/22    Justyna Bower, MARCELN, RN, PHN    
COVID-19

## 2022-06-16 ENCOUNTER — OFFICE VISIT (OUTPATIENT)
Dept: FAMILY MEDICINE | Facility: CLINIC | Age: 77
End: 2022-06-16
Payer: COMMERCIAL

## 2022-06-16 VITALS
WEIGHT: 177 LBS | OXYGEN SATURATION: 98 % | DIASTOLIC BLOOD PRESSURE: 76 MMHG | TEMPERATURE: 97.6 F | BODY MASS INDEX: 26.83 KG/M2 | RESPIRATION RATE: 16 BRPM | HEART RATE: 76 BPM | HEIGHT: 68 IN | SYSTOLIC BLOOD PRESSURE: 126 MMHG

## 2022-06-16 DIAGNOSIS — D69.6 THROMBOCYTOPENIA (H): ICD-10-CM

## 2022-06-16 DIAGNOSIS — Z79.82 LONG TERM (CURRENT) USE OF ASPIRIN: ICD-10-CM

## 2022-06-16 DIAGNOSIS — Z11.59 NEED FOR HEPATITIS C SCREENING TEST: ICD-10-CM

## 2022-06-16 DIAGNOSIS — R53.1 WEAKNESS: ICD-10-CM

## 2022-06-16 DIAGNOSIS — R06.09 DYSPNEA ON EXERTION: ICD-10-CM

## 2022-06-16 DIAGNOSIS — Z00.00 ENCOUNTER FOR MEDICARE ANNUAL WELLNESS EXAM: Primary | ICD-10-CM

## 2022-06-16 DIAGNOSIS — G47.19 EXCESSIVE DAYTIME SLEEPINESS: ICD-10-CM

## 2022-06-16 DIAGNOSIS — I48.20 CHRONIC ATRIAL FIBRILLATION (H): ICD-10-CM

## 2022-06-16 DIAGNOSIS — R25.1 TREMOR: ICD-10-CM

## 2022-06-16 LAB
BASOPHILS # BLD AUTO: 0 10E3/UL (ref 0–0.2)
BASOPHILS NFR BLD AUTO: 0 %
EOSINOPHIL # BLD AUTO: 0.2 10E3/UL (ref 0–0.7)
EOSINOPHIL NFR BLD AUTO: 3 %
ERYTHROCYTE [DISTWIDTH] IN BLOOD BY AUTOMATED COUNT: 14.4 % (ref 10–15)
HCT VFR BLD AUTO: 38 % (ref 40–53)
HGB BLD-MCNC: 12.4 G/DL (ref 13.3–17.7)
IMM GRANULOCYTES # BLD: 0 10E3/UL
IMM GRANULOCYTES NFR BLD: 0 %
LYMPHOCYTES # BLD AUTO: 1.2 10E3/UL (ref 0.8–5.3)
LYMPHOCYTES NFR BLD AUTO: 22 %
MCH RBC QN AUTO: 32.7 PG (ref 26.5–33)
MCHC RBC AUTO-ENTMCNC: 32.6 G/DL (ref 31.5–36.5)
MCV RBC AUTO: 100 FL (ref 78–100)
MONOCYTES # BLD AUTO: 0.4 10E3/UL (ref 0–1.3)
MONOCYTES NFR BLD AUTO: 8 %
NEUTROPHILS # BLD AUTO: 3.6 10E3/UL (ref 1.6–8.3)
NEUTROPHILS NFR BLD AUTO: 66 %
PLATELET # BLD AUTO: 136 10E3/UL (ref 150–450)
RBC # BLD AUTO: 3.79 10E6/UL (ref 4.4–5.9)
TSH SERPL DL<=0.005 MIU/L-ACNC: 1.78 MU/L (ref 0.4–4)
WBC # BLD AUTO: 5.4 10E3/UL (ref 4–11)

## 2022-06-16 PROCEDURE — 99214 OFFICE O/P EST MOD 30 MIN: CPT | Mod: 25 | Performed by: FAMILY MEDICINE

## 2022-06-16 PROCEDURE — 36415 COLL VENOUS BLD VENIPUNCTURE: CPT | Performed by: FAMILY MEDICINE

## 2022-06-16 PROCEDURE — 85025 COMPLETE CBC W/AUTO DIFF WBC: CPT | Performed by: FAMILY MEDICINE

## 2022-06-16 PROCEDURE — 86803 HEPATITIS C AB TEST: CPT | Performed by: FAMILY MEDICINE

## 2022-06-16 PROCEDURE — 82306 VITAMIN D 25 HYDROXY: CPT | Performed by: FAMILY MEDICINE

## 2022-06-16 PROCEDURE — 82607 VITAMIN B-12: CPT | Performed by: FAMILY MEDICINE

## 2022-06-16 PROCEDURE — 84443 ASSAY THYROID STIM HORMONE: CPT | Performed by: FAMILY MEDICINE

## 2022-06-16 PROCEDURE — 99397 PER PM REEVAL EST PAT 65+ YR: CPT | Performed by: FAMILY MEDICINE

## 2022-06-16 RX ORDER — METOPROLOL TARTRATE 100 MG
50 TABLET ORAL 2 TIMES DAILY
Qty: 120 TABLET | Refills: 3 | COMMUNITY
Start: 2022-06-16 | End: 2022-08-18

## 2022-06-16 ASSESSMENT — ANXIETY QUESTIONNAIRES
1. FEELING NERVOUS, ANXIOUS, OR ON EDGE: SEVERAL DAYS
GAD7 TOTAL SCORE: 3
2. NOT BEING ABLE TO STOP OR CONTROL WORRYING: SEVERAL DAYS
7. FEELING AFRAID AS IF SOMETHING AWFUL MIGHT HAPPEN: NOT AT ALL
3. WORRYING TOO MUCH ABOUT DIFFERENT THINGS: SEVERAL DAYS
7. FEELING AFRAID AS IF SOMETHING AWFUL MIGHT HAPPEN: NOT AT ALL
4. TROUBLE RELAXING: NOT AT ALL
8. IF YOU CHECKED OFF ANY PROBLEMS, HOW DIFFICULT HAVE THESE MADE IT FOR YOU TO DO YOUR WORK, TAKE CARE OF THINGS AT HOME, OR GET ALONG WITH OTHER PEOPLE?: SOMEWHAT DIFFICULT
5. BEING SO RESTLESS THAT IT IS HARD TO SIT STILL: NOT AT ALL
GAD7 TOTAL SCORE: 3
GAD7 TOTAL SCORE: 3
6. BECOMING EASILY ANNOYED OR IRRITABLE: NOT AT ALL

## 2022-06-16 ASSESSMENT — PATIENT HEALTH QUESTIONNAIRE - PHQ9
10. IF YOU CHECKED OFF ANY PROBLEMS, HOW DIFFICULT HAVE THESE PROBLEMS MADE IT FOR YOU TO DO YOUR WORK, TAKE CARE OF THINGS AT HOME, OR GET ALONG WITH OTHER PEOPLE: SOMEWHAT DIFFICULT
SUM OF ALL RESPONSES TO PHQ QUESTIONS 1-9: 12
SUM OF ALL RESPONSES TO PHQ QUESTIONS 1-9: 12

## 2022-06-16 ASSESSMENT — PAIN SCALES - GENERAL: PAINLEVEL: NO PAIN (0)

## 2022-06-16 NOTE — PROGRESS NOTES
"    Assessment & Plan     Encounter for Medicare annual wellness exam    Chronic atrial fibrillation (H)  Stable. Refilled medication.    - metoprolol tartrate (LOPRESSOR) 100 MG tablet; Take 0.5 tablets (50 mg) by mouth 2 times daily    Tremor  Resting tremor noted today.  He says he notices this off and on.  It has gotten a little bit worse more recently.  He said a previous doctor had remarked on it and said it did not appear to be parkinsonian.  However, on exam today it certainly does have pill-rolling parkinsonian features.  He can stop it volitionally.  I recommended that he get a further evaluation through neurology to assess for Parkinson's disease.  Particularly also in light of his mood symptoms.  Psychiatry who sees him had actually commented on the possibility of parkinsonian-like depression.  - Adult Neurology  Referral; Future    Dyspnea on exertion  Chest x-ray today unremarkable.  Check a CBC.  We will also order an echocardiogram given moderate AR.  - CBC with Platelets & Differential; Future  - XR Chest 2 Views; Future  - CBC with Platelets & Differential  - Echocardiogram Complete; Future    Excessive daytime sleepiness  - Adult Sleep Eval & Management  Referral; Future  - Vitamin B12; Future  - Vitamin D Deficiency; Future  - TSH with free T4 reflex; Future  - CBC with Platelets & Differential; Future  - Vitamin B12  - Vitamin D Deficiency  - TSH with free T4 reflex  - CBC with Platelets & Differential    Weakness   - TSH with free T4 reflex; Future  - TSH with free T4 reflex    Long term (current) use of aspirin   - Vitamin D Deficiency; Future  - Vitamin D Deficiency    Need for hepatitis C screening test  - Hepatitis C Screen Reflex to HCV RNA Quant and Genotype; Future  - Hepatitis C Screen Reflex to HCV RNA Quant and Genotype                 BMI:   Estimated body mass index is 26.91 kg/m  as calculated from the following:    Height as of this encounter: 1.727 m (5' 8\").    " Weight as of this encounter: 80.3 kg (177 lb).           Return in about 53 weeks (around 6/22/2023) for Annual Wellness Visit.    Lizzy Leiva MD  [facility]    Greg Alonso is a 77 year old accompanied by his spouse., presenting for the following health issues:  Depression, Anxiety, and Wellness Visit      History of Present Illness       Mental Health Follow-up:  Patient presents to follow-up on Depression & Anxiety.Patient's depression since last visit has been:  Worse  The patient is having other symptoms associated with depression.  Patient's anxiety since last visit has been:  Medium  The patient is not having other symptoms associated with anxiety.  Any significant life events: No  Patient is not feeling anxious or having panic attacks.  Patient has no concerns about alcohol or drug use.    He eats 2-3 servings of fruits and vegetables daily.He consumes 1 sweetened beverage(s) daily.He exercises with enough effort to increase his heart rate 9 or less minutes per day.  He exercises with enough effort to increase his heart rate 3 or less days per week.   He is taking medications regularly.    Today's PHQ-9         PHQ-9 Total Score: 12    PHQ-9 Q9 Thoughts of better off dead/self-harm past 2 weeks :   Not at all    How difficult have these problems made it for you to do your work, take care of things at home, or get along with other people: Somewhat difficult  Today's CAROLYNN-7 Score: 3       Annual Wellness Visit    Patient has been advised of split billing requirements and indicates understanding: Yes     Are you in the first 12 months of your Medicare Part B coverage?  No    Physical Health:    In general, how would you rate your overall physical health? poor    Outside of work, how many days during the week do you exercise?none    Outside of work, approximately how many minutes a day do you exercise?less than 15 minutes    If you drink alcohol do you typically have >3  "drinks per day or >7 drinks per week? No    Do you usually eat at least 4 servings of fruit and vegetables a day, include whole grains & fiber and avoid regularly eating high fat or \"junk\" foods? NO    Do you have any problems taking medications regularly? No    Do you have any side effects from medications? none    Needs assistance for the following daily activities: no assistance needed    Which of the following safety concerns are present in your home?  none identified     Hearing impairment: Yes, patient has hearing aids    In the past 6 months, have you been bothered by leaking of urine? no    Mental Health:    In general, how would you rate your overall mental or emotional health? poor  PHQ-2 Score:      Do you feel safe in your environment? Sometimes    Have you ever done Advance Care Planning? (For example, a Health Directive, POLST, or a discussion with a medical provider or your loved ones about your wishes)? Yes, advance care planning is on file.    Fall risk:       Cognitive Screenin) Repeat 3 items (Leader, Season, Table)    2) Clock draw: NORMAL  3) 3 item recall: Recalls 1 object   Results: NORMAL clock, 1-2 items recalled: COGNITIVE IMPAIRMENT LESS LIKELY    Mini-CogTM Copyright S Jerome. Licensed by the author for use in [facility]; reprinted with permission (charito@.[facility]). All rights reserved.      Do you have sleep apnea, excessive snoring or daytime drowsiness?: no    Current providers sharing in care for this patient include:   Patient Care Team:  Lizzy Leiva MD as PCP - General (Family Medicine)  Rylee Kyle APRN CNP as Nurse Practitioner (Nurse Practitioner)  Rylee Kyle APRN CNP as Assigned Behavioral Health Provider  Lizzy Leiva MD as Assigned PCP  Sarah Chapin MD as Assigned Heart and Vascular Provider  Adriel Hawkins MD as MD (Neurology)    Patient has been advised of split billing requirements and indicates understanding: " "Yes      Review of Systems   Constitutional, neuro, ENT, endocrine, pulmonary, cardiac, gastrointestinal, genitourinary, musculoskeletal, integument and psychiatric systems are negative, except as otherwise noted.       Objective    /76   Pulse 76   Temp 97.6  F (36.4  C) (Tympanic)   Resp 16   Ht 1.727 m (5' 8\")   Wt 80.3 kg (177 lb)   SpO2 98%   BMI 26.91 kg/m    Body mass index is 26.91 kg/m .  Physical Exam   GENERAL: healthy, alert and no distress  EYES: Eyes grossly normal to inspection, PERRL and conjunctivae and sclerae normal  HENT: ear canals and TM's normal, nose and mouth without ulcers or lesions  NECK: no adenopathy, no asymmetry, masses, or scars and thyroid normal to palpation  RESP: lungs clear to auscultation - no rales, rhonchi or wheezes  CV: regular rates and rhythm, normal S1 S2, no S3 or S4, grade 2/6 david murmur heard best over the LUSB, peripheral pulses strong and no peripheral edema  ABDOMEN: soft, nontender, no hepatosplenomegaly, no masses and bowel sounds normal  MS: no gross musculoskeletal defects noted, no edema  SKIN: no suspicious lesions or rashes  NEURO: Normal strength and tone, mentation intact and speech normal there is a pill-rolling resting tremor that stops volitionally.  Otherwise no dysdiadochokinesis  PSYCH: mentation appears normal, affect normal/bright                    .  ..  "

## 2022-06-16 NOTE — NURSING NOTE
"Initial /76   Pulse 76   Temp 97.6  F (36.4  C) (Tympanic)   Resp 16   Ht 1.727 m (5' 8\")   Wt 80.3 kg (177 lb)   SpO2 98%   BMI 26.91 kg/m   Estimated body mass index is 26.91 kg/m  as calculated from the following:    Height as of this encounter: 1.727 m (5' 8\").    Weight as of this encounter: 80.3 kg (177 lb). .      "

## 2022-06-16 NOTE — PATIENT INSTRUCTIONS
"The number to reach the [facility] is [phone].    * * *    Per federal transparency in medicine laws, lab and imaging results are released \"real time\" into My Chart.  This may mean that you see the results before I have a chance to review them. My Chart will alert you again when I review the lab and enter comments.  Sometimes with imaging or labs there may be serious or unexpected results. Critical results are paged to me or the after hours on-call provider so that they can be reviewed immediately.  This is not true of non-critical abnormal results. Unfortunately, this means that it's possible you may be alerted of a serious finding before I have a change to review it.  If you ever receive a result that you are concerned about and I have not already contacted you, please feel free to reach out to me or the care team so that you get the answers you need.    Additionally, it is my goal that you understand the care plan discussed at your visit and that any questions you have are answered.  Please feel free to reach out if you need clarification or explanation of any information addressed at your office visit.       Metoprolol 1/2 tab in AM and 1 tab in PM x 1 week then reduce to 1/2 tab twice daily.  Message me if any flare in a.fib symptoms.    Patient Education      "

## 2022-06-17 LAB
DEPRECATED CALCIDIOL+CALCIFEROL SERPL-MC: 48 UG/L (ref 20–75)
HCV AB SERPL QL IA: NONREACTIVE
VIT B12 SERPL-MCNC: 809 PG/ML (ref 193–986)

## 2022-06-29 PROBLEM — R53.1 WEAKNESS: Status: ACTIVE | Noted: 2022-06-29

## 2022-06-29 PROBLEM — D69.6 THROMBOCYTOPENIA (H): Status: ACTIVE | Noted: 2022-06-29

## 2022-06-29 PROBLEM — R25.1 TREMOR: Status: ACTIVE | Noted: 2022-06-29

## 2022-07-20 ENCOUNTER — MYC MEDICAL ADVICE (OUTPATIENT)
Dept: FAMILY MEDICINE | Facility: CLINIC | Age: 77
End: 2022-07-20

## 2022-07-22 NOTE — TELEPHONE ENCOUNTER
420pm Monday appt was taken.  Patient was scheduled Tue with Ruthy.    Santino LUNDY [facility]

## 2022-07-26 ENCOUNTER — OFFICE VISIT (OUTPATIENT)
Dept: FAMILY MEDICINE | Facility: CLINIC | Age: 77
End: 2022-07-26
Payer: COMMERCIAL

## 2022-07-26 VITALS
SYSTOLIC BLOOD PRESSURE: 132 MMHG | DIASTOLIC BLOOD PRESSURE: 80 MMHG | BODY MASS INDEX: 26.49 KG/M2 | HEART RATE: 84 BPM | RESPIRATION RATE: 16 BRPM | OXYGEN SATURATION: 98 % | WEIGHT: 174.8 LBS | TEMPERATURE: 97.7 F | HEIGHT: 68 IN

## 2022-07-26 DIAGNOSIS — D69.6 THROMBOCYTOPENIA (H): ICD-10-CM

## 2022-07-26 DIAGNOSIS — R10.84 ABDOMINAL PAIN, GENERALIZED: Primary | ICD-10-CM

## 2022-07-26 DIAGNOSIS — R63.4 UNINTENTIONAL WEIGHT LOSS: ICD-10-CM

## 2022-07-26 LAB
ALBUMIN SERPL-MCNC: 3.6 G/DL (ref 3.4–5)
ALP SERPL-CCNC: 91 U/L (ref 40–150)
ALT SERPL W P-5'-P-CCNC: 21 U/L (ref 0–70)
ANION GAP SERPL CALCULATED.3IONS-SCNC: 2 MMOL/L (ref 3–14)
AST SERPL W P-5'-P-CCNC: 21 U/L (ref 0–45)
BASOPHILS # BLD AUTO: 0 10E3/UL (ref 0–0.2)
BASOPHILS NFR BLD AUTO: 1 %
BILIRUB SERPL-MCNC: 0.7 MG/DL (ref 0.2–1.3)
BUN SERPL-MCNC: 12 MG/DL (ref 7–30)
CALCIUM SERPL-MCNC: 9.1 MG/DL (ref 8.5–10.1)
CHLORIDE BLD-SCNC: 103 MMOL/L (ref 94–109)
CO2 SERPL-SCNC: 30 MMOL/L (ref 20–32)
CREAT SERPL-MCNC: 1.15 MG/DL (ref 0.66–1.25)
EOSINOPHIL # BLD AUTO: 0.2 10E3/UL (ref 0–0.7)
EOSINOPHIL NFR BLD AUTO: 5 %
ERYTHROCYTE [DISTWIDTH] IN BLOOD BY AUTOMATED COUNT: 13.8 % (ref 10–15)
GFR SERPL CREATININE-BSD FRML MDRD: 65 ML/MIN/1.73M2
GLUCOSE BLD-MCNC: 111 MG/DL (ref 70–99)
HCT VFR BLD AUTO: 35.5 % (ref 40–53)
HGB BLD-MCNC: 11.6 G/DL (ref 13.3–17.7)
IMM GRANULOCYTES # BLD: 0 10E3/UL
IMM GRANULOCYTES NFR BLD: 0 %
LIPASE SERPL-CCNC: 90 U/L (ref 73–393)
LYMPHOCYTES # BLD AUTO: 1 10E3/UL (ref 0.8–5.3)
LYMPHOCYTES NFR BLD AUTO: 27 %
MCH RBC QN AUTO: 32 PG (ref 26.5–33)
MCHC RBC AUTO-ENTMCNC: 32.7 G/DL (ref 31.5–36.5)
MCV RBC AUTO: 98 FL (ref 78–100)
MONOCYTES # BLD AUTO: 0.4 10E3/UL (ref 0–1.3)
MONOCYTES NFR BLD AUTO: 10 %
NEUTROPHILS # BLD AUTO: 2.1 10E3/UL (ref 1.6–8.3)
NEUTROPHILS NFR BLD AUTO: 58 %
PLATELET # BLD AUTO: 116 10E3/UL (ref 150–450)
POTASSIUM BLD-SCNC: 4.2 MMOL/L (ref 3.4–5.3)
PROT SERPL-MCNC: 7 G/DL (ref 6.8–8.8)
RBC # BLD AUTO: 3.63 10E6/UL (ref 4.4–5.9)
SODIUM SERPL-SCNC: 135 MMOL/L (ref 133–144)
WBC # BLD AUTO: 3.7 10E3/UL (ref 4–11)

## 2022-07-26 PROCEDURE — 90715 TDAP VACCINE 7 YRS/> IM: CPT | Performed by: NURSE PRACTITIONER

## 2022-07-26 PROCEDURE — 83690 ASSAY OF LIPASE: CPT | Performed by: NURSE PRACTITIONER

## 2022-07-26 PROCEDURE — 99213 OFFICE O/P EST LOW 20 MIN: CPT | Mod: 25 | Performed by: NURSE PRACTITIONER

## 2022-07-26 PROCEDURE — 90471 IMMUNIZATION ADMIN: CPT | Performed by: NURSE PRACTITIONER

## 2022-07-26 PROCEDURE — 80053 COMPREHEN METABOLIC PANEL: CPT | Performed by: NURSE PRACTITIONER

## 2022-07-26 PROCEDURE — 36415 COLL VENOUS BLD VENIPUNCTURE: CPT | Performed by: NURSE PRACTITIONER

## 2022-07-26 PROCEDURE — 85025 COMPLETE CBC W/AUTO DIFF WBC: CPT | Performed by: NURSE PRACTITIONER

## 2022-07-26 ASSESSMENT — PATIENT HEALTH QUESTIONNAIRE - PHQ9
SUM OF ALL RESPONSES TO PHQ QUESTIONS 1-9: 5
10. IF YOU CHECKED OFF ANY PROBLEMS, HOW DIFFICULT HAVE THESE PROBLEMS MADE IT FOR YOU TO DO YOUR WORK, TAKE CARE OF THINGS AT HOME, OR GET ALONG WITH OTHER PEOPLE: SOMEWHAT DIFFICULT
SUM OF ALL RESPONSES TO PHQ QUESTIONS 1-9: 5

## 2022-07-26 NOTE — PROGRESS NOTES
"  Assessment & Plan     Abdominal pain, generalized    - Comprehensive metabolic panel (BMP + Alb, Alk Phos, ALT, AST, Total. Bili, TP); Future  - Lipase; Future  - CBC with platelets and differential; Future  - CT Abdomen Pelvis w Contrast; Future  - Comprehensive metabolic panel (BMP + Alb, Alk Phos, ALT, AST, Total. Bili, TP)  - Lipase    Unintentional weight loss    - CT Abdomen Pelvis w Contrast; Future    Thrombocytopenia (H)    - CBC with Platelets & Differential             BMI:   Estimated body mass index is 26.58 kg/m  as calculated from the following:    Height as of this encounter: 1.727 m (5' 8\").    Weight as of this encounter: 79.3 kg (174 lb 12.8 oz).       FURTHER TESTING:       - CT abdomen/pelvis, if unremarkable will plan on colonoscopy    FUTURE APPOINTMENTS:       - Follow-up visit in case of new or worsening symptoms.     No follow-ups on file.    KAYLA Valle CNP  [facility]   Gavin is a 77 year old, presenting for the following health issues:  Abdominal Pain      History of Present Illness       Reason for visit:  Stomach problem  Symptom onset:  1-2 weeks ago  Symptom intensity:  Moderate  Symptom progression:  Staying the same  Had these symptoms before:  No    He eats 0-1 servings of fruits and vegetables daily.He consumes 1 sweetened beverage(s) daily.He exercises with enough effort to increase his heart rate 9 or less minutes per day.  He exercises with enough effort to increase his heart rate 3 or less days per week.   He is taking medications regularly.    Today's PHQ-9         PHQ-9 Total Score: 5    PHQ-9 Q9 Thoughts of better off dead/self-harm past 2 weeks :   Not at all    How difficult have these problems made it for you to do your work, take care of things at home, or get along with other people: Somewhat difficult       Pain History:  When did you first notice your pain? - Acute Pain   Have you seen anyone else for your pain? " "No  Where in your body do you have pain? Abdominal/Flank Pain  Onset/Duration: 3 weeks   Description:   Character: Dull ache  Location: alden-umbilical region  Radiation: None  Intensity: moderate  Progression of Symptoms:  same and constant  Accompanying Signs & Symptoms:  Fever/Chills: No  Gas/Bloating: No  Nausea: No  Vomitting: No  Diarrhea: No  Constipation: No  Dysuria or Hematuria: No  History:   Trauma: No  Previous similar pain: No  Previous tests done: Colonoscopy 2018- hyperplastic polyp- repeat in 5 years  Precipitating factors:   Does the pain change with:     Food: No    Bowel Movement: No    Urination: No   Other factors:  No  Therapies tried and outcome: tums-not effective     Has had at least 2 weeks of dull lower midline abdominal pain. Pain is not changed with eating. Denies n/v/d, constipation or bowel changes. Pain is not changed with lifting/coughing/sneezing. He denies urinary changes or flank pain. Denies fever. Has had a 20 lb unintentional weight loss in the last 6 months. At first he attributed it to a water pill the Cardiologist had him on, that has been discontinued and he has continued to lose some weight but thinks it is stabilizing.         Review of Systems   Constitutional, HEENT, cardiovascular, pulmonary, gi and gu systems are negative, except as otherwise noted.      Objective    /80   Pulse 84   Temp 97.7  F (36.5  C) (Tympanic)   Resp 16   Ht 1.727 m (5' 8\")   Wt 79.3 kg (174 lb 12.8 oz)   SpO2 98%   BMI 26.58 kg/m    Body mass index is 26.58 kg/m .  Physical Exam   GENERAL: alert and no distress  NECK: no adenopathy, no asymmetry, masses, or scars and thyroid normal to palpation  RESP: lungs clear to auscultation - no rales, rhonchi or wheezes  CV: regular rates and rhythm, normal S1 S2, no S3 or S4, no murmur, click or rub and no peripheral edema  ABDOMEN: tenderness RUQ and LUQ, bowel sounds normal, no palpable or pulsatile masses and possible hepatomegaly, no " splenomegaly palpated  MS: no gross musculoskeletal defects noted, no edema  SKIN: no suspicious lesions or rashes  NEURO: Normal strength and tone, mentation intact and speech normal  PSYCH: mentation appears normal, affect normal/bright    Results for orders placed or performed in visit on 07/26/22 (from the past 24 hour(s))   CBC with Platelets & Differential    Narrative    The following orders were created for panel order CBC with Platelets & Differential.  Procedure                               Abnormality         Status                     ---------                               -----------         ------                     CBC with platelets and d...[[id number]]  Abnormal            Final result                 Please view results for these tests on the individual orders.   CBC with platelets and differential   Result Value Ref Range    WBC Count 3.7 (L) 4.0 - 11.0 10e3/uL    RBC Count 3.63 (L) 4.40 - 5.90 10e6/uL    Hemoglobin 11.6 (L) 13.3 - 17.7 g/dL    Hematocrit 35.5 (L) 40.0 - 53.0 %    MCV 98 78 - 100 fL    MCH 32.0 26.5 - 33.0 pg    MCHC 32.7 31.5 - 36.5 g/dL    RDW 13.8 10.0 - 15.0 %    Platelet Count 116 (L) 150 - 450 10e3/uL    % Neutrophils 58 %    % Lymphocytes 27 %    % Monocytes 10 %    % Eosinophils 5 %    % Basophils 1 %    % Immature Granulocytes 0 %    Absolute Neutrophils 2.1 1.6 - 8.3 10e3/uL    Absolute Lymphocytes 1.0 0.8 - 5.3 10e3/uL    Absolute Monocytes 0.4 0.0 - 1.3 10e3/uL    Absolute Eosinophils 0.2 0.0 - 0.7 10e3/uL    Absolute Basophils 0.0 0.0 - 0.2 10e3/uL    Absolute Immature Granulocytes 0.0 <=0.4 10e3/uL                   .  ..

## 2022-07-26 NOTE — PATIENT INSTRUCTIONS
Please call [facility] Imaging Department to schedule your imaging [phone].

## 2022-07-27 NOTE — RESULT ENCOUNTER NOTE
Susana Alonso    Your blood counts are slightly lower than before. There can be many different causes for this. Please schedule your abdomen CT so we can see if your pain and the low blood counts are related. The platelets are low- careful not to fall or cut yourself. The rest of the labs are unremarkable. Please let us know if you have any questions.     Take care,    KAYLA Thomas CNP

## 2022-08-02 ENCOUNTER — HOSPITAL ENCOUNTER (OUTPATIENT)
Dept: CT IMAGING | Facility: CLINIC | Age: 77
Discharge: HOME | End: 2022-08-02
Attending: NURSE PRACTITIONER | Admitting: NURSE PRACTITIONER
Payer: COMMERCIAL

## 2022-08-02 DIAGNOSIS — R63.4 UNINTENTIONAL WEIGHT LOSS: ICD-10-CM

## 2022-08-02 DIAGNOSIS — R10.84 ABDOMINAL PAIN, GENERALIZED: ICD-10-CM

## 2022-08-02 PROCEDURE — 250N000009 HC RX 250: Performed by: RADIOLOGY

## 2022-08-02 PROCEDURE — 74177 CT ABD & PELVIS W/CONTRAST: CPT

## 2022-08-02 PROCEDURE — 250N000011 HC RX IP 250 OP 636: Performed by: RADIOLOGY

## 2022-08-02 RX ORDER — IOPAMIDOL 755 MG/ML
77 INJECTION, SOLUTION INTRAVASCULAR ONCE
Status: COMPLETED | OUTPATIENT
Start: 2022-08-02 | End: 2022-08-02

## 2022-08-02 RX ADMIN — SODIUM CHLORIDE 61 ML: 9 INJECTION, SOLUTION INTRAVENOUS at 14:23

## 2022-08-02 RX ADMIN — IOPAMIDOL 77 ML: 755 INJECTION, SOLUTION INTRAVENOUS at 14:23

## 2022-08-03 ENCOUNTER — TELEPHONE (OUTPATIENT)
Dept: FAMILY MEDICINE | Facility: CLINIC | Age: 77
End: 2022-08-03

## 2022-08-03 ENCOUNTER — PATIENT OUTREACH (OUTPATIENT)
Dept: ONCOLOGY | Facility: CLINIC | Age: 77
End: 2022-08-03

## 2022-08-03 DIAGNOSIS — Z96.649 STATUS POST HIP REPLACEMENT, UNSPECIFIED LATERALITY: ICD-10-CM

## 2022-08-03 DIAGNOSIS — R63.4 UNINTENTIONAL WEIGHT LOSS: ICD-10-CM

## 2022-08-03 DIAGNOSIS — D69.6 THROMBOCYTOPENIA (H): Primary | ICD-10-CM

## 2022-08-03 NOTE — TELEPHONE ENCOUNTER
Please let Gavin know that his CT did not identify any cause for the abnormal blood counts. I want him to see Hematology for further work up of this- they will call. There was a large amount of stool seen in the bowels- his discomfort may be due to that- recommend stool softeners to clean out bowel- goal is 3 soft stools daily. There was a complication of his hip replacement seen on CT- recommend to consult with Ortho before this leads to worsening complication. They will also call unless he wants to follow up with his own/past surgeon. Thank you

## 2022-08-03 NOTE — PROGRESS NOTES
Referral received for benign heme services, see below.    Referral reason: pancytopenia    Current abnormal labs:    Latest Reference Range & Units 09/04/19 10:20 06/03/21 14:02 06/16/22 12:55 07/26/22 16:02   WBC 4.0 - 11.0 10e3/uL 5.5 5.7 5.4 3.7 (L)   Hemoglobin 13.3 - 17.7 g/dL 12.7 (L) 12.3 (L) 12.4 (L) 11.6 (L)   Hematocrit 40.0 - 53.0 % 37.6 (L) 35.6 (L) 38.0 (L) 35.5 (L)   Platelet Count 150 - 450 10e3/uL 162 184 136 (L) 116 (L)   RBC Count 4.40 - 5.90 10e6/uL 3.79 (L) 3.62 (L) 3.79 (L) 3.63 (L)   MCV 78 - 100 fL 99 98 100 98   MCH 26.5 - 33.0 pg 33.5 (H) 34.0 (H) 32.7 32.0   MCHC 31.5 - 36.5 g/dL 33.8 34.6 32.6 32.7   RDW 10.0 - 15.0 % 12.0 13.5 14.4 13.8   Diff Method  Automated Method      % Neutrophils % 66.0  66 58   % Lymphocytes % 21.8  22 27   % Monocytes % 8.8  8 10   % Eosinophils % 2.6  3 5   % Basophils % 0.6  0 1   % Immature Granulocytes % 0.2      Absolute Basophils 0.0 - 0.2 10e3/uL   0.0 0.0   Nucleated RBCs 0 /100 0      Absolute Neutrophil 1.6 - 8.3 10e9/L 3.6      Absolute Lymphocytes 0.8 - 5.3 10e9/L 1.2      Absolute Monocytes 0.0 - 1.3 10e9/L 0.5      Absolute Eosinophils 0.0 - 0.7 10e9/L 0.1      Absolute Eosinophils 0.0 - 0.7 10e3/uL   0.2 0.2   Absolute Basophils 0.0 - 0.2 10e9/L 0.0      Absolute Immature Granulocytes <=0.4 10e3/uL   0.0 0.0   Absolute Lymphocytes 0.8 - 5.3 10e3/uL   1.2 1.0   Absolute Monocytes 0.0 - 1.3 10e3/uL   0.4 0.4   % Immature Granulocytes %   0 0   Absolute Neutrophils 1.6 - 8.3 10e3/uL   3.6 2.1   Abs Immature Granulocytes 0 - 0.4 10e9/L 0.0      Absolute Nucleated RBC  0.0      (L): Data is abnormally low  (H): Data is abnormally high    Outreach: Call not placed to patient regarding referral.    Plan: Internal Referral: No additional work-up needed, scheduling instructions updated, referral transferred to NPS for completion.

## 2022-08-03 NOTE — TELEPHONE ENCOUNTER
Susana Subramanian,     Patient has questions and would like to speak with you.   States he has no bowel issue and believes the CT results I read to him were comparison from 2019.     Please call patient. [phone]    Thank you.     Annette Simons RN on 8/3/2022 at 9:09 AM

## 2022-08-08 ENCOUNTER — MYC MEDICAL ADVICE (OUTPATIENT)
Dept: FAMILY MEDICINE | Facility: CLINIC | Age: 77
End: 2022-08-08

## 2022-08-17 DIAGNOSIS — F41.9 ANXIETY: ICD-10-CM

## 2022-08-17 RX ORDER — GABAPENTIN 600 MG/1
TABLET ORAL
Qty: 360 TABLET | Refills: 3 | Status: ON HOLD | OUTPATIENT
Start: 2022-08-17 | End: 2023-05-30

## 2022-08-17 RX ORDER — GABAPENTIN 600 MG/1
600 TABLET ORAL 4 TIMES DAILY
Qty: 28 TABLET | Refills: 0 | Status: SHIPPED | OUTPATIENT
Start: 2022-08-17 | End: 2022-08-18

## 2022-08-17 NOTE — TELEPHONE ENCOUNTER
[facility] year [facility]  Patient only has two days worth of medications at home, patient does have enough gabapentin to get through tomorrow  Patient is asking for a 7 day supply of gabapentin to be sent to [facility]    Routed to Dr Leiva:  Can patient have short fill of gabapentin as pended?

## 2022-08-17 NOTE — TELEPHONE ENCOUNTER
Routing refill request to provider for review/approval because:  Drea given x1 and patient did not follow up, please advise. Alexandra QUESADA RN

## 2022-08-17 NOTE — TELEPHONE ENCOUNTER
Reason for Call:  Medication refill:    Do you use a [facility]?  Name of the pharmacy and phone number for the current request:  [facility] [place] [phone] [facility]    Name of the medication requested: Gabapentin    Other request: Patient needs 7 day refill of Gabapentin sent to [facility] and a new rx for Gabapentin sent to [facility]. Patient only has 2 days left      Can we leave a detailed message on this number? YES    Phone number patient can be reached at: Cell number on file:    Telephone Information:   Mobile [phone]       Best Time: Any    Call taken on 8/17/2022 at 12:26 PM by Jennifer Cruz

## 2022-08-18 ENCOUNTER — VIRTUAL VISIT (OUTPATIENT)
Dept: FAMILY MEDICINE | Facility: CLINIC | Age: 77
End: 2022-08-18
Payer: COMMERCIAL

## 2022-08-18 DIAGNOSIS — I48.20 CHRONIC ATRIAL FIBRILLATION (H): ICD-10-CM

## 2022-08-18 DIAGNOSIS — D61.818 PANCYTOPENIA (H): Primary | ICD-10-CM

## 2022-08-18 DIAGNOSIS — F32.1 MODERATE MAJOR DEPRESSION (H): ICD-10-CM

## 2022-08-18 DIAGNOSIS — R06.09 DYSPNEA ON EXERTION: ICD-10-CM

## 2022-08-18 PROCEDURE — 99214 OFFICE O/P EST MOD 30 MIN: CPT | Mod: 95 | Performed by: FAMILY MEDICINE

## 2022-08-18 RX ORDER — DESVENLAFAXINE 100 MG/1
100 TABLET, EXTENDED RELEASE ORAL DAILY
Qty: 14 TABLET | Refills: 0 | Status: SHIPPED | OUTPATIENT
Start: 2022-08-18 | End: 2022-09-21

## 2022-08-18 RX ORDER — METOPROLOL TARTRATE 75 MG/1
50 TABLET, FILM COATED ORAL 2 TIMES DAILY
Qty: 180 TABLET | Refills: 1 | Status: SHIPPED | OUTPATIENT
Start: 2022-08-18 | End: 2022-08-18

## 2022-08-18 RX ORDER — METOPROLOL TARTRATE 75 MG/1
75 TABLET, FILM COATED ORAL 2 TIMES DAILY
Qty: 180 TABLET | Refills: 1 | Status: SHIPPED | OUTPATIENT
Start: 2022-08-18 | End: 2023-03-15

## 2022-08-18 ASSESSMENT — ANXIETY QUESTIONNAIRES
GAD7 TOTAL SCORE: 3
3. WORRYING TOO MUCH ABOUT DIFFERENT THINGS: MORE THAN HALF THE DAYS
1. FEELING NERVOUS, ANXIOUS, OR ON EDGE: NOT AT ALL
GAD7 TOTAL SCORE: 3
5. BEING SO RESTLESS THAT IT IS HARD TO SIT STILL: NOT AT ALL
IF YOU CHECKED OFF ANY PROBLEMS ON THIS QUESTIONNAIRE, HOW DIFFICULT HAVE THESE PROBLEMS MADE IT FOR YOU TO DO YOUR WORK, TAKE CARE OF THINGS AT HOME, OR GET ALONG WITH OTHER PEOPLE: SOMEWHAT DIFFICULT
6. BECOMING EASILY ANNOYED OR IRRITABLE: NOT AT ALL
7. FEELING AFRAID AS IF SOMETHING AWFUL MIGHT HAPPEN: NOT AT ALL
2. NOT BEING ABLE TO STOP OR CONTROL WORRYING: SEVERAL DAYS

## 2022-08-18 ASSESSMENT — PATIENT HEALTH QUESTIONNAIRE - PHQ9
5. POOR APPETITE OR OVEREATING: NOT AT ALL
SUM OF ALL RESPONSES TO PHQ QUESTIONS 1-9: 9

## 2022-08-18 NOTE — PATIENT INSTRUCTIONS
"Medication changes:  Reduce Pristiq 50mg to once daily and start Fetzima 20mg once daily x 2 weeks   then stop Pristiq and increase Fetzima to 2 caps (40mg) once daily.    I faxed a prescription for #14 tabs of Pristiq in the event that you do not have 50 mg tablets at home.    Someone from clinic should be contacting you to arrange for the hematology/oncology consult.    Please schedule a lab visit for additional blood work prior to the hematology consult. Future lab order placed. You do not be able to be there.  So sorry!  We are celebrating my brother-in-law's birthday courtney.  Fransisca, when you leaving?  Need to fast for this lab.  You can schedule a lab visit via [facility] or by calling [phone].      Call your orthopedist to schedule an appointment regarding potential loosening of hip replacement hardware.    I will follow-up on your heart ultrasound once you have that done.          * * *    Per federal transparency in medicine laws, lab and imaging results are released \"real time\" into My Chart.  This may mean that you see the results before I have a chance to review them. My Chart will alert you again when I review the lab and enter comments.  Sometimes with imaging or labs there may be serious or unexpected results. Critical results are paged to me or the after hours on-call provider so that they can be reviewed immediately.  This is not true of non-critical abnormal results. Unfortunately, this means that it's possible you may be alerted of a serious finding before I have a change to review it.  If you ever receive a result that you are concerned about and I have not already contacted you, please feel free to reach out to me or the care team so that you get the answers you need.    Additionally, it is my goal that you understand the care plan discussed at your visit and that any questions you have are answered.  Please feel free to reach out if you need clarification or explanation of any information " addressed at your office visit.

## 2022-08-18 NOTE — PROGRESS NOTES
Gavin is a 77 year old who is being evaluated via a billable video visit.      How would you like to obtain your AVS? MyChart  If the video visit is dropped, the invitation should be resent by: Text to cell phone: [phone]  Will anyone else be joining your video visit? No          Assessment & Plan     Pancytopenia (H)  Discussed potential differential diagnosis including malignancy.  He has a hematology/oncology appointment scheduled.  We will have him come in for an SPEP and peripheral smear prior.  This may account for some of his recent weight loss.    Moderate major depression (H)  Poorly controlled.  He has given Pristiq a good try but just is not helping symptoms.  We will cross-taper to Fetzima which was on the green list on his GeneSight testing.  He did see psychiatry but would rather follow with me.  They did not have any additional recommendations at the time that he saw them.  - levomilnacipran (FETZIMA) 20 MG 24 hr capsule; Take 1 capsule (20 mg) by mouth daily  - desvenlafaxine (PRISTIQ) 100 MG 24 hr tablet; Take 1 tablet (100 mg) by mouth daily    Chronic atrial fibrillation (H)  He was to have increase metoprolol to 75 mg twice daily  - metoprolol tartrate 75 MG TABS; Take 50 mg by mouth 2 times daily    Dyspnea on exertion  Abdominal CT showed cardiomegaly with pericardial effusion.  This was not seen on echocardiogram 2/2022.  Recommended repeat echo.  He is already scheduled for this next Monday.  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  Discussed the implications of this and his need to follow-up with Ortho for further evaluation and management.      No follow-ups on file.    Lizzy Leiva MD  [facility]  [facility] [place]    Greg Alonso is a 77 year old, presenting for the following health issues:  Abdominal Pain (Patient was seen in clinic 8/2/2022.  He was ordered to get a CT done of his abdomen.  He would like to discuss results.  He is still having pain intermittently. Tums have been used and are not working. /), Depression (Would like to discuss getting something else for medication.  He is currently taking Pristiq for about 6 months and says it is not helping. Also taking Wellbutrin and Buspar. ), and Results (Would like to discuss Labs that were drawn also from his 8/2/2022 appointment. )      HPI     Chief Complaint   Patient presents with     Abdominal Pain     Patient was seen in clinic 8/2/2022.  He was ordered to get a CT done of his abdomen.  He would like to discuss results.  He is still having pain intermittently. Tums have been used and are not working.        Depression     Would like to discuss getting something else for medication.  He is currently taking Pristiq for about 6 months and says it is not helping. Also taking Wellbutrin and Buspar.      Results     Would like to discuss Labs that were drawn also from his 8/2/2022 appointment.            Review of Systems   Constitutional, neuro, ENT, endocrine, pulmonary, cardiac, gastrointestinal, genitourinary, musculoskeletal, integument and psychiatric systems are negative, except as otherwise noted.       Objective           Vitals:  No vitals were obtained today due to virtual visit.    Physical Exam   GENERAL: Healthy, alert and no distress  EYES: Eyes grossly normal to inspection.  No discharge or erythema, or obvious scleral/conjunctival abnormalities.  RESP: No audible wheeze, cough, or visible cyanosis.  No visible retractions or increased work of breathing.    SKIN: Visible skin clear. No significant rash, abnormal pigmentation or lesions.  NEURO: Cranial nerves grossly intact.  Mentation and speech appropriate for age.  PSYCH:  Mentation appears normal, affect normal/bright, judgement and insight intact, normal speech and appearance well-groomed.        Recent Results (from the past 744 hour(s))   CT Abdomen Pelvis w Contrast    Narrative    CT ABDOMEN AND PELVIS WITH CONTRAST 8/2/2022 2:35 PM    CLINICAL HISTORY: Persistent lower abdomen pain, unexplained weight  loss; Abdominal pain, generalized; Unintentional weight loss.    TECHNIQUE: CT scan of the abdomen and pelvis was performed following  injection of IV contrast. Multiplanar reformats were obtained. Dose  reduction techniques were used.    CONTRAST: 77mL Isovue 370    COMPARISON: 9/14/2019    FINDINGS:   LOWER CHEST: Cardiomegaly is noted. Trace pericardial fluid is  present.    HEPATOBILIARY: Diffuse hepatic steatosis. Dependent stones are seen  within the gallbladder.    PANCREAS: Normal.    SPLEEN: Normal.    ADRENAL GLANDS: Normal.    KIDNEYS/BLADDER: No evidence of hydronephrosis or enhancing mass. The  bladder is obscured by metallic artifact from the prosthetic hips.    BOWEL: No bowel obstruction. Normal appendix. Large amount of stool is  seen throughout the colon.    PELVIC ORGANS: Small amount of fluid is present in the pelvis. The  majority of the pelvis is obscured by metallic artifact from the  prosthetic hips.    ADDITIONAL FINDINGS: None.    MUSCULOSKELETAL: Degenerative changes are noted through the spine.  Lucent area at the superior aspect of the acetabular component of the  left replacement measuring 6.4 x 2.6 x 3.1 cm has increased in size  and is causing some thinning and erosion of the lateral cortex of the  left iliac bone. This may be related to particle disease.      Impression    IMPRESSION:   1.  Majority of the pelvis is obscured by metallic artifact from the  prosthetic hips.  2.  Diffuse hepatic steatosis.  3.  Cholelithiasis.  4.  Large lucent area in the left iliac bone just above the acetabular  component of the hip prosthesis is causing some  erosion of the lateral  cortex and may be related to particle disease.    MAGDALENA MUIR MD         SYSTEM ID:  [id number]         Reviewed abdominal CT scan from 8/2/2022 ordered by Marilynn Bliss.  Reviewed findings and implications with patient.  Discussed further plan based on results of imaging.    Video-Visit Details    Video Start Time: 2:45    Type of service:  Video Visit    Video End Time:3:08 PM    Originating Location (pt. Location): Home    Distant Location (provider location):  [facility]     Platform used for Video Visit: KaitlynnWell    Macario Sorto.

## 2022-08-22 ENCOUNTER — HOSPITAL ENCOUNTER (OUTPATIENT)
Dept: CARDIOLOGY | Facility: CLINIC | Age: 77
Discharge: HOME | End: 2022-08-22
Attending: FAMILY MEDICINE | Admitting: FAMILY MEDICINE
Payer: COMMERCIAL

## 2022-08-22 DIAGNOSIS — R06.09 DYSPNEA ON EXERTION: ICD-10-CM

## 2022-08-22 LAB
BI-PLANE LVEF ECHO: NORMAL
LVEF ECHO: NORMAL

## 2022-08-22 PROCEDURE — 93306 TTE W/DOPPLER COMPLETE: CPT

## 2022-08-22 PROCEDURE — 93306 TTE W/DOPPLER COMPLETE: CPT | Mod: 26 | Performed by: INTERNAL MEDICINE

## 2022-08-23 DIAGNOSIS — N40.1 BENIGN PROSTATIC HYPERPLASIA WITH WEAK URINARY STREAM: ICD-10-CM

## 2022-08-23 DIAGNOSIS — R39.12 BENIGN PROSTATIC HYPERPLASIA WITH WEAK URINARY STREAM: ICD-10-CM

## 2022-08-23 RX ORDER — FINASTERIDE 5 MG/1
TABLET, FILM COATED ORAL
Qty: 90 TABLET | Refills: 3 | Status: SHIPPED | OUTPATIENT
Start: 2022-08-23 | End: 2023-09-28

## 2022-09-01 ENCOUNTER — TRANSFERRED RECORDS (OUTPATIENT)
Dept: FAMILY MEDICINE | Facility: CLINIC | Age: 77
End: 2022-09-01

## 2022-09-01 ENCOUNTER — LAB (OUTPATIENT)
Dept: LAB | Facility: CLINIC | Age: 77
End: 2022-09-01
Payer: COMMERCIAL

## 2022-09-01 DIAGNOSIS — M25.559 PAIN IN JOINT, PELVIC REGION AND THIGH: Primary | ICD-10-CM

## 2022-09-01 DIAGNOSIS — I48.20 CHRONIC ATRIAL FIBRILLATION (H): ICD-10-CM

## 2022-09-01 DIAGNOSIS — D61.818 PANCYTOPENIA (H): ICD-10-CM

## 2022-09-01 LAB
BASOPHILS # BLD AUTO: 0.1 10E3/UL (ref 0–0.2)
BASOPHILS NFR BLD AUTO: 1 %
EOSINOPHIL # BLD AUTO: 0.1 10E3/UL (ref 0–0.7)
EOSINOPHIL NFR BLD AUTO: 3 %
ERYTHROCYTE [DISTWIDTH] IN BLOOD BY AUTOMATED COUNT: 13.5 % (ref 10–15)
HCT VFR BLD AUTO: 36.5 % (ref 40–53)
HGB BLD-MCNC: 11.9 G/DL (ref 13.3–17.7)
LYMPHOCYTES # BLD AUTO: 1.1 10E3/UL (ref 0.8–5.3)
LYMPHOCYTES NFR BLD AUTO: 24 %
MCH RBC QN AUTO: 32.4 PG (ref 26.5–33)
MCHC RBC AUTO-ENTMCNC: 32.6 G/DL (ref 31.5–36.5)
MCV RBC AUTO: 100 FL (ref 78–100)
MONOCYTES # BLD AUTO: 0.4 10E3/UL (ref 0–1.3)
MONOCYTES NFR BLD AUTO: 8 %
NEUTROPHILS # BLD AUTO: 2.9 10E3/UL (ref 1.6–8.3)
NEUTROPHILS NFR BLD AUTO: 64 %
PLATELET # BLD AUTO: 129 10E3/UL (ref 150–450)
RBC # BLD AUTO: 3.67 10E6/UL (ref 4.4–5.9)
RETICS # AUTO: 0.04 10E6/UL (ref 0.03–0.1)
RETICS/RBC NFR AUTO: 1.1 % (ref 0.5–2)
TOTAL PROTEIN SERUM FOR ELP: 6.6 G/DL (ref 6.4–8.3)
WBC # BLD AUTO: 4.6 10E3/UL (ref 4–11)

## 2022-09-01 PROCEDURE — 84165 PROTEIN E-PHORESIS SERUM: CPT | Performed by: PATHOLOGY

## 2022-09-01 PROCEDURE — 99000 SPECIMEN HANDLING OFFICE-LAB: CPT | Mod: GA

## 2022-09-01 PROCEDURE — 82495 ASSAY OF CHROMIUM: CPT | Mod: 90

## 2022-09-01 PROCEDURE — 36415 COLL VENOUS BLD VENIPUNCTURE: CPT

## 2022-09-01 PROCEDURE — 83018 HEAVY METAL QUAN EACH NES: CPT | Mod: 90

## 2022-09-01 PROCEDURE — 84155 ASSAY OF PROTEIN SERUM: CPT

## 2022-09-01 PROCEDURE — 85025 COMPLETE CBC W/AUTO DIFF WBC: CPT

## 2022-09-01 PROCEDURE — 85045 AUTOMATED RETICULOCYTE COUNT: CPT

## 2022-09-02 LAB
ALBUMIN SERPL ELPH-MCNC: 4 G/DL (ref 3.7–5.1)
ALPHA1 GLOB SERPL ELPH-MCNC: 0.3 G/DL (ref 0.2–0.4)
ALPHA2 GLOB SERPL ELPH-MCNC: 0.6 G/DL (ref 0.5–0.9)
B-GLOBULIN SERPL ELPH-MCNC: 0.7 G/DL (ref 0.6–1)
GAMMA GLOB SERPL ELPH-MCNC: 1 G/DL (ref 0.7–1.6)
M PROTEIN SERPL ELPH-MCNC: 0 G/DL
PATH REPORT.COMMENTS IMP SPEC: NORMAL
PATH REPORT.COMMENTS IMP SPEC: NORMAL
PATH REPORT.FINAL DX SPEC: NORMAL
PATH REPORT.MICROSCOPIC SPEC OTHER STN: NORMAL
PATH REPORT.MICROSCOPIC SPEC OTHER STN: NORMAL
PATH REPORT.RELEVANT HX SPEC: NORMAL
PROT PATTERN SERPL ELPH-IMP: NORMAL

## 2022-09-02 RX ORDER — RIVAROXABAN 20 MG/1
TABLET, FILM COATED ORAL
Qty: 90 TABLET | Refills: 3 | Status: SHIPPED | OUTPATIENT
Start: 2022-09-02 | End: 2023-09-28

## 2022-09-02 NOTE — TELEPHONE ENCOUNTER
Requested Prescriptions   Pending Prescriptions Disp Refills    XARELTO ANTICOAGULANT 20 MG TABS tablet [Pharmacy Med Name: XARELTO TABS 20MG] 90 tablet 3     Sig: TAKE 1 TABLET DAILY WITH   DINNER        Direct Oral Anticoagulant Agents Failed - 9/1/2022 11:24 PM        Failed - Normal Platelets on file in past 12 months       Recent Labs   Lab Test 09/01/22  1116   *               Failed - Creatinine Clearance greater than 50 ml/min on file in past 3 mos     No lab results found.            Passed - Medication is active on med list        Passed - Serum creatinine less than or equal to 1.4 on file in past 3 mos     Recent Labs   Lab Test 07/26/22  1602   CR 1.15       Ok to refill medication if creatinine is low          Passed - Patient is 18 years of age or older        Passed - Recent (6 mo) or future (30 days) visit within the authorizing provider's specialty            Alexandra Cason RN

## 2022-09-04 LAB
COBALT SERPL-MCNC: 13.1 UG/L
CR SERPL-MCNC: 9.8 UG/L

## 2022-09-06 ENCOUNTER — MYC MEDICAL ADVICE (OUTPATIENT)
Dept: FAMILY MEDICINE | Facility: CLINIC | Age: 77
End: 2022-09-06

## 2022-09-06 DIAGNOSIS — F32.1 MODERATE MAJOR DEPRESSION (H): ICD-10-CM

## 2022-09-06 NOTE — TELEPHONE ENCOUNTER
Ezequiel message asking for a new R of Fetzima reflecting the new dosage of 2 a day.  [facility].    Routed to Dr Leiva.    Santino LUNDY [facility]

## 2022-09-08 ENCOUNTER — TELEPHONE (OUTPATIENT)
Dept: FAMILY MEDICINE | Facility: CLINIC | Age: 77
End: 2022-09-08

## 2022-09-08 NOTE — TELEPHONE ENCOUNTER
Writer routed to Dr. Leiva for review with Pharmacy listed.    Spoke with patient informing him that message was sent to MD. Jennifer LUNDY

## 2022-09-08 NOTE — TELEPHONE ENCOUNTER
Patient would like to use [facility] in [facility].    Jennifer LUNDY

## 2022-09-08 NOTE — TELEPHONE ENCOUNTER
Prescription for the higher strength 40 mg pended.  Please fax to pharmacy of his choice..  When he picks that up he can take 1 at a time to equal the current 2 x 20 mg dose.

## 2022-09-08 NOTE — TELEPHONE ENCOUNTER
Levomilnacipran (FETZIMA) 20 MG 24 hr capsule - Please send Rx for new dose to [facility]

## 2022-09-13 ENCOUNTER — TELEPHONE (OUTPATIENT)
Dept: FAMILY MEDICINE | Facility: CLINIC | Age: 77
End: 2022-09-13

## 2022-09-13 DIAGNOSIS — F32.1 MODERATE MAJOR DEPRESSION (H): ICD-10-CM

## 2022-09-14 ENCOUNTER — MYC MEDICAL ADVICE (OUTPATIENT)
Dept: FAMILY MEDICINE | Facility: CLINIC | Age: 77
End: 2022-09-14

## 2022-09-14 DIAGNOSIS — F32.1 MODERATE MAJOR DEPRESSION (H): ICD-10-CM

## 2022-09-14 RX ORDER — DESVENLAFAXINE 50 MG/1
50 TABLET, FILM COATED, EXTENDED RELEASE ORAL DAILY
Qty: 30 TABLET | Refills: 1 | Status: SHIPPED | OUTPATIENT
Start: 2022-09-14 | End: 2022-09-21

## 2022-09-14 NOTE — TELEPHONE ENCOUNTER
"Requested Prescriptions   Pending Prescriptions Disp Refills    desvenlafaxine (PRISTIQ) 50 MG 24 hr tablet [Pharmacy Med Name: desvenlafaxine succinate ER 50 mg tablet,extended release 24 hr] 30 tablet 1     Sig: Take 1 tablet (50 mg) by mouth daily        Serotonin-Norepinephrine Reuptake Inhibitors  Failed - 9/13/2022  8:00 AM        Failed - PHQ-9 score of less than 5 in past 6 months     Please review last PHQ-9 score.           Passed - Blood pressure under 140/90 in past 12 months       BP Readings from Last 3 Encounters:   07/26/22 132/80   06/16/22 126/76   05/23/22 123/80                 Passed - Medication is active on med list        Passed - Patient is age 18 or older        Passed - Normal serum creatinine on file in past 12 months     Recent Labs   Lab Test 07/26/22  1602   CR 1.15       Ok to refill medication if creatinine is low          Passed - Recent (6 mo) or future (30 days) visit within the authorizing provider's specialty     Patient had office visit in the last 6 months or has a visit in the next 30 days with authorizing provider or within the authorizing provider's specialty.  See \"Patient Info\" tab in inbasket, or \"Choose Columns\" in Meds & Orders section of the refill encounter.                  "

## 2022-09-14 NOTE — TELEPHONE ENCOUNTER
We were going to be cross tapering him from Pristiq onto Fetzima.  Looks like he called yesterday regarding concerns about coverage.  Triage RN messaged him back that he should check with his insurance company.  Can we actually escalate this and see if we can try for prior authorization of the Fetzima.  He has tried and failed numerous medications and has had GeneSight testing which indicates that Malcolm is one of very few antidepressants that he actually metabolizes appropriately. And he has tried and failed the others.    Also advised him that the Fetzima drug  offers a patient savings card.  If he qualifies cost could be reduced to as low as $10 for a 90-day supply.  He can initiate this process by going to the following website:    [email or web address]/[email or web address]/savings-card    In the meantime I did refill the Pristiq.

## 2022-09-15 NOTE — TELEPHONE ENCOUNTER
"Routing refill request to provider for review/approval because:  PHQ-9 score 9  8/18/22  Patient states he is taking 2 a day now    Pending Prescriptions:                       Disp   Refills    levomilnacipran (FETZIMA) 40 MG 24 hr caps*30 cap*1        Sig: Take 1 capsule (40 mg) by mouth daily    Requested Prescriptions   Pending Prescriptions Disp Refills    levomilnacipran (FETZIMA) 40 MG 24 hr capsule 30 capsule 1     Sig: Take 1 capsule (40 mg) by mouth daily        Serotonin-Norepinephrine Reuptake Inhibitors  Failed - 9/15/2022  7:13 AM        Failed - PHQ-9 score of less than 5 in past 6 months     Please review last PHQ-9 score.           Passed - Blood pressure under 140/90 in past 12 months       BP Readings from Last 3 Encounters:   07/26/22 132/80   06/16/22 126/76   05/23/22 123/80                 Passed - Medication is active on med list        Passed - Patient is age 18 or older        Passed - Recent (6 mo) or future (30 days) visit within the authorizing provider's specialty     Patient had office visit in the last 6 months or has a visit in the next 30 days with authorizing provider or within the authorizing provider's specialty.  See \"Patient Info\" tab in inbasket, or \"Choose Columns\" in Meds & Orders section of the refill encounter.                Annette Simons RN on 9/15/2022 at 7:14 AM    "

## 2022-09-15 NOTE — TELEPHONE ENCOUNTER
Prior Authorization Retail Medication Request    Medication/Dose: Fetzima  ICD code (if different than what is on RX):    Previously Tried and Failed:  wellbutrin xr; wellbutrin  and 300 mg; pristiq 50, 100 mg; cymbalta 30 mg; lexapro 10, 20mg, fetzima 20 mg;     Rationale:  He has tried and failed numerous medications and has had [employer] testing which indicates that Malcolm is one of very few antidepressants that he actually metabolizes appropriately. And he has tried and failed the others. Dr. Leiva    Insurance Name:  [employer]  Insurance ID:  [id number]      Pharmacy Information (if different than what is on RX)  Name:    Phone:

## 2022-09-15 NOTE — TELEPHONE ENCOUNTER
Central Prior Authorization Team   Phone: [phone]      PA NOT NEEDED    Medication: fetzima  Insurance Company: [employer] - Phone [phone] Fax [phone]  Pharmacy Filling the Rx: [facility] [place] [facility] [address] [facility]  Filling Pharmacy Phone: [phone]  Filling Pharmacy Fax:    Start Date: 9/15/2022    Started PA on CMM and a response of Drug is covered by current benefit plan. No further PA activity needed. Called pharmacy, the PA request was for the 20mg but that was discontinued and the 40mg has processed through insurance.

## 2022-09-16 NOTE — TELEPHONE ENCOUNTER
If he is taking 2 of the Fetzima at daily now then he should be off of the Pristiq.  He just, however, call for refills of the Pristiq.  I am a little confused as to exactly what he is doing.  Please clarify this with him.    The cross-taper I provided as his last visit is as follows:  Reduce Pristiq 50mg to once daily and start Fetzima 20mg once daily x 2 weeks   then stop Pristiq and increase Fetzima to 2 caps (40mg) once daily.

## 2022-09-19 ENCOUNTER — TELEPHONE (OUTPATIENT)
Dept: CARDIOLOGY | Facility: CLINIC | Age: 77
End: 2022-09-19

## 2022-09-19 NOTE — TELEPHONE ENCOUNTER
"Requested Prescriptions   Pending Prescriptions Disp Refills    levomilnacipran (FETZIMA) 40 MG 24 hr capsule 180 capsule 1     Sig: Take 2 capsules (80 mg) by mouth daily        Serotonin-Norepinephrine Reuptake Inhibitors  Failed - 9/16/2022  5:46 PM        Failed - PHQ-9 score of less than 5 in past 6 months     Please review last PHQ-9 score.           Passed - Blood pressure under 140/90 in past 12 months       BP Readings from Last 3 Encounters:   07/26/22 132/80   06/16/22 126/76   05/23/22 123/80                 Passed - Medication is active on med list        Passed - Patient is age 18 or older        Passed - Recent (6 mo) or future (30 days) visit within the authorizing provider's specialty     Patient had office visit in the last 6 months or has a visit in the next 30 days with authorizing provider or within the authorizing provider's specialty.  See \"Patient Info\" tab in inbasket, or \"Choose Columns\" in Meds & Orders section of the refill encounter.                  "

## 2022-09-20 DIAGNOSIS — I48.91 ATRIAL FIBRILLATION STATUS POST CARDIOVERSION (H): Primary | ICD-10-CM

## 2022-09-20 DIAGNOSIS — I48.20 CHRONIC ATRIAL FIBRILLATION (H): ICD-10-CM

## 2022-09-20 NOTE — TELEPHONE ENCOUNTER
"Called and spoke with pt. He states his symptoms with A. fib remain about the same. Maybe a little more tired. Denies CP, SOB or dizziness/lightheadedness. Reviewed his last clinic visit with Dr. Chapin on 5/23/22 and she recommended he \"Follow up KRYSTEN in 2 months.\"    Reviewed with pt that he is over due for f/u. Pt scheduled to see Nazanin Federico this week 9/22/22 for f/u.     Alda Mejia RN    "

## 2022-09-20 NOTE — TELEPHONE ENCOUNTER
Writer called patient and left a message requesting a call back to the care team.    When patient calls back please clarify per Dr Leiva's note below.    Santino LUNDY [facility]

## 2022-09-21 ENCOUNTER — TELEPHONE (OUTPATIENT)
Dept: FAMILY MEDICINE | Facility: CLINIC | Age: 77
End: 2022-09-21

## 2022-09-21 DIAGNOSIS — F32.1 MODERATE MAJOR DEPRESSION (H): ICD-10-CM

## 2022-09-21 NOTE — TELEPHONE ENCOUNTER
Patient calling regarding earlier message from 9/14    Patient states he is taking Fetzima two tablets daily 40 mg X2, pended this dose  Listed on med list as 1 tab daily    States he is not taking Pristiq, removed from medication list. He is not sure how this was requested. States he did not place a request.     Annette Simons RN on 9/21/2022 at 11:43 AM

## 2022-09-22 ENCOUNTER — OFFICE VISIT (OUTPATIENT)
Dept: CARDIOLOGY | Facility: CLINIC | Age: 77
End: 2022-09-22

## 2022-09-22 ENCOUNTER — HOSPITAL ENCOUNTER (OUTPATIENT)
Dept: CARDIOLOGY | Facility: CLINIC | Age: 77
Discharge: HOME | End: 2022-09-22
Attending: NURSE PRACTITIONER | Admitting: NURSE PRACTITIONER
Payer: COMMERCIAL

## 2022-09-22 VITALS
HEART RATE: 85 BPM | SYSTOLIC BLOOD PRESSURE: 110 MMHG | BODY MASS INDEX: 25.76 KG/M2 | DIASTOLIC BLOOD PRESSURE: 86 MMHG | OXYGEN SATURATION: 99 % | WEIGHT: 169.4 LBS

## 2022-09-22 DIAGNOSIS — R06.09 DYSPNEA ON EXERTION: ICD-10-CM

## 2022-09-22 DIAGNOSIS — I50.32 CHRONIC DIASTOLIC HEART FAILURE (H): ICD-10-CM

## 2022-09-22 DIAGNOSIS — I48.20 CHRONIC ATRIAL FIBRILLATION (H): Primary | ICD-10-CM

## 2022-09-22 DIAGNOSIS — E78.5 HYPERLIPIDEMIA LDL GOAL <100: ICD-10-CM

## 2022-09-22 DIAGNOSIS — I48.20 CHRONIC ATRIAL FIBRILLATION (H): ICD-10-CM

## 2022-09-22 DIAGNOSIS — I38 VALVULAR HEART DISEASE: ICD-10-CM

## 2022-09-22 DIAGNOSIS — I10 BENIGN ESSENTIAL HYPERTENSION: ICD-10-CM

## 2022-09-22 PROCEDURE — 93005 ELECTROCARDIOGRAM TRACING: CPT

## 2022-09-22 PROCEDURE — 93010 ELECTROCARDIOGRAM REPORT: CPT | Performed by: NURSE PRACTITIONER

## 2022-09-22 PROCEDURE — 99214 OFFICE O/P EST MOD 30 MIN: CPT | Performed by: NURSE PRACTITIONER

## 2022-09-22 RX ORDER — FUROSEMIDE 20 MG
20 TABLET ORAL DAILY PRN
Qty: 60 TABLET | Refills: 3 | COMMUNITY
Start: 2022-09-22 | End: 2022-10-26

## 2022-09-22 NOTE — LETTER
9/22/2022    Lizzy Leiva MD  [address]  [facility] [address]    RE: Josemanuel Edmond       Dear Colleague,     I had the pleasure of seeing Josemanuel Edmond in the [facility].  Cardiology Clinic Progress Note  Josemanuel Edmond MRN# [id number]   YOB: 1945 Age: 77 year old      Primary Cardiologist:   Dr. Chapin           History of Presenting Illness:      Josemanuel Edmond is a pleasant 77 year old patient with a past cardiac history significant for   1. Chronic diastolic heart failure    EF 50-55%  2. Chronic atrial fibrillation    onset 2018 postoperatively    S/p cardioversion 2018    Recurrence 2021 with palpitations    Rate controlled    Anticoagulated  3. Valvular disease    Moderate MR/TR, mild to moderate AI  4. Hypertension  5. Hyperlipidemia  Past medical history significant for GERD, history of empyema s/p thoracotomy, prior EtOH abuse.    He had postoperative atrial fibrillation in 2018.  He underwent successful cardioversion.  However, he had return of his atrial fibrillation in October 2021 and was experiencing palpitations.  PCP uptitrated metoprolol.  Zio patch showed 100% A. fib burden with average heart rate 83.  Echocardiogram 2017 showed normal EF and has had previous normal TSH.    Patient was seen by Dr. Chapin in May 2022 for routine follow-up.  He continued with mild chronic dyspnea on exertion that was stable.  He noted that heart rates were elevated, 98 at rest.  Metoprolol was further increased to 100 mg twice daily and recommended assessing for cardiomyopathy.    Pt presents today for 4-month follow-up.  Echocardiogram August 2022 showing low normal EF 50 to 55%, normal RV, moderate MR/TR, moderate pulmonary hypertension, mild to moderate AI, mild thoracic aortic dilatation, compared to February 2022 the findings were similar, per the reader. BMP July 2022 showing normal renal function and electrolytes.  Results reviewed  today.    His medication list states that he has been taking Lopressor 75 mg twice a day instead of 100 mg twice a day.  It appears there was some confusion on his actual dosing that he takes.  When he saw Dr. Chapin he states he was only taking 50 mg twice a day but medication list stated he was taking 75 mg twice a day.  Therefore, he has actually increased his metoprolol dosing appropriately.  He continues to see heart rates in the 90s at home.  However, clinic visits show 70s and 80s.  He is agreeable to Holter monitor.  He denies any side effects after increasing metoprolol.  He continues with only rare palpitations.    He continues with dyspnea on exertion which is only mild and he feels this has actually improved some.  His weight is down 6 pounds in the last 4 months, unintentionally.  He has not noted any significant weight gain at home.  He has occasional lower extremity edema.  He has only been taking his Lasix as needed for edema and has only been using this every couple weeks.  On exam today he does have RLE 1+ pitting to above the ankle. Patient reports no chest pain,  PND, orthopnea, presyncope, syncope, heart racing.        Labs:  LIPID RESULTS:  Lab Results   Component Value Date    CHOL 231 (H) 10/09/2017    HDL 71 10/09/2017     (H) 10/09/2017    TRIG 242 (H) 10/09/2017    CHOLHDLRATIO 3.0 10/07/2013       LIVER ENZYME RESULTS:  Lab Results   Component Value Date    AST 21 07/26/2022    AST 18 06/03/2021    ALT 21 07/26/2022    ALT 23 06/03/2021       CBC RESULTS:  Lab Results   Component Value Date    WBC 4.6 09/01/2022    WBC 5.7 06/03/2021    RBC 3.67 (L) 09/01/2022    RBC 3.62 (L) 06/03/2021    HGB 11.9 (L) 09/01/2022    HGB 12.3 (L) 06/03/2021    HCT 36.5 (L) 09/01/2022    HCT 35.6 (L) 06/03/2021     09/01/2022    MCV 98 06/03/2021    MCH 32.4 09/01/2022    MCH 34.0 (H) 06/03/2021    MCHC 32.6 09/01/2022    MCHC 34.6 06/03/2021    RDW 13.5 09/01/2022    RDW 13.5 06/03/2021      (L) 09/01/2022     06/03/2021       BMP RESULTS:  Lab Results   Component Value Date     07/26/2022     (L) 06/03/2021    POTASSIUM 4.2 07/26/2022    POTASSIUM 4.5 06/03/2021    CHLORIDE 103 07/26/2022    CHLORIDE 97 06/03/2021    CO2 30 07/26/2022    CO2 30 06/03/2021    ANIONGAP 2 (L) 07/26/2022    ANIONGAP 5 06/03/2021     (H) 07/26/2022    GLC 96 06/03/2021    BUN 12 07/26/2022    BUN 14 06/03/2021    CR 1.15 07/26/2022    CR 0.91 06/03/2021    GFRESTIMATED 65 07/26/2022    GFRESTIMATED 80 06/03/2021    GFRESTBLACK >90 06/03/2021    LUIS 9.1 07/26/2022    LUIS 8.9 06/03/2021        A1C RESULTS:  No results found for: A1C    INR RESULTS:  Lab Results   Component Value Date    INR 2.73 (H) 11/15/2012    INR 2.77 (H) 11/12/2012       Results reviewed today.       Current Cardiac Medications     Doxazosin 4 mg daily had a chest  Lasix 20 mg daily  Lopressor 75 mg twice daily  Xarelto 20 mg daily                   Assessment and Plan:       Plan    Patient Instructions   Medication Changes:  6. None     Recommendations:  1. Call  if heart rates are staying above 80s  2. Check blood pressure at least 1 hour after medications. Call the clinic if your blood pressure is consistently greater than 130/80.    3. Check daily weights and call the clinic if your weight has increased more than 2 lbs in one day or 5 lbs in one week; if you feel more short of breath or have worsening swelling in your legs or abdomen.     Follow-up:  1. 24 hr holter monitor-assess average A. fib rates-we will call with results  2. See Dr. Chapin for cardiology follow up at [facility]: March 2023.   Call 6 months prior, to schedule.     Cardiology Scheduling~[phone]  Cardiology Clinic RN~[phone] (Rosita RN, Alda RN, Brittany RN)                1. Chronic diastolic heart failure    NYHA class I mild [place]     Continue beta-blocker, Lasix      2. Chronic atrial fibrillation    Asymptomatic    Elevated  [id number] score    Continue metoprolol for rate control and Xarelto for anticoagulation      3. Valvular disease    Stable on echo 8/2022    Follow with echo      4. Hypertension    Controlled    Continue metoprolol      5. Hyperlipidemia     in 2017    Not on statin therapy               Thank you for allowing me to participate in this delightful patient's care.      This note was completed in part using Dragon voice recognition software. Although reviewed after completion, some word and grammatical errors may occur.    KAYLA Parikh CNP, APRN, CNP           Data:   All laboratory data reviewed      Total time spent today was 37 mins, reviewing labs, testing, notes, documenting notes, and seeing patient.          Constitutional:  cooperative, alert and oriented, well developed, well nourished, in no acute distress   Skin:  warm and dry to the touch         Head:  normocephalic       Eyes:  pupils equal and round       ENT:  no pallor or cyanosis       Neck:  no stiffness       Respiratory:  clear to auscultation; normal symmetry        Cardiac: regular rate and irregularly irregular rhythm; normal S1 and S2                 pulses full and equal     GI:  abdomen soft, nondistended     Extremities and Muscular Skeletal:  RLE 1+ pitting above the ankle       Neurological:  affect appropriate; no gross motor deficits       Psych:  Alert and Oriented x 3 , appropriate affact  Thank you for allowing me to participate in the care of your patient.      Sincerely,     KAYLA Parikh CNP     [facility]  cc:   No referring provider defined for this encounter.

## 2022-09-22 NOTE — PROGRESS NOTES
Cardiology Clinic Progress Note  Josemanuel Edmond MRN# [id number]   YOB: 1945 Age: 77 year old      Primary Cardiologist:   Dr. Chapin           History of Presenting Illness:      Josemanuel Edmond is a pleasant 77 year old patient with a past cardiac history significant for   1. Chronic diastolic heart failure    EF 50-55%  2. Chronic atrial fibrillation    onset 2018 postoperatively    S/p cardioversion 2018    Recurrence 2021 with palpitations    Rate controlled    Anticoagulated  3. Valvular disease    Moderate MR/TR, mild to moderate AI  4. Hypertension  5. Hyperlipidemia  Past medical history significant for GERD, history of empyema s/p thoracotomy, prior EtOH abuse.    He had postoperative atrial fibrillation in 2018.  He underwent successful cardioversion.  However, he had return of his atrial fibrillation in October 2021 and was experiencing palpitations.  PCP uptitrated metoprolol.  Zio patch showed 100% A. fib burden with average heart rate 83.  Echocardiogram 2017 showed normal EF and has had previous normal TSH.    Patient was seen by Dr. Chapin in May 2022 for routine follow-up.  He continued with mild chronic dyspnea on exertion that was stable.  He noted that heart rates were elevated, 98 at rest.  Metoprolol was further increased to 100 mg twice daily and recommended assessing for cardiomyopathy.    Pt presents today for 4-month follow-up.  Echocardiogram August 2022 showing low normal EF 50 to 55%, normal RV, moderate MR/TR, moderate pulmonary hypertension, mild to moderate AI, mild thoracic aortic dilatation, compared to February 2022 the findings were similar, per the reader. BMP July 2022 showing normal renal function and electrolytes.  Results reviewed today.    His medication list states that he has been taking Lopressor 75 mg twice a day instead of 100 mg twice a day.  It appears there was some confusion on his actual dosing that he takes.  When he saw Dr. Chapin he states he  was only taking 50 mg twice a day but medication list stated he was taking 75 mg twice a day.  Therefore, he has actually increased his metoprolol dosing appropriately.  He continues to see heart rates in the 90s at home.  However, clinic visits show 70s and 80s.  He is agreeable to Holter monitor.  He denies any side effects after increasing metoprolol.  He continues with only rare palpitations.    He continues with dyspnea on exertion which is only mild and he feels this has actually improved some.  His weight is down 6 pounds in the last 4 months, unintentionally.  He has not noted any significant weight gain at home.  He has occasional lower extremity edema.  He has only been taking his Lasix as needed for edema and has only been using this every couple weeks.  On exam today he does have RLE 1+ pitting to above the ankle. Patient reports no chest pain,  PND, orthopnea, presyncope, syncope, heart racing.        Labs:  LIPID RESULTS:  Lab Results   Component Value Date    CHOL 231 (H) 10/09/2017    HDL 71 10/09/2017     (H) 10/09/2017    TRIG 242 (H) 10/09/2017    CHOLHDLRATIO 3.0 10/07/2013       LIVER ENZYME RESULTS:  Lab Results   Component Value Date    AST 21 07/26/2022    AST 18 06/03/2021    ALT 21 07/26/2022    ALT 23 06/03/2021       CBC RESULTS:  Lab Results   Component Value Date    WBC 4.6 09/01/2022    WBC 5.7 06/03/2021    RBC 3.67 (L) 09/01/2022    RBC 3.62 (L) 06/03/2021    HGB 11.9 (L) 09/01/2022    HGB 12.3 (L) 06/03/2021    HCT 36.5 (L) 09/01/2022    HCT 35.6 (L) 06/03/2021     09/01/2022    MCV 98 06/03/2021    MCH 32.4 09/01/2022    MCH 34.0 (H) 06/03/2021    MCHC 32.6 09/01/2022    MCHC 34.6 06/03/2021    RDW 13.5 09/01/2022    RDW 13.5 06/03/2021     (L) 09/01/2022     06/03/2021       BMP RESULTS:  Lab Results   Component Value Date     07/26/2022     (L) 06/03/2021    POTASSIUM 4.2 07/26/2022    POTASSIUM 4.5 06/03/2021    CHLORIDE 103 07/26/2022     CHLORIDE 97 06/03/2021    CO2 30 07/26/2022    CO2 30 06/03/2021    ANIONGAP 2 (L) 07/26/2022    ANIONGAP 5 06/03/2021     (H) 07/26/2022    GLC 96 06/03/2021    BUN 12 07/26/2022    BUN 14 06/03/2021    CR 1.15 07/26/2022    CR 0.91 06/03/2021    GFRESTIMATED 65 07/26/2022    GFRESTIMATED 80 06/03/2021    GFRESTBLACK >90 06/03/2021    LUIS 9.1 07/26/2022    LUIS 8.9 06/03/2021        A1C RESULTS:  No results found for: A1C    INR RESULTS:  Lab Results   Component Value Date    INR 2.73 (H) 11/15/2012    INR 2.77 (H) 11/12/2012       Results reviewed today.       Current Cardiac Medications     Doxazosin 4 mg daily had a chest  Lasix 20 mg daily  Lopressor 75 mg twice daily  Xarelto 20 mg daily                   Assessment and Plan:       Plan    Patient Instructions   Medication Changes:  6. None     Recommendations:  1. Call  if heart rates are staying above 80s  2. Check blood pressure at least 1 hour after medications. Call the clinic if your blood pressure is consistently greater than 130/80.    3. Check daily weights and call the clinic if your weight has increased more than 2 lbs in one day or 5 lbs in one week; if you feel more short of breath or have worsening swelling in your legs or abdomen.     Follow-up:  1. 24 hr holter monitor-assess average A. fib rates-we will call with results  2. See Dr. Chapin for cardiology follow up at [facility]: March 2023.   Call 6 months prior, to schedule.     Cardiology Scheduling~[phone]  Cardiology Clinic RN~[phone] (Rosita RN, Alda RN, Brittany RN)                1. Chronic diastolic heart failure    NYHA class I mild [place]     Continue beta-blocker, Lasix      2. Chronic atrial fibrillation    Asymptomatic    Elevated [id number] score    Continue metoprolol for rate control and Xarelto for anticoagulation      3. Valvular disease    Stable on echo 8/2022    Follow with echo      4. Hypertension    Controlled    Continue  metoprolol      5. Hyperlipidemia     in 2017    Not on statin therapy               Thank you for allowing me to participate in this delightful patient's care.      This note was completed in part using Dragon voice recognition software. Although reviewed after completion, some word and grammatical errors may occur.    Nazanin Tipton, APRN CNP, APRN, CNP           Data:   All laboratory data reviewed      Total time spent today was 37 mins, reviewing labs, testing, notes, documenting notes, and seeing patient.          Constitutional:  cooperative, alert and oriented, well developed, well nourished, in no acute distress   Skin:  warm and dry to the touch         Head:  normocephalic       Eyes:  pupils equal and round       ENT:  no pallor or cyanosis       Neck:  no stiffness       Respiratory:  clear to auscultation; normal symmetry        Cardiac: regular rate and irregularly irregular rhythm; normal S1 and S2                 pulses full and equal     GI:  abdomen soft, nondistended     Extremities and Muscular Skeletal:  RLE 1+ pitting above the ankle       Neurological:  affect appropriate; no gross motor deficits       Psych:  Alert and Oriented x 3 , appropriate affact

## 2022-09-24 ENCOUNTER — MYC MEDICAL ADVICE (OUTPATIENT)
Dept: FAMILY MEDICINE | Facility: CLINIC | Age: 77
End: 2022-09-24

## 2022-09-26 NOTE — TELEPHONE ENCOUNTER
Ezequiel Message.  Routed to Dr gipson:  Can patient use a same day for follow up?  Do you have any suggestions for helping lower cost of Fetzima?   Or   Alternative medications that may be cheaper?    Santino LUNDY [facility]

## 2022-09-28 ENCOUNTER — HOSPITAL ENCOUNTER (OUTPATIENT)
Dept: CARDIOLOGY | Facility: CLINIC | Age: 77
Discharge: HOME | End: 2022-09-28
Attending: NURSE PRACTITIONER | Admitting: NURSE PRACTITIONER
Payer: COMMERCIAL

## 2022-09-28 DIAGNOSIS — I48.20 CHRONIC ATRIAL FIBRILLATION (H): ICD-10-CM

## 2022-09-28 PROCEDURE — 93227 XTRNL ECG REC<48 HR R&I: CPT | Performed by: INTERNAL MEDICINE

## 2022-09-28 PROCEDURE — 93226 XTRNL ECG REC<48 HR SCAN A/R: CPT

## 2022-09-30 NOTE — TELEPHONE ENCOUNTER
Please give patient the [facility] Prescription contact number [phone]  Any other care coordination needs Place a care coordination order     [facility]   Kenzie Barragan RN, Care Coordinator   [facility]'s   E-mail [email or web address]   [phone]

## 2022-09-30 NOTE — TELEPHONE ENCOUNTER
The Fetzima website has a savings card that he can apply for which can lower the cost to $10 if he qualifies.    [email or web address]    Otherwise  The [place] prescription assistance program.

## 2022-09-30 NOTE — TELEPHONE ENCOUNTER
Routed to care coordination for possible prescription assistance program    See my chart messages    Annette Simons RN on 9/30/2022 at 1:04 PM

## 2022-09-30 NOTE — TELEPHONE ENCOUNTER
Annette,     Please give patient the [place] Prescription contact number [phone]  Any other care coordination needs place a care coordination order     [facility]   Kenzie Barragan RN, Care Coordinator   [facility]'s   E-mail [email or web address]   [phone]    Routing comment      Copied from routing comment  Annette Simons RN on 9/30/2022 at 1:39 PM

## 2022-10-03 ASSESSMENT — PATIENT HEALTH QUESTIONNAIRE - PHQ9
10. IF YOU CHECKED OFF ANY PROBLEMS, HOW DIFFICULT HAVE THESE PROBLEMS MADE IT FOR YOU TO DO YOUR WORK, TAKE CARE OF THINGS AT HOME, OR GET ALONG WITH OTHER PEOPLE: SOMEWHAT DIFFICULT
SUM OF ALL RESPONSES TO PHQ QUESTIONS 1-9: 8
SUM OF ALL RESPONSES TO PHQ QUESTIONS 1-9: 8

## 2022-10-05 ENCOUNTER — MEDICAL CORRESPONDENCE (OUTPATIENT)
Dept: HEALTH INFORMATION MANAGEMENT | Facility: CLINIC | Age: 77
End: 2022-10-05

## 2022-10-05 NOTE — RESULT ENCOUNTER NOTE
Pt notified of results and recommendations via [employer]. Pt scheduled to see Dr Chapin Jan 2023.

## 2022-10-10 ENCOUNTER — VIRTUAL VISIT (OUTPATIENT)
Dept: PSYCHIATRY | Facility: CLINIC | Age: 77
End: 2022-10-10
Payer: COMMERCIAL

## 2022-10-10 VITALS — WEIGHT: 165 LBS | HEIGHT: 70 IN | BODY MASS INDEX: 23.62 KG/M2

## 2022-10-10 DIAGNOSIS — F33.1 MODERATE EPISODE OF RECURRENT MAJOR DEPRESSIVE DISORDER (H): Primary | ICD-10-CM

## 2022-10-10 ASSESSMENT — PAIN SCALES - GENERAL: PAINLEVEL: NO PAIN (0)

## 2022-10-10 NOTE — PROGRESS NOTES
"[facility] Treatment Resistant Depression Program  Diagnostic Assessment  A part of the [facility] Psychiatry Mood Disorders Program    Josemanuel Edmond MRN# [id number]   Age: 77 year old YOB: 1945     Date of Evaluation: 10/10/22  Start Time: 10:00; End Time: 11:05    Mode of communication: [employer] (HIPAA compliant, secure platform). Patient consented verbally to this mode of therapy today.  Reason for telehealth: COVID-19. This patient visit was converted to a telehealth visit to minimize exposure to COVID-19.    Originating Location (patient location): home, located in [place], [place]  Distant Location (provider location): Home office, located in [place], [place], using appropriate privacy considerations and procedures         Care Team     PCP- Lzizy Leiva  Specialty Providers- no  Therapist- no  Psychiatric Med Management Provider- PCP is prescribing  Other Mental Health Providers- no    Referred by:  Veronica Carreon  Referred for evaluation of:  depression.         Contributors to the Assessment     Chart Reviewed.   Interview completed with Josemanuel Edmond.  Releases of information signed?  no  Collateral information obtained? no           Chief Complaint     \"Depression and anxiety for quite a few years that prevents me from socializing\"         History of Present Illness      Josemanuel Edmond is a 77 year old male who goes by Gavin and uses he, him, his pronouns.    Gavin reports on, on-going lifetime episode of depression and no psychiatric hospitalization(s). Josemanuel is interested in ketamine treatment.    Gavin's most recent episode of depression started in  when is son .  Gavin may have had symptoms of depression before , but this is when it became acute.    Current psychosocial stressors discussed include symptoms of depression that limit interpersonal connection     Discussed other mental health concerns apart from depression, including anxiety, grief and loss, " "trauma    Psych critical item history includes suicidal ideation, trauma hx and substance use: alcohol         Psychiatric Review of Systems (Completed M.I.N.I. Version 7.0.2     A. DEPRESSION  Past 2 Weeks:  low mood nearly every day, anhedonia most of the time, low energy and difficulty concentrating, thinking or making decisions    Past Episode:  low mood nearly every day, anhedonia most of the time, low energy and difficulty concentrating, thinking or making decisions    B. SUICIDALITY: Current: No, risk Low  -reports 0 lifetime suicide attempts  -reports 0% in response to \"How likely are to you to try to kill yourself within the next 3 months on a scale from 0-100%?\"  -denies current SI, denies plan and denies intent  -denies current SIB/Self Injurious Behavior    C. OLIVIA/HYPOMANIA  Current Episode:  none    Past Episode:  none    D. PANIC:  none    E. AGORAPHOBIA:  none    F. SOCIAL ANXIETY:  marked fear/anxiety in initiating or maintaining a conversation, participating in small groups, attending parties, public speaking and performing in front of others out of fear that he/she will act in a way or show anxiety symptoms that will be negatively evaluated, almost always, with active avoidance, a  or are endured with intense anxiety/fear, at a level out of proportion to the actual danger posed, lasting 6 months or more and causing clinically significant distress or impairement in social, occupation, or other important areas of functioning     G. OBSESSIVE-COMPULSIVE:  none    H. TRAUMA:  experienced traumatic event, persistent avoidance of recollections of trauma, negativity about others or self, negative emotions, detachment from others and inability to experience happiness    I. ALCOHOL & J. NON-ALCOHOL:  See below    K. PSYCHOSIS:   none    L-M. EATING DISORDER: none    N. GENERALIZED ANXIETY:  none    O. RULE OUT MEDICAL, ORGANIC OR DRUG CAUSES FOR ALL DISORDERS  During any current disorder or past " mood episode, patient reports:  A. Substance use or withdrawal: No  B. Medical illness: No    P. ANTISOCIAL PERSONALITY:  none     Other Cluster B Traits Identified (not formally assessed):  none discussed    SUBSTANCE USE HISTORY                                                                 RECENT SUBSTANCE USE:     TOBACCO- quit 50 years ago     CAFFEINE- 1 cups/day of coffee   ALCOHOL- quit 2017     CANNABIS- none            OTHER ILLICIT DRUGS- none    Past Use-   TOBACCO- none       CAFFEINE- 1 cups/day of coffee   ALCOHOL- quit 2017, attended [facility] program    CANNABIS- none            OTHER ILLICIT DRUGS- none    CD Treatment Hx: Yes - 2017  Medical Consequences (eg HIV/Hepatitis)- No  Legal Consequences- No     PSYCHIATRIC HISTORY     Past diagnoses: MDD, [facility], anxiety    Past medication trials: multiple trials    Hospitalizations: none    Commitment: No, Current Kelsey order: No    ECT trials: No    TMS trials:  No      Ketamine:  No    Suicide attempts: No    Self-injurious behavior: No    Violent behavior: No    Outpatient Programs & Services [Psychotherapy, DBT, Day Treatment, Eating Disorder Tx etc]:   Current:  Medication management    Past:  Medication management, [facility] treatment    SOCIAL and FAMILY HISTORY                                                             Living situation: Josemanuel lives with his wife of 50+ years, in a Private Residence.   Guns, weapons, or other means to harm oneself in the home? Yes. locked  Pets at home? 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 He describes a close knit group of friends who are open with one another and supportive about mental  health.    Spiritual considerations: No    Cultural influences: Josemanuel identifies is race as Caucasion. Josemanuel reports no cultural considerations to take into account when providing treatment.     Gender identity:  Josemanuel identifies as male and uses he/him/his pronouns.    Strengths & Coping Strategies:  Gavin is pleasant and easy to engage. He has good insight about his symptoms and is seeking additional care. He has stable housing and relationships and a good support system.    Legal Hx: No    Trauma/Abuse Hx: No     Hx: No    Family Mental Health Hx- none reported    PAST PSYCH MED TRIALS      Will be reviewed during MTM.    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)     Chronic atrial fibrillation (H)     Hyperplastic Polyp     Right knee DJD     Peripheral neuropathy     Sleep disturbance     Hematoma of skin     Traumatic ecchymosis of buttock, initial encounter     Infection due to 2019 novel coronavirus     Pancytopenia (H)     Benzodiazepine dependence (H)     Community acquired pneumonia     Empyema (H)     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     Left upper quadrant pain     Unilateral inguinal hernia without obstruction or gangrene     Atrial fibrillation status post cardioversion (H)     Persistent atrial fibrillation (H)     Depression with anxiety     Hyperlipidemia     S/P left knee arthroscopy     Status post lung surgery     Memory difficulties     Thrombocytopenia (H)     Weakness      Tremor     Dyspnea on exertion      Chronic diastolic heart failure (H)     Valvular heart disease       Past Surgical History:   Procedure Laterality Date     ARTHROPLASTY KNEE Right 10/12/2018    Procedure: ARTHROPLASTY KNEE;  Right Total Knee Arthroplasty;  Surgeon: Jeb Peralta MD;  Location: [facility]     COLONOSCOPY N/A 12/10/2015    Procedure: COMBINED COLONOSCOPY, SINGLE OR MULTIPLE BIOPSY/POLYPECTOMY BY BIOPSY;  Surgeon: Jyoti Figueroa MD;  Location: [facility]     JOINT REPLACEMENT, HIP RT/LT  10/07    Joint Replacement Hip LT     JOINT REPLACEMTN, KNEE RT/LT  8/2011    Joint Replacement knee /LT, [facility]     LAPAROSCOPIC HERNIORRHAPHY INGUINAL BILATERAL Bilateral 4/24/2018    Procedure: LAPAROSCOPIC HERNIORRHAPHY INGUINAL BILATERAL;  Laparoscopic bilateral inguinal hernia repair;  Surgeon: Josemanuel Resendiz MD;  Location: [facility]     PHACOEMULSIFICATION WITH STANDARD INTRAOCULAR LENS IMPLANT Right 1/6/2021    Procedure: Cataract Removal with Implant;  Surgeon: Regan Kaufman MD;  Location: [facility]     PHACOEMULSIFICATION WITH STANDARD INTRAOCULAR LENS IMPLANT Left 2/17/2021    Procedure: Cataract Removal with Implant;  Surgeon: Regan Kaufman MD;  Location: [facility]     SURGICAL HISTORY OF -   12/1/1999    Umbilical Herniorrhaphy with mesh     SURGICAL HISTORY OF -  Left 11/2017    Thoracotomy and drainage of empyema        History of seizures: no   History of head trauma/loss of consciousness: no     ALLERGY                                Bactrim [sulfamethoxazole w/trimethoprim]    MEDICATIONS                               Current Outpatient Medications   Medication Sig Dispense Refill     acetaminophen (TYLENOL) 500 MG tablet Take 1,000 mg by mouth every 8 hours as needed for mild pain       buPROPion (WELLBUTRIN SR) 150 MG 12 hr tablet TAKE 1 TABLET TWICE A  tablet 3     busPIRone HCl (BUSPAR) 30 MG tablet TAKE 1 TABLET TWICE A  tablet 1     doxazosin (CARDURA) 8 MG tablet Take 0.5 tablets (4  "mg) by mouth At Bedtime 90 tablet 3     finasteride (PROSCAR) 5 MG tablet TAKE 1 TABLET DAILY 90 tablet 3     furosemide (LASIX) 20 MG tablet Take 1 tablet (20 mg) by mouth daily as needed 60 tablet 3     gabapentin (NEURONTIN) 600 MG tablet TAKE 1 TABLET FOUR TIMES A  tablet 3     lamoTRIgine (LAMICTAL) 25 MG tablet Take 2 tablets (50 mg) by mouth 2 times daily 360 tablet 4     levomilnacipran (FETZIMA) 40 MG 24 hr capsule Take 2 capsules (80 mg) by mouth daily 60 capsule 5     Metoprolol Tartrate 75 MG TABS Take 75 mg by mouth 2 times daily 180 tablet 1     XARELTO ANTICOAGULANT 20 MG TABS tablet TAKE 1 TABLET DAILY WITH   DINNER 90 tablet 3       VITALS                                                                                                                            3, 3   Ht 1.778 m (5' 10\")   Wt 74.8 kg (165 lb)   BMI 23.68 kg/m       MENTAL STATUS EXAM                                                                                    9, 14 cog gs     Alertness: alert  and oriented  Appearance: well groomed  Behavior/Demeanor: cooperative, pleasant and calm, with good  eye contact   Speech: normal  Language: intact  Psychomotor: normal or unremarkable  Mood: depressed  Affect: restricted; was congruent to mood; was congruent to content  Thought Process/Associations: unremarkable  Thought Content:  Reports none;  Denies suicidal and violent ideation, delusions, preoccupations, obsessions , phobia , magical thinking, over-valued ideas and paranoid ideation  Perception:  Reports none;  Denies auditory hallucinations and visual hallucinations  Insight: good  Judgment: good  Cognition: does  appear grossly intact; formal cognitive testing was not done    PSYCHIATRIC DIAGNOSES                                                                                               Major depressive disorder     ASSESSMENT                                                                                              "             m2, h3     Please note, writer did not receive all pertinent medical records as of the time of this assessment. Josemanuel did not sign CHRISTOS's for additional records.     Josemanuel Edmond is a 77 year old   male with a psychiatric history of depression and substance abuse disorder who presents for a [facility] Treatment Resistant Depression program evaluation. Josemanuel was referred by Veronica Carreon. He has a history of no psychiatric hospitalizations.  Family mental health history is unknown.     Josemanuel's most recent episode of depression started  and has sustained since then without a period of more than a few days without symptoms of depression.    Today, Josemanuel presents as a good2 historian with Good insight. He estimates one lifetime episodes of depression with onset in , after his son . Josemanuel s past and present depressive symptoms seem consistent with a diagnosis of major depressive disorder. Depressive symptoms seem to contribute to impaired functioning in the areas of family / partner relationships , social relationships and emotional wellbeing . Precipitating factors seem to include the unexpected death of Gavin's son. Perpetuating factors may consist of multiple mediation trials without improvement of symptoms. Past treatment approaches include medication managemnet. Josemanuel is presently participating in medication manageement with interest in ketamine treatment.    In addition, the M.I.N.I. Interview scores positively for a diagnosis/diagnoses of social anxiety disorder. Substance use does not seem to be a current problem. Gavin completed part of a substance abuse program and stopped drinking 10/1/2017. Further diagnostic clarification is not needed to rule out diagnoses.     Basic needs (food, housing, transportation, safety, income stability): basic needs are met    Today, Josemaunel denies SI, denies a plan, and denies intent. He has notable risk factors for self-harm including severe  anxiety and male.  However, risk is mitigated by no h/o suicide attempt, no plan or intent, no h/o risky impulsive behavior, none to minimal alcohol use , commitment to family, good social support  , Anglican beliefs and stable housing.  Based on all available evidence he does not appear to be at imminent risk for self-harm therefore does not meet criteria for a 72-hr hold/  involuntary hospitalization. Additional steps to minimize risk include: discussing safety plan, including people to reach out to, crisis numbers and texts, and EmPATH.  24/7 crisis resources were provided verbally.     PLAN                                                                                                                        m2, h3   Next steps are as follows with intention of completing a comprehensive multi-disciplinary assessment utilizing today's evaluation, the expertise of a PharmD, as well as a Psychiatrist. Informed Josemanuel that if deemed appropriate for Interventional Psychiatry treatments, care will be provided with goal of stabilization with subsequent transfer back to the community (I.e. PCP or previous psychiatrist).    Medications: Will be addressed during an MTM visit and new patient medication evaluation with a Psychiatrist.     Therapy:  Yes - Gavin may benefit form working with a therapist who specializes if grief and loss. He could also consider a support group through [employer], [employer], or a grief and loss organization    Crisis Resources provided:  24/7 crisis resources including but not limited to the following:   - National Suicide Prevention Hotline: 9-793-702-TALK (6646)   - Crisis Text Line: Text START to 962-151   - EmPATH at [employer] [facility]    Other Referrals:  No    RTC: For MTM visit.    Martha Blanco, SHUBHAM         _____________________________  Gavin is a 77 year old who is being evaluated via a billable video visit.      How would you like to obtain your AVS? MyChart  Will anyone else be joining your video  visit? No        Video-Visit Details    Video Start Time: 10:00    Type of service:  Video Visit    Video End Time:11:05    Originating Location (pt. Location): Home    Distant Location (provider location):  [facility], [place]    Platform used for Video Visit: Jackson       Medication and allergies have been reviewed.       Atif Cannon, VF

## 2022-10-19 ENCOUNTER — OFFICE VISIT (OUTPATIENT)
Dept: PHARMACY | Facility: CLINIC | Age: 77
End: 2022-10-19
Payer: COMMERCIAL

## 2022-10-19 DIAGNOSIS — I48.20 CHRONIC ATRIAL FIBRILLATION (H): Primary | ICD-10-CM

## 2022-10-19 DIAGNOSIS — I10 BENIGN ESSENTIAL HYPERTENSION: ICD-10-CM

## 2022-10-19 DIAGNOSIS — F41.8 DEPRESSION WITH ANXIETY: ICD-10-CM

## 2022-10-19 DIAGNOSIS — N40.0 BENIGN PROSTATIC HYPERPLASIA, UNSPECIFIED WHETHER LOWER URINARY TRACT SYMPTOMS PRESENT: ICD-10-CM

## 2022-10-19 DIAGNOSIS — G62.89 OTHER POLYNEUROPATHY: ICD-10-CM

## 2022-10-19 PROCEDURE — 99605 MTMS BY PHARM NP 15 MIN: CPT | Performed by: PHARMACIST

## 2022-10-19 PROCEDURE — 99607 MTMS BY PHARM ADDL 15 MIN: CPT | Performed by: PHARMACIST

## 2022-10-19 NOTE — PROGRESS NOTES
Medication Therapy Management (MTM) Encounter    ASSESSMENT:                            Medication Adherence/Access: No issues identified    Hypertension/Atrial Fibrillation: stable    BPH: stable    Neuropathy: Stable - patient doesn't feel needs gabapentin anymore. Decrease gabapentin to 600 mg twice daily, monitor tremor, neuropathy and anxiety.     Depression/Anxiety: Patient has been on bupropion since 2013, has risk for tremor 3-6%, he has been on lamotrigine since 2018, risk for tremor 4-10%, Fetzima has risk for extrapyramidal disease of 2% - could possibly be contributing to worsening of tremor.     Patient has upcoming appointment with neurology in November to rule out Parkinson's.     Trintellix may be an option - has gene-drug interaction of serum level may be too low - higher doses may be required.     Recommend patient make an appointment with psychiatry to determine if ketamine is an option for him. Also recommend patient start seeing a counselor. Decrease Fetzima dose to 40 mg daily - patient does not feel effective, may be contributing to increase in tremor. Monitor anxiety, depression and tremor.     PLAN:                            1. I recommend you make an appointment with a psychiatrist to determine if ketamine is an option for you.     2. Recommend you also start seeing a counselor.    3. We can try lowering your gabapentin dose to see if your tremor improves - since you no longer feel you need it. Start taking gabapentin 600 mg twice daily. Monitor neuropathy, tremor and anxiety.     4. Try going back down to Fetzima 40 mg daily to see if you notice any change in your depression/anxiety. If taper is too fast - go back up to 80 mg daily and then taper at 40 mg one day alternating with 80 mg daily for 1 week, then decrease to 40 mg daily. Monitor tremor, anxiety and depression.     Future considerations: Trintellix may be an option has gene-drug interaction of serum level may be too low -  higher doses may be required.     Follow-up: Tuesday, November 15th at 11:00 AM    SUBJECTIVE/OBJECTIVE:                          Josemanuel Edmond is a 77 year old male coming in for an initial visit. He was referred to me from self.      Reason for visit: med review/side effects - twitching and jerking - in legs and hands. Patient has a tremor that he thinks started about 1 year ago - getting worse lately.    Allergies/ADRs: Reviewed in chart  Past Medical History: Reviewed in chart  Tobacco: He reports that he quit smoking about 47 years ago. His smoking use included cigarettes. He has a 15.00 pack-year smoking history. He has never used smokeless tobacco.  Alcohol: not currently using - used to have a problem    Medication Adherence/Access: no issues reported    Hypertension/Atrial Fibrillation: Current medications include metoprolol 75 mg twice daily and Xarelto 20 mg with dinner. Patient reports no current medication side effects.  BP Readings from Last 3 Encounters:   09/22/22 110/86   07/26/22 132/80   06/16/22 126/76     BPH: taking finasteride 5 mg daily and doxazosin 4 mg at bedtime. Tolerating well.     Neuropathy: Taking gabapentin 600 mg one tablet in the morning and 2 tablets in the evening. Patient doesn't think that he needs gabapentin any longer. He lowered his dose about 6 months ago - no change in tremor, no problem with neuropathy or anxiety. Gabapentin has risk for tremor of 7%.    Depression/Anxiety: Patient states doesn't feel depression and anxiety are doing very good. No change since starting Fetzima about 1 month ago. No longer seeing psychiatry, not seeing a counselor. Patient states has a lot of social anxiety - not on propranolol, he is on metoprolol for atrial fibrillation. States doesn't feel his medications have ever really helped. Wife is a social butterfly - has problems with social anxiety quite often.     Current medications include lamotrigine 50 mg twice daily, bupropion 150 mg  twice daily - patient states he has been on both of these a lot longer than he has had his tremor. Also taking buspirone 30 mg twice daily and Fetzima 80 mg daily. States started Fetzima about 2 months ago - doesn't feel like it has helped at all. He is interested in ketamine. No longer seeing psychiatry - recommended to him by Dr. Carreon. Sleep is good - taking OTC Sleep Aid from [employer] - working very well, used to have a lot of trouble with sleep.     Per patient's chart: Additional information Per Rylee Kyle, CNP note:   Lexapro (ineffective), Prozac (helps depression, but anxiety increase), Hydroxyzine (oversedation, PCP trial), Wellbutrin XL, Xanax, Klonopin, Valium, Ativan, Remeron (30 mg only, worked well for sleep and anxiety, but weight gain), Nortriptyline, Vistaril (dry mouth, nightmares), Remeron (oversedation at 45 mg, 22.5mg and 15 mg and wt gain), Trazodone (oversedate at 50 mg), Ambien (effective for sleep, fall), Belsomra not covered with insurance.    Has also tried duloxetine - side effects and Prestiq - not effective. Venlafaxine didn't agree with him.     CAROLYNN-7 SCORE 5/5/2022 6/16/2022 8/18/2022   Total Score - - -   Total Score 2 (minimal anxiety) 3 (minimal anxiety) -   Total Score 2 3 3       PHQ 7/26/2022 8/18/2022 10/3/2022   PHQ-9 Total Score 5 9 8   Q9: Thoughts of better off dead/self-harm past 2 weeks Not at all Not at all Not at all     Today's Vitals: There were no vitals taken for this visit. Patient declined getting his blood pressure checked today.   ----------------      I spent 60 minutes with this patient today. All changes were made via collaborative practice agreement with Lizzy Leiva MD. A copy of the visit note was provided to the patient's provider(s).    The patient was given a summary of these recommendations.     Marian Correa, PharmD  Medication Therapy Management     Distant Location (provider location):  On-site  Provider has received  verbal consent for a visit from the patient? Yes     Medication Therapy Recommendations  Depression with anxiety    Current Medication: levomilnacipran (FETZIMA) 40 MG 24 hr capsule   Rationale: Undesirable effect - Adverse medication event - Safety   Recommendation: Decrease Dose   Status: Accepted per CPA         Peripheral neuropathy    Current Medication: gabapentin (NEURONTIN) 600 MG tablet   Rationale: No medical indication at this time - Unnecessary medication therapy - Indication   Recommendation: Decrease Dose   Status: Accepted per CPA

## 2022-10-19 NOTE — LETTER
October 19, 2022  Josemanuel Edmond  [address] [place] [address]    Dear J CARLOS [facility]     Thank you for talking with me on Oct 19, 2022 about your health and medications. As a follow-up to our conversation, I have included two documents:      1. Your Recommended To-Do List has steps you should take to get the best results from your medications.  2. Your Medication List will help you keep track of your medications and how to take them.    If you want to talk about these documents, please call Marian Correa RPH at phone: [phone], Monday-Friday 8-4:30pm.    I look forward to working with you and your doctors to make sure your medications work well for you.    Sincerely,  Marian Correa RPH  [facility] Pharmacist, [facility]

## 2022-10-19 NOTE — LETTER
_  Medication List        Prepared on: Oct 19, 2022     Bring your Medication List when you go to the doctor, hospital, or   emergency room. And, share it with your family or caregivers.     Note any changes to how you take your medications.  Cross out medications when you no longer use them.    Medication How I take it Why I use it Prescriber   acetaminophen (TYLENOL) 500 MG tablet Take 1,000 mg by mouth every 8 hours as needed for mild pain Pain Self   buPROPion (WELLBUTRIN SR) 150 MG 12 hr tablet TAKE 1 TABLET TWICE A DAY Moderate Major Depression (H); Anxiety Lizzy Leiva MD   busPIRone HCl (BUSPAR) 30 MG tablet TAKE 1 TABLET TWICE A DAY Social Anxiety Disorder Lizzy Leiva MD   doxazosin (CARDURA) 8 MG tablet Take 0.5 tablets (4 mg) by mouth At Bedtime Hypertrophy of Prostate with Urinary Obstruction Lizzy Leiva MD   finasteride (PROSCAR) 5 MG tablet TAKE 1 TABLET DAILY Benign prostatic hyperplasia with weak urinary stream Marilynn Bliss, APRN CNP   furosemide (LASIX) 20 MG tablet Take 1 tablet (20 mg) by mouth daily as needed Chronic Atrial Fibrillation (H) Nazanin Caballero, APRN CNP   gabapentin (NEURONTIN) 600 MG tablet TAKE 1 TABLET FOUR TIMES A DAY Anxiety Lizzy Leiva MD   lamoTRIgine (LAMICTAL) 25 MG tablet Take 2 tablets (50 mg) by mouth 2 times daily Moderate Major Depression (H) Lizzy Leiva MD   levomilnacipran (FETZIMA) 40 MG 24 hr capsule Take 2 capsules (80 mg) by mouth daily Moderate Major Depression (H) Lizzy Leiva MD   Metoprolol Tartrate 75 MG TABS Take 75 mg by mouth 2 times daily Chronic Atrial Fibrillation (H); Dyspnea on Exertion Lizzy Leiva MD   XARELTO ANTICOAGULANT 20 MG TABS tablet TAKE 1 TABLET DAILY WITH   DINNER Chronic Atrial Fibrillation (H) Lizzy Leiva MD         Add new medications, over-the-counter drugs, herbals, vitamins, or  minerals in the blank rows  below.    Medication How I take it Why I use it Prescriber                          Allergies:      bactrim [sulfamethoxazole w/trimethoprim]        Side effects I have had:               Other Information:              My notes and questions:

## 2022-10-19 NOTE — PATIENT INSTRUCTIONS
"Recommendations from today's MTM visit:                                                    MTM (medication therapy management) is a service provided by a clinical pharmacist designed to help you get the most of out of your medicines.   Today we reviewed what your medicines are for, how to know if they are working, that your medicines are safe and how to make your medicine regimen as easy as possible.      Gavin,    I recommend you make an appointment with a psychiatrist to determine if ketamine is an option for you. Trintellix may be an option for you instead of Fetzima.     2. Recommend you also start seeing a counselor.    3. We can try lowering your gabapentin dose to see if your tremor improves. Start taking gabapentin 600 mg twice daily. Monitor neuropathy, tremor and anxiety.     4. Try going back down to Fetzima 40 mg daily to see if you notice any change in your depression/anxiety. If taper is too fast - go back up to 80 mg daily and then taper at 40 mg one day alternating with 80 mg daily for 1 week, then decrease to 40 mg daily.     Follow-up: Tuesday, November 15th at 11:00 AM    It was great speaking with you today.  I value your experience and would be very thankful for your time in providing feedback in our clinic survey. In the next few days, you may receive an email or text message from [employer] with a link to a survey related to your  clinical pharmacist.\"     To schedule another MTM appointment, please call the clinic directly or you may call the MTM scheduling line at [phone] or toll-free at [phone].     My Clinical Pharmacist's contact information:                                                      Please feel free to contact me with any questions or concerns you have.      Keep up the good work!!    Marian Correa, PharmD  [phone] in clinic on Tuesday and Wednesday          "

## 2022-10-19 NOTE — LETTER
"Recommended To-Do List      Prepared on: Oct 19, 2022       You can get the best results from your medications by completing the items on this \"To-Do List.\"      Bring your To-Do List when you go to your doctor. And, share it with your family or caregivers.    My To-Do List:  What we talked about: What I should do:   A possible side effect with your medication    Decrease your dosage of levomilnacipran (Fetzima) to 40 mg daily - monitor depression, anxiety and tremor.           What we talked about: What I should do:   A medication that you may no longer need    Decrease your dosage of gabapentin (NEURONTIN) to 600 mg twice daily.           What we talked about: What I should do:                       "

## 2022-10-23 DIAGNOSIS — I48.20 CHRONIC ATRIAL FIBRILLATION (H): ICD-10-CM

## 2022-10-26 ENCOUNTER — VIRTUAL VISIT (OUTPATIENT)
Dept: PSYCHIATRY | Facility: CLINIC | Age: 77
End: 2022-10-26
Payer: COMMERCIAL

## 2022-10-26 VITALS — BODY MASS INDEX: 26.22 KG/M2 | WEIGHT: 173 LBS | HEIGHT: 68 IN

## 2022-10-26 DIAGNOSIS — F32.1 MODERATE MAJOR DEPRESSION (H): Primary | ICD-10-CM

## 2022-10-26 RX ORDER — FUROSEMIDE 20 MG
TABLET ORAL
Qty: 90 TABLET | Refills: 3 | Status: SHIPPED | OUTPATIENT
Start: 2022-10-26 | End: 2023-11-13

## 2022-10-26 ASSESSMENT — PAIN SCALES - GENERAL: PAINLEVEL: NO PAIN (0)

## 2022-10-26 ASSESSMENT — PATIENT HEALTH QUESTIONNAIRE - PHQ9: SUM OF ALL RESPONSES TO PHQ QUESTIONS 1-9: 12

## 2022-10-26 NOTE — NURSING NOTE
Patient said all medications are the same since last reviewed on 10/19/22.    Patient scored 12 on PHQ-9    Odessa Akins, VF

## 2022-10-26 NOTE — PROGRESS NOTES
Gavin is a 77 year old who is being evaluated via a billable video visit.      How would you like to obtain your AVS? MyChart  If the video visit is dropped, the invitation should be resent by: Send to e-mail at: [email or web address]  Will anyone else be joining your video visit? No       KARYN Jackson    Depression Response    Patient completed the PHQ-9 assessment for depression and scored >9? Yes  Question 9 on the PHQ-9 was positive for suicidality? Yes  Does patient have current mental health provider? Yes    Is this a virtual visit? Yes   Does patient have suicidal ideation (positive question 9)? Yes (adult) - transfer to [facility] Triage ([phone]) Patient declined transfer.  Notify provider.     I personally notified the following: triage nurse (virtual visit)               Video-Visit Details    Video Start Time: 2:00 pm    Type of service:  Video Visit    Video End Time:2:57 pm    Originating Location (pt. Location): Home        Distant Location (provider location):  Off-site    Platform used for Video Visit: Jackson

## 2022-11-01 ENCOUNTER — MYC MEDICAL ADVICE (OUTPATIENT)
Dept: FAMILY MEDICINE | Facility: CLINIC | Age: 77
End: 2022-11-01
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
## 2022-11-04 ENCOUNTER — OFFICE VISIT (OUTPATIENT)
Dept: FAMILY MEDICINE | Facility: CLINIC | Age: 77
End: 2022-11-04
Payer: COMMERCIAL

## 2022-11-04 VITALS
HEIGHT: 68 IN | DIASTOLIC BLOOD PRESSURE: 82 MMHG | HEART RATE: 81 BPM | RESPIRATION RATE: 16 BRPM | OXYGEN SATURATION: 99 % | TEMPERATURE: 97.2 F | BODY MASS INDEX: 25.61 KG/M2 | WEIGHT: 169 LBS | SYSTOLIC BLOOD PRESSURE: 130 MMHG

## 2022-11-04 DIAGNOSIS — F40.10 SOCIAL ANXIETY DISORDER: Primary | ICD-10-CM

## 2022-11-04 DIAGNOSIS — D61.818 PANCYTOPENIA (H): ICD-10-CM

## 2022-11-04 DIAGNOSIS — R63.4 WEIGHT LOSS: ICD-10-CM

## 2022-11-04 DIAGNOSIS — E78.5 HYPERLIPIDEMIA, UNSPECIFIED HYPERLIPIDEMIA TYPE: ICD-10-CM

## 2022-11-04 DIAGNOSIS — T14.8XXA BRUISING: ICD-10-CM

## 2022-11-04 LAB
ALBUMIN SERPL BCG-MCNC: 4.1 G/DL (ref 3.5–5.2)
ALP SERPL-CCNC: 101 U/L (ref 40–129)
ALT SERPL W P-5'-P-CCNC: 18 U/L (ref 10–50)
ANION GAP SERPL CALCULATED.3IONS-SCNC: 9 MMOL/L (ref 7–15)
AST SERPL W P-5'-P-CCNC: 24 U/L (ref 10–50)
BASOPHILS # BLD AUTO: 0 10E3/UL (ref 0–0.2)
BASOPHILS NFR BLD AUTO: 1 %
BILIRUB SERPL-MCNC: 0.8 MG/DL
BUN SERPL-MCNC: 10.5 MG/DL (ref 8–23)
CALCIUM SERPL-MCNC: 9.2 MG/DL (ref 8.8–10.2)
CHLORIDE SERPL-SCNC: 96 MMOL/L (ref 98–107)
CHOLEST SERPL-MCNC: 139 MG/DL
CREAT SERPL-MCNC: 0.94 MG/DL (ref 0.67–1.17)
DEPRECATED HCO3 PLAS-SCNC: 29 MMOL/L (ref 22–29)
EOSINOPHIL # BLD AUTO: 0.1 10E3/UL (ref 0–0.7)
EOSINOPHIL NFR BLD AUTO: 3 %
ERYTHROCYTE [DISTWIDTH] IN BLOOD BY AUTOMATED COUNT: 14.2 % (ref 10–15)
GFR SERPL CREATININE-BSD FRML MDRD: 82 ML/MIN/1.73M2
GLUCOSE SERPL-MCNC: 85 MG/DL (ref 70–99)
HCT VFR BLD AUTO: 37.4 % (ref 40–53)
HDLC SERPL-MCNC: 37 MG/DL
HGB BLD-MCNC: 12.5 G/DL (ref 13.3–17.7)
IMM GRANULOCYTES # BLD: 0 10E3/UL
IMM GRANULOCYTES NFR BLD: 0 %
INR PPP: 1.39 (ref 0.85–1.15)
LDLC SERPL CALC-MCNC: 88 MG/DL
LYMPHOCYTES # BLD AUTO: 1.1 10E3/UL (ref 0.8–5.3)
LYMPHOCYTES NFR BLD AUTO: 22 %
MCH RBC QN AUTO: 32.7 PG (ref 26.5–33)
MCHC RBC AUTO-ENTMCNC: 33.4 G/DL (ref 31.5–36.5)
MCV RBC AUTO: 98 FL (ref 78–100)
MONOCYTES # BLD AUTO: 0.5 10E3/UL (ref 0–1.3)
MONOCYTES NFR BLD AUTO: 10 %
NEUTROPHILS # BLD AUTO: 3.1 10E3/UL (ref 1.6–8.3)
NEUTROPHILS NFR BLD AUTO: 65 %
NONHDLC SERPL-MCNC: 102 MG/DL
PLATELET # BLD AUTO: 153 10E3/UL (ref 150–450)
POTASSIUM SERPL-SCNC: 4.2 MMOL/L (ref 3.4–5.3)
PROT SERPL-MCNC: 6.8 G/DL (ref 6.4–8.3)
RBC # BLD AUTO: 3.82 10E6/UL (ref 4.4–5.9)
SODIUM SERPL-SCNC: 134 MMOL/L (ref 136–145)
TRIGL SERPL-MCNC: 72 MG/DL
TSH SERPL DL<=0.005 MIU/L-ACNC: 1.54 UIU/ML (ref 0.3–4.2)
WBC # BLD AUTO: 4.8 10E3/UL (ref 4–11)

## 2022-11-04 PROCEDURE — 85610 PROTHROMBIN TIME: CPT | Performed by: FAMILY MEDICINE

## 2022-11-04 PROCEDURE — 80061 LIPID PANEL: CPT | Performed by: FAMILY MEDICINE

## 2022-11-04 PROCEDURE — 85025 COMPLETE CBC W/AUTO DIFF WBC: CPT | Performed by: FAMILY MEDICINE

## 2022-11-04 PROCEDURE — 99214 OFFICE O/P EST MOD 30 MIN: CPT | Performed by: FAMILY MEDICINE

## 2022-11-04 PROCEDURE — 84443 ASSAY THYROID STIM HORMONE: CPT | Performed by: FAMILY MEDICINE

## 2022-11-04 PROCEDURE — 80053 COMPREHEN METABOLIC PANEL: CPT | Performed by: FAMILY MEDICINE

## 2022-11-04 PROCEDURE — 36415 COLL VENOUS BLD VENIPUNCTURE: CPT | Performed by: FAMILY MEDICINE

## 2022-11-04 RX ORDER — BUSPIRONE HYDROCHLORIDE 30 MG/1
30 TABLET ORAL 2 TIMES DAILY
Qty: 180 TABLET | Refills: 1 | Status: CANCELLED | OUTPATIENT
Start: 2022-11-04

## 2022-11-04 ASSESSMENT — ANXIETY QUESTIONNAIRES
7. FEELING AFRAID AS IF SOMETHING AWFUL MIGHT HAPPEN: NOT AT ALL
2. NOT BEING ABLE TO STOP OR CONTROL WORRYING: SEVERAL DAYS
6. BECOMING EASILY ANNOYED OR IRRITABLE: SEVERAL DAYS
3. WORRYING TOO MUCH ABOUT DIFFERENT THINGS: SEVERAL DAYS
GAD7 TOTAL SCORE: 4
4. TROUBLE RELAXING: NOT AT ALL
IF YOU CHECKED OFF ANY PROBLEMS ON THIS QUESTIONNAIRE, HOW DIFFICULT HAVE THESE PROBLEMS MADE IT FOR YOU TO DO YOUR WORK, TAKE CARE OF THINGS AT HOME, OR GET ALONG WITH OTHER PEOPLE: NOT DIFFICULT AT ALL
8. IF YOU CHECKED OFF ANY PROBLEMS, HOW DIFFICULT HAVE THESE MADE IT FOR YOU TO DO YOUR WORK, TAKE CARE OF THINGS AT HOME, OR GET ALONG WITH OTHER PEOPLE?: NOT DIFFICULT AT ALL
1. FEELING NERVOUS, ANXIOUS, OR ON EDGE: SEVERAL DAYS
GAD7 TOTAL SCORE: 4
7. FEELING AFRAID AS IF SOMETHING AWFUL MIGHT HAPPEN: NOT AT ALL
GAD7 TOTAL SCORE: 4
5. BEING SO RESTLESS THAT IT IS HARD TO SIT STILL: NOT AT ALL

## 2022-11-04 ASSESSMENT — PATIENT HEALTH QUESTIONNAIRE - PHQ9
SUM OF ALL RESPONSES TO PHQ QUESTIONS 1-9: 6
SUM OF ALL RESPONSES TO PHQ QUESTIONS 1-9: 6
10. IF YOU CHECKED OFF ANY PROBLEMS, HOW DIFFICULT HAVE THESE PROBLEMS MADE IT FOR YOU TO DO YOUR WORK, TAKE CARE OF THINGS AT HOME, OR GET ALONG WITH OTHER PEOPLE: NOT DIFFICULT AT ALL

## 2022-11-04 ASSESSMENT — PAIN SCALES - GENERAL: PAINLEVEL: NO PAIN (0)

## 2022-11-04 NOTE — NURSING NOTE
"Initial There were no vitals taken for this visit. Estimated body mass index is 26.3 kg/m  as calculated from the following:    Height as of 10/26/22: 1.727 m (5' 8\").    Weight as of 10/26/22: 78.5 kg (173 lb). .      "

## 2022-11-04 NOTE — PROGRESS NOTES
Assessment & Plan     Social anxiety disorder  Has tried and failed numerous medications. Has consult scheduled with psychiatry.  Also ? Parkinsonism. Has neurology consult for tremor coming up as well..     Pancytopenia (H)  Re-check labs as below. Further work-up and management as dictated by results.   - CBC with Platelets & Differential; Future  - Comprehensive metabolic panel; Future  - CBC with Platelets & Differential  - Comprehensive metabolic panel    Weight loss  Check labs.   - TSH with free T4 reflex; Future  - TSH with free T4 reflex    Bruising  - INR; Future  - INR    Hyperlipidemia, unspecified hyperlipidemia type  - Lipid panel reflex to direct LDL Non-fasting; Future  - Lipid panel reflex to direct LDL Non-fasting                 No follow-ups on file.    Lizzy Leiva MD  [facility]    Greg   Gavin is a 77 year old, presenting for the following health issues:  Depression and Anxiety      History of Present Illness       Mental Health Follow-up:  Patient presents to follow-up on Depression & Anxiety.Patient's depression since last visit has been:  No change  The patient is not having other symptoms associated with depression.  Patient's anxiety since last visit has been:  No change  The patient is having other symptoms associated with anxiety.  Any significant life events: grief or loss  Patient is not feeling anxious or having panic attacks.  Patient has no concerns about alcohol or drug use.    He eats 0-1 servings of fruits and vegetables daily.He consumes 1 sweetened beverage(s) daily.He exercises with enough effort to increase his heart rate 9 or less minutes per day.  He exercises with enough effort to increase his heart rate 3 or less days per week.   He is taking medications regularly.    Today's PHQ-9         PHQ-9 Total Score: 6    PHQ-9 Q9 Thoughts of better off dead/self-harm past 2 weeks :   Not at all    How difficult have these problems  "made it for you to do your work, take care of things at home, or get along with other people: Not difficult at all  Today's CAROLYNN-7 Score: 4             Review of Systems   Constitutional, neuro, ENT, endocrine, pulmonary, cardiac, gastrointestinal, genitourinary, musculoskeletal, integument and psychiatric systems are negative, except as otherwise noted.       Objective    /82   Pulse 81   Temp 97.2  F (36.2  C) (Tympanic)   Resp 16   Ht 1.727 m (5' 8\")   Wt 76.7 kg (169 lb)   SpO2 99%   BMI 25.70 kg/m    Body mass index is 25.7 kg/m .  Physical Exam   GENERAL: Pleasant, well appearing male.  PSYCH: Alert and oriented x3, neatly dressed and well groomed, makes good eye contact, fluid speech - non-pressured, no psychomotor agitation or slowing, good memory, judgement and insight, no auditory or visual hallucinations, flat affect which is mood congruent. No suicidal ideation.                   "

## 2022-11-04 NOTE — NURSING NOTE
"Initial /82   Pulse 81   Temp 97.2  F (36.2  C) (Tympanic)   Resp 16   Ht 1.727 m (5' 8\")   Wt 76.7 kg (169 lb)   SpO2 99%   BMI 25.70 kg/m   Estimated body mass index is 25.7 kg/m  as calculated from the following:    Height as of this encounter: 1.727 m (5' 8\").    Weight as of this encounter: 76.7 kg (169 lb). .      "

## 2022-11-07 ENCOUNTER — OFFICE VISIT (OUTPATIENT)
Dept: PSYCHIATRY | Facility: CLINIC | Age: 77
End: 2022-11-07
Payer: COMMERCIAL

## 2022-11-07 VITALS
BODY MASS INDEX: 25.85 KG/M2 | HEIGHT: 68 IN | TEMPERATURE: 97.3 F | SYSTOLIC BLOOD PRESSURE: 122 MMHG | DIASTOLIC BLOOD PRESSURE: 82 MMHG | WEIGHT: 170.6 LBS | HEART RATE: 97 BPM

## 2022-11-07 DIAGNOSIS — F32.0 CURRENT MILD EPISODE OF MAJOR DEPRESSIVE DISORDER WITHOUT PRIOR EPISODE (H): Primary | ICD-10-CM

## 2022-11-07 ASSESSMENT — PATIENT HEALTH QUESTIONNAIRE - PHQ9: SUM OF ALL RESPONSES TO PHQ QUESTIONS 1-9: 9

## 2022-11-07 NOTE — PATIENT INSTRUCTIONS
Today's Recommendations:  - Please discuss with Dr. Carreon about possibly increasing Wellbutrin and / or Fetzima; other considerations include adding a stimulant medication  - Please discuss with Dr. Carreon about starting psychotherapy   - Please discuss with Dr. Carreon about the utility of a brain MRI given your risk factors for microvascular disease   - Let's check a few labs: testosterone, Vitamin D, Vitamin B12, folate, TSH   - Today we discussed transcranial magnetic stimulation (TMS) and ketamine interventions. Please see the below information for these and let me know if you would like to pursue them further or have additional questions.    TMS:  [email or web address]  TMS treatments would be 5 days per week for about 20 minutes for each treatment for 36 treatments.     Ketamine:  The Ketamine pre-authorization process:  1.The process has two steps: 1) getting approval from insurance and 2) being on the wait list for ketamine monitoring in our clinic.  2. The first step takes two weeks if everything goes easily. If your insurance denies and we have to appeal, it could take months.  3. The second step involves waiting for an open treatment slot. When we have one, someone will call you. If you cannot take that specific time, you will go back on the top of the waiting list.  4. It can take several weeks on the waiting list when our service is busy. You are always welcome to call or send a MyChart to ask about status updates.  5. The waiting process can be long and frustrating when you have this severe a symptom level. IF YOU ARE GETTING WORSE, CALL A CRISIS NUMBER (below) OR GO TO AN EMERGENCY ROOM. Ketamine is not an emergency treatment, and the first priority is keeping you stable while we are getting set up.    Ketamine forms and availability:  - We also discussed Spravato (Intranasal ketamine), Intravenous and intramuscular injection forms of ketamine.  - Infusion ketamine can be  "given more gradually than the Intramuscular injection - the [facility] delivers both IM and IN ketamine. Other clinics might deliver the intravenous form and Insurance might not cover the IV ketamine, so if it does not, we can try the Intramuscular injection.     Ketamine effects:  - Ketamine can improve your depression quickly (within 24-48 hours of taking the drug) and this effect can last for 7-10 days but rarely does so in the beginning. It is also reported to help with suicidal ideations. Like ECT, its antidepressant effect can fade away the longer you stay without ketamine. This is why you will have a maintenance treatment of ketamine.   - Acute side effects are nausea, dissociation (a floaty, detached, feeling), hallucinations, and changes in blood pressure, which can lead to you feeling unsteady when you first stand up.  - The risks of long-term esketamine use are unknown. People who use ketamine as a street drug have shown a variety of side effects, including persistent hallucinations, impairment in cognitive abilities, and serious damage to their bladder and kidneys. These do not go away when they stop using ketamine. We do not know if these would occur with using esketamine regularly for depression.  - Because we are concerned about these long-term risks, we do not prescribe esketamine more than monthly for long-term \"maintenance\" use. If the antidepressant effects require dosing more frequently than that, then this is not the right approach to treating your depression    Ketamine labs:  - We will need to get some basic laboratory tests to have a baseline for the cardiac side effects, urine drug screen and kidney and liver functions     The ketamine clinical procedure and experience  - Treatment is usually 8 doses (2 per week) over a month (for esketamine/Spravato) or 6-9 doses (3 per week) over 2-3 weeks. After that, we begin to space things out. Usually, you will have 1 weekly treatment for " "1 month, then 1 treatment given every other week for 1 month, then we will space out to monthly.   - 2 weeks after starting the treatment, if we see that you are not responding (I.e. you have NOT improved more than 50 %), we would stop the treatment.   - With the intramuscular injection, we will need one extra session of injections in the beginning to test the drug out, so you will get 10 instead of 9 sessions with the IM form   - During or right before the treatment, it is advised that you do a joyful positive activity (like listening to music rather than watching a violent movie for example)     Things to keep in mind about ketamine in general:  - You will need to have a plan for how to get home after treatments. You will not be safe to drive, and even taking a taxi/Uber/etc. may be difficult. These effects will likely be gone by the next day.    Research  The \"Measurement Based Care\" research study is being conducted throughout the [facility] Department of Psychiatry and Behavioral Sciences. This provides some additional testing that might be diagnostically helpful or may help us to know what treatments are better suited to different mental health symptoms. This study will enable us how to better incorporate testing into clinical care.    More information about this study is at: [email or web address]    If you are interested in the study you can email, [email or web address].    If you do not want to be contacted about research, please let us know. (Being called does not mean you have to be in a  study.)    Our clinic is also conducting clinical trials of new brain stimulation treatments. Other studies are what we would call \"biomarker\" studies, where we ask you to participate in some kind of measurement while you are undergoing regular treatment. You may be called by one of our clinical research coordinators about these studies.      General Information:  Our " "Treatment-Resistant Disorders/Interventional Psychiatry clinic is what is often called a \"consultation\" or \"tertiary care\" clinic. That means we do not see patients long-term for medication management or talk therapy. We offer thorough evaluations, recommendations about medications/therapies you may have not yet tried, and in some cases, brain stimulation or office-based treatments. If you are likely to benefit from one of those advanced treatments, we will have talked about it today. Once that treatment is complete, we will see you once or twice afterwards to check in, and then you will return to getting ongoing psychiatric care from whomever you were seeing before you came for your evaluation with us. If for some reason you no longer have access to that clinician, we can help with a referral to our main [facility]. The only patients we see long-term are patients with implanted medical devices that require ongoing monitoring and programming.     Our recommendations almost always include some kind of cognitive-behavioral therapy (CBT) if you are not getting it already. Brain and nerve stimulation treatments work best when combined with certain talk therapies. We make these recommendations because we strongly believe that, without the therapy piece, most people will not get better, or will have limited clinical benefit. It is often difficult or inconvenient to add therapy to an already busy schedule, but it is also necessary.    It is important to remember that, like all mental health treatments, our interventional therapies are not perfect. About one third of people will not feel better after treatment, and even when they work, they do not take away the symptoms entirely.If we have recommended something above, it means we think there is a better than even chance of it working, but things are never guaranteed. This is another reason we usually recommend CBT along with our advanced treatments -- it can " address the symptoms that remain after the stimulation/ketamine treatment.       While we are waiting to implement the recommendations you and I have discussed, you should know what to do if your symptoms worsen. A variety of crisis numbers/resources are available. They include:    CRISIS GENERAL NUMBERS   7-310-YLPIJMD (6-011-527-2729)  OR  911     CRISIS INTERVENTION TEAM (CIT) - this is a POLICE UNIT, specially trained.  This website has information for all of [place]'s CITs. Lays out which areas have this team.  [email or web address]  However, one can just call 911 and ask for this special team.   If police need to be called, DO ask for this team.    CRISIS MOBILE TEAMS  [and see end of this phrase*]  [facility] -COPE: 24hrs/7days:    [phone]  (Adults > 17yo)    [phone]  (Child   < 16yo)                                       FRONT DOOR (during the day could call)   [phone]    [facility] -[phone] (Adult)  [phone] (Child)     [facility] -[phone] (Adult and Child)     [facility] -[phone] ([employer] mobile crisis team; Adult and Child; 24hr)     [facility] -[phone] (Adult and Child)     CRISIS HOUSING  [facility]                           [address]              [phone]  [facility]                                [address]                       [phone]  [facility]                               [address]     [phone]   [facility]  [address]                [phone]    [place] EMERGENCY NUMBERS    Crisis Connection:                                [phone]  [facility]:     [phone]  Crisis Intervention:                                [phone] or [phone]   COPE: Mobile Team 24hrs/7days:     "[phone]  (Adults > 17yo)                                                                            [phone]  (Child   < 18yo)  [facility]        [phone] 24/7 line and Mobile Team   [address] [place] [address]  DROP IN:  M-F: 8:00 am - 7:00 pm  Sat: 11:00 am - 3:00 pm   Sun: Closed     WALK-IN COUNSELING:  [facility]       [phone]    [address]:   M, W, F:  1:00-3:00PM    M-Th:  6:30-8:30PM    TRANS and LGBT  Call [employer] at [phone]. This service is staffed by trans people 24/7.   LGBT youth <24 contemplating suicide, call the [employer] [phone].    POISON CONTROL CENTER  [phone]    OR  go to nearest ER    CHILD  \"[facility] has a needs assessment team who will do an intake evaluation. Based on the results of the intake they will recommended inpatient admission, partial hospitalization, intensive outpatient or outpatient care. The number is [phone]. \"    CRISIS TEXT LINE  Http://www.crisistextline.org  FREE SUPPORT AT YOUR FINGERTIPS, 24/7  Crisis Text Line serves anyone, in any type of crisis, providing access to free, 24/7 support and information via the medium people already use and trust: text. Here s how it works:  1)  Text [phone] from anywhere in the [place], anytime, about any type of crisis.  2)  A live, trained Crisis Counselor receives the text and responds quickly.      The volunteer Crisis Counselor will help you move from a 'hot moment to a cool moment'    [facility] EMERGENCY NUMBERS    Crisis Connection:                                [phone]  [facility]:              [phone]  Crisis Intervention:                                [phone] or [phone]   COPE: Mobile Team 24hrs/7days:    [phone]  (Adults > 17yo)                                                                            [phone]  (Child   < 18yo)  [facility]gent [facility] for " "Adult Mental Health        [phone]  CALL 24 hours a day.  [address] [place] [address]  DROP IN:  M-F: 8:00 am - 7:00 pm  Sat: 11:00 am - 3:00 pm   Sun: Closed    WALK-IN COUNSELIN)  [facility]                                  [phone]        [place], [address]:                  M, W:      5:00-7:00PM       2)  [facility]                            [phone]        [place], [address]                T, Th:      6:00-8:00PM    TRANS and LGBT  Call [employer] at [phone]. This service is staffed by trans people .   LGBT youth <24 contemplating suicide, call the [employer] [phone].    POISON CONTROL CENTER  [phone]    OR  go to nearest ER    CHILD  \"[facility] has a needs assessment team who will do an intake evaluation. Based on the results of the intake they will recommended inpatient admission, partial hospitalization, intensive outpatient or outpatient care. The number is [phone] or [phone]. \"    CRISIS TEXT LINE  Http://www.crisistextline.org  FREE SUPPORT AT YOUR FINGERTIPS,   Crisis Text Line serves anyone, in any type of crisis, providing access to free,  support and information via the medium people already use and trust: text. Here s how it works:  1)  Text [phone] from anywhere in the [place], anytime, about any type of crisis.  2)  A live, trained Crisis Counselor receives the text and responds quickly.      The volunteer Crisis Counselor will help you move from a 'hot moment to a cool moment'      * A Community Paramedic (CP) is an advanced paramedic that works to increase access to primary and preventive care and decrease use of emergency departments, which in turn decreases health care costs. Among other things, CPs may play a key role in providing follow-up services after a hospital discharge to prevent hospital readmission. CPs can provide health assessments, chronic disease monitoring " and education, medication management, immunizations and vaccinations, laboratory specimen collection, hospital discharge follow-up care and minor medical procedures. CPs work under the direction of an Ambulance Medical Director.     Preventive measure
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
## 2022-11-15 ENCOUNTER — OFFICE VISIT (OUTPATIENT)
Dept: PHARMACY | Facility: CLINIC | Age: 77
End: 2022-11-15
Payer: COMMERCIAL

## 2022-11-15 DIAGNOSIS — G62.89 OTHER POLYNEUROPATHY: ICD-10-CM

## 2022-11-15 DIAGNOSIS — F41.8 DEPRESSION WITH ANXIETY: Primary | ICD-10-CM

## 2022-11-15 PROCEDURE — 99606 MTMS BY PHARM EST 15 MIN: CPT | Performed by: PHARMACIST

## 2022-11-15 PROCEDURE — 99607 MTMS BY PHARM ADDL 15 MIN: CPT | Performed by: PHARMACIST

## 2022-11-15 RX ORDER — ESCITALOPRAM OXALATE 10 MG/1
10 TABLET ORAL DAILY
Qty: 30 TABLET | Refills: 1 | Status: SHIPPED | OUTPATIENT
Start: 2022-11-15 | End: 2022-12-13 | Stop reason: DRUGHIGH

## 2022-11-15 NOTE — PATIENT INSTRUCTIONS
"Recommendations from today's MTM visit:                                                       Gavin,    It was a pleasure talking with you! We discussed the following:    Start Lexapro 10 mg once daily. Lower Fetzima to 40 mg once daily for one week - after 1 week, lower to 40 mg every other day for 1 week and then stop. I sent a new prescription for Lexapro to your pharmacy.    2. Lower gabapentin to one tablet (600 mg) twice daily - monitor tremor.     3. I recommend you start seeing a counseling - [facility] here in [place].    Follow-up: Tuesday, November 29th at 11:00 AM    It was great speaking with you today.  I value your experience and would be very thankful for your time in providing feedback in our clinic survey. In the next few days, you may receive an email or text message from "[employer] with a link to a survey related to your  clinical pharmacist.\"     To schedule another MTM appointment, please call the clinic directly or you may call the MTM scheduling line at [phone] or toll-free at [phone].     My Clinical Pharmacist's contact information:                                                      Please feel free to contact me with any questions or concerns you have.      Keep up the good work!!    Marian Correa, PharmD  [phone] in clinic on Tuesday and Wednesday        "

## 2022-11-15 NOTE — PROGRESS NOTES
Medication Therapy Management (MTM) Encounter    ASSESSMENT:                            Medication Adherence/Access: See below for considerations    Neuropathy: Lower gabapentin to one tablet (600 mg) twice daily - monitor tremor. Patient has upcoming appointment with neurology to rule out Parkinson's.     Depression/Anxiety: Reviewed patient's chart - Lexapro has worked well for patient in the past - he has stopped it twice because he is doing better and doesn't feel he needs it any longer. There is a gene-drug interaction (difficult to predict dose adjustments due to conflicting variations in metabolism and genotype may impact drug mechanism of action and result in moderately reduced efficacy) - however patient is willing to try again due to working well in the past.     Will start Lexapro 10 mg once daily and cross-taper with Fetzima (see Plan). Fetzima is too expensive for patient and he doesn't feel it's effective. Recommend patient start counseling.         PLAN:                            1. Start Lexapro 10 mg once daily. Lower Fetzima to 40 mg once daily for one week - after 1 week, lower to 40 mg every other day for 1 week and then stop. I sent a new prescription for Lexapro to your pharmacy.    2. Lower gabapentin to one tablet (600 mg) twice daily - monitor tremor.     3. I recommend you start seeing a counseling - [facility] practices here in [place].    Follow-up: Tuesday, November 29th at 11:00 AM      SUBJECTIVE/OBJECTIVE:                          Josemanuel Edmond is a 77 year old male coming in for a follow-up visit.  Today's visit is a follow-up MTM visit from 10/19/2022.     Reason for visit: follow up MTM visit.    Tobacco: He reports that he quit smoking about 47 years ago. His smoking use included cigarettes. He has a 15.00 pack-year smoking history. He has never used smokeless tobacco.  Alcohol: history of alcohol dependence and not currently using    Medication Adherence/Access: no issues  reported    Neuropathy: Last visit we reviewed patient trying to lower his gabapentin dose to see if his tremor improves - he has not done this. Patient continues to take gabapentin 600 mg one tablet in the morning and 2 tablets in the evening. Patient doesn't think that he needs gabapentin any longer. He lowered his dose about 6 months ago - no change in tremor, no problem with neuropathy or anxiety. Gabapentin has risk for tremor of 7%.    Depression/Anxiety: Patient states doesn't feel anxiety is doing very good. He doesn't feel Fetzima is very effective and too expensive for patient - he would like to try something else. He met with psychiatry about starting ketamine - he does not feel this is an option for him as there are no clinics close to him. He does not think he will go back to psychiatry. He has not started counseling yet - reviewed last visit. States his main concern is social anxiety - has problems with social anxiety quite often. Patient asking today about tramadol and lorazapem for anxiety - felt both of these were effective for him. Has history of alcohol abuse.     Current medications include lamotrigine 50 mg twice daily, bupropion 150 mg twice daily - patient states he has been on both of these a lot longer than he has had his tremor. Also taking buspirone 30 mg twice daily and Fetzima 80 mg daily.     CAROLYNN-7 SCORE 6/16/2022 8/18/2022 11/4/2022   Total Score - - -   Total Score 3 (minimal anxiety) - 4 (minimal anxiety)   Total Score 3 3 4     Today's Vitals: There were no vitals taken for this visit. Patient declined getting his blood pressure checked today.   BP Readings from Last 3 Encounters:   11/07/22 122/82   11/04/22 130/82   09/22/22 110/86     ----------------    I spent 30 minutes with this patient today. All changes were made via collaborative practice agreement with Lizzy Leiva MD. A copy of the visit note was provided to the patient's provider(s).    The patient was given  a summary of these recommendations.     Marian Correa, PharmD  Medication Therapy Management     Distant Location (provider location):  On-site  Provider has received verbal consent for a visit from the patient? Yes     Medication Therapy Recommendations  Depression with anxiety    Current Medication: levomilnacipran (FETZIMA) 40 MG 24 hr capsule (Discontinued)   Rationale: More effective medication available - Ineffective medication - Effectiveness   Recommendation: Change Medication - Lexapro 10 MG Tabs   Status: Accepted per CPA

## 2022-11-16 ENCOUNTER — OFFICE VISIT (OUTPATIENT)
Dept: NEUROLOGY | Facility: CLINIC | Age: 77
End: 2022-11-16
Attending: FAMILY MEDICINE
Payer: COMMERCIAL

## 2022-11-16 VITALS
DIASTOLIC BLOOD PRESSURE: 80 MMHG | HEIGHT: 68 IN | SYSTOLIC BLOOD PRESSURE: 116 MMHG | HEART RATE: 88 BPM | BODY MASS INDEX: 26.22 KG/M2 | WEIGHT: 173 LBS

## 2022-11-16 DIAGNOSIS — R74.8 ABNORMAL LEVELS OF OTHER SERUM ENZYMES: ICD-10-CM

## 2022-11-16 DIAGNOSIS — G25.0 BENIGN ESSENTIAL TREMOR: Primary | ICD-10-CM

## 2022-11-16 PROBLEM — R06.09 DYSPNEA ON EXERTION: Status: RESOLVED | Noted: 2022-09-22 | Resolved: 2022-11-16

## 2022-11-16 PROBLEM — R53.1 WEAKNESS: Status: RESOLVED | Noted: 2022-06-29 | Resolved: 2022-11-16

## 2022-11-16 PROBLEM — U07.1 INFECTION DUE TO 2019 NOVEL CORONAVIRUS: Status: RESOLVED | Noted: 2021-09-27 | Resolved: 2022-11-16

## 2022-11-16 PROBLEM — J18.9 COMMUNITY ACQUIRED PNEUMONIA: Status: RESOLVED | Noted: 2017-11-05 | Resolved: 2022-11-16

## 2022-11-16 PROBLEM — D61.818 PANCYTOPENIA (H): Status: RESOLVED | Noted: 2022-02-28 | Resolved: 2022-11-16

## 2022-11-16 PROBLEM — S30.0XXA: Status: RESOLVED | Noted: 2019-07-17 | Resolved: 2022-11-16

## 2022-11-16 PROBLEM — R10.12 LEFT UPPER QUADRANT PAIN: Status: RESOLVED | Noted: 2017-11-07 | Resolved: 2022-11-16

## 2022-11-16 PROBLEM — J86.9 EMPYEMA (H): Status: RESOLVED | Noted: 2022-02-28 | Resolved: 2022-11-16

## 2022-11-16 PROBLEM — G47.9 SLEEP DISTURBANCE: Status: RESOLVED | Noted: 2018-10-12 | Resolved: 2022-11-16

## 2022-11-16 PROCEDURE — 99205 OFFICE O/P NEW HI 60 MIN: CPT | Performed by: PSYCHIATRY & NEUROLOGY

## 2022-11-16 RX ORDER — ACETAMINOPHEN 500 MG
2 TABLET ORAL EVERY 6 HOURS PRN
Status: ON HOLD | COMMUNITY
End: 2023-05-30

## 2022-11-16 NOTE — PROGRESS NOTES
NEUROLOGY CONSULTATION NOTE       [facility] [place]  [address] [place] [address]  Tel: [phone]  Fax: [phone]  [email or web address]     Josemanuel Edmond,  1945, MRN [id number]  PCP: Lizzy Leiva  Date: 2022     ASSESSMENT & PLAN     Visit Diagnosis  Benign essential tremor     Benign essential tremor  77-year-old male with history of depression and anxiety, HTN, HLD, GERD, alcoholism in remission, A. fib s/p cardioversion who has benign essential tremor.  I have reassured him his symptoms do not suggest the diagnosis of Parkinson disease.  I have recommended:    1.  Check copper, ceruloplasmin and TSH  2.  At present patient has elected not to start any medication but if his symptoms bother him we can consider starting him on low-dose primidone.  3.  Follow-up will be on as needed basis    Thank you again for this referral, please feel free to contact me if you have any questions.    Adriel Hawkins MD  [facility]  (Formerly, [facility]     REASON FOR CONSULTATION Tremors        HISTORY OF PRESENT ILLNESS     We have been requested by Dr. Leiva to evaluate Josemanuel Edmond who is a 77 year old  male for tremors    Patient is a 77-year-old male with history of depression and anxiety, HTN, HLD, GERD, alcoholism in remission, A. fib s/p cardioversion who was referred for evaluation of tremors.  According to patient he had these symptoms for some time but progressively got worse.  These tend to get worse with intention.  Dialing phone numbers on phone is problematic also when he reaches for things his hands shake.  He denies any resting tremors or any gait difficulty.  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  He denies any head tremors or voice tremors.  There is no history of any autonomic symptoms, festinating gait or frequent falls     PROBLEM LIST    Patient Active Problem List   Diagnosis Code     Benign essential hypertension I10     Hypertrophy of prostate with urinary obstruction N40.1, N13.8     Osteoarthrosis, unspecified whether generalized or localized, pelvic region and thigh M16.10     Hyperlipidemia LDL goal <100 E78.5     Anxiety F41.9     GERD (gastroesophageal reflux disease) K21.9     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) F10.21     Hyperplastic Polyp Z86.010     Right knee DJD M17.11     Peripheral neuropathy G62.9     Hematoma of skin T14.8XXA     Benzodiazepine dependence (H) F13.20     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 Z77.090     Unilateral inguinal hernia without obstruction or gangrene K40.90     Atrial fibrillation status post cardioversion (H) I48.91     Depression with anxiety F41.8     Status post lung surgery Z98.890     Memory difficulties R41.3     Thrombocytopenia (H) D69.6     Tremor R25.1     Chronic diastolic heart failure (H) I50.32     Valvular heart disease I38         PAST MEDICAL & SURGICAL HISTORY     Past Medical History:   Patient  has a past medical history of Benign neoplasm of prostate (2000), Infection due to 2019 novel coronavirus (9/27/2021), S/P knee replacement (11/6/2012), and S/P left knee arthroscopy (8/15/2011).    Surgical History:  He  has a past surgical history that includes surgical history of -  (12/1/1999); joint replacement, hip rt/lt (10/07); joint replacemtn, knee rt/lt (8/2011); Colonoscopy (N/A, 12/10/2015); surgical history of -  (Left, 11/2017); Laparoscopic herniorrhaphy inguinal bilateral (Bilateral, 4/24/2018); Arthroplasty knee (Right, 10/12/2018); Phacoemulsification with standard intraocular lens implant (Right, 1/6/2021); and Phacoemulsification with standard intraocular lens implant (Left, 2/17/2021).     SOCIAL HISTORY     Reviewed, and he  reports that he quit smoking about 47 years ago. His smoking use included cigarettes. He has a 20.00 pack-year smoking history. He has never used smokeless  tobacco. He reports that he does not currently use alcohol. He reports that he does not use drugs.     FAMILY HISTORY     Reviewed, and family history includes Breast Cancer in his mother; Cerebrovascular Disease in his mother; Depression in his mother; Diabetes in his brother; Neurologic Disorder in his mother; Parkinsonism in his mother; Respiratory in his father.     ALLERGIES     Allergies   Allergen Reactions     Bactrim [Sulfamethoxazole W/Trimethoprim] Nausea and Vomiting         REVIEW OF SYSTEMS     A 12 point review of system was performed and was negative except as outlined in the history of present illness.     HOME MEDICATIONS     Current Outpatient Rx   Medication Sig Dispense Refill     acetaminophen (TYLENOL) 500 MG tablet Take 500-1,000 mg by mouth every 6 hours as needed for mild pain       buPROPion (WELLBUTRIN SR) 150 MG 12 hr tablet TAKE 1 TABLET TWICE A  tablet 3     busPIRone HCl (BUSPAR) 30 MG tablet TAKE 1 TABLET TWICE A  tablet 1     doxazosin (CARDURA) 8 MG tablet Take 0.5 tablets (4 mg) by mouth At Bedtime 90 tablet 3     finasteride (PROSCAR) 5 MG tablet TAKE 1 TABLET DAILY 90 tablet 3     furosemide (LASIX) 20 MG tablet Take 1 tablet (20 mg) by mouth daily 90 tablet 3     gabapentin (NEURONTIN) 600 MG tablet TAKE 1 TABLET FOUR TIMES A DAY (Patient taking differently: Take 600 mg by mouth 2 times daily Taking 1 tab qam and 2 tab at bedtime) 360 tablet 3     lamoTRIgine (LAMICTAL) 25 MG tablet Take 2 tablets (50 mg) by mouth 2 times daily 360 tablet 4     levomilnacipran (FETZIMA ER) 40 MG 24 hr capsule Take 80 mg by mouth daily       Metoprolol Tartrate 75 MG TABS Take 75 mg by mouth 2 times daily 180 tablet 1     XARELTO ANTICOAGULANT 20 MG TABS tablet TAKE 1 TABLET DAILY WITH   DINNER 90 tablet 3     escitalopram (LEXAPRO) 10 MG tablet Take 1 tablet (10 mg) by mouth daily 30 tablet 1         PHYSICAL EXAM     Vital signs  /80 (BP Location: Left arm, Patient  "Position: Sitting)   Pulse 88   Ht 1.727 m (5' 8\")   Wt 78.5 kg (173 lb)   BMI 26.30 kg/m      Weight:   173 lbs 0 oz    Elderly gentleman who is alert and oriented vital signs were reviewed and are documented in electronic medical record.  Neck supple.  Neurologically speech was normal cranial nerves II through XII are intact except he is hard of hearing no cogwheel rigidity he has bilateral intention tremor strength is 5/5 reflexes are 1+ toes equivocal.  He has decreased arm swing on the right but no cogwheel rigidity.  He is not unstable on turning     PERTINENT DIAGNOSTIC STUDIES     Following studies were reviewed:     MRI LUMBAR SPINE 5/1/2015  1. Degenerative disc disease L1-2 with small left central disc  protrusion, increased slightly since prior exam. No direct impingement  on neural structures.  2. Interval development of mild central stenosis L2-3 related to  multiple factors. Mild to moderate left and minimal right foraminal  stenosis at this level.  3. Mild central stenosis L3-4 related to multiple factors, increased  since the prior exam. Mild bilateral foraminal stenosis at this level.  4. Mild central stenosis L4-5 related to multiple factors, including a  grade 1 degenerative anterolisthesis which has increased since the  prior study. Moderate to severe right and mild left foraminal stenosis  at this level are unchanged.  5. Partial sacralization of the L5 vertebral segment on the left. No  impingement on neural structures at this level.     PERTINENT LABS  Following labs were reviewed:  Office Visit on 11/04/2022   Component Date Value     Cholesterol 11/04/2022 139      Triglycerides 11/04/2022 72      Direct Measure HDL 11/04/2022 37 (L)      LDL Cholesterol Calculat* 11/04/2022 88      Non HDL Cholesterol 11/04/2022 102      Sodium 11/04/2022 134 (L)      Potassium 11/04/2022 4.2      Chloride 11/04/2022 96 (L)      Carbon Dioxide (CO2) 11/04/2022 29      Anion Gap 11/04/2022 9      Urea " Nitrogen 11/04/2022 10.5      Creatinine 11/04/2022 0.94      Calcium 11/04/2022 9.2      Glucose 11/04/2022 85      Alkaline Phosphatase 11/04/2022 101      AST 11/04/2022 24      ALT 11/04/2022 18      Protein Total 11/04/2022 6.8      Albumin 11/04/2022 4.1      Bilirubin Total 11/04/2022 0.8      GFR Estimate 11/04/2022 82      TSH 11/04/2022 1.54      INR 11/04/2022 1.39 (H)      WBC Count 11/04/2022 4.8      RBC Count 11/04/2022 3.82 (L)      Hemoglobin 11/04/2022 12.5 (L)      Hematocrit 11/04/2022 37.4 (L)      MCV 11/04/2022 98      MCH 11/04/2022 32.7      MCHC 11/04/2022 33.4      RDW 11/04/2022 14.2      Platelet Count 11/04/2022 153      % Neutrophils 11/04/2022 65      % Lymphocytes 11/04/2022 22      % Monocytes 11/04/2022 10      % Eosinophils 11/04/2022 3      % Basophils 11/04/2022 1      % Immature Granulocytes 11/04/2022 0      Absolute Neutrophils 11/04/2022 3.1      Absolute Lymphocytes 11/04/2022 1.1      Absolute Monocytes 11/04/2022 0.5      Absolute Eosinophils 11/04/2022 0.1      Absolute Basophils 11/04/2022 0.0      Absolute Immature Granul* 11/04/2022 0.0    Lab on 09/01/2022   Component Date Value     Total Protein Serum for * 09/01/2022 6.6      Albumin 09/01/2022 4.0      Alpha 1 09/01/2022 0.3      Alpha 2 09/01/2022 0.6      Beta Globulin 09/01/2022 0.7      Gamma Globulin 09/01/2022 1.0      Monoclonal Peak 09/01/2022 0.0      ELP Interpretation 09/01/2022 Essentially normal electrophoretic pattern. No obvious monoclonal proteins seen. Pathologic significance requires clinical correlation. DENILSON Bruce M.D., Ph.D., Pathologist ().      Final Diagnosis 09/01/2022                      Value:This result contains rich text formatting which cannot be displayed here.     Comment 09/01/2022                      Value:This result contains rich text formatting which cannot be displayed here.     Clinical Information 09/01/2022                      Value:This result contains  rich text formatting which cannot be displayed here.     Peripheral Smear 09/01/2022                      Value:This result contains rich text formatting which cannot be displayed here.     Peripheral Hematologic D* 09/01/2022                      Value:This result contains rich text formatting which cannot be displayed here.     Performing Labs 09/01/2022                      Value:This result contains rich text formatting which cannot be displayed here.     WBC Count 09/01/2022 4.6      RBC Count 09/01/2022 3.67 (L)      Hemoglobin 09/01/2022 11.9 (L)      Hematocrit 09/01/2022 36.5 (L)      MCV 09/01/2022 100      MCH 09/01/2022 32.4      MCHC 09/01/2022 32.6      RDW 09/01/2022 13.5      Platelet Count 09/01/2022 129 (L)      % Neutrophils 09/01/2022 64      % Lymphocytes 09/01/2022 24      % Monocytes 09/01/2022 8      % Eosinophils 09/01/2022 3      % Basophils 09/01/2022 1      Absolute Neutrophils 09/01/2022 2.9      Absolute Lymphocytes 09/01/2022 1.1      Absolute Monocytes 09/01/2022 0.4      Absolute Eosinophils 09/01/2022 0.1      Absolute Basophils 09/01/2022 0.1      % Reticulocyte 09/01/2022 1.1      Absolute Reticulocyte 09/01/2022 0.040      Chromium 09/01/2022 9.8 (H)      [place] 09/01/2022 13.1 (H)    Hospital Outpatient Visit on 08/22/2022   Component Date Value     LVEF  08/22/2022 50-55%      Biplane LVEF 08/22/2022 53%         Total time spent for face to face visit, reviewing labs/imaging studies, counseling and coordination of care was: 1 Hour spent on the date of the encounter doing chart review, review of outside records, review of test results, interpretation of tests, patient visit and documentation     This note was dictated using voice recognition software.  Any grammatical or context distortions are unintentional and inherent to the software.    No orders of the defined types were placed in this encounter.     New Prescriptions    No medications on file      Modified Medications     No medications on file

## 2022-11-19 ENCOUNTER — HEALTH MAINTENANCE LETTER (OUTPATIENT)
Age: 77
End: 2022-11-19

## 2022-11-22 ENCOUNTER — LAB (OUTPATIENT)
Dept: LAB | Facility: CLINIC | Age: 77
End: 2022-11-22
Payer: COMMERCIAL

## 2022-11-22 DIAGNOSIS — F32.0 CURRENT MILD EPISODE OF MAJOR DEPRESSIVE DISORDER WITHOUT PRIOR EPISODE (H): ICD-10-CM

## 2022-11-22 DIAGNOSIS — G25.0 BENIGN ESSENTIAL TREMOR: ICD-10-CM

## 2022-11-22 DIAGNOSIS — R74.8 ABNORMAL LEVELS OF OTHER SERUM ENZYMES: ICD-10-CM

## 2022-11-22 LAB — TSH SERPL DL<=0.005 MIU/L-ACNC: 1.88 UIU/ML (ref 0.3–4.2)

## 2022-11-22 PROCEDURE — 82607 VITAMIN B-12: CPT

## 2022-11-22 PROCEDURE — 36415 COLL VENOUS BLD VENIPUNCTURE: CPT

## 2022-11-22 PROCEDURE — 82306 VITAMIN D 25 HYDROXY: CPT

## 2022-11-22 PROCEDURE — 82525 ASSAY OF COPPER: CPT | Mod: 90

## 2022-11-22 PROCEDURE — 84443 ASSAY THYROID STIM HORMONE: CPT

## 2022-11-22 PROCEDURE — 82390 ASSAY OF CERULOPLASMIN: CPT

## 2022-11-22 PROCEDURE — 99000 SPECIMEN HANDLING OFFICE-LAB: CPT

## 2022-11-22 PROCEDURE — 82746 ASSAY OF FOLIC ACID SERUM: CPT

## 2022-11-22 PROCEDURE — 84270 ASSAY OF SEX HORMONE GLOBUL: CPT

## 2022-11-22 PROCEDURE — 84403 ASSAY OF TOTAL TESTOSTERONE: CPT

## 2022-11-23 LAB
CERULOPLASMIN SERPL-MCNC: 38 MG/DL (ref 20–60)
DEPRECATED CALCIDIOL+CALCIFEROL SERPL-MC: 39 UG/L (ref 20–75)
FOLATE SERPL-MCNC: 17.5 NG/ML (ref 4.6–34.8)
SHBG SERPL-SCNC: 75 NMOL/L (ref 11–80)
VIT B12 SERPL-MCNC: 683 PG/ML (ref 232–1245)

## 2022-11-25 LAB — COPPER SERPL-MCNC: 159.1 UG/DL

## 2022-11-28 LAB
TESTOST FREE SERPL-MCNC: 2.88 NG/DL
TESTOST SERPL-MCNC: 264 NG/DL (ref 240–950)

## 2022-11-29 ENCOUNTER — OFFICE VISIT (OUTPATIENT)
Dept: PHARMACY | Facility: CLINIC | Age: 77
End: 2022-11-29
Payer: COMMERCIAL

## 2022-11-29 VITALS — SYSTOLIC BLOOD PRESSURE: 127 MMHG | HEART RATE: 88 BPM | DIASTOLIC BLOOD PRESSURE: 84 MMHG

## 2022-11-29 DIAGNOSIS — F41.8 DEPRESSION WITH ANXIETY: Primary | ICD-10-CM

## 2022-11-29 DIAGNOSIS — G62.89 OTHER POLYNEUROPATHY: ICD-10-CM

## 2022-11-29 PROCEDURE — 99606 MTMS BY PHARM EST 15 MIN: CPT | Performed by: PHARMACIST

## 2022-11-29 PROCEDURE — 99607 MTMS BY PHARM ADDL 15 MIN: CPT | Performed by: PHARMACIST

## 2022-11-29 RX ORDER — MULTIVITAMIN
1 TABLET ORAL DAILY
Qty: 30 TABLET | Refills: 0 | COMMUNITY
Start: 2022-11-29

## 2022-11-29 NOTE — PATIENT INSTRUCTIONS
"Recommendations from today's MTM visit:                                                       Gavin,    It was a pleasure talking with you! We discussed the following:    Continue with current dose of Lexapro 10 mg once daily. Continue to monitor mood, social anxiety and weight gain.    2.  Continue on current dose of gabapentin 600 mg twice daily - in two weeks we will discuss lowering your dose to 600 mg once daily.     Follow-up: Tuesday, December 13th at 11:30 AM    It was great speaking with you today.  I value your experience and would be very thankful for your time in providing feedback in our clinic survey. In the next few days, you may receive an email or text message from [facility] [employer] with a link to a survey related to your  clinical pharmacist.\"     To schedule another MTM appointment, please call the clinic directly or you may call the MTM scheduling line at [phone] or toll-free at [phone].     My Clinical Pharmacist's contact information:                                                      Please feel free to contact me with any questions or concerns you have.      Keep up the good work!!    Marian Correa, PharmD  [phone] in clinic on Tuesday and Wednesday        "

## 2022-11-29 NOTE — PROGRESS NOTES
Medication Therapy Management (MTM) Encounter    ASSESSMENT:                            Medication Adherence/Access: No issues identified    Neuropathy: stable, continue on current dose of gabapentin 600 mg twice daily - in two weeks we will discuss lowering gabapentin dose to 600 mg once daily.     Depression/Anxiety: depression is improved slightly - no change in social anxiety - continue with Lexapro 10 mg once daily. Continue to monitor social anxiety. Recommend counseling, patient not interested at this time.     PLAN:                            1. Continue with current dose of Lexapro 10 mg once daily. Continue to monitor mood, social anxiety and weight gain.    2.  Continue on current dose of gabapentin 600 mg twice daily - in two weeks we will discuss lowering your dose to 600 mg once daily.     Follow-up: Tuesday, December 13th at 11:30 AM    SUBJECTIVE/OBJECTIVE:                          Josemanuel Edmond is a 77 year old male coming in for a follow-up visit.  Today's visit is a follow-up MTM visit from 11/15/2022.     Reason for visit: follow up MTM visit.    Tobacco: He reports that he quit smoking about 47 years ago. His smoking use included cigarettes. He has a 20.00 pack-year smoking history. He has never used smokeless tobacco.  Alcohol: history of alcohol dependence and not currently using    Medication Adherence/Access: no issues reported    Neuropathy: Patient met with neurology - he does not have parkinson's - he has an essential tremor, he declined starting medication at this time. No change in neuropathy with lower gabapentin dose. Mood has improved, no change in social anxiety. No change in tremor with lower gabapentin dose.     Depression/Anxiety: Patient taking Lexapro 10 mg once daily and is tapering off of Fetzima (currently taking 40 mg every other day - is done the end of this week). Patient states he knows why he went off of Lexapro - he has gained about 4 lbs in 2 weeks. This was not  mentioned anywhere that I was able to find in patient's chart - patient feels this is manageable with diet changes. Patient feels his depression is still there, but it's not quite as bad, he states it tends to vary day by day. There is no change in his social anxiety. He is still undecided about seeing a counselor.     Patient is also taking - lamotrigine 50 mg twice daily, bupropion 150 mg twice daily and buspirone 30 mg twice daily. Tolerating his medications well.     CAROLYNN-7 SCORE 6/16/2022 8/18/2022 11/4/2022   Total Score - - -   Total Score 3 (minimal anxiety) - 4 (minimal anxiety)   Total Score 3 3 4     PHQ 10/26/2022 11/4/2022 11/7/2022   PHQ-9 Total Score 12 6 9   Q9: Thoughts of better off dead/self-harm past 2 weeks Not at all Not at all Not at all     Today's Vitals: /84   Pulse 88   ----------------    I spent 30 minutes with this patient today. All changes were made via collaborative practice agreement with Lizzy Leiva MD. A copy of the visit note was provided to the patient's provider(s).    A summary of these recommendations was given to the patient.    Marian Correa, PharmD  Medication Therapy Management     Distant Location (provider location):  On-site  Provider has received verbal consent for a visit from the patient? Yes     Medication Therapy Recommendations  No medication therapy recommendations to display

## 2022-12-13 ENCOUNTER — VIRTUAL VISIT (OUTPATIENT)
Dept: PHARMACY | Facility: CLINIC | Age: 77
End: 2022-12-13
Payer: COMMERCIAL

## 2022-12-13 DIAGNOSIS — F41.8 DEPRESSION WITH ANXIETY: Primary | ICD-10-CM

## 2022-12-13 PROCEDURE — 99606 MTMS BY PHARM EST 15 MIN: CPT | Performed by: PHARMACIST

## 2022-12-13 PROCEDURE — 99607 MTMS BY PHARM ADDL 15 MIN: CPT | Performed by: PHARMACIST

## 2022-12-13 RX ORDER — ESCITALOPRAM OXALATE 20 MG/1
20 TABLET ORAL DAILY
Qty: 30 TABLET | Refills: 1 | Status: SHIPPED | OUTPATIENT
Start: 2022-12-13 | End: 2023-02-09

## 2022-12-13 NOTE — PROGRESS NOTES
Medication Therapy Management (MTM) Encounter    ASSESSMENT:                            Medication Adherence/Access: No issues identified    Depression/Anxiety: needs improvement. Increase Lexapro to 20 mg once daily - I sent a prescription to patient's pharmacy. Increase gabapentin back up to 2400 mg daily (okay to take 1200 mg twice daily) - helping his anxiety.       PLAN:                            1.  Increase Lexapro to 20 mg once daily - I sent a prescription to patient's pharmacy.    2.  Increase gabapentin back up to 2400 mg daily (okay to take 1200 mg twice daily) - helping his anxiety.     Follow-up: Tuesday, January 3rd at 10:00 AM    SUBJECTIVE/OBJECTIVE:                          Josemanuel Edmond is a 77 year old male called for a follow-up visit.  Today's visit is a follow-up MTM visit from 11/29/2022.     Reason for visit: follow up MTM visit.    Tobacco: He reports that he quit smoking about 47 years ago. His smoking use included cigarettes. He has a 20.00 pack-year smoking history. He has never used smokeless tobacco.  Alcohol: history of alcohol dependence and not currently using    Medication Adherence/Access: no issues reported    Depression/Anxiety: Patient taking Lexapro 10 mg once daily. Weight gain is doing fine - depression and social anxiety unchanged - states not anxious at home, but there all the time when he goes someplace. Feels depression is about the same as when taking Fetzima. He went up on his dose of gabapentin to 600 mg in the morning and 1200 mg in the evening - he feels it helps his anxiety.     Patient is also taking - lamotrigine 50 mg twice daily, bupropion 150 mg twice daily and buspirone 30 mg twice daily. Tolerating his medications well.     CAROYLNN-7 SCORE 6/16/2022 8/18/2022 11/4/2022   Total Score - - -   Total Score 3 (minimal anxiety) - 4 (minimal anxiety)   Total Score 3 3 4     PHQ 10/26/2022 11/4/2022 11/7/2022   PHQ-9 Total Score 12 6 9   Q9: Thoughts of better off  dead/self-harm past 2 weeks Not at all Not at all Not at all     ----------------    I spent 20 minutes with this patient today. All changes were made via collaborative practice agreement with Lizzy Leiva MD. A copy of the visit note was provided to the patient's provider(s).    A summary of these recommendations was sent via [employer].    Fatuma MoctezumaD  Medication Therapy Management     Telemedicine Visit Details  Type of service:  Telephone visit  Start Time: 11:30 AM  End Time: 11:50 AM  Originating Location (pt. Location): Home    Distant Location (provider location):  On-site  Provider has received verbal consent for a visit from the patient? Yes     Medication Therapy Recommendations  Depression with anxiety    Current Medication: escitalopram (LEXAPRO) 20 MG tablet   Rationale: Dose too low - Dosage too low - Effectiveness   Recommendation: Increase Dose   Status: Accepted per CPA          Current Medication: gabapentin (NEURONTIN) 600 MG tablet   Rationale: Dose too low - Dosage too low - Effectiveness   Recommendation: Increase Dose   Status: Accepted per CPA

## 2022-12-13 NOTE — PATIENT INSTRUCTIONS
"Recommendations from today's MTM visit:                                                       Gavin,    It was a pleasure talking with you! We discussed the followin.  Increase Lexapro to 20 mg once daily - I sent a prescription to your pharmacy.    2.  Increase gabapentin back up to 2400 mg daily (okay to take 1200 mg twice daily) - this is helping with your anxiety.     Follow-up:  at 10:00 AM    It was great speaking with you today.  I value your experience and would be very thankful for your time in providing feedback in our clinic survey. In the next few days, you may receive an email or text message from [facility] [employer] with a link to a survey related to your  clinical pharmacist.\"     To schedule another MTM appointment, please call the clinic directly or you may call the MTM scheduling line at [phone] or toll-free at [phone].     My Clinical Pharmacist's contact information:                                                      Please feel free to contact me with any questions or concerns you have.      Keep up the good work!!    Marian Correa, PharmD  [phone] in clinic on Tuesday and Wednesday        "

## 2022-12-25 DIAGNOSIS — F40.10 SOCIAL ANXIETY DISORDER: ICD-10-CM

## 2022-12-26 DIAGNOSIS — F32.1 MODERATE MAJOR DEPRESSION (H): ICD-10-CM

## 2022-12-26 RX ORDER — BUSPIRONE HYDROCHLORIDE 30 MG/1
TABLET ORAL
Qty: 180 TABLET | Refills: 1 | Status: SHIPPED | OUTPATIENT
Start: 2022-12-26 | End: 2023-06-26

## 2022-12-26 NOTE — TELEPHONE ENCOUNTER
"Requested Prescriptions   Pending Prescriptions Disp Refills    lamoTRIgine (LAMICTAL) 25 MG tablet [Pharmacy Med Name: lamotrigine 25 mg tablet] 120 tablet 1     Sig: TAKE 2 TABLETS BY MOUTH TWICE DAILY       Anti-Seizure Meds Protocol  Failed - 12/26/2022  3:51 PM        Failed - Review Authorizing provider's last note.      Refer to last progress notes: confirm request is for original authorizing provider (cannot be through other providers).          Failed - Normal CBC on file in past 26 months     Recent Labs   Lab Test 11/04/22  1027   WBC 4.8   RBC 3.82*   HGB 12.5*   HCT 37.4*                    Passed - Recent (12 mo) or future (30 days) visit within the authorizing provider's specialty     Patient has had an office visit with the authorizing provider or a provider within the authorizing providers department within the previous 12 mos or has a future within next 30 days. See \"Patient Info\" tab in inbasket, or \"Choose Columns\" in Meds & Orders section of the refill encounter.              Passed - Normal ALT or AST on file in past 26 months     Recent Labs   Lab Test 11/04/22  1027   ALT 18     Recent Labs   Lab Test 11/04/22  1027   AST 24             Passed - Normal platelet count on file in past 26 months     Recent Labs   Lab Test 11/04/22  1027                  Passed - Medication is active on med list             "

## 2022-12-27 RX ORDER — LAMOTRIGINE 25 MG/1
TABLET ORAL
Qty: 120 TABLET | Refills: 1 | Status: SHIPPED | OUTPATIENT
Start: 2022-12-27 | End: 2023-02-16

## 2023-01-03 ENCOUNTER — VIRTUAL VISIT (OUTPATIENT)
Dept: PHARMACY | Facility: CLINIC | Age: 78
End: 2023-01-03
Payer: COMMERCIAL

## 2023-01-03 DIAGNOSIS — I10 BENIGN ESSENTIAL HYPERTENSION: ICD-10-CM

## 2023-01-03 DIAGNOSIS — F41.8 DEPRESSION WITH ANXIETY: Primary | ICD-10-CM

## 2023-01-03 DIAGNOSIS — G62.89 OTHER POLYNEUROPATHY: ICD-10-CM

## 2023-01-03 DIAGNOSIS — Z78.9 TAKES DIETARY SUPPLEMENTS: ICD-10-CM

## 2023-01-03 DIAGNOSIS — I48.20 CHRONIC ATRIAL FIBRILLATION (H): ICD-10-CM

## 2023-01-03 DIAGNOSIS — N40.0 BENIGN PROSTATIC HYPERPLASIA, UNSPECIFIED WHETHER LOWER URINARY TRACT SYMPTOMS PRESENT: ICD-10-CM

## 2023-01-03 PROCEDURE — 99605 MTMS BY PHARM NP 15 MIN: CPT | Performed by: PHARMACIST

## 2023-01-03 PROCEDURE — 99607 MTMS BY PHARM ADDL 15 MIN: CPT | Performed by: PHARMACIST

## 2023-01-03 RX ORDER — BUPROPION HYDROCHLORIDE 200 MG/1
200 TABLET, EXTENDED RELEASE ORAL 2 TIMES DAILY
Qty: 180 TABLET | Refills: 1 | Status: SHIPPED | OUTPATIENT
Start: 2023-01-03 | End: 2023-01-31 | Stop reason: DRUGHIGH

## 2023-01-03 NOTE — LETTER
_  Medication List        Prepared on: Chinmay 3, 2023     Bring your Medication List when you go to the doctor, hospital, or   emergency room. And, share it with your family or caregivers.     Note any changes to how you take your medications.  Cross out medications when you no longer use them.    Medication How I take it Why I use it Prescriber   acetaminophen (TYLENOL) 500 MG tablet Take 500-1,000 mg by mouth every 6 hours as needed for mild pain Pain Patient Reported   buPROPion (WELLBUTRIN SR) 200 MG 12 hr tablet Take 1 tablet (200 mg) by mouth 2 times daily Depression with Anxiety Lizzy Leiva MD   busPIRone HCl (BUSPAR) 30 MG tablet TAKE 1 TABLET TWICE A DAY Social Anxiety Disorder Lizzy Leiva MD   doxazosin (CARDURA) 8 MG tablet Take 0.5 tablets (4 mg) by mouth At Bedtime Hypertrophy of Prostate with Urinary Obstruction Lizzy Leiva MD   escitalopram (LEXAPRO) 20 MG tablet Take 1 tablet (20 mg) by mouth daily Depression with Anxiety Lizzy Leiva MD   finasteride (PROSCAR) 5 MG tablet TAKE 1 TABLET DAILY Benign prostatic hyperplasia with weak urinary stream Marilynn Bliss, KAYLA CNP   furosemide (LASIX) 20 MG tablet Take 1 tablet (20 mg) by mouth daily Chronic Atrial Fibrillation (H) Nazanin Caballero, KAYLA CNP   gabapentin (NEURONTIN) 600 MG tablet TAKE 1 TABLET FOUR TIMES A DAY Anxiety Lizzy Leiva MD   lamoTRIgine (LAMICTAL) 25 MG tablet TAKE 2 TABLETS BY MOUTH TWICE DAILY Moderate Major Depression (H) KAYLA Cam CNP   Metoprolol Tartrate 75 MG TABS Take 75 mg by mouth 2 times daily Chronic Atrial Fibrillation (H); Dyspnea on Exertion Lizzy Leiva MD   multivitamin (ONE-DAILY) tablet Take 1 tablet by mouth daily General Health   Lizzy Leiva MD   XARELTO ANTICOAGULANT 20 MG TABS tablet TAKE 1 TABLET DAILY WITH   DINNER Chronic Atrial Fibrillation (H) Lizzy Leiva MD         Add new  medications, over-the-counter drugs, herbals, vitamins, or  minerals in the blank rows below.    Medication How I take it Why I use it Prescriber                          Allergies:      bactrim [sulfamethoxazole w/trimethoprim]        Side effects I have had:               Other Information:              My notes and questions:

## 2023-01-03 NOTE — LETTER
January 4, 2023  Josemanuel Edmond  [address] [place] [address]    Dear J CARLOS [facility]     Thank you for talking with me on Chinmay 3, 2023 about your health and medications. As a follow-up to our conversation, I have included two documents:      1. Your Recommended To-Do List has steps you should take to get the best results from your medications.  2. Your Medication List will help you keep track of your medications and how to take them.    If you want to talk about these documents, please call Marian Correa RPH at phone: [phone], Monday-Friday 8-4:30pm.    I look forward to working with you and your doctors to make sure your medications work well for you.    Sincerely,  Marian Correa RPH  [facility] Pharmacist, [facility]

## 2023-01-03 NOTE — PROGRESS NOTES
Medication Therapy Management (MTM) Encounter    ASSESSMENT:                            Medication Adherence/Access: No issues identified    Depression/Anxiety: Anxiety and depression not controlled. Patient denies history of seizures, per Gene Sight study for patient's gene/drug interaction for bupropion - serum level may be too low, higher doses may be required. Will try increase in bupropion dose, 200 mg twice daily. I sent a new prescription to patient's pharmacy.     Hypertension/Atrial Fibrillation: stable    BPH: stable    Neuropathy: stable    Supplements: stable    PLAN:                            1. Increase bupropion to 200 mg twice daily - I sent a prescription to patient's pharmacy.    Follow-up: Tuesday, January 31st at 10:00 AM    SUBJECTIVE/OBJECTIVE:                          Josemanuel Edmond is a 77 year old male called for a follow-up visit.  Today's visit is a follow-up MTM visit from 12/13/2022.     Reason for visit: follow up MTM visit. Comprehensive Medication Review (CMR).    Allergies/ADRs: Reviewed in chart  Past Medical History: Reviewed in chart  Tobacco: He reports that he quit smoking about 47 years ago. His smoking use included cigarettes. He has a 20.00 pack-year smoking history. He has never used smokeless tobacco.  Alcohol: history of alcohol dependence and not currently using    Medication Adherence/Access: no issues reported    Depression/Anxiety: Patient taking Lexapro 20 mg once daily. Patient states no changes at all - today feeling kind of panicky. States he doesn't feel Fetzima was working either - not enough to make it worth the expense. Has an upcoming social event that he is anxious about. Also states it is difficult for him to tell the difference between depression and anxiety at times. Patient is also taking - lamotrigine 50 mg twice daily, bupropion 150 mg twice daily and buspirone 30 mg twice daily. Tolerating his medications well.     Per patient's chart: Additional  information Per Rylee Kyle, CNP note:   Lexapro (ineffective), Prozac (helps depression, but anxiety increase), Hydroxyzine (oversedation, PCP trial), Wellbutrin XL, Xanax, Klonopin, Valium, Ativan, Remeron (30 mg only, worked well for sleep and anxiety, but weight gain), Nortriptyline, Vistaril (dry mouth, nightmares), Remeron (oversedation at 45 mg, 22.5mg and 15 mg and wt gain), Trazodone (oversedate at 50 mg), Ambien (effective for sleep, fall), Belsomra not covered with insurance.    Has also tried duloxetine - side effects and Prestiq - not effective. Venlafaxine didn't agree with him. Has also tried Fetzima for a short time - states didn't feel effective and too expensive, patient does not qualify for patient assistance. States doesn't feel his medications have ever really helped. Wife is a social butterfly - has problems with social anxiety quite often.     CAROLYNN-7 SCORE 6/16/2022 8/18/2022 11/4/2022   Total Score - - -   Total Score 3 (minimal anxiety) - 4 (minimal anxiety)   Total Score 3 3 4     PHQ 10/26/2022 11/4/2022 11/7/2022   PHQ-9 Total Score 12 6 9   Q9: Thoughts of better off dead/self-harm past 2 weeks Not at all Not at all Not at all       Hypertension/Atrial Fibrillation: Current medications include metoprolol 75 mg twice daily and Xarelto 20 mg with dinner. Patient reports no current medication side effects.    BP Readings from Last 3 Encounters:   11/29/22 127/84   11/16/22 116/80   11/07/22 122/82     BPH: taking finasteride 5 mg daily and doxazosin 4 mg at bedtime. Tolerating well.     Neuropathy: Taking gabapentin 1200 mg twice daily. Patient has tried lowering his gabapentin dose - needed to increase it again, felt it helps his anxiety.     Supplements: taking a daily multivitamin.     ----------------    I spent 20 minutes with this patient today. All changes were made via collaborative practice agreement with Lizzy Leiva MD. A copy of the visit note was provided to the  patient's provider(s).    A summary of these recommendations was sent via [employer].    Marian Correa, PharmD  Medication Therapy Management     Telemedicine Visit Details  Type of service:  Telephone visit  Start Time: 10:00 AM  End Time: 10:20 AM     Medication Therapy Recommendations  Depression with anxiety    Current Medication: buPROPion (WELLBUTRIN SR) 200 MG 12 hr tablet   Rationale: Dose too low - Dosage too low - Effectiveness   Recommendation: Increase Dose   Status: Accepted per CPA

## 2023-01-03 NOTE — LETTER
"Recommended To-Do List      Prepared on: Chinmay 3, 2023       You can get the best results from your medications by completing the items on this \"To-Do List.\"      Bring your To-Do List when you go to your doctor. And, share it with your family or caregivers.    My To-Do List:  What we talked about: What I should do:   Your medication dosage being too low    Increase your dosage of buPROPion (WELLBUTRIN SR) to 200 mg twice daily - I sent a new prescription to your pharmacy          What we talked about: What I should do:                       " Attending with

## 2023-01-04 NOTE — PATIENT INSTRUCTIONS
"Recommendations from today's MTM visit:                                                       Gavin,    It was a pleasure talking with you! We discussed the followin. Increase bupropion to 200 mg twice daily - I sent a prescription to your pharmacy.    Follow-up:  at 10:00 AM    It was great speaking with you today.  I value your experience and would be very thankful for your time in providing feedback in our clinic survey. In the next few days, you may receive an email or text message from [facility] [employer] with a link to a survey related to your  clinical pharmacist.\"     To schedule another MTM appointment, please call the clinic directly or you may call the MTM scheduling line at [phone] or toll-free at [phone].     My Clinical Pharmacist's contact information:                                                      Please feel free to contact me with any questions or concerns you have.      Keep up the good work!!    Marian Correa, PharmD  [phone] in clinic on Tuesday and Wednesday        "

## 2023-01-11 ENCOUNTER — VIRTUAL VISIT (OUTPATIENT)
Dept: CARDIOLOGY | Facility: CLINIC | Age: 78
End: 2023-01-11
Attending: INTERNAL MEDICINE
Payer: COMMERCIAL

## 2023-01-11 VITALS
BODY MASS INDEX: 25.39 KG/M2 | HEART RATE: 81 BPM | SYSTOLIC BLOOD PRESSURE: 108 MMHG | DIASTOLIC BLOOD PRESSURE: 80 MMHG | WEIGHT: 167 LBS

## 2023-01-11 DIAGNOSIS — I48.20 CHRONIC ATRIAL FIBRILLATION (H): ICD-10-CM

## 2023-01-11 DIAGNOSIS — R06.09 DYSPNEA ON EXERTION: ICD-10-CM

## 2023-01-11 PROCEDURE — 99214 OFFICE O/P EST MOD 30 MIN: CPT | Mod: 95 | Performed by: INTERNAL MEDICINE

## 2023-01-11 NOTE — LETTER
1/11/2023    Lizzy Leiva MD  [address]  [facility] [address]    RE: Josemanuel Edmond       Dear Colleague,     I had the pleasure of seeing Josemanuel Edmond in the [facility].      Gavin is a 77 year old who is being evaluated via a billable telephone visit.      What phone number would you like to be contacted at? [phone]  How would you like to obtain your AVS? MyChart    Distant Location (provider location):   Phone call duration: 10:30-11:00 AM       PROVIDER NOTES:     This is a pleasant 76 yr old with PMH AF, HTN, HLD, COVID + in September 2021, Moderate MR, TR here for followup. He developed AFib after undergoing thoractomy for empyema 11/2017. Was started on Xarelto and cardioverted. Came to clinic 10/19/21 with palpitations--EKG done and showed AFib. PCP in October then increased metoprolol to 50 mg bid then 75 mg bid. Underwent 9 day Zio 11/17/21: AFib (100% burden) with avg HR 83. Echocardiogram last done in 2017, normal LV function. TSH was wnl from past. Stable kidney function. He has been on stable metoprolol dosing with rate control       ROS negative for chest pain. + palpitations, [place], mild LE edema. Today he is here to review studies, prior labs. He is active daily in summer with getting boat ready (has a pontoon) and stays active on treadmill in the winter. His breathing overall stable since last visit. No longer taking prn lasix.     ASSESSMENT/PLAN:     1. AFIB: Average heart rates on HOlter monitor in the 80s, max 115 bpm which is well controlled  -continue rate control for now, 100% burden and severe LA  - metoprolol 75 mg twice a day  -repeated echocardiogram given [place] and evaluate for tachy-mediated cardiomyopathy which he doesn't have  -continue rivaroxaban indefinitely   -will continue lasix 20 mg daily as needed, walking on treadmill daily     2. HTN: controlled, on metoprolol 75 mg bid     3. HLD: on statin, yearly lipids - controlled    4.  MR and TR: stable, will follow by echocardiogram once a year     Sarah Chapin MD [facility]    Current Outpatient Medications   Medication     acetaminophen (TYLENOL) 500 MG tablet     buPROPion (WELLBUTRIN SR) 200 MG 12 hr tablet     busPIRone HCl (BUSPAR) 30 MG tablet     doxazosin (CARDURA) 8 MG tablet     escitalopram (LEXAPRO) 20 MG tablet     finasteride (PROSCAR) 5 MG tablet     furosemide (LASIX) 20 MG tablet     gabapentin (NEURONTIN) 600 MG tablet     lamoTRIgine (LAMICTAL) 25 MG tablet     Metoprolol Tartrate 75 MG TABS     multivitamin (ONE-DAILY) tablet     XARELTO ANTICOAGULANT 20 MG TABS tablet     No current facility-administered medications for this visit.           Thank you for allowing me to participate in the care of your patient.      Sincerely,     Sarah Chapin MD     [facility]  cc:   Sarah Chapin MD  [address] [place] [address]

## 2023-01-11 NOTE — PROGRESS NOTES
Gavin is a 77 year old who is being evaluated via a billable telephone visit.      What phone number would you like to be contacted at? [phone]  How would you like to obtain your AVS? Ezequiel    Distant Location (provider location):   Phone call duration: 10:30-11:00 AM       PROVIDER NOTES:     This is a pleasant 76 yr old with PMH AF, HTN, HLD, COVID + in September 2021, Moderate MR, TR here for followup. He developed AFib after undergoing thoractomy for empyema 11/2017. Was started on Xarelto and cardioverted. Came to clinic 10/19/21 with palpitations--EKG done and showed AFib. PCP in October then increased metoprolol to 50 mg bid then 75 mg bid. Underwent 9 day Zio 11/17/21: AFib (100% burden) with avg HR 83. Echocardiogram last done in 2017, normal LV function. TSH was wnl from past. Stable kidney function. He has been on stable metoprolol dosing with rate control       ROS negative for chest pain. + palpitations, [place], mild LE edema. Today he is here to review studies, prior labs. He is active daily in summer with getting boat ready (has a pontoon) and stays active on treadmill in the winter. His breathing overall stable since last visit. No longer taking prn lasix.     ASSESSMENT/PLAN:     1. AFIB: Average heart rates on HOlter monitor in the 80s, max 115 bpm which is well controlled  -continue rate control for now, 100% burden and severe LA  - metoprolol 75 mg twice a day  -repeated echocardiogram given [place] and evaluate for tachy-mediated cardiomyopathy which he doesn't have  -continue rivaroxaban indefinitely   -will continue lasix 20 mg daily as needed, walking on treadmill daily     2. HTN: controlled, on metoprolol 75 mg bid     3. HLD: on statin, yearly lipids - controlled    4. MR and TR: stable, will follow by echocardiogram once a year     Sarah Chapin MD MSc  [facility]    Current Outpatient Medications   Medication     acetaminophen (TYLENOL) 500 MG tablet     buPROPion  (WELLBUTRIN SR) 200 MG 12 hr tablet     busPIRone HCl (BUSPAR) 30 MG tablet     doxazosin (CARDURA) 8 MG tablet     escitalopram (LEXAPRO) 20 MG tablet     finasteride (PROSCAR) 5 MG tablet     furosemide (LASIX) 20 MG tablet     gabapentin (NEURONTIN) 600 MG tablet     lamoTRIgine (LAMICTAL) 25 MG tablet     Metoprolol Tartrate 75 MG TABS     multivitamin (ONE-DAILY) tablet     XARELTO ANTICOAGULANT 20 MG TABS tablet     No current facility-administered medications for this visit.

## 2023-01-31 ENCOUNTER — VIRTUAL VISIT (OUTPATIENT)
Dept: PHARMACY | Facility: CLINIC | Age: 78
End: 2023-01-31
Payer: COMMERCIAL

## 2023-01-31 DIAGNOSIS — F41.8 DEPRESSION WITH ANXIETY: Primary | ICD-10-CM

## 2023-01-31 PROCEDURE — 99607 MTMS BY PHARM ADDL 15 MIN: CPT | Performed by: PHARMACIST

## 2023-01-31 PROCEDURE — 99606 MTMS BY PHARM EST 15 MIN: CPT | Performed by: PHARMACIST

## 2023-01-31 RX ORDER — BUPROPION HYDROCHLORIDE 150 MG/1
150 TABLET, EXTENDED RELEASE ORAL 2 TIMES DAILY
COMMUNITY
End: 2023-07-24

## 2023-01-31 NOTE — PATIENT INSTRUCTIONS
"Recommendations from today's MTM visit:                                                       Gavin,    It was a pleasure talking with you! We discussed the followin. Decrease your bupropion back to 150 mg twice daily - monitor your tremor and falls.     2.  It sounds like your main concern is your social anxiety. Propranolol may help with your tremor and social anxiety, however you are taking metoprolol for your atrial fibrillation. I will discuss with Dr. Leiva - to see if we could consider addition of low dose of propranolol for when you are going out in public or review with cardiology to see if you can switch from metoprolol to propranolol.     Follow-up:  at 1:00 PM    It was great speaking with you today.  I value your experience and would be very thankful for your time in providing feedback in our clinic survey. In the next few days, you may receive an email or text message from [employer] with a link to a survey related to your  clinical pharmacist.\"     To schedule another MTM appointment, please call the clinic directly or you may call the MTM scheduling line at [phone] or toll-free at [phone].     My Clinical Pharmacist's contact information:                                                      Please feel free to contact me with any questions or concerns you have.      Keep up the good work!!    Marian Correa, PharmD  [phone] in clinic on Tuesday and Wednesday        "

## 2023-01-31 NOTE — PROGRESS NOTES
Medication Therapy Management (MTM) Encounter    ASSESSMENT:                            Medication Adherence/Access: No issues identified    Depression/Anxiety: Decrease bupropion back down to 150 mg twice daily (patient has a lot of bupropion 150 mg tablets left, prefers to wait until our next visit for new prescription). Monitor tremor and falls, mood, and anxiety. Bupropion has risk for tremor of 2-21.2% and may be contributing to his tremors, if no change - patient to follow up with neurology. Patient has tried most all anti-depressants with no change in his anxiety. He primarily feels his social anxiety is the biggest limiting factor for him. He has declined counseling and ketamine. He is taking metoprolol so propranolol has not been tried. I will discuss this with Dr. Leiva - may be able to see if it helps by starting with low dose propranolol prior to going out for a social event. If effective, may be able to review with cardiology and switch from metoprolol to propranolol.     PLAN:                            1. Decrease bupropion back to 150 mg twice daily - monitor tremor and falls, mood and anxiety - if tremor is improving but not gone - could consider coming off of bupropion, if no change in tremors, patient to follow up with neurology.     2. Patient declined counseling and Ketamine. His main concern is social anxiety - which is his biggest limiting factor and feels he wouldn't be anxious if not for his social anxiety. Patient is currently taking metoprolol for his atrial fibrillation. I will discuss with Dr. Leiva - propranolol may help with his social anxiety and his tremor, could consider addition of low dose when going out in public or review with cardiology to see if patient can switch from metoprolol to propranolol.     Follow-up: February 7th at 1:00 PM    SUBJECTIVE/OBJECTIVE:                          Josemanuel Edmond is a 77 year old male called for a follow-up visit.  Today's visit is a  "follow-up MT visit from 1/3/2023. Patient's wife Yoselyn joined us for part of patient's visit.     Reason for visit: follow up MT visit.    Tobacco: He reports that he quit smoking about 47 years ago. His smoking use included cigarettes. He has a 20.00 pack-year smoking history. He has never used smokeless tobacco.  Alcohol: history of alcohol dependence and not currently using    Medication Adherence/Access: no issues reported    Depression/Anxiety: Patient taking Lexapro 20 mg once daily, lamotrigine 50 mg twice daily, bupropion 200 mg twice daily (dose increased at our last visit), and buspirone 30 mg twice daily. Patient was previously on Fetzima - did not feel is was working at 80 mg daily and too expensive. He feels that his tremor has gotten worse since increasing bupropion dose - per neurology visit from 11/16/2022 - \"he does not have parkinson's - he has an essential tremor, he declined starting medication at this time\". Patient feels there is no change in his depression or anxiety since increase in bupropion dose. Overall he feels his depression is stable (not good) and anxiety is getting gradually worse. Patient states if he never had to go to a social event, he would not have any anxiety. Patient's wife states that he sleeps 80% of the time - wakes up tired. Patient seemed somewhat confused about his medication doses - had to go get the bottles of what he was taking. Patient is not seeing a counselor, his wife agreed \"he does not want to do that\". His wife states he fell three times on Saturday - not able to get up, patient did not mention this - concern is tremor has increased and contributing to falls, happening more frequently.     He was referred for ketamine - he does not feel this is an option for him as there are no clinics close to him.    Per psychiatry visit 11/7/2022 - note:   At this time, patient would like time to consider these options and discuss with primary psychiatrist.    1) " Meds  -Follow-up with primary psychiatrist to consider increasing wellbutrin and/or Fetzima, ketamine, or TMS, as discussed above  2) Psychotherapy  -Consider pursuing psychotherapy   3) Neuromodulation  - Consider whether to pursue TMS or Ketamine treatments as discussed today  4) Other  - Recommended he check Testosterone, TSH, B12, folate, Vit D-   Recommend he discuss possible brain MRI w/ his psychiatrist to r/o intracranial vascular disease  3) RTC: If interested in pursuing ketamine or TMS  4) CRISIS Numbers: Provided routinely in AVS After hours: [phone]    Per patient's chart: Additional information Per Rylee Kyle, CNP note:   Lexapro (ineffective), Prozac (helps depression, but anxiety increase), Hydroxyzine (oversedation, PCP trial), Wellbutrin XL, Xanax, Klonopin, Valium, Ativan, Remeron (30 mg only, worked well for sleep and anxiety, but weight gain), Nortriptyline, Vistaril (dry mouth, nightmares), Remeron (oversedation at 45 mg, 22.5mg and 15 mg and wt gain), Trazodone (oversedate at 50 mg), Ambien (effective for sleep, fall), Belsomra not covered with insurance.    Has also tried duloxetine - side effects and Prestiq - not effective. Venlafaxine didn't agree with him. Has also tried Fetzima for a short time - states didn't feel effective and too expensive, patient does not qualify for patient assistance. States doesn't feel his medications have ever really helped. Wife is a social butterfly - has problems with social anxiety quite often.     CAROLYNN-7 SCORE 6/16/2022 8/18/2022 11/4/2022   Total Score - - -   Total Score 3 (minimal anxiety) - 4 (minimal anxiety)   Total Score 3 3 4     PHQ 10/26/2022 11/4/2022 11/7/2022   PHQ-9 Total Score 12 6 9   Q9: Thoughts of better off dead/self-harm past 2 weeks Not at all Not at all Not at all     ----------------  I spent 30 minutes with this patient today. All changes were made via collaborative practice agreement with Lizzy Leiva MD. A  copy of the visit note was provided to the patient's provider(s).    A summary of these recommendations was sent via [employer].    Marian Correa, PharmD  Medication Therapy Management     Telemedicine Visit Details  Type of service:  Telephone visit  Start Time: 10:00 AM  End Time: 10:30 AM     Medication Therapy Recommendations  Depression with anxiety    Current Medication: buPROPion (WELLBUTRIN SR) 200 MG 12 hr tablet (Discontinued)   Rationale: Undesirable effect - Adverse medication event - Safety   Recommendation: Decrease Dose   Status: Accepted per CPA          Rationale: More effective medication available - Ineffective medication - Effectiveness   Recommendation: Change Medication - propranolol 10 MG tablet   Status: Contact Provider - Awaiting Response

## 2023-02-07 ENCOUNTER — VIRTUAL VISIT (OUTPATIENT)
Dept: PHARMACY | Facility: CLINIC | Age: 78
End: 2023-02-07
Payer: COMMERCIAL

## 2023-02-07 ENCOUNTER — TELEPHONE (OUTPATIENT)
Dept: PHARMACY | Facility: CLINIC | Age: 78
End: 2023-02-07
Payer: COMMERCIAL

## 2023-02-07 DIAGNOSIS — F41.8 DEPRESSION WITH ANXIETY: Primary | ICD-10-CM

## 2023-02-07 PROCEDURE — 99606 MTMS BY PHARM EST 15 MIN: CPT | Performed by: PHARMACIST

## 2023-02-07 NOTE — PROGRESS NOTES
Medication Therapy Management (MTM) Encounter    ASSESSMENT:                            Medication Adherence/Access: No issues identified    Depression/Anxiety: Tremor back to baseline, patient does not want to try lowering bupropion dose further. He does not feel Lexapro is helping at all and would like to taper off of it. I asked that he decrease his dose to 10 mg daily.     Dr. Leiva agrees to try propranolol as needed prior to social outings. Of note propranolol has a significant Gene-drug interaction (GeneSight) of serum level may be too low, higher doses may be required - see below, unclear how patient will respond, but with limited success with all other medications, it may still be a good option for patient.*  Since patient is on metoprolol - reviewed with patient to monitor for low blood pressure when taking propranolol for social outings (patient states at home systolic blood pressures are in the 120's, his pulse in clinic typically in the 80's). I sent a prescription to patient's pharmacy - starting with low dose propranolol (10 mg as needed).     *Propranolol is metabolized through three primary routes: aromatic hydroxylation (mainly 4-hydroxylation), N-dealkylation followed by further side-chain oxidation, and direct glucuronidation.The four major metabolites are Propranolol glucuronide, naphthyloxylactic acid and glucuronic acid, and sulfate conjugates of 4-hydroxy Propranolol. In healthy subjects, no difference was observed between CYP2D6 extensive metabolizers (EMs) and poor metabolizers (PMs) with respect to oral clearance or elimination half-life. Partial clearance of 4-hydroxy Propranolol was significantly higher and naphthyloxyactic acid was significantly lower in EMs than PMs.    PLAN:                            1. Decrease Lexapro to 10 mg daily.    2. I sent a prescription for propranolol 10 mg to be taken 30-60 minutes before a social outing.     Follow-up: Wednesday, February 15th at 11  AM    SUBJECTIVE/OBJECTIVE:                          Josemanuel Edmond is a 77 year old male called for a follow-up visit.  Today's visit is a follow-up MTM visit from 1/31/2023.     Reason for visit: follow up MTM visit.    Tobacco: He reports that he quit smoking about 47 years ago. His smoking use included cigarettes. He has a 20.00 pack-year smoking history. He has never used smokeless tobacco.  Alcohol: history of alcohol dependence and not currently using    Medication Adherence/Access: no issues reported    Depression/Anxiety: Patient is taking Lexapro 20 mg once daily, lamotrigine 50 mg twice daily, bupropion 150 mg twice daily, and buspirone 30 mg twice daily. Since decreasing his dose of bupropion, he feels his tremor has improved a little. He hasn't fallen in the past week. He does not want to lower bupropion dose further to see if tremor improves. He does not feel that Lexapro is working and would like to taper off of it. Patient feels social anxiety is the main component of his discomfort - which is his biggest limiting factor and feels he wouldn't be anxious if not for his social anxiety.    He was referred for ketamine - he does not feel this is an option for him as there are no clinics close to him.    Per psychiatry visit 11/7/2022 - note:   At this time, patient would like time to consider these options and discuss with primary psychiatrist.    1) Meds  -Follow-up with primary psychiatrist to consider increasing wellbutrin and/or Fetzima, ketamine, or TMS, as discussed above  2) Psychotherapy  -Consider pursuing psychotherapy   3) Neuromodulation  - Consider whether to pursue TMS or Ketamine treatments as discussed today  4) Other  - Recommended he check Testosterone, TSH, B12, folate, Vit D-   Recommend he discuss possible brain MRI w/ his psychiatrist to r/o intracranial vascular disease  3) RTC: If interested in pursuing ketamine or TMS  4) CRISIS Numbers: Provided routinely in AVS After hours:  [phone]    Per patient's chart: Additional information Per Rylee Kyle, CNP note:   Lexapro (ineffective), Prozac (helps depression, but anxiety increase), Hydroxyzine (oversedation, PCP trial), Wellbutrin XL, Xanax, Klonopin, Valium, Ativan, Remeron (30 mg only, worked well for sleep and anxiety, but weight gain), Nortriptyline, Vistaril (dry mouth, nightmares), Remeron (oversedation at 45 mg, 22.5mg and 15 mg and wt gain), Trazodone (oversedate at 50 mg), Ambien (effective for sleep, fall), Belsomra not covered with insurance.     Patient was previously on Fetzima - did not feel is was working at 80 mg daily and too expensive - patient does not qualify for patient assistance. He has also tried duloxetine - side effects and Prestiq - not effective. Venlafaxine didn't agree with him. He tried Viibryd for a short time - stopped 12/2021. He was taking low dose sertraline - stopped 2004. States doesn't feel his medications have ever really helped.    The three medications listed from patient's GeneSight testing that do not have any Gene-drug interactions are Pristiq, Fetzima and Viibryd - patient has tired all of these without improvement. He has not tried Paxil or Celexa - both have significant Gene-drug interactions per GeneSight. Patient is an ultrarapid metapolizer of CYP2D6 (paroxetine) and a poor metabolizer of PXX1F87 (citalopram and sertraline) - in general recommended to avoid therapy for these three SSRIs.      CAROLYNN-7 SCORE 6/16/2022 8/18/2022 11/4/2022   Total Score - - -   Total Score 3 (minimal anxiety) - 4 (minimal anxiety)   Total Score 3 3 4     PHQ 10/26/2022 11/4/2022 11/7/2022   PHQ-9 Total Score 12 6 9   Q9: Thoughts of better off dead/self-harm past 2 weeks Not at all Not at all Not at all     BP Readings from Last 3 Encounters:   01/11/23 108/80   11/29/22 127/84   11/16/22 116/80       ----------------    I spent 15 minutes with this patient today. All changes were made via collaborative  practice agreement with Lizzy Leiva MD. A copy of the visit note was provided to the patient's provider(s).    A summary of these recommendations was sent via [employer].    Marian Correa PharmD  Medication Therapy Management     Telemedicine Visit Details  Type of service:  Telephone visit  Start Time: 1:00 PM  End Time: 1:15 PM     Medication Therapy Recommendations  Depression with anxiety    Current Medication: escitalopram (LEXAPRO) 20 MG tablet (Discontinued)   Rationale: More effective medication available - Ineffective medication - Effectiveness   Recommendation: Decrease Dose   Status: Accepted per CPA   Note: will taper off of Lexapro and add as needed propranolol

## 2023-02-07 NOTE — TELEPHONE ENCOUNTER
Hi Dr. Leiva,     Gavin has tried several medications, none which have helped much to date. He declined counseling and Ketamine.     His main concern is social anxiety - which is his biggest limiting factor and feels he wouldn't be anxious if not for his social anxiety. He's currently taking metoprolol for his atrial fibrillation. Propranolol may be more effective for his social anxiety and his tremor.     What are your thoughts on either Gavin staying on his current dose of metoprolol and try adding a low dose of propranolol as needed for when he goes out in public to see if it helps with his social anxiety? Otherwise we could review with cardiology to see if he can switch from metoprolol to propranolol?     Thanks!!   Marian Correa, PharmD   Medication Therapy Management

## 2023-02-07 NOTE — PATIENT INSTRUCTIONS
"Recommendations from today's MTM visit:                                                       Gavin,    It was a pleasure talking with you! We discussed the followin. Decrease Lexapro to 10 mg daily.    2. I sent a prescription to your pharmacy for propranolol 10 mg to be taken 30-60 minutes before a social outing.     Follow-up:  at 11 AM    It was great speaking with you today.  I value your experience and would be very thankful for your time in providing feedback in our clinic survey. In the next few days, you may receive an email or text message from [facility] [employer] with a link to a survey related to your  clinical pharmacist.\"     To schedule another MTM appointment, please call the clinic directly or you may call the MTM scheduling line at [phone] or toll-free at [phone].     My Clinical Pharmacist's contact information:                                                      Please feel free to contact me with any questions or concerns you have.      Keep up the good work!!    Marian Correa, PharmD  [phone] in clinic on Tuesday and Wednesday        "

## 2023-02-08 RX ORDER — PROPRANOLOL HYDROCHLORIDE 10 MG/1
10 TABLET ORAL DAILY PRN
Qty: 30 TABLET | Refills: 1 | Status: SHIPPED | OUTPATIENT
Start: 2023-02-08 | End: 2023-03-23

## 2023-02-08 NOTE — TELEPHONE ENCOUNTER
I think it be fine to do a trial of adding propranolol as needed.  Would have him watch for symptoms of hypotension as most recent blood pressure was 108/80.  But his pulse certainly seems like it would tolerate additional beta-blocker.

## 2023-02-14 DIAGNOSIS — F32.1 MODERATE MAJOR DEPRESSION (H): ICD-10-CM

## 2023-02-15 NOTE — TELEPHONE ENCOUNTER
Pending Prescriptions:                       Disp   Refills    lamoTRIgine (LAMICTAL) 25 MG tablet [Pharm*120 ta*1        Sig: TAKE 2 TABLETS BY MOUTH TWICE DAILY        Routing refill request to provider for review/approval because:  Normal CBC on file in past 26 months      Recent Labs   Lab Test 11/04/22  1027   WBC 4.8   RBC 3.82*   HGB 12.5*   HCT 37.4*        Last Written Prescription Date:  12/27/22  Last Fill Quantity: 120,  # refills: 1   Last office visit: 11/4/2022 with prescribing provider.   Future Office Visit:   Next 5 appointments (look out 90 days)      Feb 22, 2023  2:30 PM  Pharmacist Visit with Marian Correa RPH  [facility] ([facility] [address] [place] [id number]  [phone]               Verna Alcaraz RN

## 2023-02-16 RX ORDER — LAMOTRIGINE 25 MG/1
TABLET ORAL
Qty: 120 TABLET | Refills: 1 | Status: SHIPPED | OUTPATIENT
Start: 2023-02-16 | End: 2023-04-12

## 2023-02-22 ENCOUNTER — VIRTUAL VISIT (OUTPATIENT)
Dept: PHARMACY | Facility: CLINIC | Age: 78
End: 2023-02-22
Payer: COMMERCIAL

## 2023-02-22 DIAGNOSIS — F41.8 DEPRESSION WITH ANXIETY: Primary | ICD-10-CM

## 2023-02-22 PROCEDURE — 99607 MTMS BY PHARM ADDL 15 MIN: CPT | Performed by: PHARMACIST

## 2023-02-22 PROCEDURE — 99606 MTMS BY PHARM EST 15 MIN: CPT | Performed by: PHARMACIST

## 2023-02-22 NOTE — PROGRESS NOTES
Medication Therapy Management (MTM) Encounter    ASSESSMENT:                            Medication Adherence/Access: No issues identified    Depression/Anxiety: Patient feels the same with the dose decrease of Lexapro. He prefers to remain on the current dose. Patient has only been able to use propranolol prior to social outing once, and hasn't noticed a difference when using it. He is wondering if he can use a higher dose. Recommend he start with 10 mg and if not effective okay to take a second dose as long as he is not feeling symptoms of hypotension (reviewed these with patient). Per GeneSight higher doses of propranolol may be required. Bupropion has highest risk for xerostomia - may be additive effect from several medications - will continue to monitor.     PLAN:                            1. Continue using propranolol as needed prior to social outings. Start with 10 mg - if not effective okay to take a second dose as long as he is not feeling symptoms of hypotension - per GeneSight higher doses of propranolol may be required.     2. Continue Lexapro 10 mg daily.    3. Continue to monitor xerostomia - bupropion has risk of xerostomia of 17-26%, Lexapro has risk of 4-9%, lamotrigine has risk of 6% and gabapentin 1.7-4.8%.     Follow-up: Wednesday, March 22nd at 1 PM    SUBJECTIVE/OBJECTIVE:                          Josemanuel Edmond is a 77 year old male called for a follow-up visit.  Today's visit is a follow-up MTM visit from 2/7/23.     Reason for visit: follow up MTM visit    Tobacco: He reports that he quit smoking about 47 years ago. His smoking use included cigarettes. He has a 20.00 pack-year smoking history. He has never used smokeless tobacco.  Alcohol: history of alcohol dependence and not currently using    Medication Adherence/Access: no issues reported    Depression/Anxiety: Patient is taking Lexapro 10 mg once daily, lamotrigine 50 mg twice daily, bupropion 150 mg twice daily, and buspirone 30 mg  twice daily. He is feeling the same since decreasing his Lexapro dose at our last visit. He reports getting really down at times, but these feelings don't last long and overall he feels stable. At last visit, patient felt social anxiety was the main component of his discomfort, but today reports that depression is the biggest issue - although when I point this out, he agrees social anxiety is his main concern. He was prescribed propranolol as needed for social anxiety at our last visit, but patient has only used this once and didn't notice a difference. Patient is asking about dry mouth today - wondering if any of his medications could be contributing. Patient states this has been an ongoing problem for several years. Bupropion has risk of xerostomia of 17-26%, Lexapro has risk of 4-9%, lamotrigine has risk of 6% and gabapentin 1.7-4.8%.     He was referred for ketamine - he does not feel this is an option for him as there are no clinics close to him. Revisited this again with patient today - and he still is not willing to consider ketamine.     Per psychiatry visit 11/7/2022 - note:   At this time, patient would like time to consider these options and discuss with primary psychiatrist.    1) Meds  -Follow-up with primary psychiatrist to consider increasing wellbutrin and/or Fetzima, ketamine, or TMS, as discussed above  2) Psychotherapy  -Consider pursuing psychotherapy   3) Neuromodulation  - Consider whether to pursue TMS or Ketamine treatments as discussed today  4) Other  - Recommended he check Testosterone, TSH, B12, folate, Vit D-   Recommend he discuss possible brain MRI w/ his psychiatrist to r/o intracranial vascular disease  3) RTC: If interested in pursuing ketamine or TMS  4) CRISIS Numbers: Provided routinely in AVS After hours: [phone]    Per patient's chart: Additional information Per Rylee Kyle, CNP note:   Lexapro (ineffective), Prozac (helps depression, but anxiety increase), Hydroxyzine  (oversedation, PCP trial), Wellbutrin XL, Xanax, Klonopin, Valium, Ativan, Remeron (30 mg only, worked well for sleep and anxiety, but weight gain), Nortriptyline, Vistaril (dry mouth, nightmares), Remeron (oversedation at 45 mg, 22.5mg and 15 mg and wt gain), Trazodone (oversedate at 50 mg), Ambien (effective for sleep, fall), Belsomra not covered with insurance.     Patient was previously on Fetzima - did not feel is was working at 80 mg daily and too expensive - patient does not qualify for patient assistance. He has also tried duloxetine - side effects and Prestiq - not effective. Venlafaxine didn't agree with him. He tried Viibryd for a short time - stopped 12/2021. He was taking low dose sertraline - stopped 2004. States doesn't feel his medications have ever really helped.    The three medications listed from patient's GeneSight testing that do not have any Gene-drug interactions are Pristiq, Fetzima and Viibryd - patient has tired all of these without improvement. He has not tried Paxil or Celexa - both have significant Gene-drug interactions per GeneSight. Patient is an ultrarapid metapolizer of CYP2D6 (paroxetine) and a poor metabolizer of [id number] (citalopram and sertraline) - in general recommended to avoid therapy for these three SSRIs.      CAROLYNN-7 SCORE 6/16/2022 8/18/2022 11/4/2022   Total Score - - -   Total Score 3 (minimal anxiety) - 4 (minimal anxiety)   Total Score 3 3 4     PHQ 10/26/2022 11/4/2022 11/7/2022   PHQ-9 Total Score 12 6 9   Q9: Thoughts of better off dead/self-harm past 2 weeks Not at all Not at all Not at all     ----------------    I spent 23 minutes with this patient today. All changes were made via collaborative practice agreement with Lizzy Leiva MD. A copy of the visit note was provided to the patient's provider(s).    A summary of these recommendations was sent via [employer].    Marian Correa [facility], PharmD IV  Marian Correa, PharmD  Medication Therapy  Management     Telemedicine Visit Details  Type of service:  Telephone visit  Start Time: 2:37 PM  End Time: 3:00 PM     Medication Therapy Recommendations  Depression with anxiety    Current Medication: propranolol (INDERAL) 10 MG tablet   Rationale: Dose too low - Dosage too low - Effectiveness   Recommendation: Increase Dose   Status: Accepted per CPA

## 2023-02-25 NOTE — PATIENT INSTRUCTIONS
"Recommendations from today's MTM visit:                                                       Gavin,    It was a pleasure talking with you! We discussed the followin. Continue using propranolol as needed prior to social outings. Start with 10 mg - if not effective okay to take a second dose as long as you are not feeling symptoms of hypotension (lightheadedness/dizziness).     2. Continue Lexapro 10 mg daily.    3. Continue to monitor your dry mouth - bupropion has risk of dry mouth of 17-26%, Lexapro 4-9%, lamotrigine 6% and gabapentin 1.7-4.8%.     Follow-up:  at 1 PM    It was great speaking with you today.  I value your experience and would be very thankful for your time in providing feedback in our clinic survey. In the next few days, you may receive an email or text message from [employer] with a link to a survey related to your  clinical pharmacist.\"     To schedule another MTM appointment, please call the clinic directly or you may call the MTM scheduling line at [phone] or toll-free at [phone].     My Clinical Pharmacist's contact information:                                                      Please feel free to contact me with any questions or concerns you have.      Keep up the good work!!    Marian Correa, PharmD  [phone] in clinic on Tuesday and Wednesday        "

## 2023-03-15 ENCOUNTER — TELEPHONE (OUTPATIENT)
Dept: CARDIOLOGY | Facility: CLINIC | Age: 78
End: 2023-03-15
Payer: COMMERCIAL

## 2023-03-15 DIAGNOSIS — R06.09 DYSPNEA ON EXERTION: ICD-10-CM

## 2023-03-15 DIAGNOSIS — I48.20 CHRONIC ATRIAL FIBRILLATION (H): ICD-10-CM

## 2023-03-15 RX ORDER — METOPROLOL TARTRATE 75 MG/1
75 TABLET, FILM COATED ORAL 2 TIMES DAILY
Qty: 180 TABLET | Refills: 1 | Status: SHIPPED | OUTPATIENT
Start: 2023-03-15 | End: 2023-03-17

## 2023-03-15 NOTE — TELEPHONE ENCOUNTER
[facility] is asking whether patient should be getting metoprolol tartrate 100mg bid or metoprolol tartrate 75mg.

## 2023-03-15 NOTE — TELEPHONE ENCOUNTER
New rx sent to pharmacy with comments stating pt is taking 75mg tabs. As well as faxed to pharmacy.     Alda Mejia RN

## 2023-03-17 DIAGNOSIS — I48.20 CHRONIC ATRIAL FIBRILLATION (H): ICD-10-CM

## 2023-03-17 DIAGNOSIS — R06.09 DYSPNEA ON EXERTION: ICD-10-CM

## 2023-03-17 RX ORDER — METOPROLOL TARTRATE 75 MG/1
TABLET, FILM COATED ORAL
Qty: 180 TABLET | Refills: 0 | Status: SHIPPED | OUTPATIENT
Start: 2023-03-17 | End: 2023-04-13

## 2023-03-17 NOTE — TELEPHONE ENCOUNTER
Pending Prescriptions:                       Disp   Refills    Metoprolol Tartrate 75 MG TABS [Pharmacy M*180 ta*1        Sig: Take 1 tablet (75 mg) by mouth 2 times daily    Routing refill request to provider for review/approval because:  Medication managed by cardiology. Looks like initial prescription was sent to [facility], this request coming from Jeremy Martin. Routing to appropriate pool for further review.    Natalie Alcocer RN  [facility]

## 2023-03-22 ENCOUNTER — VIRTUAL VISIT (OUTPATIENT)
Dept: PHARMACY | Facility: CLINIC | Age: 78
End: 2023-03-22
Payer: COMMERCIAL

## 2023-03-22 DIAGNOSIS — F41.8 DEPRESSION WITH ANXIETY: Primary | ICD-10-CM

## 2023-03-22 PROCEDURE — 99607 MTMS BY PHARM ADDL 15 MIN: CPT | Performed by: PHARMACIST

## 2023-03-22 PROCEDURE — 99606 MTMS BY PHARM EST 15 MIN: CPT | Performed by: PHARMACIST

## 2023-03-22 RX ORDER — LEVOMILNACIPRAN HYDROCHLORIDE 20 MG-40MG
20 KIT ORAL DAILY
Qty: 28 EACH | Refills: 0 | Status: SHIPPED | OUTPATIENT
Start: 2023-03-22 | End: 2023-04-21 | Stop reason: DRUGHIGH

## 2023-03-22 NOTE — PROGRESS NOTES
Medication Therapy Management (MTM) Encounter    ASSESSMENT:                            Medication Adherence/Access: No issues identified    Depression/Anxiety: Stop propranolol. Stop Lexapro - start Fetzima 20 mg first two days then increase to 40 mg once daily. I sent a prescription to patient's pharmacy. Dry mouth may improve with stopping Lexapro - continue to monitor.     PLAN:                            1. Stop propranolol.     2. Stop Lexapro - start Fetzima 20 mg first two days then increase to 40 mg once daily. I sent a prescription to patient's pharmacy.     3. Dry mouth may improve with stopping Lexapro - continue to monitor.     Follow-up: Wednesday, March 29th at 1:30 PM    SUBJECTIVE/OBJECTIVE:                          Josemanuel Edmond is a 77 year old male called for a follow-up visit.  Today's visit is a follow-up MTM visit from 2/22/2023.     Reason for visit: MTM follow up visit.    Tobacco: He reports that he quit smoking about 47 years ago. His smoking use included cigarettes. He has a 20.00 pack-year smoking history. He has never used smokeless tobacco.  Alcohol: history of alcohol dependence and not currently using    Medication Adherence/Access: no issues reported    Depression/Anxiety: Patient is taking Lexapro 10 mg once daily, lamotrigine 50 mg twice daily, bupropion 150 mg twice daily, and buspirone 30 mg twice daily. He does not feel that Lexapro is working - he would like to change back to Fetzima - more affordable for him this year. Using propranolol as needed for social anxiety is not having any effect. Patient is still having some dry mouth - patient feels it may be due to taking Sleep-Aide which contains diphenhydramine 25 mg - states the only thing that works to help him sleep. Patient states this has been an ongoing problem for several years. Bupropion has risk of xerostomia of 17-26%, Lexapro has risk of 4-9%, lamotrigine has risk of 6% and gabapentin 1.7-4.8%.     He was referred  for ketamine - he does not feel this is an option for him as there are no clinics close to him.     Per psychiatry visit 11/7/2022 - note:   At this time, patient would like time to consider these options and discuss with primary psychiatrist.    1) Meds  -Follow-up with primary psychiatrist to consider increasing wellbutrin and/or Fetzima, ketamine, or TMS, as discussed above  2) Psychotherapy  -Consider pursuing psychotherapy   3) Neuromodulation  - Consider whether to pursue TMS or Ketamine treatments as discussed today  4) Other  - Recommended he check Testosterone, TSH, B12, folate, Vit D-   Recommend he discuss possible brain MRI w/ his psychiatrist to r/o intracranial vascular disease  3) RTC: If interested in pursuing ketamine or TMS  4) CRISIS Numbers: Provided routinely in AVS After hours: [phone]    Per patient's chart: Additional information Per Rylee Kyle, CNP note:   Lexapro (ineffective), Prozac (helps depression, but anxiety increase), Hydroxyzine (oversedation, PCP trial), Wellbutrin XL, Xanax, Klonopin, Valium, Ativan, Remeron (30 mg only, worked well for sleep and anxiety, but weight gain), Nortriptyline, Vistaril (dry mouth, nightmares), Remeron (oversedation at 45 mg, 22.5mg and 15 mg and wt gain), Trazodone (oversedate at 50 mg), Ambien (effective for sleep, fall), Belsomra not covered with insurance.     Patient was previously on Fetzima - did not feel is was working at 80 mg daily and too expensive - patient does not qualify for patient assistance. He has also tried duloxetine - side effects and Prestiq - not effective. Venlafaxine didn't agree with him. He tried Viibryd for a short time - stopped 12/2021. He was taking low dose sertraline - stopped 2004. States doesn't feel his medications have ever really helped.    The three medications listed from patient's GeneSight testing that do not have any Gene-drug interactions are Pristiq, Fetzima and Viibryd - patient has tired all of  these without improvement. He has not tried Paxil or Celexa - both have significant Gene-drug interactions per [facility]. Patient is an ultrarapid metapolizer of CYP2D6 (paroxetine) and a poor metabolizer of [id number] (citalopram and sertraline) - in general recommended to avoid therapy for these three SSRIs.      CAROLYNN-7 SCORE 6/16/2022 8/18/2022 11/4/2022   Total Score - - -   Total Score 3 (minimal anxiety) - 4 (minimal anxiety)   Total Score 3 3 4     PHQ 10/26/2022 11/4/2022 11/7/2022   PHQ-9 Total Score 12 6 9   Q9: Thoughts of better off dead/self-harm past 2 weeks Not at all Not at all Not at all   ----------------    I spent 20 minutes with this patient today. All changes were made via collaborative practice agreement with Lizzy Leiva MD. A copy of the visit note was provided to the patient's provider(s).    A summary of these recommendations was sent via [employer].    Marian Correa, PharmD  Medication Therapy Management     Telemedicine Visit Details  Type of service:  Telephone visit  Start Time: 1:00 PM  End Time: 1:20 PM     Medication Therapy Recommendations  Depression with anxiety    Current Medication: escitalopram (LEXAPRO) 20 MG tablet (Discontinued)   Rationale: More effective medication available - Ineffective medication - Effectiveness   Recommendation: Change Medication - Fetzima 20 MG Cp24   Status: Accepted per CPA

## 2023-03-23 NOTE — PATIENT INSTRUCTIONS
"Recommendations from today's MTM visit:                                                       Gavin,    It was a pleasure talking with you! We discussed the followin. Stop propranolol.     2. Stop Lexapro - start Fetzima 20 mg first two days then increase to 40 mg once daily. I sent a prescription to your pharmacy.     3. Dry mouth may improve with stopping Lexapro - continue to monitor.     Follow-up:  at 1:30 PM    It was great speaking with you today.  I value your experience and would be very thankful for your time in providing feedback in our clinic survey. In the next few days, you may receive an email or text message from [facility] [employer] with a link to a survey related to your  clinical pharmacist.\"     To schedule another MTM appointment, please call the clinic directly or you may call the MTM scheduling line at [phone] or toll-free at [phone].     My Clinical Pharmacist's contact information:                                                      Please feel free to contact me with any questions or concerns you have.      Keep up the good work!!    Marian Correa, PharmD  [phone] in clinic on Tuesday and Wednesday        "

## 2023-03-29 ENCOUNTER — VIRTUAL VISIT (OUTPATIENT)
Dept: PHARMACY | Facility: CLINIC | Age: 78
End: 2023-03-29
Payer: COMMERCIAL

## 2023-03-29 DIAGNOSIS — F41.8 DEPRESSION WITH ANXIETY: Primary | ICD-10-CM

## 2023-03-29 PROCEDURE — 99606 MTMS BY PHARM EST 15 MIN: CPT | Performed by: PHARMACIST

## 2023-03-29 NOTE — PATIENT INSTRUCTIONS
"Recommendations from today's MTM visit:                                                       Gavin,    It was a pleasure talking with you! We discussed the followin. Continue current drug therapy.     Follow-up:  at 11:00 AM    It was great speaking with you today.  I value your experience and would be very thankful for your time in providing feedback in our clinic survey. In the next few days, you may receive an email or text message from [facility] [employer] with a link to a survey related to your  clinical pharmacist.\"     To schedule another MTM appointment, please call the clinic directly or you may call the MTM scheduling line at [phone] or toll-free at [phone].     My Clinical Pharmacist's contact information:                                                      Please feel free to contact me with any questions or concerns you have.      Keep up the good work!!    Marian Correa, PharmD  [phone] in clinic on Tuesday and Wednesday          "

## 2023-03-29 NOTE — PROGRESS NOTES
Medication Therapy Management (MTM) Encounter    ASSESSMENT:                            Medication Adherence/Access: No issues identified    Depression/Anxiety: Patient prefers to give current dose of Fitzema a little longer before increasing dose (Initial, 20 mg orally once daily for 2 days, then 40 mg once daily; may increase in 40-mg increments at intervals of 2 or more days to  mg once daily)    PLAN:                            1. Continue current drug therapy.     Follow-up: Wednesday, April 19th at 11:00 AM    SUBJECTIVE/OBJECTIVE:                          Josemanuel Edmond is a 77 year old male called for a follow-up visit.  Today's visit is a follow-up MTM visit from 3/22/2023.     Reason for visit: follow up MTM visit.    Tobacco: He reports that he quit smoking about 47 years ago. His smoking use included cigarettes. He has a 20.00 pack-year smoking history. He has never used smokeless tobacco.  Alcohol: history of alcohol dependence and not currently using    Medication Adherence/Access: no issues reported    Depression/Anxiety: Patient is taking Fitzema 40 mg once daily, lamotrigine 50 mg twice daily, bupropion 150 mg twice daily, and buspirone 30 mg twice daily. He has only been on current Fetzima dose for 4 days. Tolerating well. Depression doesn't seem to be as bed - social anxiety still there. Continues to have dry mouth - patient feels from OTC Sleep-Aide or may be from bupropion - he prefers to not change these two medications.      PHQ 10/26/2022 11/4/2022 11/7/2022   PHQ-9 Total Score 12 6 9   Q9: Thoughts of better off dead/self-harm past 2 weeks Not at all Not at all Not at all     CAROLYNN-7 SCORE 6/16/2022 8/18/2022 11/4/2022   Total Score - - -   Total Score 3 (minimal anxiety) - 4 (minimal anxiety)   Total Score 3 3 4     ----------------      I spent 12 minutes with this patient today. All changes were made via collaborative practice agreement with Lizzy Leiva MD. A copy of  the visit note was provided to the patient's provider(s).    A summary of these recommendations was sent via [employer].    Marian Correa PharmD  Medication Therapy Management     Telemedicine Visit Details  Type of service:  Telephone visit  Start Time: 1:30 PM  End Time: 1:42 PM     Medication Therapy Recommendations  No medication therapy recommendations to display

## 2023-04-05 ENCOUNTER — MYC MEDICAL ADVICE (OUTPATIENT)
Dept: PHARMACY | Facility: CLINIC | Age: 78
End: 2023-04-05
Payer: COMMERCIAL

## 2023-04-05 ENCOUNTER — MYC MEDICAL ADVICE (OUTPATIENT)
Dept: FAMILY MEDICINE | Facility: CLINIC | Age: 78
End: 2023-04-05
Payer: COMMERCIAL

## 2023-04-06 ENCOUNTER — HOSPITAL ENCOUNTER (EMERGENCY)
Facility: CLINIC | Age: 78
Discharge: HOME | End: 2023-04-06
Attending: EMERGENCY MEDICINE | Admitting: EMERGENCY MEDICINE
Payer: COMMERCIAL

## 2023-04-06 VITALS
HEART RATE: 81 BPM | TEMPERATURE: 97.5 F | BODY MASS INDEX: 25.01 KG/M2 | HEIGHT: 68 IN | DIASTOLIC BLOOD PRESSURE: 93 MMHG | OXYGEN SATURATION: 97 % | SYSTOLIC BLOOD PRESSURE: 121 MMHG | WEIGHT: 165 LBS | RESPIRATION RATE: 23 BRPM

## 2023-04-06 DIAGNOSIS — R00.2 PALPITATIONS: ICD-10-CM

## 2023-04-06 DIAGNOSIS — I48.20 CHRONIC ATRIAL FIBRILLATION (H): ICD-10-CM

## 2023-04-06 LAB
ANION GAP SERPL CALCULATED.3IONS-SCNC: 10 MMOL/L (ref 7–15)
BASOPHILS # BLD AUTO: 0.1 10E3/UL (ref 0–0.2)
BASOPHILS NFR BLD AUTO: 1 %
BUN SERPL-MCNC: 10.3 MG/DL (ref 8–23)
CALCIUM SERPL-MCNC: 9.4 MG/DL (ref 8.8–10.2)
CHLORIDE SERPL-SCNC: 98 MMOL/L (ref 98–107)
CREAT SERPL-MCNC: 0.92 MG/DL (ref 0.67–1.17)
DEPRECATED HCO3 PLAS-SCNC: 27 MMOL/L (ref 22–29)
EOSINOPHIL # BLD AUTO: 0.2 10E3/UL (ref 0–0.7)
EOSINOPHIL NFR BLD AUTO: 3 %
ERYTHROCYTE [DISTWIDTH] IN BLOOD BY AUTOMATED COUNT: 14.4 % (ref 10–15)
GFR SERPL CREATININE-BSD FRML MDRD: 84 ML/MIN/1.73M2
GLUCOSE SERPL-MCNC: 94 MG/DL (ref 70–99)
HCT VFR BLD AUTO: 39.8 % (ref 40–53)
HGB BLD-MCNC: 13.3 G/DL (ref 13.3–17.7)
HOLD SPECIMEN: NORMAL
HOLD SPECIMEN: NORMAL
IMM GRANULOCYTES # BLD: 0 10E3/UL
IMM GRANULOCYTES NFR BLD: 0 %
LYMPHOCYTES # BLD AUTO: 1.5 10E3/UL (ref 0.8–5.3)
LYMPHOCYTES NFR BLD AUTO: 26 %
MAGNESIUM SERPL-MCNC: 2.2 MG/DL (ref 1.7–2.3)
MCH RBC QN AUTO: 32.2 PG (ref 26.5–33)
MCHC RBC AUTO-ENTMCNC: 33.4 G/DL (ref 31.5–36.5)
MCV RBC AUTO: 96 FL (ref 78–100)
MONOCYTES # BLD AUTO: 0.6 10E3/UL (ref 0–1.3)
MONOCYTES NFR BLD AUTO: 10 %
NEUTROPHILS # BLD AUTO: 3.5 10E3/UL (ref 1.6–8.3)
NEUTROPHILS NFR BLD AUTO: 60 %
NRBC # BLD AUTO: 0 10E3/UL
NRBC BLD AUTO-RTO: 0 /100
NT-PROBNP SERPL-MCNC: 1329 PG/ML (ref 0–1800)
PLATELET # BLD AUTO: 164 10E3/UL (ref 150–450)
POTASSIUM SERPL-SCNC: 3.9 MMOL/L (ref 3.4–5.3)
RBC # BLD AUTO: 4.13 10E6/UL (ref 4.4–5.9)
SODIUM SERPL-SCNC: 135 MMOL/L (ref 136–145)
TROPONIN T SERPL HS-MCNC: 12 NG/L
WBC # BLD AUTO: 5.9 10E3/UL (ref 4–11)

## 2023-04-06 PROCEDURE — 99284 EMERGENCY DEPT VISIT MOD MDM: CPT | Mod: 25 | Performed by: EMERGENCY MEDICINE

## 2023-04-06 PROCEDURE — 84484 ASSAY OF TROPONIN QUANT: CPT | Performed by: EMERGENCY MEDICINE

## 2023-04-06 PROCEDURE — 96374 THER/PROPH/DIAG INJ IV PUSH: CPT | Performed by: EMERGENCY MEDICINE

## 2023-04-06 PROCEDURE — 93010 ELECTROCARDIOGRAM REPORT: CPT | Performed by: EMERGENCY MEDICINE

## 2023-04-06 PROCEDURE — 83735 ASSAY OF MAGNESIUM: CPT | Performed by: EMERGENCY MEDICINE

## 2023-04-06 PROCEDURE — 85025 COMPLETE CBC W/AUTO DIFF WBC: CPT | Performed by: EMERGENCY MEDICINE

## 2023-04-06 PROCEDURE — 36415 COLL VENOUS BLD VENIPUNCTURE: CPT | Performed by: EMERGENCY MEDICINE

## 2023-04-06 PROCEDURE — 250N000009 HC RX 250: Performed by: EMERGENCY MEDICINE

## 2023-04-06 PROCEDURE — 80048 BASIC METABOLIC PNL TOTAL CA: CPT | Performed by: EMERGENCY MEDICINE

## 2023-04-06 PROCEDURE — 83880 ASSAY OF NATRIURETIC PEPTIDE: CPT | Performed by: EMERGENCY MEDICINE

## 2023-04-06 PROCEDURE — 93005 ELECTROCARDIOGRAM TRACING: CPT | Performed by: EMERGENCY MEDICINE

## 2023-04-06 PROCEDURE — 250N000013 HC RX MED GY IP 250 OP 250 PS 637: Performed by: EMERGENCY MEDICINE

## 2023-04-06 RX ORDER — METOPROLOL TARTRATE 1 MG/ML
5 INJECTION, SOLUTION INTRAVENOUS EVERY 5 MIN PRN
Status: DISCONTINUED | OUTPATIENT
Start: 2023-04-06 | End: 2023-04-06 | Stop reason: HOSPADM

## 2023-04-06 RX ORDER — METOPROLOL TARTRATE 50 MG
50 TABLET ORAL ONCE
Status: COMPLETED | OUTPATIENT
Start: 2023-04-06 | End: 2023-04-06

## 2023-04-06 RX ORDER — METOPROLOL TARTRATE 25 MG/1
25 TABLET, FILM COATED ORAL 2 TIMES DAILY
Qty: 180 TABLET | Refills: 0 | Status: SHIPPED | OUTPATIENT
Start: 2023-04-06 | End: 2023-04-13

## 2023-04-06 RX ADMIN — METOPROLOL TARTRATE 50 MG: 50 TABLET, FILM COATED ORAL at 10:12

## 2023-04-06 RX ADMIN — METOPROLOL TARTRATE 5 MG: 1 INJECTION, SOLUTION INTRAVENOUS at 10:14

## 2023-04-06 ASSESSMENT — ACTIVITIES OF DAILY LIVING (ADL): ADLS_ACUITY_SCORE: 35

## 2023-04-06 NOTE — ED TRIAGE NOTES
Pt here states he is in A-fib for the past three days, on xarelto. Pt did not come yesterday because [birth year]. Pt is shaky, he said he started getting shaky with the A-fib. -126 in triage.      Triage Assessment     Row Name 04/06/23 0935       Triage Assessment (Adult)    Airway WDL WDL       Respiratory WDL    Respiratory WDL WDL       Cardiac WDL    Cardiac WDL X;rhythm    Pulse Rate & Regularity tachycardic;apical pulse irregular       Cognitive/Neuro/Behavioral WDL    Cognitive/Neuro/Behavioral WDL WDL              
Venkat

## 2023-04-06 NOTE — ED PROVIDER NOTES
History     Chief Complaint   Patient presents with     Atrial Fib     On xarelto, 3 days of A-fib not getting better -126 in triage     HPI  Josemanuel Edmond is a 78 year old male who presents to the emergency department with concerns regarding fluttering type sensation of the chest.  Feels as if his heart is beating fast.  Patient has checked his heart rate at home, and heart rates have been elevated in the 120s.  Patient does state he has history of atrial fibrillation over the past 4 years.  However, patient is unaware if he is chronically in atrial fibrillation.  He is anticoagulated on Xarelto.  Denies any severe chest pains.  No severe worsening of shortness of breath.  No lightheadedness, or dizziness.  No recent changes of medications, and patient is on metoprolol.    I reviewed chart records.  In September 2022, patient had Holter monitor.  It showed predominant rhythm was atrial fibrillation.  Results copied below.          . Josemanuel Edmond was monitored for 24 hours. Quality of the tracing was good. Principle rhythm was Atrial fibrillation. Average HR 86 bpm, maximum  bpm at 3:19 PM (Day 1), minmum HR 73 bpm at 1:13 AM (Day 1). QRS duration measured 0.113 seconds, QT interval 0.352-0.418 seconds. There were zero pauses over 2.0 seconds. 2. There were 45 isolated PVCs and 2 VE couplets. 3. The patient event button was not pressed and no symptoms were noted. 10/1/2022      Allergies:  Allergies   Allergen Reactions     Bactrim [Sulfamethoxazole W/Trimethoprim] Nausea and Vomiting       Problem List:    Patient Active Problem List    Diagnosis Date Noted     Chronic diastolic heart failure (H) 09/22/2022     Priority: Medium     Valvular heart disease 09/22/2022     Priority: Medium     Thrombocytopenia (H) 06/29/2022     Priority: Medium     Tremor 06/29/2022     Priority: Medium     Memory difficulties 05/05/2022     Priority: Medium     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 02/28/2022      Priority: Medium     Depression with anxiety 02/28/2022     Priority: Medium     Hematoma of skin 07/17/2019     Priority: Medium     Right knee DJD 10/12/2018     Priority: Medium     Peripheral neuropathy 10/12/2018     Priority: Medium     Hyperplastic Polyp 03/05/2018     Priority: Medium     Atrial fibrillation status post cardioversion (H) 01/18/2018     Priority: Medium     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) 11/28/2017     Priority: Medium     Status post lung surgery 11/18/2017     Priority: Medium     Unilateral inguinal hernia without obstruction or gangrene 11/07/2017     Priority: Medium     Benzodiazepine dependence (H) 10/31/2017     Priority: Medium     GERD (gastroesophageal reflux disease) 08/03/2012     Priority: Medium     Anxiety 02/16/2012     Priority: Medium     Hyperlipidemia LDL goal <100 10/31/2010     Priority: Medium     Osteoarthrosis, unspecified whether generalized or localized, pelvic region and thigh 09/28/2007     Priority: Medium     Hypertrophy of prostate with urinary obstruction 08/03/2006     Priority: Medium     Problem list name updated by automated process. Provider to review       Benign essential hypertension 12/12/2005     Priority: Medium        Past Medical History:    Past Medical History:   Diagnosis Date     Benign neoplasm of prostate 2000     Infection due to 2019 novel coronavirus 9/27/2021     S/P knee replacement 11/6/2012     S/P left knee arthroscopy 8/15/2011       Past Surgical History:    Past Surgical History:   Procedure Laterality Date     ARTHROPLASTY KNEE Right 10/12/2018    Procedure: ARTHROPLASTY KNEE;  Right Total Knee Arthroplasty;  Surgeon: Jeb Peralta MD;  Location: [facility] OR     COLONOSCOPY N/A 12/10/2015    Procedure: COMBINED COLONOSCOPY, SINGLE OR MULTIPLE BIOPSY/POLYPECTOMY BY BIOPSY;  Surgeon: Jyoti Figueroa MD;  Location: [facility]     JOINT REPLACEMENT, HIP RT/LT  10/07    Joint Replacement Hip LT     JOINT REPLACEMTN, KNEE RT/LT   2011    Joint Replacement knee /LT, [facility]     LAPAROSCOPIC HERNIORRHAPHY INGUINAL BILATERAL Bilateral 2018    Procedure: LAPAROSCOPIC HERNIORRHAPHY INGUINAL BILATERAL;  Laparoscopic bilateral inguinal hernia repair;  Surgeon: Josemanuel Resendiz MD;  Location: [facility] OR     PHACOEMULSIFICATION WITH STANDARD INTRAOCULAR LENS IMPLANT Right 2021    Procedure: Cataract Removal with Implant;  Surgeon: Regan Kaufman MD;  Location: [facility]     PHACOEMULSIFICATION WITH STANDARD INTRAOCULAR LENS IMPLANT Left 2021    Procedure: Cataract Removal with Implant;  Surgeon: Regan Kaufman MD;  Location: [facility] OR     SURGICAL HISTORY OF -   1999    Umbilical Herniorrhaphy with mesh     SURGICAL HISTORY OF -  Left 2017    Thoracotomy and drainage of empyema       Family History:    Family History   Problem Relation Age of Onset     Depression Mother      Cerebrovascular Disease Mother      Breast Cancer Mother      Neurologic Disorder Mother         parkinsons     Parkinsonism Mother      Respiratory Father         emphyzema     Diabetes Brother        Social History:  Marital Status:   [2]  Social History     Tobacco Use     Smoking status: Former     Packs/day: 1.00     Years: 20.00     Pack years: 20.00     Types: Cigarettes     Quit date: 10/13/1975     Years since quittin.5     Smokeless tobacco: Never   Substance Use Topics     Alcohol use: Not Currently     Drug use: No        Medications:    metoprolol tartrate (LOPRESSOR) 25 MG tablet  acetaminophen (TYLENOL) 500 MG tablet  buPROPion (WELLBUTRIN SR) 150 MG 12 hr tablet  busPIRone HCl (BUSPAR) 30 MG tablet  doxazosin (CARDURA) 8 MG tablet  finasteride (PROSCAR) 5 MG tablet  furosemide (LASIX) 20 MG tablet  gabapentin (NEURONTIN) 600 MG tablet  lamoTRIgine (LAMICTAL) 25 MG tablet  Levomilnacipran HCl ER (FETZIMA TITRATION) 20 & 40 MG C4PK  Metoprolol Tartrate 75 MG TABS  multivitamin (ONE-DAILY) tablet  XARELTO ANTICOAGULANT 20  "MG TABS tablet          Review of Systems  See HPI  Physical Exam   BP: 135/87  Pulse: (!) 126 (100-126)  Temp: 97.5  F (36.4  C)  Resp: 20  Height: 172.7 cm (5' 8\")  Weight: 74.8 kg (165 lb)  SpO2: 98 %      Physical Exam  /82 (BP Location: Right arm)   Pulse 90   Temp 97.5  F (36.4  C) (Oral)   Resp 17   Ht 1.727 m (5' 8\")   Wt 74.8 kg (165 lb)   SpO2 95%   BMI 25.09 kg/m    General: alert and in no acute distress  Head: atraumatic, normocephalic  Abd: nondistended  Cardiovascular: S1-S2 irregularly irregular.  Tachycardic.  Musculoskel/Extremities: normal extremities, no apparent edema, and full AROM of major joints  Skin: no rashes, no diaphoresis and skin color normal  Neuro: Patient awake, alert, oriented, speech is fluent, gait is normal  Psychiatric: affect/mood normal, cooperative, normal judgement/insight and memory intact      ED Course                 Procedures         EKG, reviewed by myself shows atrial fibrillation.  Nonspecific [id number] depression.  No acute ischemic appearing changes.  Rate 128 bpm.    Critical Care time:  none               Results for orders placed or performed during the hospital encounter of 04/06/23 (from the past 24 hour(s))   CBC with platelets differential    Narrative    The following orders were created for panel order CBC with platelets differential.  Procedure                               Abnormality         Status                     ---------                               -----------         ------                     CBC with platelets and d...[[id number]]  Abnormal            Final result                 Please view results for these tests on the individual orders.   Basic metabolic panel   Result Value Ref Range    Sodium 135 (L) 136 - 145 mmol/L    Potassium 3.9 3.4 - 5.3 mmol/L    Chloride 98 98 - 107 mmol/L    Carbon Dioxide (CO2) 27 22 - 29 mmol/L    Anion Gap 10 7 - 15 mmol/L    Urea Nitrogen 10.3 8.0 - 23.0 mg/dL    Creatinine 0.92 0.67 - " 1.17 mg/dL    Calcium 9.4 8.8 - 10.2 mg/dL    Glucose 94 70 - 99 mg/dL    GFR Estimate 84 >60 mL/min/1.73m2   Nt probnp inpatient (BNP)   Result Value Ref Range    N terminal Pro BNP Inpatient 1,329 0 - 1,800 pg/mL   Troponin T, High Sensitivity   Result Value Ref Range    Troponin T, High Sensitivity 12 <=22 ng/L   Magnesium   Result Value Ref Range    Magnesium 2.2 1.7 - 2.3 mg/dL   [place] Draw    Narrative    The following orders were created for panel order [place] Draw.  Procedure                               Abnormality         Status                     ---------                               -----------         ------                     Extra Blue Top Tube[[id number]]                              Final result                 Please view results for these tests on the individual orders.   CBC with platelets and differential   Result Value Ref Range    WBC Count 5.9 4.0 - 11.0 10e3/uL    RBC Count 4.13 (L) 4.40 - 5.90 10e6/uL    Hemoglobin 13.3 13.3 - 17.7 g/dL    Hematocrit 39.8 (L) 40.0 - 53.0 %    MCV 96 78 - 100 fL    MCH 32.2 26.5 - 33.0 pg    MCHC 33.4 31.5 - 36.5 g/dL    RDW 14.4 10.0 - 15.0 %    Platelet Count 164 150 - 450 10e3/uL    % Neutrophils 60 %    % Lymphocytes 26 %    % Monocytes 10 %    % Eosinophils 3 %    % Basophils 1 %    % Immature Granulocytes 0 %    NRBCs per 100 WBC 0 <1 /100    Absolute Neutrophils 3.5 1.6 - 8.3 10e3/uL    Absolute Lymphocytes 1.5 0.8 - 5.3 10e3/uL    Absolute Monocytes 0.6 0.0 - 1.3 10e3/uL    Absolute Eosinophils 0.2 0.0 - 0.7 10e3/uL    Absolute Basophils 0.1 0.0 - 0.2 10e3/uL    Absolute Immature Granulocytes 0.0 <=0.4 10e3/uL    Absolute NRBCs 0.0 10e3/uL   Extra Blue Top Tube   Result Value Ref Range    Hold Specimen JIC    [place] Draw    Narrative    The following orders were created for panel order [place] Draw.  Procedure                               Abnormality         Status                     ---------                               -----------          ------                     Extra Red Top Tube[[id number]]                               Final result                 Please view results for these tests on the individual orders.   Extra Red Top Tube   Result Value Ref Range    Hold Specimen JIC        Medications   metoprolol (LOPRESSOR) injection 5 mg (5 mg Intravenous $Given 4/6/23 1014)   metoprolol tartrate (LOPRESSOR) tablet 50 mg (50 mg Oral $Given 4/6/23 1012)       Assessments & Plan (with Medical Decision Making)  78 year old male who has medical history significant for atrial fibrillation.  Patient was uncertain with regards to the chronicity of his atrial fibrillation.  Upon my chart review, it does appear that patient is chronically in atrial fibrillation, with rates controlled with metoprolol.  He now presents to the emergency department with acute severe exacerbation of his atrial fibrillation, with heart rates that have now been in the 120s.  No changes in medications.  Anticoagulated on Xarelto.  No severe chest pains.    Based on patient's metoprolol medication use, decision made for giving additional oral in addition to IV metoprolol.  This did slow his heart rates down into the 80s.  Patient is asymptomatic with his A-fib.  Therefore, I feel it is reasonable for discharge home, with metoprolol increased up to 100 mg twice daily.  Patient previously on 75 mg twice daily.  Consideration for hospitalization with regards to monitoring heart rates, however patient now stable, with heart rates persistently less than 100, and I feel it is reasonable for discharge home, and follow-up in clinic, closely monitoring symptoms.         I have reviewed the nursing notes.    I have reviewed the findings, diagnosis, plan and need for follow up with the patient.               New Prescriptions    METOPROLOL TARTRATE (LOPRESSOR) 25 MG TABLET    Take 1 tablet (25 mg) by mouth 2 times daily for 90 days       Final diagnoses:   Palpitations   Chronic atrial  fibrillation (H)       4/6/2023   [facility] EMERGENCY DEPT     Jesus Welch MD  04/06/23 1120

## 2023-04-06 NOTE — ED NOTES
"Reports several days of \"afib\" elevated heart rate.  Denies pain , SOB nausea, or diaphoresis.  Had med change about 2 weeks ago  "

## 2023-04-06 NOTE — DISCHARGE INSTRUCTIONS
Take the additional 25 mg metoprolol tablet twice daily.  This would put your total metoprolol dose at 100 mg twice daily.    Follow-up in primary care clinic in the next 1 to 2 weeks to review heart rates and blood pressures.    Return or be seen if new or worsening symptoms develop.

## 2023-04-07 ENCOUNTER — PATIENT OUTREACH (OUTPATIENT)
Dept: FAMILY MEDICINE | Facility: CLINIC | Age: 78
End: 2023-04-07
Payer: COMMERCIAL

## 2023-04-07 NOTE — TELEPHONE ENCOUNTER
What type of discharge? Emergency Department  Risk of Hospital admission or ED visit: 16%  Is a TCM episode required? Yes  When should the patient follow up with PCP? 14 days of discharge.    Veronica Loaiza RN  [facility]

## 2023-04-07 NOTE — TELEPHONE ENCOUNTER
"Pt was called for ER follow up of rapid heart rate related to chronic a-fib. Metoprolol dose was increased from 75 mg  2x per day up to 100 mg 2x per day. Pt says he is feeling \"okay\", denies feeling lightheaded when standing. His BP's yesterday were    80/56 HR 71, 79/53 p 66. Pt was advised to drink plenty of water. Follow up appt made for  4/13/2023. Pt has plenty of medication available to get to appt. He will call clinic back with any concerns or if BP becomes lower. Message forwarded to PCP for any further recommendations about BP. Alexandra Cason RN     "

## 2023-04-12 DIAGNOSIS — F32.1 MODERATE MAJOR DEPRESSION (H): ICD-10-CM

## 2023-04-12 RX ORDER — LAMOTRIGINE 25 MG/1
TABLET ORAL
Qty: 120 TABLET | Refills: 1 | Status: SHIPPED | OUTPATIENT
Start: 2023-04-12 | End: 2023-05-22

## 2023-04-12 NOTE — TELEPHONE ENCOUNTER
Pending Prescriptions:                       Disp   Refills    lamoTRIgine (LAMICTAL) 25 MG tablet [Phar*120 ta*1            Sig: TAKE 2 TABLETS BY MOUTH TWICE DAILY    Routing refill request to provider for review/approval because:  Labs out of range:    Lab Results   Component Value Date    WBC 5.9 04/06/2023    WBC 5.7 06/03/2021     Lab Results   Component Value Date    RBC 4.13 04/06/2023    RBC 3.62 06/03/2021     Lab Results   Component Value Date    HGB 13.3 04/06/2023    HGB 12.3 06/03/2021     Lab Results   Component Value Date    HCT 39.8 04/06/2023    HCT 35.6 06/03/2021     Lab Results   Component Value Date    MCV 96 04/06/2023    MCV 98 06/03/2021     Lab Results   Component Value Date    MCH 32.2 04/06/2023    MCH 34.0 06/03/2021     Lab Results   Component Value Date    MCHC 33.4 04/06/2023    MCHC 34.6 06/03/2021     Lab Results   Component Value Date    RDW 14.4 04/06/2023    RDW 13.5 06/03/2021     Lab Results   Component Value Date     04/06/2023     06/03/2021     See providers last note      Brian Martins RN

## 2023-04-13 ENCOUNTER — OFFICE VISIT (OUTPATIENT)
Dept: FAMILY MEDICINE | Facility: CLINIC | Age: 78
End: 2023-04-13
Payer: COMMERCIAL

## 2023-04-13 VITALS
SYSTOLIC BLOOD PRESSURE: 126 MMHG | WEIGHT: 172 LBS | BODY MASS INDEX: 26.07 KG/M2 | HEART RATE: 82 BPM | OXYGEN SATURATION: 99 % | TEMPERATURE: 97.4 F | DIASTOLIC BLOOD PRESSURE: 72 MMHG | RESPIRATION RATE: 16 BRPM | HEIGHT: 68 IN

## 2023-04-13 DIAGNOSIS — F40.10 SOCIAL PHOBIA: ICD-10-CM

## 2023-04-13 DIAGNOSIS — F33.1 MODERATE EPISODE OF RECURRENT MAJOR DEPRESSIVE DISORDER (H): ICD-10-CM

## 2023-04-13 DIAGNOSIS — R06.09 DYSPNEA ON EXERTION: ICD-10-CM

## 2023-04-13 DIAGNOSIS — F10.21 ALCOHOLISM IN REMISSION (H): ICD-10-CM

## 2023-04-13 DIAGNOSIS — D69.6 THROMBOCYTOPENIA (H): ICD-10-CM

## 2023-04-13 DIAGNOSIS — I48.20 CHRONIC ATRIAL FIBRILLATION (H): Primary | ICD-10-CM

## 2023-04-13 DIAGNOSIS — I50.32 CHRONIC DIASTOLIC HEART FAILURE (H): ICD-10-CM

## 2023-04-13 PROCEDURE — 99214 OFFICE O/P EST MOD 30 MIN: CPT | Performed by: FAMILY MEDICINE

## 2023-04-13 RX ORDER — METOPROLOL TARTRATE 100 MG
100 TABLET ORAL 2 TIMES DAILY
Qty: 180 TABLET | Refills: 4 | Status: ON HOLD | OUTPATIENT
Start: 2023-04-13 | End: 2023-05-30

## 2023-04-13 ASSESSMENT — PAIN SCALES - GENERAL: PAINLEVEL: NO PAIN (0)

## 2023-04-13 NOTE — PATIENT INSTRUCTIONS
"Stick with your plan regarding psychiatric medications for now. Give it a few months.     When you've used up the metoprolol 25mg + 75mg there is a prescription for 100mg tabs at your pharmacy.    * * *    Per federal transparency in medicine laws, lab and imaging results are released \"real time\" into My Chart.  This may mean that you see the results before I have a chance to review them. My Chart will alert you again when I review the lab and enter comments.  Sometimes with imaging or labs there may be serious or unexpected results. Critical results are paged to me or the after hours on-call provider so that they can be reviewed immediately.  This is not true of non-critical abnormal results. Unfortunately, this means that it's possible you may be alerted of a serious finding before I have a chance to review it.  If you ever receive a result that you are concerned about and I have not already contacted you, please feel free to reach out to me or the care team so that you get the answers you need.    Additionally, it is my goal that you understand the care plan discussed at your visit and that any questions you have are answered.  Please feel free to reach out if you need clarification or explanation of any information addressed at your office visit.    "

## 2023-04-13 NOTE — PROGRESS NOTES
"  Assessment & Plan     Chronic atrial fibrillation (H)  Improved heart rate control with increase in metoprolol.  However heart rate still 80s to 100s.  We will get an echo as per cardiology recommendation back in January.  Continue with metoprolol at 100 mg.    - metoprolol tartrate (LOPRESSOR) 100 MG tablet; Take 1 tablet (100 mg) by mouth 2 times daily  - Echocardiogram Complete; Future    Social phobia  - Adult Mental Health  Referral; Future    Moderate episode of recurrent major depressive disorder (H)  Stable. Continue with current medications. Follow-up with psychotherapy.  - [facility]; Future    Dyspnea on exertion  Cardiology had recommended a repeat echo which she has not yet done.  Echo ordered and scheduled for him.  - Echocardiogram Complete; Future    Known higher risk conditions that I take into account for medical decision making:   Chronic diastolic heart failure (H)  Follows with cardiology.  ECHO as above.     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)  Stable     Thrombocytopenia (H)  Recent plt normal. Continue to monitor annually.             MED REC REQUIRED  Post Medication Reconciliation Status:  Discharge medications reconciled, continue medications without change    BMI:   Estimated body mass index is 26.15 kg/m  as calculated from the following:    Height as of this encounter: 1.727 m (5' 8\").    Weight as of this encounter: 78 kg (172 lb).           Lizzy Leiva MD  [facility]    Greg Alonso is a 78 year old, presenting for the following health issues:  ER F/U         View : No data to display.              HPI     ED/UC Followup:    Facility:  [facility]  Date of visit: 04/06/2023  Reason for visit: Palpitations  Current Status: improved          Review of Systems   Constitutional, neuro, ENT, endocrine, pulmonary, cardiac, gastrointestinal, genitourinary, musculoskeletal, integument and psychiatric systems are " "negative, except as otherwise noted.       Objective    /72   Pulse 82   Temp 97.4  F (36.3  C) (Tympanic)   Resp 16   Ht 1.727 m (5' 8\")   Wt 78 kg (172 lb)   SpO2 99%   BMI 26.15 kg/m    Body mass index is 26.15 kg/m .  Physical Exam   GENERAL: Pleasant, well appearing male.  HEENT: PERRL, EOMI.  NECK: supple, no thyromegaly or thyroid masses, no lymphadenopathy.  CV: irr irr, no murmurs, rubs or gallops.  LUNGS: Clear to auscultation bilaterally, normal effort.  ABDOMEN: Soft, non-distended, non-tender.  No hepatosplenomegaly or palpable masses.    SKIN: warm and dry without obvious rashes.   EXTREMITIES: No edema, normal pulses.                     "

## 2023-04-13 NOTE — NURSING NOTE
"Initial /72   Pulse 82   Temp 97.4  F (36.3  C) (Tympanic)   Resp 16   Ht 1.727 m (5' 8\")   Wt 78 kg (172 lb)   SpO2 99%   BMI 26.15 kg/m   Estimated body mass index is 26.15 kg/m  as calculated from the following:    Height as of this encounter: 1.727 m (5' 8\").    Weight as of this encounter: 78 kg (172 lb). .      "

## 2023-04-14 ENCOUNTER — TELEPHONE (OUTPATIENT)
Dept: CARDIOLOGY | Facility: CLINIC | Age: 78
End: 2023-04-14
Payer: COMMERCIAL

## 2023-04-14 NOTE — TELEPHONE ENCOUNTER
Called [facility]. They need approval to send out furosemide 20 mg tablets. Rosita Gibson RN Cardiology April 14, 2023, 4:44 PM

## 2023-04-14 NOTE — TELEPHONE ENCOUNTER
[facility]    Phone Message    May a detailed message be left on voicemail: yes     Reason for Call: Other: Charlee with [employer] [facility] called in to get approval for medication, the approval can be a verbal, EScribe or through fax at [phone]. Please reach out to [phone] with all further questions.     Action Taken: Other: Cardiology    Travel Screening: Not Applicable     Thank you!  [facility]

## 2023-04-19 ENCOUNTER — VIRTUAL VISIT (OUTPATIENT)
Dept: PHARMACY | Facility: CLINIC | Age: 78
End: 2023-04-19
Payer: COMMERCIAL

## 2023-04-19 DIAGNOSIS — I10 BENIGN ESSENTIAL HYPERTENSION: ICD-10-CM

## 2023-04-19 DIAGNOSIS — F41.8 DEPRESSION WITH ANXIETY: Primary | ICD-10-CM

## 2023-04-19 DIAGNOSIS — I48.20 CHRONIC ATRIAL FIBRILLATION (H): ICD-10-CM

## 2023-04-19 PROCEDURE — 99606 MTMS BY PHARM EST 15 MIN: CPT | Performed by: PHARMACIST

## 2023-04-19 NOTE — PROGRESS NOTES
Medication Therapy Management (MTM) Encounter    ASSESSMENT:                            Medication Adherence/Access: No issues identified    Depression/Anxiety: patient had one good day when meeting a friend - otherwise feels no change, increase Fetzima to 80 mg daily. I sent a prescription to patient's pharmacy. Since patient had a good day when meeting with a friend, I asked if he could meet with a friend more often than once every 3 weeks - he thinks it would work to meet with his friend weekly, he is planning on reaching out to his friend.     Hypertension/Atrial Fibrillation: stable    PLAN:                            1. Increase Fetzima to 80 mg daily - I sent a prescription to patient's pharmacy.     Follow-up: Wednesday, May 17th at 1:30 PM    SUBJECTIVE/OBJECTIVE:                          Josemanuel Edmond is a 78 year old male called for a follow-up visit.  Today's visit is a follow-up MTM visit from 3/29/2023.     Reason for visit: follow up MTM visit.    Tobacco: He reports that he quit smoking about 47 years ago. His smoking use included cigarettes. He has a 20.00 pack-year smoking history. He has never used smokeless tobacco.  Alcohol: history of alcohol dependence and not currently using    Medication Adherence/Access: no issues reported    Depression/Anxiety: Patient is taking Fetzima 40 mg once daily, lamotrigine 50 mg twice daily, bupropion 150 mg twice daily, and buspirone 30 mg twice daily. Tolerating well. Patient states he discussed his medications with his PCP and she would like him to be on his medications for at least 2 months before changing to a new medication. He states today not a whole lot of change, he states he felt pretty good yesterday - today that's gone. I asked what he did yesterday that was different. He states he met a friend from high school for lunch. He says that he sees this friend about once every three weeks.     PHQ 10/26/2022 11/4/2022 11/7/2022   PHQ-9 Total Score 12 6 9    Q9: Thoughts of better off dead/self-harm past 2 weeks Not at all Not at all Not at all     CAROLYNN-7 SCORE 6/16/2022 8/18/2022 11/4/2022   Total Score - - -   Total Score 3 (minimal anxiety) - 4 (minimal anxiety)   Total Score 3 3 4     Hypertension/Atrial Fibrillation: Current medications include metoprolol 100 mg twice daily (recently had an atrial fibrillation episode and metoprolol dose increased) and Xarelto 20 mg with dinner. Patient states doing good on higher metoprolol dose, at home blood pressures in the 130's systolic. Patient reports no current medication side effects.    BP Readings from Last 3 Encounters:   04/13/23 126/72   04/06/23 (!) 121/93   01/11/23 108/80     ----------------      I spent 15 minutes with this patient today. All changes were made via collaborative practice agreement with Lizzy Leiva MD. A copy of the visit note was provided to the patient's provider(s).    A summary of these recommendations was sent via [employer].    Marian Correa, PharmD  Medication Therapy Management     Telemedicine Visit Details  Type of service:  Telephone visit  Start Time: 11:00 AM  End Time: 11:15 AM     Medication Therapy Recommendations  Depression with anxiety    Current Medication: levomilnacipran (FETZIMA) 80 MG 24 hr capsule   Rationale: Dose too low - Dosage too low - Effectiveness   Recommendation: Increase Dose   Status: Accepted per CPA

## 2023-04-21 NOTE — PATIENT INSTRUCTIONS
"Recommendations from today's MTM visit:                                                       Gavin,    It was a pleasure talking with you! We discussed the followin. Increase Fetzima to 80 mg daily - I sent a prescription to your pharmacy.     Follow-up: Wednesday, May 17th at 1:30 PM    It was great speaking with you today.  I value your experience and would be very thankful for your time in providing feedback in our clinic survey. In the next few days, you may receive an email or text message from [facility] [employer] with a link to a survey related to your  clinical pharmacist.\"     To schedule another MTM appointment, please call the clinic directly or you may call the MTM scheduling line at [phone] or toll-free at [phone].     My Clinical Pharmacist's contact information:                                                      Please feel free to contact me with any questions or concerns you have.      Keep up the good work!!    Marian Correa, PharmD  [phone] in clinic on Tuesday and Wednesday        "

## 2023-05-05 ENCOUNTER — MYC MEDICAL ADVICE (OUTPATIENT)
Dept: FAMILY MEDICINE | Facility: CLINIC | Age: 78
End: 2023-05-05
Payer: COMMERCIAL

## 2023-05-05 DIAGNOSIS — N40.1 HYPERTROPHY OF PROSTATE WITH URINARY OBSTRUCTION: ICD-10-CM

## 2023-05-05 DIAGNOSIS — N13.8 HYPERTROPHY OF PROSTATE WITH URINARY OBSTRUCTION: ICD-10-CM

## 2023-05-05 RX ORDER — DOXAZOSIN 8 MG/1
4 TABLET ORAL AT BEDTIME
Qty: 45 TABLET | Refills: 0 | Status: SHIPPED | OUTPATIENT
Start: 2023-05-05 | End: 2023-06-26

## 2023-05-05 NOTE — TELEPHONE ENCOUNTER
"Prescription approved per [facility] Refill Protocol.    Signed Prescriptions:                        Disp   Refills    doxazosin (CARDURA) 8 MG tablet            45 tab*0        Sig: Take 0.5 tablets (4 mg) by mouth At Bedtime  Authorizing Provider: BETITO KEENAN  Ordering User: BRITTANY MURPHY      Requested Prescriptions   Signed Prescriptions Disp Refills    doxazosin (CARDURA) 8 MG tablet 45 tablet 0     Sig: Take 0.5 tablets (4 mg) by mouth At Bedtime       Alpha Blockers Passed - 5/5/2023  3:23 PM        Passed - Blood pressure under 140/90 in past 12 months     BP Readings from Last 3 Encounters:   04/13/23 126/72   04/06/23 (!) 121/93 01/11/23 108/80                 Passed - Recent (12 mo) or future (30 days) visit within the authorizing provider's specialty     Patient has had an office visit with the authorizing provider or a provider within the authorizing providers department within the previous 12 mos or has a future within next 30 days. See \"Patient Info\" tab in inbasket, or \"Choose Columns\" in Meds & Orders section of the refill encounter.              Passed - Patient does not have Tadalafil, Vardenafil, or Sildenafil on their medication list        Passed - Medication is active on med list        Passed - Patient is 18 years of age or older       Antiadrenergic Antihypertensives Passed - 5/5/2023  3:23 PM        Passed - Blood pressure less than 140/90 in past 6 months     BP Readings from Last 3 Encounters:   04/13/23 126/72   04/06/23 (!) 121/93 01/11/23 108/80                 Passed - Medication is active on med list        Passed - Patient is age 18 or older        Passed - Normal serum creatinine on file in past 12 months     Recent Labs   Lab Test 04/06/23  0948   CR 0.92       Ok to refill medication if creatinine is low          Passed - Recent (6 mo) or future (30 days) visit within the authorizing provider's specialty     Patient had office visit in the last 6 months or has a " "visit in the next 30 days with authorizing provider or within the authorizing provider's specialty.  See \"Patient Info\" tab in inbasket, or \"Choose Columns\" in Meds & Orders section of the refill encounter.               See my chart message    Annette Simons RN on 5/5/2023 at 3:26 PM    "

## 2023-05-16 ENCOUNTER — VIRTUAL VISIT (OUTPATIENT)
Dept: PHARMACY | Facility: CLINIC | Age: 78
End: 2023-05-16
Payer: COMMERCIAL

## 2023-05-16 DIAGNOSIS — F41.8 DEPRESSION WITH ANXIETY: Primary | ICD-10-CM

## 2023-05-16 PROCEDURE — 99606 MTMS BY PHARM EST 15 MIN: CPT | Performed by: PHARMACIST

## 2023-05-16 RX ORDER — HYDROXYZINE HYDROCHLORIDE 10 MG/1
10 TABLET, FILM COATED ORAL 3 TIMES DAILY PRN
Qty: 90 TABLET | Refills: 1 | Status: ON HOLD | OUTPATIENT
Start: 2023-05-16 | End: 2023-05-25

## 2023-05-16 NOTE — PATIENT INSTRUCTIONS
"Recommendations from today's MTM visit:                                                       Gavin,    It was a pleasure talking with you! We discussed the followin. Continue Fetzima at 80 mg once daily - continue to monitor anxiety - if no improvement or anxiety gets worse lower Fetzima dose back down to 40 mg daily - increase in anxiety may be transient.     2. Try a lower dose of hydroxyzine 10 mg three times daily for your anxieties. I sent a prescription to your pharmacy.     Follow-up:  at 10:00 AM    It was great speaking with you today.  I value your experience and would be very thankful for your time in providing feedback in our clinic survey. In the next few days, you may receive an email or text message from [employer] with a link to a survey related to your  clinical pharmacist.\"     To schedule another MTM appointment, please call the clinic directly or you may call the MTM scheduling line at [phone] or toll-free at [phone].     My Clinical Pharmacist's contact information:                                                      Please feel free to contact me with any questions or concerns you have.      Keep up the good work!!    Marian Correa, PharmD  [phone] in clinic on Tuesday and Wednesday        "

## 2023-05-16 NOTE — PROGRESS NOTES
Medication Therapy Management (MTM) Encounter    ASSESSMENT:                            Medication Adherence/Access: No issues identified    Depression/Anxiety: Continue to monitor anxiety - if no improvement or anxiety gets worse lower Fetzima dose back down to 40 mg daily - increase in anxiety may be transient. Will try hydroxyzine again at a lower dose - I sent a prescription to patient's pharmacy. Reviewed with patient - will revisit bupropion and buspirone at a later date to see if still needed - prefer to make one change at a time. Reviewed with patient counseling may be beneficial for him, he states he doesn't think that is going to happen - personal reasons.     PLAN:                            1. Continue Fetzima at 80 mg once daily - continue to monitor anxiety - if no improvement or anxiety gets worse lower Fetzima dose back down to 40 mg daily - increase in anxiety may be transient. At follow up visit update PHQ-9 and CAROLYNN.    2. Try lower dose of hydroxyzine 10 mg three times daily for anxieties. I sent a prescription to patient's pharmacy.     Follow-up: Tuesday, June 13th at 10:00 AM    SUBJECTIVE/OBJECTIVE:                          Gavin Edmond is a 78 year old male called for a follow-up visit.  Today's visit is a follow-up MTM visit from 4/19/2023.     Reason for visit: follow up MTM visit.    Tobacco: He reports that he quit smoking about 47 years ago. His smoking use included cigarettes. He has a 20.00 pack-year smoking history. He has never used smokeless tobacco.  Alcohol: history of alcohol dependence and not currently using.    Medication Adherence/Access: no issues reported    Depression/Anxiety: Patient is taking Fetzima 80 mg once daily, lamotrigine 50 mg twice daily, bupropion 150 mg twice daily, and buspirone 30 mg twice daily. Tolerating well. Patient states his depression seems to be a bit better, his anxiety a bit worse. Just increased his Fetzima dose 1 week ago. He thinks his  anxiety may be worse since increasing his dose of Fetzima. Patient is wondering if he should stop either his bupropion or buspirone. He is also wondering about as needed medications for anxiety. He has tried propranolol - no improvement. In the past he tried hydroxyzine 50 mg three times daily as needed - he states he got overly sedated.       10/26/2022     1:24 PM 11/4/2022     9:28 AM 11/7/2022     3:14 PM   PHQ   PHQ-9 Total Score 12 6 9   Q9: Thoughts of better off dead/self-harm past 2 weeks Not at all Not at all Not at all         6/16/2022    11:34 AM 8/18/2022     1:55 PM 11/4/2022     9:28 AM   CAROLYNN-7 SCORE   Total Score 3 (minimal anxiety)  4 (minimal anxiety)   Total Score 3 3 4     ----------------    I spent 15 minutes with this patient today. All changes were made via collaborative practice agreement with Lizzy Leiva MD. A copy of the visit note was provided to the patient's provider(s).    A summary of these recommendations was sent via [employer].    Marian Correa, PharmD  Medication Therapy Management     Telemedicine Visit Details  Type of service:  Telephone visit  Start Time: 10:00 AM  End Time: 10:15 AM     Medication Therapy Recommendations  Anxiety    Current Medication: hydrOXYzine (ATARAX) 10 MG tablet   Rationale: Synergistic therapy - Needs additional medication therapy - Indication   Recommendation: Start Medication   Status: Accepted per CPA

## 2023-05-17 ENCOUNTER — PATIENT OUTREACH (OUTPATIENT)
Dept: CARE COORDINATION | Facility: CLINIC | Age: 78
End: 2023-05-17
Payer: COMMERCIAL

## 2023-05-19 ENCOUNTER — TRANSFERRED RECORDS (OUTPATIENT)
Dept: HEALTH INFORMATION MANAGEMENT | Facility: CLINIC | Age: 78
End: 2023-05-19
Payer: COMMERCIAL

## 2023-05-22 ENCOUNTER — OFFICE VISIT (OUTPATIENT)
Dept: FAMILY MEDICINE | Facility: CLINIC | Age: 78
End: 2023-05-22
Payer: COMMERCIAL

## 2023-05-22 VITALS
BODY MASS INDEX: 25.91 KG/M2 | HEIGHT: 68 IN | TEMPERATURE: 98.3 F | RESPIRATION RATE: 18 BRPM | OXYGEN SATURATION: 100 % | WEIGHT: 171 LBS | DIASTOLIC BLOOD PRESSURE: 86 MMHG | HEART RATE: 87 BPM | SYSTOLIC BLOOD PRESSURE: 114 MMHG

## 2023-05-22 DIAGNOSIS — I10 BENIGN ESSENTIAL HYPERTENSION: ICD-10-CM

## 2023-05-22 DIAGNOSIS — Z01.818 PREOP GENERAL PHYSICAL EXAM: Primary | ICD-10-CM

## 2023-05-22 DIAGNOSIS — Z23 NEED FOR SHINGLES VACCINE: ICD-10-CM

## 2023-05-22 DIAGNOSIS — I48.20 CHRONIC ATRIAL FIBRILLATION (H): ICD-10-CM

## 2023-05-22 DIAGNOSIS — I50.32 CHRONIC DIASTOLIC HEART FAILURE (H): ICD-10-CM

## 2023-05-22 DIAGNOSIS — Z96.651 S/P TOTAL KNEE ARTHROPLASTY, RIGHT: ICD-10-CM

## 2023-05-22 DIAGNOSIS — D69.6 THROMBOCYTOPENIA (H): ICD-10-CM

## 2023-05-22 DIAGNOSIS — M17.11 PRIMARY OSTEOARTHRITIS OF RIGHT KNEE: ICD-10-CM

## 2023-05-22 DIAGNOSIS — E78.5 HYPERLIPIDEMIA LDL GOAL <100: ICD-10-CM

## 2023-05-22 LAB
ANION GAP SERPL CALCULATED.3IONS-SCNC: 10 MMOL/L (ref 7–15)
BUN SERPL-MCNC: 13 MG/DL (ref 8–23)
CALCIUM SERPL-MCNC: 9.2 MG/DL (ref 8.8–10.2)
CHLORIDE SERPL-SCNC: 96 MMOL/L (ref 98–107)
CREAT SERPL-MCNC: 0.92 MG/DL (ref 0.67–1.17)
DEPRECATED HCO3 PLAS-SCNC: 29 MMOL/L (ref 22–29)
ERYTHROCYTE [DISTWIDTH] IN BLOOD BY AUTOMATED COUNT: 14.3 % (ref 10–15)
GFR SERPL CREATININE-BSD FRML MDRD: 84 ML/MIN/1.73M2
GLUCOSE SERPL-MCNC: 99 MG/DL (ref 70–99)
HCT VFR BLD AUTO: 35.9 % (ref 40–53)
HGB BLD-MCNC: 11.8 G/DL (ref 13.3–17.7)
MCH RBC QN AUTO: 32.2 PG (ref 26.5–33)
MCHC RBC AUTO-ENTMCNC: 32.9 G/DL (ref 31.5–36.5)
MCV RBC AUTO: 98 FL (ref 78–100)
PLATELET # BLD AUTO: 138 10E3/UL (ref 150–450)
POTASSIUM SERPL-SCNC: 4.3 MMOL/L (ref 3.4–5.3)
RBC # BLD AUTO: 3.66 10E6/UL (ref 4.4–5.9)
SODIUM SERPL-SCNC: 135 MMOL/L (ref 136–145)
WBC # BLD AUTO: 5.6 10E3/UL (ref 4–11)

## 2023-05-22 PROCEDURE — 80048 BASIC METABOLIC PNL TOTAL CA: CPT | Performed by: PHYSICIAN ASSISTANT

## 2023-05-22 PROCEDURE — 85027 COMPLETE CBC AUTOMATED: CPT | Performed by: PHYSICIAN ASSISTANT

## 2023-05-22 PROCEDURE — 93000 ELECTROCARDIOGRAM COMPLETE: CPT | Performed by: PHYSICIAN ASSISTANT

## 2023-05-22 PROCEDURE — 36415 COLL VENOUS BLD VENIPUNCTURE: CPT | Performed by: PHYSICIAN ASSISTANT

## 2023-05-22 PROCEDURE — 99214 OFFICE O/P EST MOD 30 MIN: CPT | Performed by: PHYSICIAN ASSISTANT

## 2023-05-22 ASSESSMENT — PATIENT HEALTH QUESTIONNAIRE - PHQ9
SUM OF ALL RESPONSES TO PHQ QUESTIONS 1-9: 6
SUM OF ALL RESPONSES TO PHQ QUESTIONS 1-9: 6

## 2023-05-22 ASSESSMENT — PAIN SCALES - GENERAL: PAINLEVEL: EXTREME PAIN (8)

## 2023-05-22 NOTE — H&P (VIEW-ONLY)
[facility]  [address] [place] [id number]  Phone: [phone]  Fax: [phone]  Primary Provider: Lizzy Leiva  Pre-op Performing Provider: SORAYA SHAFER    PREOPERATIVE EVALUATION:  Today's date: 5/22/2023    Josemanuel Edmond is a 78 year old male who presents for a preoperative evaluation.      5/22/2023     3:50 PM   Additional Questions   Roomed by Rebeca SALEH CMA     Surgical Information:  Surgery/Procedure: Revision total hip arthroplasty-Left  Surgery Location: [facility]   Surgeon: Chandler Leiva MD  Surgery Date: 05/25/2023  Time of Surgery: TBD   Where patient plans to recover: At home with family  Fax number for surgical facility: Note does not need to be faxed, will be available electronically in Epic.    Assessment & Plan     The proposed surgical procedure is considered INTERMEDIATE risk.    Problem List Items Addressed This Visit        Endocrine    Hyperlipidemia LDL goal <100       Circulatory    Benign essential hypertension    Chronic atrial fibrillation (H)    Chronic diastolic heart failure (H)       Musculoskeletal and Integumentary    Right knee DJD       Hematologic    Thrombocytopenia (H)   Other Visit Diagnoses     Preop general physical exam    -  Primary    Relevant Orders    EKG 12-lead complete w/read - Clinics (Completed)    Basic metabolic panel  (Ca, Cl, CO2, Creat, Gluc, K, Na, BUN) (Completed)    CBC with platelets (Completed)    S/P total knee arthroplasty, right        Need for shingles vaccine             - No identified additional risk factors other than previously addressed    Antiplatelet or Anticoagulation Medication Instructions:   - apixaban (Eliquis), edoxaban (Savaysa), rivaroxaban (Xarelto): Neuraxial or regional block anticipated AND CrCL (>=) 50mL/min. HOLD 3 days before surgery.     Additional Medication Instructions:   - Beta Blockers: Continue taking on the day of surgery.   -  SSRIs, SNRIs, TCAs, Antipsychotics: Continue without modification.     RECOMMENDATION:  APPROVAL GIVEN to proceed with proposed procedure, without further diagnostic evaluation.    Patient did complete echocardiogram on 5/23/2023.  EF is 50 to 55% and he has moderate mitral regurgitation and mild aortic regurgitation.  This is unchanged from his previous echocardiogram several years ago.  I did discuss his case with Dr. Rimma Wan's cardiologist who agrees with approval to proceed with his surgery without further diagnostic evaluation.    Subjective   HPI related to upcoming procedure: Patient is an 78-year-old male with history of chronic A-fib, chronic diastolic heart failure, hypertension, hyperlipidemia and thrombocytopenia who presents for preoperative evaluation for proposed revision of the right total hip arthroplasty on the left.  Patient has chronic mild dyspnea on exertion but is actively being worked up by cardiology.  He unfortunately missed his echocardiogram appointment 2 weeks ago.  We discussed that given his ongoing symptoms this does need to get done prior to clearance for his procedure.  Patient denies any other new or concerning symptoms including fevers or chills, URI symptoms, chest pain, palpitations, abdominal pain, nausea or vomiting, diarrhea constipation, urinary symptoms or any other concerning systemic sign or symptom.        5/22/2023     3:55 PM   Preop Questions   1. Have you ever had a heart attack or stroke? No   2. Have you ever had surgery on your heart or blood vessels, such as a stent placement, a coronary artery bypass, or surgery on an artery in your head, neck, heart, or legs? No   3. Do you have chest pain with activity? No   4. Do you have a history of  heart failure? No   5. Do you currently have a cold, bronchitis or symptoms of other infection? No   6. Do you have a cough, shortness of breath, or wheezing? No   7. Do you or anyone in your family have previous history of  blood clots? No   8. Do you or does anyone in your family have a serious bleeding problem such as prolonged bleeding following surgeries or cuts? No   9. Have you ever had problems with anemia or been told to take iron pills? No   10. Have you had any abnormal blood loss such as black, tarry or bloody stools? No   11. Have you ever had a blood transfusion? No   12. Are you willing to have a blood transfusion if it is medically needed before, during, or after your surgery? Yes   13. Have you or any of your relatives ever had problems with anesthesia? No   14. Do you have sleep apnea, excessive snoring or daytime drowsiness? No   15. Do you have any artifical heart valves or other implanted medical devices like a pacemaker, defibrillator, or continuous glucose monitor? No   16. Do you have artificial joints? YES - Right TKA   17. Are you allergic to latex? No     Health Care Directive:  Patient has a Health Care Directive on file    Preoperative Review of :   reviewed - no record of controlled substances prescribed.    Status of Chronic Conditions:  A-FIB - Patient has a longstanding history of chronic A-fib currently on rate control. Current treatment regimen includes Rivaroxaban for stroke prevention and denies significant symptoms of lightheadedness, palpitations or dyspnea.     CHF - Patient has a longstanding history of moderate-severe CHF. Exacerbating conditions include hypertension and atrial fibrilation. Currently the patient's condition is same. Current treatment regimen includes beta blocker and diuretic. The patient denies chest pain, edema, orthopnea, SOB or recent weight gain. EKG 5/22/2023 .     HYPERLIPIDEMIA - Patient has a long history of significant Hyperlipidemia requiring medication for treatment with recent good control. Patient reports no problems or side effects with the medication.     HYPERTENSION - Patient has longstanding history of HTN , currently denies any symptoms referable to  elevated blood pressure. Specifically denies chest pain, palpitations, dyspnea, orthopnea, PND or peripheral edema. Blood pressure readings have been in normal range. Current medication regimen is as listed below. Patient denies any side effects of medication.       Review of Systems  CONSTITUTIONAL: NEGATIVE for fever, chills, change in weight  INTEGUMENTARY/SKIN: NEGATIVE for worrisome rashes, moles or lesions  EYES: NEGATIVE for vision changes or irritation  ENT/MOUTH: NEGATIVE for ear, mouth and throat problems  RESP:NEGATIVE for significant cough or SOB and dyspnea on exertion  CV: NEGATIVE for chest pain, palpitations or peripheral edema  GI: NEGATIVE for nausea, abdominal pain, heartburn, or change in bowel habits  : NEGATIVE for frequency, dysuria, or hematuria  MUSCULOSKELETAL: NEGATIVE for significant arthralgias or myalgia  NEURO: NEGATIVE for weakness, dizziness or paresthesias  ENDOCRINE: NEGATIVE for temperature intolerance, skin/hair changes  HEME: NEGATIVE for bleeding problems  PSYCHIATRIC: NEGATIVE for changes in mood or affect    Patient Active Problem List    Diagnosis Date Noted     Social phobia 04/13/2023     Priority: Medium     Chronic diastolic heart failure (H) 09/22/2022     Priority: Medium     Valvular heart disease 09/22/2022     Priority: Medium     Thrombocytopenia (H) 06/29/2022     Priority: Medium     Tremor 06/29/2022     Priority: Medium     Memory difficulties 05/05/2022     Priority: Medium     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 02/28/2022     Priority: Medium     Depression with anxiety 02/28/2022     Priority: Medium     Hematoma of skin 07/17/2019     Priority: Medium     Right knee DJD 10/12/2018     Priority: Medium     Peripheral neuropathy 10/12/2018     Priority: Medium     Hyperplastic Polyp 03/05/2018     Priority: Medium     Chronic atrial fibrillation (H) 01/18/2018     Priority: Medium     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) 11/28/2017     Priority: Medium     Status post  lung surgery 11/18/2017     Priority: Medium     Unilateral inguinal hernia without obstruction or gangrene 11/07/2017     Priority: Medium     Benzodiazepine dependence (H) 10/31/2017     Priority: Medium     GERD (gastroesophageal reflux disease) 08/03/2012     Priority: Medium     Anxiety 02/16/2012     Priority: Medium     Hyperlipidemia LDL goal <100 10/31/2010     Priority: Medium     Osteoarthrosis, unspecified whether generalized or localized, pelvic region and thigh 09/28/2007     Priority: Medium     Hypertrophy of prostate with urinary obstruction 08/03/2006     Priority: Medium     Problem list name updated by automated process. Provider to review       Benign essential hypertension 12/12/2005     Priority: Medium      Past Medical History:   Diagnosis Date     Arthritis 2005     Benign neoplasm of prostate 2000    Benign Prostate Nodule     Depressive disorder     past condition     Infection due to 2019 novel coronavirus 09/27/2021     S/P knee replacement 11/06/2012     S/P left knee arthroscopy 08/15/2011     Past Surgical History:   Procedure Laterality Date     ABDOMEN SURGERY  2003     ARTHROPLASTY KNEE Right 10/12/2018    Procedure: ARTHROPLASTY KNEE;  Right Total Knee Arthroplasty;  Surgeon: Jeb Peralta MD;  Location: [facility]     BIOPSY  2007     COLONOSCOPY N/A 12/10/2015    Procedure: COMBINED COLONOSCOPY, SINGLE OR MULTIPLE BIOPSY/POLYPECTOMY BY BIOPSY;  Surgeon: Jyoti Figueroa MD;  Location: [facility]     JOINT REPLACEMENT, HIP RT/LT  10/2007    Joint Replacement Hip LT     JOINT REPLACEMTN, KNEE RT/LT  08/2011    Joint Replacement knee /LT, [facility]     LAPAROSCOPIC HERNIORRHAPHY INGUINAL BILATERAL Bilateral 04/24/2018    Procedure: LAPAROSCOPIC HERNIORRHAPHY INGUINAL BILATERAL;  Laparoscopic bilateral inguinal hernia repair;  Surgeon: Josemanuel Resendiz MD;  Location: [facility]     PHACOEMULSIFICATION WITH STANDARD INTRAOCULAR LENS IMPLANT Right 01/06/2021    Procedure:  Cataract Removal with Implant;  Surgeon: Regan Kaufman MD;  Location: [facility]     PHACOEMULSIFICATION WITH STANDARD INTRAOCULAR LENS IMPLANT Left 02/17/2021    Procedure: Cataract Removal with Implant;  Surgeon: Regan Kaufman MD;  Location: [facility]     SURGICAL HISTORY OF -   12/01/1999    Umbilical Herniorrhaphy with mesh     SURGICAL HISTORY OF -  Left 11/2017    Thoracotomy and drainage of empyema     Current Outpatient Medications   Medication Sig Dispense Refill     acetaminophen (TYLENOL) 500 MG tablet Take 500-1,000 mg by mouth every 6 hours as needed for mild pain       buPROPion (WELLBUTRIN SR) 150 MG 12 hr tablet Take 150 mg by mouth 2 times daily       busPIRone HCl (BUSPAR) 30 MG tablet TAKE 1 TABLET TWICE A  tablet 1     doxazosin (CARDURA) 8 MG tablet Take 0.5 tablets (4 mg) by mouth At Bedtime 45 tablet 0     finasteride (PROSCAR) 5 MG tablet TAKE 1 TABLET DAILY 90 tablet 3     gabapentin (NEURONTIN) 600 MG tablet TAKE 1 TABLET FOUR TIMES A DAY (Patient taking differently: Take 1,200 mg by mouth 2 times daily) 360 tablet 3     lamoTRIgine (LAMICTAL) 25 MG tablet TAKE 2 TABLETS TWICE A  tablet 3     levomilnacipran (FETZIMA) 80 MG 24 hr capsule Take 1 capsule (80 mg) by mouth daily 30 capsule 1     metoprolol tartrate (LOPRESSOR) 100 MG tablet Take 1 tablet (100 mg) by mouth 2 times daily 180 tablet 4     multivitamin (ONE-DAILY) tablet Take 1 tablet by mouth daily 30 tablet 0     XARELTO ANTICOAGULANT 20 MG TABS tablet TAKE 1 TABLET DAILY WITH   DINNER 90 tablet 3     furosemide (LASIX) 20 MG tablet Take 1 tablet (20 mg) by mouth daily (Patient not taking: Reported on 5/22/2023) 90 tablet 3     hydrOXYzine (ATARAX) 10 MG tablet Take 1 tablet (10 mg) by mouth 3 times daily as needed for anxiety (Patient not taking: Reported on 5/22/2023) 90 tablet 1       Allergies   Allergen Reactions     Bactrim [Sulfamethoxazole-Trimethoprim] Nausea and Vomiting        Social History  "    Tobacco Use     Smoking status: Former     Packs/day: 1.00     Years: 15.00     Pack years: 15.00     Types: Cigarettes     Start date: 1958     Quit date: 10/13/1975     Years since quittin.6     Smokeless tobacco: Never   Vaping Use     Vaping status: Never Used   Substance Use Topics     Alcohol use: Yes       History   Drug Use No         Objective     /86 (BP Location: Right arm, Patient Position: Sitting, Cuff Size: Adult Regular)   Pulse 87   Temp 98.3  F (36.8  C) (Tympanic)   Resp 18   Ht 1.727 m (5' 8\")   Wt 77.6 kg (171 lb)   SpO2 100%   BMI 26.00 kg/m      Physical Exam    GENERAL APPEARANCE: healthy, alert and no distress     EYES: EOMI,  PERRL     HENT: ear canals and TM's normal and nose and mouth without ulcers or lesions     NECK: no adenopathy, no asymmetry, masses, or scars and thyroid normal to palpation     RESP: lungs clear to auscultation - no rales, rhonchi or wheezes     CV: regular rates and rhythm, normal S1 S2, no S3 or S4 and no murmur, click or rub     ABDOMEN:  soft, nontender, no HSM or masses and bowel sounds normal     MS: extremities normal- no gross deformities noted, no evidence of inflammation in joints, FROM in all extremities.     SKIN: no suspicious lesions or rashes     NEURO: Normal strength and tone, sensory exam grossly normal, mentation intact and speech normal     PSYCH: mentation appears normal. and affect normal/bright     LYMPHATICS: No cervical adenopathy    Recent Labs   Lab Test 23  0948 22  1027   HGB 13.3 12.5*    153   INR  --  1.39*   * 134*   POTASSIUM 3.9 4.2   CR 0.92 0.94      Diagnostics:  Results for orders placed or performed in visit on 23   CBC with platelets     Status: Abnormal   Result Value Ref Range    WBC Count 5.6 4.0 - 11.0 10e3/uL    RBC Count 3.66 (L) 4.40 - 5.90 10e6/uL    Hemoglobin 11.8 (L) 13.3 - 17.7 g/dL    Hematocrit 35.9 (L) 40.0 - 53.0 %    MCV 98 78 - 100 fL    MCH 32.2 26.5 " - 33.0 pg    MCHC 32.9 31.5 - 36.5 g/dL    RDW 14.3 10.0 - 15.0 %    Platelet Count 138 (L) 150 - 450 10e3/uL   Basic metabolic panel  (Ca, Cl, CO2, Creat, Gluc, K, Na, BUN)     Status: Abnormal   Result Value Ref Range    Sodium 135 (L) 136 - 145 mmol/L    Potassium 4.3 3.4 - 5.3 mmol/L    Chloride 96 (L) 98 - 107 mmol/L    Carbon Dioxide (CO2) 29 22 - 29 mmol/L    Anion Gap 10 7 - 15 mmol/L    Urea Nitrogen 13.0 8.0 - 23.0 mg/dL    Creatinine 0.92 0.67 - 1.17 mg/dL    Calcium 9.2 8.8 - 10.2 mg/dL    Glucose 99 70 - 99 mg/dL    GFR Estimate 84 >60 mL/min/1.73m2       EKG: atrial fibrillation, rate variable from , normal axis, normal intervals, no acute ST/T changes c/w ischemia, no LVH by voltage criteria, unchanged from previous tracings    Revised Cardiac Risk Index (RCRI):  The patient has the following serious cardiovascular risks for perioperative complications:   - Congestive Heart Failure (pulmonary edema, PND, s3 godfrey, CXR with pulmonary congestion, basilar rales) = 1 point     RCRI Interpretation: 1 point: Class II (low risk - 0.9% complication rate)     Signed Electronically by: Misha Ventura PA-C  Copy of this evaluation report is provided to requesting physician.

## 2023-05-22 NOTE — PROGRESS NOTES
[facility]  [address] [place] [address]  Phone: [phone]  Fax: [phone]  Primary Provider: Lizzy Leiva  Pre-op Performing Provider: SORAYA SHAFER    PREOPERATIVE EVALUATION:  Today's date: 5/22/2023    Josemanuel Edmond is a 78 year old male who presents for a preoperative evaluation.      5/22/2023     3:50 PM   Additional Questions   Roomed by Rebeca SALEH CMA     Surgical Information:  Surgery/Procedure: Revision total hip arthroplasty-Left  Surgery Location: [facility]   Surgeon: Chandler Leiva MD  Surgery Date: 05/25/2023  Time of Surgery: TBD   Where patient plans to recover: At home with family  Fax number for surgical facility: Note does not need to be faxed, will be available electronically in Epic.    Assessment & Plan     The proposed surgical procedure is considered INTERMEDIATE risk.    Problem List Items Addressed This Visit        Endocrine    Hyperlipidemia LDL goal <100       Circulatory    Benign essential hypertension    Chronic atrial fibrillation (H)    Chronic diastolic heart failure (H)       Musculoskeletal and Integumentary    Right knee DJD       Hematologic    Thrombocytopenia (H)   Other Visit Diagnoses     Preop general physical exam    -  Primary    Relevant Orders    EKG 12-lead complete w/read - Clinics (Completed)    Basic metabolic panel  (Ca, Cl, CO2, Creat, Gluc, K, Na, BUN) (Completed)    CBC with platelets (Completed)    S/P total knee arthroplasty, right        Need for shingles vaccine             - No identified additional risk factors other than previously addressed    Antiplatelet or Anticoagulation Medication Instructions:   - apixaban (Eliquis), edoxaban (Savaysa), rivaroxaban (Xarelto): Neuraxial or regional block anticipated AND CrCL (>=) 50mL/min. HOLD 3 days before surgery.     Additional Medication Instructions:   - Beta Blockers: Continue taking on the day of surgery.   -  SSRIs, SNRIs, TCAs, Antipsychotics: Continue without modification.     RECOMMENDATION:  APPROVAL GIVEN to proceed with proposed procedure, without further diagnostic evaluation.    Patient did complete echocardiogram on 5/23/2023.  EF is 50 to 55% and he has moderate mitral regurgitation and mild aortic regurgitation.  This is unchanged from his previous echocardiogram several years ago.  I did discuss his case with Dr. Rimma Wan's cardiologist who agrees with approval to proceed with his surgery without further diagnostic evaluation.    Subjective   HPI related to upcoming procedure: Patient is an 78-year-old male with history of chronic A-fib, chronic diastolic heart failure, hypertension, hyperlipidemia and thrombocytopenia who presents for preoperative evaluation for proposed revision of the right total hip arthroplasty on the left.  Patient has chronic mild dyspnea on exertion but is actively being worked up by cardiology.  He unfortunately missed his echocardiogram appointment 2 weeks ago.  We discussed that given his ongoing symptoms this does need to get done prior to clearance for his procedure.  Patient denies any other new or concerning symptoms including fevers or chills, URI symptoms, chest pain, palpitations, abdominal pain, nausea or vomiting, diarrhea constipation, urinary symptoms or any other concerning systemic sign or symptom.        5/22/2023     3:55 PM   Preop Questions   1. Have you ever had a heart attack or stroke? No   2. Have you ever had surgery on your heart or blood vessels, such as a stent placement, a coronary artery bypass, or surgery on an artery in your head, neck, heart, or legs? No   3. Do you have chest pain with activity? No   4. Do you have a history of  heart failure? No   5. Do you currently have a cold, bronchitis or symptoms of other infection? No   6. Do you have a cough, shortness of breath, or wheezing? No   7. Do you or anyone in your family have previous history of  blood clots? No   8. Do you or does anyone in your family have a serious bleeding problem such as prolonged bleeding following surgeries or cuts? No   9. Have you ever had problems with anemia or been told to take iron pills? No   10. Have you had any abnormal blood loss such as black, tarry or bloody stools? No   11. Have you ever had a blood transfusion? No   12. Are you willing to have a blood transfusion if it is medically needed before, during, or after your surgery? Yes   13. Have you or any of your relatives ever had problems with anesthesia? No   14. Do you have sleep apnea, excessive snoring or daytime drowsiness? No   15. Do you have any artifical heart valves or other implanted medical devices like a pacemaker, defibrillator, or continuous glucose monitor? No   16. Do you have artificial joints? YES - Right TKA   17. Are you allergic to latex? No     Health Care Directive:  Patient has a Health Care Directive on file    Preoperative Review of :   reviewed - no record of controlled substances prescribed.    Status of Chronic Conditions:  A-FIB - Patient has a longstanding history of chronic A-fib currently on rate control. Current treatment regimen includes Rivaroxaban for stroke prevention and denies significant symptoms of lightheadedness, palpitations or dyspnea.     CHF - Patient has a longstanding history of moderate-severe CHF. Exacerbating conditions include hypertension and atrial fibrilation. Currently the patient's condition is same. Current treatment regimen includes beta blocker and diuretic. The patient denies chest pain, edema, orthopnea, SOB or recent weight gain. EKG 5/22/2023 .     HYPERLIPIDEMIA - Patient has a long history of significant Hyperlipidemia requiring medication for treatment with recent good control. Patient reports no problems or side effects with the medication.     HYPERTENSION - Patient has longstanding history of HTN , currently denies any symptoms referable to  elevated blood pressure. Specifically denies chest pain, palpitations, dyspnea, orthopnea, PND or peripheral edema. Blood pressure readings have been in normal range. Current medication regimen is as listed below. Patient denies any side effects of medication.       Review of Systems  CONSTITUTIONAL: NEGATIVE for fever, chills, change in weight  INTEGUMENTARY/SKIN: NEGATIVE for worrisome rashes, moles or lesions  EYES: NEGATIVE for vision changes or irritation  ENT/MOUTH: NEGATIVE for ear, mouth and throat problems  RESP:NEGATIVE for significant cough or SOB and dyspnea on exertion  CV: NEGATIVE for chest pain, palpitations or peripheral edema  GI: NEGATIVE for nausea, abdominal pain, heartburn, or change in bowel habits  : NEGATIVE for frequency, dysuria, or hematuria  MUSCULOSKELETAL: NEGATIVE for significant arthralgias or myalgia  NEURO: NEGATIVE for weakness, dizziness or paresthesias  ENDOCRINE: NEGATIVE for temperature intolerance, skin/hair changes  HEME: NEGATIVE for bleeding problems  PSYCHIATRIC: NEGATIVE for changes in mood or affect    Patient Active Problem List    Diagnosis Date Noted     Social phobia 04/13/2023     Priority: Medium     Chronic diastolic heart failure (H) 09/22/2022     Priority: Medium     Valvular heart disease 09/22/2022     Priority: Medium     Thrombocytopenia (H) 06/29/2022     Priority: Medium     Tremor 06/29/2022     Priority: Medium     Memory difficulties 05/05/2022     Priority: Medium     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 02/28/2022     Priority: Medium     Depression with anxiety 02/28/2022     Priority: Medium     Hematoma of skin 07/17/2019     Priority: Medium     Right knee DJD 10/12/2018     Priority: Medium     Peripheral neuropathy 10/12/2018     Priority: Medium     Hyperplastic Polyp 03/05/2018     Priority: Medium     Chronic atrial fibrillation (H) 01/18/2018     Priority: Medium     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) 11/28/2017     Priority: Medium     Status post  lung surgery 11/18/2017     Priority: Medium     Unilateral inguinal hernia without obstruction or gangrene 11/07/2017     Priority: Medium     Benzodiazepine dependence (H) 10/31/2017     Priority: Medium     GERD (gastroesophageal reflux disease) 08/03/2012     Priority: Medium     Anxiety 02/16/2012     Priority: Medium     Hyperlipidemia LDL goal <100 10/31/2010     Priority: Medium     Osteoarthrosis, unspecified whether generalized or localized, pelvic region and thigh 09/28/2007     Priority: Medium     Hypertrophy of prostate with urinary obstruction 08/03/2006     Priority: Medium     Problem list name updated by automated process. Provider to review       Benign essential hypertension 12/12/2005     Priority: Medium      Past Medical History:   Diagnosis Date     Arthritis 2005     Benign neoplasm of prostate 2000    Benign Prostate Nodule     Depressive disorder     past condition     Infection due to 2019 novel coronavirus 09/27/2021     S/P knee replacement 11/06/2012     S/P left knee arthroscopy 08/15/2011     Past Surgical History:   Procedure Laterality Date     ABDOMEN SURGERY  2003     ARTHROPLASTY KNEE Right 10/12/2018    Procedure: ARTHROPLASTY KNEE;  Right Total Knee Arthroplasty;  Surgeon: Jeb Peralta MD;  Location: [facility]     BIOPSY  2007     COLONOSCOPY N/A 12/10/2015    Procedure: COMBINED COLONOSCOPY, SINGLE OR MULTIPLE BIOPSY/POLYPECTOMY BY BIOPSY;  Surgeon: Jyoti Figueroa MD;  Location: [facility]     JOINT REPLACEMENT, HIP RT/LT  10/2007    Joint Replacement Hip LT     JOINT REPLACEMTN, KNEE RT/LT  08/2011    Joint Replacement knee /LT, [facility]     LAPAROSCOPIC HERNIORRHAPHY INGUINAL BILATERAL Bilateral 04/24/2018    Procedure: LAPAROSCOPIC HERNIORRHAPHY INGUINAL BILATERAL;  Laparoscopic bilateral inguinal hernia repair;  Surgeon: Josemanuel Resendiz MD;  Location: [facility]     PHACOEMULSIFICATION WITH STANDARD INTRAOCULAR LENS IMPLANT Right 01/06/2021    Procedure:  Cataract Removal with Implant;  Surgeon: Regan Kaufman MD;  Location: [facility]     PHACOEMULSIFICATION WITH STANDARD INTRAOCULAR LENS IMPLANT Left 02/17/2021    Procedure: Cataract Removal with Implant;  Surgeon: Regan Kaufman MD;  Location: [facility]     SURGICAL HISTORY OF -   12/01/1999    Umbilical Herniorrhaphy with mesh     SURGICAL HISTORY OF -  Left 11/2017    Thoracotomy and drainage of empyema     Current Outpatient Medications   Medication Sig Dispense Refill     acetaminophen (TYLENOL) 500 MG tablet Take 500-1,000 mg by mouth every 6 hours as needed for mild pain       buPROPion (WELLBUTRIN SR) 150 MG 12 hr tablet Take 150 mg by mouth 2 times daily       busPIRone HCl (BUSPAR) 30 MG tablet TAKE 1 TABLET TWICE A  tablet 1     doxazosin (CARDURA) 8 MG tablet Take 0.5 tablets (4 mg) by mouth At Bedtime 45 tablet 0     finasteride (PROSCAR) 5 MG tablet TAKE 1 TABLET DAILY 90 tablet 3     gabapentin (NEURONTIN) 600 MG tablet TAKE 1 TABLET FOUR TIMES A DAY (Patient taking differently: Take 1,200 mg by mouth 2 times daily) 360 tablet 3     lamoTRIgine (LAMICTAL) 25 MG tablet TAKE 2 TABLETS TWICE A  tablet 3     levomilnacipran (FETZIMA) 80 MG 24 hr capsule Take 1 capsule (80 mg) by mouth daily 30 capsule 1     metoprolol tartrate (LOPRESSOR) 100 MG tablet Take 1 tablet (100 mg) by mouth 2 times daily 180 tablet 4     multivitamin (ONE-DAILY) tablet Take 1 tablet by mouth daily 30 tablet 0     XARELTO ANTICOAGULANT 20 MG TABS tablet TAKE 1 TABLET DAILY WITH   DINNER 90 tablet 3     furosemide (LASIX) 20 MG tablet Take 1 tablet (20 mg) by mouth daily (Patient not taking: Reported on 5/22/2023) 90 tablet 3     hydrOXYzine (ATARAX) 10 MG tablet Take 1 tablet (10 mg) by mouth 3 times daily as needed for anxiety (Patient not taking: Reported on 5/22/2023) 90 tablet 1       Allergies   Allergen Reactions     Bactrim [Sulfamethoxazole-Trimethoprim] Nausea and Vomiting        Social History  "    Tobacco Use     Smoking status: Former     Packs/day: 1.00     Years: 15.00     Pack years: 15.00     Types: Cigarettes     Start date: 1958     Quit date: 10/13/1975     Years since quittin.6     Smokeless tobacco: Never   Vaping Use     Vaping status: Never Used   Substance Use Topics     Alcohol use: Yes       History   Drug Use No         Objective     /86 (BP Location: Right arm, Patient Position: Sitting, Cuff Size: Adult Regular)   Pulse 87   Temp 98.3  F (36.8  C) (Tympanic)   Resp 18   Ht 1.727 m (5' 8\")   Wt 77.6 kg (171 lb)   SpO2 100%   BMI 26.00 kg/m      Physical Exam    GENERAL APPEARANCE: healthy, alert and no distress     EYES: EOMI,  PERRL     HENT: ear canals and TM's normal and nose and mouth without ulcers or lesions     NECK: no adenopathy, no asymmetry, masses, or scars and thyroid normal to palpation     RESP: lungs clear to auscultation - no rales, rhonchi or wheezes     CV: regular rates and rhythm, normal S1 S2, no S3 or S4 and no murmur, click or rub     ABDOMEN:  soft, nontender, no HSM or masses and bowel sounds normal     MS: extremities normal- no gross deformities noted, no evidence of inflammation in joints, FROM in all extremities.     SKIN: no suspicious lesions or rashes     NEURO: Normal strength and tone, sensory exam grossly normal, mentation intact and speech normal     PSYCH: mentation appears normal. and affect normal/bright     LYMPHATICS: No cervical adenopathy    Recent Labs   Lab Test 23  0948 22  1027   HGB 13.3 12.5*    153   INR  --  1.39*   * 134*   POTASSIUM 3.9 4.2   CR 0.92 0.94      Diagnostics:  Results for orders placed or performed in visit on 23   CBC with platelets     Status: Abnormal   Result Value Ref Range    WBC Count 5.6 4.0 - 11.0 10e3/uL    RBC Count 3.66 (L) 4.40 - 5.90 10e6/uL    Hemoglobin 11.8 (L) 13.3 - 17.7 g/dL    Hematocrit 35.9 (L) 40.0 - 53.0 %    MCV 98 78 - 100 fL    MCH 32.2 26.5 " - 33.0 pg    MCHC 32.9 31.5 - 36.5 g/dL    RDW 14.3 10.0 - 15.0 %    Platelet Count 138 (L) 150 - 450 10e3/uL   Basic metabolic panel  (Ca, Cl, CO2, Creat, Gluc, K, Na, BUN)     Status: Abnormal   Result Value Ref Range    Sodium 135 (L) 136 - 145 mmol/L    Potassium 4.3 3.4 - 5.3 mmol/L    Chloride 96 (L) 98 - 107 mmol/L    Carbon Dioxide (CO2) 29 22 - 29 mmol/L    Anion Gap 10 7 - 15 mmol/L    Urea Nitrogen 13.0 8.0 - 23.0 mg/dL    Creatinine 0.92 0.67 - 1.17 mg/dL    Calcium 9.2 8.8 - 10.2 mg/dL    Glucose 99 70 - 99 mg/dL    GFR Estimate 84 >60 mL/min/1.73m2       EKG: atrial fibrillation, rate variable from , normal axis, normal intervals, no acute ST/T changes c/w ischemia, no LVH by voltage criteria, unchanged from previous tracings    Revised Cardiac Risk Index (RCRI):  The patient has the following serious cardiovascular risks for perioperative complications:   - Congestive Heart Failure (pulmonary edema, PND, s3 godfery, CXR with pulmonary congestion, basilar rales) = 1 point     RCRI Interpretation: 1 point: Class II (low risk - 0.9% complication rate)     Signed Electronically by: Misha Ventura PA-C  Copy of this evaluation report is provided to requesting physician.

## 2023-05-22 NOTE — PATIENT INSTRUCTIONS
For informational purposes only. Not to replace the advice of your health care provider. Copyright   2003,  [facility]. All rights reserved. Clinically reviewed by Estefania Hidalgo MD. [employer] [id number] - REV .  Preparing for Your Surgery  Getting started  A nurse will call you to review your health history and instructions. They will give you an arrival time based on your scheduled surgery time. Please be ready to share:  Your doctor's clinic name and phone number  Your medical, surgical, and anesthesia history  A list of allergies and sensitivities  A list of medicines, including herbal treatments and over-the-counter drugs  Whether the patient has a legal guardian (ask how to send us the papers in advance)  Please tell us if you're pregnant--or if there's any chance you might be pregnant. Some surgeries may injure a fetus (unborn baby), so they require a pregnancy test. Surgeries that are safe for a fetus don't always need a test, and you can choose whether to have one.   If you have a child who's having surgery, please ask for a copy of Preparing for Your Child's Surgery.    Preparing for surgery  Within 10 to 30 days of surgery: Have a pre-op exam (sometimes called an H&P, or History and Physical). This can be done at a clinic or pre-operative center.  If you're having a , you may not need this exam. Talk to your care team.  At your pre-op exam, talk to your care team about all medicines you take. If you need to stop any medicines before surgery, ask when to start taking them again.  We do this for your safety. Many medicines can make you bleed too much during surgery. Some change how well surgery (anesthesia) drugs work.  Call your insurance company to let them know you're having surgery. (If you don't have insurance, call [phone].)  Call your clinic if there's any change in your health. This includes signs of a cold or flu (sore throat, runny nose, cough, rash, fever). It  also includes a scrape or scratch near the surgery site.  If you have questions on the day of surgery, call your hospital or surgery center.  Eating and drinking guidelines  For your safety: Unless your surgeon tells you otherwise, follow the guidelines below.  Eat and drink as usual until 8 hours before you arrive for surgery. After that, no food or milk.  Drink clear liquids until 2 hours before you arrive. These are liquids you can see through, like water, Gatorade, and Propel Water. They also include plain black coffee and tea (no cream or milk), candy, and breath mints. You can spit out gum when you arrive.  If you drink alcohol: Stop drinking it the night before surgery.  If your care team tells you to take medicine on the morning of surgery, it's okay to take it with a sip of water.  Preventing infection  Shower or bathe the night before and morning of your surgery. Follow the instructions your clinic gave you. (If no instructions, use regular soap.)  Don't shave or clip hair near your surgery site. We'll remove the hair if needed.  Don't smoke or vape the morning of surgery. You may chew nicotine gum up to 2 hours before surgery. A nicotine patch is okay.  Note: Some surgeries require you to completely quit smoking and nicotine. Check with your surgeon.  Your care team will make every effort to keep you safe from infection. We will:  Clean our hands often with soap and water (or an alcohol-based hand rub).  Clean the skin at your surgery site with a special soap that kills germs.  Give you a special gown to keep you warm. (Cold raises the risk of infection.)  Wear special hair covers, masks, gowns and gloves during surgery.  Give antibiotic medicine, if prescribed. Not all surgeries need antibiotics.  What to bring on the day of surgery  Photo ID and insurance card  Copy of your health care directive, if you have one  Glasses and hearing aids (bring cases)  You can't wear contacts during surgery  Inhaler and  eye drops, if you use them (tell us about these when you arrive)  CPAP machine or breathing device, if you use them  A few personal items, if spending the night  If you have . . .  A pacemaker, ICD (cardiac defibrillator) or other implant: Bring the ID card.  An implanted stimulator: Bring the remote control.  A legal guardian: Bring a copy of the certified (court-stamped) guardianship papers.  Please remove any jewelry, including body piercings. Leave jewelry and other valuables at home.  If you're going home the day of surgery  You must have a responsible adult drive you home. They should stay with you overnight as well.  If you don't have someone to stay with you, and you aren't safe to go home alone, we may keep you overnight. Insurance often won't pay for this.  After surgery  If it's hard to control your pain or you need more pain medicine, please call your surgeon's office.  Questions?   If you have any questions for your care team, list them here: _________________________________________________________________________________________________________________________________________________________________________ ____________________________________ ____________________________________ ____________________________________    How to Take Your Medication Before Surgery  - HOLD (do not take) Xarelto until after your surgery.

## 2023-05-22 NOTE — NURSING NOTE
Did medicare add on-Patient to complex and time today did not allow for Bebeto to complete.   Rebeca Hill, CMA

## 2023-05-23 ENCOUNTER — ANESTHESIA EVENT (OUTPATIENT)
Dept: SURGERY | Facility: CLINIC | Age: 78
DRG: 467 | End: 2023-05-23
Payer: COMMERCIAL

## 2023-05-23 ENCOUNTER — HOSPITAL ENCOUNTER (OUTPATIENT)
Dept: CARDIOLOGY | Facility: CLINIC | Age: 78
Discharge: HOME | End: 2023-05-23
Attending: FAMILY MEDICINE | Admitting: FAMILY MEDICINE
Payer: COMMERCIAL

## 2023-05-23 DIAGNOSIS — I48.20 CHRONIC ATRIAL FIBRILLATION (H): ICD-10-CM

## 2023-05-23 LAB — LVEF ECHO: NORMAL

## 2023-05-23 PROCEDURE — 93306 TTE W/DOPPLER COMPLETE: CPT

## 2023-05-23 PROCEDURE — 93306 TTE W/DOPPLER COMPLETE: CPT | Mod: 26 | Performed by: INTERNAL MEDICINE

## 2023-05-23 NOTE — PROVIDER NOTIFICATION
"   05/23/23 1313   General Information   Contact made? Yes   Which attempt is this? 2   Call date:  05/23/23   Call time: 1313   Communication Assessment   Patient / Family communication style spoken language (English or Bilingual)   Pre-op Phone Call   How to be Addressed Gavin   Primary Caregiver Self   Notified of Need to Arrange Ride and Caregiver Yes   Stated Reason for Admission LEFT hip   Surgery Time Verified Yes   Arrival Time Verified 1200   Facility Location Verified Yes   NPO Status Reinforced Yes   Solids 0400   Clear Liquids 1000   Physician's Name Dr Hayden   Date of Physical 05/22/23   Patient Knows to Bring List of Pre-op Medications Yes   Patient Reminded to Bring Eye Drops for Day of Surgery N/A   Do you have any of the following Medical Devices? Not Applicable   Patient/Family states an understanding of: Pre-op shower with antiseptic soap x2 instructions;Removing jewelry/ body piercings before surgery;Need to bring insurance card;No cosmetics including fragrances;Plan/Resources/Equipment needed for discharge;Pre-op bowel prep instructions;Need to leave valuables at home;Reviewed visitor policy   Patient Contact Information    Contact (Name and Relationship) - Surgery/Procedure Yoselyn   Pre-op Implants (USE \"DEVICES\" GROUP TO DOCUMENT ASSESSMENT OF IMPLANT ON DOS)   Electronic Implant No   Advance Directives   Scanned docmt in ACP Activity? No scanned doc   Pt confirms current doc scanned Pt states no documents   Pt offered more information Pt declined   Malnutrition Screening Tool (MST)   Have you recently lost weight without trying? 0   Spiritual Beliefs   May our Spiritual Health Services staff meet with you or contact someone on your behalf? not at this time   Disability/Function   Hearing Difficulty or Deaf yes   Use of hearing assistive devices right hearing aid;left hearing aid   Wear Glasses or Blind yes   Vision Management glassees   Concentrating, Remembering or Making Decisions Difficulty " no   Difficulty Communicating no   Difficulty Eating/Swallowing no   Walking or Climbing Stairs Difficulty no   Dressing/Bathing Difficulty no   Toileting issues no   Doing Errands Independently Difficulty (such as shopping) no   Equipment Currently Used at Home cane, straight   Fall history within last six months yes   Number of times patient has fallen within last six months 3   Change in Functional Status Since Onset of Current Illness/Injury no   Discharge Planning   People in Home spouse   Support Systems Spouse/Significant Other   Type of Residence Private Residence   Patient/Family Anticipates Transition to home   Coping/Stress/Tolerance   Major Change/Loss/Stressor/Fears denies

## 2023-05-24 ENCOUNTER — TELEPHONE (OUTPATIENT)
Dept: CARDIOLOGY | Facility: CLINIC | Age: 78
End: 2023-05-24
Payer: COMMERCIAL

## 2023-05-24 NOTE — TELEPHONE ENCOUNTER
Received message from Dr Chapin that she will call Bebeto YANES to discuss echo. Rosita Gibson RN Cardiology May 24, 2023, 10:56 AM

## 2023-05-24 NOTE — TELEPHONE ENCOUNTER
Message to Dr Chapin re: need to speak with Bebeto YANES to assist in clearance for upcoming FAITH revision on 5/25/23. Rosita Gibson RN Cardiology May 24, 2023, 10:40 AM

## 2023-05-24 NOTE — TELEPHONE ENCOUNTER
[facility] Call Center    Phone Message    May a detailed message be left on voicemail: yes     Reason for Call: Other: Alayna called from the [facility] because Bebeto Ventura would like to approve Bebeto for surgery on 5/25 but would like a second opinion from Gavin's Cardiologist. Gavin had an echo yesterday 5/23 and everything looks good according to Bebeto he just wants a second opinion. Explained to Alayna that Dr. Leiva ordered the echo and would be the one to get the results but both she and Bebeto would like Dr. Chapin's input on the echo readings. Please reach out to Bebeto at [phone] to discuss Gavin's echo results so he can be cleared for surgery. Thank you!     Action Taken: Other: Cardiology    Travel Screening: Not Applicable     Thank you!  [facility]

## 2023-05-25 ENCOUNTER — ANESTHESIA (OUTPATIENT)
Dept: SURGERY | Facility: CLINIC | Age: 78
DRG: 467 | End: 2023-05-25
Payer: COMMERCIAL

## 2023-05-25 ENCOUNTER — HOSPITAL ENCOUNTER (INPATIENT)
Facility: CLINIC | Age: 78
LOS: 5 days | Discharge: HOME HEALTH | DRG: 467 | End: 2023-05-30
Attending: ORTHOPAEDIC SURGERY | Admitting: ORTHOPAEDIC SURGERY
Payer: COMMERCIAL

## 2023-05-25 ENCOUNTER — APPOINTMENT (OUTPATIENT)
Dept: GENERAL RADIOLOGY | Facility: CLINIC | Age: 78
DRG: 467 | End: 2023-05-25
Attending: ORTHOPAEDIC SURGERY
Payer: COMMERCIAL

## 2023-05-25 DIAGNOSIS — D64.9 ANEMIA, UNSPECIFIED TYPE: ICD-10-CM

## 2023-05-25 DIAGNOSIS — Z96.642 HISTORY OF REVISION OF TOTAL REPLACEMENT OF LEFT HIP JOINT: Primary | ICD-10-CM

## 2023-05-25 DIAGNOSIS — Z96.649 S/P REVISION OF TOTAL HIP: ICD-10-CM

## 2023-05-25 DIAGNOSIS — I48.20 CHRONIC ATRIAL FIBRILLATION (H): ICD-10-CM

## 2023-05-25 DIAGNOSIS — F41.9 ANXIETY: ICD-10-CM

## 2023-05-25 LAB
GRAM STAIN RESULT: NORMAL
LACTATE SERPL-SCNC: 1 MMOL/L (ref 0.7–2)

## 2023-05-25 PROCEDURE — 272N000001 HC OR GENERAL SUPPLY STERILE: Performed by: ORTHOPAEDIC SURGERY

## 2023-05-25 PROCEDURE — 250N000009 HC RX 250: Performed by: NURSE ANESTHETIST, CERTIFIED REGISTERED

## 2023-05-25 PROCEDURE — 258N000003 HC RX IP 258 OP 636: Performed by: NURSE ANESTHETIST, CERTIFIED REGISTERED

## 2023-05-25 PROCEDURE — 88305 TISSUE EXAM BY PATHOLOGIST: CPT | Mod: TC | Performed by: ORTHOPAEDIC SURGERY

## 2023-05-25 PROCEDURE — 250N000011 HC RX IP 250 OP 636

## 2023-05-25 PROCEDURE — 250N000011 HC RX IP 250 OP 636: Performed by: ORTHOPAEDIC SURGERY

## 2023-05-25 PROCEDURE — 710N000009 HC RECOVERY PHASE 1, LEVEL 1, PER MIN: Performed by: ORTHOPAEDIC SURGERY

## 2023-05-25 PROCEDURE — 0SBB0ZZ EXCISION OF LEFT HIP JOINT, OPEN APPROACH: ICD-10-PCS | Performed by: ORTHOPAEDIC SURGERY

## 2023-05-25 PROCEDURE — 250N000011 HC RX IP 250 OP 636: Performed by: NURSE ANESTHETIST, CERTIFIED REGISTERED

## 2023-05-25 PROCEDURE — 36415 COLL VENOUS BLD VENIPUNCTURE: CPT | Performed by: ORTHOPAEDIC SURGERY

## 2023-05-25 PROCEDURE — 0QU50JZ SUPPLEMENT LEFT ACETABULUM WITH SYNTHETIC SUBSTITUTE, OPEN APPROACH: ICD-10-PCS | Performed by: ORTHOPAEDIC SURGERY

## 2023-05-25 PROCEDURE — C1762 CONN TISS, HUMAN(INC FASCIA): HCPCS | Performed by: ORTHOPAEDIC SURGERY

## 2023-05-25 PROCEDURE — 0SUE09Z SUPPLEMENT LEFT HIP JOINT, ACETABULAR SURFACE WITH LINER, OPEN APPROACH: ICD-10-PCS | Performed by: ORTHOPAEDIC SURGERY

## 2023-05-25 PROCEDURE — C1713 ANCHOR/SCREW BN/BN,TIS/BN: HCPCS | Performed by: ORTHOPAEDIC SURGERY

## 2023-05-25 PROCEDURE — 0SRS03A REPLACEMENT OF LEFT HIP JOINT, FEMORAL SURFACE WITH CERAMIC SYNTHETIC SUBSTITUTE, UNCEMENTED, OPEN APPROACH: ICD-10-PCS | Performed by: ORTHOPAEDIC SURGERY

## 2023-05-25 PROCEDURE — 250N000013 HC RX MED GY IP 250 OP 250 PS 637: Performed by: ORTHOPAEDIC SURGERY

## 2023-05-25 PROCEDURE — 0SPB09Z REMOVAL OF LINER FROM LEFT HIP JOINT, OPEN APPROACH: ICD-10-PCS | Performed by: ORTHOPAEDIC SURGERY

## 2023-05-25 PROCEDURE — 250N000013 HC RX MED GY IP 250 OP 250 PS 637: Performed by: NURSE ANESTHETIST, CERTIFIED REGISTERED

## 2023-05-25 PROCEDURE — 999N000141 HC STATISTIC PRE-PROCEDURE NURSING ASSESSMENT: Performed by: ORTHOPAEDIC SURGERY

## 2023-05-25 PROCEDURE — 258N000003 HC RX IP 258 OP 636

## 2023-05-25 PROCEDURE — 258N000003 HC RX IP 258 OP 636: Performed by: ORTHOPAEDIC SURGERY

## 2023-05-25 PROCEDURE — 87075 CULTR BACTERIA EXCEPT BLOOD: CPT | Performed by: ORTHOPAEDIC SURGERY

## 2023-05-25 PROCEDURE — 999N000065 XR PELVIS PORT 1/2 VIEWS

## 2023-05-25 PROCEDURE — 258N000003 HC RX IP 258 OP 636: Performed by: PHYSICIAN ASSISTANT

## 2023-05-25 PROCEDURE — 250N000009 HC RX 250: Performed by: ORTHOPAEDIC SURGERY

## 2023-05-25 PROCEDURE — 360N000078 HC SURGERY LEVEL 5, PER MIN: Performed by: ORTHOPAEDIC SURGERY

## 2023-05-25 PROCEDURE — 370N000017 HC ANESTHESIA TECHNICAL FEE, PER MIN: Performed by: ORTHOPAEDIC SURGERY

## 2023-05-25 PROCEDURE — 87070 CULTURE OTHR SPECIMN AEROBIC: CPT | Performed by: ORTHOPAEDIC SURGERY

## 2023-05-25 PROCEDURE — C1776 JOINT DEVICE (IMPLANTABLE): HCPCS | Performed by: ORTHOPAEDIC SURGERY

## 2023-05-25 PROCEDURE — 87176 TISSUE HOMOGENIZATION CULTR: CPT | Performed by: ORTHOPAEDIC SURGERY

## 2023-05-25 PROCEDURE — 83605 ASSAY OF LACTIC ACID: CPT | Performed by: ORTHOPAEDIC SURGERY

## 2023-05-25 PROCEDURE — 120N000001 HC R&B MED SURG/OB

## 2023-05-25 PROCEDURE — 87205 SMEAR GRAM STAIN: CPT | Performed by: ORTHOPAEDIC SURGERY

## 2023-05-25 PROCEDURE — 0SPB0JZ REMOVAL OF SYNTHETIC SUBSTITUTE FROM LEFT HIP JOINT, OPEN APPROACH: ICD-10-PCS | Performed by: ORTHOPAEDIC SURGERY

## 2023-05-25 DEVICE — IMPLANTABLE DEVICE: Type: IMPLANTABLE DEVICE | Site: HIP | Status: FUNCTIONAL

## 2023-05-25 DEVICE — GRAFT BONE CRUSH CANC 30ML 400080: Type: IMPLANTABLE DEVICE | Site: HIP | Status: FUNCTIONAL

## 2023-05-25 DEVICE — IMP SCR DEPUY PINNACLE CANC 6.5X15MM 1217-15-500: Type: IMPLANTABLE DEVICE | Site: HIP | Status: FUNCTIONAL

## 2023-05-25 DEVICE — IMP SCR BONE CAN ACE 6.5X35MM 1217-35-500: Type: IMPLANTABLE DEVICE | Site: HIP | Status: FUNCTIONAL

## 2023-05-25 RX ORDER — PHENYLEPHRINE HCL IN 0.9% NACL 50MG/250ML
PLASTIC BAG, INJECTION (ML) INTRAVENOUS CONTINUOUS PRN
Status: DISCONTINUED | OUTPATIENT
Start: 2023-05-25 | End: 2023-05-25

## 2023-05-25 RX ORDER — SODIUM CHLORIDE, SODIUM LACTATE, POTASSIUM CHLORIDE, CALCIUM CHLORIDE 600; 310; 30; 20 MG/100ML; MG/100ML; MG/100ML; MG/100ML
INJECTION, SOLUTION INTRAVENOUS CONTINUOUS
Status: DISCONTINUED | OUTPATIENT
Start: 2023-05-25 | End: 2023-05-30 | Stop reason: HOSPADM

## 2023-05-25 RX ORDER — ONDANSETRON 2 MG/ML
INJECTION INTRAMUSCULAR; INTRAVENOUS PRN
Status: DISCONTINUED | OUTPATIENT
Start: 2023-05-25 | End: 2023-05-25

## 2023-05-25 RX ORDER — ACETAMINOPHEN 325 MG/1
650 TABLET ORAL EVERY 4 HOURS PRN
Qty: 100 TABLET | Refills: 0
Start: 2023-05-25 | End: 2024-07-28

## 2023-05-25 RX ORDER — HYDROXYZINE HYDROCHLORIDE 25 MG/1
25 TABLET, FILM COATED ORAL EVERY 6 HOURS PRN
Qty: 30 TABLET | Refills: 0 | Status: SHIPPED | OUTPATIENT
Start: 2023-05-25 | End: 2023-09-20

## 2023-05-25 RX ORDER — POLYETHYLENE GLYCOL 3350 17 G/17G
17 POWDER, FOR SOLUTION ORAL DAILY
Status: DISCONTINUED | OUTPATIENT
Start: 2023-05-26 | End: 2023-05-30 | Stop reason: HOSPADM

## 2023-05-25 RX ORDER — HYDROMORPHONE HCL IN WATER/PF 6 MG/30 ML
0.2 PATIENT CONTROLLED ANALGESIA SYRINGE INTRAVENOUS EVERY 5 MIN PRN
Status: DISCONTINUED | OUTPATIENT
Start: 2023-05-25 | End: 2023-05-25 | Stop reason: HOSPADM

## 2023-05-25 RX ORDER — TRANEXAMIC ACID 650 MG/1
1950 TABLET ORAL ONCE
Status: COMPLETED | OUTPATIENT
Start: 2023-05-25 | End: 2023-05-25

## 2023-05-25 RX ORDER — ACETAMINOPHEN 325 MG/1
975 TABLET ORAL ONCE
Status: COMPLETED | OUTPATIENT
Start: 2023-05-25 | End: 2023-05-25

## 2023-05-25 RX ORDER — NALOXONE HYDROCHLORIDE 0.4 MG/ML
0.2 INJECTION, SOLUTION INTRAMUSCULAR; INTRAVENOUS; SUBCUTANEOUS
Status: DISCONTINUED | OUTPATIENT
Start: 2023-05-25 | End: 2023-05-30 | Stop reason: HOSPADM

## 2023-05-25 RX ORDER — ONDANSETRON 4 MG/1
4 TABLET, ORALLY DISINTEGRATING ORAL EVERY 6 HOURS PRN
Status: DISCONTINUED | OUTPATIENT
Start: 2023-05-25 | End: 2023-05-30 | Stop reason: HOSPADM

## 2023-05-25 RX ORDER — ONDANSETRON 2 MG/ML
4 INJECTION INTRAMUSCULAR; INTRAVENOUS EVERY 30 MIN PRN
Status: DISCONTINUED | OUTPATIENT
Start: 2023-05-25 | End: 2023-05-25 | Stop reason: HOSPADM

## 2023-05-25 RX ORDER — OXYCODONE HYDROCHLORIDE 5 MG/1
10 TABLET ORAL EVERY 4 HOURS PRN
Status: DISCONTINUED | OUTPATIENT
Start: 2023-05-25 | End: 2023-05-29

## 2023-05-25 RX ORDER — MAGNESIUM SULFATE HEPTAHYDRATE 40 MG/ML
2 INJECTION, SOLUTION INTRAVENOUS ONCE
Status: DISCONTINUED | OUTPATIENT
Start: 2023-05-25 | End: 2023-05-25 | Stop reason: HOSPADM

## 2023-05-25 RX ORDER — ONDANSETRON 2 MG/ML
4 INJECTION INTRAMUSCULAR; INTRAVENOUS EVERY 6 HOURS PRN
Status: DISCONTINUED | OUTPATIENT
Start: 2023-05-25 | End: 2023-05-30 | Stop reason: HOSPADM

## 2023-05-25 RX ORDER — KETAMINE HYDROCHLORIDE 10 MG/ML
INJECTION INTRAMUSCULAR; INTRAVENOUS PRN
Status: DISCONTINUED | OUTPATIENT
Start: 2023-05-25 | End: 2023-05-25

## 2023-05-25 RX ORDER — KETOROLAC TROMETHAMINE 15 MG/ML
15 INJECTION, SOLUTION INTRAMUSCULAR; INTRAVENOUS EVERY 6 HOURS
Status: DISCONTINUED | OUTPATIENT
Start: 2023-05-25 | End: 2023-05-26

## 2023-05-25 RX ORDER — OXYCODONE HYDROCHLORIDE 5 MG/1
5 TABLET ORAL EVERY 4 HOURS PRN
Status: DISCONTINUED | OUTPATIENT
Start: 2023-05-25 | End: 2023-05-29

## 2023-05-25 RX ORDER — OXYCODONE HYDROCHLORIDE 5 MG/1
5-10 TABLET ORAL EVERY 4 HOURS PRN
Qty: 26 TABLET | Refills: 0 | Status: SHIPPED | OUTPATIENT
Start: 2023-05-25 | End: 2023-06-27

## 2023-05-25 RX ORDER — CEFAZOLIN SODIUM 1 G/3ML
1 INJECTION, POWDER, FOR SOLUTION INTRAMUSCULAR; INTRAVENOUS EVERY 8 HOURS
Status: COMPLETED | OUTPATIENT
Start: 2023-05-25 | End: 2023-05-26

## 2023-05-25 RX ORDER — FAMOTIDINE 20 MG/1
20 TABLET, FILM COATED ORAL 2 TIMES DAILY
Status: DISCONTINUED | OUTPATIENT
Start: 2023-05-25 | End: 2023-05-30 | Stop reason: HOSPADM

## 2023-05-25 RX ORDER — LIDOCAINE 40 MG/G
CREAM TOPICAL
Status: DISCONTINUED | OUTPATIENT
Start: 2023-05-25 | End: 2023-05-25 | Stop reason: HOSPADM

## 2023-05-25 RX ORDER — GABAPENTIN 100 MG/1
100 CAPSULE ORAL
Status: COMPLETED | OUTPATIENT
Start: 2023-05-25 | End: 2023-05-25

## 2023-05-25 RX ORDER — CEFAZOLIN SODIUM/WATER 2 G/20 ML
2 SYRINGE (ML) INTRAVENOUS
Status: COMPLETED | OUTPATIENT
Start: 2023-05-25 | End: 2023-05-25

## 2023-05-25 RX ORDER — FENTANYL CITRATE 50 UG/ML
25 INJECTION, SOLUTION INTRAMUSCULAR; INTRAVENOUS EVERY 5 MIN PRN
Status: DISCONTINUED | OUTPATIENT
Start: 2023-05-25 | End: 2023-05-25 | Stop reason: HOSPADM

## 2023-05-25 RX ORDER — SODIUM CHLORIDE, SODIUM LACTATE, POTASSIUM CHLORIDE, CALCIUM CHLORIDE 600; 310; 30; 20 MG/100ML; MG/100ML; MG/100ML; MG/100ML
INJECTION, SOLUTION INTRAVENOUS CONTINUOUS
Status: DISCONTINUED | OUTPATIENT
Start: 2023-05-25 | End: 2023-05-25 | Stop reason: HOSPADM

## 2023-05-25 RX ORDER — BISACODYL 10 MG
10 SUPPOSITORY, RECTAL RECTAL DAILY PRN
Status: DISCONTINUED | OUTPATIENT
Start: 2023-05-25 | End: 2023-05-30 | Stop reason: HOSPADM

## 2023-05-25 RX ORDER — CEFAZOLIN SODIUM/WATER 2 G/20 ML
2 SYRINGE (ML) INTRAVENOUS SEE ADMIN INSTRUCTIONS
Status: DISCONTINUED | OUTPATIENT
Start: 2023-05-25 | End: 2023-05-25 | Stop reason: HOSPADM

## 2023-05-25 RX ORDER — FENTANYL CITRATE 0.05 MG/ML
INJECTION, SOLUTION INTRAMUSCULAR; INTRAVENOUS PRN
Status: DISCONTINUED | OUTPATIENT
Start: 2023-05-25 | End: 2023-05-25

## 2023-05-25 RX ORDER — DEXAMETHASONE SODIUM PHOSPHATE 4 MG/ML
INJECTION, SOLUTION INTRA-ARTICULAR; INTRALESIONAL; INTRAMUSCULAR; INTRAVENOUS; SOFT TISSUE PRN
Status: DISCONTINUED | OUTPATIENT
Start: 2023-05-25 | End: 2023-05-25

## 2023-05-25 RX ORDER — TRANEXAMIC ACID 650 MG/1
1950 TABLET ORAL 2 TIMES DAILY
Status: DISCONTINUED | OUTPATIENT
Start: 2023-05-25 | End: 2023-05-25

## 2023-05-25 RX ORDER — HYDROMORPHONE HCL IN WATER/PF 6 MG/30 ML
0.4 PATIENT CONTROLLED ANALGESIA SYRINGE INTRAVENOUS
Status: DISCONTINUED | OUTPATIENT
Start: 2023-05-25 | End: 2023-05-30 | Stop reason: HOSPADM

## 2023-05-25 RX ORDER — AMOXICILLIN 250 MG
1 CAPSULE ORAL 2 TIMES DAILY
Status: DISCONTINUED | OUTPATIENT
Start: 2023-05-25 | End: 2023-05-30 | Stop reason: HOSPADM

## 2023-05-25 RX ORDER — EPINEPHRINE 1 MG/ML
INJECTION, SOLUTION, CONCENTRATE INTRAVENOUS
Status: COMPLETED | OUTPATIENT
Start: 2023-05-25 | End: 2023-05-25

## 2023-05-25 RX ORDER — HYDROMORPHONE HCL IN WATER/PF 6 MG/30 ML
0.4 PATIENT CONTROLLED ANALGESIA SYRINGE INTRAVENOUS EVERY 5 MIN PRN
Status: DISCONTINUED | OUTPATIENT
Start: 2023-05-25 | End: 2023-05-25 | Stop reason: HOSPADM

## 2023-05-25 RX ORDER — LIDOCAINE 40 MG/G
CREAM TOPICAL
Status: DISCONTINUED | OUTPATIENT
Start: 2023-05-25 | End: 2023-05-30 | Stop reason: HOSPADM

## 2023-05-25 RX ORDER — FENTANYL CITRATE 50 UG/ML
50 INJECTION, SOLUTION INTRAMUSCULAR; INTRAVENOUS EVERY 5 MIN PRN
Status: DISCONTINUED | OUTPATIENT
Start: 2023-05-25 | End: 2023-05-25 | Stop reason: HOSPADM

## 2023-05-25 RX ORDER — ACETAMINOPHEN 325 MG/1
975 TABLET ORAL EVERY 8 HOURS
Status: COMPLETED | OUTPATIENT
Start: 2023-05-25 | End: 2023-05-28

## 2023-05-25 RX ORDER — ONDANSETRON 4 MG/1
4 TABLET, ORALLY DISINTEGRATING ORAL EVERY 30 MIN PRN
Status: DISCONTINUED | OUTPATIENT
Start: 2023-05-25 | End: 2023-05-25 | Stop reason: HOSPADM

## 2023-05-25 RX ORDER — NALOXONE HYDROCHLORIDE 0.4 MG/ML
0.4 INJECTION, SOLUTION INTRAMUSCULAR; INTRAVENOUS; SUBCUTANEOUS
Status: DISCONTINUED | OUTPATIENT
Start: 2023-05-25 | End: 2023-05-30 | Stop reason: HOSPADM

## 2023-05-25 RX ORDER — PROCHLORPERAZINE MALEATE 5 MG
5 TABLET ORAL EVERY 6 HOURS PRN
Status: DISCONTINUED | OUTPATIENT
Start: 2023-05-25 | End: 2023-05-30 | Stop reason: HOSPADM

## 2023-05-25 RX ORDER — ACETAMINOPHEN 325 MG/1
650 TABLET ORAL EVERY 4 HOURS PRN
Status: DISCONTINUED | OUTPATIENT
Start: 2023-05-28 | End: 2023-05-30 | Stop reason: HOSPADM

## 2023-05-25 RX ORDER — HYDROXYZINE HYDROCHLORIDE 25 MG/1
25 TABLET, FILM COATED ORAL EVERY 6 HOURS PRN
Status: DISCONTINUED | OUTPATIENT
Start: 2023-05-25 | End: 2023-05-30 | Stop reason: HOSPADM

## 2023-05-25 RX ORDER — AMOXICILLIN 250 MG
1-2 CAPSULE ORAL 2 TIMES DAILY PRN
Qty: 30 TABLET | Refills: 0 | Status: SHIPPED | OUTPATIENT
Start: 2023-05-25 | End: 2023-09-20

## 2023-05-25 RX ORDER — PROPOFOL 10 MG/ML
INJECTION, EMULSION INTRAVENOUS CONTINUOUS PRN
Status: DISCONTINUED | OUTPATIENT
Start: 2023-05-25 | End: 2023-05-25

## 2023-05-25 RX ORDER — CALCIUM CARBONATE 500 MG/1
500 TABLET, CHEWABLE ORAL 4 TIMES DAILY PRN
Status: DISCONTINUED | OUTPATIENT
Start: 2023-05-25 | End: 2023-05-30 | Stop reason: HOSPADM

## 2023-05-25 RX ORDER — BUPIVACAINE HYDROCHLORIDE 5 MG/ML
INJECTION, SOLUTION PERINEURAL PRN
Status: DISCONTINUED | OUTPATIENT
Start: 2023-05-25 | End: 2023-05-25 | Stop reason: HOSPADM

## 2023-05-25 RX ORDER — HYDROMORPHONE HCL IN WATER/PF 6 MG/30 ML
0.2 PATIENT CONTROLLED ANALGESIA SYRINGE INTRAVENOUS
Status: DISCONTINUED | OUTPATIENT
Start: 2023-05-25 | End: 2023-05-30 | Stop reason: HOSPADM

## 2023-05-25 RX ADMIN — ONDANSETRON 4 MG: 2 INJECTION INTRAMUSCULAR; INTRAVENOUS at 14:22

## 2023-05-25 RX ADMIN — SODIUM CHLORIDE, POTASSIUM CHLORIDE, SODIUM LACTATE AND CALCIUM CHLORIDE 500 ML: 600; 310; 30; 20 INJECTION, SOLUTION INTRAVENOUS at 19:02

## 2023-05-25 RX ADMIN — OXYCODONE HYDROCHLORIDE 10 MG: 5 TABLET ORAL at 23:15

## 2023-05-25 RX ADMIN — TRANEXAMIC ACID 1950 MG: 650 TABLET ORAL at 12:55

## 2023-05-25 RX ADMIN — Medication 0.1 MCG/KG/MIN: at 13:57

## 2023-05-25 RX ADMIN — PHENYLEPHRINE HYDROCHLORIDE 100 MCG: 10 INJECTION INTRAVENOUS at 16:09

## 2023-05-25 RX ADMIN — SODIUM CHLORIDE, POTASSIUM CHLORIDE, SODIUM LACTATE AND CALCIUM CHLORIDE: 600; 310; 30; 20 INJECTION, SOLUTION INTRAVENOUS at 15:11

## 2023-05-25 RX ADMIN — ACETAMINOPHEN 975 MG: 325 TABLET ORAL at 12:00

## 2023-05-25 RX ADMIN — KETAMINE HYDROCHLORIDE 20 MG: 10 INJECTION, SOLUTION INTRAMUSCULAR; INTRAVENOUS at 13:53

## 2023-05-25 RX ADMIN — KETOROLAC TROMETHAMINE 15 MG: 15 INJECTION, SOLUTION INTRAMUSCULAR; INTRAVENOUS at 21:28

## 2023-05-25 RX ADMIN — FENTANYL CITRATE 50 MCG: 50 INJECTION INTRAVENOUS at 13:44

## 2023-05-25 RX ADMIN — GABAPENTIN 100 MG: 100 CAPSULE ORAL at 12:00

## 2023-05-25 RX ADMIN — HYDROXYZINE HYDROCHLORIDE 25 MG: 25 TABLET, FILM COATED ORAL at 23:15

## 2023-05-25 RX ADMIN — FENTANYL CITRATE 50 MCG: 50 INJECTION INTRAVENOUS at 14:21

## 2023-05-25 RX ADMIN — LIDOCAINE HYDROCHLORIDE 0.2 ML: 10 INJECTION, SOLUTION EPIDURAL; INFILTRATION; INTRACAUDAL; PERINEURAL at 12:18

## 2023-05-25 RX ADMIN — SODIUM CHLORIDE, POTASSIUM CHLORIDE, SODIUM LACTATE AND CALCIUM CHLORIDE: 600; 310; 30; 20 INJECTION, SOLUTION INTRAVENOUS at 18:00

## 2023-05-25 RX ADMIN — MIDAZOLAM 2 MG: 1 INJECTION INTRAMUSCULAR; INTRAVENOUS at 13:44

## 2023-05-25 RX ADMIN — CEFAZOLIN 1 G: 1 INJECTION, POWDER, FOR SOLUTION INTRAMUSCULAR; INTRAVENOUS at 21:28

## 2023-05-25 RX ADMIN — PROPOFOL 50 MCG/KG/MIN: 10 INJECTION, EMULSION INTRAVENOUS at 14:00

## 2023-05-25 RX ADMIN — PHENYLEPHRINE HYDROCHLORIDE 100 MCG: 10 INJECTION INTRAVENOUS at 14:53

## 2023-05-25 RX ADMIN — SENNOSIDES AND DOCUSATE SODIUM 1 TABLET: 50; 8.6 TABLET ORAL at 21:28

## 2023-05-25 RX ADMIN — Medication 2 G: at 13:32

## 2023-05-25 RX ADMIN — FAMOTIDINE 20 MG: 20 TABLET, FILM COATED ORAL at 21:28

## 2023-05-25 RX ADMIN — EPINEPHRINE 0.1 MG: 1 INJECTION, SOLUTION, CONCENTRATE INTRAVENOUS at 01:35

## 2023-05-25 RX ADMIN — DEXAMETHASONE SODIUM PHOSPHATE 4 MG: 4 INJECTION, SOLUTION INTRA-ARTICULAR; INTRALESIONAL; INTRAMUSCULAR; INTRAVENOUS; SOFT TISSUE at 14:00

## 2023-05-25 RX ADMIN — SODIUM CHLORIDE, POTASSIUM CHLORIDE, SODIUM LACTATE AND CALCIUM CHLORIDE: 600; 310; 30; 20 INJECTION, SOLUTION INTRAVENOUS at 12:17

## 2023-05-25 RX ADMIN — ACETAMINOPHEN 975 MG: 325 TABLET, FILM COATED ORAL at 21:27

## 2023-05-25 ASSESSMENT — ACTIVITIES OF DAILY LIVING (ADL)
ADLS_ACUITY_SCORE: 22
ADLS_ACUITY_SCORE: 22
ADLS_ACUITY_SCORE: 20
ADLS_ACUITY_SCORE: 22
ADLS_ACUITY_SCORE: 25
ADLS_ACUITY_SCORE: 22
ADLS_ACUITY_SCORE: 25

## 2023-05-25 NOTE — ANESTHESIA PROCEDURE NOTES
"Intrathecal injection Procedure Note    Pre-Procedure   Staff -        CRNA: Tere Blanco APRN CRNA       Performed By: CRNA       Location: OR       Procedure Start/Stop Times: 5/25/2023 1:35 AM and 5/25/2023 1:55 AM       Pre-Anesthestic Checklist: patient identified, IV checked, risks and benefits discussed, informed consent, monitors and equipment checked, pre-op evaluation, at physician/surgeon's request and post-op pain management  Timeout:       Correct Patient: Yes        Correct Procedure: Yes        Correct Site: Yes        Correct Position: Yes   Procedure Documentation  Procedure: intrathecal injection       Patient Position: sitting       Patient Prep/Sterile Barriers: sterile gloves, mask, patient draped       Skin prep: Betadine       Insertion Site: L3-4. (midline approach).       Needle Gauge: 22.        Needle Length (Inches): 3.5        Spinal Needle Type: PencanNo introducer used       # of attempts: 3 and  # of redirects:     Assessment/Narrative         Paresthesias: No.       CSF fluid: clear.       Opening pressure was cmH2O while  Sitting.      Medication(s) Administered   Epinephrine 1000 mcg/mL (Intrathecal) - Intrathecal   0.1 mg - 5/25/2023 1:35:00 AM  Medication Administration Time: 5/25/2023 1:35 AM      FOR [facility]) ONLY:   Pain Team Contact information: please page the Pain Team Via [employer]. Search \"Pain\". During daytime hours, please page the attending first. At night please page the resident first.      "

## 2023-05-25 NOTE — ANESTHESIA PREPROCEDURE EVALUATION
Anesthesia Pre-Procedure Evaluation    Patient: Josemanuel Edmond   MRN: [id number] : 1945        Procedure : Procedure(s):  Revision total hip arthroplasty          Past Medical History:   Diagnosis Date     Arthritis      Benign neoplasm of prostate     Benign Prostate Nodule     Depressive disorder     past condition     Infection due to 2019 novel coronavirus 2021     S/P knee replacement 2012     S/P left knee arthroscopy 08/15/2011      Past Surgical History:   Procedure Laterality Date     ABDOMEN SURGERY  2003     ARTHROPLASTY KNEE Right 10/12/2018    Procedure: ARTHROPLASTY KNEE;  Right Total Knee Arthroplasty;  Surgeon: Jeb Peralta MD;  Location: [facility] OR     BIOPSY       COLONOSCOPY N/A 12/10/2015    Procedure: COMBINED COLONOSCOPY, SINGLE OR MULTIPLE BIOPSY/POLYPECTOMY BY BIOPSY;  Surgeon: Jyoti Figueroa MD;  Location: [facility] GI     JOINT REPLACEMENT, HIP RT/LT  10/2007    Joint Replacement Hip LT     JOINT REPLACEMTN, KNEE RT/LT  2011    Joint Replacement knee /LT, [facility]     LAPAROSCOPIC HERNIORRHAPHY INGUINAL BILATERAL Bilateral 2018    Procedure: LAPAROSCOPIC HERNIORRHAPHY INGUINAL BILATERAL;  Laparoscopic bilateral inguinal hernia repair;  Surgeon: Josemanuel Resendiz MD;  Location: [facility] OR     PHACOEMULSIFICATION WITH STANDARD INTRAOCULAR LENS IMPLANT Right 2021    Procedure: Cataract Removal with Implant;  Surgeon: Regan Kaufman MD;  Location: [facility] OR     PHACOEMULSIFICATION WITH STANDARD INTRAOCULAR LENS IMPLANT Left 2021    Procedure: Cataract Removal with Implant;  Surgeon: Regan Kaufman MD;  Location: [facility] OR     SURGICAL HISTORY OF -   1999    Umbilical Herniorrhaphy with mesh     SURGICAL HISTORY OF -  Left 2017    Thoracotomy and drainage of empyema      Allergies   Allergen Reactions     Bactrim [Sulfamethoxazole-Trimethoprim] Nausea and Vomiting      Social History     Tobacco Use     Smoking  status: Former     Packs/day: 1.00     Years: 15.00     Pack years: 15.00     Types: Cigarettes     Start date: 1958     Quit date: 10/13/1975     Years since quittin.6     Smokeless tobacco: Never   Vaping Use     Vaping status: Never Used   Substance Use Topics     Alcohol use: Yes      Wt Readings from Last 1 Encounters:   23 77.6 kg (171 lb)        Anesthesia Evaluation   Pt has had prior anesthetic. Type: MAC and Regional.        ROS/MED HX  ENT/Pulmonary:       Neurologic:     (+) peripheral neuropathy, migraines,     Cardiovascular:     (+) Dyslipidemia hypertension-Peripheral Vascular Disease----valvular problems/murmurs Previous cardiac testing   Echo: Date: 2023 Results:     Left ventricular systolic function is mildly reduced.The visual ejection  fraction is 50-55%.  Mildly decreased right ventricular systolic function  Severe bi-atrial enlargement.  There is moderate to mod-severe (2-3+) mitral regurgitation.There is moderate  (2+) tricuspid regurgitation.There is mild to moderate (1-2+) aortic  regurgitation.  Mild aortic root and ascending aorta dilatation.  IVC diameter >2.1 cm collapsing <50% with sniff suggests a high RA pressure  estimated at 15 mmHg or greater.     Compared to echo dated 2022 no siognificant changes.  ______________________________________________________________________________  Left Ventricle  The left ventricle is normal in size. There is mild concentric left  ventricular hypertrophy. Left ventricular systolic function is mildly reduced.  The visual ejection fraction is 50-55%. Diastolic Doppler findings (E/E' ratio  and/or other parameters) suggest left ventricular filling pressures are  normal. There is borderline global hypokinesia of the left ventricle.     Right Ventricle  The right ventricle is normal size. Mildly decreased right ventricular  systolic function.     Atria  The left atrium is severely dilated. The right atrium is severely  dilated.  There is no color Doppler evidence of an atrial shunt.     Mitral Valve  There is mild mitral annular calcification. There is moderate to mod-severe  (2-3+) mitral regurgitation.     Tricuspid Valve  There is moderate (2+) tricuspid regurgitation. The right ventricular systolic  pressure is approximated at 30.7 mmHg plus the right atrial pressure.     Aortic Valve  The aortic valve is trileaflet. There is mild trileaflet aortic sclerosis.  There is mild to moderate (1-2+) aortic regurgitation. No aortic stenosis is  present.     Pulmonic Valve  There is mild to moderate (1-2+) pulmonic valvular regurgitation.     Vessels  Mild aortic root dilatation. 4.1 cm. The ascending aorta is Mildly dilated.  4.1 cm. Dilation of the inferior vena cava is present with abnormal  respiratory variation in diameter. IVC diameter >2.1 cm collapsing <50% with  sniff suggests a high RA pressure estimated at 15 mmHg or greater.     Pericardium  There is no pericardial effusion.     ______________________________________________________________________________  MMode/2D Measurements & Calculations  IVSd: 1.1 cm  LVIDd: 4.7 cm  LVIDs: 3.7 cm  LVPWd: 1.4 cm  FS: 21.5 %  LV mass(C)d: 234.3 grams  LV mass(C)dI: 122.5 grams/m2  Ao root diam: 4.1 cm  LA dimension: 5.4 cm  asc Aorta Diam: 4.1 cm  LA/Ao: 1.3  LA Volume (BP): 181.0 ml     LA Volume Index (BP): 94.8 ml/m2  RV Base: 4.0 cm  RWT: 0.60  TAPSE: 2.1 cm     Doppler Measurements & Calculations  MV E max pedro: 76.4 cm/sec  MV dec slope: 504.0 cm/sec2  MV dec time: 0.15 sec  AI P1/2t: 398.8 msec  MR PISA: 4.0 cm2  MR ERO: 0.32 cm2  MR volume: 48.3 ml     PA acc time: 0.06 sec  TR max pedro: 276.8 cm/sec  TR max P.7 mmHg  E/E' av.3  Lateral E/e': 4.7  Medial E/e': 7.8  RV S Pedro: 12.2 cm/sec  Stress Test: Date: Results:    ECG Reviewed: Date: Results:  Atrial fibrillation   -Nonspecific ST depression -Nondiagnostic.     Cath: Date: Results:      METS/Exercise Tolerance:      Hematologic: Comments: Thrombocytopenia      Musculoskeletal:   (+) arthritis,     GI/Hepatic:     (+) GERD,     Renal/Genitourinary:  - neg Renal ROS     Endo:  - neg endo ROS     Psychiatric/Substance Use:     (+) psychiatric history anxiety and depression     Infectious Disease:  - neg infectious disease ROS     Malignancy:       Other:            Physical Exam    Airway        Mallampati: II   TM distance: > 3 FB   Neck ROM: full   Mouth opening: > 3 cm    Respiratory Devices and Support         Dental    unable to assess        Cardiovascular   cardiovascular exam normal          Pulmonary   pulmonary exam normal                OUTSIDE LABS:  CBC:   Lab Results   Component Value Date    WBC 5.6 05/22/2023    WBC 5.9 04/06/2023    HGB 11.8 (L) 05/22/2023    HGB 13.3 04/06/2023    HCT 35.9 (L) 05/22/2023    HCT 39.8 (L) 04/06/2023     (L) 05/22/2023     04/06/2023     BMP:   Lab Results   Component Value Date     (L) 05/22/2023     (L) 04/06/2023    POTASSIUM 4.3 05/22/2023    POTASSIUM 3.9 04/06/2023    CHLORIDE 96 (L) 05/22/2023    CHLORIDE 98 04/06/2023    CO2 29 05/22/2023    CO2 27 04/06/2023    BUN 13.0 05/22/2023    BUN 10.3 04/06/2023    CR 0.92 05/22/2023    CR 0.92 04/06/2023    GLC 99 05/22/2023    GLC 94 04/06/2023     COAGS:   Lab Results   Component Value Date    PTT 30 02/09/2005    INR 1.39 (H) 11/04/2022     POC: No results found for: BGM, HCG, HCGS  HEPATIC:   Lab Results   Component Value Date    ALBUMIN 4.1 11/04/2022    PROTTOTAL 6.8 11/04/2022    ALT 18 11/04/2022    AST 24 11/04/2022    GGT 69 10/09/2017    ALKPHOS 101 11/04/2022    BILITOTAL 0.8 11/04/2022     OTHER:   Lab Results   Component Value Date    LUIS 9.2 05/22/2023    PHOS 3.1 02/28/2022    MAG 2.2 04/06/2023    LIPASE 90 07/26/2022    TSH 1.88 11/22/2022    SED 10 10/25/2012       Anesthesia Plan    ASA Status:  3      Anesthesia Type: Spinal.   Induction: Intravenous.           Consents    Anesthesia  Plan(s) and associated risks, benefits, and realistic alternatives discussed. Questions answered and patient/representative(s) expressed understanding.     - Discussed: Risks, Benefits and Alternatives for the PROCEDURE were discussed     - Discussed with:  Patient         Postoperative Care    Pain management: IV analgesics, Oral pain medications.   PONV prophylaxis: Ondansetron (or other 5HT-3), Dexamethasone or Solumedrol     Comments:                KAYLA Floyd CRNA

## 2023-05-25 NOTE — MEDICATION SCRIBE - ADMISSION MEDICATION HISTORY
Medication Scribe Admission Medication History    Admission medication history is complete. The information provided in this note is only as accurate as the sources available at the time of the update.    Medication reconciliation/reorder completed by provider prior to medication history? No    Information Source(s): Patient via in-person    Pertinent Information: None    Changes made to PTA medication list:    Added: Unisom 25 mg    Deleted: None    Changed: None    Medication Affordability:  Not including over the counter (OTC) medications, was there a time in the past 3 months when you did not take your medications as prescribed because of cost?: No    Allergies reviewed with patient and updates made in EHR: yes. No    Medication History Completed By: Char Guan 5/25/2023 12:32 PM    Prior to Admission medications    Medication Sig Last Dose Taking? Auth Provider Long Term End Date   buPROPion (WELLBUTRIN SR) 150 MG 12 hr tablet Take 150 mg by mouth 2 times daily 5/24/2023 at pm Yes Reported, Patient Yes    busPIRone HCl (BUSPAR) 30 MG tablet TAKE 1 TABLET TWICE A DAY 5/24/2023 at pm Yes Lizzy Leiva MD Yes    doxazosin (CARDURA) 8 MG tablet Take 0.5 tablets (4 mg) by mouth At Bedtime 5/24/2023 at pm Yes Lizzy Leiva MD Yes    doxylamine (UNISOM) 25 MG TABS tablet Take 25 mg by mouth At Bedtime 5/24/2023 at hs Yes Reported, Patient     finasteride (PROSCAR) 5 MG tablet TAKE 1 TABLET DAILY 5/24/2023 at am Yes Marilynn Bliss APRN CNP     furosemide (LASIX) 20 MG tablet Take 1 tablet (20 mg) by mouth daily Past Month at unknown Yes Nazanin Caballero APRN CNP Yes    gabapentin (NEURONTIN) 600 MG tablet TAKE 1 TABLET FOUR TIMES A DAY  Patient taking differently: Take 1 tablet by mouth 4 times daily 5/24/2023 at pm Yes Lizzy Leiva MD Yes    lamoTRIgine (LAMICTAL) 25 MG tablet TAKE 2 TABLETS TWICE A DAY 5/24/2023 at pm Yes Lizzy Leiva MD Yes     levomilnacipran (FETZIMA) 80 MG 24 hr capsule Take 1 capsule (80 mg) by mouth daily 5/24/2023 at am Yes Lizzy Leiva MD     metoprolol tartrate (LOPRESSOR) 100 MG tablet Take 1 tablet (100 mg) by mouth 2 times daily 5/24/2023 at pm Yes Lizzy Leiva MD Yes    multivitamin (ONE-DAILY) tablet Take 1 tablet by mouth daily 5/24/2023 at 1600 Yes Lizzy Leiva MD     XARELTO ANTICOAGULANT 20 MG TABS tablet TAKE 1 TABLET DAILY WITH   DINNER Past Week at unknown Yes Lizzy Leiva MD Yes    acetaminophen (TYLENOL) 500 MG tablet Take 2 tablets by mouth every 6 hours as needed for mild pain 5/22/2023 at pm  Reported, Patient

## 2023-05-25 NOTE — ANESTHESIA CARE TRANSFER NOTE
Patient: Josemanuel Edmond    Procedure: Procedure(s):  Revision total hip arthroplasty, left       Diagnosis: Pain of left hip [M25.552]  Diagnosis Additional Information: No value filed.    Anesthesia Type:   Spinal     Note:    Oropharynx: oropharynx clear of all foreign objects and spontaneously breathing  Level of Consciousness: drowsy and awake  Oxygen Supplementation: face mask  Level of Supplemental Oxygen (L/min / FiO2): 6  Independent Airway: airway patency satisfactory and stable  Dentition: dentition unchanged  Vital Signs Stable: post-procedure vital signs reviewed and stable  Report to RN Given: handoff report given  Patient transferred to: PACU    Handoff Report: Identifed the Patient, Identified the Reponsible Provider, Reviewed the pertinent medical history, Discussed the surgical course, Reviewed Intra-OP anesthesia mangement and issues during anesthesia, Set expectations for post-procedure period and Allowed opportunity for questions and acknowledgement of understanding      Vitals:  Vitals Value Taken Time   BP 95/62 05/25/23 1616   Temp 35.9  C (96.6  F) 05/25/23 1616   Pulse 84 05/25/23 1619   Resp 9 05/25/23 1619   SpO2 100 % 05/25/23 1619   Vitals shown include unvalidated device data.    Electronically Signed By: KAYLA Cano CRNA  May 25, 2023  4:21 PM

## 2023-05-25 NOTE — ANESTHESIA POSTPROCEDURE EVALUATION
Patient: Josemanuel Edmond    Procedure: Procedure(s):  Revision total hip arthroplasty, left       Anesthesia Type:  Spinal    Note:  Disposition: Inpatient   Postop Pain Control: Uneventful            Sign Out: Well controlled pain   PONV: No   Neuro/Psych: Uneventful            Sign Out: Acceptable/Baseline neuro status   Airway/Respiratory: Uneventful            Sign Out: Acceptable/Baseline resp. status   CV/Hemodynamics: Uneventful            Sign Out: Acceptable CV status; No obvious hypovolemia; No obvious fluid overload   Other NRE: NONE   DID A NON-ROUTINE EVENT OCCUR? No           Last vitals:  Vitals Value Taken Time   BP 92/60 05/25/23 1715   Temp 36.8  C (98.2  F) 05/25/23 1700   Pulse 91 05/25/23 1726   Resp 11 05/25/23 1726   SpO2 96 % 05/25/23 1726   Vitals shown include unvalidated device data.    Electronically Signed By: KAYLA Murdock CRNA  May 25, 2023  5:45 PM

## 2023-05-25 NOTE — PROCEDURES
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 Revision left hip replacement (acetabular component, liner, and head) with acetabular bone grafting   2. Left hip synovectomy    SURGEON: Chandler Leiva MD    ASSISTANT: Matilda Menchaca PA-c.  The presence of a PA was necessary for positioning retraction, and safe progression of the case    ANESTHESIA:  Spinal    BLOOD LOSS: 200cc    COMPLICATIONS: None apparent    DISPOSITION: Stable to PACU    IMPLANTS: DePuy [place] 56 mm GRIPTION multihole revision cup with 6.5 millimeters screws x3, 56 mm x 49 mm dual mobility liner, 49 mm x 28 mm outer polyethylene liner, 28 mm +5 mm ceramic TS femoral head.           Retained DePuy [place] size 5 high offset femoral stem    INDICATIONS: Gavin  is a 78 year old male who underwent a left hip replacement with an ASR on October 5, 2007.  He has recovered well and really not had any issues.  We have been watching his metal irons which are elevated.  Unfortunate, couple weeks ago he got fairly significant pain in his left groin he had a hard time bearing weight.  X-rays were obtained which did not show any acute change.  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  After discussing treatment options, he elected to proceed with a revision left FAITH to improve his pain, understanding the risks of ongoing pain, instability, infection, damage to vessels or nerves, and risks inherent to anesthesia.    PROCEDURE: Gavin was met in the preoperative holding area where the left was marked.  He was then transferred to the operating theater.  After smooth induction of spinal anesthesia, he was positioned in a right lateral decubitus position with the left side up.  A timeout was performed verifying the correct patient,  surgery, and location.  He received preoperative antibiotics.  The left lower extremity was then prepped and draped in a standard sterile fashion.    We then recreated his previously made incision line of the posterior approach to the hip.  Dissection sharp carried down through the skin and subcutaneous tissue.  We incised his IT band and gluteal fascia.  Upon getting this later, there was a large amount of thick, dark, fluid, approximately 500 cc.  This was sent for aerobic and anaerobic culture.  Additionally, there is significant metal stained, dark, synovial tissue.  We performed a wide synovectomy of this tissue and sent some tissue for aerobic and anaerobic culture as well as for permanent section.  We then evaluated his femoral stem.  This was well fixed with no loosening.  We sed a tamp to disengage the femoral head.  Upon doing so, we inspected the trunnion, which was in good condition without significant corrosion.  We then continued our synovectomy of the periacetabular tissue, creating a pocket anteriorly and superiorly to rest the femoral head.  We placed acetabular retractors and exposed acetabulum.  We then used the Daphne explant tool to remove the previously placed acetabular component, which was somewhat vertical, without any significant bone loss.  There was a significant mount of fibrous tissue in the suprapatellar acetabular area as well as anteriorly.  We removed rongeurs to remove as much of the fibrous tissue as possible.  Fortunately, he did have adequate bone stock centrally as well as posteriorly.  We then started with a 54 mm reamer and reamed to a 56.  Again, there is significant bone loss in the supra acetabular area and anteriorly.  We opened 60 cc of crushed cancellous croutons and packed these into the areas of defect.  We we then reversed reamed knees in the place.  We opened up a 56 mm multihole revision GRIPTION cup.  This was impacted in the place in approximately 45 degrees of  inclination and 20 degrees of anteversion.  It had a reasonable fit.  We then placed three 6.5 millimeter screws.  We attempted to place these superiorly, although given his lysis of simply was not adequate bone/the good purchase with the screw.  We placed 1 straight medially and 2 more posterior superior.  1 screw had mediocre bite, and the other 2 had a very good bite.  Given the significant metallosis and concern about his abductor function, we elected to place a dual mobility mobility cup.  We therefore placed our 49 mm 56 mm dual mobility liner into the [place] cup and ensured the [place] taper was engaged.  We then turned attention back to the femur.  We tried a variety of different head things and felt the +5mm head was most appropriate.  Range of motion was full extension and external rotation, flexion to 90 with internal rotation to 70.  It was stable in the sleeping position.  We felt that his leg lengths were well reapproximated.  Therefore, we cleaned off the trunnion.  We assembled our dual mobility head utilizing the press on the back table and impacted the final head construct onto the trunnion.  The hip was reduced.  Weekly irrigated and medial capsule through the posterior scar tissue.  This was repaired with #5 Ethibond through drill holes in the greater trochanter.  The wound was soaked in a dilute Betadine solution.  Closed the gluteal fascia and IT band with #1 Vicryl, followed by a running strata fix, subcutaneous layer with 2-0 Monocryl and skin with 3-0 Monocryl.  An Aquacel dressing was applied and Gavin was awoke from anesthesia and transferred recovery in stable condition.    POSTOPERATIVE PLAN:    1.  Weightbearing as tolerated left lower extremity.  PT for mobilization  2.  DVT prophylaxis: Resume preop xarelto  3.  Perioperative antibiotics  4.  Follow-up in 2 weeks for wound check    NIKITA KEENAN MD

## 2023-05-25 NOTE — PROVIDER NOTIFICATION
"   05/25/23 1214   Discharge Planning   Patient/Family Anticipates Transition to home with family   Concerns to be Addressed denies needs/concerns at this time   Living Arrangements   People in Home spouse   Type of Residence Private Residence   Is your private residence a single family home or apartment? Single family home   Number of Stairs, Within Home, Primary none   Stair Railings, Within Home, Primary railing on right side (ascending)   Once home, are you able to live on one level? Yes   Which level? Main Level   Which rooms are not on the main level? Bedroom;Bathroom;Living Room;Kitchen   Bathroom Shower/Tub Walk-in shower   Equipment Currently Used at Home cane, straight   Support System   Support Systems Spouse/Significant Other   Do you have someone available to stay with you one or two nights once you are home? Yes   Medical Clearance   Date of Physical 05/22/23   It is recommended that you call and check with any specialty providers before surgery to see if you need surgical clearance.  Do you see any specialty providers outside of your primary care provider? Yes   Blood   Known Bleeding Disorder or Coagulopathy No   Does the patient have any Evangelical/cultural preferences related to blood products? No   Education   Has the patient scheduled or completed pre-op total joint education, either in class or online, in the last 12 months? No   Relationship/Living Environment   Name(s) of People in Home Yoselyn     BP (!) 140/103   Pulse 84   Temp 97.5  F (36.4  C) (Oral)   Resp 18   Ht 1.727 m (5' 7.99\")   Wt 77.6 kg (171 lb)   SpO2 100%   BMI 26.01 kg/m      "

## 2023-05-25 NOTE — INTERVAL H&P NOTE
"I have reviewed the surgical (or preoperative) H&P that is linked to this encounter, and examined the patient. There are no significant changes    Clinical Conditions Present on Arrival:  Clinically Significant Risk Factors Present on Admission         # Hyponatremia: Lowest Na = 135 mmol/L in last 30 days, will monitor as appropriate        # Drug Induced Coagulation Defect: home medication list includes an anticoagulant medication   # Overweight: Estimated body mass index is 26.01 kg/m  as calculated from the following:    Height as of this encounter: 1.727 m (5' 7.99\").    Weight as of this encounter: 77.6 kg (171 lb).       "

## 2023-05-25 NOTE — PROVIDER NOTIFICATION
"   05/25/23 1726   Discharge/Handoff   Handoff given to stefano read RN   Oxygen Therapy   O2 Device None (Room air)   Valuables   Patient Belongings remains with patient   Patient Belongings Remaining with Patient clothing;shoes;glasses;hearing aid(s)   Number of Belongings Bags 1   Did you bring any home meds/supplements to the hospital?  No     BP 92/60   Pulse 86   Temp 98.2  F (36.8  C) (Temporal)   Resp 14   Ht 1.727 m (5' 7.99\")   Wt 77.6 kg (171 lb)   SpO2 98%   BMI 26.01 kg/m      "

## 2023-05-26 ENCOUNTER — APPOINTMENT (OUTPATIENT)
Dept: OCCUPATIONAL THERAPY | Facility: CLINIC | Age: 78
DRG: 467 | End: 2023-05-26
Attending: ORTHOPAEDIC SURGERY
Payer: COMMERCIAL

## 2023-05-26 ENCOUNTER — APPOINTMENT (OUTPATIENT)
Dept: PHYSICAL THERAPY | Facility: CLINIC | Age: 78
DRG: 467 | End: 2023-05-26
Attending: ORTHOPAEDIC SURGERY
Payer: COMMERCIAL

## 2023-05-26 LAB
GLUCOSE SERPL-MCNC: 114 MG/DL (ref 70–99)
HGB BLD-MCNC: 7.2 G/DL (ref 13.3–17.7)
HGB BLD-MCNC: 7.4 G/DL (ref 13.3–17.7)
HGB BLD-MCNC: 7.5 G/DL (ref 13.3–17.7)

## 2023-05-26 PROCEDURE — 258N000003 HC RX IP 258 OP 636: Performed by: ORTHOPAEDIC SURGERY

## 2023-05-26 PROCEDURE — 97116 GAIT TRAINING THERAPY: CPT | Mod: GP

## 2023-05-26 PROCEDURE — 97530 THERAPEUTIC ACTIVITIES: CPT | Mod: GP

## 2023-05-26 PROCEDURE — 97161 PT EVAL LOW COMPLEX 20 MIN: CPT | Mod: GP

## 2023-05-26 PROCEDURE — 82947 ASSAY GLUCOSE BLOOD QUANT: CPT | Performed by: ORTHOPAEDIC SURGERY

## 2023-05-26 PROCEDURE — 36415 COLL VENOUS BLD VENIPUNCTURE: CPT

## 2023-05-26 PROCEDURE — 250N000013 HC RX MED GY IP 250 OP 250 PS 637

## 2023-05-26 PROCEDURE — 85018 HEMOGLOBIN: CPT

## 2023-05-26 PROCEDURE — 250N000013 HC RX MED GY IP 250 OP 250 PS 637: Performed by: ORTHOPAEDIC SURGERY

## 2023-05-26 PROCEDURE — 258N000003 HC RX IP 258 OP 636

## 2023-05-26 PROCEDURE — 97535 SELF CARE MNGMENT TRAINING: CPT | Mod: GO

## 2023-05-26 PROCEDURE — 36415 COLL VENOUS BLD VENIPUNCTURE: CPT | Performed by: ORTHOPAEDIC SURGERY

## 2023-05-26 PROCEDURE — 97165 OT EVAL LOW COMPLEX 30 MIN: CPT | Mod: GO

## 2023-05-26 PROCEDURE — 85018 HEMOGLOBIN: CPT | Performed by: ORTHOPAEDIC SURGERY

## 2023-05-26 PROCEDURE — 250N000011 HC RX IP 250 OP 636: Performed by: ORTHOPAEDIC SURGERY

## 2023-05-26 PROCEDURE — 120N000001 HC R&B MED SURG/OB

## 2023-05-26 RX ORDER — FERROUS SULFATE 325(65) MG
325 TABLET ORAL DAILY
Status: DISCONTINUED | OUTPATIENT
Start: 2023-05-26 | End: 2023-05-30 | Stop reason: HOSPADM

## 2023-05-26 RX ORDER — LAMOTRIGINE 25 MG/1
50 TABLET ORAL 2 TIMES DAILY
Status: DISCONTINUED | OUTPATIENT
Start: 2023-05-26 | End: 2023-05-30 | Stop reason: HOSPADM

## 2023-05-26 RX ORDER — BUPROPION HYDROCHLORIDE 150 MG/1
150 TABLET, EXTENDED RELEASE ORAL 2 TIMES DAILY
Status: DISCONTINUED | OUTPATIENT
Start: 2023-05-26 | End: 2023-05-30 | Stop reason: HOSPADM

## 2023-05-26 RX ORDER — DOXAZOSIN 2 MG/1
4 TABLET ORAL AT BEDTIME
Status: DISCONTINUED | OUTPATIENT
Start: 2023-05-26 | End: 2023-05-30 | Stop reason: HOSPADM

## 2023-05-26 RX ORDER — METOPROLOL TARTRATE 100 MG
100 TABLET ORAL 2 TIMES DAILY
Status: DISCONTINUED | OUTPATIENT
Start: 2023-05-27 | End: 2023-05-28

## 2023-05-26 RX ORDER — GABAPENTIN 600 MG/1
600 TABLET ORAL 4 TIMES DAILY
Status: DISCONTINUED | OUTPATIENT
Start: 2023-05-26 | End: 2023-05-30 | Stop reason: HOSPADM

## 2023-05-26 RX ORDER — BUSPIRONE HYDROCHLORIDE 15 MG/1
30 TABLET ORAL 2 TIMES DAILY
Status: DISCONTINUED | OUTPATIENT
Start: 2023-05-26 | End: 2023-05-30 | Stop reason: HOSPADM

## 2023-05-26 RX ORDER — FINASTERIDE 5 MG/1
5 TABLET, FILM COATED ORAL DAILY
Status: DISCONTINUED | OUTPATIENT
Start: 2023-05-26 | End: 2023-05-30 | Stop reason: HOSPADM

## 2023-05-26 RX ORDER — METOPROLOL TARTRATE 100 MG
100 TABLET ORAL 2 TIMES DAILY
Status: DISCONTINUED | OUTPATIENT
Start: 2023-05-26 | End: 2023-05-26

## 2023-05-26 RX ADMIN — BUPROPION HYDROCHLORIDE 150 MG: 150 TABLET, EXTENDED RELEASE ORAL at 20:48

## 2023-05-26 RX ADMIN — KETOROLAC TROMETHAMINE 15 MG: 15 INJECTION, SOLUTION INTRAMUSCULAR; INTRAVENOUS at 03:03

## 2023-05-26 RX ADMIN — LAMOTRIGINE 50 MG: 25 TABLET ORAL at 21:23

## 2023-05-26 RX ADMIN — FAMOTIDINE 20 MG: 20 TABLET, FILM COATED ORAL at 08:16

## 2023-05-26 RX ADMIN — BUSPIRONE HYDROCHLORIDE 30 MG: 15 TABLET ORAL at 20:48

## 2023-05-26 RX ADMIN — DOXAZOSIN 4 MG: 2 TABLET ORAL at 21:23

## 2023-05-26 RX ADMIN — GABAPENTIN 600 MG: 600 TABLET, FILM COATED ORAL at 21:23

## 2023-05-26 RX ADMIN — FINASTERIDE 5 MG: 5 TABLET, FILM COATED ORAL at 08:16

## 2023-05-26 RX ADMIN — POLYETHYLENE GLYCOL 3350 17 G: 17 POWDER, FOR SOLUTION ORAL at 08:16

## 2023-05-26 RX ADMIN — SODIUM CHLORIDE 500 ML: 9 INJECTION, SOLUTION INTRAVENOUS at 16:52

## 2023-05-26 RX ADMIN — OXYCODONE HYDROCHLORIDE 10 MG: 5 TABLET ORAL at 03:08

## 2023-05-26 RX ADMIN — GABAPENTIN 600 MG: 600 TABLET, FILM COATED ORAL at 08:16

## 2023-05-26 RX ADMIN — BUPROPION HYDROCHLORIDE 150 MG: 150 TABLET, EXTENDED RELEASE ORAL at 08:16

## 2023-05-26 RX ADMIN — GABAPENTIN 600 MG: 600 TABLET, FILM COATED ORAL at 12:28

## 2023-05-26 RX ADMIN — BUSPIRONE HYDROCHLORIDE 30 MG: 15 TABLET ORAL at 08:16

## 2023-05-26 RX ADMIN — FAMOTIDINE 20 MG: 20 TABLET, FILM COATED ORAL at 20:48

## 2023-05-26 RX ADMIN — SENNOSIDES AND DOCUSATE SODIUM 1 TABLET: 50; 8.6 TABLET ORAL at 08:16

## 2023-05-26 RX ADMIN — LAMOTRIGINE 50 MG: 25 TABLET ORAL at 08:16

## 2023-05-26 RX ADMIN — SENNOSIDES AND DOCUSATE SODIUM 1 TABLET: 50; 8.6 TABLET ORAL at 20:48

## 2023-05-26 RX ADMIN — DOXYLAMINE SUCCINATE 25 MG: 25 TABLET ORAL at 21:23

## 2023-05-26 RX ADMIN — OXYCODONE HYDROCHLORIDE 5 MG: 5 TABLET ORAL at 20:49

## 2023-05-26 RX ADMIN — ACETAMINOPHEN 975 MG: 325 TABLET, FILM COATED ORAL at 13:39

## 2023-05-26 RX ADMIN — SODIUM CHLORIDE, POTASSIUM CHLORIDE, SODIUM LACTATE AND CALCIUM CHLORIDE: 600; 310; 30; 20 INJECTION, SOLUTION INTRAVENOUS at 03:03

## 2023-05-26 RX ADMIN — GABAPENTIN 600 MG: 600 TABLET, FILM COATED ORAL at 17:22

## 2023-05-26 RX ADMIN — RIVAROXABAN 20 MG: 10 TABLET, FILM COATED ORAL at 17:22

## 2023-05-26 RX ADMIN — ACETAMINOPHEN 975 MG: 325 TABLET, FILM COATED ORAL at 20:48

## 2023-05-26 RX ADMIN — ACETAMINOPHEN 975 MG: 325 TABLET, FILM COATED ORAL at 06:09

## 2023-05-26 RX ADMIN — METOPROLOL TARTRATE 100 MG: 100 TABLET, FILM COATED ORAL at 12:29

## 2023-05-26 RX ADMIN — CEFAZOLIN 1 G: 1 INJECTION, POWDER, FOR SOLUTION INTRAMUSCULAR; INTRAVENOUS at 06:10

## 2023-05-26 RX ADMIN — OXYCODONE HYDROCHLORIDE 5 MG: 5 TABLET ORAL at 09:36

## 2023-05-26 RX ADMIN — FERROUS SULFATE TAB 325 MG (65 MG ELEMENTAL FE) 325 MG: 325 (65 FE) TAB at 12:28

## 2023-05-26 ASSESSMENT — ACTIVITIES OF DAILY LIVING (ADL)
ADLS_ACUITY_SCORE: 25
ADLS_ACUITY_SCORE: 24
ADLS_ACUITY_SCORE: 25
ADLS_ACUITY_SCORE: 24
ADLS_ACUITY_SCORE: 24
ADLS_ACUITY_SCORE: 25
ADLS_ACUITY_SCORE: 24
ADLS_ACUITY_SCORE: 25
PREVIOUS_RESPONSIBILITIES: MEAL PREP;HOUSEKEEPING;MEDICATION MANAGEMENT;DRIVING
ADLS_ACUITY_SCORE: 25
ADLS_ACUITY_SCORE: 25

## 2023-05-26 NOTE — PROGRESS NOTES
"CLINICAL NUTRITION SERVICES - ASSESSMENT NOTE     Nutrition Prescription    RECOMMENDATIONS FOR MDs/PROVIDERS TO ORDER:  None at this time    Malnutrition Status:    Patient does not meet criteria    Recommendations already ordered by Registered Dietitian (RD):  Please send vanilla ensure with lunch meal    Future/Additional Recommendations:  Monitor patient weight, intakes, labs, and GI/BM  Monitor patient tolerance to supplement     REASON FOR ASSESSMENT  Josemanuel Edmond is a/an 78 year old male assessed by the dietitian for Admission Nutrition Risk Screen for positive    NUTRITION HISTORY  Patient is a 78 year old who present s/p revision of total hip. Pertinent medical hx includes Afib, CHF, HLD, HTN. Patient scored positive on nutrition risk screen for unsure weight loss but no decreased appetite.   Per patient interview  Patients reports a good appetite and no concerns regarding decreased oral intakes. Patient denied any GI concerns. Patient further denied any concerns regarding unintended weight loss. Patient does not use supplements at home but has tried them in the past.  RD went over the importance of adequate protein intake for healing. Patient was agree bale to vanilla ensure with lunch tray.      CURRENT NUTRITION ORDERS  Diet: Orders Placed This Encounter      Advance Diet as Tolerated: Regular Diet Adult      Discharge Instruction - Regular Diet Adult      Intake/Tolerance: No percentage of recorded meal intakes in patient chart.    LABS  Labs reviewed  Glucose elevated-114 mg/dL    MEDICATIONS  Medications reviewed  Scheduled: Ancef, Pepcid, Miralax, Senna  Continuous:  LR infusion  PRN: Atarax, Oxycodone    ANTHROPOMETRICS  Height: 172.7 cm (5' 7.992\")  Most Recent Weight: 77.6 kg (171 lb)    IBW: 69.7 kg  BMI: Overweight BMI 25-29.9  Weight History:   Wt Readings from Last 15 Encounters:   05/25/23 77.6 kg (171 lb)   05/22/23 77.6 kg (171 lb)   04/13/23 78 kg (172 lb)   04/06/23 74.8 kg (165 lb) "   01/11/23 75.8 kg (167 lb)   11/16/22 78.5 kg (173 lb)   11/07/22 77.4 kg (170 lb 9.6 oz)   11/04/22 76.7 kg (169 lb)   10/26/22 78.5 kg (173 lb)   10/10/22 74.8 kg (165 lb)   09/22/22 76.8 kg (169 lb 6.4 oz)   07/26/22 79.3 kg (174 lb 12.8 oz)   06/16/22 80.3 kg (177 lb)   05/23/22 79.7 kg (175 lb 9.6 oz)   05/05/22 80.3 kg (177 lb)   No significant weight loss noted in patient chart.    Dosing Weight: 77.6-actual BW used.    ASSESSED NUTRITION NEEDS  Estimated Energy Needs: 1,940-2,328 kcals/day (25 - 30 kcals/kg)  Justification: Maintenance  Estimated Protein Needs:  grams protein/day (1.2 - 1.5 grams of pro/kg)  Justification: Increased needs  Estimated Fluid Needs: 1,940-2,328 mL/day (1 mL/kcal)   Justification: Per provider pending fluid status    PHYSICAL FINDINGS  See malnutrition section below.     MALNUTRITION  % Intake: No decreased intake noted  % Weight Loss: Weight loss does not meet criteria  Subcutaneous Fat Loss: None observed  Muscle Loss: Does not meet criteria d/t sarcopenia  Fluid Accumulation/Edema: None noted  Malnutrition Diagnosis: Patient does not meet two of the established criteria necessary for diagnosing malnutrition    NUTRITION DIAGNOSIS  Inadequate protein intake related to increased needs as evidenced by s/p revision of total hip.      INTERVENTIONS  Implementation   Collaboration with other providers-IDT rounds  Medical food supplement therapy-Please send vanilla ensure once daily     Goals  Patient to consume % of nutritionally adequate meal trays TID, or the equivalent with supplements/snacks.     Monitoring/Evaluation  Progress toward goals will be monitored and evaluated per protocol.    Kimmy Mckinney RDN, [place]  Clinical Dietitian  Office: [phone]  Weekend pager: [phone]

## 2023-05-26 NOTE — TELEPHONE ENCOUNTER
Per pre op note of 5/22/23 done by Bebeto YANES, he was able to talk with Dr Chapin regarding echo. Rosita Gibson RN Cardiology May 26, 2023, 9:00 AM

## 2023-05-26 NOTE — PROGRESS NOTES
"Occupational Therapy     05/26/23 1148   Appointment Info   Signing Clinician's Name / Credentials (OT) Jennifer Hodges, OTR/L   Living Environment   People in Home spouse   Current Living Arrangements condominium   Home Accessibility no concerns;wheelchair accessible   Number of Stairs, Within Home, Primary none   Stair Railings, Within Home, Primary railing on right side (ascending)   Self-Care   Activity/Exercise/Self-Care Comment Pt reports indep with ADLs, currently using FWW or cane for mobility.   Instrumental Activities of Daily Living (IADL)   Previous Responsibilities meal prep;housekeeping;medication management;driving   IADL Comments Wife has been assisting more with IADLs, will provide transportation.   General Information   Onset of Illness/Injury or Date of Surgery 05/25/23   Referring Physician Chandler Leiva MD   Patient/Family Therapy Goal Statement (OT) To return home safely   Additional Occupational Profile Info/Pertinent History of Current Problem Josemanuel STUART \"Gavin\" Feli is 79yo male who present post total hip arthroplasty of the left hip.   Existing Precautions/Restrictions fall   Left Lower Extremity (Weight-bearing Status) weight-bearing as tolerated (WBAT)   Cognitive Status Examination   Orientation Status orientation to person, place and time   Cognitive Status Comments Pt able to follow 2-step directions, conversation and communicate needs.   Pain Assessment   Patient Currently in Pain No   Range of Motion Comprehensive   General Range of Motion no range of motion deficits identified   Strength Comprehensive (MMT)   General Manual Muscle Testing (MMT) Assessment no strength deficits identified   Transfers   Transfers sit-stand transfer   Sit-Stand Transfer   Sit-Stand [place] (Transfers) supervision   Assistive Device (Sit-Stand Transfers) walker, front-wheeled   Sit/Stand Transfer Comments Pt completes sit>stand chair to FWW SBA, sturdy but reporting slight dizziness upon " standing - resolves quickly.   Clinical Impression   Criteria for Skilled Therapeutic Interventions Met (OT) Yes, treatment indicated   OT Diagnosis Decreased ADL indep/safety   Influenced by the following impairments S/P revision of total hip, left hip   OT Problem List-Impairments impacting ADL problems related to;activity tolerance impaired;pain;flexibility   Assessment of Occupational Performance 1-3 Performance Deficits   Identified Performance Deficits transfers, functional mobility, LB dressing, bathing   Planned Therapy Interventions (OT) ADL retraining;transfer training;other (see comments)  (education on AE for ADLs)   Clinical Decision Making Complexity (OT) low complexity   Risk & Benefits of therapy have been explained evaluation/treatment results reviewed;care plan/treatment goals reviewed;risks/benefits reviewed   OT Total Evaluation Time   OT Eval, Low Complexity Minutes (41276) 8   OT Goals   Therapy Frequency (OT) One time eval and treatment   OT Predicted Duration/Target Date for Goal Attainment 05/26/23   OT Goals Lower Body Dressing;Toilet Transfer/Toileting;OT Goal 1   OT: Lower Body Dressing Modified independent;using adaptive equipment   OT: Toilet Transfer/Toileting Supervision/stand-by assist   OT: Goal 1 Pt will verbalize/demonstrate understanding of adaptive equipment to increase ADL indep and safety at home.   Self-Care/Home Management   Self-Care/Home Mgmt/ADL, Compensatory, Meal Prep Minutes (64962) 30   Symptoms Noted During/After Treatment (Meal Preparation/Planning Training) dizziness   Treatment Detail/Skilled Intervention Pt reclined in chair upon arrival, agreeable to therapy. Education provided on role of OT whilein hospital. Pt completes sit>stand SBA with some dizziness that resolves quickly, ambulates BR toilet w/ FWW SBA. Education provided on safe walker usage for toilet transfer w/ pt demonstrating understanding. Education provided (verbal, demo) on AE to increase ADL  indep and safety at home including sock aide, reacher and long handled shoe horn. Pt also seeking AE suggesitons for bed, chair and tub transfers. Education provided on tub transfer bench with demo on how to transfer in/out. Education provided on AE for chair transfers including transfer bars and easy up cushion. Education provided on step stool with handle for bed transfer, pt stating does not think bed rail will help. Pt demonstrates/verbalizes understanding of AE. TH left pt with list of AE and where he might purchase it. Pt remains seated in chair with call light and needs in reach.   OT Discharge Planning   OT Plan one time eval and treat   OT Discharge Recommendation (DC Rec) home with assist   OT Rationale for DC Rec Pt demonstrates safe mobility and indep with ADLs today. Pt verbalizes/demonstrates understanding of AE to assist with ADL indep and safety at home, wife informed as well. Pt is set up with home PT and wife is home to provide light assist as needed.   OT Brief overview of current status SBA sit>stand, ambulating in room w/ FWW, toileting, LB dressing using sock aide   Total Session Time   Timed Code Treatment Minutes 30   Total Session Time (sum of timed and untimed services) 38

## 2023-05-26 NOTE — PROGRESS NOTES
Reason for Admission: Left Hip Replacement      Neuro: A&O x4.    GI/: Voiding small amounts. Bladder scanned after patient felt the need to void but did not - 350mL,     Resp: WNL    CV: Tachycardic.    Diet: Regular    Activity:SBA + walker & gait belt.    Lines/Drains: PIV - SL    Vitals:    Pain: Scheduled tylenol given, PRN      Plan: 1 more night.

## 2023-05-26 NOTE — PROGRESS NOTES
Patient vital signs are at baseline: No,  Reason:  Patient was hypotensive shortly after arrival to unit; recovered with one-time 500 mL LR bolus; soft BP since  Patient able to ambulate as they were prior to admission or with assist devices provided by therapies during their stay:  Yes  Patient MUST void prior to discharge:  No,  Reason:  Patient has not been able to void spontaneously  Patient able to tolerate oral intake:  Yes  Pain has adequate pain control using Oral analgesics:  Yes  Does patient have an identified :  Yes  Has goal D/C date and time been discussed with patient:  Yes     Patient ambulated to the bathroom and in the duenas x 1 with walker and gait belt

## 2023-05-26 NOTE — PROGRESS NOTES
"[facility] [facility] ADMISSION NOTE    Patient admitted to room 2407 at approximately 1750 via cart from surgery. Patient was accompanied by transport tech.     Verbal SBAR report received from KAMRON Romero prior to patient arrival.     Patient trasferred to bed via air sugey. Patient alert and oriented X 4. The patient is not having any pain.  . Admission vital signs: Blood pressure 101/60, pulse 87, temperature 97.5  F (36.4  C), temperature source Oral, resp. rate 16, height 1.727 m (5' 7.99\"), weight 77.6 kg (171 lb), SpO2 94 %. Patient was oriented to plan of care, call light, bed controls, tv, telephone, bathroom and visiting hours.     Risk Assessment    The following safety risks were identified during admission: fall. Yellow risk band applied: YES.     Skin Initial Assessment    This writer admitted this patient and completed a full skin assessment and Rex score in the Adult PCS flowsheet. Appropriate interventions initiated as needed.     Patient declined a full skin assessment at this time.     Education    Patient has a [place] to Observation order: No  Observation education completed and documented: N/A      Hazel Lau RN    "

## 2023-05-26 NOTE — PROGRESS NOTES
"[facility] Orthopaedics Progress Note      Post-operative Day: 1 Day Post-Op    Procedure(s):  Revision total hip arthroplasty, left  Subjective:    Pt reports mild pain in the left hip that is controlled. He denies any other issues. He has home PT set up to start next week and hopes to go home later today.     Chest pain, SOB:  No  Nausea, vomiting:  No  Lightheadedness, dizziness:  No  Neuro:  Patient denies new onset numbness or paresthesias      Objective:  Blood pressure 97/58, pulse 105, temperature 98.1  F (36.7  C), temperature source Oral, resp. rate 18, height 1.727 m (5' 7.99\"), weight 77.6 kg (171 lb), SpO2 98 %.    Patient Vitals for the past 24 hrs:   BP Temp Temp src Pulse Resp SpO2 Height Weight   05/26/23 0720 97/58 98.1  F (36.7  C) Oral 105 18 98 % -- --   05/26/23 0247 95/49 98.2  F (36.8  C) Oral 103 20 98 % -- --   05/26/23 0056 -- -- -- -- 14 100 % -- --   05/25/23 2315 98/56 -- -- -- -- -- -- --   05/25/23 2310 98/60 -- -- 99 -- -- -- --   05/25/23 2253 (!) 88/58 97.9  F (36.6  C) Oral 96 18 99 % -- --   05/25/23 2030 115/53 -- -- 92 -- -- -- --   05/25/23 2000 99/64 -- -- 96 -- -- -- --   05/25/23 1930 101/60 -- -- 87 -- -- -- --   05/25/23 1900 91/54 97.5  F (36.4  C) Oral 83 16 94 % -- --   05/25/23 1837 (!) 84/49 -- -- 72 -- -- -- --   05/25/23 1830 (!) 86/60 -- -- 91 -- -- -- --   05/25/23 1800 108/68 -- -- 88 -- -- -- --   05/25/23 1751 104/72 97.4  F (36.3  C) -- 92 10 97 % -- --   05/25/23 1715 92/60 -- -- 86 14 98 % -- --   05/25/23 1700 100/65 98.2  F (36.8  C) Temporal 82 14 98 % -- --   05/25/23 1645 94/62 -- -- 89 11 95 % -- --   05/25/23 1630 91/68 -- -- 85 12 100 % -- --   05/25/23 1625 93/66 -- -- 80 13 100 % -- --   05/25/23 1616 95/62 (!) 96.6  F (35.9  C) Temporal 77 11 100 % -- --   05/25/23 1144 (!) 140/103 97.5  F (36.4  C) Oral 84 18 100 % 1.727 m (5' 7.99\") 77.6 kg (171 lb)       Wt Readings from Last 4 Encounters:   05/25/23 77.6 kg (171 lb)   05/22/23 77.6 kg (171 " lb)   04/13/23 78 kg (172 lb)   04/06/23 74.8 kg (165 lb)       Gen: A&O x 3. NAD. Sitting up in bed eating breakfast.   Wound status: Covered, Aquacel is c/d/i.   Circulation, motion and sensation: Dorsiflexion/plantarflexion intact and equal bilaterally; distal lower extremity sensation is intact and equal bilaterally. Foot and toes are warm and well perfused.    Swelling: Mild  Calf tenderness: calves are soft and non-tender bilaterally     Pertinent Labs   Lab Results: personally reviewed.     Recent Labs   Lab Test 05/26/23  0723 05/22/23  1634 04/06/23  0948 11/04/22  1027   INR  --   --   --  1.39*   HGB 7.4* 11.8* 13.3 12.5*   HCT  --  35.9* 39.8* 37.4*   MCV  --  98 96 98   PLT  --  138* 164 153   NA  --  135* 135* 134*       Plan:   Continue current cares and rehabilitation.  Anticoagulation protocol: Xarelto   Pain medications:   oxycodone, toradol, tylenol and vistaril  Weight bearing status:  WBAT  Hgb 7.4, down from 11.8 preop on 5/22. Did receive additional fluid bolus for hypotension in addition to ongoing IV fluids and abx. Likely due to combination of blood loss from sx and dilution.   Disposition:  Plan for discharge to home with home health care when medically stable and pain is controlled, cleared by therapy.  Likely later today pending progress.              Report completed by:  Tommie Garcia PA-C  Date: 5/26/2023  Time: 8:52 AM

## 2023-05-26 NOTE — CONSULTS
Care Management:    Received a referral for discharge planning.  The patient had a revision of a total hip.    Chart reviewed and plan of care discussed in Interdisciplinary Rounds.  The patient lives independently in the community with his wife, Anna, and they plan to discharge to home with pre-planned Home Care PT services from [facility],  [phone]/ F: [phone].    Pt & wife are aware that  [facility],  (P: [phone]/ F: [phone]) will begin on Tuesday, May 30th as the agency has already called pt on his cell phone.    No further care management needs at this time.    KELLEE Boles

## 2023-05-26 NOTE — PROGRESS NOTES
"Josemanuel Edmond is a 78 year old male patient.  1. 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/2012     S/P left knee arthroscopy 08/15/2011     Current Outpatient Medications   Medication Sig Dispense Refill     acetaminophen (TYLENOL) 325 MG tablet Take 2 tablets (650 mg) by mouth every 4 hours as needed for other (mild pain) 100 tablet 0     hydrOXYzine (ATARAX) 25 MG tablet Take 1 tablet (25 mg) by mouth every 6 hours as needed for itching or anxiety (with pain, moderate pain) 30 tablet 0     oxyCODONE (ROXICODONE) 5 MG tablet Take 1-2 tablets (5-10 mg) by mouth every 4 hours as needed for moderate to severe pain 26 tablet 0     senna-docusate (SENOKOT-S/PERICOLACE) 8.6-50 MG tablet Take 1-2 tablets by mouth 2 times daily as needed for constipation Take while on oral narcotics to prevent or treat constipation. 30 tablet 0     Allergies   Allergen Reactions     Bactrim [Sulfamethoxazole-Trimethoprim] Nausea and Vomiting     Principal Problem:    S/P revision of total hip    Blood pressure (!) 88/52, pulse 86, temperature 98.4  F (36.9  C), temperature source Oral, resp. rate 18, height 1.727 m (5' 7.99\"), weight 77.6 kg (171 lb), SpO2 99 %.    Subjective   Was hypotensive yesterday and was given intravenous fluids.  Hypotension resolved.  However, his blood pressure remains soft.  This morning beta-blocker was given for tachyarrhythmia.  Significant hemoglobin drop noted.  Hemoglobin this morning 7.4 and appropriate 7.2.  Objective  Assessment & Plan  Anemia of acute blood loss secondary to perioperative hemorrhage.  Hemoglobin dropped down to 7.4 noted.  I would transfuse if there is any evidence of acute bleeding or hemoglobin below 7.0.  This " is discussed with the patient.  IV fluids continued for now due to soft BP.  Repeat H&H tomorrow in a.m. I will restart ferrous sulfate for anemia of blood loss  Anticipate discharge in 1 to 2 days.  Marcelina Post MD  5/26/2023

## 2023-05-26 NOTE — PROGRESS NOTES
"Poor sleep overnight, seemed to obsess on not sleeping. Taking oxy 10mg , atarax, sched Tylenol and Toradol for pain control. Reports pain level of \"7\" on 1-10 scale and down to a \"4\" post oxycodone. Denies than movement causes additional pain. Cold pack applied on and off.     Not completely emptying bladder, but did not meet criteria for straight cath.   Continued IVF overnight at 75cc/hour until urinating without difficulty.    "

## 2023-05-26 NOTE — PROGRESS NOTES
"Paged provider d/t patient being hypotensive again.      BP (!) 88/52   Pulse 86   Temp 98.4  F (36.9  C) (Oral)   Resp 18   Ht 1.727 m (5' 7.99\")   Wt 77.6 kg (171 lb)   SpO2 99%   BMI 26.01 kg/m      "

## 2023-05-26 NOTE — PROGRESS NOTES
"   05/26/23 0923   Appointment Info   Signing Clinician's Name / Credentials (PT) Jhonatan Quintero, PT, DPT   Living Environment   People in Home spouse   Current Living Arrangements condominium   Home Accessibility no concerns;wheelchair accessible   Living Environment Comments Bathroom: has both tub/shower and walk in shower. Walk in shower is a small corner unit with ~6\" step. Tub/shower, no grab bars, HHSH, shower seat (hasn't needed to use it recently). Higher toilet with RTS with rails. Very high bed with soft edge, difficult to get in and out of prior to increased pain/surgery, might have to use a step stool. Has a recliner and access to a second bed that is very uncomfortable but easier to get in and out of.   Self-Care   Equipment Currently Used at Home cane, straight;walker, rolling;shower chair  (has personal FWW here but does not typically use)   Fall history within last six months yes   Number of times patient has fallen within last six months 3  (balance in the kitchen, bending down and couldn't return to upright, jacket caught on something sticking out of the wall and he lost his balance, fall when getting out of the car.)   Activity/Exercise/Self-Care Comment Patient has been indep without AD up until about 2 weeks ago, to which pain increased significantly and patient then required use of a SPC. Prior to increased pain, patient was indep with bADL and IADL, since the pain increase - was needing assist with bADL/IADL. Both patient and spouse drive, both retired. Spouse with current rib injury, able to assist with ADL but not mobility. Home PT scheduled for Tuesday 5/30.   General Information   Onset of Illness/Injury or Date of Surgery 05/25/23   Referring Physician Chandler Leiva MD   Pertinent History of Current Problem (include personal factors and/or comorbidities that impact the POC) LEFT FAITH Revision   Existing Precautions/Restrictions fall  (no hip precautions)   Range of Motion (ROM)   Range " of Motion ROM deficits secondary to weakness;ROM deficits secondary to pain   Strength (Manual Muscle Testing)   Strength (Manual Muscle Testing) Deficits observed during functional mobility   Bed Mobility   Bed Mobility supine-sit   Supine-Sit Luce (Bed Mobility) contact guard;verbal cues;nonverbal cues (demo/gesture)   Assistive Device (Bed Mobility)   (HOB elevated slightly)   Transfers   Transfers sit-stand transfer   Sit-Stand Transfer   Sit-Stand Luce (Transfers) contact guard;verbal cues;nonverbal cues (demo/gesture)   Assistive Device (Sit-Stand Transfers) walker, front-wheeled   Gait/Stairs (Locomotion)   Luce Level (Gait) contact guard;verbal cues;nonverbal cues (demo/gesture)   Assistive Device (Gait) walker, front-wheeled   Distance in Feet initial 30ft   Distance in Feet (Gait) additional 125ft, sit break, 20ft   Clinical Impression   Criteria for Skilled Therapeutic Intervention Yes, treatment indicated   PT Diagnosis (PT) impaired functional mobility   Influenced by the following impairments impaired balance, impaired strength, pain, impaired activity tolerance   Functional limitations due to impairments impaired bed mobility, impaired transfers, impaired gait   Clinical Presentation (PT Evaluation Complexity) Stable/Uncomplicated   Clinical Presentation Rationale clinical judgement   Clinical Decision Making (Complexity) low complexity   Planned Therapy Interventions (PT) balance training;bed mobility training;gait training;home exercise program;neuromuscular re-education;patient/family education;stair training;strengthening;transfer training;progressive activity/exercise   Anticipated Equipment Needs at Discharge (PT)   (has personal FWW in room)   Risk & Benefits of therapy have been explained evaluation/treatment results reviewed;care plan/treatment goals reviewed;participants voiced agreement with care plan;participants included;patient;spouse/significant other   PT Total  Evaluation Time   PT Eval, Low Complexity Minutes (74721) 10   Physical Therapy Goals   PT Frequency Daily   PT Predicted Duration/Target Date for Goal Attainment 06/02/23   PT Goals Transfers;Gait;Bed Mobility   PT: Bed Mobility Supine to/from sit;Independent   PT: Transfers Modified independent;Sit to/from stand;Bed to/from chair;Assistive device   PT: Gait Supervision/stand-by assist;Rolling walker;150 feet  (FWW)   Interventions   Interventions Quick Adds Gait Training;Therapeutic Activity   Therapeutic Activity   Therapeutic Activities: dynamic activities to improve functional performance Minutes (63666) 15   Symptoms Noted During/After Treatment Fatigue   Treatment Detail/Skilled Intervention Patient able to perform multiple transfers during the session with progression to SBA with FWW. Patient demonstrated carryover within session for hand placement without cues required by end of session. Patient and spouse with many questions during session, all answered in depth. Verbally discussed with visual demo potential retro step technique up to a step stool for getting into his high bed at home. Discussed potentially sleeping in his recliner for a few days if needed. Discussed recommended frequency of mobility once at home to prevent scar tissue buildup. Adjusted patient's personal FWW lower 1 inch with pt reporting improved BUE comfort/decreased WBing through BUE and PT noting improved upright posture. Recommended patient/spouse discuss multiple home setup concerns with home care PT, as he does have alternative sleeping arrangements and showering arrangements available if needed.   Gait Training   Gait Training Minutes (57456) 25   Symptoms Noted During/After Treatment (Gait Training) fatigue   Treatment Detail/Skilled Intervention Patient able to demonstrate improved gait speed, upright posture, forward visual gaze, step/stride length, increased gait distance, and L hip to neutral rotation with continued gait  training. Patient required verbal cues, tactile cues (at times), and demo for corrections. Discussed progressive mobility and patient denying dizziness/lightheadedness on subsequent bouts of gait.   [place] Level (Gait Training) stand-by assist   Physical Assistance Level (Gait Training) verbal cues;nonverbal cues (demo/gestures)   Assistive Device (Gait Training) rolling walker  (FWW)   PT Discharge Planning   PT Plan Daily - progress bed mobility, retro step up to step stool for high bed at home, gait distance, ensure neutral L hip rotation (was toed in initially but able to correct without pain), review FAITH ther ex   PT Discharge Recommendation (DC Rec) home with assist;home with home care physical therapy   PT Rationale for DC Rec Patient is able to mobilize with one person assist at this time. He reported dizziness initially with ambulation; however, with subsequent bouts of ambulation he denied symptoms. Anticipate patient will be safe to transition home with spouse (ability to assist with some ADL but unable to assist with mobility at this time) with home care PT initiating on Tuesday 5/30/23.   PT Brief overview of current status SBA supine>sit with increased time/effort and cues needed, CGA>SBA transfers FWW, CGA>SBA gait FWW longest bout 125ft   Total Session Time   Timed Code Treatment Minutes 40   Total Session Time (sum of timed and untimed services) 50

## 2023-05-26 NOTE — PROGRESS NOTES
[facility] Medicine Progress Note  Date of Service: 05/26/2023    Assessment & Plan   Josemanuel Edmond is a 78 year old male who presented on 5/25/2023 for scheduled Procedure(s):  Revision total hip arthroplasty, left by Chandler Leiva MD and is being followed by the hospital medicine service for co-management of acute and/or chronic perioperative medical problems.      S/p Procedure(s):  Revision total hip arthroplasty, left   1 Day Post-Op    - Pain control, wound cares, physical therapy, occupational therapy and DVT prophylaxis per orthopedic surgery service    Acute Anemia  Acute anemia in the post operative setting. Baseline hemoglobin near 12.0, 11.8 prior to surgery. AM hemoglobin 05/26 was 7.4 --> four hour repeat was 7.2. The patient is asymptomatic and denies lightheadedness or dizziness. Likely a combination of blood loss during surgery and some dilutional effect due to fluid bolus for hypotension and maintenance fluids. EBL during surgery 200cc.  - Following hemoglobin; repeat early evening, pending. Consent for transfusion if needed has been complete.  - Iron supplement daily.    Chronic Atrial Fibrillation  Chronic Diastolic Heart Failure  Essential Hypertension  Hyperlipidemia  Developed atrial fibrillation after undergoing thoractomy for empyema in 11/2017. He was started on Xarelto and cardioverted. He presented on 10/19/21 with palpitations--EKG done and showed AFib. PCP in October then increased metoprolol to 50 mg bid then 75 mg bid. Underwent 9 day Zio 11/17/21: AFib (100% burden) with avg HR 83. He has been on stable metoprolol dosing with rate control. Recent echocardiogram on 5/23/2023 shows EF is 50 to 55% and he has moderate mitral regurgitation and mild aortic regurgitation. This is unchanged from his previous echocardiogram several years ago.    Remains in atrial fibrillation, tachycardic (low 100's), and soft blood pressure. Was given  500cc bolus overnight for reported 88/58. Blood pressure responded however remains soft. He asymptomatic, denying lightheadedness, dizziness, or shortness of breath.   - Continue PTA xarelto 05/26  - Continue PTA metoprolol tartrate 100 mg BID and furosemide 20 mg daily  - Continuous IV fluids for soft blood pressure.    Addendum: Paged about dizziness and blood pressure of 88/52 with ambulation the restroom. He is no longer tachycardic, will hold his evening dose of metoprolol and give 500 mL NS.    Principal Problem:    S/P revision of total hip    DVT Prophylaxis: as per orthopedic surgery service - Resume PTA Xarelto  Code Status: Full Code    Lines: PIV  Parr catheter: None    Discussion: Medically, the patient is not ready for discharge.    Disposition: Anticipate discharge 05/27/23     Attestation:  I have reviewed today's vital signs, notes, medications, labs and imaging.    I have discussed, or will be discussing, the patient with hospitalist physician, Dr. Post.    CK GREWAL PA-C     Interval History   POD#1: Routine post operative morning rounds. Patient states that he is feeling fairly well this morning. Low blood pressure overnight that responded to 500cc bolus fluid. He He states having some irritation from his sutures possibly. Endorses good appetite without nausea or vomiting. Ambulating well with PT, feeling steady on his feet without dizziness or lightheadedness. Passing flatus without a bowel movement. Urinating without difficulty. No paresthesias. He denies chest pain, shortness of breath, palpitation, nausea, vomiting, abdominal pain.    Physical Exam   Temp:  [96.6  F (35.9  C)-98.2  F (36.8  C)] 98.1  F (36.7  C)  Pulse:  [] 105  Resp:  [10-20] 18  BP: ()/() 97/58  SpO2:  [94 %-100 %] 98 %    Weights:   Vitals:    05/25/23 1144   Weight: 77.6 kg (171 lb)    Body mass index is 26.01 kg/m .    General: Alert, oriented, cooperative, no acute distress, sitting upright in  recliner with wife visiting at his side.  CV: Irregularly irregular, tachycardic, normal S1 and S2 heart sounds. No murmurs appreciated. Strong radial pulses bilaterally. No lower extremity edema.  Respiratory: CTA bilaterally. No crackles, wheezes, or rhonchi. Normal respiratory effort on room air. Speaking in full sentences.  GI: Soft, flat, non-tender, normoactive bowel sounds.  Skin: Warm and dry  Musculoskeletal: Surgical site exam deferred to orthopedics. Intact dorsiflexion and plantarflexion. Pedal pulses present bilaterally. Normal sensation bilaterally to lower extremities.    Data   Recent Labs   Lab 05/26/23  0723 05/22/23  1634   WBC  --  5.6   HGB 7.4* 11.8*   MCV  --  98   PLT  --  138*   NA  --  135*   POTASSIUM  --  4.3   CHLORIDE  --  96*   CO2  --  29   BUN  --  13.0   CR  --  0.92   ANIONGAP  --  10   LUIS  --  9.2   * 99       Recent Labs   Lab 05/26/23 0723 05/22/23  1634   * 99        Unresulted Labs Ordered in the Past 30 Days of this Admission     Date and Time Order Name Status Description    5/25/2023  2:27 PM Tissue Aerobic Bacterial Culture Routine In process     5/25/2023  2:27 PM Anaerobic Bacterial Culture Routine In process     5/25/2023  2:27 PM Surgical Pathology Exam In process     5/25/2023  2:15 PM Synovial fluid Aerobic Bacterial Culture Routine In process     5/25/2023  2:15 PM Anaerobic Bacterial Culture Routine In process            Imaging  Recent Results (from the past 24 hour(s))   XR Pelvis Port 1/2 Views    Narrative    EXAM: XR PELVIS PORT 1/2 VIEWS  LOCATION: [facility]  DATE/TIME: 5/25/2023 4:49 PM CDT    INDICATION: Status post Hip surgery  COMPARISON: None.      Impression    IMPRESSION: Completed left total hip arthroplasty. Normal joint alignment. Components appear well seated without evidence of periprosthetic fracture or other immediate complication. Postoperative air in the soft tissues lateral to the hip. Right total    hip arthroplasty also in place, uncomplicated. No acute or significant bone or joint abnormality in the pelvis or hips otherwise.        I reviewed all new labs and imaging results over the last 24 hours. I personally reviewed no images or EKG's today.    Medications     lactated ringers Stopped (05/26/23 0957)       acetaminophen  975 mg Oral Q8H     buPROPion  150 mg Oral BID     busPIRone HCl  30 mg Oral BID     doxazosin  4 mg Oral At Bedtime     doxylamine  25 mg Oral At Bedtime     famotidine  20 mg Oral BID    Or     famotidine  20 mg Intravenous BID     finasteride  5 mg Oral Daily     gabapentin  600 mg Oral 4x Daily     lamoTRIgine  50 mg Oral BID     levomilnacipran  80 mg Oral Daily     polyethylene glycol  17 g Oral Daily     rivaroxaban ANTICOAGULANT  20 mg Oral Daily with supper     senna-docusate  1 tablet Oral BID     sodium chloride (PF)  3 mL Intracatheter Q8H       CK GREWAL PA-C

## 2023-05-27 LAB
ABO/RH(D): NORMAL
ANTIBODY SCREEN: NEGATIVE
BLD PROD TYP BPU: NORMAL
BLOOD COMPONENT TYPE: NORMAL
CODING SYSTEM: NORMAL
CROSSMATCH: NORMAL
GLUCOSE SERPL-MCNC: 93 MG/DL (ref 70–99)
HGB BLD-MCNC: 7.3 G/DL (ref 13.3–17.7)
ISSUE DATE AND TIME: NORMAL
SPECIMEN EXPIRATION DATE: NORMAL
UNIT ABO/RH: NORMAL
UNIT NUMBER: NORMAL
UNIT STATUS: NORMAL
UNIT TYPE ISBT: 6200

## 2023-05-27 PROCEDURE — 250N000013 HC RX MED GY IP 250 OP 250 PS 637

## 2023-05-27 PROCEDURE — 82947 ASSAY GLUCOSE BLOOD QUANT: CPT | Performed by: ORTHOPAEDIC SURGERY

## 2023-05-27 PROCEDURE — 36415 COLL VENOUS BLD VENIPUNCTURE: CPT | Performed by: ORTHOPAEDIC SURGERY

## 2023-05-27 PROCEDURE — 86901 BLOOD TYPING SEROLOGIC RH(D): CPT | Performed by: ORTHOPAEDIC SURGERY

## 2023-05-27 PROCEDURE — 86923 COMPATIBILITY TEST ELECTRIC: CPT | Performed by: INTERNAL MEDICINE

## 2023-05-27 PROCEDURE — P9016 RBC LEUKOCYTES REDUCED: HCPCS | Performed by: INTERNAL MEDICINE

## 2023-05-27 PROCEDURE — 85018 HEMOGLOBIN: CPT | Performed by: ORTHOPAEDIC SURGERY

## 2023-05-27 PROCEDURE — 250N000013 HC RX MED GY IP 250 OP 250 PS 637: Performed by: ORTHOPAEDIC SURGERY

## 2023-05-27 PROCEDURE — 99232 SBSQ HOSP IP/OBS MODERATE 35: CPT | Performed by: INTERNAL MEDICINE

## 2023-05-27 PROCEDURE — 120N000001 HC R&B MED SURG/OB

## 2023-05-27 RX ADMIN — MAGNESIUM HYDROXIDE 30 ML: 400 SUSPENSION ORAL at 13:03

## 2023-05-27 RX ADMIN — FAMOTIDINE 20 MG: 20 TABLET, FILM COATED ORAL at 21:49

## 2023-05-27 RX ADMIN — SENNOSIDES AND DOCUSATE SODIUM 1 TABLET: 50; 8.6 TABLET ORAL at 21:48

## 2023-05-27 RX ADMIN — ACETAMINOPHEN 975 MG: 325 TABLET, FILM COATED ORAL at 05:57

## 2023-05-27 RX ADMIN — HYDROXYZINE HYDROCHLORIDE 25 MG: 25 TABLET, FILM COATED ORAL at 21:49

## 2023-05-27 RX ADMIN — DOXAZOSIN 4 MG: 2 TABLET ORAL at 21:49

## 2023-05-27 RX ADMIN — OXYCODONE HYDROCHLORIDE 5 MG: 5 TABLET ORAL at 14:14

## 2023-05-27 RX ADMIN — LAMOTRIGINE 50 MG: 25 TABLET ORAL at 21:49

## 2023-05-27 RX ADMIN — GABAPENTIN 600 MG: 600 TABLET, FILM COATED ORAL at 16:38

## 2023-05-27 RX ADMIN — OXYCODONE HYDROCHLORIDE 5 MG: 5 TABLET ORAL at 12:50

## 2023-05-27 RX ADMIN — ACETAMINOPHEN 975 MG: 325 TABLET, FILM COATED ORAL at 21:48

## 2023-05-27 RX ADMIN — OXYCODONE HYDROCHLORIDE 5 MG: 5 TABLET ORAL at 08:54

## 2023-05-27 RX ADMIN — BUSPIRONE HYDROCHLORIDE 30 MG: 15 TABLET ORAL at 08:56

## 2023-05-27 RX ADMIN — FINASTERIDE 5 MG: 5 TABLET, FILM COATED ORAL at 08:57

## 2023-05-27 RX ADMIN — BUPROPION HYDROCHLORIDE 150 MG: 150 TABLET, EXTENDED RELEASE ORAL at 21:48

## 2023-05-27 RX ADMIN — GABAPENTIN 600 MG: 600 TABLET, FILM COATED ORAL at 08:57

## 2023-05-27 RX ADMIN — GABAPENTIN 600 MG: 600 TABLET, FILM COATED ORAL at 21:48

## 2023-05-27 RX ADMIN — FERROUS SULFATE TAB 325 MG (65 MG ELEMENTAL FE) 325 MG: 325 (65 FE) TAB at 08:56

## 2023-05-27 RX ADMIN — BUPROPION HYDROCHLORIDE 150 MG: 150 TABLET, EXTENDED RELEASE ORAL at 08:56

## 2023-05-27 RX ADMIN — POLYETHYLENE GLYCOL 3350 17 G: 17 POWDER, FOR SOLUTION ORAL at 08:56

## 2023-05-27 RX ADMIN — SENNOSIDES AND DOCUSATE SODIUM 1 TABLET: 50; 8.6 TABLET ORAL at 08:57

## 2023-05-27 RX ADMIN — GABAPENTIN 600 MG: 600 TABLET, FILM COATED ORAL at 12:44

## 2023-05-27 RX ADMIN — OXYCODONE HYDROCHLORIDE 10 MG: 5 TABLET ORAL at 18:11

## 2023-05-27 RX ADMIN — OXYCODONE HYDROCHLORIDE 10 MG: 5 TABLET ORAL at 22:13

## 2023-05-27 RX ADMIN — DOXYLAMINE SUCCINATE 25 MG: 25 TABLET ORAL at 21:49

## 2023-05-27 RX ADMIN — BUSPIRONE HYDROCHLORIDE 30 MG: 15 TABLET ORAL at 21:48

## 2023-05-27 RX ADMIN — HYDROXYZINE HYDROCHLORIDE 25 MG: 25 TABLET, FILM COATED ORAL at 13:58

## 2023-05-27 RX ADMIN — LAMOTRIGINE 50 MG: 25 TABLET ORAL at 08:54

## 2023-05-27 RX ADMIN — FAMOTIDINE 20 MG: 20 TABLET, FILM COATED ORAL at 08:54

## 2023-05-27 RX ADMIN — ACETAMINOPHEN 975 MG: 325 TABLET, FILM COATED ORAL at 12:44

## 2023-05-27 ASSESSMENT — ACTIVITIES OF DAILY LIVING (ADL)
ADLS_ACUITY_SCORE: 25
ADLS_ACUITY_SCORE: 23
ADLS_ACUITY_SCORE: 25
ADLS_ACUITY_SCORE: 23
ADLS_ACUITY_SCORE: 23
ADLS_ACUITY_SCORE: 25
ADLS_ACUITY_SCORE: 23

## 2023-05-27 NOTE — PLAN OF CARE
Patient vital signs are at baseline: No,  Reason: BP soft, metoprolol held per set parameters. Pt denies feeling dizzy or lightheaded.  Hgb 7.3, 1 unit PRBC administered. Recheck Hgb in AM.  Patient able to ambulate as they were prior to admission or with assist devices provided by therapies during their stay:  Yes, ambulates with SBA and walker, walked 200 ft  Patient MUST void prior to discharge:  Yes  Patient able to tolerate oral intake:  Yes  Pain has adequate pain control using Oral analgesics:  Yes  Does patient have an identified :  Yes. Pt to return home with wife at discharge.  Has goal D/C date and time been discussed with patient:  Yes . Discharge date dependent on BP and Hgb, pt aware.  He is hoping to discharge home tomorrow.

## 2023-05-27 NOTE — PLAN OF CARE
"Reason for Admission: L FAITH revision     Activity: SBA/A1 with walker/gait belt.     Neuro: Pt A/O x4    Cardiac: hypotensive at beginning of shift, MD notified 500ml NS bolus given. Soft B/P resulted post bolus    Respiratory: WNL, pt consistent with incentive spirometer.     GI/: WNL, pt continent, utilized bedside urinal.      Skin: Pt L hip incisions covered     Diet: Reg diet     Lines/Drains: Pt's RFA IV SL.     Vitals: BP 95/47 (BP Location: Right arm)   Pulse 75   Temp 97.5  F (36.4  C) (Oral)   Resp 16   Ht 1.727 m (5' 7.99\")   Wt 77.6 kg (171 lb)   SpO2 99%   BMI 26.01 kg/m    Labs: monitor HGB,      Pain: pt complains of pain with ambulation, requested prn pain medication at beginning of shift, cold applied     Plan: Continue with plan of care, possible discharge.  Reason for Admission: Left Hip Replacement   April L KAMRON Zeng on 5/27/2023 at 5:29 AM        "

## 2023-05-27 NOTE — PROGRESS NOTES
"[facility] Medicine Progress Note  Date of Service (when I saw the patient): 05/27/2023    REASON FOR ADMISSION / INTERVAL HISTORY:  Admitted by ortho 5/25 for Revision left hip replacement. hospitalist consulted for medical management  Feels ok-no CP/SOB/lightheadedness       Assessment & Plan      Revision left hip replacement (acetabular component, liner, and head) with acetabular bone grafting  Pod#2. On xarelto for chronic afib. On scheduled tylenol tid and prn oxycodone. Pain well controlled    Acute post-op blood loss anemia  Baseline hg 11-13. Pre-op 5/22 was 11.2 . Post OP-7.3. Pt not very symptomatic but BP <90.  -will hold xarelto. Transfuse 1unit blood and follow hg in AM    Chronic afib  On xarelto and metoprolol. Will hold xarelto today and resume in AM if hg stable. Continue metoprolol with hold parameters.    H/o chronic diastolic CHF  Stable. On po lasix. Hold meds for now.     Alcoholism in remission  Peripheral neuropathy  Continue meds    BPH  Continue meds    Chronic thrombocytopenia  Stable . At baseline.    Depression/ anxiety  Continue meds    Dispo  Per ortho. If hg / BP stable, could discharge in AM        Diet: Advance Diet as Tolerated: Regular Diet Adult  Discharge Instruction - Regular Diet Adult  Snacks/Supplements Adult: Ensure Enlive; With Meals    DVT Prophylaxis: DOAC  Parr Catheter: Not present  Code Status: Full Code    Lines: ALON PEDRAZA MD   Pg [phone]        ROS:  As described in A/P and Exam.  Otherwise ALL are  negative.    PHYSICAL EXAM:  All vitals have been reviewed    Blood pressure 95/49, pulse 102, temperature 97.1  F (36.2  C), temperature source Oral, resp. rate 16, height 1.727 m (5' 7.99\"), weight 77.6 kg (171 lb), SpO2 98 %.    No intake/output data recorded.    GENERAL APPEARANCE: healthy, alert and no distress  EYES: conjunctiva clear, eyes grossly normal  HENT: external ears and nose normal   RESP: lungs " clear to auscultation - no rales, rhonchi or wheezes  CV: regular rate and rhythm, normal S1 S2, no S3 or S4 and no murmur, click or rub   ABDOMEN: soft, nontender, no HSM or masses and bowel sounds normal  MS: no clubbing, cyanosis; no edema  SKIN: clear without significant rashes or lesions  NEURO: -non-focal moves all 4 extr    ROUTINE  LABS (Last four results)  CMP  Recent Labs   Lab 05/27/23  0615 05/26/23  0723 05/22/23  1634   NA  --   --  135*   POTASSIUM  --   --  4.3   CHLORIDE  --   --  96*   CO2  --   --  29   ANIONGAP  --   --  10   GLC 93 114* 99   BUN  --   --  13.0   CR  --   --  0.92   GFRESTIMATED  --   --  84   LUIS  --   --  9.2     CBC  Recent Labs   Lab 05/27/23  0615 05/26/23  1753 05/26/23  1118 05/26/23  0723 05/22/23  1634   WBC  --   --   --   --  5.6   RBC  --   --   --   --  3.66*   HGB 7.3* 7.5* 7.2* 7.4* 11.8*   HCT  --   --   --   --  35.9*   MCV  --   --   --   --  98   MCH  --   --   --   --  32.2   MCHC  --   --   --   --  32.9   RDW  --   --   --   --  14.3   PLT  --   --   --   --  138*     INRNo lab results found in last 7 days.  Arterial Blood GasNo lab results found in last 7 days.    No results found for this or any previous visit (from the past 24 hour(s)).

## 2023-05-27 NOTE — PROGRESS NOTES
"[facility] Orthopaedics Progress Note      Post-operative Day: 2 Days Post-Op    Procedure(s):  Revision total hip arthroplasty, left      Subjective:    Pain: minimal  Chest pain, SOB:  No      Objective:  Blood pressure 92/50, pulse 87, temperature 97.4  F (36.3  C), temperature source Oral, resp. rate 18, height 1.727 m (5' 7.99\"), weight 77.6 kg (171 lb), SpO2 99 %.    Patient Vitals for the past 24 hrs:   BP Temp Temp src Pulse Resp SpO2   05/27/23 0619 92/50 97.4  F (36.3  C) Oral 87 18 99 %   05/27/23 0233 95/47 -- -- 75 16 99 %   05/27/23 0016 (!) 89/41 -- -- 81 -- --   05/26/23 2313 (!) 81/45 97.5  F (36.4  C) Oral 85 14 92 %   05/26/23 1847 (!) 89/53 97.5  F (36.4  C) Oral 79 18 100 %   05/26/23 1615 (!) 88/52 -- -- 86 -- --   05/26/23 1604 -- -- -- -- -- 99 %   05/26/23 1600 (!) 82/48 98.4  F (36.9  C) Oral 78 18 99 %   05/26/23 1229 109/60 -- -- 106 -- --   05/26/23 1119 97/53 98.1  F (36.7  C) Oral 106 16 100 %       Wt Readings from Last 4 Encounters:   05/25/23 77.6 kg (171 lb)   05/22/23 77.6 kg (171 lb)   04/13/23 78 kg (172 lb)   04/06/23 74.8 kg (165 lb)         Motor function, sensation, and circulation intact   Yes  Wound status: incisions are clean dry and intact. Yes  Calf tenderness: Left  No    Pertinent Labs   Lab Results: personally reviewed.     Recent Labs   Lab Test 05/27/23  0615 05/26/23  1753 05/26/23  1118 05/26/23  0723 05/22/23  1634 04/06/23  0948 11/04/22  1027   INR  --   --   --   --   --   --  1.39*   HGB 7.3* 7.5* 7.2*   < > 11.8* 13.3 12.5*   HCT  --   --   --   --  35.9* 39.8* 37.4*   MCV  --   --   --   --  98 96 98   PLT  --   --   --   --  138* 164 153   NA  --   --   --   --  135* 135* 134*    < > = values in this interval not displayed.       Plan: Anticoagulation protocol:   Xarelto            Pain medications:  scopainmedication: oxycodone            Weight bearing status:  WBAT            Disposition:  Home pending             Continue cares and rehabilitation "     Report completed by:  Kenji Da Silva MD  Date: 5/27/2023  Time: 7:53 AM

## 2023-05-28 ENCOUNTER — APPOINTMENT (OUTPATIENT)
Dept: PHYSICAL THERAPY | Facility: CLINIC | Age: 78
DRG: 467 | End: 2023-05-28
Attending: ORTHOPAEDIC SURGERY
Payer: COMMERCIAL

## 2023-05-28 LAB
HGB BLD-MCNC: 7.7 G/DL (ref 13.3–17.7)
HOLD SPECIMEN: NORMAL

## 2023-05-28 PROCEDURE — 36415 COLL VENOUS BLD VENIPUNCTURE: CPT | Performed by: INTERNAL MEDICINE

## 2023-05-28 PROCEDURE — 250N000013 HC RX MED GY IP 250 OP 250 PS 637

## 2023-05-28 PROCEDURE — 99232 SBSQ HOSP IP/OBS MODERATE 35: CPT | Performed by: INTERNAL MEDICINE

## 2023-05-28 PROCEDURE — 120N000001 HC R&B MED SURG/OB

## 2023-05-28 PROCEDURE — 258N000003 HC RX IP 258 OP 636: Performed by: INTERNAL MEDICINE

## 2023-05-28 PROCEDURE — 250N000013 HC RX MED GY IP 250 OP 250 PS 637: Performed by: INTERNAL MEDICINE

## 2023-05-28 PROCEDURE — 258N000003 HC RX IP 258 OP 636: Performed by: PHYSICIAN ASSISTANT

## 2023-05-28 PROCEDURE — 97116 GAIT TRAINING THERAPY: CPT | Mod: GP | Performed by: PHYSICAL THERAPIST

## 2023-05-28 PROCEDURE — 250N000013 HC RX MED GY IP 250 OP 250 PS 637: Performed by: ORTHOPAEDIC SURGERY

## 2023-05-28 PROCEDURE — 85018 HEMOGLOBIN: CPT | Performed by: INTERNAL MEDICINE

## 2023-05-28 RX ORDER — METOPROLOL TARTRATE 25 MG/1
50 TABLET, FILM COATED ORAL 2 TIMES DAILY
Status: DISCONTINUED | OUTPATIENT
Start: 2023-05-28 | End: 2023-05-30 | Stop reason: HOSPADM

## 2023-05-28 RX ADMIN — LAMOTRIGINE 50 MG: 25 TABLET ORAL at 09:27

## 2023-05-28 RX ADMIN — BUSPIRONE HYDROCHLORIDE 30 MG: 15 TABLET ORAL at 09:27

## 2023-05-28 RX ADMIN — OXYCODONE HYDROCHLORIDE 5 MG: 5 TABLET ORAL at 06:31

## 2023-05-28 RX ADMIN — FINASTERIDE 5 MG: 5 TABLET, FILM COATED ORAL at 09:27

## 2023-05-28 RX ADMIN — ACETAMINOPHEN 975 MG: 325 TABLET, FILM COATED ORAL at 14:32

## 2023-05-28 RX ADMIN — DOXAZOSIN 4 MG: 2 TABLET ORAL at 21:11

## 2023-05-28 RX ADMIN — GABAPENTIN 600 MG: 600 TABLET, FILM COATED ORAL at 09:28

## 2023-05-28 RX ADMIN — OXYCODONE HYDROCHLORIDE 10 MG: 5 TABLET ORAL at 21:11

## 2023-05-28 RX ADMIN — POLYETHYLENE GLYCOL 3350 17 G: 17 POWDER, FOR SOLUTION ORAL at 09:28

## 2023-05-28 RX ADMIN — GABAPENTIN 600 MG: 600 TABLET, FILM COATED ORAL at 11:53

## 2023-05-28 RX ADMIN — METOPROLOL TARTRATE 50 MG: 25 TABLET, FILM COATED ORAL at 14:32

## 2023-05-28 RX ADMIN — SODIUM CHLORIDE, POTASSIUM CHLORIDE, SODIUM LACTATE AND CALCIUM CHLORIDE 500 ML: 600; 310; 30; 20 INJECTION, SOLUTION INTRAVENOUS at 08:49

## 2023-05-28 RX ADMIN — BUPROPION HYDROCHLORIDE 150 MG: 150 TABLET, EXTENDED RELEASE ORAL at 09:27

## 2023-05-28 RX ADMIN — FERROUS SULFATE TAB 325 MG (65 MG ELEMENTAL FE) 325 MG: 325 (65 FE) TAB at 09:27

## 2023-05-28 RX ADMIN — SENNOSIDES AND DOCUSATE SODIUM 1 TABLET: 50; 8.6 TABLET ORAL at 21:12

## 2023-05-28 RX ADMIN — ACETAMINOPHEN 975 MG: 325 TABLET, FILM COATED ORAL at 06:27

## 2023-05-28 RX ADMIN — FAMOTIDINE 20 MG: 20 TABLET, FILM COATED ORAL at 09:27

## 2023-05-28 RX ADMIN — METOPROLOL TARTRATE 50 MG: 25 TABLET, FILM COATED ORAL at 21:12

## 2023-05-28 RX ADMIN — BUPROPION HYDROCHLORIDE 150 MG: 150 TABLET, EXTENDED RELEASE ORAL at 21:11

## 2023-05-28 RX ADMIN — BUSPIRONE HYDROCHLORIDE 30 MG: 15 TABLET ORAL at 21:12

## 2023-05-28 RX ADMIN — FAMOTIDINE 20 MG: 20 TABLET, FILM COATED ORAL at 21:12

## 2023-05-28 RX ADMIN — DOXYLAMINE SUCCINATE 25 MG: 25 TABLET ORAL at 21:11

## 2023-05-28 RX ADMIN — SENNOSIDES AND DOCUSATE SODIUM 1 TABLET: 50; 8.6 TABLET ORAL at 09:27

## 2023-05-28 RX ADMIN — LAMOTRIGINE 50 MG: 25 TABLET ORAL at 21:11

## 2023-05-28 RX ADMIN — SODIUM CHLORIDE 500 ML: 9 INJECTION, SOLUTION INTRAVENOUS at 23:10

## 2023-05-28 RX ADMIN — SODIUM CHLORIDE, POTASSIUM CHLORIDE, SODIUM LACTATE AND CALCIUM CHLORIDE: 600; 310; 30; 20 INJECTION, SOLUTION INTRAVENOUS at 18:16

## 2023-05-28 ASSESSMENT — ACTIVITIES OF DAILY LIVING (ADL)
ADLS_ACUITY_SCORE: 23
ADLS_ACUITY_SCORE: 27
ADLS_ACUITY_SCORE: 27
ADLS_ACUITY_SCORE: 23
ADLS_ACUITY_SCORE: 25
ADLS_ACUITY_SCORE: 25
ADLS_ACUITY_SCORE: 23
ADLS_ACUITY_SCORE: 25
ADLS_ACUITY_SCORE: 23
ADLS_ACUITY_SCORE: 25
ADLS_ACUITY_SCORE: 23
ADLS_ACUITY_SCORE: 25

## 2023-05-28 NOTE — PROGRESS NOTES
"[facility] Orthopaedics Progress Note      Post-operative Day: 3 Days Post-Op    Procedure(s):  Revision total hip arthroplasty, left      Subjective:    Pain: moderate  Chest pain, SOB:  No      Objective:  Blood pressure (!) 82/50, pulse 112, temperature 97.8  F (36.6  C), temperature source Oral, resp. rate 16, height 1.727 m (5' 7.99\"), weight 77.6 kg (171 lb), SpO2 97 %.    Patient Vitals for the past 24 hrs:   BP Temp Temp src Pulse Resp SpO2   05/28/23 0807 (!) 82/50 -- -- 112 -- --   05/28/23 0804 (!) 81/42 -- -- 101 -- --   05/28/23 0618 -- 97.8  F (36.6  C) Oral 101 16 97 %   05/27/23 2308 95/54 98.3  F (36.8  C) Oral 120 16 96 %   05/27/23 1929 95/51 97.6  F (36.4  C) Oral 84 18 99 %   05/27/23 1856 -- -- -- -- 18 --   05/27/23 1511 95/49 97.1  F (36.2  C) Oral 102 16 98 %   05/27/23 1335 -- -- -- -- 18 --   05/27/23 1255 107/65 97.5  F (36.4  C) Oral 96 18 98 %   05/27/23 1215 105/54 97.4  F (36.3  C) Oral 100 18 98 %   05/27/23 1050 91/55 97.9  F (36.6  C) Oral 93 18 99 %   05/27/23 1011 (!) 88/48 98.1  F (36.7  C) -- 94 18 99 %   05/27/23 1004 (!) 88/48 98.1  F (36.7  C) Oral 94 18 95 %   05/27/23 0939 -- -- -- -- 18 --       Wt Readings from Last 4 Encounters:   05/25/23 77.6 kg (171 lb)   05/22/23 77.6 kg (171 lb)   04/13/23 78 kg (172 lb)   04/06/23 74.8 kg (165 lb)         Motor function, sensation, and circulation intact   yes  Wound status: incisions are clean dry and intact. Yes  Calf tenderness: Left  No     Hypotension Hgb 7.7 s/p one unit PRBCs        Pertinent Labs   Lab Results: personally reviewed.     Recent Labs   Lab Test 05/28/23  0515 05/27/23  0615 05/26/23  1753 05/26/23  0723 05/22/23  1634 04/06/23  0948 11/04/22  1027   INR  --   --   --   --   --   --  1.39*   HGB 7.7* 7.3* 7.5*   < > 11.8* 13.3 12.5*   HCT  --   --   --   --  35.9* 39.8* 37.4*   MCV  --   --   --   --  98 96 98   PLT  --   --   --   --  138* 164 153   NA  --   --   --   --  135* 135* 134*    < > = values in " this interval not displayed.       Plan: Anticoagulation protocol: Xarelto curently held due to bleeding            Pain medications:  scopainmedication: oxycodone            Weight bearing status:  WBAT            Disposition:  homepending             Continue cares and rehabilitation    Xarelto held, Check Hgb in AM, Monitor BPs Wean Oxy,    Report completed by:  Kenji Da Silva MD  Date: 5/28/2023  Time: 8:49 AM

## 2023-05-28 NOTE — PROGRESS NOTES
Care Management Discharge Note    Discharge Date: 05/29/2023       Discharge Disposition:  Home with  [facility],  (P: [phone]/ F: [phone]) PT services    Discharge Services:  Home Care    Discharge DME:  NA    Discharge Transportation:  family    Private pay costs discussed: Not applicable    Does the patient's insurance plan have a 3 day qualifying hospital stay waiver?  Yes   Will the waiver be used for post-acute placement? No    PAS Confirmation Code:  NA  Patient/family educated on Medicare website which has current facility and service quality ratings:  Yes    Education Provided on the Discharge Plan:  Yes  Persons Notified of Discharge Plans: patient, wife  Patient/Family in Agreement with the Plan:  yes    Handoff Referral Completed: Yes    Additional Information:  Pt with revision of hip.    Accepted for home care services with  [facility],  (P: [phone]/ F: [phone]) & they will begin cares on 5/30/23.    Wife to transport home.    KELLEE Boles

## 2023-05-28 NOTE — PLAN OF CARE
"Reason for Admission: L FAITH revision       Activity: SBA/A1 with walker/gait belt.       Neuro: Pt A/O x4       Cardiac: metoprolol not given this shift d/t parameters not met for administration, soft b/p       Respiratory: WNL, pt consistent with incentive spirometer.        GI/: WNL, pt continent, utilized bedside urinal.        Skin: Pt L hip incisions covered       Diet: Reg diet       Lines/Drains: Pt's RFA IV SL.       Vitals: BP 95/54 (BP Location: Left arm)   Pulse 120   Temp 98.3  F (36.8  C) (Oral)   Resp 16   Ht 1.727 m (5' 7.99\")   Wt 77.6 kg (171 lb)   SpO2 96%   BMI 26.01 kg/m      Labs: monitor HGB in AM        Pain: pt complains of pain with ambulation, requested prn pain medication throughout shift,  cold applied       Plan: 1 unit of blood received AM shift, recheck HGB in AM, Continue with plan of care, possible discharge.    Reason for Admission: Left Hip Replacement  April DEANNA Zeng RN on 5/28/2023 at 7:00 AM  "

## 2023-05-28 NOTE — PROGRESS NOTES
[facility] Medicine Progress Note  Date of Service (when I saw the patient): 05/28/2023    REASON FOR ADMISSION / INTERVAL HISTORY:  Admitted by ortho 5/25 for Revision left hip replacement. hospitalist consulted for medical management  Feels ok-no CP/SOB/lightheadedness       Assessment & Plan      Revision left hip replacement (acetabular component, liner, and head) with acetabular bone grafting  Pod#3. On xarelto for chronic afib. On scheduled tylenol tid and prn oxycodone. Pain well controlled    Acute post-op blood loss anemia  Baseline hg 11-13. Pre-op 5/22 was 11.2 . Post OP-7.3. Pt not very symptomatic but BP has been  <90-gave 1unit blood 5/27. Hg today 7.7. BP was still<90.  -will continue to hold xarelto. Follow hg in AM and Transfuse if needed. Continue iv fluids and prn fluid boluses    Hypotension  Likely due to blood loss and meds. Pt on high dose neurontin/ lamictal/ oxycodone.  -will hold neurontin, decrease frequency of oxycodone and continue metoprolol as below. Continue fluids as above.     Chronic afib  On xarelto and metoprolol.   -Will continue to hold xarelto. Will start metoprolol at lower dose with hold parameters. HR now in 100s    H/o chronic diastolic CHF  Stable. On po lasix. Hold meds for now.     Alcoholism in remission  Peripheral neuropathy  Continue meds    BPH  Continue meds    Chronic thrombocytopenia  Stable . At baseline.    Depression/ anxiety  Continue meds    Dispo  Per ortho. If hg / BP stable, could discharge in AM        Diet: Advance Diet as Tolerated: Regular Diet Adult  Discharge Instruction - Regular Diet Adult  Snacks/Supplements Adult: Ensure Enlive; With Meals    DVT Prophylaxis: DOAC  Parr Catheter: Not present  Code Status: Full Code    Lines: ALON PEDRAZA MD   Pg [phone]        ROS:  As described in A/P and Exam.  Otherwise ALL are  negative.    PHYSICAL EXAM:  All vitals have been reviewed    Blood pressure  "108/64, pulse 103, temperature 97.8  F (36.6  C), temperature source Oral, resp. rate 16, height 1.727 m (5' 7.99\"), weight 77.6 kg (171 lb), SpO2 97 %.    I/O this shift:  In: 300 [P.O.:300]  Out: -     GENERAL APPEARANCE: healthy, alert and no distress  EYES: conjunctiva clear, eyes grossly normal  HENT: external ears and nose normal   RESP: lungs clear to auscultation - no rales, rhonchi or wheezes  CV: regular rate and rhythm, normal S1 S2, no S3 or S4 and no murmur, click or rub   ABDOMEN: soft, nontender, no HSM or masses and bowel sounds normal  MS: no clubbing, cyanosis; no edema  SKIN: clear without significant rashes or lesions  NEURO: -non-focal moves all 4 extr    ROUTINE  LABS (Last four results)  CMP  Recent Labs   Lab 05/27/23  0615 05/26/23  0723 05/22/23  1634   NA  --   --  135*   POTASSIUM  --   --  4.3   CHLORIDE  --   --  96*   CO2  --   --  29   ANIONGAP  --   --  10   GLC 93 114* 99   BUN  --   --  13.0   CR  --   --  0.92   GFRESTIMATED  --   --  84   LUIS  --   --  9.2     CBC  Recent Labs   Lab 05/28/23  0515 05/27/23  0615 05/26/23  1753 05/26/23  1118 05/26/23  0723 05/22/23  1634   WBC  --   --   --   --   --  5.6   RBC  --   --   --   --   --  3.66*   HGB 7.7* 7.3* 7.5* 7.2*   < > 11.8*   HCT  --   --   --   --   --  35.9*   MCV  --   --   --   --   --  98   MCH  --   --   --   --   --  32.2   MCHC  --   --   --   --   --  32.9   RDW  --   --   --   --   --  14.3   PLT  --   --   --   --   --  138*    < > = values in this interval not displayed.     INRNo lab results found in last 7 days.  Arterial Blood GasNo lab results found in last 7 days.    No results found for this or any previous visit (from the past 24 hour(s)).        "

## 2023-05-29 ENCOUNTER — APPOINTMENT (OUTPATIENT)
Dept: PHYSICAL THERAPY | Facility: CLINIC | Age: 78
DRG: 467 | End: 2023-05-29
Attending: ORTHOPAEDIC SURGERY
Payer: COMMERCIAL

## 2023-05-29 LAB
BLD PROD TYP BPU: NORMAL
BLOOD COMPONENT TYPE: NORMAL
CODING SYSTEM: NORMAL
CROSSMATCH: NORMAL
HGB BLD-MCNC: 7.1 G/DL (ref 13.3–17.7)
HOLD SPECIMEN: NORMAL
ISSUE DATE AND TIME: NORMAL
UNIT ABO/RH: NORMAL
UNIT NUMBER: NORMAL
UNIT STATUS: NORMAL
UNIT TYPE ISBT: 6200

## 2023-05-29 PROCEDURE — 36415 COLL VENOUS BLD VENIPUNCTURE: CPT | Performed by: INTERNAL MEDICINE

## 2023-05-29 PROCEDURE — 258N000003 HC RX IP 258 OP 636: Performed by: INTERNAL MEDICINE

## 2023-05-29 PROCEDURE — 250N000013 HC RX MED GY IP 250 OP 250 PS 637: Performed by: INTERNAL MEDICINE

## 2023-05-29 PROCEDURE — 120N000001 HC R&B MED SURG/OB

## 2023-05-29 PROCEDURE — P9016 RBC LEUKOCYTES REDUCED: HCPCS | Performed by: INTERNAL MEDICINE

## 2023-05-29 PROCEDURE — 97110 THERAPEUTIC EXERCISES: CPT | Mod: GP | Performed by: PHYSICAL MEDICINE & REHABILITATION

## 2023-05-29 PROCEDURE — 85018 HEMOGLOBIN: CPT | Performed by: INTERNAL MEDICINE

## 2023-05-29 PROCEDURE — 250N000013 HC RX MED GY IP 250 OP 250 PS 637

## 2023-05-29 PROCEDURE — 250N000013 HC RX MED GY IP 250 OP 250 PS 637: Performed by: ORTHOPAEDIC SURGERY

## 2023-05-29 PROCEDURE — 99232 SBSQ HOSP IP/OBS MODERATE 35: CPT | Performed by: INTERNAL MEDICINE

## 2023-05-29 RX ORDER — OXYCODONE HYDROCHLORIDE 5 MG/1
10 TABLET ORAL EVERY 6 HOURS PRN
Status: DISCONTINUED | OUTPATIENT
Start: 2023-05-29 | End: 2023-05-30 | Stop reason: HOSPADM

## 2023-05-29 RX ORDER — OXYCODONE HYDROCHLORIDE 5 MG/1
5 TABLET ORAL EVERY 6 HOURS PRN
Status: DISCONTINUED | OUTPATIENT
Start: 2023-05-29 | End: 2023-05-29

## 2023-05-29 RX ORDER — OXYCODONE HYDROCHLORIDE 5 MG/1
5 TABLET ORAL EVERY 6 HOURS PRN
Status: DISCONTINUED | OUTPATIENT
Start: 2023-05-29 | End: 2023-05-30 | Stop reason: HOSPADM

## 2023-05-29 RX ORDER — OXYCODONE HYDROCHLORIDE 5 MG/1
10 TABLET ORAL EVERY 4 HOURS PRN
Status: DISCONTINUED | OUTPATIENT
Start: 2023-05-29 | End: 2023-05-29

## 2023-05-29 RX ADMIN — FERROUS SULFATE TAB 325 MG (65 MG ELEMENTAL FE) 325 MG: 325 (65 FE) TAB at 09:15

## 2023-05-29 RX ADMIN — SENNOSIDES AND DOCUSATE SODIUM 1 TABLET: 50; 8.6 TABLET ORAL at 21:09

## 2023-05-29 RX ADMIN — LAMOTRIGINE 50 MG: 25 TABLET ORAL at 09:14

## 2023-05-29 RX ADMIN — BUSPIRONE HYDROCHLORIDE 30 MG: 15 TABLET ORAL at 21:10

## 2023-05-29 RX ADMIN — METOPROLOL TARTRATE 50 MG: 25 TABLET, FILM COATED ORAL at 21:09

## 2023-05-29 RX ADMIN — OXYCODONE HYDROCHLORIDE 10 MG: 5 TABLET ORAL at 09:14

## 2023-05-29 RX ADMIN — BUPROPION HYDROCHLORIDE 150 MG: 150 TABLET, EXTENDED RELEASE ORAL at 09:14

## 2023-05-29 RX ADMIN — HYDROXYZINE HYDROCHLORIDE 25 MG: 25 TABLET, FILM COATED ORAL at 18:15

## 2023-05-29 RX ADMIN — BUSPIRONE HYDROCHLORIDE 30 MG: 15 TABLET ORAL at 09:14

## 2023-05-29 RX ADMIN — DOXYLAMINE SUCCINATE 25 MG: 25 TABLET ORAL at 21:09

## 2023-05-29 RX ADMIN — OXYCODONE HYDROCHLORIDE 10 MG: 5 TABLET ORAL at 22:44

## 2023-05-29 RX ADMIN — FAMOTIDINE 20 MG: 20 TABLET, FILM COATED ORAL at 09:15

## 2023-05-29 RX ADMIN — LAMOTRIGINE 50 MG: 25 TABLET ORAL at 21:09

## 2023-05-29 RX ADMIN — BUPROPION HYDROCHLORIDE 150 MG: 150 TABLET, EXTENDED RELEASE ORAL at 21:09

## 2023-05-29 RX ADMIN — FAMOTIDINE 20 MG: 20 TABLET, FILM COATED ORAL at 21:09

## 2023-05-29 RX ADMIN — FINASTERIDE 5 MG: 5 TABLET, FILM COATED ORAL at 09:13

## 2023-05-29 RX ADMIN — DOXAZOSIN 4 MG: 2 TABLET ORAL at 21:09

## 2023-05-29 RX ADMIN — SODIUM CHLORIDE, POTASSIUM CHLORIDE, SODIUM LACTATE AND CALCIUM CHLORIDE: 600; 310; 30; 20 INJECTION, SOLUTION INTRAVENOUS at 05:48

## 2023-05-29 RX ADMIN — ACETAMINOPHEN 650 MG: 325 TABLET, FILM COATED ORAL at 18:15

## 2023-05-29 ASSESSMENT — ACTIVITIES OF DAILY LIVING (ADL)
ADLS_ACUITY_SCORE: 24
ADLS_ACUITY_SCORE: 23
ADLS_ACUITY_SCORE: 24
ADLS_ACUITY_SCORE: 25
ADLS_ACUITY_SCORE: 24
ADLS_ACUITY_SCORE: 25
ADLS_ACUITY_SCORE: 25
ADLS_ACUITY_SCORE: 24
ADLS_ACUITY_SCORE: 24
ADLS_ACUITY_SCORE: 25

## 2023-05-29 NOTE — PROGRESS NOTES
[facility] Medicine Progress Note  Date of Service (when I saw the patient): 05/29/2023    REASON FOR ADMISSION / INTERVAL HISTORY:  Admitted by ortho 5/25 for Revision left hip replacement. hospitalist consulted for medical management  Feels ok-no CP/SOB/lightheadedness       Assessment & Plan      Revision left hip replacement (acetabular component, liner, and head) with acetabular bone grafting  Pod#4. On xarelto for chronic afib. On scheduled tylenol tid and prn oxycodone. Pain well controlled    Acute post-op blood loss anemia  Baseline hg 11-13. Pre-op 5/22 was 11.2 . Post OP-7.3. Pt not very symptomatic but BP has been  <90-gave 1unit blood 5/27. Hg today 7.1 from 7.7 yesterady. BP was still periodically low.  -will continue to hold xarelto. Follow hg in AM and Transfuse iunit blood again today.  Continue iv fluids and prn fluid boluses    Hypotension  Likely due to blood loss and meds. Pt on high dose neurontin/ lamictal/ oxycodone.  Held  neurontin on 5/28.   - decrease frequency of oxycodone and continue metoprolol as below. Continue fluids as above.     Chronic afib  On xarelto and metoprolol 100mg bid at home. .    Started metoprolol at lower dose with hold parameters on 5/28. HR now in 80-90s  -continue metoprolol at current dose 50mg bid with hold parameters. Continue to hold xarelto    H/o chronic diastolic CHF  Stable. On po lasix. Continue to Hold meds for now.     Alcoholism in remission  Peripheral neuropathy  Continue meds    BPH  Continue meds    Chronic thrombocytopenia  Stable . At baseline.    Depression/ anxiety  Continue meds    Dispo   If hg / BP stable, could discharge in AM        Diet: Advance Diet as Tolerated: Regular Diet Adult  Discharge Instruction - Regular Diet Adult  Snacks/Supplements Adult: Ensure Enlive; With Meals    DVT Prophylaxis: DOAC  Parr Catheter: Not present  Code Status: Full Code    Lines: ALON PEDRAZA MD   Pg  "[phone]        ROS:  As described in A/P and Exam.  Otherwise ALL are  negative.    PHYSICAL EXAM:  All vitals have been reviewed    Blood pressure 115/74, pulse 91, temperature 98  F (36.7  C), temperature source Oral, resp. rate 18, height 1.727 m (5' 7.99\"), weight 77.6 kg (171 lb), SpO2 97 %.    I/O this shift:  In: 200 [P.O.:200]  Out: -     GENERAL APPEARANCE: healthy, alert and no distress  EYES: conjunctiva clear, eyes grossly normal  HENT: external ears and nose normal   RESP: lungs clear to auscultation - no rales, rhonchi or wheezes  CV: regular rate and rhythm, normal S1 S2, no S3 or S4 and no murmur, click or rub   ABDOMEN: soft, nontender, no HSM or masses and bowel sounds normal  MS: no clubbing, cyanosis; no edema  SKIN: clear without significant rashes or lesions  NEURO: -non-focal moves all 4 extr    ROUTINE  LABS (Last four results)  CMP  Recent Labs   Lab 05/27/23  0615 05/26/23  0723 05/22/23  1634   NA  --   --  135*   POTASSIUM  --   --  4.3   CHLORIDE  --   --  96*   CO2  --   --  29   ANIONGAP  --   --  10   GLC 93 114* 99   BUN  --   --  13.0   CR  --   --  0.92   GFRESTIMATED  --   --  84   LUIS  --   --  9.2     CBC  Recent Labs   Lab 05/29/23  0539 05/28/23  0515 05/27/23  0615 05/26/23  1753 05/26/23  0723 05/22/23  1634   WBC  --   --   --   --   --  5.6   RBC  --   --   --   --   --  3.66*   HGB 7.1* 7.7* 7.3* 7.5*   < > 11.8*   HCT  --   --   --   --   --  35.9*   MCV  --   --   --   --   --  98   MCH  --   --   --   --   --  32.2   MCHC  --   --   --   --   --  32.9   RDW  --   --   --   --   --  14.3   PLT  --   --   --   --   --  138*    < > = values in this interval not displayed.     INRNo lab results found in last 7 days.  Arterial Blood GasNo lab results found in last 7 days.    No results found for this or any previous visit (from the past 24 hour(s)).        "

## 2023-05-29 NOTE — PROGRESS NOTES
"[facility] Progress Note      Post-operative Day: 4 Days Post-Op    Procedure(s):  Revision total hip arthroplasty, left      Subjective:    No acute events overnight. His pain has been well controlled.   Chest pain, SOB:  No   He denies any lightheadedness, nausea.   Voiding spontaneously and had BM.       Objective:  Blood pressure 110/63, pulse 83, temperature 98.2  F (36.8  C), temperature source Oral, resp. rate 16, height 1.727 m (5' 7.99\"), weight 77.6 kg (171 lb), SpO2 96 %.    Patient Vitals for the past 24 hrs:   BP Temp Temp src Pulse Resp SpO2   05/29/23 0700 110/63 98.2  F (36.8  C) Oral 83 16 96 %   05/28/23 2240 (!) 80/44 98.4  F (36.9  C) Oral 92 16 96 %   05/28/23 2235 -- 98.4  F (36.9  C) Oral -- 16 --   05/28/23 1611 112/58 98.2  F (36.8  C) Oral 113 18 99 %   05/28/23 1432 108/62 -- -- -- -- --   05/28/23 1054 108/64 -- -- 103 -- --   05/28/23 0922 (!) 88/48 -- -- (!) 124 -- --   05/28/23 0807 (!) 82/50 -- -- 112 -- --   05/28/23 0804 (!) 81/42 -- -- 101 -- --       Wt Readings from Last 4 Encounters:   05/25/23 77.6 kg (171 lb)   05/22/23 77.6 kg (171 lb)   04/13/23 78 kg (172 lb)   04/06/23 74.8 kg (165 lb)       General: Awake, alert, oriented. No acute distress. Appears comfortable.   Motor function, sensation, and circulation intact   Yes.  2+ DP pulses.   Wound status: incisions are clean dry and intact. Yes  Calf tenderness: Left  No           Pertinent Labs   Lab Results: personally reviewed.   Hgb 7.1 (5/29/23)    Plan:   He has alternated between being normotensive and hypotensive. Hg has drifted down again. There is no signs on exam of hematoma, though there is general swelling about the thigh, as to be expected.    Anticoagulation protocol: Xarelto curently held due to anemia            Pain medications:  scopainmedication: oxycodone            Weight bearing status:  WBAT            Disposition:  homepending             Continue cares and rehabilitation    Xarelto held " for now.   Discussed with medicine team; will transfuse PRBCs again today given the down drift of Hg to 7.1     Monitor BPs Wean Oxy,        Report completed by:  Kirk Walters MD  Date: 5/28/2023  Time: 8:49 AM

## 2023-05-29 NOTE — PROGRESS NOTES
Patient vital signs are at baseline: No,  Reason:  Hypotensive at times.Transfused with 1 unit PRBCs today for HEMOGLOBIN of 7.1. Asymptomatic. Metoprolol held.  Patient able to ambulate as they were prior to admission or with assist devices provided by therapies during their stay:  Yes  Patient MUST void prior to discharge:  Yes  Patient able to tolerate oral intake:  Yes  Pain has adequate pain control using Oral analgesics:  Yes  Does patient have an identified :  Yes  Has goal D/C date and time been discussed with patient:  Yes

## 2023-05-29 NOTE — PLAN OF CARE
"Reason for Admission: L FAITH revision       Activity: SBA/A1 with walker/gait belt.       Neuro: Pt A/O x4       Cardiac: post metoprolol administration, low b/p, MD notified new order for 500ml bolus       Respiratory: WNL, pt consistent with incentive spirometer.        GI/: WNL, pt continent, ambulates to BR       Skin: Pt L hip incisions covered, CDI       Diet: Reg diet       Lines/Drains: Pt's RFA IV infusing LR as prescribed, no s/sx of infection/infiltration       Vitals: BP (!) 80/44   Pulse 92   Temp 98.4  F (36.9  C) (Oral)   Resp 16   Ht 1.727 m (5' 7.99\")   Wt 77.6 kg (171 lb)   SpO2 96%   BMI 26.01 kg/m      Labs: monitor HGB in AM        Pain: pt complains of pain with ambulation, requested prn pain medication throughout shift,  cold applied       Plan: recheck HGB in AM, Continue with plan of care, possible discharge.    Reason for Admission: Left Hip Replacement  Su Zeng RN on 5/29/2023 at 6:07 AM    "

## 2023-05-30 ENCOUNTER — APPOINTMENT (OUTPATIENT)
Dept: PHYSICAL THERAPY | Facility: CLINIC | Age: 78
DRG: 467 | End: 2023-05-30
Attending: ORTHOPAEDIC SURGERY
Payer: COMMERCIAL

## 2023-05-30 ENCOUNTER — APPOINTMENT (OUTPATIENT)
Dept: ULTRASOUND IMAGING | Facility: CLINIC | Age: 78
DRG: 467 | End: 2023-05-30
Attending: INTERNAL MEDICINE
Payer: COMMERCIAL

## 2023-05-30 VITALS
HEIGHT: 68 IN | DIASTOLIC BLOOD PRESSURE: 72 MMHG | HEART RATE: 94 BPM | BODY MASS INDEX: 25.91 KG/M2 | WEIGHT: 171 LBS | OXYGEN SATURATION: 99 % | RESPIRATION RATE: 18 BRPM | TEMPERATURE: 98.1 F | SYSTOLIC BLOOD PRESSURE: 118 MMHG

## 2023-05-30 LAB
BACTERIA SNV CULT: NO GROWTH
BACTERIA TISS BX CULT: NO GROWTH
HGB BLD-MCNC: 7.9 G/DL (ref 13.3–17.7)
HOLD SPECIMEN: NORMAL

## 2023-05-30 PROCEDURE — 93971 EXTREMITY STUDY: CPT | Mod: LT

## 2023-05-30 PROCEDURE — 85018 HEMOGLOBIN: CPT | Performed by: INTERNAL MEDICINE

## 2023-05-30 PROCEDURE — 250N000013 HC RX MED GY IP 250 OP 250 PS 637: Performed by: INTERNAL MEDICINE

## 2023-05-30 PROCEDURE — 250N000013 HC RX MED GY IP 250 OP 250 PS 637: Performed by: ORTHOPAEDIC SURGERY

## 2023-05-30 PROCEDURE — 97116 GAIT TRAINING THERAPY: CPT | Mod: GP

## 2023-05-30 PROCEDURE — 36415 COLL VENOUS BLD VENIPUNCTURE: CPT | Performed by: INTERNAL MEDICINE

## 2023-05-30 PROCEDURE — 250N000013 HC RX MED GY IP 250 OP 250 PS 637

## 2023-05-30 PROCEDURE — 99232 SBSQ HOSP IP/OBS MODERATE 35: CPT

## 2023-05-30 RX ORDER — METOPROLOL TARTRATE 50 MG
50 TABLET ORAL 2 TIMES DAILY
Qty: 60 TABLET | Refills: 0 | Status: SHIPPED | OUTPATIENT
Start: 2023-05-30 | End: 2023-09-13

## 2023-05-30 RX ORDER — GABAPENTIN 600 MG/1
600 TABLET ORAL 3 TIMES DAILY
Qty: 360 TABLET | Refills: 3 | Status: SHIPPED | OUTPATIENT
Start: 2023-05-30 | End: 2023-10-09

## 2023-05-30 RX ADMIN — HYDROXYZINE HYDROCHLORIDE 25 MG: 25 TABLET, FILM COATED ORAL at 11:08

## 2023-05-30 RX ADMIN — FAMOTIDINE 20 MG: 20 TABLET, FILM COATED ORAL at 09:15

## 2023-05-30 RX ADMIN — METOPROLOL TARTRATE 50 MG: 25 TABLET, FILM COATED ORAL at 09:15

## 2023-05-30 RX ADMIN — POLYETHYLENE GLYCOL 3350 17 G: 17 POWDER, FOR SOLUTION ORAL at 09:14

## 2023-05-30 RX ADMIN — LAMOTRIGINE 50 MG: 25 TABLET ORAL at 09:15

## 2023-05-30 RX ADMIN — FERROUS SULFATE TAB 325 MG (65 MG ELEMENTAL FE) 325 MG: 325 (65 FE) TAB at 09:15

## 2023-05-30 RX ADMIN — ACETAMINOPHEN 650 MG: 325 TABLET, FILM COATED ORAL at 09:33

## 2023-05-30 RX ADMIN — OXYCODONE HYDROCHLORIDE 10 MG: 5 TABLET ORAL at 11:08

## 2023-05-30 RX ADMIN — OXYCODONE HYDROCHLORIDE 5 MG: 5 TABLET ORAL at 05:41

## 2023-05-30 RX ADMIN — BUSPIRONE HYDROCHLORIDE 30 MG: 15 TABLET ORAL at 09:15

## 2023-05-30 RX ADMIN — HYDROXYZINE HYDROCHLORIDE 25 MG: 25 TABLET, FILM COATED ORAL at 05:41

## 2023-05-30 RX ADMIN — BUPROPION HYDROCHLORIDE 150 MG: 150 TABLET, EXTENDED RELEASE ORAL at 09:15

## 2023-05-30 RX ADMIN — SENNOSIDES AND DOCUSATE SODIUM 1 TABLET: 50; 8.6 TABLET ORAL at 09:14

## 2023-05-30 RX ADMIN — FINASTERIDE 5 MG: 5 TABLET, FILM COATED ORAL at 09:15

## 2023-05-30 RX ADMIN — ACETAMINOPHEN 650 MG: 325 TABLET, FILM COATED ORAL at 14:24

## 2023-05-30 ASSESSMENT — ACTIVITIES OF DAILY LIVING (ADL)
ADLS_ACUITY_SCORE: 24
ADLS_ACUITY_SCORE: 25
ADLS_ACUITY_SCORE: 24
ADLS_ACUITY_SCORE: 25

## 2023-05-30 ASSESSMENT — ENCOUNTER SYMPTOMS
FEVER: 0
COUGH: 0
SHORTNESS OF BREATH: 0
PALPITATIONS: 0
NAUSEA: 0
DIZZINESS: 0
HEADACHES: 0
ABDOMINAL PAIN: 0
CHILLS: 0
VOMITING: 0

## 2023-05-30 NOTE — PROGRESS NOTES
[facility] DISCHARGE NOTE    Patient discharged to home at 3:47 PM via wheel chair. Accompanied by spouse and staff. Discharge instructions reviewed with patient and spouse, opportunity offered to ask questions. Prescriptions sent to patients preferred pharmacy. All belongings sent with patient.    Char Anaya RN

## 2023-05-30 NOTE — PLAN OF CARE
Goal Outcome Evaluation:      Neuro: A&O x4  Cardiac: WNL  Respiratory: WNL  GI/: WNL  Diet/appetite: Regular  Activity: SBA with walker  Pain: Managing per MAR  Skin: Aquacel clean, dry, intact. L leg edema (1+)  LDA's:  IV SL     Plan: Pending Hgb and BP improvement

## 2023-05-30 NOTE — PROGRESS NOTES
"Time: 0314-2655  Reason for Admission: Total Left hip revision    Activity: SBA and walker    Neuro: Patient is alert and orientated x 4. Has been calm and cooperative.    Resp:  LS are clear.  O2 sats 97% on RA.  No SOB.    GI/:  No c/o nausea.  Continues to have some hesitancy with urination.    Skin: Left hip surgical incision in clean, dry and intact.    Diet: Regular diet.    IV Access: PIV Right lower forearm is saline locked.    Vitals: /80   Pulse 87   Temp 97.9  F (36.6  C) (Oral)   Resp 16   Ht 1.727 m (5' 7.99\")   Wt 77.6 kg (171 lb)   SpO2 97%   BMI 26.01 kg/m      Pain: Left hip pain has been well controlled with PRN PO oxycodone.    Plan: recheck hgb this am.    "

## 2023-05-30 NOTE — PROGRESS NOTES
[facility] Medicine Progress Note  Date of Service (when I saw the patient): 05/30/2023    REASON FOR ADMISSION / INTERVAL HISTORY:  Admitted by ortho 5/25 for Revision left hip replacement. hospitalist consulted for medical management  Feels ok-no CP/SOB/lightheadedness       Assessment & Plan      Revision left hip replacement (acetabular component, liner, and head) with acetabular bone grafting  POD#5: DVT prophylaxis with PTA Xarelto for atrial fibrillation. Due to anemia (see below) xarelto was held staring 0527. His pain was managed with scheduled tylenol TID and PRN oxycodone. Prophylactic antibiotics completed during stay. See orthopedic notes for more detail.    Right Lower Extremity Swelling  New/increased swelling of left leg without calf pain 05/30. Ruled out DVT with US of LLE on 05/30. Report states no evidence of a deep venous thrombosis.     Acute post-op blood loss anemia  Developed anemia post-operatively requiring extended stay and blood transfusion x 2. Baseline hemoglobin is 11-13 and was found to be 7.3 post operatively. Hemoglobin was followed daily and ranged from 7.1 - 7.9. He was given 1 unit of blood 5/27 for a hemoglobin of 7.3 and on 05/29 for hemoglobin of 7.1. Day of discharge hemoglobin increased to 7.9 t with a stable normal blood pressure and controlled heart rate. He is asymptomatic and ambulating without lightheadedness or dizziness day of discharge. Prior to admission Xarelto to be resumed at discharge. Recommend follow-up with primary care provider in 1 week for hemoglobin check. Future hemoglobin lab has been ordered.    Hypotension  Intermittently hypotensive during stay that was likely a combination of medications and blood loss. Blood pressures on numerous checks throughout stay has been 80's systolic over 40s diastolic and have been corrected with fluid boluses. He remained on maintenance fluids. Symptomatic at times, significant for  lightheadedness with standing/ambulating. Patient is on high dose neurontin/ lamictal/ oxycodone. Neurontin was held and encouraged reduced use of oxycodone. On day of discharge his blood pressure has been stable in the normal range for greater than 24 hours and ambulating without lightheadedness with PT.     Chronic afib  He takes Xarelto and metoprolol 100 mg BID at home. His Xarelto was held starting 05/27 due to anemia (see above). His heart rate and blood pressure was challenging to control during the beginning of his stay. He was tachycardic and hypotensive at times. His dose of metoprolol was lowered to 50 mg BID with good control of heart rate (kept below 100) and avoidance of hypotension.  - Resume Xarelto PTA  - Metoprolol 50 mg BID (reduced from 100 mg BID)    H/o chronic diastolic CHF  Stable and takes lasix as needed PTA. Medication was held due to hypotension in hospital (see above)  - Resume PTA lasix as needed.     Alcoholism in remission  Peripheral neuropathy  Continue PTA medications     BPH  Continued PTA finasteride during stay.    Chronic thrombocytopenia  Stable chronic thrombocytopenia that remained at baseline during stay.    Depression/ anxiety  PTA medications was continued during stay.    Diet: Advance Diet as Tolerated: Regular Diet Adult  Discharge Instruction - Regular Diet Adult  Snacks/Supplements Adult: Ensure Enlive; With Meals    DVT Prophylaxis: DOAC  Parr Catheter: Not present  Code Status: Full Code    Lines: CK BAUTISTA PA-C    ROS:   Review of Systems   Constitutional: Negative for chills and fever.   Respiratory: Negative for cough and shortness of breath.    Cardiovascular: Positive for leg swelling. Negative for chest pain and palpitations.   Gastrointestinal: Negative for abdominal pain, nausea and vomiting.   Neurological: Negative for dizziness and headaches.     PHYSICAL EXAM:  All vitals have been reviewed    Blood pressure 118/72, pulse 94, temperature  "98.1  F (36.7  C), temperature source Oral, resp. rate 18, height 1.727 m (5' 7.99\"), weight 77.6 kg (171 lb), SpO2 99 %.    No intake/output data recorded.    GENERAL APPEARANCE: Alert, oriented, cooperative, sitting upright in recliner, no acute distress, speaking coherently.   EYES: Conjunctiva clear, eyes grossly normal  RESP: Lungs clear to auscultation bilaterally. No wheezes, rhonchi, or crackles noted. No cough observed. Normal respiratory effort on room air. Normal respiratory effort.   CV: Irregularly Irregular, normal rate, normal S1 S2. No S3, S4, murmur, rubs, or gallops appreciated. Peripheral pulse intact bilaterally.   ABDOMEN: Soft, flat, nontender, no abdominal masses appreciated, normoactive bowel movements throughout.  MS: Lower extremity edema bilaterally. L side > R side. Left leg circumference greater than the right leg with swelling just above knee. No left calf tenderness or palpable cord. R sided edema resides below the knee.  SKIN: clear without significant rashes or lesions  NEURO: Moving extremities appropriately. Cranial nerves grossly intact.    ROUTINE  LABS (Last four results)  CMP  Recent Labs   Lab 05/27/23  0615 05/26/23  0723   GLC 93 114*     CBC  Recent Labs   Lab 05/30/23  0539 05/29/23  0539 05/28/23  0515 05/27/23  0615   HGB 7.9* 7.1* 7.7* 7.3*     INRNo lab results found in last 7 days.  Arterial Blood GasNo lab results found in last 7 days.    No results found for this or any previous visit (from the past 24 hour(s)).        "

## 2023-05-30 NOTE — DISCHARGE INSTRUCTIONS
Per Hospital Medicine Team:  You had post operative anemia that did require the need for a blood transfusion(s) and monitoring of your hemoglobin while here. Your anemia is now improving, however it is recommended that you follow up with your primary care provider in 1 week to recheck your hemoglobin. An order for the lab has been placed, you can get this done in advance to seeing your primary care provider so they have the results during your visit.

## 2023-05-30 NOTE — PROGRESS NOTES
"Pt A & O x 4, pain 5-8/10 this shift, moderate relief with current pain medication, ice to L hip is helpful, IV L AC flushed and saline locked, moves with assist of 1 walker/gait belt, on room air, able to make needs known, drinking well, ambulating to toilet, walked in duenas, steady gait, /72 (BP Location: Left arm, Patient Position: Sitting, Cuff Size: Adult Regular)   Pulse 94   Temp 98.1  F (36.7  C) (Oral)   Resp 18   Ht 1.727 m (5' 7.99\")   Wt 77.6 kg (171 lb)   SpO2 99%   BMI 26.01 kg/m    "

## 2023-05-30 NOTE — PROGRESS NOTES
"[facility] Progress Note      Post-operative Day: 5 Days Post-Op    Procedure(s):  Revision total hip arthroplasty, left  Subjective:    Pt reports that his left hip feels stiff today, maybe a little worse than yesterday. He feels it is firm and swollen, difficult to move. He is comfortable at rest and really only notices it with standing and motion. He denies calf pain, numbness or tingling.     Chest pain, SOB:  No  Nausea, vomiting:  No  Lightheadedness, dizziness:  No  Neuro:  Patient denies new onset numbness or paresthesias      Objective:  Blood pressure 134/81, pulse 94, temperature 98.1  F (36.7  C), temperature source Oral, resp. rate 18, height 1.727 m (5' 7.99\"), weight 77.6 kg (171 lb), SpO2 99 %.    Patient Vitals for the past 24 hrs:   BP Temp Temp src Pulse Resp SpO2   05/30/23 0729 134/81 98.1  F (36.7  C) Oral 94 18 99 %   05/30/23 0635 119/70 98.3  F (36.8  C) Oral 84 16 97 %   05/29/23 2227 127/80 97.9  F (36.6  C) Oral 87 16 97 %   05/29/23 2006 129/83 98  F (36.7  C) Oral 97 16 97 %   05/29/23 1500 102/55 97.5  F (36.4  C) Oral 83 16 98 %   05/29/23 1305 101/56 97.7  F (36.5  C) Oral -- 16 98 %   05/29/23 1210 103/69 98  F (36.7  C) Oral -- 16 96 %   05/29/23 1110 115/74 98  F (36.7  C) Oral 91 18 97 %   05/29/23 1051 104/56 97.6  F (36.4  C) Oral 86 18 96 %   05/29/23 0912 90/51 -- -- 90 -- --       Wt Readings from Last 4 Encounters:   05/25/23 77.6 kg (171 lb)   05/22/23 77.6 kg (171 lb)   04/13/23 78 kg (172 lb)   04/06/23 74.8 kg (165 lb)       Gen: A&O x 3. NAD. Standing in front of his chair independently with walker present.   Wound status: Covered, Aquacel in place, c/d/i. Diffuse firm swelling throughout the left hip, increased locally around the incisional area. No fluctuance, minimally TTP. Ecchymosis noted at the posterior buttocks and thigh.   Circulation, motion and sensation: Dorsiflexion/plantarflexion intact and equal bilaterally; distal lower extremity sensation " is intact and equal bilaterally. Foot and toes are warm and well perfused. Able to flex the hip partially while standing.    Swelling: Moderate  Calf tenderness: Left calf mildly swollen, non-tender. Right calf non-tender.    Pertinent Labs   Lab Results: personally reviewed.     Recent Labs   Lab Test 05/30/23  0539 05/29/23  0539 05/28/23  0515 05/26/23  0723 05/22/23  1634 04/06/23  0948 11/04/22  1027   INR  --   --   --   --   --   --  1.39*   HGB 7.9* 7.1* 7.7*   < > 11.8* 13.3 12.5*   HCT  --   --   --   --  35.9* 39.8* 37.4*   MCV  --   --   --   --  98 96 98   PLT  --   --   --   --  138* 164 153   NA  --   --   --   --  135* 135* 134*    < > = values in this interval not displayed.       Plan:   Continue current cares and rehabilitation.  Discussed with pt, reviewed that the hip has been stiff and similar to this the last several days, a little worse today perhaps.   Discussed with ; hgb improved to 7.9, BP normotensive. Will check an US for DVT given increased complaints of tightness. Pending those results, likely will be ok to discharge home today.   Anticoagulation protocol: Reviewed with , likely will restart Xarelto home dosing to ensure management of a fib and will have him follow up with PCP for hemoglobin rechecks.   Pain medications: oxycodone and tylenol  Weight bearing status:  WBAT  Disposition:  Plan for discharge to home with home health care when medically stable and pain is controlled, cleared by therapy. Likely later today if US negative.              Report completed by:  Tommie Garcia PA-C  Date: 5/30/2023  Time: 8:59 AM

## 2023-05-30 NOTE — PLAN OF CARE
Physical Therapy Discharge Summary    Reason for therapy discharge:    All goals and outcomes met, no further needs identified.    Progress towards therapy goal(s). See goals on Care Plan in [facility] electronic health record for goal details.  Goals met    Therapy recommendation(s):    Continued therapy is recommended.  Rationale/Recommendations:  Recommend continued home care PT to increase indep in own environment and progress L FAITH revision aftercares.

## 2023-05-30 NOTE — PROGRESS NOTES
Care Management Discharge Note    Discharge Date: 05/30/2023       Discharge Disposition:  Home with Home Care via  [facility],  (P: [phone]/ F: [phone])    Discharge Services:  Home Care    Discharge DME:  none    Discharge Transportation:  family    Private pay costs discussed: Not applicable    Does the patient's insurance plan have a 3 day qualifying hospital stay waiver?  Yes   Will the waiver be used for post-acute placement? No    PAS Confirmation Code:  NA  Patient/family educated on Medicare website which has current facility and service quality ratings:  Yes    Education Provided on the Discharge Plan:  Yes  Persons Notified of Discharge Plans: Pt, wife, Home Care and medical team  Patient/Family in Agreement with the Plan:  Yes    Handoff Referral Completed: Yes    Additional Information:  Per IDT rounds, MD states that pt is medically stable for discharge today.    PT/OT recommends that pt discharge to home with home care PT services.  Pt & family are in agreement.    Pt is accepted for cares at  [facility],  (P: [phone]/ F: [phone]) & they have already called the pt to discuss admission in 48 hours of discharge.    Transportation discussed and pt's wife will transport home.      KELLEE Boles

## 2023-05-31 ENCOUNTER — PATIENT OUTREACH (OUTPATIENT)
Dept: FAMILY MEDICINE | Facility: CLINIC | Age: 78
End: 2023-05-31
Payer: COMMERCIAL

## 2023-05-31 DIAGNOSIS — Z96.698 AFTERCARE FOLLOWING OTHER JOINT REPLACEMENT SURGERY: Primary | ICD-10-CM

## 2023-05-31 DIAGNOSIS — Z47.1 AFTERCARE FOLLOWING OTHER JOINT REPLACEMENT SURGERY: Primary | ICD-10-CM

## 2023-05-31 LAB
PATH REPORT.COMMENTS IMP SPEC: NORMAL
PATH REPORT.COMMENTS IMP SPEC: NORMAL
PATH REPORT.FINAL DX SPEC: NORMAL
PATH REPORT.GROSS SPEC: NORMAL
PATH REPORT.MICROSCOPIC SPEC OTHER STN: NORMAL
PATH REPORT.RELEVANT HX SPEC: NORMAL
PHOTO IMAGE: NORMAL

## 2023-05-31 PROCEDURE — 88305 TISSUE EXAM BY PATHOLOGIST: CPT | Mod: 26 | Performed by: PATHOLOGY

## 2023-05-31 PROCEDURE — 99207 PR NO CHARGE NURSE ONLY: CPT | Performed by: FAMILY MEDICINE

## 2023-05-31 NOTE — TELEPHONE ENCOUNTER
"Hospital/TCU/ED for chronic condition Discharge Protocol  Tell me how you are doing now that you are home?\" doing good \"Im out\". Taking pain meds with relief.       Discharge Instructions    \"Let's review your discharge instructions.  What is/are the follow-up recommendations?  Pt. Response: follow up in 2 weeks with the surgeon. Patient will call to schedule.     \"Has an appointment with your primary care provider been scheduled?\"   No (schedule appointment)    \"When you see the provider, I would recommend that you bring your medications with you.\"    Medications    \"Tell me what changed about your medicines when you discharged?\"    Changes to chronic meds?    Oxycodone,     \"What questions do you have about your medications?\"    None     New diagnoses of heart failure, COPD, diabetes, or MI?    No              Post Discharge Medication Reconciliation Status: medication reconcilation previously completed during another office visit.    Was MTM referral placed (*Make sure to put transitions as reason for referral)?   Yes - \"The Medication Therapy  will call you within the next few days to schedule an appointment with an MTM pharmacist to discuss your medicines and make sure they are working as well as they possibly can for you. This visit can be done in an [facility] clinic or on the phone and is at no charge to you.\"    Call Summary    \"What questions or concerns do you have about your recent visit and your follow-up care?\"     none    \"If you have questions or things don't continue to improve, we encourage you contact us through the main clinic number (give number).  Even if the clinic is not open, triage nurses are available 24/7 to help you.     We would like you to know that our clinic has extended hours (provide information).  We also have urgent care (provide details on closest location and hours/contact info)\"      \"Thank you for your time and take care!\"               "

## 2023-05-31 NOTE — TELEPHONE ENCOUNTER
What type of discharge? Hospital admission  Risk of Hospital admission or ED visit: 22%  Is a TCM episode required? No  When should the patient follow up with PCP? 14 days of discharge.      Annette Simons RN on 5/31/2023 at 10:58 AM

## 2023-06-01 ENCOUNTER — PATIENT OUTREACH (OUTPATIENT)
Dept: CARE COORDINATION | Facility: CLINIC | Age: 78
End: 2023-06-01
Payer: COMMERCIAL

## 2023-06-01 LAB — BACTERIA TISS BX CULT: NORMAL

## 2023-06-01 NOTE — DISCHARGE SUMMARY
DATE OF ADMISSION: 5/25/2023  DATE OF DISCHARGE: 6/1/2023    DISCHARGE DIAGNOSIS: s/p revision left total hip arthroplasty    HPI AND HOSPITAL COURSE: Gavin is an 78 year old male who underwent a revision total left arthroplasty on 5/25/2023. He initially had a metal on metal hip resurfacing.  He developed significant pain over the last few weeks and had a hard time bearing weight.  His metal ions were elvated and therefore we recommend a revision left FAITH.  Postoperatively, he was admitted to the floor and followed by the hospitalist.  He had fairly significant anemia and required two blood transfusion, but ultimately his hemoglobin stabilized.  By POD #5 , he had progressed with PT, tolerated PO diet, voided, and pain was well controlled on orals meds.      DVT PROPHYLAXIS: ASA 325mg daily x 30d       Medication List      Started    hydrOXYzine 25 MG tablet  Commonly known as: ATARAX  25 mg, Oral, EVERY 6 HOURS PRN     oxyCODONE 5 MG tablet  Commonly known as: ROXICODONE  5-10 mg, Oral, EVERY 4 HOURS PRN     senna-docusate 8.6-50 MG tablet  Commonly known as: SENOKOT-S/PERICOLACE  1-2 tablets, Oral, 2 TIMES DAILY PRN, Take while on oral narcotics to prevent or treat constipation.        Modified    acetaminophen 325 MG tablet  Commonly known as: TYLENOL  650 mg, Oral, EVERY 4 HOURS PRN  What changed:     medication strength    how much to take    when to take this    reasons to take this     gabapentin 600 MG tablet  Commonly known as: NEURONTIN  600 mg, Oral, 3 TIMES DAILY  What changed: See the new instructions.     metoprolol tartrate 50 MG tablet  Commonly known as: LOPRESSOR  50 mg, Oral, 2 TIMES DAILY  What changed:     medication strength    how much to take            DISCHARGE INSTRUCTIONS   1. Weight bearing as tolerated.     2. Aquacel dressing is waterproof.  May shower with dressing in place.  Dressing will be removed at 2 week post op check   3. Return to clinic: in 2 weeks as scheduled.  Call [phone] with questions    DISCHARGE DISPOSITION: Home in stable condition    NIKITA KEENAN MD

## 2023-06-05 ENCOUNTER — LAB (OUTPATIENT)
Dept: LAB | Facility: CLINIC | Age: 78
End: 2023-06-05
Payer: COMMERCIAL

## 2023-06-05 DIAGNOSIS — D64.9 ANEMIA, UNSPECIFIED TYPE: ICD-10-CM

## 2023-06-05 LAB — HGB BLD-MCNC: 8.9 G/DL (ref 13.3–17.7)

## 2023-06-05 PROCEDURE — 85018 HEMOGLOBIN: CPT

## 2023-06-05 PROCEDURE — 36415 COLL VENOUS BLD VENIPUNCTURE: CPT

## 2023-06-08 LAB — BACTERIA SNV CULT: NORMAL

## 2023-06-09 ENCOUNTER — TRANSFERRED RECORDS (OUTPATIENT)
Dept: HEALTH INFORMATION MANAGEMENT | Facility: CLINIC | Age: 78
End: 2023-06-09
Payer: COMMERCIAL

## 2023-06-13 ENCOUNTER — VIRTUAL VISIT (OUTPATIENT)
Dept: PHARMACY | Facility: CLINIC | Age: 78
End: 2023-06-13
Payer: COMMERCIAL

## 2023-06-13 DIAGNOSIS — G47.00 INSOMNIA, UNSPECIFIED TYPE: ICD-10-CM

## 2023-06-13 DIAGNOSIS — K59.00 CONSTIPATION, UNSPECIFIED CONSTIPATION TYPE: ICD-10-CM

## 2023-06-13 DIAGNOSIS — F41.8 DEPRESSION WITH ANXIETY: Primary | ICD-10-CM

## 2023-06-13 DIAGNOSIS — Z96.649 S/P REVISION OF TOTAL HIP: ICD-10-CM

## 2023-06-13 PROCEDURE — 99606 MTMS BY PHARM EST 15 MIN: CPT | Performed by: PHARMACIST

## 2023-06-13 PROCEDURE — 99607 MTMS BY PHARM ADDL 15 MIN: CPT | Performed by: PHARMACIST

## 2023-06-13 NOTE — PROGRESS NOTES
Medication Therapy Management (MTM) Encounter    ASSESSMENT:                            Medication Adherence/Access: No issues identified    Hip Replacement: stable    Depression/Anxiety: Taper off of Fetzima - start taking 80 mg every other day - reviewed with patient, if not able to tolerate go back up to 80 mg every day and will taper slower. Increase gabapentin to 600 mg three times daily as prescribed - may help with anxiety.     Recommend patient see psychiatry - declines ketamine or TMS as recommended by psychiatry 11/7/2022. Declines counseling.     Per psychiatry visit 11/7/2022 - note:   At this time, patient would like time to consider these options and discuss with primary psychiatrist.    1) Meds  -Follow-up with primary psychiatrist to consider increasing wellbutrin and/or Fetzima, ketamine, or TMS, as discussed above  2) Psychotherapy  -Consider pursuing psychotherapy   3) Neuromodulation  - Consider whether to pursue TMS or Ketamine treatments as discussed today  4) Other  - Recommended he check Testosterone, TSH, B12, folate, Vit D-   Recommend he discuss possible brain MRI w/ his psychiatrist to r/o intracranial vascular disease  3) RTC: If interested in pursuing ketamine or TMS  4) CRISIS Numbers: Provided routinely in AVS After hours: [phone]    Per patient's chart: Additional information Per Rylee Kyle, CNP note:   Lexapro (ineffective), Prozac (helps depression, but anxiety increase), Hydroxyzine (oversedation, PCP trial), Wellbutrin XL, Xanax, Klonopin, Valium, Ativan, Remeron (30 mg only, worked well for sleep and anxiety, but weight gain), Nortriptyline, Vistaril (dry mouth, nightmares), Remeron (oversedation at 45 mg, 22.5mg and 15 mg and wt gain), Trazodone (oversedate at 50 mg), Ambien (effective for sleep, fall), Belsomra not covered with insurance.     He has also tried duloxetine - side effects and Prestiq - not effective. Venlafaxine didn't agree with him. He tried Viibryd  for a short time - stopped 12/2021. He was taking low dose sertraline - stopped 2004. States doesn't feel his medications have ever really helped.    The three medications listed from patient's Gene[employer] testing that do not have any Gene-drug interactions are Pristiq, Fetzima and Viibryd - patient has tired all of these without improvement. He has not tried Paxil or Celexa - both have significant Gene-drug interactions per [employer]ight. Patient is an ultrarapid metapolizer of CYP2D6 (paroxetine) and a poor metabolizer of CLF2R29 (citalopram and sertraline) - in general recommended to avoid therapy for these three SSRIs.      Insomnia: try cutting afternoon oxycodone tablet in 1/2 and take 1/2 in the afternoon and 1/2 at bedtime to see if sleep improves.     Constipation: Increase Senna S to twice daily.      PLAN:                            1. Taper off of Fetzima - start taking 80 mg every other day - if not able to tolerate go back up to 80 mg every day and will taper slower.     2. Increase gabapentin to 600 mg three times daily as prescribed - may help with anxiety.     3. Consider psychiatry referral.    4, Try cutting your afternoon oxycodone tablet in 1/2 and take 1/2 tablet in the afternoon and 1/2 tablet at bedtime to see if it helps with nighttime anxiety and sleep.     2. Increase Senna S to two tablets twice daily.    Follow-up: Tuesday, June 27th at 2:30 PM    SUBJECTIVE/OBJECTIVE:                          Gavin Edmond is a 78 year old male called for a follow-up visit.  Today's visit is a follow-up MTM visit from 5/16/2023.     Reason for visit: follow up MTM visit.    Tobacco: He reports that he quit smoking about 47 years ago. His smoking use included cigarettes. He started smoking about 65 years ago. He has a 15.00 pack-year smoking history. He has never used smokeless tobacco.  Alcohol: history of alcohol dependence and not currently using    Recovering from hip replacement surgery - 2 weeks ago.  Very painful - getting better.     Medication Adherence/Access: no issues reported    Hip Replacement: taking oxycodone 5 mg twice daily and hydroxyzine 25 mg daily. States currently not able to sleep with tapering off of narcotic. Hydroxyzine is not helping with his anxiety. Also taking acetaminophen 975 mg twice daily as needed - taking in the morning only. Pain is currently controlled.     Depression/Anxiety: Patient is taking Fetzima 80 mg once daily (has been on higher dose of Fetzima since 4/19/2023), lamotrigine 50 mg twice daily, bupropion 150 mg twice daily, and buspirone 30 mg twice daily. Tolerating well. Patient states his depression and anxiety are not improved on higher dose of Fetzima. States as needed hydroxyzine has not helped his anxiety. Patient would like to taper off of Fetzima. He is taking gabapentin for nerve pain, when he tried to taper off of it, he noticed his anxiety was worse. He is currently taking gabapentin 600 mg twice daily.         11/4/2022     9:28 AM 11/7/2022     3:14 PM 5/22/2023     3:56 PM   PHQ   PHQ-9 Total Score 6 9 6   Q9: Thoughts of better off dead/self-harm past 2 weeks Not at all Not at all Not at all         6/16/2022    11:34 AM 8/18/2022     1:55 PM 11/4/2022     9:28 AM   CAROLYNN-7 SCORE   Total Score 3 (minimal anxiety)  4 (minimal anxiety)   Total Score 3 3 4     Insomnia:   Unisom at bedtime    Patient states Unisom typically helpful for sleep, he is currently having anxiety from tapering off of narcotic.     Constipation: taking Senna S 2 tablets once daily - states not working very well. Also drinking prune juice.   ----------------  Post Discharge Medication Reconciliation Status: discharge medications reconciled and changed, per note/orders.    I spent 30 minutes with this patient today. All changes were made via collaborative practice agreement with Lizzy Leiva MD. A copy of the visit note was provided to the patient's provider(s).    A summary  of these recommendations was sent via [employer].    Marian Correa, PharmD  Medication Therapy Management     Telemedicine Visit Details  Type of service:  Telephone visit  Start Time: 10:00 AM  End Time: 10:30 AM     Medication Therapy Recommendations  Constipation, unspecified constipation type    Current Medication: senna-docusate (SENOKOT-S/PERICOLACE) 8.6-50 MG tablet   Rationale: Dose too low - Dosage too low - Effectiveness   Recommendation: Increase Dose   Status: Accepted - no CPA Needed         Depression with anxiety    Current Medication: gabapentin (NEURONTIN) 600 MG tablet   Rationale: Frequency inappropriate - Dosage too low - Effectiveness   Recommendation: Increase Frequency   Status: Patient Agreed - Adherence/Education          Current Medication: levomilnacipran (FETZIMA) 80 MG 24 hr capsule   Rationale: Patient prefers not to take - Adherence - Adherence   Recommendation: Decrease Dose   Status: Accepted per CPA         S/P revision of total hip    Current Medication: oxyCODONE (ROXICODONE) 5 MG tablet   Rationale: Undesirable effect - Adverse medication event - Safety   Recommendation: Increase Frequency   Status: Patient Agreed - Adherence/Education

## 2023-06-13 NOTE — PATIENT INSTRUCTIONS
"Recommendations from today's MTM visit:                                                       Gavin,    It was a pleasure talking with you! We discussed the followin. Taper off of Fetzima - start taking 80 mg every other day - if not able to tolerate go back up to 80 mg every day and we can taper slower.     2. Increase gabapentin to 600 mg three times daily as prescribed - may help with anxiety.     3. Consider psychiatry referral.    4, Try cutting your afternoon oxycodone tablet in 1/2 and take 1/2 tablet in the afternoon and 1/2 tablet at bedtime to see if it helps with nighttime anxiety and sleep.     2. Increase Senna S to two tablets twice daily.    Follow-up:  at 2:30 PM    It was great speaking with you today.  I value your experience and would be very thankful for your time in providing feedback in our clinic survey. In the next few days, you may receive an email or text message from [employer] with a link to a survey related to your  clinical pharmacist.\"     To schedule another MTM appointment, please call the clinic directly or you may call the MTM scheduling line at [phone] or toll-free at [phone].     My Clinical Pharmacist's contact information:                                                      Please feel free to contact me with any questions or concerns you have.      Keep up the good work!!    Marian oCrrea, PharmD  [phone] in clinic on Tuesday and Wednesday        "

## 2023-06-23 DIAGNOSIS — N40.1 HYPERTROPHY OF PROSTATE WITH URINARY OBSTRUCTION: ICD-10-CM

## 2023-06-23 DIAGNOSIS — N13.8 HYPERTROPHY OF PROSTATE WITH URINARY OBSTRUCTION: ICD-10-CM

## 2023-06-23 DIAGNOSIS — F40.10 SOCIAL ANXIETY DISORDER: ICD-10-CM

## 2023-06-26 ENCOUNTER — MYC MEDICAL ADVICE (OUTPATIENT)
Dept: FAMILY MEDICINE | Facility: CLINIC | Age: 78
End: 2023-06-26
Payer: COMMERCIAL

## 2023-06-26 RX ORDER — DOXAZOSIN 8 MG/1
TABLET ORAL
Qty: 90 TABLET | Refills: 1 | Status: SHIPPED | OUTPATIENT
Start: 2023-06-26 | End: 2023-10-24

## 2023-06-26 RX ORDER — BUSPIRONE HYDROCHLORIDE 30 MG/1
TABLET ORAL
Qty: 180 TABLET | Refills: 1 | Status: SHIPPED | OUTPATIENT
Start: 2023-06-26 | End: 2023-12-12

## 2023-06-27 ENCOUNTER — VIRTUAL VISIT (OUTPATIENT)
Dept: PHARMACY | Facility: CLINIC | Age: 78
End: 2023-06-27
Payer: COMMERCIAL

## 2023-06-27 DIAGNOSIS — F41.8 DEPRESSION WITH ANXIETY: Primary | ICD-10-CM

## 2023-06-27 PROCEDURE — 99606 MTMS BY PHARM EST 15 MIN: CPT | Performed by: PHARMACIST

## 2023-06-27 NOTE — PROGRESS NOTES
Medication Therapy Management (MTM) Encounter    ASSESSMENT:                            Medication Adherence/Access: No issues identified    Depression/Anxiety: patient has follow up visit with PCP next week - asked that he review gabapentin dose at that time, anxiety a little better on higher dose, possibly consider increasing gabapentin dose. I have reviewed with patient there are no other medications for me to recommend other than increase in gabapentin for anxiety - recommend patient see psychiatry - he declines counseling, has tried an extensive list of medications for depression with no improvement or intolerance (see previous note), declines last psychiatry recommendation for pursuing TMS or Ketamine treatments (see previous note). He currently is asking to stop Fetzima - asked that he taper to every 3rd day for 1 week and then stop.     PLAN:                            1. Review gabapentin dose at your follow up visit with Dr. Leiva, your anxiety is a little better on higher dose, possibly consider increasing gabapentin dose.     2. Recommend patient get referral for psychiatry - he declines counseling, has tried an extensive list of medications for depression with no improvement or intolerance (see previous note), declines last psychiatry recommendation for pursuing TMS or Ketamine treatments (see previous note).    3. He currently is asking to stop Fetzima - asked that he taper to every 3rd day for 1 week and then stop.     Follow-up: Tuesday, August 29th at 2:30 PM per patient request. Has follow up with Dr. Leiva on 7/6/2023.    SUBJECTIVE/OBJECTIVE:                          Gavin Edmond is a 78 year old male called for a follow-up visit.  Today's visit is a follow-up MTM visit from 6/13/2023.     Reason for visit: follow up MTM visit.    Tobacco: He reports that he quit smoking about 47 years ago. His smoking use included cigarettes. He started smoking about 65 years ago. He has a 15.00 pack-year  smoking history. He has never used smokeless tobacco.  Alcohol: history of alcohol dependence and not currently using    Medication Adherence/Access: no issues reported    Depression/Anxiety:   Fetzima 80 mg every other day  Gabapentin 600 mg three times daily (patient increased to prescribed dose last visit - he was taking twice daily)  lamotrigine 50 mg twice daily   bupropion 150 mg twice daily  buspirone 30 mg twice daily.     Patient states no difference in depression with tapering off Fetzima - he does not want to continue taking Fetzima. He does feel that his anxiety has improved a little bit with higher gabapentin dose. Patient feels his situation (recent hip replacement surgery) is not helping his mood, frustrated that he is not able to do things like he used to. Still recovering, still has pain - has noticed improvement, gradually getting better.         11/4/2022     9:28 AM 11/7/2022     3:14 PM 5/22/2023     3:56 PM   PHQ   PHQ-9 Total Score 6 9 6   Q9: Thoughts of better off dead/self-harm past 2 weeks Not at all Not at all Not at all     ----------------  Post Discharge Medication Reconciliation Status: discharge medications reconciled, continue medications without change.    I spent 15 minutes with this patient today. All changes were made via collaborative practice agreement with Lizzy Leiva MD. A copy of the visit note was provided to the patient's provider(s).    A summary of these recommendations was sent via [employer].    Marian Correa, PharmD  Medication Therapy Management     Telemedicine Visit Details  Type of service:  Telephone visit  Start Time: 2:30 PM  End Time: 2:45 PM     Medication Therapy Recommendations  Depression with anxiety    Current Medication: gabapentin (NEURONTIN) 600 MG tablet   Rationale: Dose too low - Dosage too low - Effectiveness   Recommendation: Increase Dose   Status: Contact Provider - Awaiting Response

## 2023-06-29 NOTE — PATIENT INSTRUCTIONS
"Recommendations from today's MTM visit:                                                       Gavin,    It was a pleasure talking with you! We discussed the followin. Review gabapentin dose at your follow up visit with Dr. Leiva, your anxiety is a little better on higher dose, possibly consider increasing gabapentin dose.     2. I recommend you discuss with Dr. Leiva about getting a referral for psychiatry.     3. Taper Fetzima dose to every 3rd day for 1 week and then stop.     Follow-up:  at 2:30 PM. Follow up with Dr. Leiva on 2023.    It was great speaking with you today.  I value your experience and would be very thankful for your time in providing feedback in our clinic survey. In the next few days, you may receive an email or text message from [employer] with a link to a survey related to your  clinical pharmacist.\"     To schedule another MTM appointment, please call the clinic directly or you may call the MTM scheduling line at [phone] or toll-free at [phone].     My Clinical Pharmacist's contact information:                                                      Please feel free to contact me with any questions or concerns you have.      Keep up the good work!!    Marian Correa, PharmD  [phone] in clinic on Tuesday and Wednesday      "

## 2023-07-06 ENCOUNTER — VIRTUAL VISIT (OUTPATIENT)
Dept: FAMILY MEDICINE | Facility: CLINIC | Age: 78
End: 2023-07-06
Payer: COMMERCIAL

## 2023-07-06 DIAGNOSIS — F41.9 ANXIETY: ICD-10-CM

## 2023-07-06 DIAGNOSIS — F32.1 MODERATE MAJOR DEPRESSION (H): ICD-10-CM

## 2023-07-06 DIAGNOSIS — D64.9 ANEMIA, UNSPECIFIED TYPE: Primary | ICD-10-CM

## 2023-07-06 PROCEDURE — 99214 OFFICE O/P EST MOD 30 MIN: CPT | Mod: VID | Performed by: FAMILY MEDICINE

## 2023-07-06 RX ORDER — GABAPENTIN 300 MG/1
300 CAPSULE ORAL 3 TIMES DAILY
Qty: 90 CAPSULE | Refills: 3 | Status: SHIPPED | OUTPATIENT
Start: 2023-07-06 | End: 2024-02-21

## 2023-07-06 ASSESSMENT — ANXIETY QUESTIONNAIRES
5. BEING SO RESTLESS THAT IT IS HARD TO SIT STILL: NOT AT ALL
3. WORRYING TOO MUCH ABOUT DIFFERENT THINGS: SEVERAL DAYS
GAD7 TOTAL SCORE: 4
2. NOT BEING ABLE TO STOP OR CONTROL WORRYING: SEVERAL DAYS
GAD7 TOTAL SCORE: 4
1. FEELING NERVOUS, ANXIOUS, OR ON EDGE: MORE THAN HALF THE DAYS
4. TROUBLE RELAXING: NOT AT ALL
7. FEELING AFRAID AS IF SOMETHING AWFUL MIGHT HAPPEN: NOT AT ALL
6. BECOMING EASILY ANNOYED OR IRRITABLE: NOT AT ALL

## 2023-07-06 ASSESSMENT — PATIENT HEALTH QUESTIONNAIRE - PHQ9
SUM OF ALL RESPONSES TO PHQ QUESTIONS 1-9: 7
10. IF YOU CHECKED OFF ANY PROBLEMS, HOW DIFFICULT HAVE THESE PROBLEMS MADE IT FOR YOU TO DO YOUR WORK, TAKE CARE OF THINGS AT HOME, OR GET ALONG WITH OTHER PEOPLE: SOMEWHAT DIFFICULT
SUM OF ALL RESPONSES TO PHQ QUESTIONS 1-9: 7

## 2023-07-06 NOTE — PATIENT INSTRUCTIONS
"Taper Fetzima as follows:  Fetzima 20 mg capsule take 2 capsules once daily x1 week then 1 capsule once daily x1 week then stop.    Increase gabapentin as follows:  Gabapentin 600 mg + 300 mg (for a total of 900 mg) every morning and 600 mg at noon and 600 mg in the p.m. x5 days  Then increase gabapentin to 600 mg +300 mg every morning, 600 mg at noon and 600 mg +300 mg in p.m. x5 days  Then increase to gabapentin 600 mg +300 mg (for a total of 900 mg) 3 times daily.    Schedule a lab only visit to recheck your hemoglobin and iron levels.    Further treatment options could include increasing the Lamictal dose, increasing the bupropion dose, consulting with psychiatry to discuss potential for ketamine treatments versus TMS (brain stimulation therapy).    * * *    Per federal transparency in medicine laws, lab and imaging results are released \"real time\" into My Chart.  This may mean that you see the results before I have a chance to review them. My Chart will alert you again when I review the lab and enter comments.  Sometimes with imaging or labs there may be serious or unexpected results. Critical results are paged to me or the after hours on-call provider so that they can be reviewed immediately.  This is not true of non-critical abnormal results. Unfortunately, this means that it's possible you may be alerted of a serious finding before I have a chance to review it.  If you ever receive a result that you are concerned about and I have not already contacted you, please feel free to reach out to me or the care team so that you get the answers you need.    Additionally, it is my goal that you understand the care plan discussed at your visit and that any questions you have are answered.  Please feel free to reach out if you need clarification or explanation of any information addressed at your office visit.    "

## 2023-07-06 NOTE — PROGRESS NOTES
"Gavin is a 78 year old who is being evaluated via a billable video visit.      How would you like to obtain your AVS? MyChart  If the video visit is dropped, the invitation should be resent by: Text to cell phone: [phone]  Will anyone else be joining your video visit? No        Assessment & Plan     Anemia, unspecified type  Re-check labs. May be contributing to worsening restless leg syndrome. Further work-up and management as dictated by results.   - CBC with Platelets & Differential; Future  - Ferritin; Future  - Iron & Iron Binding Capacity; Future    Moderate major depression (H)  Fetzima not helpful.  He has not yet tapered as discussed with MTM.  We will have him taper off.  - levomilnacipran (FETZIMA) 20 MG 24 hr capsule; 2 capsules daily x 1 week and then 1 capsule x 1 week then stop.  - gabapentin (NEURONTIN) 300 MG capsule; Take 1 capsule (300 mg) by mouth 3 times daily In addition to 600mg capsul.    Anxiety  He did find gabapentin somewhat helpful.  We will titrate up as tolerated.  Follow-up in 1 month.  - gabapentin (NEURONTIN) 300 MG capsule; Take 1 capsule (300 mg) by mouth 3 times daily In addition to 600mg capsul.       BMI:   Estimated body mass index is 26.01 kg/m  as calculated from the following:    Height as of 5/25/23: 1.727 m (5' 7.99\").    Weight as of 5/25/23: 77.6 kg (171 lb).           Lizzy Leiva MD  [facility]   Gavin is a 78 year old, presenting for the following health issues:  Anxiety        7/6/2023    11:46 AM   Additional Questions   Roomed by Krystle MUNSON     History of Present Illness       Mental Health Follow-up:  Patient presents to follow-up on Depression & Anxiety.Patient's depression since last visit has been:  Medium  The patient is not having other symptoms associated with depression.  Patient's anxiety since last visit has been:  Worse  The patient is not having other symptoms associated with anxiety.  Any " significant life events: No  Patient is feeling anxious or having panic attacks.  Patient has no concerns about alcohol or drug use.    Reason for visit:  Follow up    He eats 2-3 servings of fruits and vegetables daily.He consumes 2 sweetened beverage(s) daily.He exercises with enough effort to increase his heart rate 9 or less minutes per day.  He exercises with enough effort to increase his heart rate 3 or less days per week.   He is taking medications regularly.    Today's PHQ-9         PHQ-9 Total Score: 7    PHQ-9 Q9 Thoughts of better off dead/self-harm past 2 weeks :   Not at all    How difficult have these problems made it for you to do your work, take care of things at home, or get along with other people: Somewhat difficult  Today's CAROLYNN-7 Score: 4               Review of Systems   Constitutional, neuro, ENT, endocrine, pulmonary, cardiac, gastrointestinal, genitourinary, musculoskeletal, integument and psychiatric systems are negative, except as otherwise noted.       Objective           Vitals:  No vitals were obtained today due to virtual visit.    Physical Exam   GENERAL: Healthy, alert and no distress  EYES: Eyes grossly normal to inspection.  No discharge or erythema, or obvious scleral/conjunctival abnormalities.  RESP: No audible wheeze, cough, or visible cyanosis.  No visible retractions or increased work of breathing.    SKIN: Visible skin clear. No significant rash, abnormal pigmentation or lesions.  NEURO: Cranial nerves grossly intact.  Mentation and speech appropriate for age.  PSYCH: Mentation appears normal, affect normal/bright, judgement and insight intact, normal speech and appearance well-groomed.                Video-Visit Details    Type of service:  Video Visit   Video Start Time: 1:20pm  Video End Time:1:40pm    Originating Location (pt. Location): Home  Distant Location (provider location):  On-site  Platform used for Video Visit: iOmando

## 2023-07-10 ENCOUNTER — TRANSFERRED RECORDS (OUTPATIENT)
Dept: HEALTH INFORMATION MANAGEMENT | Facility: CLINIC | Age: 78
End: 2023-07-10
Payer: COMMERCIAL

## 2023-07-12 ENCOUNTER — LAB (OUTPATIENT)
Dept: LAB | Facility: CLINIC | Age: 78
End: 2023-07-12
Payer: COMMERCIAL

## 2023-07-12 DIAGNOSIS — D64.9 ANEMIA, UNSPECIFIED TYPE: ICD-10-CM

## 2023-07-12 LAB
BASOPHILS # BLD AUTO: 0 10E3/UL (ref 0–0.2)
BASOPHILS NFR BLD AUTO: 1 %
EOSINOPHIL # BLD AUTO: 0.2 10E3/UL (ref 0–0.7)
EOSINOPHIL NFR BLD AUTO: 4 %
ERYTHROCYTE [DISTWIDTH] IN BLOOD BY AUTOMATED COUNT: 16.7 % (ref 10–15)
FERRITIN SERPL-MCNC: 133 NG/ML (ref 31–409)
HCT VFR BLD AUTO: 37 % (ref 40–53)
HGB BLD-MCNC: 11.8 G/DL (ref 13.3–17.7)
IMM GRANULOCYTES # BLD: 0 10E3/UL
IMM GRANULOCYTES NFR BLD: 0 %
IRON BINDING CAPACITY (ROCHE): 369 UG/DL (ref 240–430)
IRON SATN MFR SERPL: 15 % (ref 15–46)
IRON SERPL-MCNC: 56 UG/DL (ref 61–157)
LYMPHOCYTES # BLD AUTO: 0.8 10E3/UL (ref 0.8–5.3)
LYMPHOCYTES NFR BLD AUTO: 20 %
MCH RBC QN AUTO: 31.4 PG (ref 26.5–33)
MCHC RBC AUTO-ENTMCNC: 31.9 G/DL (ref 31.5–36.5)
MCV RBC AUTO: 98 FL (ref 78–100)
MONOCYTES # BLD AUTO: 0.3 10E3/UL (ref 0–1.3)
MONOCYTES NFR BLD AUTO: 7 %
NEUTROPHILS # BLD AUTO: 2.7 10E3/UL (ref 1.6–8.3)
NEUTROPHILS NFR BLD AUTO: 68 %
PLATELET # BLD AUTO: 133 10E3/UL (ref 150–450)
RBC # BLD AUTO: 3.76 10E6/UL (ref 4.4–5.9)
WBC # BLD AUTO: 4.1 10E3/UL (ref 4–11)

## 2023-07-12 PROCEDURE — 36415 COLL VENOUS BLD VENIPUNCTURE: CPT

## 2023-07-12 PROCEDURE — 83550 IRON BINDING TEST: CPT

## 2023-07-12 PROCEDURE — 83540 ASSAY OF IRON: CPT

## 2023-07-12 PROCEDURE — 85025 COMPLETE CBC W/AUTO DIFF WBC: CPT

## 2023-07-12 PROCEDURE — 82728 ASSAY OF FERRITIN: CPT

## 2023-07-18 ENCOUNTER — TRANSFERRED RECORDS (OUTPATIENT)
Dept: HEALTH INFORMATION MANAGEMENT | Facility: CLINIC | Age: 78
End: 2023-07-18
Payer: COMMERCIAL

## 2023-07-22 ENCOUNTER — MYC MEDICAL ADVICE (OUTPATIENT)
Dept: FAMILY MEDICINE | Facility: CLINIC | Age: 78
End: 2023-07-22
Payer: COMMERCIAL

## 2023-07-22 DIAGNOSIS — F32.1 MODERATE MAJOR DEPRESSION (H): Primary | ICD-10-CM

## 2023-07-24 NOTE — TELEPHONE ENCOUNTER
"Routing refill request to provider for review/approval because:  Medication is reported/historical    Requested Prescriptions   Pending Prescriptions Disp Refills    buPROPion (WELLBUTRIN SR) 150 MG 12 hr tablet       Sig: Take 1 tablet (150 mg) by mouth 2 times daily       SSRIs Protocol Passed - 7/24/2023  8:59 AM        Passed - Recent (12 mo) or future (30 days) visit within the authorizing provider's specialty     Patient has had an office visit with the authorizing provider or a provider within the authorizing providers department within the previous 12 mos or has a future within next 30 days. See \"Patient Info\" tab in inbasket, or \"Choose Columns\" in Meds & Orders section of the refill encounter.              Passed - Medication is Bupropion     If the medication is Bupropion (Wellbutrin), and the patient is taking for smoking cessation; OK to refill.          Passed - Medication is active on med list        Passed - Patient is age 18 or older                 "

## 2023-07-26 RX ORDER — BUPROPION HYDROCHLORIDE 150 MG/1
150 TABLET, EXTENDED RELEASE ORAL 2 TIMES DAILY
Qty: 90 TABLET | Refills: 4 | Status: SHIPPED | OUTPATIENT
Start: 2023-07-26 | End: 2023-09-12

## 2023-07-28 ENCOUNTER — HOSPITAL ENCOUNTER (OUTPATIENT)
Dept: CT IMAGING | Facility: CLINIC | Age: 78
Discharge: HOME | End: 2023-07-28
Attending: ORTHOPAEDIC SURGERY | Admitting: ORTHOPAEDIC SURGERY
Payer: COMMERCIAL

## 2023-07-28 DIAGNOSIS — M25.559 HIP PAIN: ICD-10-CM

## 2023-07-28 PROCEDURE — 72192 CT PELVIS W/O DYE: CPT

## 2023-08-14 DIAGNOSIS — F41.8 DEPRESSION WITH ANXIETY: ICD-10-CM

## 2023-08-14 RX ORDER — LEVOMILNACIPRAN HYDROCHLORIDE 80 MG/1
80 CAPSULE, EXTENDED RELEASE ORAL DAILY
Qty: 30 CAPSULE | Refills: 1 | OUTPATIENT
Start: 2023-08-14

## 2023-08-15 ENCOUNTER — HOSPITAL ENCOUNTER (EMERGENCY)
Facility: CLINIC | Age: 78
Discharge: HOME | End: 2023-08-15
Attending: FAMILY MEDICINE | Admitting: FAMILY MEDICINE
Payer: COMMERCIAL

## 2023-08-15 ENCOUNTER — APPOINTMENT (OUTPATIENT)
Dept: GENERAL RADIOLOGY | Facility: CLINIC | Age: 78
End: 2023-08-15
Attending: FAMILY MEDICINE
Payer: COMMERCIAL

## 2023-08-15 VITALS
OXYGEN SATURATION: 99 % | SYSTOLIC BLOOD PRESSURE: 126 MMHG | HEART RATE: 80 BPM | RESPIRATION RATE: 12 BRPM | BODY MASS INDEX: 26.07 KG/M2 | WEIGHT: 172 LBS | DIASTOLIC BLOOD PRESSURE: 88 MMHG | HEIGHT: 68 IN | TEMPERATURE: 97.8 F

## 2023-08-15 DIAGNOSIS — I48.91 ATRIAL FIBRILLATION WITH RVR (H): ICD-10-CM

## 2023-08-15 DIAGNOSIS — J90 BILATERAL PLEURAL EFFUSION: ICD-10-CM

## 2023-08-15 LAB
ALBUMIN SERPL BCG-MCNC: 4.1 G/DL (ref 3.5–5.2)
ALP SERPL-CCNC: 107 U/L (ref 40–129)
ALT SERPL W P-5'-P-CCNC: 11 U/L (ref 0–70)
ANION GAP SERPL CALCULATED.3IONS-SCNC: 11 MMOL/L (ref 7–15)
AST SERPL W P-5'-P-CCNC: 21 U/L (ref 0–45)
BASOPHILS # BLD AUTO: 0 10E3/UL (ref 0–0.2)
BASOPHILS NFR BLD AUTO: 1 %
BILIRUB SERPL-MCNC: 1.2 MG/DL
BUN SERPL-MCNC: 16.4 MG/DL (ref 8–23)
CALCIUM SERPL-MCNC: 9.6 MG/DL (ref 8.8–10.2)
CHLORIDE SERPL-SCNC: 94 MMOL/L (ref 98–107)
CREAT SERPL-MCNC: 0.9 MG/DL (ref 0.67–1.17)
DEPRECATED HCO3 PLAS-SCNC: 25 MMOL/L (ref 22–29)
EOSINOPHIL # BLD AUTO: 0.1 10E3/UL (ref 0–0.7)
EOSINOPHIL NFR BLD AUTO: 2 %
ERYTHROCYTE [DISTWIDTH] IN BLOOD BY AUTOMATED COUNT: 17.7 % (ref 10–15)
GFR SERPL CREATININE-BSD FRML MDRD: 86 ML/MIN/1.73M2
GLUCOSE SERPL-MCNC: 99 MG/DL (ref 70–99)
HCT VFR BLD AUTO: 33.1 % (ref 40–53)
HGB BLD-MCNC: 10.7 G/DL (ref 13.3–17.7)
HOLD SPECIMEN: NORMAL
IMM GRANULOCYTES # BLD: 0 10E3/UL
IMM GRANULOCYTES NFR BLD: 0 %
LYMPHOCYTES # BLD AUTO: 1.1 10E3/UL (ref 0.8–5.3)
LYMPHOCYTES NFR BLD AUTO: 22 %
MCH RBC QN AUTO: 31.7 PG (ref 26.5–33)
MCHC RBC AUTO-ENTMCNC: 32.3 G/DL (ref 31.5–36.5)
MCV RBC AUTO: 98 FL (ref 78–100)
MONOCYTES # BLD AUTO: 0.4 10E3/UL (ref 0–1.3)
MONOCYTES NFR BLD AUTO: 8 %
NEUTROPHILS # BLD AUTO: 3.2 10E3/UL (ref 1.6–8.3)
NEUTROPHILS NFR BLD AUTO: 67 %
NRBC # BLD AUTO: 0 10E3/UL
NRBC BLD AUTO-RTO: 0 /100
PLATELET # BLD AUTO: 130 10E3/UL (ref 150–450)
POTASSIUM SERPL-SCNC: 4.5 MMOL/L (ref 3.4–5.3)
PROT SERPL-MCNC: 6.9 G/DL (ref 6.4–8.3)
RBC # BLD AUTO: 3.38 10E6/UL (ref 4.4–5.9)
SODIUM SERPL-SCNC: 130 MMOL/L (ref 136–145)
WBC # BLD AUTO: 4.8 10E3/UL (ref 4–11)

## 2023-08-15 PROCEDURE — 93010 ELECTROCARDIOGRAM REPORT: CPT | Performed by: FAMILY MEDICINE

## 2023-08-15 PROCEDURE — 85025 COMPLETE CBC W/AUTO DIFF WBC: CPT | Performed by: FAMILY MEDICINE

## 2023-08-15 PROCEDURE — 71046 X-RAY EXAM CHEST 2 VIEWS: CPT

## 2023-08-15 PROCEDURE — 80053 COMPREHEN METABOLIC PANEL: CPT | Performed by: FAMILY MEDICINE

## 2023-08-15 PROCEDURE — 93005 ELECTROCARDIOGRAM TRACING: CPT | Performed by: FAMILY MEDICINE

## 2023-08-15 PROCEDURE — 36415 COLL VENOUS BLD VENIPUNCTURE: CPT | Performed by: EMERGENCY MEDICINE

## 2023-08-15 PROCEDURE — 99285 EMERGENCY DEPT VISIT HI MDM: CPT | Mod: 25 | Performed by: FAMILY MEDICINE

## 2023-08-15 PROCEDURE — 99284 EMERGENCY DEPT VISIT MOD MDM: CPT | Mod: 25 | Performed by: FAMILY MEDICINE

## 2023-08-15 ASSESSMENT — ACTIVITIES OF DAILY LIVING (ADL)
ADLS_ACUITY_SCORE: 35

## 2023-08-15 NOTE — ED TRIAGE NOTES
Pt here with known Atrial fibrillation said the past two weeks he is getting more SOB, having a flutter or palpitations in his chest. Pt said he is really tired.      Triage Assessment       Row Name 08/15/23 1216       Triage Assessment (Adult)    Airway WDL WDL       Respiratory WDL    Respiratory WDL X;rhythm/pattern    Rhythm/Pattern, Respiratory shortness of breath;dyspnea on exertion       Cardiac WDL    Cardiac WDL X  palpitations/flutter feeling       Cognitive/Neuro/Behavioral WDL    Cognitive/Neuro/Behavioral WDL WDL

## 2023-08-15 NOTE — DISCHARGE INSTRUCTIONS
RETURN TO THE EMERGENCY ROOM FOR THE FOLLOWING:    Newly worsened breathing, lightheadedness and fainting, new chest pain, fever greater than 101, or at anytime for any concern.    FOLLOW UP:    Referral order was placed for primary care follow-up, expect a phone call within the next 2-3 business days to help with scheduling.    TREATMENT RECOMMENDATIONS:    Consider taking metoprolol 100 mg twice a day if your pulse is consistently above 110 bpm.  Do not increase your metoprolol greater than 100 mg twice a day.  If further rate control is needed (consistently above 110 bpm with exertion) then a new medication will have to be added.    Consider Lasix 20 mg once a day for 3-5 days.  This may help with your transient trouble with breathing.  You do have small bilateral pleural (outside the lung) effusions (pockets of fluid).    NURSE ADVICE LINE:  [phone] or [phone]

## 2023-08-15 NOTE — ED NOTES
"Pt denies soa or cp in room.  Pt states over last 4-5 weks he has noticed \"my heart rate increasing.\"  No recent illness or fevers.  Only complaint is \"being a little more fatigued.  "

## 2023-08-15 NOTE — ED PROVIDER NOTES
"  HPI   Patient is an 78-year-old male with palpitations.  He has atrial fibrillation and takes metoprolol and Xarelto on a regular basis.  He has hypertension and hyperlipidemia, chronic diastolic heart failure, taking Lipitor.  Known history of BPH.  Known history of anxiety and depression, distant alcoholism and benzodiazepine dependence.  He takes a number of medications for anxiety and depression.  He has reflux and herniorrhaphy.  He has a knee arthroplasty.  He has thrombocytopenia and anemia.  Former smoker, quitting in 1975.  No drugs of abuse.  Last visit with his family physician was a virtual one on 7/6, see that note for details.    Patient has persistent atrial fibrillation.  He tells me that his pulse has been \"more around 110 than 80 or 90 which is what he usually is.\"  He denies new chest pain or pressure.  No neck, jaw, or arm pain.  No back pain.  He denies ongoing shortness of breath but says \"it comes on every once in a while when I am sitting still.\"  No symptoms that are different or with onset during exertion.  No new leg pain or swelling.  No cough or congestion.  No fever.  No trauma or injury.  No skin rash.  No lightheadedness or fainting.      Allergies:  Allergies   Allergen Reactions    Bactrim [Sulfamethoxazole-Trimethoprim] Nausea and Vomiting     Problem List:    Patient Active Problem List    Diagnosis Date Noted    Anemia, unspecified type 07/06/2023     Priority: Medium    Moderate major depression (H) 07/06/2023     Priority: Medium    S/P revision of total hip 05/25/2023     Priority: Medium    Social phobia 04/13/2023     Priority: Medium    Chronic diastolic heart failure (H) 09/22/2022     Priority: Medium    Valvular heart disease 09/22/2022     Priority: Medium    Thrombocytopenia (H) 06/29/2022     Priority: Medium    Tremor 06/29/2022     Priority: Medium    Memory difficulties 05/05/2022     Priority: Medium    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 02/28/2022     Priority: " Medium    Depression with anxiety 02/28/2022     Priority: Medium    Hematoma of skin 07/17/2019     Priority: Medium    Right knee DJD 10/12/2018     Priority: Medium    Peripheral neuropathy 10/12/2018     Priority: Medium    Hyperplastic Polyp 03/05/2018     Priority: Medium    Chronic atrial fibrillation (H) 01/18/2018     Priority: Medium    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) 11/28/2017     Priority: Medium    Status post lung surgery 11/18/2017     Priority: Medium    Unilateral inguinal hernia without obstruction or gangrene 11/07/2017     Priority: Medium    Benzodiazepine dependence (H) 10/31/2017     Priority: Medium    GERD (gastroesophageal reflux disease) 08/03/2012     Priority: Medium    Anxiety 02/16/2012     Priority: Medium    Hyperlipidemia LDL goal <100 10/31/2010     Priority: Medium    Osteoarthrosis, unspecified whether generalized or localized, pelvic region and thigh 09/28/2007     Priority: Medium    Hypertrophy of prostate with urinary obstruction 08/03/2006     Priority: Medium     Problem list name updated by automated process. Provider to review      Benign essential hypertension 12/12/2005     Priority: Medium      Past Medical History:    Past Medical History:   Diagnosis Date    Arthritis 2005    Benign neoplasm of prostate 2000    Depressive disorder     Infection due to 2019 novel coronavirus 09/27/2021    S/P knee replacement 11/06/2012    S/P left knee arthroscopy 08/15/2011     Past Surgical History:    Past Surgical History:   Procedure Laterality Date    ABDOMEN SURGERY  2003    ARTHROPLASTY KNEE Right 10/12/2018    Procedure: ARTHROPLASTY KNEE;  Right Total Knee Arthroplasty;  Surgeon: Jeb Peralta MD;  Location: [facility]    ARTHROPLASTY REVISION HIP Left 5/25/2023    Procedure: Revision total hip arthroplasty, left;  Surgeon: Chandler Leiva MD;  Location: [facility]    BIOPSY  2007    COLONOSCOPY N/A 12/10/2015    Procedure: COMBINED COLONOSCOPY, SINGLE OR MULTIPLE  BIOPSY/POLYPECTOMY BY BIOPSY;  Surgeon: Jyoti Figueroa MD;  Location: [facility]    JOINT REPLACEMENT, HIP RT/LT  10/2007    Joint Replacement Hip LT    JOINT REPLACEMTN, KNEE RT/LT  2011    Joint Replacement knee /LT, [facility]    LAPAROSCOPIC HERNIORRHAPHY INGUINAL BILATERAL Bilateral 2018    Procedure: LAPAROSCOPIC HERNIORRHAPHY INGUINAL BILATERAL;  Laparoscopic bilateral inguinal hernia repair;  Surgeon: Josemanuel Resendiz MD;  Location: [facility]    PHACOEMULSIFICATION WITH STANDARD INTRAOCULAR LENS IMPLANT Right 2021    Procedure: Cataract Removal with Implant;  Surgeon: Regan Kaufman MD;  Location: [facility]    PHACOEMULSIFICATION WITH STANDARD INTRAOCULAR LENS IMPLANT Left 2021    Procedure: Cataract Removal with Implant;  Surgeon: Regan Kaufman MD;  Location: [facility]    SURGICAL HISTORY OF -   1999    Umbilical Herniorrhaphy with mesh    SURGICAL HISTORY OF -  Left 2017    Thoracotomy and drainage of empyema     Family History:    Family History   Problem Relation Age of Onset    Depression Mother     Cerebrovascular Disease Mother     Breast Cancer Mother     Neurologic Disorder Mother         parkinsons    Parkinsonism Mother     Respiratory Father         emphyzema    Diabetes Brother      Social History:  Marital Status:   [2]  Social History     Tobacco Use    Smoking status: Former     Packs/day: 1.00     Years: 15.00     Pack years: 15.00     Types: Cigarettes     Start date: 1958     Quit date: 10/13/1975     Years since quittin.8    Smokeless tobacco: Never   Vaping Use    Vaping Use: Never used   Substance Use Topics    Alcohol use: Yes    Drug use: No      Medications:    acetaminophen (TYLENOL) 325 MG tablet  buPROPion (WELLBUTRIN SR) 150 MG 12 hr tablet  busPIRone HCl (BUSPAR) 30 MG tablet  doxazosin (CARDURA) 8 MG tablet  doxylamine (UNISOM) 25 MG TABS tablet  finasteride (PROSCAR) 5 MG tablet  furosemide (LASIX) 20 MG  "tablet  gabapentin (NEURONTIN) 300 MG capsule  gabapentin (NEURONTIN) 600 MG tablet  hydrOXYzine (ATARAX) 25 MG tablet  lamoTRIgine (LAMICTAL) 25 MG tablet  levomilnacipran (FETZIMA) 20 MG 24 hr capsule  metoprolol tartrate (LOPRESSOR) 50 MG tablet  multivitamin (ONE-DAILY) tablet  senna-docusate (SENOKOT-S/PERICOLACE) 8.6-50 MG tablet  XARELTO ANTICOAGULANT 20 MG TABS tablet      Review of Systems   All other systems reviewed and are negative.      PE   BP: (!) 136/94  Pulse: 84  Temp: (!) 96.2  F (35.7  C)  Resp: 21  Height: 172.7 cm (5' 8\")  Weight: 78 kg (172 lb)  SpO2: 98 %  Physical Exam  Vitals and nursing note reviewed.   Constitutional:       General: He is not in acute distress.  HENT:      Head: Atraumatic.      Right Ear: External ear normal.      Left Ear: External ear normal.      Nose: Nose normal.      Mouth/Throat:      Mouth: Mucous membranes are moist.      Pharynx: Oropharynx is clear.   Eyes:      General: No scleral icterus.     Extraocular Movements: Extraocular movements intact.      Conjunctiva/sclera: Conjunctivae normal.      Pupils: Pupils are equal, round, and reactive to light.   Cardiovascular:      Rate and Rhythm: Normal rate. Rhythm irregular.   Pulmonary:      Effort: Pulmonary effort is normal. No respiratory distress.   Abdominal:      Palpations: Abdomen is soft.      Tenderness: There is no abdominal tenderness.   Musculoskeletal:         General: Normal range of motion.      Cervical back: Normal range of motion.      Right lower leg: No tenderness. No edema.      Left lower leg: No tenderness. No edema.   Skin:     General: Skin is warm and dry.   Neurological:      General: No focal deficit present.      Mental Status: He is alert and oriented to person, place, and time.   Psychiatric:         Behavior: Behavior normal.         ED COURSE and MDM   1536.  Patient has symptoms and signs as described above.  EKG ordered and shows atrial fibrillation, rate of 90.  In the room " his pulse is in the 80s.  Blood pressure was last 141/105.  Blood work unremarkable.  Chest x-ray ordered.    1732.  Patient with no evidence for tachycardia here in the department though he does report a pulse occasionally above 110 bpm (111).  Mild bilateral pleural effusions seen on the chest x-ray.  He is taking metoprolol 50 mg twice a day.  I am encouraging metoprolol 100 mg twice a day if he has consistently high pulse above 110 bpm.  Consider Lasix 20 mg once a day (this has been prescribed already, he does not take regularly) over the next 3 to 5 days.  This may help with his occasional shortness of breath.  Referral order will be placed for follow-up, 1 to 2 weeks.    EKG  (7533)   Interpretation performed by me.  Rate: 90     Rhythm: AF     Axis: nl  Intervals: WI (12-2) -, QRS (<12) 112, QTc (>5) 452  P wave: -     QRS complex: nl   ST segment / T-wave: nl  Conclusion: Atrial fibrillation with normal rate.    Electronic medical chart reviewed, including medical problems, medications, medical allergies, social history.  Recent hospitalizations and surgical procedures reviewed.  Recent clinic visits and consultations reviewed.  Recent labs and test results reviewed.  Nursing notes reviewed.    The patient, their parent if applicable, and/or their medical decision maker(s) and I have reviewed all of the available historical information, applicable PMH, physical exam findings, and objective diagnostic data gathered during this ED visit.  We then discussed all work-up options and then together agreed upon the course taken during this visit.  The ultimate disposition and plan was a cooperative decision made between myself and the patient, their parent if applicable, and/or their legal decision maker(s).  The risks and benefits of all decisions made during this visit were discussed to the best of my abilities given the circumstances, and all parties are understanding of the pertinent ramifications of these  decisions.      LABS  Labs Ordered and Resulted from Time of ED Arrival to Time of ED Departure   COMPREHENSIVE METABOLIC PANEL - Abnormal       Result Value    Sodium 130 (*)     Potassium 4.5      Chloride 94 (*)     Carbon Dioxide (CO2) 25      Anion Gap 11      Urea Nitrogen 16.4      Creatinine 0.90      Calcium 9.6      Glucose 99      Alkaline Phosphatase 107      AST 21      ALT 11      Protein Total 6.9      Albumin 4.1      Bilirubin Total 1.2      GFR Estimate 86     CBC WITH PLATELETS AND DIFFERENTIAL - Abnormal    WBC Count 4.8      RBC Count 3.38 (*)     Hemoglobin 10.7 (*)     Hematocrit 33.1 (*)     MCV 98      MCH 31.7      MCHC 32.3      RDW 17.7 (*)     Platelet Count 130 (*)     % Neutrophils 67      % Lymphocytes 22      % Monocytes 8      % Eosinophils 2      % Basophils 1      % Immature Granulocytes 0      NRBCs per 100 WBC 0      Absolute Neutrophils 3.2      Absolute Lymphocytes 1.1      Absolute Monocytes 0.4      Absolute Eosinophils 0.1      Absolute Basophils 0.0      Absolute Immature Granulocytes 0.0      Absolute NRBCs 0.0         IMAGING  Images reviewed by me.  Radiology report also reviewed.  XR Chest 2 Views   Final Result   IMPRESSION: Tiny bilateral pleural effusions with unchanged bilateral pleural plaques. No focal airspace opacities.      Unchanged enlarged cardiac silhouette.          Procedures    Medications - No data to display      IMPRESSION       ICD-10-CM    1. Atrial fibrillation with RVR (H)  I48.91       2. Bilateral pleural effusion  J90                Medication List      There are no discharge medications for this visit.                     Juan Luis Colon MD  08/15/23 1164

## 2023-08-24 ENCOUNTER — HOSPITAL ENCOUNTER (OUTPATIENT)
Dept: OUTPATIENT PROCEDURES | Facility: CLINIC | Age: 78
Discharge: HOME | End: 2023-08-24
Attending: OPHTHALMOLOGY | Admitting: OPHTHALMOLOGY
Payer: COMMERCIAL

## 2023-08-24 PROCEDURE — 66821 AFTER CATARACT LASER SURGERY: CPT | Mod: 50

## 2023-08-29 ENCOUNTER — TRANSFERRED RECORDS (OUTPATIENT)
Dept: HEALTH INFORMATION MANAGEMENT | Facility: CLINIC | Age: 78
End: 2023-08-29
Payer: COMMERCIAL

## 2023-09-07 ENCOUNTER — HOSPITAL ENCOUNTER (OUTPATIENT)
Dept: MRI IMAGING | Facility: CLINIC | Age: 78
Discharge: HOME | End: 2023-09-07
Attending: ORTHOPAEDIC SURGERY | Admitting: ORTHOPAEDIC SURGERY
Payer: COMMERCIAL

## 2023-09-07 ENCOUNTER — DOCUMENTATION ONLY (OUTPATIENT)
Dept: OTHER | Facility: CLINIC | Age: 78
End: 2023-09-07
Payer: COMMERCIAL

## 2023-09-07 DIAGNOSIS — M54.10 RADICULAR PAIN: ICD-10-CM

## 2023-09-07 PROCEDURE — 72148 MRI LUMBAR SPINE W/O DYE: CPT

## 2023-09-10 ENCOUNTER — HEALTH MAINTENANCE LETTER (OUTPATIENT)
Age: 78
End: 2023-09-10

## 2023-09-12 DIAGNOSIS — F32.1 MODERATE MAJOR DEPRESSION (H): ICD-10-CM

## 2023-09-13 ENCOUNTER — LAB (OUTPATIENT)
Dept: LAB | Facility: CLINIC | Age: 78
End: 2023-09-13
Payer: COMMERCIAL

## 2023-09-13 ENCOUNTER — ANCILLARY PROCEDURE (OUTPATIENT)
Dept: GENERAL RADIOLOGY | Facility: CLINIC | Age: 78
End: 2023-09-13
Attending: FAMILY MEDICINE
Payer: COMMERCIAL

## 2023-09-13 ENCOUNTER — OFFICE VISIT (OUTPATIENT)
Dept: FAMILY MEDICINE | Facility: CLINIC | Age: 78
End: 2023-09-13
Payer: COMMERCIAL

## 2023-09-13 VITALS
BODY MASS INDEX: 23.95 KG/M2 | SYSTOLIC BLOOD PRESSURE: 124 MMHG | DIASTOLIC BLOOD PRESSURE: 78 MMHG | RESPIRATION RATE: 16 BRPM | WEIGHT: 158 LBS | TEMPERATURE: 97.4 F | HEIGHT: 68 IN | HEART RATE: 88 BPM | OXYGEN SATURATION: 92 %

## 2023-09-13 DIAGNOSIS — D69.6 THROMBOCYTOPENIA (H): ICD-10-CM

## 2023-09-13 DIAGNOSIS — J90 PLEURAL EFFUSION: ICD-10-CM

## 2023-09-13 DIAGNOSIS — R63.4 WEIGHT LOSS: ICD-10-CM

## 2023-09-13 DIAGNOSIS — D64.9 ANEMIA, UNSPECIFIED TYPE: ICD-10-CM

## 2023-09-13 DIAGNOSIS — I48.91 ATRIAL FIBRILLATION WITH RAPID VENTRICULAR RESPONSE (H): Primary | ICD-10-CM

## 2023-09-13 DIAGNOSIS — F32.1 MODERATE MAJOR DEPRESSION (H): ICD-10-CM

## 2023-09-13 LAB
ALBUMIN SERPL BCG-MCNC: 4.2 G/DL (ref 3.5–5.2)
ALP SERPL-CCNC: 95 U/L (ref 40–129)
ALT SERPL W P-5'-P-CCNC: 14 U/L (ref 0–70)
ANION GAP SERPL CALCULATED.3IONS-SCNC: 8 MMOL/L (ref 7–15)
AST SERPL W P-5'-P-CCNC: 21 U/L (ref 0–45)
BASOPHILS # BLD AUTO: 0 10E3/UL (ref 0–0.2)
BASOPHILS NFR BLD AUTO: 1 %
BILIRUB SERPL-MCNC: 0.8 MG/DL
BUN SERPL-MCNC: 19.1 MG/DL (ref 8–23)
CALCIUM SERPL-MCNC: 9.6 MG/DL (ref 8.8–10.2)
CHLORIDE SERPL-SCNC: 101 MMOL/L (ref 98–107)
CREAT SERPL-MCNC: 0.88 MG/DL (ref 0.67–1.17)
CRP SERPL-MCNC: <3 MG/L
DEPRECATED HCO3 PLAS-SCNC: 29 MMOL/L (ref 22–29)
EGFRCR SERPLBLD CKD-EPI 2021: 87 ML/MIN/1.73M2
EOSINOPHIL # BLD AUTO: 0.1 10E3/UL (ref 0–0.7)
EOSINOPHIL NFR BLD AUTO: 3 %
ERYTHROCYTE [DISTWIDTH] IN BLOOD BY AUTOMATED COUNT: 18.3 % (ref 10–15)
ERYTHROCYTE [SEDIMENTATION RATE] IN BLOOD BY WESTERGREN METHOD: 13 MM/HR (ref 0–20)
FERRITIN SERPL-MCNC: 79 NG/ML (ref 31–409)
GLUCOSE SERPL-MCNC: 73 MG/DL (ref 70–99)
HCT VFR BLD AUTO: 35.8 % (ref 40–53)
HGB BLD-MCNC: 11.3 G/DL (ref 13.3–17.7)
IMM GRANULOCYTES # BLD: 0 10E3/UL
IMM GRANULOCYTES NFR BLD: 0 %
IRON BINDING CAPACITY (ROCHE): 380 UG/DL (ref 240–430)
IRON SATN MFR SERPL: 22 % (ref 15–46)
IRON SERPL-MCNC: 84 UG/DL (ref 61–157)
LYMPHOCYTES # BLD AUTO: 0.8 10E3/UL (ref 0.8–5.3)
LYMPHOCYTES NFR BLD AUTO: 18 %
MCH RBC QN AUTO: 31.6 PG (ref 26.5–33)
MCHC RBC AUTO-ENTMCNC: 31.6 G/DL (ref 31.5–36.5)
MCV RBC AUTO: 100 FL (ref 78–100)
MONOCYTES # BLD AUTO: 0.5 10E3/UL (ref 0–1.3)
MONOCYTES NFR BLD AUTO: 11 %
NEUTROPHILS # BLD AUTO: 3 10E3/UL (ref 1.6–8.3)
NEUTROPHILS NFR BLD AUTO: 68 %
PLATELET # BLD AUTO: 118 10E3/UL (ref 150–450)
POTASSIUM SERPL-SCNC: 4.8 MMOL/L (ref 3.4–5.3)
PROT SERPL-MCNC: 7 G/DL (ref 6.4–8.3)
RBC # BLD AUTO: 3.58 10E6/UL (ref 4.4–5.9)
SODIUM SERPL-SCNC: 138 MMOL/L (ref 136–145)
TSH SERPL DL<=0.005 MIU/L-ACNC: 1.86 UIU/ML (ref 0.3–4.2)
WBC # BLD AUTO: 4.4 10E3/UL (ref 4–11)

## 2023-09-13 PROCEDURE — 83540 ASSAY OF IRON: CPT

## 2023-09-13 PROCEDURE — 86140 C-REACTIVE PROTEIN: CPT

## 2023-09-13 PROCEDURE — 83550 IRON BINDING TEST: CPT

## 2023-09-13 PROCEDURE — 82607 VITAMIN B-12: CPT

## 2023-09-13 PROCEDURE — 84443 ASSAY THYROID STIM HORMONE: CPT

## 2023-09-13 PROCEDURE — 85025 COMPLETE CBC W/AUTO DIFF WBC: CPT

## 2023-09-13 PROCEDURE — 85652 RBC SED RATE AUTOMATED: CPT

## 2023-09-13 PROCEDURE — 87389 HIV-1 AG W/HIV-1&-2 AB AG IA: CPT

## 2023-09-13 PROCEDURE — 82728 ASSAY OF FERRITIN: CPT

## 2023-09-13 PROCEDURE — 36415 COLL VENOUS BLD VENIPUNCTURE: CPT

## 2023-09-13 PROCEDURE — 71046 X-RAY EXAM CHEST 2 VIEWS: CPT | Mod: TC | Performed by: RADIOLOGY

## 2023-09-13 PROCEDURE — 99214 OFFICE O/P EST MOD 30 MIN: CPT | Performed by: FAMILY MEDICINE

## 2023-09-13 PROCEDURE — 80053 COMPREHEN METABOLIC PANEL: CPT

## 2023-09-13 RX ORDER — BUPROPION HYDROCHLORIDE 150 MG/1
150 TABLET, EXTENDED RELEASE ORAL 2 TIMES DAILY
Qty: 180 TABLET | Refills: 3 | Status: SHIPPED | OUTPATIENT
Start: 2023-09-13 | End: 2023-12-28 | Stop reason: DRUGHIGH

## 2023-09-13 RX ORDER — METOPROLOL TARTRATE 100 MG
100 TABLET ORAL 2 TIMES DAILY
Qty: 180 TABLET | Refills: 4 | Status: SHIPPED | OUTPATIENT
Start: 2023-09-13 | End: 2023-12-12

## 2023-09-13 ASSESSMENT — PATIENT HEALTH QUESTIONNAIRE - PHQ9
SUM OF ALL RESPONSES TO PHQ QUESTIONS 1-9: 6
10. IF YOU CHECKED OFF ANY PROBLEMS, HOW DIFFICULT HAVE THESE PROBLEMS MADE IT FOR YOU TO DO YOUR WORK, TAKE CARE OF THINGS AT HOME, OR GET ALONG WITH OTHER PEOPLE: SOMEWHAT DIFFICULT
SUM OF ALL RESPONSES TO PHQ QUESTIONS 1-9: 6

## 2023-09-13 ASSESSMENT — PAIN SCALES - GENERAL: PAINLEVEL: MODERATE PAIN (5)

## 2023-09-13 NOTE — RESULT ENCOUNTER NOTE
Notified via My Chart. Care team: please also call him so he's aware for next week.  Hemoglobin improved slightly but further decline in platelets. I ordered some additional labs to look into this further. You can get these done next week when you see Dr. Chapin.  Further work-up and management as dictated by results.

## 2023-09-13 NOTE — LETTER
September 19, 2023      Gavin Edmond  [address] [address]   [facility] [id number]        Dear ,    We are writing to inform you of your test results.    Test results indicate you may require additional follow up, see comment below.    Resulted Orders   TSH with free T4 reflex   Result Value Ref Range    TSH 1.86 0.30 - 4.20 uIU/mL   CRP inflammation   Result Value Ref Range    CRP Inflammation <3.00 <5.00 mg/L   Erythrocyte sedimentation rate auto   Result Value Ref Range    Erythrocyte Sedimentation Rate 13 0 - 20 mm/hr   Comprehensive metabolic panel   Result Value Ref Range    Sodium 138 136 - 145 mmol/L    Potassium 4.8 3.4 - 5.3 mmol/L    Chloride 101 98 - 107 mmol/L    Carbon Dioxide (CO2) 29 22 - 29 mmol/L    Anion Gap 8 7 - 15 mmol/L    Urea Nitrogen 19.1 8.0 - 23.0 mg/dL    Creatinine 0.88 0.67 - 1.17 mg/dL    Calcium 9.6 8.8 - 10.2 mg/dL    Glucose 73 70 - 99 mg/dL    Alkaline Phosphatase 95 40 - 129 U/L    AST 21 0 - 45 U/L      Comment:      Reference intervals for this test were updated on 6/12/2023 to more accurately reflect our healthy population. There may be differences in the flagging of prior results with similar values performed with this method. Interpretation of those prior results can be made in the context of the updated reference intervals.    ALT 14 0 - 70 U/L      Comment:      Reference intervals for this test were updated on 6/12/2023 to more accurately reflect our healthy population. There may be differences in the flagging of prior results with similar values performed with this method. Interpretation of those prior results can be made in the context of the updated reference intervals.      Protein Total 7.0 6.4 - 8.3 g/dL    Albumin 4.2 3.5 - 5.2 g/dL    Bilirubin Total 0.8 <=1.2 mg/dL    GFR Estimate 87 >60 mL/min/1.73m2   Iron & Iron Binding Capacity   Result Value Ref Range    Iron 84 61 - 157 ug/dL    Iron Binding Capacity 380 240 - 430 ug/dL    Iron Sat Index  22 15 - 46 %   Ferritin   Result Value Ref Range    Ferritin 79 31 - 409 ng/mL   CBC with platelets and differential   Result Value Ref Range    WBC Count 4.4 4.0 - 11.0 10e3/uL    RBC Count 3.58 (L) 4.40 - 5.90 10e6/uL    Hemoglobin 11.3 (L) 13.3 - 17.7 g/dL    Hematocrit 35.8 (L) 40.0 - 53.0 %     78 - 100 fL    MCH 31.6 26.5 - 33.0 pg    MCHC 31.6 31.5 - 36.5 g/dL    RDW 18.3 (H) 10.0 - 15.0 %    Platelet Count 118 (L) 150 - 450 10e3/uL    % Neutrophils 68 %    % Lymphocytes 18 %    % Monocytes 11 %    % Eosinophils 3 %    % Basophils 1 %    % Immature Granulocytes 0 %    Absolute Neutrophils 3.0 1.6 - 8.3 10e3/uL    Absolute Lymphocytes 0.8 0.8 - 5.3 10e3/uL    Absolute Monocytes 0.5 0.0 - 1.3 10e3/uL    Absolute Eosinophils 0.1 0.0 - 0.7 10e3/uL    Absolute Basophils 0.0 0.0 - 0.2 10e3/uL    Absolute Immature Granulocytes 0.0 <=0.4 10e3/uL   HIV Antigen Antibody Combo   Result Value Ref Range    HIV Antigen Antibody Combo Nonreactive Nonreactive      Comment:      HIV-1 p24 Ag & HIV-1/HIV-2 Ab Not Detected   Vitamin B12   Result Value Ref Range    Vitamin B12 566 232 - 1,245 pg/mL                       These labs all look good.  B12 low levels are adequate but you might benefit from some additional B12 supplementation.  I would suggest starting a once daily B12 supplement.  You can find this over-the-counter.  I would recommend a dose of the 1000 mcg daily.  Iron levels are excellent so I would not recommend any additional iron supplementation.  Inflammatory levels normal.  Nothing in this lab work that would explain why your platelet count is continuing to drop.  I would recommend a consult with the hematologist (blood specialist) to see if we can figure out why this is happening.  It is possible that it may be related to your Lamictal but I am concerned that making dose adjustments may negatively impact your mood so I do not want to make changes there unless hematology feels that is what is driving the  low platelet count.  I put in a consult order.  Someone from their office should be contacting you to schedule this.  Please also be sure he gets peripheral smear and SPEP done next week when he sees Dr. Chapin.  This is going to be important for hematology to see to help with diagnosis.   If you have any questions or concerns, please call the clinic at the number listed above.     Results were also sent in [employer]   Sincerely,      Lizzy Leiva MD

## 2023-09-14 DIAGNOSIS — D69.6 THROMBOCYTOPENIA (H): Primary | ICD-10-CM

## 2023-09-14 LAB
HIV 1+2 AB+HIV1 P24 AG SERPL QL IA: NONREACTIVE
VIT B12 SERPL-MCNC: 566 PG/ML (ref 232–1245)

## 2023-09-14 NOTE — RESULT ENCOUNTER NOTE
Notified via My Chart.  Please be sure he is aware of the information I sent via [employer]:    These labs all look good.  B12 low levels are adequate but you might benefit from some additional B12 supplementation.  I would suggest starting a once daily B12 supplement.  You can find this over-the-counter.  I would recommend a dose of the 1000 mcg daily.  Iron levels are excellent so I would not recommend any additional iron supplementation.  Inflammatory levels normal.  Nothing in this lab work that would explain why your platelet count is continuing to drop.  I would recommend a consult with the hematologist (blood specialist) to see if we can figure out why this is happening.  It is possible that it may be related to your Lamictal but I am concerned that making dose adjustments may negatively impact your mood so I do not want to make changes there unless hematology feels that is what is driving the low platelet count.  I put in a consult order.  Someone from their office should be contacting you to schedule this.  Please also be sure he gets peripheral smear and SPEP done next week when he sees Dr. Chapin.  This is going to be important for hematology to see to help with diagnosis.

## 2023-09-15 ENCOUNTER — PATIENT OUTREACH (OUTPATIENT)
Dept: ONCOLOGY | Facility: CLINIC | Age: 78
End: 2023-09-15
Payer: COMMERCIAL

## 2023-09-15 NOTE — PROGRESS NOTES
Hematology referral reviewed for [facility], see below.    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 Referred in August 2022 for pancytopenia, no appt scheduled.    Current abnormal labs: Available in Chart Review    Outreach: Adilenet sent to patient    Plan: Triage instructions updated and sent to NPS for completion.

## 2023-09-18 DIAGNOSIS — F32.1 MODERATE MAJOR DEPRESSION (H): ICD-10-CM

## 2023-09-18 RX ORDER — LAMOTRIGINE 25 MG/1
50 TABLET ORAL 2 TIMES DAILY
Qty: 120 TABLET | Refills: 1 | OUTPATIENT
Start: 2023-09-18

## 2023-09-20 ENCOUNTER — OFFICE VISIT (OUTPATIENT)
Dept: CARDIOLOGY | Facility: CLINIC | Age: 78
End: 2023-09-20
Attending: INTERNAL MEDICINE
Payer: COMMERCIAL

## 2023-09-20 VITALS
HEART RATE: 93 BPM | HEIGHT: 68 IN | OXYGEN SATURATION: 100 % | BODY MASS INDEX: 24.1 KG/M2 | SYSTOLIC BLOOD PRESSURE: 131 MMHG | DIASTOLIC BLOOD PRESSURE: 82 MMHG | WEIGHT: 159 LBS | RESPIRATION RATE: 16 BRPM

## 2023-09-20 DIAGNOSIS — R06.09 DYSPNEA ON EXERTION: ICD-10-CM

## 2023-09-20 DIAGNOSIS — I48.20 CHRONIC ATRIAL FIBRILLATION (H): ICD-10-CM

## 2023-09-20 PROCEDURE — 99215 OFFICE O/P EST HI 40 MIN: CPT | Performed by: INTERNAL MEDICINE

## 2023-09-20 NOTE — PROGRESS NOTES
HPI:     This is a pleasant 78 yr old with PMH AF, HTN, HLD, COVID + in September 2021, Moderate MR, TR here for followup.     He developed AFib after undergoing thoractomy for empyema 11/2017. Was started on Xarelto and cardioverted. Came to clinic 10/19/21 with palpitations--EKG done and showed AFib. Started on metoprolol. Zio showed AFib (100% burden) with avg HR 83.     ROS negative for chest pain. + palpitations, [place], mild LE edema. Today he is here to review studies, prior labs. He is active daily in summer with getting boat ready (has a pontoon) and stays active on treadmill in the winter. His breathing overall stable since last visit. No longer taking prn lasix.    Increased metoprolol 100 mg bid. No swelling in legs, no needed prn lasix. Just had hip replacememtn. No bleeding.     I personally reviewed patient's annual lab values today, in addition to last available echocardiogram and EKG images which included my own interpretation of results in order to formulate plan. His MR and TR have been stable overall. XR with chamber enlargement.     ASSESSMENT/PLAN:     1. AFIB: Average heart rates on Holter monitor in the 80s, max 115 bpm which is well controlled  -continue rate control for now, 100% burden and severe LA  -metoprolol 100 mg twice a day  -repeated echocardiogram given [place] and evaluate for tachy-mediated cardiomyopathy which he doesn't have  -continue rivaroxaban indefinitely   -will continue lasix 20 mg daily as needed, walking on treadmill daily      2. HTN: controlled, on metoprolol 100 mg bid      3. HLD: on statin, yearly lipids - controlled     4. MR and TR: stable, will follow by echocardiogram once a year     Sarah Chapin MD MSc  [facility]    Total time spent on complex clinic patient visit is more than 45 minutes, which includes face-to-face patient evaluation and physical exam, review of imaging studies and laboratory data, counseling with patient, review of outside  records, and documentation of patient encounter       PAST MEDICAL HISTORY  Past Medical History:   Diagnosis Date    Arthritis 2005    Benign neoplasm of prostate 2000    Benign Prostate Nodule    Depressive disorder     past condition    Infection due to 2019 novel coronavirus 09/27/2021    S/P knee replacement 11/06/2012    S/P left knee arthroscopy 08/15/2011       CURRENT MEDICATIONS  Current Outpatient Medications   Medication Sig Dispense Refill    acetaminophen (TYLENOL) 325 MG tablet Take 2 tablets (650 mg) by mouth every 4 hours as needed for other (mild pain) 100 tablet 0    buPROPion (WELLBUTRIN SR) 150 MG 12 hr tablet Take 1 tablet (150 mg) by mouth 2 times daily 180 tablet 3    busPIRone HCl (BUSPAR) 30 MG tablet TAKE 1 TABLET TWICE A  tablet 1    doxazosin (CARDURA) 8 MG tablet TAKE ONE-HALF (1/2) TABLET AT BEDTIME 90 tablet 1    doxylamine (UNISOM) 25 MG TABS tablet Take 25 mg by mouth At Bedtime [place] Sleep-Aid      finasteride (PROSCAR) 5 MG tablet TAKE 1 TABLET DAILY 90 tablet 3    furosemide (LASIX) 20 MG tablet Take 1 tablet (20 mg) by mouth daily 90 tablet 3    gabapentin (NEURONTIN) 300 MG capsule Take 1 capsule (300 mg) by mouth 3 times daily In addition to 600mg capsul. 90 capsule 3    gabapentin (NEURONTIN) 600 MG tablet Take 1 tablet (600 mg) by mouth 3 times daily 360 tablet 3    lamoTRIgine (LAMICTAL) 25 MG tablet TAKE 2 TABLETS TWICE A  tablet 3    metoprolol tartrate (LOPRESSOR) 100 MG tablet Take 1 tablet (100 mg) by mouth 2 times daily 180 tablet 4    multivitamin (ONE-DAILY) tablet Take 1 tablet by mouth daily 30 tablet 0    XARELTO ANTICOAGULANT 20 MG TABS tablet TAKE 1 TABLET DAILY WITH   DINNER 90 tablet 3       PAST SURGICAL HISTORY:  Past Surgical History:   Procedure Laterality Date    ABDOMEN SURGERY  2003    ARTHROPLASTY KNEE Right 10/12/2018    Procedure: ARTHROPLASTY KNEE;  Right Total Knee Arthroplasty;  Surgeon: Jeb Peralta MD;  Location: [facility]  OR    ARTHROPLASTY REVISION HIP Left 5/25/2023    Procedure: Revision total hip arthroplasty, left;  Surgeon: Chandler Leiva MD;  Location: [facility]    BIOPSY  2007    COLONOSCOPY N/A 12/10/2015    Procedure: COMBINED COLONOSCOPY, SINGLE OR MULTIPLE BIOPSY/POLYPECTOMY BY BIOPSY;  Surgeon: Jyoti Figueroa MD;  Location: [facility]    JOINT REPLACEMENT, HIP RT/LT  10/2007    Joint Replacement Hip LT    JOINT REPLACEMTN, KNEE RT/LT  08/2011    Joint Replacement knee /LT, [facility]    LAPAROSCOPIC HERNIORRHAPHY INGUINAL BILATERAL Bilateral 04/24/2018    Procedure: LAPAROSCOPIC HERNIORRHAPHY INGUINAL BILATERAL;  Laparoscopic bilateral inguinal hernia repair;  Surgeon: Josemanuel Resendiz MD;  Location: [facility]    PHACOEMULSIFICATION WITH STANDARD INTRAOCULAR LENS IMPLANT Right 01/06/2021    Procedure: Cataract Removal with Implant;  Surgeon: Regan Kaufman MD;  Location: [facility]    PHACOEMULSIFICATION WITH STANDARD INTRAOCULAR LENS IMPLANT Left 02/17/2021    Procedure: Cataract Removal with Implant;  Surgeon: Regan Kaufman MD;  Location: [facility]    SURGICAL HISTORY OF -   12/01/1999    Umbilical Herniorrhaphy with mesh    SURGICAL HISTORY OF -  Left 11/2017    Thoracotomy and drainage of empyema       ALLERGIES     Allergies   Allergen Reactions    Bactrim [Sulfamethoxazole-Trimethoprim] Nausea and Vomiting       FAMILY HISTORY  Family History   Problem Relation Age of Onset    Depression Mother     Cerebrovascular Disease Mother     Breast Cancer Mother     Neurologic Disorder Mother         parkinsons    Parkinsonism Mother     Respiratory Father         emphyzema    Diabetes Brother        SOCIAL HISTORY  Social History     Socioeconomic History    Marital status:      Spouse name: Not on file    Number of children: Not on file    Years of education: Not on file    Highest education level: Not on file   Occupational History    Not on file   Tobacco Use    Smoking status: Former      "Packs/day: 1.00     Years: 15.00     Pack years: 15.00     Types: Cigarettes     Start date: 1958     Quit date: 10/13/1975     Years since quittin.9    Smokeless tobacco: Never   Vaping Use    Vaping Use: Never used   Substance and Sexual Activity    Alcohol use: Yes    Drug use: No    Sexual activity: Yes     Partners: Female     Birth control/protection: None     Comment:  51 years   Other Topics Concern    Parent/sibling w/ CABG, MI or angioplasty before 65F 55M? No   Social History Narrative    Not on file     Social Determinants of Health     Financial Resource Strain: Not on file   Food Insecurity: Not on file   Transportation Needs: Not on file   Physical Activity: Not on file   Stress: Not on file   Social Connections: Not on file   Interpersonal Safety: Not on file   Housing Stability: Not on file       ROS:   Constitutional: No fever, chills, or sweats. No weight gain/loss   ENT: No visual disturbance, ear ache, epistaxis, sore throat  Allergies/Immunologic: Negative  Respiratory: No cough, hemoptysia  Cardiovascular: As per HPI  GI: No nausea, vomiting, hematemesis, melena, or hematochezia  : No urinary frequency, dysuria, or hematuria  Integument: Negative  Psychiatric: Negative  Neuro: Negative  Endocrinology: Negative   Musculoskeletal: Negative  Vascular: No walking impairment, claudication, ischemic rest pain or nonhealing wounds    EXAM:  /82 (BP Location: Right arm, Patient Position: Sitting, Cuff Size: Adult Regular)   Pulse 93   Resp 16   Ht 1.727 m (5' 7.99\")   Wt 72.1 kg (159 lb)   SpO2 100%   BMI 24.18 kg/m    In general, the patient is a pleasant male in no apparent distress.    HEENT: NC/AT.  PERRLA.  EOMI.  Sclerae white, not injected.   Neck: No adenopathy.  No thyromegaly. Carotids +2/2 bilaterally without bruits.  No jugular venous distension.   Heart: RRR. Normal S1, S2 splits physiologically. + murmur, rub, click, or gallop. The PMI is in the 5th ICS in " the midclavicular line. There is no heave.    Lungs: CTA.  No ronchi, wheezes, rales.  No dullness to percussion.   Abdomen: Soft, nontender, nondistended.   Extremities: No clubbing, cyanosis, or edema.    Vascular: No bruits are noted.    Labs:  LIPID RESULTS:  Lab Results   Component Value Date    CHOL 139 11/04/2022    CHOL 231 (H) 10/09/2017    HDL 37 (L) 11/04/2022    HDL 71 10/09/2017    LDL 88 11/04/2022     (H) 10/09/2017    TRIG 72 11/04/2022    TRIG 242 (H) 10/09/2017    CHOLHDLRATIO 3.0 10/07/2013    NHDL 102 11/04/2022    NHDL 160 (H) 10/09/2017       LIVER ENZYME RESULTS:  Lab Results   Component Value Date    AST 21 09/13/2023    AST 18 06/03/2021    ALT 14 09/13/2023    ALT 23 06/03/2021       CBC RESULTS:  Lab Results   Component Value Date    WBC 4.4 09/13/2023    WBC 5.7 06/03/2021    RBC 3.58 (L) 09/13/2023    RBC 3.62 (L) 06/03/2021    HGB 11.3 (L) 09/13/2023    HGB 12.3 (L) 06/03/2021    HCT 35.8 (L) 09/13/2023    HCT 35.6 (L) 06/03/2021     09/13/2023    MCV 98 06/03/2021    MCH 31.6 09/13/2023    MCH 34.0 (H) 06/03/2021    MCHC 31.6 09/13/2023    MCHC 34.6 06/03/2021    RDW 18.3 (H) 09/13/2023    RDW 13.5 06/03/2021     (L) 09/13/2023     06/03/2021       BMP RESULTS:  Lab Results   Component Value Date     09/13/2023     (L) 06/03/2021    POTASSIUM 4.8 09/13/2023    POTASSIUM 4.2 07/26/2022    POTASSIUM 4.5 06/03/2021    CHLORIDE 101 09/13/2023    CHLORIDE 103 07/26/2022    CHLORIDE 97 06/03/2021    CO2 29 09/13/2023    CO2 30 07/26/2022    CO2 30 06/03/2021    ANIONGAP 8 09/13/2023    ANIONGAP 2 (L) 07/26/2022    ANIONGAP 5 06/03/2021    GLC 73 09/13/2023     (H) 07/26/2022    GLC 96 06/03/2021    BUN 19.1 09/13/2023    BUN 12 07/26/2022    BUN 14 06/03/2021    CR 0.88 09/13/2023    CR 0.91 06/03/2021    GFRESTIMATED 87 09/13/2023    GFRESTIMATED 80 06/03/2021    GFRESTBLACK >90 06/03/2021    LUIS 9.6 09/13/2023    LUIS 8.9 06/03/2021        A1C  RESULTS:  No results found for: A1C

## 2023-09-20 NOTE — LETTER
9/20/2023    Lizzy Leiva MD  [address]  [facility] [address]    RE: Josemanuel Edmond       Dear Colleague,     I had the pleasure of seeing Josemanuel Edmond in the [facility].      HPI:     This is a pleasant 78 yr old with PMH AF, HTN, HLD, COVID + in September 2021, Moderate MR, TR here for followup.     He developed AFib after undergoing thoractomy for empyema 11/2017. Was started on Xarelto and cardioverted. Came to clinic 10/19/21 with palpitations--EKG done and showed AFib. Started on metoprolol. Zio showed AFib (100% burden) with avg HR 83.     ROS negative for chest pain. + palpitations, [place], mild LE edema. Today he is here to review studies, prior labs. He is active daily in summer with getting boat ready (has a pontoon) and stays active on treadmill in the winter. His breathing overall stable since last visit. No longer taking prn lasix.    Increased metoprolol 100 mg bid. No swelling in legs, no needed prn lasix. Just had hip replacememtn. No bleeding.     I personally reviewed patient's annual lab values today, in addition to last available echocardiogram and EKG images which included my own interpretation of results in order to formulate plan. His MR and TR have been stable overall. XR with chamber enlargement.     ASSESSMENT/PLAN:     1. AFIB: Average heart rates on Holter monitor in the 80s, max 115 bpm which is well controlled  -continue rate control for now, 100% burden and severe LA  -metoprolol 100 mg twice a day  -repeated echocardiogram given [place] and evaluate for tachy-mediated cardiomyopathy which he doesn't have  -continue rivaroxaban indefinitely   -will continue lasix 20 mg daily as needed, walking on treadmill daily      2. HTN: controlled, on metoprolol 100 mg bid      3. HLD: on statin, yearly lipids - controlled     4. MR and TR: stable, will follow by echocardiogram once a year     Sarah Chapin MD MSc  [facility]    Total time  spent on complex clinic patient visit is more than 45 minutes, which includes face-to-face patient evaluation and physical exam, review of imaging studies and laboratory data, counseling with patient, review of outside records, and documentation of patient encounter       PAST MEDICAL HISTORY  Past Medical History:   Diagnosis Date    Arthritis 2005    Benign neoplasm of prostate 2000    Benign Prostate Nodule    Depressive disorder     past condition    Infection due to 2019 novel coronavirus 09/27/2021    S/P knee replacement 11/06/2012    S/P left knee arthroscopy 08/15/2011       CURRENT MEDICATIONS  Current Outpatient Medications   Medication Sig Dispense Refill    acetaminophen (TYLENOL) 325 MG tablet Take 2 tablets (650 mg) by mouth every 4 hours as needed for other (mild pain) 100 tablet 0    buPROPion (WELLBUTRIN SR) 150 MG 12 hr tablet Take 1 tablet (150 mg) by mouth 2 times daily 180 tablet 3    busPIRone HCl (BUSPAR) 30 MG tablet TAKE 1 TABLET TWICE A  tablet 1    doxazosin (CARDURA) 8 MG tablet TAKE ONE-HALF (1/2) TABLET AT BEDTIME 90 tablet 1    doxylamine (UNISOM) 25 MG TABS tablet Take 25 mg by mouth At Bedtime [place] [facility]      finasteride (PROSCAR) 5 MG tablet TAKE 1 TABLET DAILY 90 tablet 3    furosemide (LASIX) 20 MG tablet Take 1 tablet (20 mg) by mouth daily 90 tablet 3    gabapentin (NEURONTIN) 300 MG capsule Take 1 capsule (300 mg) by mouth 3 times daily In addition to 600mg capsul. 90 capsule 3    gabapentin (NEURONTIN) 600 MG tablet Take 1 tablet (600 mg) by mouth 3 times daily 360 tablet 3    lamoTRIgine (LAMICTAL) 25 MG tablet TAKE 2 TABLETS TWICE A  tablet 3    metoprolol tartrate (LOPRESSOR) 100 MG tablet Take 1 tablet (100 mg) by mouth 2 times daily 180 tablet 4    multivitamin (ONE-DAILY) tablet Take 1 tablet by mouth daily 30 tablet 0    XARELTO ANTICOAGULANT 20 MG TABS tablet TAKE 1 TABLET DAILY WITH   DINNER 90 tablet 3       PAST SURGICAL HISTORY:  Past  Surgical History:   Procedure Laterality Date    ABDOMEN SURGERY  2003    ARTHROPLASTY KNEE Right 10/12/2018    Procedure: ARTHROPLASTY KNEE;  Right Total Knee Arthroplasty;  Surgeon: Jeb Peralta MD;  Location: [facility]    ARTHROPLASTY REVISION HIP Left 5/25/2023    Procedure: Revision total hip arthroplasty, left;  Surgeon: Chandler Leiva MD;  Location: [facility]    BIOPSY  2007    COLONOSCOPY N/A 12/10/2015    Procedure: COMBINED COLONOSCOPY, SINGLE OR MULTIPLE BIOPSY/POLYPECTOMY BY BIOPSY;  Surgeon: Jyoti Figueroa MD;  Location: [facility]    JOINT REPLACEMENT, HIP RT/LT  10/2007    Joint Replacement Hip LT    JOINT REPLACEMTN, KNEE RT/LT  08/2011    Joint Replacement knee /LT, [facility]    LAPAROSCOPIC HERNIORRHAPHY INGUINAL BILATERAL Bilateral 04/24/2018    Procedure: LAPAROSCOPIC HERNIORRHAPHY INGUINAL BILATERAL;  Laparoscopic bilateral inguinal hernia repair;  Surgeon: Josemanuel Resendiz MD;  Location: [facility]    PHACOEMULSIFICATION WITH STANDARD INTRAOCULAR LENS IMPLANT Right 01/06/2021    Procedure: Cataract Removal with Implant;  Surgeon: Regan Kaufman MD;  Location: [facility]    PHACOEMULSIFICATION WITH STANDARD INTRAOCULAR LENS IMPLANT Left 02/17/2021    Procedure: Cataract Removal with Implant;  Surgeon: Regan Kaufman MD;  Location: [facility]    SURGICAL HISTORY OF -   12/01/1999    Umbilical Herniorrhaphy with mesh    SURGICAL HISTORY OF -  Left 11/2017    Thoracotomy and drainage of empyema       ALLERGIES     Allergies   Allergen Reactions    Bactrim [Sulfamethoxazole-Trimethoprim] Nausea and Vomiting       FAMILY HISTORY  Family History   Problem Relation Age of Onset    Depression Mother     Cerebrovascular Disease Mother     Breast Cancer Mother     Neurologic Disorder Mother         parkinsons    Parkinsonism Mother     Respiratory Father         emphyzema    Diabetes Brother        SOCIAL HISTORY  Social History     Socioeconomic History    Marital status:      " Spouse name: Not on file    Number of children: Not on file    Years of education: Not on file    Highest education level: Not on file   Occupational History    Not on file   Tobacco Use    Smoking status: Former     Packs/day: 1.00     Years: 15.00     Pack years: 15.00     Types: Cigarettes     Start date: 1958     Quit date: 10/13/1975     Years since quittin.9    Smokeless tobacco: Never   Vaping Use    Vaping Use: Never used   Substance and Sexual Activity    Alcohol use: Yes    Drug use: No    Sexual activity: Yes     Partners: Female     Birth control/protection: None     Comment:  51 years   Other Topics Concern    Parent/sibling w/ CABG, MI or angioplasty before 65F 55M? No   Social History Narrative    Not on file     Social Determinants of Health     Financial Resource Strain: Not on file   Food Insecurity: Not on file   Transportation Needs: Not on file   Physical Activity: Not on file   Stress: Not on file   Social Connections: Not on file   Interpersonal Safety: Not on file   Housing Stability: Not on file       ROS:   Constitutional: No fever, chills, or sweats. No weight gain/loss   ENT: No visual disturbance, ear ache, epistaxis, sore throat  Allergies/Immunologic: Negative  Respiratory: No cough, hemoptysia  Cardiovascular: As per HPI  GI: No nausea, vomiting, hematemesis, melena, or hematochezia  : No urinary frequency, dysuria, or hematuria  Integument: Negative  Psychiatric: Negative  Neuro: Negative  Endocrinology: Negative   Musculoskeletal: Negative  Vascular: No walking impairment, claudication, ischemic rest pain or nonhealing wounds    EXAM:  /82 (BP Location: Right arm, Patient Position: Sitting, Cuff Size: Adult Regular)   Pulse 93   Resp 16   Ht 1.727 m (5' 7.99\")   Wt 72.1 kg (159 lb)   SpO2 100%   BMI 24.18 kg/m    In general, the patient is a pleasant male in no apparent distress.    HEENT: NC/AT.  PERRLA.  EOMI.  Sclerae white, not injected.   Neck: No " adenopathy.  No thyromegaly. Carotids +2/2 bilaterally without bruits.  No jugular venous distension.   Heart: RRR. Normal S1, S2 splits physiologically. + murmur, rub, click, or gallop. The PMI is in the 5th ICS in the midclavicular line. There is no heave.    Lungs: CTA.  No ronchi, wheezes, rales.  No dullness to percussion.   Abdomen: Soft, nontender, nondistended.   Extremities: No clubbing, cyanosis, or edema.    Vascular: No bruits are noted.    Labs:  LIPID RESULTS:  Lab Results   Component Value Date    CHOL 139 11/04/2022    CHOL 231 (H) 10/09/2017    HDL 37 (L) 11/04/2022    HDL 71 10/09/2017    LDL 88 11/04/2022     (H) 10/09/2017    TRIG 72 11/04/2022    TRIG 242 (H) 10/09/2017    CHOLHDLRATIO 3.0 10/07/2013    NHDL 102 11/04/2022    NHDL 160 (H) 10/09/2017       LIVER ENZYME RESULTS:  Lab Results   Component Value Date    AST 21 09/13/2023    AST 18 06/03/2021    ALT 14 09/13/2023    ALT 23 06/03/2021       CBC RESULTS:  Lab Results   Component Value Date    WBC 4.4 09/13/2023    WBC 5.7 06/03/2021    RBC 3.58 (L) 09/13/2023    RBC 3.62 (L) 06/03/2021    HGB 11.3 (L) 09/13/2023    HGB 12.3 (L) 06/03/2021    HCT 35.8 (L) 09/13/2023    HCT 35.6 (L) 06/03/2021     09/13/2023    MCV 98 06/03/2021    MCH 31.6 09/13/2023    MCH 34.0 (H) 06/03/2021    MCHC 31.6 09/13/2023    MCHC 34.6 06/03/2021    RDW 18.3 (H) 09/13/2023    RDW 13.5 06/03/2021     (L) 09/13/2023     06/03/2021       BMP RESULTS:  Lab Results   Component Value Date     09/13/2023     (L) 06/03/2021    POTASSIUM 4.8 09/13/2023    POTASSIUM 4.2 07/26/2022    POTASSIUM 4.5 06/03/2021    CHLORIDE 101 09/13/2023    CHLORIDE 103 07/26/2022    CHLORIDE 97 06/03/2021    CO2 29 09/13/2023    CO2 30 07/26/2022    CO2 30 06/03/2021    ANIONGAP 8 09/13/2023    ANIONGAP 2 (L) 07/26/2022    ANIONGAP 5 06/03/2021    GLC 73 09/13/2023     (H) 07/26/2022    GLC 96 06/03/2021    BUN 19.1 09/13/2023    BUN 12  07/26/2022    BUN 14 06/03/2021    CR 0.88 09/13/2023    CR 0.91 06/03/2021    GFRESTIMATED 87 09/13/2023    GFRESTIMATED 80 06/03/2021    GFRESTBLACK >90 06/03/2021    LUIS 9.6 09/13/2023    LUIS 8.9 06/03/2021        A1C RESULTS:  No results found for: A1C        Thank you for allowing me to participate in the care of your patient.      Sincerely,     Sarah Chapin MD     [facility]  cc:   Sarah Chapin MD  [address] [place] [id number]

## 2023-09-26 ENCOUNTER — LAB (OUTPATIENT)
Dept: LAB | Facility: CLINIC | Age: 78
End: 2023-09-26
Payer: COMMERCIAL

## 2023-09-26 DIAGNOSIS — R39.12 BENIGN PROSTATIC HYPERPLASIA WITH WEAK URINARY STREAM: ICD-10-CM

## 2023-09-26 DIAGNOSIS — D64.9 ANEMIA, UNSPECIFIED TYPE: ICD-10-CM

## 2023-09-26 DIAGNOSIS — I48.20 CHRONIC ATRIAL FIBRILLATION (H): ICD-10-CM

## 2023-09-26 DIAGNOSIS — N40.1 BENIGN PROSTATIC HYPERPLASIA WITH WEAK URINARY STREAM: ICD-10-CM

## 2023-09-26 DIAGNOSIS — D69.6 THROMBOCYTOPENIA (H): ICD-10-CM

## 2023-09-26 LAB
BASOPHILS # BLD AUTO: 0 10E3/UL (ref 0–0.2)
BASOPHILS NFR BLD AUTO: 0 %
EOSINOPHIL # BLD AUTO: 0.1 10E3/UL (ref 0–0.7)
EOSINOPHIL NFR BLD AUTO: 3 %
ERYTHROCYTE [DISTWIDTH] IN BLOOD BY AUTOMATED COUNT: 19.2 % (ref 10–15)
HCT VFR BLD AUTO: 34.1 % (ref 40–53)
HGB BLD-MCNC: 10.9 G/DL (ref 13.3–17.7)
IMM GRANULOCYTES # BLD: 0 10E3/UL
IMM GRANULOCYTES NFR BLD: 0 %
LYMPHOCYTES # BLD AUTO: 0.9 10E3/UL (ref 0.8–5.3)
LYMPHOCYTES NFR BLD AUTO: 19 %
MCH RBC QN AUTO: 32.7 PG (ref 26.5–33)
MCHC RBC AUTO-ENTMCNC: 32 G/DL (ref 31.5–36.5)
MCV RBC AUTO: 102 FL (ref 78–100)
MONOCYTES # BLD AUTO: 0.5 10E3/UL (ref 0–1.3)
MONOCYTES NFR BLD AUTO: 10 %
NEUTROPHILS # BLD AUTO: 3.2 10E3/UL (ref 1.6–8.3)
NEUTROPHILS NFR BLD AUTO: 67 %
PLATELET # BLD AUTO: 118 10E3/UL (ref 150–450)
RBC # BLD AUTO: 3.33 10E6/UL (ref 4.4–5.9)
RETICS # AUTO: 0.08 10E6/UL (ref 0.03–0.1)
RETICS/RBC NFR AUTO: 2.3 % (ref 0.5–2)
WBC # BLD AUTO: 4.8 10E3/UL (ref 4–11)

## 2023-09-26 PROCEDURE — 36415 COLL VENOUS BLD VENIPUNCTURE: CPT

## 2023-09-26 PROCEDURE — 86803 HEPATITIS C AB TEST: CPT

## 2023-09-26 PROCEDURE — 85025 COMPLETE CBC W/AUTO DIFF WBC: CPT

## 2023-09-26 PROCEDURE — 99207 BLOOD MORPHOLOGY PATHOLOGIST REVIEW: CPT | Performed by: PATHOLOGY

## 2023-09-26 PROCEDURE — 84155 ASSAY OF PROTEIN SERUM: CPT

## 2023-09-26 PROCEDURE — 84165 PROTEIN E-PHORESIS SERUM: CPT

## 2023-09-26 PROCEDURE — 85045 AUTOMATED RETICULOCYTE COUNT: CPT

## 2023-09-27 LAB
ALBUMIN SERPL ELPH-MCNC: 4 G/DL (ref 3.7–5.1)
ALPHA1 GLOB SERPL ELPH-MCNC: 0.3 G/DL (ref 0.2–0.4)
ALPHA2 GLOB SERPL ELPH-MCNC: 0.6 G/DL (ref 0.5–0.9)
B-GLOBULIN SERPL ELPH-MCNC: 0.8 G/DL (ref 0.6–1)
GAMMA GLOB SERPL ELPH-MCNC: 1 G/DL (ref 0.7–1.6)
HCV AB SERPL QL IA: NONREACTIVE
M PROTEIN SERPL ELPH-MCNC: 0 G/DL
PATH REPORT.COMMENTS IMP SPEC: NORMAL
PATH REPORT.FINAL DX SPEC: NORMAL
PATH REPORT.MICROSCOPIC SPEC OTHER STN: NORMAL
PATH REPORT.MICROSCOPIC SPEC OTHER STN: NORMAL
PROT PATTERN SERPL ELPH-IMP: NORMAL
TOTAL PROTEIN SERUM FOR ELP: 6.7 G/DL (ref 6.4–8.3)

## 2023-09-27 NOTE — PROGRESS NOTES
Hematology/Medical Oncology Consultation Note      October 5, 2023    ADDENDUM: Called regarding satisfactory PSA. Domo Casillas MD (10/5/2023)      Referring provider:  Lizzy Leiva MD    Reason for visit:  Josemanuel Edmond is a 78 year old retired self-employed floor covering business owner from [place] who is referred for hematologic evaluation and consultation regarding recent edification of mild normocytic anemia and thrombocytopenia.    Impression:  Recent indeterminate anemia and thrombocytopenia; this could represent an early myelodysplastic syndrome.  However, his hemoglobin has seemed to recover a bit.    Recommendation, plan, instructions:  Check PSA  We discussed the possibility of doing a bone marrow aspirate and biopsy.  However, I also indicated that the result probably would not  at this time if a myelodysplastic syndrome was identified.  The patient requests and I am agreeable to follow-up in 3 months with CBC and CMP before appointment.  I think this is a reasonable compromise.    Time with patient including review, documentation, history, examination, coordination of care and counseling was 40 minutes.    History of present illness:  9/13/2023 blood tests revealed hemoglobin 11.3, , white count 4,400, platelets 118,000, absolute neutrophil count 3,000, with a normal CMP, ELP, serum iron/TIBC, TSH, CRP, sed rate, ferritin, HIV, B12, and hepatitis C antibody.    In retrospect, thrombocytopenia has been noted intermittently since June, 2022 and moderate anemia has been noted since December, 2017 with a prior July, 2011 hemoglobin of 14.9.  A 8/2022 CT abdomen pelvis revealed diffuse hepatic steatosis without splenomegaly.  His wife and the patient had a significant COVID-19 infection in September, 2021 but did not require hospitalization.  He had underwent a left total hip arthroplasty in May, 2023 which required a red cell transfusion postoperatively.    He had  drank vodka heavily up to about half pint per day intermittently before 2017 for several years but has been abstinent since then.    He has a history of paroxysmal atrial fibrillation on rivaroxaban and metoprolol, moderate major depression and recent 10 pound unexplained weight loss but it has recovered somewhat. A 2018 colonoscopy was negative except for a diminutive hyperplastic polyp.    Past medical history:  Past Medical History:   Diagnosis Date    Arthritis 2005    Benign neoplasm of prostate 2000    Benign Prostate Nodule    Depressive disorder     past condition    Infection due to 2019 novel coronavirus 09/27/2021    S/P knee replacement 11/06/2012    S/P left knee arthroscopy 08/15/2011       Family history:  I have reviewed this patient's family history and updated it with pertinent information if needed.  Family History   Problem Relation Age of Onset    Depression Mother     Cerebrovascular Disease Mother     Breast Cancer Mother     Neurologic Disorder Mother         parkinsons    Parkinsonism Mother     Respiratory Father         emphyzema    Diabetes Brother          Medications:  Current Outpatient Medications   Medication    acetaminophen (TYLENOL) 325 MG tablet    buPROPion (WELLBUTRIN SR) 150 MG 12 hr tablet    busPIRone HCl (BUSPAR) 30 MG tablet    doxazosin (CARDURA) 8 MG tablet    doxylamine (UNISOM) 25 MG TABS tablet    finasteride (PROSCAR) 5 MG tablet    furosemide (LASIX) 20 MG tablet    gabapentin (NEURONTIN) 300 MG capsule    gabapentin (NEURONTIN) 600 MG tablet    hydrOXYzine (ATARAX) 25 MG tablet    lamoTRIgine (LAMICTAL) 25 MG tablet    metoprolol tartrate (LOPRESSOR) 100 MG tablet    multivitamin (ONE-DAILY) tablet    XARELTO ANTICOAGULANT 20 MG TABS tablet     No current facility-administered medications for this visit.       Allergies:  Allergies   Allergen Reactions    Bactrim [Sulfamethoxazole-Trimethoprim] Nausea and Vomiting       Review of systems:  Hard of hearing uses  "hearing aids.    Except as noted in the note above, the patient denies headaches, diplopia, hearing loss or dizziness; dyspnea, cough, hemoptysis, pleurisy; chest pain/pressure, palpitations, lightheadedness; anorexia, nausea, vomiting, abdominal pain, diarrhea, constipation, melena or rectal bleeding; dysuria, frequency, nocturia, blood in the urine; fever, chills, sweats; tingling, numbness, loss of balance; insomnia, depression, anxiety.    Physical examination:  /78 (BP Location: Right arm, Patient Position: Sitting, Cuff Size: Adult Regular)   Pulse 94   Temp 97.7  F (36.5  C) (Tympanic)   Resp 12   Ht 1.727 m (5' 8\")   Wt 72.6 kg (160 lb)   SpO2 98%   BMI 24.33 kg/m      The patient is alert and oriented.  Mild chronic blepharitis.  Throat and oral mucosa are normal-appearing. Thyroid gland is not enlarged. Adenopathy is absent in the neck, axilla, groin and supraclavicular fossa; Lungs are clear to auscultation and percussion without rales, wheezes or rubs; heart rhythm is regular, heart sounds are without murmurs or gallops; the abdomen is soft and flat without hepatosplenomegaly, masses, ascites or tenderness.; extremities and skin reveal no unusual skin lesions, joint swelling or edema;    Kalen Casillas MD            "

## 2023-09-28 RX ORDER — RIVAROXABAN 20 MG/1
TABLET, FILM COATED ORAL
Qty: 90 TABLET | Refills: 3 | Status: SHIPPED | OUTPATIENT
Start: 2023-09-28

## 2023-09-28 RX ORDER — FINASTERIDE 5 MG/1
TABLET, FILM COATED ORAL
Qty: 90 TABLET | Refills: 3 | Status: SHIPPED | OUTPATIENT
Start: 2023-09-28 | End: 2024-07-05

## 2023-09-28 NOTE — TELEPHONE ENCOUNTER
Pending Prescriptions:                       Disp   Refills    XARELTO ANTICOAGULANT 20 MG TABS tablet [*90 tab*3            Sig: TAKE 1 TABLET DAILY WITH   DINNER    Routing refill request to provider for review/approval because:  Labs out of range:    Platelet Count   Date Value Ref Range Status   09/26/2023 118 (L) 150 - 450 10e3/uL Final   06/03/2021 184 150 - 450 10e9/L Final      Creatinine   Date Value Ref Range Status   09/13/2023 0.88 0.67 - 1.17 mg/dL Final   06/03/2021 0.91 0.66 - 1.25 mg/dL Final     Brian Martins RN

## 2023-09-29 ENCOUNTER — MYC MEDICAL ADVICE (OUTPATIENT)
Dept: FAMILY MEDICINE | Facility: CLINIC | Age: 78
End: 2023-09-29
Payer: COMMERCIAL

## 2023-09-29 NOTE — TELEPHONE ENCOUNTER
Please see my chart message.     I don't see that you have ever seen him for insomnia    He has seen MTM and had sleep study    Advise a visit?    Annette LUNDY

## 2023-10-02 ENCOUNTER — E-VISIT (OUTPATIENT)
Dept: FAMILY MEDICINE | Facility: CLINIC | Age: 78
End: 2023-10-02
Payer: COMMERCIAL

## 2023-10-02 DIAGNOSIS — G47.00 INSOMNIA, UNSPECIFIED TYPE: Primary | ICD-10-CM

## 2023-10-02 DIAGNOSIS — Z96.642 HISTORY OF REVISION OF TOTAL REPLACEMENT OF LEFT HIP JOINT: ICD-10-CM

## 2023-10-02 PROCEDURE — 99421 OL DIG E/M SVC 5-10 MIN: CPT | Performed by: FAMILY MEDICINE

## 2023-10-03 RX ORDER — HYDROXYZINE HYDROCHLORIDE 25 MG/1
25 TABLET, FILM COATED ORAL EVERY 8 HOURS PRN
Qty: 30 TABLET | Refills: 0 | Status: SHIPPED | OUTPATIENT
Start: 2023-10-03 | End: 2023-10-20

## 2023-10-04 NOTE — TELEPHONE ENCOUNTER
RECORDS STATUS - ALL OTHER DIAGNOSIS      RECORDS RECEIVED FROM: [facility] - Internal Records   DATE RECEIVED: 10/4

## 2023-10-05 ENCOUNTER — PRE VISIT (OUTPATIENT)
Dept: ONCOLOGY | Facility: CLINIC | Age: 78
End: 2023-10-05
Payer: COMMERCIAL

## 2023-10-05 ENCOUNTER — LAB (OUTPATIENT)
Dept: LAB | Facility: CLINIC | Age: 78
End: 2023-10-05
Payer: COMMERCIAL

## 2023-10-05 ENCOUNTER — ONCOLOGY VISIT (OUTPATIENT)
Dept: ONCOLOGY | Facility: CLINIC | Age: 78
End: 2023-10-05
Attending: FAMILY MEDICINE
Payer: COMMERCIAL

## 2023-10-05 VITALS
RESPIRATION RATE: 12 BRPM | TEMPERATURE: 97.7 F | BODY MASS INDEX: 24.25 KG/M2 | SYSTOLIC BLOOD PRESSURE: 127 MMHG | OXYGEN SATURATION: 98 % | DIASTOLIC BLOOD PRESSURE: 78 MMHG | HEIGHT: 68 IN | HEART RATE: 94 BPM | WEIGHT: 160 LBS

## 2023-10-05 DIAGNOSIS — Z12.5 SCREENING FOR PROSTATE CANCER: ICD-10-CM

## 2023-10-05 DIAGNOSIS — Z12.5 SCREENING FOR PROSTATE CANCER: Primary | ICD-10-CM

## 2023-10-05 DIAGNOSIS — D69.6 THROMBOCYTOPENIA (H): ICD-10-CM

## 2023-10-05 LAB
HOLD SPECIMEN: NORMAL
PSA SERPL DL<=0.01 NG/ML-MCNC: 0.55 NG/ML

## 2023-10-05 PROCEDURE — 36415 COLL VENOUS BLD VENIPUNCTURE: CPT

## 2023-10-05 PROCEDURE — G0103 PSA SCREENING: HCPCS

## 2023-10-05 PROCEDURE — 99204 OFFICE O/P NEW MOD 45 MIN: CPT | Performed by: INTERNAL MEDICINE

## 2023-10-05 PROCEDURE — G0463 HOSPITAL OUTPT CLINIC VISIT: HCPCS | Performed by: INTERNAL MEDICINE

## 2023-10-05 ASSESSMENT — PAIN SCALES - GENERAL: PAINLEVEL: NO PAIN (0)

## 2023-10-05 NOTE — PROGRESS NOTES
"Oncology Rooming Note    October 5, 2023 10:58 AM   Josemanuel Edmond is a 78 year old male who presents for:    Chief Complaint   Patient presents with    Hematology     Thrombocytopenia - New consult     Initial Vitals: /78 (BP Location: Right arm, Patient Position: Sitting, Cuff Size: Adult Regular)   Pulse 94   Temp 97.7  F (36.5  C) (Tympanic)   Resp 12   Ht 1.727 m (5' 8\")   Wt 72.6 kg (160 lb)   SpO2 98%   BMI 24.33 kg/m   Estimated body mass index is 24.33 kg/m  as calculated from the following:    Height as of this encounter: 1.727 m (5' 8\").    Weight as of this encounter: 72.6 kg (160 lb). Body surface area is 1.87 meters squared.  No Pain (0) Comment: Data Unavailable   No LMP for male patient.  Allergies reviewed: Yes  Medications reviewed: Yes    Medications: Medication refills not needed today.  Pharmacy name entered into [employer]:    [facility] - [place], [place] - [address] [facility] [place] [address] NO.  [facility] - [facility] - [facility] AT PORTAL TO REGISTERED [facility] SITES  [facility] - [facility], [place] - [facility]    Clinical concerns:  New consult      Sarahi Reagan CMA              "

## 2023-10-05 NOTE — PATIENT INSTRUCTIONS
1. PSA today; Dr. Casillas will call  2. Follow-up Dr. Casillas in 3 months with blood tests (CBC?CMP) before appointment.

## 2023-10-05 NOTE — LETTER
10/5/2023         RE: Josemanuel Edmond  [address]   [facility] [address]        Dear Colleague,    Thank you for referring your patient, Josemanuel Edmond, to the [facility]. Please see a copy of my visit note below.    Hematology/Medical Oncology Consultation Note      October 5, 2023    Referring provider:  Lizzy Leiva MD    Reason for visit:  Josemanuel Edmond is a 78 year old retired self-employed floor covering business owner from [place] who is referred for hematologic evaluation and consultation regarding recent edification of mild normocytic anemia and thrombocytopenia.    Impression:  Recent indeterminate anemia and thrombocytopenia; this could represent an early myelodysplastic syndrome.  However, his hemoglobin has seemed to recover a bit.    Recommendation, plan, instructions:  Check PSA  We discussed the possibility of doing a bone marrow aspirate and biopsy.  However, I also indicated that the result probably would not  at this time if a myelodysplastic syndrome was identified.  The patient requests and I am agreeable to follow-up in 3 months with CBC and CMP before appointment.  I think this is a reasonable compromise.    Time with patient including review, documentation, history, examination, coordination of care and counseling was 40 minutes.    History of present illness:  9/13/2023 blood tests revealed hemoglobin 11.3, , white count 4,400, platelets 118,000, absolute neutrophil count 3,000, with a normal CMP, ELP, serum iron/TIBC, TSH, CRP, sed rate, ferritin, HIV, B12, and hepatitis C antibody.    In retrospect, thrombocytopenia has been noted intermittently since June, 2022 and moderate anemia has been noted since December, 2017 with a prior July, 2011 hemoglobin of 14.9.  A 8/2022 CT abdomen pelvis revealed diffuse hepatic steatosis without splenomegaly.  His wife and the patient had a significant  COVID-19 infection in September, 2021 but did not require hospitalization.  He had underwent a left total hip arthroplasty in May, 2023 which required a red cell transfusion postoperatively.    He had drank vodka heavily up to about half pint per day intermittently before 2017 for several years but has been abstinent since then.    He has a history of paroxysmal atrial fibrillation on rivaroxaban and metoprolol, moderate major depression and recent 10 pound unexplained weight loss but it has recovered somewhat. A 2018 colonoscopy was negative except for a diminutive hyperplastic polyp.    Past medical history:  Past Medical History:   Diagnosis Date     Arthritis 2005     Benign neoplasm of prostate 2000    Benign Prostate Nodule     Depressive disorder     past condition     Infection due to 2019 novel coronavirus 09/27/2021     S/P knee replacement 11/06/2012     S/P left knee arthroscopy 08/15/2011       Family history:  I have reviewed this patient's family history and updated it with pertinent information if needed.  Family History   Problem Relation Age of Onset     Depression Mother      Cerebrovascular Disease Mother      Breast Cancer Mother      Neurologic Disorder Mother         parkinsons     Parkinsonism Mother      Respiratory Father         emphyzema     Diabetes Brother          Medications:  Current Outpatient Medications   Medication     acetaminophen (TYLENOL) 325 MG tablet     buPROPion (WELLBUTRIN SR) 150 MG 12 hr tablet     busPIRone HCl (BUSPAR) 30 MG tablet     doxazosin (CARDURA) 8 MG tablet     doxylamine (UNISOM) 25 MG TABS tablet     finasteride (PROSCAR) 5 MG tablet     furosemide (LASIX) 20 MG tablet     gabapentin (NEURONTIN) 300 MG capsule     gabapentin (NEURONTIN) 600 MG tablet     hydrOXYzine (ATARAX) 25 MG tablet     lamoTRIgine (LAMICTAL) 25 MG tablet     metoprolol tartrate (LOPRESSOR) 100 MG tablet     multivitamin (ONE-DAILY) tablet     XARELTO ANTICOAGULANT 20 MG TABS tablet  "    No current facility-administered medications for this visit.       Allergies:  Allergies   Allergen Reactions     Bactrim [Sulfamethoxazole-Trimethoprim] Nausea and Vomiting       Review of systems:  Hard of hearing uses hearing aids.    Except as noted in the note above, the patient denies headaches, diplopia, hearing loss or dizziness; dyspnea, cough, hemoptysis, pleurisy; chest pain/pressure, palpitations, lightheadedness; anorexia, nausea, vomiting, abdominal pain, diarrhea, constipation, melena or rectal bleeding; dysuria, frequency, nocturia, blood in the urine; fever, chills, sweats; tingling, numbness, loss of balance; insomnia, depression, anxiety.    Physical examination:  /78 (BP Location: Right arm, Patient Position: Sitting, Cuff Size: Adult Regular)   Pulse 94   Temp 97.7  F (36.5  C) (Tympanic)   Resp 12   Ht 1.727 m (5' 8\")   Wt 72.6 kg (160 lb)   SpO2 98%   BMI 24.33 kg/m      The patient is alert and oriented.  Mild chronic blepharitis.  Throat and oral mucosa are normal-appearing. Thyroid gland is not enlarged. Adenopathy is absent in the neck, axilla, groin and supraclavicular fossa; Lungs are clear to auscultation and percussion without rales, wheezes or rubs; heart rhythm is regular, heart sounds are without murmurs or gallops; the abdomen is soft and flat without hepatosplenomegaly, masses, ascites or tenderness.; extremities and skin reveal no unusual skin lesions, joint swelling or edema;    Kalen Casillas MD              Oncology Rooming Note    October 5, 2023 10:58 AM   Josemanuel Edmond is a 78 year old male who presents for:    Chief Complaint   Patient presents with     Hematology     Thrombocytopenia - New consult     Initial Vitals: /78 (BP Location: Right arm, Patient Position: Sitting, Cuff Size: Adult Regular)   Pulse 94   Temp 97.7  F (36.5  C) (Tympanic)   Resp 12   Ht 1.727 m (5' 8\")   Wt 72.6 kg (160 lb)   SpO2 98%   BMI 24.33 kg/m   Estimated " "body mass index is 24.33 kg/m  as calculated from the following:    Height as of this encounter: 1.727 m (5' 8\").    Weight as of this encounter: 72.6 kg (160 lb). Body surface area is 1.87 meters squared.  No Pain (0) Comment: Data Unavailable   No LMP for male patient.  Allergies reviewed: Yes  Medications reviewed: Yes    Medications: Medication refills not needed today.  Pharmacy name entered into [employer]:    [facility] - [place], [place] - [address] [facility] [place] [address] NO.  [facility] [place] [facility] - [facility] AT PORTAL TO REGISTERED [facility] SITES  [facility] - [facility], [place] - [address] [place] [address]    Clinical concerns:  New consult      Sarahi Reagan CMA                Again, thank you for allowing me to participate in the care of your patient.        Sincerely,        Kalen Casillas MD  "

## 2023-10-06 ENCOUNTER — MYC MEDICAL ADVICE (OUTPATIENT)
Dept: FAMILY MEDICINE | Facility: CLINIC | Age: 78
End: 2023-10-06
Payer: COMMERCIAL

## 2023-10-06 DIAGNOSIS — F41.9 ANXIETY: ICD-10-CM

## 2023-10-06 DIAGNOSIS — F32.1 MODERATE MAJOR DEPRESSION (H): ICD-10-CM

## 2023-10-09 ENCOUNTER — TELEPHONE (OUTPATIENT)
Dept: FAMILY MEDICINE | Facility: CLINIC | Age: 78
End: 2023-10-09
Payer: COMMERCIAL

## 2023-10-09 RX ORDER — LAMOTRIGINE 25 MG/1
50 TABLET ORAL 2 TIMES DAILY
Qty: 240 TABLET | Refills: 0 | Status: SHIPPED | OUTPATIENT
Start: 2023-10-09 | End: 2023-12-28

## 2023-10-09 RX ORDER — GABAPENTIN 600 MG/1
TABLET ORAL
Qty: 360 TABLET | Refills: 0 | Status: SHIPPED | OUTPATIENT
Start: 2023-10-09 | End: 2023-12-26

## 2023-10-09 NOTE — TELEPHONE ENCOUNTER
Lamictal - Pt is OUT of med and needs refill TODAY to get him through until his appt 11/15.  No need to call patient back, unless there are questions or problems.

## 2023-10-09 NOTE — TELEPHONE ENCOUNTER
Patient calling stating he has not been able to sleep x 13 days, only taking naps during the day and frequently is awake during the NOC. Patient requesting prescription to help him sleep. Advised patient he will need an appointment for this, he agreed with this plan and appointment scheduled for 10/10/23 440 PM virtual visit with Nikki Brown NP.    Update sent to PCP.    Julie Behrendt RN

## 2023-10-10 ENCOUNTER — VIRTUAL VISIT (OUTPATIENT)
Dept: FAMILY MEDICINE | Facility: CLINIC | Age: 78
End: 2023-10-10
Payer: COMMERCIAL

## 2023-10-10 DIAGNOSIS — F41.8 DEPRESSION WITH ANXIETY: ICD-10-CM

## 2023-10-10 DIAGNOSIS — G47.00 INSOMNIA, UNSPECIFIED TYPE: Primary | ICD-10-CM

## 2023-10-10 PROCEDURE — 99442 PR PHYSICIAN TELEPHONE EVALUATION 11-20 MIN: CPT | Mod: 93 | Performed by: NURSE PRACTITIONER

## 2023-10-10 RX ORDER — ZOLPIDEM TARTRATE 5 MG/1
5 TABLET ORAL
Qty: 15 TABLET | Refills: 0 | Status: SHIPPED | OUTPATIENT
Start: 2023-10-10 | End: 2023-10-18

## 2023-10-10 RX ORDER — ZOLPIDEM TARTRATE 5 MG/1
5 TABLET ORAL
Qty: 15 TABLET | Refills: 0 | Status: SHIPPED | OUTPATIENT
Start: 2023-10-10 | End: 2023-10-10

## 2023-10-10 NOTE — PROGRESS NOTES
Gavin is a 78 year old who is being evaluated via a billable telephone visit.      What phone number would you like to be contacted at? [phone]  How would you like to obtain your AVS? Ezequiel    Distant Location (provider location):  On-site    Assessment & Plan     Insomnia, unspecified type    - Med Therapy Management Referral  [facility]; Future  - zolpidem (AMBIEN) 5 MG tablet; Take 1 tablet (5 mg) by mouth nightly as needed for sleep  See below for plan.    Depression with anxiety    - Med Therapy Management Referral  - [facility] Referral; Future             See Patient Instructions  Patient Instructions   One more refill of Ambien sent but that is not safe to use long term.  Referrals placed for you to speak with pharmacist and psychiatry about your concerns and the medications you are taking.      Our Clinic hours are:  Mondays    7:20 am - 7 pm  Tues - Fri  7:20 am - 5 pm    Clinic Phone: [phone]    The clinic lab opens at 7:30 am Mon - Fri and appointments are required.    [facility]  Ph. [phone]  Monday  8 am - 7pm  Tues - Fri 8 am - 5:30 pm         [facility]    Subjective   Gavin is a 78 year old, presenting for the following health issues:  No chief complaint on file.        10/10/2023     1:36 PM   Additional Questions   Roomed by [facility]     Insomnia  Onset/Duration: about 13 days   Description:   Frequency of insomnia:  nightly  Time to fall asleep (sleep latency): 6 hours   Middle of night awakening:  No  Early morning awakening:  YES  Progression of Symptoms:  same  Accompanying Signs & Symptoms:  Daytime sleepiness/napping: YES  Excessive snoring/apnea: No  Restless legs: YES  Waking to urinate: YES  Chronic pain:  No  Depression symptoms (if yes, do PHQ9): YES-   Anxiety symptoms (if yes, do CAROLYNN-7): YES  History:  Prior Insomnia: YES  New stressful  "situation: No  Precipitating factors:   Caffeine intake: No  OTC decongestants: No  Any new medications: No  Alleviating factors:  Self medicating (alcohol, etc.):  No  Stress-reduction (exercise, yoga, meditation etc): No  Therapies tried and outcome: Ambien -  effective    States he's tried \"everything\" and the only thing that helps him get a good night of rest is Ambien.  Atarax doesn't seem to help.      Review of Systems   Constitutional, HEENT, cardiovascular, pulmonary, gi and gu systems are negative, except as otherwise noted.      Objective           Vitals:  No vitals were obtained today due to virtual visit.    Physical Exam   healthy, alert, and no distress  PSYCH: Alert and oriented times 3; coherent speech, normal   rate and volume, able to articulate logical thoughts, able   to abstract reason, no tangential thoughts, no hallucinations   or delusions  His affect is normal  RESP: No cough, no audible wheezing, able to talk in full sentences  Remainder of exam unable to be completed due to telephone visits                Phone call duration: 12 minutes      "

## 2023-10-10 NOTE — PATIENT INSTRUCTIONS
One more refill of Ambien sent but that is not safe to use long term.  Referrals placed for you to speak with pharmacist and psychiatry about your concerns and the medications you are taking.      Our Clinic hours are:  Mondays    7:20 am - 7 pm  Tues -  Fri  7:20 am - 5 pm    Clinic Phone: [phone]    The clinic lab opens at 7:30 am Mon - Fri and appointments are required.    [facility]. [phone]  Monday  8 am - 7pm  Tues - Fri 8 am - 5:30 pm

## 2023-10-11 ENCOUNTER — TELEPHONE (OUTPATIENT)
Dept: FAMILY MEDICINE | Facility: CLINIC | Age: 78
End: 2023-10-11
Payer: COMMERCIAL

## 2023-10-11 NOTE — TELEPHONE ENCOUNTER
MTM referral from: [facility] visit (referral by provider)    MTM referral outreach attempt #1 on October 11, 2023 at 9:04 AM      Outcome: Spoke with patient this morning. He was not interested in scheduling an MTM appointment at this time but he stated that he would give us a call if he would like to schedule at a later time.     Use [employer]/Map for the carrier/Plan on the flowsheet      Char Narvaez CPhT  MTM

## 2023-10-17 NOTE — TELEPHONE ENCOUNTER
Looks like he is still having symptoms. He had a virtual visit with tristan but with all the itching and sudden symptoms he really needs a face to face visit for exam and labs.

## 2023-10-18 DIAGNOSIS — G47.00 INSOMNIA, UNSPECIFIED TYPE: ICD-10-CM

## 2023-10-18 RX ORDER — ZOLPIDEM TARTRATE 5 MG/1
5 TABLET ORAL
Qty: 15 TABLET | Refills: 0 | Status: SHIPPED | OUTPATIENT
Start: 2023-10-18 | End: 2023-11-06

## 2023-10-20 DIAGNOSIS — G47.00 INSOMNIA, UNSPECIFIED TYPE: ICD-10-CM

## 2023-10-20 RX ORDER — HYDROXYZINE HYDROCHLORIDE 25 MG/1
25 TABLET, FILM COATED ORAL EVERY 8 HOURS PRN
Qty: 30 TABLET | Refills: 0 | Status: SHIPPED | OUTPATIENT
Start: 2023-10-20 | End: 2023-11-21

## 2023-10-20 NOTE — TELEPHONE ENCOUNTER
"Requested Prescriptions   Pending Prescriptions Disp Refills    hydrOXYzine (ATARAX) 25 MG tablet [Pharmacy Med Name: hydroxyzine HCl 25 mg tablet] 30 tablet 0     Sig: Take 1 tablet (25 mg) by mouth every 8 hours as needed for itching or other (insomnia)       Antihistamines Protocol Passed - 10/20/2023 11:44 AM        Passed - Recent (12 mo) or future (30 days) visit within the authorizing provider's specialty     Patient has had an office visit with the authorizing provider or a provider within the authorizing providers department within the previous 12 mos or has a future within next 30 days. See \"Patient Info\" tab in inbasket, or \"Choose Columns\" in Meds & Orders section of the refill encounter.              Passed - Patient is age 3 or older     Apply age and/or weight-based dosing for peds patients age 3 and older.    Forward request to provider for patients under the age of 3.          Passed - Medication is active on med list           Veronica Loaiza RN on 10/20/2023 at 1:40 PM    "

## 2023-10-23 DIAGNOSIS — N13.8 HYPERTROPHY OF PROSTATE WITH URINARY OBSTRUCTION: ICD-10-CM

## 2023-10-23 DIAGNOSIS — N40.1 HYPERTROPHY OF PROSTATE WITH URINARY OBSTRUCTION: ICD-10-CM

## 2023-10-24 ENCOUNTER — TRANSFERRED RECORDS (OUTPATIENT)
Dept: HEALTH INFORMATION MANAGEMENT | Facility: CLINIC | Age: 78
End: 2023-10-24
Payer: COMMERCIAL

## 2023-10-24 RX ORDER — DOXAZOSIN 8 MG/1
TABLET ORAL
Qty: 45 TABLET | Refills: 3 | Status: SHIPPED | OUTPATIENT
Start: 2023-10-24

## 2023-10-25 ENCOUNTER — TRANSCRIBE ORDERS (OUTPATIENT)
Dept: OTHER | Age: 78
End: 2023-10-25

## 2023-10-25 DIAGNOSIS — M48.061 LUMBAR SPINAL STENOSIS: Primary | ICD-10-CM

## 2023-10-25 DIAGNOSIS — M25.552 HIP PAIN, LEFT: ICD-10-CM

## 2023-10-25 DIAGNOSIS — M25.551 HIP PAIN, RIGHT: ICD-10-CM

## 2023-11-06 ENCOUNTER — MYC REFILL (OUTPATIENT)
Dept: FAMILY MEDICINE | Facility: CLINIC | Age: 78
End: 2023-11-06
Payer: COMMERCIAL

## 2023-11-06 DIAGNOSIS — G47.00 INSOMNIA, UNSPECIFIED TYPE: ICD-10-CM

## 2023-11-06 NOTE — TELEPHONE ENCOUNTER
Routing to ordering provider for consideration, not on refill protocol.           Chelsea Rm     RN MSN

## 2023-11-07 ENCOUNTER — THERAPY VISIT (OUTPATIENT)
Dept: PHYSICAL THERAPY | Facility: CLINIC | Age: 78
End: 2023-11-07
Attending: STUDENT IN AN ORGANIZED HEALTH CARE EDUCATION/TRAINING PROGRAM
Payer: COMMERCIAL

## 2023-11-07 DIAGNOSIS — M25.551 HIP PAIN, RIGHT: ICD-10-CM

## 2023-11-07 DIAGNOSIS — M48.061 LUMBAR SPINAL STENOSIS: ICD-10-CM

## 2023-11-07 DIAGNOSIS — M25.552 HIP PAIN, LEFT: ICD-10-CM

## 2023-11-07 PROCEDURE — 97110 THERAPEUTIC EXERCISES: CPT | Mod: GP | Performed by: PHYSICAL THERAPIST

## 2023-11-07 PROCEDURE — 97161 PT EVAL LOW COMPLEX 20 MIN: CPT | Mod: GP | Performed by: PHYSICAL THERAPIST

## 2023-11-07 NOTE — PROGRESS NOTES
PHYSICAL THERAPY EVALUATION  Type of Visit: Evaluation    See electronic medical record for Abuse and Falls Screening details.    Subjective       Presenting condition or subjective complaint: Nerve pain in hip and  Leg, Patient has a hx L FAITH 23 and it felt pretty good. Started to be painful again and received a L hip injection 10/3/23 and that helped a bit. L hip pain is bothersome with lifting left leg.   Date of onset:      Relevant medical history:     Dates & types of surgery: Hip surgery May 31st    Prior diagnostic imaging/testing results: MRI; X-ray     Prior therapy history for the same diagnosis, illness or injury: No      Living Environment  Social support: With a significant other or spouse   Type of home: House   Stairs to enter the home: No       Ramp: No   Stairs inside the home: No       Help at home: Self Cares (home health aide/personal care attendant, family, etc)  Equipment owned: Straight Cane; Walker     Employment: No    Hobbies/Interests:      Patient goals for therapy:      Pain assessment: Pain present  Location: L hip pain/Ratin/10     Objective   HIP EVALUATION  PAIN: Pain Level at Rest: 0/10  Pain Level with Use: 8/10  Pain Location: hip  Pain Quality: Aching and Sharp  Pain Frequency: intermittent  Pain is Worst: daytime  Pain is Exacerbated By: lifting left leg,   Pain is Relieved By: rest  INTEGUMENTARY (edema, incisions): WNL  GAIT:   Weightbearing Status: WBAT  Assistive Device(s): None  Gait Deviations: Antalgic  BALANCE/PROPRIOCEPTION:  poor bilaterally    ROM:  limited hip flexion AROM & PROM with some posterior hip/thigh pain, some scoliotic curvature noticed on the R  PELVIC/SI SCREEN: WNL  STRENGTH:  hip flexion/abduction/ER 3/5, hip extension 3+/5  LE FLEXIBILITY: Decreased adductors L, Decreased piriformis L, Decreased hip flexors L, Decreased quadriceps L, Decreased hamstrings L, Decreased adductors R, Decreased piriformis R, Decreased hip flexors R, Decreased  quadriceps R, Decreased hamstrings R  SPECIAL TESTS:    Left Right   AJ Positive Negative    FADIR/Labrum/DAYLIN Positive Negative    Femoral Nerve Negative  Negative    Ana's Negative  Negative    Piriformis Positive Negative    Quadrant Testing Negative  Negative    SLR Negative  Negative    Slump Negative  Negative    Stork with Extension Negative  Negative    Kenji Negative  Negative           PALPATION:  TTP L hip incision  JOINT MOBILITY: slightly limited hip mobility secondary to recent L FAITH and patient has not been compliant with HEP    Assessment & Plan   CLINICAL IMPRESSIONS  Medical Diagnosis: Lumbar spinal stenosis  Hip pain, left  Hip pain, right    Treatment Diagnosis: Left hip pain   Impression/Assessment: Patient is a 78 year old male with L hip pain complaints.  The following significant findings have been identified: Pain, Decreased ROM/flexibility, Decreased joint mobility, Decreased strength, Impaired balance, Impaired gait, and Impaired muscle performance. These impairments interfere with their ability to perform self care tasks as compared to previous level of function.     Clinical Decision Making (Complexity):  Clinical Presentation: Stable/Uncomplicated  Clinical Presentation Rationale: based on medical and personal factors listed in PT evaluation  Clinical Decision Making (Complexity): Low complexity    PLAN OF CARE  Treatment Interventions:  Interventions: Gait Training, Manual Therapy, Neuromuscular Re-education, Therapeutic Activity, Therapeutic Exercise, Self-Care/Home Management    Long Term Goals     PT Goal 1  Goal Identifier: 1.  Goal Description: Patient will tolerate getting dressed with pain <2/10 in order to perform ADLs.  Target Date: 12/05/23  PT Goal 2  Goal Identifier: 2.  Goal Description: Patient will demonstrate ability to  moderately weighted object from the ground with pain <2/10.  Rationale: to maximize safety and independence with performance of ADLs and  functional tasks  Target Date: 12/19/23  PT Goal 3  Goal Identifier: 3.  Goal Description: Patient will be ind with HEP to reduce risk for return of symptoms.  Target Date: 01/16/24      Frequency of Treatment: 1x/week  Duration of Treatment: 10    Education Assessment:        Risks and benefits of evaluation/treatment have been explained.   Patient/Family/caregiver agrees with Plan of Care.     Evaluation Time:     PT Eval, Low Complexity Minutes (55688): 15   Signing Clinician: HOLLY Morrison [facility]                                                                                   OUTPATIENT PHYSICAL THERAPY      PLAN OF TREATMENT FOR OUTPATIENT REHABILITATION   Patient's Last Name, First Name, Josemanuel Rowland YOB: 1945   Provider's Name   [facility]   Medical Record No.  [id number]     Onset Date:    Start of Care Date: 11/07/23     Medical Diagnosis:  Lumbar spinal stenosis  Hip pain, left  Hip pain, right      PT Treatment Diagnosis:  Left hip pain Plan of Treatment  Frequency/Duration: 1x/week/ 10    Certification date from 11/07/23 to 01/16/24         See note for plan of treatment details and functional goals     Shirley Tong, PT                         I CERTIFY THE NEED FOR THESE SERVICES FURNISHED UNDER        THIS PLAN OF TREATMENT AND WHILE UNDER MY CARE .             Physician Signature               Date    X_____________________________________________________                    Referring Provider:  Jaden Glover      Initial Assessment  See Epic Evaluation- Start of Care Date: 11/07/23

## 2023-11-08 RX ORDER — ZOLPIDEM TARTRATE 5 MG/1
5 TABLET ORAL
Qty: 15 TABLET | Refills: 0 | Status: SHIPPED | OUTPATIENT
Start: 2023-11-08 | End: 2023-11-22

## 2023-11-13 DIAGNOSIS — I48.20 CHRONIC ATRIAL FIBRILLATION (H): ICD-10-CM

## 2023-11-13 RX ORDER — FUROSEMIDE 20 MG
20 TABLET ORAL DAILY
Qty: 90 TABLET | Refills: 3 | Status: SHIPPED | OUTPATIENT
Start: 2023-11-13 | End: 2023-11-30

## 2023-11-13 NOTE — TELEPHONE ENCOUNTER
Last Office Visit: 9/20/23 (Dari)  Next Office Visit: TBD  Last Fill Date: 10/26/23  Sarai Chavarria LPN Cardiology   11/13/2023 8:59 AM

## 2023-11-13 NOTE — TELEPHONE ENCOUNTER
[facility] Cardiology Refill Guideline reviewed.  Medication meets criteria for refill. Rosita Gibson RN Cardiology November 13, 2023, 9:15 AM

## 2023-11-15 ENCOUNTER — ANCILLARY PROCEDURE (OUTPATIENT)
Dept: GENERAL RADIOLOGY | Facility: CLINIC | Age: 78
End: 2023-11-15
Attending: FAMILY MEDICINE
Payer: COMMERCIAL

## 2023-11-15 ENCOUNTER — THERAPY VISIT (OUTPATIENT)
Dept: PHYSICAL THERAPY | Facility: CLINIC | Age: 78
End: 2023-11-15
Attending: STUDENT IN AN ORGANIZED HEALTH CARE EDUCATION/TRAINING PROGRAM
Payer: COMMERCIAL

## 2023-11-15 DIAGNOSIS — R06.02 SHORTNESS OF BREATH: ICD-10-CM

## 2023-11-15 DIAGNOSIS — M25.552 HIP PAIN, LEFT: Primary | ICD-10-CM

## 2023-11-15 PROCEDURE — 71046 X-RAY EXAM CHEST 2 VIEWS: CPT | Mod: TC | Performed by: RADIOLOGY

## 2023-11-15 PROCEDURE — 97110 THERAPEUTIC EXERCISES: CPT | Mod: GP | Performed by: PHYSICAL THERAPIST

## 2023-11-20 DIAGNOSIS — G47.00 INSOMNIA, UNSPECIFIED TYPE: ICD-10-CM

## 2023-11-21 DIAGNOSIS — G47.00 INSOMNIA, UNSPECIFIED TYPE: ICD-10-CM

## 2023-11-21 RX ORDER — HYDROXYZINE HYDROCHLORIDE 25 MG/1
25 TABLET, FILM COATED ORAL EVERY 8 HOURS PRN
Qty: 30 TABLET | Refills: 3 | Status: SHIPPED | OUTPATIENT
Start: 2023-11-21 | End: 2024-01-08

## 2023-11-22 RX ORDER — ZOLPIDEM TARTRATE 5 MG/1
5 TABLET ORAL
Qty: 15 TABLET | Refills: 1 | Status: SHIPPED | OUTPATIENT
Start: 2023-11-22 | End: 2023-11-30

## 2023-11-24 ENCOUNTER — APPOINTMENT (OUTPATIENT)
Dept: GENERAL RADIOLOGY | Facility: CLINIC | Age: 78
End: 2023-11-24
Attending: EMERGENCY MEDICINE
Payer: COMMERCIAL

## 2023-11-24 ENCOUNTER — HOSPITAL ENCOUNTER (EMERGENCY)
Facility: CLINIC | Age: 78
Discharge: HOME | End: 2023-11-24
Attending: EMERGENCY MEDICINE | Admitting: EMERGENCY MEDICINE
Payer: COMMERCIAL

## 2023-11-24 VITALS
OXYGEN SATURATION: 100 % | BODY MASS INDEX: 24.9 KG/M2 | SYSTOLIC BLOOD PRESSURE: 136 MMHG | HEART RATE: 85 BPM | TEMPERATURE: 98.2 F | WEIGHT: 164.3 LBS | DIASTOLIC BLOOD PRESSURE: 98 MMHG | HEIGHT: 68 IN | RESPIRATION RATE: 21 BRPM

## 2023-11-24 DIAGNOSIS — R35.1 NOCTURIA: ICD-10-CM

## 2023-11-24 DIAGNOSIS — R06.00 DYSPNEA, UNSPECIFIED TYPE: ICD-10-CM

## 2023-11-24 DIAGNOSIS — I48.20 CHRONIC ATRIAL FIBRILLATION (H): ICD-10-CM

## 2023-11-24 DIAGNOSIS — G47.00 INSOMNIA, UNSPECIFIED TYPE: ICD-10-CM

## 2023-11-24 DIAGNOSIS — R68.89 MULTIPLE SOMATIC COMPLAINTS: ICD-10-CM

## 2023-11-24 DIAGNOSIS — F41.8 DEPRESSION WITH ANXIETY: ICD-10-CM

## 2023-11-24 DIAGNOSIS — E87.1 HYPONATREMIA: ICD-10-CM

## 2023-11-24 DIAGNOSIS — Z79.01 CHRONIC ANTICOAGULATION: ICD-10-CM

## 2023-11-24 DIAGNOSIS — I50.32 CHRONIC DIASTOLIC HEART FAILURE (H): ICD-10-CM

## 2023-11-24 LAB
ALBUMIN UR-MCNC: NEGATIVE MG/DL
ANION GAP SERPL CALCULATED.3IONS-SCNC: 13 MMOL/L (ref 7–15)
APPEARANCE UR: CLEAR
BASOPHILS # BLD AUTO: 0.1 10E3/UL (ref 0–0.2)
BASOPHILS NFR BLD AUTO: 1 %
BILIRUB UR QL STRIP: NEGATIVE
BUN SERPL-MCNC: 15.4 MG/DL (ref 8–23)
CALCIUM SERPL-MCNC: 9 MG/DL (ref 8.8–10.2)
CHLORIDE SERPL-SCNC: 91 MMOL/L (ref 98–107)
COLOR UR AUTO: YELLOW
CREAT SERPL-MCNC: 0.98 MG/DL (ref 0.67–1.17)
DEPRECATED HCO3 PLAS-SCNC: 24 MMOL/L (ref 22–29)
EGFRCR SERPLBLD CKD-EPI 2021: 78 ML/MIN/1.73M2
EOSINOPHIL # BLD AUTO: 0.1 10E3/UL (ref 0–0.7)
EOSINOPHIL NFR BLD AUTO: 1 %
ERYTHROCYTE [DISTWIDTH] IN BLOOD BY AUTOMATED COUNT: 14.9 % (ref 10–15)
FLUAV RNA SPEC QL NAA+PROBE: NEGATIVE
FLUBV RNA RESP QL NAA+PROBE: NEGATIVE
GLUCOSE SERPL-MCNC: 124 MG/DL (ref 70–99)
GLUCOSE UR STRIP-MCNC: NEGATIVE MG/DL
HCT VFR BLD AUTO: 34.7 % (ref 40–53)
HGB BLD-MCNC: 11.5 G/DL (ref 13.3–17.7)
HGB UR QL STRIP: NEGATIVE
HOLD SPECIMEN: NORMAL
IMM GRANULOCYTES # BLD: 0 10E3/UL
IMM GRANULOCYTES NFR BLD: 0 %
KETONES UR STRIP-MCNC: NEGATIVE MG/DL
LEUKOCYTE ESTERASE UR QL STRIP: NEGATIVE
LYMPHOCYTES # BLD AUTO: 1.2 10E3/UL (ref 0.8–5.3)
LYMPHOCYTES NFR BLD AUTO: 18 %
MAGNESIUM SERPL-MCNC: 2 MG/DL (ref 1.7–2.3)
MCH RBC QN AUTO: 32.7 PG (ref 26.5–33)
MCHC RBC AUTO-ENTMCNC: 33.1 G/DL (ref 31.5–36.5)
MCV RBC AUTO: 99 FL (ref 78–100)
MONOCYTES # BLD AUTO: 0.6 10E3/UL (ref 0–1.3)
MONOCYTES NFR BLD AUTO: 10 %
NEUTROPHILS # BLD AUTO: 4.5 10E3/UL (ref 1.6–8.3)
NEUTROPHILS NFR BLD AUTO: 70 %
NITRATE UR QL: NEGATIVE
NRBC # BLD AUTO: 0 10E3/UL
NRBC BLD AUTO-RTO: 0 /100
NT-PROBNP SERPL-MCNC: 4896 PG/ML (ref 0–1800)
PH UR STRIP: 7 [PH] (ref 5–7)
PLATELET # BLD AUTO: 176 10E3/UL (ref 150–450)
POTASSIUM SERPL-SCNC: 4.2 MMOL/L (ref 3.4–5.3)
RBC # BLD AUTO: 3.52 10E6/UL (ref 4.4–5.9)
RSV RNA SPEC NAA+PROBE: NEGATIVE
SARS-COV-2 RNA RESP QL NAA+PROBE: NEGATIVE
SODIUM SERPL-SCNC: 128 MMOL/L (ref 135–145)
SP GR UR STRIP: 1.01 (ref 1–1.03)
TROPONIN T SERPL HS-MCNC: 12 NG/L
TSH SERPL DL<=0.005 MIU/L-ACNC: 2.87 UIU/ML (ref 0.3–4.2)
UROBILINOGEN UR STRIP-MCNC: NORMAL MG/DL
WBC # BLD AUTO: 6.5 10E3/UL (ref 4–11)

## 2023-11-24 PROCEDURE — 93005 ELECTROCARDIOGRAM TRACING: CPT

## 2023-11-24 PROCEDURE — 83735 ASSAY OF MAGNESIUM: CPT | Performed by: EMERGENCY MEDICINE

## 2023-11-24 PROCEDURE — 87637 SARSCOV2&INF A&B&RSV AMP PRB: CPT | Performed by: EMERGENCY MEDICINE

## 2023-11-24 PROCEDURE — 96374 THER/PROPH/DIAG INJ IV PUSH: CPT

## 2023-11-24 PROCEDURE — 96375 TX/PRO/DX INJ NEW DRUG ADDON: CPT

## 2023-11-24 PROCEDURE — 36415 COLL VENOUS BLD VENIPUNCTURE: CPT | Performed by: EMERGENCY MEDICINE

## 2023-11-24 PROCEDURE — 99285 EMERGENCY DEPT VISIT HI MDM: CPT | Mod: 25

## 2023-11-24 PROCEDURE — 84484 ASSAY OF TROPONIN QUANT: CPT | Performed by: EMERGENCY MEDICINE

## 2023-11-24 PROCEDURE — 258N000003 HC RX IP 258 OP 636: Performed by: EMERGENCY MEDICINE

## 2023-11-24 PROCEDURE — 99284 EMERGENCY DEPT VISIT MOD MDM: CPT | Mod: 25 | Performed by: EMERGENCY MEDICINE

## 2023-11-24 PROCEDURE — 93010 ELECTROCARDIOGRAM REPORT: CPT | Performed by: EMERGENCY MEDICINE

## 2023-11-24 PROCEDURE — 80048 BASIC METABOLIC PNL TOTAL CA: CPT | Performed by: EMERGENCY MEDICINE

## 2023-11-24 PROCEDURE — 83880 ASSAY OF NATRIURETIC PEPTIDE: CPT | Performed by: EMERGENCY MEDICINE

## 2023-11-24 PROCEDURE — 84443 ASSAY THYROID STIM HORMONE: CPT | Performed by: EMERGENCY MEDICINE

## 2023-11-24 PROCEDURE — 81003 URINALYSIS AUTO W/O SCOPE: CPT | Performed by: EMERGENCY MEDICINE

## 2023-11-24 PROCEDURE — 250N000011 HC RX IP 250 OP 636: Mod: JZ | Performed by: EMERGENCY MEDICINE

## 2023-11-24 PROCEDURE — 85025 COMPLETE CBC W/AUTO DIFF WBC: CPT | Performed by: EMERGENCY MEDICINE

## 2023-11-24 PROCEDURE — 96361 HYDRATE IV INFUSION ADD-ON: CPT

## 2023-11-24 PROCEDURE — 71046 X-RAY EXAM CHEST 2 VIEWS: CPT

## 2023-11-24 RX ORDER — LORAZEPAM 2 MG/ML
0.5 INJECTION INTRAMUSCULAR ONCE
Status: COMPLETED | OUTPATIENT
Start: 2023-11-24 | End: 2023-11-24

## 2023-11-24 RX ORDER — ONDANSETRON 2 MG/ML
4 INJECTION INTRAMUSCULAR; INTRAVENOUS ONCE
Status: COMPLETED | OUTPATIENT
Start: 2023-11-24 | End: 2023-11-24

## 2023-11-24 RX ORDER — LORAZEPAM 0.5 MG/1
TABLET ORAL
Qty: 20 TABLET | Refills: 0 | Status: SHIPPED | OUTPATIENT
Start: 2023-11-24 | End: 2023-11-30

## 2023-11-24 RX ADMIN — LORAZEPAM 0.5 MG: 2 INJECTION INTRAMUSCULAR; INTRAVENOUS at 16:31

## 2023-11-24 RX ADMIN — ONDANSETRON 4 MG: 2 INJECTION INTRAMUSCULAR; INTRAVENOUS at 14:27

## 2023-11-24 RX ADMIN — SODIUM CHLORIDE 1000 ML: 9 INJECTION, SOLUTION INTRAVENOUS at 14:18

## 2023-11-24 ASSESSMENT — ACTIVITIES OF DAILY LIVING (ADL)
ADLS_ACUITY_SCORE: 37
ADLS_ACUITY_SCORE: 33

## 2023-11-24 NOTE — ED TRIAGE NOTES
Pt presents from home with increasing weakness over 3 weeks.  He is having difficulty sleeping and c/o generalized pruitis with no known rash.      Triage Assessment (Adult)       Row Name 11/24/23 1042          Triage Assessment    Airway WDL WDL        Cognitive/Neuro/Behavioral WDL    Cognitive/Neuro/Behavioral WDL WDL

## 2023-11-24 NOTE — ED NOTES
Checked pt oxygenation after using the restroom and ambulating. Oxygenation was 99% on room air after ambulating. Pt states he does feel short of breath

## 2023-11-24 NOTE — ED PROVIDER NOTES
History     Chief Complaint   Patient presents with    Generalized Weakness     HPI  History per patient, his wife and review of [facility] EMR and Care Everywhere EMR, including extensive chart review of multiple prior imaging studies, multiple clinic notes and multiple prior laboratory evaluations.   Josemanuel Edmond is a 78 year old male with history of depression, anxiety, chronic diastolic heart failure, valvular heart disease, chronic atrial fibrillation, chronic anticoagulation on Xarelto and hypertension who is with his wife for evaluation of multiple somatic complaints: Dyspnea on exertion, shortness of breath at night while lying, insomnia refractory to Ambien and occasional Benadryl use, itching of the arms/shoulders/head and eyes, generalized weakness, decreased appetite and oral intake feeling cold all the time.  In addition sometimes when he urinates he also passes stool.  He gets up couple times at night to urinate, no acute urinary abnormality.  He is unsure if he has previously seen Urologist regarding this.  He sleeps on 1 pillow and sleeps on his side, he denies orthopnea or PND.  Ambien helps him fall asleep but he does not stay asleep through the night and after waking and he feels short of breath through the night, and then during the day with exertion.  He has no chest pain, URI symptoms, cough, mopped assist or leg pain or swelling.  Review of recent weights including today's weight in the EMR:  Wt Readings from Last 3 Encounters:   11/24/23 74.5 kg (164 lb 4.8 oz)   11/15/23 72.6 kg (160 lb)   10/05/23 72.6 kg (160 lb)   No other pertinent history or acute complaints or concerns.    Previous Records Reviewed:  [facility]  Echocardiography Laboratory - 05/23/2023    Reason For Study: Chronic atrial fibrillation (H)    Weight: 171 lb  ___________________________________________________________________________  Interpretation Summary     Left ventricular  systolic function is mildly reduced.The visual ejection  fraction is 50-55%.  Mildly decreased right ventricular systolic function  Severe bi-atrial enlargement.  There is moderate to mod-severe (2-3+) mitral regurgitation.There is moderate  (2+) tricuspid regurgitation.There is mild to moderate (1-2+) aortic regurgitation.  Mild aortic root and ascending aorta dilatation.  IVC diameter >2.1 cm collapsing <50% with sniff suggests a high RA pressure  estimated at 15 mmHg or greater.     Compared to echo dated 08/22/2022 no siognificant changes.      Allergies:  Allergies   Allergen Reactions    Bactrim [Sulfamethoxazole-Trimethoprim] Nausea and Vomiting       Problem List:    Patient Active Problem List    Diagnosis Date Noted    Chronic anticoagulation 11/24/2023     Priority: Medium     On Xarelto      Hip pain, left 11/15/2023     Priority: Medium    Anemia, unspecified type 07/06/2023     Priority: Medium    Moderate major depression (H) 07/06/2023     Priority: Medium    S/P revision of total hip 05/25/2023     Priority: Medium    Social phobia 04/13/2023     Priority: Medium    Chronic diastolic heart failure (H) 09/22/2022     Priority: Medium    Valvular heart disease 09/22/2022     Priority: Medium    Thrombocytopenia (H24) 06/29/2022     Priority: Medium    Tremor 06/29/2022     Priority: Medium    Memory difficulties 05/05/2022     Priority: Medium    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 02/28/2022     Priority: Medium    Depression with anxiety 02/28/2022     Priority: Medium    Hematoma of skin 07/17/2019     Priority: Medium    Right knee DJD 10/12/2018     Priority: Medium    Peripheral neuropathy 10/12/2018     Priority: Medium    Hyperplastic Polyp 03/05/2018     Priority: Medium    Chronic atrial fibrillation (H) 01/18/2018     Priority: Medium    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) 11/28/2017     Priority: Medium    Status post lung surgery 11/18/2017     Priority: Medium    Unilateral inguinal hernia without  obstruction or gangrene 11/07/2017     Priority: Medium    Benzodiazepine dependence (H) 10/31/2017     Priority: Medium    GERD (gastroesophageal reflux disease) 08/03/2012     Priority: Medium    Anxiety 02/16/2012     Priority: Medium    Hyperlipidemia LDL goal <100 10/31/2010     Priority: Medium    Osteoarthrosis, unspecified whether generalized or localized, pelvic region and thigh 09/28/2007     Priority: Medium    Hypertrophy of prostate with urinary obstruction 08/03/2006     Priority: Medium     Problem list name updated by automated process. Provider to review      Benign essential hypertension 12/12/2005     Priority: Medium        Past Medical History:    Past Medical History:   Diagnosis Date    Arthritis 2005    Benign neoplasm of prostate 2000    Depressive disorder     Infection due to 2019 novel coronavirus 09/27/2021    S/P knee replacement 11/06/2012    S/P left knee arthroscopy 08/15/2011       Past Surgical History:    Past Surgical History:   Procedure Laterality Date    ABDOMEN SURGERY  2003    ARTHROPLASTY KNEE Right 10/12/2018    Procedure: ARTHROPLASTY KNEE;  Right Total Knee Arthroplasty;  Surgeon: Jeb Peralta MD;  Location: [facility]    ARTHROPLASTY REVISION HIP Left 5/25/2023    Procedure: Revision total hip arthroplasty, left;  Surgeon: Chandler Leiva MD;  Location: [facility]    BIOPSY  2007    COLONOSCOPY N/A 12/10/2015    Procedure: COMBINED COLONOSCOPY, SINGLE OR MULTIPLE BIOPSY/POLYPECTOMY BY BIOPSY;  Surgeon: Jyoti Figueroa MD;  Location: [facility]    JOINT REPLACEMENT, HIP RT/LT  10/2007    Joint Replacement Hip LT    JOINT REPLACEMTN, KNEE RT/LT  08/2011    Joint Replacement knee /LT, [facility]    LAPAROSCOPIC HERNIORRHAPHY INGUINAL BILATERAL Bilateral 04/24/2018    Procedure: LAPAROSCOPIC HERNIORRHAPHY INGUINAL BILATERAL;  Laparoscopic bilateral inguinal hernia repair;  Surgeon: Josemanuel Resendiz MD;  Location: [facility]    PHACOEMULSIFICATION WITH STANDARD  INTRAOCULAR LENS IMPLANT Right 2021    Procedure: Cataract Removal with Implant;  Surgeon: Regan Kaufman MD;  Location: [facility]    PHACOEMULSIFICATION WITH STANDARD INTRAOCULAR LENS IMPLANT Left 2021    Procedure: Cataract Removal with Implant;  Surgeon: Regan Kaufman MD;  Location: [facility]    SURGICAL HISTORY OF -   1999    Umbilical Herniorrhaphy with mesh    SURGICAL HISTORY OF -  Left 2017    Thoracotomy and drainage of empyema       Family History:    Family History   Problem Relation Age of Onset    Depression Mother     Cerebrovascular Disease Mother     Breast Cancer Mother     Neurologic Disorder Mother         parkinsons    Parkinsonism Mother     Respiratory Father         emphyzema    Diabetes Brother        Social History:  Marital Status:   [2]  Social History     Tobacco Use    Smoking status: Former     Packs/day: 1.00     Years: 15.00     Additional pack years: 0.00     Total pack years: 15.00     Types: Cigarettes     Start date: 1958     Quit date: 10/13/1975     Years since quittin.1    Smokeless tobacco: Never   Vaping Use    Vaping Use: Never used   Substance Use Topics    Alcohol use: Yes    Drug use: No        Medications:    LORazepam (ATIVAN) 0.5 MG tablet  acetaminophen (TYLENOL) 325 MG tablet  buPROPion (WELLBUTRIN SR) 150 MG 12 hr tablet  busPIRone HCl (BUSPAR) 30 MG tablet  doxazosin (CARDURA) 8 MG tablet  doxylamine (UNISOM) 25 MG TABS tablet  finasteride (PROSCAR) 5 MG tablet  furosemide (LASIX) 20 MG tablet  gabapentin (NEURONTIN) 300 MG capsule  gabapentin (NEURONTIN) 600 MG tablet  hydrOXYzine (ATARAX) 25 MG tablet  lamoTRIgine (LAMICTAL) 25 MG tablet  metoprolol tartrate (LOPRESSOR) 100 MG tablet  multivitamin (ONE-DAILY) tablet  XARELTO ANTICOAGULANT 20 MG TABS tablet  zolpidem (AMBIEN) 5 MG tablet        Review of Systems  As mentioned in the HPI, in addition focused review of systems was negative.    Physical Exam   BP:  "133/78  Pulse: 80  Temp: 98.2  F (36.8  C)  Resp: 18  Height: 172.7 cm (5' 8\")  Weight: 76.2 kg (168 lb)  SpO2: 98 %      Physical Exam  Vitals and nursing note reviewed.   Constitutional:       General: He is not in acute distress.     Appearance: Normal appearance. He is well-developed. He is not ill-appearing or diaphoretic.   HENT:      Head: Normocephalic and atraumatic.      Right Ear: External ear normal.      Left Ear: External ear normal.      Nose: Nose normal.      Mouth/Throat:      Mouth: Mucous membranes are moist.   Eyes:      General: No scleral icterus.     Extraocular Movements: Extraocular movements intact.      Conjunctiva/sclera: Conjunctivae normal.   Neck:      Trachea: No tracheal deviation.   Cardiovascular:      Rate and Rhythm: Normal rate. Rhythm irregularly irregular.      Heart sounds: Murmur (2/6) heard.      No friction rub. No gallop.   Pulmonary:      Effort: Pulmonary effort is normal. No respiratory distress.      Breath sounds: Rales (Bibasilar rales) present. No wheezing or rhonchi.   Abdominal:      General: There is no distension.      Palpations: Abdomen is soft.      Tenderness: There is no abdominal tenderness.   Musculoskeletal:         General: No tenderness. Normal range of motion.      Cervical back: Normal range of motion and neck supple.      Right lower leg: No edema.      Left lower leg: No edema.   Skin:     General: Skin is warm and dry.      Coloration: Skin is not pale.      Findings: No erythema or rash.   Neurological:      General: No focal deficit present.      Mental Status: He is alert and oriented to person, place, and time.      Sensory: No sensory deficit.      Motor: No weakness.   Psychiatric:         Behavior: Behavior normal.      Comments: Anxious affect, depressed mood.         ED Course           Procedures              EKG Interpretation:      Interpreted by Armen Cleveland MD, MD  Time reviewed: Upon completion  Symptoms at time of EKG: " Generalized weakness  Rhythm: Atrial fibrillation  Rate: normal,  Axis: normal  Ectopy: none  Conduction: Incomplete right bundle branch block  ST Segments/ T Waves: Non-specific ST-T wave changes  Q Waves: none  Comparison to prior: 8/15/23  Clinical Impression: Atrial fibrillation with controlled ventricular rate no acute EKG changes           Results for orders placed or performed during the hospital encounter of 11/24/23 (from the past 24 hour(s))   [place] Draw    Narrative    The following orders were created for panel order [place] Draw.  Procedure                               Abnormality         Status                     ---------                               -----------         ------                     Extra Blue Top Tube[[id number]]                              Final result               Extra Green Top (Lithium...[[id number]]                      Final result               Extra Purple Top Tube[[id number]]                            Final result                 Please view results for these tests on the individual orders.   Extra Blue Top Tube   Result Value Ref Range    Hold Specimen JIC    Extra Green Top (Lithium Heparin) Tube   Result Value Ref Range    Hold Specimen JIC    Extra Purple Top Tube   Result Value Ref Range    Hold Specimen JIC    CBC with platelets, differential    Narrative    The following orders were created for panel order CBC with platelets, differential.  Procedure                               Abnormality         Status                     ---------                               -----------         ------                     CBC with platelets and d...[[id number]]  Abnormal            Final result                 Please view results for these tests on the individual orders.   Basic metabolic panel   Result Value Ref Range    Sodium 128 (L) 135 - 145 mmol/L    Potassium 4.2 3.4 - 5.3 mmol/L    Chloride 91 (L) 98 - 107 mmol/L    Carbon Dioxide (CO2) 24 22 - 29 mmol/L    Anion Gap  13 7 - 15 mmol/L    Urea Nitrogen 15.4 8.0 - 23.0 mg/dL    Creatinine 0.98 0.67 - 1.17 mg/dL    GFR Estimate 78 >60 mL/min/1.73m2    Calcium 9.0 8.8 - 10.2 mg/dL    Glucose 124 (H) 70 - 99 mg/dL   CBC with platelets and differential   Result Value Ref Range    WBC Count 6.5 4.0 - 11.0 10e3/uL    RBC Count 3.52 (L) 4.40 - 5.90 10e6/uL    Hemoglobin 11.5 (L) 13.3 - 17.7 g/dL    Hematocrit 34.7 (L) 40.0 - 53.0 %    MCV 99 78 - 100 fL    MCH 32.7 26.5 - 33.0 pg    MCHC 33.1 31.5 - 36.5 g/dL    RDW 14.9 10.0 - 15.0 %    Platelet Count 176 150 - 450 10e3/uL    % Neutrophils 70 %    % Lymphocytes 18 %    % Monocytes 10 %    % Eosinophils 1 %    % Basophils 1 %    % Immature Granulocytes 0 %    NRBCs per 100 WBC 0 <1 /100    Absolute Neutrophils 4.5 1.6 - 8.3 10e3/uL    Absolute Lymphocytes 1.2 0.8 - 5.3 10e3/uL    Absolute Monocytes 0.6 0.0 - 1.3 10e3/uL    Absolute Eosinophils 0.1 0.0 - 0.7 10e3/uL    Absolute Basophils 0.1 0.0 - 0.2 10e3/uL    Absolute Immature Granulocytes 0.0 <=0.4 10e3/uL    Absolute NRBCs 0.0 10e3/uL   TSH with free T4 reflex   Result Value Ref Range    TSH 2.87 0.30 - 4.20 uIU/mL   Magnesium   Result Value Ref Range    Magnesium 2.0 1.7 - 2.3 mg/dL   Troponin T, High Sensitivity   Result Value Ref Range    Troponin T, High Sensitivity 12 <=22 ng/L   Nt probnp inpatient (BNP)   Result Value Ref Range    N terminal Pro BNP Inpatient 4,896 (H) 0 - 1,800 pg/mL   UA Macroscopic with reflex to Microscopic and Culture    Specimen: Urine, Midstream   Result Value Ref Range    Color Urine Yellow Colorless, Straw, Light Yellow, Yellow    Appearance Urine Clear Clear    Glucose Urine Negative Negative mg/dL    Bilirubin Urine Negative Negative    Ketones Urine Negative Negative mg/dL    Specific Gravity Urine 1.009 1.003 - 1.035    Blood Urine Negative Negative    pH Urine 7.0 5.0 - 7.0    Protein Albumin Urine Negative Negative mg/dL    Urobilinogen Urine Normal Normal, 2.0 mg/dL    Nitrite Urine Negative  Negative    Leukocyte Esterase Urine Negative Negative    Narrative    Microscopic not indicated   Symptomatic Influenza A/B, RSV, & SARS-CoV2 PCR (COVID-19) Nose    Specimen: Nose; Swab   Result Value Ref Range    Influenza A PCR Negative Negative    Influenza B PCR Negative Negative    RSV PCR Negative Negative    SARS CoV2 PCR Negative Negative    Narrative    Testing was performed using the Xpert Xpress CoV2/Flu/RSV Assay on the [employer] GeneXpert Instrument. This test should be ordered for the detection of SARS-CoV-2, influenza, and RSV viruses in individuals who meet clinical and/or epidemiological criteria. Test performance is unknown in asymptomatic patients. This test is for in vitro diagnostic use under the FDA EUA for laboratories certified under CLIA to perform high or moderate complexity testing. This test has not been FDA cleared or approved. A negative result does not rule out the presence of PCR inhibitors in the specimen or target RNA in concentration below the limit of detection for the assay. If only one viral target is positive but coinfection with multiple targets is suspected, the sample should be re-tested with another FDA cleared, approved, or authorized test, if coinfection would change clinical management. This test was validated by the [facility]. These laboratories are certified under the Clinical Laboratory Improvement Amendments of 1988 (CLIA-88) as qualified to perform high complexity laboratory testing.   XR Chest 2 Views    Narrative    EXAM: XR CHEST 2 VIEWS  LOCATION: [facility]  DATE: 11/24/2023    INDICATION: Shortness of breath and dyspnea on exertion  COMPARISON: 11/15/2023      Impression    IMPRESSION: No change. Mild cardiomegaly. Pulmonary vessels appear normal. Bilateral calcific pleural plaques are present. Lungs otherwise clear.       Previous Records Reviewed:  9/13/2023 - Sodium 138    Medications   sodium chloride 0.9%  BOLUS 1,000 mL (0 mLs Intravenous Stopped 11/24/23 1636)   ondansetron (ZOFRAN) injection 4 mg (4 mg Intravenous $Given 11/24/23 1427)   LORazepam (ATIVAN) injection 0.5 mg (0.5 mg Intravenous $Given 11/24/23 1631)       Previous Records Reviewed:   Wt Readings from Last 3 Encounters:   11/24/23 74.5 kg (164 lb 4.8 oz)   11/15/23 72.6 kg (160 lb)   10/05/23 72.6 kg (160 lb)       After ambulation to and from the restroom he reported dyspnea but he appeared to have no respiratory distress or hypoxia and O2 saturation was 100% on room air.    Much improved after Ativan.  Prior to appreciating clinical evidence of CHF, he was given a 1 L IV fluid bolus.  He has no decompensation or worsening symptoms of heart failure.  Will have him diurese at home with increasing of Lasix dose.  He and his wife are comfortable with discharge home and he has been up walking about with this walker and anxious to be discharged.    Assessments & Plan (with Medical Decision Making)   78 year old male with history of depression, anxiety, chronic diastolic heart failure, valvular heart disease, chronic atrial fibrillation, chronic anticoagulation on Xarelto and hypertension who is with his wife for evaluation of multiple somatic complaints: dyspnea on exertion, shortness of breath at night while lying, insomnia refractory to Ambien and occasional Benadryl use, pruritus, generalized weakness, decreased appetite and oral intake feeling cold all the time. In addition sometimes when he urinates he also passes stool.  He gets up couple times at night to urinate, no acute urinary abnormality.   No orthopnea or PND, no respiratory distress or hypoxia. Increased weight of ~ 14.33 #  today versus when last checked 9 days ago. He does not adhere to a low-sodium diet.  BNP elevated to 4,896 today versus 1329 when last checked approximately 7 months ago.  He has bibasilar rales, no pitting edema and chest x-ray which is unchanged with only mild  cardiomegaly and normal appearing pulmonary vessels. I will have him increase Lasix from 20 mg daily to 20 mg twice daily in the next several days for CHF and order an outpatient Echocardiogram for him. Will Rx a small number of Ativan for him to use as needed for anxiety, he reports prior treatment with other medications such as trazodone, melatonin, antihistamine have been unsuccessful for his chronic anxiety. Doubt ACS, PE/DVT or other emergent disease process. He can have mild hyponatremia rechecked in clinic next week. He and his wife were provided instructions for supportive care and will return as needed for worsened condition or worsening symptoms, or new problems or concerns. Will make referrals for urgent follow-up in PCC, Cardiology clinic and make a Urology clinic referral for him.    I have reviewed the nursing notes.    I have reviewed the findings, diagnosis, plan and need for follow up with the patient and his wife.    Medical Decision Making: high complexity  Hospitalization for IV antibiotic diuresis, observation was considered and deferred. Currently appears stable and appropriate for outpatient management with close f/u.         Discharge Medication List as of 2023  7:15 PM    Disp Refills Start End JOSUE   LORazepam (ATIVAN) 0.5 MG tablet 20 tablet 0 2023  No   Si tablet by mouth once daily as needed for sleep/insomnia, or anxiety.   Sent to pharmacy as: LORazepam 0.5 MG Oral Tablet (ATIVAN)   Class: E-Prescribe          Final diagnoses:   Dyspnea, unspecified type   Multiple somatic complaints   Depression with anxiety   Chronic diastolic heart failure (H)   Chronic atrial fibrillation (H)   Chronic anticoagulation - On Xarelto   Hyponatremia   Insomnia, unspecified type   Nocturia       2023   [facility] EMERGENCY DEPT       Armen Cleveland MD  [phone]

## 2023-11-28 ASSESSMENT — ENCOUNTER SYMPTOMS
WEAKNESS: 1
NERVOUS/ANXIOUS: 1
ARTHRALGIAS: 1
SHORTNESS OF BREATH: 1

## 2023-11-28 ASSESSMENT — ACTIVITIES OF DAILY LIVING (ADL): CURRENT_FUNCTION: NO ASSISTANCE NEEDED

## 2023-11-30 ENCOUNTER — LAB (OUTPATIENT)
Dept: LAB | Facility: CLINIC | Age: 78
End: 2023-11-30
Payer: COMMERCIAL

## 2023-11-30 ENCOUNTER — OFFICE VISIT (OUTPATIENT)
Dept: FAMILY MEDICINE | Facility: CLINIC | Age: 78
End: 2023-11-30
Payer: COMMERCIAL

## 2023-11-30 VITALS
WEIGHT: 166 LBS | TEMPERATURE: 97.2 F | OXYGEN SATURATION: 99 % | HEART RATE: 84 BPM | RESPIRATION RATE: 16 BRPM | BODY MASS INDEX: 25.16 KG/M2 | HEIGHT: 68 IN | SYSTOLIC BLOOD PRESSURE: 122 MMHG | DIASTOLIC BLOOD PRESSURE: 72 MMHG

## 2023-11-30 DIAGNOSIS — F32.1 MODERATE MAJOR DEPRESSION (H): ICD-10-CM

## 2023-11-30 DIAGNOSIS — G47.00 INSOMNIA, UNSPECIFIED TYPE: ICD-10-CM

## 2023-11-30 DIAGNOSIS — I48.20 CHRONIC ATRIAL FIBRILLATION (H): ICD-10-CM

## 2023-11-30 DIAGNOSIS — F41.9 ANXIETY: ICD-10-CM

## 2023-11-30 DIAGNOSIS — D69.6 THROMBOCYTOPENIA (H): ICD-10-CM

## 2023-11-30 DIAGNOSIS — I50.32 CHRONIC DIASTOLIC HEART FAILURE (H): ICD-10-CM

## 2023-11-30 DIAGNOSIS — I50.33 ACUTE ON CHRONIC DIASTOLIC (CONGESTIVE) HEART FAILURE (H): ICD-10-CM

## 2023-11-30 DIAGNOSIS — Z00.00 ENCOUNTER FOR MEDICARE ANNUAL WELLNESS EXAM: Primary | ICD-10-CM

## 2023-11-30 LAB
ALBUMIN SERPL BCG-MCNC: 4.4 G/DL (ref 3.5–5.2)
ALP SERPL-CCNC: 103 U/L (ref 40–150)
ALT SERPL W P-5'-P-CCNC: 20 U/L (ref 0–70)
ANION GAP SERPL CALCULATED.3IONS-SCNC: 16 MMOL/L (ref 7–15)
AST SERPL W P-5'-P-CCNC: 25 U/L (ref 0–45)
BASOPHILS # BLD AUTO: 0 10E3/UL (ref 0–0.2)
BASOPHILS NFR BLD AUTO: 1 %
BILIRUB SERPL-MCNC: 1.1 MG/DL
BUN SERPL-MCNC: 21.4 MG/DL (ref 8–23)
CALCIUM SERPL-MCNC: 9.4 MG/DL (ref 8.8–10.2)
CHLORIDE SERPL-SCNC: 97 MMOL/L (ref 98–107)
CHOLEST SERPL-MCNC: 106 MG/DL
CREAT SERPL-MCNC: 0.99 MG/DL (ref 0.67–1.17)
DEPRECATED HCO3 PLAS-SCNC: 23 MMOL/L (ref 22–29)
EGFRCR SERPLBLD CKD-EPI 2021: 77 ML/MIN/1.73M2
EOSINOPHIL # BLD AUTO: 0.1 10E3/UL (ref 0–0.7)
EOSINOPHIL NFR BLD AUTO: 3 %
ERYTHROCYTE [DISTWIDTH] IN BLOOD BY AUTOMATED COUNT: 15.6 % (ref 10–15)
GLUCOSE SERPL-MCNC: 97 MG/DL (ref 70–99)
HCT VFR BLD AUTO: 33.7 % (ref 40–53)
HDLC SERPL-MCNC: 27 MG/DL
HGB BLD-MCNC: 10.6 G/DL (ref 13.3–17.7)
IMM GRANULOCYTES # BLD: 0 10E3/UL
IMM GRANULOCYTES NFR BLD: 0 %
LDLC SERPL CALC-MCNC: 64 MG/DL
LYMPHOCYTES # BLD AUTO: 0.9 10E3/UL (ref 0.8–5.3)
LYMPHOCYTES NFR BLD AUTO: 19 %
MCH RBC QN AUTO: 32.3 PG (ref 26.5–33)
MCHC RBC AUTO-ENTMCNC: 31.5 G/DL (ref 31.5–36.5)
MCV RBC AUTO: 103 FL (ref 78–100)
MONOCYTES # BLD AUTO: 0.5 10E3/UL (ref 0–1.3)
MONOCYTES NFR BLD AUTO: 11 %
NEUTROPHILS # BLD AUTO: 3.3 10E3/UL (ref 1.6–8.3)
NEUTROPHILS NFR BLD AUTO: 67 %
NONHDLC SERPL-MCNC: 79 MG/DL
NT-PROBNP SERPL-MCNC: 1862 PG/ML (ref 0–1800)
PLATELET # BLD AUTO: 131 10E3/UL (ref 150–450)
POTASSIUM SERPL-SCNC: 4.4 MMOL/L (ref 3.4–5.3)
PROT SERPL-MCNC: 7.3 G/DL (ref 6.4–8.3)
RBC # BLD AUTO: 3.28 10E6/UL (ref 4.4–5.9)
SODIUM SERPL-SCNC: 136 MMOL/L (ref 135–145)
TRIGL SERPL-MCNC: 74 MG/DL
VIT B12 SERPL-MCNC: 2926 PG/ML (ref 232–1245)
WBC # BLD AUTO: 4.9 10E3/UL (ref 4–11)

## 2023-11-30 PROCEDURE — 82607 VITAMIN B-12: CPT

## 2023-11-30 PROCEDURE — 90677 PCV20 VACCINE IM: CPT | Performed by: FAMILY MEDICINE

## 2023-11-30 PROCEDURE — 80053 COMPREHEN METABOLIC PANEL: CPT

## 2023-11-30 PROCEDURE — 83880 ASSAY OF NATRIURETIC PEPTIDE: CPT

## 2023-11-30 PROCEDURE — 36415 COLL VENOUS BLD VENIPUNCTURE: CPT

## 2023-11-30 PROCEDURE — G0439 PPPS, SUBSEQ VISIT: HCPCS | Performed by: FAMILY MEDICINE

## 2023-11-30 PROCEDURE — 80061 LIPID PANEL: CPT

## 2023-11-30 PROCEDURE — G0009 ADMIN PNEUMOCOCCAL VACCINE: HCPCS | Performed by: FAMILY MEDICINE

## 2023-11-30 PROCEDURE — 99214 OFFICE O/P EST MOD 30 MIN: CPT | Mod: 25 | Performed by: FAMILY MEDICINE

## 2023-11-30 PROCEDURE — 85025 COMPLETE CBC W/AUTO DIFF WBC: CPT

## 2023-11-30 RX ORDER — ZOLPIDEM TARTRATE 5 MG/1
5 TABLET ORAL
Qty: 30 TABLET | Refills: 1 | Status: SHIPPED | OUTPATIENT
Start: 2023-11-30 | End: 2024-01-02

## 2023-11-30 RX ORDER — FUROSEMIDE 20 MG
20 TABLET ORAL 2 TIMES DAILY
Qty: 180 TABLET | Refills: 3 | Status: SHIPPED | OUTPATIENT
Start: 2023-11-30 | End: 2024-02-07

## 2023-11-30 RX ORDER — RESPIRATORY SYNCYTIAL VIRUS VACCINE 120MCG/0.5
0.5 KIT INTRAMUSCULAR ONCE
Qty: 1 EACH | Refills: 0 | Status: CANCELLED | OUTPATIENT
Start: 2023-11-30 | End: 2023-11-30

## 2023-11-30 ASSESSMENT — PATIENT HEALTH QUESTIONNAIRE - PHQ9
SUM OF ALL RESPONSES TO PHQ QUESTIONS 1-9: 5
SUM OF ALL RESPONSES TO PHQ QUESTIONS 1-9: 5
10. IF YOU CHECKED OFF ANY PROBLEMS, HOW DIFFICULT HAVE THESE PROBLEMS MADE IT FOR YOU TO DO YOUR WORK, TAKE CARE OF THINGS AT HOME, OR GET ALONG WITH OTHER PEOPLE: SOMEWHAT DIFFICULT

## 2023-11-30 ASSESSMENT — ENCOUNTER SYMPTOMS
ARTHRALGIAS: 1
NERVOUS/ANXIOUS: 1
SHORTNESS OF BREATH: 1
WEAKNESS: 1

## 2023-11-30 ASSESSMENT — PAIN SCALES - GENERAL: PAINLEVEL: NO PAIN (0)

## 2023-11-30 ASSESSMENT — ACTIVITIES OF DAILY LIVING (ADL): CURRENT_FUNCTION: NO ASSISTANCE NEEDED

## 2023-11-30 NOTE — PROGRESS NOTES
"SUBJECTIVE:   Gavin is a 78 year old, presenting for the following:  Wellness Visit        11/30/2023     9:55 AM   Additional Questions   Roomed by Krystle ALAS CMA   Accompanied by spouse       Are you in the first 12 months of your Medicare coverage?  No    Healthy Habits:     In general, how would you rate your overall health?  Fair    Frequency of exercise:  1 day/week    Duration of exercise:  Less than 15 minutes    Do you usually eat at least 4 servings of fruit and vegetables a day, include whole grains    & fiber and avoid regularly eating high fat or \"junk\" foods?  No    Taking medications regularly:  Yes    Medication side effects:  None    Ability to successfully perform activities of daily living:  No assistance needed    Home Safety:  No safety concerns identified    Hearing Impairment:  Difficult to understand a speaker at a public meeting or Church service, need to ask people to speak up or repeat themselves and difficulty understanding soft or whispered speech    In the past 6 months, have you been bothered by leaking of urine?  No    In general, how would you rate your overall mental or emotional health?  Fair    Additional concerns today:  Yes      Today's PHQ-9 Score:       11/30/2023     9:44 AM   PHQ-9 SCORE   PHQ-9 Total Score MyChart 5 (Mild depression)   PHQ-9 Total Score 5           Have you ever done Advance Care Planning? (For example, a Health Directive, POLST, or a discussion with a medical provider or your loved ones about your wishes): Yes, advance care planning is on file.       Fall risk  Fallen 2 or more times in the past year?: No  Any fall with injury in the past year?: No    Cognitive Screening   1) Repeat 3 items (Leader, Season, Table)    2) Clock draw: NORMAL  3) 3 item recall: Recalls 2 objects   Results: NORMAL clock, 1-2 items recalled: COGNITIVE IMPAIRMENT LESS LIKELY    Mini-CogTM Copyright S Jerome. Licensed by the author for use in [facility]; reprinted " with permission (charito@[facility]). All rights reserved.      Do you have sleep apnea, excessive snoring or daytime drowsiness? : no    Reviewed and updated as needed this visit by clinical staff   Tobacco  Allergies  Meds  Problems  Med Hx  Surg Hx  Fam Hx          Reviewed and updated as needed this visit by Provider   Tobacco  Allergies  Meds  Problems  Med Hx  Surg Hx  Fam Hx         Social History     Tobacco Use     Smoking status: Former     Packs/day: 1.00     Years: 15.00     Additional pack years: 0.00     Total pack years: 15.00     Types: Cigarettes     Start date: 1958     Quit date: 10/13/1975     Years since quittin.2     Smokeless tobacco: Never   Substance Use Topics     Alcohol use: Yes             2023     4:57 PM   Alcohol Use   Prescreen: >3 drinks/day or >7 drinks/week? Not Applicable     Do you have a current opioid prescription? No  Do you use any other controlled substances or medications that are not prescribed by a provider? None              Current providers sharing in care for this patient include:   Patient Care Team:  Lizzy Leiva MD as PCP - General (Family Medicine)  Rylee Kyle APRN CNP as Nurse Practitioner (Nurse Practitioner)  Lizzy Leiva MD as Assigned PCP  Sarah Chapin MD as Assigned Heart and Vascular Provider  Adriel Hawkins MD as MD (Neurology)  Marian Correa RPH as Pharmacist (Pharmacist)  Adriel Hawkins MD as Assigned Neuroscience Provider  Marian Correa RPH as Assigned MTM Pharmacist  Gina Olivares MD as Assigned Behavioral Health Provider  Kalen Casillas MD as MD (Hematology & Oncology)    The following health maintenance items are reviewed in Epic and correct as of today:  Health Maintenance   Topic Date Due     HF ACTION PLAN  Never done     COVID-19 Vaccine (1) Never done     HEPATITIS A IMMUNIZATION (1 of 2 - Risk 2-dose series) Never done     ZOSTER IMMUNIZATION (1 of 2) Never done      RSV VACCINE (Pregnancy & 60+) (1 - 1-dose 60+ series) Never done     MEDICARE ANNUAL WELLNESS VISIT  06/16/2023     INFLUENZA VACCINE (1) 09/01/2023     BMP  05/30/2024     PHQ-9  05/30/2024     ALT  11/30/2024     LIPID  11/30/2024     ANNUAL REVIEW OF HM ORDERS  11/30/2024     FALL RISK ASSESSMENT  11/30/2024     CBC  11/30/2024     ADVANCE CARE PLANNING  11/30/2028     DTAP/TDAP/TD IMMUNIZATION (3 - Td or Tdap) 07/26/2032     TSH W/FREE T4 REFLEX  Completed     DEPRESSION ACTION PLAN  Completed     Pneumococcal Vaccine: 65+ Years  Completed     IPV IMMUNIZATION  Aged Out     HPV IMMUNIZATION  Aged Out     MENINGITIS IMMUNIZATION  Aged Out     RSV MONOCLONAL ANTIBODY  Aged Out     COLORECTAL CANCER SCREENING  Discontinued     Labs reviewed in EPIC  Patient Active Problem List   Diagnosis     Benign essential hypertension     Hypertrophy of prostate with urinary obstruction     Osteoarthrosis, unspecified whether generalized or localized, pelvic region and thigh     Hyperlipidemia LDL goal <100     Anxiety     GERD (gastroesophageal reflux disease)     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)     Hyperplastic Polyp     Right knee DJD     Peripheral neuropathy     Hematoma of skin     Benzodiazepine dependence (H)     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     Unilateral inguinal hernia without obstruction or gangrene     Chronic atrial fibrillation (H)     Depression with anxiety     Status post lung surgery     Memory difficulties     Thrombocytopenia (H24)     Tremor     Chronic diastolic heart failure (H)     Valvular heart disease     Social phobia     S/P revision of total hip     Anemia, unspecified type     Moderate major depression (H)     Hip pain, left     Chronic anticoagulation     Acute on chronic diastolic (congestive) heart failure (H)     Insomnia, unspecified type     Past Surgical History:   Procedure Laterality Date     ABDOMEN SURGERY  2003     ARTHROPLASTY KNEE Right 10/12/2018    Procedure: ARTHROPLASTY KNEE;   Right Total Knee Arthroplasty;  Surgeon: Jeb Peralta MD;  Location: [facility]     ARTHROPLASTY REVISION HIP Left 2023    Procedure: Revision total hip arthroplasty, left;  Surgeon: Chandler Leiva MD;  Location: [facility]     BIOPSY       COLONOSCOPY N/A 12/10/2015    Procedure: COMBINED COLONOSCOPY, SINGLE OR MULTIPLE BIOPSY/POLYPECTOMY BY BIOPSY;  Surgeon: Jyoti Figueroa MD;  Location: [facility]     JOINT REPLACEMENT, HIP RT/LT  10/2007    Joint Replacement Hip LT     JOINT REPLACEMTN, KNEE RT/LT  2011    Joint Replacement knee /LT, [facility]     LAPAROSCOPIC HERNIORRHAPHY INGUINAL BILATERAL Bilateral 2018    Procedure: LAPAROSCOPIC HERNIORRHAPHY INGUINAL BILATERAL;  Laparoscopic bilateral inguinal hernia repair;  Surgeon: Josemanuel Resendiz MD;  Location: [facility]     PHACOEMULSIFICATION WITH STANDARD INTRAOCULAR LENS IMPLANT Right 2021    Procedure: Cataract Removal with Implant;  Surgeon: Regan Kaufman MD;  Location: [facility]     PHACOEMULSIFICATION WITH STANDARD INTRAOCULAR LENS IMPLANT Left 2021    Procedure: Cataract Removal with Implant;  Surgeon: Regan Kaufman MD;  Location: [facility]     SURGICAL HISTORY OF -   1999    Umbilical Herniorrhaphy with mesh     SURGICAL HISTORY OF -  Left 2017    Thoracotomy and drainage of empyema       Social History     Tobacco Use     Smoking status: Former     Packs/day: 1.00     Years: 15.00     Additional pack years: 0.00     Total pack years: 15.00     Types: Cigarettes     Start date: 1958     Quit date: 10/13/1975     Years since quittin.2     Smokeless tobacco: Never   Substance Use Topics     Alcohol use: Yes     Family History   Problem Relation Age of Onset     Depression Mother      Cerebrovascular Disease Mother      Breast Cancer Mother      Neurologic Disorder Mother         parkinsons     Parkinsonism Mother      Respiratory Father         emphyzema     Diabetes Brother           Current Outpatient Medications   Medication Sig Dispense Refill     acetaminophen (TYLENOL) 325 MG tablet Take 2 tablets (650 mg) by mouth every 4 hours as needed for other (mild pain) 100 tablet 0     buPROPion (WELLBUTRIN SR) 150 MG 12 hr tablet Take 1 tablet (150 mg) by mouth 2 times daily 180 tablet 3     doxazosin (CARDURA) 8 MG tablet Take 0.5 tablets (4 mg) by mouth At Bedtime 45 tablet 3     finasteride (PROSCAR) 5 MG tablet TAKE 1 TABLET DAILY 90 tablet 3     furosemide (LASIX) 20 MG tablet Take 1 tablet (20 mg) by mouth 2 times daily 180 tablet 3     gabapentin (NEURONTIN) 300 MG capsule Take 1 capsule (300 mg) by mouth 3 times daily In addition to 600mg capsul. 90 capsule 3     gabapentin (NEURONTIN) 600 MG tablet TAKE 1 TABLET FOUR TIMES A  tablet 0     hydrOXYzine (ATARAX) 25 MG tablet Take 1 tablet (25 mg) by mouth every 8 hours as needed for itching or other (insomnia) 30 tablet 3     lamoTRIgine (LAMICTAL) 25 MG tablet Take 2 tablets (50 mg) by mouth 2 times daily 240 tablet 0     multivitamin (ONE-DAILY) tablet Take 1 tablet by mouth daily 30 tablet 0     XARELTO ANTICOAGULANT 20 MG TABS tablet TAKE 1 TABLET DAILY WITH   DINNER 90 tablet 3     zolpidem (AMBIEN) 5 MG tablet Take 1 tablet (5 mg) by mouth nightly as needed for sleep 30 tablet 1     busPIRone HCl (BUSPAR) 30 MG tablet TAKE 1 TABLET TWICE A  tablet 3     metoprolol tartrate (LOPRESSOR) 100 MG tablet Take 1 tablet (100 mg) by mouth 2 times daily 180 tablet 3     Allergies   Allergen Reactions     Bactrim [Sulfamethoxazole-Trimethoprim] Nausea and Vomiting             Review of Systems   Respiratory:  Positive for shortness of breath.    Genitourinary:  Negative for impotence and penile discharge.   Musculoskeletal:  Positive for arthralgias.   Neurological:  Positive for weakness.   Psychiatric/Behavioral:  The patient is nervous/anxious.          OBJECTIVE:   /72   Pulse 84   Temp 97.2  F (36.2  C) (Tympanic)    "Resp 16   Ht 1.727 m (5' 8\")   Wt 75.3 kg (166 lb)   SpO2 99%   BMI 25.24 kg/m   Estimated body mass index is 25.24 kg/m  as calculated from the following:    Height as of this encounter: 1.727 m (5' 8\").    Weight as of this encounter: 75.3 kg (166 lb).  Physical Exam  GENERAL: healthy, alert and no distress  EYES: Eyes grossly normal to inspection, PERRL and conjunctivae and sclerae normal  HENT: normal cephalic/atraumatic and ear canals and TM's normal  NECK: no adenopathy, no asymmetry, masses, or scars and thyroid normal to palpation  RESP: lungs clear to auscultation - no rales, rhonchi or wheezes  CV: regular rate and rhythm, normal S1 S2, no S3 or S4, no murmur, click or rub, no peripheral edema and peripheral pulses strong  ABDOMEN: soft, nontender, no hepatosplenomegaly, no masses and bowel sounds normal  MS: no gross musculoskeletal defects noted, no edema  SKIN: no suspicious lesions or rashes  NEURO: Normal strength and tone, mentation intact and speech normal  PSYCH: mentation appears normal and affect flat    Diagnostic Test Results:  Labs reviewed in Epic    ASSESSMENT / PLAN:   Encounter for Medicare annual wellness exam    Acute on chronic diastolic (congestive) heart failure (H)  Had mild improvement in edema on 20 mg of furosemide but still volume up on recent BNP.  Will increase to 40 mg daily of furosemide.  Repeat labs as below.Follow-up for re-check in 2-3 weeks for re-check.  - BNP-N terminal pro; Future  - Comprehensive metabolic panel; Future  - Lipid panel reflex to direct LDL Fasting; Future    Chronic atrial fibrillation (H)  Stable.  Follows with cardiology.  On metoprolol.    Moderate major depression (H)  Poorly controlled.  He has tried and failed numerous medications.  Currently on Wellbutrin  mg twice daily as well as BuSpar 30 mg twice daily and Lamictal 25 mg twice daily.  We have discussed psychiatry consult previously.  He has not followed up with this but stressed " importance today that given numerous medication failures and poor control of symptoms that at this point he really needs to see a mental health specialist.    At one point the question was raised whether this might be Parkinson's related mood disorder.  He did see neurology 11/16/2022 given tremor that he had.  Their assessment at that time was that he did not have any symptoms suggestive of Parkinson's disease.    Per patient's chart: Additional information Per Rylee Kyle, CNP note:   Lexapro (ineffective), Prozac (helps depression, but anxiety increase), Hydroxyzine (oversedation, PCP trial), Wellbutrin XL, Xanax, Klonopin, Valium, Ativan, Remeron (30 mg only, worked well for sleep and anxiety, but weight gain), Nortriptyline, Vistaril (dry mouth, nightmares), Remeron (oversedation at 45 mg, 22.5mg and 15 mg and wt gain), Trazodone (oversedate at 50 mg), Ambien (effective for sleep, fall), Belsomra not covered with insurance.      He has also tried duloxetine - side effects and Prestiq - not effective. Venlafaxine didn't agree with him. He tried Viibryd for a short time - stopped 12/2021. He was taking low dose sertraline - stopped 2004. States doesn't feel his medications have ever really helped.     The three medications listed from patient's Gene[employer]ight testing that do not have any Gene-drug interactions are Pristiq, Fetzima and Viibryd - patient has tired all of these without improvement. He has not tried Paxil or Celexa - both have significant Gene-drug interactions per Gene[employer]ight. Patient is an ultrarapid metapolizer of CYP2D6 (paroxetine) and a poor metabolizer of IGV0M88 (citalopram and sertraline) - in general recommended to avoid therapy for these three SSRIs.      - Adult Mental Health  Referral; Future    Thrombocytopenia (H24)  Stable.  Recheck labs.  - CBC with Platelets & Differential; Future    Chronic diastolic heart failure (H)  Acute exacerbation as above.  Furosemide increased to 40  "mg daily today.  - BNP-N terminal pro; Future  - Comprehensive metabolic panel; Future  - Lipid panel reflex to direct LDL Fasting; Future    Insomnia, unspecified type  Poorly controlled.  He reports Ambien works well.  He has a history of benzodiazepine abuse so would not be inclined to use these.  Additionally, would be disinclined to use benzodiazepines given his age.  Suspect depression and anxiety which are both poorly controlled are playing a significant role here as well.  Ultimately psychiatry I think is going to be most helpful for him.  For now we will continue with Ambien 5 mg.  Discussed risk of rebound with regular use.  Discussed risk of psychological dependence.  Advised attempting to limit use to 3-4 days/week.  - zolpidem (AMBIEN) 5 MG tablet; Take 1 tablet (5 mg) by mouth nightly as needed for sleep  - Vitamin B12; Future    Anxiety  See above.  - Adult Mental Health  Referral; Future        Patient has been advised of split billing requirements and indicates understanding: Yes      COUNSELING:  Reviewed preventive health counseling, as reflected in patient instructions      BMI:   Estimated body mass index is 25.24 kg/m  as calculated from the following:    Height as of this encounter: 1.727 m (5' 8\").    Weight as of this encounter: 75.3 kg (166 lb).         He reports that he quit smoking about 48 years ago. His smoking use included cigarettes. He started smoking about 65 years ago. He has a 15.00 pack-year smoking history. He has never used smokeless tobacco.      Appropriate preventive services were discussed with this patient, including applicable screening as appropriate for fall prevention, nutrition, physical activity, Tobacco-use cessation, weight loss and cognition.  Checklist reviewing preventive services available has been given to the patient.    Reviewed patients plan of care and provided an AVS. The Intermediate Care Plan ( asthma action plan, low back pain action plan, " and migraine action plan) for Josemanuel meets the Care Plan requirement. This Care Plan has been established and reviewed with the Patient and spouse.          Lizzy Leiva MD  [facility]    Identified Health Risks:  I have reviewed Opioid Use Disorder and Substance Use Disorder risk factors and made any needed referrals.   Answers submitted by the patient for this visit:  Patient Health Questionnaire (Submitted on 11/30/2023)  If you checked off any problems, how difficult have these problems made it for you to do your work, take care of things at home, or get along with other people?: Somewhat difficult  PHQ9 TOTAL SCORE: 5

## 2023-12-11 ENCOUNTER — MYC MEDICAL ADVICE (OUTPATIENT)
Dept: FAMILY MEDICINE | Facility: CLINIC | Age: 78
End: 2023-12-11
Payer: COMMERCIAL

## 2023-12-12 DIAGNOSIS — I48.91 ATRIAL FIBRILLATION WITH RAPID VENTRICULAR RESPONSE (H): ICD-10-CM

## 2023-12-12 DIAGNOSIS — F40.10 SOCIAL ANXIETY DISORDER: ICD-10-CM

## 2023-12-12 RX ORDER — METOPROLOL TARTRATE 100 MG
100 TABLET ORAL 2 TIMES DAILY
Qty: 180 TABLET | Refills: 3 | Status: SHIPPED | OUTPATIENT
Start: 2023-12-12 | End: 2024-08-16 | Stop reason: ALTCHOICE

## 2023-12-12 RX ORDER — BUSPIRONE HYDROCHLORIDE 30 MG/1
TABLET ORAL
Qty: 180 TABLET | Refills: 3 | Status: SHIPPED | OUTPATIENT
Start: 2023-12-12

## 2023-12-12 NOTE — TELEPHONE ENCOUNTER
"Requested Prescriptions   Pending Prescriptions Disp Refills    busPIRone HCl (BUSPAR) 30 MG tablet [Pharmacy Med Name: BUSPIRONE HCL TABS 30MG] 180 tablet 3     Sig: TAKE 1 TABLET TWICE A DAY       Atypical Antidepressants Protocol Passed - 12/12/2023  3:51 PM        Passed - Recent (12 mo) or future (30 days) visit within the authorizing provider's specialty     Patient has had an office visit with the authorizing provider or a provider within the authorizing providers department within the previous 12 mos or has a future within next 30 days. See \"Patient Info\" tab in inbasket, or \"Choose Columns\" in Meds & Orders section of the refill encounter.              Passed - Medication active on med list        Passed - Patient is age 18 or older           Veronica Loaiza RN on 12/12/2023 at 3:56 PM    "

## 2023-12-13 ENCOUNTER — TELEPHONE (OUTPATIENT)
Dept: FAMILY MEDICINE | Facility: CLINIC | Age: 78
End: 2023-12-13
Payer: COMMERCIAL

## 2023-12-13 PROBLEM — G47.00 INSOMNIA, UNSPECIFIED TYPE: Status: ACTIVE | Noted: 2023-12-13

## 2023-12-14 NOTE — PROGRESS NOTES
"he patient was provided with suggestions to help him develop a healthy physical lifestyle.  He is at risk for lack of exercise and has been provided with information to increase physical activity for the benefit of his well-being.  The patient was counseled and encouraged to consider modifying their diet and eating habits. He was provided with information on recommended healthy diet options.  The patient was provided with written information regarding signs of hearing loss.  The patient was provided with suggestions to help him develop a healthy emotional lifestyle.  The patient's PHQ-9 score is consistent with mild depression. He was provided with information regarding depression and was advised to schedule a follow up appointment in  weeks to further address this issue.  Answers submitted by the patient for this visit:  Patient Health Questionnaire (Submitted on 11/30/2023)  If you checked off any problems, how difficult have these problems made it for you to do your work, take care of things at home, or get along with other people?: Somewhat difficult  PHQ9 TOTAL SCORE: 5  Annual Preventive Visit (Submitted on 11/28/2023)  Chief Complaint: Annual Exam:  In general, how would you rate your overall physical health?: fair  Frequency of exercise:: 1 day/week  Do you usually eat at least 4 servings of fruit and vegetables a day, include whole grains & fiber, and avoid regularly eating high fat or \"junk\" foods? : No  Taking medications regularly:: Yes  Medication side effects:: None  Activities of Daily Living: no assistance needed  Home safety: no safety concerns identified  Hearing Impairment:: difficult to understand a speaker at a public meeting or [employer] service, need to ask people to speak up or repeat themselves, difficulty understanding soft or whispered speech  In the past 6 months, have you been bothered by leaking of urine?: No  nervous/anxious: Yes  arthralgias: Yes  shortness of breath: Yes  weakness: " Yes  impotence: No  penile discharge: No  In general, how would you rate your overall mental or emotional health?: fair  Additional concerns today:: Yes  Exercise outside of work (Submitted on 11/28/2023)  Chief Complaint: Annual Exam:  Duration of exercise:: Less than 15 minutes

## 2023-12-14 NOTE — TELEPHONE ENCOUNTER
Patient notified of message below.   States he will do so.     Annette Simons RN on 12/14/2023 at 1:49 PM

## 2023-12-14 NOTE — TELEPHONE ENCOUNTER
Care team: Regarding message below: Please advise him that I recommend he go ahead and schedule with psychiatry.  I know the appointment is out a way in June but I am really out of ideas and I think at this point we need to get psychiatry involved.  Sometimes they have cancellations and scheduled appointments can be moved up.  If he does not schedule psychiatry we will never get their input.  If he does it may still be several months out but at least at some point we will get him in with the specialist who can hopefully provide more treatment options than I.    Adriane Pastor Sieglinde Iris, MD  Pt declined to [facility] for [facility] psychiatry because first appt is in June.

## 2023-12-14 NOTE — PATIENT INSTRUCTIONS
Patient Education   Personalized Prevention Plan  You are due for the preventive services outlined below.  Your care team is available to assist you in scheduling these services.  If you have already completed any of these items, please share that information with your care team to update in your medical record.  Health Maintenance Due   Topic Date Due     Heart Failure Action Plan  Never done     COVID-19 Vaccine (1) Never done     Hepatitis A Vaccine (1 of 2 - Risk 2-dose series) Never done     Zoster (Shingles) Vaccine (1 of 2) Never done     RSV VACCINE (Pregnancy & 60+) (1 - 1-dose 60+ series) Never done     Annual Wellness Visit  06/16/2023     Flu Vaccine (1) 09/01/2023     Your Health Risk Assessment indicates you feel you are not in good health    A healthy lifestyle helps keep the body fit and the mind alert. It helps protect you from disease, helps you fight disease, and helps prevent chronic disease (disease that doesn't go away) from getting worse. This is important as you get older and begin to notice twinges in muscles and joints and a decline in the strength and stamina you once took for granted. A healthy lifestyle includes good healthcare, good nutrition, weight control, recreation, and regular exercise. Avoid harmful substances and do what you can to keep safe. Another part of a healthy lifestyle is stay mentally active and socially involved.    Good healthcare   Have a wellness visit every year.   If you have new symptoms, let us know right away. Don't wait until the next checkup.   Take medicines exactly as prescribed and keep your medicines in a safe place. Tell us if your medicine causes problems.   Healthy diet and weight control   Eat 3 or 4 small, nutritious, low-fat, high-fiber meals a day. Include a variety of fruits, vegetables, and whole-grain foods.   Make sure you get enough calcium in your diet. Calcium, vitamin D, and exercise help prevent osteoporosis (bone thinning).   If you live  "alone, try eating with others when you can. That way you get a good meal and have company while you eat it.   Try to keep a healthy weight. If you eat more calories than your body uses for energy, it will be stored as fat and you will gain weight.     Recreation   Recreation is not limited to sports and team events. It includes any activity that provides relaxation, interest, enjoyment, and exercise. Recreation provides an outlet for physical, mental, and social energy. It can give a sense of worth and achievement. It can help you stay healthy.    Mental Exercise and Social Involvement  Mental and emotional health is as important as physical health. Keep in touch with friends and family. Stay as active as possible. Continue to learn and challenge yourself.   Things you can do to stay mentally active are:  Learn something new, like a foreign language or musical instrument.   Play SCRABBLE or do crossword puzzles. If you cannot find people to play these games with you at home, you can play them with others on your computer through the Internet.   Join a games club--anything from card games to chess or checkers or lawn bowling.   Start a new hobby.   Go back to school.   Volunteer.   Read.   Keep up with world events.  Learning About Being Physically Active  What is physical activity?     Being physically active means doing any kind of activity that gets your body moving.  The types of physical activity that can help you get fit and stay healthy include:  Aerobic or \"cardio\" activities. These make your heart beat faster and make you breathe harder, such as brisk walking, riding a bike, or running. They strengthen your heart and lungs and build up your endurance.  Strength training activities. These make your muscles work against, or \"resist,\" something. Examples include lifting weights or doing push-ups. These activities help tone and strengthen your muscles and bones.  Stretches. These let you move your joints and " muscles through their full range of motion. Stretching helps you be more flexible.  Reaching a balance between these three types of physical activity is important because each one contributes to your overall fitness.  What are the benefits of being active?  Being active is one of the best things you can do for your health. It helps you to:  Feel stronger and have more energy to do all the things you like to do.  Focus better at school or work.  Feel, think, and sleep better.  Reach and stay at a healthy weight.  Lose fat and build lean muscle.  Lower your risk for serious health problems, including diabetes, heart attack, high blood pressure, and some cancers.  Keep your heart, lungs, bones, muscles, and joints strong and healthy.  How can you make being active part of your life?  Start slowly. Make it your long-term goal to get at least 30 minutes of exercise on most days of the week. Walking is a good choice. You also may want to do other activities, such as running, swimming, cycling, or playing tennis or team sports.  Pick activities that you like--ones that make your heart beat faster, your muscles stronger, and your muscles and joints more flexible. If you find more than one thing you like doing, do them all. You don't have to do the same thing every day.  Get your heart pumping every day. Any activity that makes your heart beat faster and keeps it at that rate for a while counts.  Here are some great ways to get your heart beating faster:  Go for a brisk walk, run, or hike.  Go for a swim or bike ride.  Take an online exercise class or dance.  Play a game of touch football, basketball, or soccer.  Play tennis, pickleball, or racquetball.  Climb stairs.  Even some household chores can be aerobic. Just do them at a faster pace. Raking or mowing the lawn, sweeping the garage, and vacuuming and cleaning your home all can help get your heart rate up.  Strengthen your muscles during the week. You don't have to lift  "heavy weights or grow big, bulky muscles to get stronger. Doing a few simple activities that make your muscles work against, or \"resist,\" something can help you get stronger. Aim for at least twice a week.  For example, you can:  Do push-ups or sit-ups, which use your own body weight as resistance.  Lift weights or dumbbells or use stretch bands at home or in a gym or community center.  Stretch your muscles often. Stretching will help you as you become more active. It can help you stay flexible and loosen tight muscles. It can also help improve your balance and posture and can be a great way to relax.  Be sure to stretch the muscles you'll be using when you work out. It's best to warm your muscles slightly before you stretch them. Walk or do some other light aerobic activity for a few minutes. Then start stretching.  When you stretch your muscles:  Do it slowly. Stretching is not about going fast or making sudden movements.  Don't push or bounce during a stretch.  Hold each stretch for at least 15 to 30 seconds, if you can. You should feel a stretch in the muscle, but not pain.  Breathe out as you do the stretch. Then breathe in as you hold the stretch. Don't hold your breath.  If you're worried about how more activity might affect your health, have a checkup before you start. Follow any special advice your doctor gives you for getting a smart start.  Where can you learn more?  Go to https://www.Teamer.net.net/patiented  Enter W332 in the search box to learn more about \"Learning About Being Physically Active.\"  Current as of: June 6, 2023               Content Version: 13.8    8896-9743 [employer].   Care instructions adapted under license by your healthcare professional. If you have questions about a medical condition or this instruction, always ask your healthcare professional. [employer] disclaims any warranty or liability for your use of this information.      Learning About Dietary " Guidelines  What are the Dietary Guidelines for Americans?     Dietary Guidelines for Americans provide tips for eating well and staying healthy. This helps reduce the risk for long-term (chronic) diseases.  These guidelines recommend that you:  Eat and drink the right amount for you. The U.S. government's food guide is called MyPlate. It can help you make your own well-balanced eating plan.  Try to balance your eating with your activity. This helps you stay at a healthy weight.  Drink alcohol in moderation, if at all.  Limit foods high in salt, saturated fat, trans fat, and added sugar.  These guidelines are from the U.S. Department of Agriculture and the U.S. Department of Health and Human Services. They are updated every 5 years.  What is MyPlate?  MyPlate is the U.[place]. government's food guide. It can help you make your own well-balanced eating plan. A balanced eating plan means that you eat enough, but not too much, and that your food gives you the nutrients you need to stay healthy.  MyPlate focuses on eating plenty of whole grains, fruits, and vegetables, and on limiting fat and sugar. It is available online at www.ChooseMyPlate.gov.  How can you get started?  If you're trying to eat healthier, you can slowly change your eating habits over time. You don't have to make big changes all at once. Start by adding one or two healthy foods to your meals each day.  Grains  Choose whole-grain breads and cereals and whole-wheat pasta and whole-grain crackers.  Vegetables  Eat a variety of vegetables every day. They have lots of nutrients and are part of a heart-healthy diet.  Fruits  Eat a variety of fruits every day. Fruits contain lots of nutrients. Choose fresh fruit instead of fruit juice.  Protein foods  Choose fish and lean poultry more often. Eat red meat and fried meats less often. Dried beans, tofu, and nuts are also good sources of protein.  Dairy  Choose low-fat or fat-free products from this food group. If you  "have problems digesting milk, try eating cheese or yogurt instead.  Fats and oils  Limit fats and oils if you're trying to cut calories. Choose healthy fats when you cook. These include canola oil and olive oil.  Where can you learn more?  Go to https://www.[employer].net/patiented  Enter D676 in the search box to learn more about \"Learning About Dietary Guidelines.\"  Current as of: February 28, 2023               Content Version: 13.8    7796-1793 [employer].   Care instructions adapted under license by your healthcare professional. If you have questions about a medical condition or this instruction, always ask your healthcare professional. [employer] disclaims any warranty or liability for your use of this information.      Hearing Loss: Care Instructions  Overview     Hearing loss is a sudden or slow decrease in how well you hear. It can range from slight to profound. Permanent hearing loss can occur with aging. It also can happen when you are exposed long-term to loud noise. Examples include listening to loud music, riding motorcycles, or being around other loud machines.  Hearing loss can affect your work and home life. It can make you feel lonely or depressed. You may feel that you have lost your independence. But hearing aids and other devices can help you hear better and feel connected to others.  Follow-up care is a key part of your treatment and safety. Be sure to make and go to all appointments, and call your doctor if you are having problems. It's also a good idea to know your test results and keep a list of the medicines you take.  How can you care for yourself at home?  Avoid loud noises whenever possible. This helps keep your hearing from getting worse.  Always wear hearing protection around loud noises.  Wear a hearing aid as directed.  A professional can help you pick a hearing aid that will work best for you.  You can also get hearing aids over the counter for mild to " "moderate hearing loss.  Have hearing tests as your doctor suggests. They can show whether your hearing has changed. Your hearing aid may need to be adjusted.  Use other devices as needed. These may include:  Telephone amplifiers and hearing aids that can connect to a television, stereo, radio, or microphone.  Devices that use lights or vibrations. These alert you to the doorbell, a ringing telephone, or a baby monitor.  Television closed-captioning. This shows the words at the bottom of the screen. Most new TVs can do this.  TTY (text telephone). This lets you type messages back and forth on the telephone instead of talking or listening. These devices are also called TDD. When messages are typed on the keyboard, they are sent over the phone line to a receiving TTY. The message is shown on a monitor.  Use text messaging, social media, and email if it is hard for you to communicate by telephone.  Try to learn a listening technique called speechreading. It is not lipreading. You pay attention to people's gestures, expressions, posture, and tone of voice. These clues can help you understand what a person is saying. Face the person you are talking to, and have them face you. Make sure the lighting is good. You need to see the other person's face clearly.  Think about counseling if you need help to adjust to your hearing loss.  When should you call for help?  Watch closely for changes in your health, and be sure to contact your doctor if:    You think your hearing is getting worse.     You have new symptoms, such as dizziness or nausea.   Where can you learn more?  Go to https://www.[employer].net/patiented  Enter R798 in the search box to learn more about \"Hearing Loss: Care Instructions.\"  Current as of: February 28, 2023               Content Version: 13.8    2369-0839 [employer].   Care instructions adapted under license by your healthcare professional. If you have questions about a medical condition or " this instruction, always ask your healthcare professional. [employer] disclaims any warranty or liability for your use of this information.      Your Health Risk Assessment indicates you feel you are not in good emotional health.    Recreation   Recreation is not limited to sports and team events. It includes any activity that provides relaxation, interest, enjoyment, and exercise. Recreation provides an outlet for physical, mental, and social energy. It can give a sense of worth and achievement. It can help you stay healthy.    Mental Exercise and Social Involvement  Mental and emotional health is as important as physical health. Keep in touch with friends and family. Stay as active as possible. Continue to learn and challenge yourself.   Things you can do to stay mentally active are:  Learn something new, like a foreign language or musical instrument.   Play SCRABBLE or do crossword puzzles. If you cannot find people to play these games with you at home, you can play them with others on your computer through the Internet.   Join a games club--anything from card games to chess or checkers or lawn bowling.   Start a new hobby.   Go back to school.   Volunteer.   Read.   Keep up with world events.  Learning About Depression Screening  What is depression screening?  Depression screening is a way to see if you have depression symptoms. It may be done by a doctor or counselor. It's often part of a routine checkup. That's because your mental health is just as important as your physical health.  Depression is a mental health condition that affects how you feel, think, and act. You may:  Have less energy.  Lose interest in your daily activities.  Feel sad and grouchy for a long time.  Depression is very common. It affects people of all ages.  Many things can lead to depression. Some people become depressed after they have a stroke or find out they have a major illness like cancer or heart disease. The death  "of a loved one or a breakup may lead to depression. It can run in families. Most experts believe that a combination of inherited genes and stressful life events can cause it.  What happens during screening?  You may be asked to fill out a form about your depression symptoms. You and the doctor will discuss your answers. The doctor may ask you more questions to learn more about how you think, act, and feel.  What happens after screening?  If you have symptoms of depression, your doctor will talk to you about your options.  Doctors usually treat depression with medicines or counseling. Often, combining the two works best. Many people don't get help because they think that they'll get over the depression on their own. But people with depression may not get better unless they get treatment.  The cause of depression is not well understood. There may be many factors involved. But if you have depression, it's not your fault.  A serious symptom of depression is thinking about death or suicide. If you or someone you care about talks about this or about feeling hopeless, get help right away.  It's important to know that depression can be treated. Medicine, counseling, and self-care may help.  Where can you learn more?  Go to https://www.[employer].net/patiented  Enter T185 in the search box to learn more about \"Learning About Depression Screening.\"  Current as of: June 25, 2023               Content Version: 13.8    2760-5802 [employer].   Care instructions adapted under license by your healthcare professional. If you have questions about a medical condition or this instruction, always ask your healthcare professional. [employer] disclaims any warranty or liability for your use of this information.         "

## 2023-12-22 ENCOUNTER — TELEPHONE (OUTPATIENT)
Dept: BEHAVIORAL HEALTH | Facility: CLINIC | Age: 78
End: 2023-12-22
Payer: COMMERCIAL

## 2023-12-22 NOTE — PROGRESS NOTES
Subjective:  Josemanuel Edmond is a 72 year old male   Chief Complaint   Patient presents with     Patient Request     stomach pains X 3-4 months. ? alcohol related. pt. states stomach feels better when he drinks alcohol the pain goes away.      Blood Draw     pt. states he is due for cholesterol      He gets a gnawing pain in the upper midabdomen on a daily basis. He states that it feels like someone is pressing a bowling ball against his abdomen. He is suspicious that it may be related to his excessive alcohol use, but has found that if he has a couple drinks it seems to take the pain away. He is currently drinking more than 5 drinks per day. He usually will stop try to wait until mid day for his first drink but finds that he has the shakes in the morning. They go away when he starts drinking. He went through some type of assessment and they recommended that he go through detox and then enroll in a residential treatment program. He states that he really would have difficulty doing that at this time because he and his wife have legal custody of their toddler grandson and that it would be too much work for her to care for him all by herself. He has been prescribed omeprazole but did not think that that helped with the abdominal pain        Encounter Diagnoses   Name Primary?     Acute alcoholic gastritis without hemorrhage Yes     Hypertension goal BP (blood pressure) < 140/90      Hyperlipidemia LDL goal <100      Alcoholic liver disease (H)       Anxiety        ROS:other than noted above, general, HEENT, respiratory, cardiac, gastrointestinal systems are negative    Medical, surgical, social, and family histories, medications and allergies reviewed and updated.    Objective:  Exam:    GENERAL APPEARANCE ADULT: Alert, no acute distress  EYES: PERRL, EOM normal, conjunctiva and lids normal  RESP: lungs clear to auscultation   CV: normal rate, regular rhythm, no murmur or gallop  ABDOMEN: soft, no organomegaly or  masses , tender epigastric moderate but no guarding, rigidity, or rebound  MS: extremities normal, no peripheral edema  PSYCH: mentation appears normal., sad, anxious      ASSESSMENT:  1. Acute alcoholic gastritis without hemorrhage    2. Hypertension goal BP (blood pressure) < 140/90    3. Hyperlipidemia LDL goal <100    4. Alcoholic liver disease (H)     5. Anxiety        PLAN:  Orders Placed This Encounter     Lipid panel reflex to direct LDL     Hepatic panel     Basic metabolic panel     GGT     escitalopram (LEXAPRO) 10 MG tablet       Patient Instructions   You need to get off the alcohol and probably go to a detox center initially.  Then outpatient or residential treatment. The stomach pain will subside when you are off the alcohol and taking the omeprazole.      There are no Wet Read(s) to document.

## 2023-12-22 NOTE — TELEPHONE ENCOUNTER
First attempt to reach patient regarding Transition Clinic Referral.  Spoke to patient regarding Transition Clinic referral.    Scheduled patient for Transition Clinic services on 12/28/2023. Tracker form completed.    Vi Wahl  Transition Clinic Coordinator  12/22/23 3:11 PM        ----- Message from Kenyon Langford sent at 12/22/2023  2:51 PM CST -----  Transition Clinic Referral   [place]       Please Check Type of Referral Requested:       _NA___THERAPY: The Transition clinic is able to schedule patients without current medical insurance; these patient will be referred to our Social Work Care Coordinator for Medical Insurance              Assistance. We are open for referral for psychotherapy. Patient is referred from:  PCP at      [facility]    ____MEDICATION:   Referrals for Medication are ONLY accepted from the following areas (select): Other..... STANDARD PRIORITY                                        Suboxone and Opioid Management Referrals are automatically denied.   TC Psychiatry cannot see patient without active medical insurance.   Next level of psychiatry care must be within 12 months.  TC Psychiatry cannot accept patient with next level of care scheduled with PCP.  The transition clinic cannot follow patients who are on a restricted recipient program.    ___ Long-Acting Injection (DURAN)?    Is referred patient receiving a long-acting injection (DURAN)?  If YES, provide the following:    Name and dose of Medication:   Date Injection was last administered to patient:   Next Long- Acting injection Due Date:     Is referred patient transferring from [facility]?  If YES, provide the following:     ___  DURAN will [facility] DURAN [facility] [place]  ___  DURAN will be administered per an IRTS or elsewhere in the community       GUARDIAN: If your patient is not their own Guardian, please provide the following:    Guardian Name:  Guardian Contact Information  (Phone & Email) :  Guardian Address:     FOSTER CARE PROVIDER: If your patient lives at a Licensed Foster Care, please provide the following:    Foster Provider:  Foster Provider Contact Information (Phone & Email):  Foster Provider Address:     Referring Provider Contact Name:  Lizzy Leiva MD; Phone Number: unk    Reason for Transition Clinic Referral:   F41.9 (ICD-10-CM) - Anxiety  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)        Next Level of Care Patient Will Be Transitioned To:     Name: Gavin Edmond MRN: [id number]  Date: 3/14/2024 Status: Scheduled  Time: 1:30 PM Length: 60  Visit Type: [facility] ADULT PSYCHIATRY NEW [[id number]] Copay: $0.00  Provider: Easton Masters MD Department: [facility] PSYCHIATRY  Encounter #: [id number]        What Would Be Helpful from the Transition Clinic: bridge gap     Needs: NO    Does Patient Have Access to Technology: yes    Patient E-mail Address: [email or web address]    Current Patient Phone Number: [phone];     Clinician Gender Preference (if applicable): NO    Patient location preference: unk     Kenyon Langford      (Master Form: Updated 11/28/2023)

## 2023-12-28 ENCOUNTER — VIRTUAL VISIT (OUTPATIENT)
Dept: BEHAVIORAL HEALTH | Facility: CLINIC | Age: 78
End: 2023-12-28
Payer: COMMERCIAL

## 2023-12-28 DIAGNOSIS — F40.10 SOCIAL ANXIETY DISORDER: ICD-10-CM

## 2023-12-28 DIAGNOSIS — F33.1 MODERATE EPISODE OF RECURRENT MAJOR DEPRESSIVE DISORDER (H): Primary | ICD-10-CM

## 2023-12-28 PROCEDURE — 99205 OFFICE O/P NEW HI 60 MIN: CPT | Mod: VID | Performed by: NURSE PRACTITIONER

## 2023-12-28 RX ORDER — BUPROPION HYDROCHLORIDE 150 MG/1
150 TABLET, EXTENDED RELEASE ORAL DAILY
Qty: 30 TABLET | Refills: 0 | Status: SHIPPED | OUTPATIENT
Start: 2023-12-28 | End: 2024-01-18 | Stop reason: DRUGHIGH

## 2023-12-28 RX ORDER — LAMOTRIGINE 25 MG/1
50 TABLET ORAL 2 TIMES DAILY
Qty: 360 TABLET | Refills: 0 | Status: SHIPPED | OUTPATIENT
Start: 2023-12-28 | End: 2024-02-15

## 2023-12-28 RX ORDER — BUPROPION HYDROCHLORIDE 300 MG/1
300 TABLET ORAL EVERY MORNING
Qty: 30 TABLET | Refills: 0 | Status: SHIPPED | OUTPATIENT
Start: 2023-12-28 | End: 2024-01-18

## 2023-12-28 ASSESSMENT — ANXIETY QUESTIONNAIRES
2. NOT BEING ABLE TO STOP OR CONTROL WORRYING: NOT AT ALL
GAD7 TOTAL SCORE: 6
6. BECOMING EASILY ANNOYED OR IRRITABLE: SEVERAL DAYS
IF YOU CHECKED OFF ANY PROBLEMS ON THIS QUESTIONNAIRE, HOW DIFFICULT HAVE THESE PROBLEMS MADE IT FOR YOU TO DO YOUR WORK, TAKE CARE OF THINGS AT HOME, OR GET ALONG WITH OTHER PEOPLE: SOMEWHAT DIFFICULT
3. WORRYING TOO MUCH ABOUT DIFFERENT THINGS: SEVERAL DAYS
5. BEING SO RESTLESS THAT IT IS HARD TO SIT STILL: SEVERAL DAYS
1. FEELING NERVOUS, ANXIOUS, OR ON EDGE: MORE THAN HALF THE DAYS
7. FEELING AFRAID AS IF SOMETHING AWFUL MIGHT HAPPEN: NOT AT ALL
GAD7 TOTAL SCORE: 6

## 2023-12-28 ASSESSMENT — PATIENT HEALTH QUESTIONNAIRE - PHQ9
SUM OF ALL RESPONSES TO PHQ QUESTIONS 1-9: 7
5. POOR APPETITE OR OVEREATING: SEVERAL DAYS
SUM OF ALL RESPONSES TO PHQ QUESTIONS 1-9: 7

## 2023-12-28 NOTE — PATIENT INSTRUCTIONS
Start:  Bupropion/Wellbutrin 300 mg XL in AM (between 8 to 9 am) + 150 mg SR (between noon to 2 pm)    Continue:  Buspirone/Buspar 30 mg twice daily    Hydroxyzine/Atarax 25 mg every 8 hours as needed for itching, insomnia    Lamotrigine/Lamictal 50 mg (2 tabs) twice daily for ttl: 100 mg    Zolpidem/Ambien 5 mg nightly as needed for sleep      Per PCP:  -Gabapentin 600 mg QID + 300 mg TID; ttl: 3300 mg

## 2023-12-28 NOTE — PROGRESS NOTES
Hematology/Medical Oncology Follow-up Note      January 4, 2024      Referring provider:  Lizzy Leiva MD    Reason for visit:  Josemanuel Edmond is a 78 year old retired self-employed floor covering business owner from [place] accompanied by his wife Yoselyn who is referred for hematologic evaluation and consultation regarding recent identification of mild normocytic anemia and thrombocytopenia.    Impression:  Recent indeterminate anemia and thrombocytopenia; this could represent an early myelodysplastic syndrome.      Recommendation, plan, instructions:  I would continue to observe without intervention  Sometimes as elanzepine or Zyprexa can cause mild pancytopenia he might confer with his primary care provider about substituting something for it  Follow-up with Justyna Hassan NP in 6 months with CBC/CMP before appointment  I would not perform a bone marrow biopsy in this setting since her results would not dictate any change in management or intervention.    Time with patient including review, documentation, history, examination, coordination of care and counseling was 25 minutes.    History of present illness:  9/13/2023 blood tests revealed hemoglobin 11.3, , white count 4,400, platelets 118,000, absolute neutrophil count 3,000, with a normal CMP, ELP, serum iron/TIBC, TSH, CRP, sed rate, ferritin, HIV, B12, and hepatitis C antibody.    In retrospect, thrombocytopenia has been noted intermittently since June, 2022 and moderate anemia has been noted since December, 2017 with a prior July, 2011 hemoglobin of 14.9.  A 8/2022 CT abdomen pelvis revealed diffuse hepatic steatosis without splenomegaly.  His wife and the patient had a significant COVID-19 infection in September, 2021 but did not require hospitalization.  He had underwent a left total hip arthroplasty in May, 2023 which required a red cell transfusion postoperatively.    When I saw him in September, 2023, we decided to defer bone marrow  biopsy since results would not .  CBC/CMP obtained before today's appointment revealed a white count of 3900, hemoglobin 11.0, MCV 99,platelets 121,000 with absolute neutrophil count of 2500.    He had drank vodka heavily up to about half pint per day intermittently before 2017 for several years but has been abstinent since then.    He has a history of paroxysmal atrial fibrillation on rivaroxaban and metoprolol, moderate major depression and recent 10 pound unexplained weight loss but it has recovered somewhat. A 2018 colonoscopy was negative except for a diminutive hyperplastic polyp.    Past medical history:  Past Medical History:   Diagnosis Date    Acute on chronic diastolic (congestive) heart failure (H) 11/29/2023    Arthritis 2005    Benign neoplasm of prostate 2000    Benign Prostate Nodule    Depressive disorder     past condition    Infection due to 2019 novel coronavirus 09/27/2021    S/P knee replacement 11/06/2012    S/P left knee arthroscopy 08/15/2011       Family history:  I have reviewed this patient's family history and updated it with pertinent information if needed.  Family History   Problem Relation Age of Onset    Depression Mother     Cerebrovascular Disease Mother     Breast Cancer Mother     Neurologic Disorder Mother         parkinsons    Parkinsonism Mother     Respiratory Father         emphyzema    Diabetes Brother          Medications:  Current Outpatient Medications   Medication    acetaminophen (TYLENOL) 325 MG tablet    buPROPion (WELLBUTRIN SR) 150 MG 12 hr tablet    buPROPion (WELLBUTRIN XL) 300 MG 24 hr tablet    busPIRone HCl (BUSPAR) 30 MG tablet    doxazosin (CARDURA) 8 MG tablet    finasteride (PROSCAR) 5 MG tablet    furosemide (LASIX) 20 MG tablet    gabapentin (NEURONTIN) 300 MG capsule    gabapentin (NEURONTIN) 600 MG tablet    hydrOXYzine (ATARAX) 25 MG tablet    lamoTRIgine (LAMICTAL) 25 MG tablet    metoprolol tartrate (LOPRESSOR) 100 MG tablet     "multivitamin (ONE-DAILY) tablet    OLANZapine (ZYPREXA) 5 MG tablet    XARELTO ANTICOAGULANT 20 MG TABS tablet    zolpidem (AMBIEN) 5 MG tablet     No current facility-administered medications for this visit.       Allergies:  Allergies   Allergen Reactions    Bactrim [Sulfamethoxazole-Trimethoprim] Nausea and Vomiting       Review of systems:  Hard of hearing uses hearing aids.    Except as noted in the note above, the patient denies headaches, diplopia, hearing loss or dizziness; dyspnea, cough, hemoptysis, pleurisy; chest pain/pressure, palpitations, lightheadedness; anorexia, nausea, vomiting, abdominal pain, diarrhea, constipation, melena or rectal bleeding; dysuria, frequency, nocturia, blood in the urine; fever, chills, sweats; tingling, numbness, loss of balance; insomnia, depression, anxiety.    Physical examination:  /81 (BP Location: Right arm, Patient Position: Sitting, Cuff Size: Adult Regular)   Pulse 75   Temp 97.6  F (36.4  C) (Oral)   Resp 16   Ht 1.727 m (5' 7.99\")   Wt 73.3 kg (161 lb 8 oz)   SpO2 99%   BMI 24.56 kg/m      The patient is alert and oriented.     Kalen Casillas MD            "

## 2023-12-28 NOTE — PROGRESS NOTES
"  Mental Health and Collaborative Care Psychiatry Service Rooming Note      Most pressing mental health concern at this time: Depression and anxiety.      Any new physical health conditions or diagnoses affecting you that we should be aware of: No      Side effects related to medications patient would like to discuss with the provider:  No      Are you taking your medications as prescribed?  Yes  If not, why? NA      Do you need refills of any of the medications?  No  If so, which ones? NA      Are you taking any recreational substances? No      Is there any chance you are pregnant? NA  Do you use birth control? NA      Provider notified  N/A      Add attendance guidelines .phrase here   Care team has reviewed attendance agreement with patient. Patient advised that two failed appointments within 6 months may lead to termination of current episode of care.        **If appointment is with [facility]-include the following:      \"The [facility] is a temporary psychiatry service that helps to bridge the time to your next appointment. It is not intended to be a long-term service and you are expected to attend your scheduled appointment with your next provider.\"    [x ] Patient/Parent verbalized understanding     If you need support between appointments, please call [phone] and let them know you're seen by [facility] Psych[facility]. You may also send a [employer] message to reach us.     New (awaiting) Mental health provider or next programming: [facility]  Date of scheduled apt: 3/14/2024  1:30 PM       Patient would like the video visit invitation sent by: Ezequiel Galarza RN  December 28, 2023  10:19 AM        "

## 2023-12-28 NOTE — PROGRESS NOTES
[facility] & [facility] Service Line    [facility] Clinic: Psychiatry Note  Medication Management              VISIT INFORMATION    Date:  2023     Number:  -Initial     Referral source:  -PCP      Patient Identifying Information:  Legal name: Josemanuel Edmond  Preferred name: Gavin  : 1945  Preferred pronouns: He/him       Participants:   -Patient  -Provider          Telehealth visit details:  Type of service:  Video  Patient location:  At home, Off site  Provider Location:  [facility]  Platform utilized:  [employer]    Start time:  10:46 am  End time:   11:19 am      HPI      -See 2023 PCP encounter     -Previously managed per KAYLA Martino-CNP per [facility] from 2020 to 2021            PSYCHIATRIC ROS    Sleep:   -Like a baby with the Ambien  -Getting 7 hours  -Go to bed around 11 pm and wake up around 6 am      Appetite/Weight Changes:   -Denies unintentional loss or gain > 10 lbs in the past 4 weeks    -Eating is fine      Energy Levels:   -3/10      Trauma hx:   -Son passed away in  d/t MVA      Depression/Anxiety:   -Down, poor motivation, mood is low all the time    Social anxiety:  -Still have difficulty attending [employer]  -Don't like groups of people, having to mingle      Suicidal ideations:   -Denies       SIB:  -Denies hx or current engagement of self harm       Side effects:  -None reported         MENTAL HEALTH HISTORY    Suicide attempts:  -None reported     Inpatient psychiatric hospitalizations:  -None reported   -No hx of commitments     ECT:  -None reported         Medication Trials:     Per [employer] testing:  -Ultra-rapid metabolizer CYP2D6 = Paxil  -Poor metabolizer [id number] = Celexa, Zoloft    SSRIs:  -Lexapro: ineffective  -Prozac 10 mg: effective for depression but increased anxiety  -Zoloft: low dosage    SNRIs:  -Cymbalta: side  effects  -Effexor: side effects  -Fetzima: ineffective  -Pristiq: ineffective    TCAs:  -Nortriptyline: dry mouth, nightmares    Serotonin modulators/stimulators:  -Mirtazapine 30 mg: effective for sleep, however caused weight gain  -Viibryd: ineffective    Other anxiolytics:  -Hydroxyzine: over sedation, dry mouth    Antipsychotics:  -Abilify 10 mg      Benzodiazepines = history of abuse  -Alprazolam  -Clonazepam  -Diazepam  -Lorazepam    Sleep aides:  -Trazodone 50 mg: over-sedation        SUBSTANCE USE    Prior use:  -Denies any history of alcohol, recreational, or illicit substance use resulting in: legal issues, c/d programming, or withdrawal symptoms    Per chart review 5/7/2020 encounter by ALENA GARZA-CNP:  -On/off heavy alcohol use after son passed away in 2002  -Stopped drinking altogether in 2017      Current use:    Alcohol:   -None reported       Recreational Drugs:   -None reported       Medical Marijuana:  -None reported       Cigarettes per day:   -None reported       Chewing tobacco:   -None reported       Vaping:    -None reported       Caffeine intake:    -1 cup coffee per day            SOCIAL HISTORY  -Was reviewed within the EMR per: 5/7/2020 and 11/12/2021 encounters by ALENA GARZA-CNP:          MEDICAL HISTORY    Current:  -The problem list was reviewed prior to the appointment  -The patient denies any concerning physical and or medical symptoms during the interviewing process      Developmental:   -Mother had normal pregnancy: Yes  -Met age appropriate milestones: Yes  -Participated in special education classes and or had an IEP: No  -Hx of autism spectrum disorder, learning disability, and or other cognitive disorder: No      Neurological:  -Denies any hx of: seizures, concussions, or TBI        MEDICATIONS      Current Outpatient Medications:     acetaminophen (TYLENOL) 325 MG tablet, Take 2 tablets (650 mg) by mouth every 4 hours as needed for other (mild pain), Disp: 100  tablet, Rfl: 0    buPROPion (WELLBUTRIN SR) 150 MG 12 hr tablet, Take 1 tablet (150 mg) by mouth 2 times daily, Disp: 180 tablet, Rfl: 3    busPIRone HCl (BUSPAR) 30 MG tablet, TAKE 1 TABLET TWICE A DAY, Disp: 180 tablet, Rfl: 3    doxazosin (CARDURA) 8 MG tablet, Take 0.5 tablets (4 mg) by mouth At Bedtime, Disp: 45 tablet, Rfl: 3    finasteride (PROSCAR) 5 MG tablet, TAKE 1 TABLET DAILY, Disp: 90 tablet, Rfl: 3    furosemide (LASIX) 20 MG tablet, Take 1 tablet (20 mg) by mouth 2 times daily, Disp: 180 tablet, Rfl: 3    gabapentin (NEURONTIN) 300 MG capsule, Take 1 capsule (300 mg) by mouth 3 times daily In addition to 600mg capsul., Disp: 90 capsule, Rfl: 3    gabapentin (NEURONTIN) 600 MG tablet, TAKE 1 TABLET FOUR TIMES A DAY, Disp: 360 tablet, Rfl: 3    hydrOXYzine (ATARAX) 25 MG tablet, Take 1 tablet (25 mg) by mouth every 8 hours as needed for itching or other (insomnia), Disp: 30 tablet, Rfl: 3    lamoTRIgine (LAMICTAL) 25 MG tablet, Take 2 tablets (50 mg) by mouth 2 times daily, Disp: 240 tablet, Rfl: 0    metoprolol tartrate (LOPRESSOR) 100 MG tablet, Take 1 tablet (100 mg) by mouth 2 times daily, Disp: 180 tablet, Rfl: 3    multivitamin (ONE-DAILY) tablet, Take 1 tablet by mouth daily, Disp: 30 tablet, Rfl: 0    XARELTO ANTICOAGULANT 20 MG TABS tablet, TAKE 1 TABLET DAILY WITH   DINNER, Disp: 90 tablet, Rfl: 3    zolpidem (AMBIEN) 5 MG tablet, Take 1 tablet (5 mg) by mouth nightly as needed for sleep, Disp: 30 tablet, Rfl: 1          If a controlled substance has been prescribed during the appointment:    -The [place] Prescription Monitoring Program has been reviewed and there are no current concerns with: diversionary activity, early refill requests, and or obtaining the medication from multiple providers.          VITALS    BP Readings from Last 3 Encounters:   11/30/23 122/72   11/24/23 (!) 136/98   11/15/23 113/75       Pulse Readings from Last 3 Encounters:   11/30/23 84   11/24/23 85   11/15/23 77        Wt Readings from Last 3 Encounters:   11/30/23 75.3 kg (166 lb)   11/24/23 74.5 kg (164 lb 4.8 oz)   11/15/23 72.6 kg (160 lb)               LABS    The following have been reviewed prior to or during the appointment:  -11/30/2023          SCALES          9/13/2023     8:07 AM 11/15/2023    10:22 AM 11/30/2023     9:44 AM   PHQ   PHQ-9 Total Score 6 7 5   Q9: Thoughts of better off dead/self-harm past 2 weeks Not at all Not at all Not at all            8/18/2022     1:55 PM 11/4/2022     9:28 AM 7/6/2023    12:48 PM   CAROLYNN-7 SCORE   Total Score  4 (minimal anxiety) 4 (minimal anxiety)   Total Score 3 4 4        Answers submitted by the patient for this visit:  Patient Health Questionnaire (Submitted on 12/28/2023)  PHQ9 TOTAL SCORE: 7        MENTAL STATUS EXAMINATION    Appearance: Well Groomed, Attire Appropriate for the Season  General Behavior:  Cooperative, Direct Eye Contact  Speech: Fluent, Normal rate and volume  Musculoskeletal:    -Gait not observed during t.h. visit  -No facial tics/tremors observed   -Motor coordination is grossly intact   Mood: Depressed  Affect: Congruent with mood  Attention: Intact  Orientation:  Person, Place, Time, Situation  Thought Associations:  Intact  Thought Content: Reality based   Thought Processes: Organized, Normal rate  Memory: No overt impairment; no screenings or formal testing performed  Language: Intact  Judgement: Fair to good  Insight: Fair to good        ASSESSMENT/CLINICAL IMPRESSIONS    Summary:    Kristin Edmond is an 77 y/o male with a previous mental health hx of: MDD, Social Anxiety Disorder, and Alcohol Abuse (remote + no consumption since 2017) and Benzodiazepine Dependence.    Medical decision making is complex based on: age, co-morbidities (see problem list), and polypharmacy.    Is attending today's appointment for the management of psychotropics/bridging until able to establish long term outpatient psychiatric services.     Per chart review:  depressive + anxiety symptoms began in 2002 after his son passed away during a MVA.  Social anxiety became problematic around 2013-14.    Additionally, Gavin has previously tried numerous antidepressants and sleep aides which either haven't been effective or contributed to side effects.    MDD symptom profile has gradually been escalating, therefore maximize Wellbutrin dosing.    Moving forward will consider Prozac + Zyprexa, however would consider cross tapering (possibly off Wellbutrin if above is ineffective or Buspar) due to polypharmacy.    Gavin currently denies suicidal ideations or any immediate safety concerns.        DSM-V and or working diagnosis:      1.  Moderate episode of recurrent major depressive disorder (H)     2.  Social anxiety disorder      Rule outs:              SAFETY EVALUATION:  Suicidal ideations:  -denies  Homicidal ideations:  -denies  Risk factors:  -male, age  -chronic illnesses   Protective and mitigating factors:  -spouse, family  -no prior attempts  Risk assessment:  -low to medium      SAFETY PLAN:  -Has numbers of at least 1 family member + 1 friend in personal contact list  -Knows clinic number(s) + operation of regular business hours   -Reviewed to utilize [phone] or text [place] to [phone] for mental health crisis  -Discussed to call 911 and or return to the nearest Emergency Department if unable to maintain safety of self or others (due to severity of suicidal and or homicidal ideations)          TREATMENT PLAN      Medications:  Start:  Bupropion/Wellbutrin 300 mg XL in AM (between 8 to 9 am) + 150 mg SR (between noon to 2 pm)    Continue:  Buspirone/Buspar 30 mg twice daily    Hydroxyzine/Atarax 25 mg every 8 hours as needed for itching, insomnia    Lamotrigine/Lamictal 50 mg (2 tabs) twice daily for ttl: 100 mg    Zolpidem/Ambien 5 mg nightly as needed for sleep      Per PCP:  -Gabapentin 600 mg QID + 300 mg TID; ttl: 3300 mg      Labs:  -None Obtained        Therapy and or  Non-pharmacological modalities:  -Consider individual therapy in addition to psychotropics        Disposition:  -Has been advised of consultative [facility] model    -Please follow up at the [facility] for medication management in:  3 weeks          Total time:  64 minutes per:    -Review of EMR   -Appointment time  -Documentation           [place] CARDONA  [facility]          ----------------------------------------------------------------------------------------------------------------------        TREATMENT RISK STATEMENT    The risks, benefits, alternatives, and potential adverse effects have been explained and are understood by the patient.  The patient agrees to the treatment plan with their ability to do so.      The patient knows to call the clinic: [phone]  for any problems or concerns until the next psychiatry visit, regardless if it is within or outside of the [facility] system.     If unable to reach clinic staff (via phone call or medical messaging) during the normal business hours: 8:00 am to 4:30 pm then it is recommended accessing the nearest: emergency department, urgent care facility, or utilizing local (varies based on county of residence) and national crisis #'s or text messaging services for immediate assistance.          ----------------------------------------------------------------------------------------------------------------------        If applicable the following has been discussed with the patient, parent/guardian, and or attending family member during the appointment:    Risks of polypharmacy and possible drug interactions with current medication list + common OTC products, herbs, and supplements.  Moving forward, it is suggested to intermittently check-in with a clinic or retail pharmacist whenever new medications or OTC/h/s are consumed.    Risks of polypharmacy and possible drug + drug interactions with current medication list.  Moving forward, it is  suggested to intermittently check-in with a clinic or retail pharmacist whenever new prescription medications are added to your treatment regimen.    Recommendation to adhere to CDC guidelines as it relates to alcohol consumption.  If taking benzodiazepines, you should abstain from alcohol intake due to increased risks of CNS and respiratory depression, as well as psychomotor impairment.    If possible, it is recommended to avoid concurrent use of prescribed: opioids + benzodiazepines due to increased risks of CNS and respiratory depression, as well as the increased risk of overdose.     Recommendation to minimize and or abstain from THC use (unless you are prescribed medical marijuana).    Recommendation to abstain from illicit substances including but not limited to the following: heroin, street fentanyl, cocaine, methamphetamines, bath salts, and other synthetic products.    Recommendation to abstain from tobacco/smoking/nicotine, alcohol, THC, and all illicit substances if trying to become or are pregnant.    Do not take opioids, stimulants, and or other prescription medications unless they are specifically prescribed for you.     Black Box Warnings associated with the prescribed psychotropic(s).     Potential adverse effects of antipsychotics including but not limited to the following: weight gain, metabolic syndrome, EPS/Tardive Dyskinesias, and Neuroleptic Malignant Syndrome.    Potential adverse effects of stimulants including but not limited to the following: sudden death, MI, stroke, HTN, cardiomyopathy (long term use), seizures, ed, psychosis, and aggressive behaviors.

## 2023-12-28 NOTE — TELEPHONE ENCOUNTER
Health Call Center    Phone Message    May a detailed message be left on voicemail: yes     Reason for Call: Symptoms or Concerns     If patient has red-flag symptoms, warm transfer to triage line    Current symptom or concern: Per pt Via [employer]     Has Afib symptom      Are there any new or worsening symptoms? Unknown-the patient submitted the appointment request on [employer] unaware of any additional details other than what the patient provided . Please contact the patient via phone to assess their symptoms.        Action Taken: Message routed to:  Other: Cardiology    Travel Screening: Not Applicable                                                                         I will SWITCH the dose or number of times a day I take the medications listed below when I get home from the hospital:  None

## 2024-01-02 ENCOUNTER — MYC MEDICAL ADVICE (OUTPATIENT)
Dept: BEHAVIORAL HEALTH | Facility: CLINIC | Age: 79
End: 2024-01-02
Payer: COMMERCIAL

## 2024-01-02 DIAGNOSIS — G47.00 INSOMNIA, UNSPECIFIED TYPE: ICD-10-CM

## 2024-01-02 DIAGNOSIS — F33.1 MODERATE EPISODE OF RECURRENT MAJOR DEPRESSIVE DISORDER (H): Primary | ICD-10-CM

## 2024-01-02 RX ORDER — ZOLPIDEM TARTRATE 5 MG/1
5 TABLET ORAL
Qty: 30 TABLET | Refills: 0 | Status: SHIPPED | OUTPATIENT
Start: 2024-01-02 | End: 2024-01-29

## 2024-01-02 RX ORDER — OLANZAPINE 5 MG/1
5 TABLET ORAL AT BEDTIME
Qty: 30 TABLET | Refills: 0 | Status: SHIPPED | OUTPATIENT
Start: 2024-01-02 | End: 2024-01-08 | Stop reason: DRUGHIGH

## 2024-01-03 ENCOUNTER — MYC MEDICAL ADVICE (OUTPATIENT)
Dept: BEHAVIORAL HEALTH | Facility: CLINIC | Age: 79
End: 2024-01-03
Payer: COMMERCIAL

## 2024-01-03 NOTE — TELEPHONE ENCOUNTER
RN attempted call to patient to review instructions from Angeles Sanders CNP and reached Voice mail.    RN noted last read "[employer] message 1/2/24 8:09 AM.    RN to re attempt call in the afternoon.

## 2024-01-04 ENCOUNTER — HOSPITAL ENCOUNTER (OUTPATIENT)
Dept: CARDIOLOGY | Facility: CLINIC | Age: 79
Discharge: HOME | End: 2024-01-04
Attending: INTERNAL MEDICINE | Admitting: INTERNAL MEDICINE
Payer: COMMERCIAL

## 2024-01-04 ENCOUNTER — TELEPHONE (OUTPATIENT)
Dept: FAMILY MEDICINE | Facility: CLINIC | Age: 79
End: 2024-01-04
Payer: COMMERCIAL

## 2024-01-04 ENCOUNTER — TELEPHONE (OUTPATIENT)
Dept: BEHAVIORAL HEALTH | Facility: CLINIC | Age: 79
End: 2024-01-04
Payer: COMMERCIAL

## 2024-01-04 DIAGNOSIS — R06.09 DYSPNEA ON EXERTION: ICD-10-CM

## 2024-01-04 DIAGNOSIS — I48.20 CHRONIC ATRIAL FIBRILLATION (H): ICD-10-CM

## 2024-01-04 LAB — LVEF ECHO: NORMAL

## 2024-01-04 PROCEDURE — 93306 TTE W/DOPPLER COMPLETE: CPT | Mod: 26 | Performed by: INTERNAL MEDICINE

## 2024-01-04 PROCEDURE — 93306 TTE W/DOPPLER COMPLETE: CPT

## 2024-01-04 NOTE — TELEPHONE ENCOUNTER
[facility] calling to clarify Gabapentin dosing    Patient is taking 300 mg 3 times daily in addition to the 600 mg capsule    Annette Simons RN on 1/4/2024 at 2:45 PM

## 2024-01-04 NOTE — TELEPHONE ENCOUNTER
Anna picked up the phone and stated we are on our way to the clinic.    Anna then gave the phone to Gavin who stated we are on out way to get an Echo. This is the house phone please call [phone] then we can talk.

## 2024-01-04 NOTE — TELEPHONE ENCOUNTER
RN calling to speak with Josemanuel Edmond [phone] (cell phone)       Call goes to Voice mail. RN left callback number [phone].

## 2024-01-05 ENCOUNTER — LAB (OUTPATIENT)
Dept: LAB | Facility: CLINIC | Age: 79
End: 2024-01-05
Payer: COMMERCIAL

## 2024-01-05 ENCOUNTER — TELEPHONE (OUTPATIENT)
Dept: CARDIOLOGY | Facility: CLINIC | Age: 79
End: 2024-01-05

## 2024-01-05 ENCOUNTER — ONCOLOGY VISIT (OUTPATIENT)
Dept: ONCOLOGY | Facility: CLINIC | Age: 79
End: 2024-01-05
Attending: INTERNAL MEDICINE
Payer: COMMERCIAL

## 2024-01-05 VITALS
OXYGEN SATURATION: 99 % | DIASTOLIC BLOOD PRESSURE: 81 MMHG | SYSTOLIC BLOOD PRESSURE: 135 MMHG | TEMPERATURE: 97.6 F | BODY MASS INDEX: 24.48 KG/M2 | RESPIRATION RATE: 16 BRPM | WEIGHT: 161.5 LBS | HEART RATE: 75 BPM | HEIGHT: 68 IN

## 2024-01-05 DIAGNOSIS — D64.9 ANEMIA, UNSPECIFIED TYPE: Primary | ICD-10-CM

## 2024-01-05 DIAGNOSIS — D69.6 THROMBOCYTOPENIA (H): ICD-10-CM

## 2024-01-05 LAB
ALBUMIN SERPL BCG-MCNC: 4.2 G/DL (ref 3.5–5.2)
ALP SERPL-CCNC: 93 U/L (ref 40–150)
ALT SERPL W P-5'-P-CCNC: 15 U/L (ref 0–70)
ANION GAP SERPL CALCULATED.3IONS-SCNC: 4 MMOL/L (ref 7–15)
AST SERPL W P-5'-P-CCNC: 20 U/L (ref 0–45)
BASOPHILS # BLD AUTO: 0 10E3/UL (ref 0–0.2)
BASOPHILS NFR BLD AUTO: 1 %
BILIRUB SERPL-MCNC: 1.2 MG/DL
BUN SERPL-MCNC: 13.3 MG/DL (ref 8–23)
CALCIUM SERPL-MCNC: 9 MG/DL (ref 8.8–10.2)
CHLORIDE SERPL-SCNC: 100 MMOL/L (ref 98–107)
CREAT SERPL-MCNC: 0.99 MG/DL (ref 0.67–1.17)
DEPRECATED HCO3 PLAS-SCNC: 31 MMOL/L (ref 22–29)
EGFRCR SERPLBLD CKD-EPI 2021: 77 ML/MIN/1.73M2
EOSINOPHIL # BLD AUTO: 0.1 10E3/UL (ref 0–0.7)
EOSINOPHIL NFR BLD AUTO: 3 %
ERYTHROCYTE [DISTWIDTH] IN BLOOD BY AUTOMATED COUNT: 16.1 % (ref 10–15)
GLUCOSE SERPL-MCNC: 104 MG/DL (ref 70–99)
HCT VFR BLD AUTO: 34.2 % (ref 40–53)
HGB BLD-MCNC: 11 G/DL (ref 13.3–17.7)
IMM GRANULOCYTES # BLD: 0 10E3/UL
IMM GRANULOCYTES NFR BLD: 0 %
LYMPHOCYTES # BLD AUTO: 0.8 10E3/UL (ref 0.8–5.3)
LYMPHOCYTES NFR BLD AUTO: 21 %
MCH RBC QN AUTO: 31.8 PG (ref 26.5–33)
MCHC RBC AUTO-ENTMCNC: 32.2 G/DL (ref 31.5–36.5)
MCV RBC AUTO: 99 FL (ref 78–100)
MONOCYTES # BLD AUTO: 0.4 10E3/UL (ref 0–1.3)
MONOCYTES NFR BLD AUTO: 11 %
NEUTROPHILS # BLD AUTO: 2.5 10E3/UL (ref 1.6–8.3)
NEUTROPHILS NFR BLD AUTO: 64 %
NRBC # BLD AUTO: 0 10E3/UL
NRBC BLD AUTO-RTO: 0 /100
PLATELET # BLD AUTO: 121 10E3/UL (ref 150–450)
POTASSIUM SERPL-SCNC: 4.4 MMOL/L (ref 3.4–5.3)
PROT SERPL-MCNC: 7.2 G/DL (ref 6.4–8.3)
RBC # BLD AUTO: 3.46 10E6/UL (ref 4.4–5.9)
SODIUM SERPL-SCNC: 135 MMOL/L (ref 135–145)
WBC # BLD AUTO: 3.9 10E3/UL (ref 4–11)

## 2024-01-05 PROCEDURE — 36415 COLL VENOUS BLD VENIPUNCTURE: CPT

## 2024-01-05 PROCEDURE — G0463 HOSPITAL OUTPT CLINIC VISIT: HCPCS | Performed by: INTERNAL MEDICINE

## 2024-01-05 PROCEDURE — 99213 OFFICE O/P EST LOW 20 MIN: CPT | Performed by: INTERNAL MEDICINE

## 2024-01-05 PROCEDURE — 85025 COMPLETE CBC W/AUTO DIFF WBC: CPT

## 2024-01-05 PROCEDURE — 80053 COMPREHEN METABOLIC PANEL: CPT

## 2024-01-05 ASSESSMENT — PAIN SCALES - GENERAL: PAINLEVEL: NO PAIN (0)

## 2024-01-05 NOTE — LETTER
1/5/2024         RE: Josemanuel Edmond  [address]   [facility] [id number]        Dear Colleague,    Thank you for referring your patient, Josemanuel Edmond, to the [facility]. Please see a copy of my visit note below.    Hematology/Medical Oncology Follow-up Note      January 4, 2024      Referring provider:  Lizzy Leiva MD    Reason for visit:  Josemanuel Edmond is a 78 year old retired self-employed floor covering business owner from [place] accompanied by his wife Yoselyn who is referred for hematologic evaluation and consultation regarding recent identification of mild normocytic anemia and thrombocytopenia.    Impression:  Recent indeterminate anemia and thrombocytopenia; this could represent an early myelodysplastic syndrome.      Recommendation, plan, instructions:  I would continue to observe without intervention  Sometimes as elanzepine or Zyprexa can cause mild pancytopenia he might confer with his primary care provider about substituting something for it  Follow-up with Justyna Hassan NP in 6 months with CBC/CMP before appointment  I would not perform a bone marrow biopsy in this setting since her results would not dictate any change in management or intervention.    Time with patient including review, documentation, history, examination, coordination of care and counseling was 25 minutes.    History of present illness:  9/13/2023 blood tests revealed hemoglobin 11.3, , white count 4,400, platelets 118,000, absolute neutrophil count 3,000, with a normal CMP, ELP, serum iron/TIBC, TSH, CRP, sed rate, ferritin, HIV, B12, and hepatitis C antibody.    In retrospect, thrombocytopenia has been noted intermittently since June, 2022 and moderate anemia has been noted since December, 2017 with a prior July, 2011 hemoglobin of 14.9.  A 8/2022 CT abdomen pelvis revealed diffuse hepatic steatosis without splenomegaly.  His wife and the patient had a  significant COVID-19 infection in September, 2021 but did not require hospitalization.  He had underwent a left total hip arthroplasty in May, 2023 which required a red cell transfusion postoperatively.    When I saw him in September, 2023, we decided to defer bone marrow biopsy since results would not .  CBC/CMP obtained before today's appointment revealed ***.    He had drank vodka heavily up to about half pint per day intermittently before 2017 for several years but has been abstinent since then.    He has a history of paroxysmal atrial fibrillation on rivaroxaban and metoprolol, moderate major depression and recent 10 pound unexplained weight loss but it has recovered somewhat. A 2018 colonoscopy was negative except for a diminutive hyperplastic polyp.    Past medical history:  Past Medical History:   Diagnosis Date     Acute on chronic diastolic (congestive) heart failure (H) 11/29/2023     Arthritis 2005     Benign neoplasm of prostate 2000    Benign Prostate Nodule     Depressive disorder     past condition     Infection due to 2019 novel coronavirus 09/27/2021     S/P knee replacement 11/06/2012     S/P left knee arthroscopy 08/15/2011       Family history:  I have reviewed this patient's family history and updated it with pertinent information if needed.  Family History   Problem Relation Age of Onset     Depression Mother      Cerebrovascular Disease Mother      Breast Cancer Mother      Neurologic Disorder Mother         parkinsons     Parkinsonism Mother      Respiratory Father         emphyzema     Diabetes Brother          Medications:  Current Outpatient Medications   Medication     acetaminophen (TYLENOL) 325 MG tablet     buPROPion (WELLBUTRIN SR) 150 MG 12 hr tablet     buPROPion (WELLBUTRIN XL) 300 MG 24 hr tablet     busPIRone HCl (BUSPAR) 30 MG tablet     doxazosin (CARDURA) 8 MG tablet     finasteride (PROSCAR) 5 MG tablet     furosemide (LASIX) 20 MG tablet     gabapentin  "(NEURONTIN) 300 MG capsule     gabapentin (NEURONTIN) 600 MG tablet     hydrOXYzine (ATARAX) 25 MG tablet     lamoTRIgine (LAMICTAL) 25 MG tablet     metoprolol tartrate (LOPRESSOR) 100 MG tablet     multivitamin (ONE-DAILY) tablet     OLANZapine (ZYPREXA) 5 MG tablet     XARELTO ANTICOAGULANT 20 MG TABS tablet     zolpidem (AMBIEN) 5 MG tablet     No current facility-administered medications for this visit.       Allergies:  Allergies   Allergen Reactions     Bactrim [Sulfamethoxazole-Trimethoprim] Nausea and Vomiting       Review of systems:  Hard of hearing uses hearing aids.    Except as noted in the note above, the patient denies headaches, diplopia, hearing loss or dizziness; dyspnea, cough, hemoptysis, pleurisy; chest pain/pressure, palpitations, lightheadedness; anorexia, nausea, vomiting, abdominal pain, diarrhea, constipation, melena or rectal bleeding; dysuria, frequency, nocturia, blood in the urine; fever, chills, sweats; tingling, numbness, loss of balance; insomnia, depression, anxiety.    Physical examination:  /81 (BP Location: Right arm, Patient Position: Sitting, Cuff Size: Adult Regular)   Pulse 75   Temp 97.6  F (36.4  C) (Oral)   Resp 16   Ht 1.727 m (5' 7.99\")   Wt 73.3 kg (161 lb 8 oz)   SpO2 99%   BMI 24.56 kg/m      The patient is alert and oriented.     Kalen Casillas MD              Oncology Rooming Note    January 5, 2024 1:11 PM   Josemanuel Edmond is a 78 year old male who presents for:    Chief Complaint   Patient presents with     Oncology Clinic Visit     3 month follow up MDS- lab and provider      Initial Vitals: /81 (BP Location: Right arm, Patient Position: Sitting, Cuff Size: Adult Regular)   Pulse 75   Temp 97.6  F (36.4  C) (Oral)   Resp 16   Ht 1.727 m (5' 7.99\")   Wt 73.3 kg (161 lb 8 oz)   SpO2 99%   BMI 24.56 kg/m   Estimated body mass index is 24.56 kg/m  as calculated from the following:    Height as of this encounter: 1.727 m (5' 7.99\").    " Weight as of this encounter: 73.3 kg (161 lb 8 oz). Body surface area is 1.88 meters squared.  No Pain (0) Comment: Data Unavailable   No LMP for male patient.  Allergies reviewed: Yes  Medications reviewed: Yes    Medications: Medication refills not needed today.  Pharmacy name entered into EPIC:    [facility] - [place], [place] - [address] [facility] [address] - [place] [address] [address]  [facility] - ONE [facility] AT PORTAL TO REGISTERED [facility] SITES  [facility] - [facility], [place] - [facility] Located within [facility]    Frailty Screening:   Is the patient here for a new oncology consult visit in cancer care? 2. No      Clinical concerns:  None      Fuad Spears                Again, thank you for allowing me to participate in the care of your patient.        Sincerely,        Kalen Casillas MD

## 2024-01-05 NOTE — TELEPHONE ENCOUNTER
Echo results as follows: EF 45-50% (50-55% on 5/23/23 echo); RV mod dilated with mild dec RV systolic function; severe MR (mod to mod-severe on 5/23/23 study), severe TR (mod on 5/23/23 study), mod AI (mild to mod on 5/23/23 study), mod SD (mild to mod on 5/23/23 study); aortic root at 4.3 cm; asc aorta at 4.2 cm (both 4.1 cm on 5/23/23 study). Will discuss changes with Dr Chapin for her recommendations. Current follow up order is 1 year. Rosita Gibson RN Cardiology January 5, 2024, 9:58 AM

## 2024-01-05 NOTE — PROGRESS NOTES
"Oncology Rooming Note    January 5, 2024 1:11 PM   Josemanuel Edmond is a 78 year old male who presents for:    Chief Complaint   Patient presents with    Oncology Clinic Visit     3 month follow up MDS- lab and provider      Initial Vitals: /81 (BP Location: Right arm, Patient Position: Sitting, Cuff Size: Adult Regular)   Pulse 75   Temp 97.6  F (36.4  C) (Oral)   Resp 16   Ht 1.727 m (5' 7.99\")   Wt 73.3 kg (161 lb 8 oz)   SpO2 99%   BMI 24.56 kg/m   Estimated body mass index is 24.56 kg/m  as calculated from the following:    Height as of this encounter: 1.727 m (5' 7.99\").    Weight as of this encounter: 73.3 kg (161 lb 8 oz). Body surface area is 1.88 meters squared.  No Pain (0) Comment: Data Unavailable   No LMP for male patient.  Allergies reviewed: Yes  Medications reviewed: Yes    Medications: Medication refills not needed today.  Pharmacy name entered into Mismi:    [facility] - [place], [place] - [address] [facility] [address] [facility] - [place] [address] [address].  [facility] - [facility] - ONE [facility] AT PORTAL TO REGISTERED [facility] SITES  [facility] [place] [facility] [address] [facility]    Frailty Screening:   Is the patient here for a new oncology consult visit in cancer care? 2. No      Clinical concerns:  None      Fuad Spears"

## 2024-01-05 NOTE — RESULT ENCOUNTER NOTE
EF 45-50% (50-55% on 5/23/23 echo); RV mod dilated with mild dec RV systolic function; severe MR (mod to mod-severe on 5/23/23 study), severe TR (mod on 5/23/23 study), mod AI (mild to mod on 5/23/23 study), mod TN (mild to mod on 5/23/23 study); aortic root at 4.3 cm; asc aorta at 4.2 cm (both 4.1 cm on 5/23/23 study). Will discuss changes with Dr Chapin for her recommendations. Current follow up order is 1 year.

## 2024-01-05 NOTE — PATIENT INSTRUCTIONS
Follow-up Justyna Hassan NP in 6 months with blood tests before appointment  Consider conferring with your primary care provider to see we can substitute something for olanzapine (Zyprexa)

## 2024-01-06 DIAGNOSIS — G47.00 INSOMNIA, UNSPECIFIED TYPE: ICD-10-CM

## 2024-01-08 RX ORDER — HYDROXYZINE HYDROCHLORIDE 25 MG/1
25 TABLET, FILM COATED ORAL EVERY 8 HOURS PRN
Qty: 30 TABLET | Refills: 3 | Status: SHIPPED | OUTPATIENT
Start: 2024-01-08 | End: 2024-04-24

## 2024-01-08 NOTE — TELEPHONE ENCOUNTER
1.  See 1/8/2024 MyChart response    Will not start Prozac at this time due to Zyprexa being adjusted per reported adverse effects.    2.  This encounter will be closed d/t multiple encounters/My Chart messages related to the same topic.

## 2024-01-09 PROBLEM — M25.552 HIP PAIN, LEFT: Status: RESOLVED | Noted: 2023-11-15 | Resolved: 2024-01-09

## 2024-01-09 NOTE — PROGRESS NOTES
DISCHARGE  Reason for Discharge: Patient has failed to schedule further appointments.    Equipment Issued: HEP    Discharge Plan: Patient to continue home program.    Referring Provider:  No ref. provider found

## 2024-01-11 NOTE — TELEPHONE ENCOUNTER
Spoke with pt about changes in most recent ECHO compared to 05/2023. Per Dr Chapin, pt to see Nazanin Caballero CNP next available appointment to review and discuss plan of care. Pt transferred to scheduling to make appointment.     Jaimie Allan RN

## 2024-01-18 ENCOUNTER — VIRTUAL VISIT (OUTPATIENT)
Dept: BEHAVIORAL HEALTH | Facility: CLINIC | Age: 79
End: 2024-01-18
Payer: COMMERCIAL

## 2024-01-18 DIAGNOSIS — F40.10 SOCIAL ANXIETY DISORDER: ICD-10-CM

## 2024-01-18 DIAGNOSIS — F33.1 MODERATE EPISODE OF RECURRENT MAJOR DEPRESSIVE DISORDER (H): Primary | ICD-10-CM

## 2024-01-18 PROCEDURE — 99214 OFFICE O/P EST MOD 30 MIN: CPT | Mod: 95 | Performed by: NURSE PRACTITIONER

## 2024-01-18 RX ORDER — BUPROPION HYDROCHLORIDE 300 MG/1
300 TABLET ORAL EVERY MORNING
Qty: 30 TABLET | Refills: 1 | Status: SHIPPED | OUTPATIENT
Start: 2024-01-18 | End: 2024-02-15

## 2024-01-18 ASSESSMENT — ANXIETY QUESTIONNAIRES
1. FEELING NERVOUS, ANXIOUS, OR ON EDGE: SEVERAL DAYS
GAD7 TOTAL SCORE: 5
3. WORRYING TOO MUCH ABOUT DIFFERENT THINGS: MORE THAN HALF THE DAYS
7. FEELING AFRAID AS IF SOMETHING AWFUL MIGHT HAPPEN: NOT AT ALL
IF YOU CHECKED OFF ANY PROBLEMS ON THIS QUESTIONNAIRE, HOW DIFFICULT HAVE THESE PROBLEMS MADE IT FOR YOU TO DO YOUR WORK, TAKE CARE OF THINGS AT HOME, OR GET ALONG WITH OTHER PEOPLE: SOMEWHAT DIFFICULT
6. BECOMING EASILY ANNOYED OR IRRITABLE: SEVERAL DAYS
5. BEING SO RESTLESS THAT IT IS HARD TO SIT STILL: NOT AT ALL
2. NOT BEING ABLE TO STOP OR CONTROL WORRYING: SEVERAL DAYS
GAD7 TOTAL SCORE: 5

## 2024-01-18 ASSESSMENT — PATIENT HEALTH QUESTIONNAIRE - PHQ9
SUM OF ALL RESPONSES TO PHQ QUESTIONS 1-9: 4
5. POOR APPETITE OR OVEREATING: NOT AT ALL

## 2024-01-18 NOTE — PROGRESS NOTES
"  Mental Health and Collaborative Care Psychiatry Service Rooming Note      Most pressing mental health concern at this time: Depression and anxiety.      Any new physical health conditions or diagnoses affecting you that we should be aware of: No      Side effects related to medications patient would like to discuss with the provider:  Yes    Olanzapine - unable to void.    Are you taking your medications as prescribed?  Yes  If not, why? Apart from the Olanzapine.      Do you need refills of any of the medications?  No  If so, which ones? NA      Are you taking any recreational substances? No      Is there any chance you are pregnant? NA  Do you use birth control? NA      Provider notified  N/A      Add attendance guidelines .phrase here   Care team has reviewed attendance agreement with patient. Patient advised that two failed appointments within 6 months may lead to termination of current episode of care.        **If appointment is with [facility]-include the following:      \"The [facility] is a temporary psychiatry service that helps to bridge the time to your next appointment. It is not intended to be a long-term service and you are expected to attend your scheduled appointment with your next provider.\"    [x] Patient/Parent verbalized understanding     If you need support between appointments, please call [phone] and let them know you're seen by [facility]. You may also send a [employer] message to reach us.       New (awaiting) Mental health provider or [facility]   Date of scheduled apt: 6/21/24     Patient would like the video visit invitation sent by: Ezequiel.      Jf Galarza RN  January 18, 2024  9:06 AM        "

## 2024-01-18 NOTE — PROGRESS NOTES
[facility]      Mental Health & Addiction Service Line    Transition Clinic: Psychiatry Progress Note  Medication Management                VISIT INFORMATION      Date:  2024       Number:  -2nd      Referral source:  -PCP      Patient Identifying Information:  Legal name: Josemanuel Edmond  Preferred name: Gavin  : 1945  Preferred pronouns: He/him      Participants:   -Patient  -Provider        Telehealth visit details:  Type of service:   Video  Patient location:   At home, Off site  Provider Location: [facility]  Platform utilized: [employer]    Start time:  9:45 am  End time: 10:01 am          INTERIM HX      -See 2024 Oncology Clinic encounter    ----------------    -Having a lot of anxiety about meeting groups of people  -Care about and like people but often just want to be by myself  -Don't want to visit friends, hard to want to be      PSYCHIATRIC ROS      Sleep:  -Really good with the Ambien  -Getting around 8 hours per night      Trauma:  -Son passed away in  d/t MVA       Mood/Anxiety:  -See PHQ-9 score    -See CAROLYNN-7 score      Suicidal ideations:  -Denies passive or active thoughts at this time      SIB:  -Denies engaging in self harm         Side effects:  -Zyprexa 2.5 to 5 mg = urinary retention        PSYCHIATRIC HISTORY    Suicide attempts:  -None reported      Inpatient psychiatric hospitalizations:  -None reported   -No hx of commitments      ECT:  -None reported            Medication Trials:      Per [employer] testing:  -Ultra-rapid metabolizer CYP2D6 = Paxil  -Poor metabolizer QLZ4P77 = Celexa, Zoloft     SSRIs:  -Lexapro: ineffective  -Prozac 10 mg: effective for depression but increased anxiety  -Zoloft: low dosage     SNRIs:  -Cymbalta: side effects  -Effexor: side effects  -Fetzima: ineffective  -Pristiq: ineffective     TCAs:  -Nortriptyline: dry mouth, nightmares     Serotonin modulators/stimulators:  -Mirtazapine 30  mg: effective for sleep, however caused weight gain  -Viibryd: ineffective     Other anxiolytics:  -Hydroxyzine: over sedation, dry mouth     Antipsychotics:  -Abilify 10 mg  -Zyprexa 2.5 to 5 mg = urinary retention     Benzodiazepines = history of abuse  -Alprazolam  -Clonazepam  -Diazepam  -Lorazepam     Sleep aides:  -Trazodone 50 mg: over-sedation      SUBSTANCE USE      Prior use:  -Denies any history of alcohol, recreational, or illicit substance use resulting in: legal issues, c/d programming, or withdrawal symptoms     Per chart review 5/7/2020 encounter by ALENA GARZA-CNP:  -On/off heavy alcohol use after son passed away in 2002  -Stopped drinking altogether in 2017        Current use:     Alcohol:   -None reported         Recreational Drugs:   -None reported         Medical Marijuana:  -None reported         Cigarettes per day:   -None reported         Chewing tobacco:   -None reported         Vaping:    -None reported         Caffeine intake:    -1 cup coffee per day        MEDICAL HISTORY    -The problem list was reviewed via the EMR prior to the appointment    -The patient denies any concerning physical and or medical symptoms during the interviewing process          MEDICATIONS      Current Outpatient Medications:     acetaminophen (TYLENOL) 325 MG tablet, Take 2 tablets (650 mg) by mouth every 4 hours as needed for other (mild pain), Disp: 100 tablet, Rfl: 0    buPROPion (WELLBUTRIN SR) 150 MG 12 hr tablet, Take 1 tablet (150 mg) by mouth daily between noon to 2 pm in addition to 300 mg XL in the AM for ttl: 450 mg, Disp: 30 tablet, Rfl: 0    buPROPion (WELLBUTRIN XL) 300 MG 24 hr tablet, Take 1 tablet (300 mg) by mouth every morning between 8 to 9 am in addition to 150 mg SR for ttl: 450 mg, Disp: 30 tablet, Rfl: 0    busPIRone HCl (BUSPAR) 30 MG tablet, TAKE 1 TABLET TWICE A DAY, Disp: 180 tablet, Rfl: 3    doxazosin (CARDURA) 8 MG tablet, Take 0.5 tablets (4 mg) by mouth At Bedtime, Disp: 45  tablet, Rfl: 3    finasteride (PROSCAR) 5 MG tablet, TAKE 1 TABLET DAILY, Disp: 90 tablet, Rfl: 3    furosemide (LASIX) 20 MG tablet, Take 1 tablet (20 mg) by mouth 2 times daily, Disp: 180 tablet, Rfl: 3    gabapentin (NEURONTIN) 300 MG capsule, Take 1 capsule (300 mg) by mouth 3 times daily In addition to 600mg capsul., Disp: 90 capsule, Rfl: 3    gabapentin (NEURONTIN) 600 MG tablet, TAKE 1 TABLET FOUR TIMES A DAY, Disp: 360 tablet, Rfl: 3    hydrOXYzine HCl (ATARAX) 25 MG tablet, Take 1 tablet (25 mg) by mouth every 8 hours as needed for itching or other (insomnia), Disp: 30 tablet, Rfl: 3    lamoTRIgine (LAMICTAL) 25 MG tablet, Take 2 tablets (50 mg) by mouth 2 times daily, Disp: 360 tablet, Rfl: 0    metoprolol tartrate (LOPRESSOR) 100 MG tablet, Take 1 tablet (100 mg) by mouth 2 times daily, Disp: 180 tablet, Rfl: 3    multivitamin (ONE-DAILY) tablet, Take 1 tablet by mouth daily, Disp: 30 tablet, Rfl: 0    OLANZapine (ZYPREXA) 2.5 MG tablet, Take 1 tablet (2.5 mg) by mouth at bedtime, Disp: , Rfl:     XARELTO ANTICOAGULANT 20 MG TABS tablet, TAKE 1 TABLET DAILY WITH   DINNER, Disp: 90 tablet, Rfl: 3    zolpidem (AMBIEN) 5 MG tablet, Take 1 tablet (5 mg) by mouth nightly as needed for sleep, Disp: 30 tablet, Rfl: 0      If a controlled substance is prescribed during today's appointment:    -The [place] Prescription Monitoring Program has been reviewed and there are no current concerns with: diversionary activity, early refill requests, and or obtaining the medication from multiple providers.        VITALS    BP Readings from Last 3 Encounters:   01/05/24 135/81   11/30/23 122/72   11/24/23 (!) 136/98       Pulse Readings from Last 3 Encounters:   01/05/24 75   11/30/23 84   11/24/23 85       Wt Readings from Last 3 Encounters:   01/05/24 73.3 kg (161 lb 8 oz)   11/30/23 75.3 kg (166 lb)   11/24/23 74.5 kg (164 lb 4.8 oz)         LABS    The following labs were reviewed prior to or during the  appointment:  -1/5/2024        SCALES        11/15/2023    10:22 AM 11/30/2023     9:44 AM 12/28/2023    10:07 AM   PHQ   PHQ-9 Total Score 7 5 7   Q9: Thoughts of better off dead/self-harm past 2 weeks Not at all Not at all Not at all            11/4/2022     9:28 AM 7/6/2023    12:48 PM 12/28/2023    10:24 AM   CAROLYNN-7 SCORE   Total Score 4 (minimal anxiety) 4 (minimal anxiety)    Total Score 4 4 6         MENTAL STATUS EXAMINATION    Appearance: Well Groomed, Attire Appropriate for the Season  General Behavior:  Cooperative, Direct Eye Contact  Speech: Fluent, Normal rate, Monotone  Musculoskeletal:    -Gait not observed during t.h. visit  -No facial tics/tremors observed   -Motor coordination is grossly intact   Mood: Anxious   Affect: Flat  Attention: Intact  Orientation:  Person, Place, Time, Situation  Thought Associations:  Intact  Thought Content: Reality based   Thought Processes: Organized, Normal rate  Memory: No overt impairment; no screenings or formal testing performed  Language: Intact  Judgement: Fair to good  Insight: Fair to good        ASSESSMENT/CLINICAL IMPRESSIONS    Summary:    Josemanuel/Gavin Edmond is an 77 y/o male with a previous mental health hx of: MDD, Social   Anxiety Disorder, Alcohol Abuse (remote + no consumption since 2017) and   Benzodiazepine Dependence.     Medical decision making is complex based on: age, co-morbidities (see problem list), and polypharmacy.     Per chart review: depressive + anxiety symptoms began in 2002 after his son passed away during a MVA. Social anxiety became problematic around 2013-14.    Initiated care at the [facility] on 12/28/2023 for the management of psychotropics/bridging until able   to establish long term outpatient psychiatric services.     ---------------------    Gavin continues to experience moderate to high levels of both depressive and social anxiety symptoms.  He   did not tolerate a low dosage of Zyprexa therefore it was discontinued  (see 1/8/2024 for Livestream message   for further details).    Will reduce Wellbutrin back down to 300 mg in the morning, since the maximum dosage possibly   exacerbated feeling anxious.    Provided instructions to titrate Trintellix to 10 mg/day.  Moving forward it may be necessary to maximize dosing given Gavin is a CYP2D6 ultra-rapid metabolizer.    Additionally, since BuSpar seems to be minimally effective as an anxiolytic will consider eventually discontinuing to reduce polypharmacy; however will wait until determining Trintellix effectiveness +   tolerability.    Gavin currently denies suicidal ideations or any immediate safety concerns.       DSM-V and or working diagnosis:    1.  Moderate episode of recurrent major depressive disorder (H)      2.  Social anxiety disorder            SAFETY EVALUATION:  Suicidal ideations:  -denies  Homicidal ideations:  -denies  Risk factors:  -male, age  -chronic illnesses   Protective and mitigating factors:  -spouse, family  -no prior attempts  Risk assessment:  -low to medium         SAFETY PLAN:  -Has numbers of at least 1 family member + 1 friend in personal contact list  -Knows clinic number(s) + operation of regular business hours   -Reviewed to utilize [email or web address] or text [place] to [phone] for mental health crisis  -Discussed to call 911 and or return to the nearest Emergency Department if unable to maintain safety of self or others (due to severity of suicidal and or homicidal ideations)        TREATMENT PLAN      Medications:  Start:  Bupropion/Wellbutrin 300 mg XL in AM; decrease from 450 mg    Vortioxetine/Trintellix 5 mg x 7 days then increase to 10 mg daily    Continue:  Buspirone/Buspar 30 mg twice daily; TDD = 60 mg     Hydroxyzine/Atarax 25 mg every 8 hours as needed for itching, insomnia     Lamotrigine/Lamictal 50 mg (2 tabs) twice daily for ttl: 100 mg      Per PCP and or other providers:  -Gabapentin 600 mg QID + 300 mg TID; ttl: 3300 mg  -Zolpidem/Ambien 5 mg nightly as  needed for sleep         Labs:  -None obtained        Therapy and or Non-pharmacological modalities:        Disposition:  -Has been advised of consultative Transition Clinic model    -Please follow up at the Transition Clinic for medication management in:   4 weeks        Total time: 22 minutes per:    -Review of EMR  -Appointment time   -Documentation          [place] [facility]    ----------------------------------------------------------------------------------------------------------------------        TREATMENT RISK STATEMENT    The risks, benefits, alternatives, and potential adverse effects have been explained and are understood by the patient.  The patient agrees to the treatment plan with their ability to do so.      The patient knows to call the clinic: [phone]  for any problems or concerns until the next psychiatry visit, regardless if it is within or outside of the [facility] system.     If unable to reach clinic staff (via phone call or medical messaging) during the normal business hours: 8:00 am to 4:30 pm then it is recommended accessing the nearest: emergency department, urgent care facility, or utilizing local (varies based on county of residence) and national crisis #'s or text messaging services for immediate assistance.          ----------------------------------------------------------------------------------------------------------------------      If applicable the following has been discussed with the patient, parent/guardian, and or attending family member during the appointment:      Risks of polypharmacy and possible drug interactions with current medication list + common OTC products, herbs, and supplements.  Moving forward, it is suggested to intermittently check-in with a clinic or retail pharmacist whenever new medications or OTC/h/s are consumed.    Risks of polypharmacy and possible drug + drug interactions with current medication list.  Moving forward, it is  suggested to intermittently check-in with a clinic or retail pharmacist whenever new prescription medications are added to your treatment regimen.    Recommendation to adhere to CDC guidelines as it relates to alcohol consumption.  If taking benzodiazepines, you should abstain from alcohol intake due to increased risks of CNS and respiratory depression, as well as psychomotor impairment.    If possible, it is recommended to avoid concurrent use of prescribed: opioids + benzodiazepines due to increased risks of CNS and respiratory depression, as well as the increased risk of overdose.     Recommendation to minimize and or abstain from THC use (unless you are prescribed medical marijuana).    Recommendation to abstain from illicit substances including but not limited to the following: heroin, street fentanyl, cocaine, methamphetamines, bath salts, and other synthetic products.    Recommendation to abstain from tobacco/smoking/nicotine, alcohol, THC, and all illicit substances if trying to become or are pregnant.    Do not take opioids, stimulants, and or other prescription medications unless they are specifically prescribed for you.     Black Box Warnings associated with the prescribed psychotropic(s).     Potential adverse effects of antipsychotics including but not limited to the following: weight gain, metabolic syndrome, EPS/Tardive Dyskinesias, and Neuroleptic Malignant Syndrome.    Potential adverse effects of stimulants including but not limited to the following: sudden death, MI, stroke, HTN, cardiomyopathy (long term use), seizures, ed, psychosis, and aggressive behaviors.

## 2024-01-18 NOTE — PATIENT INSTRUCTIONS
Start:  Bupropion/Wellbutrin 300 mg XL in AM; decrease from 450 mg    Vortioxetine/Trintellix 5 mg x 7 days then increase to 10 mg daily    Continue:  Buspirone/Buspar 30 mg twice daily; TDD = 60 mg     Hydroxyzine/Atarax 25 mg every 8 hours as needed for itching, insomnia     Lamotrigine/Lamictal 50 mg (2 tabs) twice daily for ttl: 100 mg      Per PCP and or other providers:  -Gabapentin 600 mg QID + 300 mg TID; ttl: 3300 mg  -Zolpidem/Ambien 5 mg nightly as needed for sleep

## 2024-01-29 ENCOUNTER — MYC MEDICAL ADVICE (OUTPATIENT)
Dept: FAMILY MEDICINE | Facility: CLINIC | Age: 79
End: 2024-01-29
Payer: COMMERCIAL

## 2024-01-29 DIAGNOSIS — G47.00 INSOMNIA, UNSPECIFIED TYPE: ICD-10-CM

## 2024-02-01 RX ORDER — ZOLPIDEM TARTRATE 5 MG/1
5 TABLET ORAL
Qty: 30 TABLET | Refills: 0 | Status: SHIPPED | OUTPATIENT
Start: 2024-02-01 | End: 2024-03-22

## 2024-02-07 ENCOUNTER — OFFICE VISIT (OUTPATIENT)
Dept: CARDIOLOGY | Facility: CLINIC | Age: 79
End: 2024-02-07
Payer: COMMERCIAL

## 2024-02-07 VITALS
DIASTOLIC BLOOD PRESSURE: 79 MMHG | BODY MASS INDEX: 24.52 KG/M2 | OXYGEN SATURATION: 98 % | RESPIRATION RATE: 16 BRPM | HEART RATE: 75 BPM | WEIGHT: 161.8 LBS | SYSTOLIC BLOOD PRESSURE: 121 MMHG | HEIGHT: 68 IN

## 2024-02-07 DIAGNOSIS — R53.83 OTHER FATIGUE: ICD-10-CM

## 2024-02-07 DIAGNOSIS — I48.11 LONGSTANDING PERSISTENT ATRIAL FIBRILLATION (H): ICD-10-CM

## 2024-02-07 DIAGNOSIS — I10 BENIGN ESSENTIAL HYPERTENSION: ICD-10-CM

## 2024-02-07 DIAGNOSIS — I38 VALVULAR HEART DISEASE: ICD-10-CM

## 2024-02-07 DIAGNOSIS — I50.33 ACUTE ON CHRONIC HEART FAILURE WITH PRESERVED EJECTION FRACTION (H): Primary | ICD-10-CM

## 2024-02-07 DIAGNOSIS — I48.20 CHRONIC ATRIAL FIBRILLATION (H): ICD-10-CM

## 2024-02-07 PROCEDURE — 99214 OFFICE O/P EST MOD 30 MIN: CPT | Performed by: NURSE PRACTITIONER

## 2024-02-07 RX ORDER — LISINOPRIL 2.5 MG/1
2.5 TABLET ORAL DAILY
Qty: 30 TABLET | Refills: 11 | Status: SHIPPED | OUTPATIENT
Start: 2024-02-07 | End: 2024-03-08

## 2024-02-07 RX ORDER — FUROSEMIDE 20 MG
40 TABLET ORAL DAILY
Qty: 180 TABLET | Refills: 3 | Status: SHIPPED | OUTPATIENT
Start: 2024-02-07 | End: 2024-04-25

## 2024-02-07 NOTE — PATIENT INSTRUCTIONS
Medication Changes  Start lisinopril 2.5 mg daily  Increase lasix to 40 mg daily     Recommendations:  Check daily weights and call the clinic if your weight has increased more than 2 lbs in one day or 5 lbs in one week; if you feel more short of breath or have worsening swelling in your legs or abdomen.  2000 mg sodium diet   4-8 8 ounce glasses of fluids per day, not more than 2000 mL (2 L) per day.    Call if blood pressure is less than 90 on top or less than 100 with lightheadedness.       Follow-up:  Cardiology follow up at [facility]: Nazanin EDMONDSON in 1 month.    Nonfasting lab in 1-2 weeks ([facility])  Lexiscan stress test     Cardiology Scheduling~[phone]  Cardiology Clinic RN~[phone] (Rosita RN, Alda RN; Jaimie RN)

## 2024-02-07 NOTE — LETTER
2/7/2024    Lizzy Leiva MD  [address]  [facility] [address]    RE: Josemanuel Edmond       Dear Colleague,     I had the pleasure of seeing Josemanuel Edmond in the [facility].  Cardiology Clinic Progress Note  Josemanuel Edmond MRN# [id number]   YOB: 1945 Age: 78 year old      Primary Cardiologist:   Dr. Chapin  Patient presents today for testing follow-up        History of Presenting Illness:      Josemanuel Edmond is a pleasant 78 year old patient with a past cardiac history significant for   Chronic HFpEF  Negative Lexiscan 2022  EF 50-55% 2023  45 to 50% 1/2024  Chronic atrial fibrillation  onset 2018 postoperatively, S/p cardioversion   Recurrence 2021 with palpitations  Severe LA  Rate control and Anticoagulation  Valvular disease  Moderate to moderately severe MR, moderate TR, mild to moderate AI 2023  Severe MR/TR, moderate AI 2024  Hypertension  Hyperlipidemia  Past medical history significant for GERD, history of empyema s/p thoracotomy 2018, prior EtOH abuse.          Most recent lipid profile, BMP, ALT, hemoglobin reviewed today. Echo January 2024 show going decreased EF 45 to 50% which was previously low normal, RV moderately dilated with mildly decreased function, severe MR/TR, moderate AI, and mildly dilated aorta.  Results reviewed today.    Overall, he notes that his weights have been stable at home since he was last seen.  However, he also reports that he was previously 154 pounds last year and is now 161 pounds.  He is agreeable to trying increased diuretic.  He reports that he has started to have fatigue with exertion, since he was last seen.  He reports 1 day of orthopnea that has since resolved.  Heart rates have been controlled 75 to 80s on his blood pressure machine at home it is reported as 75 bpm today in clinic.  Blood pressure is well-controlled.  He is agreeable to adding lisinopril for cardiomyopathy. Patient reports no chest  pain, shortness of breath, PND, orthopnea, presyncope, syncope, edema, heart racing, or palpitations.                   Assessment and Plan:       Plan  Patient Instructions   Medication Changes  Start lisinopril 2.5 mg daily for CM  Increase lasix to 40 mg daily     Recommendations:  Check daily weights and call the clinic if your weight has increased more than 2 lbs in one day or 5 lbs in one week; if you feel more short of breath or have worsening swelling in your legs or abdomen.  2000 mg sodium diet   4-8 8 ounce glasses of fluids per day, not more than 2000 mL (2 L) per day.    Call if blood pressure is less than 90 on top or less than 100 with lightheadedness.       Follow-up:  Cardiology follow up at [facility]: Nazanin EDMONDSON in 1 month.    Nonfasting lab in 1-2 weeks ([facility])  Lexiscan stress test     Cardiology Scheduling~[phone]  Cardiology Clinic RN~[phone] (Rosita RN, Alda RN; Jaimie RN)            Cardiomyopathy with Acute on chronic heart failure with preserved ejection fraction (H)  No symptoms of heart failure  Increased diuretic, start ACE inhibitor   Uptitrate cardiomyopathy medications as tolerated  Reassess EF with echo or CMR  Consider holter for rate assessment     Longstanding persistent atrial fibrillation (H)  Asymptomatic  Elevated [id number] score  Continue rate control, anticoagulation    Benign essential hypertension  Controlled  Continue current medications    Valvular heart disease  Severe MR/TR possibly due to to volume overload   Will reassess after diuresis  Consider AD, if needed             Respiratory:  clear to auscultation; normal symmetry        Cardiac: regular rate and irregularly irregular rhythm , murmur 5/6   GI:  nondistended     Extremities and Muscular Skeletal:   no edema           Thank you for allowing me to participate in this delightful patient's care.   This note was completed in part using Dragon voice recognition software. Although reviewed after  completion, some word and grammatical errors may occur.    KAYLA Parikh CNP      Thank you for allowing me to participate in the care of your patient.      Sincerely,     KAYLA Parikh CNP     [facility]  cc:   No referring provider defined for this encounter.

## 2024-02-07 NOTE — PROGRESS NOTES
Cardiology Clinic Progress Note  Josemanuel Edmond MRN# [id number]   YOB: 1945 Age: 78 year old      Primary Cardiologist:   Dr. Chapin  Patient presents today for testing follow-up        History of Presenting Illness:      Josemanuel Edmond is a pleasant 78 year old patient with a past cardiac history significant for   Chronic HFpEF  Negative Lexiscan 2022  EF 50-55% 2023  45 to 50% 1/2024  Chronic atrial fibrillation  onset 2018 postoperatively, S/p cardioversion   Recurrence 2021 with palpitations  Severe LA  Rate control and Anticoagulation  Valvular disease  Moderate to moderately severe MR, moderate TR, mild to moderate AI 2023  Severe MR/TR, moderate AI 2024  Hypertension  Hyperlipidemia  Past medical history significant for GERD, history of empyema s/p thoracotomy 2018, prior EtOH abuse.          Most recent lipid profile, BMP, ALT, hemoglobin reviewed today. Echo January 2024 show going decreased EF 45 to 50% which was previously low normal, RV moderately dilated with mildly decreased function, severe MR/TR, moderate AI, and mildly dilated aorta.  Results reviewed today.    Overall, he notes that his weights have been stable at home since he was last seen.  However, he also reports that he was previously 154 pounds last year and is now 161 pounds.  He is agreeable to trying increased diuretic.  He reports that he has started to have fatigue with exertion, since he was last seen.  He reports 1 day of orthopnea that has since resolved.  Heart rates have been controlled 75 to 80s on his blood pressure machine at home it is reported as 75 bpm today in clinic.  Blood pressure is well-controlled.  He is agreeable to adding lisinopril for cardiomyopathy. Patient reports no chest pain, shortness of breath, PND, orthopnea, presyncope, syncope, edema, heart racing, or palpitations.                   Assessment and Plan:       Plan  Patient Instructions   Medication Changes  Start lisinopril 2.5 mg  daily for CM  Increase lasix to 40 mg daily     Recommendations:  Check daily weights and call the clinic if your weight has increased more than 2 lbs in one day or 5 lbs in one week; if you feel more short of breath or have worsening swelling in your legs or abdomen.  2000 mg sodium diet   4-8 8 ounce glasses of fluids per day, not more than 2000 mL (2 L) per day.    Call if blood pressure is less than 90 on top or less than 100 with lightheadedness.       Follow-up:  Cardiology follow up at [facility]: Nazanin EDMONDSON in 1 month.    Nonfasting lab in 1-2 weeks ([facility])  Lexiscan stress test     Cardiology Scheduling~[phone]  Cardiology Clinic RN~[phone] (Rosita RN, Alda RN; Jaimie RN)            Cardiomyopathy with Acute on chronic heart failure with preserved ejection fraction (H)  No symptoms of heart failure  Increased diuretic, start ACE inhibitor   Uptitrate cardiomyopathy medications as tolerated  Reassess EF with echo or CMR  Consider holter for rate assessment     Longstanding persistent atrial fibrillation (H)  Asymptomatic  Elevated [id number] score  Continue rate control, anticoagulation    Benign essential hypertension  Controlled  Continue current medications    Valvular heart disease  Severe MR/TR possibly due to to volume overload   Will reassess after diuresis  Consider AD, if needed             Respiratory:  clear to auscultation; normal symmetry        Cardiac: regular rate and irregularly irregular rhythm , murmur 5/6   GI:  nondistended     Extremities and Muscular Skeletal:   no edema           Thank you for allowing me to participate in this delightful patient's care.   This note was completed in part using Dragon voice recognition software. Although reviewed after completion, some word and grammatical errors may occur.    Nazanin Tipton, KAYLA CNP

## 2024-02-14 ASSESSMENT — ANXIETY QUESTIONNAIRES
GAD7 TOTAL SCORE: 5
5. BEING SO RESTLESS THAT IT IS HARD TO SIT STILL: NOT AT ALL
3. WORRYING TOO MUCH ABOUT DIFFERENT THINGS: SEVERAL DAYS
1. FEELING NERVOUS, ANXIOUS, OR ON EDGE: MORE THAN HALF THE DAYS
7. FEELING AFRAID AS IF SOMETHING AWFUL MIGHT HAPPEN: NOT AT ALL
4. TROUBLE RELAXING: SEVERAL DAYS
GAD7 TOTAL SCORE: 5
7. FEELING AFRAID AS IF SOMETHING AWFUL MIGHT HAPPEN: NOT AT ALL
6. BECOMING EASILY ANNOYED OR IRRITABLE: NOT AT ALL
GAD7 TOTAL SCORE: 5
2. NOT BEING ABLE TO STOP OR CONTROL WORRYING: SEVERAL DAYS

## 2024-02-15 ENCOUNTER — VIRTUAL VISIT (OUTPATIENT)
Dept: BEHAVIORAL HEALTH | Facility: CLINIC | Age: 79
End: 2024-02-15
Payer: COMMERCIAL

## 2024-02-15 DIAGNOSIS — F40.10 SOCIAL ANXIETY DISORDER: ICD-10-CM

## 2024-02-15 DIAGNOSIS — F33.1 MODERATE EPISODE OF RECURRENT MAJOR DEPRESSIVE DISORDER (H): Primary | ICD-10-CM

## 2024-02-15 PROCEDURE — 99214 OFFICE O/P EST MOD 30 MIN: CPT | Mod: 95 | Performed by: NURSE PRACTITIONER

## 2024-02-15 RX ORDER — BUPROPION HYDROCHLORIDE 300 MG/1
300 TABLET ORAL EVERY MORNING
Qty: 90 TABLET | Refills: 0 | Status: SHIPPED | OUTPATIENT
Start: 2024-02-15 | End: 2024-05-07

## 2024-02-15 RX ORDER — LAMOTRIGINE 25 MG/1
50 TABLET ORAL 2 TIMES DAILY
Qty: 360 TABLET | Refills: 0 | Status: SHIPPED | OUTPATIENT
Start: 2024-02-15 | End: 2024-05-07

## 2024-02-15 ASSESSMENT — PATIENT HEALTH QUESTIONNAIRE - PHQ9: SUM OF ALL RESPONSES TO PHQ QUESTIONS 1-9: 4

## 2024-02-15 NOTE — PROGRESS NOTES
[facility]      Mental Health & Addiction Service Line    [facility]: Psychiatry Progress Note  Medication Management                VISIT INFORMATION      Date:  2024       Number:  -3rd      Referral source:  -PCP      Patient Identifying Information:  Legal name: Josemanuel Edmond  Preferred name: Gavin  : 1945  Preferred pronouns: He/him      Participants:   -Patient  -Provider        Telehealth visit details:  Type of service:   Video  Patient location:   At home, Off site  Provider Location: [facility] & Addiction Service Line  Platform utilized: SendMe    Start time:  1:41 pm  End time:   1:53 pm          INTERIM HX    -Have been taking the Trintellix for 13 days    -Twice a month have Bible study  -Used to look forward to participating but now I don't care for it that much    -Also sometimes go to see friends for lunch or to play cards   -Recently saw a friend I've known for 30 years with ALS but just felt uncomfortable, didn't have any fun or like being in this   social situation ... would have just preferred to stay home      PSYCHIATRIC ROS      Sleep:  No change:  -7 to 8 hours  -Taking the Ambien nightly      Mood/Anxiety:  -See PHQ-9 score    -See CAROLYNN-7 score      Suicidal ideations:  -Denies passive or active thoughts at this time      SIB:  -Denies engaging in self harm         Side effects:  -Zyprexa 2.5 to 5 mg = urinary retention        PSYCHIATRIC HISTORY    Suicide attempts:  -None reported      Inpatient psychiatric hospitalizations:  -None reported   -No hx of commitments      ECT:  -None reported            Medication Trials:      Per [employer] testing:  -Ultra-rapid metabolizer CYP2D6 = Paxil  -Poor metabolizer [id number] = Celexa, Zoloft     SSRIs:  -Lexapro: ineffective  -Prozac 10 mg: effective for depression but increased anxiety  -Zoloft: low dosage     SNRIs:  -Cymbalta: side effects  -Effexor: side effects  -Fetzima:  ineffective  -Pristiq: ineffective     TCAs:  -Nortriptyline: dry mouth, nightmares     Serotonin modulators/stimulators:  -Mirtazapine 30 mg: effective for sleep, however caused weight gain  -Viibryd: ineffective     Other anxiolytics:  -Hydroxyzine: over sedation, dry mouth     Antipsychotics:  -Abilify 10 mg  -Zyprexa 2.5 to 5 mg = urinary retention     Benzodiazepines = history of abuse  -Alprazolam  -Clonazepam  -Diazepam  -Lorazepam     Sleep aides:  -Trazodone 50 mg: over-sedation      SUBSTANCE USE      Prior use:  -Denies any history of alcohol, recreational, or illicit substance use resulting in: legal issues, c/d programming, or withdrawal symptoms     Per chart review 5/7/2020 encounter by ALENA GARZA-CNP:  -On/off heavy alcohol use after son passed away in 2002  -Stopped drinking altogether in 2017        Current use:     Alcohol:   -None reported         Recreational Drugs:   -None reported         Medical Marijuana:  -None reported         Cigarettes per day:   -None reported         Chewing tobacco:   -None reported         Vaping:    -None reported         Caffeine intake:    -1 cup coffee per day        MEDICAL HISTORY    -The problem list was reviewed via the EMR prior to the appointment    -The patient denies any concerning physical and or medical symptoms during the interviewing process          MEDICATIONS      Current Outpatient Medications:     acetaminophen (TYLENOL) 325 MG tablet, Take 2 tablets (650 mg) by mouth every 4 hours as needed for other (mild pain), Disp: 100 tablet, Rfl: 0    buPROPion (WELLBUTRIN XL) 300 MG 24 hr tablet, Take 1 tablet (300 mg) by mouth every morning, Disp: 30 tablet, Rfl: 1    busPIRone HCl (BUSPAR) 30 MG tablet, TAKE 1 TABLET TWICE A DAY, Disp: 180 tablet, Rfl: 3    doxazosin (CARDURA) 8 MG tablet, Take 0.5 tablets (4 mg) by mouth At Bedtime, Disp: 45 tablet, Rfl: 3    finasteride (PROSCAR) 5 MG tablet, TAKE 1 TABLET DAILY, Disp: 90 tablet, Rfl: 3     furosemide (LASIX) 20 MG tablet, Take 2 tablets (40 mg) by mouth daily, Disp: 180 tablet, Rfl: 3    gabapentin (NEURONTIN) 300 MG capsule, Take 1 capsule (300 mg) by mouth 3 times daily In addition to 600mg capsul., Disp: 90 capsule, Rfl: 3    gabapentin (NEURONTIN) 600 MG tablet, TAKE 1 TABLET FOUR TIMES A DAY, Disp: 360 tablet, Rfl: 3    hydrOXYzine HCl (ATARAX) 25 MG tablet, Take 1 tablet (25 mg) by mouth every 8 hours as needed for itching or other (insomnia), Disp: 30 tablet, Rfl: 3    lamoTRIgine (LAMICTAL) 25 MG tablet, Take 2 tablets (50 mg) by mouth 2 times daily, Disp: 360 tablet, Rfl: 0    lisinopril (ZESTRIL) 2.5 MG tablet, Take 1 tablet (2.5 mg) by mouth daily, Disp: 30 tablet, Rfl: 11    metoprolol tartrate (LOPRESSOR) 100 MG tablet, Take 1 tablet (100 mg) by mouth 2 times daily, Disp: 180 tablet, Rfl: 3    multivitamin (ONE-DAILY) tablet, Take 1 tablet by mouth daily, Disp: 30 tablet, Rfl: 0    vortioxetine (TRINTELLIX) 10 MG tablet, Take 1 tablet (10 mg) by mouth daily, Disp: 30 tablet, Rfl: 0    vortioxetine (TRINTELLIX) 5 MG tablet, Take 1 tablet (5 mg) by mouth daily for 7 days then increase to 10 mg/day, Disp: 7 tablet, Rfl: 0    XARELTO ANTICOAGULANT 20 MG TABS tablet, TAKE 1 TABLET DAILY WITH   DINNER, Disp: 90 tablet, Rfl: 3    zolpidem (AMBIEN) 5 MG tablet, Take 1 tablet (5 mg) by mouth nightly as needed for sleep, Disp: 30 tablet, Rfl: 0      If a controlled substance is prescribed during today's appointment:    -The [place] Prescription Monitoring Program has been reviewed and there are no current concerns with: diversionary activity, early refill requests, and or obtaining the medication from multiple providers.        VITALS    BP Readings from Last 3 Encounters:   02/07/24 121/79   01/05/24 135/81   11/30/23 122/72       Pulse Readings from Last 3 Encounters:   02/07/24 75   01/05/24 75   11/30/23 84       Wt Readings from Last 3 Encounters:   02/07/24 73.4 kg (161 lb 12.8 oz)    01/05/24 73.3 kg (161 lb 8 oz)   11/30/23 75.3 kg (166 lb)         LABS    The following labs were reviewed prior to or during the appointment:  -None        SCALES        11/30/2023     9:44 AM 12/28/2023    10:07 AM 1/18/2024     9:16 AM   PHQ   PHQ-9 Total Score 5 7 4   Q9: Thoughts of better off dead/self-harm past 2 weeks Not at all Not at all Not at all          Answers submitted by the patient for this visit:  CAROLYNN-7 (Submitted on 2/14/2024)  CAROLYNN 7 TOTAL SCORE: 5        MENTAL STATUS EXAMINATION    Appearance: Well Groomed, Attire Appropriate for the Season  General Behavior:  Cooperative, Direct Eye Contact  Speech: Fluent, Normal rate, Monotone  Musculoskeletal:    -Gait not observed during t.h. visit  -No facial tics/tremors observed   -Motor coordination is grossly intact   Mood: Depressed    Affect: Ambivalent   Attention: Intact  Orientation:  Person, Place, Time, Situation  Thought Associations:  Intact  Thought Content: Reality based   Thought Processes: Organized, Normal rate  Memory: No overt impairment; no screenings or formal testing performed  Language: Intact  Judgement: Fair to good  Insight: Fair to good      ASSESSMENT/CLINICAL IMPRESSIONS    Summary:    Josemanuel/Gavin Edmond is an 79 y/o male with a previous mental health hx of: MDD, Social   Anxiety Disorder, Alcohol Abuse (remote + no consumption since 2017) and   Benzodiazepine Dependence.     Medical decision making is complex based on: age, co-morbidities (see problem list), and polypharmacy.     Per chart review: depressive + anxiety symptoms began in 2002 after his son passed away during a MVA. Social anxiety became problematic around 2013-14.    [facility]iated [facility] at [facility] on [facility]2023 for the management of psychotropics/bridging until able   to establish long term outpatient psychiatric services.     ---------------------    Gavin outlines no noticeable improvement in depression and social anxiety disorder symptom profiles  since starting   Trintellix.  However he has only been on this particular antidepressant for the past 13 days.  Will continue titration to   20 mg/day (see below treatment plan).    Despite ongoing low mood, poor motivation, and lack of interest in socializing with friends or in the [facility] setting, Gavin   denies any immediate safety concerns towards himself or others.  He also continues to be future oriented/forward thinking.        DSM-V and or working diagnosis:    1.  Moderate episode of recurrent major depressive disorder (H)      2.  Social anxiety disorder         SAFETY EVALUATION:  Suicidal ideations:  -denies  Homicidal ideations:  -denies  Risk factors:  -male, age  -chronic illnesses   Protective and mitigating factors:  -spouse, family  -no prior attempts  Risk assessment:  -low to medium         SAFETY PLAN:  -Has numbers of at least 1 family member + 1 friend in personal contact list  -Knows clinic number(s) + operation of regular business hours   -Reviewed to utilize 988 or text [place] to [phone] for mental health crisis  -Discussed to call 911 and or return to the nearest Emergency Department if unable to maintain safety of self or others (due to severity of suicidal and or homicidal ideations)        TREATMENT PLAN      Medications:    Change:  Vortioxetine/Trintellix 10 mg daily until gone (approx until 3/3/2024) then increase to 20 mg daily    Continue:  Bupropion/Wellbutrin 300 mg XL in AM    Buspirone/Buspar 30 mg twice daily; TDD = 60 mg    Lamotrigine/Lamictal 50 mg (2 tabs) twice daily for ttl: 100 mg      Per PCP and or other providers:  -Gabapentin 600 mg QID + 300 mg TID; ttl: 3300 mg  -Hydroxyzine/Atarax 25 mg every 8 hours as needed for itching, insomnia  -Zolpidem/Ambien 5 mg nightly as needed for sleep         Labs:  -None obtained        Therapy and or Non-pharmacological modalities:        Disposition:  -Has been advised of consultative [facility] model    -Please follow up at the  [facility] for medication management in: 4 to 6 weeks        Total time: 20 minutes per:    -Review of EMR  -Appointment time   -Documentation          [place] [facility]    ----------------------------------------------------------------------------------------------------------------------        TREATMENT RISK STATEMENT    The risks, benefits, alternatives, and potential adverse effects have been explained and are understood by the patient.  The patient agrees to the treatment plan with their ability to do so.      The patient knows to call the clinic: [phone]  for any problems or concerns until the next psychiatry visit, regardless if it is within or outside of the [facility] system.     If unable to reach clinic staff (via phone call or medical messaging) during the normal business hours: 8:00 am to 4:30 pm then it is recommended accessing the nearest: emergency department, urgent care facility, or utilizing local (varies based on county of residence) and national crisis #'s or text messaging services for immediate assistance.          ----------------------------------------------------------------------------------------------------------------------      If applicable the following has been discussed with the patient, parent/guardian, and or attending family member during the appointment:      Risks of polypharmacy and possible drug interactions with current medication list + common OTC products, herbs, and supplements.  Moving forward, it is suggested to intermittently check-in with a clinic or retail pharmacist whenever new medications or OTC/h/s are consumed.    Risks of polypharmacy and possible drug + drug interactions with current medication list.  Moving forward, it is suggested to intermittently check-in with a clinic or retail pharmacist whenever new prescription medications are added to your treatment regimen.    Recommendation to adhere to CDC guidelines as it relates  to alcohol consumption.  If taking benzodiazepines, you should abstain from alcohol intake due to increased risks of CNS and respiratory depression, as well as psychomotor impairment.    If possible, it is recommended to avoid concurrent use of prescribed: opioids + benzodiazepines due to increased risks of CNS and respiratory depression, as well as the increased risk of overdose.     Recommendation to minimize and or abstain from THC use (unless you are prescribed medical marijuana).    Recommendation to abstain from illicit substances including but not limited to the following: heroin, street fentanyl, cocaine, methamphetamines, bath salts, and other synthetic products.    Recommendation to abstain from tobacco/smoking/nicotine, alcohol, THC, and all illicit substances if trying to become or are pregnant.    Do not take opioids, stimulants, and or other prescription medications unless they are specifically prescribed for you.     Black Box Warnings associated with the prescribed psychotropic(s).     Potential adverse effects of antipsychotics including but not limited to the following: weight gain, metabolic syndrome, EPS/Tardive Dyskinesias, and Neuroleptic Malignant Syndrome.    Potential adverse effects of stimulants including but not limited to the following: sudden death, MI, stroke, HTN, cardiomyopathy (long term use), seizures, ed, psychosis, and aggressive behaviors.

## 2024-02-15 NOTE — PATIENT INSTRUCTIONS
"Change:  Vortioxetine/Trintellix 10 mg daily until gone (approx until 3/3/2024) then increase to 20 mg daily      Continue:  Bupropion/Wellbutrin 300 mg XL in AM    Buspirone/Buspar 30 mg twice daily; TDD = 60 mg    Lamotrigine/Lamictal 50 mg (2 tabs) twice daily for ttl: 100 mg      Per PCP and or other providers:  -Gabapentin 600 mg QID + 300 mg TID; ttl: 3300 mg  -Hydroxyzine/Atarax 25 mg every 8 hours as needed for itching, insomnia  -Zolpidem/Ambien 5 mg nightly as needed for sleep    -----------------------      Patient Education   The [facility]  What to Expect  Here's what to expect in the [facility].   About the clinic  The [facility] cares for you while you wait for long-term help.   You'll meet with a psychiatric doctor, who can give you medicine to help you feel better. You'll meet with them for about 5 visits or less. You'll see a different psychiatric doctor for your ongoing care. The clinic nurses and coordinators will help you guide your care.  If you have any questions or concerns, we'll be glad to talk with you.  About visits  Be open  At your visits, please talk openly about your problems. It may feel hard, but it's the best way for us to help you.  Cancelling visits  If you can't come to your visit, please call us right away at [phone]. If you don't cancel at least 24 hours (1 full day) before your visit, that's \"late cancellation.\"  Not showing up for your visits  Being very late is the same as not showing up. You will be a \"no show\" if:  You're more than 15 minutes late for a 30-minute (half hour) visit.  You're more than 30 minutes late for a 60-minute (full hour) visit.  If you cancel late or don't show up 2 times within 6 months, we may end your care.   If your ongoing care with a psychiatric doctor is cancelled, we may end your care at the [facility]. If that happens, we'll give you referrals and enough medicine to last 3 months. The [facility] is " only used to bridge the gap between your care.  Getting help between visits  If you need help between visits, you can call us Monday to Friday from 8 a.m. to 4:30 p.m. at [phone].  Emergency care   Call 911 or go to the nearest emergency department if your life or someone else's life is in danger.  Call 988 anytime to reach the national Suicide and Crisis hotline.  Medicine refills  To refill your medicine, call your pharmacy. You can also call the [facility] at [phone], Monday to Friday, 8 a.m. to 4:30 p.m. It can take 1 to 3 business days to get a refill.   Forms, letters, and tests  You may have papers to fill out, like FMLA, short-term disability, and workability. We'll find out if we can do them and let you know. It can take 5 to 7 business days to get them filled out, and we may need you to come in for a visit.  Before we can give you medicine for ADHD, we may refer you to get tested for it or confirm it another way.  We don't do mental health checks ordered by the court.   We don't do mental health testing, but we can refer you to get tested.   Thank you for choosing us for your care.  For informational purposes only. Not to replace the advice of your health care provider. Copyright   2022 [facility]. All rights reserved. [employer] [facility] [id number] - 12/22.

## 2024-02-15 NOTE — PROGRESS NOTES
"  Mental Health and Collaborative Care Psychiatry Service Rooming Note      Most pressing mental health concern at this time: Wants to make sure his current med regimen is working       Any new physical health conditions or diagnoses affecting you that we should be aware of: none reported      Side effects related to medications patient would like to discuss with the provider:  No      Are you taking your medications as prescribed?  yes  If not, why? NA      Do you need refills of any of the medications?  yes  If so, which ones? As per chart review, Trintellix      Are you taking any recreational substances? denies        Add attendance guidelines .phrase here   Care team has reviewed attendance agreement with patient. Patient advised that two failed appointments within 6 months may lead to termination of current episode of care.       **If appointment is with [facility]-include the following:      \"The [facility] is a temporary psychiatry service that helps to bridge the time to your next appointment. It is not intended to be a long-term service and you are expected to attend your scheduled appointment with your next provider.\"    [ x ] Patient/Parent verbalized understanding     If you need support between appointments, please call [phone] and let them know you're seen by [facility] Psychiatry. You may also send a [employer] message to reach us.     New (awaiting) Mental health provider or next programming: VERONICA Armstrong  Date of scheduled apt: 6/21/24     Patient would like the video visit invitation sent by:   Ezequiel Jhaveri LPN  February 15, 2024  1:18 PM      "

## 2024-02-19 DIAGNOSIS — F41.9 ANXIETY: ICD-10-CM

## 2024-02-19 DIAGNOSIS — F32.1 MODERATE MAJOR DEPRESSION (H): ICD-10-CM

## 2024-02-21 ENCOUNTER — LAB (OUTPATIENT)
Dept: LAB | Facility: CLINIC | Age: 79
End: 2024-02-21
Payer: COMMERCIAL

## 2024-02-21 ENCOUNTER — HOSPITAL ENCOUNTER (OUTPATIENT)
Dept: NUCLEAR MEDICINE | Facility: CLINIC | Age: 79
Setting detail: NUCLEAR MEDICINE
Discharge: HOME | End: 2024-02-21
Attending: NURSE PRACTITIONER
Payer: COMMERCIAL

## 2024-02-21 ENCOUNTER — HOSPITAL ENCOUNTER (OUTPATIENT)
Dept: CARDIOLOGY | Facility: CLINIC | Age: 79
Discharge: HOME | End: 2024-02-21
Attending: NURSE PRACTITIONER
Payer: COMMERCIAL

## 2024-02-21 DIAGNOSIS — I50.33 ACUTE ON CHRONIC HEART FAILURE WITH PRESERVED EJECTION FRACTION (H): Primary | ICD-10-CM

## 2024-02-21 DIAGNOSIS — I50.33 ACUTE ON CHRONIC HEART FAILURE WITH PRESERVED EJECTION FRACTION (H): ICD-10-CM

## 2024-02-21 DIAGNOSIS — R53.83 OTHER FATIGUE: ICD-10-CM

## 2024-02-21 LAB
ANION GAP SERPL CALCULATED.3IONS-SCNC: 7 MMOL/L (ref 7–15)
BUN SERPL-MCNC: 17.7 MG/DL (ref 8–23)
CALCIUM SERPL-MCNC: 9.4 MG/DL (ref 8.8–10.2)
CHLORIDE SERPL-SCNC: 96 MMOL/L (ref 98–107)
CREAT SERPL-MCNC: 1.15 MG/DL (ref 0.67–1.17)
CV STRESS MAX HR HE: 89
DEPRECATED HCO3 PLAS-SCNC: 30 MMOL/L (ref 22–29)
EGFRCR SERPLBLD CKD-EPI 2021: 64 ML/MIN/1.73M2
GLUCOSE SERPL-MCNC: 81 MG/DL (ref 70–99)
NUC STRESS EJECTION FRACTION: 51 %
POTASSIUM SERPL-SCNC: 4.6 MMOL/L (ref 3.4–5.3)
RATE PRESSURE PRODUCT: 9256
SODIUM SERPL-SCNC: 133 MMOL/L (ref 135–145)
STRESS ECHO BASELINE DIASTOLIC HE: 89
STRESS ECHO BASELINE HR: 73 BPM
STRESS ECHO BASELINE SYSTOLIC BP: 73
STRESS ECHO CALCULATED PERCENT HR: 64 %
STRESS ECHO LAST STRESS DIASTOLIC BP: 70
STRESS ECHO LAST STRESS SYSTOLIC BP: 104
STRESS ECHO TARGET HR: 140

## 2024-02-21 PROCEDURE — 78452 HT MUSCLE IMAGE SPECT MULT: CPT | Mod: 26 | Performed by: INTERNAL MEDICINE

## 2024-02-21 PROCEDURE — 93016 CV STRESS TEST SUPVJ ONLY: CPT | Performed by: INTERNAL MEDICINE

## 2024-02-21 PROCEDURE — 78452 HT MUSCLE IMAGE SPECT MULT: CPT

## 2024-02-21 PROCEDURE — 93017 CV STRESS TEST TRACING ONLY: CPT

## 2024-02-21 PROCEDURE — 250N000011 HC RX IP 250 OP 636: Mod: JZ | Performed by: NURSE PRACTITIONER

## 2024-02-21 PROCEDURE — 343N000001 HC RX 343: Performed by: NURSE PRACTITIONER

## 2024-02-21 PROCEDURE — 80048 BASIC METABOLIC PNL TOTAL CA: CPT | Performed by: NURSE PRACTITIONER

## 2024-02-21 PROCEDURE — A9502 TC99M TETROFOSMIN: HCPCS | Performed by: NURSE PRACTITIONER

## 2024-02-21 PROCEDURE — 93018 CV STRESS TEST I&R ONLY: CPT | Performed by: INTERNAL MEDICINE

## 2024-02-21 PROCEDURE — 36415 COLL VENOUS BLD VENIPUNCTURE: CPT | Performed by: NURSE PRACTITIONER

## 2024-02-21 RX ORDER — REGADENOSON 0.08 MG/ML
0.4 INJECTION, SOLUTION INTRAVENOUS ONCE
Status: COMPLETED | OUTPATIENT
Start: 2024-02-21 | End: 2024-02-21

## 2024-02-21 RX ORDER — GABAPENTIN 300 MG/1
CAPSULE ORAL
Qty: 90 CAPSULE | Refills: 3 | Status: SHIPPED | OUTPATIENT
Start: 2024-02-21

## 2024-02-21 RX ADMIN — REGADENOSON 0.4 MG: 0.08 INJECTION, SOLUTION INTRAVENOUS at 14:31

## 2024-02-21 RX ADMIN — TETROFOSMIN 30.3 MILLICURIE: 1.38 INJECTION, POWDER, LYOPHILIZED, FOR SOLUTION INTRAVENOUS at 14:35

## 2024-02-21 RX ADMIN — TETROFOSMIN 10.1 MILLICURIE: 1.38 INJECTION, POWDER, LYOPHILIZED, FOR SOLUTION INTRAVENOUS at 12:20

## 2024-02-27 ENCOUNTER — VIRTUAL VISIT (OUTPATIENT)
Dept: FAMILY MEDICINE | Facility: CLINIC | Age: 79
End: 2024-02-27
Payer: COMMERCIAL

## 2024-02-27 ENCOUNTER — TELEPHONE (OUTPATIENT)
Dept: UROLOGY | Facility: CLINIC | Age: 79
End: 2024-02-27

## 2024-02-27 ENCOUNTER — TELEPHONE (OUTPATIENT)
Dept: FAMILY MEDICINE | Facility: CLINIC | Age: 79
End: 2024-02-27
Payer: COMMERCIAL

## 2024-02-27 ENCOUNTER — TELEPHONE (OUTPATIENT)
Dept: FAMILY MEDICINE | Facility: CLINIC | Age: 79
End: 2024-02-27

## 2024-02-27 ENCOUNTER — LAB (OUTPATIENT)
Dept: LAB | Facility: CLINIC | Age: 79
End: 2024-02-27
Payer: COMMERCIAL

## 2024-02-27 DIAGNOSIS — R33.9 INCOMPLETE BLADDER EMPTYING: ICD-10-CM

## 2024-02-27 DIAGNOSIS — R30.0 DYSURIA: ICD-10-CM

## 2024-02-27 DIAGNOSIS — R30.0 DYSURIA: Primary | ICD-10-CM

## 2024-02-27 DIAGNOSIS — R39.198 SLOW URINARY STREAM: ICD-10-CM

## 2024-02-27 LAB
ALBUMIN UR-MCNC: NEGATIVE MG/DL
APPEARANCE UR: CLEAR
BACTERIA #/AREA URNS HPF: ABNORMAL /HPF
BILIRUB UR QL STRIP: NEGATIVE
COLOR UR AUTO: YELLOW
GLUCOSE UR STRIP-MCNC: NEGATIVE MG/DL
HGB UR QL STRIP: NEGATIVE
KETONES UR STRIP-MCNC: NEGATIVE MG/DL
LEUKOCYTE ESTERASE UR QL STRIP: NEGATIVE
NITRATE UR QL: NEGATIVE
PH UR STRIP: 7 [PH] (ref 5–7)
RBC #/AREA URNS AUTO: ABNORMAL /HPF
SP GR UR STRIP: 1.01 (ref 1–1.03)
UROBILINOGEN UR STRIP-ACNC: 0.2 E.U./DL
WBC #/AREA URNS AUTO: ABNORMAL /HPF

## 2024-02-27 PROCEDURE — 81001 URINALYSIS AUTO W/SCOPE: CPT

## 2024-02-27 PROCEDURE — 99442 PR PHYSICIAN TELEPHONE EVALUATION 11-20 MIN: CPT | Mod: 93 | Performed by: FAMILY MEDICINE

## 2024-02-27 RX ORDER — AMOXICILLIN 500 MG/1
CAPSULE ORAL
COMMUNITY
Start: 2023-12-18 | End: 2024-05-31

## 2024-02-27 NOTE — PROGRESS NOTES
Gavin is a 78 year old who is being evaluated via a billable telephone visit.      What phone number would you like to be contacted at? [phone]  How would you like to obtain your AVS? Ezequiel    1:01 PM      Originating Location (pt. Location): Home    Distant Location (provider location):  On-site    A/P:      Subjective   Gavin is a 78 year old, presenting for the following health issues:  Urinary Problem        2/27/2024    11:05 AM   Additional Questions   Roomed by Carmen WHITMAN   Accompanied by self     History of Present Illness       Reason for visit:  UTI  Symptom onset:  1-3 days ago  Symptoms include:  Truble urinating  Symptom intensity:  Moderate  Symptom progression:  Worsening  Had these symptoms before:  No    He eats 2-3 servings of fruits and vegetables daily.He consumes 2 sweetened beverage(s) daily.He exercises with enough effort to increase his heart rate 9 or less minutes per day.  He exercises with enough effort to increase his heart rate 3 or less days per week.   He is taking medications regularly.       Genitourinary - Male  Onset/Duration: 2 days   Description:   Dysuria (painful urination): YES}  Hematuria (blood in urine): No  Frequency: No  Waking at night to urinate: YES  Hesitancy (delay in urine): YES  Retention (unable to empty): YES  Decrease in urinary flow: YES  Incontinence: No  Progression of Symptoms:  worsening  Accompanying Signs & Symptoms:  Fever: No  Back/Flank pain: No  Urethral discharge: No  Testicle lumps/masses/pain: No  Nausea and/or vomiting: No  Abdominal pain: No  History:   History of frequent UTI s: No  History of kidney stones: No  History of hernias: No  Personal or Family history of Prostate problems: No  Sexually active: No  Precipitating or alleviating factors: None  Therapies tried and outcome: increase fluid intake    Feels like it is hard to start his stream and once he does it is only dribbling.  Feels like he cannot empty his bladder completely.  Has some  "burning in the urethra with urination.  Has been going more frequently but with less volume.  This has been going on for about 2 days.    Feels like his stream is better during the day and worse at night.  Not \"terrible\" but bothersome.    Has a h/o enlarged prostate on meds.  Has a h/o UTI but not for many years.    No belly or back pain.  No fevers.  Urine looks normal, no cloudiness or hematuria.          Objective           Vitals:  No vitals were obtained today due to virtual visit.    Physical Exam   General: Alert and no distress //Respiratory: No audible wheeze, cough, or shortness of breath // Psychiatric:  Appropriate affect, tone, and pace of words      Results for orders placed or performed in visit on 02/27/24   UA with Microscopic reflex to Culture - lab collect     Status: Normal    Specimen: Urine, Clean Catch   Result Value Ref Range    Color Urine Yellow Colorless, Straw, Light Yellow, Yellow    Appearance Urine Clear Clear    Glucose Urine Negative Negative mg/dL    Bilirubin Urine Negative Negative    Ketones Urine Negative Negative mg/dL    Specific Gravity Urine 1.015 1.003 - 1.035    Blood Urine Negative Negative    pH Urine 7.0 5.0 - 7.0    Protein Albumin Urine Negative Negative mg/dL    Urobilinogen Urine 0.2 0.2, 1.0 E.U./dL    Nitrite Urine Negative Negative    Leukocyte Esterase Urine Negative Negative   UA Microscopic with Reflex to Culture     Status: Abnormal   Result Value Ref Range    Bacteria Urine Few (A) None Seen /HPF    RBC Urine None Seen 0-2 /HPF /HPF    WBC Urine None Seen 0-5 /HPF /HPF    Narrative    Urine Culture not indicated         1:18 PM    Phone call duration: 17 minutes  Signed Electronically by: Brooke Connors DO    "

## 2024-02-27 NOTE — PATIENT INSTRUCTIONS
There was no sign of infection on your urine specimen today.    I placed the urology referral we discussed.  Ideally they would be able to see you within the next few days.  Please call to schedule that appointment    If your difficulty emptying the bladder worsens and you are not able to empty at all please be sure to seek emergent care.    Also, if you have worsening urinary symptoms, belly pain/back pain, fever, please be sure to seek emergent care for a possible infection.

## 2024-02-27 NOTE — TELEPHONE ENCOUNTER
[facility] Call Center    Phone Message    May a detailed message be left on voicemail: yes     Reason for Call: Other:   Patient being referred for Urgent Appointment (3-5 days) by referring provider. Patient very concerned and declined first available writer offered. Sending encounter message for review and follow-up with Pt for scheduling. Thank you!    Dysuria  Slow urinary stream  Incomplete bladder emptying     Action Taken: Message routed to:  Other: [place] [facility]    Travel Screening: Not Applicable

## 2024-02-27 NOTE — TELEPHONE ENCOUNTER
Attempted to call patient,  no answer. appears that patient started an evisit with provider.  Nimco Holder RN on 2/27/2024 at 8:39 AM

## 2024-02-27 NOTE — TELEPHONE ENCOUNTER
Patient calling requesting to be evaluated today due to having urinary symptoms. Patient wanted to be scheduled instead of triaged.     No available appointments at [facility] for today and patient is not sure how to start an E-Visit and is not wanting to go to Urgent Care.    RN scheduled with Dr. Connors for a telephone visit today at 1:00 pm and it is ok for the team to start the visit when they can.    Patient will do any labs at the [facility].    Veronica Loaiza RN on 2/27/2024 at 10:36 AM

## 2024-02-27 NOTE — TELEPHONE ENCOUNTER
Symptoms    Describe your symptoms: Pt has urgency and frequency with urination X 2 days - No fever.  Please call patient and advise.      Any pain: No    How long have you been having symptoms: 2  days    Have you been seen for this:  Yes:     Appointment offered?: No - None Available    Triage offered?: Yes:     Home remedies tried:     Preferred Pharmacy:   [facility] - [facility] [address] [address] [address]  Phone: [phone] Fax: [phone]    Could we send this information to you in [employer] or would you prefer to receive a phone call?:   Patient would prefer a phone call   Okay to leave a detailed message?: Yes at Home number on file [phone] (home)

## 2024-02-27 NOTE — TELEPHONE ENCOUNTER
Spoke with patient he will start an evisit, no abd pain or fever reported  Nimco Holder RN on 2/27/2024 at 9:20 AM

## 2024-02-28 NOTE — TELEPHONE ENCOUNTER
Ok to wait one week. If he is concerned or is not able to urinate, he should be seen in person by PCP or go to emergency room.

## 2024-02-28 NOTE — TELEPHONE ENCOUNTER
Patient active in [employer]. Also sent patient a [employer] message with instructions to make a Urology appointment and if he is unable to void in the meantime, proceed to the ER.    Odessa Mcmahon RN on 2/28/2024 at 11:00 AM

## 2024-02-28 NOTE — TELEPHONE ENCOUNTER
Left message on VM to call back and schedule or could ask to speak further with the RN regarding recommendation from [facility] after review of his chart.    Vielka [facility]   [facility]

## 2024-02-29 NOTE — TELEPHONE ENCOUNTER
Urology appointment  Message [phone]  From  Odessa Mcmahon RN To  Gavin Edmond Sent and Delivered  2/28/2024 10:59 AM   Last Read in [employer]  2/28/2024  4:09 PM by Josemanuel Edmond     Closing note.    Odessa Mcmahon RN on 2/29/2024 at 10:12 AM

## 2024-03-07 PROBLEM — I50.32 CHRONIC HEART FAILURE WITH PRESERVED EJECTION FRACTION (H): Status: ACTIVE | Noted: 2023-11-29

## 2024-03-07 PROBLEM — I42.9 CARDIOMYOPATHY, UNSPECIFIED TYPE (H): Status: ACTIVE | Noted: 2024-03-07

## 2024-03-07 NOTE — PATIENT INSTRUCTIONS
Medication Changes:  Increase lisinopril to 5 mg daily    Recommendations:  Check daily weights and call the clinic if your weight has increased more than 2 lbs in one day or 5 lbs in one week; if you feel more short of breath or have worsening swelling in your legs or abdomen.  Check blood pressure at least 1 hour after medications. Call the clinic if your blood pressure is consistently greater than 130/80.   Call if blood pressure is less than 90 on top or less than 100 with lightheadedness.         Follow-up:  Cardiology follow up at [facility]: 1-2 months.    Nonfasting lab in 1-2 weeks ([facility])    Cardiology Scheduling~[phone]  Cardiology Clinic RN~[phone] (Rosita RN, Alda RN; Jaimie RN)

## 2024-03-07 NOTE — PROGRESS NOTES
Cardiology Clinic Progress Note  Josemanuel Edmond MRN# [id number]   YOB: 1945 Age: 78 year old      Primary Cardiologist:   Dr. Chapin  Patient presents today for 1 month follow-up        History of Presenting Illness:      Josemanuel Edmond is a pleasant 78 year old patient with a past cardiac history significant for   Chronic HFpEF  Negative Lexiscan 2022  EF 50-55% 2023  45 to 50% 1/2024  Negative Lexiscan 2/2024  Dry weight 154 pounds at home  Chronic atrial fibrillation  onset 2018 postoperatively, S/p cardioversion   Recurrence 2021 with palpitations  Severe LA  Rate control and Anticoagulation  Valvular disease  Moderate to moderately severe MR, moderate TR, mild to moderate AI 2023  Severe MR/TR, moderate AI 2024  Hypertension  Hyperlipidemia  Past medical history significant for GERD, history of empyema s/p thoracotomy 2018, prior EtOH abuse.      Echo January 2024 showing decreased EF 45 to 50% which was previously low normal, RV moderately dilated with mildly decreased function, severe MR/TR, moderate AI, and mildly dilated aorta.      Patient was seen by me in February 2024.  Weight was up and diuretic was increased.  He had fatigue with exertion and had a prior episode of orthopnea.  Atrial fibrillation heart rates were controlled.  Lisinopril added for cardiomyopathy.    Most recent lipid profile, BMP, ALT, hemoglobin, TSH reviewed today. Lexiscan February 2024 showing no ischemia or infarction, EF 51%.  Results reviewed today.    He denies any side effects after starting lisinopril and is agreeable to further uptitrating this for cardiomyopathy.  He reported a couple days of blood pressure systolic 85 last week but have been normal this week.  Blood pressure today 121/77.  Weight has been stable and denies any heart failure symptoms.  Heart rate is well-controlled. Patient reports no chest pain, shortness of breath, PND, orthopnea, presyncope, syncope, edema, heart racing, or  palpitations.                   Assessment and Plan:       Plan  Patient Instructions   Medication Changes:  Increase lisinopril to 5 mg daily for CM    Recommendations:  Check daily weights and call the clinic if your weight has increased more than 2 lbs in one day or 5 lbs in one week; if you feel more short of breath or have worsening swelling in your legs or abdomen.  Check blood pressure at least 1 hour after medications. Call the clinic if your blood pressure is consistently greater than 130/80.   Call if blood pressure is less than 90 on top or less than 100 with lightheadedness.         Follow-up:  Cardiology follow up at [facility]: 1-2 months.    Nonfasting lab in 1-2 weeks ([facility])    Cardiology Scheduling~[phone]  Cardiology Clinic RN~[phone] (Rosita RN, Alda RN; Jaimie RN)                Cardiomyopathy with Chronic heart failure with preserved ejection fraction (H)  No symptoms of heart failure  Continue GDMT with Lasix, lisinopril, metoprolol  Uptitrate cardiomyopathy medications as tolerated  Reassess with echo or CMR       Longstanding persistent atrial fibrillation (H)  Asymptomatic  Elevated [id number] score  Continue rate control, anticoagulation    Benign essential hypertension  Controlled  Continue current medications    Valvular heart disease  Follow with echo             Respiratory:  clear to auscultation; normal symmetry        Cardiac: regular rate and rhythm irregularly irregular   GI:  nondistended     Extremities and Muscular Skeletal:   no edema            Thank you for allowing me to participate in this delightful patient's care.   This note was completed in part using Dragon voice recognition software. Although reviewed after completion, some word and grammatical errors may occur.    Nazanin Tipton, KAYLA CNP

## 2024-03-08 ENCOUNTER — OFFICE VISIT (OUTPATIENT)
Dept: CARDIOLOGY | Facility: CLINIC | Age: 79
End: 2024-03-08
Attending: NURSE PRACTITIONER
Payer: COMMERCIAL

## 2024-03-08 VITALS
SYSTOLIC BLOOD PRESSURE: 121 MMHG | DIASTOLIC BLOOD PRESSURE: 77 MMHG | HEIGHT: 68 IN | BODY MASS INDEX: 24.58 KG/M2 | HEART RATE: 69 BPM | WEIGHT: 162.2 LBS | RESPIRATION RATE: 14 BRPM | OXYGEN SATURATION: 100 %

## 2024-03-08 DIAGNOSIS — I10 BENIGN ESSENTIAL HYPERTENSION: ICD-10-CM

## 2024-03-08 DIAGNOSIS — I48.20 CHRONIC ATRIAL FIBRILLATION (H): ICD-10-CM

## 2024-03-08 DIAGNOSIS — I42.9 CARDIOMYOPATHY, UNSPECIFIED TYPE (H): ICD-10-CM

## 2024-03-08 DIAGNOSIS — I50.33 ACUTE ON CHRONIC HEART FAILURE WITH PRESERVED EJECTION FRACTION (H): ICD-10-CM

## 2024-03-08 DIAGNOSIS — I38 VALVULAR HEART DISEASE: ICD-10-CM

## 2024-03-08 DIAGNOSIS — I48.11 LONGSTANDING PERSISTENT ATRIAL FIBRILLATION (H): ICD-10-CM

## 2024-03-08 DIAGNOSIS — R53.83 OTHER FATIGUE: ICD-10-CM

## 2024-03-08 DIAGNOSIS — I50.32 CHRONIC HEART FAILURE WITH PRESERVED EJECTION FRACTION (H): Primary | ICD-10-CM

## 2024-03-08 PROCEDURE — 99214 OFFICE O/P EST MOD 30 MIN: CPT | Performed by: NURSE PRACTITIONER

## 2024-03-08 RX ORDER — LISINOPRIL 5 MG/1
5 TABLET ORAL DAILY
Qty: 30 TABLET | Refills: 11 | Status: SHIPPED | OUTPATIENT
Start: 2024-03-08 | End: 2024-04-25 | Stop reason: SINTOL

## 2024-03-08 ASSESSMENT — PAIN SCALES - GENERAL: PAINLEVEL: NO PAIN (0)

## 2024-03-08 NOTE — LETTER
3/8/2024    Lizzy Leiva MD  [address]  [facility] [address]    RE: Josemanuel Edmond       Dear Colleague,     I had the pleasure of seeing Josemanuel Edmond in the [facility].  Cardiology Clinic Progress Note  Josemanuel Edmond MRN# [id number]   YOB: 1945 Age: 78 year old      Primary Cardiologist:   Dr. Chapin  Patient presents today for 1 month follow-up        History of Presenting Illness:      Josemanuel Edmond is a pleasant 78 year old patient with a past cardiac history significant for   Chronic HFpEF  Negative Lexiscan 2022  EF 50-55% 2023  45 to 50% 1/2024  Negative Lexiscan 2/2024  Dry weight 154 pounds at home  Chronic atrial fibrillation  onset 2018 postoperatively, S/p cardioversion   Recurrence 2021 with palpitations  Severe LA  Rate control and Anticoagulation  Valvular disease  Moderate to moderately severe MR, moderate TR, mild to moderate AI 2023  Severe MR/TR, moderate AI 2024  Hypertension  Hyperlipidemia  Past medical history significant for GERD, history of empyema s/p thoracotomy 2018, prior EtOH abuse.      Echo January 2024 showing decreased EF 45 to 50% which was previously low normal, RV moderately dilated with mildly decreased function, severe MR/TR, moderate AI, and mildly dilated aorta.      Patient was seen by me in February 2024.  Weight was up and diuretic was increased.  He had fatigue with exertion and had a prior episode of orthopnea.  Atrial fibrillation heart rates were controlled.  Lisinopril added for cardiomyopathy.    Most recent lipid profile, BMP, ALT, hemoglobin, TSH reviewed today. Lexiscan February 2024 showing no ischemia or infarction, EF 51%.  Results reviewed today.    He denies any side effects after starting lisinopril and is agreeable to further uptitrating this for cardiomyopathy.  He reported a couple days of blood pressure systolic 85 last week but have been normal this week.  Blood pressure today  121/77.  Weight has been stable and denies any heart failure symptoms.  Heart rate is well-controlled. Patient reports no chest pain, shortness of breath, PND, orthopnea, presyncope, syncope, edema, heart racing, or palpitations.                   Assessment and Plan:       Plan  Patient Instructions   Medication Changes:  Increase lisinopril to 5 mg daily for CM    Recommendations:  Check daily weights and call the clinic if your weight has increased more than 2 lbs in one day or 5 lbs in one week; if you feel more short of breath or have worsening swelling in your legs or abdomen.  Check blood pressure at least 1 hour after medications. Call the clinic if your blood pressure is consistently greater than 130/80.   Call if blood pressure is less than 90 on top or less than 100 with lightheadedness.         Follow-up:  Cardiology follow up at [facility]: 1-2 months.    Nonfasting lab in 1-2 weeks ([facility])    Cardiology Scheduling~[phone]  Cardiology Clinic RN~[phone] (Rosita RN, Alda RN; Jaimie RN)                Cardiomyopathy with Chronic heart failure with preserved ejection fraction (H)  No symptoms of heart failure  Continue GDMT with Lasix, lisinopril, metoprolol  Uptitrate cardiomyopathy medications as tolerated  Reassess with echo or CMR       Longstanding persistent atrial fibrillation (H)  Asymptomatic  Elevated [id number] score  Continue rate control, anticoagulation    Benign essential hypertension  Controlled  Continue current medications    Valvular heart disease  Follow with echo             Respiratory:  clear to auscultation; normal symmetry        Cardiac: regular rate and rhythm irregularly irregular   GI:  nondistended     Extremities and Muscular Skeletal:   no edema            Thank you for allowing me to participate in this delightful patient's care.   This note was completed in part using Dragon voice recognition software. Although reviewed after completion, some word and  grammatical errors may occur.    KAYLA Parikh CNP      Thank you for allowing me to participate in the care of your patient.      Sincerely,     KAYLA Parikh CNP     [facility]  cc:   KAYLA Allen CNP  [address] [place] [address]

## 2024-03-22 ENCOUNTER — LAB (OUTPATIENT)
Dept: LAB | Facility: CLINIC | Age: 79
End: 2024-03-22
Payer: COMMERCIAL

## 2024-03-22 DIAGNOSIS — I50.32 CHRONIC HEART FAILURE WITH PRESERVED EJECTION FRACTION (H): ICD-10-CM

## 2024-03-22 DIAGNOSIS — I42.9 CARDIOMYOPATHY, UNSPECIFIED TYPE (H): ICD-10-CM

## 2024-03-22 LAB
ANION GAP SERPL CALCULATED.3IONS-SCNC: 10 MMOL/L (ref 7–15)
BUN SERPL-MCNC: 17.5 MG/DL (ref 8–23)
CALCIUM SERPL-MCNC: 9.2 MG/DL (ref 8.8–10.2)
CHLORIDE SERPL-SCNC: 97 MMOL/L (ref 98–107)
CREAT SERPL-MCNC: 1.03 MG/DL (ref 0.67–1.17)
DEPRECATED HCO3 PLAS-SCNC: 29 MMOL/L (ref 22–29)
EGFRCR SERPLBLD CKD-EPI 2021: 73 ML/MIN/1.73M2
GLUCOSE SERPL-MCNC: 88 MG/DL (ref 70–99)
POTASSIUM SERPL-SCNC: 4.1 MMOL/L (ref 3.4–5.3)
SODIUM SERPL-SCNC: 136 MMOL/L (ref 135–145)

## 2024-03-22 PROCEDURE — 80048 BASIC METABOLIC PNL TOTAL CA: CPT

## 2024-03-22 PROCEDURE — 36415 COLL VENOUS BLD VENIPUNCTURE: CPT

## 2024-03-25 NOTE — PROGRESS NOTES
[facility]      Mental Health & Addiction Service Line    Transition Clinic: Psychiatry Progress Note  Medication Management                VISIT INFORMATION      Date:  2024     Number:  -4th    Referral source:  -PCP    Patient Identifying Information:  Legal name: Josemanuel Edmond  Preferred name: Gavin  : 1945  Preferred pronouns: He/him      Participants:   -Patient  -Provider      Telehealth visit details:  Type of service:   Video  Patient location:   At home, Off site  Provider Location: [facility]  Platform utilized: [employer]    Start time:  2:44 pm  End time:   3:12 pm      INTERIM HX    -Continue to avoid leaving the house much or interacting with people even though  overall mood is better + less depressed    -Recently a good friend wanted to hang out with me but I was worried about what this person would think, felt very anxious + self conscious even though I've known them for a long time    -The kids the grand-kids are coming over for [place]        PSYCHIATRIC ROS    Sleep:  No change:  -7 to 8 hours  -Taking the Ambien nightly      Mood/Anxiety:  -See above    -See PHQ-9 score    -See CAROLYNN-7 score      Suicidal ideations:  -Denies passive or active thoughts at this time      SIB:  -Denies engaging in self harm       Side effects:  -None reported        PSYCHIATRIC HISTORY    Suicide attempts:  -None reported      Inpatient psychiatric hospitalizations:  -None reported   -No hx of commitments      ECT:  -None reported            Medication Trials:      Per [employer] testing:  -Ultra-rapid metabolizer CYP2D6 = Paxil  -Poor metabolizer HLJ1R23 = Celexa, Zoloft     SSRIs:  -Lexapro: ineffective  -Prozac 10 mg: effective for depression but increased anxiety  -Zoloft: low dosage     SNRIs:  -Cymbalta: side effects  -Effexor: side effects  -Fetzima: ineffective  -Pristiq: ineffective     TCAs:  -Nortriptyline: dry mouth, nightmares      Serotonin modulators/stimulators:  -Mirtazapine 30 mg: effective for sleep, however caused weight gain  -Viibryd: ineffective     Other anxiolytics:  -Hydroxyzine: over sedation, dry mouth     Antipsychotics:  -Abilify 10 mg  -Zyprexa 2.5 to 5 mg = urinary retention     Benzodiazepines = history of abuse  -Alprazolam  -Clonazepam  -Diazepam  -Lorazepam     Sleep aides:  -Trazodone 50 mg: over-sedation      SUBSTANCE USE      Prior use:  -Denies any history of alcohol, recreational, or illicit substance use   resulting in: legal issues, c/d programming, or withdrawal symptoms     Per chart review 5/7/2020 encounter by ALENA GARZA-CNP:  -On/off heavy alcohol use after son passed away in 2002  -Stopped drinking altogether in 2017        Current use:     Alcohol:   -None reported         Recreational Drugs:   -None reported         Medical Marijuana:  -None reported         Cigarettes per day:   -None reported         Chewing tobacco:   -None reported         Vaping:    -None reported         Caffeine intake:    -1 cup coffee per day        MEDICAL HISTORY    -The problem list was reviewed via the EMR prior to the appointment    -The patient denies any concerning physical and or medical symptoms during the interviewing process          MEDICATIONS      Current Outpatient Medications:     acetaminophen (TYLENOL) 325 MG tablet, Take 2 tablets (650 mg) by mouth every 4 hours as needed for other (mild pain), Disp: 100 tablet, Rfl: 0    amoxicillin (AMOXIL) 500 MG capsule, , Disp: , Rfl:     buPROPion (WELLBUTRIN XL) 300 MG 24 hr tablet, Take 1 tablet (300 mg) by mouth every morning, Disp: 90 tablet, Rfl: 0    busPIRone HCl (BUSPAR) 30 MG tablet, TAKE 1 TABLET TWICE A DAY, Disp: 180 tablet, Rfl: 3    doxazosin (CARDURA) 8 MG tablet, Take 0.5 tablets (4 mg) by mouth At Bedtime, Disp: 45 tablet, Rfl: 3    finasteride (PROSCAR) 5 MG tablet, TAKE 1 TABLET DAILY, Disp: 90 tablet, Rfl: 3    furosemide (LASIX) 20 MG tablet,  Take 2 tablets (40 mg) by mouth daily, Disp: 180 tablet, Rfl: 3    gabapentin (NEURONTIN) 300 MG capsule, Take 1 capsule (300 mg) by mouth 3 times daily In addition to 600mg capsule., Disp: 90 capsule, Rfl: 3    gabapentin (NEURONTIN) 600 MG tablet, TAKE 1 TABLET FOUR TIMES A DAY, Disp: 360 tablet, Rfl: 3    hydrOXYzine HCl (ATARAX) 25 MG tablet, Take 1 tablet (25 mg) by mouth every 8 hours as needed for itching or other (insomnia), Disp: 30 tablet, Rfl: 3    lamoTRIgine (LAMICTAL) 25 MG tablet, Take 2 tablets (50 mg) by mouth 2 times daily, Disp: 360 tablet, Rfl: 0    lisinopril (ZESTRIL) 5 MG tablet, Take 1 tablet (5 mg) by mouth daily, Disp: 30 tablet, Rfl: 11    metoprolol tartrate (LOPRESSOR) 100 MG tablet, Take 1 tablet (100 mg) by mouth 2 times daily, Disp: 180 tablet, Rfl: 3    multivitamin (ONE-DAILY) tablet, Take 1 tablet by mouth daily, Disp: 30 tablet, Rfl: 0    vortioxetine (TRINTELLIX) 20 MG tablet, Take 1 tablet (20 mg) by mouth daily (Patient not taking: Reported on 3/8/2024), Disp: 30 tablet, Rfl: 1    XARELTO ANTICOAGULANT 20 MG TABS tablet, TAKE 1 TABLET DAILY WITH   DINNER, Disp: 90 tablet, Rfl: 3    zolpidem (AMBIEN) 5 MG tablet, Take 1 tablet (5 mg) by mouth nightly as needed for sleep, Disp: 30 tablet, Rfl: 2      If a controlled substance is prescribed during today's appointment:    -The [place] Prescription Monitoring Program has been reviewed and there are no current concerns with: diversionary activity, early refill requests, and or obtaining the medication from multiple providers.        VITALS    BP Readings from Last 3 Encounters:   03/08/24 121/77   02/07/24 121/79   01/05/24 135/81       Pulse Readings from Last 3 Encounters:   03/08/24 69   02/07/24 75   01/05/24 75       Wt Readings from Last 3 Encounters:   03/08/24 73.6 kg (162 lb 3.2 oz)   02/07/24 73.4 kg (161 lb 12.8 oz)   01/05/24 73.3 kg (161 lb 8 oz)         LABS    The following labs were reviewed prior to or during the  appointment:  -3/22/2024        SCALES        12/28/2023    10:07 AM 1/18/2024     9:16 AM 2/15/2024     1:00 PM   PHQ   PHQ-9 Total Score 7 4 4   Q9: Thoughts of better off dead/self-harm past 2 weeks Not at all Not at all Not at all          Answers submitted by the patient for this visit:  CAROLYNN-7 (Submitted on 2/14/2024)  CAROLYNN 7 TOTAL SCORE: 5      Answers submitted by the patient for this visit:  Patient Health Questionnaire (Submitted on 3/28/2024)  PHQ9 TOTAL SCORE: 2    CAROLYNN-7 (Submitted on 3/28/2024)  CAROLYNN 7 TOTAL SCORE: 3      MENTAL STATUS EXAMINATION    Appearance: Well Groomed, Attire Appropriate for the Season  General Behavior:  Cooperative, Direct Eye Contact  Speech: Fluent, Normal rate, Monotone  Musculoskeletal:    -Gait not observed during t.h. visit  -No facial tics/tremors observed   -Motor coordination is grossly intact   Mood: Anxious  Affect: Appropriate to Content of Speech and Circumstances   Attention: Intact  Orientation:  Person, Place, Time, Situation  Thought Associations:  Intact  Thought Content: Reality based   Thought Processes: Organized, Normal rate  Memory: No overt impairment; no screenings or formal testing performed  Language: Intact  Judgement: Fair to good  Insight: Fair to good      ASSESSMENT/CLINICAL IMPRESSIONS    Summary:    Kristin Edmond is an 77 y/o male with a previous mental health hx of: MDD, Social   Anxiety Disorder, Alcohol Abuse (remote + no consumption since 2017) and   Benzodiazepine Dependence.     Medical decision making is complex based on: age, co-morbidities (see problem list), and polypharmacy.     Per chart review: depressive + anxiety symptoms began in 2002 after his son passed away during a MVA. Social anxiety became problematic   around 2013-14.    Initiated care at the [facility] on 12/28/2023 for the management   of psychotropics/bridging until able to establish long term outpatient psychiatric services.     Attended follow up in 2024  on: 1/18 and 2/15.    ---------------------    Gavin outlines an improvement in overall mood and a decrease in depressive symptoms.      However, he continues to endorse ongoing social anxiety + difficulty interacting with   others who are not part of his immediate and extended family (able to tolerate: spouse,   children, grandchildren).       He has concerns about the cost of Trintellix (ie the donut hole per Medicare), however at this time wants to continue the current dosage of 20 mg/day (although was indecisive and changed which pharmacy location he wanted it sent to multiple times during the interview).    Discussed moving forward given how many antidepressants he has previously utilized and he either has not tolerated due to adverse effects or found to be ineffective, non-pharmacological modalities including individual therapy will probably best address residual mental health symptoms.    Gavin denies any immediate safety concerns towards himself or others at this time.      DSM-V and or working diagnosis:    1.  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  Social anxiety disorder            SAFETY EVALUATION:  Suicidal ideations:  -denies  Homicidal ideations:  -denies  Risk factors:  -male, age  -chronic illnesses   Protective and mitigating factors:  -spouse, family  -no prior attempts  Risk assessment:  -low to medium         SAFETY PLAN:  -Has numbers of at least 1 family member + 1 friend in personal contact list  -Knows clinic number(s) + operation of regular business hours   -Reviewed to utilize 988 or text [place] to [phone] for mental health crisis  -Discussed to call 911 and or return to the nearest Emergency Department if unable to maintain safety of self or others (due to severity of suicidal and or homicidal ideations)        TREATMENT PLAN      Medications:    Continue:  Bupropion/Wellbutrin 300 mg XL in AM    Buspirone/Buspar 30 mg twice daily; TDD = 60  mg    Lamotrigine/Lamictal 50 mg (2 tabs) twice daily for ttl: 100 mg    Vortioxetine/Trintellix 20 mg daily      Per PCP and or other providers:  -Gabapentin 600 mg QID + 300 mg TID; ttl: 3300 mg  -Hydroxyzine/Atarax 25 mg every 8 hours as needed for itching, insomnia  -Zolpidem/Ambien 5 mg nightly as needed for sleep         Labs:  -None obtained        Therapy and or Non-pharmacological modalities:        Disposition:  -Has been advised of consultative Transition [facility] model    -Please follow up at the [facility] for medication management in:   4 to 6 weeks        Total time:  34 minutes per:    -Review of EMR  -Appointment time   -Documentation          Angeles GARZA-CNP, PMH-BC    ----------------------------------------------------------------------------------------------------------------------        TREATMENT RISK STATEMENT    The risks, benefits, alternatives, and potential adverse effects have been explained and are understood by the patient.  The patient agrees to the treatment plan with their ability to do so.      The patient knows to call the clinic: [phone]  for any problems or concerns until the next psychiatry visit, regardless if it is within or outside of the [facility] system.     If unable to reach clinic staff (via phone call or medical messaging) during the normal business hours: 8:00 am to 4:30 pm then it is recommended accessing the nearest: emergency department, urgent care facility, or utilizing local (varies based on county of residence) and national crisis #'s or text messaging services for immediate assistance.          ----------------------------------------------------------------------------------------------------------------------      If applicable the following has been discussed with the patient, parent/guardian, and or attending family member during the appointment:      Risks of polypharmacy and possible drug interactions with current medication list  + common OTC products, herbs, and supplements.  Moving forward, it is suggested to intermittently check-in with a clinic or retail pharmacist whenever new medications or OTC/h/s are consumed.    Risks of polypharmacy and possible drug + drug interactions with current medication list.  Moving forward, it is suggested to intermittently check-in with a clinic or retail pharmacist whenever new prescription medications are added to your treatment regimen.    Recommendation to adhere to CDC guidelines as it relates to alcohol consumption.  If taking benzodiazepines, you should abstain from alcohol intake due to increased risks of CNS and respiratory depression, as well as psychomotor impairment.    If possible, it is recommended to avoid concurrent use of prescribed: opioids + benzodiazepines due to increased risks of CNS and respiratory depression, as well as the increased risk of overdose.     Recommendation to minimize and or abstain from THC use (unless you are prescribed medical marijuana).    Recommendation to abstain from illicit substances including but not limited to the following: heroin, street fentanyl, cocaine, methamphetamines, bath salts, and other synthetic products.    Recommendation to abstain from tobacco/smoking/nicotine, alcohol, THC, and all illicit substances if trying to become or are pregnant.    Do not take opioids, stimulants, and or other prescription medications unless they are specifically prescribed for you.     Black Box Warnings associated with the prescribed psychotropic(s).     Potential adverse effects of antipsychotics including but not limited to the following: weight gain, metabolic syndrome, EPS/Tardive Dyskinesias, and Neuroleptic Malignant Syndrome.    Potential adverse effects of stimulants including but not limited to the following: sudden death, MI, stroke, HTN, cardiomyopathy (long term use), seizures, ed, psychosis, and aggressive  behaviors.

## 2024-03-28 ENCOUNTER — VIRTUAL VISIT (OUTPATIENT)
Dept: BEHAVIORAL HEALTH | Facility: CLINIC | Age: 79
End: 2024-03-28
Payer: COMMERCIAL

## 2024-03-28 DIAGNOSIS — F33.0 MILD EPISODE OF RECURRENT MAJOR DEPRESSIVE DISORDER (H): Primary | ICD-10-CM

## 2024-03-28 DIAGNOSIS — F40.10 SOCIAL ANXIETY DISORDER: ICD-10-CM

## 2024-03-28 PROCEDURE — 99214 OFFICE O/P EST MOD 30 MIN: CPT | Mod: 95 | Performed by: NURSE PRACTITIONER

## 2024-03-28 ASSESSMENT — PATIENT HEALTH QUESTIONNAIRE - PHQ9
SUM OF ALL RESPONSES TO PHQ QUESTIONS 1-9: 2
SUM OF ALL RESPONSES TO PHQ QUESTIONS 1-9: 2

## 2024-03-28 ASSESSMENT — ANXIETY QUESTIONNAIRES
1. FEELING NERVOUS, ANXIOUS, OR ON EDGE: SEVERAL DAYS
7. FEELING AFRAID AS IF SOMETHING AWFUL MIGHT HAPPEN: NOT AT ALL
7. FEELING AFRAID AS IF SOMETHING AWFUL MIGHT HAPPEN: NOT AT ALL
2. NOT BEING ABLE TO STOP OR CONTROL WORRYING: SEVERAL DAYS
6. BECOMING EASILY ANNOYED OR IRRITABLE: NOT AT ALL
GAD7 TOTAL SCORE: 3
GAD7 TOTAL SCORE: 3
1. FEELING NERVOUS, ANXIOUS, OR ON EDGE: SEVERAL DAYS
7. FEELING AFRAID AS IF SOMETHING AWFUL MIGHT HAPPEN: NOT AT ALL
GAD7 TOTAL SCORE: 3
GAD7 TOTAL SCORE: 3
5. BEING SO RESTLESS THAT IT IS HARD TO SIT STILL: NOT AT ALL
4. TROUBLE RELAXING: NOT AT ALL
2. NOT BEING ABLE TO STOP OR CONTROL WORRYING: SEVERAL DAYS
3. WORRYING TOO MUCH ABOUT DIFFERENT THINGS: SEVERAL DAYS
6. BECOMING EASILY ANNOYED OR IRRITABLE: NOT AT ALL
4. TROUBLE RELAXING: NOT AT ALL
5. BEING SO RESTLESS THAT IT IS HARD TO SIT STILL: NOT AT ALL
3. WORRYING TOO MUCH ABOUT DIFFERENT THINGS: SEVERAL DAYS

## 2024-03-28 NOTE — PROGRESS NOTES
"  Mental Health and Collaborative Care Psychiatry Service Rooming Note      Most pressing mental health concern at this time: Social Anxiety.      Any new physical health conditions or diagnoses affecting you that we should be aware of: No      Side effects related to medications patient would like to discuss with the provider:  No      Are you taking your medications as prescribed?  Yes  If not, why? NA      Do you need refills of any of the medications?  Yes  If so, which ones? Wants to talk to Angeles first about the refills.      Are you taking any recreational substances? No      Is there any chance you are pregnant? NA  Do you use birth control? NA      Provider notified  N/A      Add attendance guidelines .phrase here   Care team has reviewed attendance agreement with patient. Patient advised that two failed appointments within 6 months may lead to termination of current episode of care.        **If appointment is with Transition Clinic-include the following:      \"The [facility] is a temporary psychiatry service that helps to bridge the time to your next appointment. It is not intended to be a long-term service and you are expected to attend your scheduled appointment with your next provider.\"    [ x] Patient/Parent verbalized understanding     If you need support between appointments, please call [phone] and let them know you're seen by [facility] Psychiatry. You may also send a [employer] message to reach us.      New (awaiting) Mental health provider or next programming: Arleth Ward CNP   Date of scheduled apt: 6/21/24     Patient would like the video visit invitation sent by: Ezequiel Galarza RN  March 28, 2024  2:12 PM        "

## 2024-03-28 NOTE — PATIENT INSTRUCTIONS
"Continue:  Bupropion/Wellbutrin 300 mg XL in AM    Buspirone/Buspar 30 mg twice daily; TDD = 60 mg    Lamotrigine/Lamictal 50 mg (2 tabs) twice daily for ttl: 100 mg    Vortioxetine/Trintellix 20 mg daily    ------------------------      Patient Education   The [facility]  What to Expect  Here's what to expect in the [facility].   About the clinic  The [facility] Clinic cares for you while you wait for long-term help.   You'll meet with a psychiatric doctor, who can give you medicine to help you feel better. You'll meet with them for about 5 visits or less. You'll see a different psychiatric doctor for your ongoing care. The clinic nurses and coordinators will help you guide your care.  If you have any questions or concerns, we'll be glad to talk with you.  About visits  Be open  At your visits, please talk openly about your problems. It may feel hard, but it's the best way for us to help you.  Cancelling visits  If you can't come to your visit, please call us right away at [phone]. If you don't cancel at least 24 hours (1 full day) before your visit, that's \"late cancellation.\"  Not showing up for your visits  Being very late is the same as not showing up. You will be a \"no show\" if:  You're more than 15 minutes late for a 30-minute (half hour) visit.  You're more than 30 minutes late for a 60-minute (full hour) visit.  If you cancel late or don't show up 2 times within 6 months, we may end your care.   If your ongoing care with a psychiatric doctor is cancelled, we may end your care at the [facility]. If that happens, we'll give you referrals and enough medicine to last 3 months. The [facility] Clinic is only used to bridge the gap between your care.  Getting help between visits  If you need help between visits, you can call us Monday to Friday from 8 a.m. to 4:30 p.m. at [phone].  Emergency care   Call 911 or go to the nearest emergency department if your life or someone else's life " is in danger.  Call 988 anytime to reach the national Suicide and Crisis hotline.  Medicine refills  To refill your medicine, call your pharmacy. You can also call the [facility] at [phone], Monday to Friday, 8 a.m. to 4:30 p.m. It can take 1 to 3 business days to get a refill.   Forms, letters, and tests  You may have papers to fill out, like FMLA, short-term disability, and workability. We'll find out if we can do them and let you know. It can take 5 to 7 business days to get them filled out, and we may need you to come in for a visit.  Before we can give you medicine for ADHD, we may refer you to get tested for it or confirm it another way.  We don't do mental health checks ordered by the court.   We don't do mental health testing, but we can refer you to get tested.   Thank you for choosing us for your care.  For informational purposes only. Not to replace the advice of your health care provider. Copyright   2022 [facility]. All rights reserved. [employer] [id number] - 12/22.

## 2024-04-13 NOTE — TELEPHONE ENCOUNTER
[facility] Refill Guideline reviewed.  Medication meets criteria for refill. Refills sent. Rosita Gibson RN Cardiology October 26, 2022, 9:33 AM      
REFILL REQUEST  
No

## 2024-04-17 ENCOUNTER — MYC MEDICAL ADVICE (OUTPATIENT)
Dept: BEHAVIORAL HEALTH | Facility: CLINIC | Age: 79
End: 2024-04-17
Payer: COMMERCIAL

## 2024-04-17 DIAGNOSIS — F33.1 MODERATE EPISODE OF RECURRENT MAJOR DEPRESSIVE DISORDER (H): Primary | ICD-10-CM

## 2024-04-17 NOTE — TELEPHONE ENCOUNTER
"RN spoke with Gavin who reports:    - Anxiety is same. He just notices a reluctance to socialize with people. Other than that \"everything is going pretty smoothly. Seems like the trintellix is making the anxiety worse. This started when trintellix was started a few months ago\".     - Denies SI/HI.    - He has no other updates for Angeles Sanders CNP other than wanting to try Paxil          "

## 2024-04-18 ENCOUNTER — MYC MEDICAL ADVICE (OUTPATIENT)
Dept: BEHAVIORAL HEALTH | Facility: CLINIC | Age: 79
End: 2024-04-18
Payer: COMMERCIAL

## 2024-04-18 NOTE — TELEPHONE ENCOUNTER
RN received instructions from Angeles Sanders CNP to please contact this patient to review her instructions and verify the patient understood her instructions.       RN phoned and spoke to the patient.      2.  The patient verbalized he received MONICA Sanders's instructions via [employer] [place]) message.  RN also read these instructions to the patient verbatim.  The patient verbalized understanding and had no questions or concerns.  He was able to repeat these instructions back to the RN verifying that he understood her instructions.      3.  The patient looks forward to his appointment on 5/7/24 with Angeles Bennett RN on 4/18/2024 at 9:24 AM

## 2024-04-24 DIAGNOSIS — G47.00 INSOMNIA, UNSPECIFIED TYPE: ICD-10-CM

## 2024-04-24 RX ORDER — HYDROXYZINE HYDROCHLORIDE 25 MG/1
25 TABLET, FILM COATED ORAL EVERY 8 HOURS PRN
Qty: 30 TABLET | Refills: 3 | Status: SHIPPED | OUTPATIENT
Start: 2024-04-24 | End: 2024-08-20

## 2024-04-25 ENCOUNTER — TELEPHONE (OUTPATIENT)
Dept: CARDIOLOGY | Facility: CLINIC | Age: 79
End: 2024-04-25

## 2024-04-25 ENCOUNTER — OFFICE VISIT (OUTPATIENT)
Dept: CARDIOLOGY | Facility: CLINIC | Age: 79
End: 2024-04-25
Attending: NURSE PRACTITIONER
Payer: COMMERCIAL

## 2024-04-25 VITALS
HEIGHT: 68 IN | SYSTOLIC BLOOD PRESSURE: 111 MMHG | WEIGHT: 160 LBS | OXYGEN SATURATION: 98 % | RESPIRATION RATE: 20 BRPM | BODY MASS INDEX: 24.25 KG/M2 | HEART RATE: 78 BPM | DIASTOLIC BLOOD PRESSURE: 72 MMHG

## 2024-04-25 DIAGNOSIS — I48.20 CHRONIC ATRIAL FIBRILLATION (H): ICD-10-CM

## 2024-04-25 DIAGNOSIS — I10 BENIGN ESSENTIAL HYPERTENSION: ICD-10-CM

## 2024-04-25 DIAGNOSIS — I38 VALVULAR HEART DISEASE: ICD-10-CM

## 2024-04-25 DIAGNOSIS — R53.83 OTHER FATIGUE: ICD-10-CM

## 2024-04-25 DIAGNOSIS — I48.11 LONGSTANDING PERSISTENT ATRIAL FIBRILLATION (H): ICD-10-CM

## 2024-04-25 DIAGNOSIS — I42.9 CARDIOMYOPATHY, UNSPECIFIED TYPE (H): ICD-10-CM

## 2024-04-25 DIAGNOSIS — I50.33 ACUTE ON CHRONIC HEART FAILURE WITH PRESERVED EJECTION FRACTION (H): ICD-10-CM

## 2024-04-25 DIAGNOSIS — I50.32 CHRONIC HEART FAILURE WITH PRESERVED EJECTION FRACTION (H): Primary | ICD-10-CM

## 2024-04-25 PROCEDURE — 99214 OFFICE O/P EST MOD 30 MIN: CPT | Performed by: NURSE PRACTITIONER

## 2024-04-25 RX ORDER — FUROSEMIDE 20 MG
40 TABLET ORAL DAILY
Qty: 180 TABLET | Refills: 3 | Status: SHIPPED | OUTPATIENT
Start: 2024-04-25

## 2024-04-25 RX ORDER — LOSARTAN POTASSIUM 50 MG/1
50 TABLET ORAL DAILY
Qty: 30 TABLET | Refills: 11 | Status: SHIPPED | OUTPATIENT
Start: 2024-04-25 | End: 2024-06-26

## 2024-04-25 NOTE — LETTER
4/25/2024    Lizzy Leiva MD  [address]  [facility] [address]    RE: Josemanuel Edmond       Dear Colleague,     I had the pleasure of seeing Josemanuel Edmond in the [facility].  Cardiology Clinic Progress Note  Josemanuel Edmond MRN# [id number]   YOB: 1945 Age: 79 year old      Primary Cardiologist:   Dr. Chapin  Patient presents today for 1 month follow-up        History of Presenting Illness:      Josemanuel Edmond is a pleasant 79 year old patient with a past cardiac history significant for   Chronic HFpEF  EF 50-55% 2023  45 to 50% 1/2024  Negative Lexiscan 2/2024  Dry weight 154 pounds at home  Chronic atrial fibrillation  onset 2018 postoperatively, S/p cardioversion   Recurrence 2021 with palpitations  Severe LA  Elevated [id number] score  Rate control and Anticoagulation  Valvular disease  Moderate to moderately severe MR, moderate TR, mild to moderate AI 2023  Severe MR/TR, moderate AI 2024  Hypertension  Hyperlipidemia  Past medical history significant for GERD, history of empyema s/p thoracotomy 2018, prior EtOH abuse.      Echo January 2024 showing decreased EF 45 to 50% which was previously low normal, RV moderately dilated with mildly decreased function, severe MR/TR, moderate AI, and mildly dilated aorta.      In February 2024 he had fatigue with exertion and heart failure.  A-fib rates were controlled and cardiomyopathy medications uptitrated.  Lexiscan was negative.    Most recent lipid profile, BMP, ALT, hemoglobin reviewed today.    Side effects after increasing lisinopril? Cough  BPs? Ok   Weights? Down 2 lbs in clinic, stable at home  Heart failure symptoms? None   MONTANA chronic and stable   Fatigue with exertion improved? Stable       Patient reports no chest pain, shortness of breath, PND, orthopnea, presyncope, syncope, edema, heart racing, or palpitations.                   Assessment and Plan:       Plan  Patient Instructions    Medication Changes:  stop lisinopril   Start losartan 50 mg daily   We call notify you about cost of jardiance     Recommendations:  Check daily weights and call the clinic if your weight has increased more than 2 lbs in one day or 5 lbs in one week; if you feel more short of breath or have worsening swelling in your legs or abdomen.  2000 mg sodium diet   4-8 8 ounce glasses of fluids per day, not more than 2000 mL (2 L) per day.   Call if blood pressure is less than 90 on top or less than 100 with lightheadedness.      Follow-up:   Nonfasting lab in 1-2 weeks (CHARLEY)   Cardiology follow up at [facility]: [place] in 2 months.     Cardiology Scheduling~[phone]  Cardiology Clinic RN~[phone] (Rosita RN, Alda RN; Jaimie RN)            Chronic heart failure with preserved ejection fraction (H)  No symptoms of heart failure  Continue GDMT, add Jardiance if affordable  Uptitrate cardiomyopathy medications as tolerated  Reassess EF    Longstanding persistent atrial fibrillation (H)  Asymptomatic  Continue rate control, anticoagulation    Benign essential hypertension  Controlled  Continue current medications             Respiratory:  clear to auscultation; normal symmetry        Cardiac: regular rate and rhythm irregularly irregular   GI:  nondistended     Extremities and Muscular Skeletal:   no edema            Thank you for allowing me to participate in this delightful patient's care.   This note was completed in part using Dragon voice recognition software. Although reviewed after completion, some word and grammatical errors may occur.    KAYLA Parikh CNP                Thank you for allowing me to participate in the care of your patient.      Sincerely,     KAYLA Parikh CNP     [facility]  cc:   KAYLA Allen CNP  [address] [place] [address]

## 2024-04-25 NOTE — PROGRESS NOTES
Cardiology Clinic Progress Note  Josemanuel Edmond MRN# [id number]   YOB: 1945 Age: 79 year old      Primary Cardiologist:   Dr. Chapin  Patient presents today for 1 month follow-up        History of Presenting Illness:      Josemanuel Edmond is a pleasant 79 year old patient with a past cardiac history significant for   Chronic HFpEF  EF 50-55% 2023  45 to 50% 1/2024  Negative Lexiscan 2/2024  Dry weight 154 pounds at home  Chronic atrial fibrillation  onset 2018 postoperatively, S/p cardioversion   Recurrence 2021 with palpitations  Severe LA  Elevated [id number] score  Rate control and Anticoagulation  Valvular disease  Moderate to moderately severe MR, moderate TR, mild to moderate AI 2023  Severe MR/TR, moderate AI 2024  Hypertension  Hyperlipidemia  Past medical history significant for GERD, history of empyema s/p thoracotomy 2018, prior EtOH abuse.      Echo January 2024 showing decreased EF 45 to 50% which was previously low normal, RV moderately dilated with mildly decreased function, severe MR/TR, moderate AI, and mildly dilated aorta.      In February 2024 he had fatigue with exertion and heart failure.  A-fib rates were controlled and cardiomyopathy medications uptitrated.  Lexiscan was negative.    Most recent lipid profile, BMP, ALT, hemoglobin reviewed today.    Side effects after increasing lisinopril? Cough  BPs? Ok   Weights? Down 2 lbs in clinic, stable at home  Heart failure symptoms? None   MONTANA chronic and stable   Fatigue with exertion improved? Stable       Patient reports no chest pain, shortness of breath, PND, orthopnea, presyncope, syncope, edema, heart racing, or palpitations.                   Assessment and Plan:       Plan  Patient Instructions   Medication Changes:  stop lisinopril   Start losartan 50 mg daily   We call notify you about cost of jardiance     Recommendations:  Check daily weights and call the clinic if your weight has increased more than 2 lbs in one  day or 5 lbs in one week; if you feel more short of breath or have worsening swelling in your legs or abdomen.  2000 mg sodium diet   4-8 8 ounce glasses of fluids per day, not more than 2000 mL (2 L) per day.   Call if blood pressure is less than 90 on top or less than 100 with lightheadedness.      Follow-up:   Nonfasting lab in 1-2 weeks (CHARLEY)   Cardiology follow up at [facility]: Nazanin in 2 months.     Cardiology Scheduling~[phone]  Cardiology Clinic RN~[phone] (Rosita RN, Alda RN; Jaimie RN)            Chronic heart failure with preserved ejection fraction (H)  No symptoms of heart failure  Continue GDMT, add Jardiance if affordable  Uptitrate cardiomyopathy medications as tolerated  Reassess EF    Longstanding persistent atrial fibrillation (H)  Asymptomatic  Continue rate control, anticoagulation    Benign essential hypertension  Controlled  Continue current medications             Respiratory:  clear to auscultation; normal symmetry        Cardiac: regular rate and rhythm irregularly irregular   GI:  nondistended     Extremities and Muscular Skeletal:   no edema            Thank you for allowing me to participate in this delightful patient's care.   This note was completed in part using Dragon voice recognition software. Although reviewed after completion, some word and grammatical errors may occur.    Nazanin Tipton, KAYLA CNP

## 2024-04-25 NOTE — PATIENT INSTRUCTIONS
Medication Changes:  stop lisinopril   Start losartan 50 mg daily   We call notify you about cost of jardiance     Recommendations:  Check daily weights and call the clinic if your weight has increased more than 2 lbs in one day or 5 lbs in one week; if you feel more short of breath or have worsening swelling in your legs or abdomen.  2000 mg sodium diet   4-8 8 ounce glasses of fluids per day, not more than 2000 mL (2 L) per day.   Call if blood pressure is less than 90 on top or less than 100 with lightheadedness.      Follow-up:   Nonfasting lab in 1-2 weeks (BMP)   Cardiology follow up at [facility]: Nazanin in 2 months.     Cardiology Scheduling~[phone]  Cardiology Clinic RN~[phone] (Rosita RN, Alda RN; Jaimie RN)

## 2024-04-25 NOTE — CONFIDENTIAL NOTE
Can you call Pt and see if the cost of jardiace is affordable for him?   If so, please start 10 mg daily.     KAYLA Parikh CNP

## 2024-05-07 ENCOUNTER — VIRTUAL VISIT (OUTPATIENT)
Dept: BEHAVIORAL HEALTH | Facility: CLINIC | Age: 79
End: 2024-05-07
Payer: COMMERCIAL

## 2024-05-07 DIAGNOSIS — F33.1 MODERATE EPISODE OF RECURRENT MAJOR DEPRESSIVE DISORDER (H): ICD-10-CM

## 2024-05-07 PROCEDURE — 99214 OFFICE O/P EST MOD 30 MIN: CPT | Mod: 95 | Performed by: NURSE PRACTITIONER

## 2024-05-07 RX ORDER — BUPROPION HYDROCHLORIDE 300 MG/1
300 TABLET ORAL EVERY MORNING
Qty: 90 TABLET | Refills: 0 | Status: SHIPPED | OUTPATIENT
Start: 2024-05-07

## 2024-05-07 RX ORDER — LAMOTRIGINE 25 MG/1
50 TABLET ORAL 2 TIMES DAILY
Qty: 360 TABLET | Refills: 0 | Status: SHIPPED | OUTPATIENT
Start: 2024-05-07 | End: 2024-09-30

## 2024-05-07 RX ORDER — PAROXETINE 20 MG/1
20 TABLET, FILM COATED ORAL AT BEDTIME
Qty: 30 TABLET | Refills: 1 | Status: SHIPPED | OUTPATIENT
Start: 2024-05-07 | End: 2024-05-31 | Stop reason: DRUGHIGH

## 2024-05-07 RX ORDER — PAROXETINE 10 MG/1
10 TABLET, FILM COATED ORAL EVERY MORNING
Qty: 30 TABLET | Refills: 1 | Status: SHIPPED | OUTPATIENT
Start: 2024-05-07 | End: 2024-05-31 | Stop reason: DRUGHIGH

## 2024-05-07 ASSESSMENT — PATIENT HEALTH QUESTIONNAIRE - PHQ9
SUM OF ALL RESPONSES TO PHQ QUESTIONS 1-9: 2
SUM OF ALL RESPONSES TO PHQ QUESTIONS 1-9: 2
10. IF YOU CHECKED OFF ANY PROBLEMS, HOW DIFFICULT HAVE THESE PROBLEMS MADE IT FOR YOU TO DO YOUR WORK, TAKE CARE OF THINGS AT HOME, OR GET ALONG WITH OTHER PEOPLE: NOT DIFFICULT AT ALL

## 2024-05-07 ASSESSMENT — PAIN SCALES - GENERAL: PAINLEVEL: NO PAIN (0)

## 2024-05-07 NOTE — PROGRESS NOTES
[facility]      Mental Health & Addiction Service Line    Transition Clinic: Psychiatry Progress Note  Medication Management                VISIT INFORMATION      Date:  May 7th, 2024     Number:  -5th    Referral source:  -PCP    Patient Identifying Information:  Legal name: Josemanuel Edmond  Preferred name: Gavin  : 1945  Preferred pronouns: He/him      Participants:   -Patient  -Provider      Telehealth visit details:  Type of service:   Video  Patient location:   At home, Off site  Provider Location: [facility]  Platform utilized: Hashable    Start time:  1:37 pm  End time:   1:52 pm      INTERIM HX      -Nothing really new with friends and family  -Maybe mood is a little better  -Grandkids are in sports so my wife and I go to watch them       PSYCHIATRIC ROS    Sleep:  No change:  -7 to 8 hours  -Taking the Ambien nightly  -Rate quality as good      Mood/Anxiety:  -See above    -See PHQ-9 score    -See CAROLYNN-7 score    -Continue to be anxious, worried, have difficulty socializing at [employer]/going out to eat or meeting up with friends      Suicidal ideations:  -Denies passive or active thoughts at this time      SIB:  -Denies engaging in self harm       Side effects:  -None reported        PSYCHIATRIC HISTORY    Suicide attempts:  -None reported      Inpatient psychiatric hospitalizations:  -None reported   -No hx of commitments      ECT:  -None reported            Medication Trials:      Per [employer] testing:  -Ultra-rapid metabolizer CYP2D6 = Paxil  -Poor metabolizer SQB0K48 = Celexa, Zoloft     SSRIs:  -Lexapro: ineffective  -Prozac 10 mg: effective for depression but increased anxiety  -Zoloft: low dosage     SNRIs:  -Cymbalta: side effects  -Effexor: side effects  -Fetzima: ineffective  -Pristiq: ineffective     TCAs:  -Nortriptyline: dry mouth, nightmares     Serotonin modulators/stimulators:  -Mirtazapine 30 mg: effective for sleep, however  caused weight gain  -Trintellix 20 mg: worsened anxiety  + expensive  -Viibryd: ineffective    Antipsychotics:  -Abilify 10 mg  -Zyprexa 2.5 to 5 mg = urinary retention     Benzodiazepines = history of abuse  -Alprazolam  -Clonazepam  -Diazepam  -Lorazepam     Sleep aides:  -Trazodone 50 mg: over-sedation      SUBSTANCE USE      Prior use:  -Denies any history of alcohol, recreational, or illicit substance use   resulting in: legal issues, c/d programming, or withdrawal symptoms     Per chart review 5/7/2020 encounter by ALENA GARZA-CNP:  -On/off heavy alcohol use after son passed away in 2002  -Stopped drinking altogether in 2017        Current use:     Alcohol:   -None reported         Recreational Drugs:   -None reported         Medical Marijuana:  -None reported         Cigarettes per day:   -None reported         Chewing tobacco:   -None reported         Vaping:    -None reported         Caffeine intake:    -1 cup coffee per day        MEDICAL HISTORY    -The problem list was reviewed via the EMR prior to the appointment    -The patient denies any concerning physical and or medical symptoms during the interviewing process          MEDICATIONS      Current Outpatient Medications:     acetaminophen (TYLENOL) 325 MG tablet, Take 2 tablets (650 mg) by mouth every 4 hours as needed for other (mild pain), Disp: 100 tablet, Rfl: 0    amoxicillin (AMOXIL) 500 MG capsule, , Disp: , Rfl:     buPROPion (WELLBUTRIN XL) 300 MG 24 hr tablet, Take 1 tablet (300 mg) by mouth every morning, Disp: 90 tablet, Rfl: 0    busPIRone HCl (BUSPAR) 30 MG tablet, TAKE 1 TABLET TWICE A DAY, Disp: 180 tablet, Rfl: 3    doxazosin (CARDURA) 8 MG tablet, Take 0.5 tablets (4 mg) by mouth At Bedtime, Disp: 45 tablet, Rfl: 3    finasteride (PROSCAR) 5 MG tablet, TAKE 1 TABLET DAILY, Disp: 90 tablet, Rfl: 3    furosemide (LASIX) 20 MG tablet, Take 2 tablets (40 mg) by mouth daily, Disp: 180 tablet, Rfl: 3    gabapentin (NEURONTIN) 300 MG  capsule, Take 1 capsule (300 mg) by mouth 3 times daily In addition to 600mg capsule., Disp: 90 capsule, Rfl: 3    gabapentin (NEURONTIN) 600 MG tablet, TAKE 1 TABLET FOUR TIMES A DAY, Disp: 360 tablet, Rfl: 3    hydrOXYzine HCl (ATARAX) 25 MG tablet, Take 1 tablet (25 mg) by mouth every 8 hours as needed for itching or other (insomnia), Disp: 30 tablet, Rfl: 3    lamoTRIgine (LAMICTAL) 25 MG tablet, Take 2 tablets (50 mg) by mouth 2 times daily, Disp: 360 tablet, Rfl: 0    losartan (COZAAR) 50 MG tablet, Take 1 tablet (50 mg) by mouth daily, Disp: 30 tablet, Rfl: 11    metoprolol tartrate (LOPRESSOR) 100 MG tablet, Take 1 tablet (100 mg) by mouth 2 times daily, Disp: 180 tablet, Rfl: 3    multivitamin (ONE-DAILY) tablet, Take 1 tablet by mouth daily, Disp: 30 tablet, Rfl: 0    vortioxetine (TRINTELLIX) 10 MG tablet, Take 1 tablet (10 mg) by mouth daily, Disp: 7 tablet, Rfl: 0    vortioxetine (TRINTELLIX) 5 MG tablet, Take 1 tablet (5 mg) by mouth daily For 14 days per tapering schedule then stop, Disp: 14 tablet, Rfl: 0    XARELTO ANTICOAGULANT 20 MG TABS tablet, TAKE 1 TABLET DAILY WITH   DINNER, Disp: 90 tablet, Rfl: 3    zolpidem (AMBIEN) 5 MG tablet, Take 1 tablet (5 mg) by mouth nightly as needed for sleep, Disp: 30 tablet, Rfl: 2      If a controlled substance is prescribed during today's appointment:    -The [place] Prescription Monitoring Program has been reviewed and there are no current concerns with: diversionary activity, early refill requests, and or obtaining the medication from multiple providers.        VITALS    BP Readings from Last 3 Encounters:   04/25/24 111/72   03/08/24 121/77   02/07/24 121/79       Pulse Readings from Last 3 Encounters:   04/25/24 78   03/08/24 69   02/07/24 75       Wt Readings from Last 3 Encounters:   04/25/24 72.6 kg (160 lb)   03/08/24 73.6 kg (162 lb 3.2 oz)   02/07/24 73.4 kg (161 lb 12.8 oz)         LABS    The following labs were reviewed prior to or during the  appointment:  -3/22/2024        SCALES        1/18/2024     9:16 AM 2/15/2024     1:00 PM 3/28/2024     2:10 PM   PHQ   PHQ-9 Total Score 4 4 2   Q9: Thoughts of better off dead/self-harm past 2 weeks Not at all Not at all Not at all          Answers submitted by the patient for this visit:  Patient Health Questionnaire (Submitted on 5/7/2024)  If you checked off any problems, how difficult have these problems made it for you to do your work, take care of things at home, or get along with other people?: Not difficult at all  PHQ9 TOTAL SCORE: 2      MENTAL STATUS EXAMINATION    Appearance: Well Groomed, Attire Appropriate for the Season  General Behavior:  Cooperative, Direct Eye Contact  Speech: Fluent, Normal rate, Monotone  Musculoskeletal:    -Gait not observed during t.h. visit  -No facial tics/tremors observed   -Motor coordination is grossly intact   Mood: Anxious  Affect: Mildly frustrated  Attention: Intact  Orientation:  Person, Place, Time, Situation  Thought Associations:  Intact  Thought Content: Reality based   Thought Processes: Organized, Normal rate  Memory: No overt impairment; no screenings or formal testing performed  Language: Intact  Judgement: Fair to good  Insight: Fair to good      ASSESSMENT/CLINICAL IMPRESSIONS    Summary:    Kristin Edmond is an 78 y/o male with a previous mental health hx of: MDD, Social Anxiety Disorder,   Alcohol Abuse (remote + no consumption since 2017) and Benzodiazepine Dependence.     Medical decision making is complex based on: age, co-morbidities (see problem list), and polypharmacy.     Per chart review: depressive + anxiety symptoms began in 2002 after his son passed away during a MVA.   Social anxiety became problematic around 2013-14.    Initiated care at the [facility] on 12/28/2023 for the management of psychotropics/bridging until able to   establish long term outpatient psychiatric services.     Attended follow up in 2024 on: 1/18, 2/15,  3/28.    ---------------------    Gavin expresses frustration he has tried multiple antidepressants without improvement and he continues to experience anxiety symptoms.  However he would still like to trial Paxil (moving forward may need higher than usual dosages for   age, d/t being an ultra rapid CYP2D6 metabolizer).    At this time he denies any immediate safety concerns towards himself or others and is forward thinking/future oriented.        DSM-V and or working diagnosis:    1.  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  Social anxiety disorder            SAFETY EVALUATION:  Suicidal ideations:  -denies  Homicidal ideations:  -denies  Risk factors:  -male, age  -chronic illnesses   Protective and mitigating factors:  -spouse, family  -no prior attempts  Risk assessment:  -low to medium         SAFETY PLAN:  -Has numbers of at least 1 family member + 1 friend in personal contact list  -Knows clinic number(s) + operation of regular business hours   -Reviewed to utilize 988 or text [place] to [phone] for mental health crisis  -Discussed to call 911 and or return to the nearest Emergency Department if unable to maintain safety of self or others (due to severity of suicidal and or homicidal ideations)        TREATMENT PLAN      Medications:    Change:  Paroxetine/Paxil 10 mg in the AM + 20 mg at bedtime; ttl 30 mg    Continue:  Bupropion/Wellbutrin 300 mg XL in AM    Buspirone/Buspar 30 mg twice daily; TDD = 60 mg    Lamotrigine/Lamictal 50 mg (2 tabs) twice daily for ttl: 100 mg      Per PCP and or other providers:  -Gabapentin 600 mg QID + 300 mg TID; ttl: 3300 mg  -Hydroxyzine/Atarax 25 mg every 8 hours as needed for itching, insomnia  -Zolpidem/Ambien 5 mg nightly as needed for sleep       Labs:  -None obtained      Therapy and or Non-pharmacological modalities:  -Consider individual therapy  -Maintain activity levels      Disposition:  -Has been advised of consultative Transition  Clinic model    -Please follow up at the Transition Clinic for medication management in: 3 weeks          Total time:  21 minutes per:    -Review of EMR  -Appointment time   -Documentation          [place] CARDONA PMRONALD    ----------------------------------------------------------------------------------------------------------------------        TREATMENT RISK STATEMENT    The risks, benefits, alternatives, and potential adverse effects have been explained and are understood by the patient.  The patient agrees to the treatment plan with their ability to do so.      The patient knows to call the clinic: [phone]  for any problems or concerns until the next psychiatry visit, regardless if it is within or outside of the [facility] system.     If unable to reach clinic staff (via phone call or medical messaging) during the normal business hours: 8:00 am to 4:30 pm then it is recommended accessing the nearest: emergency department, urgent care facility, or utilizing local (varies based on county of residence) and national crisis #'s or text messaging services for immediate assistance.          ----------------------------------------------------------------------------------------------------------------------      If applicable the following has been discussed with the patient, parent/guardian, and or attending family member during the appointment:      Risks of polypharmacy and possible drug interactions with current medication list + common OTC products, herbs, and supplements.  Moving forward, it is suggested to intermittently check-in with a clinic or retail pharmacist whenever new medications or OTC/h/s are consumed.    Risks of polypharmacy and possible drug + drug interactions with current medication list.  Moving forward, it is suggested to intermittently check-in with a clinic or retail pharmacist whenever new prescription medications are added to your treatment regimen.    Recommendation to  adhere to CDC guidelines as it relates to alcohol consumption.  If taking benzodiazepines, you should abstain from alcohol intake due to increased risks of CNS and respiratory depression, as well as psychomotor impairment.    If possible, it is recommended to avoid concurrent use of prescribed: opioids + benzodiazepines due to increased risks of CNS and respiratory depression, as well as the increased risk of overdose.     Recommendation to minimize and or abstain from THC use (unless you are prescribed medical marijuana).    Recommendation to abstain from illicit substances including but not limited to the following: heroin, street fentanyl, cocaine, methamphetamines, bath salts, and other synthetic products.    Recommendation to abstain from tobacco/smoking/nicotine, alcohol, THC, and all illicit substances if trying to become or are pregnant.    Do not take opioids, stimulants, and or other prescription medications unless they are specifically prescribed for you.     Black Box Warnings associated with the prescribed psychotropic(s).     Potential adverse effects of antipsychotics including but not limited to the following: weight gain, metabolic syndrome, EPS/Tardive Dyskinesias, and Neuroleptic Malignant Syndrome.    Potential adverse effects of stimulants including but not limited to the following: sudden death, MI, stroke, HTN, cardiomyopathy (long term use), seizures, ed, psychosis, and aggressive behaviors.

## 2024-05-07 NOTE — NURSING NOTE
Is the patient currently in the state of [place]? YES    Visit mode:VIDEO    If the visit is dropped, the patient can be reconnected by: VIDEO VISIT:  Send e-mail to at [email or web address]    Will anyone else be joining the visit? No  (If patient encounters technical issues they should call [phone])    How would you like to obtain your AVS? MyChart    Are changes needed to the allergy or medication list? No    Are refills needed on medications prescribed by this physician? NO    Rooming Documentation: Assigned questionnaire(s) completed .    Reason for visit: SOMMER Crenshaw F      Care team has reviewed attendance agreement with patient. Patient advised that two failed appointments within 6 months may lead to termination of current episode of care.

## 2024-05-07 NOTE — PATIENT INSTRUCTIONS
"Change:  Paroxetine/Paxil 10 mg in the AM + 20 mg at bedtime; ttl 30 mg    Continue:  Bupropion/Wellbutrin 300 mg XL in AM    Buspirone/Buspar 30 mg twice daily; TDD = 60 mg    Lamotrigine/Lamictal 50 mg (2 tabs) twice daily for ttl: 100 mg      Per PCP and or other providers:  -Gabapentin 600 mg QID + 300 mg TID; ttl: 3300 mg  -Hydroxyzine/Atarax 25 mg every 8 hours as needed for itching, insomnia  -Zolpidem/Ambien 5 mg nightly as needed for sleep    ------------------------      Patient Education   The Transition Clinic  What to Expect  Here's what to expect in the Transition Clinic.   About the clinic  The Transition Clinic cares for you while you wait for long-term help.   You'll meet with a psychiatric doctor, who can give you medicine to help you feel better. You'll meet with them for about 5 visits or less. You'll see a different psychiatric doctor for your ongoing care. The clinic nurses and coordinators will help you guide your care.  If you have any questions or concerns, we'll be glad to talk with you.  About visits  Be open  At your visits, please talk openly about your problems. It may feel hard, but it's the best way for us to help you.  Cancelling visits  If you can't come to your visit, please call us right away at [phone]. If you don't cancel at least 24 hours (1 full day) before your visit, that's \"late cancellation.\"  Not showing up for your visits  Being very late is the same as not showing up. You will be a \"no show\" if:  You're more than 15 minutes late for a 30-minute (half hour) visit.  You're more than 30 minutes late for a 60-minute (full hour) visit.  If you cancel late or don't show up 2 times within 6 months, we may end your care.   If your ongoing care with a psychiatric doctor is cancelled, we may end your care at the Transition Clinic. If that happens, we'll give you referrals and enough medicine to last 3 months. The Transition Clinic is only used to bridge the gap between your " care.  Getting help between visits  If you need help between visits, you can call us Monday to Friday from 8 a.m. to 4:30 p.m. at [phone].  Emergency care   Call 911 or go to the nearest emergency department if your life or someone else's life is in danger.  Call 988 anytime to reach the national Suicide and Crisis hotline.  Medicine refills  To refill your medicine, call your pharmacy. You can also call the Transition Clinic at [phone], Monday to Friday, 8 a.m. to 4:30 p.m. It can take 1 to 3 business days to get a refill.   Forms, letters, and tests  You may have papers to fill out, like FMLA, short-term disability, and workability. We'll find out if we can do them and let you know. It can take 5 to 7 business days to get them filled out, and we may need you to come in for a visit.  Before we can give you medicine for ADHD, we may refer you to get tested for it or confirm it another way.  We don't do mental health checks ordered by the court.   We don't do mental health testing, but we can refer you to get tested.   Thank you for choosing us for your care.  For informational purposes only. Not to replace the advice of your health care provider. Copyright   2022 [facility]. All rights reserved. [employer] 727244 - 12/22.

## 2024-05-24 ENCOUNTER — LAB (OUTPATIENT)
Dept: LAB | Facility: CLINIC | Age: 79
End: 2024-05-24
Payer: COMMERCIAL

## 2024-05-24 DIAGNOSIS — I50.32 CHRONIC HEART FAILURE WITH PRESERVED EJECTION FRACTION (H): ICD-10-CM

## 2024-05-24 LAB
ANION GAP SERPL CALCULATED.3IONS-SCNC: 10 MMOL/L (ref 7–15)
BUN SERPL-MCNC: 13.3 MG/DL (ref 8–23)
CALCIUM SERPL-MCNC: 9.2 MG/DL (ref 8.8–10.2)
CHLORIDE SERPL-SCNC: 98 MMOL/L (ref 98–107)
CREAT SERPL-MCNC: 1.13 MG/DL (ref 0.67–1.17)
DEPRECATED HCO3 PLAS-SCNC: 28 MMOL/L (ref 22–29)
EGFRCR SERPLBLD CKD-EPI 2021: 65 ML/MIN/1.73M2
GLUCOSE SERPL-MCNC: 109 MG/DL (ref 70–99)
POTASSIUM SERPL-SCNC: 4.4 MMOL/L (ref 3.4–5.3)
SODIUM SERPL-SCNC: 136 MMOL/L (ref 135–145)

## 2024-05-24 PROCEDURE — 80048 BASIC METABOLIC PNL TOTAL CA: CPT

## 2024-05-24 PROCEDURE — 36415 COLL VENOUS BLD VENIPUNCTURE: CPT

## 2024-05-31 ENCOUNTER — VIRTUAL VISIT (OUTPATIENT)
Dept: BEHAVIORAL HEALTH | Facility: CLINIC | Age: 79
End: 2024-05-31
Payer: COMMERCIAL

## 2024-05-31 DIAGNOSIS — F40.10 SOCIAL ANXIETY DISORDER: ICD-10-CM

## 2024-05-31 DIAGNOSIS — F33.0 MILD EPISODE OF RECURRENT MAJOR DEPRESSIVE DISORDER (H): Primary | ICD-10-CM

## 2024-05-31 PROCEDURE — 99214 OFFICE O/P EST MOD 30 MIN: CPT | Mod: 95 | Performed by: NURSE PRACTITIONER

## 2024-05-31 RX ORDER — PAROXETINE 30 MG/1
30 TABLET, FILM COATED ORAL 2 TIMES DAILY
Qty: 180 TABLET | Refills: 0 | Status: SHIPPED | OUTPATIENT
Start: 2024-05-31 | End: 2024-08-28

## 2024-05-31 RX ORDER — PAROXETINE 20 MG/1
TABLET, FILM COATED ORAL
Qty: 21 TABLET | Refills: 0 | Status: SHIPPED | OUTPATIENT
Start: 2024-05-31 | End: 2024-07-30

## 2024-05-31 ASSESSMENT — PAIN SCALES - GENERAL: PAINLEVEL: NO PAIN (0)

## 2024-05-31 NOTE — NURSING NOTE
Is the patient currently in the state of [place]? YES    Visit mode:VIDEO    If the visit is dropped, the patient can be reconnected by: VIDEO VISIT:  Send e-mail to at [email or web address]    Will anyone else be joining the visit? No  (If patient encounters technical issues they should call [phone])    How would you like to obtain your AVS? MyChart    Are changes needed to the allergy or medication list? No    Are refills needed on medications prescribed by this physician? YES Bupropion    Rooming Documentation: Assigned questionnaire(s) completed .    Reason for visit: PRADEEP Shin

## 2024-05-31 NOTE — PROGRESS NOTES
"    [facility]      Mental Health & Addiction Service Line    Transition Clinic: Psychiatry Progress Note  Medication Management                VISIT INFORMATION      Date:  May 31st, 2024     Number:  -6th    Referral source:  -PCP    Patient Identifying Information:  Legal name: Josemanuel Edmond  Preferred name: Gavin  : 1945  Preferred pronouns: He/him      Participants:   -Patient  -Provider      Telehealth visit details:  Type of service:   Video  Patient location:   At home, Off site  Provider Location: [facility] Health & Addiction Service Line  Platform utilized: Pyreg    Start time: 10:25 am  End time:  10:47 am      INTERIM HX    -Things are good with the grand-kids  -They are still playing soccer + baseball  -Not sure what activities the grand-kids are going to be doing this summer    Worried about my son who is 36 y/o:  -He got hooked on kratom   -He and his girlfriend have 3 kids (ages 5 to 10 y/o) between their +   prior relationships  -His girlfriend is on the verge of leaving him ... so things are a mess  -He is considering entering c/d treatment programming but not sure who is going to take care of the kids if he attends        PSYCHIATRIC ROS    Sleep:  No change:  -7 to 8 hours  -Taking the Ambien nightly ... \"but I'm probably hooked on it at this point\"  -Rate quality as good      Mood/Anxiety:  -See above    -See PHQ-9 score    -See CAROLYNN-7 score    -Continue to want to avoid people  -Have to force myself to attend going out to lunch or dinner with my wife + friends  -If I do go out, able to tolerate being out for a few hours (i.e. don't leave the lunch or dinner event early)       Suicidal ideations:  +Passive, intermittent   -Denies intent or plan      SIB:  -Denies engaging in self harm       Side effects:  -None reported        PSYCHIATRIC HISTORY    Suicide attempts:  -None reported      Inpatient psychiatric hospitalizations:  -None reported   -No hx of " commitments      ECT:  -None reported            Medication Trials:      Per Genesight testing:  -Ultra-rapid metabolizer CYP2D6 = Paxil  -Poor metabolizer [id number] = Celexa, Zoloft     SSRIs:  -Lexapro: ineffective  -Prozac 10 mg: effective for depression but increased anxiety  -Zoloft: low dosage     SNRIs:  -Cymbalta: side effects  -Effexor: side effects  -Fetzima: ineffective  -Pristiq: ineffective     TCAs:  -Nortriptyline: dry mouth, nightmares     Serotonin modulators/stimulators:  -Mirtazapine 30 mg: effective for sleep, however caused weight gain  -Trintellix 20 mg: worsened anxiety  + expensive  -Viibryd: ineffective    Antipsychotics:  -Abilify 10 mg  -Zyprexa 2.5 to 5 mg = urinary retention     Benzodiazepines = history of abuse  -Alprazolam  -Clonazepam  -Diazepam  -Lorazepam     Sleep aides:  -Trazodone 50 mg: over-sedation      SUBSTANCE USE      Prior use:  -Denies any history of alcohol, recreational, or illicit substance use   resulting in: legal issues, c/d programming, or withdrawal symptoms     Per chart review 5/7/2020 encounter by ALENA GARZA-CNP:  -On/off heavy alcohol use after son passed away in 2002  -Stopped drinking altogether in 2017        Current use:     Alcohol:   -None reported         Recreational Drugs:   -None reported         Medical Marijuana:  -None reported         Cigarettes per day:   -None reported         Chewing tobacco:   -None reported         Vaping:    -None reported         Caffeine intake:    -1 cup coffee per day        MEDICAL HISTORY    -The problem list was reviewed via the EMR prior to the appointment    -The patient denies any concerning physical and or medical symptoms during the interviewing process          MEDICATIONS      Current Outpatient Medications:     acetaminophen (TYLENOL) 325 MG tablet, Take 2 tablets (650 mg) by mouth every 4 hours as needed for other (mild pain), Disp: 100 tablet, Rfl: 0    amoxicillin (AMOXIL) 500 MG capsule, , Disp: ,  Rfl:     buPROPion (WELLBUTRIN XL) 300 MG 24 hr tablet, Take 1 tablet (300 mg) by mouth every morning, Disp: 90 tablet, Rfl: 0    busPIRone HCl (BUSPAR) 30 MG tablet, TAKE 1 TABLET TWICE A DAY, Disp: 180 tablet, Rfl: 3    doxazosin (CARDURA) 8 MG tablet, Take 0.5 tablets (4 mg) by mouth At Bedtime, Disp: 45 tablet, Rfl: 3    finasteride (PROSCAR) 5 MG tablet, TAKE 1 TABLET DAILY, Disp: 90 tablet, Rfl: 3    furosemide (LASIX) 20 MG tablet, Take 2 tablets (40 mg) by mouth daily, Disp: 180 tablet, Rfl: 3    gabapentin (NEURONTIN) 300 MG capsule, Take 1 capsule (300 mg) by mouth 3 times daily In addition to 600mg capsule., Disp: 90 capsule, Rfl: 3    gabapentin (NEURONTIN) 600 MG tablet, TAKE 1 TABLET FOUR TIMES A DAY, Disp: 360 tablet, Rfl: 3    hydrOXYzine HCl (ATARAX) 25 MG tablet, Take 1 tablet (25 mg) by mouth every 8 hours as needed for itching or other (insomnia), Disp: 30 tablet, Rfl: 3    lamoTRIgine (LAMICTAL) 25 MG tablet, Take 2 tablets (50 mg) by mouth 2 times daily, Disp: 360 tablet, Rfl: 0    losartan (COZAAR) 50 MG tablet, Take 1 tablet (50 mg) by mouth daily, Disp: 30 tablet, Rfl: 11    metoprolol tartrate (LOPRESSOR) 100 MG tablet, Take 1 tablet (100 mg) by mouth 2 times daily, Disp: 180 tablet, Rfl: 3    multivitamin (ONE-DAILY) tablet, Take 1 tablet by mouth daily, Disp: 30 tablet, Rfl: 0    PARoxetine (PAXIL) 10 MG tablet, Take 1 tablet (10 mg) by mouth every morning in addition to 20 mg in the afternoon for ttl: 30 mg, Disp: 30 tablet, Rfl: 1    PARoxetine (PAXIL) 20 MG tablet, Take 1 tablet (20 mg) by mouth at bedtime in addition to 10 mg in the AM for ttl: 30 mg, Disp: 30 tablet, Rfl: 1    XARELTO ANTICOAGULANT 20 MG TABS tablet, TAKE 1 TABLET DAILY WITH   DINNER, Disp: 90 tablet, Rfl: 3    zolpidem (AMBIEN) 5 MG tablet, Take 1 tablet (5 mg) by mouth nightly as needed for sleep, Disp: 30 tablet, Rfl: 2      If a controlled substance is prescribed during today's appointment:    -The [place]  Prescription Monitoring Program has been reviewed and there are no current concerns with: diversionary activity, early refill requests, and or obtaining the medication from multiple providers.        VITALS    BP Readings from Last 3 Encounters:   04/25/24 111/72   03/08/24 121/77   02/07/24 121/79       Pulse Readings from Last 3 Encounters:   04/25/24 78   03/08/24 69   02/07/24 75       Wt Readings from Last 3 Encounters:   04/25/24 72.6 kg (160 lb)   03/08/24 73.6 kg (162 lb 3.2 oz)   02/07/24 73.4 kg (161 lb 12.8 oz)         LABS    The following labs were reviewed prior to or during the appointment:  -3/22/2024        SCALES        2/15/2024     1:00 PM 3/28/2024     2:10 PM 5/7/2024     1:22 PM   PHQ   PHQ-9 Total Score 4 2 2   Q9: Thoughts of better off dead/self-harm past 2 weeks Not at all Not at all Not at all          Answers submitted by the patient for this visit:  Patient Health Questionnaire (Submitted on 5/7/2024)  If you checked off any problems, how difficult have these problems made it for you to do your work, take care of things at home, or get along with other people?: Not difficult at all  PHQ9 TOTAL SCORE: 2      MENTAL STATUS EXAMINATION    Appearance: Well Groomed, Attire Appropriate for the Season  General Behavior:  Cooperative, Direct Eye Contact  Speech: Fluent, Normal rate, Monotone  Musculoskeletal:    -Gait not observed during t.h. visit  -No facial tics/tremors observed   -Motor coordination is grossly intact   Mood: Anxious, Worried  Affect: Congruent with mood  Attention: Intact  Orientation:  Person, Place, Time, Situation  Thought Associations:  Intact  Thought Content: Reality based   Thought Processes: Organized, Normal rate  Memory: No overt impairment; no screenings or formal testing performed  Language: Intact  Judgement: Fair to good  Insight: Fair to good      ASSESSMENT/CLINICAL IMPRESSIONS    Summary:    Josemanuel/Gavin Edmond is an 80 y/o male with a previous mental  health hx   of: MDD, Social Anxiety Disorder, Alcohol Abuse (remote + no consumption since 2017) and Benzodiazepine Dependence.     Medical decision making is complex based on: age, co-morbidities (see problem list), and polypharmacy.     Per chart review: depressive + anxiety symptoms began in 2002 after his son passed away during a MVA. Social anxiety became problematic around 2013-14.    Initiated care at the [facility] on 12/28/2023 for the management of psychotropics/bridging until able to   establish long term outpatient psychiatric services.     Attended follow up in 2024 on: 1/18, 2/15, 3/28, and 5/7.    ---------------------    Gavin outlines continuing to experience moderate to high levels of anxiety especially prior to leaving his household.      He endorses not being motivated and anhedonic surrounding socializing with friends.    Additionally, he has ruminating + worry thoughts about his son (who has   his own personal issues + struggling with substance use: kratom).    Provided instructions to further titrate Paxil to 60 mg/day.      Moving forward, could consider eventually tapering Buspar to discontinue,   as a means to reduce polypharmacy + thus far it has not been a   particularly efficacious anxiolytic.    At this time Gavin denies any immediate safety concerns towards himself or others, and is forward thinking/future oriented.          DSM-V and or working diagnosis:    1.  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  Social anxiety disorder            SAFETY EVALUATION:  Suicidal ideations:  -denies  Homicidal ideations:  -denies  Risk factors:  -male, age  -chronic illnesses   Protective and mitigating factors:  -spouse, family  -no prior attempts  Risk assessment:  -low to medium         SAFETY PLAN:  -Has numbers of at least 1 family member + 1 friend in personal contact list  -Knows clinic number(s) + operation of regular business hours   -Reviewed to  utilize 893 or text [place] to [phone] for mental health crisis  -Discussed to call 911 and or return to the nearest Emergency Department if unable to maintain safety of self or others (due to severity of suicidal and or homicidal ideations)        TREATMENT PLAN      Medications:    Change:  Paroxetine/Paxil   -20 mg twice daily x 7 days  -20 mg in am + 30 mg in pm x 7 days  -30 mg twice daily thereafter    Continue:  Bupropion/Wellbutrin 300 mg XL in AM    Lamotrigine/Lamictal 50 mg (2 tabs) twice daily for ttl: 100 mg      Per PCP and or other providers:  -Buspirone/Buspar 30 mg twice daily; TDD = 60 mg  -Gabapentin 600 mg QID + 300 mg TID; ttl: 3300 mg  -Hydroxyzine/Atarax 25 mg every 8 hours as needed for itching, insomnia  -Zolpidem/Ambien 5 mg nightly as needed for sleep       Labs:  -None obtained      Therapy and or Non-pharmacological modalities:  -Consider individual therapy  -Maintain activity levels      Disposition:  -Has been advised of consultative Transition Clinic model    -You do not need to return to the Transition Clinic for medication management    -Please make sure to keep the appointment for longitudinal outpatient psychiatry services(see below):    Department: [facility]  Provider: DULCE Escobedo  Location: [address] [place] [address] [address] [address]   Phone: [phone]  Fax: [phone]  Date/Time/Visit type: 6/21/2024, check in: 12:15 pm, via video/telehealth        Total time:  28 minutes per:    -Review of EMR  -Appointment time   -Documentation          Angeles CARDONA [facility]    ----------------------------------------------------------------------------------------------------------        TREATMENT RISK STATEMENT    The risks, benefits, alternatives, and potential adverse effects have been explained and are understood by the patient.  The patient agrees to the treatment plan with their ability to do so.      The patient knows to call the clinic: [phone]  for any  problems or concerns until the next psychiatry visit, regardless if it is within or outside of the [facility] system.     If unable to reach clinic staff (via phone call or medical messaging) during the normal business hours: 8:00 am to 4:30 pm then it is recommended accessing the nearest: emergency department, urgent care facility, or utilizing local (varies based on county of residence) and national crisis #'s or text messaging services for immediate assistance.          ----------------------------------------------------------------------------------------------------------------------      If applicable the following has been discussed with the patient, parent/guardian, and or attending family member during the appointment:      Risks of polypharmacy and possible drug interactions with current medication list + common OTC products, herbs, and supplements.  Moving forward, it is suggested to intermittently check-in with a clinic or retail pharmacist whenever new medications or OTC/h/s are consumed.    Risks of polypharmacy and possible drug + drug interactions with current medication list.  Moving forward, it is suggested to intermittently check-in with a clinic or retail pharmacist whenever new prescription medications are added to your treatment regimen.    Recommendation to adhere to CDC guidelines as it relates to alcohol consumption.  If taking benzodiazepines, you should abstain from alcohol intake due to increased risks of CNS and respiratory depression, as well as psychomotor impairment.    If possible, it is recommended to avoid concurrent use of prescribed: opioids + benzodiazepines due to increased risks of CNS and respiratory depression, as well as the increased risk of overdose.     Recommendation to minimize and or abstain from THC use (unless you are prescribed medical marijuana).    Recommendation to abstain from illicit substances including but not limited to the following: heroin, street  fentanyl, cocaine, methamphetamines, bath salts, and other synthetic products.    Recommendation to abstain from tobacco/smoking/nicotine, alcohol, THC, and all illicit substances if trying to become or are pregnant.    Do not take opioids, stimulants, and or other prescription medications unless they are specifically prescribed for you.     Black Box Warnings associated with the prescribed psychotropic(s).     Potential adverse effects of antipsychotics including but not limited to the following: weight gain, metabolic syndrome, EPS/Tardive Dyskinesias, and Neuroleptic Malignant Syndrome.    Potential adverse effects of stimulants including but not limited to the following: sudden death, MI, stroke, HTN, cardiomyopathy (long term use), seizures, ed, psychosis, and aggressive behaviors.

## 2024-05-31 NOTE — Clinical Note
1. Sonam Reinoso ... The only reason I titrated the Paxil to 60 mg/day is because Genesight Testing Demonstrated = Ultra-rapid metabolizer CYP2D6 = Paxil  2. I'm not sure the Buspar is doing much of anything, even though prescribed by the PCP, I'd still consider getting rid of it and then see if Gavin is willing to possibly work towards reducing the Ambien (previously he's been resistant to doing so + it's prescribed by the PCP).  Let me know if there's any additional questions or concerns.  Angeles

## 2024-05-31 NOTE — PATIENT INSTRUCTIONS
"Change:  Paroxetine/Paxil   -20 mg twice daily x 7 days  -20 mg in am + 30 mg in pm x 7 days  -30 mg twice daily thereafter    Continue:  Bupropion/Wellbutrin 300 mg XL in AM    Lamotrigine/Lamictal 50 mg (2 tabs) twice daily for ttl: 100 mg      Per PCP and or other providers:  -Buspirone/Buspar 30 mg twice daily; TDD = 60 mg  -Gabapentin 600 mg QID + 300 mg TID; ttl: 3300 mg  -Hydroxyzine/Atarax 25 mg every 8 hours as needed for itching, insomnia  -Zolpidem/Ambien 5 mg nightly as needed for sleep    ---------------------------------------      -You do not need to return to the Transition Clinic for medication management    -Please make sure to keep the appointment for longitudinal outpatient psychiatry services(see below):    Department: [facility]  Provider: DULCE Escobedo  Location: [address] [place] [address]   Phone: [phone]  Fax: [phone]  Date/Time/Visit type: 6/21/2024, check in: 12:15 pm, via video/telehealth        -------------------------------------        Patient Education   The [facility]  What to Expect  Here's what to expect in the Transition Clinic.   About the clinic  The Transition Clinic cares for you while you wait for long-term help.   You'll meet with a psychiatric doctor, who can give you medicine to help you feel better. You'll meet with them for about 5 visits or less. You'll see a different psychiatric doctor for your ongoing care. The clinic nurses and coordinators will help you guide your care.  If you have any questions or concerns, we'll be glad to talk with you.  About visits  Be open  At your visits, please talk openly about your problems. It may feel hard, but it's the best way for us to help you.  Cancelling visits  If you can't come to your visit, please call us right away at [phone]. If you don't cancel at least 24 hours (1 full day) before your visit, that's \"late cancellation.\"  Not showing up for your visits  Being very late is " "the same as not showing up. You will be a \"no show\" if:  You're more than 15 minutes late for a 30-minute (half hour) visit.  You're more than 30 minutes late for a 60-minute (full hour) visit.  If you cancel late or don't show up 2 times within 6 months, we may end your care.   If your ongoing care with a psychiatric doctor is cancelled, we may end your care at the [facility]. If that happens, we'll give you referrals and enough medicine to last 3 months. The Transition Clinic is only used to bridge the gap between your care.  Getting help between visits  If you need help between visits, you can call us Monday to Friday from 8 a.m. to 4:30 p.m. at [phone].  Emergency care   Call 911 or go to the nearest emergency department if your life or someone else's life is in danger.  Call 988 anytime to reach the national Suicide and Crisis hotline.  Medicine refills  To refill your medicine, call your pharmacy. You can also call the [facility] at [phone], Monday to Friday, 8 a.m. to 4:30 p.m. It can take 1 to 3 business days to get a refill.   Forms, letters, and tests  You may have papers to fill out, like FMLA, short-term disability, and workability. We'll find out if we can do them and let you know. It can take 5 to 7 business days to get them filled out, and we may need you to come in for a visit.  Before we can give you medicine for ADHD, we may refer you to get tested for it or confirm it another way.  We don't do mental health checks ordered by the court.   We don't do mental health testing, but we can refer you to get tested.   Thank you for choosing us for your care.  For informational purposes only. Not to replace the advice of your health care provider. Copyright   2022 [employer]. All rights reserved. [id number] [id number] - 12/22.       "

## 2024-06-07 DIAGNOSIS — F40.10 SOCIAL ANXIETY DISORDER: ICD-10-CM

## 2024-06-07 DIAGNOSIS — F33.0 MILD EPISODE OF RECURRENT MAJOR DEPRESSIVE DISORDER (H): ICD-10-CM

## 2024-06-07 RX ORDER — PAROXETINE 20 MG/1
TABLET, FILM COATED ORAL
Qty: 21 TABLET | Refills: 0 | OUTPATIENT
Start: 2024-06-07

## 2024-06-07 NOTE — TELEPHONE ENCOUNTER
Date of Last Office Visit: 5/31/24  Date of Next Office Visit: 6/21/24  No shows since last visit: 0  Cancellations since last visit: 0    Medication requested: PARoxetine (PAXIL) 20 MG tablet  Date last ordered: 5/31/24 Qty: 21 Refills: 0     Review of [place] ?: NA    Lapse in medication adherence greater than 5 days?: No  If yes, call patient and gather details: NA  Medication refill request verified as identical to current order?: Yes  Result of Last DAM, VPA, Li+ Level, CBC, or Carbamazepine Level (at or since last visit): N/A    Last visit treatment plan: 5/31/24  TREATMENT PLAN        Medications:     Change:  Paroxetine/Paxil   -20 mg twice daily x 7 days  -20 mg in am + 30 mg in pm x 7 days  -30 mg twice daily thereafter     Continue:  Bupropion/Wellbutrin 300 mg XL in AM     Lamotrigine/Lamictal 50 mg (2 tabs) twice daily for ttl: 100 mg        Per PCP and or other providers:  -Buspirone/Buspar 30 mg twice daily; TDD = 60 mg  -Gabapentin 600 mg QID + 300 mg TID; ttl: 3300 mg  -Hydroxyzine/Atarax 25 mg every 8 hours as needed for itching, insomnia  -Zolpidem/Ambien 5 mg nightly as needed for sleep        Labs:  -None obtained        Therapy and or Non-pharmacological modalities:  -Consider individual therapy  -Maintain activity levels        Disposition:  -Has been advised of consultative Transition Clinic model     -You do not need to return to the [facility] for medication management     -Please make sure to keep the appointment for longitudinal outpatient psychiatry services(see below):     Department: [facility]  Provider: DULCE Escobedo  Location: [address] [place] [address] [address] [address]   Phone: [phone]  Fax: [phone]  Date/Time/Visit type: 6/21/2024, check in: 12:15 pm, via video/telehealth           Total time:  28 minutes per:     -Review of EMR  -Appointment time   -Documentation              Angeles CARDONA, [facility]    []Medication refilled per  Medication Refill  in Ambulatory Care  policy.  [x]Medication unable to be refilled by RN due to criteria not met as indicated below:    []Eligibility - not seen in the last year   []Supervision - no future appointment   []Compliance - no shows, cancellations or lapse in therapy   []Verification - order discrepancy   []Controlled medication   []Medication not included in policy   []90-day supply request   [x]Other - Paxil dose titrated from 20 mg to 30 mg BID per treatment plan.

## 2024-06-10 DIAGNOSIS — G47.00 INSOMNIA, UNSPECIFIED TYPE: ICD-10-CM

## 2024-06-12 RX ORDER — ZOLPIDEM TARTRATE 5 MG/1
5 TABLET ORAL
Qty: 30 TABLET | Refills: 2 | Status: SHIPPED | OUTPATIENT
Start: 2024-06-12 | End: 2024-09-11

## 2024-06-26 ENCOUNTER — OFFICE VISIT (OUTPATIENT)
Dept: CARDIOLOGY | Facility: CLINIC | Age: 79
End: 2024-06-26
Attending: NURSE PRACTITIONER
Payer: COMMERCIAL

## 2024-06-26 VITALS
DIASTOLIC BLOOD PRESSURE: 73 MMHG | OXYGEN SATURATION: 99 % | SYSTOLIC BLOOD PRESSURE: 124 MMHG | RESPIRATION RATE: 18 BRPM | HEART RATE: 80 BPM | HEIGHT: 68 IN | WEIGHT: 162.8 LBS | BODY MASS INDEX: 24.67 KG/M2

## 2024-06-26 DIAGNOSIS — I10 BENIGN ESSENTIAL HYPERTENSION: ICD-10-CM

## 2024-06-26 DIAGNOSIS — I48.91 ATRIAL FIBRILLATION, UNSPECIFIED TYPE (H): ICD-10-CM

## 2024-06-26 DIAGNOSIS — I50.32 CHRONIC HEART FAILURE WITH PRESERVED EJECTION FRACTION (H): Primary | ICD-10-CM

## 2024-06-26 DIAGNOSIS — I42.9 CARDIOMYOPATHY, UNSPECIFIED TYPE (H): ICD-10-CM

## 2024-06-26 DIAGNOSIS — I38 VALVULAR HEART DISEASE: ICD-10-CM

## 2024-06-26 DIAGNOSIS — E78.5 HYPERLIPIDEMIA LDL GOAL <100: ICD-10-CM

## 2024-06-26 PROCEDURE — 99214 OFFICE O/P EST MOD 30 MIN: CPT | Performed by: NURSE PRACTITIONER

## 2024-06-26 RX ORDER — LOSARTAN POTASSIUM 100 MG/1
100 TABLET ORAL DAILY
Qty: 90 TABLET | Refills: 3 | Status: SHIPPED | OUTPATIENT
Start: 2024-06-26

## 2024-06-26 NOTE — PROGRESS NOTES
Cardiology Clinic Progress Note  Josemanuel Edmodn MRN# [id number]   YOB: 1945 Age: 79 year old      Primary Cardiologist:   Dr. Chapin, 2-month follow-up          History of Presenting Illness:      Echo January 2024 showing decreased EF 45 to 50% which was previously low normal, RV moderately dilated with mildly decreased function, severe MR/TR, moderate AI, and mildly dilated aorta.       In February 2024 he had fatigue with exertion and heart failure.  A-fib rates were controlled and cardiomyopathy medications uptitrated.  Lexiscan was negative.  Jardiance was cost prohibitive.    Most recent lipid profile, BMP, ALT reviewed today.    Side effects of switching to losartan? None   Weights? Stable   Heart failure symptoms? None   BP? Controlled   Chronic mild [place] stable   Muscle Fatigue with exertion  Had a fall and hit his head, last week, d/t balance concerns      Patient reports no chest pain, PND, orthopnea, presyncope, syncope, edema, heart racing, or palpitations.                    Assessment and Plan:     Plan  Patient Instructions   Medication Changes:  Increase losartan to 100 mg daily for CM    Recommendations:  Check daily weights and call the clinic if your weight has increased more than 2 lbs in one day or 5 lbs in one week; if you feel more short of breath or have worsening swelling in your legs or abdomen.  2000 mg sodium diet   4-8 8 ounce glasses of fluids per day, not more than 2000 mL (2 L) per day.   Check blood pressure at least 1 hour after medications. Call the clinic if your blood pressure is consistently greater than 130/80.   Call if blood pressure is less than 90 on top or less than 100 with lightheadedness.      Follow-up:   Nonfasting lab in 1-2 weeks (BMP)  Cardiology follow up at [facility]: carmelita with echo prior to reassess EF and uptitrate cardiomyopathy medications if needed  Follow-up with primary care about your balance concerns     Cardiology  Scheduling~[phone]  Cardiology Clinic RN~[phone] (Rosita RN, Alda RN; Jaimie RN)          Problem List as of 6/26/2024 Reviewed: 11/30/2023 11:01 AM by Lizzy Leiva MD            Noted       Active Problems    1. Benign essential hypertension 12/12/2005     Last Assessment & Plan 6/26/2024 Office Visit Written 6/26/2024  7:39 AM by Nazanin Caballero APRN CNP      Controlled  Continue current medications          Relevant Medications     losartan (COZAAR) 100 MG tablet     Other Relevant Orders     Basic metabolic panel     Follow-Up with Cardiology    2. Hyperlipidemia LDL goal <100 10/31/2010     Last Assessment & Plan 6/26/2024 Office Visit Edited 6/26/2024  7:40 AM by Nazanin Caballero APRN CNP      LDL at goal without medication  Continue lifestyle modification          Relevant Orders     Follow-Up with Cardiology    3. Atrial fibrillation, unspecified type (H) 1/18/2018     Overview Addendum 6/26/2024  7:41 AM by Nazanin Caballero APRN CNP      onset 2018 postoperatively, S/p cardioversion   Recurrence 2021 with palpitations  Now chronic A-fib  Severe LA  Elevated [id number] score          Last Assessment & Plan 6/26/2024 Office Visit Written 6/26/2024  7:41 AM by Nazanin Caballero APRN CNP      Continue rate control, anticoagulation          Relevant Orders     Follow-Up with Cardiology    4. Valvular heart disease 9/22/2022     Overview Signed 6/26/2024  7:41 AM by Nazanin Caballero APRN CNP      Moderate to moderately severe MR, moderate TR, mild to moderate AI 2023  Severe MR/TR, moderate AI 2024          Last Assessment & Plan 6/26/2024 Office Visit Written 6/26/2024  7:41 AM by Nazanin Caballero APRN CNP      Follow with echo          Relevant Orders     Follow-Up with Cardiology    5. Chronic heart failure with preserved ejection fraction (H) - Primary 11/29/2023     Overview Signed 6/26/2024  7:43 AM by Nazanin Caballero  APRN CNP      EF 50-55% 2023  45 to 50% 1/2024  Heart failure episode 2/2024  Negative Lexiscan 2/2024  Dry weight 154 pounds at home          Last Assessment & Plan 6/26/2024 Office Visit Written 6/26/2024  7:43 AM by Nazanin Caballero APRN CNP      No symptoms of heart failure  Continue GDMT          Relevant Orders     Follow-Up with Cardiology    6. Cardiomyopathy, unspecified type (H) 3/7/2024     Relevant Medications     losartan (COZAAR) 100 MG tablet     Other Relevant Orders     Basic metabolic panel     Follow-Up with Cardiology     Echocardiogram Complete             Respiratory:  clear to auscultation; normal symmetry        Cardiac: regular rate and rhythm, 3/6 Murmur     GI:  abdomen nondistended     Extremities and Muscular Skeletal:   no edema                Thank you for allowing me to participate in this delightful patient's care.   This note was completed in part using Dragon voice recognition software. Although reviewed after completion, some word and grammatical errors may occur.    KAYLA Parikh CNP

## 2024-06-26 NOTE — PATIENT INSTRUCTIONS
Medication Changes:  Increase losartan to 100 mg daily      Recommendations:  Check daily weights and call the clinic if your weight has increased more than 2 lbs in one day or 5 lbs in one week; if you feel more short of breath or have worsening swelling in your legs or abdomen.  2000 mg sodium diet   4-8 8 ounce glasses of fluids per day, not more than 2000 mL (2 L) per day.   Check blood pressure at least 1 hour after medications. Call the clinic if your blood pressure is consistently greater than 130/80.   Call if blood pressure is less than 90 on top or less than 100 with lightheadedness.      Follow-up:   Nonfasting lab in 1-2 weeks (BMP)  Cardiology follow up at [facility]: carmelita with echo prior  Follow-up with primary care about your balance concerns     Cardiology Scheduling~[phone]  Cardiology Clinic RN~[phone] (Rosita RN, Alda RN; Jaimie RN)

## 2024-06-26 NOTE — LETTER
6/26/2024    Lizzy Leiva MD  [address]  [facility] [address]    RE: Josemanuel Edmond       Dear Colleague,     I had the pleasure of seeing Josemanuel Edmond in the [facility].  Cardiology Clinic Progress Note  Josemanuel Edmond MRN# [id number]   YOB: 1945 Age: 79 year old      Primary Cardiologist:   Dr. Chapin, 2-month follow-up          History of Presenting Illness:      Echo January 2024 showing decreased EF 45 to 50% which was previously low normal, RV moderately dilated with mildly decreased function, severe MR/TR, moderate AI, and mildly dilated aorta.       In February 2024 he had fatigue with exertion and heart failure.  A-fib rates were controlled and cardiomyopathy medications uptitrated.  Lexiscan was negative.  Jardiance was cost prohibitive.    Most recent lipid profile, BMP, ALT reviewed today.    Side effects of switching to losartan? None   Weights? Stable   Heart failure symptoms? None   BP? Controlled   Chronic mild [place] stable   Muscle Fatigue with exertion  Had a fall and hit his head, last week, d/t balance concerns      Patient reports no chest pain, PND, orthopnea, presyncope, syncope, edema, heart racing, or palpitations.                    Assessment and Plan:     Plan  Patient Instructions   Medication Changes:  Increase losartan to 100 mg daily for CM    Recommendations:  Check daily weights and call the clinic if your weight has increased more than 2 lbs in one day or 5 lbs in one week; if you feel more short of breath or have worsening swelling in your legs or abdomen.  2000 mg sodium diet   4-8 8 ounce glasses of fluids per day, not more than 2000 mL (2 L) per day.   Check blood pressure at least 1 hour after medications. Call the clinic if your blood pressure is consistently greater than 130/80.   Call if blood pressure is less than 90 on top or less than 100 with lightheadedness.      Follow-up:   Nonfasting lab in 1-2 weeks  (BMP)  Cardiology follow up at [facility]: nazanin with echo prior to reassess EF and uptitrate cardiomyopathy medications if needed  Follow-up with primary care about your balance concerns     Cardiology Scheduling~[phone]  Cardiology Clinic RN~[phone] (Rosita RN, Alda RN; Jaimie RN)          Problem List as of 6/26/2024 Reviewed: 11/30/2023 11:01 AM by Lizzy Leiva MD            Noted       Active Problems    1. Benign essential hypertension 12/12/2005     Last Assessment & Plan 6/26/2024 Office Visit Written 6/26/2024  7:39 AM by Nazanin Caballero APRN CNP      Controlled  Continue current medications          Relevant Medications     losartan (COZAAR) 100 MG tablet     Other Relevant Orders     Basic metabolic panel     Follow-Up with Cardiology    2. Hyperlipidemia LDL goal <100 10/31/2010     Last Assessment & Plan 6/26/2024 Office Visit Edited 6/26/2024  7:40 AM by Nazanin Caballero APRN CNP      LDL at goal without medication  Continue lifestyle modification          Relevant Orders     Follow-Up with Cardiology    3. Atrial fibrillation, unspecified type (H) 1/18/2018     Overview Addendum 6/26/2024  7:41 AM by Nazanin Caballero APRN CNP      onset 2018 postoperatively, S/p cardioversion   Recurrence 2021 with palpitations  Now chronic A-fib  Severe LA  Elevated [id number] score          Last Assessment & Plan 6/26/2024 Office Visit Written 6/26/2024  7:41 AM by Nazanin Caballero APRN CNP      Continue rate control, anticoagulation          Relevant Orders     Follow-Up with Cardiology    4. Valvular heart disease 9/22/2022     Overview Signed 6/26/2024  7:41 AM by Nazanin Caballero APRN CNP      Moderate to moderately severe MR, moderate TR, mild to moderate AI 2023  Severe MR/TR, moderate AI 2024          Last Assessment & Plan 6/26/2024 Office Visit Written 6/26/2024  7:41 AM by Nazanin Caballero APRN CNP      Follow with echo           Relevant Orders     Follow-Up with Cardiology    5. Chronic heart failure with preserved ejection fraction (H) - Primary 11/29/2023     Overview Signed 6/26/2024  7:43 AM by Nazanin Caballero APRN CNP      EF 50-55% 2023  45 to 50% 1/2024  Heart failure episode 2/2024  Negative Lexiscan 2/2024  Dry weight 154 pounds at home          Last Assessment & Plan 6/26/2024 Office Visit Written 6/26/2024  7:43 AM by Nazanin Caballero APRN CNP      No symptoms of heart failure  Continue GDMT          Relevant Orders     Follow-Up with Cardiology    6. Cardiomyopathy, unspecified type (H) 3/7/2024     Relevant Medications     losartan (COZAAR) 100 MG tablet     Other Relevant Orders     Basic metabolic panel     Follow-Up with Cardiology     Echocardiogram Complete             Respiratory:  clear to auscultation; normal symmetry        Cardiac: regular rate and rhythm, 3/6 Murmur     GI:  abdomen nondistended     Extremities and Muscular Skeletal:   no edema                Thank you for allowing me to participate in this delightful patient's care.   This note was completed in part using Dragon voice recognition software. Although reviewed after completion, some word and grammatical errors may occur.    KAYLA Parikh CNP      Thank you for allowing me to participate in the care of your patient.      Sincerely,     KAYLA Parikh CNP     [facility]  cc:   KAYLA Allen CNP  [address] [place] [address]

## 2024-07-05 DIAGNOSIS — R39.12 BENIGN PROSTATIC HYPERPLASIA WITH WEAK URINARY STREAM: ICD-10-CM

## 2024-07-05 DIAGNOSIS — N40.1 BENIGN PROSTATIC HYPERPLASIA WITH WEAK URINARY STREAM: ICD-10-CM

## 2024-07-05 RX ORDER — FINASTERIDE 5 MG/1
1 TABLET, FILM COATED ORAL DAILY
Qty: 30 TABLET | Refills: 3 | Status: SHIPPED | OUTPATIENT
Start: 2024-07-05 | End: 2024-07-09

## 2024-07-05 NOTE — TELEPHONE ENCOUNTER
Requested Prescriptions   Pending Prescriptions Disp Refills    finasteride (PROSCAR) 5 MG tablet [Pharmacy Med Name: finasteride 5 mg tablet] 30 tablet 3     Sig: TAKE 1 TABLET BY MOUTH EVERY DAY       BPH Agents Passed - 7/5/2024  3:49 PM        Passed - Recent (12 mo) or future (30 days) visit within the authorizing provider's department     The patient must have completed an in-person or virtual visit within the past 12 months or has a future visit scheduled within the next 90 days with the authorizing provider s specialty.  Urgent care and e-visits do not quality as an office visit for this protocol.          Passed - Medication is active on med list        Passed - Medication indicated for associated diagnosis     Medication is associated with one or more of the following diagnoses:     Benign prostatic hyperplasia   Male pattern alopecia          Passed - Patient is 18 years of age or older

## 2024-07-08 DIAGNOSIS — N40.1 BENIGN PROSTATIC HYPERPLASIA WITH WEAK URINARY STREAM: ICD-10-CM

## 2024-07-08 DIAGNOSIS — R39.12 BENIGN PROSTATIC HYPERPLASIA WITH WEAK URINARY STREAM: ICD-10-CM

## 2024-07-09 RX ORDER — FINASTERIDE 5 MG/1
5 TABLET, FILM COATED ORAL DAILY
Qty: 90 TABLET | Refills: 0 | Status: SHIPPED | OUTPATIENT
Start: 2024-07-09

## 2024-07-12 ENCOUNTER — OFFICE VISIT (OUTPATIENT)
Dept: FAMILY MEDICINE | Facility: CLINIC | Age: 79
End: 2024-07-12
Payer: COMMERCIAL

## 2024-07-12 ENCOUNTER — TELEPHONE (OUTPATIENT)
Dept: FAMILY MEDICINE | Facility: CLINIC | Age: 79
End: 2024-07-12

## 2024-07-12 VITALS
WEIGHT: 159 LBS | BODY MASS INDEX: 24.1 KG/M2 | HEART RATE: 65 BPM | RESPIRATION RATE: 16 BRPM | HEIGHT: 68 IN | TEMPERATURE: 96.7 F | DIASTOLIC BLOOD PRESSURE: 78 MMHG | OXYGEN SATURATION: 100 % | SYSTOLIC BLOOD PRESSURE: 122 MMHG

## 2024-07-12 DIAGNOSIS — G25.9 MOVEMENT DISORDER: Primary | ICD-10-CM

## 2024-07-12 DIAGNOSIS — F13.20 BENZODIAZEPINE DEPENDENCE (H): ICD-10-CM

## 2024-07-12 DIAGNOSIS — F10.21 ALCOHOLISM IN REMISSION (H): ICD-10-CM

## 2024-07-12 LAB
ALBUMIN SERPL BCG-MCNC: 4.7 G/DL (ref 3.5–5.2)
ALP SERPL-CCNC: 95 U/L (ref 40–150)
ALT SERPL W P-5'-P-CCNC: 16 U/L (ref 0–70)
ANION GAP SERPL CALCULATED.3IONS-SCNC: 12 MMOL/L (ref 7–15)
AST SERPL W P-5'-P-CCNC: 24 U/L (ref 0–45)
BASOPHILS # BLD AUTO: 0 10E3/UL (ref 0–0.2)
BASOPHILS NFR BLD AUTO: 1 %
BILIRUB SERPL-MCNC: 0.9 MG/DL
BUN SERPL-MCNC: 16.2 MG/DL (ref 8–23)
CALCIUM SERPL-MCNC: 9 MG/DL (ref 8.8–10.2)
CHLORIDE SERPL-SCNC: 96 MMOL/L (ref 98–107)
CREAT SERPL-MCNC: 0.95 MG/DL (ref 0.67–1.17)
DEPRECATED HCO3 PLAS-SCNC: 28 MMOL/L (ref 22–29)
EGFRCR SERPLBLD CKD-EPI 2021: 80 ML/MIN/1.73M2
EOSINOPHIL # BLD AUTO: 0.2 10E3/UL (ref 0–0.7)
EOSINOPHIL NFR BLD AUTO: 4 %
ERYTHROCYTE [DISTWIDTH] IN BLOOD BY AUTOMATED COUNT: 14.2 % (ref 10–15)
GLUCOSE SERPL-MCNC: 105 MG/DL (ref 70–99)
HCT VFR BLD AUTO: 36.2 % (ref 40–53)
HGB BLD-MCNC: 11.9 G/DL (ref 13.3–17.7)
IMM GRANULOCYTES # BLD: 0 10E3/UL
IMM GRANULOCYTES NFR BLD: 0 %
LYMPHOCYTES # BLD AUTO: 0.9 10E3/UL (ref 0.8–5.3)
LYMPHOCYTES NFR BLD AUTO: 17 %
MCH RBC QN AUTO: 33.3 PG (ref 26.5–33)
MCHC RBC AUTO-ENTMCNC: 32.9 G/DL (ref 31.5–36.5)
MCV RBC AUTO: 101 FL (ref 78–100)
MONOCYTES # BLD AUTO: 0.4 10E3/UL (ref 0–1.3)
MONOCYTES NFR BLD AUTO: 8 %
NEUTROPHILS # BLD AUTO: 3.6 10E3/UL (ref 1.6–8.3)
NEUTROPHILS NFR BLD AUTO: 70 %
PLATELET # BLD AUTO: 141 10E3/UL (ref 150–450)
POTASSIUM SERPL-SCNC: 4.4 MMOL/L (ref 3.4–5.3)
PROT SERPL-MCNC: 7.5 G/DL (ref 6.4–8.3)
RBC # BLD AUTO: 3.57 10E6/UL (ref 4.4–5.9)
SODIUM SERPL-SCNC: 136 MMOL/L (ref 135–145)
TSH SERPL DL<=0.005 MIU/L-ACNC: 2.23 UIU/ML (ref 0.3–4.2)
WBC # BLD AUTO: 5.2 10E3/UL (ref 4–11)

## 2024-07-12 PROCEDURE — 99214 OFFICE O/P EST MOD 30 MIN: CPT | Performed by: FAMILY MEDICINE

## 2024-07-12 PROCEDURE — 84443 ASSAY THYROID STIM HORMONE: CPT | Performed by: FAMILY MEDICINE

## 2024-07-12 PROCEDURE — 85025 COMPLETE CBC W/AUTO DIFF WBC: CPT | Performed by: FAMILY MEDICINE

## 2024-07-12 PROCEDURE — 36415 COLL VENOUS BLD VENIPUNCTURE: CPT | Performed by: FAMILY MEDICINE

## 2024-07-12 PROCEDURE — 82607 VITAMIN B-12: CPT | Performed by: FAMILY MEDICINE

## 2024-07-12 PROCEDURE — 80053 COMPREHEN METABOLIC PANEL: CPT | Performed by: FAMILY MEDICINE

## 2024-07-12 RX ORDER — RESPIRATORY SYNCYTIAL VIRUS VACCINE 120MCG/0.5
0.5 KIT INTRAMUSCULAR ONCE
Qty: 1 EACH | Refills: 0 | Status: CANCELLED | OUTPATIENT
Start: 2024-07-12 | End: 2024-07-12

## 2024-07-12 ASSESSMENT — ANXIETY QUESTIONNAIRES
2. NOT BEING ABLE TO STOP OR CONTROL WORRYING: SEVERAL DAYS
7. FEELING AFRAID AS IF SOMETHING AWFUL MIGHT HAPPEN: NOT AT ALL
8. IF YOU CHECKED OFF ANY PROBLEMS, HOW DIFFICULT HAVE THESE MADE IT FOR YOU TO DO YOUR WORK, TAKE CARE OF THINGS AT HOME, OR GET ALONG WITH OTHER PEOPLE?: NOT DIFFICULT AT ALL
1. FEELING NERVOUS, ANXIOUS, OR ON EDGE: SEVERAL DAYS
5. BEING SO RESTLESS THAT IT IS HARD TO SIT STILL: NOT AT ALL
6. BECOMING EASILY ANNOYED OR IRRITABLE: NOT AT ALL
4. TROUBLE RELAXING: NOT AT ALL
7. FEELING AFRAID AS IF SOMETHING AWFUL MIGHT HAPPEN: NOT AT ALL
3. WORRYING TOO MUCH ABOUT DIFFERENT THINGS: SEVERAL DAYS
IF YOU CHECKED OFF ANY PROBLEMS ON THIS QUESTIONNAIRE, HOW DIFFICULT HAVE THESE PROBLEMS MADE IT FOR YOU TO DO YOUR WORK, TAKE CARE OF THINGS AT HOME, OR GET ALONG WITH OTHER PEOPLE: NOT DIFFICULT AT ALL
GAD7 TOTAL SCORE: 3
GAD7 TOTAL SCORE: 3

## 2024-07-12 ASSESSMENT — PAIN SCALES - GENERAL: PAINLEVEL: NO PAIN (0)

## 2024-07-12 NOTE — TELEPHONE ENCOUNTER
I was able to get patient scheduled with neurology in [place] on11/21/2024 at 9AM.  Called patient and left message for patient to call clinic. Please inform patient of appointment.  Krystle Mg CMA

## 2024-07-12 NOTE — PROGRESS NOTES
Assessment & Plan     Movement disorder  Family history parkinson's. Mood disorder consistent with with parkinsonism. Further work-up with imaging and labs as below as well as neurology consult.  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  Patient's anxiety since last visit has been:  Bad  The patient is not having other symptoms associated with anxiety.  Any significant life events: other  Patient is not feeling anxious or having panic attacks.  Patient has no concerns about alcohol or drug use.    He eats 2-3 servings of fruits and vegetables daily.He exercises with enough effort to increase his heart rate 9 or less minutes per day.  He exercises with enough effort to increase his heart rate 3 or less days per week.   He is taking medications regularly.         Concern - Falling  Onset: 6-8 months ago  Description: Involuntary leg jerking contribute to losing balance  Intensity: moderate, severe  Progression of Symptoms:  worsening  Accompanying Signs & Symptoms: involuntary leg movement      ROS:   Constitutional, neuro, ENT,  "endocrine, pulmonary, cardiac, gastrointestinal, genitourinary, musculoskeletal, integument and psychiatric systems are negative, except as otherwise noted.       Objective    /78   Pulse 65   Temp (!) 96.7  F (35.9  C) (Tympanic)   Resp 16   Ht 1.727 m (5' 8\")   Wt 72.1 kg (159 lb)   SpO2 100%   BMI 24.18 kg/m    Body mass index is 24.18 kg/m .  Physical Exam   GENERAL: Pleasant, well appearing male.  NEURO: Cranial nerves II through XII grossly intact. Discoordination with fine motor and LINDA testing. Intermittent resting tremor with pill rolling quality.            Signed Electronically by: Lizzy Leiva MD    "

## 2024-07-13 LAB — VIT B12 SERPL-MCNC: 2863 PG/ML (ref 232–1245)

## 2024-07-19 ENCOUNTER — TELEPHONE (OUTPATIENT)
Dept: ONCOLOGY | Facility: CLINIC | Age: 79
End: 2024-07-19
Payer: COMMERCIAL

## 2024-07-19 NOTE — TELEPHONE ENCOUNTER
----- Message from Concepción VARELA sent at 7/19/2024 10:17 AM CDT -----  He is choosing to only go with his primary care provider and no longer wants care with us.  ----- Message -----  From: Angeles Hyatt RN  Sent: 7/17/2024   4:04 PM CDT  To: [facility]     [facility],  This patient cancelled his 6 month follow up with Justyna on 7/10 for his thrombocytopenia and anemia. It looks like he did have recent labs through per his PCP. Can you call him to see if he wants to scheduled his 6 month follow up with either Justyna or Dr. Friedell or if he is just going to continue following with his PCP?  Thanks,  Jennifer

## 2024-07-24 ENCOUNTER — LAB (OUTPATIENT)
Dept: LAB | Facility: CLINIC | Age: 79
End: 2024-07-24
Payer: COMMERCIAL

## 2024-07-24 DIAGNOSIS — I42.9 CARDIOMYOPATHY, UNSPECIFIED TYPE (H): ICD-10-CM

## 2024-07-24 DIAGNOSIS — I10 BENIGN ESSENTIAL HYPERTENSION: ICD-10-CM

## 2024-07-24 LAB
ANION GAP SERPL CALCULATED.3IONS-SCNC: 7 MMOL/L (ref 7–15)
BUN SERPL-MCNC: 14.4 MG/DL (ref 8–23)
CALCIUM SERPL-MCNC: 9 MG/DL (ref 8.8–10.4)
CHLORIDE SERPL-SCNC: 99 MMOL/L (ref 98–107)
CREAT SERPL-MCNC: 1.03 MG/DL (ref 0.67–1.17)
EGFRCR SERPLBLD CKD-EPI 2021: 73 ML/MIN/1.73M2
GLUCOSE SERPL-MCNC: 105 MG/DL (ref 70–99)
HCO3 SERPL-SCNC: 28 MMOL/L (ref 22–29)
POTASSIUM SERPL-SCNC: 4.7 MMOL/L (ref 3.4–5.3)
SODIUM SERPL-SCNC: 134 MMOL/L (ref 135–145)

## 2024-07-24 PROCEDURE — 80048 BASIC METABOLIC PNL TOTAL CA: CPT

## 2024-07-24 PROCEDURE — 36415 COLL VENOUS BLD VENIPUNCTURE: CPT

## 2024-07-26 ENCOUNTER — HOSPITAL ENCOUNTER (OUTPATIENT)
Dept: CARDIOLOGY | Facility: CLINIC | Age: 79
Discharge: HOME | End: 2024-07-26
Attending: NURSE PRACTITIONER | Admitting: NURSE PRACTITIONER
Payer: COMMERCIAL

## 2024-07-26 DIAGNOSIS — I42.9 CARDIOMYOPATHY, UNSPECIFIED TYPE (H): ICD-10-CM

## 2024-07-26 LAB — LVEF ECHO: NORMAL

## 2024-07-26 PROCEDURE — 93306 TTE W/DOPPLER COMPLETE: CPT | Mod: 26 | Performed by: INTERNAL MEDICINE

## 2024-07-26 PROCEDURE — 93306 TTE W/DOPPLER COMPLETE: CPT

## 2024-07-28 PROBLEM — D69.6 THROMBOCYTOPENIA (H): Status: RESOLVED | Noted: 2022-06-29 | Resolved: 2024-07-28

## 2024-07-28 PROBLEM — T14.8XXA HEMATOMA OF SKIN: Status: RESOLVED | Noted: 2019-07-17 | Resolved: 2024-07-28

## 2024-07-28 NOTE — PROGRESS NOTES
"NEUROLOGY FOLLOW UP VISIT  NOTE       [facility] NEUROLOGY [place]  [address] [place] [address]  Tel: [phone]  Fax: [phone]  [email or web address]     Josemanuel Edmond,  1945, MRN [id number]  PCP: Lizzy Leiva I  Date: 2024      ASSESSMENT & PLAN     Visit Diagnosis  Benign essential tremor     Benign essential tremor  79-year-old male with history of depression and anxiety, HTN, HLD, GERD, alcoholism in remission, A-fib s/p cardioversion who returns for follow-up.  He was last seen on 2022 for benign essential tremor and at that time he was not interested in any treatment.  He has minimal intention tremor and I suspect previously these tremors were not as obvious due to his history of alcohol use.  At present I have recommended no intervention but I have ordered serum copper and ceruloplasmin level.  Follow-up after above test    Gait difficulty  Patient has noticed progressive gait difficulty that likely is multifactorial due to his age, past history of alcohol use resulting in peripheral neuropathy.  I have recommended:    1.  MRI brain and cervical spine  2.  Wife is reporting acting out during his sleep and he tends to talk and if he has to get up to go to the bathroom appears \"out of it\" and at times falls down.  I have recommended evaluation by speech REM sleep disorder    Thank you again for this referral, please feel free to contact me if you have any questions.    Adriel Hawkins MD  [facility] NEUROLOGY, [place]     HISTORY OF PRESENT ILLNESS     Patient is a 79-year-old male with history of depression and anxiety, HTN, HLD, GERD, alcoholism in remission, A-fib s/p cardioversion last seen on 2022 for benign essential tremor who returns for follow-up.  During his last visit I had reassured him that he does not have Parkinson disease and recommended lab work that included a normal ceruloplasmin level but borderline elevated " copper.  Patient symptoms are minimal and he wanted to hold off on starting any medication    He is here today with worsening tremors that tend to get worse later in the day.  Also wife reports that at nights while sleeping he acts out and if he has to get up to go to the bathroom he appears a lot more ataxic and at times falls down.  This is problematic as he is on a DOAC and does tend to get increased bruises on his body.  He does not have as much of a balance issue during daytime.     PROBLEM LIST   Patient Active Problem List   Diagnosis    Benign essential hypertension    Hypertrophy of prostate with urinary obstruction    Osteoarthrosis, unspecified whether generalized or localized, pelvic region and thigh    Hyperlipidemia LDL goal <100    GERD (gastroesophageal reflux disease)    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)    Hyperplastic Polyp    Right knee DJD    Peripheral neuropathy    Benzodiazepine dependence (H)    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    Unilateral inguinal hernia without obstruction or gangrene    Atrial fibrillation, unspecified type (H)    Depression with anxiety    Status post lung surgery    Memory difficulties    Tremor    Chronic diastolic heart failure (H)    Valvular heart disease    Social phobia    S/P revision of total hip    Anemia, unspecified type    Moderate major depression (H)    Chronic anticoagulation    Chronic heart failure with preserved ejection fraction (H)    Insomnia, unspecified type    Longstanding persistent atrial fibrillation (H)    Cardiomyopathy, unspecified type (H)    Sensorineural hearing loss (SNHL) of both ears         PAST MEDICAL & SURGICAL HISTORY     Past Medical History:   Patient  has a past medical history of Acute on chronic diastolic (congestive) heart failure (H) (11/29/2023), Arthritis (2005), Benign neoplasm of prostate (2000), Depressive disorder, Infection due to 2019 novel coronavirus (09/27/2021), S/P knee replacement (11/06/2012), and S/P left knee  arthroscopy (08/15/2011).    Surgical History:  He  has a past surgical history that includes surgical history of -  (12/01/1999); joint replacement, hip rt/lt (10/2007); joint replacemtn, knee rt/lt (08/2011); Colonoscopy (N/A, 12/10/2015); surgical history of -  (Left, 11/2017); Laparoscopic herniorrhaphy inguinal bilateral (Bilateral, 04/24/2018); Arthroplasty knee (Right, 10/12/2018); Phacoemulsification with standard intraocular lens implant (Right, 01/06/2021); Phacoemulsification with standard intraocular lens implant (Left, 02/17/2021); Abdomen surgery (2003); biopsy (2007); and Arthroplasty revision hip (Left, 5/25/2023).     SOCIAL HISTORY     Reviewed, and he  reports that he quit smoking about 48 years ago. His smoking use included cigarettes. He started smoking about 66 years ago. He has a 17.8 pack-year smoking history. He has never used smokeless tobacco. He reports that he does not currently use alcohol. He reports that he does not use drugs.     FAMILY HISTORY     Reviewed, and family history includes Breast Cancer in his mother; Cerebrovascular Disease in his mother; Depression in his mother; Diabetes in his brother; Neurologic Disorder in his mother; Parkinsonism in his mother; Respiratory in his father.     ALLERGIES     Allergies   Allergen Reactions    Bactrim [Sulfamethoxazole-Trimethoprim] Nausea and Vomiting         REVIEW OF SYSTEMS     A 12 point review of system was performed and was negative except as outlined in the history of present illness.     HOME MEDICATIONS     Current Outpatient Rx   Medication Sig Dispense Refill    buPROPion (WELLBUTRIN XL) 300 MG 24 hr tablet Take 1 tablet (300 mg) by mouth every morning 90 tablet 0    busPIRone HCl (BUSPAR) 30 MG tablet TAKE 1 TABLET TWICE A  tablet 3    doxazosin (CARDURA) 8 MG tablet Take 0.5 tablets (4 mg) by mouth At Bedtime 45 tablet 3    finasteride (PROSCAR) 5 MG tablet Take 1 tablet (5 mg) by mouth daily 90 tablet 0     furosemide (LASIX) 20 MG tablet Take 2 tablets (40 mg) by mouth daily 180 tablet 3    gabapentin (NEURONTIN) 300 MG capsule Take 1 capsule (300 mg) by mouth 3 times daily In addition to 600mg capsule. 90 capsule 3    gabapentin (NEURONTIN) 600 MG tablet TAKE 1 TABLET FOUR TIMES A  tablet 3    hydrOXYzine HCl (ATARAX) 25 MG tablet Take 1 tablet (25 mg) by mouth every 8 hours as needed for itching or other (insomnia) 30 tablet 3    lamoTRIgine (LAMICTAL) 25 MG tablet Take 2 tablets (50 mg) by mouth 2 times daily 360 tablet 0    losartan (COZAAR) 100 MG tablet Take 1 tablet (100 mg) by mouth daily 90 tablet 3    metoprolol tartrate (LOPRESSOR) 100 MG tablet Take 1 tablet (100 mg) by mouth 2 times daily 180 tablet 3    multivitamin (ONE-DAILY) tablet Take 1 tablet by mouth daily 30 tablet 0    PARoxetine (PAXIL) 20 MG tablet Orally/by mouth: 1 tab (20 mg) twice daily x 7 days, 1 tab (20 mg) in am + 30 mg in pm x 7 days 21 tablet 0    PARoxetine (PAXIL) 30 MG tablet Take 1 tablet (30 mg) by mouth 2 times daily 180 tablet 0    XARELTO ANTICOAGULANT 20 MG TABS tablet TAKE 1 TABLET DAILY WITH   DINNER 90 tablet 3    zolpidem (AMBIEN) 5 MG tablet Take 1 tablet (5 mg) by mouth nightly as needed for sleep 30 tablet 2         PHYSICAL EXAM     Vital signs  /57   Pulse 69   Wt 74.3 kg (163 lb 14.4 oz)   BMI 24.92 kg/m      Weight:   163 lbs 14.4 oz    Elderly gentleman who is alert and oriented vital signs were reviewed and are documented in electronic medical record. Neck supple. Neurologically speech was normal cranial nerves II through XII are intact except he is hard of hearing no cogwheel rigidity he has bilateral intention tremor strength is 5/5 reflexes are 1+ toes equivocal. He has decreased arm swing on the right but no cogwheel rigidity. He is not unstable on turning      PERTINENT DIAGNOSTIC STUDIES     Following studies were reviewed:     MRI LUMBAR SPINE 5/1/2015  1. Degenerative disc disease L1-2  with small left central disc  protrusion, increased slightly since prior exam. No direct impingement  on neural structures.  2. Interval development of mild central stenosis L2-3 related to  multiple factors. Mild to moderate left and minimal right foraminal  stenosis at this level.  3. Mild central stenosis L3-4 related to multiple factors, increased  since the prior exam. Mild bilateral foraminal stenosis at this level.  4. Mild central stenosis L4-5 related to multiple factors, including a  grade 1 degenerative anterolisthesis which has increased since the  prior study. Moderate to severe right and mild left foraminal stenosis  at this level are unchanged.  5. Partial sacralization of the L5 vertebral segment on the left. No  impingement on neural structures at this level.     PERTINENT LABS  Following labs were reviewed:  Hospital Outpatient Visit on 07/26/2024   Component Date Value Ref Range Status    LVEF  07/26/2024 45-50%   Final   Lab on 07/24/2024   Component Date Value Ref Range Status    Sodium 07/24/2024 134 (L)  135 - 145 mmol/L Final    Potassium 07/24/2024 4.7  3.4 - 5.3 mmol/L Final    Chloride 07/24/2024 99  98 - 107 mmol/L Final    Carbon Dioxide (CO2) 07/24/2024 28  22 - 29 mmol/L Final    Anion Gap 07/24/2024 7  7 - 15 mmol/L Final    Urea Nitrogen 07/24/2024 14.4  8.0 - 23.0 mg/dL Final    Creatinine 07/24/2024 1.03  0.67 - 1.17 mg/dL Final    GFR Estimate 07/24/2024 73  >60 mL/min/1.73m2 Final    Calcium 07/24/2024 9.0  8.8 - 10.4 mg/dL Final    Glucose 07/24/2024 105 (H)  70 - 99 mg/dL Final   Office Visit on 07/12/2024   Component Date Value Ref Range Status    TSH 07/12/2024 2.23  0.30 - 4.20 uIU/mL Final    Vitamin B12 07/12/2024 2,863 (H)  232 - 1,245 pg/mL Final    Sodium 07/12/2024 136  135 - 145 mmol/L Final    Potassium 07/12/2024 4.4  3.4 - 5.3 mmol/L Final    Carbon Dioxide (CO2) 07/12/2024 28  22 - 29 mmol/L Final    Anion Gap 07/12/2024 12  7 - 15 mmol/L Final    Urea Nitrogen  07/12/2024 16.2  8.0 - 23.0 mg/dL Final    Creatinine 07/12/2024 0.95  0.67 - 1.17 mg/dL Final    GFR Estimate 07/12/2024 80  >60 mL/min/1.73m2 Final    Calcium 07/12/2024 9.0  8.8 - 10.2 mg/dL Final    Chloride 07/12/2024 96 (L)  98 - 107 mmol/L Final    Glucose 07/12/2024 105 (H)  70 - 99 mg/dL Final    Alkaline Phosphatase 07/12/2024 95  40 - 150 U/L Final    AST 07/12/2024 24  0 - 45 U/L Final    ALT 07/12/2024 16  0 - 70 U/L Final    Protein Total 07/12/2024 7.5  6.4 - 8.3 g/dL Final    Albumin 07/12/2024 4.7  3.5 - 5.2 g/dL Final    Bilirubin Total 07/12/2024 0.9  <=1.2 mg/dL Final    WBC Count 07/12/2024 5.2  4.0 - 11.0 10e3/uL Final    RBC Count 07/12/2024 3.57 (L)  4.40 - 5.90 10e6/uL Final    Hemoglobin 07/12/2024 11.9 (L)  13.3 - 17.7 g/dL Final    Hematocrit 07/12/2024 36.2 (L)  40.0 - 53.0 % Final    MCV 07/12/2024 101 (H)  78 - 100 fL Final    MCH 07/12/2024 33.3 (H)  26.5 - 33.0 pg Final    MCHC 07/12/2024 32.9  31.5 - 36.5 g/dL Final    RDW 07/12/2024 14.2  10.0 - 15.0 % Final    Platelet Count 07/12/2024 141 (L)  150 - 450 10e3/uL Final    % Neutrophils 07/12/2024 70  % Final    % Lymphocytes 07/12/2024 17  % Final    % Monocytes 07/12/2024 8  % Final    % Eosinophils 07/12/2024 4  % Final    % Basophils 07/12/2024 1  % Final    % Immature Granulocytes 07/12/2024 0  % Final    Absolute Neutrophils 07/12/2024 3.6  1.6 - 8.3 10e3/uL Final    Absolute Lymphocytes 07/12/2024 0.9  0.8 - 5.3 10e3/uL Final    Absolute Monocytes 07/12/2024 0.4  0.0 - 1.3 10e3/uL Final    Absolute Eosinophils 07/12/2024 0.2  0.0 - 0.7 10e3/uL Final    Absolute Basophils 07/12/2024 0.0  0.0 - 0.2 10e3/uL Final    Absolute Immature Granulocytes 07/12/2024 0.0  <=0.4 10e3/uL Final   Lab on 05/24/2024   Component Date Value Ref Range Status    Sodium 05/24/2024 136  135 - 145 mmol/L Final    Potassium 05/24/2024 4.4  3.4 - 5.3 mmol/L Final    Chloride 05/24/2024 98  98 - 107 mmol/L Final    Carbon Dioxide (CO2) 05/24/2024 28  22  - 29 mmol/L Final    Anion Gap 05/24/2024 10  7 - 15 mmol/L Final    Urea Nitrogen 05/24/2024 13.3  8.0 - 23.0 mg/dL Final    Creatinine 05/24/2024 1.13  0.67 - 1.17 mg/dL Final    GFR Estimate 05/24/2024 65  >60 mL/min/1.73m2 Final    Calcium 05/24/2024 9.2  8.8 - 10.2 mg/dL Final    Glucose 05/24/2024 109 (H)  70 - 99 mg/dL Final         Total time spent for face to face visit, reviewing labs/imaging studies, counseling and coordination of care was: 45 Minutes spent on the date of the encounter doing chart review, review of outside records, review of test results, interpretation of tests, patient visit, and documentation     The longitudinal plan of care for the diagnosis(es)/condition(s) as documented were addressed during this visit. Due to the added complexity in care, I will continue to support Gavin in the subsequent management and with ongoing continuity of care.    This note was dictated using voice recognition software.  Any grammatical or context distortions are unintentional and inherent to the software.    Orders Placed This Encounter   Procedures    MR Cervical Spine w/o Contrast    Ceruloplasmin    Copper level    TSH with free T4 reflex    Adult Sleep Eval & Management  Referral      New Prescriptions    No medications on file     Modified Medications    No medications on file

## 2024-07-29 ENCOUNTER — OFFICE VISIT (OUTPATIENT)
Dept: NEUROLOGY | Facility: CLINIC | Age: 79
End: 2024-07-29
Attending: FAMILY MEDICINE
Payer: COMMERCIAL

## 2024-07-29 ENCOUNTER — TELEPHONE (OUTPATIENT)
Dept: FAMILY MEDICINE | Facility: CLINIC | Age: 79
End: 2024-07-29

## 2024-07-29 ENCOUNTER — VIRTUAL VISIT (OUTPATIENT)
Dept: FAMILY MEDICINE | Facility: CLINIC | Age: 79
End: 2024-07-29
Payer: COMMERCIAL

## 2024-07-29 VITALS
DIASTOLIC BLOOD PRESSURE: 57 MMHG | HEART RATE: 69 BPM | WEIGHT: 163.9 LBS | SYSTOLIC BLOOD PRESSURE: 100 MMHG | BODY MASS INDEX: 24.92 KG/M2

## 2024-07-29 DIAGNOSIS — G47.52 REM SLEEP BEHAVIOR DISORDER: ICD-10-CM

## 2024-07-29 DIAGNOSIS — H10.023 PINK EYE DISEASE, BILATERAL: Primary | ICD-10-CM

## 2024-07-29 DIAGNOSIS — R74.8 ABNORMAL LEVELS OF OTHER SERUM ENZYMES: ICD-10-CM

## 2024-07-29 DIAGNOSIS — R26.0 ATAXIC GAIT: ICD-10-CM

## 2024-07-29 DIAGNOSIS — H57.9 ITCH OF EYE: ICD-10-CM

## 2024-07-29 DIAGNOSIS — G25.0 BENIGN ESSENTIAL TREMOR: Primary | ICD-10-CM

## 2024-07-29 PROBLEM — H90.3 SENSORINEURAL HEARING LOSS (SNHL) OF BOTH EARS: Status: ACTIVE | Noted: 2024-07-29

## 2024-07-29 PROCEDURE — 99213 OFFICE O/P EST LOW 20 MIN: CPT | Mod: 95 | Performed by: NURSE PRACTITIONER

## 2024-07-29 PROCEDURE — 99215 OFFICE O/P EST HI 40 MIN: CPT | Performed by: PSYCHIATRY & NEUROLOGY

## 2024-07-29 PROCEDURE — G2211 COMPLEX E/M VISIT ADD ON: HCPCS | Performed by: PSYCHIATRY & NEUROLOGY

## 2024-07-29 RX ORDER — OFLOXACIN 3 MG/ML
SOLUTION/ DROPS OPHTHALMIC
Qty: 5 ML | Refills: 0 | Status: SHIPPED | OUTPATIENT
Start: 2024-07-29 | End: 2024-08-16

## 2024-07-29 ASSESSMENT — ANXIETY QUESTIONNAIRES
8. IF YOU CHECKED OFF ANY PROBLEMS, HOW DIFFICULT HAVE THESE MADE IT FOR YOU TO DO YOUR WORK, TAKE CARE OF THINGS AT HOME, OR GET ALONG WITH OTHER PEOPLE?: SOMEWHAT DIFFICULT
4. TROUBLE RELAXING: SEVERAL DAYS
GAD7 TOTAL SCORE: 7
GAD7 TOTAL SCORE: 7
7. FEELING AFRAID AS IF SOMETHING AWFUL MIGHT HAPPEN: NOT AT ALL
7. FEELING AFRAID AS IF SOMETHING AWFUL MIGHT HAPPEN: NOT AT ALL
5. BEING SO RESTLESS THAT IT IS HARD TO SIT STILL: SEVERAL DAYS
2. NOT BEING ABLE TO STOP OR CONTROL WORRYING: MORE THAN HALF THE DAYS
1. FEELING NERVOUS, ANXIOUS, OR ON EDGE: SEVERAL DAYS
IF YOU CHECKED OFF ANY PROBLEMS ON THIS QUESTIONNAIRE, HOW DIFFICULT HAVE THESE PROBLEMS MADE IT FOR YOU TO DO YOUR WORK, TAKE CARE OF THINGS AT HOME, OR GET ALONG WITH OTHER PEOPLE: SOMEWHAT DIFFICULT
6. BECOMING EASILY ANNOYED OR IRRITABLE: SEVERAL DAYS
3. WORRYING TOO MUCH ABOUT DIFFERENT THINGS: SEVERAL DAYS

## 2024-07-29 NOTE — TELEPHONE ENCOUNTER
Patients spouse calling in regards to the patient having pink eye. Advised to schedule a virtual visit. Patients spouse voiced understanding.    Patient's neurologist would also like patient to have a cervical MRI at the same time as scheduled MRI. Advised patients spouse to check with insurance about doing both in the same day and to call imaging as they may have to reschedule due to time. Patients spouse voiced understanding.    Kervin WHITMAN RN  [facility]

## 2024-07-29 NOTE — LETTER
"2024      Josemanuel Edmond  [address] [place] [address]      Dear Colleague,    Thank you for referring your patient, Josemanuel Edmond, to the [facility] [place]. Please see a copy of my visit note below.    NEUROLOGY FOLLOW UP VISIT  NOTE       [facility] [place]  [address] [place] [address]  Tel: [phone]  Fax: [phone]  [email or web address]     Josemanuel Edmond,  1945, MRN [id number]  PCP: Lizzy Leiva I  Date: 2024      ASSESSMENT & PLAN     Visit Diagnosis  Benign essential tremor     Benign essential tremor  79-year-old male with history of depression and anxiety, HTN, HLD, GERD, alcoholism in remission, A-fib s/p cardioversion who returns for follow-up.  He was last seen on 2022 for benign essential tremor and at that time he was not interested in any treatment.  He has minimal intention tremor and I suspect previously these tremors were not as obvious due to his history of alcohol use.  At present I have recommended no intervention but I have ordered serum copper and ceruloplasmin level.  Follow-up after above test    Gait difficulty  Patient has noticed progressive gait difficulty that likely is multifactorial due to his age, past history of alcohol use resulting in peripheral neuropathy.  I have recommended:    1.  MRI brain and cervical spine  2.  Wife is reporting acting out during his sleep and he tends to talk and if he has to get up to go to the bathroom appears \"out of it\" and at times falls down.  I have recommended evaluation by speech REM sleep disorder    Thank you again for this referral, please feel free to contact me if you have any questions.    Adriel Hawkins MD  [facility], [place]     HISTORY OF PRESENT ILLNESS     Patient is a 79-year-old male with history of depression and anxiety, HTN, HLD, GERD, alcoholism in remission, A-fib s/p " cardioversion last seen on 11/16/2022 for benign essential tremor who returns for follow-up.  During his last visit I had reassured him that he does not have Parkinson disease and recommended lab work that included a normal ceruloplasmin level but borderline elevated copper.  Patient symptoms are minimal and he wanted to hold off on starting any medication    He is here today with worsening tremors that tend to get worse later in the day.  Also wife reports that at nights while sleeping he acts out and if he has to get up to go to the bathroom he appears a lot more ataxic and at times falls down.  This is problematic as he is on a DOAC and does tend to get increased bruises on his body.  He does not have as much of a balance issue during daytime.     PROBLEM LIST   Patient Active Problem List   Diagnosis     Benign essential hypertension     Hypertrophy of prostate with urinary obstruction     Osteoarthrosis, unspecified whether generalized or localized, pelvic region and thigh     Hyperlipidemia LDL goal <100     GERD (gastroesophageal reflux disease)     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)     Hyperplastic Polyp     Right knee DJD     Peripheral neuropathy     Benzodiazepine dependence (H)     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     Unilateral inguinal hernia without obstruction or gangrene     Atrial fibrillation, unspecified type (H)     Depression with anxiety     Status post lung surgery     Memory difficulties     Tremor     Chronic diastolic heart failure (H)     Valvular heart disease     Social phobia     S/P revision of total hip     Anemia, unspecified type     Moderate major depression (H)     Chronic anticoagulation     Chronic heart failure with preserved ejection fraction (H)     Insomnia, unspecified type     Longstanding persistent atrial fibrillation (H)     Cardiomyopathy, unspecified type (H)     Sensorineural hearing loss (SNHL) of both ears         PAST MEDICAL & SURGICAL HISTORY     Past Medical History:    Patient  has a past medical history of Acute on chronic diastolic (congestive) heart failure (H) (11/29/2023), Arthritis (2005), Benign neoplasm of prostate (2000), Depressive disorder, Infection due to 2019 novel coronavirus (09/27/2021), S/P knee replacement (11/06/2012), and S/P left knee arthroscopy (08/15/2011).    Surgical History:  He  has a past surgical history that includes surgical history of -  (12/01/1999); joint replacement, hip rt/lt (10/2007); joint replacemtn, knee rt/lt (08/2011); Colonoscopy (N/A, 12/10/2015); surgical history of -  (Left, 11/2017); Laparoscopic herniorrhaphy inguinal bilateral (Bilateral, 04/24/2018); Arthroplasty knee (Right, 10/12/2018); Phacoemulsification with standard intraocular lens implant (Right, 01/06/2021); Phacoemulsification with standard intraocular lens implant (Left, 02/17/2021); Abdomen surgery (2003); biopsy (2007); and Arthroplasty revision hip (Left, 5/25/2023).     SOCIAL HISTORY     Reviewed, and he  reports that he quit smoking about 48 years ago. His smoking use included cigarettes. He started smoking about 66 years ago. He has a 17.8 pack-year smoking history. He has never used smokeless tobacco. He reports that he does not currently use alcohol. He reports that he does not use drugs.     FAMILY HISTORY     Reviewed, and family history includes Breast Cancer in his mother; Cerebrovascular Disease in his mother; Depression in his mother; Diabetes in his brother; Neurologic Disorder in his mother; Parkinsonism in his mother; Respiratory in his father.     ALLERGIES     Allergies   Allergen Reactions     Bactrim [Sulfamethoxazole-Trimethoprim] Nausea and Vomiting         REVIEW OF SYSTEMS     A 12 point review of system was performed and was negative except as outlined in the history of present illness.     HOME MEDICATIONS     Current Outpatient Rx   Medication Sig Dispense Refill     buPROPion (WELLBUTRIN XL) 300 MG 24 hr tablet Take 1 tablet (300 mg) by  mouth every morning 90 tablet 0     busPIRone HCl (BUSPAR) 30 MG tablet TAKE 1 TABLET TWICE A  tablet 3     doxazosin (CARDURA) 8 MG tablet Take 0.5 tablets (4 mg) by mouth At Bedtime 45 tablet 3     finasteride (PROSCAR) 5 MG tablet Take 1 tablet (5 mg) by mouth daily 90 tablet 0     furosemide (LASIX) 20 MG tablet Take 2 tablets (40 mg) by mouth daily 180 tablet 3     gabapentin (NEURONTIN) 300 MG capsule Take 1 capsule (300 mg) by mouth 3 times daily In addition to 600mg capsule. 90 capsule 3     gabapentin (NEURONTIN) 600 MG tablet TAKE 1 TABLET FOUR TIMES A  tablet 3     hydrOXYzine HCl (ATARAX) 25 MG tablet Take 1 tablet (25 mg) by mouth every 8 hours as needed for itching or other (insomnia) 30 tablet 3     lamoTRIgine (LAMICTAL) 25 MG tablet Take 2 tablets (50 mg) by mouth 2 times daily 360 tablet 0     losartan (COZAAR) 100 MG tablet Take 1 tablet (100 mg) by mouth daily 90 tablet 3     metoprolol tartrate (LOPRESSOR) 100 MG tablet Take 1 tablet (100 mg) by mouth 2 times daily 180 tablet 3     multivitamin (ONE-DAILY) tablet Take 1 tablet by mouth daily 30 tablet 0     PARoxetine (PAXIL) 20 MG tablet Orally/by mouth: 1 tab (20 mg) twice daily x 7 days, 1 tab (20 mg) in am + 30 mg in pm x 7 days 21 tablet 0     PARoxetine (PAXIL) 30 MG tablet Take 1 tablet (30 mg) by mouth 2 times daily 180 tablet 0     XARELTO ANTICOAGULANT 20 MG TABS tablet TAKE 1 TABLET DAILY WITH   DINNER 90 tablet 3     zolpidem (AMBIEN) 5 MG tablet Take 1 tablet (5 mg) by mouth nightly as needed for sleep 30 tablet 2         PHYSICAL EXAM     Vital signs  /57   Pulse 69   Wt 74.3 kg (163 lb 14.4 oz)   BMI 24.92 kg/m      Weight:   163 lbs 14.4 oz    Elderly gentleman who is alert and oriented vital signs were reviewed and are documented in electronic medical record. Neck supple. Neurologically speech was normal cranial nerves II through XII are intact except he is hard of hearing no cogwheel rigidity he has  bilateral intention tremor strength is 5/5 reflexes are 1+ toes equivocal. He has decreased arm swing on the right but no cogwheel rigidity. He is not unstable on turning      PERTINENT DIAGNOSTIC STUDIES     Following studies were reviewed:     MRI LUMBAR SPINE 5/1/2015  1. Degenerative disc disease L1-2 with small left central disc  protrusion, increased slightly since prior exam. No direct impingement  on neural structures.  2. Interval development of mild central stenosis L2-3 related to  multiple factors. Mild to moderate left and minimal right foraminal  stenosis at this level.  3. Mild central stenosis L3-4 related to multiple factors, increased  since the prior exam. Mild bilateral foraminal stenosis at this level.  4. Mild central stenosis L4-5 related to multiple factors, including a  grade 1 degenerative anterolisthesis which has increased since the  prior study. Moderate to severe right and mild left foraminal stenosis  at this level are unchanged.  5. Partial sacralization of the L5 vertebral segment on the left. No  impingement on neural structures at this level.     PERTINENT LABS  Following labs were reviewed:  Hospital Outpatient Visit on 07/26/2024   Component Date Value Ref Range Status     LVEF  07/26/2024 45-50%   Final   Lab on 07/24/2024   Component Date Value Ref Range Status     Sodium 07/24/2024 134 (L)  135 - 145 mmol/L Final     Potassium 07/24/2024 4.7  3.4 - 5.3 mmol/L Final     Chloride 07/24/2024 99  98 - 107 mmol/L Final     Carbon Dioxide (CO2) 07/24/2024 28  22 - 29 mmol/L Final     Anion Gap 07/24/2024 7  7 - 15 mmol/L Final     Urea Nitrogen 07/24/2024 14.4  8.0 - 23.0 mg/dL Final     Creatinine 07/24/2024 1.03  0.67 - 1.17 mg/dL Final     GFR Estimate 07/24/2024 73  >60 mL/min/1.73m2 Final     Calcium 07/24/2024 9.0  8.8 - 10.4 mg/dL Final     Glucose 07/24/2024 105 (H)  70 - 99 mg/dL Final   Office Visit on 07/12/2024   Component Date Value Ref Range Status     TSH 07/12/2024  2.23  0.30 - 4.20 uIU/mL Final     Vitamin B12 07/12/2024 2,863 (H)  232 - 1,245 pg/mL Final     Sodium 07/12/2024 136  135 - 145 mmol/L Final     Potassium 07/12/2024 4.4  3.4 - 5.3 mmol/L Final     Carbon Dioxide (CO2) 07/12/2024 28  22 - 29 mmol/L Final     Anion Gap 07/12/2024 12  7 - 15 mmol/L Final     Urea Nitrogen 07/12/2024 16.2  8.0 - 23.0 mg/dL Final     Creatinine 07/12/2024 0.95  0.67 - 1.17 mg/dL Final     GFR Estimate 07/12/2024 80  >60 mL/min/1.73m2 Final     Calcium 07/12/2024 9.0  8.8 - 10.2 mg/dL Final     Chloride 07/12/2024 96 (L)  98 - 107 mmol/L Final     Glucose 07/12/2024 105 (H)  70 - 99 mg/dL Final     Alkaline Phosphatase 07/12/2024 95  40 - 150 U/L Final     AST 07/12/2024 24  0 - 45 U/L Final     ALT 07/12/2024 16  0 - 70 U/L Final     Protein Total 07/12/2024 7.5  6.4 - 8.3 g/dL Final     Albumin 07/12/2024 4.7  3.5 - 5.2 g/dL Final     Bilirubin Total 07/12/2024 0.9  <=1.2 mg/dL Final     WBC Count 07/12/2024 5.2  4.0 - 11.0 10e3/uL Final     RBC Count 07/12/2024 3.57 (L)  4.40 - 5.90 10e6/uL Final     Hemoglobin 07/12/2024 11.9 (L)  13.3 - 17.7 g/dL Final     Hematocrit 07/12/2024 36.2 (L)  40.0 - 53.0 % Final     MCV 07/12/2024 101 (H)  78 - 100 fL Final     MCH 07/12/2024 33.3 (H)  26.5 - 33.0 pg Final     MCHC 07/12/2024 32.9  31.5 - 36.5 g/dL Final     RDW 07/12/2024 14.2  10.0 - 15.0 % Final     Platelet Count 07/12/2024 141 (L)  150 - 450 10e3/uL Final     % Neutrophils 07/12/2024 70  % Final     % Lymphocytes 07/12/2024 17  % Final     % Monocytes 07/12/2024 8  % Final     % Eosinophils 07/12/2024 4  % Final     % Basophils 07/12/2024 1  % Final     % Immature Granulocytes 07/12/2024 0  % Final     Absolute Neutrophils 07/12/2024 3.6  1.6 - 8.3 10e3/uL Final     Absolute Lymphocytes 07/12/2024 0.9  0.8 - 5.3 10e3/uL Final     Absolute Monocytes 07/12/2024 0.4  0.0 - 1.3 10e3/uL Final     Absolute Eosinophils 07/12/2024 0.2  0.0 - 0.7 10e3/uL Final     Absolute Basophils  07/12/2024 0.0  0.0 - 0.2 10e3/uL Final     Absolute Immature Granulocytes 07/12/2024 0.0  <=0.4 10e3/uL Final   Lab on 05/24/2024   Component Date Value Ref Range Status     Sodium 05/24/2024 136  135 - 145 mmol/L Final     Potassium 05/24/2024 4.4  3.4 - 5.3 mmol/L Final     Chloride 05/24/2024 98  98 - 107 mmol/L Final     Carbon Dioxide (CO2) 05/24/2024 28  22 - 29 mmol/L Final     Anion Gap 05/24/2024 10  7 - 15 mmol/L Final     Urea Nitrogen 05/24/2024 13.3  8.0 - 23.0 mg/dL Final     Creatinine 05/24/2024 1.13  0.67 - 1.17 mg/dL Final     GFR Estimate 05/24/2024 65  >60 mL/min/1.73m2 Final     Calcium 05/24/2024 9.2  8.8 - 10.2 mg/dL Final     Glucose 05/24/2024 109 (H)  70 - 99 mg/dL Final         Total time spent for face to face visit, reviewing labs/imaging studies, counseling and coordination of care was: 45 Minutes spent on the date of the encounter doing chart review, review of outside records, review of test results, interpretation of tests, patient visit, and documentation     The longitudinal plan of care for the diagnosis(es)/condition(s) as documented were addressed during this visit. Due to the added complexity in care, I will continue to support Gavin in the subsequent management and with ongoing continuity of care.    This note was dictated using voice recognition software.  Any grammatical or context distortions are unintentional and inherent to the software.    Orders Placed This Encounter   Procedures     MR Cervical Spine w/o Contrast     Ceruloplasmin     Copper level     TSH with free T4 reflex     Adult Sleep Eval & Management  Referral      New Prescriptions    No medications on file     Modified Medications    No medications on file                 Again, thank you for allowing me to participate in the care of your patient.        Sincerely,        Adriel Hawkins MD

## 2024-07-29 NOTE — NURSING NOTE
"Josemanuel Edmond is a 79 year old male who presents for:  Chief Complaint   Patient presents with    Tremors     Pt took a fall yesterday heading to bed, patient got dizzy. Been having frequent fall.  Intermittently body jerks.         Initial Vitals:  /57   Pulse 69   Wt 74.3 kg (163 lb 14.4 oz)   BMI 24.92 kg/m   Estimated body mass index is 24.92 kg/m  as calculated from the following:    Height as of 7/12/24: 1.727 m (5' 8\").    Weight as of this encounter: 74.3 kg (163 lb 14.4 oz).. Body surface area is 1.89 meters squared. BP completed using cuff size: wrist cuff    Óscar Gray  "

## 2024-07-29 NOTE — PROGRESS NOTES
Gavin is a 79 year old who is being evaluated via a billable video visit.    How would you like to obtain your AVS? MyChart  If the video visit is dropped, the invitation should be resent by: Text to cell phone: [phone]  Will anyone else be joining your video visit? No      Assessment & Plan     Pink eye disease, bilateral  *  - ofloxacin (OCUFLOX) 0.3 % ophthalmic solution  Dispense: 5 mL; Refill: 0    Itch of eye  *  - ofloxacin (OCUFLOX) 0.3 % ophthalmic solution  Dispense: 5 mL; Refill: 0    - discussed conservative and warning signs today. If not better, get seen by eye doctor in 1-2 days.         Subjective   Gavin is a 79 year old, presenting for the following health issues:  Eye Itching Both Eyes and Conjunctivitis        7/12/2024     3:09 PM   Additional Questions   Roomed by KATIE Lobo     - about 2-3 days ago. Has eye itching, and eyes were matted shut in AM and discharge throughout the day. No eye pain. No vision changes.                 Objective           Vitals:  No vitals were obtained today due to virtual visit.    Physical Exam   GENERAL: alert and no distress  EYES: eyelids- goop noted on bilateral eyelids and conjunctiva/corneas- yellow colored discharge present bilateral, conjunctiva bilateral pinker in color  RESP: No audible wheeze, cough, or visible cyanosis.    SKIN: Visible skin clear. No significant rash, abnormal pigmentation or lesions.  NEURO: Cranial nerves grossly intact.  Mentation and speech appropriate for age.  PSYCH: Appropriate affect, tone, and pace of words          Video-Visit Details    Type of service:  Video Visit   Video End Time:  Originating Location (pt. Location): Home    Distant Location (provider location):  On-site  Platform used for Video Visit: Jackson  Signed Electronically by: KAYLA Narayan CNP  Called patient, not reaching him.

## 2024-08-09 ENCOUNTER — HOSPITAL ENCOUNTER (OUTPATIENT)
Dept: MRI IMAGING | Facility: CLINIC | Age: 79
Discharge: HOME | End: 2024-08-09
Attending: FAMILY MEDICINE | Admitting: FAMILY MEDICINE
Payer: COMMERCIAL

## 2024-08-09 DIAGNOSIS — G25.9 MOVEMENT DISORDER: ICD-10-CM

## 2024-08-09 PROCEDURE — 255N000002 HC RX 255 OP 636: Performed by: FAMILY MEDICINE

## 2024-08-09 PROCEDURE — 70553 MRI BRAIN STEM W/O & W/DYE: CPT

## 2024-08-09 PROCEDURE — A9585 GADOBUTROL INJECTION: HCPCS | Performed by: FAMILY MEDICINE

## 2024-08-09 RX ORDER — GADOBUTROL 604.72 MG/ML
7 INJECTION INTRAVENOUS ONCE
Status: COMPLETED | OUTPATIENT
Start: 2024-08-09 | End: 2024-08-09

## 2024-08-09 RX ADMIN — GADOBUTROL 7 ML: 604.72 INJECTION INTRAVENOUS at 12:08

## 2024-08-09 NOTE — LETTER
August 29, 2024      Gavin Edmond  [address]   [place] [address]        Dear Gavin,    We are writing to inform you of your test results.    MRI brain shows age-related changes.  There is chronic occlusion of right carotid artery.  No acute abnormality noted.     Resulted Orders   MR Brain w/o & w Contrast    Narrative    MRI BRAIN WITHOUT AND WITH CONTRAST  8/9/2024 12:27 PM     HISTORY:  Movement disorder.    TECHNIQUE:  Multiplanar, multisequence MRI of the brain without and  with 7 mL Gadavist.     COMPARISON: Brain MR 5/18/2005.     FINDINGS: There is no evidence of acute infarct, hemorrhage, mass, or  herniation. Mild diffuse parenchymal volume loss. Mild patchy deep and  subcortical white matter T2 hyperintensities which are nonspecific,  but likely related to chronic microvascular ischemic disease.  Ventricular size within normal limits without hydrocephalus.     There is no abnormal intracranial enhancement.     Marked polypoid mucosal thickening in the right maxillary sinus. Small  right mastoid effusion.    Loss of flow-void of the right internal carotid artery at the skull  base compatible with right internal carotid artery occlusion which was  also present on 5/18/2005.      Impression    IMPRESSION:    1. No evidence of acute infarct, mass, hemorrhage, or herniation.  2. Mild diffuse parenchymal volume loss and white matter changes  likely due to chronic microvascular ischemic disease. This has  progressed since 5/18/2005.  3. 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     MARIAM LEIGH MD         SYSTEM ID:  [id number]       If you have any questions or concerns, please call the clinic at the number listed above.       Sincerely,      Lizzy Leiva MD

## 2024-08-16 ENCOUNTER — OFFICE VISIT (OUTPATIENT)
Dept: CARDIOLOGY | Facility: CLINIC | Age: 79
End: 2024-08-16
Attending: NURSE PRACTITIONER
Payer: COMMERCIAL

## 2024-08-16 VITALS
OXYGEN SATURATION: 97 % | HEIGHT: 68 IN | HEART RATE: 73 BPM | BODY MASS INDEX: 24.4 KG/M2 | DIASTOLIC BLOOD PRESSURE: 72 MMHG | WEIGHT: 161 LBS | SYSTOLIC BLOOD PRESSURE: 109 MMHG

## 2024-08-16 DIAGNOSIS — I42.9 CARDIOMYOPATHY, UNSPECIFIED TYPE (H): ICD-10-CM

## 2024-08-16 DIAGNOSIS — I48.91 ATRIAL FIBRILLATION, UNSPECIFIED TYPE (H): ICD-10-CM

## 2024-08-16 DIAGNOSIS — I38 VALVULAR HEART DISEASE: ICD-10-CM

## 2024-08-16 DIAGNOSIS — E78.5 HYPERLIPIDEMIA LDL GOAL <100: ICD-10-CM

## 2024-08-16 DIAGNOSIS — I50.32 CHRONIC HEART FAILURE WITH PRESERVED EJECTION FRACTION (H): Primary | ICD-10-CM

## 2024-08-16 DIAGNOSIS — I10 BENIGN ESSENTIAL HYPERTENSION: ICD-10-CM

## 2024-08-16 PROCEDURE — 99214 OFFICE O/P EST MOD 30 MIN: CPT | Performed by: NURSE PRACTITIONER

## 2024-08-16 RX ORDER — METOPROLOL SUCCINATE 200 MG/1
200 TABLET, EXTENDED RELEASE ORAL EVERY EVENING
Qty: 30 TABLET | Refills: 11 | Status: SHIPPED | OUTPATIENT
Start: 2024-08-16

## 2024-08-16 NOTE — NURSING NOTE
"Chief Complaint   Patient presents with    Heart Problem     F/up,HFpEF,CM,Afib and Review  echo       Vitals:    08/16/24 0919   Pulse: 73   SpO2: 97%   Weight: 73 kg (161 lb)   Height: 1.727 m (5' 8\")     Wt Readings from Last 1 Encounters:   08/16/24 73 kg (161 lb)   Jazmín Aquino MA      "

## 2024-08-16 NOTE — PATIENT INSTRUCTIONS
Medication Changes:  Can try holding losartan for 1 week to see if cough resolves. If not, restart losartan. If cough resolves, call and we will change the medication.  Stop metoprolol tartrate   Start metoprolol succinate  mg daily and take in the evening to see if fatigue improves. If not improving, we can try decreasing the dose or adding entresto.       Recommendations:  Check blood pressure at least 1 hour after medications. Call the clinic if your blood pressure is consistently greater than 130/80.   Check daily weights and call the clinic if your weight has increased more than 2 lbs in one day or 5 lbs in one week; if you feel more short of breath or have worsening swelling in your legs or abdomen.    Follow-up:  Cardiology follow up at [facility]: carmelita in 1-2 months.     Cardiology Scheduling~[phone]  Cardiology Clinic RN~[phone] (Rosita RN, Alda RN; Jaimie RN)

## 2024-08-16 NOTE — PROGRESS NOTES
Cardiology Clinic Progress Note  Josemanuel Edmond MRN# [id number]   YOB: 1945 Age: 79 year old      Primary Cardiologist:   Dr. Chapin for 2-month follow-up          History of Presenting Illness:      In February 2024 he had fatigue with exertion and heart failure.  A-fib rates were controlled and cardiomyopathy medications uptitrated.  Lexiscan was negative.  Jardiance was cost prohibitive.    Most recent lipid profile, BMP, ALT reviewed today. Echo 7/26/2024 showing stable EF 45 to 50%, mildly reduced RV function, moderately severe to severe MR, severe TR, moderate pulmonary hypertension, moderately severe pulmonic regurgitation.  Per the reader, compared to January there are no significant changes.  Results reviewed today.    Side effect of increasing losartan? Has worsening cough over last few weeks   Weight?  Stable Dry 154 home  Heart failure symptoms? None   BP? Controlled   Bleeding concerns?  None   Chronic mild [place] stable   Has fatigue in the morning after sleeping at night.   Has been having falls at night which they are attributing to Ambien.  Have MTM visit upcoming.     Patient reports no chest pain, PND, orthopnea, presyncope, syncope, heart racing, or palpitations.                    Assessment and Plan:     Plan  Patient Instructions   Medication Changes:  Can try holding losartan for 1 week to see if cough resolves. If not, restart losartan. If cough resolves, call and we will change the medication.  Stop metoprolol tartrate   Start metoprolol succinate  mg daily and take in the evening to see if fatigue improves. If not improving, we can try decreasing the dose or adding entresto.       Recommendations:  Check blood pressure at least 1 hour after medications. Call the clinic if your blood pressure is consistently greater than 130/80.   Check daily weights and call the clinic if your weight has increased more than 2 lbs in one day or 5 lbs in one week; if you feel more short  of breath or have worsening swelling in your legs or abdomen.    Follow-up:  Cardiology follow up at [facility]: nazanin in 1-2 months to see if fatigue and cough are improved.  Consider starting Entresto (decrease lasix), if needed    Cardiology Scheduling~[phone]  Cardiology Clinic RN~[phone] (Rosita RN, Alda RN; Jaimie RN)          Problem List as of 8/16/2024 Reviewed: 8/1/2024  4:48 PM by Mariposa Jimenez MD            Noted       Active Problems    1. Benign essential hypertension 12/12/2005     Last Assessment & Plan 6/26/2024 Office Visit Written 6/26/2024  7:39 AM by Nazanin Caballero APRN CNP      Controlled  Continue current medications          Relevant Medications     metoprolol succinate ER (TOPROL XL) 200 MG 24 hr tablet     Other Relevant Orders     Follow-Up with Cardiology    2. Hyperlipidemia LDL goal <100 10/31/2010     Last Assessment & Plan 6/26/2024 Office Visit Edited 6/26/2024  7:40 AM by Nazanin Caballero APRN CNP      LDL at goal without medication  Continue lifestyle modification          Relevant Orders     Follow-Up with Cardiology    3. Atrial fibrillation, unspecified type (H) 1/18/2018     Overview Addendum 6/26/2024  7:41 AM by Nazanin Caballero APRN CNP      onset 2018 postoperatively, S/p cardioversion   Recurrence 2021 with palpitations  Now chronic A-fib  Severe LA  Elevated [id number] score          Last Assessment & Plan 6/26/2024 Office Visit Written 6/26/2024  7:41 AM by Nazanin Caballero APRN CNP      Continue rate control, anticoagulation          Relevant Medications     metoprolol succinate ER (TOPROL XL) 200 MG 24 hr tablet     Other Relevant Orders     Follow-Up with Cardiology    4. Valvular heart disease 9/22/2022     Overview Signed 6/26/2024  7:41 AM by Nazanin Caballero APRN CNP      Moderate to moderately severe MR, moderate TR, mild to moderate AI 2023  Severe MR/TR, moderate AI 2024          Last  Assessment & Plan 6/26/2024 Office Visit Written 6/26/2024  7:41 AM by Nazanin Caballero APRN CNP      Follow with echo          Relevant Orders     Follow-Up with Cardiology    5. Chronic heart failure with preserved ejection fraction (H) - Primary 11/29/2023     Overview Signed 6/26/2024  7:43 AM by Nazanin Caballero APRN CNP      EF 50-55% 2023  45 to 50% 1/2024  Heart failure episode 2/2024  Negative Lexiscan 2/2024  Dry weight 154 pounds at home          Last Assessment & Plan 6/26/2024 Office Visit Written 6/26/2024  7:43 AM by Nazanin Caballero APRN CNP      No symptoms of heart failure  Continue GDMT          Relevant Orders     Follow-Up with Cardiology    6. Cardiomyopathy, unspecified type (H) 3/7/2024     Relevant Medications     metoprolol succinate ER (TOPROL XL) 200 MG 24 hr tablet     Other Relevant Orders     Follow-Up with Cardiology             Respiratory:  clear to auscultation; normal symmetry        Cardiac: regular rate and rhythm irregularly irregular   GI:  abdomen nondistended     Extremities and Muscular Skeletal:   Mild right lower extremity edema               Thank you for allowing me to participate in this delightful patient's care.   This note was completed in part using Dragon voice recognition software. Although reviewed after completion, some word and grammatical errors may occur.    KAYLA Parikh CNP

## 2024-08-16 NOTE — LETTER
8/16/2024    Lizzy Leiva [place]  [address] [address] [facility] [address]    RE: Josemanuel Edmond       Dear Colleague,     I had the pleasure of seeing Josemanuel Edmond in the [facility].  Cardiology Clinic Progress Note  Josemanuel Edmond MRN# [id number]   YOB: 1945 Age: 79 year old      Primary Cardiologist:   Dr. Chapin for 2-month follow-up          History of Presenting Illness:      In February 2024 he had fatigue with exertion and heart failure.  A-fib rates were controlled and cardiomyopathy medications uptitrated.  Lexiscan was negative.  Jardiance was cost prohibitive.    Most recent lipid profile, BMP, ALT reviewed today. Echo 7/26/2024 showing stable EF 45 to 50%, mildly reduced RV function, moderately severe to severe MR, severe TR, moderate pulmonary hypertension, moderately severe pulmonic regurgitation.  Per the reader, compared to January there are no significant changes.  Results reviewed today.    Side effect of increasing losartan? Has worsening cough over last few weeks   Weight?  Stable Dry 154 home  Heart failure symptoms? None   BP? Controlled   Bleeding concerns?  None   Chronic mild [place] stable   Has fatigue in the morning after sleeping at night.   Has been having falls at night which they are attributing to Ambien.  Have MTM visit upcoming.     Patient reports no chest pain, PND, orthopnea, presyncope, syncope, heart racing, or palpitations.                    Assessment and Plan:     Plan  Patient Instructions   Medication Changes:  Can try holding losartan for 1 week to see if cough resolves. If not, restart losartan. If cough resolves, call and we will change the medication.  Stop metoprolol tartrate   Start metoprolol succinate  mg daily and take in the evening to see if fatigue improves. If not improving, we can try decreasing the dose or adding entresto.       Recommendations:  Check blood pressure at least 1 hour after medications.  Call the clinic if your blood pressure is consistently greater than 130/80.   Check daily weights and call the clinic if your weight has increased more than 2 lbs in one day or 5 lbs in one week; if you feel more short of breath or have worsening swelling in your legs or abdomen.    Follow-up:  Cardiology follow up at [facility]: nazanin in 1-2 months to see if fatigue and cough are improved.  Consider starting Entresto (decrease lasix), if needed    Cardiology Scheduling~[phone]  Cardiology Clinic RN~[phone] (Rosita RN, Alda RN; Jaimie RN)          Problem List as of 8/16/2024 Reviewed: 8/1/2024  4:48 PM by Mariposa Jimenez MD            Noted       Active Problems    1. Benign essential hypertension 12/12/2005     Last Assessment & Plan 6/26/2024 Office Visit Written 6/26/2024  7:39 AM by Nazanin Caballero APRN CNP      Controlled  Continue current medications          Relevant Medications     metoprolol succinate ER (TOPROL XL) 200 MG 24 hr tablet     Other Relevant Orders     Follow-Up with Cardiology    2. Hyperlipidemia LDL goal <100 10/31/2010     Last Assessment & Plan 6/26/2024 Office Visit Edited 6/26/2024  7:40 AM by Nazanin Caballero APRN CNP      LDL at goal without medication  Continue lifestyle modification          Relevant Orders     Follow-Up with Cardiology    3. Atrial fibrillation, unspecified type (H) 1/18/2018     Overview Addendum 6/26/2024  7:41 AM by Nazanin Caballero APRN CNP      onset 2018 postoperatively, S/p cardioversion   Recurrence 2021 with palpitations  Now chronic A-fib  Severe LA  Elevated [id number] score          Last Assessment & Plan 6/26/2024 Office Visit Written 6/26/2024  7:41 AM by Nazanin Caballero APRN CNP      Continue rate control, anticoagulation          Relevant Medications     metoprolol succinate ER (TOPROL XL) 200 MG 24 hr tablet     Other Relevant Orders     Follow-Up with Cardiology    4. Valvular heart  disease 9/22/2022     Overview Signed 6/26/2024  7:41 AM by Nazanin Caballero APRN CNP      Moderate to moderately severe MR, moderate TR, mild to moderate AI 2023  Severe MR/TR, moderate AI 2024          Last Assessment & Plan 6/26/2024 Office Visit Written 6/26/2024  7:41 AM by Nazanin Caballero APRN CNP      Follow with echo          Relevant Orders     Follow-Up with Cardiology    5. Chronic heart failure with preserved ejection fraction (H) - Primary 11/29/2023     Overview Signed 6/26/2024  7:43 AM by Nazanin Caballero APRN CNP      EF 50-55% 2023  45 to 50% 1/2024  Heart failure episode 2/2024  Negative Lexiscan 2/2024  Dry weight 154 pounds at home          Last Assessment & Plan 6/26/2024 Office Visit Written 6/26/2024  7:43 AM by Nazanin Caballero APRN CNP      No symptoms of heart failure  Continue GDMT          Relevant Orders     Follow-Up with Cardiology    6. Cardiomyopathy, unspecified type (H) 3/7/2024     Relevant Medications     metoprolol succinate ER (TOPROL XL) 200 MG 24 hr tablet     Other Relevant Orders     Follow-Up with Cardiology             Respiratory:  clear to auscultation; normal symmetry        Cardiac: regular rate and rhythm irregularly irregular   GI:  abdomen nondistended     Extremities and Muscular Skeletal:   Mild right lower extremity edema               Thank you for allowing me to participate in this delightful patient's care.   This note was completed in part using Dragon voice recognition software. Although reviewed after completion, some word and grammatical errors may occur.    KAYLA Parikh CNP                  Thank you for allowing me to participate in the care of your patient.      Sincerely,     KAYLA Parikh CNP     [facility]  cc:   KAYLA Allen CNP  [address] [place] [address]

## 2024-08-18 DIAGNOSIS — G47.00 INSOMNIA, UNSPECIFIED TYPE: ICD-10-CM

## 2024-08-20 RX ORDER — HYDROXYZINE HYDROCHLORIDE 25 MG/1
25 TABLET, FILM COATED ORAL EVERY 8 HOURS PRN
Qty: 30 TABLET | Refills: 3 | Status: SHIPPED | OUTPATIENT
Start: 2024-08-20

## 2024-08-27 ENCOUNTER — MYC MEDICAL ADVICE (OUTPATIENT)
Dept: BEHAVIORAL HEALTH | Facility: CLINIC | Age: 79
End: 2024-08-27

## 2024-08-27 DIAGNOSIS — F33.0 MILD EPISODE OF RECURRENT MAJOR DEPRESSIVE DISORDER: Primary | ICD-10-CM

## 2024-08-28 ENCOUNTER — TELEPHONE (OUTPATIENT)
Dept: BEHAVIORAL HEALTH | Facility: CLINIC | Age: 79
End: 2024-08-28
Payer: COMMERCIAL

## 2024-08-28 RX ORDER — PAROXETINE 10 MG/1
10 TABLET, FILM COATED ORAL DAILY
Qty: 7 TABLET | Refills: 0 | Status: SHIPPED | OUTPATIENT
Start: 2024-08-28 | End: 2024-10-28

## 2024-08-28 RX ORDER — PAROXETINE 30 MG/1
30 TABLET, FILM COATED ORAL DAILY
Qty: 14 TABLET | Refills: 0 | Status: SHIPPED | OUTPATIENT
Start: 2024-08-28 | End: 2024-09-30

## 2024-08-28 RX ORDER — PAROXETINE 20 MG/1
20 TABLET, FILM COATED ORAL DAILY
Qty: 28 TABLET | Refills: 0 | Status: SHIPPED | OUTPATIENT
Start: 2024-08-28 | End: 2024-10-28

## 2024-08-28 NOTE — TELEPHONE ENCOUNTER
This writer left a message with the patient informing him there is a 8:30am appointment available for medication management on 8/29/24 @ 8:30am. This writer provided the Transition Clinic phone number to call back and confirm if he'd like to be scheduled.     Xochitl Matta, Lead Clinical Coordinator

## 2024-08-29 ENCOUNTER — TELEPHONE (OUTPATIENT)
Dept: BEHAVIORAL HEALTH | Facility: CLINIC | Age: 79
End: 2024-08-29
Payer: COMMERCIAL

## 2024-08-29 NOTE — TELEPHONE ENCOUNTER
Attempt to reach patient to schedule a visit, as requested by TC provider (Care coordinators please reschedule a follow up at T.C. psychiatry.)    VM left for patient asking for call back to TC.    Marilynn Chambers  Transition Clinic Coordinator  08/29/24 9:28 AM

## 2024-08-30 ENCOUNTER — TELEPHONE (OUTPATIENT)
Dept: CARDIOLOGY | Facility: CLINIC | Age: 79
End: 2024-08-30
Payer: COMMERCIAL

## 2024-08-30 NOTE — TELEPHONE ENCOUNTER
Pt sent the following [email or web address] message:     Nazanin,  I have been off of Losartan for 10 days. Cough is better. I took my blood pressure this morning and it was 142 over 93. heart rate was 98. It's got me concerned. what should I do next?  Gavin Caballero NP out of office until Wednesday 9/4/24. Will discuss with Dr Chapin for recommendations. Rosita Gibson RN Cardiology August 30, 2024, 9:17 AM

## 2024-09-09 DIAGNOSIS — G47.00 INSOMNIA, UNSPECIFIED TYPE: ICD-10-CM

## 2024-09-11 RX ORDER — ZOLPIDEM TARTRATE 5 MG/1
5 TABLET ORAL
Qty: 30 TABLET | Refills: 2 | Status: SHIPPED | OUTPATIENT
Start: 2024-09-11

## 2024-09-11 NOTE — TELEPHONE ENCOUNTER
"Per Dr Chapin: \"Let me know if we need to add a different agent, thanks\". [employer] message for updated BP. Rosita Gibson RN Cardiology September 11, 2024, 2:10 PM'    "

## 2024-09-13 NOTE — TELEPHONE ENCOUNTER
Called pt to get updated BP. Did BP while I waited. 102/63 with HR 91. He will continue to monitor and let us know if it is >130/90 consistently. Pt verbalized understanding and agreed with plan. Rosita Gibson RN Cardiology September 13, 2024, 10:34 AM

## 2024-09-25 ENCOUNTER — TELEPHONE (OUTPATIENT)
Dept: CARDIOLOGY | Facility: CLINIC | Age: 79
End: 2024-09-25
Payer: COMMERCIAL

## 2024-09-25 ENCOUNTER — TELEPHONE (OUTPATIENT)
Dept: BEHAVIORAL HEALTH | Facility: CLINIC | Age: 79
End: 2024-09-25
Payer: COMMERCIAL

## 2024-09-25 ENCOUNTER — MYC MEDICAL ADVICE (OUTPATIENT)
Dept: BEHAVIORAL HEALTH | Facility: CLINIC | Age: 79
End: 2024-09-25
Payer: COMMERCIAL

## 2024-09-25 DIAGNOSIS — F33.0 MILD EPISODE OF RECURRENT MAJOR DEPRESSIVE DISORDER (H): ICD-10-CM

## 2024-09-25 NOTE — TELEPHONE ENCOUNTER
Health Call Center    Phone Message    May a detailed message be left on voicemail: yes     Reason for Call: Other: Patient called wanting to let care team know that the fast heart rate they were feeling is now feeling ok and normal again. Please call patient back to further discuss.     Action Taken: Other: Cardiology    Travel Screening: Not Applicable     Thank you!  [facility]

## 2024-09-25 NOTE — TELEPHONE ENCOUNTER
See all messages below as well as telephone call from 8/30/24. Pt seems to call in with high readings but then they always come back down without intervening.     Alda Mejia RN

## 2024-09-25 NOTE — TELEPHONE ENCOUNTER
Health Call Center    Phone Message    May a detailed message be left on voicemail: yes     Reason for Call: Other: Gavin called requesting to speak with his care team about some elevated  heart rates he has had over the past few days. Gavin states his heart rate has been between 107-117 over the past few days. Please reach out to Gavin to discuss. Thank you!     Action Taken: Other: Cardiology    Travel Screening: Not Applicable    Thank you!  [facility]       Date of Service:

## 2024-09-25 NOTE — TELEPHONE ENCOUNTER
Routing call to Nazanin Caballero, CNP -     Called and spoke with pt. He had stopped the losartan to see if it improved his cough and it did. He went back on it and is tolerating it well with No return of cough.   BP has been great and stable 110-120's systolic. He is worried about his faster heart rate he has been having for the past 2 weeks. Ranging 107-117. Pt states has a discomfort in chest, no pain. He says it is hard to explain. Does not feel like it is racing, fluttering or skipping beats, just uneasy feeling. Breathing remains stable.   Pt has hx chronic A fib. Last saw pt 8/16/24. Switched pt from Lopressor to Toprol 200mg in evening for fatigue.     Recommendations for tachycardia?    Alda Mejia RN

## 2024-09-25 NOTE — TELEPHONE ENCOUNTER
Writer made call to patient and left voice mail and transition clinic phone number regarding return appt with Angeles.    - Angela Lynch  Transition Clinic Coord.

## 2024-09-26 ENCOUNTER — TELEPHONE (OUTPATIENT)
Dept: BEHAVIORAL HEALTH | Facility: CLINIC | Age: 79
End: 2024-09-26
Payer: COMMERCIAL

## 2024-09-26 NOTE — TELEPHONE ENCOUNTER
Second attempt to reach patient to schedule return visit with Angeles Vincent, as requested. [facility] message sent.     Marilynn Chambers  Transition Clinic Coordinator  09/26/24 9:12 AM

## 2024-09-26 NOTE — CONFIDENTIAL NOTE
No changes at this time.  Is he still having chest discomfort?  Stress test 2/2024 showed no ischemia or infarction.  Call if heart rates become tachycardic again.     KAYLA Parikh CNP

## 2024-09-26 NOTE — TELEPHONE ENCOUNTER
Called and spoke to pt. Pt states HR is still in normal range. No CP or SOB.   Notified pt to call us if he is having elevated heart rate greater then 120 bpm that is lasting longer then 20 min or is having CP, SOB or dizziness/lightheadedness.   Pt verbalized an understanding.     Alda Mejia RN

## 2024-09-27 RX ORDER — PAROXETINE 30 MG/1
30 TABLET, FILM COATED ORAL DAILY
Qty: 14 TABLET | Refills: 0 | OUTPATIENT
Start: 2024-09-27

## 2024-09-30 ENCOUNTER — MYC MEDICAL ADVICE (OUTPATIENT)
Dept: BEHAVIORAL HEALTH | Facility: CLINIC | Age: 79
End: 2024-09-30
Payer: COMMERCIAL

## 2024-09-30 DIAGNOSIS — F33.1 MODERATE EPISODE OF RECURRENT MAJOR DEPRESSIVE DISORDER (H): ICD-10-CM

## 2024-09-30 DIAGNOSIS — F33.0 MILD EPISODE OF RECURRENT MAJOR DEPRESSIVE DISORDER (H): ICD-10-CM

## 2024-09-30 RX ORDER — LAMOTRIGINE 25 MG/1
50 TABLET ORAL 2 TIMES DAILY
Qty: 360 TABLET | Refills: 0 | Status: SHIPPED | OUTPATIENT
Start: 2024-09-30

## 2024-09-30 RX ORDER — PAROXETINE 30 MG/1
30 TABLET, FILM COATED ORAL DAILY
Qty: 90 TABLET | Refills: 0 | Status: SHIPPED | OUTPATIENT
Start: 2024-09-30

## 2024-09-30 NOTE — TELEPHONE ENCOUNTER
RN called patient regarding refill request for Paxil.    Per Gavin reports:  - He decided not to stop Paxil.    - He took his last dose of Paxil 30 MG this morning and has 1 pill left.    - He would like to have refills.    - He is not sure (Paxil) its working but would like to continue taking it as sometimes he think it is helping.    - He has no concerns about this medications.     - He would like to have a view appointment and be called to reschedule appointment.    Date of Last Office Visit: 5/31/24  Date of Next Office Visit:  Routing to Transition Clinic Care Coordinator and forwarding encounter to [facility] for scheduling with Long term psychiatry   No shows since last visit: No  More than one patient-initiated cancellation (with reschedule) since last seen in clinic? No    []Medication refilled per  Medication Refill in Ambulatory Care  policy.  [x]Medication unable to be refilled by RN due to criteria not met as indicated below:    []Eligibility: has not had a provider visit within last 6 months   [x]Supervision: no future appointment; < 7 days before next appointment   []Compliance: no shows; cancellations; lapse in therapy   [x]Verification: order discrepancy; may need modification...   [] > 30-day supply request   []Advanced refill request: > 7 days before refill date   []Controlled medication   []Medication not included in policy   []Review: new med; med adjusted ? 30 days; safety alert; requires lab monitoring...   []Scope of Practice: refill request processed by LPN/MA   []Other:      Medication(s) requested:     -  PARoxetine (PAXIL) 30 MG tablet  Date last ordered: 8/28/24  Qty: 30  Refills: 0  Appropriate for refill? Provider to review. Patient reports he did NOT taper and has been taking 30 MG. He would like to continue on this dose    Any Controlled Substance(s)? No      Requested medication(s) verified as identical to current order? No: patient reports not tapering Paxil to discontinue    Any lapse in  adherence to medication(s) greater than 5 days? No      Additional action taken? none.      Last visit treatment plan:    8/17/24 [employer] Message  2.  Please message back if you need a refill on the Lamotrigine/Lamictal and or Bupropion/Wellbutrin.     If it's been > 5 days in a row of missing the Lamotrigine it will have to be re-started the following way:  -25 mg x 14 days  -50 mg x 14 days  -50 mg twice daily thereafter     3.  To get off the Paroxetine/Paxil, can do the following:     Every 7 days will lower the dosage  -20 mg in AM + 30 mg in the PM; from 8/29 to 9/4/2024  -20 mg twice daily;                       from 9/5 to 9/11/2024  -30 mg daily;                                from 9/12 to 9/18/2024  -20 mg daily;                                from 9/19 to 9/25/2024  -10 mg daily                                 from 9/26 to 10/2/2024  -Then discontinue          5/31/24  TREATMENT PLAN        Medications:     Change:  Paroxetine/Paxil   -20 mg twice daily x 7 days  -20 mg in am + 30 mg in pm x 7 days  -30 mg twice daily thereafter     Continue:  Bupropion/Wellbutrin 300 mg XL in AM     Lamotrigine/Lamictal 50 mg (2 tabs) twice daily for ttl: 100 mg        Per PCP and or other providers:  -Buspirone/Buspar 30 mg twice daily; TDD = 60 mg  -Gabapentin 600 mg QID + 300 mg TID; ttl: 3300 mg  -Hydroxyzine/Atarax 25 mg every 8 hours as needed for itching, insomnia  -Zolpidem/Ambien 5 mg nightly as needed for sleep        Labs:  -None obtained        Therapy and or Non-pharmacological modalities:  -Consider individual therapy  -Maintain activity levels        Disposition:  -Has been advised of consultative Transition Clinic model     -You do not need to return to the Transition Clinic for medication management     -Please make sure to keep the appointment for longitudinal outpatient psychiatry services(see below):     Department: [facility]  Provider: DULCE Escobedo  Location: [address] [place] [address], [address]  [place] [address]   Phone: [phone]  Fax: [phone]  Date/Time/Visit type: 6/21/2024, check in: 12:15 pm, via video/telehealth           Total time:  28 minutes per:     -Review of EMR  -Appointment time   -Documentation              Angeles Sanders APRN-CNP, PM-BC    Any medication(s) require lab monitoring? No

## 2024-09-30 NOTE — TELEPHONE ENCOUNTER
Date of Last Office Visit: 5/31/24  Date of Next Office Visit:  3/25/25 See Martinez MONICA  No shows since last visit: No  More than one patient-initiated cancellation (with reschedule) since last seen in clinic? No    []Medication refilled per  Medication Refill in Ambulatory Care  policy.  [x]Medication unable to be refilled by RN due to criteria not met as indicated below:    []Eligibility: has not had a provider visit within last 6 months   []Supervision: no future appointment; < 7 days before next appointment   [x]Compliance: no shows; cancellations; lapse in therapy   []Verification: order discrepancy; may need modification...   [] > 30-day supply request   []Advanced refill request: > 7 days before refill date   []Controlled medication   [x]Medication not included in policy   []Review: new med; med adjusted ? 30 days; safety alert; requires lab monitoring...   []Scope of Practice: refill request processed by LPN/MA   [x]Other: patient reports no gap in therapy. Per last script end, possible gap in therapy.    Per Call to Gavin, He reports:  - Takes Lamotrigine 25 mg 2 tabs in am 2 tabs in evening.  - Took Last 2 tabs this morning.   - Some left a 1 weeks worth left.   - No concerns about Has been taking for 20 years.  - Denies gaps in therapy and stated he takes it all the time after setting it up in the weekly pill box.  - RN informed him paxil has been refilled.      Medication(s) requested:     -  lamoTRIgine (LAMICTAL) 25 MG tablet  Date last ordered: 5/7/24  Qty: 360  Refills: 0  Appropriate for refill? Provider to review. Possible gap in therapy per last script. Patient reports no gap in therapy.      Any Controlled Substance(s)? No      Requested medication(s) verified as identical to current order? No: RN pending 30 day supply per [facility] guidelines.    Any lapse in adherence to medication(s) greater than 5 days? No  - patient reports took last dose this AM and has about 1 week worth  "left.    Additional action taken? routed encounter to provider for review.          Last visit treatment plan:   5/31/24  TREATMENT PLAN        Medications:     Change:  Paroxetine/Paxil   -20 mg twice daily x 7 days  -20 mg in am + 30 mg in pm x 7 days  -30 mg twice daily thereafter     Continue:  Bupropion/Wellbutrin 300 mg XL in AM     Lamotrigine/Lamictal 50 mg (2 tabs) twice daily for ttl: 100 mg        Per PCP and or other providers:  -Buspirone/Buspar 30 mg twice daily; TDD = 60 mg  -Gabapentin 600 mg QID + 300 mg TID; ttl: 3300 mg  -Hydroxyzine/Atarax 25 mg every 8 hours as needed for itching, insomnia  -Zolpidem/Ambien 5 mg nightly as needed for sleep        Labs:  -None obtained        Therapy and or Non-pharmacological modalities:  -Consider individual therapy  -Maintain activity levels        Disposition:  -Has been advised of consultative Transition Clinic model     -You do not need to return to the [facility] for medication management     -Please make sure to keep the appointment for longitudinal outpatient psychiatry services(see below):     Department: [facility]  Provider: DULCE Escobedo  Location: [address] [place] [address]   Phone: [phone]  Fax: [phone]  Date/Time/Visit type: 6/21/2024, check in: 12:15 pm, via video/telehealth           Total time:  28 minutes per:     -Review of EMR  -Appointment time   -Documentation              [place] CARDONA, [facility]    Any medication(s) require lab monitoring? Yes   LAMOTRIGINE   Last Lamotrigine Level: No results found for: \"DARDEN\"  Last CMP/LFT Lab Values:   Sodium   Date Value Ref Range Status   07/24/2024 134 (L) 135 - 145 mmol/L Final     Potassium   Date Value Ref Range Status   07/24/2024 4.7 3.4 - 5.3 mmol/L Final     Chloride   Date Value Ref Range Status   07/24/2024 99 98 - 107 mmol/L Final     Carbon Dioxide (CO2)   Date Value Ref Range Status   07/24/2024 28 22 - 29 mmol/L Final     Anion Gap "   Date Value Ref Range Status   07/24/2024 7 7 - 15 mmol/L Final     Urea Nitrogen   Date Value Ref Range Status   07/24/2024 14.4 8.0 - 23.0 mg/dL Final     Creatinine   Date Value Ref Range Status   07/24/2024 1.03 0.67 - 1.17 mg/dL Final     GFR Estimate   Date Value Ref Range Status   07/24/2024 73 >60 mL/min/1.73m2 Final     Comment:     eGFR calculated using 2021 CKD-EPI equation.     Calcium   Date Value Ref Range Status   07/24/2024 9.0 8.8 - 10.4 mg/dL Final     Comment:     Reference intervals for this test were updated on 7/16/2024 to reflect our healthy population more accurately. There may be differences in the flagging of prior results with similar values performed with this method. Those prior results can be interpreted in the context of the updated reference intervals.     Glucose   Date Value Ref Range Status   07/24/2024 105 (H) 70 - 99 mg/dL Final     Alkaline Phosphatase   Date Value Ref Range Status   07/12/2024 95 40 - 150 U/L Final     AST   Date Value Ref Range Status   07/12/2024 24 0 - 45 U/L Final     Comment:     Reference intervals for this test were updated on 6/12/2023 to more accurately reflect our healthy population. There may be differences in the flagging of prior results with similar values performed with this method. Interpretation of those prior results can be made in the context of the updated reference intervals.     ALT   Date Value Ref Range Status   07/12/2024 16 0 - 70 U/L Final     Comment:     Reference intervals for this test were updated on 6/12/2023 to more accurately reflect our healthy population. There may be differences in the flagging of prior results with similar values performed with this method. Interpretation of those prior results can be made in the context of the updated reference intervals.       Protein Total   Date Value Ref Range Status   07/12/2024 7.5 6.4 - 8.3 g/dL Final     Albumin   Date Value Ref Range Status   07/12/2024 4.7 3.5 - 5.2 g/dL Final      Bilirubin Total   Date Value Ref Range Status   07/12/2024 0.9 <=1.2 mg/dL Final     Bilirubin Direct   Date Value Ref Range Status   10/09/2017 0.2 0.0 - 0.2 mg/dL Final

## 2024-10-17 ENCOUNTER — HOSPITAL ENCOUNTER (OUTPATIENT)
Facility: CLINIC | Age: 79
End: 2024-10-17
Admitting: INTERNAL MEDICINE
Payer: COMMERCIAL

## 2024-10-17 ENCOUNTER — OFFICE VISIT (OUTPATIENT)
Dept: CARDIOLOGY | Facility: CLINIC | Age: 79
End: 2024-10-17
Attending: NURSE PRACTITIONER
Payer: COMMERCIAL

## 2024-10-17 VITALS
BODY MASS INDEX: 24.6 KG/M2 | HEART RATE: 79 BPM | OXYGEN SATURATION: 100 % | SYSTOLIC BLOOD PRESSURE: 137 MMHG | HEIGHT: 68 IN | RESPIRATION RATE: 20 BRPM | WEIGHT: 162.3 LBS | DIASTOLIC BLOOD PRESSURE: 87 MMHG

## 2024-10-17 DIAGNOSIS — I38 VALVULAR HEART DISEASE: ICD-10-CM

## 2024-10-17 DIAGNOSIS — I07.1 TRICUSPID VALVE INSUFFICIENCY, UNSPECIFIED ETIOLOGY: ICD-10-CM

## 2024-10-17 DIAGNOSIS — I10 BENIGN ESSENTIAL HYPERTENSION: ICD-10-CM

## 2024-10-17 DIAGNOSIS — E78.5 HYPERLIPIDEMIA LDL GOAL <100: ICD-10-CM

## 2024-10-17 DIAGNOSIS — I34.0 MITRAL VALVE INSUFFICIENCY, UNSPECIFIED ETIOLOGY: ICD-10-CM

## 2024-10-17 DIAGNOSIS — I48.91 ATRIAL FIBRILLATION, UNSPECIFIED TYPE (H): ICD-10-CM

## 2024-10-17 DIAGNOSIS — I48.20 CHRONIC ATRIAL FIBRILLATION (H): ICD-10-CM

## 2024-10-17 DIAGNOSIS — I42.9 CARDIOMYOPATHY, UNSPECIFIED TYPE (H): ICD-10-CM

## 2024-10-17 DIAGNOSIS — I50.32 CHRONIC HEART FAILURE WITH PRESERVED EJECTION FRACTION (H): Primary | ICD-10-CM

## 2024-10-17 PROCEDURE — 99214 OFFICE O/P EST MOD 30 MIN: CPT | Performed by: NURSE PRACTITIONER

## 2024-10-17 RX ORDER — FUROSEMIDE 20 MG/1
20 TABLET ORAL DAILY
Qty: 90 TABLET | Refills: 3 | Status: SHIPPED | OUTPATIENT
Start: 2024-10-17 | End: 2024-10-25

## 2024-10-17 RX ORDER — SPIRONOLACTONE 25 MG/1
12.5 TABLET ORAL DAILY
Qty: 15 TABLET | Refills: 11 | Status: SHIPPED | OUTPATIENT
Start: 2024-10-17

## 2024-10-17 NOTE — PATIENT INSTRUCTIONS
Medication Changes:  Start spironolactone 12.5 mg daily   Decrease lasix to 20 mg daily     Recommendations:  Check daily weights and call the clinic if your weight has increased more than 2 lbs in one day or 5 lbs in one week; if you feel more short of breath or have worsening swelling in your legs or abdomen.   Call if blood pressure is less than 90 on top or less than 100 with lightheadedness.       Follow-up:  Carlito to look at heart valves better and then structural heart clinic consult   Nonfasting lab in 1-2 weeks (BMP)  Annual fasting lab within 2 months (lipid/ALT)   Cardiology follow up at [facility]: carmelita in 2 months.     Cardiology Scheduling~[phone]  Cardiology Clinic RN~[phone] (Rosita RN, Alda RN; Jaimie RN)        Transesophageal Echocardiogram (CARLITO)  [place], [place]      Please call clinic with any new COVID like symptoms prior to procedure.    2.   Take your temperature the morning of the procedure. If it is >100 call the Care [facility] at [phone]    3.   Transesophageal Echocardiogram (CARLITO) to be done at [facility] on 10/31/24 . Please arrive at 11:30am . If you need to contact [facility] for any reason, please call [phone] option #2.    Call the [facility] Nurse line with any questions [phone] Alda LUNDY, Rosita RN; Jaimie RN    In preparation for your procedure, we require that you do the following:    NO foods or liquids 8 hours prior to arrival.    Take your usual morning medications with a small sip of water immediately upon arising on the morning of your procedure    Hold your Furosemide (Lasix) and Spironolactone morning of procedure.     You will be unable to drive after your procedure; please arrange to have someone drive you home. Make sure that there is a responsible adult with you for 24 hours after your procedure. Your procedure will be cancelled if you do not have transportation home or someone staying with you  "for 24 hrs.    Your procedure will be done at [facility] located at [address] [facility] [address]. Please park in the  Skyway Ramp  on the west side of [facility] on [address]. Take the skyway over [facility] to the hospital. Please check in on the first floor, \"Skyway Welcome Desk\" which is one floor down from the skyway level.    If you have any questions about your upcoming procedure, please contact nursing at [facility] Cardiology clinic at [phone] or at [facility] at [phone].        " 48.1

## 2024-10-17 NOTE — LETTER
10/17/2024    Lizzy Leiva MD  [address]  [facility] [address]    RE: Josemanuel Edmond       Dear Colleague,     I had the pleasure of seeing Josemanuel Edmond in the [facility].  Cardiology Clinic Progress Note  Josemanuel Edmond MRN# [id number]   YOB: 1945 Age: 79 year old      Primary Cardiologist:   Dr. Chapin for 2-month follow-up          History of Presenting Illness:      Patient was seen by me in August and recommended holding losartan to see if cough improved.  He was also changed to metoprolol XL to be taken in the evening to see if fatigue improved.    Most recent lipid profile, BMP, ALT reviewed today.    After holding losartan his cough resolved.  After restarting losartan has been well-tolerated.  Fatigue improved after switching metoprolol?  Resolved   TSH WNL 7/2024   BP? Controlled   Weight? Stable   Heart failure symptoms? None   I reviewed his case with Dr. Chapin who agrees with AD and structural heart clinic consult  Had some medication changes through PCP recently and states he has been feeling significantly better.    Patient reports no chest pain, shortness of breath, PND, orthopnea, presyncope, syncope, edema, heart racing, or palpitations.    I discussed risks, benefits, and indications of proceeding with AD.  This includes but is not limited to esophageal irritation, tear, or perforation. I also discussed discomfort at the IV site and risks of conscious sedation including aspiration pneumonia.  They deny any swallowing or esophageal problems. They voice understanding and are willing to proceed.  A formal consent form will be signed by the procedural physician.                      Assessment and Plan:     Plan  Patient Instructions   Medication Changes:  Start spironolactone 12.5 mg daily for CM  Decrease lasix to 20 mg daily     Recommendations:  Check daily weights and call the clinic if your weight has increased more than 2 lbs in  one day or 5 lbs in one week; if you feel more short of breath or have worsening swelling in your legs or abdomen.   Call if blood pressure is less than 90 on top or less than 100 with lightheadedness.       Follow-up:  AD to look at heart valves better and then structural heart clinic consult   Nonfasting lab in 1-2 weeks (BMP)  Annual fasting lab within 2 months (lipid/ALT)   Cardiology follow up at [facility]: nazanin in 2 months.     Cardiology Scheduling~[phone]  Cardiology Clinic RN~[phone] (Rosita RN, Alda RN; Jaimie RN)          Problem List as of 10/17/2024 Reviewed: 8/1/2024  4:48 PM by Mariposa Jimenez MD            Noted       Active Problems    1. Valvular heart disease 9/22/2022     Overview Addendum 10/17/2024  2:24 PM by Nazanin Caballero APRN CNP      Moderate to moderately severe MR, moderate TR, mild to moderate AI 2023  Severe TR, moderately severe to severe MR, moderate AI 2024          Last Assessment & Plan 10/17/2024 Office Visit Edited 10/17/2024  2:53 PM by Nazanin Caballero APRN CNP      Follow with echo  AD and structural consult           Relevant Orders     Follow-Up with Cardiology     AD Only- Transesophageal Echocardiogram     Follow-Up with Cardiology    2. Chronic heart failure with preserved ejection fraction (H) - Primary 11/29/2023     Overview Addendum 10/17/2024  2:25 PM by Nazanin Caballero APRN CNP      Heart failure episode 2/2024  Negative Lexiscan 2/2024  Dry weight 154 pounds at home          Last Assessment & Plan 10/17/2024 Office Visit Written 10/17/2024  2:25 PM by Nazanin Caballero APRN CNP      No symptoms of heart failure  Continue GDMT          Relevant Orders     Follow-Up with Cardiology     Follow-Up with Cardiology    3. Cardiomyopathy, unspecified type (H) 3/7/2024     Overview Addendum 10/17/2024  2:54 PM by Nazanin Caballero APRN CNP      EF 50-55% 2023  45 to 50% 1/2024  Heart failure episode  2/2024  Negative Lexiscan 2/2024  EF 45-50% 7/2024 echo   Dry weight 154 pounds at home  Jardiance and entresto cost prohibitive           Last Assessment & Plan 10/17/2024 Office Visit Edited 10/17/2024  2:54 PM by Nazanin Caballero APRN CNP      No symptoms of Heart failure   Continue GDMT             Relevant Medications     spironolactone (ALDACTONE) 25 MG tablet     Other Relevant Orders     Basic metabolic panel     Follow-Up with Cardiology     Follow-Up with Cardiology    4. Benign essential hypertension 12/12/2005     Last Assessment & Plan 6/26/2024 Office Visit Written 6/26/2024  7:39 AM by Nazanin Caballero APRN CNP      Controlled  Continue current medications          Relevant Orders     Follow-Up with Cardiology     Follow-Up with Cardiology    5. Hyperlipidemia LDL goal <100 10/31/2010     Last Assessment & Plan 6/26/2024 Office Visit Edited 6/26/2024  7:40 AM by Nazanin Caballero APRN CNP      LDL at goal without medication  Continue lifestyle modification          Relevant Orders     Lipid panel reflex to direct LDL Fasting     ALT     Follow-Up with Cardiology     Follow-Up with Cardiology    6. Atrial fibrillation, unspecified type (H) 1/18/2018     Overview Addendum 6/26/2024  7:41 AM by Nazanin Caballero APRN CNP      onset 2018 postoperatively, S/p cardioversion   Recurrence 2021 with palpitations  Now chronic A-fib  Severe LA  Elevated [id number] score          Last Assessment & Plan 6/26/2024 Office Visit Written 6/26/2024  7:41 AM by Nazanin Caballero APRN CNP      Continue rate control, anticoagulation          Relevant Medications     furosemide (LASIX) 20 MG tablet     Other Relevant Orders     Follow-Up with Cardiology     Follow-Up with Cardiology             Respiratory:  clear to auscultation; normal symmetry        Cardiac: regular rate and rhythm, 3/6 murmur     GI:  abdomen nondistended     Extremities and Muscular Skeletal:   no edema                Thank you for allowing me to participate in this delightful patient's care.   This note was completed in part using Dragon voice recognition software. Although reviewed after completion, some word and grammatical errors may occur.    KAYLA Parikh CNP                  Thank you for allowing me to participate in the care of your patient.      Sincerely,     KAYLA Parikh CNP     [facility]  cc:   KAYLA Allen CNP  [address] [place] [address]

## 2024-10-17 NOTE — PROGRESS NOTES
Cardiology Clinic Progress Note  Josemanuel Edmond MRN# [id number]   YOB: 1945 Age: 79 year old      Primary Cardiologist:   Dr. Chapin for 2-month follow-up          History of Presenting Illness:      Patient was seen by me in August and recommended holding losartan to see if cough improved.  He was also changed to metoprolol XL to be taken in the evening to see if fatigue improved.    Most recent lipid profile, BMP, ALT reviewed today.    After holding losartan his cough resolved.  After restarting losartan has been well-tolerated.  Fatigue improved after switching metoprolol?  Resolved   TSH WNL 7/2024   BP? Controlled   Weight? Stable   Heart failure symptoms? None   I reviewed his case with Dr. Chaipn who agrees with AD and structural heart clinic consult  Had some medication changes through PCP recently and states he has been feeling significantly better.    Patient reports no chest pain, shortness of breath, PND, orthopnea, presyncope, syncope, edema, heart racing, or palpitations.    I discussed risks, benefits, and indications of proceeding with AD.  This includes but is not limited to esophageal irritation, tear, or perforation. I also discussed discomfort at the IV site and risks of conscious sedation including aspiration pneumonia.  They deny any swallowing or esophageal problems. They voice understanding and are willing to proceed.  A formal consent form will be signed by the procedural physician.                      Assessment and Plan:     Plan  Patient Instructions   Medication Changes:  Start spironolactone 12.5 mg daily for CM  Decrease lasix to 20 mg daily     Recommendations:  Check daily weights and call the clinic if your weight has increased more than 2 lbs in one day or 5 lbs in one week; if you feel more short of breath or have worsening swelling in your legs or abdomen.   Call if blood pressure is less than 90 on top or less than 100 with lightheadedness.        Follow-up:  AD to look at heart valves better and then structural heart clinic consult   Nonfasting lab in 1-2 weeks (BMP)  Annual fasting lab within 2 months (lipid/ALT)   Cardiology follow up at [facility]: nazanin in 2 months.     Cardiology Scheduling~[phone]  Cardiology Clinic RN~[phone] (Rosita RN, Alda RN; Jaimie RN)          Problem List as of 10/17/2024 Reviewed: 8/1/2024  4:48 PM by Mariposa Jimenez MD            Noted       Active Problems    1. Valvular heart disease 9/22/2022     Overview Addendum 10/17/2024  2:24 PM by Nazanin Caballero APRN CNP      Moderate to moderately severe MR, moderate TR, mild to moderate AI 2023  Severe TR, moderately severe to severe MR, moderate AI 2024          Last Assessment & Plan 10/17/2024 Office Visit Edited 10/17/2024  2:53 PM by Nazanin Caballero APRN CNP      Follow with echo  AD and structural consult           Relevant Orders     Follow-Up with Cardiology     AD Only- Transesophageal Echocardiogram     Follow-Up with Cardiology    2. Chronic heart failure with preserved ejection fraction (H) - Primary 11/29/2023     Overview Addendum 10/17/2024  2:25 PM by Nazanin Caballero APRN CNP      Heart failure episode 2/2024  Negative Lexiscan 2/2024  Dry weight 154 pounds at home          Last Assessment & Plan 10/17/2024 Office Visit Written 10/17/2024  2:25 PM by Nazanin Caballero APRN CNP      No symptoms of heart failure  Continue GDMT          Relevant Orders     Follow-Up with Cardiology     Follow-Up with Cardiology    3. Cardiomyopathy, unspecified type (H) 3/7/2024     Overview Addendum 10/17/2024  2:54 PM by Nazanin Caballero APRN CNP      EF 50-55% 2023  45 to 50% 1/2024  Heart failure episode 2/2024  Negative Lexiscan 2/2024  EF 45-50% 7/2024 echo   Dry weight 154 pounds at home  Jardiance and entresto cost prohibitive           Last Assessment & Plan 10/17/2024 Office Visit Edited 10/17/2024   2:54 PM by Nazanin Caballero APRN CNP      No symptoms of Heart failure   Continue GDMT             Relevant Medications     spironolactone (ALDACTONE) 25 MG tablet     Other Relevant Orders     Basic metabolic panel     Follow-Up with Cardiology     Follow-Up with Cardiology    4. Benign essential hypertension 12/12/2005     Last Assessment & Plan 6/26/2024 Office Visit Written 6/26/2024  7:39 AM by Nazanin Caballero APRN CNP      Controlled  Continue current medications          Relevant Orders     Follow-Up with Cardiology     Follow-Up with Cardiology    5. Hyperlipidemia LDL goal <100 10/31/2010     Last Assessment & Plan 6/26/2024 Office Visit Edited 6/26/2024  7:40 AM by Nazanin Caballero APRN CNP      LDL at goal without medication  Continue lifestyle modification          Relevant Orders     Lipid panel reflex to direct LDL Fasting     ALT     Follow-Up with Cardiology     Follow-Up with Cardiology    6. Atrial fibrillation, unspecified type (H) 1/18/2018     Overview Addendum 6/26/2024  7:41 AM by Nazanin Caballero APRN CNP      onset 2018 postoperatively, S/p cardioversion   Recurrence 2021 with palpitations  Now chronic A-fib  Severe LA  Elevated [id number] score          Last Assessment & Plan 6/26/2024 Office Visit Written 6/26/2024  7:41 AM by Nazanin Caballero APRN CNP      Continue rate control, anticoagulation          Relevant Medications     furosemide (LASIX) 20 MG tablet     Other Relevant Orders     Follow-Up with Cardiology     Follow-Up with Cardiology             Respiratory:  clear to auscultation; normal symmetry        Cardiac: regular rate and rhythm, 3/6 murmur     GI:  abdomen nondistended     Extremities and Muscular Skeletal:   no edema               Thank you for allowing me to participate in this delightful patient's care.   This note was completed in part using Dragon voice recognition software. Although reviewed after completion, some  word and grammatical errors may occur.    Nazanin Tipton, APRN CNP

## 2024-10-23 ENCOUNTER — TELEPHONE (OUTPATIENT)
Dept: FAMILY MEDICINE | Facility: CLINIC | Age: 79
End: 2024-10-23
Payer: COMMERCIAL

## 2024-10-23 DIAGNOSIS — F33.1 MODERATE EPISODE OF RECURRENT MAJOR DEPRESSIVE DISORDER (H): ICD-10-CM

## 2024-10-23 RX ORDER — BUPROPION HYDROCHLORIDE 300 MG/1
300 TABLET ORAL EVERY MORNING
Qty: 90 TABLET | Refills: 0 | Status: SHIPPED | OUTPATIENT
Start: 2024-10-23

## 2024-10-23 NOTE — TELEPHONE ENCOUNTER
Writer spoke with Gavin and his wife, explained regarding the next LOC appointment would be March 2025, therefore he would have to schedule a psychiatry appt before.     Pt scheduled for a psychiatry appt for Nov 8th 1030AM.    Rebeka Naqvi  Transition Clinic Coordinator  10/23/24 4:26 PM

## 2024-10-23 NOTE — TELEPHONE ENCOUNTER
Date of Last Office Visit: 5/31/24  Date of Next Office Visit:Long term psychiatry appointment on 3/25/24 See Martinez CNP   No shows since last visit: No  More than one patient-initiated cancellation (with reschedule) since last seen in clinic? No    []Medication refilled per  Medication Refill in Ambulatory Care  policy.  [x]Medication unable to be refilled by RN due to criteria not met as indicated below:    []Eligibility: has not had a provider visit within last 6 months   []Supervision: no future appointment; < 7 days before next appointment   []Compliance: no shows; cancellations; lapse in therapy   [x]Verification: order discrepancy; may need modification...   [] > 30-day supply request   [x]Advanced refill request: > 7 days before refill date   []Controlled medication   []Medication not included in policy   []Review: new med; med adjusted <= 30 days; safety alert; requires lab monitoring...   []Scope of Practice: refill request processed by LPN/MA   [x]Other: 6 months 28 days to next appointment with See Martinez CNP.    Medication(s) requested:     -  buPROPion (WELLBUTRIN XL) 300 MG 24 hr tablet  Date last ordered: 5/7/24  Qty: 90  Refills: 0  Appropriate for refill? Provider to review. Advance refill request    - 2/15/24 Wellbutrin Script #60 tablets Filled on  10/1/24 at [facility].  -  Pharmacy requesting advance fill to place script on file.    Requested medication(s) verified as identical to current order? Yes    Any lapse in adherence to medication(s) greater than 5 days? Unknown     Additional action taken? contacted patient via via voicemail message at patient number .      Last visit treatment plan:     TREATMENT PLAN        Medications:     Change:  Paroxetine/Paxil   -20 mg twice daily x 7 days  -20 mg in am + 30 mg in pm x 7 days  -30 mg twice daily thereafter     Continue:  Bupropion/Wellbutrin 300 mg XL in AM     Lamotrigine/Lamictal 50 mg (2 tabs) twice daily for ttl: 100  mg        Per PCP and or other providers:  -Buspirone/Buspar 30 mg twice daily; TDD = 60 mg  -Gabapentin 600 mg QID + 300 mg TID; ttl: 3300 mg  -Hydroxyzine/Atarax 25 mg every 8 hours as needed for itching, insomnia  -Zolpidem/Ambien 5 mg nightly as needed for sleep        Labs:  -None obtained        Therapy and or Non-pharmacological modalities:  -Consider individual therapy  -Maintain activity levels        Disposition:  -Has been advised of consultative [facility] model     -You do not need to return to the [facility] for medication management     -Please make sure to keep the appointment for longitudinal outpatient psychiatry services(see below):     Department: [facility] [place]  Provider: DULCE Escobedo  Location: [address] [place] [address] [address], [place] [address]   Phone: [phone]  Fax: [phone]  Date/Time/Visit type: 6/21/2024, check in: 12:15 pm, via video/telehealth           Total time:  28 minutes per:     -Review of EMR  -Appointment time   -Documentation              [place] CARDONA, [facility]    Any medication(s) require lab monitoring? No

## 2024-10-23 NOTE — TELEPHONE ENCOUNTER
Received call from patient's wife.  Wife states that Patient has been falling when he gets up to go to the bathroom in the morning for the past 3 days.  Patient was using walker this morning and still fell.'  Has not hit head.  Has bruising on body.  No injury noted.  Patient was falling about 1 month ago and did stop until Monday am.  Appointment scheduled with Dr Leiva on 10/25/24 at 1120.  Joe Cintron RN

## 2024-10-24 ENCOUNTER — LAB (OUTPATIENT)
Dept: LAB | Facility: CLINIC | Age: 79
End: 2024-10-24
Payer: COMMERCIAL

## 2024-10-24 DIAGNOSIS — I42.9 CARDIOMYOPATHY, UNSPECIFIED TYPE (H): ICD-10-CM

## 2024-10-24 DIAGNOSIS — E78.5 HYPERLIPIDEMIA LDL GOAL <100: ICD-10-CM

## 2024-10-24 LAB
ALT SERPL W P-5'-P-CCNC: 17 U/L (ref 0–70)
ANION GAP SERPL CALCULATED.3IONS-SCNC: 12 MMOL/L (ref 7–15)
BUN SERPL-MCNC: 12.3 MG/DL (ref 8–23)
CALCIUM SERPL-MCNC: 8.8 MG/DL (ref 8.8–10.4)
CHLORIDE SERPL-SCNC: 93 MMOL/L (ref 98–107)
CHOLEST SERPL-MCNC: 106 MG/DL
CREAT SERPL-MCNC: 1.02 MG/DL (ref 0.67–1.17)
EGFRCR SERPLBLD CKD-EPI 2021: 74 ML/MIN/1.73M2
FASTING STATUS PATIENT QL REPORTED: YES
FASTING STATUS PATIENT QL REPORTED: YES
GLUCOSE SERPL-MCNC: 114 MG/DL (ref 70–99)
HCO3 SERPL-SCNC: 25 MMOL/L (ref 22–29)
HDLC SERPL-MCNC: 31 MG/DL
LDLC SERPL CALC-MCNC: 60 MG/DL
NONHDLC SERPL-MCNC: 75 MG/DL
POTASSIUM SERPL-SCNC: 4.3 MMOL/L (ref 3.4–5.3)
SODIUM SERPL-SCNC: 130 MMOL/L (ref 135–145)
TRIGL SERPL-MCNC: 76 MG/DL

## 2024-10-24 PROCEDURE — 84460 ALANINE AMINO (ALT) (SGPT): CPT

## 2024-10-24 PROCEDURE — 80048 BASIC METABOLIC PNL TOTAL CA: CPT

## 2024-10-24 PROCEDURE — 80061 LIPID PANEL: CPT

## 2024-10-24 PROCEDURE — 36415 COLL VENOUS BLD VENIPUNCTURE: CPT

## 2024-10-25 ENCOUNTER — OFFICE VISIT (OUTPATIENT)
Dept: FAMILY MEDICINE | Facility: CLINIC | Age: 79
End: 2024-10-25
Payer: COMMERCIAL

## 2024-10-25 VITALS
BODY MASS INDEX: 25.61 KG/M2 | TEMPERATURE: 97.2 F | WEIGHT: 169 LBS | SYSTOLIC BLOOD PRESSURE: 104 MMHG | HEART RATE: 62 BPM | DIASTOLIC BLOOD PRESSURE: 70 MMHG | OXYGEN SATURATION: 93 % | RESPIRATION RATE: 16 BRPM | HEIGHT: 68 IN

## 2024-10-25 DIAGNOSIS — R26.81 GAIT INSTABILITY: Primary | ICD-10-CM

## 2024-10-25 DIAGNOSIS — I48.20 CHRONIC ATRIAL FIBRILLATION (H): ICD-10-CM

## 2024-10-25 DIAGNOSIS — I50.32 CHRONIC DIASTOLIC HEART FAILURE (H): Primary | ICD-10-CM

## 2024-10-25 PROCEDURE — 99213 OFFICE O/P EST LOW 20 MIN: CPT | Performed by: FAMILY MEDICINE

## 2024-10-25 RX ORDER — FUROSEMIDE 20 MG/1
20 TABLET ORAL PRN
Qty: 45 TABLET | Refills: 3 | Status: SHIPPED | OUTPATIENT
Start: 2024-10-25

## 2024-10-25 ASSESSMENT — ANXIETY QUESTIONNAIRES
8. IF YOU CHECKED OFF ANY PROBLEMS, HOW DIFFICULT HAVE THESE MADE IT FOR YOU TO DO YOUR WORK, TAKE CARE OF THINGS AT HOME, OR GET ALONG WITH OTHER PEOPLE?: SOMEWHAT DIFFICULT
GAD7 TOTAL SCORE: 6
1. FEELING NERVOUS, ANXIOUS, OR ON EDGE: MORE THAN HALF THE DAYS
3. WORRYING TOO MUCH ABOUT DIFFERENT THINGS: SEVERAL DAYS
IF YOU CHECKED OFF ANY PROBLEMS ON THIS QUESTIONNAIRE, HOW DIFFICULT HAVE THESE PROBLEMS MADE IT FOR YOU TO DO YOUR WORK, TAKE CARE OF THINGS AT HOME, OR GET ALONG WITH OTHER PEOPLE: SOMEWHAT DIFFICULT
4. TROUBLE RELAXING: SEVERAL DAYS
5. BEING SO RESTLESS THAT IT IS HARD TO SIT STILL: SEVERAL DAYS
7. FEELING AFRAID AS IF SOMETHING AWFUL MIGHT HAPPEN: NOT AT ALL
GAD7 TOTAL SCORE: 6
6. BECOMING EASILY ANNOYED OR IRRITABLE: NOT AT ALL
7. FEELING AFRAID AS IF SOMETHING AWFUL MIGHT HAPPEN: NOT AT ALL
2. NOT BEING ABLE TO STOP OR CONTROL WORRYING: SEVERAL DAYS
GAD7 TOTAL SCORE: 6

## 2024-10-25 ASSESSMENT — PAIN SCALES - GENERAL: PAINLEVEL_OUTOF10: NO PAIN (0)

## 2024-10-25 NOTE — RESULT ENCOUNTER NOTE
Called pt to discuss. States he hasn't started spironolactone yet as he was leery of the side effects. States has no HF symptoms and weights have remained stable. He will change lasix to PRN wt gain >2# in one day or 5# over 1 week. Order placed for BMP in 1 week. He prefers to make his own lab appt. Med list updated. Will also route to Nazanin Caballero NP to inform her that pt has not started spironolactone.

## 2024-10-25 NOTE — PROGRESS NOTES
Assessment & Plan     Gait instability  Follows with neurology.  They have assessed his gait instability and felt that it was likely multifactorial due to his age, past history of alcohol use resulting in peripheral neuropathy.  He has a large hematoma from his recent fall.  Is on Xarelto for atrial fibrillation holding for now due to hematoma.  Discussed risks associated with anticoagulation versus benefit of stroke prevention.  Given progressive gait instability and increasing falls we will continue to hold and see how he does with physical therapy.if balance improves we could at some point consider restarting Xarelto.  - Physical Therapy  Referral; Future        MED REC REQUIRED  Post Medication Reconciliation Status:  Discharge medications reconciled, continue medications without change        Subjective   Gavin is a 79 year old, presenting for the following health issues:  Fall        10/25/2024    11:21 AM   Additional Questions   Roomed by Krystle ALAS CMA     History of Present Illness       Mental Health Follow-up:  Patient presents to follow-up on Depression & Anxiety.Patient's depression since last visit has been:  No change  The patient is not having other symptoms associated with depression.  Patient's anxiety since last visit has been:  No change  The patient is not having other symptoms associated with anxiety.  Any significant life events: grief or loss  Patient is not feeling anxious or having panic attacks.  Patient has no concerns about alcohol or drug use.    He eats 2-3 servings of fruits and vegetables daily.He consumes 3 sweetened beverage(s) daily.He exercises with enough effort to increase his heart rate 10 to 19 minutes per day.  He exercises with enough effort to increase his heart rate 3 or less days per week.   He is taking medications regularly.           Patient would like to discuss intermittent weakness in legs.    Patient has spot on roof of mouth he would like  "checked.        Review of Systems  Constitutional, neuro, ENT, endocrine, pulmonary, cardiac, gastrointestinal, genitourinary, musculoskeletal, integument and psychiatric systems are negative, except as otherwise noted.      Objective    /70   Pulse 62   Temp 97.2  F (36.2  C) (Tympanic)   Resp 16   Ht 1.727 m (5' 8\")   Wt 76.7 kg (169 lb)   SpO2 93%   BMI 25.70 kg/m    Body mass index is 25.7 kg/m .  Physical Exam   GENERAL: Pleasant, well appearing male.  NEURO: Cranial nerves II through XII grossly intact. No focal deficits.             Signed Electronically by: Lizzy Leiva MD    "

## 2024-10-25 NOTE — NURSING NOTE
"Initial /70   Pulse 62   Temp 97.2  F (36.2  C) (Tympanic)   Resp 16   Ht 1.727 m (5' 8\")   Wt 76.7 kg (169 lb)   SpO2 93%   BMI 25.70 kg/m   Estimated body mass index is 25.7 kg/m  as calculated from the following:    Height as of this encounter: 1.727 m (5' 8\").    Weight as of this encounter: 76.7 kg (169 lb). .    "

## 2024-10-28 ENCOUNTER — APPOINTMENT (OUTPATIENT)
Dept: CT IMAGING | Facility: CLINIC | Age: 79
End: 2024-10-28
Attending: FAMILY MEDICINE
Payer: COMMERCIAL

## 2024-10-28 ENCOUNTER — HOSPITAL ENCOUNTER (OUTPATIENT)
Facility: CLINIC | Age: 79
Setting detail: OBSERVATION
Discharge: HOME | End: 2024-10-29
Attending: FAMILY MEDICINE | Admitting: INTERNAL MEDICINE
Payer: COMMERCIAL

## 2024-10-28 DIAGNOSIS — G47.00 PERSISTENT DISORDER OF INITIATING OR MAINTAINING SLEEP: ICD-10-CM

## 2024-10-28 DIAGNOSIS — R29.6 FALLS FREQUENTLY: ICD-10-CM

## 2024-10-28 DIAGNOSIS — N13.8 HYPERTROPHY OF PROSTATE WITH URINARY OBSTRUCTION: ICD-10-CM

## 2024-10-28 DIAGNOSIS — R53.1 GENERALIZED WEAKNESS: ICD-10-CM

## 2024-10-28 DIAGNOSIS — Z79.01 ANTICOAGULATED: ICD-10-CM

## 2024-10-28 DIAGNOSIS — S22.41XA CLOSED FRACTURE OF MULTIPLE RIBS OF RIGHT SIDE, INITIAL ENCOUNTER: ICD-10-CM

## 2024-10-28 DIAGNOSIS — Z79.01 LONG TERM (CURRENT) USE OF ANTICOAGULANTS: ICD-10-CM

## 2024-10-28 DIAGNOSIS — N40.1 HYPERTROPHY OF PROSTATE WITH URINARY OBSTRUCTION: ICD-10-CM

## 2024-10-28 DIAGNOSIS — K59.00 CONSTIPATION, UNSPECIFIED CONSTIPATION TYPE: ICD-10-CM

## 2024-10-28 DIAGNOSIS — J34.89 MAXILLARY SINUS MASS: ICD-10-CM

## 2024-10-28 DIAGNOSIS — S30.0XXA TRAUMATIC HEMATOMA OF BUTTOCK, INITIAL ENCOUNTER: Primary | ICD-10-CM

## 2024-10-28 DIAGNOSIS — I48.91 ATRIAL FIBRILLATION, UNSPECIFIED TYPE (H): ICD-10-CM

## 2024-10-28 DIAGNOSIS — I48.20 CHRONIC ATRIAL FIBRILLATION (H): ICD-10-CM

## 2024-10-28 DIAGNOSIS — M54.2 CERVICALGIA: ICD-10-CM

## 2024-10-28 DIAGNOSIS — G47.00 INSOMNIA, UNSPECIFIED TYPE: ICD-10-CM

## 2024-10-28 DIAGNOSIS — I45.10 RIGHT BUNDLE BRANCH BLOCK: ICD-10-CM

## 2024-10-28 LAB
ALBUMIN SERPL BCG-MCNC: 3.9 G/DL (ref 3.5–5.2)
ALBUMIN UR-MCNC: NEGATIVE MG/DL
ALP SERPL-CCNC: 80 U/L (ref 40–150)
ALT SERPL W P-5'-P-CCNC: 16 U/L (ref 0–70)
ANION GAP SERPL CALCULATED.3IONS-SCNC: 8 MMOL/L (ref 7–15)
APPEARANCE UR: CLEAR
AST SERPL W P-5'-P-CCNC: 31 U/L (ref 0–45)
BASOPHILS # BLD AUTO: 0 10E3/UL (ref 0–0.2)
BASOPHILS NFR BLD AUTO: 1 %
BILIRUB SERPL-MCNC: 1 MG/DL
BILIRUB UR QL STRIP: NEGATIVE
BUN SERPL-MCNC: 13.8 MG/DL (ref 8–23)
CALCIUM SERPL-MCNC: 8.8 MG/DL (ref 8.8–10.4)
CHLORIDE SERPL-SCNC: 95 MMOL/L (ref 98–107)
CK SERPL-CCNC: 317 U/L (ref 39–308)
COLOR UR AUTO: YELLOW
CREAT SERPL-MCNC: 0.99 MG/DL (ref 0.67–1.17)
EGFRCR SERPLBLD CKD-EPI 2021: 77 ML/MIN/1.73M2
EOSINOPHIL # BLD AUTO: 0.1 10E3/UL (ref 0–0.7)
EOSINOPHIL NFR BLD AUTO: 2 %
ERYTHROCYTE [DISTWIDTH] IN BLOOD BY AUTOMATED COUNT: 15 % (ref 10–15)
ETHANOL SERPL-MCNC: <0.01 G/DL
GLUCOSE SERPL-MCNC: 111 MG/DL (ref 70–99)
GLUCOSE UR STRIP-MCNC: NEGATIVE MG/DL
HCO3 SERPL-SCNC: 28 MMOL/L (ref 22–29)
HCT VFR BLD AUTO: 30.2 % (ref 40–53)
HGB BLD-MCNC: 10.2 G/DL (ref 13.3–17.7)
HGB BLD-MCNC: 9.5 G/DL (ref 13.3–17.7)
HGB UR QL STRIP: NEGATIVE
HOLD SPECIMEN: NORMAL
IMM GRANULOCYTES # BLD: 0 10E3/UL
IMM GRANULOCYTES NFR BLD: 0 %
KETONES UR STRIP-MCNC: NEGATIVE MG/DL
LEUKOCYTE ESTERASE UR QL STRIP: NEGATIVE
LYMPHOCYTES # BLD AUTO: 0.9 10E3/UL (ref 0.8–5.3)
LYMPHOCYTES NFR BLD AUTO: 14 %
MCH RBC QN AUTO: 34.8 PG (ref 26.5–33)
MCHC RBC AUTO-ENTMCNC: 33.8 G/DL (ref 31.5–36.5)
MCV RBC AUTO: 103 FL (ref 78–100)
MONOCYTES # BLD AUTO: 0.5 10E3/UL (ref 0–1.3)
MONOCYTES NFR BLD AUTO: 9 %
NEUTROPHILS # BLD AUTO: 4.7 10E3/UL (ref 1.6–8.3)
NEUTROPHILS NFR BLD AUTO: 75 %
NITRATE UR QL: NEGATIVE
NRBC # BLD AUTO: 0 10E3/UL
NRBC BLD AUTO-RTO: 0 /100
PH UR STRIP: 6 [PH] (ref 5–7)
PLATELET # BLD AUTO: 129 10E3/UL (ref 150–450)
POTASSIUM SERPL-SCNC: 4.1 MMOL/L (ref 3.4–5.3)
PROT SERPL-MCNC: 6.5 G/DL (ref 6.4–8.3)
RBC # BLD AUTO: 2.93 10E6/UL (ref 4.4–5.9)
RBC URINE: 2 /HPF
SODIUM SERPL-SCNC: 131 MMOL/L (ref 135–145)
SP GR UR STRIP: 1.03 (ref 1–1.03)
UROBILINOGEN UR STRIP-MCNC: 2 MG/DL
WBC # BLD AUTO: 6.3 10E3/UL (ref 4–11)
WBC URINE: <1 /HPF

## 2024-10-28 PROCEDURE — 36415 COLL VENOUS BLD VENIPUNCTURE: CPT | Performed by: FAMILY MEDICINE

## 2024-10-28 PROCEDURE — 70450 CT HEAD/BRAIN W/O DYE: CPT

## 2024-10-28 PROCEDURE — 85025 COMPLETE CBC W/AUTO DIFF WBC: CPT | Performed by: FAMILY MEDICINE

## 2024-10-28 PROCEDURE — 82550 ASSAY OF CK (CPK): CPT

## 2024-10-28 PROCEDURE — 80053 COMPREHEN METABOLIC PANEL: CPT | Performed by: FAMILY MEDICINE

## 2024-10-28 PROCEDURE — 250N000011 HC RX IP 250 OP 636: Performed by: FAMILY MEDICINE

## 2024-10-28 PROCEDURE — 99285 EMERGENCY DEPT VISIT HI MDM: CPT | Performed by: FAMILY MEDICINE

## 2024-10-28 PROCEDURE — 250N000009 HC RX 250: Performed by: FAMILY MEDICINE

## 2024-10-28 PROCEDURE — 82077 ASSAY SPEC XCP UR&BREATH IA: CPT | Performed by: FAMILY MEDICINE

## 2024-10-28 PROCEDURE — G0378 HOSPITAL OBSERVATION PER HR: HCPCS

## 2024-10-28 PROCEDURE — 81001 URINALYSIS AUTO W/SCOPE: CPT

## 2024-10-28 PROCEDURE — 250N000013 HC RX MED GY IP 250 OP 250 PS 637

## 2024-10-28 PROCEDURE — 74177 CT ABD & PELVIS W/CONTRAST: CPT

## 2024-10-28 PROCEDURE — 99221 1ST HOSP IP/OBS SF/LOW 40: CPT | Mod: FS | Performed by: SURGERY

## 2024-10-28 PROCEDURE — 93010 ELECTROCARDIOGRAM REPORT: CPT | Performed by: FAMILY MEDICINE

## 2024-10-28 PROCEDURE — 93005 ELECTROCARDIOGRAM TRACING: CPT | Performed by: FAMILY MEDICINE

## 2024-10-28 PROCEDURE — 99285 EMERGENCY DEPT VISIT HI MDM: CPT | Mod: 25 | Performed by: FAMILY MEDICINE

## 2024-10-28 PROCEDURE — 36415 COLL VENOUS BLD VENIPUNCTURE: CPT

## 2024-10-28 PROCEDURE — 250N000013 HC RX MED GY IP 250 OP 250 PS 637: Performed by: FAMILY MEDICINE

## 2024-10-28 PROCEDURE — 72125 CT NECK SPINE W/O DYE: CPT

## 2024-10-28 PROCEDURE — 99223 1ST HOSP IP/OBS HIGH 75: CPT

## 2024-10-28 PROCEDURE — 85018 HEMOGLOBIN: CPT

## 2024-10-28 RX ORDER — LAMOTRIGINE 25 MG/1
50 TABLET ORAL 2 TIMES DAILY
Status: DISCONTINUED | OUTPATIENT
Start: 2024-10-28 | End: 2024-10-29 | Stop reason: HOSPADM

## 2024-10-28 RX ORDER — DOXAZOSIN 2 MG/1
4 TABLET ORAL AT BEDTIME
Status: DISCONTINUED | OUTPATIENT
Start: 2024-10-28 | End: 2024-10-29 | Stop reason: HOSPADM

## 2024-10-28 RX ORDER — LOSARTAN POTASSIUM 50 MG/1
50 TABLET ORAL DAILY
COMMUNITY
Start: 2024-10-09

## 2024-10-28 RX ORDER — ONDANSETRON 2 MG/ML
4 INJECTION INTRAMUSCULAR; INTRAVENOUS EVERY 6 HOURS PRN
Status: DISCONTINUED | OUTPATIENT
Start: 2024-10-28 | End: 2024-10-29 | Stop reason: HOSPADM

## 2024-10-28 RX ORDER — OXYCODONE HYDROCHLORIDE 5 MG/1
5 TABLET ORAL EVERY 4 HOURS PRN
Status: DISCONTINUED | OUTPATIENT
Start: 2024-10-28 | End: 2024-10-29 | Stop reason: HOSPADM

## 2024-10-28 RX ORDER — AMPICILLIN AND SULBACTAM 2; 1 G/1; G/1
3 INJECTION, POWDER, FOR SOLUTION INTRAMUSCULAR; INTRAVENOUS ONCE
Status: DISCONTINUED | OUTPATIENT
Start: 2024-10-28 | End: 2024-10-28

## 2024-10-28 RX ORDER — FINASTERIDE 5 MG/1
5 TABLET, FILM COATED ORAL DAILY
Status: DISCONTINUED | OUTPATIENT
Start: 2024-10-29 | End: 2024-10-29 | Stop reason: HOSPADM

## 2024-10-28 RX ORDER — FUROSEMIDE 20 MG/1
20 TABLET ORAL DAILY PRN
Status: DISCONTINUED | OUTPATIENT
Start: 2024-10-28 | End: 2024-10-29 | Stop reason: HOSPADM

## 2024-10-28 RX ORDER — CALCIUM CARBONATE 500 MG/1
1000 TABLET, CHEWABLE ORAL 4 TIMES DAILY PRN
Status: DISCONTINUED | OUTPATIENT
Start: 2024-10-28 | End: 2024-10-29 | Stop reason: HOSPADM

## 2024-10-28 RX ORDER — BUSPIRONE HYDROCHLORIDE 15 MG/1
30 TABLET ORAL 2 TIMES DAILY
Status: DISCONTINUED | OUTPATIENT
Start: 2024-10-28 | End: 2024-10-29 | Stop reason: HOSPADM

## 2024-10-28 RX ORDER — AMOXICILLIN 250 MG
1 CAPSULE ORAL 2 TIMES DAILY PRN
Status: DISCONTINUED | OUTPATIENT
Start: 2024-10-28 | End: 2024-10-29 | Stop reason: HOSPADM

## 2024-10-28 RX ORDER — SPIRONOLACTONE 25 MG
12.5 TABLET ORAL DAILY
Status: DISCONTINUED | OUTPATIENT
Start: 2024-10-28 | End: 2024-10-29 | Stop reason: HOSPADM

## 2024-10-28 RX ORDER — PAROXETINE 30 MG/1
30 TABLET, FILM COATED ORAL DAILY
Status: DISCONTINUED | OUTPATIENT
Start: 2024-10-28 | End: 2024-10-29 | Stop reason: HOSPADM

## 2024-10-28 RX ORDER — AMOXICILLIN 250 MG
2 CAPSULE ORAL 2 TIMES DAILY PRN
Status: DISCONTINUED | OUTPATIENT
Start: 2024-10-28 | End: 2024-10-29 | Stop reason: HOSPADM

## 2024-10-28 RX ORDER — ACETAMINOPHEN 325 MG/1
650 TABLET ORAL ONCE
Status: COMPLETED | OUTPATIENT
Start: 2024-10-28 | End: 2024-10-28

## 2024-10-28 RX ORDER — ACETAMINOPHEN 325 MG/1
650 TABLET ORAL EVERY 4 HOURS PRN
Status: DISCONTINUED | OUTPATIENT
Start: 2024-10-28 | End: 2024-10-29 | Stop reason: HOSPADM

## 2024-10-28 RX ORDER — LOSARTAN POTASSIUM 50 MG/1
50 TABLET ORAL DAILY
Status: DISCONTINUED | OUTPATIENT
Start: 2024-10-29 | End: 2024-10-29 | Stop reason: HOSPADM

## 2024-10-28 RX ORDER — BUPROPION HYDROCHLORIDE 300 MG/1
300 TABLET ORAL EVERY MORNING
Status: DISCONTINUED | OUTPATIENT
Start: 2024-10-29 | End: 2024-10-29 | Stop reason: HOSPADM

## 2024-10-28 RX ORDER — ZOLPIDEM TARTRATE 5 MG/1
5 TABLET ORAL
Status: DISCONTINUED | OUTPATIENT
Start: 2024-10-28 | End: 2024-10-29 | Stop reason: HOSPADM

## 2024-10-28 RX ORDER — IOPAMIDOL 755 MG/ML
78 INJECTION, SOLUTION INTRAVASCULAR ONCE
Status: COMPLETED | OUTPATIENT
Start: 2024-10-28 | End: 2024-10-28

## 2024-10-28 RX ORDER — ONDANSETRON 4 MG/1
4 TABLET, ORALLY DISINTEGRATING ORAL EVERY 6 HOURS PRN
Status: DISCONTINUED | OUTPATIENT
Start: 2024-10-28 | End: 2024-10-29 | Stop reason: HOSPADM

## 2024-10-28 RX ORDER — METOPROLOL SUCCINATE 100 MG/1
200 TABLET, EXTENDED RELEASE ORAL EVERY EVENING
Status: DISCONTINUED | OUTPATIENT
Start: 2024-10-28 | End: 2024-10-29 | Stop reason: HOSPADM

## 2024-10-28 RX ADMIN — IOPAMIDOL 78 ML: 755 INJECTION, SOLUTION INTRAVENOUS at 12:40

## 2024-10-28 RX ADMIN — OXYCODONE 5 MG: 5 TABLET ORAL at 19:56

## 2024-10-28 RX ADMIN — OXYCODONE 5 MG: 5 TABLET ORAL at 15:32

## 2024-10-28 RX ADMIN — SODIUM CHLORIDE 59 ML: 9 INJECTION, SOLUTION INTRAVENOUS at 12:41

## 2024-10-28 RX ADMIN — PAROXETINE HYDROCHLORIDE 30 MG: 30 TABLET, FILM COATED ORAL at 19:57

## 2024-10-28 RX ADMIN — ACETAMINOPHEN 650 MG: 325 TABLET ORAL at 15:32

## 2024-10-28 RX ADMIN — BUSPIRONE HYDROCHLORIDE 30 MG: 15 TABLET ORAL at 19:53

## 2024-10-28 RX ADMIN — DOXAZOSIN 4 MG: 2 TABLET ORAL at 22:22

## 2024-10-28 RX ADMIN — LAMOTRIGINE 50 MG: 25 TABLET ORAL at 19:56

## 2024-10-28 RX ADMIN — METOPROLOL SUCCINATE 200 MG: 100 TABLET, EXTENDED RELEASE ORAL at 20:02

## 2024-10-28 ASSESSMENT — ACTIVITIES OF DAILY LIVING (ADL)
ADLS_ACUITY_SCORE: 0

## 2024-10-28 ASSESSMENT — ENCOUNTER SYMPTOMS
FEVER: 0
PALPITATIONS: 0
BLOOD IN STOOL: 0
WHEEZING: 0
WEAKNESS: 1
NAUSEA: 0
DIARRHEA: 0
SINUS PRESSURE: 0
SORE THROAT: 0
COUGH: 0
DIAPHORESIS: 0
VOMITING: 0
SHORTNESS OF BREATH: 0
CONSTIPATION: 1
FREQUENCY: 0
ABDOMINAL PAIN: 0
HEADACHES: 0
DYSURIA: 0
NECK PAIN: 1
DIZZINESS: 0
CHILLS: 0

## 2024-10-28 ASSESSMENT — COLUMBIA-SUICIDE SEVERITY RATING SCALE - C-SSRS
1. IN THE PAST MONTH, HAVE YOU WISHED YOU WERE DEAD OR WISHED YOU COULD GO TO SLEEP AND NOT WAKE UP?: NO
2. HAVE YOU ACTUALLY HAD ANY THOUGHTS OF KILLING YOURSELF IN THE PAST MONTH?: NO
6. HAVE YOU EVER DONE ANYTHING, STARTED TO DO ANYTHING, OR PREPARED TO DO ANYTHING TO END YOUR LIFE?: NO

## 2024-10-28 NOTE — ED PROVIDER NOTES
History     Chief Complaint   Patient presents with    Fall     Frequent at home due to weakness     HPI  Josemanuel Edmond is a 79 year old male who presents after 4-5 falls in the last 2 days tells me he has a sense of generalized weakness ambulation is difficult his legs just seem to give way.   He is on chronic anticoagulation.  Also has some neck pain that he reported earlier although improved now.  Possibly worse with range of motion.  Insomnia has been treated with hydroxyzine and with Ambien.  On anticoagulation for atrial fibrillation.  Arrived by EMS.  Noted to have ecchymosis in the lower back posterior chest and right leg.        Allergies:  Allergies   Allergen Reactions    Bactrim [Sulfamethoxazole-Trimethoprim] Nausea and Vomiting       Problem List:    Patient Active Problem List    Diagnosis Date Noted    Sensorineural hearing loss (SNHL) of both ears 07/29/2024     Priority: Medium    Cardiomyopathy, unspecified type (H) 03/07/2024     Priority: Medium     EF 50-55% 2023  45 to 50% 1/2024  Heart failure episode 2/2024  Negative Lexiscan 2/2024  EF 45-50% 7/2024 echo   Dry weight 154 pounds at home  Jardiance and entresto cost prohibitive       Longstanding persistent atrial fibrillation (H) 02/07/2024     Priority: Medium    Insomnia, unspecified type 12/13/2023     Priority: Medium    Chronic heart failure with preserved ejection fraction (H) 11/29/2023     Priority: Medium     Heart failure episode 2/2024  Negative Lexiscan 2/2024  Dry weight 154 pounds at home      Chronic anticoagulation 11/24/2023     Priority: Medium     On Xarelto      Anemia, unspecified type 07/06/2023     Priority: Medium    Moderate major depression (H) 07/06/2023     Priority: Medium    S/P revision of total hip 05/25/2023     Priority: Medium    Social phobia 04/13/2023     Priority: Medium    Chronic diastolic heart failure (H) 09/22/2022     Priority: Medium    Valvular heart disease 09/22/2022     Priority: Medium      Moderate to moderately severe MR, moderate TR, mild to moderate AI 2023  Severe TR, moderately severe to severe MR, moderate AI 2024      Tremor 06/29/2022     Priority: Medium    Memory difficulties 05/05/2022     Priority: Medium    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 02/28/2022     Priority: Medium    Depression with anxiety 02/28/2022     Priority: Medium    Right knee DJD 10/12/2018     Priority: Medium    Peripheral neuropathy 10/12/2018     Priority: Medium    Hyperplastic Polyp 03/05/2018     Priority: Medium    Atrial fibrillation, unspecified type (H) 01/18/2018     Priority: Medium     onset 2018 postoperatively, S/p cardioversion   Recurrence 2021 with palpitations  Now chronic A-fib  Severe LA  Elevated [id number] score      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) 11/28/2017     Priority: Medium    Status post lung surgery 11/18/2017     Priority: Medium    Unilateral inguinal hernia without obstruction or gangrene 11/07/2017     Priority: Medium    Benzodiazepine dependence (H) 10/31/2017     Priority: Medium    GERD (gastroesophageal reflux disease) 08/03/2012     Priority: Medium    Hyperlipidemia LDL goal <100 10/31/2010     Priority: Medium    Osteoarthrosis, unspecified whether generalized or localized, pelvic region and thigh 09/28/2007     Priority: Medium    Hypertrophy of prostate with urinary obstruction 08/03/2006     Priority: Medium     Problem list name updated by automated process. Provider to review      Benign essential hypertension 12/12/2005     Priority: Medium        Past Medical History:    Past Medical History:   Diagnosis Date    Acute on chronic diastolic (congestive) heart failure (H) 11/29/2023    Arthritis 2005    Benign neoplasm of prostate 2000    Depressive disorder     Infection due to 2019 novel coronavirus 09/27/2021    S/P knee replacement 11/06/2012    S/P left knee arthroscopy 08/15/2011       Past Surgical History:    Past Surgical History:   Procedure Laterality Date     ABDOMEN SURGERY  2003    ARTHROPLASTY KNEE Right 10/12/2018    Procedure: ARTHROPLASTY KNEE;  Right Total Knee Arthroplasty;  Surgeon: Jeb Peralta MD;  Location: [facility]    ARTHROPLASTY REVISION HIP Left 5/25/2023    Procedure: Revision total hip arthroplasty, left;  Surgeon: Chandler Leiva MD;  Location: [facility]    BIOPSY  2007    COLONOSCOPY N/A 12/10/2015    Procedure: COMBINED COLONOSCOPY, SINGLE OR MULTIPLE BIOPSY/POLYPECTOMY BY BIOPSY;  Surgeon: Jyoti Figueroa MD;  Location: [facility]    JOINT REPLACEMENT, HIP RT/LT  10/2007    Joint Replacement Hip LT    JOINT REPLACEMTN, KNEE RT/LT  08/2011    Joint Replacement knee /LT, [facility]    LAPAROSCOPIC HERNIORRHAPHY INGUINAL BILATERAL Bilateral 04/24/2018    Procedure: LAPAROSCOPIC HERNIORRHAPHY INGUINAL BILATERAL;  Laparoscopic bilateral inguinal hernia repair;  Surgeon: Josemanuel Resendiz MD;  Location: [facility]    PHACOEMULSIFICATION WITH STANDARD INTRAOCULAR LENS IMPLANT Right 01/06/2021    Procedure: Cataract Removal with Implant;  Surgeon: Regan Kaufman MD;  Location: [facility]    PHACOEMULSIFICATION WITH STANDARD INTRAOCULAR LENS IMPLANT Left 02/17/2021    Procedure: Cataract Removal with Implant;  Surgeon: Regan Kaufman MD;  Location: [facility]    SURGICAL HISTORY OF -   12/01/1999    Umbilical Herniorrhaphy with mesh    SURGICAL HISTORY OF -  Left 11/2017    Thoracotomy and drainage of empyema       Family History:    Family History   Problem Relation Age of Onset    Depression Mother     Cerebrovascular Disease Mother     Breast Cancer Mother     Neurologic Disorder Mother         parkinsons    Parkinsonism Mother     Respiratory Father         emphyzema    Diabetes Brother        Social History:  Marital Status:   [2]  Social History     Tobacco Use    Smoking status: Former     Current packs/day: 0.00     Average packs/day: 1 pack/day for 17.8 years (17.8 ttl pk-yrs)     Types: Cigarettes     Start date: 1/5/1958      Quit date: 10/13/1975     Years since quittin.0    Smokeless tobacco: Never   Vaping Use    Vaping status: Never Used   Substance Use Topics    Alcohol use: Not Currently    Drug use: No        Medications:    buPROPion (WELLBUTRIN XL) 300 MG 24 hr tablet  busPIRone HCl (BUSPAR) 30 MG tablet  doxazosin (CARDURA) 8 MG tablet  finasteride (PROSCAR) 5 MG tablet  furosemide (LASIX) 20 MG tablet  gabapentin (NEURONTIN) 300 MG capsule  gabapentin (NEURONTIN) 600 MG tablet  hydrOXYzine HCl (ATARAX) 25 MG tablet  lamoTRIgine (LAMICTAL) 25 MG tablet  losartan (COZAAR) 100 MG tablet  metoprolol succinate ER (TOPROL XL) 200 MG 24 hr tablet  multivitamin (ONE-DAILY) tablet  PARoxetine (PAXIL) 10 MG tablet  PARoxetine (PAXIL) 20 MG tablet  PARoxetine (PAXIL) 30 MG tablet  spironolactone (ALDACTONE) 25 MG tablet  XARELTO ANTICOAGULANT 20 MG TABS tablet  zolpidem (AMBIEN) 5 MG tablet          Review of Systems   Constitutional:  Negative for chills, diaphoresis and fever.   HENT:  Negative for ear pain, sinus pressure and sore throat.    Eyes:  Negative for visual disturbance.   Respiratory:  Negative for cough, shortness of breath and wheezing.    Cardiovascular:  Negative for chest pain and palpitations.   Gastrointestinal:  Positive for constipation. Negative for abdominal pain, blood in stool, diarrhea, nausea and vomiting.   Genitourinary:  Negative for dysuria, frequency and urgency.   Musculoskeletal:  Positive for neck pain.   Skin:  Negative for rash.   Neurological:  Positive for weakness. Negative for dizziness and headaches.   All other systems reviewed and are negative.      Physical Exam   BP: 117/73  Pulse: 75  Temp: 97.6  F (36.4  C)  Resp: 18  Weight: 72.6 kg (160 lb)  SpO2: 98 %      Physical Exam  Constitutional:       General: He is in acute distress.      Appearance: He is not diaphoretic.   Eyes:      Conjunctiva/sclera: Conjunctivae normal.   Cardiovascular:      Rate and Rhythm: Normal rate and  regular rhythm.      Heart sounds: No murmur heard.  Pulmonary:      Effort: Pulmonary effort is normal. No respiratory distress.      Breath sounds: Normal breath sounds. No stridor. No wheezing or rhonchi.   Abdominal:      General: Abdomen is flat. There is no distension.      Palpations: Abdomen is soft. There is no mass.      Tenderness: There is no abdominal tenderness. There is no guarding.   Musculoskeletal:      Cervical back: Neck supple.      Right lower leg: No edema.      Left lower leg: No edema.   Skin:     Coloration: Skin is not pale.      Findings: Bruising present. No rash.   Neurological:      General: No focal deficit present.      Mental Status: He is alert.      Cranial Nerves: No cranial nerve deficit.      Motor: No weakness.       Range of motion of the right hip is quite painful.  There is ecchymosis in various regions including extensive involving the right buttock hip femur region.  He also has ecchymosis over the right posterior chest and the right lateral abdomen.  Tenderness to palpation primarily closer to the hip.  The head is without raccoon's eyes or Pozo sign.  I could not detect midline cervical neck tenderness to palpation on my exam.  Distal motor function all extremities.  Normal pulses distally dorsalis pedis posterior tibial.    ED Course        Procedures                EKG Interpretation:      Interpreted by Doron Perez MD  EKG done at 1035 hrs. demonstrates a sinus rhythm 76 bpm with possible atrial fibrillation, normal axis no ST or T wave changes normal R progress no Q waves.  partial Right bundle branch block no ectopy.  Impression possible A-fib right bundle branch block.      Critical Care time:  none              Results for orders placed or performed during the hospital encounter of 10/28/24 (from the past 24 hours)   [facility]    Narrative    The following orders were created for panel order [facility].  Procedure                                Abnormality         Status                     ---------                               -----------         ------                     Extra Blue Top Tube[[id number]]                              In process                 Extra Green Top (Lithium...[[id number]]                      In process                 Extra Purple Top Tube[[id number]]                            In process                   Please view results for these tests on the individual orders.     *Note: Due to a large number of results and/or encounters for the requested time period, some results have not been displayed. A complete set of results can be found in Results Review.       Medications - No data to display    Assessments & Plan (with Medical Decision Making)     MDM:  Josemanuel Edmond is a 79 year old male presenting with multiple falls possible closed head injury neck pain likely injury to the hip on the right side also with ecchymosis over the right back posterior chest and lateral abdomen right side.  Plan for CT imaging evaluation for syncope will likely need to stay in the hospital for at least observation because of multiple falls.    The CT imaging does demonstrate a continued bleed within the right gluteal region.  There is also a possible right sided rib fracture sixth rib nondisplaced.  Given his multiple falls and the continued bleeding on anticoagulation, the risks for continued falls would recommend that the patient remain in the hospital for further evaluation for safety at home.  I did also discuss the continued bleeding with Dr. Patrick fajardo .  We discussed the possible pelvic binderrecommends pressure to the area and have asked that nursing and ERT attempt to find a way to apply continued pressure with potentially pelvic binder similar.     I have reviewed the nursing notes.    I have reviewed the findings, diagnosis, plan and need for follow up with the patient.       ED to Inpatient Handoff:    Discussed with  Breanna Douglass  Patient accepted for Inpatient Stay  Pending studies include none  Code Status: Not Addressed             Medical Decision Making  The patient's presentation was of moderate complexity (an acute complicated injury).    The patient's evaluation involved:  ordering and/or review of 3+ test(s) in this encounter (see separate area of note for details)    The patient's management necessitated high risk (a decision regarding hospitalization).        New Prescriptions    No medications on file       Final diagnoses:   Traumatic hematoma of buttock, initial encounter   Anticoagulated   Falls frequently   Closed fracture of multiple ribs of right side, initial encounter   Generalized weakness       10/28/2024   [facility] EMERGENCY DEPT       Doron Perez MD  10/28/24 2040

## 2024-10-28 NOTE — H&P
[facility]    History and Physical  Hospital Medicine       Date of Admission:  10/28/2024  Date of Service: 10/28/2024     Assessment & Plan   Josemanuel Edmond is a 79 year old male who presents on 10/28/2024 with multiple falls due to generalized weakness. He was found to have hematoma of buttock with signs of active bleeding. Hemodynamically stable. He is being admitted for PT/OT and monitoring of large right thigh/buttock hematoma with CT evidence of active bleeding.    Hematoma of buttock with active bleeding secondary to trauma  Right hip pain    Sustained ground level fall onto buttocks. Right hip/buttock with ecchymosis and starry night appearance suggesting active bleeding. Right posterior chest wall also with ecchymosis. Anticoagulated PTA with Xarelto for a-fib. CT C/A/P showing right gluteal muscular hematoma (approximately 5.7 x 9.1 cm) with suggestion of active bleeding. No acute traumatic injries without the abdomen or pelvis. General surgery consulted. Recommending admission for monitoring of hemoglobin and pressure bandage to right hip/buttock. Chronically anemic (baseline 10.5-11.5), hemoglobin 10.2 -> 9.5 at recheck. CK mildly elevated 317.  - General surgery consultation  - Holding PTA Xarelto  - Recheck hemoglobin in AM  - Pressure dressing to right hp/buttock  - Pain management with scheduled acetaminophen 650 mg q4hrs and oxycodone PRN 5 mg q4hrs    Right 6th rib fracture, possible    Denies right sided chest wall pains. CT C/A/P showing possible nondisplaced anterior sixth rib fracture. No other acute traumatic injuries to chest. No respiratory distress and oxygenating well on RA.  - Pain management with scheduled acetaminophen 650 mg q4hrs and oxycodone PRN 5 mg q4hrs    Falls with generalized weakness    Multiple falls on chronic anticoagulation with Xarelto. States bilateral leg weakness. Denies lightheadedness or dizziness. CT head without acute intracranial  abnormalities. CT c-spine without acute cervical spinal fracture. Multilevel spondylolisthesis. EKG showing rate controlled atrial fibrillation. Sodium mildly low, otherwise no electrolyte derangement.   - PT/OT  - Fall precautions  - UA, pending    Hyponatremia    Sodium 131.   - Recheck in AM    Persistent atrial fibrillation  Chronic anticoagulation    Chronic. Appears stable. EKG showing rate controlled atrial fibrillation. Rate controlled PTA with metoprolol succinate 200 mg. Anticoagulated PTA with Xarelto 20 mg for stroke prevention.  - Continue metoprolol succinate  - Holding Xarelto for bleeding (see above)    Heart failure with reduced ejection fraction (HFrEF)    Chronic. Appears compensated. Managed PTA with metoprolol succinate 200 mg, losartan 50 mg, and furosemide 20 mg PRN for weight gain. Also prescribed spironolactone 12.5 mg, however has not started taking yet. Last echocardiogram 7/2024 with EF 45-50%, mild global hypokinesis of LV, severe tricuspid regurgitation, and mod-severe mitral regurgitation.  - Continue metoprolol succinate and losartan.  - Low saturated fat diet    Chronic anemia, macrocytic    Appears stable. Hemoglobin 10.2. Baseline appears near 10.5-11.5.     Maxillary sinus mass, right    Incidental CT head finding of indeterminate lobulated soft tissue in the right maxillary sinus, concerning for neoplastic process such as an inverted papilloma. Recommend ENT consultation and tissue sampling.  - ENT referral (ordered)    Major depressive disorder  Generalized anxiety disorder    Chronic. Mood appears appropriate. Managed PTA with bupropion 300 mg, buspirone 30 mg BID, lamotrigine 50 mg BID, and paroxetine 30 mg.  - Continue bupropion, buspirone BID, lamotrigine BID, and paroxetine    Benign prostatic hyperplasia (BPH)    Chronic. Managed PTA with finasteride 5 mg and doxazosin 4 mg.  - Continue finasteride and doxazosin    Insomnia    Chronic. Utilizes zolpidem PRN, however has  "not bee using as caused changes to behavior.    Clinically Significant Risk Factors Present on Admission         # Hyponatremia: Lowest Na = 131 mmol/L in last 2 days, will monitor as appropriate  # Hypochloremia: Lowest Cl = 95 mmol/L in last 2 days, will monitor as appropriate         # Drug Induced Coagulation Defect: home medication list includes an anticoagulant medication  # Thrombocytopenia: Lowest platelets = 129 in last 2 days, will monitor for bleeding   # Hypertension: Noted on problem list    # Anemia: based on hgb <11    Diet: Low Saturated Fat Na <2400 mg    DVT Prophylaxis: Pneumatic Compression Devices  Parr Catheter: Not present  Code Status: No CPR- Do NOT Intubate  Lines: PIV    Disposition Plan   Medically Ready for Discharge: Anticipated Tomorrow    I have discussed patient and formulated plan with attending hospitalist physician, Dr. Nixon.    CK GREWAL PA-C        Primary Care Physician   Lizzy Leiva [phone]    History is obtained from the patient, emergency department provider, and review of old records via the EMR.    History of Present Illness   Josemanuel Edmond is a 79 year old male with past medical history of atrial fibrillation on anticoagulation, hypertension, hyperlipidemia, HFrEF, anxiety, depression, alcohol use disorder in remission, BPH, and insomnia now presents on 10/28/2024 with multiple falls due to generalized weakness.    Josemanuel states that he has fallen 4-5 times over the last week. He feels that his legs are weak and they \"give out\" on him. He believes he is deconditioned and needs to exercise to gain strength. He states his leg weakness is intermittent and can feel strong for weeks at a time without falling. At times he gets leg weakness with limited distance of 1 block and other times can walk throughout retain stores. He states his right leg \"jumps\" at times. He denies fever, chills, or concern for infection. Denies urinary symptoms. He endorses " striking his head on carpet during one of his falls. He is on anticoagulation for atrial fibrillation. Denies LOC, headache, vision changes, nausea, and vomiting. No neck pain. He is having right hip pain and states falling his right hip twice. He states needing assistance getting up from one of his falls. He was encouraged by his wife to come in for evaluation due to his frequent falls as of recent.    Review of Systems   Review of Systems   Constitutional:  Negative for chills and fever.   Eyes:  Negative for blurred vision and double vision.   Respiratory:  Negative for cough and shortness of breath.    Cardiovascular:  Negative for chest pain, palpitations and leg swelling.   Gastrointestinal:  Negative for abdominal pain, nausea and vomiting.   Genitourinary:  Negative for dysuria and frequency.   Musculoskeletal:  Positive for falls and joint pain (Right hip). Negative for neck pain.   Neurological:  Positive for weakness (Bilateral legs). Negative for dizziness, focal weakness, loss of consciousness and headaches.        Head injury   Psychiatric/Behavioral:  Negative for memory loss.      Past Medical History    Past Medical History:   Diagnosis Date    Acute on chronic diastolic (congestive) heart failure (H) 11/29/2023    Arthritis 2005    Benign neoplasm of prostate 2000    Benign Prostate Nodule    Depressive disorder     past condition    Infection due to 2019 novel coronavirus 09/27/2021    S/P knee replacement 11/06/2012    S/P left knee arthroscopy 08/15/2011     Past Surgical History   Past Surgical History:   Procedure Laterality Date    ABDOMEN SURGERY  2003    ARTHROPLASTY KNEE Right 10/12/2018    Procedure: ARTHROPLASTY KNEE;  Right Total Knee Arthroplasty;  Surgeon: Jeb Peralta MD;  Location: [facility]    ARTHROPLASTY REVISION HIP Left 5/25/2023    Procedure: Revision total hip arthroplasty, left;  Surgeon: Chandler Leiva MD;  Location: [facility]    BIOPSY  2007    COLONOSCOPY N/A  12/10/2015    Procedure: COMBINED COLONOSCOPY, SINGLE OR MULTIPLE BIOPSY/POLYPECTOMY BY BIOPSY;  Surgeon: Jyoti Figueroa MD;  Location: [facility]    JOINT REPLACEMENT, HIP RT/LT  10/2007    Joint Replacement Hip LT    JOINT REPLACEMTN, KNEE RT/LT  08/2011    Joint Replacement knee /LT, [facility]    LAPAROSCOPIC HERNIORRHAPHY INGUINAL BILATERAL Bilateral 04/24/2018    Procedure: LAPAROSCOPIC HERNIORRHAPHY INGUINAL BILATERAL;  Laparoscopic bilateral inguinal hernia repair;  Surgeon: Josemanuel Resendiz MD;  Location: [facility]    PHACOEMULSIFICATION WITH STANDARD INTRAOCULAR LENS IMPLANT Right 01/06/2021    Procedure: Cataract Removal with Implant;  Surgeon: Regan Kaufman MD;  Location: [facility]    PHACOEMULSIFICATION WITH STANDARD INTRAOCULAR LENS IMPLANT Left 02/17/2021    Procedure: Cataract Removal with Implant;  Surgeon: Regan Kaufman MD;  Location: [facility]    SURGICAL HISTORY OF -   12/01/1999    Umbilical Herniorrhaphy with mesh    SURGICAL HISTORY OF -  Left 11/2017    Thoracotomy and drainage of empyema      Prior to Admission Medications   Prior to Admission Medications   Prescriptions Last Dose Informant Patient Reported? Taking?   PARoxetine (PAXIL) 30 MG tablet 10/27/2024 Evening Self No Yes   Sig: Take 1 tablet (30 mg) by mouth daily. per tapering to discontinue instructions.   XARELTO ANTICOAGULANT 20 MG TABS tablet 10/27/2024 Evening Self No Yes   Sig: TAKE 1 TABLET DAILY WITH   DINNER   buPROPion (WELLBUTRIN XL) 300 MG 24 hr tablet 10/28/2024 Morning Self No Yes   Sig: Take 1 tablet (300 mg) by mouth every morning.   busPIRone HCl (BUSPAR) 30 MG tablet 10/28/2024 Morning Self No Yes   Sig: TAKE 1 TABLET TWICE A DAY   doxazosin (CARDURA) 8 MG tablet 10/27/2024 Bedtime Self No Yes   Sig: Take 0.5 tablets (4 mg) by mouth At Bedtime   finasteride (PROSCAR) 5 MG tablet 10/28/2024 Morning Self No Yes   Sig: Take 1 tablet (5 mg) by mouth daily   furosemide (LASIX) 20 MG tablet 10/28/2024  Morning Self No Yes   Sig: Take 1 tablet (20 mg) by mouth as needed (weight gain >2# in 1 day or >5# over one week).   lamoTRIgine (LAMICTAL) 25 MG tablet 10/28/2024 Morning Self No Yes   Sig: Take 2 tablets (50 mg) by mouth 2 times daily.   losartan (COZAAR) 50 MG tablet 10/28/2024 Morning Self Yes Yes   Sig: Take 50 mg by mouth daily.   metoprolol succinate ER (TOPROL XL) 200 MG 24 hr tablet 10/27/2024 Evening Self No Yes   Sig: Take 1 tablet (200 mg) by mouth every evening   multivitamin (ONE-DAILY) tablet 10/27/2024 Morning Self Yes Yes   Sig: Take 1 tablet by mouth daily   spironolactone (ALDACTONE) 25 MG tablet  Self No Yes   Sig: Take 0.5 tablets (12.5 mg) by mouth daily.   zolpidem (AMBIEN) 5 MG tablet 10/27/2024 Evening Self, Spouse/Significant Other No Yes   Sig: Take 1 tablet (5 mg) by mouth nightly as needed for sleep      Facility-Administered Medications: None     Allergies   Allergies   Allergen Reactions    Bactrim [Sulfamethoxazole-Trimethoprim] Nausea and Vomiting     Family History    Family History   Problem Relation Age of Onset    Depression Mother     Cerebrovascular Disease Mother     Breast Cancer Mother     Neurologic Disorder Mother         parkinsons    Parkinsonism Mother     Respiratory Father         emphyzema    Diabetes Brother      Social History   Social History     Socioeconomic History    Marital status:      Spouse name: Not on file    Number of children: Not on file    Years of education: Not on file    Highest education level: Not on file   Occupational History    Not on file   Tobacco Use    Smoking status: Former     Current packs/day: 0.00     Average packs/day: 1 pack/day for 17.8 years (17.8 ttl pk-yrs)     Types: Cigarettes     Start date: 1958     Quit date: 10/13/1975     Years since quittin.0    Smokeless tobacco: Never   Vaping Use    Vaping status: Never Used   Substance and Sexual Activity    Alcohol use: Not Currently    Drug use: No    Sexual  activity: Yes     Partners: Female     Birth control/protection: None     Comment:  51 years   Other Topics Concern    Parent/sibling w/ CABG, MI or angioplasty before 65F 55M? No   Social History Narrative    Not on file     Social Drivers of Health     Financial Resource Strain: Low Risk  (11/28/2023)    Financial Resource Strain     Within the past 12 months, have you or your family members you live with been unable to get utilities (heat, electricity) when it was really needed?: No   Food Insecurity: Low Risk  (11/28/2023)    Food Insecurity     Within the past 12 months, did you worry that your food would run out before you got money to buy more?: No     Within the past 12 months, did the food you bought just not last and you didn t have money to get more?: No   Transportation Needs: Low Risk  (11/28/2023)    Transportation Needs     Within the past 12 months, has lack of transportation kept you from medical appointments, getting your medicines, non-medical meetings or appointments, work, or from getting things that you need?: No   Physical Activity: Not on file   Stress: Not on file   Social Connections: Not on file   Interpersonal Safety: Low Risk  (10/25/2024)    Interpersonal Safety     Do you feel physically and emotionally safe where you currently live?: Yes     Within the past 12 months, have you been hit, slapped, kicked or otherwise physically hurt by someone?: No     Within the past 12 months, have you been humiliated or emotionally abused in other ways by your partner or ex-partner?: No   Housing Stability: Low Risk  (11/28/2023)    Housing Stability     Do you have housing? : Yes     Are you worried about losing your housing?: No     Physical Exam   /69 (BP Location: Left arm)   Pulse 76   Temp 97.8  F (36.6  C) (Oral)   Resp 16   Wt 72.6 kg (160 lb)   SpO2 98%   BMI 24.33 kg/m       Weight: 160 lbs 0 oz Body mass index is 24.33 kg/m .     Constitutional: Elderly male who appears  stated age, laying in bed comfortably, alert, oriented x 4, cooperative, polite, no apparent distress, appears nontoxic.  Eyes: Eyes are clear, pupils are reactive.  HENT: Oropharynx is clear and moist. No evidence of cranial trauma.  Cardiovascular: Regular rate and irregular rhythm, normal S1 and S2, and murmur present. JVP is normal. Trace LE edema of right extremity.  Respiratory: Clear to auscultation bilaterally. Normal respiratory effort on RA. Speaking in full sentences.  GI: Soft, flat, non-tender, normal bowel sounds, no hepatomegaly.  Genitourinary: Deferred  Musculoskeletal: Right hip with tenderness, however significant ecchymosis lateral right hip/buttock extending lateral thigh. Right lateral chest wall without tenderness to palpation.  Skin: Warm and dry, no rashes.   Neurologic: Neck supple. Cranial nerves are grossly intact.  is symmetric. Normal finger to nose test. No pronator drift.    Data   Data reviewed today:     I have personally reviewed the following data over the past 24 hrs:    6.3  \   9.5 (L)   / 129 (L)     131 (L) 95 (L) 13.8 /  111 (H)   4.1 28 0.99 \     ALT: 16 AST: 31 AP: 80 TBILI: 1.0   ALB: 3.9 TOT PROTEIN: 6.5 LIPASE: N/A       Recent Results (from the past 24 hours)   Head CT w/o contrast    Narrative    CT SCAN OF THE HEAD WITHOUT CONTRAST   10/28/2024 12:56 PM     HISTORY: Closed head injury, anticoagulation.    TECHNIQUE:  Axial images of the head and coronal reformations without  IV contrast material. Radiation dose for this scan was reduced using  automated exposure control, adjustment of the mA and/or kV according  to patient size, or iterative reconstruction technique.    COMPARISON: MRI of the brain 8/9/2024.    FINDINGS: There is no evidence of intracranial hemorrhage, mass, acute  infarct or anomaly. The ventricles are normal in size, shape and  configuration. Mild diffuse parenchymal volume loss. Mild patchy  periventricular white matter hypodensities which  are nonspecific, but  likely related to chronic microvascular ischemic disease.     Bilateral lens implants. Partial visualization of indeterminate  lobulated soft tissue measuring over 3 cm in the right maxillary  sinus, incompletely evaluated. Based on this lesion's appearance on  the previous brain MRI, findings raise concern for possible neoplastic  process such as an inverted papilloma. Small amount of fluid/membrane  thickening in the right mastoid air cells. Right temporomandibular  joint degenerative changes. The bony calvarium and bones of the skull  base appear intact.       Impression    IMPRESSION:  1. No acute intracranial hemorrhage, extra-axial fluid collection, or  mass effect.  2. Brain atrophy and presumed chronic small vessel ischemic changes,  as described.  3. Indeterminate lobulated soft tissue in the right maxillary sinus,  concerning for a neoplastic process such as an inverted papilloma.  Recommend ENT consultation and tissue sampling, as clinically  indicated.    JO ANN COLLINS MD         SYSTEM ID:  [id number]   CT Cervical Spine w/o Contrast    Narrative    CT CERVICAL SPINE WITHOUT CONTRAST October 28, 2024 12:56 PM     HISTORY: Closed head injury, non-specific cervical neck pain this  morning. Multiple falls.     TECHNIQUE: Axial images of the cervical spine were obtained without  intravenous contrast. Multiplanar reformations were performed.  Radiation dose for this scan was reduced using automated exposure  control, adjustment of the mA and/or kV according to patient size, or  iterative reconstruction technique.    COMPARISON: None.    FINDINGS: No acute cervical spine fracture is identified.  Straightening of the normal cervical lordosis. Mild anterolisthesis of  C3 on C4 measuring approximately 2.5-3 mm. Trace anterolisthesis of C4  on C5, trace retrolisthesis of C5 on C6, and anterolisthesis of C7 on  T1 measuring approximately 2.5-3 mm.    There is moderate to severe disc height  loss at C5-C6 and at least  moderate disc height loss at C6-C7 and C7-T1. Scattered marginal  endplate and uncovertebral spurs. There is moderate to severe facet  arthropathy on the left at C2-C3, C3-C4, C4-C5 and C7-T1 and on the  right C3-C4 and C7-T1. Prominent degenerative changes in the median  atlantoaxial joint. Presumed degenerative subcortical cyst of the  anterior aspect of the odontoid process. Disc osteophyte complexes.  Mild to moderate lower cervical spinal canal stenosis. Varying degrees  of multilevel degenerative neural foraminal stenosis, moderate to  severe on the left at C2-C3, moderate to severe bilaterally at C3-C4,  severe on the left at C4-C5 moderate to severe on the right at C5-C6  and at least moderate on the left at C5-C6.    Bilateral carotid bifurcation atherosclerotic calcifications and  aortic arch atherosclerotic calcifications. The prevertebral soft  tissues demonstrate no obvious soft tissue swelling.      Impression    IMPRESSION:  1. No acute cervical spinal fracture identified.  2. Multilevel spondylolisthesis, greatest at C3-C4 and C7-T1.  3. Degenerative changes, as described. Please see the body of the  report for details.    JO ANN COLLINS MD         SYSTEM ID:  [id number]   CT Chest/Abdomen/Pelvis w Contrast    Narrative    CT CHEST/ABDOMEN/PELVIS WITH CONTRAST October 28, 2024 1:00 PM    CLINICAL HISTORY: Chronic anticoagulation, multiple recent falls.  Right hip pain, ecchymosis over the posterior chest right side, lower  back right side. Lateral right hip.    TECHNIQUE: CT scan of the chest, abdomen, and pelvis was performed  following injection of IV contrast. Multiplanar reformats were  obtained. Dose reduction techniques were used.   CONTRAST: 78 mL Isovue 370.    COMPARISON: CT pelvis 7/28/2023.    FINDINGS:   LOWER NECK: Unremarkable.    LUNGS AND PLEURA: Calcified pleural plaques suggestive of prior  asbestos exposure. Mild bibasilar fibroatelectasis. No  focal  consolidation or pleural effusion. Calcified granuloma. No suspicious  nodule.    AIRWAYS: Patent.    HEART: Cardiomegaly. No pericardial effusion.  Mild coronary  calcifications. No thoracic aortic aneurysm.    PULMONARY ARTERIES: Unremarkable.    MEDIASTINUM AND DONN: Unremarkable.    HEPATOBILIARY: Cholelithiasis.     SPLEEN: Unremarkable.    PANCREAS: Unremarkable.    ADRENAL GLANDS: Unremarkable.    KIDNEYS/URETERS/BLADDER: Distal ureters and urinary bladder are not  well evaluated due to significant streak artifact. No collecting  system dilatation.    BOWEL: Colonic diverticula. Redundant sigmoid colon. No bowel  obstruction.    LYMPH NODES AND PERITONEUM: Unremarkable.    VASCULATURE: Mild to moderate abdominal aortic atherosclerosis.    PELVIS: Not well evaluated due to streak artifact. Enlarged prostate.    MUSCULOSKELETAL: Right gluteal muscular hematoma measuring  approximately 5.7 x 9.1 cm with internal ill-defined blush of  enhancement (4/311). Bilateral hip arthroplasties. Similar superior  acetabular sclerosis and lucency extending into the mid iliac bone. No  convincing acute pelvic fracture. Possible nondisplaced anterior right  sixth rib fracture (4/111). Remote left rib fracture. Moderate  degenerative changes of the spine.    ADDITIONAL FINDINGS: None.      Impression    IMPRESSION:  1.  Right gluteal muscular hematoma (approximately 5.7 x 9.1 cm) with  suggestion of active bleeding.  2.  Possible nondisplaced anterior right sixth rib fracture. Correlate  with point tenderness.  3.  Otherwise, no acute traumatic injuries within the chest, abdomen  or pelvis.  4.  Cholelithiasis.    Findings were discussed with Dr. Perez on 10/20/2024 at 1:26 PM.    JUANIS VAZQUEZ MD         SYSTEM ID:  [id number]

## 2024-10-28 NOTE — MEDICATION SCRIBE - ADMISSION MEDICATION HISTORY
Medication Scribe Admission Medication History    Admission medication history is complete. The information provided in this note is only as accurate as the sources available at the time of the update.    Information Source(s): Patient and CareEverywhere/SureScripts via  with patient in room and finished at desk.    Pertinent Information:   Had not started taking Spironolactone due to some concerns about the med, but spoke with his doctor and concerns were addressed.  He will start taking this soon.  Wife states that he should not take Zolpidem as it makes him crazy and wonders if something else could be prescribed?  He does not think there is a problem with it and it helps him sleep.  He is not discontinuing Paroxetine and is taking 30 mg daily.    Changes made to PTA medication list:  Added: Losartan 50 mg.  Deleted: Gabapentin 300 mg and 600 mg, Hydroxyzine 25 mg, Losartan 100 mg.  Changed: None    Allergies reviewed with patient and updates made in EHR: yes, no change.    Medication History Completed By: Arminda Resendez 10/28/2024 4:00 PM    PTA Med List   Medication Sig Note Last Dose/Taking    buPROPion (WELLBUTRIN XL) 300 MG 24 hr tablet Take 1 tablet (300 mg) by mouth every morning.  10/28/2024 Morning    busPIRone HCl (BUSPAR) 30 MG tablet TAKE 1 TABLET TWICE A DAY  10/28/2024 Morning    doxazosin (CARDURA) 8 MG tablet Take 0.5 tablets (4 mg) by mouth At Bedtime  10/27/2024 Bedtime    finasteride (PROSCAR) 5 MG tablet Take 1 tablet (5 mg) by mouth daily  10/28/2024 Morning    furosemide (LASIX) 20 MG tablet Take 1 tablet (20 mg) by mouth as needed (weight gain >2# in 1 day or >5# over one week).  10/28/2024 Morning    lamoTRIgine (LAMICTAL) 25 MG tablet Take 2 tablets (50 mg) by mouth 2 times daily.  10/28/2024 Morning    losartan (COZAAR) 50 MG tablet Take 50 mg by mouth daily.  10/28/2024 Morning    metoprolol succinate ER (TOPROL XL) 200 MG 24 hr tablet Take 1 tablet (200 mg) by mouth every evening   10/27/2024 Evening    multivitamin (ONE-DAILY) tablet Take 1 tablet by mouth daily  10/27/2024 Morning    PARoxetine (PAXIL) 30 MG tablet Take 1 tablet (30 mg) by mouth daily. per tapering to discontinue instructions.  10/27/2024 Evening    spironolactone (ALDACTONE) 25 MG tablet Take 0.5 tablets (12.5 mg) by mouth daily. 10/28/2024: Had not started due to some concerns about the med, but spoke with his doctor and concerns were addressed.  He will start taking this soon. Taking    XARELTO ANTICOAGULANT 20 MG TABS tablet TAKE 1 TABLET DAILY WITH   DINNER  10/27/2024 Evening    zolpidem (AMBIEN) 5 MG tablet Take 1 tablet (5 mg) by mouth nightly as needed for sleep 10/28/2024: Wife states that he should not take this as it makes him crazy and wonders if something else could be prescribed? 10/27/2024 Evening

## 2024-10-28 NOTE — PROGRESS NOTES
[facility] ADMISSION NOTE    Patient admitted to room 2208 at approximately 1615 via cart from emergency room. Patient was accompanied by spouse and transport tech.     Verbal SBAR report received from Susanna prior to patient arrival.     Patient ambulated to bed with one assist. Patient alert and oriented X 3. Pain is controlled with current analgesics.  Medication(s) being used: narcotic analgesics including oxycodone (Oxycontin, Oxyir).  . Admission vital signs: Blood pressure 122/76, pulse 76, temperature 97.6  F (36.4  C), temperature source Oral, resp. rate 12, weight 72.6 kg (160 lb), SpO2 99%. Patient and spouse were oriented to plan of care, call light, bed controls, tv, telephone, bathroom, and visiting hours.     Risk Assessment    The following safety risks were identified during admission: fall. Yellow risk band applied: YES.     Skin Initial Assessment    This writer admitted this patient and completed a full skin assessment and Rex score in the Adult PCS flowsheet.   Photo documentation of skin problem and/or wound competed via Intermolecular application (located under Media):  Yes    Appropriate interventions initiated as needed.     Secondary skin check completed by Yanick Goodman Risk Assessment  Sensory Perception: 3-->slightly limited  Moisture: 4-->rarely moist  Activity: 3-->walks occasionally  Mobility: 3-->slightly limited  Nutrition: 3-->adequate  Friction and Shear: 3-->no apparent problem  Rex Score: 19  Mattress: Standard gel/foam mattress (IsoFlex, Atmos Air, etc.)  Bed Frame: Standard width and length    Education    Patient has a King to Observation order: Yes  Observation education completed and documented: Yes      Esme Oquendo RN

## 2024-10-28 NOTE — ED NOTES
[facility]   Admission Handoff    The patient is Josemanuel Edmond, 79 year old who arrived in the ED by AMBULANCE from home with a complaint of Fall (Frequent at home due to weakness)  . The patient's current symptoms are new and during this time the symptoms have remained the same. In the ED, patient was diagnosed with   Final diagnoses:   Traumatic hematoma of buttock, initial encounter   Anticoagulated   Falls frequently   Closed fracture of multiple ribs of right side, initial encounter   Generalized weakness         Needed?: No    Allergies:    Allergies   Allergen Reactions    Bactrim [Sulfamethoxazole-Trimethoprim] Nausea and Vomiting       Past Medical Hx:   Past Medical History:   Diagnosis Date    Acute on chronic diastolic (congestive) heart failure (H) 11/29/2023    Arthritis 2005    Benign neoplasm of prostate 2000    Benign Prostate Nodule    Depressive disorder     past condition    Infection due to 2019 novel coronavirus 09/27/2021    S/P knee replacement 11/06/2012    S/P left knee arthroscopy 08/15/2011       Initial vitals were: BP: 117/73  Pulse: 75  Temp: 97.6  F (36.4  C)  Resp: 18  Weight: 72.6 kg (160 lb)  SpO2: 98 %   Recent vital Signs: /86   Pulse 78   Temp 97.6  F (36.4  C) (Oral)   Resp 12   Wt 72.6 kg (160 lb)   SpO2 98%   BMI 24.33 kg/m      Elimination Status: Continent: Yes     Activity Level: SBA    Fall Status: Reason for falls risk:  Mobility  nonskid shoes/slippers when out of bed, arm band in place, patient and family education, and assistive device/personal items within reach    Baseline Mental status: WDL  Current Mental Status changes: at basesline    Infection present or suspected this encounter: no  Sepsis suspected: No    Isolation type: N/A    Bariatric equipment needed?: No    In the ED these meds were given:   Medications   iopamidol (ISOVUE-370) solution 78 mL (78 mLs Intravenous $Given 10/28/24 1240)   sodium  chloride 0.9 % bag 500mL for CT scan flush use (59 mLs Intravenous $Given 10/28/24 1241)       Drips running?  No    Home pump  No    Current LDAs: Peripheral IV: Site R AC; Gauge 20  none     Results:   Labs/Imaging  Ordered and Resulted from Time of ED Arrival Up to the Time of Departure from the ED  Results for orders placed or performed during the hospital encounter of 10/28/24 (from the past 24 hours)   [place] Draw    Narrative    The following orders were created for panel order [place] Draw.  Procedure                               Abnormality         Status                     ---------                               -----------         ------                     Extra Blue Top Tube[[id number]]                              Final result               Extra Green Top (Lithium...[[id number]]                      Final result               Extra Purple Top Tube[[id number]]                            Final result                 Please view results for these tests on the individual orders.   Extra Blue Top Tube   Result Value Ref Range    Hold Specimen JIC    Extra Green Top (Lithium Heparin) Tube   Result Value Ref Range    Hold Specimen JIC    Extra Purple Top Tube   Result Value Ref Range    Hold Specimen JIC    CBC with platelets differential    Narrative    The following orders were created for panel order CBC with platelets differential.  Procedure                               Abnormality         Status                     ---------                               -----------         ------                     CBC with platelets and d...[[id number]]  Abnormal            Final result                 Please view results for these tests on the individual orders.   Comprehensive metabolic panel   Result Value Ref Range    Sodium 131 (L) 135 - 145 mmol/L    Potassium 4.1 3.4 - 5.3 mmol/L    Carbon Dioxide (CO2) 28 22 - 29 mmol/L    Anion Gap 8 7 - 15 mmol/L    Urea Nitrogen 13.8 8.0 - 23.0 mg/dL    Creatinine 0.99  0.67 - 1.17 mg/dL    GFR Estimate 77 >60 mL/min/1.73m2    Calcium 8.8 8.8 - 10.4 mg/dL    Chloride 95 (L) 98 - 107 mmol/L    Glucose 111 (H) 70 - 99 mg/dL    Alkaline Phosphatase 80 40 - 150 U/L    AST 31 0 - 45 U/L    ALT 16 0 - 70 U/L    Protein Total 6.5 6.4 - 8.3 g/dL    Albumin 3.9 3.5 - 5.2 g/dL    Bilirubin Total 1.0 <=1.2 mg/dL   CBC with platelets and differential   Result Value Ref Range    WBC Count 6.3 4.0 - 11.0 10e3/uL    RBC Count 2.93 (L) 4.40 - 5.90 10e6/uL    Hemoglobin 10.2 (L) 13.3 - 17.7 g/dL    Hematocrit 30.2 (L) 40.0 - 53.0 %     (H) 78 - 100 fL    MCH 34.8 (H) 26.5 - 33.0 pg    MCHC 33.8 31.5 - 36.5 g/dL    RDW 15.0 10.0 - 15.0 %    Platelet Count 129 (L) 150 - 450 10e3/uL    % Neutrophils 75 %    % Lymphocytes 14 %    % Monocytes 9 %    % Eosinophils 2 %    % Basophils 1 %    % Immature Granulocytes 0 %    NRBCs per 100 WBC 0 <1 /100    Absolute Neutrophils 4.7 1.6 - 8.3 10e3/uL    Absolute Lymphocytes 0.9 0.8 - 5.3 10e3/uL    Absolute Monocytes 0.5 0.0 - 1.3 10e3/uL    Absolute Eosinophils 0.1 0.0 - 0.7 10e3/uL    Absolute Basophils 0.0 0.0 - 0.2 10e3/uL    Absolute Immature Granulocytes 0.0 <=0.4 10e3/uL    Absolute NRBCs 0.0 10e3/uL   Head CT w/o contrast    Narrative    CT SCAN OF THE HEAD WITHOUT CONTRAST   10/28/2024 12:56 PM     HISTORY: Closed head injury, anticoagulation.    TECHNIQUE:  Axial images of the head and coronal reformations without  IV contrast material. Radiation dose for this scan was reduced using  automated exposure control, adjustment of the mA and/or kV according  to patient size, or iterative reconstruction technique.    COMPARISON: MRI of the brain 8/9/2024.    FINDINGS: There is no evidence of intracranial hemorrhage, mass, acute  infarct or anomaly. The ventricles are normal in size, shape and  configuration. Mild diffuse parenchymal volume loss. Mild patchy  periventricular white matter hypodensities which are nonspecific, but  likely related to  chronic microvascular ischemic disease.     Bilateral lens implants. Partial visualization of indeterminate  lobulated soft tissue measuring over 3 cm in the right maxillary  sinus, incompletely evaluated. Based on this lesion's appearance on  the previous brain MRI, findings raise concern for possible neoplastic  process such as an inverted papilloma. Small amount of fluid/membrane  thickening in the right mastoid air cells. Right temporomandibular  joint degenerative changes. The bony calvarium and bones of the skull  base appear intact.       Impression    IMPRESSION:  1. No acute intracranial hemorrhage, extra-axial fluid collection, or  mass effect.  2. Brain atrophy and presumed chronic small vessel ischemic changes,  as described.  3. Indeterminate lobulated soft tissue in the right maxillary sinus,  concerning for a neoplastic process such as an inverted papilloma.  Recommend ENT consultation and tissue sampling, as clinically  indicated.   CT Cervical Spine w/o Contrast    Narrative    CT CERVICAL SPINE WITHOUT CONTRAST October 28, 2024 12:56 PM     HISTORY: Closed head injury, non-specific cervical neck pain this  morning. Multiple falls.     TECHNIQUE: Axial images of the cervical spine were obtained without  intravenous contrast. Multiplanar reformations were performed.  Radiation dose for this scan was reduced using automated exposure  control, adjustment of the mA and/or kV according to patient size, or  iterative reconstruction technique.    COMPARISON: None.    FINDINGS: No acute cervical spine fracture is identified.  Straightening of the normal cervical lordosis. Mild anterolisthesis of  C3 on C4 measuring approximately 2.5-3 mm. Trace anterolisthesis of C4  on C5, trace retrolisthesis of C5 on C6, and anterolisthesis of C7 on  T1 measuring approximately 2.5-3 mm.    There is moderate to severe disc height loss at C5-C6 and at least  moderate disc height loss at C6-C7 and C7-T1. Scattered  marginal  endplate and uncovertebral spurs. There is moderate to severe facet  arthropathy on the left at C2-C3, C3-C4, C4-C5 and C7-T1 and on the  right C3-C4 and C7-T1. Prominent degenerative changes in the median  atlantoaxial joint. Presumed degenerative subcortical cyst of the  anterior aspect of the odontoid process. Disc osteophyte complexes.  Mild to moderate lower cervical spinal canal stenosis. Varying degrees  of multilevel degenerative neural foraminal stenosis, moderate to  severe on the left at C2-C3, moderate to severe bilaterally at C3-C4,  severe on the left at C4-C5 moderate to severe on the right at C5-C6  and at least moderate on the left at C5-C6.    Bilateral carotid bifurcation atherosclerotic calcifications and  aortic arch atherosclerotic calcifications. The prevertebral soft  tissues demonstrate no obvious soft tissue swelling.      Impression    IMPRESSION:  1. No acute cervical spinal fracture identified.  2. Multilevel spondylolisthesis, greatest at C3-C4 and C7-T1.  3. Degenerative changes, as described. Please see the body of the  report for details.   CT Chest/Abdomen/Pelvis w Contrast    Narrative    CT CHEST/ABDOMEN/PELVIS WITH CONTRAST October 28, 2024 1:00 PM    CLINICAL HISTORY: Chronic anticoagulation, multiple recent falls.  Right hip pain, ecchymosis over the posterior chest right side, lower  back right side. Lateral right hip.    TECHNIQUE: CT scan of the chest, abdomen, and pelvis was performed  following injection of IV contrast. Multiplanar reformats were  obtained. Dose reduction techniques were used.   CONTRAST: 78 mL Isovue 370.    COMPARISON: CT pelvis 7/28/2023.    FINDINGS:   LOWER NECK: Unremarkable.    LUNGS AND PLEURA: Calcified pleural plaques suggestive of prior  asbestos exposure. Mild bibasilar fibroatelectasis. No focal  consolidation or pleural effusion. Calcified granuloma. No suspicious  nodule.    AIRWAYS: Patent.    HEART: Cardiomegaly. No pericardial  effusion.  Mild coronary  calcifications. No thoracic aortic aneurysm.    PULMONARY ARTERIES: Unremarkable.    MEDIASTINUM AND DONN: Unremarkable.    HEPATOBILIARY: Cholelithiasis.     SPLEEN: Unremarkable.    PANCREAS: Unremarkable.    ADRENAL GLANDS: Unremarkable.    KIDNEYS/URETERS/BLADDER: Distal ureters and urinary bladder are not  well evaluated due to significant streak artifact. No collecting  system dilatation.    BOWEL: Colonic diverticula. Redundant sigmoid colon. No bowel  obstruction.    LYMPH NODES AND PERITONEUM: Unremarkable.    VASCULATURE: Mild to moderate abdominal aortic atherosclerosis.    PELVIS: Not well evaluated due to streak artifact. Enlarged prostate.    MUSCULOSKELETAL: Right gluteal muscular hematoma measuring  approximately 5.7 x 9.1 cm with internal ill-defined blush of  enhancement (4/311). Bilateral hip arthroplasties. Similar superior  acetabular sclerosis and lucency extending into the mid iliac bone. No  convincing acute pelvic fracture. Possible nondisplaced anterior right  sixth rib fracture (4/111). Remote left rib fracture. Moderate  degenerative changes of the spine.    ADDITIONAL FINDINGS: None.      Impression    IMPRESSION:  1.  Right gluteal muscular hematoma (approximately 5.7 x 9.1 cm) with  suggestion of active bleeding.  2.  Possible nondisplaced anterior right sixth rib fracture. Correlate  with point tenderness.  3.  Otherwise, no acute traumatic injuries within the chest, abdomen  or pelvis.  4.  Cholelithiasis.    Findings were discussed with Dr. Perez on 10/20/2024 at 1:26 PM.     *Note: Due to a large number of results and/or encounters for the requested time period, some results have not been displayed. A complete set of results can be found in Results Review.       For the majority of the shift this patient's behavior was Green     Cardiac Rhythm: Normal Sinus  Pt needs tele? No  Skin/wound Issues:  wrap on Right leg     Code Status: Full Code    Pain  control: Poor    Nausea control: good    Abnormal labs/tests/findings requiring intervention: See lab tab    Patient tested for COVID 19 prior to admission: NO     OBS brochure/video discussed/provided to patient/family: Yes     Family present during ED course? Yes     Family Comments/Social Situation comments: N/A    Tasks needing completion: None    Pilar LUNDY

## 2024-10-28 NOTE — PROGRESS NOTES
Reviewing chart due to high probability of admit to Med/Surg unit.     Edgar Richter RN on 10/28/2024 at 3:33 PM

## 2024-10-28 NOTE — CONSULTS
Surgical Consultation  [facility] Surgery    Josemanuel is seen in consultation for rib fractures, at the request of Dr. Perez.    Chief Complaint:  right rib fracture    History of Present Illness: Josemanuel Edmond is a 79 year old male presents via EMS for multiple falls, rib fracture and gluteal hematoma.   Wife is at bedside, and provides helpful history.   Per wife, patient is using Ambien to sleep, and has been trying to wean off of this. This causes instability, memory issues and she feels that this plays into his multiple falls. Patient is falling 5 or more times per day.   Patient takes blood thinning medications. His last dose was this morning.   He is falling multiple times per day. He falls onto his buttocks, per wife.   He has not lost consciousness. He has not hit his head.   He is having right buttock pain.    Patient Active Problem List   Diagnosis    Benign essential hypertension    Hypertrophy of prostate with urinary obstruction    Osteoarthrosis, unspecified whether generalized or localized, pelvic region and thigh    Hyperlipidemia LDL goal <100    GERD (gastroesophageal reflux disease)    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)    Hyperplastic Polyp    Right knee DJD    Peripheral neuropathy    Benzodiazepine dependence (H)    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    Unilateral inguinal hernia without obstruction or gangrene    Atrial fibrillation, unspecified type (H)    Depression with anxiety    Status post lung surgery    Memory difficulties    Tremor    Chronic diastolic heart failure (H)    Valvular heart disease    Social phobia    S/P revision of total hip    Anemia, unspecified type    Moderate major depression (H)    Chronic anticoagulation    Chronic heart failure with preserved ejection fraction (H)    Insomnia, unspecified type    Longstanding persistent atrial fibrillation (H)    Cardiomyopathy, unspecified type (H)    Sensorineural hearing loss (SNHL) of both ears    Generalized weakness     Falls frequently    Anticoagulated    Traumatic hematoma of buttock, initial encounter    Closed fracture of multiple ribs of right side, initial encounter       Past Medical History:   Diagnosis Date    Acute on chronic diastolic (congestive) heart failure (H) 11/29/2023    Arthritis 2005    Benign neoplasm of prostate 2000    Benign Prostate Nodule    Depressive disorder     past condition    Infection due to 2019 novel coronavirus 09/27/2021    S/P knee replacement 11/06/2012    S/P left knee arthroscopy 08/15/2011       Past Surgical History:   Procedure Laterality Date    ABDOMEN SURGERY  2003    ARTHROPLASTY KNEE Right 10/12/2018    Procedure: ARTHROPLASTY KNEE;  Right Total Knee Arthroplasty;  Surgeon: Jeb Peralta MD;  Location: [facility] OR    ARTHROPLASTY REVISION HIP Left 5/25/2023    Procedure: Revision total hip arthroplasty, left;  Surgeon: Chandler Leiva MD;  Location: [facility]    BIOPSY  2007    COLONOSCOPY N/A 12/10/2015    Procedure: COMBINED COLONOSCOPY, SINGLE OR MULTIPLE BIOPSY/POLYPECTOMY BY BIOPSY;  Surgeon: Jyoti Figueroa MD;  Location: [facility]    JOINT REPLACEMENT, HIP RT/LT  10/2007    Joint Replacement Hip LT    JOINT REPLACEMTN, KNEE RT/LT  08/2011    Joint Replacement knee /LT, [facility]    LAPAROSCOPIC HERNIORRHAPHY INGUINAL BILATERAL Bilateral 04/24/2018    Procedure: LAPAROSCOPIC HERNIORRHAPHY INGUINAL BILATERAL;  Laparoscopic bilateral inguinal hernia repair;  Surgeon: Josemanuel Resendiz MD;  Location: [facility] OR    PHACOEMULSIFICATION WITH STANDARD INTRAOCULAR LENS IMPLANT Right 01/06/2021    Procedure: Cataract Removal with Implant;  Surgeon: Regan Kaufman MD;  Location: [facility]    PHACOEMULSIFICATION WITH STANDARD INTRAOCULAR LENS IMPLANT Left 02/17/2021    Procedure: Cataract Removal with Implant;  Surgeon: Regan Kaufman MD;  Location: [facility]    SURGICAL HISTORY OF -   12/01/1999    Umbilical Herniorrhaphy with mesh    SURGICAL HISTORY OF -  Left  2017    Thoracotomy and drainage of empyema       Family History   Problem Relation Age of Onset    Depression Mother     Cerebrovascular Disease Mother     Breast Cancer Mother     Neurologic Disorder Mother         parkinsons    Parkinsonism Mother     Respiratory Father         emphyzema    Diabetes Brother        Social History     Tobacco Use    Smoking status: Former     Current packs/day: 0.00     Average packs/day: 1 pack/day for 17.8 years (17.8 ttl pk-yrs)     Types: Cigarettes     Start date: 1958     Quit date: 10/13/1975     Years since quittin.0    Smokeless tobacco: Never   Substance Use Topics    Alcohol use: Not Currently        History   Drug Use No       Current Outpatient Medications   Medication Sig Dispense Refill    buPROPion (WELLBUTRIN XL) 300 MG 24 hr tablet Take 1 tablet (300 mg) by mouth every morning. 90 tablet 0    busPIRone HCl (BUSPAR) 30 MG tablet TAKE 1 TABLET TWICE A  tablet 3    doxazosin (CARDURA) 8 MG tablet Take 0.5 tablets (4 mg) by mouth At Bedtime 45 tablet 3    finasteride (PROSCAR) 5 MG tablet Take 1 tablet (5 mg) by mouth daily 90 tablet 0    furosemide (LASIX) 20 MG tablet Take 1 tablet (20 mg) by mouth as needed (weight gain >2# in 1 day or >5# over one week). 45 tablet 3    gabapentin (NEURONTIN) 300 MG capsule Take 1 capsule (300 mg) by mouth 3 times daily In addition to 600mg capsule. 90 capsule 3    gabapentin (NEURONTIN) 600 MG tablet TAKE 1 TABLET FOUR TIMES A  tablet 3    hydrOXYzine HCl (ATARAX) 25 MG tablet Take 1 tablet (25 mg) by mouth every 8 hours as needed for itching or other (insomnia) (Patient not taking: Reported on 10/25/2024) 30 tablet 3    lamoTRIgine (LAMICTAL) 25 MG tablet Take 2 tablets (50 mg) by mouth 2 times daily. 360 tablet 0    losartan (COZAAR) 100 MG tablet Take 1 tablet (100 mg) by mouth daily 90 tablet 3    metoprolol succinate ER (TOPROL XL) 200 MG 24 hr tablet Take 1 tablet (200 mg) by mouth every evening 30  tablet 11    multivitamin (ONE-DAILY) tablet Take 1 tablet by mouth daily 30 tablet 0    PARoxetine (PAXIL) 10 MG tablet Take 1 tablet (10 mg) by mouth daily. per tapering to discontinue instructions. 7 tablet 0    PARoxetine (PAXIL) 20 MG tablet Take 1 tablet (20 mg) by mouth daily. per tapering to discontinue instructions. 28 tablet 0    PARoxetine (PAXIL) 30 MG tablet Take 1 tablet (30 mg) by mouth daily. per tapering to discontinue instructions. 90 tablet 0    spironolactone (ALDACTONE) 25 MG tablet Take 0.5 tablets (12.5 mg) by mouth daily. 15 tablet 11    XARELTO ANTICOAGULANT 20 MG TABS tablet TAKE 1 TABLET DAILY WITH   DINNER 90 tablet 3    zolpidem (AMBIEN) 5 MG tablet Take 1 tablet (5 mg) by mouth nightly as needed for sleep 30 tablet 2       Allergies   Allergen Reactions    Bactrim [Sulfamethoxazole-Trimethoprim] Nausea and Vomiting       Review of Systems:   Constitutional - Denies fevers, weight loss, endorses feeling of weakness and unsteadiness  Neuro - Denies tremors or seizures  Pulmon - Denies SOB, dyspnea, hemoptysis, chronic cough or use of an inhaler  CV - Denies CP, SOB, lower extremity edema, difficulty w/ stairs, has never used NTG  GI - Denies hematemesis, BRBPR, melena, chronic diarrhea or epigastric pain   - Denies hematuria, difficulty voiding, h/o STDs  Hematology - Denies blood clotting disorders, chronic anemias  Dermatology - No melanomas or skin cancers. Endorses significant bruising and pain to his right buttock and right posterior thigh.   Rheumatology - No h/o RA  Pysch - Denies depression, bipolar d/o or schizophrenia    Physical Exam:  /86   Pulse 78   Temp 97.6  F (36.4  C) (Oral)   Resp 12   Wt 72.6 kg (160 lb)   SpO2 98%   BMI 24.33 kg/m      Constitutional- does not appear acutely distressed, appears thin  Respiratory- no increased work of breathing   Cardiovascular - heart rate is normal  Abdomen - Soft, non-tender no palpable masses  Neuro - No focal neuro  deficits, Alert and oriented x 3  Psych: Appropriate mood and affect  Musculoskeletal: extensive bruising with tenderness to right buttock, right posterior thigh. Tenderness with palpation. Firmness to area of bruising, large hematoma.   Skin: Warm, Dry    Assessment:  1. 79 year old male on anticoagulation who presents after multiple falls with buttock hematoma, posterior thigh hematoma and rib fracture       Plan:   Admit to medicine team due to repeated falls, weakness  2. Recommend holding anticoagulation   3. Recommend IR consultation to determine if embolization is an option for hematoma, as area on CT suggests active bleeding.   4. Compression dressing to buttock and thigh   5. Recommend orthopedic consultation due to gluteal hematoma  6. Recommend supportive cares for possible rib fracture: tylenol, lidocaine patches, ice, incentive spirometry         IRA RINALDI PA-C  10/28/2024 at 2:56 PM

## 2024-10-28 NOTE — ED TRIAGE NOTES
Patient has been falling approx 4-5 times the past 2 days. He did hit his head and has some neck discomfort with movement. Wife reports he has not been sleeping well due weaning off of ambien recently. He denies LOC and reports he generally feels weak and falls. He is on blood thinners for Afib. Vitals stable per EMS and upon arrival. Large bruise noted right hip and leg.      Triage Assessment (Adult)       Row Name 10/28/24 1026          Triage Assessment    Airway WDL WDL        Respiratory WDL    Respiratory WDL WDL        Skin Circulation/Temperature WDL    Skin Circulation/Temperature WDL WDL        Cardiac WDL    Cardiac WDL WDL        Peripheral/Neurovascular WDL    Peripheral Neurovascular WDL WDL        Cognitive/Neuro/Behavioral WDL    Cognitive/Neuro/Behavioral WDL WDL

## 2024-10-28 NOTE — ED NOTES
Bed: ED23  Expected date:   Expected time:   Means of arrival:   Comments:  EMS - fall on thinners

## 2024-10-29 ENCOUNTER — APPOINTMENT (OUTPATIENT)
Dept: PHYSICAL THERAPY | Facility: CLINIC | Age: 79
End: 2024-10-29
Payer: COMMERCIAL

## 2024-10-29 ENCOUNTER — APPOINTMENT (OUTPATIENT)
Dept: OCCUPATIONAL THERAPY | Facility: CLINIC | Age: 79
End: 2024-10-29
Payer: COMMERCIAL

## 2024-10-29 VITALS
HEART RATE: 71 BPM | WEIGHT: 160 LBS | DIASTOLIC BLOOD PRESSURE: 53 MMHG | RESPIRATION RATE: 16 BRPM | SYSTOLIC BLOOD PRESSURE: 93 MMHG | OXYGEN SATURATION: 98 % | BODY MASS INDEX: 24.33 KG/M2 | TEMPERATURE: 97.9 F

## 2024-10-29 PROBLEM — Z79.01 LONG TERM (CURRENT) USE OF ANTICOAGULANTS: Status: ACTIVE | Noted: 2024-10-29

## 2024-10-29 PROBLEM — I45.10 RIGHT BUNDLE BRANCH BLOCK: Status: ACTIVE | Noted: 2024-10-29

## 2024-10-29 PROBLEM — M54.2 CERVICALGIA: Status: ACTIVE | Noted: 2024-10-29

## 2024-10-29 PROBLEM — K59.00 CONSTIPATION, UNSPECIFIED CONSTIPATION TYPE: Status: ACTIVE | Noted: 2024-10-29

## 2024-10-29 PROBLEM — J34.89 MAXILLARY SINUS MASS: Status: ACTIVE | Noted: 2024-10-29

## 2024-10-29 PROBLEM — G47.00 PERSISTENT DISORDER OF INITIATING OR MAINTAINING SLEEP: Status: ACTIVE | Noted: 2024-10-29

## 2024-10-29 LAB
ANION GAP SERPL CALCULATED.3IONS-SCNC: 9 MMOL/L (ref 7–15)
BUN SERPL-MCNC: 11.9 MG/DL (ref 8–23)
CALCIUM SERPL-MCNC: 8.3 MG/DL (ref 8.8–10.4)
CHLORIDE SERPL-SCNC: 98 MMOL/L (ref 98–107)
CREAT SERPL-MCNC: 0.88 MG/DL (ref 0.67–1.17)
EGFRCR SERPLBLD CKD-EPI 2021: 86 ML/MIN/1.73M2
ERYTHROCYTE [DISTWIDTH] IN BLOOD BY AUTOMATED COUNT: 15.2 % (ref 10–15)
GLUCOSE SERPL-MCNC: 98 MG/DL (ref 70–99)
HCO3 SERPL-SCNC: 23 MMOL/L (ref 22–29)
HCT VFR BLD AUTO: 27 % (ref 40–53)
HGB BLD-MCNC: 9.2 G/DL (ref 13.3–17.7)
MCH RBC QN AUTO: 33.8 PG (ref 26.5–33)
MCHC RBC AUTO-ENTMCNC: 34.1 G/DL (ref 31.5–36.5)
MCV RBC AUTO: 99 FL (ref 78–100)
PLATELET # BLD AUTO: 118 10E3/UL (ref 150–450)
POTASSIUM SERPL-SCNC: 3.8 MMOL/L (ref 3.4–5.3)
RBC # BLD AUTO: 2.72 10E6/UL (ref 4.4–5.9)
SODIUM SERPL-SCNC: 130 MMOL/L (ref 135–145)
WBC # BLD AUTO: 4.9 10E3/UL (ref 4–11)

## 2024-10-29 PROCEDURE — 36415 COLL VENOUS BLD VENIPUNCTURE: CPT

## 2024-10-29 PROCEDURE — G0378 HOSPITAL OBSERVATION PER HR: HCPCS

## 2024-10-29 PROCEDURE — 97530 THERAPEUTIC ACTIVITIES: CPT | Mod: GP | Performed by: PHYSICAL THERAPIST

## 2024-10-29 PROCEDURE — 85014 HEMATOCRIT: CPT

## 2024-10-29 PROCEDURE — 99239 HOSP IP/OBS DSCHRG MGMT >30: CPT | Mod: FS | Performed by: INTERNAL MEDICINE

## 2024-10-29 PROCEDURE — 97535 SELF CARE MNGMENT TRAINING: CPT | Mod: GO

## 2024-10-29 PROCEDURE — 97161 PT EVAL LOW COMPLEX 20 MIN: CPT | Mod: GP | Performed by: PHYSICAL THERAPIST

## 2024-10-29 PROCEDURE — 99207 PR APP CREDIT; MD BILLING SHARED VISIT: CPT | Mod: FS

## 2024-10-29 PROCEDURE — 250N000013 HC RX MED GY IP 250 OP 250 PS 637

## 2024-10-29 PROCEDURE — 82947 ASSAY GLUCOSE BLOOD QUANT: CPT

## 2024-10-29 PROCEDURE — 97165 OT EVAL LOW COMPLEX 30 MIN: CPT | Mod: GO

## 2024-10-29 PROCEDURE — 80048 BASIC METABOLIC PNL TOTAL CA: CPT

## 2024-10-29 PROCEDURE — 250N000013 HC RX MED GY IP 250 OP 250 PS 637: Performed by: FAMILY MEDICINE

## 2024-10-29 RX ORDER — OXYCODONE HYDROCHLORIDE 5 MG/1
5 TABLET ORAL EVERY 6 HOURS PRN
Qty: 10 TABLET | Refills: 0 | Status: SHIPPED | OUTPATIENT
Start: 2024-10-29 | End: 2024-11-01

## 2024-10-29 RX ORDER — TAMSULOSIN HYDROCHLORIDE 0.4 MG/1
0.4 CAPSULE ORAL DAILY
Qty: 30 CAPSULE | Refills: 0 | Status: SHIPPED | OUTPATIENT
Start: 2024-10-29

## 2024-10-29 RX ADMIN — LAMOTRIGINE 50 MG: 25 TABLET ORAL at 07:54

## 2024-10-29 RX ADMIN — FINASTERIDE 5 MG: 5 TABLET, FILM COATED ORAL at 07:54

## 2024-10-29 RX ADMIN — OXYCODONE 5 MG: 5 TABLET ORAL at 00:21

## 2024-10-29 RX ADMIN — BUPROPION HYDROCHLORIDE 300 MG: 300 TABLET, EXTENDED RELEASE ORAL at 07:53

## 2024-10-29 RX ADMIN — OXYCODONE 5 MG: 5 TABLET ORAL at 04:09

## 2024-10-29 RX ADMIN — ACETAMINOPHEN 650 MG: 325 TABLET ORAL at 00:22

## 2024-10-29 RX ADMIN — ACETAMINOPHEN 650 MG: 325 TABLET ORAL at 04:09

## 2024-10-29 RX ADMIN — OXYCODONE 5 MG: 5 TABLET ORAL at 09:04

## 2024-10-29 RX ADMIN — ACETAMINOPHEN 650 MG: 325 TABLET ORAL at 13:10

## 2024-10-29 RX ADMIN — BUSPIRONE HYDROCHLORIDE 30 MG: 15 TABLET ORAL at 07:54

## 2024-10-29 RX ADMIN — OXYCODONE 5 MG: 5 TABLET ORAL at 13:11

## 2024-10-29 RX ADMIN — ACETAMINOPHEN 650 MG: 325 TABLET ORAL at 09:04

## 2024-10-29 RX ADMIN — LOSARTAN POTASSIUM 50 MG: 50 TABLET, FILM COATED ORAL at 07:53

## 2024-10-29 ASSESSMENT — ACTIVITIES OF DAILY LIVING (ADL)
ADLS_ACUITY_SCORE: 0
DEPENDENT_IADLS:: INDEPENDENT
ADLS_ACUITY_SCORE: 0

## 2024-10-29 ASSESSMENT — ENCOUNTER SYMPTOMS
MEMORY LOSS: 0
CHILLS: 0
DIZZINESS: 0
VOMITING: 0
ABDOMINAL PAIN: 0
PALPITATIONS: 0
FREQUENCY: 0
NECK PAIN: 0
DOUBLE VISION: 0
BLURRED VISION: 0
FALLS: 1
FEVER: 0
SHORTNESS OF BREATH: 0
NAUSEA: 0
HEADACHES: 0
COUGH: 0
DYSURIA: 0
LOSS OF CONSCIOUSNESS: 0
WEAKNESS: 1
FOCAL WEAKNESS: 0

## 2024-10-29 NOTE — PROGRESS NOTES
10/29/24 1000   Appointment Info   Signing Clinician's Name / Credentials (OT) Amy Nevarez, OTR/L   Quick Adds   Quick Adds Certification   Living Environment   People in Home spouse   Current Living Arrangements house   Home Accessibility no concerns   Self-Care   Equipment Currently Used at Home walker, standard;shower chair   Fall history within last six months yes   Number of times patient has fallen within last six months 8   Activity/Exercise/Self-Care Comment at baseline: independent with ADLs and related mobility using walker at times.   General Information   Onset of Illness/Injury or Date of Surgery 10/28/24   Referring Physician Corby Galindo PA-C   Patient/Family Therapy Goal Statement (OT) to return home. Declines TCU. Would like home therapy. Knows he needs to move/exercise more.   Additional Occupational Profile Info/Pertinent History of Current Problem Josemanuel Edmond is a 79 year old male who presents on 10/28/2024 with multiple falls due to generalized weakness. He was found to have hematoma of buttock with signs of active bleeding. Hemodynamically stable. He is being admitted for PT/OT and monitoring of large right thigh/buttock hematoma with CT evidence of active bleeding. States he can go for weeks with feeling really strong- able to ambulate out in community throughout stores/run errands with no difficulty. Then has times when he becomes fatigued with simple tasks.   Existing Precautions/Restrictions fall   Cognitive Status Examination   Orientation Status orientation to person, place and time   Pain Assessment   Patient Currently in Pain Yes, see Vital Sign flowsheet  (buttocks/buised area.)   Strength Comprehensive (MMT)   Comment, General Manual Muscle Testing (MMT) Assessment feels weaker than baseline. Would like home therapy.   Transfers   Transfer Comments SBA for sit to stand using FWW   Balance   Balance Comments Ambulates throughout room with SBA and FWW   Activities of  Daily Living   BADL Assessment/Intervention upper body dressing;lower body dressing;toileting   Upper Body Dressing Assessment/Training   [place] Level (Upper Body Dressing) independent   Lower Body Dressing Assessment/Training   [place] Level (Lower Body Dressing) supervision   Toileting   [place] Level (Toileting) supervision   Clinical Impression   Criteria for Skilled Therapeutic Interventions Met (OT) Yes, treatment indicated   OT Diagnosis decreased activity tolerance for ADLs and related mobility   OT Problem List-Impairments impacting ADL problems related to;activity tolerance impaired;strength   Assessment of Occupational Performance 1-3 Performance Deficits   Identified Performance Deficits general safety with ADLs.   Planned Therapy Interventions (OT) ADL retraining;strengthening   Clinical Decision Making Complexity (OT) problem focused assessment/low complexity   Risk & Benefits of therapy have been explained patient;participants included;participants voiced agreement with care plan;current/potential barriers reviewed;risks/benefits reviewed;care plan/treatment goals reviewed;evaluation/treatment results reviewed   OT Total Evaluation Time   OT Eval, Low Complexity Minutes (41801) 10   Therapy Certification   Medical Diagnosis falls   Start of Care Date 10/29/24   Certification date from 10/29/24   Certification date to 12/01/24   OT Goals   Therapy Frequency (OT) 5 times/week   OT Predicted Duration/Target Date for Goal Attainment 11/01/24   OT Goals Lower Body Dressing;Hygiene/Grooming;Toilet Transfer/Toileting;OT Goal 1   OT: Hygiene/Grooming supervision/stand-by assist;while standing   OT: Goal 1 Pt will verbalize 1-2 techniques to decrease risk for falls during ADLs.  (goal met- seated while dressing, use of shower chair.)   Interventions   Interventions Quick Adds Self-Care/Home Management   Self-Care/Home Management   Self-Care/Home Mgmt/ADL, Compensatory, Meal Prep Minutes (30230)  13   Symptoms Noted During/After Treatment (Meal Preparation/Planning Training) none   Treatment Detail/Skilled Intervention to increase activity prior to return home pt participates in ambulation within room to increase activity tolerance all with SBA and FWW. Educated pt on various techniques to decrease risk for falls. States he doesn't always use his walker. Educated pt on importance of use of walker, shower chair, motion sensor lights, keeping items in easy to reach areas. Provided hanout out of techniques to reduce falls.  pt thankful for education/information. left seated up in chair with all needs within reach and chair alarm on.   OT Discharge Planning   OT Plan POC Tues 1/5; review education, UE HEP   OT Discharge Recommendation (DC Rec) home with home care occupational therapy   OT Rationale for DC Rec Pt moving with SBA and FWW. Would benefit from home therapy to maximize independence with ADLs and complete AE assessment within home to assist with reducing falls within environment   OT Brief overview of current status SBA and FWW   Total Session Time   Timed Code Treatment Minutes 13   Total Session Time (sum of timed and untimed services) 23      [facility]  OUTPATIENT OCCUPATIONAL THERAPY  EVALUATION  PLAN OF TREATMENT FOR OUTPATIENT REHABILITATION  (COMPLETE FOR INITIAL CLAIMS ONLY)  Patient's Last Name, First Name, M.I.  YOB: 1945  Josemanuel Edmond                          Provider's Name  [facility] Medical Record No.  [id number]                             Onset Date:  10/28/24   Start of Care Date:  10/29/24   Type:     ___PT   _X_OT   ___SLP Medical Diagnosis:  falls                    OT Diagnosis:  decreased activity tolerance for ADLs and related mobility Visits from SOC:  1     See note for plan of treatment, functional goals and certification details    I CERTIFY THE NEED FOR THESE SERVICES FURNISHED UNDER         THIS PLAN OF TREATMENT AND WHILE UNDER MY CARE     (Physician co-signature of this document indicates review and certification of the therapy plan).

## 2024-10-29 NOTE — PLAN OF CARE
Problem: Adult Inpatient Plan of Care  Goal: Plan of Care Review  Description: The Plan of Care Review/Shift note should be completed every shift.  The Outcome Evaluation is a brief statement about your assessment that the patient is improving, declining, or no change.  This information will be displayed automatically on your shift  note.  Outcome: Not Progressing  Flowsheets (Taken 10/29/2024 0553)  Plan of Care Reviewed With: patient  Overall Patient Progress: no change     Problem: Pain Acute  Goal: Optimal Pain Control and Function  Outcome: Not Progressing   Goal Outcome Evaluation:      Plan of Care Reviewed With: patient    Overall Patient Progress: no change     Pt has been quite uncomfortable tonight. Given prn tylenol & oxycodone close to q 4 hours. Ice pack applied to R hip prn. Pressure dressing to R hip in place, ace wrap to RLE rewrapped once tonight as it had bunched up under pt's knee & was causing increased discomfort. [facility]

## 2024-10-29 NOTE — PROGRESS NOTES
[facility]  OUTPATIENT PHYSICAL THERAPY EVALUATION  PLAN OF TREATMENT FOR OUTPATIENT REHABILITATION  (COMPLETE FOR INITIAL CLAIMS ONLY)  Patient's Last Name, First Name, M.I.  YOB: 1945  Josemanuel Edmond                        Provider's Name  [facility] Medical Record No.  [id number]                             Onset Date:  (P) 10/28/24   Start of Care Date:  (P) 10/29/24   Type:     _X_PT   ___OT   ___SLP Medical Diagnosis:  (P) R rib Fx and R gluteal hematoma              PT Diagnosis:  (P) impaired functional mobility Visits from SOC:  1     See note for plan of treatment, functional goals and certification details    I CERTIFY THE NEED FOR THESE SERVICES FURNISHED UNDER        THIS PLAN OF TREATMENT AND WHILE UNDER MY CARE     (Physician co-signature of this document indicates review and certification of the therapy plan).                10/29/24 9827   Appointment Info   Signing Clinician's Name / Credentials (PT) Shaylee Martin, PT   Quick Adds   Quick Adds Certification   Living Environment   People in Home spouse   Current Living Arrangements house   Home Accessibility no concerns   Living Environment Comments no steps to enter, no basement   Self-Care   Usual Activity Tolerance moderate   Current Activity Tolerance fair   Fall history within last six months yes   Activity/Exercise/Self-Care Comment has been using a FWW recently since the falls have been happening, baseline is indep   General Information   Onset of Illness/Injury or Date of Surgery 10/28/24   Referring Physician Corby Galindo PA-C   Patient/Family Therapy Goals Statement (PT) to return home   Pertinent History of Current Problem (include personal factors and/or comorbidities that impact the POC) multiple falls, R glute hematoma, R anterior 6th rib Fx (but not causing pain in the ribs or diff breathing)   Existing Precautions/Restrictions fall   Cognition    Affect/Mental Status (Cognition) WFL   Cognitive Status Comments [place]   Pain Assessment   Patient Currently in Pain Yes, see Vital Sign flowsheet   Integumentary/Edema   Integumentary/Edema Comments buttocks dark purple   Posture    Posture Forward head position;Protracted shoulders   Strength (Manual Muscle Testing)   Strength (Manual Muscle Testing) Deficits observed during functional mobility   Bed Mobility   Comment, (Bed Mobility) SBA supine<>sit, no rail, HOB flat   Transfers   Comment, (Transfers) SBA sit<>stand from recliner with FWW   Gait/Stairs (Locomotion)   [place] Level (Gait) contact guard   Assistive Device (Gait) walker, front-wheeled   Distance in Feet (Gait) 10'   Pattern (Gait) step-through   Deviations/Abnormal Patterns (Gait) weight shifting decreased;stride length decreased   Maintains Weight-bearing Status (Gait) able to maintain   Clinical Impression   Criteria for Skilled Therapeutic Intervention Yes, treatment indicated   PT Diagnosis (PT) impaired functional mobility   Influenced by the following impairments pain, impaired balance, weakness   Functional limitations due to impairments impaired gait, decreased act tolerance   Clinical Presentation (PT Evaluation Complexity) stable   Clinical Presentation Rationale clinical judgement   Clinical Decision Making (Complexity) low complexity   Planned Therapy Interventions (PT) balance training;bed mobility training;gait training;patient/family education;strengthening;transfer training;progressive activity/exercise;risk factor education   Risk & Benefits of therapy have been explained evaluation/treatment results reviewed;care plan/treatment goals reviewed;risks/benefits reviewed;current/potential barriers reviewed;participants voiced agreement with care plan;patient;spouse/significant other   PT Total Evaluation Time   PT Eval, Low Complexity Minutes (14574) 10   Therapy Certification   Start of care date 10/29/24   Certification date  from 10/29/24   Certification date to 10/30/24   Medical Diagnosis R rib Fx and R gluteal hematoma   Physical Therapy Goals   PT Frequency One time eval and treatment only   PT Predicted Duration/Target Date for Goal Attainment 10/30/24   PT Goals Bed Mobility;Transfers;Gait   PT: Bed Mobility Supervision/stand-by assist;Supine to/from sit;Within precautions;Goal Met   PT: Transfers Supervision/stand-by assist;Sit to/from stand;Assistive device;Within precautions   PT: Gait Supervision/stand-by assist;Rolling walker;50 feet;Goal Met   Interventions   Interventions Quick Adds Therapeutic Activity   Therapeutic Activity   Therapeutic Activities: dynamic activities to improve functional performance Minutes (97708) 12   Symptoms Noted During/After Treatment Increased pain   Treatment Detail/Skilled Intervention education on pros/cons 4WW vs FWW as they have a FWW but might borrow a 4WW, edu about 4WW being more mobile so need to have fairly good balance to use, if leaning a lot through UEs FWW is safer, at this time rec FWW, ambulation in room additional 100 ft SBA, edu on adjusting walker height, edu on logroll for bed mobility lying down onto L hip and demonstrated, edu about car transfer, edu on walking guidelines at home, showed pt and wife online shower bench and tub clamp rail as options   PT Discharge Planning   PT Plan DC   PT Discharge Recommendation (DC Rec) home with assist;home with home care physical therapy   PT Rationale for DC Rec home PT for strenghtening, balance, assess 4WW safety in his home   PT Brief overview of current status 110 ft SBA with FWW, be dmob SBA

## 2024-10-29 NOTE — PROGRESS NOTES
[facility] DISCHARGE NOTE    Patient discharged to home at 3:24 PM via wheel chair. Accompanied by spouse and staff. Discharge instructions reviewed with patient and spouse, opportunity offered to ask questions. Prescriptions sent to patients preferred pharmacy. All belongings sent with patient.    Yanick Norman RN

## 2024-10-29 NOTE — DISCHARGE SUMMARY
[facility]  Discharge Summary  Hospital Medicine       Date of Admission:  10/28/2024  Date of Discharge:  10/29/2024  Discharging Provider: Hazel Diaz PA-C    Identification and Chief Compaint: Josemanuel Edmond is a 79 year old male who presents on 10/28/2024 with multiple falls due to generalized weakness. He was found to have hematoma of buttock with signs of active bleeding. Hemodynamically stable. He is being admitted for PT/OT and monitoring of large right thigh/buttock hematoma with CT evidence of active bleeding. Remained hemodynamically stable and is now ready for discharge home with home care PT & OT.     Discharge Diagnoses   Hematoma of buttock with active bleeding secondary to trauma  Right hip pain  Right 6th rib fracture, possible  Falls with generalized weakness  Persistent atrial fibrillation  Chronic anticoagulation  Benign prostatic hyperplasia (BPH)  Insomnia  Maxillary sinus mass, right  Heart failure with reduced ejection fraction (HFrEF)  Hyponatremia  Major depressive disorder  Generalized anxiety disorder  Chronic anemia, macrocytic     Follow-ups Needed After Discharge   Follow-up Appointments       Follow-up and recommended labs and tests       Follow up with primary care provider, Lizzy Leiva, within 7 days for hospital follow- up.            -No outstanding labs at time of discharge    Hospital Course   Josemanuel Edmond is a 79 year old male who presents on 10/28/2024 with multiple falls due to generalized weakness. He was found to have hematoma of buttock with signs of active bleeding. Hemodynamically stable. He is being admitted for PT/OT and monitoring of large right thigh/buttock hematoma with CT evidence of active bleeding.    Hematoma of buttock with active bleeding secondary to trauma  Right hip pain    Sustained multiple ground level falls onto buttocks. Right hip/buttock with ecchymosis and starry night appearance suggesting active  bleeding. Right posterior chest wall also with ecchymosis. Anticoagulated PTA with Xarelto for a-fib. CT C/A/P showing right gluteal muscular hematoma (approximately 5.7 x 9.1 cm) with suggestion of active bleeding. No acute traumatic injries in the abdomen or pelvis. General surgery consulted who is recommending admission for monitoring of hemoglobin and pressure bandage to right hip/buttock. Chronically anemic (baseline 10.5-11.5), hemoglobin 10.2 -> 9.5 at recheck. CK mildly elevated 317.  - Holding PTA Rivaroxaban. Plan to hold until at least PCP follow up in ~1 week to ensure no further bleeding  - Pressure dressing to right hp/buttock  - Pain management with scheduled acetaminophen 650 mg q4hrs and oxycodone PRN 5 mg Q6hrs (total 10 pills on discharge for ongoing severe pain)     Right 6th rib fracture, possible  Denies right sided chest wall pains. CT C/A/P showing possible nondisplaced anterior sixth rib fracture. No other acute traumatic injuries to chest. No respiratory distress and oxygenating well on RA.  - Pain controlled during admission with acetaminophen & low-dose oxycodone. Discharged with total 10 pills of oxycodone 5mg for use Q6hrs PRN for severe pain    Falls with generalized weakness  Multiple falls on chronic anticoagulation with Xarelto. Endorses bilateral leg weakness & seems to be falling when he gets up in the middle of the night; suspicious for combo of HOTN probably medication-related & zolpidem. CT head without acute intracranial abnormalities. CT c-spine without acute cervical spinal fracture. Multilevel spondylolisthesis. EKG showing rate controlled atrial fibrillation. Sodium mildly low, otherwise no electrolyte derangement.   - PT/OT recommending home care; referral placed in discharge navigator  - Switched doxazosin to tamsulosin for possible hypotension as etiology  - Holding xarelto as per hematoma, above.     Persistent atrial fibrillation  Chronic anticoagulation  EKG showing  rate controlled atrial fibrillation. Rate controlled PTA with metoprolol succinate 200 mg. Anticoagulated PTA with Xarelto 20 mg for stroke prevention.  - Continue metoprolol succinate  - Holding Xarelto for bleeding. Plan to discuss resuming at PCP appointment (~1wk after discharge)    Benign prostatic hyperplasia (BPH)  Stable. Managed PTA with finasteride 5 mg and doxazosin 4 mg.  - Continued PTA finasteride 5mg daily  - STOP doxazosin for possible contribution to hOtn & falls  - Initiate tamsulosin 0.4mg daily     Insomnia  Chronic, using zolpidem which he feels is not working as well as it used to.   - Continue PTA zolpidem 5mg at bedtime PRN. Consider switching to other agent if falls / nighttime behavior issues continue as this may be contributing.     Maxillary sinus mass, right  Incidental CT head finding of indeterminate lobulated soft tissue in the right maxillary sinus, concerning for neoplastic process such as an inverted papilloma. Recommend ENT consultation and tissue sampling.  - ENT  referral placed in discharge navigator    Heart failure with reduced ejection fraction (HFrEF)  Appears compensated. Managed PTA with metoprolol succinate 200 mg, losartan 50 mg, and furosemide 20 mg PRN for weight gain. Also prescribed spironolactone 12.5 mg, however has not started taking yet. Last echocardiogram 7/2024 with EF 45-50%, mild global hypokinesis of LV, severe tricuspid regurgitation, and mod-severe mitral regurgitation.  - Continued metoprolol succinate and losartan during admission. BP low-normal.   - Plan to initiate spironolactone outpatient with plan according to PCP  - Low saturated fat diet    Hyponatremia  Sodium 131 ?  130. No interventions. Monitor periodically.     Major depressive disorder  Generalized anxiety disorder  Mood appears appropriate. Managed PTA with bupropion 300 mg, buspirone 30 mg BID, lamotrigine 50 mg BID, and paroxetine 30 mg.  - Continued bupropion, buspirone BID,  lamotrigine BID, and paroxetine    Chronic anemia, macrocytic  Hemoglobin 10.2. Baseline appears near 10.5-11.5.      Consultations This Hospital Stay   PHYSICAL THERAPY ADULT IP CONSULT  OCCUPATIONAL THERAPY ADULT IP CONSULT  CARE MANAGEMENT / SOCIAL WORK IP CONSULT    Code Status   No CPR- Do NOT Intubate    The discharge plan was discussed with the patient, patient's wife, bedside RN, and attending physician Dr. Nixon, and all expressed understanding.     Time Spent on this Encounter   Total time on this discharge was 45 minutes.       Hazel Diaz PA-C  [facility]  ______________________________________________________________________    Physical Exam   Vital Signs: Temp: 97.9  F (36.6  C) Temp src: Oral BP: 93/53 Pulse: 71   Resp: 16 SpO2: 98 % O2 Device: None (Room air)    Weight: 160 lbs 0 oz  Constitutional: alert and interacting appropriately. Laying in chair, appears comfortable, nontoxic.   CV: regular rate & rhythm, paradoxical splitting of S2 primarily heard over LUSB & LLSB, systolic murmur over apex. Radial pulse 2+.   Respiratory: CTA bilaterally, respirations unlabored on room air.   GI: Bowel sounds present throughout. Abdomen firm but not rigid, non-tender to palpation.   Skin: Warm and dry. Significant hematoma over right buttock into right thigh.   Musculoskeletal: muscle bulk as expected. No obvious joint deformities.   Neuro: hard of hearing, but answering questions appropriately.        Primary Care Physician   Lizzy Leiva  [id number] [facility] [id number]     Discharge Disposition   Discharged to home  Condition at discharge: Fair    Significant Results and Procedures   Results for orders placed or performed during the hospital encounter of 10/28/24   CT Chest/Abdomen/Pelvis w Contrast    Narrative    CT CHEST/ABDOMEN/PELVIS WITH CONTRAST October 28, 2024 1:00 PM    CLINICAL HISTORY: Chronic anticoagulation, multiple recent falls.  Right  hip pain, ecchymosis over the posterior chest right side, lower  back right side. Lateral right hip.    TECHNIQUE: CT scan of the chest, abdomen, and pelvis was performed  following injection of IV contrast. Multiplanar reformats were  obtained. Dose reduction techniques were used.   CONTRAST: 78 mL Isovue 370.    COMPARISON: CT pelvis 7/28/2023.    FINDINGS:   LOWER NECK: Unremarkable.    LUNGS AND PLEURA: Calcified pleural plaques suggestive of prior  asbestos exposure. Mild bibasilar fibroatelectasis. No focal  consolidation or pleural effusion. Calcified granuloma. No suspicious  nodule.    AIRWAYS: Patent.    HEART: Cardiomegaly. No pericardial effusion.  Mild coronary  calcifications. No thoracic aortic aneurysm.    PULMONARY ARTERIES: Unremarkable.    MEDIASTINUM AND DONN: Unremarkable.    HEPATOBILIARY: Cholelithiasis.     SPLEEN: Unremarkable.    PANCREAS: Unremarkable.    ADRENAL GLANDS: Unremarkable.    KIDNEYS/URETERS/BLADDER: Distal ureters and urinary bladder are not  well evaluated due to significant streak artifact. No collecting  system dilatation.    BOWEL: Colonic diverticula. Redundant sigmoid colon. No bowel  obstruction.    LYMPH NODES AND PERITONEUM: Unremarkable.    VASCULATURE: Mild to moderate abdominal aortic atherosclerosis.    PELVIS: Not well evaluated due to streak artifact. Enlarged prostate.    MUSCULOSKELETAL: Right gluteal muscular hematoma measuring  approximately 5.7 x 9.1 cm with internal ill-defined blush of  enhancement (4/311). Bilateral hip arthroplasties. Similar superior  acetabular sclerosis and lucency extending into the mid iliac bone. No  convincing acute pelvic fracture. Possible nondisplaced anterior right  sixth rib fracture (4/111). Remote left rib fracture. Moderate  degenerative changes of the spine.    ADDITIONAL FINDINGS: None.      Impression    IMPRESSION:  1.  Right gluteal muscular hematoma (approximately 5.7 x 9.1 cm) with  suggestion of active  bleeding.  2.  Possible nondisplaced anterior right sixth rib fracture. Correlate  with point tenderness.  3.  Otherwise, no acute traumatic injuries within the chest, abdomen  or pelvis.  4.  Cholelithiasis.    Findings were discussed with Dr. Perez on 10/20/2024 at 1:26 PM.    JUANIS VAZQUEZ MD         SYSTEM ID:  [id number]   Head CT w/o contrast    Narrative    CT SCAN OF THE HEAD WITHOUT CONTRAST   10/28/2024 12:56 PM     HISTORY: Closed head injury, anticoagulation.    TECHNIQUE:  Axial images of the head and coronal reformations without  IV contrast material. Radiation dose for this scan was reduced using  automated exposure control, adjustment of the mA and/or kV according  to patient size, or iterative reconstruction technique.    COMPARISON: MRI of the brain 8/9/2024.    FINDINGS: There is no evidence of intracranial hemorrhage, mass, acute  infarct or anomaly. The ventricles are normal in size, shape and  configuration. Mild diffuse parenchymal volume loss. Mild patchy  periventricular white matter hypodensities which are nonspecific, but  likely related to chronic microvascular ischemic disease.     Bilateral lens implants. Partial visualization of indeterminate  lobulated soft tissue measuring over 3 cm in the right maxillary  sinus, incompletely evaluated. Based on this lesion's appearance on  the previous brain MRI, findings raise concern for possible neoplastic  process such as an inverted papilloma. Small amount of fluid/membrane  thickening in the right mastoid air cells. Right temporomandibular  joint degenerative changes. The bony calvarium and bones of the skull  base appear intact.       Impression    IMPRESSION:  1. No acute intracranial hemorrhage, extra-axial fluid collection, or  mass effect.  2. Brain atrophy and presumed chronic small vessel ischemic changes,  as described.  3. Indeterminate lobulated soft tissue in the right maxillary sinus,  concerning for a neoplastic process  such as an inverted papilloma.  Recommend ENT consultation and tissue sampling, as clinically  indicated.    JO ANN COLLINS MD         SYSTEM ID:  [id number]   CT Cervical Spine w/o Contrast    Narrative    CT CERVICAL SPINE WITHOUT CONTRAST October 28, 2024 12:56 PM     HISTORY: Closed head injury, non-specific cervical neck pain this  morning. Multiple falls.     TECHNIQUE: Axial images of the cervical spine were obtained without  intravenous contrast. Multiplanar reformations were performed.  Radiation dose for this scan was reduced using automated exposure  control, adjustment of the mA and/or kV according to patient size, or  iterative reconstruction technique.    COMPARISON: None.    FINDINGS: No acute cervical spine fracture is identified.  Straightening of the normal cervical lordosis. Mild anterolisthesis of  C3 on C4 measuring approximately 2.5-3 mm. Trace anterolisthesis of C4  on C5, trace retrolisthesis of C5 on C6, and anterolisthesis of C7 on  T1 measuring approximately 2.5-3 mm.    There is moderate to severe disc height loss at C5-C6 and at least  moderate disc height loss at C6-C7 and C7-T1. Scattered marginal  endplate and uncovertebral spurs. There is moderate to severe facet  arthropathy on the left at C2-C3, C3-C4, C4-C5 and C7-T1 and on the  right C3-C4 and C7-T1. Prominent degenerative changes in the median  atlantoaxial joint. Presumed degenerative subcortical cyst of the  anterior aspect of the odontoid process. Disc osteophyte complexes.  Mild to moderate lower cervical spinal canal stenosis. Varying degrees  of multilevel degenerative neural foraminal stenosis, moderate to  severe on the left at C2-C3, moderate to severe bilaterally at C3-C4,  severe on the left at C4-C5 moderate to severe on the right at C5-C6  and at least moderate on the left at C5-C6.    Bilateral carotid bifurcation atherosclerotic calcifications and  aortic arch atherosclerotic calcifications. The prevertebral  soft  tissues demonstrate no obvious soft tissue swelling.      Impression    IMPRESSION:  1. No acute cervical spinal fracture identified.  2. Multilevel spondylolisthesis, greatest at C3-C4 and C7-T1.  3. Degenerative changes, as described. Please see the body of the  report for details.    JO ANN COLLINS MD         SYSTEM ID:  [id number]     *Note: Due to a large number of results and/or encounters for the requested time period, some results have not been displayed. A complete set of results can be found in Results Review.     Procedures  None    Discharge Orders      Adult ENT  Referral      Primary Care - Care Coordination Referral      Home Care Referral      Reason for your hospital stay    You were hospitalized for pain following a fall. During your hospitalization you received pain control & were evaluated by physical & occupational therapy. A few medication changes were made; please follow up on these with your primary care provider within ~1 week.     Follow-up and recommended labs and tests     Follow up with primary care provider, Lizzy Leiva, within 7 days for hospital follow- up.     Activity    Your activity upon discharge: activity as tolerated     Diet    Follow this diet upon discharge: Current Diet:Orders Placed This Encounter      Low Saturated Fat Na <2400 mg     Discharge Medications   Discharge Medication List as of 10/29/2024  3:25 PM        START taking these medications    Details   oxyCODONE (ROXICODONE) 5 MG tablet Take 1 tablet (5 mg) by mouth every 6 hours as needed for pain., Disp-10 tablet, R-0, Local Print      tamsulosin (FLOMAX) 0.4 MG capsule Take 1 capsule (0.4 mg) by mouth daily., Disp-30 capsule, R-0, E-Prescribe           CONTINUE these medications which have CHANGED    Details   rivaroxaban ANTICOAGULANT (XARELTO ANTICOAGULANT) 20 MG TABS tablet Take 1 tablet (20 mg) by mouth daily (with dinner). HOLD until your follow up in ~1 week with primary care., No  Print Out           CONTINUE these medications which have NOT CHANGED    Details   buPROPion (WELLBUTRIN XL) 300 MG 24 hr tablet Take 1 tablet (300 mg) by mouth every morning., Disp-90 tablet, R-0, E-PrescribeNeeds appointment for additional refills      busPIRone HCl (BUSPAR) 30 MG tablet TAKE 1 TABLET TWICE A DAY, Disp-180 tablet, R-3, E-Prescribe      finasteride (PROSCAR) 5 MG tablet Take 1 tablet (5 mg) by mouth daily, Disp-90 tablet, R-0, E-Prescribe      furosemide (LASIX) 20 MG tablet Take 1 tablet (20 mg) by mouth as needed (weight gain >2# in 1 day or >5# over one week)., Disp-45 tablet, R-3, E-PrescribeDec dose      lamoTRIgine (LAMICTAL) 25 MG tablet Take 2 tablets (50 mg) by mouth 2 times daily., Disp-360 tablet, R-0, E-PrescribeNeeds to attend an appointment for additional refills      losartan (COZAAR) 50 MG tablet Take 50 mg by mouth daily., Historical      metoprolol succinate ER (TOPROL XL) 200 MG 24 hr tablet Take 1 tablet (200 mg) by mouth every evening, Disp-30 tablet, R-11, E-Prescribe      multivitamin (ONE-DAILY) tablet Take 1 tablet by mouth daily, Disp-30 tablet, R-0, Historical      PARoxetine (PAXIL) 30 MG tablet Take 1 tablet (30 mg) by mouth daily. per tapering to discontinue instructions., Disp-90 tablet, R-0, E-PrescribeNeeds appointment for additional refills      spironolactone (ALDACTONE) 25 MG tablet Take 0.5 tablets (12.5 mg) by mouth daily., Disp-15 tablet, R-11, E-Prescribe      zolpidem (AMBIEN) 5 MG tablet Take 1 tablet (5 mg) by mouth nightly as needed for sleep, Disp-30 tablet, R-2, E-PrescribeThis prescription was filled on 8/19/2024. Any refills authorized will be placed on file.           STOP taking these medications       doxazosin (CARDURA) 8 MG tablet Comments:   Reason for Stopping:             Allergies   Allergies   Allergen Reactions    Bactrim [Sulfamethoxazole-Trimethoprim] Nausea and Vomiting

## 2024-10-29 NOTE — PLAN OF CARE
Goal Outcome Evaluation:      Plan of Care Reviewed With: patient, spouse          Outcome Evaluation: Home with Home Care

## 2024-10-29 NOTE — CONSULTS
Care Management Initial Consult    General Information  Assessment completed with: Patient, Gavin and Spouse, Anna  Type of CM/SW Visit: Offer D/C Planning    Primary Care Provider verified and updated as needed: Yes   Readmission within the last 30 days: no previous admission in last 30 days      Reason for Consult: discharge planning  Advance Care Planning: Advance Care Planning Reviewed: present on chart          Communication Assessment  Patient's communication style: spoken language (English or Bilingual)    Hearing Difficulty or Deaf: yes   Wear Glasses or Blind: yes    Cognitive  Cognitive/Neuro/Behavioral: WDL                      Living Environment:   People in home: spouse  Anna  Current living Arrangements: condominium      Able to return to prior arrangements: yes       Family/Social Support:  Care provided by: self  Provides care for: no one  Marital Status:   Support system: Wife  Anna       Description of Support System: Supportive, Involved    Support Assessment: Adequate family and caregiver support    Current Resources:   Patient receiving home care services: No        Community Resources: None  Equipment currently used at home: shower chair, walker, standard  Supplies currently used at home: None    Employment/Financial:  Employment Status: retired        Financial Concerns: none           Does the patient's insurance plan have a 3 day qualifying hospital stay waiver?  Yes     Which insurance plan 3 day waiver is available? Alternative insurance waiver    Will the waiver be used for post-acute placement? No    Lifestyle & Psychosocial Needs:  Social Drivers of Health     Food Insecurity: Low Risk  (11/28/2023)    Food Insecurity     Within the past 12 months, did you worry that your food would run out before you got money to buy more?: No     Within the past 12 months, did the food you bought just not last and you didn t have money to get more?: No   Depression: Not at risk (10/25/2024)     PHQ-2     PHQ-2 Score: 2   Housing Stability: Low Risk  (11/28/2023)    Housing Stability     Do you have housing? : Yes     Are you worried about losing your housing?: No   Tobacco Use: Medium Risk (10/25/2024)    Patient History     Smoking Tobacco Use: Former     Smokeless Tobacco Use: Never     Passive Exposure: Not on file   Financial Resource Strain: Low Risk  (11/28/2023)    Financial Resource Strain     Within the past 12 months, have you or your family members you live with been unable to get utilities (heat, electricity) when it was really needed?: No   Alcohol Use: Not on file   Transportation Needs: Low Risk  (11/28/2023)    Transportation Needs     Within the past 12 months, has lack of transportation kept you from medical appointments, getting your medicines, non-medical meetings or appointments, work, or from getting things that you need?: No   Physical Activity: Not on file   Interpersonal Safety: Low Risk  (10/25/2024)    Interpersonal Safety     Do you feel physically and emotionally safe where you currently live?: Yes     Within the past 12 months, have you been hit, slapped, kicked or otherwise physically hurt by someone?: No     Within the past 12 months, have you been humiliated or emotionally abused in other ways by your partner or ex-partner?: No   Stress: Not on file   Social Connections: Not on file   Health Literacy: Not on file       Functional Status:  Prior to admission patient needed assistance:   Dependent ADLs:: Independent  Dependent IADLs:: Independent       Mental Health Status:  Mental Health Status: No Current Concerns       Chemical Dependency Status:  Chemical Dependency Status: No Current Concerns             Values/Beliefs:  Spiritual, Cultural Beliefs, Druze Practices, Values that affect care: no               Discussed  Partnership in Safe Discharge Planning  document with patient/family: Yes    Additional Information:    Consult received for discharge  planning/disposition due to generalized weakness and falls.    The Patient lives with wife, Anna independently in the community in a condominium.  At baseline he is independent with ADLs.  Patient and wife complete IADLs.  He ambulates independently.  He has a walker and is going to start using it.  He drives.    Patient has had 4-5 falls in the last 2 days due to  generalized weakness, ambulation is difficult his legs just seem to give way. Therapy recommends home care upon discharge.  Discussed home care.  [facility] Phone: [phone] Fax: [phone] has accepted Patient for PT/OT services.      Hand off referral placed for [facility] Care Coordination.    Plan:  Home [facility] today    Next Steps:     Discharge today      Alyssa Mckinnon RN

## 2024-10-30 ENCOUNTER — PATIENT OUTREACH (OUTPATIENT)
Dept: CARE COORDINATION | Facility: CLINIC | Age: 79
End: 2024-10-30
Payer: COMMERCIAL

## 2024-10-30 ASSESSMENT — ACTIVITIES OF DAILY LIVING (ADL): DEPENDENT_IADLS:: INDEPENDENT

## 2024-10-30 NOTE — PROGRESS NOTES
Clinic Care Coordination Contact  Transitions of Care Outreach  Chief Complaint   Patient presents with    Clinic Care Coordination - Post Hospital    Clinic Care Coordination - Initial       Most Recent Admission Date: 10/28/2024   Most Recent Admission Diagnosis: Generalized weakness - R53.1  Falls frequently - R29.6  Anticoagulated - Z79.01  Traumatic hematoma of buttock, initial encounter - S30.0XXA  Closed fracture of multiple ribs of right side, initial encounter - S22.41XA     Most Recent Discharge Date: 10/29/2024   Most Recent Discharge Diagnosis: Traumatic hematoma of buttock, initial encounter - S30.0XXA  Anticoagulated - Z79.01  Falls frequently - R29.6  Closed fracture of multiple ribs of right side, initial encounter - S22.41XA  Generalized weakness - R53.1  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 (Red Flag symptoms escalate to triage hotline per guidelines): Improved  Do you know how to contact your clinic care team if you have future questions or changes to your health status? : Yes  Does the patient have their discharge instructions? : Yes  Does the patient have questions regarding their discharge instructions? : No  Were you started on any new medications or were there changes to any of your previous medications? : Yes  Does the patient have all of their medications?: Yes  Do you have questions regarding any of your medications? : No  Do you have all of your  needed medical supplies or equipment (DME)?  (i.e. oxygen tank, CPAP, cane, etc.): Yes    Follow up Plan     Discharge Follow-Up  Discharge follow up appointment scheduled in alignment with recommended follow up timeframe or Transitions of Risk Category? (Low = within 30 days; Moderate= within 14 days; High= within 7 days): Yes  Discharge Follow Up Appointment Date: 11/05/24  Discharge Follow Up Appointment Scheduled with?: Primary Care Provider    IP LSIA 10/29/24: Consult received for discharge planning/disposition due to generalized weakness and falls.  The Patient lives with wife, Anna independently in the community in a condominium.  At baseline he is independent with ADLs.  Patient and wife complete IADLs.  He ambulates independently.  He has a walker and is going to start using it.  He drives.  Patient has had 4-5 falls in the last 2 days due to  generalized weakness, ambulation is difficult his legs just seem to give way. Therapy recommends home care upon discharge.  Discussed home care.  [employer] Phone: [phone] Fax: [phone] has accepted Patient for PT/OT services.    Hand off referral placed for [facility] Care Coordination.  Plan:  Home with [employer] today    Pt was hospitalized at [facility] 10/28/24 to 10/29/24 for multiple falls due to generalized weakness. Medication changes made.     START taking:  tamsulosin (FLOMAX)  CHANGE how you take:  rivaroxaban ANTICOAGULANT (XARELTO  ANTICOAGULANT)  STOP taking:  doxazosin 8 MG tablet (CARDURA)    Follow this diet upon discharge: Current Diet:Orders Placed This Encounter  Low Saturated Fat Na <2400 mg    Follow-up and recommended labs and tests  Follow up with primary care provider, Lizzy Leiva, within 7 days for hospital follow- up.    - PCP 11/5/24 10:15 am arrival time, pt aware  - [facility] PT/OT, states they called but they wanted to come too soon, pt said he will call  when freed up  - [phone] Incidental CT head finding of indeterminate lobulated soft tissue in the right maxillary sinus, concerning for neoplastic process such as an inverted papilloma.    Future Appointments   Date Time Provider Department Center   11/5/2024 10:30 AM Becca Rowe APRN CNP [facility]   11/8/2024 10:30 AM Angeles Sanders APRN CNP [facility]   11/14/2024 12:45 PM Kenyon Cortes MD [facility] CLIN   12/18/2024 12:55 PM Nazanin Caballero APRN CNP [facility] CLIN   12/19/2024 10:40 AM Lizzy Leiva MD [facility]   1/29/2025 11:15 AM [facility]   3/25/2025  3:00 PM [facility]   4/22/2025 11:00 AM [facility]       Outpatient Plan as outlined on AVS reviewed with patient.    For any urgent concerns, please contact our 24 hour nurse triage line: [phone] ([phone])       KELLEE Rapp

## 2024-10-31 ENCOUNTER — NURSE TRIAGE (OUTPATIENT)
Dept: FAMILY MEDICINE | Facility: CLINIC | Age: 79
End: 2024-10-31
Payer: COMMERCIAL

## 2024-10-31 NOTE — TELEPHONE ENCOUNTER
"Wife calling and answered questions.  Patient asleep.  She will bring him to [place] [facility].      Reason for Disposition   SEVERE swelling (e.g., swelling extends above knee, entire leg is swollen, weeping fluid)    Additional Information   Negative: Sounds like a life-threatening emergency to the triager   Negative: Chest pain   Negative: Followed an insect bite and has localized swelling (e.g., small area of puffy or swollen skin)   Negative: Followed a knee injury   Negative: Ankle or foot injury   Negative: Pregnant with leg swelling or edema   Negative: Difficulty breathing at rest   Negative: Entire foot is cool or blue in comparison to other side    Answer Assessment - Initial Assessment Questions  1. ONSET: \"When did the swelling start?\" (e.g., minutes, hours, days)      Yesterday  2. LOCATION: \"What part of the leg is swollen?\"  \"Are both legs swollen or just one leg?\"      (R) buttocks and down to toes   3. SEVERITY: \"How bad is the swelling?\" (e.g., localized; mild, moderate, severe)    - Localized: Small area of swelling localized to one leg.    - MILD pedal edema: Swelling limited to foot and ankle, pitting edema < 1/4 inch (6 mm) deep, rest and elevation eliminate most or all swelling.    - MODERATE edema: Swelling of lower leg to knee, pitting edema > 1/4 inch (6 mm) deep, rest and elevation only partially reduce swelling.    - SEVERE edema: Swelling extends above knee, facial or hand swelling present.       Severe   4. REDNESS: \"Does the swelling look red or infected?\"      Red below knee and back of leg  5. PAIN: \"Is the swelling painful to touch?\" If Yes, ask: \"How painful is it?\"   (Scale 1-10; mild, moderate or severe)      Severe   6. FEVER: \"Do you have a fever?\" If Yes, ask: \"What is it, how was it measured, and when did it start?\"       Feels warm   7. CAUSE: \"What do you think is causing the leg swelling?\"      Recent fall   8. MEDICAL HISTORY: \"Do you have a history of blood clots (e.g., DVT), " "cancer, heart failure, kidney disease, or liver failure?\"      Heart issues  9. RECURRENT SYMPTOM: \"Have you had leg swelling before?\" If Yes, ask: \"When was the last time?\" \"What happened that time?\"      A few months ago  10. OTHER SYMPTOMS: \"Do you have any other symptoms?\" (e.g., chest pain, difficulty breathing)        No   11. PREGNANCY: \"Is there any chance you are pregnant?\" \"When was your last menstrual period?\"        N/a    Protocols used: Leg Swelling and Edema-A-OH    "

## 2024-11-01 ENCOUNTER — TELEPHONE (OUTPATIENT)
Dept: FAMILY MEDICINE | Facility: CLINIC | Age: 79
End: 2024-11-01
Payer: COMMERCIAL

## 2024-11-01 ENCOUNTER — APPOINTMENT (OUTPATIENT)
Dept: ULTRASOUND IMAGING | Facility: CLINIC | Age: 79
End: 2024-11-01
Attending: FAMILY MEDICINE
Payer: COMMERCIAL

## 2024-11-01 ENCOUNTER — HOSPITAL ENCOUNTER (EMERGENCY)
Facility: CLINIC | Age: 79
Discharge: HOME | End: 2024-11-01
Attending: EMERGENCY MEDICINE | Admitting: EMERGENCY MEDICINE
Payer: COMMERCIAL

## 2024-11-01 VITALS
BODY MASS INDEX: 24.55 KG/M2 | SYSTOLIC BLOOD PRESSURE: 124 MMHG | WEIGHT: 162 LBS | TEMPERATURE: 97.8 F | HEART RATE: 81 BPM | OXYGEN SATURATION: 100 % | DIASTOLIC BLOOD PRESSURE: 77 MMHG | HEIGHT: 68 IN | RESPIRATION RATE: 18 BRPM

## 2024-11-01 DIAGNOSIS — M79.89 RIGHT LEG SWELLING: ICD-10-CM

## 2024-11-01 PROCEDURE — 99284 EMERGENCY DEPT VISIT MOD MDM: CPT | Mod: 25 | Performed by: EMERGENCY MEDICINE

## 2024-11-01 PROCEDURE — 93971 EXTREMITY STUDY: CPT | Mod: RT

## 2024-11-01 PROCEDURE — 99283 EMERGENCY DEPT VISIT LOW MDM: CPT | Performed by: EMERGENCY MEDICINE

## 2024-11-01 PROCEDURE — 250N000013 HC RX MED GY IP 250 OP 250 PS 637: Performed by: EMERGENCY MEDICINE

## 2024-11-01 RX ORDER — OXYCODONE HYDROCHLORIDE 5 MG/1
5 TABLET ORAL ONCE
Status: COMPLETED | OUTPATIENT
Start: 2024-11-01 | End: 2024-11-01

## 2024-11-01 RX ORDER — OXYCODONE HYDROCHLORIDE 5 MG/1
5 TABLET ORAL EVERY 6 HOURS PRN
Qty: 10 TABLET | Refills: 0 | Status: SHIPPED | OUTPATIENT
Start: 2024-11-01 | End: 2024-11-08

## 2024-11-01 RX ADMIN — OXYCODONE 5 MG: 5 TABLET ORAL at 12:47

## 2024-11-01 ASSESSMENT — ACTIVITIES OF DAILY LIVING (ADL)
ADLS_ACUITY_SCORE: 0

## 2024-11-01 NOTE — ED TRIAGE NOTES
Fall on 10/28. Required IP admission. Since discharge increased swelling to RLE. Calf is taut. Pain increased with ambulation. Accidentally took a dose of Xarelto last night, was supposed to be off for 1 week.      Triage Assessment (Adult)       Row Name 11/01/24 1110          Triage Assessment    Airway WDL WDL        Respiratory WDL    Respiratory WDL WDL        Skin Circulation/Temperature WDL    Skin Circulation/Temperature WDL X        Cardiac WDL    Cardiac WDL WDL        Peripheral/Neurovascular WDL    Peripheral Neurovascular WDL WDL        Cognitive/Neuro/Behavioral WDL    Cognitive/Neuro/Behavioral WDL WDL

## 2024-11-01 NOTE — DISCHARGE INSTRUCTIONS
The swelling in your leg is related to redistribution of the hematoma (collection of blood) in your buttock after your fall.  Keep your leg elevated as best as possible to help reduce the swelling.  Wear compression stockings to help with the blood return.  Use ice or heat for 10 to 15 minutes at a time every couple of hours while awake to help with the pain and swelling.  Continue with your home medications.  Return to the emergency department for fevers, repeated vomiting, worsening symptoms, or other concerns.    Use acetaminophen for your symptoms.  Use oxycodone as needed for pain that is not controlled by the prior two medications.    Oxycodone is an addictive opioid medication, please only take it when the pain is more than can be controlled by acetaminophen or ibuprofen alone. It will also make you lightheaded, nauseated, and constipated.  Do not drive, operate heavy machinery, or take care of young children while taking this medication. Do not mix it with other medications or drugs that will make you sleepy, such as alcohol.     Repeated studies have shown that the longer you use opioid pain medications, the longer it is until you return to normal function. It is our recommendation that you taper off opioids as quickly as possible with the goal of returning to normal function or near normal function. Long term use of opioids quickly results in growing tolerance to the medication (less of the benefits) and increased dependence (more of the bad side effects).     Pain is very difficult to treat and we can very rarely take away pain completely. If you are having difficulty with pain over several weeks after an injury, you may need to start different medications and therapies (such as physical therapy, graded exercise, massage, and acupuncture). Please talk about this with your regular doctor.     If you are interested in seeking free, confidential treatment referral and information service for individuals and  families facing mental health and/or substance use disorders please call [phone] ([phone])

## 2024-11-01 NOTE — ED PROVIDER NOTES
History     Chief Complaint   Patient presents with    Leg Pain    Leg Swelling     HPI  Josemanuel Edmond is a 79 year old male with a history of atrial fibrillation on rivaroxaban who presents for evaluation of pain and swelling of the right lower extremity.  The patient says that 5 days prior he fell landed on his lower buttock.  He had pain and swelling at the time, was evaluated in the emergency department and was admitted for the trauma.  He has gone home and is now started having increased pain and swelling of the right lower extremity, hurts worse when he is upright and walking around.  His calf is swollen and sore.  No new injuries.  He has been able to ambulate but hurts.  He has been using acetaminophen and oxycodone for the pain.  No fevers.    I reviewed the patient's discharge summary from 10/29/2024, was admitted for hematoma of the buttock with active bleeding on the CT scan and remained hemodynamically stable overnight and was discharged with home PT and OT.  I reviewed the patient's CT chest, abdomen, and pelvis from 10/28/2024, large gluteal hematoma with active bleeding and nondisplaced right sixth rib fracture but otherwise no acute traumatic injuries.    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     Priority: Medium    Constipation, unspecified constipation type 10/29/2024     Priority: Medium    Generalized weakness 10/28/2024     Priority: Medium    Falls frequently 10/28/2024     Priority: Medium    Anticoagulated 10/28/2024     Priority: Medium     Traumatic hematoma of buttock, initial encounter 10/28/2024     Priority: Medium    Closed fracture of multiple ribs of right side, initial encounter 10/28/2024     Priority: Medium    Sensorineural hearing loss (SNHL) of both ears 07/29/2024     Priority: Medium    Cardiomyopathy, unspecified type (H) 03/07/2024     Priority: Medium     EF 50-55% 2023  45 to 50% 1/2024  Heart failure episode 2/2024  Negative Lexiscan 2/2024  EF 45-50% 7/2024 echo   Dry weight 154 pounds at home  Jardiance and entresto cost prohibitive       Longstanding persistent atrial fibrillation (H) 02/07/2024     Priority: Medium    Insomnia, unspecified type 12/13/2023     Priority: Medium    Chronic heart failure with preserved ejection fraction (H) 11/29/2023     Priority: Medium     Heart failure episode 2/2024  Negative Lexiscan 2/2024  Dry weight 154 pounds at home      Chronic anticoagulation 11/24/2023     Priority: Medium     On Xarelto      Anemia, unspecified type 07/06/2023     Priority: Medium    Moderate major depression (H) 07/06/2023     Priority: Medium    S/P revision of total hip 05/25/2023     Priority: Medium    Social phobia 04/13/2023     Priority: Medium    Chronic diastolic heart failure (H) 09/22/2022     Priority: Medium    Valvular heart disease 09/22/2022     Priority: Medium     Moderate to moderately severe MR, moderate TR, mild to moderate AI 2023  Severe TR, moderately severe to severe MR, moderate AI 2024      Tremor 06/29/2022     Priority: Medium    Memory difficulties 05/05/2022     Priority: Medium    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 02/28/2022     Priority: Medium    Depression with anxiety 02/28/2022     Priority: Medium    Right knee DJD 10/12/2018     Priority: Medium    Peripheral neuropathy 10/12/2018     Priority: Medium    Hyperplastic Polyp 03/05/2018     Priority: Medium    Atrial fibrillation, unspecified type (H) 01/18/2018     Priority: Medium     onset 2018 postoperatively, S/p cardioversion    Recurrence 2021 with palpitations  Now chronic A-fib  Severe LA  Elevated [id number] score      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) 11/28/2017     Priority: Medium    Status post lung surgery 11/18/2017     Priority: Medium    Unilateral inguinal hernia without obstruction or gangrene 11/07/2017     Priority: Medium    Benzodiazepine dependence (H) 10/31/2017     Priority: Medium    GERD (gastroesophageal reflux disease) 08/03/2012     Priority: Medium    Hyperlipidemia LDL goal <100 10/31/2010     Priority: Medium    Osteoarthrosis, unspecified whether generalized or localized, pelvic region and thigh 09/28/2007     Priority: Medium    Hypertrophy of prostate with urinary obstruction 08/03/2006     Priority: Medium     Problem list name updated by automated process. Provider to review      Benign essential hypertension 12/12/2005     Priority: Medium        Past Medical History:    Past Medical History:   Diagnosis Date    Acute on chronic diastolic (congestive) heart failure (H) 11/29/2023    Arthritis 2005    Benign neoplasm of prostate 2000    Depressive disorder     Infection due to 2019 novel coronavirus 09/27/2021    S/P knee replacement 11/06/2012    S/P left knee arthroscopy 08/15/2011       Past Surgical History:    Past Surgical History:   Procedure Laterality Date    ABDOMEN SURGERY  2003    ARTHROPLASTY KNEE Right 10/12/2018    Procedure: ARTHROPLASTY KNEE;  Right Total Knee Arthroplasty;  Surgeon: Jeb Peralta MD;  Location: [facility] OR    ARTHROPLASTY REVISION HIP Left 5/25/2023    Procedure: Revision total hip arthroplasty, left;  Surgeon: Chandler Leiva MD;  Location: [facility]    BIOPSY  2007    COLONOSCOPY N/A 12/10/2015    Procedure: COMBINED COLONOSCOPY, SINGLE OR MULTIPLE BIOPSY/POLYPECTOMY BY BIOPSY;  Surgeon: Jyoti Figueroa MD;  Location: [facility]    JOINT REPLACEMENT, HIP RT/LT  10/2007    Joint Replacement Hip LT    JOINT REPLACEMTN, KNEE RT/LT  08/2011    Joint Replacement  knee /LT, [facility]    LAPAROSCOPIC HERNIORRHAPHY INGUINAL BILATERAL Bilateral 2018    Procedure: LAPAROSCOPIC HERNIORRHAPHY INGUINAL BILATERAL;  Laparoscopic bilateral inguinal hernia repair;  Surgeon: Josemanuel Resendiz MD;  Location: [facility] OR    PHACOEMULSIFICATION WITH STANDARD INTRAOCULAR LENS IMPLANT Right 2021    Procedure: Cataract Removal with Implant;  Surgeon: Regan Kaufman MD;  Location: [facility]    PHACOEMULSIFICATION WITH STANDARD INTRAOCULAR LENS IMPLANT Left 2021    Procedure: Cataract Removal with Implant;  Surgeon: Regan Kaufman MD;  Location: [facility]    SURGICAL HISTORY OF -   1999    Umbilical Herniorrhaphy with mesh    SURGICAL HISTORY OF -  Left 2017    Thoracotomy and drainage of empyema       Family History:    Family History   Problem Relation Age of Onset    Depression Mother     Cerebrovascular Disease Mother     Breast Cancer Mother     Neurologic Disorder Mother         parkinsons    Parkinsonism Mother     Respiratory Father         emphyzema    Diabetes Brother        Social History:  Marital Status:   [2]  Social History     Tobacco Use    Smoking status: Former     Current packs/day: 0.00     Average packs/day: 1 pack/day for 17.8 years (17.8 ttl pk-yrs)     Types: Cigarettes     Start date: 1958     Quit date: 10/13/1975     Years since quittin.0    Smokeless tobacco: Never   Vaping Use    Vaping status: Never Used   Substance Use Topics    Alcohol use: Not Currently    Drug use: No        Medications:    oxyCODONE (ROXICODONE) 5 MG tablet  rivaroxaban ANTICOAGULANT (XARELTO ANTICOAGULANT) 20 MG TABS tablet  buPROPion (WELLBUTRIN XL) 300 MG 24 hr tablet  busPIRone HCl (BUSPAR) 30 MG tablet  finasteride (PROSCAR) 5 MG tablet  furosemide (LASIX) 20 MG tablet  lamoTRIgine (LAMICTAL) 25 MG tablet  losartan (COZAAR) 50 MG tablet  metoprolol succinate ER (TOPROL XL) 200 MG 24 hr tablet  multivitamin (ONE-DAILY) tablet  PARoxetine  "(PAXIL) 30 MG tablet  spironolactone (ALDACTONE) 25 MG tablet  tamsulosin (FLOMAX) 0.4 MG capsule  zolpidem (AMBIEN) 5 MG tablet          Review of Systems    Physical Exam   BP: 121/84  Pulse: 81  Temp: 97.8  F (36.6  C)  Resp: 18  Height: 172.7 cm (5' 8\")  Weight: 73.5 kg (162 lb)  SpO2: 99 %      Physical Exam  Constitutional:       General: He is in acute distress.      Appearance: He is well-developed.   HENT:      Head: Normocephalic and atraumatic.   Cardiovascular:      Rate and Rhythm: Normal rate.   Pulmonary:      Effort: No respiratory distress.      Breath sounds: No stridor.   Chest:      Chest wall: No tenderness or crepitus.   Musculoskeletal:      Lumbar back: No tenderness or bony tenderness.   Skin:     General: Skin is warm and dry.   Neurological:      Mental Status: He is alert.         ED Course        Procedures              Critical Care time:  none              Results for orders placed or performed during the hospital encounter of 11/01/24 (from the past 24 hours)   US Lower Extremity Venous Duplex Right    Narrative    US LOWER EXTREMITY VENOUS DUPLEX RIGHT 11/1/2024 1:40 PM    CLINICAL HISTORY: RLE swelling. Pain when in dependent position  TECHNIQUE: Venous Duplex ultrasound of the right lower extremity with  and without compression, augmentation and duplex. Color flow and  spectral Doppler with waveform analysis performed.    COMPARISON: 5/30/2023    FINDINGS: Exam includes the common femoral, femoral, popliteal, and  contralateral common femoral veins as well as segmentally visualized  deep calf veins and greater saphenous vein.     RIGHT: No deep vein thrombosis. No superficial thrombophlebitis. No  popliteal cyst.      Impression    IMPRESSION:  1.  No deep venous thrombosis in the right lower extremity.    VJ CRONIN MD         SYSTEM ID:  [id number]     *Note: Due to a large number of results and/or encounters for the requested time period, some results have not been " displayed. A complete set of results can be found in Results Review.       Medications   oxyCODONE (ROXICODONE) tablet 5 mg (5 mg Oral $Given 11/1/24 1241)       Assessments & Plan (with Medical Decision Making)   79-year-old male presents for pain and swelling of the right lower extremity.  Vital signs are reassuring.  He is given oxycodone for the pain.  Ultrasound of the lower extremity obtained, images interpreted independently as well as radiology reviewed, no signs of a DVT.  Hospitalization considered however with the reassuring ultrasound I believe that the swelling is likely related to redistribution of the hematoma.  He is safe to discharge with a prescription for oxycodone and instructions to use elevation, compression stockings, ice or heat, return if worse, otherwise follow-up in clinic.  The patient is in agreement with this plan.    I have reviewed the nursing notes.    I have reviewed the findings, diagnosis, plan and need for follow up with the patient.           Medical Decision Making  The patient's presentation was of moderate complexity (an acute complicated injury).    The patient's evaluation involved:  review of external note(s) from 1 sources (see separate area of note for details)  review of 1 test result(s) ordered prior to this encounter (see separate area of note for details)  ordering and/or review of 1 test(s) in this encounter (see separate area of note for details)  independent interpretation of testing performed by another health professional (see separate area of note for details)    The patient's management necessitated high risk (a decision regarding hospitalization).        New Prescriptions    OXYCODONE (ROXICODONE) 5 MG TABLET    Take 1 tablet (5 mg) by mouth every 6 hours as needed for severe pain.       Final diagnoses:   Right leg swelling       11/1/2024   [facility] EMERGENCY DEPT       Isrrael Barber MD  11/01/24 2323

## 2024-11-01 NOTE — TELEPHONE ENCOUNTER
Wife, Yoselyn calling with pt in background (C2C on file)  States pt was in hosp 10/28-10/29 for hematoma of right buttock after a fall. Wife states hematoma was the size of a melon.   Pt has been home since Tuesday & has been in a lot of pain. 9/10 when sitting. Hard for pt to sit down. Standing not quite as painful.  Wife states butt is part black & it is traveling down leg. Right leg & foot have had increased swelling. Pt can only wear slipper on right foot.  RLE dark red, right foot scarlet red, ankle is purple.  Pt was rx'd oxycodone & is out of this med.  Pt was taken off his blood thinner Xarelto.   Wife states he forgot & accidentally took 1 tab of xarelto a few days ago.    Wife/pt called triage RN yesterday & were advised to go to the ER due to swelling. They did not go because they didn't want to wait, pt has to stand, cannot sit due to pain.  This RN again advised ER for severe swelling, pain in right leg/foot.  Wife states will try & get pt to go this morning.    Karen Hunter RN

## 2024-11-04 ENCOUNTER — TELEPHONE (OUTPATIENT)
Dept: CARDIOLOGY | Facility: CLINIC | Age: 79
End: 2024-11-04
Payer: COMMERCIAL

## 2024-11-04 DIAGNOSIS — F33.0 MILD EPISODE OF RECURRENT MAJOR DEPRESSIVE DISORDER (H): ICD-10-CM

## 2024-11-04 RX ORDER — PAROXETINE 30 MG/1
30 TABLET, FILM COATED ORAL DAILY
Qty: 90 TABLET | Refills: 0 | OUTPATIENT
Start: 2024-11-04

## 2024-11-04 NOTE — TELEPHONE ENCOUNTER
[facility] Call Center    Phone Message    May a detailed message be left on voicemail: yes     Reason for Call: Other: Pt's wife Yoselyn called to cancel 11/14/24 due to pt not being well enough to come in at this time.  Please cancel appt and they will call when ready to reschedule.      Action Taken: Other: cardio    Travel Screening: Not Applicable     Date of Service:

## 2024-11-05 ENCOUNTER — OFFICE VISIT (OUTPATIENT)
Dept: FAMILY MEDICINE | Facility: CLINIC | Age: 79
End: 2024-11-05
Payer: COMMERCIAL

## 2024-11-05 VITALS
SYSTOLIC BLOOD PRESSURE: 116 MMHG | BODY MASS INDEX: 24.55 KG/M2 | HEIGHT: 68 IN | HEART RATE: 86 BPM | DIASTOLIC BLOOD PRESSURE: 70 MMHG | WEIGHT: 162 LBS | RESPIRATION RATE: 16 BRPM | TEMPERATURE: 97.6 F | OXYGEN SATURATION: 98 %

## 2024-11-05 DIAGNOSIS — S30.0XXA TRAUMATIC HEMATOMA OF BUTTOCK, INITIAL ENCOUNTER: ICD-10-CM

## 2024-11-05 DIAGNOSIS — Z09 HOSPITAL DISCHARGE FOLLOW-UP: Primary | ICD-10-CM

## 2024-11-05 DIAGNOSIS — R29.898 BILATERAL LEG WEAKNESS: ICD-10-CM

## 2024-11-05 DIAGNOSIS — G47.00 INSOMNIA, UNSPECIFIED TYPE: ICD-10-CM

## 2024-11-05 DIAGNOSIS — S22.41XA CLOSED FRACTURE OF MULTIPLE RIBS OF RIGHT SIDE, INITIAL ENCOUNTER: ICD-10-CM

## 2024-11-05 PROCEDURE — 99495 TRANSJ CARE MGMT MOD F2F 14D: CPT | Performed by: NURSE PRACTITIONER

## 2024-11-05 ASSESSMENT — PAIN SCALES - GENERAL: PAINLEVEL_OUTOF10: NO PAIN (0)

## 2024-11-05 NOTE — PROGRESS NOTES
Assessment & Plan     Hospital discharge follow-up  Traumatic hematoma of buttock, initial encounter  Closed fracture of multiple ribs of right side, initial encounter  Seen for follow up from recent fall and subsequent hematoma and rib fractures. He is improving slowly. Had been admitted over night and had one return to the ER. Follow up imaging in the ER showed progressive improvement from the hematoma. He was encouraged to continue with tylenol, topical agents, and movement to help with pain management discouraged ongoing use of narcotics due to increased risk for falling. Follow up as needed.     Bilateral leg weakness  Strongly encouraged to start home care for physical therapy vs coming into clinic to complete this to help with leg strength and reduce risk for falling.     Insomnia, unspecified type  Encouraged continued efforts at weaning the medication. Discouraged use of benadryl to help with symptoms of itching and sleep as well. He has been working with his PCP closely for management of his insomnia along with his depression. He had been on mirtazepine in the past which had caused some weight gain but had helped with sleep. Maybe worthwhile to explore this again as his weight has now been stable at 160lbs.         MED REC REQUIRED  Post Medication Reconciliation Status:  Discharge medications reconciled, continue medications without change        Subjective   Gavin is a 79 year old, presenting for the following health issues:  Hospital F/U        11/5/2024    10:16 AM   Additional Questions   Roomed by Cassi PIERCE MA     \[facility]\""      ED/ Followup:  Facility:  [facility] ED  Date of visit: 11/01/2024  Reason for visit: Right leg swelling  Current Status: Has improved      *Still having pain and having general weakness, especially in lower extremeties. Feels like he's had no improvement. Not sleeping well, unable to relax. Hematoma is improving.     Hospital Follow-up Visit:    Hospital/Nursing  "Home/IP Rehab Facility: [facility]  Date of Admission: 10/28/2024  Date of Discharge: 10/29/2024  Reason(s) for Admission: Multiple falls-hematoma of buttock with active bleeding, rib fracture  Was the patient in the ICU or did the patient experience delirium during hospitalization?  No  Do you have any other stressors you would like to discuss with your provider? No    Problems taking medications regularly:  None  Medication changes since discharge: Start oxycodone PRN, tamsulosin, Stop doxazosin  Problems adhering to non-medication therapy:  None    Summary of hospitalization:  [facility] discharge summary reviewed  Diagnostic Tests/Treatments reviewed.  Follow up needed: none  Other Healthcare Providers Involved in Patient s Care:         None  Update since discharge: improved.         Plan of care communicated with patient           Feels that his legs are very weak. He feels he doesn't have the strength in the legs. He reports that this got worse post fall. He has been weaning off of gabapentin. He has been off of this for a couple months now. He is weaning his ambien as well. When he goes without this he can't sleep. Wife notes that he is up and moving between 2-6 am while still asleep.     Benedryl occasionally for itching    Hematoma is better. Has pain in the right buttock. Healing with the rib fractures.   He has restarted his rivaroxaban.   Patient and wife has a low concern for for the doxazosin causing the falling symptoms.       Mentions concern with a lesion on roof of mouth.  Has a dentist appointment tomorrow.         Review of Systems  Constitutional, HEENT, cardiovascular, pulmonary, gi and gu systems are negative, except as otherwise noted.      Objective    /70   Pulse 86   Temp 97.6  F (36.4  C) (Tympanic)   Resp 16   Ht 1.727 m (5' 8\")   Wt 73.5 kg (162 lb)   SpO2 98%   BMI 24.63 kg/m    Body mass index is 24.63 kg/m .  Physical Exam "   GENERAL: alert and no distress  NECK: no adenopathy, no asymmetry, masses, or scars  RESP: lungs clear to auscultation - no rales, rhonchi or wheezes  CV: regular rate and rhythm, normal S1 S2, no S3 or S4, no murmur, click or rub, no peripheral edema  ABDOMEN: soft, nontender, no hepatosplenomegaly, no masses and bowel sounds normal  MS: bruising of the right leg to the foot. Slow to get up.  no gross musculoskeletal defects noted, no edema  NEURO: Normal strength and tone, mentation intact and speech normal  PSYCH: mentation appears normal, affect flat, and fatigued            Signed Electronically by: KAYLA Brody CNP
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
## 2024-11-06 NOTE — TELEPHONE ENCOUNTER
Cx AD and TAVR consult due to falls with hematoma to bottock.     Scheduled for follow up with Nazanin Caballero CNP on 12/18/24. Will reassess and get rescheduled at this visit if able.     Alda Mejia RN

## 2024-11-07 ENCOUNTER — TELEPHONE (OUTPATIENT)
Dept: BEHAVIORAL HEALTH | Facility: CLINIC | Age: 79
End: 2024-11-07
Payer: COMMERCIAL

## 2024-11-08 ENCOUNTER — MYC MEDICAL ADVICE (OUTPATIENT)
Dept: FAMILY MEDICINE | Facility: CLINIC | Age: 79
End: 2024-11-08
Payer: COMMERCIAL

## 2024-11-08 ENCOUNTER — VIRTUAL VISIT (OUTPATIENT)
Dept: BEHAVIORAL HEALTH | Facility: CLINIC | Age: 79
End: 2024-11-08
Payer: COMMERCIAL

## 2024-11-08 DIAGNOSIS — F40.10 SOCIAL ANXIETY DISORDER: ICD-10-CM

## 2024-11-08 DIAGNOSIS — F33.0 MILD EPISODE OF RECURRENT MAJOR DEPRESSIVE DISORDER (H): Primary | ICD-10-CM

## 2024-11-08 RX ORDER — PAROXETINE 30 MG/1
30 TABLET, FILM COATED ORAL DAILY
Qty: 90 TABLET | Refills: 0 | Status: SHIPPED | OUTPATIENT
Start: 2024-11-08

## 2024-11-08 ASSESSMENT — ANXIETY QUESTIONNAIRES
8. IF YOU CHECKED OFF ANY PROBLEMS, HOW DIFFICULT HAVE THESE MADE IT FOR YOU TO DO YOUR WORK, TAKE CARE OF THINGS AT HOME, OR GET ALONG WITH OTHER PEOPLE?: SOMEWHAT DIFFICULT
GAD7 TOTAL SCORE: 8
1. FEELING NERVOUS, ANXIOUS, OR ON EDGE: SEVERAL DAYS
GAD7 TOTAL SCORE: 8
7. FEELING AFRAID AS IF SOMETHING AWFUL MIGHT HAPPEN: NOT AT ALL
3. WORRYING TOO MUCH ABOUT DIFFERENT THINGS: NEARLY EVERY DAY
2. NOT BEING ABLE TO STOP OR CONTROL WORRYING: NEARLY EVERY DAY
7. FEELING AFRAID AS IF SOMETHING AWFUL MIGHT HAPPEN: NOT AT ALL
6. BECOMING EASILY ANNOYED OR IRRITABLE: NOT AT ALL
5. BEING SO RESTLESS THAT IT IS HARD TO SIT STILL: NOT AT ALL
4. TROUBLE RELAXING: SEVERAL DAYS
IF YOU CHECKED OFF ANY PROBLEMS ON THIS QUESTIONNAIRE, HOW DIFFICULT HAVE THESE PROBLEMS MADE IT FOR YOU TO DO YOUR WORK, TAKE CARE OF THINGS AT HOME, OR GET ALONG WITH OTHER PEOPLE: SOMEWHAT DIFFICULT
GAD7 TOTAL SCORE: 8

## 2024-11-08 ASSESSMENT — PAIN SCALES - GENERAL: PAINLEVEL_OUTOF10: EXTREME PAIN (8)

## 2024-11-08 ASSESSMENT — PATIENT HEALTH QUESTIONNAIRE - PHQ9
SUM OF ALL RESPONSES TO PHQ QUESTIONS 1-9: 8
10. IF YOU CHECKED OFF ANY PROBLEMS, HOW DIFFICULT HAVE THESE PROBLEMS MADE IT FOR YOU TO DO YOUR WORK, TAKE CARE OF THINGS AT HOME, OR GET ALONG WITH OTHER PEOPLE: SOMEWHAT DIFFICULT
SUM OF ALL RESPONSES TO PHQ QUESTIONS 1-9: 8

## 2024-11-08 NOTE — NURSING NOTE
Current patient location: [address] [place] [address]   [facility] [id number]    Is the patient currently in the state of [place]? YES    Visit mode:VIDEO    If the visit is dropped, the patient can be reconnected by: VIDEO VISIT: Text to cell phone:   Telephone Information:   Mobile [phone]       Will anyone else be joining the visit? NO  (If patient encounters technical issues they should call [phone]    Are changes needed to the allergy or medication list? Pt stated no changes to allergies and Pt stated no med changes    Are refills needed on medications prescribed by this physician? Discuss with provider    Rooming Documentation:  Questionnaire(s) completed    Reason for visit: RECHECK    Alexandra FERNÁNDEZ

## 2024-11-08 NOTE — PATIENT INSTRUCTIONS
Continue:  Bupropion/Wellbutrin 300 mg XL in AM     Buspirone/Buspar 30 mg twice daily; TDD = 60 mg     Lamotrigine/Lamictal 50 mg (2 tabs) twice daily for ttl: 100 mg     Paroxetine/Paxil 30 mg daily     Per PCP and or other providers:  -Zolpidem/Ambien 5 mg nightly as needed for sleep      -----------------------    -If there are questions/inquiries between now and the next appointment OR prior to transferring to the next level of care, please call (see below).    Clinic hours/phone number at the Transition Clinic:   -Monday to Friday from 8:00 am to 4:30 pm  -[phone]    -----------------------    Call:  -[phone] ([phone]) if guidance is needed after T.C. clinic hours about utilizing [facility] versus the Emergency Departments    ----------------------    Any time (during or after regular clinic hours) immediate and or life threatening symptoms occur (either medical or mental health), call:  [phone] and or go to the nearest Emergency Department      ---------------------    Please use the following websites to obtain a comprehensive list of all counties within the state of [place] for adult and child mental health crisis numbers:    https://[place].gov/dhs/people-we-serve/people-with-disabilities/health-care/adult-mental-health/resources/crisis-contacts.jsp    https://[place].gov/dhs/people-we-serve/people-with-disabilities/health-care/childrens-mental-health/resources/crisis-contacts.jsp      -------------------      Below is a list of county (also found on the above websites), state, and national crisis numbers.   These resources can provide real-time assistance if experiencing moderate to severe mental health symptoms.        Additionally, please visit your county website to find the most up-to-date list of local:  adult, child, family, and chemical dependency services available.        [facility]  [phone]    [facility]  [phone]    [facility]  [phone]    [place]  [facility]  [phone]    [facility]  [phone]: Adults 18 and over    [phone]: Children 17 and under    [facility]), Acute Psychiatric Services (APS) Assessment & Referral:   [phone]    Community Outreach for Psychiatric Emergencies (COPE):  [phone]    [facility]  [phone]: Adults 18 and over    [phone]: Children 17 and under      Walk-in crisis services are available Monday to Friday from 8 am to 5:30 pm at Urgent Care for  Adult metal health:    [address] [place] [address]  Closed on Saturday, Sunday, and holidays    [place]  [phone]: Toll free    [facility]  [phone]      [facility] [place]  [phone]: Toll free    Text: [place] to 976211      [facility] and human services  Call 211 or visit [email or web address] [place], [place] [email or web address] to obtain free and confidential h/hs information     [place] [facility]  If you are an adult needing support, you can talk to a specialist who has first hand experience living with a mental health condition:    [phone]    Text: Support to 33429    [facility] [place]:  domestic violence/LGBTQ+  478.537.9812 option 3    [employer]: LGBTQ+  7-877-943-7835    Text: START to 771472     Resources  9-342-OkomJzu: (1-994.457.7990)    Crisis line: 1-747.556.8558    Text: 047682    [employer]: women and sexually exploited youth  797.575.3032    [employer] Domestic Violence Shelter Hotline  831.892.4495    National Suicide Prevention Lifeline  985    National Youth Crisis Hotline  829

## 2024-11-08 NOTE — TELEPHONE ENCOUNTER
Patient advised to make an appointment, seek medical attention at urgent care or ED if distressed.    Sarai YI LPN

## 2024-11-09 ENCOUNTER — HOSPITAL ENCOUNTER (EMERGENCY)
Facility: CLINIC | Age: 79
Discharge: HOME | End: 2024-11-09
Attending: STUDENT IN AN ORGANIZED HEALTH CARE EDUCATION/TRAINING PROGRAM | Admitting: STUDENT IN AN ORGANIZED HEALTH CARE EDUCATION/TRAINING PROGRAM
Payer: COMMERCIAL

## 2024-11-09 ENCOUNTER — APPOINTMENT (OUTPATIENT)
Dept: GENERAL RADIOLOGY | Facility: CLINIC | Age: 79
End: 2024-11-09
Attending: STUDENT IN AN ORGANIZED HEALTH CARE EDUCATION/TRAINING PROGRAM
Payer: COMMERCIAL

## 2024-11-09 ENCOUNTER — APPOINTMENT (OUTPATIENT)
Dept: CT IMAGING | Facility: CLINIC | Age: 79
End: 2024-11-09
Attending: STUDENT IN AN ORGANIZED HEALTH CARE EDUCATION/TRAINING PROGRAM
Payer: COMMERCIAL

## 2024-11-09 VITALS
OXYGEN SATURATION: 100 % | DIASTOLIC BLOOD PRESSURE: 96 MMHG | HEART RATE: 93 BPM | TEMPERATURE: 98 F | SYSTOLIC BLOOD PRESSURE: 138 MMHG | RESPIRATION RATE: 18 BRPM

## 2024-11-09 DIAGNOSIS — Z79.01 CHRONIC ANTICOAGULATION: ICD-10-CM
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
## 2024-11-09 PROCEDURE — 99284 EMERGENCY DEPT VISIT MOD MDM: CPT | Mod: 25

## 2024-11-09 PROCEDURE — 250N000013 HC RX MED GY IP 250 OP 250 PS 637: Performed by: STUDENT IN AN ORGANIZED HEALTH CARE EDUCATION/TRAINING PROGRAM

## 2024-11-09 PROCEDURE — 73502 X-RAY EXAM HIP UNI 2-3 VIEWS: CPT

## 2024-11-09 PROCEDURE — 70450 CT HEAD/BRAIN W/O DYE: CPT

## 2024-11-09 PROCEDURE — 99284 EMERGENCY DEPT VISIT MOD MDM: CPT | Performed by: STUDENT IN AN ORGANIZED HEALTH CARE EDUCATION/TRAINING PROGRAM

## 2024-11-09 PROCEDURE — 72125 CT NECK SPINE W/O DYE: CPT

## 2024-11-09 RX ORDER — ACETAMINOPHEN 500 MG
1000 TABLET ORAL ONCE
Status: COMPLETED | OUTPATIENT
Start: 2024-11-09 | End: 2024-11-09

## 2024-11-09 RX ADMIN — ACETAMINOPHEN 1000 MG: 500 TABLET ORAL at 03:00

## 2024-11-09 ASSESSMENT — ACTIVITIES OF DAILY LIVING (ADL)
ADLS_ACUITY_SCORE: 0
ADLS_ACUITY_SCORE: 0

## 2024-11-09 ASSESSMENT — COLUMBIA-SUICIDE SEVERITY RATING SCALE - C-SSRS
2. HAVE YOU ACTUALLY HAD ANY THOUGHTS OF KILLING YOURSELF IN THE PAST MONTH?: NO
6. HAVE YOU EVER DONE ANYTHING, STARTED TO DO ANYTHING, OR PREPARED TO DO ANYTHING TO END YOUR LIFE?: NO
1. IN THE PAST MONTH, HAVE YOU WISHED YOU WERE DEAD OR WISHED YOU COULD GO TO SLEEP AND NOT WAKE UP?: NO

## 2024-11-09 NOTE — ED TRIAGE NOTES
Hasn't been sleeping well from pain on gluteal fold from last fall. Fell tonight from standing, hit head on carpet, on thinners.

## 2024-11-09 NOTE — DISCHARGE INSTRUCTIONS
Your CAT scans and x-rays were normal.  You are likely falling so much due to all of the sedating medication that you are on.  This is dangerous for you to be on so many sedating medications, especially since you are on a blood thinner.  Follow-up with your regular doctor to work on getting off of the Ambien and to find a better medication for sleep and for pain.  In the meantime, you should continue taking Tylenol.  Return with new or worsening symptoms.

## 2024-11-09 NOTE — ED PROVIDER NOTES
History     Chief Complaint   Patient presents with    Fall     HPI  Josemanuel Edmond is a 79 year old male who has a history of atrial fibrillation on Xarelto, who presents for evaluation after a fall.  Chart review shows that patient was here on 11/1 for evaluation after a separate fall.  This was after he had ongoing pain from a fall that he was hospitalized for.  He had a negative DVT ultrasound at that time.  I also reviewed the patient's discharge summary from 10/29/2024 where he was admitted for hematoma of the buttock with active bleeding.  He eventually was discharged home with PT and OT.  He was also found to have nondisplaced right sixth rib fracture at that time.    Patient states that tonight, he was standing at the counter when he fell over backwards and struck his head on the ground.  He states that he has had multiple falls.  He states that he believes he is falling due to polypharmacy.  He states that he took both Ambien and hydroxyzine tonight and that makes him unsteady on his feet.  He is complaining of pain in his left hip as well as pain in the back of his head.  He was able to get himself up and has been able to ambulate.  He denies neck or back pain.  Denies numbness or tingling.  No weakness.  No chest pain or trouble breathing.  No abdominal pain.  No pain in the arms or right leg.  No vision issues.  No nausea or vomiting.    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     Priority: Medium    Constipation, unspecified constipation type 10/29/2024      Priority: Medium    Generalized weakness 10/28/2024     Priority: Medium    Falls frequently 10/28/2024     Priority: Medium    Anticoagulated 10/28/2024     Priority: Medium    Traumatic hematoma of buttock, initial encounter 10/28/2024     Priority: Medium    Closed fracture of multiple ribs of right side, initial encounter 10/28/2024     Priority: Medium    Sensorineural hearing loss (SNHL) of both ears 07/29/2024     Priority: Medium    Cardiomyopathy, unspecified type (H) 03/07/2024     Priority: Medium     EF 50-55% 2023  45 to 50% 1/2024  Heart failure episode 2/2024  Negative Lexiscan 2/2024  EF 45-50% 7/2024 echo   Dry weight 154 pounds at home  Jardiance and entresto cost prohibitive       Longstanding persistent atrial fibrillation (H) 02/07/2024     Priority: Medium    Insomnia, unspecified type 12/13/2023     Priority: Medium    Chronic heart failure with preserved ejection fraction (H) 11/29/2023     Priority: Medium     Heart failure episode 2/2024  Negative Lexiscan 2/2024  Dry weight 154 pounds at home      Chronic anticoagulation 11/24/2023     Priority: Medium     On Xarelto      Anemia, unspecified type 07/06/2023     Priority: Medium    Moderate major depression (H) 07/06/2023     Priority: Medium    S/P revision of total hip 05/25/2023     Priority: Medium    Social phobia 04/13/2023     Priority: Medium    Chronic diastolic heart failure (H) 09/22/2022     Priority: Medium    Valvular heart disease 09/22/2022     Priority: Medium     Moderate to moderately severe MR, moderate TR, mild to moderate AI 2023  Severe TR, moderately severe to severe MR, moderate AI 2024      Tremor 06/29/2022     Priority: Medium    Memory difficulties 05/05/2022     Priority: Medium    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 02/28/2022     Priority: Medium    Depression with anxiety 02/28/2022     Priority: Medium    Right knee DJD 10/12/2018     Priority: Medium    Peripheral neuropathy 10/12/2018     Priority: Medium     Hyperplastic Polyp 03/05/2018     Priority: Medium    Atrial fibrillation, unspecified type (H) 01/18/2018     Priority: Medium     onset 2018 postoperatively, S/p cardioversion   Recurrence 2021 with palpitations  Now chronic A-fib  Severe LA  Elevated [id number] score      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) 11/28/2017     Priority: Medium    Status post lung surgery 11/18/2017     Priority: Medium    Unilateral inguinal hernia without obstruction or gangrene 11/07/2017     Priority: Medium    Benzodiazepine dependence (H) 10/31/2017     Priority: Medium    GERD (gastroesophageal reflux disease) 08/03/2012     Priority: Medium    Hyperlipidemia LDL goal <100 10/31/2010     Priority: Medium    Osteoarthrosis, unspecified whether generalized or localized, pelvic region and thigh 09/28/2007     Priority: Medium    Hypertrophy of prostate with urinary obstruction 08/03/2006     Priority: Medium     Problem list name updated by automated process. Provider to review      Benign essential hypertension 12/12/2005     Priority: Medium        Past Medical History:    Past Medical History:   Diagnosis Date    Acute on chronic diastolic (congestive) heart failure (H) 11/29/2023    Arthritis 2005    Benign neoplasm of prostate 2000    Depressive disorder     Infection due to 2019 novel coronavirus 09/27/2021    S/P knee replacement 11/06/2012    S/P left knee arthroscopy 08/15/2011       Past Surgical History:    Past Surgical History:   Procedure Laterality Date    ABDOMEN SURGERY  2003    ARTHROPLASTY KNEE Right 10/12/2018    Procedure: ARTHROPLASTY KNEE;  Right Total Knee Arthroplasty;  Surgeon: Jeb Peralta MD;  Location: [facility]    ARTHROPLASTY REVISION HIP Left 5/25/2023    Procedure: Revision total hip arthroplasty, left;  Surgeon: Chandler Leiva MD;  Location: [facility]    BIOPSY  2007    COLONOSCOPY N/A 12/10/2015    Procedure: COMBINED COLONOSCOPY, SINGLE OR MULTIPLE BIOPSY/POLYPECTOMY BY BIOPSY;  Surgeon:  Jyoti Figueroa MD;  Location: [facility]    JOINT REPLACEMENT, HIP RT/LT  10/2007    Joint Replacement Hip LT    JOINT REPLACEMTN, KNEE RT/LT  2011    Joint Replacement knee /LT, [facility]    LAPAROSCOPIC HERNIORRHAPHY INGUINAL BILATERAL Bilateral 2018    Procedure: LAPAROSCOPIC HERNIORRHAPHY INGUINAL BILATERAL;  Laparoscopic bilateral inguinal hernia repair;  Surgeon: Josemanuel Resendiz MD;  Location: [facility]    PHACOEMULSIFICATION WITH STANDARD INTRAOCULAR LENS IMPLANT Right 2021    Procedure: Cataract Removal with Implant;  Surgeon: Regan Kaufman MD;  Location: [facility]    PHACOEMULSIFICATION WITH STANDARD INTRAOCULAR LENS IMPLANT Left 2021    Procedure: Cataract Removal with Implant;  Surgeon: Regan Kaufman MD;  Location: [facility]    SURGICAL HISTORY OF -   1999    Umbilical Herniorrhaphy with mesh    SURGICAL HISTORY OF -  Left 2017    Thoracotomy and drainage of empyema       Family History:    Family History   Problem Relation Age of Onset    Depression Mother     Cerebrovascular Disease Mother     Breast Cancer Mother     Neurologic Disorder Mother         parkinsons    Parkinsonism Mother     Respiratory Father         emphyzema    Diabetes Brother        Social History:  Marital Status:   [2]  Social History     Tobacco Use    Smoking status: Former     Current packs/day: 0.00     Average packs/day: 1 pack/day for 17.8 years (17.8 ttl pk-yrs)     Types: Cigarettes     Start date: 1958     Quit date: 10/13/1975     Years since quittin.1    Smokeless tobacco: Never   Vaping Use    Vaping status: Never Used   Substance Use Topics    Alcohol use: Not Currently    Drug use: No        Medications:    buPROPion (WELLBUTRIN XL) 300 MG 24 hr tablet  busPIRone HCl (BUSPAR) 30 MG tablet  finasteride (PROSCAR) 5 MG tablet  furosemide (LASIX) 20 MG tablet  lamoTRIgine (LAMICTAL) 25 MG tablet  losartan (COZAAR) 50 MG tablet  metoprolol succinate ER (TOPROL  XL) 200 MG 24 hr tablet  multivitamin (ONE-DAILY) tablet  PARoxetine (PAXIL) 30 MG tablet  rivaroxaban ANTICOAGULANT (XARELTO ANTICOAGULANT) 20 MG TABS tablet  spironolactone (ALDACTONE) 25 MG tablet  tamsulosin (FLOMAX) 0.4 MG capsule  zolpidem (AMBIEN) 5 MG tablet          Review of Systems  See HPI  Physical Exam   BP: (!) 138/96  Pulse: 93  Temp: 98  F (36.7  C)  Resp: 18  SpO2: 100 %      Physical Exam  BP (!) 138/96   Pulse 93   Temp 98  F (36.7  C) (Oral)   Resp 18   SpO2 100%   General: alert, interactive, in no apparent distress  Head: Hematoma noted on the back of the scalp  Nose: no rhinorrhea or epistaxis  Ears: no external auditory canal discharge or bleeding.    Eyes: Sclera nonicteric. Conjunctiva noninjected. PERRL, EOMI  Mouth: no tonsillar erythema, edema, or exudate.  Moist mucous membranes  Neck: supple, moving spontaneously no midline cervical tenderness  Lungs: No increased work of breathing.  Clear to auscultation bilaterally.  CV: RRR, peripheral pulses palpable and symmetric  Abdomen: soft, nt, nd, no guarding or rebound.   Extremities: Warm and well-perfused.  All 4 extremities are ranged and palpated and are normal  Skin: no rash or diaphoresis  Neuro: CN II-XII grossly intact, strength 5/5 in UE and LEs bilaterally, sensation intact to light touch in UE and LEs bilaterally;     ED Course        Procedures             Critical Care time:  none             Results for orders placed or performed during the hospital encounter of 11/09/24 (from the past 24 hours)   XR Pelvis w Hip Right 1 View    Narrative    EXAM: XR PELVIS AND HIP RIGHT 1 VIEW  LOCATION: [facility]  DATE: 11/9/2024    INDICATION: Pain after a fall  COMPARISON: 7/10/2023.      Impression    IMPRESSION: No acute fracture or dislocation. Unremarkable appearance of bilateral hip arthroplasties.   CT Head w/o Contrast    Narrative    EXAM: CT HEAD WITHOUT CONTRAST, CT CERVICAL SPINE WITHOUT  CONTRAST  LOCATION: [facility]  DATE: 11/09/2024    INDICATION: Fall with head trauma, on Xarelto.  COMPARISON: 10/28/2024.  TECHNIQUE:   1) Routine CT Head without IV contrast. Multiplanar reformats. Dose reduction techniques were used.  2) Routine CT Cervical Spine without IV contrast. Multiplanar reformats. Dose reduction techniques were used.    FINDINGS:   HEAD CT:   INTRACRANIAL CONTENTS: No intracranial hemorrhage, extra-axial collection, or mass effect.  No CT evidence of acute infarct. Mild to moderate presumed chronic small vessel ischemic changes. Mild to moderate generalized volume loss. No hydrocephalus.     VISUALIZED ORBITS/SINUSES/MASTOIDS: Prior bilateral cataract surgery. Visualized portions of the orbits are otherwise unremarkable. No paranasal sinus mucosal disease. Trace fluid in the right mastoid tip.    BONES/SOFT TISSUES: No acute abnormality. Right temporomandibular joint osteoarthritis.    CERVICAL SPINE CT:   VERTEBRA: Stable cervical spine alignment. Grade 1 anterolisthesis present at C3 on C4 and C4 on C5, likely chronic/degenerative. Additionally, there is grade retrolisthesis at C5 on C6, also likely chronic/degenerative. Grade 1 anterolisthesis is also   present at C7 on T1, likely chronic/degenerative. Decreased osseous mineralization. Dextroconvex curvature of the cervical spine. No fracture or posttraumatic subluxation.     CANAL/FORAMINA: Multilevel degenerative changes. At C1-C2, there is a prominent degenerative pannus which causes up to moderate canal stenosis. At C4-C5, C5-C6, and C6-C7, there is up to mild canal stenosis due to disc osteophyte complexes/bulges. At   C2-C3, there is moderate left neural foraminal stenosis. At C3-C4, there is severe bilateral neural foraminal stenosis. At C4-C5, there is severe left neural foraminal stenosis. At C5-C6, there is severe bilateral neural foraminal stenosis. At C6-C7,   there is moderate to severe  bilateral neural foraminal stenosis. At C7-T1, there is no neural foraminal stenosis.    PARASPINAL: No extraspinal abnormality. Visualized lung fields are clear.      Impression    IMPRESSION:  HEAD CT:  1.  No CT evidence for acute intracranial process.  2.  Brain atrophy and presumed chronic microvascular ischemic changes as above.    CERVICAL SPINE CT:  1.  No CT evidence for acute fracture or posttraumatic subluxation.  2.  Degenerative changes, as above.       CT Cervical Spine w/o Contrast    Narrative    EXAM: CT HEAD WITHOUT CONTRAST, CT CERVICAL SPINE WITHOUT CONTRAST  LOCATION: [facility]  DATE: 11/09/2024    INDICATION: Fall with head trauma, on Xarelto.  COMPARISON: 10/28/2024.  TECHNIQUE:   1) Routine CT Head without IV contrast. Multiplanar reformats. Dose reduction techniques were used.  2) Routine CT Cervical Spine without IV contrast. Multiplanar reformats. Dose reduction techniques were used.    FINDINGS:   HEAD CT:   INTRACRANIAL CONTENTS: No intracranial hemorrhage, extra-axial collection, or mass effect.  No CT evidence of acute infarct. Mild to moderate presumed chronic small vessel ischemic changes. Mild to moderate generalized volume loss. No hydrocephalus.     VISUALIZED ORBITS/SINUSES/MASTOIDS: Prior bilateral cataract surgery. Visualized portions of the orbits are otherwise unremarkable. No paranasal sinus mucosal disease. Trace fluid in the right mastoid tip.    BONES/SOFT TISSUES: No acute abnormality. Right temporomandibular joint osteoarthritis.    CERVICAL SPINE CT:   VERTEBRA: Stable cervical spine alignment. Grade 1 anterolisthesis present at C3 on C4 and C4 on C5, likely chronic/degenerative. Additionally, there is grade retrolisthesis at C5 on C6, also likely chronic/degenerative. Grade 1 anterolisthesis is also   present at C7 on T1, likely chronic/degenerative. Decreased osseous mineralization. Dextroconvex curvature of the cervical spine. No  fracture or posttraumatic subluxation.     CANAL/FORAMINA: Multilevel degenerative changes. At C1-C2, there is a prominent degenerative pannus which causes up to moderate canal stenosis. At C4-C5, C5-C6, and C6-C7, there is up to mild canal stenosis due to disc osteophyte complexes/bulges. At   C2-C3, there is moderate left neural foraminal stenosis. At C3-C4, there is severe bilateral neural foraminal stenosis. At C4-C5, there is severe left neural foraminal stenosis. At C5-C6, there is severe bilateral neural foraminal stenosis. At C6-C7,   there is moderate to severe bilateral neural foraminal stenosis. At C7-T1, there is no neural foraminal stenosis.    PARASPINAL: No extraspinal abnormality. Visualized lung fields are clear.      Impression    IMPRESSION:  HEAD CT:  1.  No CT evidence for acute intracranial process.  2.  Brain atrophy and presumed chronic microvascular ischemic changes as above.    CERVICAL SPINE CT:  1.  No CT evidence for acute fracture or posttraumatic subluxation.  2.  Degenerative changes, as above.         *Note: Due to a large number of results and/or encounters for the requested time period, some results have not been displayed. A complete set of results can be found in Results Review.       Medications   acetaminophen (TYLENOL) tablet 1,000 mg (1,000 mg Oral $Given 11/9/24 0300)       Assessments & Plan (with Medical Decision Making)     I have reviewed the nursing notes.    I have reviewed the findings, diagnosis, plan and need for follow up with the patient.          Medical Decision Making  Josemanuel Edmond is a 79 year old male who has a history of atrial fibrillation on Xarelto, who presents for evaluation after a fall.  Vital signs reviewed and notable for mild hypertension otherwise reassuring.  Patient is well-appearing on exam and has a normal neurological exam.  Frequent falls are likely secondary to polypharmacy.  He is on both hydroxyzine and Ambien and he admits that this  is likely why he has been falling.  He is requesting narcotic pain medication, but I explained to him that I do not think this is safe to give him narcotics at this time as I am worried increases his falls risk.  Head and neck CT are obtained and negative for acute injury.  Right hip and pelvis x-ray is negative for acute fracture or dislocation.  Patient was given Tylenol for pain.  His pain is tolerable.  I explained to him that I do not think it safe for him to be on multiple sedating medications.  He states that he is working with his regular doctor to get off of the Ambien, however it is the only thing that helps him sleep.  He was wondering if I could provide him anything else for sleep.  He has already tried melatonin.  I am not comfortable prescribing anything else given his falls risk and I will defer this to his primary doctor.  No further workup is needed here in the ER.  He is discharged in stable condition.  Return precautions were discussed.        New Prescriptions    No medications on file       Final diagnoses:   Fall, initial encounter   Chronic anticoagulation       11/9/2024   [facility] EMERGENCY DEPT       Lee Sifuentes MD  11/09/24 0425

## 2024-11-14 RX ORDER — BUSPIRONE HYDROCHLORIDE 30 MG/1
30 TABLET ORAL 2 TIMES DAILY
Qty: 180 TABLET | Refills: 1 | Status: SHIPPED | OUTPATIENT
Start: 2024-12-10 | End: 2024-11-15

## 2024-11-14 RX ORDER — LAMOTRIGINE 25 MG/1
50 TABLET ORAL 2 TIMES DAILY
Qty: 360 TABLET | Refills: 1 | Status: SHIPPED | OUTPATIENT
Start: 2024-12-29

## 2024-11-14 RX ORDER — BUPROPION HYDROCHLORIDE 300 MG/1
300 TABLET ORAL EVERY MORNING
Qty: 90 TABLET | Refills: 1 | Status: SHIPPED | OUTPATIENT
Start: 2025-01-22

## 2024-11-15 ENCOUNTER — OFFICE VISIT (OUTPATIENT)
Dept: FAMILY MEDICINE | Facility: CLINIC | Age: 79
End: 2024-11-15
Payer: COMMERCIAL

## 2024-11-15 VITALS
RESPIRATION RATE: 16 BRPM | BODY MASS INDEX: 23.79 KG/M2 | HEART RATE: 84 BPM | OXYGEN SATURATION: 98 % | WEIGHT: 157 LBS | SYSTOLIC BLOOD PRESSURE: 116 MMHG | DIASTOLIC BLOOD PRESSURE: 64 MMHG | TEMPERATURE: 97.6 F | HEIGHT: 68 IN

## 2024-11-15 DIAGNOSIS — R29.6 FALLS FREQUENTLY: ICD-10-CM

## 2024-11-15 DIAGNOSIS — F41.9 ANXIETY: Primary | ICD-10-CM

## 2024-11-15 PROCEDURE — 99214 OFFICE O/P EST MOD 30 MIN: CPT | Performed by: FAMILY MEDICINE

## 2024-11-15 ASSESSMENT — PAIN SCALES - GENERAL: PAINLEVEL_OUTOF10: NO PAIN (0)

## 2024-11-15 NOTE — NURSING NOTE
"Initial /64   Pulse 84   Temp 97.6  F (36.4  C) (Tympanic)   Resp 16   Ht 1.727 m (5' 8\")   Wt 71.2 kg (157 lb)   SpO2 98%   BMI 23.87 kg/m   Estimated body mass index is 23.87 kg/m  as calculated from the following:    Height as of this encounter: 1.727 m (5' 8\").    Weight as of this encounter: 71.2 kg (157 lb). .    "

## 2024-11-15 NOTE — PROGRESS NOTES
Assessment & Plan     Anxiety  He has been seeing psychiatry.  His wife today reports that despite the fact that he will often minimize symptoms in clinic he has significant, impairing anxiety at home.  He is up most of the night pacing from anxiety.  Denies any ed, impulsivity.  Does sleep some during the day.  Wrings his hand and expresses excessive worry during the day.  I do wonder with his underlying tremor and mood symptoms that have been unresponsive to multiple medications and insomnia as well as daytime somnolence that have also been largely unresponsive to treatment, whether soon.  I am so sorry!  This may be a manifestation of parkinsonism.  I did message his neurologist and will follow-up with them to see if this could be the case.  He may benefit from neuropsych/Kim psych consultation.  He denies any suicidality today.  I did however provide crisis resources given his high level of anxiety.  See AVS for details.    Falls frequently  There may be a component of polypharmacy.  In an attempt to try to get sleep he has been taking Ambien and hydroxyzine and gabapentin altogether at night.  Both he and his wife condone that when he uses multiple sedating medications it increases his instability and falling.  Advised discontinuation of Ambien, hydroxyzine and gabapentin.  He is very anxious about stopping the Ambien but discussed that he is essentially telling me it is not helping him sleep anyway so given no benefit and potential side effects/risks I would recommend stopping this.        MED REC REQUIRED  Post Medication Reconciliation Status:  Discharge medications reconciled and changed, see notes/orders        Subjective   Gavin is a 79 year old, presenting for the following health issues:  No chief complaint on file.        11/15/2024    10:22 AM   Additional Questions   Roomed by Krystle ALAS CMA     History of Present Illness       Mental Health Follow-up:  Patient presents to follow-up on Depression  "& Anxiety.Patient's depression since last visit has been:  Worse  The patient is having other symptoms associated with depression.  Patient's anxiety since last visit has been:  Bad  The patient is not having other symptoms associated with anxiety.  Any significant life events: health concerns  Patient is feeling anxious or having panic attacks.  Patient has no concerns about alcohol or drug use.    He eats 2-3 servings of fruits and vegetables daily.He consumes 2 sweetened beverage(s) daily.He exercises with enough effort to increase his heart rate 9 or less minutes per day.  He exercises with enough effort to increase his heart rate 3 or less days per week.   He is taking medications regularly.         ED/UC Followup:    Facility:  [facility] ED  Date of visit: 11/09/2024  Reason for visit: Fall  Current Status: balance improving      ROS:   Constitutional, neuro, ENT, endocrine, pulmonary, cardiac, gastrointestinal, genitourinary, musculoskeletal, integument and psychiatric systems are negative, except as otherwise noted.       Objective    /64   Pulse 84   Temp 97.6  F (36.4  C) (Tympanic)   Resp 16   Ht 1.727 m (5' 8\")   Wt 71.2 kg (157 lb)   SpO2 98%   BMI 23.87 kg/m    Body mass index is 23.87 kg/m .  Physical Exam   GENERAL: Pleasant, well appearing male.  PSYCH: Alert and oriented x3, neatly dressed and well groomed, makes good eye contact, fluid speech - non-pressured, mild psychomotor agitation, good memory, judgement and insight, no auditory or visual hallucinations, anxious affect which is mood congruent. No suicidal ideation.             Signed Electronically by: Lizzy Leiva MD    "

## 2024-11-15 NOTE — PATIENT INSTRUCTIONS
"BEHAVIORAL/MENTAL HEALTH RESOURCES: [place] , [place], [place] AND [facility]    Crisis lines  - [place]: [place]: [phone]  - [place]: [facility]: [phone]  - [facility]/[place]: [phone]  - Crisis text line: Text the word \"[place]\" to 962310  - Suicide Prevention Lifeline: 8-727-192-TALK (2709)  - [place] counseling centers daytime and after hours crisis number: 900-399-7811  - [place] - National [place] on Mental Illness:  www.namihelps.org - 7-940-404-4115    If you are not in crisis yet want to talk with someone:  - Mental health Advocacy [place]: [phone] 5pm-10pm Monday- Saturday  - [place]: [phone], [phone] 4pm-8pm Thursday-Sunday  Chemical Dependency Detox  - [place] [facility]: [phone]  - [facility]): [phone]  - [facility] ([facility]): [phone]  - [facility]/[place] ([facility]): [phone]  - [place] ([facility]): [phone]    Chemical Dependency Assessment  - [place] [facility]: [phone]  - Behavioral Health Providers - and help find a provider near you: [phone]  - [facility]: [phone]  - No insurance - call County of residence and ask about applying for a Rule 25    Counseling/Psychotherapy (please note this list does not include all agencies that provide services)  **Patient should check with their health insurance to identify providers in network**  - [facility] - [place]/[facility]/[place]/other locations: [phone]  - [facility] - [place]: [phone]  - Behavioral Healthcare Providers - can help find a provider near you: [phone]  - [facility] - [place]: [phone]  - [facility] - [place]: [phone]  - [facility] - [place]:[phone]  - [facility] - [place]/[facility]/other locations: [phone]  - [facility] " [facility] Associates - [facility]/[place]: [phone]  - [facility] - [facility]/[place]: [phone]  - [facility] - [place]: [phone]  [facility] [place]: [phone]  - [facility] - [place]: [phone]  - [facility] - [place]:[phone]  - [facility] - [place]: [phone]/ [place]: [phone]  - [facility]: [place]: [phone]  - Psychiatric hospital, [facility] - [place]: [phone]  - [facility] - [place]:[phone]  - [facility], [place]. - [place]: [phone]  - [facility]rapeutic Services Agency - [place]: [phone]    Psychiatry/Mental Health Medication Management  **Patient should check with their health insurance to identify providers in network**  - [facility] - [place]: [phone]  - Behavioral Healthcare Providers - can help find a provider near you. If you have [employer] or [employer] they can identify who is in network and assist with scheduling an appointment: [phone]  - [facility]:[phone]  - [facility] - [place]:[phone] (requires individual to be engaged in counseling)  - [facility] - [place]: [phone]/ [place]: [phone]/ Ngozi Galan [phone]  - [facility] - [place]: [phone]  - [facility] - [place], [phone]  - [facility] - [place] (medical student who rotate yearly): [phone]    Autism  - [place] - [place]: [phone]  - [facility]: [phone] (social skills assessment and training, parent skills training)    ADD Testing  - [place] - [place]is[place]/Yady/Al: [phone]  -  "[facility] - [phone]  - [facility] - [facility] locations (ages 14 and up): [phone]  - [facility] - [place] (ages 6 and up): [phone]  - [facility] - [place]: [phone]    Domestic Violence Resources  - [facility] - [place] wide: [place] crisis connection: [phone] 24 hours  - [facility]: 6-472-730-SAFE (7233) 24 hours  - [facility]: [phone]  - [facility]: [phone]/ [phone]  - Domestic Abuse Project - [facility] area: [phone]  - [facility]: [phone]    LGBTQ Services  - Directory for LGBTQ health related servies: http://mngbtqdirectory.org/  - [place] Transgender Health Coalition: http://www.Descubre.la/ - [place]: 054-190-8990  - Program in Human Sexuality - comprehensive sexual health care - https://www.sexualhealth.[facility]/ - [place]: [phone]  - Re[employer]! provides individual and family counseling for youth ages 13-25 who are seeking therapeutic support related to sexual orientation and/or gender identity and expression: https://reclaimcare.org/ - [place]: [phone] * * *  If you have a [employer] account results will appear in your MyChart.  If you do not have a MyChart account results will be mailed or called to you.    Lab and imaging results are released \"real time\" into My Chart.  This may mean that you see the results before I have a chance to review them. My Chart will alert you again when I review the results and enter comments.  Sometimes with imaging or labs there may be serious or unexpected results. Critical results are paged to me or the after hours on-call provider so that they can be reviewed immediately.  This is not true of non-critical abnormal results. Unfortunately, this means that it's possible you may be alerted of a serious finding before I have a chance to review it.  If you ever receive a result that you are concerned about and I have " "not already contacted you, please feel free to reach out to me or the care team so that you get the answers you need. You can call the care team at [phone] and say \"Care Team\".    Additionally, it is my goal that you understand the care plan discussed at your visit and that any questions you have are answered.  Please feel free to reach out if you need clarification or explanation of any information addressed at your office visit.      "

## 2024-11-18 ENCOUNTER — MYC MEDICAL ADVICE (OUTPATIENT)
Dept: FAMILY MEDICINE | Facility: CLINIC | Age: 79
End: 2024-11-18
Payer: COMMERCIAL

## 2024-11-18 ENCOUNTER — TELEPHONE (OUTPATIENT)
Dept: NEUROLOGY | Facility: CLINIC | Age: 79
End: 2024-11-18
Payer: COMMERCIAL

## 2024-11-18 ENCOUNTER — MYC MEDICAL ADVICE (OUTPATIENT)
Dept: BEHAVIORAL HEALTH | Facility: CLINIC | Age: 79
End: 2024-11-18
Payer: COMMERCIAL

## 2024-11-18 DIAGNOSIS — G47.00 INSOMNIA, UNSPECIFIED TYPE: Primary | ICD-10-CM

## 2024-11-18 DIAGNOSIS — F33.1 MODERATE EPISODE OF RECURRENT MAJOR DEPRESSIVE DISORDER (H): Primary | ICD-10-CM

## 2024-11-18 DIAGNOSIS — G47.9 SLEEP DISTURBANCES: ICD-10-CM

## 2024-11-18 NOTE — TELEPHONE ENCOUNTER
Called and spoke with spouse (Yoselyn- on ctc) and suggested a follow up sooner than Jan 2025 based on Dr. Live recommendations. Yoselyn reports that Gavin had another fall on Saturday and has a large hematoma on his buttocks. She does not feel that he would be up to coming in for an appointment at this time. Writer recommended maybe scheduling for a couple weeks out but Yoselyn stated she would rather call back. When calling back, writer asked she askthe call center to transfer to clinic to schedule an add on appt    Naye WHITMAN CMA on 11/18/2024 at 3:18 PM  [facility]

## 2024-11-18 NOTE — TELEPHONE ENCOUNTER
----- Message from Adriel Hawkins sent at 11/15/2024  5:59 PM CST -----  Susana Leiva,  Thank you for your message I will have my office staff reach out to him to make a follow-up appointment for reevaluation and I will send you an updated note.  Adriel Hawkins MD  ----- Message -----  From: Lizzy Leiva MD  Sent: 11/15/2024   2:52 PM CST  To: Adriel Hawkins MD    I saw Gavin today.  Neurologically and psychiatrically he is probably at the worst that I have seen him.  He has been having increased falls - several ER visits in the the last few months.  I think to some extent there may be some polypharmacy involved (we are addressing that with med changes/discontinuations) but I am wondering about your thoughts on the possibility of Parkinson's, especially given his history of tremor.  He is exhibiting nonmotor symptoms that would certainly also make me suspicious including depression, anxiety, insomnia, daytime somnolence.  In particular, his mood symptoms have not responded to psychiatric medications.  Just wondering your thoughts since he's declining markedly.    Lizzy Leiva MD

## 2024-11-19 NOTE — TELEPHONE ENCOUNTER
Patient was evaluated at an office visit on 11/15/24 with Dr. Leiva.   Ambien was discontinued at this visit due to increased falls.     Patient reports he has not Patient not feeling tired to go to bed at night. Patient reports increased anxiety since he has not gotten any sleep.   Patient reports he sleeps for an hour for a nap a couple of times per day but feels physically tired but is not able to sleep.     Patient contacted Angeles [facility] yesterday via ipsy and the provider had recommendations. Angeles [place] and her team attempted to call patient 11/18/24 and 11/19/24 but appears to not have been able to get a hold of patient to discuss medications. Yesterday, Neurology also contacted patient to try to coordinate an earlier appointment than 1/29/25 and neurology recommends to be seen in the next few weeks but wife was going to call back to schedule.     RN encouraged patient to see Neurology as soon as possible.     Routing to Dr. Leiva as an FYI per patient's request.     Veronica Loaiza RN on 11/19/2024 at 4:41 PM

## 2024-11-20 RX ORDER — MIRTAZAPINE 7.5 MG/1
7.5 TABLET, FILM COATED ORAL
Qty: 30 TABLET | Refills: 1 | Status: SHIPPED | OUTPATIENT
Start: 2024-11-20

## 2024-11-20 NOTE — TELEPHONE ENCOUNTER
Patient notified of message below via my chart message.     Annette Simons RN on 11/20/2024 at 1:33 PM

## 2024-11-20 NOTE — TELEPHONE ENCOUNTER
He needs to stop napping during the day.  That has tremendous impact on nighttime sleep.  Psychiatry has been trying to get a hold of him to adjust medication for anxiety.  I spoke with them Monday and she had some ideas for medication adjustment.

## 2024-11-21 NOTE — TELEPHONE ENCOUNTER
"Chart documentation shows they have called him 3 times and left voicemails as well as sent a [employer] message.    \"-Called Gavin twice per his mobile phone + once per home phone at 3:21 pm  -Left VM about possibly modifying Paxil prior to 1/16/2025 appointment  -Sent F2G message outlining taking Paxil 30 mg in AM + 20 mg in the afternoon for ttl: 50 mg/day\"    Unfortunately, there is not going to be a quick fix for his sleep.  This has been a longstanding, chronic problem and is not going to get better overnight.  None of the medications that we have tried have worked well or without side effects.  Other thoughts that I might have, other than working with psychiatry on medications, would be CBT-I (cognitive behavioral therapy for insomnia) and a sleep clinic consult.  I put in orders for both of those.    "

## 2024-12-04 ENCOUNTER — MYC MEDICAL ADVICE (OUTPATIENT)
Dept: BEHAVIORAL HEALTH | Facility: CLINIC | Age: 79
End: 2024-12-04
Payer: COMMERCIAL

## 2024-12-04 DIAGNOSIS — F40.10 SOCIAL ANXIETY DISORDER: Primary | ICD-10-CM

## 2024-12-04 DIAGNOSIS — G47.9 SLEEP DISTURBANCES: ICD-10-CM

## 2024-12-04 RX ORDER — PREGABALIN 25 MG/1
25 CAPSULE ORAL AT BEDTIME
Qty: 30 CAPSULE | Refills: 1 | Status: SHIPPED | OUTPATIENT
Start: 2024-12-04

## 2024-12-05 ENCOUNTER — NURSE TRIAGE (OUTPATIENT)
Dept: CARDIOLOGY | Facility: CLINIC | Age: 79
End: 2024-12-05
Payer: COMMERCIAL

## 2024-12-05 DIAGNOSIS — I50.32 CHRONIC DIASTOLIC HEART FAILURE (H): Primary | ICD-10-CM

## 2024-12-05 DIAGNOSIS — I48.20 CHRONIC ATRIAL FIBRILLATION (H): ICD-10-CM

## 2024-12-05 RX ORDER — FUROSEMIDE 20 MG/1
40 TABLET ORAL DAILY
COMMUNITY
Start: 2024-12-05

## 2024-12-05 NOTE — TELEPHONE ENCOUNTER
"Received a call from the [facility] to discuss shortness of breath.    HX: Chronic heart failure with preserved ejection fraction, Valvular heart disease,  Atrial fibrillation, unspecified type, Hyperlipidemia, Benign essential hypertension, Cardiomyopathy, Chronic atrial fibrillation, Mitral valve insufficiency, Tricuspid valve insufficiency.    Spoke to wife Yoselyn and Josemanuel on the call, who say he has not been sleeping the last couple of weeks, maybe an hour or two. He stopped taking ambien. For the last 1.5 weeks or so, is short of breath when turning over in bed, and walking more than 25 feet. He feels exhausted. Yoselyn says he has had several falls this month, and has a hematoma on his gluteus, so does not want to go to ED, if that is the recommendation, because he cannot sit that long.  Asked to speak to Gavin directly to assess his breathing. He says he is always trying to catch his breath. He says he cannot lay flat long. He has not weighed himself this morning, but yesterday weight was 160-161 pounds. He denies any swelling anywhere, but Yoselyn says she thinks his abdomen may be bloated some. He is not short of breath during conversation.  He says he has been taking furosemide 20 mg daily, instead of prn, because he was not aware of the change. He says he is urinating frequently. He has not yet taken his morning medications, but plans to.  Asked to check VS on the call: 125/84 HR 99 bpm.  He would like to discuss next steps.  Advised a message will be sent to [place] [facility] for follow up.  Please call home number on file.    1. RESPIRATORY STATUS: \"Describe your breathing?\" (e.g., wheezing, shortness of breath, unable to speak, severe coughing) short of breath  2. ONSET: \"When did this breathing problem begin?\" Approximately 1.5 weeks  3. PATTERN \"Does the difficult breathing come and go, or has it been constant since it started?\"  4. SEVERITY: \"How bad is your breathing?\" (e.g., mild, moderate, severe) Mild to " "Moderate  - MILD: No SOB at rest, mild SOB with walking, speaks normally in sentences, can lie down, no retractions, pulse < 100.  - MODERATE: SOB at rest, SOB with minimal exertion and prefers to sit, cannot lie down flat, speaks in phrases, mild retractions, audible wheezing, pulse 100 to 120.  - SEVERE: Very SOB at rest, speaks in single words, struggling to breathe, sitting hunched forward, retractions, pulse > 120  5. RECURRENT SYMPTOM: \"Have you had difficulty breathing before?\" If Yes, ask: \"When was the last time?\" and \"What happened that time?\" Yes has had this before  6. CARDIAC HISTORY: \"Do you have any history of heart disease?\" (e.g., heart attack, angina, bypass surgery, angioplasty) see above  7. LUNG HISTORY: \"Do you have any history of lung disease?\" (e.g., pulmonary embolus, asthma, emphysema)  8. CAUSE: \"What do you think is causing the breathing problem?\"  9. OTHER SYMPTOMS: \"Do you have any other symptoms?\" (e.g., chest pain, cough, dizziness, fever, runny nose) fatigue  10. O2 SATURATION MONITOR: \"Do you use an oxygen saturation monitor (pulse oximeter) at home?\" If Yes, ask: \"What is your reading (oxygen level) today?\" \"What is your usual oxygen saturation reading?\" (e.g., 95%)  11. PREGNANCY: \"Is there any chance you are pregnant?\" \"When was your last menstrual period?\"  12. TRAVEL: \"Have you traveled out of the country in the last month?\" (e.g., travel history, exposures)  Additional Information    Negative: MODERATE difficulty breathing (e.g., speaks in phrases, SOB even at rest, pulse 100-120) of new-onset or worse than normal    Protocols used: Breathing Difficulty-A-[place]    "

## 2024-12-05 NOTE — TELEPHONE ENCOUNTER
Called and spoke with pt and reviewed providers recommendations. Pt will increase lasix to 40mg a day, repeat BMP in 1 week. Call us if symptoms worsen or has wt gain.   Will follow up in clinic as planned on 12/18/24 to discuss rescheduling AD and getting in for structural consult    Alda Mejia RN

## 2024-12-05 NOTE — TELEPHONE ENCOUNTER
Left message for patient to return call to office.   Needs to have his AD and structural heart consult.   Ok to increase lasix to 40 mg daily.    Nonfasting lab in 1 week (BMP)- may need to start potassium.   Check daily weights and call the clinic if your weight has increased more than 2 lbs in one day or 5 lbs in one week; if you feel more short of breath or have worsening swelling in your legs or abdomen.      KAYLA Parikh CNP

## 2024-12-17 ENCOUNTER — LAB (OUTPATIENT)
Dept: LAB | Facility: CLINIC | Age: 79
End: 2024-12-17
Payer: COMMERCIAL

## 2024-12-17 ENCOUNTER — TELEPHONE (OUTPATIENT)
Dept: FAMILY MEDICINE | Facility: CLINIC | Age: 79
End: 2024-12-17

## 2024-12-17 DIAGNOSIS — I50.32 CHRONIC DIASTOLIC HEART FAILURE (H): ICD-10-CM

## 2024-12-17 LAB
ANION GAP SERPL CALCULATED.3IONS-SCNC: 10 MMOL/L (ref 7–15)
BUN SERPL-MCNC: 18.1 MG/DL (ref 8–23)
CALCIUM SERPL-MCNC: 9.3 MG/DL (ref 8.8–10.4)
CHLORIDE SERPL-SCNC: 96 MMOL/L (ref 98–107)
CREAT SERPL-MCNC: 0.97 MG/DL (ref 0.67–1.17)
EGFRCR SERPLBLD CKD-EPI 2021: 78 ML/MIN/1.73M2
GLUCOSE SERPL-MCNC: 112 MG/DL (ref 70–99)
HCO3 SERPL-SCNC: 28 MMOL/L (ref 22–29)
POTASSIUM SERPL-SCNC: 4.7 MMOL/L (ref 3.4–5.3)
SODIUM SERPL-SCNC: 134 MMOL/L (ref 135–145)

## 2024-12-17 PROCEDURE — 36415 COLL VENOUS BLD VENIPUNCTURE: CPT

## 2024-12-17 PROCEDURE — 80048 BASIC METABOLIC PNL TOTAL CA: CPT

## 2024-12-17 NOTE — TELEPHONE ENCOUNTER
Dr Castellanos is calling regarding Gavin's scheduled oral surgery for this Friday morning 12/20/24. Patient says he was told he could go off of his xarelto for a few days prior. Dr Castellanos making sure he is good to go for the procedure.   Care team to notify him via his cell phone at [phone] after PCP reviews.   Thank you, Annette MORTON RN

## 2024-12-18 ENCOUNTER — OFFICE VISIT (OUTPATIENT)
Dept: CARDIOLOGY | Facility: CLINIC | Age: 79
End: 2024-12-18
Attending: NURSE PRACTITIONER
Payer: COMMERCIAL

## 2024-12-18 VITALS
DIASTOLIC BLOOD PRESSURE: 77 MMHG | OXYGEN SATURATION: 99 % | HEART RATE: 75 BPM | BODY MASS INDEX: 24.07 KG/M2 | RESPIRATION RATE: 16 BRPM | WEIGHT: 158.8 LBS | HEIGHT: 68 IN | SYSTOLIC BLOOD PRESSURE: 114 MMHG

## 2024-12-18 DIAGNOSIS — I42.9 CARDIOMYOPATHY, UNSPECIFIED TYPE (H): ICD-10-CM

## 2024-12-18 DIAGNOSIS — E78.5 HYPERLIPIDEMIA LDL GOAL <100: ICD-10-CM

## 2024-12-18 DIAGNOSIS — I48.20 CHRONIC ATRIAL FIBRILLATION (H): ICD-10-CM

## 2024-12-18 DIAGNOSIS — I50.32 CHRONIC HEART FAILURE WITH PRESERVED EJECTION FRACTION (H): ICD-10-CM

## 2024-12-18 DIAGNOSIS — I10 BENIGN ESSENTIAL HYPERTENSION: ICD-10-CM

## 2024-12-18 DIAGNOSIS — I48.91 ATRIAL FIBRILLATION, UNSPECIFIED TYPE (H): ICD-10-CM

## 2024-12-18 DIAGNOSIS — I38 VALVULAR HEART DISEASE: ICD-10-CM

## 2024-12-18 NOTE — TELEPHONE ENCOUNTER
I do not recall discussing this with Gavin.  However, okay to hold Xarelto perioperatively.  For formal perioperative optimization/recommendations would recommend preop exam.

## 2024-12-18 NOTE — PATIENT INSTRUCTIONS
Medication Changes:  None     Recommendations:  Check daily weights and call the clinic if your weight has increased more than 2 lbs in one day or 5 lbs in one week; if you feel more short of breath or have worsening swelling in your legs or abdomen.    Follow-up:  Call to reschedule AD and structural heart consult.   Cardiology follow up at [facility]: Dr. Chapin in 6 months. Call in 1 week to schedule.     Cardiology Scheduling~[phone]  Cardiology Clinic RN~[phone] (Rosita RN, Alda RN; Jaimie RN)

## 2024-12-18 NOTE — RESULT ENCOUNTER NOTE
Na+ slightly low but improving; other electrolytes and kidney function WNL. Follow up with Nazanin Caballero NP this afternoon.

## 2024-12-18 NOTE — LETTER
12/18/2024    Lizzy Leiva MD  [address] [facility] [address]    RE: Josemanuel Edmond       Dear Colleague,     I had the pleasure of seeing Josemanuel Edmond in the [facility].  Cardiology Clinic Progress Note  Josemanuel Edmond MRN# [id number]   YOB: 1945 Age: 79 year old      Primary Cardiologist:   Dr. Chapin for 2-month follow-up          History of Presenting Illness:      Patient has a history of valvular heart disease.  Most recently he was found to have severe TR, moderately severe to severe MR and moderate AI on echocardiogram July 2024.  I reviewed this with Dr. Chapin who recommended proceeding with AD and structural heart clinic consult.  At my visit with him in October 2024 he denied any heart failure symptoms.  Most recent heart failure episode was February 2024.    Patient was seen by me in October 2024.  I recommended a AD and consult with structural heart clinic.  He was started on spironolactone for cardiomyopathy and Lasix was decreased to 20 mg daily.    Patient unfortunately had a fall and large bleeding hematoma with rib fracture at the end of October.  A couple weeks later he had another fall with head injury.  He ultimately called and canceled the AD and Dr. Calderón consult due to the buttock hematoma and frequent falls.    Patient was seen by PCP in November.  They made some medication changes due to the frequent falls and referred patient to neurology and for Kim psych evaluation.  He was also referred to sleep behavior therapy due to severe anxiety and insomnia.    Most recent lipid profile, BMP, ALT reviewed today.  Patient was supposed to have lab work 1 to 2 weeks after starting spironolactone.  Lab work on 10/25/2024 showed hyponatremia.  Patient had not actually started the spironolactone yet, Lasix was changed to as needed.  Follow-up lab 12/17/2024 showed improvement sodium to 134.    Still having frequent falls? None, after  PCP med changes   Weight? Stable   Heart failure symptoms? None   BP? Controlled   Taking Lasix only as needed now? Still taking 20 mg daily, never stopped.  Since sodium is improved, he will continue taking this daily.  Started spironolactone? Yes   Side effect of spironolactone? None   He needs to figure out  before he can reschedule his AD and structural heart clinic consult.  May ultimately decide to wait to schedule this until he has a follow-up with Dr. Chapin to discuss further.    Patient reports no chest pain, shortness of breath, PND, orthopnea, presyncope, syncope, edema, heart racing, or palpitations.    I discussed risks, benefits, and indications of proceeding with AD.  This includes but is not limited to esophageal irritation, tear, or perforation. I also discussed discomfort at the IV site and risks of conscious sedation including aspiration pneumonia.  They deny any swallowing or esophageal problems. They voice understanding and are willing to proceed.  A formal consent form will be signed by the procedural physician.  No sleep apnea or trouble swallowing.                       Assessment and Plan:     Plan  Patient Instructions   Medication Changes:  None     Recommendations:  Check daily weights and call the clinic if your weight has increased more than 2 lbs in one day or 5 lbs in one week; if you feel more short of breath or have worsening swelling in your legs or abdomen.    Follow-up:  Call to reschedule AD and structural heart consult.   [facility]: Dr. Chapin in 6 months. Call in 1 week to schedule.     Cardiology Scheduling~[phone]  Cardiology Clinic RN~[phone] (Rosita RN, Alda RN; Jaimie RN)          Problem List as of 12/18/2024 Reviewed: 11/15/2024  6:18 PM by Lizzy Leiva MD            Noted       Active Problems    1. Benign essential hypertension 12/12/2005     Last Assessment & Plan 6/26/2024 Office Visit Written 6/26/2024   7:39 AM by Nazanin Caballero APRN CNP      Controlled  Continue current medications          Relevant Orders     Follow-Up with Cardiology    2. Atrial fibrillation, unspecified type (H) 1/18/2018     Overview Addendum 6/26/2024  7:41 AM by Nazanin Caballero APRN CNP      onset 2018 postoperatively, S/p cardioversion   Recurrence 2021 with palpitations  Now chronic A-fib  Severe LA  Elevated [id number] score          Last Assessment & Plan 6/26/2024 Office Visit Written 6/26/2024  7:41 AM by Nazanin Caballero APRN CNP      Continue rate control, anticoagulation          Relevant Medications     rivaroxaban ANTICOAGULANT (XARELTO ANTICOAGULANT) 20 MG TABS tablet     Other Relevant Orders     Follow-Up with Cardiology    3. Valvular heart disease 9/22/2022     Overview Addendum 10/17/2024  2:24 PM by Nazanin Caballero APRN CNP      Moderate to moderately severe MR, moderate TR, mild to moderate AI 2023  Severe TR, moderately severe to severe MR, moderate AI 2024          Last Assessment & Plan 10/17/2024 Office Visit Edited 10/17/2024  2:53 PM by Nazanin Caballero APRN CNP      Follow with echo  AD and structural consult           Relevant Orders     Follow-Up with Cardiology    4. Chronic heart failure with preserved ejection fraction (H) 11/29/2023     Overview Addendum 10/17/2024  2:25 PM by Nazanin Caballero APRN CNP      Heart failure episode 2/2024  Negative Lexiscan 2/2024  Dry weight 154 pounds at home          Last Assessment & Plan 10/17/2024 Office Visit Written 10/17/2024  2:25 PM by Nazanin Caballero APRN CNP      No symptoms of heart failure  Continue GDMT          Relevant Orders     Follow-Up with Cardiology    5. Cardiomyopathy, unspecified type (H) 3/7/2024     Overview Addendum 10/17/2024  2:54 PM by Nazanin Caballero APRN CNP      EF 50-55% 2023  45 to 50% 1/2024  Heart failure episode 2/2024  Negative Lexiscan 2/2024  EF 45-50%  7/2024 echo   Dry weight 154 pounds at home  Jardiance and entresto cost prohibitive           Last Assessment & Plan 10/17/2024 Office Visit Edited 10/17/2024  2:54 PM by Nazanin Caballero APRN CNP      No symptoms of Heart failure   Continue GDMT             Relevant Orders     Follow-Up with Cardiology    6. Hyperlipidemia LDL goal <100 10/31/2010     Last Assessment & Plan 6/26/2024 Office Visit Edited 6/26/2024  7:40 AM by Nazanin Caballero APRN CNP      LDL at goal without medication  Continue lifestyle modification          Relevant Orders     Follow-Up with Cardiology             Respiratory:  clear to auscultation; normal symmetry        Cardiac: regular rate and rhythm irregularly irregular   GI:  abdomen nondistended     Extremities and Muscular Skeletal:   no edema                Thank you for allowing me to participate in this delightful patient's care.   This note was completed in part using Dragon voice recognition software. Although reviewed after completion, some word and grammatical errors may occur.    KAYLA Parikh CNP                  Thank you for allowing me to participate in the care of your patient.      Sincerely,     KAYLA Parikh CNP     [facility]  cc:   KAYLA Allen CNP  [address] [place] [address]

## 2024-12-18 NOTE — TELEPHONE ENCOUNTER
Noted patient has an appointment 12/19  Pre-op needed    Annette Simons RN on 12/18/2024 at 2:02 PM

## 2024-12-18 NOTE — PROGRESS NOTES
Cardiology Clinic Progress Note  Josemanuel Edmond MRN# [id number]   YOB: 1945 Age: 79 year old      Primary Cardiologist:   Dr. Chapin for 2-month follow-up          History of Presenting Illness:      Patient has a history of valvular heart disease.  Most recently he was found to have severe TR, moderately severe to severe MR and moderate AI on echocardiogram July 2024.  I reviewed this with Dr. Chapin who recommended proceeding with AD and structural heart clinic consult.  At my visit with him in October 2024 he denied any heart failure symptoms.  Most recent heart failure episode was February 2024.    Patient was seen by me in October 2024.  I recommended a AD and consult with structural heart clinic.  He was started on spironolactone for cardiomyopathy and Lasix was decreased to 20 mg daily.    Patient unfortunately had a fall and large bleeding hematoma with rib fracture at the end of October.  A couple weeks later he had another fall with head injury.  He ultimately called and canceled the AD and Dr. Calderón consult due to the buttock hematoma and frequent falls.    Patient was seen by PCP in November.  They made some medication changes due to the frequent falls and referred patient to neurology and for Kim psych evaluation.  He was also referred to sleep behavior therapy due to severe anxiety and insomnia.    Most recent lipid profile, BMP, ALT reviewed today.  Patient was supposed to have lab work 1 to 2 weeks after starting spironolactone.  Lab work on 10/25/2024 showed hyponatremia.  Patient had not actually started the spironolactone yet, Lasix was changed to as needed.  Follow-up lab 12/17/2024 showed improvement sodium to 134.    Still having frequent falls? None, after PCP med changes   Weight? Stable   Heart failure symptoms? None   BP? Controlled   Taking Lasix only as needed now? Still taking 20 mg daily, never stopped.  Since sodium is improved, he will continue taking this  daily.  Started spironolactone? Yes   Side effect of spironolactone? None   He needs to figure out  before he can reschedule his AD and structural heart clinic consult.  May ultimately decide to wait to schedule this until he has a follow-up with Dr. Chapin to discuss further.    Patient reports no chest pain, shortness of breath, PND, orthopnea, presyncope, syncope, edema, heart racing, or palpitations.    I discussed risks, benefits, and indications of proceeding with AD.  This includes but is not limited to esophageal irritation, tear, or perforation. I also discussed discomfort at the IV site and risks of conscious sedation including aspiration pneumonia.  They deny any swallowing or esophageal problems. They voice understanding and are willing to proceed.  A formal consent form will be signed by the procedural physician.  No sleep apnea or trouble swallowing.                       Assessment and Plan:     Plan  Patient Instructions   Medication Changes:  None     Recommendations:  Check daily weights and call the clinic if your weight has increased more than 2 lbs in one day or 5 lbs in one week; if you feel more short of breath or have worsening swelling in your legs or abdomen.    Follow-up:  Call to reschedule AD and structural heart consult.   Cardiology follow up at [facility]: Dr. Chapin in 6 months. Call in 1 week to schedule.     Cardiology Scheduling~[phone]  Cardiology Clinic RN~[phone] (Rosita RN, Alda RN; Jaimie RN)          Problem List as of 12/18/2024 Reviewed: 11/15/2024  6:18 PM by Lizzy Leiva MD            Noted       Active Problems    1. Benign essential hypertension 12/12/2005     Last Assessment & Plan 6/26/2024 Office Visit Written 6/26/2024  7:39 AM by Nazanin Caballero APRN CNP      Controlled  Continue current medications          Relevant Orders     Follow-Up with Cardiology    2. Atrial fibrillation, unspecified type (H) 1/18/2018      Overview Addendum 6/26/2024  7:41 AM by Nazanin Caballero APRN CNP      onset 2018 postoperatively, S/p cardioversion   Recurrence 2021 with palpitations  Now chronic A-fib  Severe LA  Elevated [id number] score          Last Assessment & Plan 6/26/2024 Office Visit Written 6/26/2024  7:41 AM by Nazanin Caballero APRN CNP      Continue rate control, anticoagulation          Relevant Medications     rivaroxaban ANTICOAGULANT (XARELTO ANTICOAGULANT) 20 MG TABS tablet     Other Relevant Orders     Follow-Up with Cardiology    3. Valvular heart disease 9/22/2022     Overview Addendum 10/17/2024  2:24 PM by Nazanin Caballero APRN CNP      Moderate to moderately severe MR, moderate TR, mild to moderate AI 2023  Severe TR, moderately severe to severe MR, moderate AI 2024          Last Assessment & Plan 10/17/2024 Office Visit Edited 10/17/2024  2:53 PM by Nazanin Caballero APRN CNP      Follow with echo  AD and structural consult           Relevant Orders     Follow-Up with Cardiology    4. Chronic heart failure with preserved ejection fraction (H) 11/29/2023     Overview Addendum 10/17/2024  2:25 PM by Nazanin Caballero APRN CNP      Heart failure episode 2/2024  Negative Lexiscan 2/2024  Dry weight 154 pounds at home          Last Assessment & Plan 10/17/2024 Office Visit Written 10/17/2024  2:25 PM by Nazanin Caballero APRN CNP      No symptoms of heart failure  Continue GDMT          Relevant Orders     Follow-Up with Cardiology    5. Cardiomyopathy, unspecified type (H) 3/7/2024     Overview Addendum 10/17/2024  2:54 PM by Nazanin Caballero APRN CNP      EF 50-55% 2023  45 to 50% 1/2024  Heart failure episode 2/2024  Negative Lexiscan 2/2024  EF 45-50% 7/2024 echo   Dry weight 154 pounds at home  Jardiance and entresto cost prohibitive           Last Assessment & Plan 10/17/2024 Office Visit Edited 10/17/2024  2:54 PM by Nazanin Caballero APRN CNP       No symptoms of Heart failure   Continue GDMT             Relevant Orders     Follow-Up with Cardiology    6. Hyperlipidemia LDL goal <100 10/31/2010     Last Assessment & Plan 6/26/2024 Office Visit Edited 6/26/2024  7:40 AM by Nazanin Caballero APRN CNP      LDL at goal without medication  Continue lifestyle modification          Relevant Orders     Follow-Up with Cardiology             Respiratory:  clear to auscultation; normal symmetry        Cardiac: regular rate and rhythm irregularly irregular   GI:  abdomen nondistended     Extremities and Muscular Skeletal:   no edema                Thank you for allowing me to participate in this delightful patient's care.   This note was completed in part using Dragon voice recognition software. Although reviewed after completion, some word and grammatical errors may occur.    KAYLA Parikh CNP

## 2024-12-19 ENCOUNTER — OFFICE VISIT (OUTPATIENT)
Dept: FAMILY MEDICINE | Facility: CLINIC | Age: 79
End: 2024-12-19
Attending: FAMILY MEDICINE
Payer: COMMERCIAL

## 2024-12-19 VITALS
SYSTOLIC BLOOD PRESSURE: 108 MMHG | OXYGEN SATURATION: 99 % | DIASTOLIC BLOOD PRESSURE: 70 MMHG | BODY MASS INDEX: 24.1 KG/M2 | WEIGHT: 159 LBS | HEART RATE: 71 BPM | HEIGHT: 68 IN | TEMPERATURE: 96.8 F | RESPIRATION RATE: 16 BRPM

## 2024-12-19 DIAGNOSIS — F40.10 SOCIAL ANXIETY DISORDER: ICD-10-CM

## 2024-12-19 DIAGNOSIS — G47.00 INSOMNIA, UNSPECIFIED TYPE: ICD-10-CM

## 2024-12-19 DIAGNOSIS — Z00.00 ENCOUNTER FOR MEDICARE ANNUAL WELLNESS EXAM: Primary | ICD-10-CM

## 2024-12-19 DIAGNOSIS — I48.91 ATRIAL FIBRILLATION, UNSPECIFIED TYPE (H): ICD-10-CM

## 2024-12-19 DIAGNOSIS — I50.32 CHRONIC DIASTOLIC HEART FAILURE (H): ICD-10-CM

## 2024-12-19 DIAGNOSIS — F33.1 MAJOR DEPRESSIVE DISORDER, RECURRENT EPISODE, MODERATE WITH ANXIOUS DISTRESS (H): ICD-10-CM

## 2024-12-19 PROCEDURE — G0439 PPPS, SUBSEQ VISIT: HCPCS | Performed by: FAMILY MEDICINE

## 2024-12-19 RX ORDER — PAROXETINE 20 MG/1
20 TABLET, FILM COATED ORAL DAILY
Qty: 90 TABLET | Refills: 0 | Status: CANCELLED | OUTPATIENT
Start: 2024-12-19

## 2024-12-19 SDOH — HEALTH STABILITY: PHYSICAL HEALTH: ON AVERAGE, HOW MANY DAYS PER WEEK DO YOU ENGAGE IN MODERATE TO STRENUOUS EXERCISE (LIKE A BRISK WALK)?: 0 DAYS

## 2024-12-19 ASSESSMENT — ANXIETY QUESTIONNAIRES
5. BEING SO RESTLESS THAT IT IS HARD TO SIT STILL: NOT AT ALL
7. FEELING AFRAID AS IF SOMETHING AWFUL MIGHT HAPPEN: NOT AT ALL
6. BECOMING EASILY ANNOYED OR IRRITABLE: SEVERAL DAYS
7. FEELING AFRAID AS IF SOMETHING AWFUL MIGHT HAPPEN: NOT AT ALL
1. FEELING NERVOUS, ANXIOUS, OR ON EDGE: SEVERAL DAYS
2. NOT BEING ABLE TO STOP OR CONTROL WORRYING: SEVERAL DAYS
GAD7 TOTAL SCORE: 4
8. IF YOU CHECKED OFF ANY PROBLEMS, HOW DIFFICULT HAVE THESE MADE IT FOR YOU TO DO YOUR WORK, TAKE CARE OF THINGS AT HOME, OR GET ALONG WITH OTHER PEOPLE?: SOMEWHAT DIFFICULT
GAD7 TOTAL SCORE: 4
3. WORRYING TOO MUCH ABOUT DIFFERENT THINGS: SEVERAL DAYS
GAD7 TOTAL SCORE: 4
4. TROUBLE RELAXING: NOT AT ALL
IF YOU CHECKED OFF ANY PROBLEMS ON THIS QUESTIONNAIRE, HOW DIFFICULT HAVE THESE PROBLEMS MADE IT FOR YOU TO DO YOUR WORK, TAKE CARE OF THINGS AT HOME, OR GET ALONG WITH OTHER PEOPLE: SOMEWHAT DIFFICULT

## 2024-12-19 ASSESSMENT — PATIENT HEALTH QUESTIONNAIRE - PHQ9
SUM OF ALL RESPONSES TO PHQ QUESTIONS 1-9: 6
SUM OF ALL RESPONSES TO PHQ QUESTIONS 1-9: 6
10. IF YOU CHECKED OFF ANY PROBLEMS, HOW DIFFICULT HAVE THESE PROBLEMS MADE IT FOR YOU TO DO YOUR WORK, TAKE CARE OF THINGS AT HOME, OR GET ALONG WITH OTHER PEOPLE: SOMEWHAT DIFFICULT

## 2024-12-19 ASSESSMENT — SOCIAL DETERMINANTS OF HEALTH (SDOH): HOW OFTEN DO YOU GET TOGETHER WITH FRIENDS OR RELATIVES?: ONCE A WEEK

## 2024-12-19 ASSESSMENT — PAIN SCALES - GENERAL: PAINLEVEL_OUTOF10: NO PAIN (0)

## 2024-12-19 NOTE — PATIENT INSTRUCTIONS
Insomnia and Behavioral Sleep Medicine Program     The [facility] Insomnia and Behavioral Sleep Medicine Program provides non-drug treatment for sleep problems including:     Cognitive-behavioral Therapies for Insomnia (CBT-I)  Management of Shift-work and Jet Lag  Management of Delayed, Advanced and Irregular Circadian Rhythm Sleep Disorders  Imagery Rehearsal Therapy (IRT) for Nightmare Disorder  PAP Therapy Desensitization     You have been referred for consultation with a sleep psychologist who specializes in behavioral sleep medicine and treatment of insomnia.  The [facility] Insomnia and Behavioral Sleep Medicine Program offers individualized telehealth services through our [facility] and online CBT-I.     Preparing for your Consultation     You will need to keep a Sleep Diary for at least a week prior to your visit. Complete the sleep diary each day first thing after you get up by answering a few key questions about your sleep using our convenient mobile graciela or paper sleep diary.  Your answers should be based on your recall of the past 24 hours.  Avoid watching the clock or recording data during the night.      Insomnia  Graciela     The Insomnia  mobile graciela  is a convenient way to keep track of your sleep prior to your sleep consultation.  Simply download the free graciela on your Apple or Android phone and record your information each morning.  The graciela includes training, self-assessment, and sleep schedule recommendations.  Prior to your consultation we recommend you use only the sleep diary function. You can e-mail yourself a copy of your sleep diary data by going to the Settings section and using the [place] User Data function.  During your consultation your provider will review the data with you.             [facility] Sleep Diary     You can also track your sleep using the [facility] paper sleep diary.  You can upload your sleep diary and send it via a  Boxbee message, fax it to [phone], or have it with you at the time of your consultation.                CBT-I:  Frequently Asked Questions     What is CBT-I?     Cognitive Behavioral Therapy for Insomnia, also known as CBT-I, is a highly effective non-drug treatment for insomnia. The American College of Physicians recommends CBT-I as the first treatment for chronic insomnia.  Research has shown CBT-I to be safer and more effective long term than sleeping pills.     What does CBT-I involve?      CBT-I targets behaviors that lead to chronic insomnia:  Habits that weaken the bed as a cue for sleep  Habits that weaken your body's sleep drive and sleep/wake clock   Unhelpful sleep thoughts that increase sleep-related worry and arousal.     The process involves 3-6 telehealth visits that guide you to implement proven strategies to get a better night's sleep.     People often see improvement in their sleep within a few weeks. Research shows if you keep practicing the skills you learn your sleep is likely to continue to improve 6-12 months after treatment.     Does this program prescribe or manage sleep medication?     No.  Your prescribing provider is responsible to assist you in managing your sleep medications.  Some people choose to stop using sleep medication prior to or during CBT-I.  Our program can work with your prescribing provider to help reduce or eliminate use of sleep medications.      Getting Started Today!     If you haven't already done so, we recommend you consider making the following changes to your sleep habits prior to your sleep consultation:      Reduce your consumption of caffeine and alcohol.  Both can disrupt sleep and make strengthening your sleep more difficult.  Specifically:     - Avoid caffeine within 6 hours of bedtime   - No more than 3 caffeinated beverages per day (e.g. 8 oz. cup coffee or 12 oz. cup soda)            - No alcohol within 3 hours of bedtime     Make sure your bedroom  is quiet, comfortable and dark.  Noise, light and an uncomfortable sleep space can harm your sleep.       Keep the same sleep schedule 7 days a week.unless you do shift work.        Online CBT-I      If you want to get started today, research indicates that online CBT-I can be effective for some individuals. These programs requires comfort with mitul-based or online learning.  However, digital CBT-I programs are not for everyone.  Contraindications include:     Seizure disorders,   Bipolar disorder,   Unstable medical or mental health conditions,   Frailty or risk of falling  Pregnancy     You should consult a sleep specialist before using these resources if you have:     Sleep Apnea  Restless Leg Syndrome  Sleep Walking  REM behavior disorder  Night Terrors  Excessive Daytime Sleepiness  Are engaged in shift work  Use prescription sleep medication     Our Online CBT-I program     If your sleep provider recommends online CBT-I for you , the cost for an entire 6-week program is $40.     To get started, copy and paste the link below which will take you to the landing page to register:                            [email or web address]                    If you wish to complete the online CBT-I program but do not plan to follow-up with a sleep provider, you are set to begin the program.     If you are planning to work with an [facility] sleep provider, there are a couple of extra steps you can take to share your sleep data with your sleep provider.  To share sleep log data, go to the left side navigation and click on the  share sleep log  button:          You will be taken to the page below where you will enter  the provider code [email or web address] into the box.             Once you press the locate button, the information for [facility] will pop up as below.  By pressing the Submit button your data will be sent to our  secure [facility] sleep program portal and is available for review by your provider. You will  only need to do this step once.                                    Self-help Workbooks for Insomnia     If you have found self-help books useful in the past, you may want to consider reading one of the following books prior to your consultation:     Say Yasmani to Insomnia: The Six-Week, Drug-Free Program Developed at [employer].  Nikita Harry MD. Available in paperback, Sheila, and audiobook.     Overcoming Insomnia: A Cognitive-Behavioral Therapy Approach, Workbook.  Josse Dorsey, PhD  and Mariama Stewart, PhD.  Available in paperback and Sheila.     Quiet Your Mind and Get to Sleep: Solutions to Insomnia for Those with Depression, Anxiety, or Chronic Pain.  Callie Trujillo, PhD and Mariama Stewart, PhD.  Available in paperback and Sheila       Patient Education   Preventive Care Advice   This is general advice given by our system to help you stay healthy. However, your care team may have specific advice just for you. Please talk to your care team about your preventive care needs.  Nutrition  Eat 5 or more servings of fruits and vegetables each day.  Try wheat bread, brown rice and whole grain pasta (instead of white bread, rice, and pasta).  Get enough calcium and vitamin D. Check the label on foods and aim for 100% of the RDA (recommended daily allowance).  Lifestyle  Exercise at least 150 minutes each week  (30 minutes a day, 5 days a week).  Do muscle strengthening activities 2 days a week. These help control your weight and prevent disease.  No smoking.  Wear sunscreen to prevent skin cancer.  Have a dental exam and cleaning every 6 months.  Yearly exams  See your health care team every year to talk about:  Any changes in your health.  Any medicines your care team has prescribed.  Preventive care, family planning, and ways to prevent chronic diseases.  Shots (vaccines)   HPV shots (up to age 26), if you've never had them before.  Hepatitis B shots (up to age 59), if you've never had them  before.  COVID-19 shot: Get this shot when it's due.  Flu shot: Get a flu shot every year.  Tetanus shot: Get a tetanus shot every 10 years.  Pneumococcal, hepatitis A, and RSV shots: Ask your care team if you need these based on your risk.  Shingles shot (for age 50 and up)  General health tests  Diabetes screening:  Starting at age 35, Get screened for diabetes at least every 3 years.  If you are younger than age 35, ask your care team if you should be screened for diabetes.  Cholesterol test: At age 39, start having a cholesterol test every 5 years, or more often if advised.  Bone density scan (DEXA): At age 50, ask your care team if you should have this scan for osteoporosis (brittle bones).  Hepatitis C: Get tested at least once in your life.  STIs (sexually transmitted infections)  Before age 24: Ask your care team if you should be screened for STIs.  After age 24: Get screened for STIs if you're at risk. You are at risk for STIs (including HIV) if:  You are sexually active with more than one person.  You don't use condoms every time.  You or a partner was diagnosed with a sexually transmitted infection.  If you are at risk for HIV, ask about PrEP medicine to prevent HIV.  Get tested for HIV at least once in your life, whether you are at risk for HIV or not.  Cancer screening tests  Cervical cancer screening: If you have a cervix, begin getting regular cervical cancer screening tests starting at age 21.  Breast cancer scan (mammogram): If you've ever had breasts, begin having regular mammograms starting at age 40. This is a scan to check for breast cancer.  Colon cancer screening: It is important to start screening for colon cancer at age 45.  Have a colonoscopy test every 10 years (or more often if you're at risk) Or, ask your provider about stool tests like a FIT test every year or Cologuard test every 3 years.  To learn more about your testing options, visit:   .  For help making a decision, visit:    https://[email or web address].  Prostate cancer screening test: If you have a prostate, ask your care team if a prostate cancer screening test (PSA) at age 55 is right for you.  Lung cancer screening: If you are a current or former smoker ages 50 to 80, ask your care team if ongoing lung cancer screenings are right for you.  For informational purposes only. Not to replace the advice of your health care provider. Copyright   2023 [facility] [employer]. All rights reserved. Clinically reviewed by the [facility] and [facility] Transitions Program. [employer] [id number] - REV 01/24.  Learning About Depression Screening  What is depression screening?  Depression screening is a way to see if you have depression symptoms. It may be done by a doctor or counselor. It's often part of a routine checkup. That's because your mental health is just as important as your physical health.  Depression is a mental health condition that affects how you feel, think, and act. You may:  Have less energy.  Lose interest in your daily activities.  Feel sad and grouchy for a long time.  Depression is very common. It affects people of all ages.  Many things can lead to depression. Some people become depressed after they have a stroke or find out they have a major illness like cancer or heart disease. The death of a loved one or a breakup may lead to depression. It can run in families. Most experts believe that a combination of inherited genes and stressful life events can cause it.  What happens during screening?  You may be asked to fill out a form about your depression symptoms. You and the doctor will discuss your answers. The doctor may ask you more questions to learn more about how you think, act, and feel.  What happens after screening?  If you have symptoms of depression, your doctor will talk to you about your options.  Doctors usually treat depression with medicines or counseling. Often, combining the two works best. Many people don't get help  "because they think that they'll get over the depression on their own. But people with depression may not get better unless they get treatment.  The cause of depression is not well understood. There may be many factors involved. But if you have depression, it's not your fault.  A serious symptom of depression is thinking about death or suicide. If you or someone you care about talks about this or about feeling hopeless, get help right away.  It's important to know that depression can be treated. Medicine, counseling, and self-care may help.  Where can you learn more?  Go to https://www.[employer].net/patiented  Enter T185 in the search box to learn more about \"Learning About Depression Screening.\"  Current as of: July 31, 2024  Content Version: 14.3    2024 [employer].   Care instructions adapted under license by your healthcare professional. If you have questions about a medical condition or this instruction, always ask your healthcare professional. [employer] disclaims any warranty or liability for your use of this information.       "

## 2024-12-19 NOTE — NURSING NOTE
"Initial There were no vitals taken for this visit. Estimated body mass index is 24.15 kg/m  as calculated from the following:    Height as of 12/18/24: 1.727 m (5' 8\").    Weight as of 12/18/24: 72 kg (158 lb 12.8 oz). .    "

## 2024-12-19 NOTE — NURSING NOTE
"Initial /70   Pulse 71   Temp 96.8  F (36  C) (Tympanic)   Resp 16   Ht 1.727 m (5' 8\")   Wt 72.1 kg (159 lb)   SpO2 99%   BMI 24.18 kg/m   Estimated body mass index is 24.18 kg/m  as calculated from the following:    Height as of this encounter: 1.727 m (5' 8\").    Weight as of this encounter: 72.1 kg (159 lb). .    "

## 2024-12-19 NOTE — PROGRESS NOTES
Preventive Care Visit  [facility]  Lizzy Leiva MD, Family Medicine  Dec 19, 2024      Assessment & Plan     Encounter for Medicare annual wellness exam    Major depressive disorder, recurrent episode, moderate with anxious distress (H)  Stable. Follows with psychiatry. We recently discontinued gabapentin, Ambien and hydroxyzine.  Has noticed significant improvement in both mood, balance and sleep since doing so.      Social anxiety disorder  See above.     Insomnia, unspecified type  Improved since discontinuation of Ambien, gabapentin and hydroxyzine.   Currently only using over the counter melatonin with good results.     Atrial fibrillation, unspecified type (H)  Stable follows with cardiology.    Chronic diastolic heart failure (H)  Stable follows with cardiology.    Patient has been advised of split billing requirements and indicates understanding: Yes        Counseling  Appropriate preventive services were addressed with this patient via screening, questionnaire, or discussion as appropriate for fall prevention, nutrition, physical activity, Tobacco-use cessation, social engagement, weight loss and cognition.  Checklist reviewing preventive services available has been given to the patient.  Reviewed patient's diet, addressing concerns and/or questions.   Discussed possible causes of fatigue. The patient was provided with written information regarding signs of hearing loss.   The patient's PHQ-9 score is consistent with mild depression. He was provided with information regarding depression.           Greg Alonso is a 79 year old, presenting for the following:  Wellness Visit        12/19/2024    10:29 AM   Additional Questions   Roomed by Krystle ALAS CMA           [facility]          Health Care Directive  Patient has a Health Care Directive on file        12/19/2024   General Health   How would you rate your overall physical health? (!) FAIR   Feel stress (tense, anxious, or  unable to sleep) Only a little         2024   Nutrition   Diet: Low salt         2024   Exercise   Days per week of moderate/strenous exercise 0 days         2024   Social Factors   Frequency of gathering with friends or relatives Once a week         2023   Activities of Daily Living- Home Safety   Needs help with the following daily activites NO assistance is needed   Safety concerns in the home None of the above         2024   Dental   Dentist two times every year? Yes         2023   Hearing Screening   Hearing concerns? Difficult to understand a speaker at a public meeting or Church service    Need to ask people to speak up or repeat themselves    Difficulty understanding soft or whispered speech       Multiple values from one day are sorted in reverse-chronological order            No data to display                   Today's PHQ-9 Score:       2024    10:22 AM   PHQ-9 SCORE   PHQ-9 Total Score MyChart 6 (Mild depression)   PHQ-9 Total Score 6        Patient-reported         2024   Substance Use   Alcohol more than 3/day or more than 7/wk Not Applicable     Social History     Tobacco Use    Smoking status: Former     Current packs/day: 0.00     Average packs/day: 1 pack/day for 17.8 years (17.8 ttl pk-yrs)     Types: Cigarettes     Start date: 1958     Quit date: 10/13/1975     Years since quittin.2    Smokeless tobacco: Never   Vaping Use    Vaping status: Never Used   Substance Use Topics    Alcohol use: Not Currently    Drug use: No                 Reviewed and updated as needed this visit by Provider   Tobacco  Allergies  Meds  Problems  Med Hx  Surg Hx  Fam Hx            Past Medical History:   Diagnosis Date    Acute on chronic diastolic (congestive) heart failure (H) 2023    Arthritis 2005    Benign neoplasm of prostate 2000    Benign Prostate Nodule    Depressive disorder     past condition    Infection due to 2019 novel coronavirus  09/27/2021    S/P knee replacement 11/06/2012    S/P left knee arthroscopy 08/15/2011     Past Surgical History:   Procedure Laterality Date    ABDOMEN SURGERY  2003    ARTHROPLASTY KNEE Right 10/12/2018    Procedure: ARTHROPLASTY KNEE;  Right Total Knee Arthroplasty;  Surgeon: Jeb Peralta MD;  Location: [facility]    ARTHROPLASTY REVISION HIP Left 5/25/2023    Procedure: Revision total hip arthroplasty, left;  Surgeon: Chandler Leiva MD;  Location: [facility]    BIOPSY  2007    COLONOSCOPY N/A 12/10/2015    Procedure: COMBINED COLONOSCOPY, SINGLE OR MULTIPLE BIOPSY/POLYPECTOMY BY BIOPSY;  Surgeon: Jyoti Figueroa MD;  Location: [facility]    JOINT REPLACEMENT, HIP RT/LT  10/2007    Joint Replacement Hip LT    JOINT REPLACEMTN, KNEE RT/LT  08/2011    Joint Replacement knee /LT, [facility]    LAPAROSCOPIC HERNIORRHAPHY INGUINAL BILATERAL Bilateral 04/24/2018    Procedure: LAPAROSCOPIC HERNIORRHAPHY INGUINAL BILATERAL;  Laparoscopic bilateral inguinal hernia repair;  Surgeon: Josemanuel Resendiz MD;  Location: [facility]    PHACOEMULSIFICATION WITH STANDARD INTRAOCULAR LENS IMPLANT Right 01/06/2021    Procedure: Cataract Removal with Implant;  Surgeon: Regan Kaufman MD;  Location: [facility]    PHACOEMULSIFICATION WITH STANDARD INTRAOCULAR LENS IMPLANT Left 02/17/2021    Procedure: Cataract Removal with Implant;  Surgeon: Regan Kaufman MD;  Location: [facility]    SURGICAL HISTORY OF -   12/01/1999    Umbilical Herniorrhaphy with mesh    SURGICAL HISTORY OF -  Left 11/2017    Thoracotomy and drainage of empyema     Labs reviewed in EPIC  Patient Active Problem List   Diagnosis    Benign essential hypertension    Hypertrophy of prostate with urinary obstruction    Osteoarthrosis, unspecified whether generalized or localized, pelvic region and thigh    Hyperlipidemia LDL goal <100    Anxiety    GERD (gastroesophageal reflux disease)    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)    Hyperplastic Polyp    Right knee DJD     Peripheral neuropathy    Benzodiazepine dependence (H)    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    Unilateral inguinal hernia without obstruction or gangrene    Atrial fibrillation, unspecified type (H)    Depression with anxiety    Status post lung surgery    Memory difficulties    Tremor    Chronic diastolic heart failure (H)    Valvular heart disease    Social phobia    S/P revision of total hip    Anemia, unspecified type    Moderate major depression (H)    Chronic anticoagulation    Chronic heart failure with preserved ejection fraction (H)    Insomnia, unspecified type    Longstanding persistent atrial fibrillation (H)    Cardiomyopathy, unspecified type (H)    Sensorineural hearing loss (SNHL) of both ears    Generalized weakness    Falls frequently    Anticoagulated    Traumatic hematoma of buttock, initial encounter    Closed fracture of multiple ribs of right side, initial encounter    Persistent disorder of initiating or maintaining sleep    Right bundle branch block    Cervicalgia    Long term (current) use of anticoagulants    Maxillary sinus mass    Constipation, unspecified constipation type     Past Surgical History:   Procedure Laterality Date    ABDOMEN SURGERY  2003    ARTHROPLASTY KNEE Right 10/12/2018    Procedure: ARTHROPLASTY KNEE;  Right Total Knee Arthroplasty;  Surgeon: Jeb Peralta MD;  Location: [facility]    ARTHROPLASTY REVISION HIP Left 5/25/2023    Procedure: Revision total hip arthroplasty, left;  Surgeon: Chandler Leiva MD;  Location: [facility]    BIOPSY  2007    COLONOSCOPY N/A 12/10/2015    Procedure: COMBINED COLONOSCOPY, SINGLE OR MULTIPLE BIOPSY/POLYPECTOMY BY BIOPSY;  Surgeon: Jyoti Figueroa MD;  Location: [facility]    JOINT REPLACEMENT, HIP RT/LT  10/2007    Joint Replacement Hip LT    JOINT REPLACEMTN, KNEE RT/LT  08/2011    Joint Replacement knee /LT, [facility]    LAPAROSCOPIC HERNIORRHAPHY INGUINAL BILATERAL Bilateral 04/24/2018    Procedure: LAPAROSCOPIC  HERNIORRHAPHY INGUINAL BILATERAL;  Laparoscopic bilateral inguinal hernia repair;  Surgeon: Josemanuel Resendiz MD;  Location: [facility]    PHACOEMULSIFICATION WITH STANDARD INTRAOCULAR LENS IMPLANT Right 2021    Procedure: Cataract Removal with Implant;  Surgeon: Regan Kaufman MD;  Location: [facility]    PHACOEMULSIFICATION WITH STANDARD INTRAOCULAR LENS IMPLANT Left 2021    Procedure: Cataract Removal with Implant;  Surgeon: Regan Kaufman MD;  Location: [facility]    SURGICAL HISTORY OF -   1999    Umbilical Herniorrhaphy with mesh    SURGICAL HISTORY OF -  Left 2017    Thoracotomy and drainage of empyema       Social History     Tobacco Use    Smoking status: Former     Current packs/day: 0.00     Average packs/day: 1 pack/day for 17.8 years (17.8 ttl pk-yrs)     Types: Cigarettes     Start date: 1958     Quit date: 10/13/1975     Years since quittin.2    Smokeless tobacco: Never   Substance Use Topics    Alcohol use: Not Currently     Family History   Problem Relation Age of Onset    Depression Mother     Cerebrovascular Disease Mother     Breast Cancer Mother     Neurologic Disorder Mother         parkinsons    Parkinsonism Mother     Respiratory Father         emphyzema    Diabetes Brother          Current Outpatient Medications   Medication Sig Dispense Refill    [START ON 2025] buPROPion (WELLBUTRIN XL) 300 MG 24 hr tablet Take 1 tablet (300 mg) by mouth every morning. 90 tablet 1    busPIRone HCl (BUSPAR) 30 MG tablet TAKE 1 TABLET TWICE A  tablet 3    finasteride (PROSCAR) 5 MG tablet Take 1 tablet (5 mg) by mouth daily 90 tablet 0    furosemide (LASIX) 20 MG tablet Take 2 tablets (40 mg) by mouth daily.      [START ON 2024] lamoTRIgine (LAMICTAL) 25 MG tablet Take 2 tablets (50 mg) by mouth 2 times daily. 360 tablet 1    losartan (COZAAR) 50 MG tablet Take 50 mg by mouth daily.      metoprolol succinate ER (TOPROL XL) 200 MG 24 hr tablet Take 1 tablet  (200 mg) by mouth every evening 30 tablet 11    multivitamin (ONE-DAILY) tablet Take 1 tablet by mouth daily 30 tablet 0    PARoxetine (PAXIL) 30 MG tablet Take 1 tablet (30 mg) by mouth every morning. in addition to 20 mg in the afternoon for ttl: 50 mg 90 tablet 0    rivaroxaban ANTICOAGULANT (XARELTO ANTICOAGULANT) 20 MG TABS tablet Take 1 tablet (20 mg) by mouth daily (with dinner). 90 tablet 3     Allergies   Allergen Reactions    Bactrim [Sulfamethoxazole-Trimethoprim] Nausea and Vomiting     Current providers sharing in care for this patient include:  Patient Care Team:  Lizzy Leiva MD as PCP - General (Family Medicine)  Rylee Kyle APRN CNP as Nurse Practitioner (Nurse Practitioner)  Lizzy Leiva MD as Assigned PCP  Adriel Hawkins MD as MD (Neurology)  Marian Correa RPH as Pharmacist (Pharmacist)  Marian Correa RPH as Assigned [place] Pharmacist  Kalen Casillas MD as MD (Hematology & Oncology)  Angeles Sanders APRN CNP as Assigned Behavioral Health Provider  Nazanin Caballero APRN CNP as Assigned Heart and Vascular Provider  Adriel Hawkins MD as Assigned Neuroscience Provider    The following health maintenance items are reviewed in Epic and correct as of today:  Health Maintenance   Topic Date Due    HF ACTION PLAN  Never done    HEPATITIS A IMMUNIZATION (1 of 2 - Risk 2-dose series) Never done    ZOSTER IMMUNIZATION (1 of 2) Never done    RSV VACCINE (1 - 1-dose 75+ series) Never done    INFLUENZA VACCINE (1) 09/01/2024    COVID-19 Vaccine (1 - 2024-25 season) Never done    BMP  06/17/2025    PHQ-9  06/19/2025    LIPID  10/24/2025    ALT  10/28/2025    CBC  10/29/2025    ANNUAL REVIEW OF HM ORDERS  11/05/2025    MEDICARE ANNUAL WELLNESS VISIT  12/19/2025    FALL RISK ASSESSMENT  12/19/2025    ADVANCE CARE PLANNING  12/13/2028    DTAP/TDAP/TD IMMUNIZATION (3 - Td or Tdap) 07/26/2032    TSH W/FREE T4 REFLEX  Completed    DEPRESSION ACTION PLAN  Completed  "   Pneumococcal Vaccine: 50+ Years  Completed    HPV IMMUNIZATION  Aged Out    MENINGITIS IMMUNIZATION  Aged Out    RSV MONOCLONAL ANTIBODY  Aged Out    COLORECTAL CANCER SCREENING  Discontinued         Review of Systems  Constitutional, neuro, ENT, endocrine, pulmonary, cardiac, gastrointestinal, genitourinary, musculoskeletal, integument and psychiatric systems are negative, except as otherwise noted.     Objective    Exam  /70   Pulse 71   Temp 96.8  F (36  C) (Tympanic)   Resp 16   Ht 1.727 m (5' 8\")   Wt 72.1 kg (159 lb)   SpO2 99%   BMI 24.18 kg/m     Estimated body mass index is 24.18 kg/m  as calculated from the following:    Height as of this encounter: 1.727 m (5' 8\").    Weight as of this encounter: 72.1 kg (159 lb).    Physical Exam  GENERAL: alert and no distress  EYES: Eyes grossly normal to inspection, PERRL and conjunctivae and sclerae normal  HENT: normal cephalic/atraumatic and ear canals and TM's normal  NECK: no adenopathy, no asymmetry, masses, or scars  RESP: lungs clear to auscultation - no rales, rhonchi or wheezes  CV: regular rate and rhythm, normal S1 S2, no S3 or S4, no murmur, click or rub, no peripheral edema  ABDOMEN: soft, nontender, no hepatosplenomegaly, no masses and bowel sounds normal  MS: no gross musculoskeletal defects noted, no edema  SKIN: no suspicious lesions or rashes  NEURO: Normal strength and tone, mentation intact and speech normal  PSYCH: mentation appears normal, affect normal/bright         12/19/2024   Mini Cog   Clock Draw Score 2 Normal   3 Item Recall 1 object recalled   Mini Cog Total Score 3              Signed Electronically by: Lizzy Leiva MD    Answers submitted by the patient for this visit:  Patient Health Questionnaire (Submitted on 12/19/2024)  If you checked off any problems, how difficult have these problems made it for you to do your work, take care of things at home, or get along with other people?: Somewhat difficult  PHQ9 " TOTAL SCORE: 6  Patient Health Questionnaire (G7) (Submitted on 12/19/2024)  CAROLYNN 7 TOTAL SCORE: 4  Answers submitted by the patient for this visit:  Patient Health Questionnaire (Submitted on 12/19/2024)  If you checked off any problems, how difficult have these problems made it for you to do your work, take care of things at home, or get along with other people?: Somewhat difficult  PHQ9 TOTAL SCORE: 6  Patient Health Questionnaire (G7) (Submitted on 12/19/2024)  CAROLYNN 7 TOTAL SCORE: 4  Answers submitted by the patient for this visit:  Patient Health Questionnaire (Submitted on 12/19/2024)  If you checked off any problems, how difficult have these problems made it for you to do your work, take care of things at home, or get along with other people?: Somewhat difficult  PHQ9 TOTAL SCORE: 6  Patient Health Questionnaire (G7) (Submitted on 12/19/2024)  CAROLYNN 7 TOTAL SCORE: 4

## 2024-12-30 ENCOUNTER — TELEPHONE (OUTPATIENT)
Dept: CARDIOLOGY | Facility: CLINIC | Age: 79
End: 2024-12-30
Payer: COMMERCIAL

## 2024-12-30 NOTE — TELEPHONE ENCOUNTER
"Pt calling with concerns with high heart rate, increased fatigue and shortness of breath \"that have been pretty constant for over a week\".     Pt has upcoming dental surgery scheduled for 1/10/2025. Pt is concerned that he wont be able to have that due to current symptoms.     After further discussion with pt, he reports similar symptoms in the past that lead him to the ER and also related to his a-fib.     Pt has not missed any medication doses.     Pt advised to be evaluated in the ER for his symptoms. Denies chest pain at this time.     Pt stated his understanding and is agreeable to that plan. Pt reports having a  to get him to the ER.     Jaimie Allan RN    "

## 2024-12-31 DIAGNOSIS — I48.20 CHRONIC ATRIAL FIBRILLATION (H): ICD-10-CM

## 2024-12-31 DIAGNOSIS — I50.32 CHRONIC DIASTOLIC HEART FAILURE (H): ICD-10-CM

## 2024-12-31 RX ORDER — FUROSEMIDE 20 MG/1
40 TABLET ORAL DAILY
Qty: 60 TABLET | Refills: 4 | Status: SHIPPED | OUTPATIENT
Start: 2024-12-31

## 2024-12-31 NOTE — PATIENT INSTRUCTIONS
Medication Changes:  None     Recommendations:  Call  if heart rates are staying above 80s  Check blood pressure at least 1 hour after medications. Call the clinic if your blood pressure is consistently greater than 130/80.    Check daily weights and call the clinic if your weight has increased more than 2 lbs in one day or 5 lbs in one week; if you feel more short of breath or have worsening swelling in your legs or abdomen.     Follow-up:  24 hr holter monitor  See Dr. Chapin for cardiology follow up at [facility]: March 2023.   Call 6 months prior, to schedule.     Cardiology Scheduling~[phone]  Cardiology Clinic RN~[phone] (Rosita RN, Alda RN, Brittany RN)       limits       Background  Allergies:   Allergies   Allergen Reactions    Latex Hives     Other reaction(s): Unknown (comments)    Aspirin Hives     Other reaction(s): Unknown (comments)    Losartan      Other reaction(s): Unknown (comments)    Penicillins Hives     Other reaction(s): Unknown (comments)     History:   Past Medical History:   Diagnosis Date    Arthritis 7/7/2020    Asthma 7/7/2020    Hypertension 7/7/2020    Kidney stones     Liver cyst        Assessment  Vitals:    Level of Consciousness: Alert (0)   Vitals:    12/31/24 0845 12/31/24 0930 12/31/24 0941 12/31/24 1000   BP: (!) 141/79 (!) 151/77  (!) 147/81   Pulse: 95 94  95   Resp: 27 (!) 31  (!) 35   Temp:  98.2 °F (36.8 °C)     TempSrc:       SpO2: 98% 100% 98% 98%   Weight:       Height:         Deterioration Index (DI): Deterioration Index: 43.12  Deterioration Index (DI) Interventions Performed:    O2 Flow Rate:    O2 Device: O2 Device: None (Room air)  Cardiac Rhythm:    Critical Lab Results: [unfilled]  Cultures: Cultures:Blood  NIH Score: NIH     Active LDA's:   Peripheral IV 12/31/24 Left Forearm (Active)     Active Central Lines:                          Active Wounds:    Active Lomas's:    Active Feeding Tubes:      Administered Medications:   Medications   sodium chloride flush 0.9 % injection 5-40 mL (has no administration in time range)   sodium chloride flush 0.9 % injection 5-40 mL (has no administration in time range)   0.9 % sodium chloride infusion (has no administration in time range)   potassium chloride (KLOR-CON M) extended release tablet 40 mEq (has no administration in time range)     Or   potassium bicarb-citric acid (EFFER-K) effervescent tablet 40 mEq (has no administration in time range)     Or   potassium chloride 10 mEq/100 mL IVPB (Peripheral Line) (has no administration in time range)   magnesium sulfate 2000 mg in 50 mL IVPB premix (has no administration in time range)   enoxaparin Sodium (LOVENOX) injection 30 mg (30 mg

## 2025-01-06 ENCOUNTER — OFFICE VISIT (OUTPATIENT)
Dept: FAMILY MEDICINE | Facility: CLINIC | Age: 80
End: 2025-01-06
Payer: COMMERCIAL

## 2025-01-06 ENCOUNTER — TELEPHONE (OUTPATIENT)
Dept: FAMILY MEDICINE | Facility: CLINIC | Age: 80
End: 2025-01-06

## 2025-01-06 VITALS
TEMPERATURE: 97.6 F | WEIGHT: 155.8 LBS | HEART RATE: 88 BPM | SYSTOLIC BLOOD PRESSURE: 119 MMHG | OXYGEN SATURATION: 100 % | DIASTOLIC BLOOD PRESSURE: 83 MMHG | BODY MASS INDEX: 23.61 KG/M2 | HEIGHT: 68 IN | RESPIRATION RATE: 16 BRPM

## 2025-01-06 DIAGNOSIS — Z01.818 PREOP GENERAL PHYSICAL EXAM: Primary | ICD-10-CM

## 2025-01-06 DIAGNOSIS — I48.20 CHRONIC ATRIAL FIBRILLATION (H): ICD-10-CM

## 2025-01-06 DIAGNOSIS — I50.33 ACUTE ON CHRONIC HEART FAILURE WITH PRESERVED EJECTION FRACTION (H): ICD-10-CM

## 2025-01-06 DIAGNOSIS — I48.91 ATRIAL FIBRILLATION, UNSPECIFIED TYPE (H): ICD-10-CM

## 2025-01-06 DIAGNOSIS — I50.32 CHRONIC DIASTOLIC HEART FAILURE (H): ICD-10-CM

## 2025-01-06 DIAGNOSIS — R74.8 ABNORMAL LEVELS OF OTHER SERUM ENZYMES: ICD-10-CM

## 2025-01-06 DIAGNOSIS — G25.0 BENIGN ESSENTIAL TREMOR: ICD-10-CM

## 2025-01-06 LAB
BASOPHILS # BLD AUTO: 0 10E3/UL (ref 0–0.2)
BASOPHILS NFR BLD AUTO: 1 %
EOSINOPHIL # BLD AUTO: 0.2 10E3/UL (ref 0–0.7)
EOSINOPHIL NFR BLD AUTO: 3 %
ERYTHROCYTE [DISTWIDTH] IN BLOOD BY AUTOMATED COUNT: 14 % (ref 10–15)
HCT VFR BLD AUTO: 33.5 % (ref 40–53)
HGB BLD-MCNC: 11.4 G/DL (ref 13.3–17.7)
IMM GRANULOCYTES # BLD: 0 10E3/UL
IMM GRANULOCYTES NFR BLD: 0 %
LYMPHOCYTES # BLD AUTO: 1.2 10E3/UL (ref 0.8–5.3)
LYMPHOCYTES NFR BLD AUTO: 18 %
MCH RBC QN AUTO: 34.7 PG (ref 26.5–33)
MCHC RBC AUTO-ENTMCNC: 34 G/DL (ref 31.5–36.5)
MCV RBC AUTO: 102 FL (ref 78–100)
MONOCYTES # BLD AUTO: 0.6 10E3/UL (ref 0–1.3)
MONOCYTES NFR BLD AUTO: 10 %
NEUTROPHILS # BLD AUTO: 4.5 10E3/UL (ref 1.6–8.3)
NEUTROPHILS NFR BLD AUTO: 69 %
PLATELET # BLD AUTO: 163 10E3/UL (ref 150–450)
RBC # BLD AUTO: 3.29 10E6/UL (ref 4.4–5.9)
WBC # BLD AUTO: 6.5 10E3/UL (ref 4–11)

## 2025-01-06 PROCEDURE — 85025 COMPLETE CBC W/AUTO DIFF WBC: CPT | Performed by: FAMILY MEDICINE

## 2025-01-06 PROCEDURE — 36415 COLL VENOUS BLD VENIPUNCTURE: CPT | Performed by: FAMILY MEDICINE

## 2025-01-06 PROCEDURE — 99214 OFFICE O/P EST MOD 30 MIN: CPT | Performed by: FAMILY MEDICINE

## 2025-01-06 NOTE — PATIENT INSTRUCTIONS
How to Take Your Medication Before Surgery  Preoperative Medication Instructions   Antiplatelet or Anticoagulation Medication Instructions   - apixaban (Eliquis), edoxaban (Savaysa), rivaroxaban (Xarelto): Bleeding risk is moderate or high for this procedure AND CrCl  (>=) 50 mL/min. DO NOT TAKE 2 days before surgery.     Additional Medication Instructions  Take all scheduled medications on the day of surgery       Patient Education   Preparing for Your Surgery  For Adults  Getting started  In most cases, a nurse will call to review your health history and instructions. They will give you an arrival time based on your scheduled surgery time. Please be ready to share:  Your doctor's clinic name and phone number  Your medical, surgical, and anesthesia history  A list of allergies and sensitivities  A list of medicines, including herbal treatments and over-the-counter drugs  Whether the patient has a legal guardian (ask how to send us the papers in advance)  Note: You may not receive a call if you were seen at our PAC (Preoperative Assessment Center).  Please tell us if you're pregnant--or if there's any chance you might be pregnant. Some surgeries may injure a fetus (unborn baby), so they require a pregnancy test. Surgeries that are safe for a fetus don't always need a test, and you can choose whether to have one.   Preparing for surgery  Within 10 to 30 days of surgery: Have a pre-op exam (sometimes called an H&P, or History and Physical). This can be done at a clinic or pre-operative center.  If you're having a , you may not need this exam. Talk to your care team.  At your pre-op exam, talk to your care team about all medicines you take. (This includes CBD oil and any drugs, such as THC, marijuana, and other forms of cannabis.) If you need to stop any medicine before surgery, ask when to start taking it again.  This is for your safety. Many medicines and drugs can make you bleed too much during surgery.  Some change how well surgery (anesthesia) drugs work.  Call your insurance company to let them know you're having surgery. (If you don't have insurance, call [phone].)  Call your clinic if there's any change in your health. This includes a scrape or scratch near the surgery site, or any signs of a cold (sore throat, runny nose, cough, rash, fever).  Eating and drinking guidelines  For your safety: Unless your surgeon tells you otherwise, follow the guidelines below.  Eat and drink as normal until 8 hours before you arrive for surgery. After that, no food or milk. You can spit out gum when you arrive.  Drink clear liquids until 2 hours before you arrive. These are liquids you can see through, like water, Gatorade, and Propel Water. They also include plain black coffee and tea (no cream or milk).  No alcohol for 24 hours before you arrive. The night before surgery, stop any drinks that contain THC.  If your care team tells you to take medicine on the morning of surgery, it's okay to take it with a sip of water. No other medicines or drugs are allowed (including CBD oil)--follow your care team's instructions.  If you have questions the day of surgery, call your hospital or surgery center.   Preventing infection  Shower or bathe the night before and the morning of surgery. Follow the instructions your clinic gave you. (If no instructions, use regular soap.)  Don't shave or clip hair near your surgery site. We'll remove the hair if needed.  Don't smoke or vape the morning of surgery. No chewing tobacco for 6 hours before you arrive. A nicotine patch is okay. You may spit out nicotine gum when you arrive.  For some surgeries, the surgeon will tell you to fully quit smoking and nicotine.  We will make every effort to keep you safe from infection. We will:  Clean our hands often with soap and water (or an alcohol-based hand rub).  Clean the skin at your surgery site with a special soap that kills germs.  Give you a  special gown to keep you warm. (Cold raises the risk of infection.)  Wear hair covers, masks, gowns, and gloves during surgery.  Give antibiotic medicine, if prescribed. Not all surgeries need this medicine.  What to bring on the day of surgery  Photo ID and insurance card  Copy of your health care directive, if you have one  Glasses and hearing aids (bring cases)  You can't wear contacts during surgery  Inhaler and eye drops, if you use them (tell us about these when you arrive)  CPAP machine or breathing device, if you use them  A few personal items, if spending the night  If you have . . .  A pacemaker, ICD (cardiac defibrillator), or other implant: Bring the ID card.  An implanted stimulator: Bring the remote control.  A legal guardian: Bring a copy of the certified (court-stamped) guardianship papers.  Please remove any jewelry, including body piercings. Leave jewelry and other valuables at home.  If you're going home the day of surgery  You must have a responsible adult drive you home. They should stay with you overnight as well.  If you don't have someone to stay with you, and you aren't safe to go home alone, we may keep you overnight. Insurance often won't pay for this.  After surgery  If it's hard to control your pain or you need more pain medicine, please call your surgeon's office.  Questions?   If you have any questions for your care team, list them here:   ____________________________________________________________________________________________________________________________________________________________________________________________________________________________________________________________  For informational purposes only. Not to replace the advice of your health care provider. Copyright   2003, 2019 [facility]. All rights reserved. Clinically reviewed by Anurag Piedra MD. [employer]ART[employer] [id number] - REV 08/24.

## 2025-01-06 NOTE — TELEPHONE ENCOUNTER
Pt has no hx of CVA, TIA, DVT, PE or other clotting disorders. On Xarelto for dx of AFib. Per guidelines, may hold Xarelto for 48 hours prior to procedure and to restart after procedure per provider. Letter drafted. Rosita Gibson RN Cardiology January 6, 2025, 10:57 AM

## 2025-01-06 NOTE — TELEPHONE ENCOUNTER
"Dental office calling. Pt said he \"left a message for us on the recorder, but hasn't heard back yet..\"     \"Pt has an appt [facility] 10 th for 5 tooth extractions and pt is on Xaralto\"     \"Should he go off his blood thinners prior to the extractions?? If so? When and for how long?...\"     CallerConstanza, is working at a partner dental office today, [facility] and can be reached at [phone].    Kaelyn Alvares RN            "

## 2025-01-06 NOTE — PROGRESS NOTES
Preoperative Evaluation  [facility]  [address]  [facility] [address]  Phone: [phone]  Primary Provider: Lizzy Leiva MD  Pre-op Performing Provider: JO ANN ZHAO MD  Jan 6, 2025 1/6/2025   Surgical Information   What procedure is being done? Dental surgery     Dental surgery   Facility or Hospital where procedure/surgery will be performed: [facility]   Who is doing the procedure / surgery? Albin DentL     Albin DentL   Date of surgery / procedure: 1/10/25     1/10/25   Time of surgery / procedure: 8 am     8 am   Where do you plan to recover after surgery? at home with family     at home with family       Proxy-reported    Multiple values from one day are sorted in reverse-chronological order     Fax number for surgical facility: [phone]    Assessment & Plan     The proposed surgical procedure is considered LOW risk.    Preop general physical exam       Atrial fibrillation, unspecified type (H)    - CBC with Platelets & Differential ([facility] Only); Future  - CBC with Platelets & Differential ([facility] Only)    Chronic diastolic heart failure (H)     - Basic metabolic panel; Future    Acute on chronic heart failure with preserved ejection fraction (H)       Benign essential tremor       Abnormal levels of other serum enzymes       Chronic atrial fibrillation (H)     - Basic metabolic panel; Future       - No identified additional risk factors other than previously addressed    Preoperative Medication Instructions  Antiplatelet or Anticoagulation Medication Instructions   - apixaban (Eliquis), edoxaban (Savaysa), rivaroxaban (Xarelto): Bleeding risk is moderate or high for this procedure AND CrCl  (>=) 50 mL/min. DO NOT TAKE 2 days before surgery.     Additional Medication Instructions  Take all scheduled medications on the day of surgery    Recommendation  Approval given to proceed with proposed procedure, without  further diagnostic evaluation.    Greg Alonso is a 79 year old, presenting for the following:  Pre-Op Exam          1/6/2025     1:52 PM   Additional Questions   Roomed by Jennifer MOYER   Accompanied by self     HPI related to upcoming procedure: overall feeling well        1/6/2025   Pre-Op Questionnaire   Have you ever had a heart attack or stroke? No    Have you ever had surgery on your heart or blood vessels, such as a stent placement, a coronary artery bypass, or surgery on an artery in your head, neck, heart, or legs? No    Do you have chest pain with activity? No    Do you have a history of heart failure? No    Do you currently have a cold, bronchitis or symptoms of other infection? No    Do you have a cough, shortness of breath, or wheezing? No    Do you or anyone in your family have previous history of blood clots? No    Do you or does anyone in your family have a serious bleeding problem such as prolonged bleeding following surgeries or cuts? No    Have you ever had problems with anemia or been told to take iron pills? No    Have you had any abnormal blood loss such as black, tarry or bloody stools? No    Have you ever had a blood transfusion? (!) YES    Have you ever had a transfusion reaction? No    Are you willing to have a blood transfusion if it is medically needed before, during, or after your surgery? Yes    Have you or any of your relatives ever had problems with anesthesia? No    Do you have sleep apnea, excessive snoring or daytime drowsiness? (!) YES    Do you have a CPAP machine? (!) NO     Do you have any artifical heart valves or other implanted medical devices like a pacemaker, defibrillator, or continuous glucose monitor? No    Do you have artificial joints? (!) YES    Are you allergic to latex? No        Proxy-reported     Health Care Directive  Patient has a Health Care Directive on file                Patient Active Problem List    Diagnosis Date Noted    Persistent disorder of initiating  transportation at discharge: Family    Financial    Payor: BCBS / Plan: [address] [employer] [facility] HMO / Product Type: *No Product type* /     Does insurance require precert for SNF: Yes    Potential assistance Purchasing Medications:    Meds-to-Beds request:        [facility] #[id number] Tia Rosas, [address]  [address]  [facility] [address]  Phone: [phone] Fax: [phone]      Notes:    Factors facilitating achievement of predicted outcomes:     Barriers to discharge: Additional Case Management Notes: d/c home independent    The Plan for Transition of Care is related to the following treatment goals of Calculus of gallbladder with acute cholecystitis and obstruction [K80.01]  Cholecystitis [K81.9]  Elevated lipase [B04.2]    IF APPLICABLE: The Patient and/or patient representative Alejandro Palma and her family were provided with a choice of provider and agrees with the discharge plan. Freedom of choice list with basic dialogue that supports the patient's individualized plan of care/goals and shares the quality data associated with the providers was provided to: Patient   Patient Representative Name:       The Patient and/or Patient Representative Agree with the Discharge Plan?  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     Priority: Medium    Constipation, unspecified constipation type 10/29/2024     Priority: Medium    Generalized weakness 10/28/2024     Priority: Medium    Falls frequently 10/28/2024     Priority: Medium    Anticoagulated 10/28/2024     Priority: Medium    Traumatic hematoma of buttock, initial encounter 10/28/2024     Priority: Medium    Closed fracture of multiple ribs of right side, initial encounter 10/28/2024     Priority: Medium    Sensorineural hearing loss (SNHL) of both ears 07/29/2024     Priority: Medium    Cardiomyopathy, unspecified type (H) 03/07/2024     Priority: Medium     EF 50-55% 2023  45 to 50% 1/2024  Heart failure episode 2/2024  Negative Lexiscan 2/2024  EF 45-50% 7/2024 echo   Dry weight 154 pounds at home  Jardiance and entresto cost prohibitive       Longstanding persistent atrial fibrillation (H) 02/07/2024     Priority: Medium    Insomnia, unspecified type 12/13/2023     Priority: Medium    Chronic heart failure with preserved ejection fraction (H) 11/29/2023     Priority: Medium     Heart failure episode 2/2024  Negative Lexiscan 2/2024  Dry weight 154 pounds at home      Chronic anticoagulation 11/24/2023     Priority: Medium     On Xarelto      Anemia, unspecified type 07/06/2023     Priority: Medium    Moderate major depression (H) 07/06/2023     Priority: Medium    S/P revision of total hip 05/25/2023     Priority: Medium    Social phobia 04/13/2023     Priority: Medium    Chronic diastolic heart failure (H) 09/22/2022     Priority: Medium    Valvular heart disease 09/22/2022     Priority: Medium     Moderate to moderately severe MR, moderate TR, mild to moderate AI 2023  Severe TR, moderately severe to severe  MR, moderate AI 2024      Tremor 06/29/2022     Priority: Medium    Memory difficulties 05/05/2022     Priority: Medium    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 02/28/2022     Priority: Medium    Depression with anxiety 02/28/2022     Priority: Medium    Right knee DJD 10/12/2018     Priority: Medium    Peripheral neuropathy 10/12/2018     Priority: Medium    Hyperplastic Polyp 03/05/2018     Priority: Medium    Atrial fibrillation, unspecified type (H) 01/18/2018     Priority: Medium     onset 2018 postoperatively, S/p cardioversion   Recurrence 2021 with palpitations  Now chronic A-fib  Severe LA  Elevated [id number] score      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) 11/28/2017     Priority: Medium    Status post lung surgery 11/18/2017     Priority: Medium    Unilateral inguinal hernia without obstruction or gangrene 11/07/2017     Priority: Medium    Benzodiazepine dependence (H) 10/31/2017     Priority: Medium    GERD (gastroesophageal reflux disease) 08/03/2012     Priority: Medium    Anxiety 02/16/2012     Priority: Medium    Hyperlipidemia LDL goal <100 10/31/2010     Priority: Medium    Osteoarthrosis, unspecified whether generalized or localized, pelvic region and thigh 09/28/2007     Priority: Medium    Hypertrophy of prostate with urinary obstruction 08/03/2006     Priority: Medium     Problem list name updated by automated process. Provider to review      Benign essential hypertension 12/12/2005     Priority: Medium      Past Medical History:   Diagnosis Date    Acute on chronic diastolic (congestive) heart failure (H) 11/29/2023    Arthritis 2005    Benign neoplasm of prostate 2000    Benign Prostate Nodule    Depressive disorder     past condition    Infection due to 2019 novel coronavirus 09/27/2021    S/P knee replacement 11/06/2012    S/P left knee arthroscopy 08/15/2011     Past Surgical History:   Procedure Laterality Date    ABDOMEN SURGERY  2003    ARTHROPLASTY KNEE Right 10/12/2018    Procedure: ARTHROPLASTY  KNEE;  Right Total Knee Arthroplasty;  Surgeon: Jeb Peralta MD;  Location: [facility]    ARTHROPLASTY REVISION HIP Left 5/25/2023    Procedure: Revision total hip arthroplasty, left;  Surgeon: Chandler Leiva MD;  Location: [facility]    BIOPSY  2007    COLONOSCOPY N/A 12/10/2015    Procedure: COMBINED COLONOSCOPY, SINGLE OR MULTIPLE BIOPSY/POLYPECTOMY BY BIOPSY;  Surgeon: Jyoti Figueroa MD;  Location: [facility]    JOINT REPLACEMENT, HIP RT/LT  10/2007    Joint Replacement Hip LT    JOINT REPLACEMTN, KNEE RT/LT  08/2011    Joint Replacement knee /LT, [facility]    LAPAROSCOPIC HERNIORRHAPHY INGUINAL BILATERAL Bilateral 04/24/2018    Procedure: LAPAROSCOPIC HERNIORRHAPHY INGUINAL BILATERAL;  Laparoscopic bilateral inguinal hernia repair;  Surgeon: Josemanuel Resendiz MD;  Location: [facility]    PHACOEMULSIFICATION WITH STANDARD INTRAOCULAR LENS IMPLANT Right 01/06/2021    Procedure: Cataract Removal with Implant;  Surgeon: Regan Kaufman MD;  Location: [facility]    PHACOEMULSIFICATION WITH STANDARD INTRAOCULAR LENS IMPLANT Left 02/17/2021    Procedure: Cataract Removal with Implant;  Surgeon: Regan Kaufman MD;  Location: [facility]    SURGICAL HISTORY OF -   12/01/1999    Umbilical Herniorrhaphy with mesh    SURGICAL HISTORY OF -  Left 11/2017    Thoracotomy and drainage of empyema     Current Outpatient Medications   Medication Sig Dispense Refill    [START ON 1/22/2025] buPROPion (WELLBUTRIN XL) 300 MG 24 hr tablet Take 1 tablet (300 mg) by mouth every morning. 90 tablet 1    busPIRone HCl (BUSPAR) 30 MG tablet TAKE 1 TABLET TWICE A  tablet 3    finasteride (PROSCAR) 5 MG tablet Take 1 tablet (5 mg) by mouth daily 90 tablet 0    furosemide (LASIX) 20 MG tablet Take 2 tablets (40 mg) by mouth daily. 60 tablet 4    lamoTRIgine (LAMICTAL) 25 MG tablet Take 2 tablets (50 mg) by mouth 2 times daily. 360 tablet 1    losartan (COZAAR) 50 MG tablet Take 50 mg by mouth daily.      metoprolol  "succinate ER (TOPROL XL) 200 MG 24 hr tablet Take 1 tablet (200 mg) by mouth every evening 30 tablet 11    multivitamin (ONE-DAILY) tablet Take 1 tablet by mouth daily 30 tablet 0    PARoxetine (PAXIL) 30 MG tablet Take 1 tablet (30 mg) by mouth every morning. in addition to 20 mg in the afternoon for ttl: 50 mg 90 tablet 0    QUEtiapine (SEROQUEL) 25 MG tablet Take 0.5-1 tablets (12.5-25 mg) by mouth at bedtime as needed, may repeat once (severe insomina). Cautious for falls. 15 tablet 0    rivaroxaban ANTICOAGULANT (XARELTO ANTICOAGULANT) 20 MG TABS tablet Take 1 tablet (20 mg) by mouth daily (with dinner). 90 tablet 3       Allergies   Allergen Reactions    Bactrim [Sulfamethoxazole-Trimethoprim] Nausea and Vomiting        Social History     Tobacco Use    Smoking status: Former     Current packs/day: 0.00     Average packs/day: 1 pack/day for 17.8 years (17.8 ttl pk-yrs)     Types: Cigarettes     Start date: 1958     Quit date: 10/13/1975     Years since quittin.2    Smokeless tobacco: Never   Substance Use Topics    Alcohol use: Not Currently     Family History   Problem Relation Age of Onset    Depression Mother     Cerebrovascular Disease Mother     Breast Cancer Mother     Neurologic Disorder Mother         parkinsons    Parkinsonism Mother     Respiratory Father         emphyzema    Diabetes Brother      History   Drug Use No             Review of Systems  Constitutional, HEENT, cardiovascular, pulmonary, gi and gu systems are negative, except as otherwise noted.    Objective    /83   Pulse 88   Temp 97.6  F (36.4  C) (Tympanic)   Resp 16   Ht 1.727 m (5' 8\")   Wt 70.7 kg (155 lb 12.8 oz)   SpO2 100%   BMI 23.69 kg/m     Estimated body mass index is 23.69 kg/m  as calculated from the following:    Height as of this encounter: 1.727 m (5' 8\").    Weight as of this encounter: 70.7 kg (155 lb 12.8 oz).  Physical Exam  GENERAL: alert and no distress  NECK: no adenopathy, no asymmetry, " masses, or scars  RESP: lungs clear to auscultation - no rales, rhonchi or wheezes  CV: regular rate and rhythm, normal S1 S2, no S3 or S4, no murmur, click or rub, no peripheral edema  ABDOMEN: soft, nontender, no hepatosplenomegaly, no masses and bowel sounds normal  MS: no gross musculoskeletal defects noted, no edema    Recent Labs   Lab Test 12/17/24  1318 10/29/24  0549 10/28/24  2214 10/28/24  1033   HGB  --  9.2* 9.5* 10.2*   PLT  --  118*  --  129*   * 130*  --  131*   POTASSIUM 4.7 3.8  --  4.1   CR 0.97 0.88  --  0.99        Diagnostics  Recent Results (from the past week)   CBC with platelets and differential    Collection Time: 01/06/25  2:34 PM   Result Value Ref Range    WBC Count 6.5 4.0 - 11.0 10e3/uL    RBC Count 3.29 (L) 4.40 - 5.90 10e6/uL    Hemoglobin 11.4 (L) 13.3 - 17.7 g/dL    Hematocrit 33.5 (L) 40.0 - 53.0 %     (H) 78 - 100 fL    MCH 34.7 (H) 26.5 - 33.0 pg    MCHC 34.0 31.5 - 36.5 g/dL    RDW 14.0 10.0 - 15.0 %    Platelet Count 163 150 - 450 10e3/uL    % Neutrophils 69 %    % Lymphocytes 18 %    % Monocytes 10 %    % Eosinophils 3 %    % Basophils 1 %    % Immature Granulocytes 0 %    Absolute Neutrophils 4.5 1.6 - 8.3 10e3/uL    Absolute Lymphocytes 1.2 0.8 - 5.3 10e3/uL    Absolute Monocytes 0.6 0.0 - 1.3 10e3/uL    Absolute Eosinophils 0.2 0.0 - 0.7 10e3/uL    Absolute Basophils 0.0 0.0 - 0.2 10e3/uL    Absolute Immature Granulocytes 0.0 <=0.4 10e3/uL      No EKG required for low risk surgery (cataract, skin procedure, breast biopsy, etc).    Revised Cardiac Risk Index (RCRI)  The patient has the following serious cardiovascular risks for perioperative complications:   - No serious cardiac risks = 0 points     RCRI Interpretation: 1 point: Class II (low risk - 0.9% complication rate)         Signed Electronically by: JO ANN ZHAO MD  A copy of this evaluation report is provided to the requesting physician.

## 2025-01-06 NOTE — LETTER
2025      RE: Josemanuel Edmond  [address]   [facility] [address]         Dr Stanton,     Mr Josemanuel Edmond ( 41) is on Xarelto for a diagnosis of Atrial Fibrillation. Per guidelines, he is able to hold Xarelto for 48 hours prior to his dental procedure on 1/10/25 and restart following the procedure at your guidance.     Sincerely,          Nazanin Caballero CNP, RN  [facility]  [phone] (phone)  [phone] (fax)

## 2025-01-15 ENCOUNTER — TELEPHONE (OUTPATIENT)
Dept: BEHAVIORAL HEALTH | Facility: CLINIC | Age: 80
End: 2025-01-15
Payer: COMMERCIAL

## 2025-01-15 NOTE — TELEPHONE ENCOUNTER
Pt reminder call for 01/16 TC appointment. Appointment cancelled.     Danielle Pardo  01/15/2025  [phone]

## 2025-01-18 DIAGNOSIS — N13.8 HYPERTROPHY OF PROSTATE WITH URINARY OBSTRUCTION: ICD-10-CM

## 2025-01-18 DIAGNOSIS — N40.1 HYPERTROPHY OF PROSTATE WITH URINARY OBSTRUCTION: ICD-10-CM

## 2025-01-20 DIAGNOSIS — R39.12 BENIGN PROSTATIC HYPERPLASIA WITH WEAK URINARY STREAM: ICD-10-CM

## 2025-01-20 DIAGNOSIS — N40.1 BENIGN PROSTATIC HYPERPLASIA WITH WEAK URINARY STREAM: ICD-10-CM

## 2025-01-20 RX ORDER — DOXAZOSIN 8 MG/1
4 TABLET ORAL AT BEDTIME
Qty: 45 TABLET | Refills: 3 | OUTPATIENT
Start: 2025-01-20

## 2025-01-20 RX ORDER — FINASTERIDE 5 MG/1
1 TABLET, FILM COATED ORAL DAILY
Qty: 90 TABLET | Refills: 3 | Status: SHIPPED | OUTPATIENT
Start: 2025-01-20

## 2025-01-28 ENCOUNTER — MYC MEDICAL ADVICE (OUTPATIENT)
Dept: FAMILY MEDICINE | Facility: CLINIC | Age: 80
End: 2025-01-28
Payer: COMMERCIAL

## 2025-01-28 DIAGNOSIS — N40.1 HYPERTROPHY OF PROSTATE WITH URINARY OBSTRUCTION: ICD-10-CM

## 2025-01-28 DIAGNOSIS — N13.8 HYPERTROPHY OF PROSTATE WITH URINARY OBSTRUCTION: ICD-10-CM

## 2025-01-28 NOTE — TELEPHONE ENCOUNTER
Please route to Dr. Leiva on 01/29/2025 when back in office.     Patient states he thinks he is suppose to be taking the finasteride (PROSCAR) 5 MG tablet - 1 tablet by mouth daily and the doxazosin (CARDURA) 8 MG tablet - 1/2 tablet at bedtime.     Patient states he still has doxazosin supply and has been taking it.     Will route to PCP for clarification.     Shahana OHARA RN  [facility]  [phone]

## 2025-01-28 NOTE — TELEPHONE ENCOUNTER
Patient sent a [employer] message asking for Doxazosin Mesylate to be restarted due to Flomax not working.  Doxazosin was discontinued on 10/28/24 while patient was in the hospital.      Patient Contact     Attempt # 1     Was call answered?  No.  Left message on voicemail with information to call back.     Please assess if patient needs to schedule an office visit.    Veronica Loaiza RN on 1/28/2025 at 3:36 PM

## 2025-01-29 DIAGNOSIS — F40.10 SOCIAL ANXIETY DISORDER: ICD-10-CM

## 2025-01-29 RX ORDER — DOXAZOSIN 4 MG/1
4 TABLET ORAL AT BEDTIME
Qty: 90 TABLET | Refills: 1 | Status: SHIPPED | OUTPATIENT
Start: 2025-01-29

## 2025-01-29 RX ORDER — BUSPIRONE HYDROCHLORIDE 30 MG/1
TABLET ORAL
Qty: 180 TABLET | Refills: 1 | Status: SHIPPED | OUTPATIENT
Start: 2025-01-29

## 2025-01-29 NOTE — TELEPHONE ENCOUNTER
Ok to re-start doxazosin - monitor for lightheadedness and dizziness.Prescription faxed to pharmacy.

## 2025-02-06 ENCOUNTER — TELEPHONE (OUTPATIENT)
Dept: CARDIOLOGY | Facility: CLINIC | Age: 80
End: 2025-02-06
Payer: COMMERCIAL

## 2025-02-06 DIAGNOSIS — I34.0 MITRAL VALVE INSUFFICIENCY, UNSPECIFIED ETIOLOGY: Primary | ICD-10-CM

## 2025-02-06 NOTE — TELEPHONE ENCOUNTER
Patient typically seen at [facility]. Patient has history on TTE of severe TR and MR. AD and valve clinic consult recommended, AD ordered.     Msg sent to  to help arrange AD and valve consult.     Shirley Neville RN on 2/6/2025 at 10:21 AM

## 2025-02-06 NOTE — TELEPHONE ENCOUNTER
Alda Mejia, RN19 hours ago (3:07 PM)     JK  Routing mychart mesg to scheduling-     Referral placed for pt to get established with cardiologist at [place] for valve disease. Pt is unable to get to [facility] for valve workup and procedures.      Could you please assist him in scheduling or give him number to call to schedule.      Thanks      Alda Mejia, RN

## 2025-02-10 ENCOUNTER — TELEPHONE (OUTPATIENT)
Dept: CARDIOLOGY | Facility: CLINIC | Age: 80
End: 2025-02-10
Payer: COMMERCIAL

## 2025-02-10 DIAGNOSIS — I10 BENIGN ESSENTIAL HYPERTENSION: ICD-10-CM

## 2025-02-10 DIAGNOSIS — I48.91 ATRIAL FIBRILLATION, UNSPECIFIED TYPE (H): ICD-10-CM

## 2025-02-10 DIAGNOSIS — I42.9 CARDIOMYOPATHY, UNSPECIFIED TYPE (H): ICD-10-CM

## 2025-02-10 DIAGNOSIS — I48.20 CHRONIC ATRIAL FIBRILLATION (H): ICD-10-CM

## 2025-02-10 RX ORDER — LOSARTAN POTASSIUM 50 MG/1
50 TABLET ORAL DAILY
Qty: 90 TABLET | Refills: 2 | Status: SHIPPED | OUTPATIENT
Start: 2025-02-10

## 2025-02-10 RX ORDER — METOPROLOL SUCCINATE 200 MG/1
200 TABLET, EXTENDED RELEASE ORAL EVERY EVENING
Qty: 90 TABLET | Refills: 2 | Status: SHIPPED | OUTPATIENT
Start: 2025-02-10

## 2025-02-10 NOTE — TELEPHONE ENCOUNTER
[facility] Call Center    Phone Message    May a detailed message be left on voicemail: yes     Reason for Call: Medication Refill Request    Has the patient contacted the pharmacy for the refill? Yes   Name of medication being requested: rivaroxaban ANTICOAGULANT (XARELTO ANTICOAGULANT) 20 MG TABS tablet  metoprolol succinate ER (TOPROL XL) 200 MG 24 hr tablet  lisinopril 2.5 mg   Provider who prescribed the medication: Nazanin Caballero  Pharmacy: ,     [facility] - [address]        Date medication is needed: 2/10/25   90 day supply with 3 refills,  e-scribe preferred    Action Taken: Other: cardiology    Travel Screening: Not Applicable   Thank you!  [facility]      Date of Service:

## 2025-02-10 NOTE — TELEPHONE ENCOUNTER
Called and spoke with pt and verified he is actually taking losartan and not lisinopril.     All rx sent to pharmacy.     [facility] Refill Guideline reviewed.  Medication meets criteria for refill.    Alda Mejia RN

## 2025-02-11 ENCOUNTER — MYC MEDICAL ADVICE (OUTPATIENT)
Dept: BEHAVIORAL HEALTH | Facility: CLINIC | Age: 80
End: 2025-02-11
Payer: COMMERCIAL

## 2025-02-11 DIAGNOSIS — F51.04 CHRONIC INSOMNIA: ICD-10-CM

## 2025-02-11 DIAGNOSIS — F41.1 GAD (GENERALIZED ANXIETY DISORDER): Primary | ICD-10-CM

## 2025-02-11 NOTE — TELEPHONE ENCOUNTER
1. RN reviewed Meeting To Yout message from patient darrell for addition information. Per call with Darrell.    - He has not been sleeping at night in the last 4 days and has been sleeping 1 to 2 hours during the day.  - He has been taking over the counter medications, unisom, Zquil which have not been working.   - He states he has not had restful sleep since stopping Ambien a few month ago.  - He is willing to see Angeles Sanders CNP for follow up appointment on 2/17/24. He states the return appointment available at 8 am this week is too early for him.    2.. RN routing encounter to Angeles Sanders CNP for review and also to [facility] for scheduling.

## 2025-02-12 RX ORDER — PREGABALIN 25 MG/1
25 CAPSULE ORAL AT BEDTIME
Qty: 30 CAPSULE | Refills: 0 | Status: SHIPPED | OUTPATIENT
Start: 2025-02-12

## 2025-02-13 DIAGNOSIS — F33.0 MILD EPISODE OF RECURRENT MAJOR DEPRESSIVE DISORDER: ICD-10-CM

## 2025-02-13 RX ORDER — BUPROPION HYDROCHLORIDE 300 MG/1
300 TABLET ORAL EVERY MORNING
Qty: 90 TABLET | Refills: 1 | OUTPATIENT
Start: 2025-02-13

## 2025-02-15 NOTE — PROGRESS NOTES
[facility]      Mental Health & Addiction Service Line    Transition Clinic: Psychiatry Progress Note  Medication Management              ASSESSMENT/PLAN    Medications:  Change:  Paroxetine/Paxil 30 mg daily; decrease from 50 mg/day    Continue:  Bupropion/Wellbutrin 300 mg XL in AM     Buspirone/Buspar 30 mg twice daily; TDD = 60 mg     Lamotrigine/Lamictal 50 mg (2 tabs) twice daily for ttl: 100 mg     Pregablin/Lyrica 25 mg at bedtime      Labs:  -None ordered      Therapy and or Non-pharmacological modalities:    Sleep:  -Get 7+ hours per night  -Avoid screens 60 minutes prior to bedtime    Nutrition:  -Anti-inflammatory diet: fruits, veggies, grains, plant proteins, lean animal protein (sparingly), dairy (small amounts)  -Omega 3's + fiber: food = first choice, supplements = second choice  -Avoid excessive amounts of: refined sugars + carbs, fried + fast foods    Activity:  -30 to 90 minutes (doesn't have to be consecutive) most days of the week  -Aerobic + strength/weight bearing  -Yoga + meditation/deep breathing      Disposition:  -Has been advised of consultative Transition Clinic model    -You do not need to return to the Transition Clinic for medication management    -Please make sure to keep the appointment for longitudinal outpatient psychiatry services  (see below):    Department: [facility]  Provider: DULCE Quintero  Location: [address] [place] [address] [address], [place] [address]   Phone: [phone]  Fax: [phone]  Date/Time/Visit type: 3/25/2025, arrive by 2:30 pm for telehealth/video visit            Summary of Clinical Impressions:    Kristin Edmond is an 80 y/o male with a previous mental health hx of: MDD, Social Anxiety Disorder, Alcohol Abuse (remote + no   consumption since 2017) and Benzodiazepine Dependence.     Medical decision making is complex based on: age, co-morbidities (see problem list), and polypharmacy.     Per chart review: depressive + anxiety symptoms began in 2002  after his son passed away during a MVA. Social anxiety became   problematic around 2013-14.     Initiated care at the Transition Clinic on 12/28/2023 for the management of psychotropics/bridging until able   to establish long term outpatient psychiatric services.      Attended follow up in 2024 on: 1/18, 2/15, 3/28, 5/7, 5/31, and 11/8.    --------------------------------      Some of Gavin's psychosocial stressors (occurred in the fall/2024) which were exacerbating anxiety symptoms have now resolved.    He notes the first 3 nights of taking the Lyrica he slept well, however last night he was only able to get a few hours.  Since it has not even been   7 days since the initiation of Lyrica 25 mg at bedtime, will not modify the dosing at this time.    Gavin comments he would like to reduce the Paxil 50 mg/day because he is not sure it is doing much of anything, therefore he can reduce to 30 mg/day.    At this time Gavin denies any immediate safety concerns towards himself or others and is forward thinking/future oriented.          DSM-V and or working diagnosis:    1.  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  Social anxiety disorder     3.  History as above        SAFETY EVALUATION:  Suicidal ideations:  -denies  Homicidal ideations:  -denies  Risk factors:  -male, age  -chronic illnesses   Protective and mitigating factors:  -spouse, family  -no prior attempts  Risk assessment:  -low to medium      SAFETY PLAN:  -Has numbers of at least 1 family member + 1 friend in personal contact list  -Knows clinic number(s) + operation of regular business hours   -Reviewed to utilize 98 or text [place] to [phone] for mental health crisis  -Discussed to call 911 and or return to the nearest Emergency Department if unable to maintain safety of self or others (due to severity of suicidal and or homicidal ideations)      PSYCHIATRIC HISTORY    Suicide attempts:  -None reported      Inpatient psychiatric  hospitalizations:  -None reported   -No hx of commitments      ECT:  -None reported            Medication Trials:      Per [employer] testing:  -Ultra-rapid metabolizer CYP2D6 = Paxil  -Poor metabolizer [id number] = Celexa, Zoloft     SSRIs:  -Lexapro: ineffective  -Paxil 50 mg: ineffective  -Prozac 10 mg: effective for depression but increased anxiety  -Zoloft: low dosage     SNRIs:  -Cymbalta: side effects  -Effexor: side effects  -Fetzima: ineffective  -Pristiq: ineffective     TCAs:  -Nortriptyline: dry mouth, nightmares     Serotonin modulators/stimulators:  -Mirtazapine 30 mg: effective for sleep, however caused weight gain  -Trintellix 20 mg: worsened anxiety  + expensive  -Viibryd: ineffective     Antipsychotics:  -Abilify 10 mg  -Zyprexa 2.5 to 5 mg = urinary retention     Benzodiazepines = history of abuse  -Alprazolam  -Clonazepam  -Diazepam  -Lorazepam     Sleep aides:  -Trazodone 50 mg: over-sedation        MEDICAL HISTORY  -The problem list was reviewed via the Electronic Medical Record (EMR) prior to the appointment  -The patient denies any concerning physical and or medical symptoms during the interviewing process        MEDICATIONS      Current Outpatient Medications:     busPIRone HCl (BUSPAR) 30 MG tablet, TAKE 1 TABLET TWICE A DAY, Disp: 180 tablet, Rfl: 1    buPROPion (WELLBUTRIN XL) 300 MG 24 hr tablet, Take 1 tablet (300 mg) by mouth every morning., Disp: 90 tablet, Rfl: 1    doxazosin (CARDURA) 4 MG tablet, Take 1 tablet (4 mg) by mouth at bedtime., Disp: 90 tablet, Rfl: 1    finasteride (PROSCAR) 5 MG tablet, TAKE 1 TABLET BY MOUTH EVERY DAY, Disp: 90 tablet, Rfl: 3    furosemide (LASIX) 20 MG tablet, Take 2 tablets (40 mg) by mouth daily., Disp: 60 tablet, Rfl: 4    lamoTRIgine (LAMICTAL) 25 MG tablet, Take 2 tablets (50 mg) by mouth 2 times daily., Disp: 360 tablet, Rfl: 1    losartan (COZAAR) 50 MG tablet, Take 1 tablet (50 mg) by mouth daily., Disp: 90 tablet, Rfl: 2    metoprolol succinate  ER (TOPROL XL) 200 MG 24 hr tablet, Take 1 tablet (200 mg) by mouth every evening., Disp: 90 tablet, Rfl: 2    multivitamin (ONE-DAILY) tablet, Take 1 tablet by mouth daily, Disp: 30 tablet, Rfl: 0    PARoxetine (PAXIL) 30 MG tablet, Take 1 tablet (30 mg) by mouth every morning. in addition to 20 mg in the afternoon for ttl: 50 mg, Disp: 90 tablet, Rfl: 0    pregabalin (LYRICA) 25 MG capsule, Take 1 capsule (25 mg) by mouth at bedtime., Disp: 30 capsule, Rfl: 0    rivaroxaban ANTICOAGULANT (XARELTO ANTICOAGULANT) 20 MG TABS tablet, Take 1 tablet (20 mg) by mouth daily (with dinner)., Disp: 90 tablet, Rfl: 2      If a controlled substance is prescribed during today's appointment:    -The [place] Prescription Monitoring Program has been reviewed and there are no current concerns with: diversionary activity, early refill requests, and or obtaining the medication from multiple providers.        VITALS    BP Readings from Last 3 Encounters:   01/06/25 119/83   12/19/24 108/70   12/18/24 114/77       Pulse Readings from Last 3 Encounters:   01/06/25 88   12/19/24 71   12/18/24 75       Wt Readings from Last 3 Encounters:   01/06/25 70.7 kg (155 lb 12.8 oz)   12/19/24 72.1 kg (159 lb)   12/18/24 72 kg (158 lb 12.8 oz)           SCALES        10/25/2024    11:05 AM 11/8/2024    10:22 AM 12/19/2024    10:22 AM   PHQ   PHQ-9 Total Score 6  8  6    Q9: Thoughts of better off dead/self-harm past 2 weeks Not at all Not at all  Not at all       Patient-reported    Proxy-reported            10/25/2024    11:20 AM 11/8/2024    10:24 AM 12/19/2024    10:24 AM   CAROLYNN-7 SCORE   Total Score 6 (mild anxiety) 8 (mild anxiety) 4 (minimal anxiety)   Total Score 6  8  4        Patient-reported    Proxy-reported       Answers submitted by the patient for this visit:  Patient Health Questionnaire (Submitted on 2/17/2025)  If you checked off any problems, how difficult have these problems made it for you to do your work, take care of things  at home, or get along with other people?: Not difficult at all  PHQ9 TOTAL SCORE: 7    SUBSTANCE USE      Prior use:  -Denies any history of alcohol, recreational, or illicit substance use   resulting in: legal issues, c/d programming, or withdrawal symptoms     Per chart review 5/7/2020 encounter by ALENA GARZA-CNP:  -On/off heavy alcohol use after son passed away in 2002  -Stopped drinking altogether in 2017        Current use:     Alcohol:   -None reported         Recreational Drugs:   -None reported         Medical Marijuana:  -None reported         Cigarettes per day:   -None reported         Chewing tobacco:   -None reported         Vaping:    -None reported         Caffeine intake:    -1 cup coffee per day           MENTAL STATUS EXAMINATION    Appearance: Well Groomed, Attire Appropriate for the Season  General Behavior:  Cooperative, Direct Eye Contact  Speech: Fluent, Normal rate and volume  Musculoskeletal:    -Gait not observed during t.h. visit  -No facial tics/tremors observed   -Motor coordination is grossly intact   Mood: Anxious  Affect: Appropriate to Content of Speech and Circumstances   Attention: Intact   Orientation:  Person, Place, Time, Situation  Thought Associations:  Intact  Thought Content: Reality based   Thought Processes: Organized, Normal rate  Memory: Recent and remote memory intact; no formal screenings or testing performed  Language: Intact  Judgement: Fair  Insight: Fair        INTERIM HX:    -My son is now doing better since breaking up with his girlfriend  -I think he realizes he's better off without her    -The grand kids (Marv, Aj) are doing pretty good  -They both are growing up fast     -Denies any falls since being off Gabapentin + Ambien    -The insurance company did finally reimburse us from a few months ago when backing into the garage door  -And the door has since been fixed          PSYCHIATRIC ROS    Sleep:  -The first couple of nights on the Lyrica I slept  really well  -Last night it was only a few hours      Mood/Anxiety:  -See PHQ-9 score    -See CAROLYNN-7 score      Suicidal ideations:  -Denies passive or active thoughts at this time      SIB:  -Denies engaging in self harm       Side effects:  -Think things are fine ... maybe slightly groggy in the AM since starting the Lyrica but no daytime sedation or fatigue          VISIT INFORMATION    Date:  2025       Number:  -8th    Patient Identifying Information:  Legal name: Josemanuel Edmond  Preferred name: Gavin  : 1945    Participants:   -Patient  -Provider      Telehealth visit details:  Type of service:  Video Visit     Video Start Time: 11:09 AM  Video End Time:  11:31 AM    Originating Location (pt. Location): Home    Distant Location (provider location):  Off-site  Platform used for Video Visit: Well    Total time:   29 minutes per:    -Review of EMR including but not limited to: tabs within Chart Review + outside records if applicable   -Appointment time  -Documentation        Angeles GARZA-CNP, [facility]        ----------------------------------------------------------------------------------------------------------------------        TREATMENT RISK STATEMENT    The risks, benefits, alternatives, and potential adverse effects have been explained and are understood by the patient.  The patient agrees to the treatment plan with their ability to do so.      The patient knows to call the clinic: [phone]  for any problems or concerns until the next psychiatry visit, regardless if it is within or outside of the [facility] system.     If unable to reach clinic staff (via phone call or medical messaging) during the normal business hours: 8:00 am to 4:30 pm then it is recommended accessing the nearest: emergency department, urgent care facility, or utilizing local (varies based on county of residence) and national crisis #'s or text messaging services for immediate  assistance.          ----------------------------------------------------------------------------------------------------------------------      If applicable the following has been discussed with the patient, parent/guardian, and or attending family member during the appointment:      Risks of polypharmacy and possible drug interactions with current medication list + common OTC products, herbs, and supplements.  Moving forward, it is suggested to intermittently check-in with a clinic or retail pharmacist whenever new medications or OTC/h/s are consumed.    Risks of polypharmacy and possible drug + drug interactions with current medication list.  Moving forward, it is suggested to intermittently check-in with a clinic or retail pharmacist whenever new prescription medications are added to your treatment regimen.    Recommendation to adhere to CDC guidelines as it relates to alcohol consumption.  If taking benzodiazepines, you should abstain from alcohol intake due to increased risks of CNS and respiratory depression, as well as psychomotor impairment.    If possible, it is recommended to avoid concurrent use of prescribed: opioids + benzodiazepines due to increased risks of CNS and respiratory depression, as well as the increased risk of overdose.     Recommendation to minimize and or abstain from THC use (unless you are prescribed medical marijuana).    Recommendation to abstain from illicit substances including but not limited to the following: heroin, street fentanyl, cocaine, methamphetamines, bath salts, and other synthetic products.    Recommendation to abstain from tobacco/smoking/nicotine, alcohol, THC, and all illicit substances if trying to become or are pregnant.    Do not take opioids, stimulants, and or other prescription medications unless they are specifically prescribed for you.     Black Box Warnings associated with the prescribed psychotropic(s).     Potential adverse effects of antipsychotics  including but not limited to the following: weight gain, metabolic syndrome, EPS/Tardive Dyskinesias, and Neuroleptic Malignant Syndrome.    Potential adverse effects of stimulants including but not limited to the following: sudden death, MI, stroke, HTN, cardiomyopathy (long term use), seizures, ed, psychosis, and aggressive behaviors.

## 2025-02-17 ENCOUNTER — VIRTUAL VISIT (OUTPATIENT)
Dept: BEHAVIORAL HEALTH | Facility: CLINIC | Age: 80
End: 2025-02-17
Payer: COMMERCIAL

## 2025-02-17 DIAGNOSIS — F40.10 SOCIAL ANXIETY DISORDER: ICD-10-CM

## 2025-02-17 DIAGNOSIS — F33.1 MAJOR DEPRESSIVE DISORDER, RECURRENT EPISODE, MODERATE WITH ANXIOUS DISTRESS (H): ICD-10-CM

## 2025-02-17 RX ORDER — PAROXETINE 30 MG/1
30 TABLET, FILM COATED ORAL EVERY MORNING
Qty: 90 TABLET | Refills: 0 | Status: SHIPPED | OUTPATIENT
Start: 2025-02-17

## 2025-02-17 RX ORDER — PREGABALIN 25 MG/1
25 CAPSULE ORAL AT BEDTIME
Qty: 30 CAPSULE | Refills: 0 | Status: SHIPPED | OUTPATIENT
Start: 2025-03-12

## 2025-02-17 ASSESSMENT — PATIENT HEALTH QUESTIONNAIRE - PHQ9
10. IF YOU CHECKED OFF ANY PROBLEMS, HOW DIFFICULT HAVE THESE PROBLEMS MADE IT FOR YOU TO DO YOUR WORK, TAKE CARE OF THINGS AT HOME, OR GET ALONG WITH OTHER PEOPLE: NOT DIFFICULT AT ALL
SUM OF ALL RESPONSES TO PHQ QUESTIONS 1-9: 7
SUM OF ALL RESPONSES TO PHQ QUESTIONS 1-9: 7

## 2025-02-17 ASSESSMENT — PAIN SCALES - GENERAL: PAINLEVEL_OUTOF10: NO PAIN (0)

## 2025-02-17 NOTE — PATIENT INSTRUCTIONS
Change:  Paroxetine/Paxil 30 mg daily; decrease from 50 mg/day    Continue:  Bupropion/Wellbutrin 300 mg XL in AM     Buspirone/Buspar 30 mg twice daily; TDD = 60 mg     Lamotrigine/Lamictal 50 mg (2 tabs) twice daily for ttl: 100 mg     Pregablin/Lyrica 25 mg at bedtime    ------------------------      -You do not need to return to the Transition Clinic for medication management    -Please make sure to keep the appointment for longitudinal outpatient psychiatry services  (see below):    Department: [facility]  Provider: DULCE Quintero  Location: [address] [place] [address], [address] [address]   Phone: [phone]  Fax: [phone]  Date/Time/Visit type: 3/25/2025, arrive by 2:30 pm for telehealth/video visit      -------------------------    -If there are questions/inquiries between now and the upcoming follow up appointment OR prior to transferring to the next level of care, please call or message the [facility] Psychiatry Department.    Clinic hours/phone number:   -Monday to Friday from 8:00 am to 4:30 pm  -[phone]      MyChart:  -IS NOT an emergency messaging service  -Should not be considered equivalent to text messages   -Please allow up to 3 business days for a reply   -If you do not receive a response within 3 business days, please do not hesitate to contact us again via calling (see above) or messaging to check on the status of your questions or concerns    -----------------------    Call:  -[phone] ([phone]) if guidance is needed after [facility] clinic hours about utilizing [facility] versus the Emergency Departments    ----------------------    Any time (during or after regular clinic hours) immediate and or life threatening symptoms occur (either medical or mental health), call:  [phone] and or go to the nearest Emergency Department      ---------------------    Please use the following websites to obtain a comprehensive list of all counties within the [facility] for adult and  child mental health crisis numbers:    https://[place].gov/dhs/people-we-serve/people-with-disabilities/health-care/adult-mental-health/resources/crisis-contacts.jsp    https://[place].gov/dhs/people-we-serve/people-with-disabilities/health-care/childrens-mental-health/resources/crisis-contacts.jsp      -------------------      Below is a list of county (also found on the above websites), state, and national crisis numbers.   These resources can provide real-time assistance if experiencing moderate to severe mental health symptoms.        Additionally, please visit your county website to find the most up-to-date list of local:  adult, child, family, and chemical dependency services available.        [facility]  [phone]    [facility]  [phone]    [facility]  [phone]    [facility]  [phone]    [facility]  [phone]: Adults 18 and over    [phone]: Children 17 and under    [facility] ([facility]), Acute Psychiatric Services (APS) Assessment & Referral:   [phone]    Community Outreach for Psychiatric Emergencies (COPE):  [phone]    [facility]  [phone]: Adults 18 and over    [phone]: Children 17 and under      Walk-in crisis services are available Monday to Friday from 8 am to 5:30 pm at Urgent Care for  Adult metal health:    [address] [place] [address]  Closed on Saturday, Sunday, and holidays    [place]  [phone]: Toll free    [facility]  [phone]      [facility] [place]  [phone]: Toll free    Text: [place] to 787127      [facility] and human services  Call 211 or visit https://www.211unitedway.org to obtain free and confidential h/hs information     [facility]  If you are an adult needing support, you can talk to a specialist who has first hand experience living with a mental health condition:    [phone]    Text: Support to 21692    [employer] [place]:   domestic violence/LGBTQ+  384-089-7541 option 3    [employer]: LGBTQ+  3-703-192-6657    Text: START to 072459    [employer]  5-834-JyflUtn: (2-977-974-0999)    Crisis line: 1-717.745.4494    Text: 031587    [employer]: women and sexually exploited youth  604.962.2410    [employer] Domestic Violence Shelter Hotline  103.226.1459    National Suicide Prevention Lifeline  981    National Youth Crisis Hotline  681

## 2025-02-17 NOTE — Clinical Note
Gavin's PCP is often very helpful + responsive to secure chat messages  Also he tends to modify on his how how prescriptions meds are taken, including controlled substances.

## 2025-02-17 NOTE — NURSING NOTE
Is the patient currently in the state of [place]? YES    Current patient location: [address] [place] [address] [facility] [id number]    Visit mode:Video    If the visit is dropped, the patient can be reconnected by: VIDEO VISIT:  Send e-mail to at [email or web address]    Will anyone else be joining the visit? No  (If patient encounters technical issues they should call [phone])    Are changes needed to the allergy or medication list? Pt stated no changes to allergies and Pt stated no med changes    Are refills needed on medications prescribed by this physician? No    Rooming Documentation: Questionnaire(s) completed.    Reason for visit: RECHECK     Suzie Whiting, VVF        
[place]

## 2025-02-18 ENCOUNTER — PREP FOR PROCEDURE (OUTPATIENT)
Dept: CARDIOLOGY | Facility: CLINIC | Age: 80
End: 2025-02-18
Payer: COMMERCIAL

## 2025-02-18 DIAGNOSIS — I34.0 MITRAL VALVE INSUFFICIENCY, UNSPECIFIED ETIOLOGY: Primary | ICD-10-CM

## 2025-02-18 RX ORDER — SODIUM CHLORIDE 9 MG/ML
30 INJECTION, SOLUTION INTRAVENOUS CONTINUOUS
OUTPATIENT
Start: 2025-02-18

## 2025-02-18 RX ORDER — LIDOCAINE 40 MG/G
CREAM TOPICAL
OUTPATIENT
Start: 2025-02-18

## 2025-02-18 NOTE — TELEPHONE ENCOUNTER
===View-only below this line===  ----- Message -----  From: Annika Narvaez  Sent: 2/18/2025  11:53 AM CST  To: Shirley Neville RN    3/4 AD  3/20 VC

## 2025-02-18 NOTE — TELEPHONE ENCOUNTER
H&P scheduled with PCP on 2/24/25. Orders placed for AD.     Shirley Neville RN on 2/18/2025 at 12:54 PM

## 2025-02-24 ENCOUNTER — OFFICE VISIT (OUTPATIENT)
Dept: FAMILY MEDICINE | Facility: CLINIC | Age: 80
End: 2025-02-24
Payer: COMMERCIAL

## 2025-02-24 VITALS
SYSTOLIC BLOOD PRESSURE: 118 MMHG | RESPIRATION RATE: 20 BRPM | WEIGHT: 154 LBS | TEMPERATURE: 97 F | BODY MASS INDEX: 24.17 KG/M2 | OXYGEN SATURATION: 99 % | HEIGHT: 67 IN | HEART RATE: 78 BPM | DIASTOLIC BLOOD PRESSURE: 60 MMHG

## 2025-02-24 DIAGNOSIS — Z01.818 PREOP GENERAL PHYSICAL EXAM: Primary | ICD-10-CM

## 2025-02-24 DIAGNOSIS — Z79.899 MEDICATION MANAGEMENT: ICD-10-CM

## 2025-02-24 DIAGNOSIS — I48.91 ATRIAL FIBRILLATION, UNSPECIFIED TYPE (H): ICD-10-CM

## 2025-02-24 DIAGNOSIS — I50.32 CHRONIC DIASTOLIC HEART FAILURE (H): ICD-10-CM

## 2025-02-24 LAB
ANION GAP SERPL CALCULATED.3IONS-SCNC: 11 MMOL/L (ref 7–15)
BUN SERPL-MCNC: 16.8 MG/DL (ref 8–23)
CALCIUM SERPL-MCNC: 9.1 MG/DL (ref 8.8–10.4)
CHLORIDE SERPL-SCNC: 99 MMOL/L (ref 98–107)
CREAT SERPL-MCNC: 0.89 MG/DL (ref 0.67–1.17)
EGFRCR SERPLBLD CKD-EPI 2021: 86 ML/MIN/1.73M2
ERYTHROCYTE [DISTWIDTH] IN BLOOD BY AUTOMATED COUNT: 13.9 % (ref 10–15)
FERRITIN SERPL-MCNC: 118 NG/ML (ref 31–409)
GLUCOSE SERPL-MCNC: 103 MG/DL (ref 70–99)
HCO3 SERPL-SCNC: 25 MMOL/L (ref 22–29)
HCT VFR BLD AUTO: 33.6 % (ref 40–53)
HGB BLD-MCNC: 11.1 G/DL (ref 13.3–17.7)
HOLD SPECIMEN: NORMAL
IRON BINDING CAPACITY (ROCHE): 342 UG/DL (ref 240–430)
IRON SATN MFR SERPL: 37 % (ref 15–46)
IRON SERPL-MCNC: 125 UG/DL (ref 61–157)
MCH RBC QN AUTO: 33.6 PG (ref 26.5–33)
MCHC RBC AUTO-ENTMCNC: 33 G/DL (ref 31.5–36.5)
MCV RBC AUTO: 102 FL (ref 78–100)
PLATELET # BLD AUTO: 118 10E3/UL (ref 150–450)
POTASSIUM SERPL-SCNC: 4.3 MMOL/L (ref 3.4–5.3)
RBC # BLD AUTO: 3.3 10E6/UL (ref 4.4–5.9)
SODIUM SERPL-SCNC: 135 MMOL/L (ref 135–145)
WBC # BLD AUTO: 4.1 10E3/UL (ref 4–11)

## 2025-02-24 PROCEDURE — 82728 ASSAY OF FERRITIN: CPT | Performed by: FAMILY MEDICINE

## 2025-02-24 PROCEDURE — 36415 COLL VENOUS BLD VENIPUNCTURE: CPT | Performed by: FAMILY MEDICINE

## 2025-02-24 PROCEDURE — 80048 BASIC METABOLIC PNL TOTAL CA: CPT | Performed by: FAMILY MEDICINE

## 2025-02-24 PROCEDURE — 85027 COMPLETE CBC AUTOMATED: CPT | Performed by: FAMILY MEDICINE

## 2025-02-24 PROCEDURE — 83540 ASSAY OF IRON: CPT | Performed by: FAMILY MEDICINE

## 2025-02-24 PROCEDURE — 83550 IRON BINDING TEST: CPT | Performed by: FAMILY MEDICINE

## 2025-02-24 PROCEDURE — 99214 OFFICE O/P EST MOD 30 MIN: CPT | Performed by: FAMILY MEDICINE

## 2025-02-24 PROCEDURE — 3078F DIAST BP <80 MM HG: CPT | Performed by: FAMILY MEDICINE

## 2025-02-24 PROCEDURE — 3074F SYST BP LT 130 MM HG: CPT | Performed by: FAMILY MEDICINE

## 2025-02-24 PROCEDURE — 1126F AMNT PAIN NOTED NONE PRSNT: CPT | Performed by: FAMILY MEDICINE

## 2025-02-24 ASSESSMENT — PAIN SCALES - GENERAL: PAINLEVEL_OUTOF10: NO PAIN (0)

## 2025-02-24 NOTE — PROGRESS NOTES
Preoperative Evaluation  [facility]  [address]  [facility] [address]  Phone: [phone]  Primary Provider: Lizzy Leiva MD  Pre-op Performing Provider: JO ANN ZHAO MD  Feb 24, 2025 2/24/2025   Surgical Information   What procedure is being done? AD    Facility or Hospital where procedure/surgery will be performed: [facility]    Who is doing the procedure / surgery? unknown    Date of surgery / procedure: 3/4/2025    Time of surgery / procedure: noon arrival time    Where do you plan to recover after surgery? at home with family        Proxy-reported     Fax number for surgical facility: Note does not need to be faxed, will be available electronically in Epic.    Assessment & Plan     The proposed surgical procedure is considered LOW risk.    Preop general physical exam       Atrial fibrillation, unspecified type (H)     - Basic Metabolic Panel; Future  - CBC w/ Reflex to Iron Studies; Future  - Basic Metabolic Panel  - CBC w/ Reflex to Iron Studies  - Iron & Iron Binding Capacity  - Ferritin    Chronic diastolic heart failure (H)       Medication management     - Med Therapy Management Referral       - No identified additional risk factors other than previously addressed    Preoperative Medication Instructions  Antiplatelet or Anticoagulation Medication Instructions   - Bleeding risk is low for this procedure (e.g. dental, skin, cataract).    Additional Medication Instructions  We reviewed the medication list and there are no chronic medications that need to be adjusted for this procedure.    Recommendation  Approval given to proceed with proposed procedure, without further diagnostic evaluation.    Greg Alonso is a 79 year old, presenting for the following:  Pre-Op Exam          2/24/2025     1:59 PM   Additional Questions   Roomed by Becca ALAS   Accompanied by wife, Anna         2/24/2025   Forms   Any forms needing to be completed Yes      HPI related to upcoming procedure: overall feeling well.   Does get a bit winded with walking, at times.   No chest pain or pressure        2/24/2025   Pre-Op Questionnaire   Have you ever had a heart attack or stroke? No    Have you ever had surgery on your heart or blood vessels, such as a stent placement, a coronary artery bypass, or surgery on an artery in your head, neck, heart, or legs? No    Do you have chest pain with activity? No    Do you have a history of heart failure? No    Do you currently have a cold, bronchitis or symptoms of other infection? No    Do you have a cough, shortness of breath, or wheezing? No    Do you or anyone in your family have previous history of blood clots? No    Do you or does anyone in your family have a serious bleeding problem such as prolonged bleeding following surgeries or cuts? No    Have you ever had problems with anemia or been told to take iron pills? (!) YES     Have you had any abnormal blood loss such as black, tarry or bloody stools? No    Have you ever had a blood transfusion? (!) YES    Have you ever had a transfusion reaction? No    Are you willing to have a blood transfusion if it is medically needed before, during, or after your surgery? Yes    Have you or any of your relatives ever had problems with anesthesia? No    Do you have sleep apnea, excessive snoring or daytime drowsiness? (!) YES    Do you have a CPAP machine? (!) NO     Do you have any artifical heart valves or other implanted medical devices like a pacemaker, defibrillator, or continuous glucose monitor? No    Do you have artificial joints? (!) YES    Are you allergic to latex? 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     Priority: Medium    Constipation, unspecified constipation type 10/29/2024     Priority: Medium    Generalized weakness 10/28/2024     Priority: Medium    Falls frequently 10/28/2024     Priority: Medium    Anticoagulated 10/28/2024     Priority: Medium    Traumatic hematoma of buttock, initial encounter 10/28/2024     Priority: Medium    Closed fracture of multiple ribs of right side, initial encounter 10/28/2024     Priority: Medium    Sensorineural hearing loss (SNHL) of both ears 07/29/2024     Priority: Medium    Cardiomyopathy, unspecified type (H) 03/07/2024     Priority: Medium     EF 50-55% 2023  45 to 50% 1/2024  Heart failure episode 2/2024  Negative Lexiscan 2/2024  EF 45-50% 7/2024 echo   Dry weight 154 pounds at home  Jardiance and entresto cost prohibitive       Longstanding persistent atrial fibrillation (H) 02/07/2024     Priority: Medium    Insomnia, unspecified type 12/13/2023     Priority: Medium    Chronic heart failure with preserved ejection fraction (H) 11/29/2023     Priority: Medium     Heart failure episode 2/2024  Negative Lexiscan 2/2024  Dry weight 154 pounds at home      Chronic anticoagulation 11/24/2023     Priority: Medium     On Xarelto      Anemia, unspecified type 07/06/2023     Priority: Medium    Moderate major depression (H) 07/06/2023     Priority: Medium    S/P revision of total hip 05/25/2023     Priority: Medium    Social phobia 04/13/2023     Priority: Medium    Chronic diastolic heart failure (H) 09/22/2022     Priority: Medium    Valvular heart disease 09/22/2022     Priority: Medium     Moderate to moderately severe MR, moderate TR, mild to moderate AI 2023  Severe TR, moderately severe to severe MR, moderate AI 2024      Tremor 06/29/2022     Priority: Medium    Memory difficulties 05/05/2022     Priority:  Medium    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 02/28/2022     Priority: Medium    Depression with anxiety 02/28/2022     Priority: Medium    Right knee DJD 10/12/2018     Priority: Medium    Peripheral neuropathy 10/12/2018     Priority: Medium    Hyperplastic Polyp 03/05/2018     Priority: Medium    Atrial fibrillation, unspecified type (H) 01/18/2018     Priority: Medium     onset 2018 postoperatively, S/p cardioversion   Recurrence 2021 with palpitations  Now chronic A-fib  Severe LA  Elevated [id number] score      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) 11/28/2017     Priority: Medium    Status post lung surgery 11/18/2017     Priority: Medium    Unilateral inguinal hernia without obstruction or gangrene 11/07/2017     Priority: Medium    Benzodiazepine dependence (H) 10/31/2017     Priority: Medium    GERD (gastroesophageal reflux disease) 08/03/2012     Priority: Medium    Anxiety 02/16/2012     Priority: Medium    Hyperlipidemia LDL goal <100 10/31/2010     Priority: Medium    Osteoarthrosis, unspecified whether generalized or localized, pelvic region and thigh 09/28/2007     Priority: Medium    Hypertrophy of prostate with urinary obstruction 08/03/2006     Priority: Medium     Problem list name updated by automated process. Provider to review      Benign essential hypertension 12/12/2005     Priority: Medium      Past Medical History:   Diagnosis Date    Acute on chronic diastolic (congestive) heart failure (H) 11/29/2023    Arthritis 2005    Benign neoplasm of prostate 2000    Benign Prostate Nodule    Depressive disorder     past condition    Infection due to 2019 novel coronavirus 09/27/2021    S/P knee replacement 11/06/2012    S/P left knee arthroscopy 08/15/2011     Past Surgical History:   Procedure Laterality Date    ABDOMEN SURGERY  2003    ARTHROPLASTY KNEE Right 10/12/2018    Procedure: ARTHROPLASTY KNEE;  Right Total Knee Arthroplasty;  Surgeon: Jeb Peralta MD;  Location: [facility]    ARTHROPLASTY REVISION  HIP Left 5/25/2023    Procedure: Revision total hip arthroplasty, left;  Surgeon: Chandler Leiva MD;  Location: [facility]    BIOPSY  2007    COLONOSCOPY N/A 12/10/2015    Procedure: COMBINED COLONOSCOPY, SINGLE OR MULTIPLE BIOPSY/POLYPECTOMY BY BIOPSY;  Surgeon: Jyoti Figueroa MD;  Location: [facility]    JOINT REPLACEMENT, HIP RT/LT  10/2007    Joint Replacement Hip LT    JOINT REPLACEMTN, KNEE RT/LT  08/2011    Joint Replacement knee /LT, [facility]    LAPAROSCOPIC HERNIORRHAPHY INGUINAL BILATERAL Bilateral 04/24/2018    Procedure: LAPAROSCOPIC HERNIORRHAPHY INGUINAL BILATERAL;  Laparoscopic bilateral inguinal hernia repair;  Surgeon: Josemanuel Resendiz MD;  Location: [facility]    PHACOEMULSIFICATION WITH STANDARD INTRAOCULAR LENS IMPLANT Right 01/06/2021    Procedure: Cataract Removal with Implant;  Surgeon: Regan Kaufman MD;  Location: [facility]    PHACOEMULSIFICATION WITH STANDARD INTRAOCULAR LENS IMPLANT Left 02/17/2021    Procedure: Cataract Removal with Implant;  Surgeon: Regan Kaufman MD;  Location: [facility]    SURGICAL HISTORY OF -   12/01/1999    Umbilical Herniorrhaphy with mesh    SURGICAL HISTORY OF -  Left 11/2017    Thoracotomy and drainage of empyema     Current Outpatient Medications   Medication Sig Dispense Refill    buPROPion (WELLBUTRIN XL) 300 MG 24 hr tablet Take 1 tablet (300 mg) by mouth every morning. 90 tablet 1    busPIRone HCl (BUSPAR) 30 MG tablet TAKE 1 TABLET TWICE A  tablet 1    doxazosin (CARDURA) 4 MG tablet Take 1 tablet (4 mg) by mouth at bedtime. 90 tablet 1    finasteride (PROSCAR) 5 MG tablet TAKE 1 TABLET BY MOUTH EVERY DAY 90 tablet 3    furosemide (LASIX) 20 MG tablet Take 2 tablets (40 mg) by mouth daily. (Patient taking differently: Take 20 mg by mouth daily.) 60 tablet 4    lamoTRIgine (LAMICTAL) 25 MG tablet Take 2 tablets (50 mg) by mouth 2 times daily. 360 tablet 1    losartan (COZAAR) 50 MG tablet Take 1 tablet (50 mg) by mouth daily. 90  "tablet 2    metoprolol succinate ER (TOPROL XL) 200 MG 24 hr tablet Take 1 tablet (200 mg) by mouth every evening. 90 tablet 2    multivitamin (ONE-DAILY) tablet Take 1 tablet by mouth daily 30 tablet 0    PARoxetine (PAXIL) 30 MG tablet Take 1 tablet (30 mg) by mouth every morning. 90 tablet 0    [START ON 3/12/2025] pregabalin (LYRICA) 25 MG capsule Take 1 capsule (25 mg) by mouth at bedtime. 30 capsule 0    pregabalin (LYRICA) 25 MG capsule Take 1 capsule (25 mg) by mouth at bedtime. 30 capsule 0    rivaroxaban ANTICOAGULANT (XARELTO ANTICOAGULANT) 20 MG TABS tablet Take 1 tablet (20 mg) by mouth daily (with dinner). 90 tablet 2    QUEtiapine (SEROQUEL) 25 MG tablet Take 0.5-1 tablets (12.5-25 mg) by mouth at bedtime. (Patient not taking: Reported on 2025) 30 tablet 1       Allergies   Allergen Reactions    Bactrim [Sulfamethoxazole-Trimethoprim] Nausea and Vomiting        Social History     Tobacco Use    Smoking status: Former     Current packs/day: 0.00     Average packs/day: 1 pack/day for 17.8 years (17.8 ttl pk-yrs)     Types: Cigarettes     Start date: 1958     Quit date: 10/13/1975     Years since quittin.4    Smokeless tobacco: Never   Substance Use Topics    Alcohol use: Not Currently     Family History   Problem Relation Age of Onset    Depression Mother     Cerebrovascular Disease Mother     Breast Cancer Mother     Neurologic Disorder Mother         parkinsons    Parkinsonism Mother     Respiratory Father         emphyzema    Diabetes Brother      History   Drug Use No             Review of Systems  Constitutional, HEENT, cardiovascular, pulmonary, gi and gu systems are negative, except as otherwise noted.    Objective    /60 (BP Location: Right arm, Patient Position: Chair, Cuff Size: Adult Small)   Pulse 78   Temp 97  F (36.1  C) (Tympanic)   Resp 20   Ht 1.708 m (5' 7.25\")   Wt 69.9 kg (154 lb)   SpO2 99%   BMI 23.94 kg/m     Estimated body mass index is 23.94 kg/m  as " "calculated from the following:    Height as of this encounter: 1.708 m (5' 7.25\").    Weight as of this encounter: 69.9 kg (154 lb).  Physical Exam  GENERAL: alert and no distress  NECK: no adenopathy, no asymmetry, masses, or scars  RESP: lungs clear to auscultation - no rales, rhonchi or wheezes  CV: regular rate and rhythm, normal S1 S2, no S3 or S4, no murmur, click or rub, no peripheral edema  ABDOMEN: soft, nontender, no hepatosplenomegaly, no masses and bowel sounds normal  MS: no gross musculoskeletal defects noted, no edema    Recent Labs   Lab Test 01/06/25  1434 12/17/24  1318 10/29/24  0549   HGB 11.4*  --  9.2*     --  118*   NA  --  134* 130*   POTASSIUM  --  4.7 3.8   CR  --  0.97 0.88        Diagnostics  Recent Results (from the past week)   Basic Metabolic Panel    Collection Time: 02/24/25  2:42 PM   Result Value Ref Range    Sodium 135 135 - 145 mmol/L    Potassium 4.3 3.4 - 5.3 mmol/L    Chloride 99 98 - 107 mmol/L    Carbon Dioxide (CO2) 25 22 - 29 mmol/L    Anion Gap 11 7 - 15 mmol/L    Urea Nitrogen 16.8 8.0 - 23.0 mg/dL    Creatinine 0.89 0.67 - 1.17 mg/dL    GFR Estimate 86 >60 mL/min/1.73m2    Calcium 9.1 8.8 - 10.4 mg/dL    Glucose 103 (H) 70 - 99 mg/dL   CBC with Platelets and Reflex to Iron Studies    Collection Time: 02/24/25  2:42 PM   Result Value Ref Range    WBC Count 4.1 4.0 - 11.0 10e3/uL    RBC Count 3.30 (L) 4.40 - 5.90 10e6/uL    Hemoglobin 11.1 (L) 13.3 - 17.7 g/dL    Hematocrit 33.6 (L) 40.0 - 53.0 %     (H) 78 - 100 fL    MCH 33.6 (H) 26.5 - 33.0 pg    MCHC 33.0 31.5 - 36.5 g/dL    RDW 13.9 10.0 - 15.0 %    Platelet Count 118 (L) 150 - 450 10e3/uL   Extra Green Top (Lithium Heparin) Tube    Collection Time: 02/24/25  2:42 PM   Result Value Ref Range    Hold Specimen [facility]    Iron & Iron Binding Capacity    Collection Time: 02/24/25  2:42 PM   Result Value Ref Range    Iron 125 61 - 157 ug/dL    Iron Binding Capacity 342 240 - 430 ug/dL    Iron Sat Index 37 15 - " 46 %   Ferritin    Collection Time: 02/24/25  2:42 PM   Result Value Ref Range    Ferritin 118 31 - 409 ng/mL          Revised Cardiac Risk Index (RCRI)  The patient has the following serious cardiovascular risks for perioperative complications:   - Congestive Heart Failure (pulmonary edema, PND, s3 godfrey, CXR with pulmonary congestion, basilar rales) = 1 point     RCRI Interpretation: 0 points: Class I (very low risk - 0.4% complication rate)         Signed Electronically by: JO ANN ZHAO MD  A copy of this evaluation report is provided to the requesting physician.

## 2025-02-24 NOTE — H&P (VIEW-ONLY)
Preoperative Evaluation  [facility]  [address]  [facility] [address]  Phone: [phone]  Primary Provider: Lizzy Leiva MD  Pre-op Performing Provider: JO ANN ZHAO MD  Feb 24, 2025 2/24/2025   Surgical Information   What procedure is being done? AD    Facility or Hospital where procedure/surgery will be performed: [facility]    Who is doing the procedure / surgery? unknown    Date of surgery / procedure: 3/4/2025    Time of surgery / procedure: noon arrival time    Where do you plan to recover after surgery? at home with family        Proxy-reported     Fax number for surgical facility: Note does not need to be faxed, will be available electronically in Epic.    Assessment & Plan     The proposed surgical procedure is considered LOW risk.    Preop general physical exam       Atrial fibrillation, unspecified type (H)     - Basic Metabolic Panel; Future  - CBC w/ Reflex to Iron Studies; Future  - Basic Metabolic Panel  - CBC w/ Reflex to Iron Studies  - Iron & Iron Binding Capacity  - Ferritin    Chronic diastolic heart failure (H)       Medication management     - Med Therapy Management Referral       - No identified additional risk factors other than previously addressed    Preoperative Medication Instructions  Antiplatelet or Anticoagulation Medication Instructions   - Bleeding risk is low for this procedure (e.g. dental, skin, cataract).    Additional Medication Instructions  We reviewed the medication list and there are no chronic medications that need to be adjusted for this procedure.    Recommendation  Approval given to proceed with proposed procedure, without further diagnostic evaluation.    Greg Alonso is a 79 year old, presenting for the following:  Pre-Op Exam          2/24/2025     1:59 PM   Additional Questions   Roomed by Becca ALAS   Accompanied by wife, Anna         2/24/2025   Forms   Any forms needing to be completed Yes      HPI related to upcoming procedure: overall feeling well.   Does get a bit winded with walking, at times.   No chest pain or pressure        2/24/2025   Pre-Op Questionnaire   Have you ever had a heart attack or stroke? No    Have you ever had surgery on your heart or blood vessels, such as a stent placement, a coronary artery bypass, or surgery on an artery in your head, neck, heart, or legs? No    Do you have chest pain with activity? No    Do you have a history of heart failure? No    Do you currently have a cold, bronchitis or symptoms of other infection? No    Do you have a cough, shortness of breath, or wheezing? No    Do you or anyone in your family have previous history of blood clots? No    Do you or does anyone in your family have a serious bleeding problem such as prolonged bleeding following surgeries or cuts? No    Have you ever had problems with anemia or been told to take iron pills? (!) YES     Have you had any abnormal blood loss such as black, tarry or bloody stools? No    Have you ever had a blood transfusion? (!) YES    Have you ever had a transfusion reaction? No    Are you willing to have a blood transfusion if it is medically needed before, during, or after your surgery? Yes    Have you or any of your relatives ever had problems with anesthesia? No    Do you have sleep apnea, excessive snoring or daytime drowsiness? (!) YES    Do you have a CPAP machine? (!) NO     Do you have any artifical heart valves or other implanted medical devices like a pacemaker, defibrillator, or continuous glucose monitor? No    Do you have artificial joints? (!) YES    Are you allergic to latex? 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     Priority: Medium    Constipation, unspecified constipation type 10/29/2024     Priority: Medium    Generalized weakness 10/28/2024     Priority: Medium    Falls frequently 10/28/2024     Priority: Medium    Anticoagulated 10/28/2024     Priority: Medium    Traumatic hematoma of buttock, initial encounter 10/28/2024     Priority: Medium    Closed fracture of multiple ribs of right side, initial encounter 10/28/2024     Priority: Medium    Sensorineural hearing loss (SNHL) of both ears 07/29/2024     Priority: Medium    Cardiomyopathy, unspecified type (H) 03/07/2024     Priority: Medium     EF 50-55% 2023  45 to 50% 1/2024  Heart failure episode 2/2024  Negative Lexiscan 2/2024  EF 45-50% 7/2024 echo   Dry weight 154 pounds at home  Jardiance and entresto cost prohibitive       Longstanding persistent atrial fibrillation (H) 02/07/2024     Priority: Medium    Insomnia, unspecified type 12/13/2023     Priority: Medium    Chronic heart failure with preserved ejection fraction (H) 11/29/2023     Priority: Medium     Heart failure episode 2/2024  Negative Lexiscan 2/2024  Dry weight 154 pounds at home      Chronic anticoagulation 11/24/2023     Priority: Medium     On Xarelto      Anemia, unspecified type 07/06/2023     Priority: Medium    Moderate major depression (H) 07/06/2023     Priority: Medium    S/P revision of total hip 05/25/2023     Priority: Medium    Social phobia 04/13/2023     Priority: Medium    Chronic diastolic heart failure (H) 09/22/2022     Priority: Medium    Valvular heart disease 09/22/2022     Priority: Medium     Moderate to moderately severe MR, moderate TR, mild to moderate AI 2023  Severe TR, moderately severe to severe MR, moderate AI 2024      Tremor 06/29/2022     Priority: Medium    Memory difficulties 05/05/2022     Priority:  Medium    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 02/28/2022     Priority: Medium    Depression with anxiety 02/28/2022     Priority: Medium    Right knee DJD 10/12/2018     Priority: Medium    Peripheral neuropathy 10/12/2018     Priority: Medium    Hyperplastic Polyp 03/05/2018     Priority: Medium    Atrial fibrillation, unspecified type (H) 01/18/2018     Priority: Medium     onset 2018 postoperatively, S/p cardioversion   Recurrence 2021 with palpitations  Now chronic A-fib  Severe LA  Elevated [id number] score      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) 11/28/2017     Priority: Medium    Status post lung surgery 11/18/2017     Priority: Medium    Unilateral inguinal hernia without obstruction or gangrene 11/07/2017     Priority: Medium    Benzodiazepine dependence (H) 10/31/2017     Priority: Medium    GERD (gastroesophageal reflux disease) 08/03/2012     Priority: Medium    Anxiety 02/16/2012     Priority: Medium    Hyperlipidemia LDL goal <100 10/31/2010     Priority: Medium    Osteoarthrosis, unspecified whether generalized or localized, pelvic region and thigh 09/28/2007     Priority: Medium    Hypertrophy of prostate with urinary obstruction 08/03/2006     Priority: Medium     Problem list name updated by automated process. Provider to review      Benign essential hypertension 12/12/2005     Priority: Medium      Past Medical History:   Diagnosis Date    Acute on chronic diastolic (congestive) heart failure (H) 11/29/2023    Arthritis 2005    Benign neoplasm of prostate 2000    Benign Prostate Nodule    Depressive disorder     past condition    Infection due to 2019 novel coronavirus 09/27/2021    S/P knee replacement 11/06/2012    S/P left knee arthroscopy 08/15/2011     Past Surgical History:   Procedure Laterality Date    ABDOMEN SURGERY  2003    ARTHROPLASTY KNEE Right 10/12/2018    Procedure: ARTHROPLASTY KNEE;  Right Total Knee Arthroplasty;  Surgeon: Jeb Peralta MD;  Location: [facility]    ARTHROPLASTY REVISION  HIP Left 5/25/2023    Procedure: Revision total hip arthroplasty, left;  Surgeon: Chandler Leiva MD;  Location: [facility]    BIOPSY  2007    COLONOSCOPY N/A 12/10/2015    Procedure: COMBINED COLONOSCOPY, SINGLE OR MULTIPLE BIOPSY/POLYPECTOMY BY BIOPSY;  Surgeon: Jyoti Figueroa MD;  Location: [facility]    JOINT REPLACEMENT, HIP RT/LT  10/2007    Joint Replacement Hip LT    JOINT REPLACEMTN, KNEE RT/LT  08/2011    Joint Replacement knee /LT, [facility]    LAPAROSCOPIC HERNIORRHAPHY INGUINAL BILATERAL Bilateral 04/24/2018    Procedure: LAPAROSCOPIC HERNIORRHAPHY INGUINAL BILATERAL;  Laparoscopic bilateral inguinal hernia repair;  Surgeon: Josemanuel Resendiz MD;  Location: [facility]    PHACOEMULSIFICATION WITH STANDARD INTRAOCULAR LENS IMPLANT Right 01/06/2021    Procedure: Cataract Removal with Implant;  Surgeon: Regan Kaufman MD;  Location: [facility]    PHACOEMULSIFICATION WITH STANDARD INTRAOCULAR LENS IMPLANT Left 02/17/2021    Procedure: Cataract Removal with Implant;  Surgeon: Regan Kaufman MD;  Location: [facility]    SURGICAL HISTORY OF -   12/01/1999    Umbilical Herniorrhaphy with mesh    SURGICAL HISTORY OF -  Left 11/2017    Thoracotomy and drainage of empyema     Current Outpatient Medications   Medication Sig Dispense Refill    buPROPion (WELLBUTRIN XL) 300 MG 24 hr tablet Take 1 tablet (300 mg) by mouth every morning. 90 tablet 1    busPIRone HCl (BUSPAR) 30 MG tablet TAKE 1 TABLET TWICE A  tablet 1    doxazosin (CARDURA) 4 MG tablet Take 1 tablet (4 mg) by mouth at bedtime. 90 tablet 1    finasteride (PROSCAR) 5 MG tablet TAKE 1 TABLET BY MOUTH EVERY DAY 90 tablet 3    furosemide (LASIX) 20 MG tablet Take 2 tablets (40 mg) by mouth daily. (Patient taking differently: Take 20 mg by mouth daily.) 60 tablet 4    lamoTRIgine (LAMICTAL) 25 MG tablet Take 2 tablets (50 mg) by mouth 2 times daily. 360 tablet 1    losartan (COZAAR) 50 MG tablet Take 1 tablet (50 mg) by mouth daily. 90  "tablet 2    metoprolol succinate ER (TOPROL XL) 200 MG 24 hr tablet Take 1 tablet (200 mg) by mouth every evening. 90 tablet 2    multivitamin (ONE-DAILY) tablet Take 1 tablet by mouth daily 30 tablet 0    PARoxetine (PAXIL) 30 MG tablet Take 1 tablet (30 mg) by mouth every morning. 90 tablet 0    [START ON 3/12/2025] pregabalin (LYRICA) 25 MG capsule Take 1 capsule (25 mg) by mouth at bedtime. 30 capsule 0    pregabalin (LYRICA) 25 MG capsule Take 1 capsule (25 mg) by mouth at bedtime. 30 capsule 0    rivaroxaban ANTICOAGULANT (XARELTO ANTICOAGULANT) 20 MG TABS tablet Take 1 tablet (20 mg) by mouth daily (with dinner). 90 tablet 2    QUEtiapine (SEROQUEL) 25 MG tablet Take 0.5-1 tablets (12.5-25 mg) by mouth at bedtime. (Patient not taking: Reported on 2025) 30 tablet 1       Allergies   Allergen Reactions    Bactrim [Sulfamethoxazole-Trimethoprim] Nausea and Vomiting        Social History     Tobacco Use    Smoking status: Former     Current packs/day: 0.00     Average packs/day: 1 pack/day for 17.8 years (17.8 ttl pk-yrs)     Types: Cigarettes     Start date: 1958     Quit date: 10/13/1975     Years since quittin.4    Smokeless tobacco: Never   Substance Use Topics    Alcohol use: Not Currently     Family History   Problem Relation Age of Onset    Depression Mother     Cerebrovascular Disease Mother     Breast Cancer Mother     Neurologic Disorder Mother         parkinsons    Parkinsonism Mother     Respiratory Father         emphyzema    Diabetes Brother      History   Drug Use No             Review of Systems  Constitutional, HEENT, cardiovascular, pulmonary, gi and gu systems are negative, except as otherwise noted.    Objective    /60 (BP Location: Right arm, Patient Position: Chair, Cuff Size: Adult Small)   Pulse 78   Temp 97  F (36.1  C) (Tympanic)   Resp 20   Ht 1.708 m (5' 7.25\")   Wt 69.9 kg (154 lb)   SpO2 99%   BMI 23.94 kg/m     Estimated body mass index is 23.94 kg/m  as " "calculated from the following:    Height as of this encounter: 1.708 m (5' 7.25\").    Weight as of this encounter: 69.9 kg (154 lb).  Physical Exam  GENERAL: alert and no distress  NECK: no adenopathy, no asymmetry, masses, or scars  RESP: lungs clear to auscultation - no rales, rhonchi or wheezes  CV: regular rate and rhythm, normal S1 S2, no S3 or S4, no murmur, click or rub, no peripheral edema  ABDOMEN: soft, nontender, no hepatosplenomegaly, no masses and bowel sounds normal  MS: no gross musculoskeletal defects noted, no edema    Recent Labs   Lab Test 01/06/25  1434 12/17/24  1318 10/29/24  0549   HGB 11.4*  --  9.2*     --  118*   NA  --  134* 130*   POTASSIUM  --  4.7 3.8   CR  --  0.97 0.88        Diagnostics  Recent Results (from the past week)   Basic Metabolic Panel    Collection Time: 02/24/25  2:42 PM   Result Value Ref Range    Sodium 135 135 - 145 mmol/L    Potassium 4.3 3.4 - 5.3 mmol/L    Chloride 99 98 - 107 mmol/L    Carbon Dioxide (CO2) 25 22 - 29 mmol/L    Anion Gap 11 7 - 15 mmol/L    Urea Nitrogen 16.8 8.0 - 23.0 mg/dL    Creatinine 0.89 0.67 - 1.17 mg/dL    GFR Estimate 86 >60 mL/min/1.73m2    Calcium 9.1 8.8 - 10.4 mg/dL    Glucose 103 (H) 70 - 99 mg/dL   CBC with Platelets and Reflex to Iron Studies    Collection Time: 02/24/25  2:42 PM   Result Value Ref Range    WBC Count 4.1 4.0 - 11.0 10e3/uL    RBC Count 3.30 (L) 4.40 - 5.90 10e6/uL    Hemoglobin 11.1 (L) 13.3 - 17.7 g/dL    Hematocrit 33.6 (L) 40.0 - 53.0 %     (H) 78 - 100 fL    MCH 33.6 (H) 26.5 - 33.0 pg    MCHC 33.0 31.5 - 36.5 g/dL    RDW 13.9 10.0 - 15.0 %    Platelet Count 118 (L) 150 - 450 10e3/uL   Extra Green Top (Lithium Heparin) Tube    Collection Time: 02/24/25  2:42 PM   Result Value Ref Range    Hold Specimen [facility]    Iron & Iron Binding Capacity    Collection Time: 02/24/25  2:42 PM   Result Value Ref Range    Iron 125 61 - 157 ug/dL    Iron Binding Capacity 342 240 - 430 ug/dL    Iron Sat Index 37 15 - " 46 %   Ferritin    Collection Time: 02/24/25  2:42 PM   Result Value Ref Range    Ferritin 118 31 - 409 ng/mL          Revised Cardiac Risk Index (RCRI)  The patient has the following serious cardiovascular risks for perioperative complications:   - Congestive Heart Failure (pulmonary edema, PND, s3 godfrey, CXR with pulmonary congestion, basilar rales) = 1 point     RCRI Interpretation: 0 points: Class I (very low risk - 0.4% complication rate)         Signed Electronically by: JO ANN ZHAO MD  A copy of this evaluation report is provided to the requesting physician.

## 2025-02-24 NOTE — PATIENT INSTRUCTIONS
How to Take Your Medication Before Surgery  Preoperative Medication Instructions   Antiplatelet or Anticoagulation Medication Instructions   - Bleeding risk is low for this procedure (e.g. dental, skin, cataract).    Additional Medication Instructions  We reviewed the medication list and there are no chronic medications that need to be adjusted for this procedure.       Patient Education   Preparing for Your Surgery  For Adults  Getting started  In most cases, a nurse will call to review your health history and instructions. They will give you an arrival time based on your scheduled surgery time. Please be ready to share:  Your doctor's clinic name and phone number  Your medical, surgical, and anesthesia history  A list of allergies and sensitivities  A list of medicines, including herbal treatments and over-the-counter drugs  Whether the patient has a legal guardian (ask how to send us the papers in advance)  Note: You may not receive a call if you were seen at our PAC (Preoperative Assessment Center).  Please tell us if you're pregnant--or if there's any chance you might be pregnant. Some surgeries may injure a fetus (unborn baby), so they require a pregnancy test. Surgeries that are safe for a fetus don't always need a test, and you can choose whether to have one.   Preparing for surgery  Within 10 to 30 days of surgery: Have a pre-op exam (sometimes called an H&P, or History and Physical). This can be done at a clinic or pre-operative center.  If you're having a , you may not need this exam. Talk to your care team.  At your pre-op exam, talk to your care team about all medicines you take. (This includes CBD oil and any drugs, such as THC, marijuana, and other forms of cannabis.) If you need to stop any medicine before surgery, ask when to start taking it again.  This is for your safety. Many medicines and drugs can make you bleed too much during surgery. Some change how well surgery (anesthesia) drugs  work.  Call your insurance company to let them know you're having surgery. (If you don't have insurance, call [phone].)  Call your clinic if there's any change in your health. This includes a scrape or scratch near the surgery site, or any signs of a cold (sore throat, runny nose, cough, rash, fever).  Eating and drinking guidelines  For your safety: Unless your surgeon tells you otherwise, follow the guidelines below.  Eat and drink as normal until 8 hours before you arrive for surgery. After that, no food or milk. You can spit out gum when you arrive.  Drink clear liquids until 2 hours before you arrive. These are liquids you can see through, like water, Gatorade, and Propel Water. They also include plain black coffee and tea (no cream or milk).  No alcohol for 24 hours before you arrive. The night before surgery, stop any drinks that contain THC.  If your care team tells you to take medicine on the morning of surgery, it's okay to take it with a sip of water. No other medicines or drugs are allowed (including CBD oil)--follow your care team's instructions.  If you have questions the day of surgery, call your hospital or surgery center.   Preventing infection  Shower or bathe the night before and the morning of surgery. Follow the instructions your clinic gave you. (If no instructions, use regular soap.)  Don't shave or clip hair near your surgery site. We'll remove the hair if needed.  Don't smoke or vape the morning of surgery. No chewing tobacco for 6 hours before you arrive. A nicotine patch is okay. You may spit out nicotine gum when you arrive.  For some surgeries, the surgeon will tell you to fully quit smoking and nicotine.  We will make every effort to keep you safe from infection. We will:  Clean our hands often with soap and water (or an alcohol-based hand rub).  Clean the skin at your surgery site with a special soap that kills germs.  Give you a special gown to keep you warm. (Cold raises the  risk of infection.)  Wear hair covers, masks, gowns, and gloves during surgery.  Give antibiotic medicine, if prescribed. Not all surgeries need this medicine.  What to bring on the day of surgery  Photo ID and insurance card  Copy of your health care directive, if you have one  Glasses and hearing aids (bring cases)  You can't wear contacts during surgery  Inhaler and eye drops, if you use them (tell us about these when you arrive)  CPAP machine or breathing device, if you use them  A few personal items, if spending the night  If you have . . .  A pacemaker, ICD (cardiac defibrillator), or other implant: Bring the ID card.  An implanted stimulator: Bring the remote control.  A legal guardian: Bring a copy of the certified (court-stamped) guardianship papers.  Please remove any jewelry, including body piercings. Leave jewelry and other valuables at home.  If you're going home the day of surgery  You must have a responsible adult drive you home. They should stay with you overnight as well.  If you don't have someone to stay with you, and you aren't safe to go home alone, we may keep you overnight. Insurance often won't pay for this.  After surgery  If it's hard to control your pain or you need more pain medicine, please call your surgeon's office.  Questions?   If you have any questions for your care team, list them here:   ____________________________________________________________________________________________________________________________________________________________________________________________________________________________________________________________  For informational purposes only. Not to replace the advice of your health care provider. Copyright   2003, 2019 [facility]. All rights reserved. Clinically reviewed by Anurag Piedra MD. [employer] [id number] - REV 08/24.

## 2025-03-04 ENCOUNTER — HOSPITAL ENCOUNTER (OUTPATIENT)
Dept: CARDIOLOGY | Facility: HOSPITAL | Age: 80
Discharge: HOME | End: 2025-03-04
Attending: INTERNAL MEDICINE | Admitting: INTERNAL MEDICINE
Payer: COMMERCIAL

## 2025-03-04 VITALS
SYSTOLIC BLOOD PRESSURE: 122 MMHG | HEART RATE: 83 BPM | TEMPERATURE: 97.1 F | OXYGEN SATURATION: 97 % | DIASTOLIC BLOOD PRESSURE: 83 MMHG | RESPIRATION RATE: 26 BRPM

## 2025-03-04 DIAGNOSIS — I34.0 MITRAL VALVE INSUFFICIENCY, UNSPECIFIED ETIOLOGY: ICD-10-CM

## 2025-03-04 LAB — LVEF ECHO: NORMAL

## 2025-03-04 PROCEDURE — 93320 DOPPLER ECHO COMPLETE: CPT | Mod: 26 | Performed by: INTERNAL MEDICINE

## 2025-03-04 PROCEDURE — 93312 ECHO TRANSESOPHAGEAL: CPT | Mod: 26 | Performed by: INTERNAL MEDICINE

## 2025-03-04 PROCEDURE — 99152 MOD SED SAME PHYS/QHP 5/>YRS: CPT | Performed by: INTERNAL MEDICINE

## 2025-03-04 PROCEDURE — 93325 DOPPLER ECHO COLOR FLOW MAPG: CPT | Mod: 26 | Performed by: INTERNAL MEDICINE

## 2025-03-04 PROCEDURE — 250N000009 HC RX 250: Performed by: INTERNAL MEDICINE

## 2025-03-04 PROCEDURE — 250N000011 HC RX IP 250 OP 636: Performed by: INTERNAL MEDICINE

## 2025-03-04 PROCEDURE — 93325 DOPPLER ECHO COLOR FLOW MAPG: CPT

## 2025-03-04 RX ORDER — BENZOCAINE/MENTHOL 6 MG-10 MG
1 LOZENGE MUCOUS MEMBRANE 3 TIMES DAILY PRN
Status: DISCONTINUED | OUTPATIENT
Start: 2025-03-04 | End: 2025-03-04 | Stop reason: HOSPADM

## 2025-03-04 RX ORDER — FENTANYL CITRATE 50 UG/ML
INJECTION, SOLUTION INTRAMUSCULAR; INTRAVENOUS
Status: COMPLETED | OUTPATIENT
Start: 2025-03-04 | End: 2025-03-04

## 2025-03-04 RX ORDER — SODIUM CHLORIDE 9 MG/ML
30 INJECTION, SOLUTION INTRAVENOUS CONTINUOUS
Status: DISCONTINUED | OUTPATIENT
Start: 2025-03-04 | End: 2025-03-04 | Stop reason: HOSPADM

## 2025-03-04 RX ORDER — MAGNESIUM HYDROXIDE/ALUMINUM HYDROXICE/SIMETHICONE 120; 1200; 1200 MG/30ML; MG/30ML; MG/30ML
30 SUSPENSION ORAL EVERY 8 HOURS PRN
Status: DISCONTINUED | OUTPATIENT
Start: 2025-03-04 | End: 2025-03-04 | Stop reason: HOSPADM

## 2025-03-04 RX ORDER — ACETAMINOPHEN 325 MG/1
650 TABLET ORAL EVERY 4 HOURS PRN
Status: DISCONTINUED | OUTPATIENT
Start: 2025-03-04 | End: 2025-03-04 | Stop reason: HOSPADM

## 2025-03-04 RX ORDER — LIDOCAINE HYDROCHLORIDE 20 MG/ML
SOLUTION OROPHARYNGEAL
Status: COMPLETED | OUTPATIENT
Start: 2025-03-04 | End: 2025-03-04

## 2025-03-04 RX ORDER — LIDOCAINE 40 MG/G
CREAM TOPICAL
Status: DISCONTINUED | OUTPATIENT
Start: 2025-03-04 | End: 2025-03-04 | Stop reason: HOSPADM

## 2025-03-04 RX ADMIN — LIDOCAINE HYDROCHLORIDE 15 ML: 20 SOLUTION ORAL; TOPICAL at 12:45

## 2025-03-04 RX ADMIN — MIDAZOLAM HYDROCHLORIDE 0.5 MG: 1 INJECTION, SOLUTION INTRAMUSCULAR; INTRAVENOUS at 12:53

## 2025-03-04 RX ADMIN — FENTANYL CITRATE 50 MCG: 50 INJECTION, SOLUTION INTRAMUSCULAR; INTRAVENOUS at 12:50

## 2025-03-04 RX ADMIN — MIDAZOLAM HYDROCHLORIDE 2 MG: 1 INJECTION, SOLUTION INTRAMUSCULAR; INTRAVENOUS at 12:50

## 2025-03-04 RX ADMIN — TOPICAL ANESTHETIC 0.5 ML: 200 SPRAY DENTAL; PERIODONTAL at 12:46

## 2025-03-04 RX ADMIN — FENTANYL CITRATE 25 MCG: 50 INJECTION, SOLUTION INTRAMUSCULAR; INTRAVENOUS at 12:56

## 2025-03-04 ASSESSMENT — ACTIVITIES OF DAILY LIVING (ADL)
ADLS_ACUITY_SCORE: 55
ADLS_ACUITY_SCORE: 55

## 2025-03-04 NOTE — INTERVAL H&P NOTE
I have reviewed the surgical (or preoperative) H&P that is linked to this encounter, and examined the patient. There are no significant changes    Clinical Conditions Present on Arrival:  Clinically Significant Risk Factors Present on Admission         # Hyponatremia: Lowest Na = 135 mmol/L in last 30 days, will monitor as appropriate         # Drug Induced Coagulation Defect: home medication list includes an anticoagulant medication  # Thrombocytopenia: Lowest platelets = 118 in last 30 days, will monitor for bleeding

## 2025-03-04 NOTE — PROGRESS NOTES
AD completed.  Pt was given 2.5mg Versed and 75mcgs Fentanyl during AD.  Pt did not tolerate the procedure well.  He was well sedated but seemed to be fighting the AD probe, coughing and required 15L of oxygen via oxymask during the procedure.  Sats were mid 80's on this amount of oxygen.  Dr Bailey was aware and aborted the procedure early.  It discussed that if this needed to be repeated, it would be safest to do under general anesthesia.   The pt's oxygen levels recovered fairly quickly after AD probe removed and pt was able to be weaned to RA prior to discharge.  See flowsheet for vital signs.  Pt had non productive, tickly cough in recovery.  Discharge instructions reviewed with pt's wife.  Pt has had recent falls at home so he and his wife were encouraged to move slowly and carefully when he got home to avoid further falls, especially with the conscious sedation on board.  Pt was agreeable to plan.  Coleen Martinez RN

## 2025-03-04 NOTE — DISCHARGE INSTRUCTIONS
1. You are required to have someone accompany you home.    2. Rest today. Do not drive or operate machinery today. Over-activity may produce nausea and dizziness.    3. You should follow your normal diet. Drink plenty of fluids. Do not drink any alcoholic beverages for 24 hours. *(Alcohol may interact with the medications you received today).    4. NO HOT FOODS or LIQUIDS FOR 6 HOURS after the procedure.  (After 7pm tonight)    5. You may have a sore throat or cough. This is normal. These symptoms should resolve in 24 hours.     6. If you have further questions call your doctor.

## 2025-03-04 NOTE — PRE-PROCEDURE
GENERAL PRE-PROCEDURE:   Procedure:  Transesophageal echocardiogram  Date/Time:  3/4/2025 12:45 PM    Verbal consent obtained?: Yes    Written consent obtained?: Yes    Risks and benefits: Risks, benefits and alternatives were discussed    Consent given by:  Patient  Patient states understanding of procedure being performed: Yes    Patient's understanding of procedure matches consent: Yes    Procedure consent matches procedure scheduled: Yes    Expected level of sedation:  Moderate  Appropriately NPO:  Yes  Mallampati  :  Grade 2- soft palate, base of uvula, tonsillar pillars, and portion of posterior pharyngeal wall visible  Lungs:  Lungs clear with good breath sounds bilaterally  Heart:  RRR and systolic murmur  History & Physical reviewed:  History and physical reviewed and no updates needed  Statement of review:  I have reviewed the lab findings, diagnostic data, medications, and the plan for sedation

## 2025-03-06 ENCOUNTER — OFFICE VISIT (OUTPATIENT)
Dept: FAMILY MEDICINE | Facility: CLINIC | Age: 80
End: 2025-03-06
Payer: COMMERCIAL

## 2025-03-06 VITALS
DIASTOLIC BLOOD PRESSURE: 72 MMHG | HEART RATE: 89 BPM | BODY MASS INDEX: 24.72 KG/M2 | RESPIRATION RATE: 16 BRPM | SYSTOLIC BLOOD PRESSURE: 126 MMHG | OXYGEN SATURATION: 100 % | WEIGHT: 159 LBS | TEMPERATURE: 97.1 F

## 2025-03-06 DIAGNOSIS — F13.20 BENZODIAZEPINE DEPENDENCE (H): ICD-10-CM

## 2025-03-06 DIAGNOSIS — G47.61 PERIODIC LIMB MOVEMENT DISORDER: Primary | ICD-10-CM

## 2025-03-06 ASSESSMENT — PAIN SCALES - GENERAL: PAINLEVEL_OUTOF10: NO PAIN (0)

## 2025-03-06 NOTE — PATIENT INSTRUCTIONS
Lets try stopping the quetiapine for 2 weeks. Monitor your symptoms Including:   Limb movements, leg movements, body jerking as well as sleep.     If no changes are noted then I would restart the medication and monitor again for symptoms.   If improving or staying the same (not worsening) I would consider increasing the dose but recommend a follow up to discuss further.

## 2025-03-06 NOTE — PROGRESS NOTES
Assessment & Plan     Periodic limb movement disorder  See patient instructions below. I do not believe the initiation of seroquel is causing increased symptoms. I would increase to 50mg if he finds that there is no change in symptoms after stopping for a couple weeks.     Benzodiazepine dependence (H)  In remission. Noted for continued evaluation and management of his reported insomnia and movement disorder. He has been off of ambien since 10/2024.     Advised to follow up with PCP in a couple weeks.     Patient Instructions   Lets try stopping the quetiapine for 2 weeks. Monitor your symptoms Including:   Limb movements, leg movements, body jerking as well as sleep.     If no changes are noted then I would restart the medication and monitor again for symptoms.   If improving or staying the same (not worsening) I would consider increasing the dose but recommend a follow up to discuss further.       Subjective   Gavin is a 79 year old, presenting for the following health issues:  Fall        3/6/2025     2:28 PM   Additional Questions   Roomed by Cassi PIERCE MA   Accompanied by Spouse     HPI      *Body twitching, jerking, itching-Wife thinks this is a side effect of a medication (Seroquel) that he started a month ago. Patient states that he is still taking this at night.    Jerking/movement is worse in the evening and overnight. Having a hard time sleeping. Falling asleep standing at the counter, sitting, etc    Having some pain in lower right side of back. Wife states that he did fall last week. Falls often. Injured right arm, has a large patch of skin that looks like it's scraped off. Looks better today, Wife has a photo    No other medication changes have been present.   He denies any chest pain, sob, or difficulty breathing.   He is concerned that the jerking of this limbs is from the seroquel.     He has been to neurology for evaluation, Parkinsons was ruled out.           Review of Systems  Constitutional, HEENT,  cardiovascular, pulmonary, gi and gu systems are negative, except as otherwise noted.      Objective    /72   Pulse 89   Temp 97.1  F (36.2  C) (Tympanic)   Resp 16   Wt 72.1 kg (159 lb)   SpO2 100%   BMI 24.72 kg/m    Body mass index is 24.72 kg/m .    Physical Exam   GENERAL: alert and no distress  NECK: no adenopathy, no asymmetry, masses, or scars  RESP: lungs clear to auscultation - no rales, rhonchi or wheezes  CV: regular rate and rhythm, normal S1 S2, no S3 or S4, no murmur, click or rub, no peripheral edema  ABDOMEN: soft, nontender, no hepatosplenomegaly, no masses and bowel sounds normal  MS: no gross musculoskeletal defects noted, no edema  SKIN: healing skin tear on right elbow  PSYCH: mentation appears normal, affect normal/bright            Signed Electronically by: KAYLA Brody CNP

## 2025-03-17 NOTE — PLAN OF CARE
Goal Outcome Evaluation:  [id number]      Neuro: A&O x4  Cardiac: Tachy  Respiratory: Regular  GI/: Urinary hesitancy  Diet/appetite: Regular  Activity: A1 with walker and belt  Pain: Managing per MAR  Skin: Aquacel clean, dry, intact  LDA's:  LR @ 100     Plan: pending Hgb and BP improvement     Patient is scheduled for a NOB with Dr. Rosenberg on 4/3 @ 9:00am. Her LMP was 2/14

## 2025-03-19 ENCOUNTER — DOCUMENTATION ONLY (OUTPATIENT)
Dept: CARDIOLOGY | Facility: CLINIC | Age: 80
End: 2025-03-19
Payer: COMMERCIAL

## 2025-03-19 DIAGNOSIS — I34.0 MITRAL VALVE INSUFFICIENCY, UNSPECIFIED ETIOLOGY: Primary | ICD-10-CM

## 2025-03-19 LAB
ABO + RH BLD: NORMAL
BLD GP AB SCN SERPL QL: NEGATIVE
SPECIMEN EXP DATE BLD: NORMAL

## 2025-03-19 NOTE — PROGRESS NOTES
"Valve Clinic Prep Worksheet    Patient Name: Josemanuel Edmond  : 1945  AGE: 79 year old     Patient Address: [address] [place] [id number] BMI: 23.94   Height (CM):    Ht Readings from Last 1 Encounters:   25 1.708 m (5' 7.25\")                                                      Weight (kg):   Wt Readings from Last 1 Encounters:   25 72.1 kg (159 lb)                                                          Contact info/Phone number:  [phone]  Initial STS: MV Replacement: 8.55%  MV Repair: 3.42%                                       Referred by: Nazanin Caballero PA-C  Date: 2025 Insurance:Payor: MEDICA / Plan: [employer] / Product Type: HMO /    Patient Care Team: Patient Care Team:  Lizzy Leiva MD as PCP - General (Family Medicine)  Rylee Kyle APRN CNP as Nurse Practitioner (Nurse Practitioner)  Lizzy Leiva MD as Assigned PCP  Adriel Hawkins MD as MD (Neurology)  Marian Correa RPH as Pharmacist (Pharmacist)  Kalen Casillas MD as MD (Hematology & Oncology)  Angeles Sanders APRN CNP as Assigned Behavioral Health Provider  Nazanin Caballero APRN CNP as Assigned Heart and Vascular Provider  Adriel Hawkins MD as Assigned Neuroscience Provider    Past Medical History   Problem List:  2024-10: Persistent disorder of initiating or maintaining sleep  2024-10: Right bundle branch block  2024-10: Cervicalgia  2024-10: Long term (current) use of anticoagulants  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  2024-10: Constipation, unspecified constipation type  2024-10: Generalized weakness  2024-10: Falls frequently  2024-10: Anticoagulated  2024-10: Traumatic hematoma of buttock, initial encounter  2024-10: Closed fracture of multiple ribs of right side, initial   encounter  2024: Sensorineural hearing loss (SNHL) of both ears  2024: Cardiomyopathy, unspecified type (H)  2024: Longstanding persistent atrial " fibrillation (H)  2023-12: Insomnia, unspecified type  2023-11: Chronic heart failure with preserved ejection fraction (H)  2023-11: Chronic anticoagulation  2023-11: Hip pain, left  2023-07: Anemia, unspecified type  2023-07: Moderate major depression (H)  2023-05: S/P revision of total hip  2023-04: Social phobia  2022-09: Dyspnea on exertion  2022-09: Chronic diastolic heart failure (H)  2022-09: Valvular heart disease  2022-06: Thrombocytopenia  2022-06: Weakness   2022-06: Tremor  2022-05: Memory difficulties  2022-02: Pancytopenia (H)  2022-02: Empyema (H)  2022-02: 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  2022-02: Depression with anxiety  2021-09: Infection due to 2019 novel coronavirus  2019-07: Hematoma of skin  2019-07: Traumatic ecchymosis of buttock, initial encounter  2018-10: Right knee DJD  2018-10: Peripheral neuropathy  2018-10: Sleep disturbance  2018-03: Hyperplastic Polyp  2018-01: Atrial fibrillation, unspecified type (H)  2017-11: 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)  2017-11: Status post lung surgery  2017-11: Left upper quadrant pain  2017-11: Unilateral inguinal hernia without obstruction or gangrene  2017-11: Community acquired pneumonia  2017-10: Benzodiazepine dependence (H)  2017-04: Alcohol abuse counseling and surveillance  2014-12: ED (erectile dysfunction)  2014-07: Alcohol abuse  2012-11: S/P knee replacement  2012-10: Advanced care planning/counseling discussion  2012-08: GERD (gastroesophageal reflux disease)  2012-02: Anxiety  2011-08: S/P left knee arthroscopy  2011-04: Allergic rhinitis  2011-04: Conjunctivitis, allergic  2010-10: Hyperlipidemia LDL goal <100  2007-09: Osteoarthrosis, unspecified whether generalized or localized,   pelvic region and thigh  2006-08: Hypertrophy of prostate with urinary obstruction  2005-12: Cerebral infarction due to occlusion or stenosis of carotid   artery  2005-12: Depressive disorder, not elsewhere classified  2005-12: Hyperlipidemia  2005-12: Benign essential  hypertension     Transthoracic Echocardiogram    Date (7/26/2024):  EF:  45-50%.The right ventricular systolic function is mildly reduced. There is mod-severe to severe (3-4+) mitral regurgitation. There is severe (4+) tricuspid regurgitation.Right ventricular systolic pressure is elevated, consistent w/ mod pulm hypertension. There is mod- severe (3+) pulmonic valvular regurgitation.   Heart Cath Summary CTA (TAVR) AD   No results found.  Not ordered EF:  55-60%.  There is bileaflet prolapse of the mitral valve leaflets with malcoaptation  of leaflet tips. Severe (4+) regurgitation is identified with a regurgitant volume by PISA of 65 ml.  There is likely moderate (2+) aortic regurgitation present (incompletely  evaluated). Severe (4+) to massive (5+) Tricuspid valve regurgitation is present, incompletely evaluated.    The study needed to be terminated prematurely due to persistent hypoxia.  Gastric views were not obtained   Labs   Creat:   Creatinine   Date Value Ref Range Status   02/24/2025 0.89 0.67 - 1.17 mg/dL Final   06/03/2021 0.91 0.66 - 1.25 mg/dL Final       GFR:   GFR Estimate   Date Value Ref Range Status   02/24/2025 86 >60 mL/min/1.73m2 Final     Comment:     eGFR calculated using 2021 CKD-EPI equation.   12/17/2024 78 >60 mL/min/1.73m2 Final     Comment:     eGFR calculated using 2021 CKD-EPI equation.   10/29/2024 86 >60 mL/min/1.73m2 Final     Comment:     eGFR calculated using 2021 CKD-EPI equation.   06/03/2021 80 >60 mL/min/[1.73_m2] Final     Comment:     Non  GFR Calc  Starting 12/18/2018, serum creatinine based estimated GFR (eGFR) will be   calculated using the Chronic Kidney Disease Epidemiology Collaboration   (CKD-EPI) equation.     09/04/2019 86 >60 mL/min/[1.73_m2] Final     Comment:     Non  GFR Calc  Starting 12/18/2018, serum creatinine based estimated GFR (eGFR) will be   calculated using the Chronic Kidney Disease Epidemiology Collaboration    (CKD-EPI) equation.     09/14/2018 79 >60 mL/min/1.7m2 Final     Comment:     Non  GFR Calc     GFR Estimate If Black   Date Value Ref Range Status   06/03/2021 >90 >60 mL/min/[1.73_m2] Final     Comment:      GFR Calc  Starting 12/18/2018, serum creatinine based estimated GFR (eGFR) will be   calculated using the Chronic Kidney Disease Epidemiology Collaboration   (CKD-EPI) equation.     09/04/2019 >90 >60 mL/min/[1.73_m2] Final     Comment:      GFR Calc  Starting 12/18/2018, serum creatinine based estimated GFR (eGFR) will be   calculated using the Chronic Kidney Disease Epidemiology Collaboration   (CKD-EPI) equation.     09/14/2018 >90 >60 mL/min/1.7m2 Final     Comment:      GFR Calc       Potassium:   Potassium   Date Value Ref Range Status   02/24/2025 4.3 3.4 - 5.3 mmol/L Final   07/26/2022 4.2 3.4 - 5.3 mmol/L Final   06/03/2021 4.5 3.4 - 5.3 mmol/L Final       Sodium:   Sodium   Date Value Ref Range Status   02/24/2025 135 135 - 145 mmol/L Final   06/03/2021 132 (L) 133 - 144 mmol/L Final       WBC:   WBC   Date Value Ref Range Status   06/03/2021 5.7 4.0 - 11.0 10e9/L Final     WBC Count   Date Value Ref Range Status   02/24/2025 4.1 4.0 - 11.0 10e3/uL Final       Hgb:   Hemoglobin   Date Value Ref Range Status   02/24/2025 11.1 (L) 13.3 - 17.7 g/dL Final   01/06/2025 11.4 (L) 13.3 - 17.7 g/dL Final   06/03/2021 12.3 (L) 13.3 - 17.7 g/dL Final   09/04/2019 12.7 (L) 13.3 - 17.7 g/dL Final       HCT:   Hematocrit   Date Value Ref Range Status   02/24/2025 33.6 (L) 40.0 - 53.0 % Final   06/03/2021 35.6 (L) 40.0 - 53.0 % Final       Plts:   Platelet Count   Date Value Ref Range Status   02/24/2025 118 (L) 150 - 450 10e3/uL Final   06/03/2021 184 150 - 450 10e9/L Final       Albumin:   Lab Results   Component Value Date    ALBUMIN 3.9 10/28/2024    ALBUMIN 3.6 07/26/2022    ALBUMIN 3.9 06/03/2021       INR:   INR   Date Value Ref Range Status    11/04/2022 1.39 (H) 0.85 - 1.15 Final   11/15/2012 2.73 (H) 0.86 - 1.14 Final        Current Medications: Allergies:    Current Outpatient Medications   Medication Sig Dispense Refill    buPROPion (WELLBUTRIN XL) 300 MG 24 hr tablet Take 1 tablet (300 mg) by mouth every morning. 90 tablet 1    busPIRone HCl (BUSPAR) 30 MG tablet TAKE 1 TABLET TWICE A  tablet 1    doxazosin (CARDURA) 4 MG tablet Take 1 tablet (4 mg) by mouth at bedtime. 90 tablet 1    finasteride (PROSCAR) 5 MG tablet TAKE 1 TABLET BY MOUTH EVERY DAY 90 tablet 3    furosemide (LASIX) 20 MG tablet Take 2 tablets (40 mg) by mouth daily. 60 tablet 4    lamoTRIgine (LAMICTAL) 25 MG tablet Take 2 tablets (50 mg) by mouth 2 times daily. 360 tablet 1    losartan (COZAAR) 50 MG tablet Take 1 tablet (50 mg) by mouth daily. 90 tablet 2    metoprolol succinate ER (TOPROL XL) 200 MG 24 hr tablet Take 1 tablet (200 mg) by mouth every evening. 90 tablet 2    multivitamin (ONE-DAILY) tablet Take 1 tablet by mouth daily 30 tablet 0    PARoxetine (PAXIL) 30 MG tablet Take 1 tablet (30 mg) by mouth every morning. 90 tablet 0    pregabalin (LYRICA) 25 MG capsule Take 1 capsule (25 mg) by mouth at bedtime. (Patient not taking: Reported on 3/6/2025) 30 capsule 0    pregabalin (LYRICA) 25 MG capsule Take 1 capsule (25 mg) by mouth at bedtime. 30 capsule 0    QUEtiapine (SEROQUEL) 25 MG tablet Take 0.5-1 tablets (12.5-25 mg) by mouth at bedtime. 30 tablet 1    rivaroxaban ANTICOAGULANT (XARELTO ANTICOAGULANT) 20 MG TABS tablet Take 1 tablet (20 mg) by mouth daily (with dinner). 90 tablet 2     No current facility-administered medications for this visit.        Allergies   Allergen Reactions    Bactrim [Sulfamethoxazole-Trimethoprim] Nausea and Vomiting

## 2025-03-20 ENCOUNTER — LAB (OUTPATIENT)
Dept: CARDIOLOGY | Facility: CLINIC | Age: 80
End: 2025-03-20
Payer: COMMERCIAL

## 2025-03-20 ENCOUNTER — OFFICE VISIT (OUTPATIENT)
Dept: CARDIOLOGY | Facility: CLINIC | Age: 80
End: 2025-03-20
Payer: COMMERCIAL

## 2025-03-20 ENCOUNTER — ALLIED HEALTH/NURSE VISIT (OUTPATIENT)
Dept: CARDIOLOGY | Facility: CLINIC | Age: 80
End: 2025-03-20
Payer: COMMERCIAL

## 2025-03-20 VITALS
DIASTOLIC BLOOD PRESSURE: 75 MMHG | HEART RATE: 74 BPM | RESPIRATION RATE: 16 BRPM | OXYGEN SATURATION: 99 % | SYSTOLIC BLOOD PRESSURE: 117 MMHG | HEIGHT: 68 IN | WEIGHT: 156.8 LBS | BODY MASS INDEX: 23.76 KG/M2

## 2025-03-20 DIAGNOSIS — I51.89 OTHER ILL-DEFINED HEART DISEASES: ICD-10-CM

## 2025-03-20 DIAGNOSIS — E78.5 HYPERLIPIDEMIA LDL GOAL <100: ICD-10-CM

## 2025-03-20 DIAGNOSIS — I34.0 MITRAL VALVE INSUFFICIENCY, UNSPECIFIED ETIOLOGY: ICD-10-CM

## 2025-03-20 DIAGNOSIS — I42.9 CARDIOMYOPATHY, UNSPECIFIED TYPE (H): ICD-10-CM

## 2025-03-20 DIAGNOSIS — I07.1 TRICUSPID VALVE INSUFFICIENCY, UNSPECIFIED ETIOLOGY: Primary | ICD-10-CM

## 2025-03-20 DIAGNOSIS — I10 BENIGN ESSENTIAL HYPERTENSION: ICD-10-CM

## 2025-03-20 DIAGNOSIS — I38 VALVULAR HEART DISEASE: ICD-10-CM

## 2025-03-20 DIAGNOSIS — I48.91 ATRIAL FIBRILLATION, UNSPECIFIED TYPE (H): ICD-10-CM

## 2025-03-20 DIAGNOSIS — I50.32 CHRONIC HEART FAILURE WITH PRESERVED EJECTION FRACTION (H): ICD-10-CM

## 2025-03-20 DIAGNOSIS — I07.1 TRICUSPID VALVE INSUFFICIENCY, UNSPECIFIED ETIOLOGY: ICD-10-CM

## 2025-03-20 DIAGNOSIS — I48.20 CHRONIC ATRIAL FIBRILLATION (H): ICD-10-CM

## 2025-03-20 LAB
ANION GAP SERPL CALCULATED.3IONS-SCNC: 9 MMOL/L (ref 7–15)
ATRIAL RATE - MUSE: 72 BPM
BUN SERPL-MCNC: 14.4 MG/DL (ref 8–23)
CALCIUM SERPL-MCNC: 9.2 MG/DL (ref 8.8–10.4)
CHLORIDE SERPL-SCNC: 98 MMOL/L (ref 98–107)
CREAT SERPL-MCNC: 0.89 MG/DL (ref 0.67–1.17)
DIASTOLIC BLOOD PRESSURE - MUSE: NORMAL MMHG
EGFRCR SERPLBLD CKD-EPI 2021: 86 ML/MIN/1.73M2
ERYTHROCYTE [DISTWIDTH] IN BLOOD BY AUTOMATED COUNT: 14.8 % (ref 10–15)
GLUCOSE SERPL-MCNC: 87 MG/DL (ref 70–99)
HCO3 SERPL-SCNC: 28 MMOL/L (ref 22–29)
HCT VFR BLD AUTO: 35 % (ref 40–53)
HGB BLD-MCNC: 11.2 G/DL (ref 13.3–17.7)
INTERPRETATION ECG - MUSE: NORMAL
MCH RBC QN AUTO: 32.4 PG (ref 26.5–33)
MCHC RBC AUTO-ENTMCNC: 32 G/DL (ref 31.5–36.5)
MCV RBC AUTO: 101 FL (ref 78–100)
P AXIS - MUSE: NORMAL DEGREES
PLATELET # BLD AUTO: 117 10E3/UL (ref 150–450)
POTASSIUM SERPL-SCNC: 4.7 MMOL/L (ref 3.4–5.3)
PR INTERVAL - MUSE: NORMAL MS
QRS DURATION - MUSE: 112 MS
QT - MUSE: 426 MS
QTC - MUSE: 472 MS
R AXIS - MUSE: 14 DEGREES
RBC # BLD AUTO: 3.46 10E6/UL (ref 4.4–5.9)
SODIUM SERPL-SCNC: 135 MMOL/L (ref 135–145)
SYSTOLIC BLOOD PRESSURE - MUSE: NORMAL MMHG
T AXIS - MUSE: 29 DEGREES
VENTRICULAR RATE- MUSE: 74 BPM
WBC # BLD AUTO: 5.3 10E3/UL (ref 4–11)

## 2025-03-20 PROCEDURE — 36415 COLL VENOUS BLD VENIPUNCTURE: CPT

## 2025-03-20 PROCEDURE — 80048 BASIC METABOLIC PNL TOTAL CA: CPT

## 2025-03-20 PROCEDURE — 86850 RBC ANTIBODY SCREEN: CPT

## 2025-03-20 PROCEDURE — 86900 BLOOD TYPING SEROLOGIC ABO: CPT

## 2025-03-20 PROCEDURE — 86901 BLOOD TYPING SEROLOGIC RH(D): CPT

## 2025-03-20 PROCEDURE — 85027 COMPLETE CBC AUTOMATED: CPT

## 2025-03-20 NOTE — PROGRESS NOTES
HEART CARE VALVE CLINIC ENCOUNTER CONSULTATON NOTE      [facility]  [phone]      Assessment/Recommendations   Assessment/Plan: Josemanuel Edmond is an 79M w/ severe MR, TR, and moderate AI, HFPEF, AF who is here for evaluation of management options for his valvular disease.    -  I personally reviewed cardiac imaging including: AD, which showed 1-2+ AI, severe MR, and torrential TR.  -  his options include OHS w/ multi-valve replacement/repair, CHANTAL for MR followed possibly by CHANTAL for TR v. Evoque  -  he is really not interested in OHS but understands that we should get more information before any concrete decisions  -  after a long discussion of natural history, risk/benefit, and management options w/  the patient and family, we all agree to proceed w/ the remainder of w/up including angio +/- PCI and RHC, CTA for Evoque eval, CTS consult, Dental eval  - once the w/up is complete, it may be good to see him w/ CTS in joined visit to discuss options    The longitudinal plan of care for the diagnosis(es)/condition(s) as documented were addressed during this visit. Due to the added complexity in care, I will continue to support Gavin in the subsequent management and with ongoing continuity of care.    A total of >60 mins was spent reviewing prior records, including documentation, lab studies, cardiac testing/imaging, interview with patient, physical exam, and documentation     History of Present Illness/Subjective    HPI: Josemanuel Edmond is a 79 year old male w/ severe MR, TR, and moderate AI, HFPEF, AF who is here for evaluation of management options for his valvular disease.    He has had AF for years. Last year had a series of falls, now thought to be 2/2 ambien, which had been stopped.  No CP + SOB, + [place] w/ relatively minimal exertion of walking in here from the  parking lot. No orthopnea/PND, + R>L edema. + fatigue. NonN/V/D/F/C/wt. Changes.    Lives w/ his wife, retired tiny  "business owner, no tobacco/EtOH in years.      Recent Echocardiogram Results:  2. Normal right ventricle size and systolic function.  3. The left atrium is severely dilated.  4. There is bileaflet prolapse of the mitral valve leaflets with malcoaptation  of leaflet tips. Severe (4+) regurgitation is identified with a regurgitant  volume by PISA of 65 ml.  5. There is likely moderate (2+) aortic regurgitation present (incompletely  evaluated).  6. Severe (4+) to massive (5+) Tricuspid valve regurgitation is present,  incompletely evaluated.     The study needed to be terminated prematurely due to persistent hypoxia.  Suspect AD probe was interfering with respiration. Gastric views were not  obtained. Consider repeating AD with general anesthesia.     Note: the mitral valve was well-visualized and likely amenable to CHANTAL. The  tricuspid valve was not visualized clearly enough to be amenable to CHANTAL.  Perhaps, visualization would be improved under general anesthesia.    Recent Coronary Angiogram Results:  None       Physical Examination  Review of Systems   Vitals: /75 (BP Location: Right arm, Patient Position: Sitting, Cuff Size: Adult Regular)   Pulse 74   Resp 16   Ht 1.727 m (5' 8\")   Wt 71.1 kg (156 lb 12.8 oz)   SpO2 99%   BMI 23.84 kg/m    BMI= Body mass index is 23.84 kg/m .  Wt Readings from Last 3 Encounters:   03/20/25 71.1 kg (156 lb 12.8 oz)   03/06/25 72.1 kg (159 lb)   02/24/25 69.9 kg (154 lb)       General Appearance:   no distress, normal body habitus   ENT/Mouth: membranes moist, no oral lesions or bleeding gums.      EYES:  no scleral icterus, normal conjunctivae   Neck: no carotid bruits or thyromegaly   Chest/Lungs:   lungs are clear to auscultation, no rales or wheezing, no sternal scar, equal chest wall expansion    Cardiovascular:   Regular. Normal first and second heart sounds with 2/6 systolic murmur, no rubs, or gallops; the carotid, radial and posterior tibial pulses are " intact, Jugular venous pressure 7, no edema bilaterally    Abdomen:  no organomegaly, masses, bruits, or tenderness; bowel sounds are present   Extremities: no cyanosis or clubbing   Skin: no xanthelasma, warm.    Neurologic: normal  bilateral, no tremors     Psychiatric: alert and oriented x3, calm        Please refer above for cardiac ROS details.        Medical History  Surgical History Family History Social History   Past Medical History:   Diagnosis Date    Acute on chronic diastolic (congestive) heart failure (H) 11/29/2023    Arthritis 2005    Benign neoplasm of prostate 2000    Benign Prostate Nodule    Depressive disorder     past condition    Infection due to 2019 novel coronavirus 09/27/2021    S/P knee replacement 11/06/2012    S/P left knee arthroscopy 08/15/2011     Past Surgical History:   Procedure Laterality Date    ABDOMEN SURGERY  2003    ARTHROPLASTY KNEE Right 10/12/2018    Procedure: ARTHROPLASTY KNEE;  Right Total Knee Arthroplasty;  Surgeon: Jeb Peralta MD;  Location: [facility]    ARTHROPLASTY REVISION HIP Left 5/25/2023    Procedure: Revision total hip arthroplasty, left;  Surgeon: Chandler Leiva MD;  Location: [facility]    BIOPSY  2007    COLONOSCOPY N/A 12/10/2015    Procedure: COMBINED COLONOSCOPY, SINGLE OR MULTIPLE BIOPSY/POLYPECTOMY BY BIOPSY;  Surgeon: Jyoti Figueroa MD;  Location: [facility]    JOINT REPLACEMENT, HIP RT/LT  10/2007    Joint Replacement Hip LT    JOINT REPLACEMTN, KNEE RT/LT  08/2011    Joint Replacement knee /LT, [facility]    LAPAROSCOPIC HERNIORRHAPHY INGUINAL BILATERAL Bilateral 04/24/2018    Procedure: LAPAROSCOPIC HERNIORRHAPHY INGUINAL BILATERAL;  Laparoscopic bilateral inguinal hernia repair;  Surgeon: Josemanuel Resendiz MD;  Location: [facility]    PHACOEMULSIFICATION WITH STANDARD INTRAOCULAR LENS IMPLANT Right 01/06/2021    Procedure: Cataract Removal with Implant;  Surgeon: Regan Kaufman MD;  Location: [facility]    PHACOEMULSIFICATION  WITH STANDARD INTRAOCULAR LENS IMPLANT Left 2021    Procedure: Cataract Removal with Implant;  Surgeon: Regan Kaufman MD;  Location: [facility]    SURGICAL HISTORY OF -   1999    Umbilical Herniorrhaphy with mesh    SURGICAL HISTORY OF -  Left 2017    Thoracotomy and drainage of empyema     Family History   Problem Relation Age of Onset    Depression Mother     Cerebrovascular Disease Mother     Breast Cancer Mother     Neurologic Disorder Mother         parkinsons    Parkinsonism Mother     Respiratory Father         emphyzema    Diabetes Brother         Social History     Socioeconomic History    Marital status:      Spouse name: Not on file    Number of children: Not on file    Years of education: Not on file    Highest education level: Not on file   Occupational History    Not on file   Tobacco Use    Smoking status: Former     Current packs/day: 0.00     Average packs/day: 1 pack/day for 17.8 years (17.8 ttl pk-yrs)     Types: Cigarettes     Start date: 1958     Quit date: 10/13/1975     Years since quittin.4    Smokeless tobacco: Never   Vaping Use    Vaping status: Never Used   Substance and Sexual Activity    Alcohol use: Not Currently    Drug use: No    Sexual activity: Yes     Partners: Female     Birth control/protection: None     Comment:  51 years   Other Topics Concern    Parent/sibling w/ CABG, MI or angioplasty before 65F 55M? No   Social History Narrative    Not on file     Social Drivers of Health     Financial Resource Strain: Low Risk  (2024)    Financial Resource Strain     Within the past 12 months, have you or your family members you live with been unable to get utilities (heat, electricity) when it was really needed?: No   Food Insecurity: Low Risk  (2024)    Food Insecurity     Within the past 12 months, did you worry that your food would run out before you got money to buy more?: No     Within the past 12 months, did the food you  bought just not last and you didn t have money to get more?: No   Transportation Needs: Low Risk  (12/19/2024)    Transportation Needs     Within the past 12 months, has lack of transportation kept you from medical appointments, getting your medicines, non-medical meetings or appointments, work, or from getting things that you need?: No   Physical Activity: Unknown (12/19/2024)    Exercise Vital Sign     Days of Exercise per Week: 0 days     Minutes of Exercise per Session: Not on file   Stress: No Stress Concern Present (12/19/2024)    [employer] - Occupational Stress Questionnaire     Feeling of Stress : Only a little   Social Connections: Unknown (12/19/2024)    Social Connection and Isolation Panel [NHANES]     Frequency of Communication with Friends and Family: Not on file     Frequency of Social Gatherings with Friends and Family: Once a week     Attends Hinduism Services: Not on file     Active Member of Clubs or Organizations: Not on file     Attends Club or Organization Meetings: Not on file     Marital Status: Not on file   Interpersonal Safety: Low Risk  (3/4/2025)    Interpersonal Safety     Do you feel physically and emotionally safe where you currently live?: Yes     Within the past 12 months, have you been hit, slapped, kicked or otherwise physically hurt by someone?: No     Within the past 12 months, have you been humiliated or emotionally abused in other ways by your partner or ex-partner?: No   Housing Stability: Low Risk  (12/19/2024)    Housing Stability     Do you have housing? : Yes     Are you worried about losing your housing?: No           Medications  Allergies   Current Outpatient Medications   Medication Sig Dispense Refill    buPROPion (WELLBUTRIN XL) 300 MG 24 hr tablet Take 1 tablet (300 mg) by mouth every morning. 90 tablet 1    busPIRone HCl (BUSPAR) 30 MG tablet TAKE 1 TABLET TWICE A  tablet 1    doxazosin (CARDURA) 4 MG tablet Take 1 tablet (4  "mg) by mouth at bedtime. 90 tablet 1    finasteride (PROSCAR) 5 MG tablet TAKE 1 TABLET BY MOUTH EVERY DAY 90 tablet 3    furosemide (LASIX) 20 MG tablet Take 2 tablets (40 mg) by mouth daily. (Patient taking differently: Take 40 mg by mouth daily. Taking only one pill daily) 60 tablet 4    lamoTRIgine (LAMICTAL) 25 MG tablet Take 2 tablets (50 mg) by mouth 2 times daily. 360 tablet 1    losartan (COZAAR) 50 MG tablet Take 1 tablet (50 mg) by mouth daily. 90 tablet 2    metoprolol succinate ER (TOPROL XL) 200 MG 24 hr tablet Take 1 tablet (200 mg) by mouth every evening. 90 tablet 2    multivitamin (ONE-DAILY) tablet Take 1 tablet by mouth daily 30 tablet 0    PARoxetine (PAXIL) 30 MG tablet Take 1 tablet (30 mg) by mouth every morning. 90 tablet 0    rivaroxaban ANTICOAGULANT (XARELTO ANTICOAGULANT) 20 MG TABS tablet Take 1 tablet (20 mg) by mouth daily (with dinner). 90 tablet 2    pregabalin (LYRICA) 25 MG capsule Take 1 capsule (25 mg) by mouth at bedtime. (Patient not taking: Reported on 3/20/2025) 30 capsule 0    pregabalin (LYRICA) 25 MG capsule Take 1 capsule (25 mg) by mouth at bedtime. (Patient not taking: Reported on 3/20/2025) 30 capsule 0    QUEtiapine (SEROQUEL) 25 MG tablet Take 0.5-1 tablets (12.5-25 mg) by mouth at bedtime. (Patient not taking: Reported on 3/20/2025) 30 tablet 1       Allergies   Allergen Reactions    Bactrim [Sulfamethoxazole-Trimethoprim] Nausea and Vomiting          Lab Results    Chemistry/lipid CBC Cardiac Enzymes/BNP/TSH/INR   Recent Labs   Lab Test 10/24/24  1033   CHOL 106   HDL 31*   LDL 60   TRIG 76     Recent Labs   Lab Test 10/24/24  1033 11/30/23  1130 11/04/22  1027   LDL 60 64 88     Recent Labs   Lab Test 02/24/25  1442      POTASSIUM 4.3   CHLORIDE 99   CO2 25   *   BUN 16.8   CR 0.89   GFRESTIMATED 86   LUIS 9.1     Recent Labs   Lab Test 02/24/25  1442 12/17/24  1318 10/29/24  0549   CR 0.89 0.97 0.88     No results for input(s): \"A1C\" in the last " "95795 hours.       Recent Labs   Lab Test 02/24/25  1442   WBC 4.1   HGB 11.1*   HCT 33.6*   *   *     Recent Labs   Lab Test 02/24/25  1442 01/06/25  1434 10/29/24  0549   HGB 11.1* 11.4* 9.2*    No results for input(s): \"TROPONINI\" in the last 93490 hours.  Recent Labs   Lab Test 11/30/23  1130 11/24/23  1340 04/06/23  0948 12/20/17  1315   NTBNPI  --  4,896* 1,329 957*   NTBNP 1,862*  --   --   --      Recent Labs   Lab Test 07/12/24  1552   TSH 2.23     Recent Labs   Lab Test 11/04/22  1027   INR 1.39*        Ubaldo Carrillo MD                                      "

## 2025-03-20 NOTE — LETTER
3/20/2025    Lizzy Leiva MD  [address]  [facility] [address]    RE: Josemanuel Nugentchelsey       Dear Colleague,     I had the [facility].    HEART CARE VALVE CLINIC ENCOUNTER CONSULTATON NOTE      [facility]  [phone]      Assessment/Recommendations   Assessment/Plan: Josemanuel Edmond is an 79M w/ severe MR, TR, and moderate AI, HFPEF, AF who is here for evaluation of management options for his valvular disease.    -  I personally reviewed cardiac imaging including: AD, which showed 1-2+ AI, severe MR, and torrential TR.  -  his options include OHS w/ multi-valve replacement/repair, CHANTAL for MR followed possibly by CHANTAL for TR v. Evoque  -  he is really not interested in OHS but understands that we should get more information before any concrete decisions  -  after a long discussion of natural history, risk/benefit, and management options w/  the patient and family, we all agree to proceed w/ the remainder of w/up including angio +/- PCI and RHC, CTA for Evoque eval, CTS consult, Dental eval  - once the w/up is complete, it may be good to see him w/ CTS in joined visit to discuss options    The longitudinal plan of care for the diagnosis(es)/condition(s) as documented were addressed during this visit. Due to the added complexity in care, I will continue to support Gavin in the subsequent management and with ongoing continuity of care.    A total of >60 mins was spent reviewing prior records, including documentation, lab studies, cardiac testing/imaging, interview with patient, physical exam, and documentation     History of Present Illness/Subjective    HPI: Josemanuel Edmond is a 79 year old male w/ severe MR, TR, and moderate AI, HFPEF, AF who is here for evaluation of management options for his valvular disease.    He has had AF for years. Last year had a series of falls, now thought to be 2/2 ambien, which had  "been stopped.  No CP + SOB, + [place] w/ relatively minimal exertion of walking in here from the  parking lot. No orthopnea/PND, + R>L edema. + fatigue. NonN/V/D/F/C/wt. Changes.    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      Recent Echocardiogram Results:  2. Normal right ventricle size and systolic function.  3. The left atrium is severely dilated.  4. There is bileaflet prolapse of the mitral valve leaflets with malcoaptation  of leaflet tips. Severe (4+) regurgitation is identified with a regurgitant  volume by PISA of 65 ml.  5. There is likely moderate (2+) aortic regurgitation present (incompletely  evaluated).  6. Severe (4+) to massive (5+) Tricuspid valve regurgitation is present,  incompletely evaluated.     The study needed to be terminated prematurely due to persistent hypoxia.  Suspect AD probe was interfering with respiration. Gastric views were not  obtained. Consider repeating AD with general anesthesia.     Note: the mitral valve was well-visualized and likely amenable to CHANTAL. The  tricuspid valve was not visualized clearly enough to be amenable to CHANTAL.  Perhaps, visualization would be improved under general anesthesia.    Recent Coronary Angiogram Results:  None       Physical Examination  Review of Systems   Vitals: /75 (BP Location: Right arm, Patient Position: Sitting, Cuff Size: Adult Regular)   Pulse 74   Resp 16   Ht 1.727 m (5' 8\")   Wt 71.1 kg (156 lb 12.8 oz)   SpO2 99%   BMI 23.84 kg/m    BMI= Body mass index is 23.84 kg/m .  Wt Readings from Last 3 Encounters:   03/20/25 71.1 kg (156 lb 12.8 oz)   03/06/25 72.1 kg (159 lb)   02/24/25 69.9 kg (154 lb)       General Appearance:   no distress, normal body habitus   ENT/Mouth: membranes moist, no oral lesions or bleeding gums.      EYES:  no scleral icterus, normal conjunctivae   Neck: no carotid bruits or thyromegaly   Chest/Lungs:   lungs are clear to auscultation, no rales or wheezing, no " sternal scar, equal chest wall expansion    Cardiovascular:   Regular. Normal first and second heart sounds with 2/6 systolic murmur, no rubs, or gallops; the carotid, radial and posterior tibial pulses are intact, Jugular venous pressure 7, no edema bilaterally    Abdomen:  no organomegaly, masses, bruits, or tenderness; bowel sounds are present   Extremities: no cyanosis or clubbing   Skin: no xanthelasma, warm.    Neurologic: normal  bilateral, no tremors     Psychiatric: alert and oriented x3, calm        Please refer above for cardiac ROS details.        Medical History  Surgical History Family History Social History   Past Medical History:   Diagnosis Date     Acute on chronic diastolic (congestive) heart failure (H) 11/29/2023     Arthritis 2005     Benign neoplasm of prostate 2000    Benign Prostate Nodule     Depressive disorder     past condition     Infection due to 2019 novel coronavirus 09/27/2021     S/P knee replacement 11/06/2012     S/P left knee arthroscopy 08/15/2011     Past Surgical History:   Procedure Laterality Date     ABDOMEN SURGERY  2003     ARTHROPLASTY KNEE Right 10/12/2018    Procedure: ARTHROPLASTY KNEE;  Right Total Knee Arthroplasty;  Surgeon: Jeb Peralta MD;  Location: [facility]     ARTHROPLASTY REVISION HIP Left 5/25/2023    Procedure: Revision total hip arthroplasty, left;  Surgeon: Chandler Leiva MD;  Location: [facility]     BIOPSY  2007     COLONOSCOPY N/A 12/10/2015    Procedure: COMBINED COLONOSCOPY, SINGLE OR MULTIPLE BIOPSY/POLYPECTOMY BY BIOPSY;  Surgeon: Jyoti Figueroa MD;  Location: [facility]     JOINT REPLACEMENT, HIP RT/LT  10/2007    Joint Replacement Hip LT     JOINT REPLACEMTN, KNEE RT/LT  08/2011    Joint Replacement knee /LT, [facility]     LAPAROSCOPIC HERNIORRHAPHY INGUINAL BILATERAL Bilateral 04/24/2018    Procedure: LAPAROSCOPIC HERNIORRHAPHY INGUINAL BILATERAL;  Laparoscopic bilateral inguinal hernia repair;  Surgeon: Josemanuel Resendiz  MD;  Location: [facility]     PHACOEMULSIFICATION WITH STANDARD INTRAOCULAR LENS IMPLANT Right 2021    Procedure: Cataract Removal with Implant;  Surgeon: Regan Kaufman MD;  Location: [facility]     PHACOEMULSIFICATION WITH STANDARD INTRAOCULAR LENS IMPLANT Left 2021    Procedure: Cataract Removal with Implant;  Surgeon: Regan Kaufman MD;  Location: [facility]     SURGICAL HISTORY OF -   1999    Umbilical Herniorrhaphy with mesh     SURGICAL HISTORY OF -  Left 2017    Thoracotomy and drainage of empyema     Family History   Problem Relation Age of Onset     Depression Mother      Cerebrovascular Disease Mother      Breast Cancer Mother      Neurologic Disorder Mother         parkinsons     Parkinsonism Mother      Respiratory Father         emphyzema     Diabetes Brother         Social History     Socioeconomic History     Marital status:      Spouse name: Not on file     Number of children: Not on file     Years of education: Not on file     Highest education level: Not on file   Occupational History     Not on file   Tobacco Use     Smoking status: Former     Current packs/day: 0.00     Average packs/day: 1 pack/day for 17.8 years (17.8 ttl pk-yrs)     Types: Cigarettes     Start date: 1958     Quit date: 10/13/1975     Years since quittin.4     Smokeless tobacco: Never   Vaping Use     Vaping status: Never Used   Substance and Sexual Activity     Alcohol use: Not Currently     Drug use: No     Sexual activity: Yes     Partners: Female     Birth control/protection: None     Comment:  51 years   Other Topics Concern     Parent/sibling w/ CABG, MI or angioplasty before 65F 55M? No   Social History Narrative     Not on file     Social Drivers of Health     Financial Resource Strain: Low Risk  (2024)    Financial Resource Strain      Within the past 12 months, have you or your family members you live with been unable to get utilities (heat, electricity) when it  was really needed?: No   Food Insecurity: Low Risk  (12/19/2024)    Food Insecurity      Within the past 12 months, did you worry that your food would run out before you got money to buy more?: No      Within the past 12 months, did the food you bought just not last and you didn t have money to get more?: No   Transportation Needs: Low Risk  (12/19/2024)    Transportation Needs      Within the past 12 months, has lack of transportation kept you from medical appointments, getting your medicines, non-medical meetings or appointments, work, or from getting things that you need?: No   Physical Activity: Unknown (12/19/2024)    Exercise Vital Sign      Days of Exercise per Week: 0 days      Minutes of Exercise per Session: Not on file   Stress: No Stress Concern Present (12/19/2024)    [employer] - Occupational Stress Questionnaire      Feeling of Stress : Only a little   Social Connections: Unknown (12/19/2024)    Social Connection and Isolation Panel [NHANES]      Frequency of Communication with Friends and Family: Not on file      Frequency of Social Gatherings with Friends and Family: Once a week      Attends Sikh Services: Not on file      Active Member of Clubs or Organizations: Not on file      Attends Club or Organization Meetings: Not on file      Marital Status: Not on file   Interpersonal Safety: Low Risk  (3/4/2025)    Interpersonal Safety      Do you feel physically and emotionally safe where you currently live?: Yes      Within the past 12 months, have you been hit, slapped, kicked or otherwise physically hurt by someone?: No      Within the past 12 months, have you been humiliated or emotionally abused in other ways by your partner or ex-partner?: No   Housing Stability: Low Risk  (12/19/2024)    Housing Stability      Do you have housing? : Yes      Are you worried about losing your housing?: No           Medications  Allergies   Current Outpatient Medications    Medication Sig Dispense Refill     buPROPion (WELLBUTRIN XL) 300 MG 24 hr tablet Take 1 tablet (300 mg) by mouth every morning. 90 tablet 1     busPIRone HCl (BUSPAR) 30 MG tablet TAKE 1 TABLET TWICE A  tablet 1     doxazosin (CARDURA) 4 MG tablet Take 1 tablet (4 mg) by mouth at bedtime. 90 tablet 1     finasteride (PROSCAR) 5 MG tablet TAKE 1 TABLET BY MOUTH EVERY DAY 90 tablet 3     furosemide (LASIX) 20 MG tablet Take 2 tablets (40 mg) by mouth daily. (Patient taking differently: Take 40 mg by mouth daily. Taking only one pill daily) 60 tablet 4     lamoTRIgine (LAMICTAL) 25 MG tablet Take 2 tablets (50 mg) by mouth 2 times daily. 360 tablet 1     losartan (COZAAR) 50 MG tablet Take 1 tablet (50 mg) by mouth daily. 90 tablet 2     metoprolol succinate ER (TOPROL XL) 200 MG 24 hr tablet Take 1 tablet (200 mg) by mouth every evening. 90 tablet 2     multivitamin (ONE-DAILY) tablet Take 1 tablet by mouth daily 30 tablet 0     PARoxetine (PAXIL) 30 MG tablet Take 1 tablet (30 mg) by mouth every morning. 90 tablet 0     rivaroxaban ANTICOAGULANT (XARELTO ANTICOAGULANT) 20 MG TABS tablet Take 1 tablet (20 mg) by mouth daily (with dinner). 90 tablet 2     pregabalin (LYRICA) 25 MG capsule Take 1 capsule (25 mg) by mouth at bedtime. (Patient not taking: Reported on 3/20/2025) 30 capsule 0     pregabalin (LYRICA) 25 MG capsule Take 1 capsule (25 mg) by mouth at bedtime. (Patient not taking: Reported on 3/20/2025) 30 capsule 0     QUEtiapine (SEROQUEL) 25 MG tablet Take 0.5-1 tablets (12.5-25 mg) by mouth at bedtime. (Patient not taking: Reported on 3/20/2025) 30 tablet 1       Allergies   Allergen Reactions     Bactrim [Sulfamethoxazole-Trimethoprim] Nausea and Vomiting          Lab Results    Chemistry/lipid CBC Cardiac Enzymes/BNP/TSH/INR   Recent Labs   Lab Test 10/24/24  1033   CHOL 106   HDL 31*   LDL 60   TRIG 76     Recent Labs   Lab Test 10/24/24  1033 11/30/23  1130 11/04/22  1027   LDL 60 64 88  "    Recent Labs   Lab Test 02/24/25  1442      POTASSIUM 4.3   CHLORIDE 99   CO2 25   *   BUN 16.8   CR 0.89   GFRESTIMATED 86   LUIS 9.1     Recent Labs   Lab Test 02/24/25  1442 12/17/24  1318 10/29/24  0549   CR 0.89 0.97 0.88     No results for input(s): \"A1C\" in the last 23673 hours.       Recent Labs   Lab Test 02/24/25  1442   WBC 4.1   HGB 11.1*   HCT 33.6*   *   *     Recent Labs   Lab Test 02/24/25  1442 01/06/25  1434 10/29/24  0549   HGB 11.1* 11.4* 9.2*    No results for input(s): \"TROPONINI\" in the last 07524 hours.  Recent Labs   Lab Test 11/30/23  1130 11/24/23  1340 04/06/23  0948 12/20/17  1315   NTBNPI  --  4,896* 1,329 957*   NTBNP 1,862*  --   --   --      Recent Labs   Lab Test 07/12/24  1552   TSH 2.23     Recent Labs   Lab Test 11/04/22  1027   INR 1.39*        Ubaldo Carrillo MD                                        Thank you for allowing me to participate in the care of your patient.      Sincerely,     Ubaldo Carrillo MD     [facility]  cc:   Nazanin Caballero, KAYLA CNP  [address] [place] [address]      "

## 2025-03-20 NOTE — PROGRESS NOTES
Valve Clinic CHANTAL - 3/20/25  (See consult note from Dr. Carrillo, pt being seen for consult for evaluation and discussion of treatment options for his severe mitral and tricuspid regurgitation)    Referring provider: Nazanin Carrillo PA-C (2/6/25)    Labs completed at visit today: Yes; including T&S with ext form       Preliminary STS Score: repair-3.42% and replace-8.55%      KCCQ12 (date completed 3/20/25), scanned into chart      PMH: right bundle branch block, long term use of anticoagulation, frequent falls, cardiomyopathy, long standing persistent Afib, CHFpEF, s/p total hip revision, depression, memory difficulties, pancytopenia, peripheral neuropathy, hx of ETOH abuse in remission, benzodiazepine dependence, GERD, HLD, osteoarthritis, cerebral infarction due to occlusion/stenosis of the carotid artery, HTN and severe mitral and tricuspid regurgitation.     Social: Pt presents to clinic today with his spouse Yoselyn       ECHO date 3/4/25  AD  EF 55-60 %  Comments on valves: Mitral valve has bileaflet prolapse w/ malcoaptation of the leaflet tips, severe 4+ regurgitation with no stenosis. Tricuspid valve has severe 4+ regurgitation and no stenosis.   Pt also has moderate AI.       Plan: After discussion with the patient and spouse patient should begin the evaluation process for both mitral repair and tricuspid repair vs replacement. If going the transcatheter route patient will have CHANTAL of mitral valve with the katelin device. Following procedure we would aggressively diurese patient and reevaluate the tricuspid valve, but may likely need to pursue either CHANTAL with the triclip device or replacement of the tricuspid valve with evoque. Patient and spouse are quite adamant they would not want to pursue open heart surgery due to patients frailty.     Pt would like to move forward with workup for CHANTAL for mitral valve with katelin device. Patient has already had AD, we did not get adequate tricuspid imaging in  the future we may need to pursue this but will hold for now. At this time we will order a CTA Evoque Eval for patient, a coronary angiogram + right heart catheterization, consultation with the HF team and with our CT surgery team. Due to complexity of patients case Dr. Carrillo would like to come over and be present in the appt when surgery sees this patient to discuss treatment options.     Orders placed and message sent to scheduling/ to arrange. Patient understands to anticipate a phone call to set up these appointments.     Pt will contact dentist for dental clearance. Pt given clearance form and instructed to have dentist fill out and fax back to clinic.     Reviewed pre-procedure work up and procedural related education information with patient and spouse. All questions answered, no further questions at this time. Patient has my direct contact information as well as the valve clinic line and was encouraged to call with questions or concerns.       Jhonatan Neville RN BSN- Valve Clinic Coordinator   [facility]  [facility]  Phone: [phone]  Fax: [phone]

## 2025-03-20 NOTE — H&P (VIEW-ONLY)
HEART CARE VALVE CLINIC ENCOUNTER CONSULTATON NOTE      [facility]  [phone]      Assessment/Recommendations   Assessment/Plan: Josemanuel Edmond is an 79M w/ severe MR, TR, and moderate AI, HFPEF, AF who is here for evaluation of management options for his valvular disease.    -  I personally reviewed cardiac imaging including: AD, which showed 1-2+ AI, severe MR, and torrential TR.  -  his options include OHS w/ multi-valve replacement/repair, CHANTAL for MR followed possibly by CHANTAL for TR v. Evoque  -  he is really not interested in OHS but understands that we should get more information before any concrete decisions  -  after a long discussion of natural history, risk/benefit, and management options w/  the patient and family, we all agree to proceed w/ the remainder of w/up including angio +/- PCI and RHC, CTA for Evoque eval, CTS consult, Dental eval  - once the w/up is complete, it may be good to see him w/ CTS in joined visit to discuss options    The longitudinal plan of care for the diagnosis(es)/condition(s) as documented were addressed during this visit. Due to the added complexity in care, I will continue to support Gavin in the subsequent management and with ongoing continuity of care.    A total of >60 mins was spent reviewing prior records, including documentation, lab studies, cardiac testing/imaging, interview with patient, physical exam, and documentation     History of Present Illness/Subjective    HPI: Josemanuel Edmond is a 79 year old male w/ severe MR, TR, and moderate AI, HFPEF, AF who is here for evaluation of management options for his valvular disease.    He has had AF for years. Last year had a series of falls, now thought to be 2/2 ambien, which had been stopped.  No CP + SOB, + [place] w/ relatively minimal exertion of walking in here from the  parking lot. No orthopnea/PND, + R>L edema. + fatigue. NonN/V/D/F/C/wt. Changes.    Lives w/ his wife, retired tiny  "business owner, no tobacco/EtOH in years.      Recent Echocardiogram Results:  2. Normal right ventricle size and systolic function.  3. The left atrium is severely dilated.  4. There is bileaflet prolapse of the mitral valve leaflets with malcoaptation  of leaflet tips. Severe (4+) regurgitation is identified with a regurgitant  volume by PISA of 65 ml.  5. There is likely moderate (2+) aortic regurgitation present (incompletely  evaluated).  6. Severe (4+) to massive (5+) Tricuspid valve regurgitation is present,  incompletely evaluated.     The study needed to be terminated prematurely due to persistent hypoxia.  Suspect AD probe was interfering with respiration. Gastric views were not  obtained. Consider repeating AD with general anesthesia.     Note: the mitral valve was well-visualized and likely amenable to CHANTAL. The  tricuspid valve was not visualized clearly enough to be amenable to CHANTAL.  Perhaps, visualization would be improved under general anesthesia.    Recent Coronary Angiogram Results:  None       Physical Examination  Review of Systems   Vitals: /75 (BP Location: Right arm, Patient Position: Sitting, Cuff Size: Adult Regular)   Pulse 74   Resp 16   Ht 1.727 m (5' 8\")   Wt 71.1 kg (156 lb 12.8 oz)   SpO2 99%   BMI 23.84 kg/m    BMI= Body mass index is 23.84 kg/m .  Wt Readings from Last 3 Encounters:   03/20/25 71.1 kg (156 lb 12.8 oz)   03/06/25 72.1 kg (159 lb)   02/24/25 69.9 kg (154 lb)       General Appearance:   no distress, normal body habitus   ENT/Mouth: membranes moist, no oral lesions or bleeding gums.      EYES:  no scleral icterus, normal conjunctivae   Neck: no carotid bruits or thyromegaly   Chest/Lungs:   lungs are clear to auscultation, no rales or wheezing, no sternal scar, equal chest wall expansion    Cardiovascular:   Regular. Normal first and second heart sounds with 2/6 systolic murmur, no rubs, or gallops; the carotid, radial and posterior tibial pulses are " intact, Jugular venous pressure 7, no edema bilaterally    Abdomen:  no organomegaly, masses, bruits, or tenderness; bowel sounds are present   Extremities: no cyanosis or clubbing   Skin: no xanthelasma, warm.    Neurologic: normal  bilateral, no tremors     Psychiatric: alert and oriented x3, calm        Please refer above for cardiac ROS details.        Medical History  Surgical History Family History Social History   Past Medical History:   Diagnosis Date    Acute on chronic diastolic (congestive) heart failure (H) 11/29/2023    Arthritis 2005    Benign neoplasm of prostate 2000    Benign Prostate Nodule    Depressive disorder     past condition    Infection due to 2019 novel coronavirus 09/27/2021    S/P knee replacement 11/06/2012    S/P left knee arthroscopy 08/15/2011     Past Surgical History:   Procedure Laterality Date    ABDOMEN SURGERY  2003    ARTHROPLASTY KNEE Right 10/12/2018    Procedure: ARTHROPLASTY KNEE;  Right Total Knee Arthroplasty;  Surgeon: Jeb Peralta MD;  Location: [facility]    ARTHROPLASTY REVISION HIP Left 5/25/2023    Procedure: Revision total hip arthroplasty, left;  Surgeon: Chandler Leiva MD;  Location: [facility]    BIOPSY  2007    COLONOSCOPY N/A 12/10/2015    Procedure: COMBINED COLONOSCOPY, SINGLE OR MULTIPLE BIOPSY/POLYPECTOMY BY BIOPSY;  Surgeon: Jyoti Figueroa MD;  Location: [facility]    JOINT REPLACEMENT, HIP RT/LT  10/2007    Joint Replacement Hip LT    JOINT REPLACEMTN, KNEE RT/LT  08/2011    Joint Replacement knee /LT, [facility]    LAPAROSCOPIC HERNIORRHAPHY INGUINAL BILATERAL Bilateral 04/24/2018    Procedure: LAPAROSCOPIC HERNIORRHAPHY INGUINAL BILATERAL;  Laparoscopic bilateral inguinal hernia repair;  Surgeon: Josemanuel Resendiz MD;  Location: [facility]    PHACOEMULSIFICATION WITH STANDARD INTRAOCULAR LENS IMPLANT Right 01/06/2021    Procedure: Cataract Removal with Implant;  Surgeon: Regan Kaufman MD;  Location: [facility]    PHACOEMULSIFICATION  WITH STANDARD INTRAOCULAR LENS IMPLANT Left 2021    Procedure: Cataract Removal with Implant;  Surgeon: Regan Kaufman MD;  Location: [facility]    SURGICAL HISTORY OF -   1999    Umbilical Herniorrhaphy with mesh    SURGICAL HISTORY OF -  Left 2017    Thoracotomy and drainage of empyema     Family History   Problem Relation Age of Onset    Depression Mother     Cerebrovascular Disease Mother     Breast Cancer Mother     Neurologic Disorder Mother         parkinsons    Parkinsonism Mother     Respiratory Father         emphyzema    Diabetes Brother         Social History     Socioeconomic History    Marital status:      Spouse name: Not on file    Number of children: Not on file    Years of education: Not on file    Highest education level: Not on file   Occupational History    Not on file   Tobacco Use    Smoking status: Former     Current packs/day: 0.00     Average packs/day: 1 pack/day for 17.8 years (17.8 ttl pk-yrs)     Types: Cigarettes     Start date: 1958     Quit date: 10/13/1975     Years since quittin.4    Smokeless tobacco: Never   Vaping Use    Vaping status: Never Used   Substance and Sexual Activity    Alcohol use: Not Currently    Drug use: No    Sexual activity: Yes     Partners: Female     Birth control/protection: None     Comment:  53 years   Other Topics Concern    Parent/sibling w/ CABG, MI or angioplasty before 65F 55M? No   Social History Narrative    Not on file     Social Drivers of Health     Financial Resource Strain: Low Risk  (2024)    Financial Resource Strain     Within the past 12 months, have you or your family members you live with been unable to get utilities (heat, electricity) when it was really needed?: No   Food Insecurity: Low Risk  (2024)    Food Insecurity     Within the past 12 months, did you worry that your food would run out before you got money to buy more?: No     Within the past 12 months, did the food you  bought just not last and you didn t have money to get more?: No   Transportation Needs: Low Risk  (12/19/2024)    Transportation Needs     Within the past 12 months, has lack of transportation kept you from medical appointments, getting your medicines, non-medical meetings or appointments, work, or from getting things that you need?: No   Physical Activity: Unknown (12/19/2024)    Exercise Vital Sign     Days of Exercise per Week: 0 days     Minutes of Exercise per Session: Not on file   Stress: No Stress Concern Present (12/19/2024)    [employer] - Occupational Stress Questionnaire     Feeling of Stress : Only a little   Social Connections: Unknown (12/19/2024)    Social Connection and Isolation Panel [NHANES]     Frequency of Communication with Friends and Family: Not on file     Frequency of Social Gatherings with Friends and Family: Once a week     Attends [employer] Services: Not on file     Active Member of Clubs or Organizations: Not on file     Attends Club or Organization Meetings: Not on file     Marital Status: Not on file   Interpersonal Safety: Low Risk  (3/4/2025)    Interpersonal Safety     Do you feel physically and emotionally safe where you currently live?: Yes     Within the past 12 months, have you been hit, slapped, kicked or otherwise physically hurt by someone?: No     Within the past 12 months, have you been humiliated or emotionally abused in other ways by your partner or ex-partner?: No   Housing Stability: Low Risk  (12/19/2024)    Housing Stability     Do you have housing? : Yes     Are you worried about losing your housing?: No           Medications  Allergies   Current Outpatient Medications   Medication Sig Dispense Refill    buPROPion (WELLBUTRIN XL) 300 MG 24 hr tablet Take 1 tablet (300 mg) by mouth every morning. 90 tablet 1    busPIRone HCl (BUSPAR) 30 MG tablet TAKE 1 TABLET TWICE A  tablet 1    doxazosin (CARDURA) 4 MG tablet Take 1 tablet (4  "mg) by mouth at bedtime. 90 tablet 1    finasteride (PROSCAR) 5 MG tablet TAKE 1 TABLET BY MOUTH EVERY DAY 90 tablet 3    furosemide (LASIX) 20 MG tablet Take 2 tablets (40 mg) by mouth daily. (Patient taking differently: Take 40 mg by mouth daily. Taking only one pill daily) 60 tablet 4    lamoTRIgine (LAMICTAL) 25 MG tablet Take 2 tablets (50 mg) by mouth 2 times daily. 360 tablet 1    losartan (COZAAR) 50 MG tablet Take 1 tablet (50 mg) by mouth daily. 90 tablet 2    metoprolol succinate ER (TOPROL XL) 200 MG 24 hr tablet Take 1 tablet (200 mg) by mouth every evening. 90 tablet 2    multivitamin (ONE-DAILY) tablet Take 1 tablet by mouth daily 30 tablet 0    PARoxetine (PAXIL) 30 MG tablet Take 1 tablet (30 mg) by mouth every morning. 90 tablet 0    rivaroxaban ANTICOAGULANT (XARELTO ANTICOAGULANT) 20 MG TABS tablet Take 1 tablet (20 mg) by mouth daily (with dinner). 90 tablet 2    pregabalin (LYRICA) 25 MG capsule Take 1 capsule (25 mg) by mouth at bedtime. (Patient not taking: Reported on 3/20/2025) 30 capsule 0    pregabalin (LYRICA) 25 MG capsule Take 1 capsule (25 mg) by mouth at bedtime. (Patient not taking: Reported on 3/20/2025) 30 capsule 0    QUEtiapine (SEROQUEL) 25 MG tablet Take 0.5-1 tablets (12.5-25 mg) by mouth at bedtime. (Patient not taking: Reported on 3/20/2025) 30 tablet 1       Allergies   Allergen Reactions    Bactrim [Sulfamethoxazole-Trimethoprim] Nausea and Vomiting          Lab Results    Chemistry/lipid CBC Cardiac Enzymes/BNP/TSH/INR   Recent Labs   Lab Test 10/24/24  1033   CHOL 106   HDL 31*   LDL 60   TRIG 76     Recent Labs   Lab Test 10/24/24  1033 11/30/23  1130 11/04/22  1027   LDL 60 64 88     Recent Labs   Lab Test 02/24/25  1442      POTASSIUM 4.3   CHLORIDE 99   CO2 25   *   BUN 16.8   CR 0.89   GFRESTIMATED 86   LUIS 9.1     Recent Labs   Lab Test 02/24/25  1442 12/17/24  1318 10/29/24  0549   CR 0.89 0.97 0.88     No results for input(s): \"A1C\" in the last " "41960 hours.       Recent Labs   Lab Test 02/24/25  1442   WBC 4.1   HGB 11.1*   HCT 33.6*   *   *     Recent Labs   Lab Test 02/24/25  1442 01/06/25  1434 10/29/24  0549   HGB 11.1* 11.4* 9.2*    No results for input(s): \"TROPONINI\" in the last 37981 hours.  Recent Labs   Lab Test 11/30/23  1130 11/24/23  1340 04/06/23  0948 12/20/17  1315   NTBNPI  --  4,896* 1,329 957*   NTBNP 1,862*  --   --   --      Recent Labs   Lab Test 07/12/24  1552   TSH 2.23     Recent Labs   Lab Test 11/04/22  1027   INR 1.39*        Ubaldo Carrillo MD                                      "

## 2025-03-24 ENCOUNTER — TELEPHONE (OUTPATIENT)
Dept: CARDIOLOGY | Facility: CLINIC | Age: 80
End: 2025-03-24
Payer: COMMERCIAL

## 2025-03-24 NOTE — TELEPHONE ENCOUNTER
Call from patients wife, Yoselyn.  Patient went to dental clinic today.  This was not his regular dentist.  They are telling him that he has four teeth that have an abscess in them and need to be pulled.  This will cost patient around $15,000 for all that needs to be done.    Patient recently had work done at his other dentist and they didn't mention needing any intervention on his lower teeth.  His regular dentist is on vacation so the plan will be for him to review the Xray scans and go from there.    Patient and spouse will keep writer informed on any changed plan for dental work.

## 2025-03-26 ENCOUNTER — MYC MEDICAL ADVICE (OUTPATIENT)
Dept: BEHAVIORAL HEALTH | Facility: CLINIC | Age: 80
End: 2025-03-26
Payer: COMMERCIAL

## 2025-03-26 ENCOUNTER — TELEPHONE (OUTPATIENT)
Dept: FAMILY MEDICINE | Facility: CLINIC | Age: 80
End: 2025-03-26
Payer: COMMERCIAL

## 2025-03-26 NOTE — TELEPHONE ENCOUNTER
Good Afternoon,    This patient stopped in for a refill on his paroxetine 20mg, but I do not see it on his medication list. He said he was weaning off it, but has a lot of the 30mg tablets left. Please review and contact patient if need be.      Thank You,  Naty Schulz, PAT.Ph.T  [facility]

## 2025-03-27 NOTE — TELEPHONE ENCOUNTER
"1. RN called Gavin to obtain additiona information regarding his Paxil 20mg refill request. Per call with Gavin.    RN reviewed current orders noting patient weaned from Paxil 50 mg daily to 30 mg by Angeles Sanders CNP then lowered farther to Paxil 15 mg daily by Becca Rowe PA-C.    - Gavin stated he was confused and thought he was taking Paxil 30 mg and 20 mg and was requesting 20 mg dose to take in the evenings. Following review with RN he stated he just remembered he was instructed by his primary doctor Becca to take Paxil 15 mg and that he should be taking half a tablet of the Paxil 30 mg and did not need a refill as he \"has enough of those\" on hand.     - He will take half tablets going forward as he wants to get off of Paxil.    - He denied other concerns and confirmed he will call [facility] if needing additional clarification on how he is taking his medications.    2. RN forwarding update to Angeles Sanders CNP for awareness and review.    "

## 2025-03-31 ENCOUNTER — OFFICE VISIT (OUTPATIENT)
Dept: CARDIOLOGY | Facility: CLINIC | Age: 80
End: 2025-03-31
Payer: COMMERCIAL

## 2025-03-31 ENCOUNTER — TELEPHONE (OUTPATIENT)
Dept: FAMILY MEDICINE | Facility: CLINIC | Age: 80
End: 2025-03-31

## 2025-03-31 VITALS
HEART RATE: 80 BPM | DIASTOLIC BLOOD PRESSURE: 62 MMHG | HEIGHT: 68 IN | SYSTOLIC BLOOD PRESSURE: 100 MMHG | BODY MASS INDEX: 22.97 KG/M2 | WEIGHT: 151.6 LBS | RESPIRATION RATE: 16 BRPM

## 2025-03-31 DIAGNOSIS — I38 VALVULAR HEART DISEASE: Primary | ICD-10-CM

## 2025-03-31 PROCEDURE — 99205 OFFICE O/P NEW HI 60 MIN: CPT | Performed by: INTERNAL MEDICINE

## 2025-03-31 PROCEDURE — G2211 COMPLEX E/M VISIT ADD ON: HCPCS | Performed by: INTERNAL MEDICINE

## 2025-03-31 PROCEDURE — 3074F SYST BP LT 130 MM HG: CPT | Performed by: INTERNAL MEDICINE

## 2025-03-31 PROCEDURE — 3078F DIAST BP <80 MM HG: CPT | Performed by: INTERNAL MEDICINE

## 2025-03-31 NOTE — LETTER
3/31/2025    Lizzy Leiva MD  [address]  [facility] [address]    RE: Josemanuel Edmond       Dear Colleague,     I had the pleasure of seeing Josemanuel PAT Edmond in the [facility].  HEART CARE NOTE        Thank you, Dr. Carrillo, for asking the [facility] team to see Josemanuel Nugentchelsey to evaluate valvular heart disease.    Assessment/Recommendations     1. HFpEF   Assessment / Plan  Near euvolemia; denies HF symptoms of prthopnea, PND, or LE - no changes to diuretic regimen at this time  Patient is high risk for adverse cardiac events 2/2 advanced age, frailty, valvular heart disease, elevated NTproBNP  GDMT as detailed below; mainstay of treatment for HFpEF includes diuretics and adequate BP control (class I) and SGLT2-I (class 2a); additional medical therapy (ARNI, MRA, ARB) demonstrated less robust evidence for indication but may be considered per guideline recommendations (2b); no indication for BBlockers     Current Pharmacotherapy AHA Guideline-Directed Medical Therapy   Losartan  50 mg daily ARNI/ARB   Spironolactone - not started  MRA   SGLT2 inhibitor: not started SGLT2-I    Furosemide 40 mg daily  Loop diuretic      2. Valvular heart disease   Assessment / Plan  Severe MR and TR with mod AI - follows with valve clinic; CHANTAL evaluation underway  Diuresis and oral afterload reduction as above    3. Afib  Assessment / Plan  Persistent; currently on rivaroxban and metoprolol     4. HTN  Assessment / Plan  Adequately controlled; management per PMD; no additional recommendations at this time    60 minutes spent reviewing prior records (including documentation, laboratory studies, cardiac testing/imaging), history and physical exam, planning, and subsequent documentation.    The longitudinal plan of care for HFpEF was addressed during this visit. Due to the added complexity in care, I will continue to support  Josemanuel Edmond in the subsequent  management of this condition(s) and with the ongoing continuity of care of this condition(s).    History of Present Illness/Subjective    Mr. Josemanuel Edmond is a 79 year old male with a PMHx significant for (per Epic notation) right bundle branch block, long term use of anticoagulation, frequent falls, cardiomyopathy, long standing persistent Afib, CHFpEF, s/p total hip revision, depression, memory difficulties, pancytopenia, peripheral neuropathy, hx of ETOH abuse in remission, benzodiazepine dependence, GERD, HLD, osteoarthritis, cerebral infarction due to occlusion/stenosis of the carotid artery, HTN and severe mitral and tricuspid regurgitation who presents to [facility] clinic to establish care    Today, Mr. Edmond denies acute cardiac events or complaints including HF symptoms of orthopnea, PND, fluid retention or edema; Despite adequate volume status patient continues to endorse symptoms of mod-severe dyspnea with mild-moderate exertion; Management plan as detailed above    We discussed HF diet and lifestyle modifications including the 2 g Na and 2L fluid restrictions. He does not currently adhere, but will do so moving forward. Patient was provided with the HF educational binder as well as a book to log his daily weights as well as HF education by our CORE RN.     ECG: Personally reviewed. nonspecific ST and T waves changes, atrial fibrillation, rate controlled, RBBB.    ECHO/AD (personnaly Reviewed on 3/31/25):   1. Left ventricular function is normal.The ejection fraction is 55-60%.  2. Normal right ventricle size and systolic function.  3. The left atrium is severely dilated.  4. There is bileaflet prolapse of the mitral valve leaflets with malcoaptation  of leaflet tips. Severe (4+) regurgitation is identified with a regurgitant  volume by PISA of 65 ml.  5. There is likely moderate (2+) aortic regurgitation present (incompletely  evaluated).  6. Severe (4+) to massive (5+) Tricuspid valve regurgitation  "is present,  incompletely evaluated.     The study needed to be terminated prematurely due to persistent hypoxia.  Suspect AD probe was interfering with respiration. Gastric views were not  obtained. Consider repeating AD with general anesthesia.     Note: the mitral valve was well-visualized and likely amenable to CHANTAL. The  tricuspid valve was not visualized clearly enough to be amenable to CHANTAL.  Perhaps, visualization would be improved under general anesthesia.    Lab results: personally reviewed March 31, 2025; notable for renal function wnl    Medical history and pertinent documents reviewed in Care Everywhere please where applicable see details above          Physical Examination Review of Systems   /62 (BP Location: Right arm, Patient Position: Sitting, Cuff Size: Adult Regular)   Pulse 80   Resp 16   Ht 1.727 m (5' 8\")   Wt 68.8 kg (151 lb 9.6 oz)   BMI 23.05 kg/m    Body mass index is 23.05 kg/m .  Wt Readings from Last 3 Encounters:   03/31/25 68.8 kg (151 lb 9.6 oz)   03/21/25 70.8 kg (156 lb)   03/20/25 71.1 kg (156 lb 12.8 oz)     General Appearance:   no distress, normal body habitus   ENT/Mouth: membranes moist, no oral lesions or bleeding gums.      EYES:  no scleral icterus, normal conjunctivae   Neck: no carotid bruits or thyromegaly   Chest/Lungs:   lungs are clear to auscultation, no rales or wheezing, equal chest wall expansion    Cardiovascular:   Irregular. Normal first and second heart sounds with +COURTNEY; no rubs, or gallops; the carotid, radial and posterior tibial pulses are intact, + JVD; no LE edema bilaterally    Abdomen:  no organomegaly, masses, bruits, or tenderness; bowel sounds are present   Extremities: no cyanosis or clubbing   Skin: no xanthelasma, warm.    Neurologic: NAD     Psychiatric: alert and oriented x3, calm     A complete 10 systems ROS was reviewed  And is negative except what is listed in the HPI.          Medical History  Surgical History Family History " Social History   Past Medical History:   Diagnosis Date     Acute on chronic diastolic (congestive) heart failure (H) 11/29/2023     Arthritis 2005     Benign neoplasm of prostate 2000    Benign Prostate Nodule     Depressive disorder     past condition     Infection due to 2019 novel coronavirus 09/27/2021     S/P knee replacement 11/06/2012     S/P left knee arthroscopy 08/15/2011    Past Surgical History:   Procedure Laterality Date     ABDOMEN SURGERY  2003     ARTHROPLASTY KNEE Right 10/12/2018    Procedure: ARTHROPLASTY KNEE;  Right Total Knee Arthroplasty;  Surgeon: Jeb Peralta MD;  Location: [facility]     ARTHROPLASTY REVISION HIP Left 5/25/2023    Procedure: Revision total hip arthroplasty, left;  Surgeon: Chandler Leiva MD;  Location: [facility]     BIOPSY  2007     COLONOSCOPY N/A 12/10/2015    Procedure: COMBINED COLONOSCOPY, SINGLE OR MULTIPLE BIOPSY/POLYPECTOMY BY BIOPSY;  Surgeon: Jyoti Figueroa MD;  Location: [facility]     JOINT REPLACEMENT, HIP RT/LT  10/2007    Joint Replacement Hip LT     JOINT REPLACEMTN, KNEE RT/LT  08/2011    Joint Replacement knee /LT, [facility]     LAPAROSCOPIC HERNIORRHAPHY INGUINAL BILATERAL Bilateral 04/24/2018    Procedure: LAPAROSCOPIC HERNIORRHAPHY INGUINAL BILATERAL;  Laparoscopic bilateral inguinal hernia repair;  Surgeon: Joesmanuel Resendiz MD;  Location: [facility]     PHACOEMULSIFICATION WITH STANDARD INTRAOCULAR LENS IMPLANT Right 01/06/2021    Procedure: Cataract Removal with Implant;  Surgeon: Regan Kaufman MD;  Location: [facility]     PHACOEMULSIFICATION WITH STANDARD INTRAOCULAR LENS IMPLANT Left 02/17/2021    Procedure: Cataract Removal with Implant;  Surgeon: Regan Kaufman MD;  Location: [facility]     SURGICAL HISTORY OF -   12/01/1999    Umbilical Herniorrhaphy with mesh     SURGICAL HISTORY OF -  Left 11/2017    Thoracotomy and drainage of empyema    no family history of premature coronary artery disease Social History      Socioeconomic History     Marital status:      Spouse name: Not on file     Number of children: Not on file     Years of education: Not on file     Highest education level: Not on file   Occupational History     Not on file   Tobacco Use     Smoking status: Former     Current packs/day: 0.00     Average packs/day: 1 pack/day for 17.8 years (17.8 ttl pk-yrs)     Types: Cigarettes     Start date: 1958     Quit date: 10/13/1975     Years since quittin.4     Smokeless tobacco: Never   Vaping Use     Vaping status: Never Used   Substance and Sexual Activity     Alcohol use: Not Currently     Drug use: No     Sexual activity: Yes     Partners: Female     Birth control/protection: None     Comment:  51 years   Other Topics Concern     Parent/sibling w/ CABG, MI or angioplasty before 65F 55M? No   Social History Narrative     Not on file     Social Drivers of Health     Financial Resource Strain: Low Risk  (2024)    Financial Resource Strain      Within the past 12 months, have you or your family members you live with been unable to get utilities (heat, electricity) when it was really needed?: No   Food Insecurity: Low Risk  (2024)    Food Insecurity      Within the past 12 months, did you worry that your food would run out before you got money to buy more?: No      Within the past 12 months, did the food you bought just not last and you didn t have money to get more?: No   Transportation Needs: Low Risk  (2024)    Transportation Needs      Within the past 12 months, has lack of transportation kept you from medical appointments, getting your medicines, non-medical meetings or appointments, work, or from getting things that you need?: No   Physical Activity: Unknown (2024)    Exercise Vital Sign      Days of Exercise per Week: 0 days      Minutes of Exercise per Session: Not on file   Stress: No Stress Concern Present (2024)    [employer]  "- Occupational Stress Questionnaire      Feeling of Stress : Only a little   Social Connections: Unknown (12/19/2024)    Social Connection and Isolation Panel [NHANES]      Frequency of Communication with Friends and Family: Not on file      Frequency of Social Gatherings with Friends and Family: Once a week      Attends Religion Services: Not on file      Active Member of Clubs or Organizations: Not on file      Attends Club or Organization Meetings: Not on file      Marital Status: Not on file   Interpersonal Safety: Low Risk  (3/4/2025)    Interpersonal Safety      Do you feel physically and emotionally safe where you currently live?: Yes      Within the past 12 months, have you been hit, slapped, kicked or otherwise physically hurt by someone?: No      Within the past 12 months, have you been humiliated or emotionally abused in other ways by your partner or ex-partner?: No   Housing Stability: Low Risk  (12/19/2024)    Housing Stability      Do you have housing? : Yes      Are you worried about losing your housing?: No           Lab Results    Chemistry/lipid CBC Cardiac Enzymes/BNP/TSH/INR   Lab Results   Component Value Date    CHOL 106 10/24/2024    HDL 31 (L) 10/24/2024    TRIG 76 10/24/2024    BUN 14.4 03/20/2025     03/20/2025    CO2 28 03/20/2025    Lab Results   Component Value Date    WBC 5.3 03/20/2025    HGB 11.2 (L) 03/20/2025    HCT 35.0 (L) 03/20/2025     (H) 03/20/2025     (L) 03/20/2025    Lab Results   Component Value Date    TSH 2.23 07/12/2024    INR 1.39 (H) 11/04/2022     No results found for: \"CKTOTAL\", \"CKMB\", \"TROPONINI\"       Weight:    Wt Readings from Last 3 Encounters:   03/21/25 70.8 kg (156 lb)   03/20/25 71.1 kg (156 lb 12.8 oz)   03/06/25 72.1 kg (159 lb)       Allergies  Allergies   Allergen Reactions     Bactrim [Sulfamethoxazole-Trimethoprim] Nausea and Vomiting         Surgical History  Past Surgical History:   Procedure Laterality Date     ABDOMEN SURGERY "  2003     ARTHROPLASTY KNEE Right 10/12/2018    Procedure: ARTHROPLASTY KNEE;  Right Total Knee Arthroplasty;  Surgeon: Jeb ePralta MD;  Location: [facility]     ARTHROPLASTY REVISION HIP Left 5/25/2023    Procedure: Revision total hip arthroplasty, left;  Surgeon: Chandler Leiva MD;  Location: [facility]     BIOPSY  2007     COLONOSCOPY N/A 12/10/2015    Procedure: COMBINED COLONOSCOPY, SINGLE OR MULTIPLE BIOPSY/POLYPECTOMY BY BIOPSY;  Surgeon: Jyoti Figueroa MD;  Location: [facility]     JOINT REPLACEMENT, HIP RT/LT  10/2007    Joint Replacement Hip LT     JOINT REPLACEMTN, KNEE RT/LT  08/2011    Joint Replacement knee /LT, [facility]     LAPAROSCOPIC HERNIORRHAPHY INGUINAL BILATERAL Bilateral 04/24/2018    Procedure: LAPAROSCOPIC HERNIORRHAPHY INGUINAL BILATERAL;  Laparoscopic bilateral inguinal hernia repair;  Surgeon: Josemanuel Resendiz MD;  Location: [facility]     PHACOEMULSIFICATION WITH STANDARD INTRAOCULAR LENS IMPLANT Right 01/06/2021    Procedure: Cataract Removal with Implant;  Surgeon: Regan Kaufman MD;  Location: [facility]     PHACOEMULSIFICATION WITH STANDARD INTRAOCULAR LENS IMPLANT Left 02/17/2021    Procedure: Cataract Removal with Implant;  Surgeon: Regan Kaufman MD;  Location: [facility]     SURGICAL HISTORY OF -   12/01/1999    Umbilical Herniorrhaphy with mesh     SURGICAL HISTORY OF -  Left 11/2017    Thoracotomy and drainage of empyema       Social History  Tobacco:   History   Smoking Status     Former     Types: Cigarettes   Smokeless Tobacco     Never    Alcohol:   Social History    Substance and Sexual Activity      Alcohol use: Not Currently   Illicit Drugs:   History   Drug Use No       Family History  Family History   Problem Relation Age of Onset     Depression Mother      Cerebrovascular Disease Mother      Breast Cancer Mother      Neurologic Disorder Mother         parkinsons     Parkinsonism Mother      Respiratory Father         emphyzema     Diabetes  Brother Dylan Harp MD on 3/31/2025      cc: Lizzy Leiva [facility]: Heart Failure Care Coordination   Heart Failure Education    Situation/Background:      RN CC provided heart failure education to patient and wife Yoselyn during clinic visit.    Assessment:      Living situation: home with family    Barriers to Heart Failure follow-up:  none    Medication management: independent    CHF assessment:  No assessment indicated.     Goals Addressed    None         Intervention/Plan:      CM/HF education topics reviewed:  Low sodium: 2000 mg or less daily, meal choices and label reading   Fluid Restriction: 50-60 oz of fluids daily   Daily weight monitoring and logging   Medication review and importance of compliance   Home blood pressure monitoring and logging   Overview of C.O.R.E. clinic   Overview of heart failure appointments and testing   Importance of exercise   Symptoms of HF to be reported to Core Team      Education materials provided:  Low sodium food and drink handout  Low sodium food product examples  HF stoplight tool  Guide to HF booklet  Cardiac medication handout  C.O.R.E. information brochure     HF resources reviewed:  Heart Failure support group      RN CC reviewed and reinforced fluid/dietary sodium restrictions; patient stated understanding. Pt reports he uses table salt on everything, strongly advised/discussed to use alternatives.     Patient expressed understanding of above education/instructions and denied further questions at this time. Both patient and wife are very receptive to this education and eager to move forward with changes.    Becca Abbott RN      Thank you for allowing me to participate in the care of your patient.      Sincerely,     MD J CARLOS Licea [facility]  cc:   No referring provider defined for this encounter.

## 2025-03-31 NOTE — PROGRESS NOTES
[facility]: Heart Failure Care Coordination   Heart Failure Education    Situation/Background:      RN CC provided heart failure education to patient and wife Yoselyn during clinic visit.    Assessment:      Living situation: home with family    Barriers to Heart Failure follow-up:  none    Medication management: independent    CHF assessment:  No assessment indicated.     Goals Addressed    None         Intervention/Plan:      CM/HF education topics reviewed:  Low sodium: 2000 mg or less daily, meal choices and label reading   Fluid Restriction: 50-60 oz of fluids daily   Daily weight monitoring and logging   Medication review and importance of compliance   Home blood pressure monitoring and logging   Overview of C.O.R.E. clinic   Overview of heart failure appointments and testing   Importance of exercise   Symptoms of HF to be reported to Core Team      Education materials provided:  Low sodium food and drink handout  Low sodium food product examples  HF stoplight tool  Guide to HF booklet  Cardiac medication handout  C.O.R.E. information brochure     HF resources reviewed:  Heart Failure support group      RN CC reviewed and reinforced fluid/dietary sodium restrictions; patient stated understanding. Pt reports he uses table salt on everything, strongly advised/discussed to use alternatives.     Patient expressed understanding of above education/instructions and denied further questions at this time. Both patient and wife are very receptive to this education and eager to move forward with changes.    Becca Abbott RN

## 2025-03-31 NOTE — PROGRESS NOTES
HEART CARE NOTE        Thank you, Dr. Carrillo, for asking the [facility] Heart Care team to see Josemanuel Edmond to evaluate valvular heart disease.    Assessment/Recommendations     1. HFpEF   Assessment / Plan  Near euvolemia; denies HF symptoms of prthopnea, PND, or LE - no changes to diuretic regimen at this time  Patient is high risk for adverse cardiac events 2/2 advanced age, frailty, valvular heart disease, elevated NTproBNP  GDMT as detailed below; mainstay of treatment for HFpEF includes diuretics and adequate BP control (class I) and SGLT2-I (class 2a); additional medical therapy (ARNI, MRA, ARB) demonstrated less robust evidence for indication but may be considered per guideline recommendations (2b); no indication for BBlockers     Current Pharmacotherapy AHA Guideline-Directed Medical Therapy   Losartan  50 mg daily ARNI/ARB   Spironolactone - not started  MRA   SGLT2 inhibitor: not started SGLT2-I    Furosemide 40 mg daily  Loop diuretic      2. Valvular heart disease   Assessment / Plan  Severe MR and TR with mod AI - follows with valve clinic; CHANTAL evaluation underway  Diuresis and oral afterload reduction as above    3. Afib  Assessment / Plan  Persistent; currently on rivaroxban and metoprolol     4. HTN  Assessment / Plan  Adequately controlled; management per PMD; no additional recommendations at this time    60 minutes spent reviewing prior records (including documentation, laboratory studies, cardiac testing/imaging), history and physical exam, planning, and subsequent documentation.    The longitudinal plan of care for HFpEF was addressed during this visit. Due to the added complexity in care, I will continue to support Mr. Josemanuel Edmond in the subsequent management of this condition(s) and with the ongoing continuity of care of this condition(s).    History of Present Illness/Subjective    Mr. Josemanuel Edmond is a 79 year old male with a PMHx significant for (per Epic  notation) right bundle branch block, long term use of anticoagulation, frequent falls, cardiomyopathy, long standing persistent Afib, CHFpEF, s/p total hip revision, depression, memory difficulties, pancytopenia, peripheral neuropathy, hx of ETOH abuse in remission, benzodiazepine dependence, GERD, HLD, osteoarthritis, cerebral infarction due to occlusion/stenosis of the carotid artery, HTN and severe mitral and tricuspid regurgitation who presents to [facility] clinic to establish care    Today, Mr. Edmond denies acute cardiac events or complaints including HF symptoms of orthopnea, PND, fluid retention or edema; Despite adequate volume status patient continues to endorse symptoms of mod-severe dyspnea with mild-moderate exertion; Management plan as detailed above    We discussed HF diet and lifestyle modifications including the 2 g Na and 2L fluid restrictions. He does not currently adhere, but will do so moving forward. Patient was provided with the HF educational binder as well as a book to log his daily weights as well as HF education by our [facility] RN.     ECG: Personally reviewed. nonspecific ST and T waves changes, atrial fibrillation, rate controlled, RBBB.    ECHO/AD (personnaly Reviewed on 3/31/25):   1. Left ventricular function is normal.The ejection fraction is 55-60%.  2. Normal right ventricle size and systolic function.  3. The left atrium is severely dilated.  4. There is bileaflet prolapse of the mitral valve leaflets with malcoaptation  of leaflet tips. Severe (4+) regurgitation is identified with a regurgitant  volume by PISA of 65 ml.  5. There is likely moderate (2+) aortic regurgitation present (incompletely  evaluated).  6. Severe (4+) to massive (5+) Tricuspid valve regurgitation is present,  incompletely evaluated.     The study needed to be terminated prematurely due to persistent hypoxia.  Suspect AD probe was interfering with respiration. Gastric views were not  obtained. Consider repeating  "AD with general anesthesia.     Note: the mitral valve was well-visualized and likely amenable to CHANTAL. The  tricuspid valve was not visualized clearly enough to be amenable to CHANTAL.  Perhaps, visualization would be improved under general anesthesia.    Lab results: personally reviewed March 31, 2025; notable for renal function wnl    Medical history and pertinent documents reviewed in Care Everywhere please where applicable see details above          Physical Examination Review of Systems   /62 (BP Location: Right arm, Patient Position: Sitting, Cuff Size: Adult Regular)   Pulse 80   Resp 16   Ht 1.727 m (5' 8\")   Wt 68.8 kg (151 lb 9.6 oz)   BMI 23.05 kg/m    Body mass index is 23.05 kg/m .  Wt Readings from Last 3 Encounters:   03/31/25 68.8 kg (151 lb 9.6 oz)   03/21/25 70.8 kg (156 lb)   03/20/25 71.1 kg (156 lb 12.8 oz)     General Appearance:   no distress, normal body habitus   ENT/Mouth: membranes moist, no oral lesions or bleeding gums.      EYES:  no scleral icterus, normal conjunctivae   Neck: no carotid bruits or thyromegaly   Chest/Lungs:   lungs are clear to auscultation, no rales or wheezing, equal chest wall expansion    Cardiovascular:   Irregular. Normal first and second heart sounds with +COURTNEY; no rubs, or gallops; the carotid, radial and posterior tibial pulses are intact, + JVD; no LE edema bilaterally    Abdomen:  no organomegaly, masses, bruits, or tenderness; bowel sounds are present   Extremities: no cyanosis or clubbing   Skin: no xanthelasma, warm.    Neurologic: NAD     Psychiatric: alert and oriented x3, calm     A complete 10 systems ROS was reviewed  And is negative except what is listed in the HPI.          Medical History  Surgical History Family History Social History   Past Medical History:   Diagnosis Date    Acute on chronic diastolic (congestive) heart failure (H) 11/29/2023    Arthritis 2005    Benign neoplasm of prostate 2000    Benign Prostate Nodule    Depressive " disorder     past condition    Infection due to 2019 novel coronavirus 09/27/2021    S/P knee replacement 11/06/2012    S/P left knee arthroscopy 08/15/2011    Past Surgical History:   Procedure Laterality Date    ABDOMEN SURGERY  2003    ARTHROPLASTY KNEE Right 10/12/2018    Procedure: ARTHROPLASTY KNEE;  Right Total Knee Arthroplasty;  Surgeon: Jeb Peralta MD;  Location: [facility]    ARTHROPLASTY REVISION HIP Left 5/25/2023    Procedure: Revision total hip arthroplasty, left;  Surgeon: Chandler Leiva MD;  Location: [facility]    BIOPSY  2007    COLONOSCOPY N/A 12/10/2015    Procedure: COMBINED COLONOSCOPY, SINGLE OR MULTIPLE BIOPSY/POLYPECTOMY BY BIOPSY;  Surgeon: Jyoti Figueroa MD;  Location: [facility]    JOINT REPLACEMENT, HIP RT/LT  10/2007    Joint Replacement Hip LT    JOINT REPLACEMTN, KNEE RT/LT  08/2011    Joint Replacement knee /LT, [facility]    LAPAROSCOPIC HERNIORRHAPHY INGUINAL BILATERAL Bilateral 04/24/2018    Procedure: LAPAROSCOPIC HERNIORRHAPHY INGUINAL BILATERAL;  Laparoscopic bilateral inguinal hernia repair;  Surgeon: Josemanuel Resendiz MD;  Location: [facility]    PHACOEMULSIFICATION WITH STANDARD INTRAOCULAR LENS IMPLANT Right 01/06/2021    Procedure: Cataract Removal with Implant;  Surgeon: Regan Kaufman MD;  Location: [facility]    PHACOEMULSIFICATION WITH STANDARD INTRAOCULAR LENS IMPLANT Left 02/17/2021    Procedure: Cataract Removal with Implant;  Surgeon: Regan Kaufman MD;  Location: [facility]    SURGICAL HISTORY OF -   12/01/1999    Umbilical Herniorrhaphy with mesh    SURGICAL HISTORY OF -  Left 11/2017    Thoracotomy and drainage of empyema    no family history of premature coronary artery disease Social History     Socioeconomic History    Marital status:      Spouse name: Not on file    Number of children: Not on file    Years of education: Not on file    Highest education level: Not on file   Occupational History    Not on file   Tobacco Use     Smoking status: Former     Current packs/day: 0.00     Average packs/day: 1 pack/day for 17.8 years (17.8 ttl pk-yrs)     Types: Cigarettes     Start date: 1958     Quit date: 10/13/1975     Years since quittin.4    Smokeless tobacco: Never   Vaping Use    Vaping status: Never Used   Substance and Sexual Activity    Alcohol use: Not Currently    Drug use: No    Sexual activity: Yes     Partners: Female     Birth control/protection: None     Comment:  51 years   Other Topics Concern    Parent/sibling w/ CABG, MI or angioplasty before 65F 55M? No   Social History Narrative    Not on file     Social Drivers of Health     Financial Resource Strain: Low Risk  (2024)    Financial Resource Strain     Within the past 12 months, have you or your family members you live with been unable to get utilities (heat, electricity) when it was really needed?: No   Food Insecurity: Low Risk  (2024)    Food Insecurity     Within the past 12 months, did you worry that your food would run out before you got money to buy more?: No     Within the past 12 months, did the food you bought just not last and you didn t have money to get more?: No   Transportation Needs: Low Risk  (2024)    Transportation Needs     Within the past 12 months, has lack of transportation kept you from medical appointments, getting your medicines, non-medical meetings or appointments, work, or from getting things that you need?: No   Physical Activity: Unknown (2024)    Exercise Vital Sign     Days of Exercise per Week: 0 days     Minutes of Exercise per Session: Not on file   Stress: No Stress Concern Present (2024)    [employer] - Occupational Stress Questionnaire     Feeling of Stress : Only a little   Social Connections: Unknown (2024)    Social Connection and Isolation Panel [NHANES]     Frequency of Communication with Friends and Family: Not on file     Frequency of Social  "Gatherings with Friends and Family: Once a week     Attends Mandaen Services: Not on file     Active Member of Clubs or Organizations: Not on file     Attends Club or Organization Meetings: Not on file     Marital Status: Not on file   Interpersonal Safety: Low Risk  (3/4/2025)    Interpersonal Safety     Do you feel physically and emotionally safe where you currently live?: Yes     Within the past 12 months, have you been hit, slapped, kicked or otherwise physically hurt by someone?: No     Within the past 12 months, have you been humiliated or emotionally abused in other ways by your partner or ex-partner?: No   Housing Stability: Low Risk  (12/19/2024)    Housing Stability     Do you have housing? : Yes     Are you worried about losing your housing?: No           Lab Results    Chemistry/lipid CBC Cardiac Enzymes/BNP/TSH/INR   Lab Results   Component Value Date    CHOL 106 10/24/2024    HDL 31 (L) 10/24/2024    TRIG 76 10/24/2024    BUN 14.4 03/20/2025     03/20/2025    CO2 28 03/20/2025    Lab Results   Component Value Date    WBC 5.3 03/20/2025    HGB 11.2 (L) 03/20/2025    HCT 35.0 (L) 03/20/2025     (H) 03/20/2025     (L) 03/20/2025    Lab Results   Component Value Date    TSH 2.23 07/12/2024    INR 1.39 (H) 11/04/2022     No results found for: \"CKTOTAL\", \"CKMB\", \"TROPONINI\"       Weight:    Wt Readings from Last 3 Encounters:   03/21/25 70.8 kg (156 lb)   03/20/25 71.1 kg (156 lb 12.8 oz)   03/06/25 72.1 kg (159 lb)       Allergies  Allergies   Allergen Reactions    Bactrim [Sulfamethoxazole-Trimethoprim] Nausea and Vomiting         Surgical History  Past Surgical History:   Procedure Laterality Date    ABDOMEN SURGERY  2003    ARTHROPLASTY KNEE Right 10/12/2018    Procedure: ARTHROPLASTY KNEE;  Right Total Knee Arthroplasty;  Surgeon: Jeb Peralta MD;  Location: [facility]    ARTHROPLASTY REVISION HIP Left 5/25/2023    Procedure: Revision total hip arthroplasty, left;  Surgeon: " Chandler Leiva MD;  Location: [facility]    BIOPSY  2007    COLONOSCOPY N/A 12/10/2015    Procedure: COMBINED COLONOSCOPY, SINGLE OR MULTIPLE BIOPSY/POLYPECTOMY BY BIOPSY;  Surgeon: Jyoti Figueroa MD;  Location: [facility]    JOINT REPLACEMENT, HIP RT/LT  10/2007    Joint Replacement Hip LT    JOINT REPLACEMTN, KNEE RT/LT  08/2011    Joint Replacement knee /LT, [facility]    LAPAROSCOPIC HERNIORRHAPHY INGUINAL BILATERAL Bilateral 04/24/2018    Procedure: LAPAROSCOPIC HERNIORRHAPHY INGUINAL BILATERAL;  Laparoscopic bilateral inguinal hernia repair;  Surgeon: Josemanuel Resendiz MD;  Location: [facility]    PHACOEMULSIFICATION WITH STANDARD INTRAOCULAR LENS IMPLANT Right 01/06/2021    Procedure: Cataract Removal with Implant;  Surgeon: Regan Kaufman MD;  Location: [facility]    PHACOEMULSIFICATION WITH STANDARD INTRAOCULAR LENS IMPLANT Left 02/17/2021    Procedure: Cataract Removal with Implant;  Surgeon: Regan Kaufman MD;  Location: [facility]    SURGICAL HISTORY OF -   12/01/1999    Umbilical Herniorrhaphy with mesh    SURGICAL HISTORY OF -  Left 11/2017    Thoracotomy and drainage of empyema       Social History  Tobacco:   History   Smoking Status    Former    Types: Cigarettes   Smokeless Tobacco    Never    Alcohol:   Social History    Substance and Sexual Activity      Alcohol use: Not Currently   Illicit Drugs:   History   Drug Use No       Family History  Family History   Problem Relation Age of Onset    Depression Mother     Cerebrovascular Disease Mother     Breast Cancer Mother     Neurologic Disorder Mother         parkinsons    Parkinsonism Mother     Respiratory Father         emphyzema    Diabetes Brother           Dylan Harp MD on 3/31/2025      cc: Lizzy Leiva,

## 2025-03-31 NOTE — TELEPHONE ENCOUNTER
[facility] referral from: [facility] visit (referral by provider)    MT referral outreach attempt #2 on March 31, 2025 at 11:58 AM      Outcome: Spoke with patient says thinks he wouldn't benefit from this at this the moment, declined    Use medica part d for the carrier/Plan on the flowsheet          JOHN Martinez   [phone]

## 2025-04-01 ENCOUNTER — HOSPITAL ENCOUNTER (OUTPATIENT)
Facility: HOSPITAL | Age: 80
Discharge: HOME | End: 2025-04-01
Attending: INTERNAL MEDICINE | Admitting: INTERNAL MEDICINE
Payer: COMMERCIAL

## 2025-04-01 VITALS
DIASTOLIC BLOOD PRESSURE: 76 MMHG | HEART RATE: 78 BPM | TEMPERATURE: 98.3 F | RESPIRATION RATE: 19 BRPM | SYSTOLIC BLOOD PRESSURE: 130 MMHG | OXYGEN SATURATION: 98 %

## 2025-04-01 DIAGNOSIS — I51.89 OTHER ILL-DEFINED HEART DISEASES: ICD-10-CM

## 2025-04-01 DIAGNOSIS — I38 VALVULAR HEART DISEASE: ICD-10-CM

## 2025-04-01 PROBLEM — Z98.890 STATUS POST CORONARY ANGIOGRAM: Status: ACTIVE | Noted: 2025-04-01

## 2025-04-01 LAB
ANION GAP SERPL CALCULATED.3IONS-SCNC: 10 MMOL/L (ref 7–15)
ATRIAL RATE - MUSE: NORMAL BPM
BASE EXCESS BLDA CALC-SCNC: 2.8 MMOL/L (ref -3–3)
BASE EXCESS BLDV CALC-SCNC: 4.9 MMOL/L (ref -3–3)
BUN SERPL-MCNC: 23.3 MG/DL (ref 8–23)
CALCIUM SERPL-MCNC: 9.5 MG/DL (ref 8.8–10.4)
CHLORIDE SERPL-SCNC: 97 MMOL/L (ref 98–107)
CREAT SERPL-MCNC: 0.99 MG/DL (ref 0.67–1.17)
DIASTOLIC BLOOD PRESSURE - MUSE: NORMAL MMHG
EGFRCR SERPLBLD CKD-EPI 2021: 77 ML/MIN/1.73M2
ERYTHROCYTE [DISTWIDTH] IN BLOOD BY AUTOMATED COUNT: 15.2 % (ref 10–15)
GLUCOSE SERPL-MCNC: 111 MG/DL (ref 70–99)
HCO3 BLDA-SCNC: 27 MMOL/L (ref 21–28)
HCO3 BLDV-SCNC: 28 MMOL/L (ref 21–28)
HCO3 SERPL-SCNC: 28 MMOL/L (ref 22–29)
HCT VFR BLD AUTO: 37.5 % (ref 40–53)
HGB BLD-MCNC: 12.3 G/DL (ref 13.3–17.7)
INTERPRETATION ECG - MUSE: NORMAL
MCH RBC QN AUTO: 32.8 PG (ref 26.5–33)
MCHC RBC AUTO-ENTMCNC: 32.8 G/DL (ref 31.5–36.5)
MCV RBC AUTO: 100 FL (ref 78–100)
OXYHGB MFR BLDA: 92 % (ref 92–100)
OXYHGB MFR BLDV: 58 % (ref 70–75)
P AXIS - MUSE: NORMAL DEGREES
PCO2 BLDA: 44 MM HG (ref 35–45)
PCO2 BLDV: 49 MM HG (ref 40–50)
PH BLDA: 7.41 [PH] (ref 7.35–7.45)
PH BLDV: 7.4 [PH] (ref 7.32–7.43)
PLATELET # BLD AUTO: 191 10E3/UL (ref 150–450)
PO2 BLDA: 68 MM HG (ref 80–105)
PO2 BLDV: 33 MM HG (ref 25–47)
POTASSIUM SERPL-SCNC: 4.1 MMOL/L (ref 3.4–5.3)
PR INTERVAL - MUSE: NORMAL MS
QRS DURATION - MUSE: 108 MS
QT - MUSE: 408 MS
QTC - MUSE: 465 MS
R AXIS - MUSE: 137 DEGREES
RBC # BLD AUTO: 3.75 10E6/UL (ref 4.4–5.9)
SAO2 % BLDA: 94 % (ref 95–96)
SAO2 % BLDV: 59 % (ref 70–75)
SODIUM SERPL-SCNC: 135 MMOL/L (ref 135–145)
SYSTOLIC BLOOD PRESSURE - MUSE: NORMAL MMHG
T AXIS - MUSE: 24 DEGREES
VENTRICULAR RATE- MUSE: 78 BPM
WBC # BLD AUTO: 5.6 10E3/UL (ref 4–11)

## 2025-04-01 PROCEDURE — 99152 MOD SED SAME PHYS/QHP 5/>YRS: CPT | Performed by: INTERNAL MEDICINE

## 2025-04-01 PROCEDURE — C1894 INTRO/SHEATH, NON-LASER: HCPCS | Performed by: INTERNAL MEDICINE

## 2025-04-01 PROCEDURE — 82435 ASSAY OF BLOOD CHLORIDE: CPT | Performed by: INTERNAL MEDICINE

## 2025-04-01 PROCEDURE — 82805 BLOOD GASES W/O2 SATURATION: CPT

## 2025-04-01 PROCEDURE — 272N000001 HC OR GENERAL SUPPLY STERILE: Performed by: INTERNAL MEDICINE

## 2025-04-01 PROCEDURE — 93005 ELECTROCARDIOGRAM TRACING: CPT

## 2025-04-01 PROCEDURE — 93460 R&L HRT ART/VENTRICLE ANGIO: CPT | Mod: 26 | Performed by: INTERNAL MEDICINE

## 2025-04-01 PROCEDURE — 255N000002 HC RX 255 OP 636: Performed by: INTERNAL MEDICINE

## 2025-04-01 PROCEDURE — 250N000011 HC RX IP 250 OP 636: Mod: JZ | Performed by: INTERNAL MEDICINE

## 2025-04-01 PROCEDURE — 258N000003 HC RX IP 258 OP 636: Performed by: INTERNAL MEDICINE

## 2025-04-01 PROCEDURE — 80048 BASIC METABOLIC PNL TOTAL CA: CPT | Performed by: INTERNAL MEDICINE

## 2025-04-01 PROCEDURE — 99153 MOD SED SAME PHYS/QHP EA: CPT | Performed by: INTERNAL MEDICINE

## 2025-04-01 PROCEDURE — 93460 R&L HRT ART/VENTRICLE ANGIO: CPT | Performed by: INTERNAL MEDICINE

## 2025-04-01 PROCEDURE — 93010 ELECTROCARDIOGRAM REPORT: CPT | Performed by: INTERNAL MEDICINE

## 2025-04-01 PROCEDURE — 250N000009 HC RX 250: Mod: JW | Performed by: INTERNAL MEDICINE

## 2025-04-01 PROCEDURE — 999N000054 HC STATISTIC EKG NON-CHARGEABLE

## 2025-04-01 PROCEDURE — 250N000013 HC RX MED GY IP 250 OP 250 PS 637: Performed by: INTERNAL MEDICINE

## 2025-04-01 PROCEDURE — C1725 CATH, TRANSLUMIN NON-LASER: HCPCS | Performed by: INTERNAL MEDICINE

## 2025-04-01 PROCEDURE — 82565 ASSAY OF CREATININE: CPT | Performed by: INTERNAL MEDICINE

## 2025-04-01 PROCEDURE — C1751 CATH, INF, PER/CENT/MIDLINE: HCPCS | Performed by: INTERNAL MEDICINE

## 2025-04-01 PROCEDURE — C1769 GUIDE WIRE: HCPCS | Performed by: INTERNAL MEDICINE

## 2025-04-01 PROCEDURE — 85014 HEMATOCRIT: CPT | Performed by: INTERNAL MEDICINE

## 2025-04-01 PROCEDURE — 36415 COLL VENOUS BLD VENIPUNCTURE: CPT | Performed by: INTERNAL MEDICINE

## 2025-04-01 RX ORDER — SODIUM CHLORIDE 9 MG/ML
INJECTION, SOLUTION INTRAVENOUS CONTINUOUS
Status: DISCONTINUED | OUTPATIENT
Start: 2025-04-01 | End: 2025-04-01 | Stop reason: HOSPADM

## 2025-04-01 RX ORDER — LIDOCAINE 40 MG/G
CREAM TOPICAL
Status: DISCONTINUED | OUTPATIENT
Start: 2025-04-01 | End: 2025-04-01 | Stop reason: HOSPADM

## 2025-04-01 RX ORDER — FENTANYL CITRATE 50 UG/ML
25 INJECTION, SOLUTION INTRAMUSCULAR; INTRAVENOUS
Status: DISCONTINUED | OUTPATIENT
Start: 2025-04-01 | End: 2025-04-01 | Stop reason: HOSPADM

## 2025-04-01 RX ORDER — OXYCODONE HYDROCHLORIDE 5 MG/1
10 TABLET ORAL EVERY 4 HOURS PRN
Status: DISCONTINUED | OUTPATIENT
Start: 2025-04-01 | End: 2025-04-01 | Stop reason: HOSPADM

## 2025-04-01 RX ORDER — OXYCODONE HYDROCHLORIDE 5 MG/1
5 TABLET ORAL EVERY 4 HOURS PRN
Status: DISCONTINUED | OUTPATIENT
Start: 2025-04-01 | End: 2025-04-01 | Stop reason: HOSPADM

## 2025-04-01 RX ORDER — NALOXONE HYDROCHLORIDE 0.4 MG/ML
0.2 INJECTION, SOLUTION INTRAMUSCULAR; INTRAVENOUS; SUBCUTANEOUS
Status: DISCONTINUED | OUTPATIENT
Start: 2025-04-01 | End: 2025-04-01 | Stop reason: HOSPADM

## 2025-04-01 RX ORDER — IODIXANOL 320 MG/ML
INJECTION, SOLUTION INTRAVASCULAR
Status: DISCONTINUED | OUTPATIENT
Start: 2025-04-01 | End: 2025-04-01 | Stop reason: HOSPADM

## 2025-04-01 RX ORDER — FENTANYL CITRATE 50 UG/ML
INJECTION, SOLUTION INTRAMUSCULAR; INTRAVENOUS
Status: DISCONTINUED | OUTPATIENT
Start: 2025-04-01 | End: 2025-04-01 | Stop reason: HOSPADM

## 2025-04-01 RX ORDER — ACETAMINOPHEN 325 MG/1
650 TABLET ORAL EVERY 4 HOURS PRN
Status: DISCONTINUED | OUTPATIENT
Start: 2025-04-01 | End: 2025-04-01 | Stop reason: HOSPADM

## 2025-04-01 RX ORDER — FLUMAZENIL 0.1 MG/ML
0.2 INJECTION, SOLUTION INTRAVENOUS
Status: DISCONTINUED | OUTPATIENT
Start: 2025-04-01 | End: 2025-04-01 | Stop reason: HOSPADM

## 2025-04-01 RX ORDER — HEPARIN SODIUM 1000 [USP'U]/ML
INJECTION, SOLUTION INTRAVENOUS; SUBCUTANEOUS
Status: DISCONTINUED | OUTPATIENT
Start: 2025-04-01 | End: 2025-04-01 | Stop reason: HOSPADM

## 2025-04-01 RX ORDER — NALOXONE HYDROCHLORIDE 0.4 MG/ML
0.4 INJECTION, SOLUTION INTRAMUSCULAR; INTRAVENOUS; SUBCUTANEOUS
Status: DISCONTINUED | OUTPATIENT
Start: 2025-04-01 | End: 2025-04-01 | Stop reason: HOSPADM

## 2025-04-01 RX ORDER — NITROGLYCERIN 5 MG/ML
VIAL (ML) INTRAVENOUS
Status: DISCONTINUED | OUTPATIENT
Start: 2025-04-01 | End: 2025-04-01 | Stop reason: HOSPADM

## 2025-04-01 RX ORDER — ATROPINE SULFATE 0.1 MG/ML
0.5 INJECTION INTRAVENOUS
Status: DISCONTINUED | OUTPATIENT
Start: 2025-04-01 | End: 2025-04-01 | Stop reason: HOSPADM

## 2025-04-01 RX ORDER — ASPIRIN 325 MG
325 TABLET ORAL ONCE
Status: COMPLETED | OUTPATIENT
Start: 2025-04-01 | End: 2025-04-01

## 2025-04-01 RX ORDER — HEPARIN SODIUM 200 [USP'U]/100ML
INJECTION, SOLUTION INTRAVENOUS
Status: DISCONTINUED | OUTPATIENT
Start: 2025-04-01 | End: 2025-04-01 | Stop reason: HOSPADM

## 2025-04-01 RX ORDER — ASPIRIN 81 MG/1
243 TABLET, CHEWABLE ORAL ONCE
Status: COMPLETED | OUTPATIENT
Start: 2025-04-01 | End: 2025-04-01

## 2025-04-01 RX ADMIN — SODIUM CHLORIDE 150 ML/HR: 0.9 INJECTION, SOLUTION INTRAVENOUS at 08:14

## 2025-04-01 RX ADMIN — ASPIRIN 325 MG: 325 TABLET ORAL at 08:14

## 2025-04-01 ASSESSMENT — ACTIVITIES OF DAILY LIVING (ADL)
ADLS_ACUITY_SCORE: 55

## 2025-04-01 ASSESSMENT — EJECTION FRACTION: EF_VALUE: .28

## 2025-04-01 NOTE — PRE-PROCEDURE
GENERAL PRE-PROCEDURE:   Procedure:  Coronary angiogram with possible PCI, right heart catheterization  Date/Time:  4/1/2025 8:30 AM    Written consent obtained?: Yes    Risks and benefits: Risks, benefits and alternatives were discussed    Consent given by:  Patient  Patient states understanding of procedure being performed: Yes    Patient's understanding of procedure matches consent: Yes    Procedure consent matches procedure scheduled: Yes    Expected level of sedation:  Moderate  Appropriately NPO:  Yes  ASA Class:  4 (Severe MR, severe-massive TR, moderate AI, HFpEF, atrial fibrillation, advanced age/frailty, chronic anemia)  Mallampati  :  Grade 2- soft palate, base of uvula, tonsillar pillars, and portion of posterior pharyngeal wall visible  Lungs:  Lungs clear with good breath sounds bilaterally  Heart:  Systolic murmur  History & Physical reviewed:  History and physical reviewed and updates made (see comment)  H&P Comments:  Clinically Significant Risk Factors Present on Admission    Cardiovascular : Severe MR, severe-massive TR, moderate AI, HFpEF, atrial fibrillation    Fluid & Electrolyte Disorders : Not present on admission    Gastroenterology : Not present on admission    Hematology/Oncology : anemia; chronic, stable    Nephrology : Not present on admission    Neurology : Not present on admission    Pulmonology : Not present on admission    Systemic : advanced age/frailty    Statement of review:  I have reviewed the lab findings, diagnostic data, medications, and the plan for sedation

## 2025-04-01 NOTE — INTERVAL H&P NOTE
I have reviewed the surgical (or preoperative) H&P that is linked to this encounter, and examined the patient. There are no significant changes    Clinical Conditions Present on Arrival:  Clinically Significant Risk Factors Present on Admission         # Hyponatremia: Lowest Na = 135 mmol/L in last 30 days, will monitor as appropriate  # Hypochloremia: Lowest Cl = 97 mmol/L in last 2 days, will monitor as appropriate        # Drug Induced Coagulation Defect: home medication list includes an anticoagulant medication  # Thrombocytopenia: Lowest platelets = 117 in last 30 days, will monitor for bleeding            Pt had a vaginal yeast infection this past week and used an over the counter med but it did not help with the sx's. She has some burning, feels sore, lots of itching. Will consult with DR Pandya on treatment and will call pt back.  Also advised her to start using a probiotic as this will help a lot in preventing yeast again.   ROXANA BOWERS in [place].

## 2025-04-01 NOTE — Clinical Note
Right brachial sheath removed. Manual pressure held. Decision made to change to femoral access for RHC

## 2025-04-01 NOTE — Clinical Note
dry, intact, no bleeding and no hematoma. Right brachial vein. Coban and gauze placed on site per Dr. Segovia.

## 2025-04-01 NOTE — DISCHARGE INSTRUCTIONS

## 2025-04-01 NOTE — CARE PLAN
Pt dc'ed to home via wheelchair with wife, denies any pain, Right groin and wrist dry and intact with no hematoma.

## 2025-04-08 ENCOUNTER — HOSPITAL ENCOUNTER (OUTPATIENT)
Dept: CT IMAGING | Facility: CLINIC | Age: 80
Discharge: HOME | End: 2025-04-08
Attending: INTERNAL MEDICINE | Admitting: INTERNAL MEDICINE
Payer: COMMERCIAL

## 2025-04-08 DIAGNOSIS — I07.1 TRICUSPID VALVE INSUFFICIENCY, UNSPECIFIED ETIOLOGY: ICD-10-CM

## 2025-04-08 PROCEDURE — 250N000011 HC RX IP 250 OP 636: Performed by: INTERNAL MEDICINE

## 2025-04-08 PROCEDURE — 74174 CTA ABD&PLVS W/CONTRAST: CPT

## 2025-04-08 PROCEDURE — 71275 CT ANGIOGRAPHY CHEST: CPT | Mod: 26 | Performed by: INTERNAL MEDICINE

## 2025-04-08 PROCEDURE — 71275 CT ANGIOGRAPHY CHEST: CPT

## 2025-04-08 PROCEDURE — 74174 CTA ABD&PLVS W/CONTRAST: CPT | Mod: 26 | Performed by: INTERNAL MEDICINE

## 2025-04-08 RX ORDER — IOPAMIDOL 755 MG/ML
130 INJECTION, SOLUTION INTRAVASCULAR ONCE
Status: COMPLETED | OUTPATIENT
Start: 2025-04-08 | End: 2025-04-08

## 2025-04-08 RX ADMIN — IOPAMIDOL 130 ML: 755 INJECTION, SOLUTION INTRAVENOUS at 12:46

## 2025-04-08 NOTE — TELEPHONE ENCOUNTER
Patient seen dentist yesterday, 4/7.  He will need extractions done from an oral surgeon. Patient has yet to set up the surgery visit.  He is hoping to get a call to schedule and wants to take care of it ASAP.  He will need the visit with the surgeon prior to scheduling extractions.  Writer told patient we had originally planned 4/23 for procedure but we may have to look at the beginning of May.  Patient will call writer when there is a plan in place for dental.

## 2025-04-15 ENCOUNTER — OFFICE VISIT (OUTPATIENT)
Dept: CARDIOLOGY | Facility: CLINIC | Age: 80
End: 2025-04-15
Payer: COMMERCIAL

## 2025-04-15 VITALS
HEART RATE: 63 BPM | DIASTOLIC BLOOD PRESSURE: 64 MMHG | HEIGHT: 68 IN | WEIGHT: 160 LBS | SYSTOLIC BLOOD PRESSURE: 98 MMHG | RESPIRATION RATE: 16 BRPM | BODY MASS INDEX: 24.25 KG/M2

## 2025-04-15 VITALS
SYSTOLIC BLOOD PRESSURE: 98 MMHG | RESPIRATION RATE: 16 BRPM | HEIGHT: 68 IN | DIASTOLIC BLOOD PRESSURE: 64 MMHG | HEART RATE: 63 BPM | WEIGHT: 160 LBS | BODY MASS INDEX: 24.25 KG/M2

## 2025-04-15 DIAGNOSIS — I34.0 MITRAL VALVE INSUFFICIENCY, UNSPECIFIED ETIOLOGY: ICD-10-CM

## 2025-04-15 DIAGNOSIS — I50.32 CHRONIC HEART FAILURE WITH PRESERVED EJECTION FRACTION (H): ICD-10-CM

## 2025-04-15 DIAGNOSIS — I07.1 TRICUSPID VALVE INSUFFICIENCY, UNSPECIFIED ETIOLOGY: Primary | ICD-10-CM

## 2025-04-15 PROCEDURE — 3074F SYST BP LT 130 MM HG: CPT | Performed by: INTERNAL MEDICINE

## 2025-04-15 PROCEDURE — 99214 OFFICE O/P EST MOD 30 MIN: CPT | Performed by: INTERNAL MEDICINE

## 2025-04-15 PROCEDURE — 3078F DIAST BP <80 MM HG: CPT | Performed by: INTERNAL MEDICINE

## 2025-04-15 PROCEDURE — G2211 COMPLEX E/M VISIT ADD ON: HCPCS | Performed by: INTERNAL MEDICINE

## 2025-04-15 NOTE — LETTER
4/15/2025    Lizzy Leiva MD  [address]  [facility] [address]    RE: Josemanuel Bailonmelvin       Dear Colleague,     I had the pleasure of seeing Josemanuel Edmond in the [facility].    HEART CARE VALVE CLINIC ENCOUNTER CONSULTATON NOTE      [facility]  [phone]      Assessment/Recommendations   Assessment/Plan:Josemanuel Edmond is an 80M w/ severe MR, TR, and moderate AI, HFPEF, AF who is here for evaluation of management options for his valvular disease.  -  I personally reviewed cardiac imaging including: AD, which showed 1-2+ AI, severe MR, and torrential TR.  -  his options include OHS w/ multi-valve replacement/repair, CHANTAL for MR followed possibly by CHANTAL for TR v. Evoque  -  if going the percutaneous route, my suggestion would be to start w/ CHANTAL for MV. Then re-image w/ a TTE +/- AD in 4-6 wks, and if TR still severe, consider TriClip v. Evoque  -  he is really not interested in OHS but I encouraged him to still meet w/  (today or later this or next week)    The longitudinal plan of care for the diagnosis(es)/condition(s) as documented were addressed during this visit. Due to the added complexity in care, I will continue to support Gavin in the subsequent management and with ongoing continuity of care.     A total of >60 mins was spent reviewing prior records, including documentation, lab studies, cardiac testing/imaging, interview with patient, physical exam, and documentation        History of Present Illness/Subjective    HPI: Josemanuel Edmond is a 80 year old male w/ severe MR, TR, and moderate AI, HFPEF, AF who is here for evaluation of management options for his valvular disease.    Since the last time we met, he underwent angio/RHC and presents today for further discussion of management options w/ IC and CTS. He and his wife tell me he continues to have significant [place], + R pedal edema, no  "orthopnea/PND/syncope/N/V/D/F/C/wt.changes.    Recent Echocardiogram Results:  1. Left ventricular function is normal.The ejection fraction is 55-60%.  2. Normal right ventricle size and systolic function.  3. The left atrium is severely dilated.  4. There is bileaflet prolapse of the mitral valve leaflets with malcoaptation  of leaflet tips. Severe (4+) regurgitation is identified with a regurgitant  volume by PISA of 65 ml.  5. There is likely moderate (2+) aortic regurgitation present (incompletely  evaluated).  6. Severe (4+) to massive (5+) Tricuspid valve regurgitation is present,  incompletely evaluated.     The study needed to be terminated prematurely due to persistent hypoxia.  Suspect AD probe was interfering with respiration. Gastric views were not  obtained. Consider repeating AD with general anesthesia.     Note: the mitral valve was well-visualized and likely amenable to CHANTAL. The  tricuspid valve was not visualized clearly enough to be amenable to CHANTAL.  Perhaps, visualization would be improved under general anesthesia.    Recent Coronary Angiogram Results:  CONCLUSIONS:   No angiographic evidence of obstructive coronary disease.  Normal right and left-sided filling pressures.   No pulmonary hypertension.  Normal cardiac output and index.  Mitral regurgitation with V waves up to 24 mmHg.        RECOMMENDATIONS:   Usual postprocedure cares, please see orders.  Continue workup for mitral and tricuspid CHANTAL as planned.          Physical Examination  Review of Systems   Vitals: BP 98/64 (BP Location: Right arm, Patient Position: Sitting, Cuff Size: Adult Large)   Pulse 63   Resp 16   Ht 1.727 m (5' 8\")   Wt 72.6 kg (160 lb)   BMI 24.33 kg/m    BMI= Body mass index is 24.33 kg/m .  Wt Readings from Last 3 Encounters:   04/15/25 72.6 kg (160 lb)   04/15/25 72.6 kg (160 lb)   03/31/25 68.8 kg (151 lb 9.6 oz)       General Appearance:   no distress, normal body habitus   ENT/Mouth: membranes moist, no " oral lesions or bleeding gums.      EYES:  no scleral icterus, normal conjunctivae   Neck: no carotid bruits or thyromegaly   Chest/Lungs:   lungs are clear to auscultation, no rales or wheezing, no sternal scar, equal chest wall expansion    Cardiovascular:   Regular. Normal first and second heart sounds with 2/6 systolic murmur, no rubs, or gallops; the carotid, radial and posterior tibial pulses are intact, Jugular venous pressure 7, no edema bilaterally    Abdomen:  no organomegaly, masses, bruits, or tenderness; bowel sounds are present   Extremities: no cyanosis or clubbing   Skin: no xanthelasma, warm.    Neurologic: normal  bilateral, no tremors     Psychiatric: alert and oriented x3, calm        Please refer above for cardiac ROS details.        Medical History  Surgical History Family History Social History   Past Medical History:   Diagnosis Date     Acute on chronic diastolic (congestive) heart failure (H) 11/29/2023     Arthritis 2005     Benign neoplasm of prostate 2000    Benign Prostate Nodule     Depressive disorder     past condition     Infection due to 2019 novel coronavirus 09/27/2021     S/P knee replacement 11/06/2012     S/P left knee arthroscopy 08/15/2011     Past Surgical History:   Procedure Laterality Date     ABDOMEN SURGERY  2003     ARTHROPLASTY KNEE Right 10/12/2018    Procedure: ARTHROPLASTY KNEE;  Right Total Knee Arthroplasty;  Surgeon: Jeb Peralta MD;  Location: [facility]     ARTHROPLASTY REVISION HIP Left 5/25/2023    Procedure: Revision total hip arthroplasty, left;  Surgeon: Chandler Leiva MD;  Location: [facility]     BIOPSY  2007     COLONOSCOPY N/A 12/10/2015    Procedure: COMBINED COLONOSCOPY, SINGLE OR MULTIPLE BIOPSY/POLYPECTOMY BY BIOPSY;  Surgeon: Jyoti Figueroa MD;  Location: [facility]     CV CORONARY ANGIOGRAM N/A 4/1/2025    Procedure: Coronary Angiogram;  Surgeon: Tae Segovia MD;  Location: [facility] CATH LAB CV     CV LEFT HEART CATH  N/A 4/1/2025    Procedure: Left Heart Catheterization;  Surgeon: Tae Segovia MD;  Location: [facility] CATH LAB CV     CV RIGHT HEART CATH MEASUREMENTS RECORDED N/A 4/1/2025    Procedure: Right Heart Catheterization;  Surgeon: Tae Segovia MD;  Location: [facility] CATH LAB CV     JOINT REPLACEMENT, HIP RT/LT  10/2007    Joint Replacement Hip LT     JOINT REPLACEMTN, KNEE RT/LT  08/2011    Joint Replacement knee /LT, [facility]     LAPAROSCOPIC HERNIORRHAPHY INGUINAL BILATERAL Bilateral 04/24/2018    Procedure: LAPAROSCOPIC HERNIORRHAPHY INGUINAL BILATERAL;  Laparoscopic bilateral inguinal hernia repair;  Surgeon: Josemanuel Resendiz MD;  Location: [facility]     PHACOEMULSIFICATION WITH STANDARD INTRAOCULAR LENS IMPLANT Right 01/06/2021    Procedure: Cataract Removal with Implant;  Surgeon: Regan Kaufman MD;  Location: [facility]     PHACOEMULSIFICATION WITH STANDARD INTRAOCULAR LENS IMPLANT Left 02/17/2021    Procedure: Cataract Removal with Implant;  Surgeon: Regan Kaufman MD;  Location: [facility]     SURGICAL HISTORY OF -   12/01/1999    Umbilical Herniorrhaphy with mesh     SURGICAL HISTORY OF -  Left 11/2017    Thoracotomy and drainage of empyema     Family History   Problem Relation Age of Onset     Depression Mother      Cerebrovascular Disease Mother      Breast Cancer Mother      Neurologic Disorder Mother         parkinsons     Parkinsonism Mother      Respiratory Father         emphyzema     Diabetes Brother         Social History     Socioeconomic History     Marital status:      Spouse name: Not on file     Number of children: Not on file     Years of education: Not on file     Highest education level: Not on file   Occupational History     Not on file   Tobacco Use     Smoking status: Former     Current packs/day: 0.00     Average packs/day: 1 pack/day for 17.8 years (17.8 ttl pk-yrs)     Types: Cigarettes     Start date: 1/5/1958     Quit date: 10/13/1975     Years since quitting:  49.5     Smokeless tobacco: Never   Vaping Use     Vaping status: Never Used   Substance and Sexual Activity     Alcohol use: Not Currently     Drug use: No     Sexual activity: Yes     Partners: Female     Birth control/protection: None     Comment:  51 years   Other Topics Concern     Parent/sibling w/ CABG, MI or angioplasty before 65F 55M? No   Social History Narrative     Not on file     Social Drivers of Health     Financial Resource Strain: Low Risk  (12/19/2024)    Financial Resource Strain      Within the past 12 months, have you or your family members you live with been unable to get utilities (heat, electricity) when it was really needed?: No   Food Insecurity: Low Risk  (12/19/2024)    Food Insecurity      Within the past 12 months, did you worry that your food would run out before you got money to buy more?: No      Within the past 12 months, did the food you bought just not last and you didn t have money to get more?: No   Transportation Needs: Low Risk  (12/19/2024)    Transportation Needs      Within the past 12 months, has lack of transportation kept you from medical appointments, getting your medicines, non-medical meetings or appointments, work, or from getting things that you need?: No   Physical Activity: Unknown (12/19/2024)    Exercise Vital Sign      Days of Exercise per Week: 0 days      Minutes of Exercise per Session: Not on file   Stress: No Stress Concern Present (12/19/2024)    [employer] - Occupational Stress Questionnaire      Feeling of Stress : Only a little   Social Connections: Unknown (12/19/2024)    Social Connection and Isolation Panel [NHANES]      Frequency of Communication with Friends and Family: Not on file      Frequency of Social Gatherings with Friends and Family: Once a week      Attends [employer] Services: Not on file      Active Member of Clubs or Organizations: Not on file      Attends Club or Organization Meetings: Not on file       Marital Status: Not on file   Interpersonal Safety: Low Risk  (4/1/2025)    Interpersonal Safety      Do you feel physically and emotionally safe where you currently live?: Yes      Within the past 12 months, have you been hit, slapped, kicked or otherwise physically hurt by someone?: No      Within the past 12 months, have you been humiliated or emotionally abused in other ways by your partner or ex-partner?: No   Housing Stability: Low Risk  (12/19/2024)    Housing Stability      Do you have housing? : Yes      Are you worried about losing your housing?: No           Medications  Allergies   Current Outpatient Medications   Medication Sig Dispense Refill     buPROPion (WELLBUTRIN XL) 300 MG 24 hr tablet Take 1 tablet (300 mg) by mouth every morning. 90 tablet 1     busPIRone HCl (BUSPAR) 30 MG tablet TAKE 1 TABLET TWICE A  tablet 1     doxazosin (CARDURA) 4 MG tablet Take 1 tablet (4 mg) by mouth at bedtime. 90 tablet 1     finasteride (PROSCAR) 5 MG tablet TAKE 1 TABLET BY MOUTH EVERY DAY 90 tablet 3     furosemide (LASIX) 20 MG tablet Take 2 tablets (40 mg) by mouth daily. 60 tablet 4     lamoTRIgine (LAMICTAL) 25 MG tablet Take 2 tablets (50 mg) by mouth 2 times daily. 360 tablet 1     losartan (COZAAR) 50 MG tablet Take 1 tablet (50 mg) by mouth daily. 90 tablet 2     metoprolol succinate ER (TOPROL XL) 200 MG 24 hr tablet Take 1 tablet (200 mg) by mouth every evening. 90 tablet 2     PARoxetine (PAXIL) 30 MG tablet Take 0.5 tablets (15 mg) by mouth every morning.       rivaroxaban ANTICOAGULANT (XARELTO ANTICOAGULANT) 20 MG TABS tablet Take 1 tablet (20 mg) by mouth daily (with dinner). 90 tablet 2     mirtazapine (REMERON) 7.5 MG tablet Take 1 tablet (7.5 mg) by mouth at bedtime. 30 tablet 1     multivitamin (ONE-DAILY) tablet Take 1 tablet by mouth daily 30 tablet 0       Allergies   Allergen Reactions     Bactrim [Sulfamethoxazole-Trimethoprim] Nausea and Vomiting          Lab Results   "  Chemistry/lipid CBC Cardiac Enzymes/BNP/TSH/INR   Recent Labs   Lab Test 10/24/24  1033   CHOL 106   HDL 31*   LDL 60   TRIG 76     Recent Labs   Lab Test 10/24/24  1033 11/30/23  1130 11/04/22  1027   LDL 60 64 88     Recent Labs   Lab Test 04/01/25  0751      POTASSIUM 4.1   CHLORIDE 97*   CO2 28   *   BUN 23.3*   CR 0.99   GFRESTIMATED 77   LUIS 9.5     Recent Labs   Lab Test 04/01/25  0751 03/20/25  1324 02/24/25  1442   CR 0.99 0.89 0.89     No results for input(s): \"A1C\" in the last 39557 hours.       Recent Labs   Lab Test 04/01/25  0751   WBC 5.6   HGB 12.3*   HCT 37.5*           Recent Labs   Lab Test 04/01/25  0751 03/20/25  1324 02/24/25  1442   HGB 12.3* 11.2* 11.1*    No results for input(s): \"TROPONINI\" in the last 88039 hours.  Recent Labs   Lab Test 11/30/23  1130 11/24/23  1340 04/06/23  0948 12/20/17  1315   NTBNPI  --  4,896* 1,329 957*   NTBNP 1,862*  --   --   --      Recent Labs   Lab Test 07/12/24  1552   TSH 2.23     Recent Labs   Lab Test 11/04/22  1027   INR 1.39*        Ubaldo Carrillo MD                                        Thank you for allowing me to participate in the care of your patient.      Sincerely,     Ubaldo Carrillo MD     [facility]  cc:   No referring provider defined for this encounter.      "

## 2025-04-15 NOTE — PROGRESS NOTES
HEART CARE VALVE CLINIC ENCOUNTER CONSULTATON NOTE      [facility]  [phone]      Assessment/Recommendations   Assessment/Plan:Josemanuel Edmond is an 80M w/ severe MR, TR, and moderate AI, HFPEF, AF who is here for evaluation of management options for his valvular disease.  -  I personally reviewed cardiac imaging including: AD, which showed 1-2+ AI, severe MR, and torrential TR.  -  his options include OHS w/ multi-valve replacement/repair, CHANTAL for MR followed possibly by CHANTAL for TR v. Evoque  -  if going the percutaneous route, my suggestion would be to start w/ CHANTAL for MV. Then re-image w/ a TTE +/- AD in 4-6 wks, and if TR still severe, consider TriClip v. Evoque  -  he is really not interested in OHS but I encouraged him to still meet w/  (today or later this or next week)    The longitudinal plan of care for the diagnosis(es)/condition(s) as documented were addressed during this visit. Due to the added complexity in care, I will continue to support Gavin in the subsequent management and with ongoing continuity of care.     A total of >60 mins was spent reviewing prior records, including documentation, lab studies, cardiac testing/imaging, interview with patient, physical exam, and documentation        History of Present Illness/Subjective    HPI: Josemanuel Edmond is a 80 year old male w/ severe MR, TR, and moderate AI, HFPEF, AF who is here for evaluation of management options for his valvular disease.    Since the last time we met, he underwent angio/RHC and presents today for further discussion of management options w/ IC and CTS. He and his wife tell me he continues to have significant MONATNA, + R pedal edema, no orthopnea/PND/syncope/N/V/D/F/C/wt.changes.    Recent Echocardiogram Results:  1. Left ventricular function is normal.The ejection fraction is 55-60%.  2. Normal right ventricle size and systolic function.  3. The left atrium is severely dilated.  4. There  "is bileaflet prolapse of the mitral valve leaflets with malcoaptation  of leaflet tips. Severe (4+) regurgitation is identified with a regurgitant  volume by PISA of 65 ml.  5. There is likely moderate (2+) aortic regurgitation present (incompletely  evaluated).  6. Severe (4+) to massive (5+) Tricuspid valve regurgitation is present,  incompletely evaluated.     The study needed to be terminated prematurely due to persistent hypoxia.  Suspect AD probe was interfering with respiration. Gastric views were not  obtained. Consider repeating AD with general anesthesia.     Note: the mitral valve was well-visualized and likely amenable to CHANTAL. The  tricuspid valve was not visualized clearly enough to be amenable to CHANTAL.  Perhaps, visualization would be improved under general anesthesia.    Recent Coronary Angiogram Results:  CONCLUSIONS:   No angiographic evidence of obstructive coronary disease.  Normal right and left-sided filling pressures.   No pulmonary hypertension.  Normal cardiac output and index.  Mitral regurgitation with V waves up to 24 mmHg.        RECOMMENDATIONS:   Usual postprocedure cares, please see orders.  Continue workup for mitral and tricuspid CHANTAL as planned.          Physical Examination  Review of Systems   Vitals: BP 98/64 (BP Location: Right arm, Patient Position: Sitting, Cuff Size: Adult Large)   Pulse 63   Resp 16   Ht 1.727 m (5' 8\")   Wt 72.6 kg (160 lb)   BMI 24.33 kg/m    BMI= Body mass index is 24.33 kg/m .  Wt Readings from Last 3 Encounters:   04/15/25 72.6 kg (160 lb)   04/15/25 72.6 kg (160 lb)   03/31/25 68.8 kg (151 lb 9.6 oz)       General Appearance:   no distress, normal body habitus   ENT/Mouth: membranes moist, no oral lesions or bleeding gums.      EYES:  no scleral icterus, normal conjunctivae   Neck: no carotid bruits or thyromegaly   Chest/Lungs:   lungs are clear to auscultation, no rales or wheezing, no sternal scar, equal chest wall expansion  "   Cardiovascular:   Regular. Normal first and second heart sounds with 2/6 systolic murmur, no rubs, or gallops; the carotid, radial and posterior tibial pulses are intact, Jugular venous pressure 7, no edema bilaterally    Abdomen:  no organomegaly, masses, bruits, or tenderness; bowel sounds are present   Extremities: no cyanosis or clubbing   Skin: no xanthelasma, warm.    Neurologic: normal  bilateral, no tremors     Psychiatric: alert and oriented x3, calm        Please refer above for cardiac ROS details.        Medical History  Surgical History Family History Social History   Past Medical History:   Diagnosis Date    Acute on chronic diastolic (congestive) heart failure (H) 11/29/2023    Arthritis 2005    Benign neoplasm of prostate 2000    Benign Prostate Nodule    Depressive disorder     past condition    Infection due to 2019 novel coronavirus 09/27/2021    S/P knee replacement 11/06/2012    S/P left knee arthroscopy 08/15/2011     Past Surgical History:   Procedure Laterality Date    ABDOMEN SURGERY  2003    ARTHROPLASTY KNEE Right 10/12/2018    Procedure: ARTHROPLASTY KNEE;  Right Total Knee Arthroplasty;  Surgeon: Jeb Peralta MD;  Location: [facility]    ARTHROPLASTY REVISION HIP Left 5/25/2023    Procedure: Revision total hip arthroplasty, left;  Surgeon: Chandler Leiva MD;  Location: [facility]    BIOPSY  2007    COLONOSCOPY N/A 12/10/2015    Procedure: COMBINED COLONOSCOPY, SINGLE OR MULTIPLE BIOPSY/POLYPECTOMY BY BIOPSY;  Surgeon: Jyoti Figueroa MD;  Location: [facility]    CV CORONARY ANGIOGRAM N/A 4/1/2025    Procedure: Coronary Angiogram;  Surgeon: Tae Segovia MD;  Location: [facility] LAB CV    CV LEFT HEART CATH N/A 4/1/2025    Procedure: Left Heart Catheterization;  Surgeon: Tae Segovia MD;  Location: [facility] CATH LAB CV    CV RIGHT HEART CATH MEASUREMENTS RECORDED N/A 4/1/2025    Procedure: Right Heart Catheterization;  Surgeon: Tae Segovia MD;   Location: [facility] LAB CV    JOINT REPLACEMENT, HIP RT/LT  10/2007    Joint Replacement Hip LT    JOINT REPLACEMTN, KNEE RT/LT  2011    Joint Replacement knee /LT, [facility]    LAPAROSCOPIC HERNIORRHAPHY INGUINAL BILATERAL Bilateral 2018    Procedure: LAPAROSCOPIC HERNIORRHAPHY INGUINAL BILATERAL;  Laparoscopic bilateral inguinal hernia repair;  Surgeon: Josemanuel Resendiz MD;  Location: [facility]    PHACOEMULSIFICATION WITH STANDARD INTRAOCULAR LENS IMPLANT Right 2021    Procedure: Cataract Removal with Implant;  Surgeon: Regan Kaufman MD;  Location: [facility]    PHACOEMULSIFICATION WITH STANDARD INTRAOCULAR LENS IMPLANT Left 2021    Procedure: Cataract Removal with Implant;  Surgeon: Regan Kaufman MD;  Location: [facility]    SURGICAL HISTORY OF -   1999    Umbilical Herniorrhaphy with mesh    SURGICAL HISTORY OF -  Left 2017    Thoracotomy and drainage of empyema     Family History   Problem Relation Age of Onset    Depression Mother     Cerebrovascular Disease Mother     Breast Cancer Mother     Neurologic Disorder Mother         parkinsons    Parkinsonism Mother     Respiratory Father         emphyzema    Diabetes Brother         Social History     Socioeconomic History    Marital status:      Spouse name: Not on file    Number of children: Not on file    Years of education: Not on file    Highest education level: Not on file   Occupational History    Not on file   Tobacco Use    Smoking status: Former     Current packs/day: 0.00     Average packs/day: 1 pack/day for 17.8 years (17.8 ttl pk-yrs)     Types: Cigarettes     Start date: 1958     Quit date: 10/13/1975     Years since quittin.5    Smokeless tobacco: Never   Vaping Use    Vaping status: Never Used   Substance and Sexual Activity    Alcohol use: Not Currently    Drug use: No    Sexual activity: Yes     Partners: Female     Birth control/protection: None     Comment:  51 years   Other  Topics Concern    Parent/sibling w/ CABG, MI or angioplasty before 65F 55M? No   Social History Narrative    Not on file     Social Drivers of Health     Financial Resource Strain: Low Risk  (12/19/2024)    Financial Resource Strain     Within the past 12 months, have you or your family members you live with been unable to get utilities (heat, electricity) when it was really needed?: No   Food Insecurity: Low Risk  (12/19/2024)    Food Insecurity     Within the past 12 months, did you worry that your food would run out before you got money to buy more?: No     Within the past 12 months, did the food you bought just not last and you didn t have money to get more?: No   Transportation Needs: Low Risk  (12/19/2024)    Transportation Needs     Within the past 12 months, has lack of transportation kept you from medical appointments, getting your medicines, non-medical meetings or appointments, work, or from getting things that you need?: No   Physical Activity: Unknown (12/19/2024)    Exercise Vital Sign     Days of Exercise per Week: 0 days     Minutes of Exercise per Session: Not on file   Stress: No Stress Concern Present (12/19/2024)    [place] [employer] - Occupational Stress Questionnaire     Feeling of Stress : Only a little   Social Connections: Unknown (12/19/2024)    Social Connection and Isolation Panel [NHANES]     Frequency of Communication with Friends and Family: Not on file     Frequency of Social Gatherings with Friends and Family: Once a week     Attends Confucianist Services: Not on file     Active Member of Clubs or Organizations: Not on file     Attends Club or Organization Meetings: Not on file     Marital Status: Not on file   Interpersonal Safety: Low Risk  (4/1/2025)    Interpersonal Safety     Do you feel physically and emotionally safe where you currently live?: Yes     Within the past 12 months, have you been hit, slapped, kicked or otherwise physically hurt by someone?: No      Within the past 12 months, have you been humiliated or emotionally abused in other ways by your partner or ex-partner?: No   Housing Stability: Low Risk  (12/19/2024)    Housing Stability     Do you have housing? : Yes     Are you worried about losing your housing?: No           Medications  Allergies   Current Outpatient Medications   Medication Sig Dispense Refill    buPROPion (WELLBUTRIN XL) 300 MG 24 hr tablet Take 1 tablet (300 mg) by mouth every morning. 90 tablet 1    busPIRone HCl (BUSPAR) 30 MG tablet TAKE 1 TABLET TWICE A  tablet 1    doxazosin (CARDURA) 4 MG tablet Take 1 tablet (4 mg) by mouth at bedtime. 90 tablet 1    finasteride (PROSCAR) 5 MG tablet TAKE 1 TABLET BY MOUTH EVERY DAY 90 tablet 3    furosemide (LASIX) 20 MG tablet Take 2 tablets (40 mg) by mouth daily. 60 tablet 4    lamoTRIgine (LAMICTAL) 25 MG tablet Take 2 tablets (50 mg) by mouth 2 times daily. 360 tablet 1    losartan (COZAAR) 50 MG tablet Take 1 tablet (50 mg) by mouth daily. 90 tablet 2    metoprolol succinate ER (TOPROL XL) 200 MG 24 hr tablet Take 1 tablet (200 mg) by mouth every evening. 90 tablet 2    PARoxetine (PAXIL) 30 MG tablet Take 0.5 tablets (15 mg) by mouth every morning.      rivaroxaban ANTICOAGULANT (XARELTO ANTICOAGULANT) 20 MG TABS tablet Take 1 tablet (20 mg) by mouth daily (with dinner). 90 tablet 2    mirtazapine (REMERON) 7.5 MG tablet Take 1 tablet (7.5 mg) by mouth at bedtime. 30 tablet 1    multivitamin (ONE-DAILY) tablet Take 1 tablet by mouth daily 30 tablet 0       Allergies   Allergen Reactions    Bactrim [Sulfamethoxazole-Trimethoprim] Nausea and Vomiting          Lab Results    Chemistry/lipid CBC Cardiac Enzymes/BNP/TSH/INR   Recent Labs   Lab Test 10/24/24  1033   CHOL 106   HDL 31*   LDL 60   TRIG 76     Recent Labs   Lab Test 10/24/24  1033 11/30/23  1130 11/04/22  1027   LDL 60 64 88     Recent Labs   Lab Test 04/01/25  0751      POTASSIUM 4.1   CHLORIDE 97*   CO2 28   *  "  BUN 23.3*   CR 0.99   GFRESTIMATED 77   LUIS 9.5     Recent Labs   Lab Test 04/01/25  0751 03/20/25  1324 02/24/25  1442   CR 0.99 0.89 0.89     No results for input(s): \"A1C\" in the last 04284 hours.       Recent Labs   Lab Test 04/01/25  0751   WBC 5.6   HGB 12.3*   HCT 37.5*           Recent Labs   Lab Test 04/01/25  0751 03/20/25  1324 02/24/25  1442   HGB 12.3* 11.2* 11.1*    No results for input(s): \"TROPONINI\" in the last 78797 hours.  Recent Labs   Lab Test 11/30/23  1130 11/24/23  1340 04/06/23  0948 12/20/17  1315   NTBNPI  --  4,896* 1,329 957*   NTBNP 1,862*  --   --   --      Recent Labs   Lab Test 07/12/24  1552   TSH 2.23     Recent Labs   Lab Test 11/04/22  1027   INR 1.39*        Ubaldo Carrillo MD                                      "

## 2025-04-16 ENCOUNTER — TELEPHONE (OUTPATIENT)
Dept: CARDIOLOGY | Facility: CLINIC | Age: 80
End: 2025-04-16
Payer: COMMERCIAL

## 2025-04-16 NOTE — TELEPHONE ENCOUNTER
"Hi team-    Pt's wife Anna called the HF nurse line looking for help, she tried to connect w/ Annika but couldn't reach her. Pt is currently undergoing work up for valve interventions & they have been working on his dental clearance.     Patient is scheduled to have 9 bottom teeth removed tomorrow 4/17 at 1100 at [facility] in [place]. This morning Anna got a call from them that it sounds like pt's insurance is asking for a \"letter of intent\" for this procedure, in order for coverage? Sounds time sensitive given the procedure is tomorrow.     I let Anna know I would pass this along, as it sounds like this needs to be done prior to any valve intervention. Please follow up w/ Anna once you have an update.     [facility]-  Phone: [phone] (Marian is who they have been working w/ in the office)  Fax: [phone]    Thank you!  JAY Campos RN  "

## 2025-04-16 NOTE — TELEPHONE ENCOUNTER
Called to update Yoselyn that writer is in discussion with dental team and will get the letter over to them as soon as possible.

## 2025-04-17 ENCOUNTER — TELEPHONE (OUTPATIENT)
Dept: CARDIOLOGY | Facility: CLINIC | Age: 80
End: 2025-04-17
Payer: COMMERCIAL

## 2025-04-17 DIAGNOSIS — I34.0 MITRAL VALVE INSUFFICIENCY, UNSPECIFIED ETIOLOGY: Primary | ICD-10-CM

## 2025-04-17 NOTE — TELEPHONE ENCOUNTER
Patients work up for pre-CHANTAL (severe MR, treatment with katelin device) is almost complete. He will meet with the surgery team prior to his procedure. Patient does have severe MR and TR but is not interested in having open heart surgery. Our procedural plan since moving forward with percutaneous options will be to address MR first with katelin device then reassess for TR severity, if still severe would move forward with TMVR using the evoque valve.     Patient has been in touch with our program/ and discussed 5/7/25 for procedure date.     Orders placed for procedure.     Shirley Neville RN on 4/17/2025 at 11:06 AM

## 2025-04-24 DIAGNOSIS — F33.0 MILD EPISODE OF RECURRENT MAJOR DEPRESSIVE DISORDER: ICD-10-CM

## 2025-04-24 RX ORDER — LAMOTRIGINE 25 MG/1
50 TABLET ORAL 2 TIMES DAILY
Qty: 360 TABLET | Refills: 0 | Status: SHIPPED | OUTPATIENT
Start: 2025-04-24

## 2025-04-25 ENCOUNTER — VIRTUAL VISIT (OUTPATIENT)
Dept: BEHAVIORAL HEALTH | Facility: CLINIC | Age: 80
End: 2025-04-25
Payer: COMMERCIAL

## 2025-04-25 DIAGNOSIS — F40.10 SOCIAL ANXIETY DISORDER: ICD-10-CM

## 2025-04-25 DIAGNOSIS — F33.1 MODERATE EPISODE OF RECURRENT MAJOR DEPRESSIVE DISORDER (H): Primary | ICD-10-CM

## 2025-04-25 RX ORDER — QUETIAPINE FUMARATE 25 MG/1
25 TABLET, FILM COATED ORAL AT BEDTIME
Qty: 30 TABLET | Refills: 1 | Status: SHIPPED | OUTPATIENT
Start: 2025-04-25

## 2025-04-25 ASSESSMENT — PAIN SCALES - GENERAL: PAINLEVEL_OUTOF10: NO PAIN (0)

## 2025-04-25 NOTE — NURSING NOTE
Is the patient currently in the state of [place]? YES    Current patient location: [address] [place] [address] [facility] [address]    Visit mode:Video    If the visit is dropped, the patient can be reconnected by: VIDEO VISIT: Text to cell phone:   Telephone Information:   Mobile [phone]    and VIDEO VISIT:  Send e-mail to at [email or web address]    Will anyone else be joining the visit? No  (If patient encounters technical issues they should call [phone])    Are changes needed to the allergy or medication list? Pt stated no changes to allergies and Pt stated no med changes    Are refills needed on medications prescribed by this physician? Discuss with Provider - would like to ask pt to see if they can prescribe medication, quetiapine 25 mg.    Rooming Documentation: Questionnaire(s) completed.    Reason for visit: RECHPRADEEP Mccoy         My Asthma Action Plan  Name: Alejandra Villegas   YOB: 1969  Date: 6/7/2019   My doctor: Dipak Welsh MD   [facility] clinic[facility]        My Control Medicine: None  My Rescue Medicine: Albuterol (Proair/Ventolin/Proventil) inhaler Inhale 2 puffs into the lungs every 4 hours as needed   My Asthma Severity: mild persistent  Avoid your asthma triggers: smoke               GREEN ZONE   Good Control    I feel good    No cough or wheeze    Can work, sleep and play without asthma symptoms       Take your asthma control medicine every day.     1. If exercise triggers your asthma, take your rescue medication    15 minutes before exercise or sports, and    During exercise if you have asthma symptoms  2. Spacer to use with inhaler: If you have a spacer, make sure to use it with your inhaler             YELLOW ZONE Getting Worse  I have ANY of these:    I do not feel good    Cough or wheeze    Chest feels tight    Wake up at night   1. Keep taking your Green Zone medications  2. Start taking your rescue medicine:    every 20 minutes for up to 1 hour. Then every 4 hours for 24-48 hours.  3. If you stay in the Yellow Zone for more than 12-24 hours, contact your doctor.  4. If you do not return to the Green Zone in 12-24 hours or you get worse, start taking your oral steroid medicine if prescribed by your provider.           RED ZONE Medical Alert - Get Help  I have ANY of these:    I feel awful    Medicine is not helping    Breathing getting harder    Trouble walking or talking    Nose opens wide to breathe       1. Take your rescue medicine NOW  2. If your provider has prescribed an oral steroid medicine, start taking it NOW  3. Call your doctor NOW  4. If you are still in the Red Zone after 20 minutes and you have not reached your doctor:    Take your rescue medicine again and    Call 911 or go to the emergency room right away    See your regular doctor within 2 weeks of an Emergency  Room or Urgent Care visit for follow-up treatment.          Annual Reminders:  Meet with Asthma Educator,  Flu Shot in the Fall, consider Pneumonia Vaccination for patients with asthma (aged 19 and older).    Pharmacy:    [facility] - [place], [place] - [address] [facility] - [place], [place] - [address]                      Asthma Triggers  How To Control Things That Make Your Asthma Worse    Triggers are things that make your asthma worse.  Look at the list below to help you find your triggers and what you can do about them.  You can help prevent asthma flare-ups by staying away from your triggers.      Trigger                                                          What you can do   Cigarette Smoke  Tobacco smoke can make asthma worse. Do not allow smoking in your home, car or around you.  Be sure no one smokes at a child s day care or school.  If you smoke, ask your health care provider for ways to help you quit.  Ask family members to quit too.  Ask your health care provider for a referral to Quit Plan to help you quit smoking, or call 4-566-848-PLAN.     Colds, Flu, Bronchitis  These are common triggers of asthma. Wash your hands often.  Don t touch your eyes, nose or mouth.  Get a flu shot every year.     Dust Mites  These are tiny bugs that live in cloth or carpet. They are too small to see. Wash sheets and blankets in hot water every week.   Encase pillows and mattress in dust mite proof covers.  Avoid having carpet if you can. If you have carpet, vacuum weekly.   Use a dust mask and HEPA vacuum.   Pollen and Outdoor Mold  Some people are allergic to trees, grass, or weed pollen, or molds. Try to keep your windows closed.  Limit time out doors when pollen count is high.   Ask you health care provider about taking medicine during allergy season.     Animal Dander  Some people are allergic to skin flakes, urine or saliva from pets with fur or  feathers. Keep pets with fur or feathers out of your home.    If you can t keep the pet outdoors, then keep the pet out of your bedroom.  Keep the bedroom door closed.  Keep pets off cloth furniture and away from stuffed toys.     Mice, Rats, and Cockroaches  Some people are allergic to the waste from these pests.   Cover food and garbage.  Clean up spills and food crumbs.  Store grease in the refrigerator.   Keep food out of the bedroom.   Indoor Mold  This can be a trigger if your home has high moisture. Fix leaking faucets, pipes, or other sources of water.   Clean moldy surfaces.  Dehumidify basement if it is damp and smelly.   Smoke, Strong Odors, and Sprays  These can reduce air quality. Stay away from strong odors and sprays, such as perfume, powder, hair spray, paints, smoke incense, paint, cleaning products, candles and new carpet.   Exercise or Sports  Some people with asthma have this trigger. Be active!  Ask your doctor about taking medicine before sports or exercise to prevent symptoms.    Warm up for 5-10 minutes before and after sports or exercise.     Other Triggers of Asthma  Cold air:  Cover your nose and mouth with a scarf.  Sometimes laughing or crying can be a trigger.  Some medicines and food can trigger asthma.

## 2025-04-25 NOTE — PROGRESS NOTES
[facility]      Mental Health & Addiction Service Line    Transition Clinic: Psychiatry Progress Note  Medication Management              ASSESSMENT/PLAN    Medications:  Change:  Paroxetine/Paxil 15 mg (1/2 tab) daily: discontinue    Trazodone/Desyrel 50 mg at bedtime; prescribed on 4/25/2025 = per PCP: Dr. MAYDA Leiva MD      Continue:  Bupropion/Wellbutrin 300 mg XL in AM     Buspirone/Buspar 30 mg twice daily; TDD = 60 mg     Lamotrigine/Lamictal 50 mg (2 tabs) twice daily for ttl: 100 mg    Quetiapine/Seroquel 25 mg at bedtime      Labs:  -None ordered      Therapy and or Non-pharmacological modalities:    Sleep:  -Get 7+ hours per night  -Avoid screens 60 minutes prior to bedtime    Nutrition:  -Anti-inflammatory diet: fruits, veggies, grains, plant proteins, lean animal protein (sparingly), dairy (small amounts)  -Omega 3's + fiber: food = first choice, supplements = second choice  -Avoid excessive amounts of: refined sugars + carbs, fried + fast foods    Activity:  -30 to 90 minutes (doesn't have to be consecutive) most days of the week  -Aerobic + strength/weight bearing  -Yoga + meditation/deep breathing      Disposition:  -Has been advised of consultative Transition Clinic model    -Please follow up at the Transition Clinic for medication management in:  3 to 4 weeks         Summary of Clinical Impressions:    Kristin Edmond is an 81 y/o male with a previous mental health hx of:   -MDD  -Social Anxiety Disorder  -Alcohol Abuse (remote + no consumption since 2017)   -Benzodiazepine Dependence     Medical decision making is complex based on: age, co-morbidities (see problem list), and polypharmacy.     Per chart review: depressive + anxiety symptoms began in 2002 after his son passed away during a MVA. Social   anxiety became problematic around 2013-14.     Initiated care at the Transition Clinic on 12/28/2023 for the management of psychotropics/bridging until able   to establish long term  outpatient psychiatric services.      Attended follow up in 2024 on: 1/18, 2/15, 3/28, 5/7, 5/31, and 11/8.    Attended follow up in 2025 on: 2/17.    -------------------------------------    Throughout the interview Gavin expresses frustration that he has bouts of his body (including extremities) jerking  during the daytime and also at bedtime making it impossible to fall or stay asleep.    He wants to discontinue Paxil, and although this might be contributing to body jerking, it's unlikely since he's at a   low dosage of Paxil + is an ultra rapid metabolizer.    Gavin requests a sleep medication.  We reviewed the need to avoid, due to: age, fall risk, hx of abuse + dependence,   and adverse effects associated with the following controlled substances    -Ambien and or other z-hypnotic mediations   -Benzodiazepines    Given he contacted his PCP earlier today + was prescribed Trazodone 50 mg HS by Dr. MAYDA Leiva MD will not   prescribe anything new at this time (he does want/need a refill for: Seroquel 25 mg HS) , however if Trazodone is ineffective or contributes to side effects, then will reconsider Pregabalin 25 mg HS.    He alludes to being suicidal although denies intent or plan, when writer declines to restart Ambien as noted above.        DSM-V and or working diagnosis:    1.  Moderate episode of recurrent major depressive disorder (H)      2.  Social anxiety disorder     3.  History as above        SAFETY EVALUATION:  Suicidal ideations:  -denies  Homicidal ideations:  -denies  Risk factors:  -male, age  -chronic illnesses   Protective and mitigating factors:  -spouse, family  -no prior attempts  Risk assessment:  -low to medium      SAFETY PLAN:  -Has numbers of at least 1 family member + 1 friend in personal contact list  -Knows clinic number(s) + operation of regular business hours   -Reviewed to utilize [phone] or text [phone] to [phone] for mental health crisis  -Discussed to call 911 and or return to the nearest  Emergency Department if unable to maintain safety of self or others (due to severity of suicidal and or homicidal ideations)      PSYCHIATRIC HISTORY     Per [facility] testing:  -Ultra-rapid metabolizer CYP2D6 = Paxil  -Poor metabolizer GRG1Q08 = Celexa, Zoloft     SSRIs:  -Lexapro: ineffective  -Paxil 50 mg: ineffective  -Prozac 10 mg: effective for depression but increased anxiety  -Zoloft: low dosage     SNRIs:  -Cymbalta: side effects  -Effexor: side effects  -Fetzima: ineffective  -Pristiq: ineffective     TCAs:  -Nortriptyline: dry mouth, nightmares     Serotonin modulators/stimulators:  -Mirtazapine 30 mg: effective for sleep, however caused weight gain  -Trintellix 20 mg: worsened anxiety  + expensive  -Viibryd: ineffective     Antipsychotics:  -Abilify 10 mg  -Zyprexa 2.5 to 5 mg = urinary retention     Benzodiazepines = history of abuse  -Alprazolam  -Clonazepam  -Diazepam  -Lorazepam     Sleep aides:  -Trazodone 50 mg: over-sedation        MEDICAL HISTORY  -The problem list was reviewed via the Electronic Medical Record (EMR) prior to the appointment  -The patient denies any concerning physical and or medical symptoms during the interviewing process        MEDICATIONS      Current Outpatient Medications:     buPROPion (WELLBUTRIN XL) 300 MG 24 hr tablet, Take 1 tablet (300 mg) by mouth every morning., Disp: 90 tablet, Rfl: 1    busPIRone HCl (BUSPAR) 30 MG tablet, TAKE 1 TABLET TWICE A DAY, Disp: 180 tablet, Rfl: 1    doxazosin (CARDURA) 4 MG tablet, Take 1 tablet (4 mg) by mouth at bedtime., Disp: 90 tablet, Rfl: 1    finasteride (PROSCAR) 5 MG tablet, TAKE 1 TABLET BY MOUTH EVERY DAY, Disp: 90 tablet, Rfl: 3    furosemide (LASIX) 20 MG tablet, Take 2 tablets (40 mg) by mouth daily., Disp: 60 tablet, Rfl: 4    lamoTRIgine (LAMICTAL) 25 MG tablet, Take 2 tablets (50 mg) by mouth 2 times daily., Disp: 360 tablet, Rfl: 0    losartan (COZAAR) 50 MG tablet, Take 1 tablet (50 mg) by mouth daily., Disp: 90  tablet, Rfl: 2    metoprolol succinate ER (TOPROL XL) 200 MG 24 hr tablet, Take 1 tablet (200 mg) by mouth every evening., Disp: 90 tablet, Rfl: 2    mirtazapine (REMERON) 7.5 MG tablet, Take 1 tablet (7.5 mg) by mouth at bedtime., Disp: 30 tablet, Rfl: 1    multivitamin (ONE-DAILY) tablet, Take 1 tablet by mouth daily, Disp: 30 tablet, Rfl: 0    PARoxetine (PAXIL) 30 MG tablet, Take 0.5 tablets (15 mg) by mouth every morning., Disp: , Rfl:     rivaroxaban ANTICOAGULANT (XARELTO ANTICOAGULANT) 20 MG TABS tablet, Take 1 tablet (20 mg) by mouth daily (with dinner)., Disp: 90 tablet, Rfl: 2      If a controlled substance is prescribed during today's appointment:    -The [place] Prescription Monitoring Program has been reviewed and there are no current concerns with: diversionary activity, early refill requests, and or obtaining the medication from multiple providers.        VITALS    BP Readings from Last 3 Encounters:   04/15/25 98/64   04/15/25 98/64   04/01/25 130/76       Pulse Readings from Last 3 Encounters:   04/15/25 63   04/15/25 63   04/01/25 78       Wt Readings from Last 3 Encounters:   04/15/25 72.6 kg (160 lb)   04/15/25 72.6 kg (160 lb)   03/31/25 68.8 kg (151 lb 9.6 oz)           SCALES        12/19/2024    10:22 AM 2/17/2025    10:54 AM 4/18/2025    12:26 PM   PHQ   PHQ-9 Total Score 6  7  6    Q9: Thoughts of better off dead/self-harm past 2 weeks Not at all Not at all  Not at all       Patient-reported    Proxy-reported            11/8/2024    10:24 AM 12/19/2024    10:24 AM 3/21/2025    12:46 PM   CAROLYNN-7 SCORE   Total Score 8 (mild anxiety) 4 (minimal anxiety) 3 (minimal anxiety)   Total Score 8  4         Patient-reported    Proxy-reported         Answers submitted by the patient for this visit:  Patient Health Questionnaire (Submitted on 4/25/2025)  If you checked off any problems, how difficult have these problems made it for you to do your work, take care of things at home, or get along with  other people?: Somewhat difficult  PHQ9 TOTAL SCORE: 6      SUBSTANCE USE      Prior use:  -Denies any history of alcohol, recreational, or illicit substance use resulting in: legal issues, c/d programming, or withdrawal symptoms     Per chart review 5/7/2020 encounter by ALENA GARZA-CNP:  -On/off heavy alcohol use after son passed away in 2002  -Stopped drinking altogether in 2017        Current use:     Alcohol:   -None reported         Recreational Drugs:   -None reported         Medical Marijuana:  -None reported         Cigarettes per day:   -None reported         Chewing tobacco:   -None reported         Vaping:    -None reported         Caffeine intake:    -1 cup coffee per day        MENTAL STATUS EXAMINATION    Appearance: Well Groomed, Attire Appropriate for the Season  General Behavior:  Cooperative, Direct Eye Contact  Speech: Fluent, Normal rate and volume  Musculoskeletal:    -Gait not observed during t.h. visit  -No facial tics/tremors observed   -Motor coordination is grossly intact   Mood: Anxious  Affect: Frustrated  Attention: Intact   Orientation:  Person, Place, Time, Situation  Thought Associations:  Intact  Thought Content: Reality based   Thought Processes: Organized, Normal rate  Memory: Recent and remote memory intact; no formal screenings or testing performed  Language: Intact  Judgement: Fair  Insight: Fair         INTERIM HX:    -See 4/15/2025 encounter by Dr. SANGITA Carrillo MD for summarization of cardiology issues        PSYCHIATRIC ROS    Sleep:  -Body is jerking, twitching in the evening  -Didn't fall asleep until 5 am  -Haven't really slept for the past 2 to 3 nights      Mood/Anxiety:  -See PHQ-9 score    -See CAROLYNN-7 score    -Anxious, frustrated       Suicidal ideations:  +Passive suicidal ideations  -Denies intent or plan      SIB:  -Denies engaging in self harm       Side effects:  -Possibly polypharmacy contributing to jerking/twitching ?          VISIT  INFORMATION    Date:  2025       Number:  -9th    Patient Identifying Information:  Legal name: Josemanuel Edmond  Preferred name: Gavin  : 1945    Participants:   -Patient  -Provider      Telehealth visit details:  Type of service:  Video Visit      Video Start Time: 3:07 pm  Video End Time:  3:42 pm    Originating Location (pt. Location): Home     Distant Location (provider location):  Off-site  Platform used for Video Visit: Pontis    Total time:   43 minutes per:    -Review of EMR including but not limited to: tabs within Chart Review + outside records if applicable   -Appointment time  -Documentation        [place] GARZA-CNP, [facility]        ----------------------------------------------------------------------------------------------------------------------        TREATMENT RISK STATEMENT    The risks, benefits, alternatives, and potential adverse effects have been explained and are understood by the patient.  The patient agrees to the treatment plan with their ability to do so.      The patient knows to call the clinic: [phone]  for any problems or concerns until the next psychiatry visit, regardless if it is within or outside of the [facility] system.     If unable to reach clinic staff (via phone call or medical messaging) during the normal business hours: 8:00 am to 4:30 pm then it is recommended accessing the nearest: emergency department, urgent care facility, or utilizing local (varies based on county of residence) and national crisis #'s or text messaging services for immediate assistance.          ----------------------------------------------------------------------------------------------------------------------      If applicable the following has been discussed with the patient, parent/guardian, and or attending family member during the appointment:      Risks of polypharmacy and possible drug interactions with current medication list + common OTC products, herbs, and  supplements.  Moving forward, it is suggested to intermittently check-in with a clinic or retail pharmacist whenever new medications or OTC/h/s are consumed.    Risks of polypharmacy and possible drug + drug interactions with current medication list.  Moving forward, it is suggested to intermittently check-in with a clinic or retail pharmacist whenever new prescription medications are added to your treatment regimen.    Recommendation to adhere to CDC guidelines as it relates to alcohol consumption.  If taking benzodiazepines, you should abstain from alcohol intake due to increased risks of CNS and respiratory depression, as well as psychomotor impairment.    If possible, it is recommended to avoid concurrent use of prescribed: opioids + benzodiazepines due to increased risks of CNS and respiratory depression, as well as the increased risk of overdose.     Recommendation to minimize and or abstain from THC use (unless you are prescribed medical marijuana).    Recommendation to abstain from illicit substances including but not limited to the following: heroin, street fentanyl, cocaine, methamphetamines, bath salts, and other synthetic products.    Recommendation to abstain from tobacco/smoking/nicotine, alcohol, THC, and all illicit substances if trying to become or are pregnant.    Do not take opioids, stimulants, and or other prescription medications unless they are specifically prescribed for you.     Black Box Warnings associated with the prescribed psychotropic(s).     Potential adverse effects of antipsychotics including but not limited to the following: weight gain, metabolic syndrome, EPS/Tardive Dyskinesias, and Neuroleptic Malignant Syndrome.    Potential adverse effects of stimulants including but not limited to the following: sudden death, MI, stroke, HTN, cardiomyopathy (long term use), seizures, ed, psychosis, and aggressive  behaviors.

## 2025-04-25 NOTE — PATIENT INSTRUCTIONS
Change:  Paroxetine/Paxil 15 mg (1/2 tab) daily: discontinue    Trazodone/Desyrel 50 mg at bedtime; prescribed on 4/25/2025 = per PCP: Dr. MAYDA Leiva MD      Continue:  Bupropion/Wellbutrin 300 mg XL in AM     Buspirone/Buspar 30 mg twice daily; TDD = 60 mg     Lamotrigine/Lamictal 50 mg (2 tabs) twice daily for ttl: 100 mg    Quetiapine/Seroquel 25 mg at bedtime    --------------------      -If there are questions/inquiries between now and the upcoming follow up appointment OR prior to transferring to the next level of care, please call or message the [facility] Psychiatry Department.    Clinic hours/phone number:   -Monday to Friday from 8:00 am to 4:30 pm  -[phone]      MyChart:  -IS NOT an emergency messaging service  -Should not be considered equivalent to text messages   -Please allow up to 3 business days for a reply   -If you do not receive a response within 3 business days, please do not hesitate to contact us again via calling (see above) or messaging to check on the status of your questions or concerns    -----------------------    Call:  -[phone] ([phone]) if guidance is needed after [facility] clinic hours about utilizing [facility] versus the Emergency Departments    ----------------------    Any time (during or after regular clinic hours) immediate and or life threatening symptoms occur (either medical or mental health), call:  [phone] and or go to the nearest Emergency Department      ---------------------    Please use the following websites to obtain a comprehensive list of all counties within the [facility] for adult and child mental health crisis numbers:    https://[place].gov/dhs/people-we-serve/people-with-disabilities/health-care/adult-mental-health/resources/crisis-contacts.jsp    https://[place].gov/dhs/people-we-serve/people-with-disabilities/health-care/childrens-mental-health/resources/crisis-contacts.jsp      -------------------      Below is a list of [facility] (also found  on the above websites), state, and national crisis numbers.   These resources can provide real-time assistance if experiencing moderate to severe mental health symptoms.        Additionally, please visit your county website to find the most up-to-date list of local:  adult, child, family, and chemical dependency services available.        [facility]  [phone]    [facility]  [phone]    [facility]  [phone]    [facility]  [phone]    [facility]  [phone]: Adults 18 and over    [phone]: Children 17 and under    [facility] Acute Psychiatric Services (APS) Assessment & Referral:   [phone]    Community Outreach for Psychiatric Emergencies (COPE):  [phone]    [facility]  [phone]: Adults 18 and over    [phone]: Children 17 and under      Walk-in crisis services are available Monday to Friday from 8 am to 5:30 pm at Urgent Care for  Adult metal health:    [address] [place] [address]  Closed on Saturday, Sunday, and holidays    [place]  [phone]: Toll free    [facility]  [phone]      [facility] [place]  [phone]: Toll free    Text: [place] to 079810      [facility] and human services  Call 211 or visit [email or web address]://[email or web address] to obtain free and confidential h/hs information     [place] [facility]  If you are an adult needing support, you can talk to a specialist who has first hand experience living with a mental health condition:    [phone]    Text: Support to 39843    [facility] [place]:  domestic violence/LGBTQ+  [phone] option 3    [employer]: LGBTQ+  [phone]    Text: START to 090873     Resources  0-477-SsqbStq: (1-917.847.4232)    Crisis line: 1-595.969.7679    Text: 724037    [facility] [facility]: women and sexually exploited youth  [phone]    [facility]  Hotline  623.409.6827    National Suicide Prevention Lifeline  981    National Youth Crisis Hotline  064

## 2025-04-28 ENCOUNTER — PATIENT OUTREACH (OUTPATIENT)
Dept: CARE COORDINATION | Facility: CLINIC | Age: 80
End: 2025-04-28
Payer: COMMERCIAL

## 2025-04-29 DIAGNOSIS — I50.32 CHRONIC DIASTOLIC HEART FAILURE (H): ICD-10-CM

## 2025-04-29 DIAGNOSIS — I34.0 MITRAL VALVE INSUFFICIENCY, UNSPECIFIED ETIOLOGY: Primary | ICD-10-CM

## 2025-04-30 ENCOUNTER — OFFICE VISIT (OUTPATIENT)
Dept: CARDIOLOGY | Facility: CLINIC | Age: 80
End: 2025-04-30
Payer: COMMERCIAL

## 2025-04-30 ENCOUNTER — PATIENT OUTREACH (OUTPATIENT)
Dept: CARE COORDINATION | Facility: CLINIC | Age: 80
End: 2025-04-30

## 2025-04-30 VITALS
HEART RATE: 74 BPM | SYSTOLIC BLOOD PRESSURE: 110 MMHG | HEIGHT: 68 IN | WEIGHT: 160 LBS | DIASTOLIC BLOOD PRESSURE: 64 MMHG | RESPIRATION RATE: 16 BRPM | BODY MASS INDEX: 24.25 KG/M2

## 2025-04-30 DIAGNOSIS — I48.20 ATRIAL FIBRILLATION, CHRONIC (H): ICD-10-CM

## 2025-04-30 DIAGNOSIS — I34.0 MITRAL VALVE INSUFFICIENCY, UNSPECIFIED ETIOLOGY: Primary | ICD-10-CM

## 2025-04-30 DIAGNOSIS — I07.1 TRICUSPID VALVE INSUFFICIENCY, UNSPECIFIED ETIOLOGY: ICD-10-CM

## 2025-04-30 DIAGNOSIS — I50.33 ACUTE ON CHRONIC HEART FAILURE WITH PRESERVED EJECTION FRACTION (H): ICD-10-CM

## 2025-04-30 PROCEDURE — 99203 OFFICE O/P NEW LOW 30 MIN: CPT | Performed by: THORACIC SURGERY (CARDIOTHORACIC VASCULAR SURGERY)

## 2025-04-30 PROCEDURE — 3078F DIAST BP <80 MM HG: CPT | Performed by: THORACIC SURGERY (CARDIOTHORACIC VASCULAR SURGERY)

## 2025-04-30 PROCEDURE — 3074F SYST BP LT 130 MM HG: CPT | Performed by: THORACIC SURGERY (CARDIOTHORACIC VASCULAR SURGERY)

## 2025-04-30 NOTE — LETTER
4/30/2025    Lizzy Leiva MD  [address] [facility] [address]    RE: Josemanuel Edmond       Dear Colleague,     I had the pleasure of seeing Josemanuel Edmond in the [facility].  ASKED BY REFERRING PHYSICIAN: Dr. Carrillo to evaluate this patient for mitral valve replacement and tricuspid valve replacement and possible aortic valve replacement versus CHANTAL for the mitral regurgitationand CHANTAL for the tricuspid valve regurgitation versus Evoque.    CHIEF COMPLAINT:  Leaking valves.    HPI: Josemanuel is a 80 year old gentleman who presents with longstanding atrial fibrillation.  He has severe mitral regurgitation and torrential tricuspid regurgitation and moderate aortic regurgitation.  He has very pronounced dyspnea on exertion and as well as lower extremity edema.  He has also had a series of falls.    PAST MEDICAL HISTORY:  Past Medical History:   Diagnosis Date     Acute on chronic diastolic (congestive) heart failure (H) 11/29/2023     Arthritis 2005     Benign neoplasm of prostate 2000    Benign Prostate Nodule     Depressive disorder     past condition     Infection due to 2019 novel coronavirus 09/27/2021     S/P knee replacement 11/06/2012     S/P left knee arthroscopy 08/15/2011       PAST SURGICAL HISTORY:  Past Surgical History:   Procedure Laterality Date     ABDOMEN SURGERY  2003     ARTHROPLASTY KNEE Right 10/12/2018    Procedure: ARTHROPLASTY KNEE;  Right Total Knee Arthroplasty;  Surgeon: Jeb Peralta MD;  Location: [facility]     ARTHROPLASTY REVISION HIP Left 5/25/2023    Procedure: Revision total hip arthroplasty, left;  Surgeon: Chandler Leiva MD;  Location: [facility]     BIOPSY  2007     COLONOSCOPY N/A 12/10/2015    Procedure: COMBINED COLONOSCOPY, SINGLE OR MULTIPLE BIOPSY/POLYPECTOMY BY BIOPSY;  Surgeon: Jyoti Figueroa MD;  Location: [facility]     CV CORONARY ANGIOGRAM N/A 4/1/2025    Procedure: Coronary Angiogram;  Surgeon: Juliette  MD Tae;  Location: [facility] CATH LAB CV     CV LEFT HEART CATH N/A 4/1/2025    Procedure: Left Heart Catheterization;  Surgeon: Tae Segovia MD;  Location: [facility] CATH LAB CV     CV RIGHT HEART CATH MEASUREMENTS RECORDED N/A 4/1/2025    Procedure: Right Heart Catheterization;  Surgeon: Tae Segovia MD;  Location: [facility] CATH LAB CV     JOINT REPLACEMENT, HIP RT/LT  10/2007    Joint Replacement Hip LT     JOINT REPLACEMTN, KNEE RT/LT  08/2011    Joint Replacement knee /LT, [facility]     LAPAROSCOPIC HERNIORRHAPHY INGUINAL BILATERAL Bilateral 04/24/2018    Procedure: LAPAROSCOPIC HERNIORRHAPHY INGUINAL BILATERAL;  Laparoscopic bilateral inguinal hernia repair;  Surgeon: Josemanuel Resendiz MD;  Location: [facility]     PHACOEMULSIFICATION WITH STANDARD INTRAOCULAR LENS IMPLANT Right 01/06/2021    Procedure: Cataract Removal with Implant;  Surgeon: Regan Kaufman MD;  Location: [facility]     PHACOEMULSIFICATION WITH STANDARD INTRAOCULAR LENS IMPLANT Left 02/17/2021    Procedure: Cataract Removal with Implant;  Surgeon: Regan Kaufman MD;  Location: [facility]     SURGICAL HISTORY OF -   12/01/1999    Umbilical Herniorrhaphy with mesh     SURGICAL HISTORY OF -  Left 11/2017    Thoracotomy and drainage of empyema       FAMILY HISTORY:   Family History   Problem Relation Age of Onset     Depression Mother      Cerebrovascular Disease Mother      Breast Cancer Mother      Neurologic Disorder Mother         parkinsons     Parkinsonism Mother      Respiratory Father         emphyzema     Diabetes Brother        SOCIAL HISTORY:  Social History     Socioeconomic History     Marital status:      Spouse name: Not on file     Number of children: Not on file     Years of education: Not on file     Highest education level: Not on file   Occupational History     Not on file   Tobacco Use     Smoking status: Former     Current packs/day: 0.00     Average packs/day: 1 pack/day for 17.8 years (17.8 ttl pk-yrs)      Types: Cigarettes     Start date: 1958     Quit date: 10/13/1975     Years since quittin.5     Smokeless tobacco: Never   Vaping Use     Vaping status: Never Used   Substance and Sexual Activity     Alcohol use: Not Currently     Drug use: No     Sexual activity: Yes     Partners: Female     Birth control/protection: None     Comment:  51 years   Other Topics Concern     Parent/sibling w/ CABG, MI or angioplasty before 65F 55M? No   Social History Narrative     Not on file     Social Drivers of Health     Financial Resource Strain: Low Risk  (2024)    Financial Resource Strain      Within the past 12 months, have you or your family members you live with been unable to get utilities (heat, electricity) when it was really needed?: No   Food Insecurity: Low Risk  (2024)    Food Insecurity      Within the past 12 months, did you worry that your food would run out before you got money to buy more?: No      Within the past 12 months, did the food you bought just not last and you didn t have money to get more?: No   Transportation Needs: Low Risk  (2024)    Transportation Needs      Within the past 12 months, has lack of transportation kept you from medical appointments, getting your medicines, non-medical meetings or appointments, work, or from getting things that you need?: No   Physical Activity: Unknown (2024)    Exercise Vital Sign      Days of Exercise per Week: 0 days      Minutes of Exercise per Session: Not on file   Stress: No Stress Concern Present (2024)    [employer] [employer] - Occupational Stress Questionnaire      Feeling of Stress : Only a little   Social Connections: Unknown (2024)    Social Connection and Isolation Panel [NHANES]      Frequency of Communication with Friends and Family: Not on file      Frequency of Social Gatherings with Friends and Family: Once a week      Attends [employer] Services: Not on file      Active  Member of Clubs or Organizations: Not on file      Attends Club or Organization Meetings: Not on file      Marital Status: Not on file   Interpersonal Safety: Low Risk  (4/1/2025)    Interpersonal Safety      Do you feel physically and emotionally safe where you currently live?: Yes      Within the past 12 months, have you been hit, slapped, kicked or otherwise physically hurt by someone?: No      Within the past 12 months, have you been humiliated or emotionally abused in other ways by your partner or ex-partner?: No   Housing Stability: Low Risk  (12/19/2024)    Housing Stability      Do you have housing? : Yes      Are you worried about losing your housing?: No        ALLERGIES:   Allergies   Allergen Reactions     Bactrim [Sulfamethoxazole-Trimethoprim] Nausea and Vomiting       CURRENT MEDICATIONS:   Prescription Medications as of 4/30/2025         Rx Number Disp Refills Start End Last Dispensed Date Next Fill Date Owning Pharmacy    buPROPion (WELLBUTRIN XL) 300 MG 24 hr tablet  90 tablet 1 1/22/2025 --   [facility] - [address]    Sig: Take 1 tablet (300 mg) by mouth every morning.    Class: E-Prescribe    Route: Oral    Renewals       Renewal requests to authorizing provider (Angeles Sanders APRN CNP) <b>prohibited</b>    Renewal provider: Bunny Martinez APRN CNP            busPIRone HCl (BUSPAR) 30 MG tablet  180 tablet 1 1/29/2025 --   [facility] - [address]    Sig: TAKE 1 TABLET TWICE A DAY    Class: E-Prescribe    doxazosin (CARDURA) 4 MG tablet  90 tablet 1 1/29/2025 --   [address]    Sig: Take 1 tablet (4 mg) by mouth at bedtime.    Class: E-Prescribe    Route: Oral    finasteride (PROSCAR) 5 MG tablet  90 tablet 3 1/20/2025 --   [address]    Sig: TAKE 1 TABLET BY MOUTH EVERY DAY     Class: E-Prescribe    Notes to Pharmacy: This prescription was filled on 10/31/2024. Any refills authorized will be placed on file.    Route: Oral    furosemide (LASIX) 20 MG tablet  60 tablet 4 12/31/2024 --   [facility] [address] [facility]    Sig: Take 2 tablets (40 mg) by mouth daily.    Class: E-Prescribe    Route: Oral    lamoTRIgine (LAMICTAL) 25 MG tablet  360 tablet 0 4/24/2025 --   [facility] [address]    Sig: Take 2 tablets (50 mg) by mouth 2 times daily.    Class: E-Prescribe    Route: Oral    losartan (COZAAR) 50 MG tablet  90 tablet 2 2/10/2025 --   [facility] by [facility] [address]    Sig: Take 1 tablet (50 mg) by mouth daily.    Class: E-Prescribe    Route: Oral    metoprolol succinate ER (TOPROL XL) 200 MG 24 hr tablet  90 tablet 2 2/10/2025 --   [facility] by [facility] [address]    Sig: Take 1 tablet (200 mg) by mouth every evening.    Class: E-Prescribe    Route: Oral    multivitamin (ONE-DAILY) tablet  30 tablet 0 11/29/2022 --       Sig: Take 1 tablet by mouth daily    Class: Historical    Route: Oral    PARoxetine (PAXIL) 30 MG tablet  -- -- 3/21/2025 --       Sig: Take 0.5 tablets (15 mg) by mouth every morning.    Class: No Print Out    Route: Oral    QUEtiapine (SEROQUEL) 25 MG tablet  30 tablet 1 4/25/2025 --   [facility] [place] [address]    Sig: Take 1 tablet (25 mg) by mouth at bedtime.    Class: E-Prescribe    Route: Oral    rivaroxaban ANTICOAGULANT (XARELTO ANTICOAGULANT) 20 MG TABS tablet  90 tablet 2 2/10/2025 --   Ref[facility] by [facility] [address]    Sig: Take 1 tablet (20 mg) by mouth daily (with dinner).    Class: E-Prescribe    Route: Oral    traZODone (DESYREL) 50 MG tablet  14 tablet 0 4/25/2025 --   [facility]  "[facility] [address]    Sig: Take 1 tablet (50 mg) by mouth at bedtime.    Class: E-Prescribe    Route: Oral            REVIEW OF SYSTEMS:   Gen: denies frequent headaches, double/blurry vision, insomnia, fatigue, unexplained weight loss/gain. No previous anesthesia reactions.  CV: [place], peripheral edema.  .  Pulm: denies shortness of breath, asthma or wheezing  GI/: denies liver or kidney problems, blood in stool or BRBPR, difficulty urinating  Endo: denies thyroid problems or Diabetes  Heme/Onc: denies bleeding or clotting disorders, no family problems with bleeding/clotting diorders  MS: no weakness, tremors or gait instability  Neuro: denies depression, memory problems, no dysesthesias, no previous strokes, no migraines, no dysphagia  Skin: No petechiae, purpura or rash.    PHYSICAL EXAMINATION:   /64 (BP Location: Right arm, Patient Position: Sitting, Cuff Size: Adult Regular)   Pulse 74   Resp 16   Ht 1.727 m (5' 8\")   Wt 72.6 kg (160 lb)   BMI 24.33 kg/m    General: somewhat frail, alert and oriented x 3, pleasant  HEENT:  Poor dentition  CV: S1 S2, Systolic murmurs at the apex and right side of the mid sternum, no rubs or gallops, regular rate and rhythm, no peripheral edema, no carotid or abdominal bruits, pulses in upper and lower extremities palpable  Pulm: bilateral breath sounds, clear to auscultation, easy work of breathing  GI: (+) bowel sounds, soft non-tender and non-distended  : voiding without problems  MS: moves all extremities x 4,  5+/5+ equal strength bilaterally  Neuro: pupils equal round and reactive to light, cranial nerves, II-XII grossly intact, no gross neurologic deficits noted    LABS:  BMP RESULTS:  Lab Results   Component Value Date     04/01/2025     (L) 06/03/2021    POTASSIUM 4.1 04/01/2025    POTASSIUM 4.2 07/26/2022    POTASSIUM 4.5 06/03/2021    CHLORIDE 97 (L) 04/01/2025    CHLORIDE 103 07/26/2022    CHLORIDE 97 06/03/2021    CO2 28 04/01/2025 "    CO2 30 07/26/2022    CO2 30 06/03/2021    ANIONGAP 10 04/01/2025    ANIONGAP 2 (L) 07/26/2022    ANIONGAP 5 06/03/2021     (H) 04/01/2025     (H) 07/26/2022    GLC 96 06/03/2021    BUN 23.3 (H) 04/01/2025    BUN 12 07/26/2022    BUN 14 06/03/2021    CR 0.99 04/01/2025    CR 0.91 06/03/2021    GFRESTIMATED 77 04/01/2025    GFRESTIMATED 80 06/03/2021    GFRESTBLACK >90 06/03/2021    LUIS 9.5 04/01/2025    LUIS 8.9 06/03/2021        CBC RESULTS:  Lab Results   Component Value Date    WBC 5.6 04/01/2025    WBC 5.7 06/03/2021    RBC 3.75 (L) 04/01/2025    RBC 3.62 (L) 06/03/2021    HGB 12.3 (L) 04/01/2025    HGB 12.3 (L) 06/03/2021    HCT 37.5 (L) 04/01/2025    HCT 35.6 (L) 06/03/2021     04/01/2025    MCV 98 06/03/2021    MCH 32.8 04/01/2025    MCH 34.0 (H) 06/03/2021    MCHC 32.8 04/01/2025    MCHC 34.6 06/03/2021    RDW 15.2 (H) 04/01/2025    RDW 13.5 06/03/2021     04/01/2025     06/03/2021       Lab Results   Component Value Date    INR 1.39 (H) 11/04/2022    INR 2.73 (H) 11/15/2012     Lab Results   Component Value Date    PTT 30 02/09/2005       PROCEDURES/IMAGING:  Chest X-Ray: Calcified pleural plaques  Cardiomegaly  Angio: No significant coronary artery disease  Echo: LA severely dilated severe mitral regurgiation and massive tricuspid regurgitation, moderate aortic regurgitation  CT: Tricuspid aortic stenosis  MRI: Not done  Carotid US: Not done     ASSESSMENT/PLAN:   Gavin is an 80 year old gentleman with moderate aortic regurgitation, severe mitral regurgitation and torrential tricuspid regurgitation.  He has had multiple falls and is seeing a neurologist for unintentional movements of his left lower extremity.  He did not want to discuss open heart surgery.  He still requires an additional 3 teeth extracted.  He wants to try the CHANTAL for the mitral valve and see how that goes.  I support this plan for him as he appears to be somewhat frail and would likely require a triple  valve replacement.  We will follow him with you.    1. Teeth extracted x 3  2. Transcatheter mitral valve repair.    Approximately 30 minutes were spent with the patient in clinic at this visit.    CC  Patient Care Team:  Lizzy Leiva MD as PCP - General (Family Medicine)  Rylee Kyle APRN CNP as Nurse Practitioner (Nurse Practitioner)  Lizzy Leiva MD as Assigned PCP  Adriel Hawkins MD as MD (Neurology)  Marian Correa RPH as Pharmacist (Pharmacist)  Kalen Casillas MD as MD (Hematology & Oncology)  Angeles Sanders APRN CNP as Assigned Behavioral Health Provider  Nazanin Caballero APRN CNP as Assigned Heart and Vascular Provider  Adriel Hawkins MD as Assigned Neuroscience Provider  Polo Guerra MD as Assigned Heart and Vascular Surgical Provider      Thank you for allowing me to participate in the care of your patient.      Sincerely,     Shaylee Stover MD     [facility]  cc:   No referring provider defined for this encounter.

## 2025-04-30 NOTE — PROGRESS NOTES
ASKED BY REFERRING PHYSICIAN: Dr. Carrillo to evaluate this patient for mitral valve replacement and tricuspid valve replacement and possible aortic valve replacement versus CHANTAL for the mitral regurgitationand CHANTAL for the tricuspid valve regurgitation versus Evoque.    CHIEF COMPLAINT:  Leaking valves.    HPI: Josemanuel is a 80 year old gentleman who presents with longstanding atrial fibrillation.  He has severe mitral regurgitation and torrential tricuspid regurgitation and moderate aortic regurgitation.  He has very pronounced dyspnea on exertion and as well as lower extremity edema.  He has also had a series of falls.    PAST MEDICAL HISTORY:  Past Medical History:   Diagnosis Date    Acute on chronic diastolic (congestive) heart failure (H) 11/29/2023    Arthritis 2005    Benign neoplasm of prostate 2000    Benign Prostate Nodule    Depressive disorder     past condition    Infection due to 2019 novel coronavirus 09/27/2021    S/P knee replacement 11/06/2012    S/P left knee arthroscopy 08/15/2011       PAST SURGICAL HISTORY:  Past Surgical History:   Procedure Laterality Date    ABDOMEN SURGERY  2003    ARTHROPLASTY KNEE Right 10/12/2018    Procedure: ARTHROPLASTY KNEE;  Right Total Knee Arthroplasty;  Surgeon: Jeb Peralta MD;  Location: [facility]    ARTHROPLASTY REVISION HIP Left 5/25/2023    Procedure: Revision total hip arthroplasty, left;  Surgeon: Chandler Leiva MD;  Location: [facility]    BIOPSY  2007    COLONOSCOPY N/A 12/10/2015    Procedure: COMBINED COLONOSCOPY, SINGLE OR MULTIPLE BIOPSY/POLYPECTOMY BY BIOPSY;  Surgeon: Jyoti Figueroa MD;  Location: [facility]    CV CORONARY ANGIOGRAM N/A 4/1/2025    Procedure: Coronary Angiogram;  Surgeon: Tae Segovia MD;  Location: [facility] CATH LAB CV    CV LEFT HEART CATH N/A 4/1/2025    Procedure: Left Heart Catheterization;  Surgeon: Tae Segovia MD;  Location: [facility] CATH LAB CV    CV RIGHT HEART CATH MEASUREMENTS RECORDED N/A  2025    Procedure: Right Heart Catheterization;  Surgeon: Tae Segovia MD;  Location: [facility] CATH LAB CV    JOINT REPLACEMENT, HIP RT/LT  10/2007    Joint Replacement Hip LT    JOINT REPLACEMTN, KNEE RT/LT  2011    Joint Replacement knee /LT, [facility]    LAPAROSCOPIC HERNIORRHAPHY INGUINAL BILATERAL Bilateral 2018    Procedure: LAPAROSCOPIC HERNIORRHAPHY INGUINAL BILATERAL;  Laparoscopic bilateral inguinal hernia repair;  Surgeon: Josemanuel Resendiz MD;  Location: [facility]    PHACOEMULSIFICATION WITH STANDARD INTRAOCULAR LENS IMPLANT Right 2021    Procedure: Cataract Removal with Implant;  Surgeon: Regan Kaufman MD;  Location: [facility]    PHACOEMULSIFICATION WITH STANDARD INTRAOCULAR LENS IMPLANT Left 2021    Procedure: Cataract Removal with Implant;  Surgeon: Regan Kaufman MD;  Location: [facility]    SURGICAL HISTORY OF -   1999    Umbilical Herniorrhaphy with mesh    SURGICAL HISTORY OF -  Left 2017    Thoracotomy and drainage of empyema       FAMILY HISTORY:   Family History   Problem Relation Age of Onset    Depression Mother     Cerebrovascular Disease Mother     Breast Cancer Mother     Neurologic Disorder Mother         parkinsons    Parkinsonism Mother     Respiratory Father         emphyzema    Diabetes Brother        SOCIAL HISTORY:  Social History     Socioeconomic History    Marital status:      Spouse name: Not on file    Number of children: Not on file    Years of education: Not on file    Highest education level: Not on file   Occupational History    Not on file   Tobacco Use    Smoking status: Former     Current packs/day: 0.00     Average packs/day: 1 pack/day for 17.8 years (17.8 ttl pk-yrs)     Types: Cigarettes     Start date: 1958     Quit date: 10/13/1975     Years since quittin.5    Smokeless tobacco: Never   Vaping Use    Vaping status: Never Used   Substance and Sexual Activity    Alcohol use: Not Currently    Drug use: No     Sexual activity: Yes     Partners: Female     Birth control/protection: None     Comment:  51 years   Other Topics Concern    Parent/sibling w/ CABG, MI or angioplasty before 65F 55M? No   Social History Narrative    Not on file     Social Drivers of Health     Financial Resource Strain: Low Risk  (12/19/2024)    Financial Resource Strain     Within the past 12 months, have you or your family members you live with been unable to get utilities (heat, electricity) when it was really needed?: No   Food Insecurity: Low Risk  (12/19/2024)    Food Insecurity     Within the past 12 months, did you worry that your food would run out before you got money to buy more?: No     Within the past 12 months, did the food you bought just not last and you didn t have money to get more?: No   Transportation Needs: Low Risk  (12/19/2024)    Transportation Needs     Within the past 12 months, has lack of transportation kept you from medical appointments, getting your medicines, non-medical meetings or appointments, work, or from getting things that you need?: No   Physical Activity: Unknown (12/19/2024)    Exercise Vital Sign     Days of Exercise per Week: 0 days     Minutes of Exercise per Session: Not on file   Stress: No Stress Concern Present (12/19/2024)    [employer] [place] [employer] - Occupational Stress Questionnaire     Feeling of Stress : Only a little   Social Connections: Unknown (12/19/2024)    Social Connection and Isolation Panel [NHANES]     Frequency of Communication with Friends and Family: Not on file     Frequency of Social Gatherings with Friends and Family: Once a week     Attends Spiritism Services: Not on file     Active Member of Clubs or Organizations: Not on file     Attends Club or Organization Meetings: Not on file     Marital Status: Not on file   Interpersonal Safety: Low Risk  (4/1/2025)    Interpersonal Safety     Do you feel physically and emotionally safe where you currently live?:  Yes     Within the past 12 months, have you been hit, slapped, kicked or otherwise physically hurt by someone?: No     Within the past 12 months, have you been humiliated or emotionally abused in other ways by your partner or ex-partner?: No   Housing Stability: Low Risk  (12/19/2024)    Housing Stability     Do you have housing? : Yes     Are you worried about losing your housing?: No        ALLERGIES:   Allergies   Allergen Reactions    Bactrim [Sulfamethoxazole-Trimethoprim] Nausea and Vomiting       CURRENT MEDICATIONS:   Prescription Medications as of 4/30/2025         Rx Number Disp Refills Start End Last Dispensed Date Next Fill Date Owning Pharmacy    buPROPion (WELLBUTRIN XL) 300 MG 24 hr tablet  90 tablet 1 1/22/2025 --   [facility] - [address]    Sig: Take 1 tablet (300 mg) by mouth every morning.    Class: E-Prescribe    Route: Oral    Renewals       Renewal requests to authorizing provider (Angeles Sanders APRN CNP) <b>prohibited</b>    Renewal provider: Bunny Martinez APRN CNP            busPIRone HCl (BUSPAR) 30 MG tablet  180 tablet 1 1/29/2025 --   [facility] - [address]    Sig: TAKE 1 TABLET TWICE A DAY    Class: E-Prescribe    doxazosin (CARDURA) 4 MG tablet  90 tablet 1 1/29/2025 --   [facility] [address]    Sig: Take 1 tablet (4 mg) by mouth at bedtime.    Class: E-Prescribe    Route: Oral    finasteride (PROSCAR) 5 MG tablet  90 tablet 3 1/20/2025 --   [address]    Sig: TAKE 1 TABLET BY MOUTH EVERY DAY    Class: E-Prescribe    Notes to Pharmacy: This prescription was filled on 10/31/2024. Any refills authorized will be placed on file.    Route: Oral    furosemide (LASIX) 20 MG tablet  60 tablet 4 12/31/2024 --   [address]     Sig: Take 2 tablets (40 mg) by mouth daily.    Class: E-Prescribe    Route: Oral    lamoTRIgine (LAMICTAL) 25 MG tablet  360 tablet 0 4/24/2025 --   [facility] [address]    Sig: Take 2 tablets (50 mg) by mouth 2 times daily.    Class: E-Prescribe    Route: Oral    losartan (COZAAR) 50 MG tablet  90 tablet 2 2/10/2025 --   [facility] [address]    Sig: Take 1 tablet (50 mg) by mouth daily.    Class: E-Prescribe    Route: Oral    metoprolol succinate ER (TOPROL XL) 200 MG 24 hr tablet  90 tablet 2 2/10/2025 --   [facility] [address]    Sig: Take 1 tablet (200 mg) by mouth every evening.    Class: E-Prescribe    Route: Oral    multivitamin (ONE-DAILY) tablet  30 tablet 0 11/29/2022 --       Sig: Take 1 tablet by mouth daily    Class: Historical    Route: Oral    PARoxetine (PAXIL) 30 MG tablet  -- -- 3/21/2025 --       Sig: Take 0.5 tablets (15 mg) by mouth every morning.    Class: No Print Out    Route: Oral    QUEtiapine (SEROQUEL) 25 MG tablet  30 tablet 1 4/25/2025 --   [facility] [address]    Sig: Take 1 tablet (25 mg) by mouth at bedtime.    Class: E-Prescribe    Route: Oral    rivaroxaban ANTICOAGULANT (XARELTO ANTICOAGULANT) 20 MG TABS tablet  90 tablet 2 2/10/2025 --   [facility] [address]    Sig: Take 1 tablet (20 mg) by mouth daily (with dinner).    Class: E-Prescribe    Route: Oral    traZODone (DESYREL) 50 MG tablet  14 tablet 0 4/25/2025 --   [facility] [address]    Sig: Take 1 tablet (50 mg) by mouth at bedtime.    Class: E-Prescribe    Route: Oral            REVIEW OF SYSTEMS:   Gen: denies frequent headaches, double/blurry vision, insomnia, fatigue, unexplained weight loss/gain. No previous anesthesia  "reactions.  CV: [place], peripheral edema.  .  Pulm: denies shortness of breath, asthma or wheezing  GI/: denies liver or kidney problems, blood in stool or BRBPR, difficulty urinating  Endo: denies thyroid problems or Diabetes  Heme/Onc: denies bleeding or clotting disorders, no family problems with bleeding/clotting diorders  MS: no weakness, tremors or gait instability  Neuro: denies depression, memory problems, no dysesthesias, no previous strokes, no migraines, no dysphagia  Skin: No petechiae, purpura or rash.    PHYSICAL EXAMINATION:   /64 (BP Location: Right arm, Patient Position: Sitting, Cuff Size: Adult Regular)   Pulse 74   Resp 16   Ht 1.727 m (5' 8\")   Wt 72.6 kg (160 lb)   BMI 24.33 kg/m    General: somewhat frail, alert and oriented x 3, pleasant  HEENT:  Poor dentition  CV: S1 S2, Systolic murmurs at the apex and right side of the mid sternum, no rubs or gallops, regular rate and rhythm, no peripheral edema, no carotid or abdominal bruits, pulses in upper and lower extremities palpable  Pulm: bilateral breath sounds, clear to auscultation, easy work of breathing  GI: (+) bowel sounds, soft non-tender and non-distended  : voiding without problems  MS: moves all extremities x 4,  5+/5+ equal strength bilaterally  Neuro: pupils equal round and reactive to light, cranial nerves, II-XII grossly intact, no gross neurologic deficits noted    LABS:  BMP RESULTS:  Lab Results   Component Value Date     04/01/2025     (L) 06/03/2021    POTASSIUM 4.1 04/01/2025    POTASSIUM 4.2 07/26/2022    POTASSIUM 4.5 06/03/2021    CHLORIDE 97 (L) 04/01/2025    CHLORIDE 103 07/26/2022    CHLORIDE 97 06/03/2021    CO2 28 04/01/2025    CO2 30 07/26/2022    CO2 30 06/03/2021    ANIONGAP 10 04/01/2025    ANIONGAP 2 (L) 07/26/2022    ANIONGAP 5 06/03/2021     (H) 04/01/2025     (H) 07/26/2022    GLC 96 06/03/2021    BUN 23.3 (H) 04/01/2025    BUN 12 07/26/2022    BUN 14 06/03/2021    CR 0.99 " 04/01/2025    CR 0.91 06/03/2021    GFRESTIMATED 77 04/01/2025    GFRESTIMATED 80 06/03/2021    GFRESTBLACK >90 06/03/2021    LUIS 9.5 04/01/2025    LUIS 8.9 06/03/2021        CBC RESULTS:  Lab Results   Component Value Date    WBC 5.6 04/01/2025    WBC 5.7 06/03/2021    RBC 3.75 (L) 04/01/2025    RBC 3.62 (L) 06/03/2021    HGB 12.3 (L) 04/01/2025    HGB 12.3 (L) 06/03/2021    HCT 37.5 (L) 04/01/2025    HCT 35.6 (L) 06/03/2021     04/01/2025    MCV 98 06/03/2021    MCH 32.8 04/01/2025    MCH 34.0 (H) 06/03/2021    MCHC 32.8 04/01/2025    MCHC 34.6 06/03/2021    RDW 15.2 (H) 04/01/2025    RDW 13.5 06/03/2021     04/01/2025     06/03/2021       Lab Results   Component Value Date    INR 1.39 (H) 11/04/2022    INR 2.73 (H) 11/15/2012     Lab Results   Component Value Date    PTT 30 02/09/2005       PROCEDURES/IMAGING:  Chest X-Ray: Calcified pleural plaques  Cardiomegaly  Angio: No significant coronary artery disease  Echo: LA severely dilated severe mitral regurgiation and massive tricuspid regurgitation, moderate aortic regurgitation  CT: Tricuspid aortic stenosis  MRI: Not done  Carotid US: Not done     ASSESSMENT/PLAN:   Gavin is an 80 year old gentleman with moderate aortic regurgitation, severe mitral regurgitation and torrential tricuspid regurgitation.  He has had multiple falls and is seeing a neurologist for unintentional movements of his left lower extremity.  He did not want to discuss open heart surgery.  He still requires an additional 3 teeth extracted.  He wants to try the CHANTAL for the mitral valve and see how that goes.  I support this plan for him as he appears to be somewhat frail and would likely require a triple valve replacement.  We will follow him with you.    1. Teeth extracted x 3  2. Transcatheter mitral valve repair.    Approximately 30 minutes were spent with the patient in clinic at this visit.    CC  Patient Care Team:  Lizzy Leiva MD as PCP - General (Family  Medicine)  Rylee Kyle, KAYLA CNP as Nurse Practitioner (Nurse Practitioner)  Lizzy Leiva MD as Assigned PCP  Adriel Hawkins MD as MD (Neurology)  Marian Correa [facility] as Pharmacist (Pharmacist)  Kalen Casillas MD as MD (Hematology & Oncology)  Angeles Sanders APRN CNP as Assigned Behavioral Health Provider  Nazanin Caballero APRN CNP as Assigned Heart and Vascular Provider  Adriel Hawkins MD as Assigned Neuroscience Provider  Polo Guerra MD as Assigned Heart and Vascular Surgical Provider

## 2025-05-01 ENCOUNTER — TELEPHONE (OUTPATIENT)
Dept: FAMILY MEDICINE | Facility: CLINIC | Age: 80
End: 2025-05-01
Payer: COMMERCIAL

## 2025-05-01 NOTE — TELEPHONE ENCOUNTER
Left a message with Gavin's wife who answered his cell phone to have him return our call as he is having some teeth extracted at this time.    Nikki Mancilla RN

## 2025-05-01 NOTE — TELEPHONE ENCOUNTER
Patient's wife calling  Had multiple teeth extracted today and cannot talk on phone  Informed of Dr. Leiva's message below and will not order medication until he is scheduled for a sleep appointment    Requests to let Dr. Leiva know that he is having heart surgery on 3 valves 5/7  He cannot sleep due to involuntary jerking motions at night  Wife is concerned he will not sleep and decline before surgery  Advised again to make an appointment with sleep, patient has phone number    Annette Simons RN on 5/1/2025 at 2:57 PM

## 2025-05-05 LAB
ABO + RH BLD: NORMAL
BLD GP AB SCN SERPL QL: NEGATIVE
SPECIMEN EXP DATE BLD: NORMAL

## 2025-05-05 NOTE — H&P (VIEW-ONLY)
HEART CARE ENCOUNTER NOTE       [facility]  [phone]    Assessment/Recommendations   Problem List Items Addressed This Visit       Benign essential hypertension    Chronic diastolic heart failure (H)    Longstanding persistent atrial fibrillation (H)     Other Visit Diagnoses       Mitral valve insufficiency, unspecified etiology    -  Primary    Tricuspid valve insufficiency, unspecified etiology                1.  Mitral Regurgitation: this has become severe and is now causing some dyspnea and fatigue.  He has been evaluated by a multidisciplinary team and it has been determined that he is not a good surgical candidate.  He is therefore a better candidate for transcatheter [id number] mitral valve repair .       Details of the procedure were discussed with the patient and there is understanding of the risks and benefits.   All questions were answered   Consents to be signed the day of the procedure.  He has no prior history of trouble with anesthesia.  Labs today reveal Hgb 10.5, WBC 3.8, electrolytes and creat WNL     Josemanuel STEWART Raffichelsey will report tomorrow morning for the procedure and all instructions regarding times and medications were given by Valve coordinator RN.     2.  Severe tricuspid regurgitation - potential consideration for TriClip vs Evoque in future pending re-imaging CHANTAL for his mitral valve     3. Chronic heart failure with preserved ejection fraction,NYHA II - compensated.  NT proBNP 1642.  Continue Lasix 20 mg daily, losartan 50 mg daily    4.  Persistent atrial fibrillation - rate controlled.  Continue metoprolol succinate 200 mg daily.  His Xarelto has been on hold since Sunday in anticipation for the procedure    5.  Hypertension -controlled.  Continue losartan 50 mg daily      The longitudinal plan of care for the diagnosis(es)/condition(s) as documented were addressed during this visit. Due to the added complexity in care, our team will continue to support Gavin in  the subsequent management and with ongoing continuity of care.        History of Present Illness/Subjective    Josemanuel Edmond is a 80 year old male who comes in today for history and physical prior to transcatheter [id number] mitral valve repair .  He is accompanied by his wife for today's visit.    Josemanuel has a past medical history significant for valvular disease including severe mitral regurgitation and severe tricuspid regurgitation and moderate AI, chronic diastolic heart failure, persistent atrial fibrillation, hypertension, RBBB, GERD, hyperlipidemia, history of EtOH abuse in remission, benzodiazepine dependence, cerebral infarction due to occlusion/stenosis of carotid artery    He has been followed by Dr. Chapin's team in [place] and was recently found to have progression of his valvular disease including severe tricuspid regurgitation, severe mitral regurgitation, and moderate AI.  He was referred to structural heart for further evaluation of his valves.  He underwent further workup and evaluation by multidisciplinary team and was deemed a good candidate for CHANTAL for his mitral valve which is scheduled for tomorrow.  Today overall he reports feeling great, however other days he feels more fatigued.  He occasionally feels winded.  He does report feeling a little winded after climbing a flight of stairs.  He notices a little bit of swelling to his right leg.  He denies having any chest discomfort, orthopnea, PND, dizziness, lightheadedness, presyncope, syncope, palpitations, weight changes, appetite changes, abdominal fullness, bloating, nausea, vomiting.  He denies having issues with sedation in the past.  He is currently holding Xarelto in anticipation for the procedure.  He reports his last dose was on Sunday.  He denies having current bleeding issues while on Xarelto.  He monitors his blood pressure at home and reports it is typically 100-120s/70s    Medical, surgical, family, social history, and  medications were reviewed and updated as necessary.    EC2025   Atrial fibrillation, ventricular rate 73    ECHOCARDIOGRAM done on 3/4/2025:  Interpretation Summary     1. Left ventricular function is normal.The ejection fraction is 55-60%.  2. Normal right ventricle size and systolic function.  3. The left atrium is severely dilated.  4. There is bileaflet prolapse of the mitral valve leaflets with malcoaptation  of leaflet tips. Severe (4+) regurgitation is identified with a regurgitant  volume by PISA of 65 ml.  5. There is likely moderate (2+) aortic regurgitation present (incompletely  evaluated).  6. Severe (4+) to massive (5+) Tricuspid valve regurgitation is present,  incompletely evaluated.     The study needed to be terminated prematurely due to persistent hypoxia.  Suspect AD probe was interfering with respiration. Gastric views were not  obtained. Consider repeating AD with general anesthesia.     Note: the mitral valve was well-visualized and likely amenable to CHANTAL. The  tricuspid valve was not visualized clearly enough to be amenable to CHANTAL.  Perhaps, visualization would be improved under general anesthesia.    CATH done on 2025:  5CONCLUSIONS:   No angiographic evidence of obstructive coronary disease.  Normal right and left-sided filling pressures.   No pulmonary hypertension.  Normal cardiac output and index.  Mitral regurgitation with V waves up to 24 mmHg.     Physical Examination Review of Systems   Vitals: /64 (BP Location: Right arm, Patient Position: Sitting, Cuff Size: Adult Regular)   Pulse 73   Resp 16   Wt 71.7 kg (158 lb)   BMI 24.02 kg/m    BMI= Body mass index is 24.02 kg/m .  Wt Readings from Last 3 Encounters:   25 71.7 kg (158 lb)   25 72.6 kg (160 lb)   04/15/25 72.6 kg (160 lb)       General Appearance:   Alert, cooperative and in no acute distress   ENT/Mouth: membranes moist, no oral lesions or bleeding gums.      EYES:  no scleral  icterus, normal conjunctivae   Neck: thyroid not visualized   Chest/Lungs:   lungs are clear to auscultation, no rales or wheezing   Cardiovascular:   Irregular. Normal first and second heart sounds with 2/6 systolic murmur.  No rubs or gallops; the carotid, radial and posterior tibial pulses are intact, trace RLE, no LLE   Abdomen:  Soft and nontender.    Extremities: no cyanosis or clubbing   Skin: no xanthelasma, warm.    Neurologic: normal gait, normal  bilateral   Psychiatric: Normal mood and affect       Please refer above for cardiac ROS details.      Medical History  Surgical History Family History Social History   Past Medical History:   Diagnosis Date    Acute on chronic diastolic (congestive) heart failure (H) 11/29/2023    Arthritis 2005    Benign neoplasm of prostate 2000    Benign Prostate Nodule    Depressive disorder     past condition    Infection due to 2019 novel coronavirus 09/27/2021    S/P knee replacement 11/06/2012    S/P left knee arthroscopy 08/15/2011     Past Surgical History:   Procedure Laterality Date    ABDOMEN SURGERY  2003    ARTHROPLASTY KNEE Right 10/12/2018    Procedure: ARTHROPLASTY KNEE;  Right Total Knee Arthroplasty;  Surgeon: Jeb Peralta MD;  Location: [facility] OR    ARTHROPLASTY REVISION HIP Left 5/25/2023    Procedure: Revision total hip arthroplasty, left;  Surgeon: Chandler Leiva MD;  Location: [facility]    BIOPSY  2007    COLONOSCOPY N/A 12/10/2015    Procedure: COMBINED COLONOSCOPY, SINGLE OR MULTIPLE BIOPSY/POLYPECTOMY BY BIOPSY;  Surgeon: Jyoti Figueroa MD;  Location: [facility]    CV CORONARY ANGIOGRAM N/A 4/1/2025    Procedure: Coronary Angiogram;  Surgeon: Tae Segovia MD;  Location: [facility] LAB CV    CV LEFT HEART CATH N/A 4/1/2025    Procedure: Left Heart Catheterization;  Surgeon: Tae Segovia MD;  Location: [facility] CATH LAB CV    CV RIGHT HEART CATH MEASUREMENTS RECORDED N/A 4/1/2025    Procedure: Right Heart  Catheterization;  Surgeon: Tae Segovia MD;  [facility] CATH LAB CV    JOINT REPLACEMENT, HIP RT/LT  10/2007    Joint Replacement Hip LT    JOINT REPLACEMTN, KNEE RT/LT  2011    Joint Replacement knee /LT, [facility]    LAPAROSCOPIC HERNIORRHAPHY INGUINAL BILATERAL Bilateral 2018    Procedure: LAPAROSCOPIC HERNIORRHAPHY INGUINAL BILATERAL;  Laparoscopic bilateral inguinal hernia repair;  Surgeon: Josemanuel Resendiz MD;  Location: [facility] OR    PHACOEMULSIFICATION WITH STANDARD INTRAOCULAR LENS IMPLANT Right 2021    Procedure: Cataract Removal with Implant;  Surgeon: Regan Kaufman MD;  Location: [facility]    PHACOEMULSIFICATION WITH STANDARD INTRAOCULAR LENS IMPLANT Left 2021    Procedure: Cataract Removal with Implant;  Surgeon: Regan Kaufman MD;  Location: [facility]    SURGICAL HISTORY OF -   1999    Umbilical Herniorrhaphy with mesh    SURGICAL HISTORY OF -  Left 2017    Thoracotomy and drainage of empyema     Family History   Problem Relation Age of Onset    Depression Mother     Cerebrovascular Disease Mother     Breast Cancer Mother     Neurologic Disorder Mother         parkinsons    Parkinsonism Mother     Respiratory Father         emphyzema    Diabetes Brother     Social History     Socioeconomic History    Marital status:      Spouse name: Not on file    Number of children: Not on file    Years of education: Not on file    Highest education level: Not on file   Occupational History    Not on file   Tobacco Use    Smoking status: Former     Current packs/day: 0.00     Average packs/day: 1 pack/day for 17.8 years (17.8 ttl pk-yrs)     Types: Cigarettes     Start date: 1958     Quit date: 10/13/1975     Years since quittin.5    Smokeless tobacco: Never   Vaping Use    Vaping status: Never Used   Substance and Sexual Activity    Alcohol use: Not Currently    Drug use: No    Sexual activity: Yes     Partners: Female     Birth control/protection:  None     Comment:  51 years   Other Topics Concern    Parent/sibling w/ CABG, MI or angioplasty before 65F 55M? No   Social History Narrative    Not on file     Social Drivers of Health     Financial Resource Strain: Low Risk  (12/19/2024)    Financial Resource Strain     Within the past 12 months, have you or your family members you live with been unable to get utilities (heat, electricity) when it was really needed?: No   Food Insecurity: Low Risk  (12/19/2024)    Food Insecurity     Within the past 12 months, did you worry that your food would run out before you got money to buy more?: No     Within the past 12 months, did the food you bought just not last and you didn t have money to get more?: No   Transportation Needs: Low Risk  (12/19/2024)    Transportation Needs     Within the past 12 months, has lack of transportation kept you from medical appointments, getting your medicines, non-medical meetings or appointments, work, or from getting things that you need?: No   Physical Activity: Unknown (12/19/2024)    Exercise Vital Sign     Days of Exercise per Week: 0 days     Minutes of Exercise per Session: Not on file   Stress: No Stress Concern Present (12/19/2024)    [employer] [place] [employer] - Occupational Stress Questionnaire     Feeling of Stress : Only a little   Social Connections: Unknown (12/19/2024)    Social Connection and Isolation Panel [NHANES]     Frequency of Communication with Friends and Family: Not on file     Frequency of Social Gatherings with Friends and Family: Once a week     Attends Restorationism Services: Not on file     Active Member of Clubs or Organizations: Not on file     Attends Club or Organization Meetings: Not on file     Marital Status: Not on file   Interpersonal Safety: Low Risk  (4/1/2025)    Interpersonal Safety     Do you feel physically and emotionally safe where you currently live?: Yes     Within the past 12 months, have you been hit, slapped, kicked or  otherwise physically hurt by someone?: No     Within the past 12 months, have you been humiliated or emotionally abused in other ways by your partner or ex-partner?: No   Housing Stability: Low Risk  (12/19/2024)    Housing Stability     Do you have housing? : Yes     Are you worried about losing your housing?: No          Medications  Allergies   Current Outpatient Medications   Medication Sig Dispense Refill    amoxicillin (AMOXIL) 500 MG tablet       buPROPion (WELLBUTRIN XL) 300 MG 24 hr tablet Take 1 tablet (300 mg) by mouth every morning. 90 tablet 1    busPIRone HCl (BUSPAR) 30 MG tablet TAKE 1 TABLET TWICE A  tablet 1    doxazosin (CARDURA) 4 MG tablet Take 1 tablet (4 mg) by mouth at bedtime. 90 tablet 1    finasteride (PROSCAR) 5 MG tablet TAKE 1 TABLET BY MOUTH EVERY DAY 90 tablet 3    furosemide (LASIX) 20 MG tablet Take 2 tablets (40 mg) by mouth daily. 60 tablet 4    lamoTRIgine (LAMICTAL) 25 MG tablet Take 2 tablets (50 mg) by mouth 2 times daily. 360 tablet 0    losartan (COZAAR) 50 MG tablet Take 1 tablet (50 mg) by mouth daily. 90 tablet 2    metoprolol succinate ER (TOPROL XL) 200 MG 24 hr tablet Take 1 tablet (200 mg) by mouth every evening. 90 tablet 2    multivitamin (ONE-DAILY) tablet Take 1 tablet by mouth daily 30 tablet 0    QUEtiapine (SEROQUEL) 25 MG tablet Take 1 tablet (25 mg) by mouth at bedtime. 30 tablet 1    rivaroxaban ANTICOAGULANT (XARELTO ANTICOAGULANT) 20 MG TABS tablet Take 1 tablet (20 mg) by mouth daily (with dinner). 90 tablet 2    traZODone (DESYREL) 50 MG tablet Take 1 tablet (50 mg) by mouth at bedtime. 14 tablet 0    Allergies   Allergen Reactions    Bactrim [Sulfamethoxazole-Trimethoprim] Nausea and Vomiting         Lab Results    Chemistry/lipid CBC Cardiac Enzymes/BNP/TSH/INR   Recent Labs   Lab Test 10/24/24  1033   CHOL 106   HDL 31*   LDL 60   TRIG 76     Recent Labs   Lab Test 10/24/24  1033 11/30/23  1130 11/04/22  1027   LDL 60 64 88     Recent Labs  "  Lab Test 04/01/25  0751      POTASSIUM 4.1   CHLORIDE 97*   CO2 28   *   BUN 23.3*   CR 0.99   GFRESTIMATED 77   LUIS 9.5     Recent Labs   Lab Test 04/01/25  0751 03/20/25  1324 02/24/25  1442   CR 0.99 0.89 0.89     No results for input(s): \"A1C\" in the last 84518 hours. Recent Labs   Lab Test 04/01/25  0751   WBC 5.6   HGB 12.3*   HCT 37.5*           Recent Labs   Lab Test 04/01/25  0751 03/20/25  1324 02/24/25  1442   HGB 12.3* 11.2* 11.1*    No results for input(s): \"TROPONINI\" in the last 81271 hours.  Recent Labs   Lab Test 11/30/23  1130 11/24/23  1340 04/06/23  0948 12/20/17  1315   NTBNPI  --  4,896* 1,329 957*   NTBNP 1,862*  --   --   --      Recent Labs   Lab Test 07/12/24  1552   TSH 2.23     Recent Labs   Lab Test 11/04/22  1027   INR 1.39*        40 minutes spent on the date of encounter doing education, chart prep/review, review of outside records, review of test results, interpretation with above tests, patient visit, documentation, discussion with other provider, and discussion with family.        This note has been dictated using voice recognition software. Any grammatical or context distortions are unintentional and inherent to the software.      Sierra Lorenzo PA-C  Structural Heart Program  [facility]   "

## 2025-05-05 NOTE — PROGRESS NOTES
HEART CARE ENCOUNTER NOTE       [facility]  [phone]    Assessment/Recommendations   Problem List Items Addressed This Visit       Benign essential hypertension    Chronic diastolic heart failure (H)    Longstanding persistent atrial fibrillation (H)     Other Visit Diagnoses       Mitral valve insufficiency, unspecified etiology    -  Primary    Tricuspid valve insufficiency, unspecified etiology                1.  Mitral Regurgitation: this has become severe and is now causing some dyspnea and fatigue.  He has been evaluated by a multidisciplinary team and it has been determined that he is not a good surgical candidate.  He is therefore a better candidate for transcatheter [id number] mitral valve repair .       Details of the procedure were discussed with the patient and there is understanding of the risks and benefits.   All questions were answered   Consents to be signed the day of the procedure.  He has no prior history of trouble with anesthesia.  Labs today reveal Hgb 10.5, WBC 3.8, electrolytes and creat WNL     Josemanuel STEWART Raffichelsey will report tomorrow morning for the procedure and all instructions regarding times and medications were given by Valve coordinator RN.     2.  Severe tricuspid regurgitation - potential consideration for TriClip vs Evoque in future pending re-imaging CHANTAL for his mitral valve     3. Chronic heart failure with preserved ejection fraction,NYHA II - compensated.  NT proBNP 1642.  Continue Lasix 20 mg daily, losartan 50 mg daily    4.  Persistent atrial fibrillation - rate controlled.  Continue metoprolol succinate 200 mg daily.  His Xarelto has been on hold since Sunday in anticipation for the procedure    5.  Hypertension -controlled.  Continue losartan 50 mg daily      The longitudinal plan of care for the diagnosis(es)/condition(s) as documented were addressed during this visit. Due to the added complexity in care, our team will continue to support Gavin in  the subsequent management and with ongoing continuity of care.        History of Present Illness/Subjective    Josemanuel Edmond is a 80 year old male who comes in today for history and physical prior to transcatheter [id number] mitral valve repair .  He is accompanied by his wife for today's visit.    Josemanuel has a past medical history significant for valvular disease including severe mitral regurgitation and severe tricuspid regurgitation and moderate AI, chronic diastolic heart failure, persistent atrial fibrillation, hypertension, RBBB, GERD, hyperlipidemia, history of EtOH abuse in remission, benzodiazepine dependence, cerebral infarction due to occlusion/stenosis of carotid artery    He has been followed by Dr. Chapin's team in [place] and was recently found to have progression of his valvular disease including severe tricuspid regurgitation, severe mitral regurgitation, and moderate AI.  He was referred to structural heart for further evaluation of his valves.  He underwent further workup and evaluation by multidisciplinary team and was deemed a good candidate for CHANTAL for his mitral valve which is scheduled for tomorrow.  Today overall he reports feeling great, however other days he feels more fatigued.  He occasionally feels winded.  He does report feeling a little winded after climbing a flight of stairs.  He notices a little bit of swelling to his right leg.  He denies having any chest discomfort, orthopnea, PND, dizziness, lightheadedness, presyncope, syncope, palpitations, weight changes, appetite changes, abdominal fullness, bloating, nausea, vomiting.  He denies having issues with sedation in the past.  He is currently holding Xarelto in anticipation for the procedure.  He reports his last dose was on Sunday.  He denies having current bleeding issues while on Xarelto.  He monitors his blood pressure at home and reports it is typically 100-120s/70s    Medical, surgical, family, social history, and  medications were reviewed and updated as necessary.    EC2025   Atrial fibrillation, ventricular rate 73    ECHOCARDIOGRAM done on 3/4/2025:  Interpretation Summary     1. Left ventricular function is normal.The ejection fraction is 55-60%.  2. Normal right ventricle size and systolic function.  3. The left atrium is severely dilated.  4. There is bileaflet prolapse of the mitral valve leaflets with malcoaptation  of leaflet tips. Severe (4+) regurgitation is identified with a regurgitant  volume by PISA of 65 ml.  5. There is likely moderate (2+) aortic regurgitation present (incompletely  evaluated).  6. Severe (4+) to massive (5+) Tricuspid valve regurgitation is present,  incompletely evaluated.     The study needed to be terminated prematurely due to persistent hypoxia.  Suspect AD probe was interfering with respiration. Gastric views were not  obtained. Consider repeating AD with general anesthesia.     Note: the mitral valve was well-visualized and likely amenable to CHANTAL. The  tricuspid valve was not visualized clearly enough to be amenable to CHANTAL.  Perhaps, visualization would be improved under general anesthesia.    CATH done on 2025:  5CONCLUSIONS:   No angiographic evidence of obstructive coronary disease.  Normal right and left-sided filling pressures.   No pulmonary hypertension.  Normal cardiac output and index.  Mitral regurgitation with V waves up to 24 mmHg.     Physical Examination Review of Systems   Vitals: /64 (BP Location: Right arm, Patient Position: Sitting, Cuff Size: Adult Regular)   Pulse 73   Resp 16   Wt 71.7 kg (158 lb)   BMI 24.02 kg/m    BMI= Body mass index is 24.02 kg/m .  Wt Readings from Last 3 Encounters:   25 71.7 kg (158 lb)   25 72.6 kg (160 lb)   04/15/25 72.6 kg (160 lb)       General Appearance:   Alert, cooperative and in no acute distress   ENT/Mouth: membranes moist, no oral lesions or bleeding gums.      EYES:  no scleral  icterus, normal conjunctivae   Neck: thyroid not visualized   Chest/Lungs:   lungs are clear to auscultation, no rales or wheezing   Cardiovascular:   Irregular. Normal first and second heart sounds with 2/6 systolic murmur.  No rubs or gallops; the carotid, radial and posterior tibial pulses are intact, trace RLE, no LLE   Abdomen:  Soft and nontender.    Extremities: no cyanosis or clubbing   Skin: no xanthelasma, warm.    Neurologic: normal gait, normal  bilateral   Psychiatric: Normal mood and affect       Please refer above for cardiac ROS details.      Medical History  Surgical History Family History Social History   Past Medical History:   Diagnosis Date    Acute on chronic diastolic (congestive) heart failure (H) 11/29/2023    Arthritis 2005    Benign neoplasm of prostate 2000    Benign Prostate Nodule    Depressive disorder     past condition    Infection due to 2019 novel coronavirus 09/27/2021    S/P knee replacement 11/06/2012    S/P left knee arthroscopy 08/15/2011     Past Surgical History:   Procedure Laterality Date    ABDOMEN SURGERY  2003    ARTHROPLASTY KNEE Right 10/12/2018    Procedure: ARTHROPLASTY KNEE;  Right Total Knee Arthroplasty;  Surgeon: Jeb Peralta MD;  Location: [facility] OR    ARTHROPLASTY REVISION HIP Left 5/25/2023    Procedure: Revision total hip arthroplasty, left;  Surgeon: Chandler Leiva MD;  Location: [facility]    BIOPSY  2007    COLONOSCOPY N/A 12/10/2015    Procedure: COMBINED COLONOSCOPY, SINGLE OR MULTIPLE BIOPSY/POLYPECTOMY BY BIOPSY;  Surgeon: Jyoti Figueroa MD;  Location: [facility] GI    CV CORONARY ANGIOGRAM N/A 4/1/2025    Procedure: Coronary Angiogram;  Surgeon: Tae Segovia MD;  Location: [facility] LAB CV    CV LEFT HEART CATH N/A 4/1/2025    Procedure: Left Heart Catheterization;  Surgeon: Tae Segovia MD;  Location: [facility] CATH LAB CV    CV RIGHT HEART CATH MEASUREMENTS RECORDED N/A 4/1/2025    Procedure: Right Heart  Catheterization;  Surgeon: Tae Segovia MD;  Location: [facility] CATH LAB CV    JOINT REPLACEMENT, HIP RT/LT  10/2007    Joint Replacement Hip LT    JOINT REPLACEMTN, KNEE RT/LT  2011    Joint Replacement knee /LT, [facility]    LAPAROSCOPIC HERNIORRHAPHY INGUINAL BILATERAL Bilateral 2018    Procedure: LAPAROSCOPIC HERNIORRHAPHY INGUINAL BILATERAL;  Laparoscopic bilateral inguinal hernia repair;  Surgeon: Josemanuel Resendiz MD;  Location: [facility] OR    PHACOEMULSIFICATION WITH STANDARD INTRAOCULAR LENS IMPLANT Right 2021    Procedure: Cataract Removal with Implant;  Surgeon: Regan Kaufman MD;  Location: [facility] OR    PHACOEMULSIFICATION WITH STANDARD INTRAOCULAR LENS IMPLANT Left 2021    Procedure: Cataract Removal with Implant;  Surgeon: Regan Kaufman MD;  Location: [facility]    SURGICAL HISTORY OF -   1999    Umbilical Herniorrhaphy with mesh    SURGICAL HISTORY OF -  Left 2017    Thoracotomy and drainage of empyema     Family History   Problem Relation Age of Onset    Depression Mother     Cerebrovascular Disease Mother     Breast Cancer Mother     Neurologic Disorder Mother         parkinsons    Parkinsonism Mother     Respiratory Father         emphyzema    Diabetes Brother     Social History     Socioeconomic History    Marital status:      Spouse name: Not on file    Number of children: Not on file    Years of education: Not on file    Highest education level: Not on file   Occupational History    Not on file   Tobacco Use    Smoking status: Former     Current packs/day: 0.00     Average packs/day: 1 pack/day for 17.8 years (17.8 ttl pk-yrs)     Types: Cigarettes     Start date: 1958     Quit date: 10/13/1975     Years since quittin.5    Smokeless tobacco: Never   Vaping Use    Vaping status: Never Used   Substance and Sexual Activity    Alcohol use: Not Currently    Drug use: No    Sexual activity: Yes     Partners: Female     Birth control/protection:  - - - None     Comment:  51 years   Other Topics Concern    Parent/sibling w/ CABG, MI or angioplasty before 65F 55M? No   Social History Narrative    Not on file     Social Drivers of Health     Financial Resource Strain: Low Risk  (12/19/2024)    Financial Resource Strain     Within the past 12 months, have you or your family members you live with been unable to get utilities (heat, electricity) when it was really needed?: No   Food Insecurity: Low Risk  (12/19/2024)    Food Insecurity     Within the past 12 months, did you worry that your food would run out before you got money to buy more?: No     Within the past 12 months, did the food you bought just not last and you didn t have money to get more?: No   Transportation Needs: Low Risk  (12/19/2024)    Transportation Needs     Within the past 12 months, has lack of transportation kept you from medical appointments, getting your medicines, non-medical meetings or appointments, work, or from getting things that you need?: No   Physical Activity: Unknown (12/19/2024)    Exercise Vital Sign     Days of Exercise per Week: 0 days     Minutes of Exercise per Session: Not on file   Stress: No Stress Concern Present (12/19/2024)    [employer] - Occupational Stress Questionnaire     Feeling of Stress : Only a little   Social Connections: Unknown (12/19/2024)    Social Connection and Isolation Panel [NHANES]     Frequency of Communication with Friends and Family: Not on file     Frequency of Social Gatherings with Friends and Family: Once a week     Attends [employer] Services: Not on file     Active Member of Clubs or Organizations: Not on file     Attends Club or Organization Meetings: Not on file     Marital Status: Not on file   Interpersonal Safety: Low Risk  (4/1/2025)    Interpersonal Safety     Do you feel physically and emotionally safe where you currently live?: Yes     Within the past 12 months, have you been hit, slapped, kicked or  otherwise physically hurt by someone?: No     Within the past 12 months, have you been humiliated or emotionally abused in other ways by your partner or ex-partner?: No   Housing Stability: Low Risk  (12/19/2024)    Housing Stability     Do you have housing? : Yes     Are you worried about losing your housing?: No          Medications  Allergies   Current Outpatient Medications   Medication Sig Dispense Refill    amoxicillin (AMOXIL) 500 MG tablet       buPROPion (WELLBUTRIN XL) 300 MG 24 hr tablet Take 1 tablet (300 mg) by mouth every morning. 90 tablet 1    busPIRone HCl (BUSPAR) 30 MG tablet TAKE 1 TABLET TWICE A  tablet 1    doxazosin (CARDURA) 4 MG tablet Take 1 tablet (4 mg) by mouth at bedtime. 90 tablet 1    finasteride (PROSCAR) 5 MG tablet TAKE 1 TABLET BY MOUTH EVERY DAY 90 tablet 3    furosemide (LASIX) 20 MG tablet Take 2 tablets (40 mg) by mouth daily. 60 tablet 4    lamoTRIgine (LAMICTAL) 25 MG tablet Take 2 tablets (50 mg) by mouth 2 times daily. 360 tablet 0    losartan (COZAAR) 50 MG tablet Take 1 tablet (50 mg) by mouth daily. 90 tablet 2    metoprolol succinate ER (TOPROL XL) 200 MG 24 hr tablet Take 1 tablet (200 mg) by mouth every evening. 90 tablet 2    multivitamin (ONE-DAILY) tablet Take 1 tablet by mouth daily 30 tablet 0    QUEtiapine (SEROQUEL) 25 MG tablet Take 1 tablet (25 mg) by mouth at bedtime. 30 tablet 1    rivaroxaban ANTICOAGULANT (XARELTO ANTICOAGULANT) 20 MG TABS tablet Take 1 tablet (20 mg) by mouth daily (with dinner). 90 tablet 2    traZODone (DESYREL) 50 MG tablet Take 1 tablet (50 mg) by mouth at bedtime. 14 tablet 0    Allergies   Allergen Reactions    Bactrim [Sulfamethoxazole-Trimethoprim] Nausea and Vomiting         Lab Results    Chemistry/lipid CBC Cardiac Enzymes/BNP/TSH/INR   Recent Labs   Lab Test 10/24/24  1033   CHOL 106   HDL 31*   LDL 60   TRIG 76     Recent Labs   Lab Test 10/24/24  1033 11/30/23  1130 11/04/22  1027   LDL 60 64 88     Recent Labs  "  Lab Test 04/01/25  0751      POTASSIUM 4.1   CHLORIDE 97*   CO2 28   *   BUN 23.3*   CR 0.99   GFRESTIMATED 77   LUIS 9.5     Recent Labs   Lab Test 04/01/25  0751 03/20/25  1324 02/24/25  1442   CR 0.99 0.89 0.89     No results for input(s): \"A1C\" in the last 39233 hours. Recent Labs   Lab Test 04/01/25  0751   WBC 5.6   HGB 12.3*   HCT 37.5*           Recent Labs   Lab Test 04/01/25  0751 03/20/25  1324 02/24/25  1442   HGB 12.3* 11.2* 11.1*    No results for input(s): \"TROPONINI\" in the last 73542 hours.  Recent Labs   Lab Test 11/30/23  1130 11/24/23  1340 04/06/23  0948 12/20/17  1315   NTBNPI  --  4,896* 1,329 957*   NTBNP 1,862*  --   --   --      Recent Labs   Lab Test 07/12/24  1552   TSH 2.23     Recent Labs   Lab Test 11/04/22  1027   INR 1.39*        40 minutes spent on the date of encounter doing education, chart prep/review, review of outside records, review of test results, interpretation with above tests, patient visit, documentation, discussion with other provider, and discussion with family.        This note has been dictated using voice recognition software. Any grammatical or context distortions are unintentional and inherent to the software.      Sierra Lorenzo PA-C  Structural Heart Program  [facility]   "

## 2025-05-05 NOTE — DISCHARGE INSTRUCTIONS
Patient Instructions - Going Home after transcatheter [id number] mitral valve repair using the [place] ACE device:    Going Home: It s normal to feel a little anxious after leaving the hospital and when fewer people are nearby. People do much better when they feel as though they have support.  If you live alone we suggest you arrange to have someone stay with you for a day or two to help you recover.     Medications:  Take all of your medications as directed in your discharge summary. Do not stop taking these medications without speaking with your cardiologist. If you have any questions about any of your medications, speak to your pharmacist or provider.     Follow up Appointments  You will follow up with Renuka Cole PA-C on May 14 at 2:50 PM.  You will have a repeat echocardiogram on June 19 at 10 AM.  You will have a follow up with Sierra Lorenzo PA-C on June 25 at 2:30 PM (for labs, EKG and visit. You DO NOT have to fast for these labs).    If you need to reschedule any of these appointments, please call: [phone]    You should see your regular cardiologist in 6 months. Your regular heart doctor will continue to be your heart specialist. Our team will call you to make this appointment  See your family doctor in 2 weeks.  Make an appointment once you get home.    Site Care: Shower every day.  Let the soap and water run over your incision or access site, but do not rub the area. No tub baths, pools or hot-tubs for the 1st week you are at home. Do not put ointments, powders, or lotions on your access site and/or incision.   It is normal to feel a small lump the size of a marble in the groin access site.  That is the closure device used to close the vein/artery.  If you are experiencing discomfort, bleeding, drainage, redness or increasing swelling, please call us as outlined below.    Dental Work: Avoid major dental work for the next 6 months if possible. . You will need antibiotics before any  dental cleanings, work or surgery. This will decrease the risk of infection and will be continued lifelong.    Driving:  You should not drive for the first 5 days after your procedure. The first time you drive, you must have someone with you. You may ride in a car immediately after the procedure.      Activity: People recover at different rates depending on their general health and the type of heart valve procedure. Most people take a few weeks to feel fully recovered. Daily activity and exercise are an important part of your recovery.  Do not lift, push or pull anything > 10 lb. for the first 5 days.    CALL THE VALVE CLINIC IF YOU HAVE ANY QUESTIONS OR CONCERNS:  [phone]    For the first 5 days after your valve procedure     Do Not:   Lift, push, or pull more than ten pounds (including pets, laundry, groceries, etc)  Garden, including lawn mowing and raking  Excessively bear down or strain when having a bowel movement   Ride a bike  Do:  Weigh yourself every day in the morning after using the bathroom.  If you notice weight gain of more than 3 pounds in 1 day or more than 5 pounds in 1 week, call the cardiology clinic.   Get up and get dressed every day. Do not stay in bed.  Set a daily routine.   Walk as much as you can. You may walk up and down stairs. When you are tired, rest.   Cough and deep breathe. Use your incentive spirometer 5-10 times each hour when you are awake.   We strongly recommend you participate in a cardiac rehabilitation program. This type of program will help you learn about your heart health, prevent more heart problems, participate in safe and heart-healthy activities, and learn how to return to your activities of daily living and hobbies.   Let the cardiology clinic know if you need to leave town before your first follow-up visit.     When to call the Cardiology Clinic to speak with a nurse?   Swelling of the legs, ankles, or feet  Increasing shortness of breath  Change in the color  or temperature of your lower legs and feet  Abdominal pain or unrelieved nausea and/or vomiting  Redness and warmth around your access site and/or incision that does not go away  Yellow or green drainage from your access site and/or incision Weight gain of 3 pounds in one day or 5 pounds in one week  Develop any numbness, tingling, or limited movement of your arms or legs.   Chest pain that does not go away  New confusion or you cannot think clearly  Fever and chills       For Non-urgent concerns that could wait up to 48 hours for a response from a nurse: call the Valve Clinic Nurse between 8am-4:30pm at [phone]. You can leave a message after hours/on the weekend, messages will be returned in 1-2 business days.    For URGENT Concerns that cannot wait please call: [facility]: [phone]    If you are calling after hours, please listen to the entire voicemail, a live  will answer at the end of the message.    If you are experiencing a medical EMERGENCY please call [phone]. Do not wait to speak to your heart care team.

## 2025-05-06 ENCOUNTER — OFFICE VISIT (OUTPATIENT)
Dept: CARDIOLOGY | Facility: CLINIC | Age: 80
End: 2025-05-06
Payer: COMMERCIAL

## 2025-05-06 ENCOUNTER — APPOINTMENT (OUTPATIENT)
Dept: CARDIOLOGY | Facility: CLINIC | Age: 80
End: 2025-05-06
Payer: COMMERCIAL

## 2025-05-06 ENCOUNTER — PREP FOR PROCEDURE (OUTPATIENT)
Dept: CARDIOLOGY | Facility: CLINIC | Age: 80
End: 2025-05-06

## 2025-05-06 ENCOUNTER — ANESTHESIA EVENT (OUTPATIENT)
Dept: CARDIOLOGY | Facility: HOSPITAL | Age: 80
End: 2025-05-06
Payer: COMMERCIAL

## 2025-05-06 ENCOUNTER — ALLIED HEALTH/NURSE VISIT (OUTPATIENT)
Dept: CARDIOLOGY | Facility: CLINIC | Age: 80
End: 2025-05-06
Payer: COMMERCIAL

## 2025-05-06 VITALS
BODY MASS INDEX: 24.02 KG/M2 | SYSTOLIC BLOOD PRESSURE: 100 MMHG | HEART RATE: 73 BPM | WEIGHT: 158 LBS | RESPIRATION RATE: 16 BRPM | DIASTOLIC BLOOD PRESSURE: 64 MMHG

## 2025-05-06 DIAGNOSIS — I50.32 CHRONIC DIASTOLIC HEART FAILURE (H): ICD-10-CM

## 2025-05-06 DIAGNOSIS — I34.0 MITRAL VALVE INSUFFICIENCY, UNSPECIFIED ETIOLOGY: Primary | ICD-10-CM

## 2025-05-06 DIAGNOSIS — I48.11 LONGSTANDING PERSISTENT ATRIAL FIBRILLATION (H): ICD-10-CM

## 2025-05-06 DIAGNOSIS — I07.1 TRICUSPID VALVE INSUFFICIENCY, UNSPECIFIED ETIOLOGY: ICD-10-CM

## 2025-05-06 DIAGNOSIS — I10 BENIGN ESSENTIAL HYPERTENSION: ICD-10-CM

## 2025-05-06 LAB
ALBUMIN SERPL BCG-MCNC: 4.2 G/DL (ref 3.5–5.2)
ALP SERPL-CCNC: 89 U/L (ref 40–150)
ALT SERPL W P-5'-P-CCNC: 15 U/L (ref 0–70)
ANION GAP SERPL CALCULATED.3IONS-SCNC: 6 MMOL/L (ref 7–15)
AST SERPL W P-5'-P-CCNC: 24 U/L (ref 0–45)
ATRIAL RATE - MUSE: 61 BPM
BASOPHILS # BLD AUTO: 0.1 10E3/UL (ref 0–0.2)
BASOPHILS NFR BLD AUTO: 1 %
BILIRUB SERPL-MCNC: 0.8 MG/DL
BUN SERPL-MCNC: 20.8 MG/DL (ref 8–23)
CALCIUM SERPL-MCNC: 8.8 MG/DL (ref 8.8–10.4)
CHLORIDE SERPL-SCNC: 100 MMOL/L (ref 98–107)
CREAT SERPL-MCNC: 0.94 MG/DL (ref 0.67–1.17)
DIASTOLIC BLOOD PRESSURE - MUSE: NORMAL MMHG
EGFRCR SERPLBLD CKD-EPI 2021: 81 ML/MIN/1.73M2
EOSINOPHIL # BLD AUTO: 0.2 10E3/UL (ref 0–0.7)
EOSINOPHIL NFR BLD AUTO: 5 %
ERYTHROCYTE [DISTWIDTH] IN BLOOD BY AUTOMATED COUNT: 15.1 % (ref 10–15)
GLUCOSE SERPL-MCNC: 74 MG/DL (ref 70–99)
HCO3 SERPL-SCNC: 32 MMOL/L (ref 22–29)
HCT VFR BLD AUTO: 32.5 % (ref 40–53)
HGB BLD-MCNC: 10.5 G/DL (ref 13.3–17.7)
IMM GRANULOCYTES # BLD: 0 10E3/UL
IMM GRANULOCYTES NFR BLD: 0 %
INR PPP: 1.25 (ref 0.85–1.15)
INTERPRETATION ECG - MUSE: NORMAL
LYMPHOCYTES # BLD AUTO: 1 10E3/UL (ref 0.8–5.3)
LYMPHOCYTES NFR BLD AUTO: 27 %
MAGNESIUM SERPL-MCNC: 2.3 MG/DL (ref 1.7–2.3)
MCH RBC QN AUTO: 32.9 PG (ref 26.5–33)
MCHC RBC AUTO-ENTMCNC: 32.3 G/DL (ref 31.5–36.5)
MCV RBC AUTO: 102 FL (ref 78–100)
MONOCYTES # BLD AUTO: 0.5 10E3/UL (ref 0–1.3)
MONOCYTES NFR BLD AUTO: 12 %
NEUTROPHILS # BLD AUTO: 2.1 10E3/UL (ref 1.6–8.3)
NEUTROPHILS NFR BLD AUTO: 54 %
NRBC # BLD AUTO: 0 10E3/UL
NRBC BLD AUTO-RTO: 0 /100
NT-PROBNP SERPL-MCNC: 1642 PG/ML (ref 0–1800)
P AXIS - MUSE: NORMAL DEGREES
PLATELET # BLD AUTO: 133 10E3/UL (ref 150–450)
POTASSIUM SERPL-SCNC: 4.3 MMOL/L (ref 3.4–5.3)
PR INTERVAL - MUSE: NORMAL MS
PROT SERPL-MCNC: 7.1 G/DL (ref 6.4–8.3)
PROTHROMBIN TIME: 15.9 SECONDS (ref 11.8–14.8)
QRS DURATION - MUSE: 110 MS
QT - MUSE: 416 MS
QTC - MUSE: 458 MS
R AXIS - MUSE: 10 DEGREES
RBC # BLD AUTO: 3.19 10E6/UL (ref 4.4–5.9)
SODIUM SERPL-SCNC: 138 MMOL/L (ref 135–145)
SYSTOLIC BLOOD PRESSURE - MUSE: NORMAL MMHG
T AXIS - MUSE: 20 DEGREES
VENTRICULAR RATE- MUSE: 73 BPM
WBC # BLD AUTO: 3.8 10E3/UL (ref 4–11)

## 2025-05-06 PROCEDURE — 99215 OFFICE O/P EST HI 40 MIN: CPT | Performed by: PHYSICIAN ASSISTANT

## 2025-05-06 PROCEDURE — 36415 COLL VENOUS BLD VENIPUNCTURE: CPT | Performed by: PHYSICIAN ASSISTANT

## 2025-05-06 PROCEDURE — 3074F SYST BP LT 130 MM HG: CPT | Performed by: PHYSICIAN ASSISTANT

## 2025-05-06 PROCEDURE — 83735 ASSAY OF MAGNESIUM: CPT | Performed by: PHYSICIAN ASSISTANT

## 2025-05-06 PROCEDURE — 85610 PROTHROMBIN TIME: CPT | Performed by: PHYSICIAN ASSISTANT

## 2025-05-06 PROCEDURE — 80053 COMPREHEN METABOLIC PANEL: CPT | Performed by: PHYSICIAN ASSISTANT

## 2025-05-06 PROCEDURE — 93000 ELECTROCARDIOGRAM COMPLETE: CPT | Performed by: INTERNAL MEDICINE

## 2025-05-06 PROCEDURE — 99207 PR NO CHARGE LOS: CPT

## 2025-05-06 PROCEDURE — 83880 ASSAY OF NATRIURETIC PEPTIDE: CPT | Performed by: PHYSICIAN ASSISTANT

## 2025-05-06 PROCEDURE — 86850 RBC ANTIBODY SCREEN: CPT | Performed by: PHYSICIAN ASSISTANT

## 2025-05-06 PROCEDURE — 86901 BLOOD TYPING SEROLOGIC RH(D): CPT | Performed by: PHYSICIAN ASSISTANT

## 2025-05-06 PROCEDURE — 3078F DIAST BP <80 MM HG: CPT | Performed by: PHYSICIAN ASSISTANT

## 2025-05-06 PROCEDURE — 85025 COMPLETE CBC W/AUTO DIFF WBC: CPT | Performed by: PHYSICIAN ASSISTANT

## 2025-05-06 PROCEDURE — 86900 BLOOD TYPING SEROLOGIC ABO: CPT | Performed by: PHYSICIAN ASSISTANT

## 2025-05-06 PROCEDURE — G2211 COMPLEX E/M VISIT ADD ON: HCPCS | Performed by: PHYSICIAN ASSISTANT

## 2025-05-06 RX ORDER — LIDOCAINE 40 MG/G
CREAM TOPICAL
Status: CANCELLED | OUTPATIENT
Start: 2025-05-06

## 2025-05-06 RX ORDER — SODIUM CHLORIDE 9 MG/ML
INJECTION, SOLUTION INTRAVENOUS CONTINUOUS
Status: CANCELLED | OUTPATIENT
Start: 2025-05-06

## 2025-05-06 RX ORDER — ASPIRIN 325 MG
325 TABLET ORAL ONCE
Status: CANCELLED | OUTPATIENT
Start: 2025-05-06 | End: 2025-05-06

## 2025-05-06 RX ORDER — AMOXICILLIN 500 MG/1
TABLET, FILM COATED ORAL
Status: ON HOLD | COMMUNITY
Start: 2025-05-01 | End: 2025-05-07

## 2025-05-06 RX ORDER — CEFAZOLIN SODIUM 2 G/50ML
2 SOLUTION INTRAVENOUS
Status: CANCELLED | OUTPATIENT
Start: 2025-05-06

## 2025-05-06 NOTE — LETTER
5/6/2025    Lizzy Leiva MD  [address]  [facility] [address]    RE: Josemanuel Edmond       Dear Colleague,     I had the pleasure of seeing Josemanuel Edmond in the [facility].  HEART CARE ENCOUNTER NOTE       [facility]  [phone]    Assessment/Recommendations   Problem List Items Addressed This Visit       Benign essential hypertension    Chronic diastolic heart failure (H)    Longstanding persistent atrial fibrillation (H)     Other Visit Diagnoses       Mitral valve insufficiency, unspecified etiology    -  Primary    Tricuspid valve insufficiency, unspecified etiology                1.  Mitral Regurgitation: this has become severe and is now causing some dyspnea and fatigue.  He has been evaluated by a multidisciplinary team and it has been determined that he is not a good surgical candidate.  He is therefore a better candidate for transcatheter [id number] mitral valve repair .       Details of the procedure were discussed with the patient and there is understanding of the risks and benefits.   All questions were answered   Consents to be signed the day of the procedure.  He has no prior history of trouble with anesthesia.  Labs today reveal Hgb 10.5, WBC 3.8, electrolytes and creat WNL     Josemanuel Bailonmaniveronica will report tomorrow morning for the procedure and all instructions regarding times and medications were given by Valve coordinator RN.     2.  Severe tricuspid regurgitation - potential consideration for TriClip vs Evoque in future pending re-imaging CHANTAL for his mitral valve     3. Chronic heart failure with preserved ejection fraction,NYHA II - compensated.  NT proBNP 1642.  Continue Lasix 20 mg daily, losartan 50 mg daily    4.  Persistent atrial fibrillation - rate controlled.  Continue metoprolol succinate 200 mg daily.  His Xarelto has been on hold since Sunday in anticipation for the procedure    5.  Hypertension -controlled.  Continue  losartan 50 mg daily      The longitudinal plan of care for the diagnosis(es)/condition(s) as documented were addressed during this visit. Due to the added complexity in care, our team will continue to support Gavin in the subsequent management and with ongoing continuity of care.        History of Present Illness/Subjective    Josemanuel Edmond is a 80 year old male who comes in today for history and physical prior to transcatheter [id number] mitral valve repair .  He is accompanied by his wife for today's visit.    Josemanuel has a past medical history significant for valvular disease including severe mitral regurgitation and severe tricuspid regurgitation and moderate AI, chronic diastolic heart failure, persistent atrial fibrillation, hypertension, RBBB, GERD, hyperlipidemia, history of EtOH abuse in remission, benzodiazepine dependence, cerebral infarction due to occlusion/stenosis of carotid artery    He has been followed by Dr. Chapin's team in [place] and was recently found to have progression of his valvular disease including severe tricuspid regurgitation, severe mitral regurgitation, and moderate AI.  He was referred to structural heart for further evaluation of his valves.  He underwent further workup and evaluation by multidisciplinary team and was deemed a good candidate for CHANTAL for his mitral valve which is scheduled for tomorrow.  Today overall he reports feeling great, however other days he feels more fatigued.  He occasionally feels winded.  He does report feeling a little winded after climbing a flight of stairs.  He notices a little bit of swelling to his right leg.  He denies having any chest discomfort, orthopnea, PND, dizziness, lightheadedness, presyncope, syncope, palpitations, weight changes, appetite changes, abdominal fullness, bloating, nausea, vomiting.  He denies having issues with sedation in the past.  He is currently holding Xarelto in anticipation for the procedure.  He reports his  last dose was on .  He denies having current bleeding issues while on Xarelto.  He monitors his blood pressure at home and reports it is typically 100-120s/70s    Medical, surgical, family, social history, and medications were reviewed and updated as necessary.    EC2025   Atrial fibrillation, ventricular rate 73    ECHOCARDIOGRAM done on 3/4/2025:  Interpretation Summary     1. Left ventricular function is normal.The ejection fraction is 55-60%.  2. Normal right ventricle size and systolic function.  3. The left atrium is severely dilated.  4. There is bileaflet prolapse of the mitral valve leaflets with malcoaptation  of leaflet tips. Severe (4+) regurgitation is identified with a regurgitant  volume by PISA of 65 ml.  5. There is likely moderate (2+) aortic regurgitation present (incompletely  evaluated).  6. Severe (4+) to massive (5+) Tricuspid valve regurgitation is present,  incompletely evaluated.     The study needed to be terminated prematurely due to persistent hypoxia.  Suspect AD probe was interfering with respiration. Gastric views were not  obtained. Consider repeating AD with general anesthesia.     Note: the mitral valve was well-visualized and likely amenable to CHANTAL. The  tricuspid valve was not visualized clearly enough to be amenable to CHANTAL.  Perhaps, visualization would be improved under general anesthesia.    CATH done on 2025:  ONCLUSIONS:   No angiographic evidence of obstructive coronary disease.  Normal right and left-sided filling pressures.   No pulmonary hypertension.  Normal cardiac output and index.  Mitral regurgitation with V waves up to 24 mmHg.     Physical Examination Review of Systems   Vitals: /64 (BP Location: Right arm, Patient Position: Sitting, Cuff Size: Adult Regular)   Pulse 73   Resp 16   Wt 71.7 kg (158 lb)   BMI 24.02 kg/m    BMI= Body mass index is 24.02 kg/m .  Wt Readings from Last 3 Encounters:   25 71.7 kg (158 lb)    04/30/25 72.6 kg (160 lb)   04/15/25 72.6 kg (160 lb)       General Appearance:   Alert, cooperative and in no acute distress   ENT/Mouth: membranes moist, no oral lesions or bleeding gums.      EYES:  no scleral icterus, normal conjunctivae   Neck: thyroid not visualized   Chest/Lungs:   lungs are clear to auscultation, no rales or wheezing   Cardiovascular:   Irregular. Normal first and second heart sounds with 2/6 systolic murmur.  No rubs or gallops; the carotid, radial and posterior tibial pulses are intact, trace RLE, no LLE   Abdomen:  Soft and nontender.    Extremities: no cyanosis or clubbing   Skin: no xanthelasma, warm.    Neurologic: normal gait, normal  bilateral   Psychiatric: Normal mood and affect       Please refer above for cardiac ROS details.      Medical History  Surgical History Family History Social History   Past Medical History:   Diagnosis Date     Acute on chronic diastolic (congestive) heart failure (H) 11/29/2023     Arthritis 2005     Benign neoplasm of prostate 2000    Benign Prostate Nodule     Depressive disorder     past condition     Infection due to 2019 novel coronavirus 09/27/2021     S/P knee replacement 11/06/2012     S/P left knee arthroscopy 08/15/2011     Past Surgical History:   Procedure Laterality Date     ABDOMEN SURGERY  2003     ARTHROPLASTY KNEE Right 10/12/2018    Procedure: ARTHROPLASTY KNEE;  Right Total Knee Arthroplasty;  Surgeon: Jeb Peralta MD;  Location: [facility] OR     ARTHROPLASTY REVISION HIP Left 5/25/2023    Procedure: Revision total hip arthroplasty, left;  Surgeon: Chandler Leiva MD;  Location: [facility] OR     BIOPSY  2007     COLONOSCOPY N/A 12/10/2015    Procedure: COMBINED COLONOSCOPY, SINGLE OR MULTIPLE BIOPSY/POLYPECTOMY BY BIOPSY;  Surgeon: Jyoti Figueroa MD;  Location: [facility] GI     CV CORONARY ANGIOGRAM N/A 4/1/2025    Procedure: Coronary Angiogram;  Surgeon: Tae Segovia MD;  Location: [facility] CATH LAB CV     CV  LEFT HEART CATH N/A 4/1/2025    Procedure: Left Heart Catheterization;  Surgeon: Tae Segovia MD;  Location: [facility] CATH LAB CV     CV RIGHT HEART CATH MEASUREMENTS RECORDED N/A 4/1/2025    Procedure: Right Heart Catheterization;  Surgeon: Tae Segovia MD;  Location: [facility] CATH LAB CV     JOINT REPLACEMENT, HIP RT/LT  10/2007    Joint Replacement Hip LT     JOINT REPLACEMTN, KNEE RT/LT  08/2011    Joint Replacement knee /LT, [facility]     LAPAROSCOPIC HERNIORRHAPHY INGUINAL BILATERAL Bilateral 04/24/2018    Procedure: LAPAROSCOPIC HERNIORRHAPHY INGUINAL BILATERAL;  Laparoscopic bilateral inguinal hernia repair;  Surgeon: Josemanuel Resendiz MD;  Location: [facility] OR     PHACOEMULSIFICATION WITH STANDARD INTRAOCULAR LENS IMPLANT Right 01/06/2021    Procedure: Cataract Removal with Implant;  Surgeon: Regan Kaufman MD;  Location: [facility]     PHACOEMULSIFICATION WITH STANDARD INTRAOCULAR LENS IMPLANT Left 02/17/2021    Procedure: Cataract Removal with Implant;  Surgeon: Regan Kaufman MD;  Location: [facility]     SURGICAL HISTORY OF -   12/01/1999    Umbilical Herniorrhaphy with mesh     SURGICAL HISTORY OF -  Left 11/2017    Thoracotomy and drainage of empyema     Family History   Problem Relation Age of Onset     Depression Mother      Cerebrovascular Disease Mother      Breast Cancer Mother      Neurologic Disorder Mother         parkinsons     Parkinsonism Mother      Respiratory Father         emphyzema     Diabetes Brother     Social History     Socioeconomic History     Marital status:      Spouse name: Not on file     Number of children: Not on file     Years of education: Not on file     Highest education level: Not on file   Occupational History     Not on file   Tobacco Use     Smoking status: Former     Current packs/day: 0.00     Average packs/day: 1 pack/day for 17.8 years (17.8 ttl pk-yrs)     Types: Cigarettes     Start date: 1/5/1958     Quit date: 10/13/1975     Years since  quittin.5     Smokeless tobacco: Never   Vaping Use     Vaping status: Never Used   Substance and Sexual Activity     Alcohol use: Not Currently     Drug use: No     Sexual activity: Yes     Partners: Female     Birth control/protection: None     Comment:  51 years   Other Topics Concern     Parent/sibling w/ CABG, MI or angioplasty before 65F 55M? No   Social History Narrative     Not on file     Social Drivers of Health     Financial Resource Strain: Low Risk  (2024)    Financial Resource Strain      Within the past 12 months, have you or your family members you live with been unable to get utilities (heat, electricity) when it was really needed?: No   Food Insecurity: Low Risk  (2024)    Food Insecurity      Within the past 12 months, did you worry that your food would run out before you got money to buy more?: No      Within the past 12 months, did the food you bought just not last and you didn t have money to get more?: No   Transportation Needs: Low Risk  (2024)    Transportation Needs      Within the past 12 months, has lack of transportation kept you from medical appointments, getting your medicines, non-medical meetings or appointments, work, or from getting things that you need?: No   Physical Activity: Unknown (2024)    Exercise Vital Sign      Days of Exercise per Week: 0 days      Minutes of Exercise per Session: Not on file   Stress: No Stress Concern Present (2024)    [employer] [place] [employer] - Occupational Stress Questionnaire      Feeling of Stress : Only a little   Social Connections: Unknown (2024)    Social Connection and Isolation Panel [NHANES]      Frequency of Communication with Friends and Family: Not on file      Frequency of Social Gatherings with Friends and Family: Once a week      Attends [employer] Services: Not on file      Active Member of Clubs or Organizations: Not on file      Attends Club or Organization Meetings: Not  on file      Marital Status: Not on file   Interpersonal Safety: Low Risk  (4/1/2025)    Interpersonal Safety      Do you feel physically and emotionally safe where you currently live?: Yes      Within the past 12 months, have you been hit, slapped, kicked or otherwise physically hurt by someone?: No      Within the past 12 months, have you been humiliated or emotionally abused in other ways by your partner or ex-partner?: No   Housing Stability: Low Risk  (12/19/2024)    Housing Stability      Do you have housing? : Yes      Are you worried about losing your housing?: No          Medications  Allergies   Current Outpatient Medications   Medication Sig Dispense Refill     amoxicillin (AMOXIL) 500 MG tablet        buPROPion (WELLBUTRIN XL) 300 MG 24 hr tablet Take 1 tablet (300 mg) by mouth every morning. 90 tablet 1     busPIRone HCl (BUSPAR) 30 MG tablet TAKE 1 TABLET TWICE A  tablet 1     doxazosin (CARDURA) 4 MG tablet Take 1 tablet (4 mg) by mouth at bedtime. 90 tablet 1     finasteride (PROSCAR) 5 MG tablet TAKE 1 TABLET BY MOUTH EVERY DAY 90 tablet 3     furosemide (LASIX) 20 MG tablet Take 2 tablets (40 mg) by mouth daily. 60 tablet 4     lamoTRIgine (LAMICTAL) 25 MG tablet Take 2 tablets (50 mg) by mouth 2 times daily. 360 tablet 0     losartan (COZAAR) 50 MG tablet Take 1 tablet (50 mg) by mouth daily. 90 tablet 2     metoprolol succinate ER (TOPROL XL) 200 MG 24 hr tablet Take 1 tablet (200 mg) by mouth every evening. 90 tablet 2     multivitamin (ONE-DAILY) tablet Take 1 tablet by mouth daily 30 tablet 0     QUEtiapine (SEROQUEL) 25 MG tablet Take 1 tablet (25 mg) by mouth at bedtime. 30 tablet 1     rivaroxaban ANTICOAGULANT (XARELTO ANTICOAGULANT) 20 MG TABS tablet Take 1 tablet (20 mg) by mouth daily (with dinner). 90 tablet 2     traZODone (DESYREL) 50 MG tablet Take 1 tablet (50 mg) by mouth at bedtime. 14 tablet 0    Allergies   Allergen Reactions     Bactrim [Sulfamethoxazole-Trimethoprim]  "Nausea and Vomiting         Lab Results    Chemistry/lipid CBC Cardiac Enzymes/BNP/TSH/INR   Recent Labs   Lab Test 10/24/24  1033   CHOL 106   HDL 31*   LDL 60   TRIG 76     Recent Labs   Lab Test 10/24/24  1033 11/30/23  1130 11/04/22  1027   LDL 60 64 88     Recent Labs   Lab Test 04/01/25  0751      POTASSIUM 4.1   CHLORIDE 97*   CO2 28   *   BUN 23.3*   CR 0.99   GFRESTIMATED 77   LUIS 9.5     Recent Labs   Lab Test 04/01/25  0751 03/20/25  1324 02/24/25  1442   CR 0.99 0.89 0.89     No results for input(s): \"A1C\" in the last 51991 hours. Recent Labs   Lab Test 04/01/25  0751   WBC 5.6   HGB 12.3*   HCT 37.5*           Recent Labs   Lab Test 04/01/25  0751 03/20/25  1324 02/24/25  1442   HGB 12.3* 11.2* 11.1*    No results for input(s): \"TROPONINI\" in the last 06050 hours.  Recent Labs   Lab Test 11/30/23  1130 11/24/23  1340 04/06/23  0948 12/20/17  1315   NTBNPI  --  4,896* 1,329 957*   NTBNP 1,862*  --   --   --      Recent Labs   Lab Test 07/12/24  1552   TSH 2.23     Recent Labs   Lab Test 11/04/22  1027   INR 1.39*        40 minutes spent on the date of encounter doing education, chart prep/review, review of outside records, review of test results, interpretation with above tests, patient visit, documentation, discussion with other provider, and discussion with family.        This note has been dictated using voice recognition software. Any grammatical or context distortions are unintentional and inherent to the software.      Sierra Lorenzo PA-C  Structural Heart Program  [facility]       Thank you for allowing me to participate in the care of your patient.      Sincerely,     Sierra Lorenzo PA-C     [facility]  cc:   Polo Guerra MD  [address] [address]      "

## 2025-05-06 NOTE — PROGRESS NOTES
CHANTAL Pre-Op RN Visit     Patient in to see RN for Pre-CHANTAL- [place] visit on 5/6/2025    All pre-procedure labs drawn: Yes   EKG obtained: Yes   Labs reviewed: Pending- To be reviewed by Sierra CONRAD PA-C prior to procedure     STS score: Repair- 3.42%, Replace- 8.55%      Patient instructed on the following medications:   Hold Xarelto starting Monday 5/5. You may take your Lamotrigine, metoprolol, losartan, and wellbutrin the day of procedure if you normally take these in the morning.     Loading dose of ASA to be given in pre-procedure/CSC if not already taken at home.     Patient does not have an implanted device--      Patient given instructions on bathing including no use of deodorant/lotions/creams/powders.      Patient has advanced directive: Yes- on file . Patients previous code status listed as Full Code.     Education was given to patient regarding what to expect pre-procedure.     Patient was informed procedure will be done at [facility]: Main Entrance at [address] [place] [address]; [place] [address] and their arrival time is at 5:30AM    Transcatheter procedure, blood and AD consents signed at the time of the appt: No- Econsent to be signed morning of procedure for Mitral repair procedure. Patient and PA did not sign AD consent- will need to be done AM of procedure.     All questions from patient and family were answered by RN.    Patient and wife Yoselyn present at the time of appointment. Yoselyn will be present with patient for procedure day.     Orders placed.     Maggi Bond RN BSN  Valve Clinic Coordinator  [facility]  [phone]

## 2025-05-07 ENCOUNTER — HOSPITAL ENCOUNTER (OUTPATIENT)
Dept: CARDIOLOGY | Facility: HOSPITAL | Age: 80
Discharge: HOME | End: 2025-05-07
Attending: INTERNAL MEDICINE | Admitting: INTERNAL MEDICINE
Payer: COMMERCIAL

## 2025-05-07 ENCOUNTER — ANESTHESIA (OUTPATIENT)
Dept: CARDIOLOGY | Facility: HOSPITAL | Age: 80
End: 2025-05-07
Payer: COMMERCIAL

## 2025-05-07 ENCOUNTER — HOSPITAL ENCOUNTER (INPATIENT)
Facility: HOSPITAL | Age: 80
End: 2025-05-07
Attending: INTERNAL MEDICINE | Admitting: INTERNAL MEDICINE
Payer: COMMERCIAL

## 2025-05-07 DIAGNOSIS — G25.81 RESTLESS LEGS SYNDROME: ICD-10-CM

## 2025-05-07 DIAGNOSIS — M62.81 GENERALIZED MUSCLE WEAKNESS: ICD-10-CM

## 2025-05-07 DIAGNOSIS — I34.0 MITRAL VALVE INSUFFICIENCY, UNSPECIFIED ETIOLOGY: ICD-10-CM

## 2025-05-07 DIAGNOSIS — I50.33 ACUTE ON CHRONIC HEART FAILURE WITH PRESERVED EJECTION FRACTION (H): ICD-10-CM

## 2025-05-07 DIAGNOSIS — Z98.890 STATUS POST IMPLANTATION OF MITRAL VALVE LEAFLET CLIP: Primary | ICD-10-CM

## 2025-05-07 DIAGNOSIS — Z95.818 STATUS POST IMPLANTATION OF MITRAL VALVE LEAFLET CLIP: Primary | ICD-10-CM

## 2025-05-07 DIAGNOSIS — I42.9 CARDIOMYOPATHY, UNSPECIFIED TYPE (H): ICD-10-CM

## 2025-05-07 LAB
ACT BLD: 145 SECONDS (ref 74–150)
ACT BLD: 182 SECONDS (ref 74–150)
ACT BLD: 190 SECONDS (ref 74–150)
ACT BLD: 194 SECONDS (ref 74–150)
ACT BLD: 211 SECONDS (ref 74–150)
ACT BLD: 231 SECONDS (ref 74–150)
ACT BLD: 263 SECONDS (ref 74–150)
ACT BLD: 276 SECONDS (ref 74–150)
ACT BLD: 300 SECONDS (ref 74–150)
ACT BLD: 312 SECONDS (ref 74–150)
ALBUMIN SERPL BCG-MCNC: 3.9 G/DL (ref 3.5–5.2)
ALP SERPL-CCNC: 82 U/L (ref 40–150)
ALT SERPL W P-5'-P-CCNC: 15 U/L (ref 0–70)
ANION GAP SERPL CALCULATED.3IONS-SCNC: 8 MMOL/L (ref 7–15)
AST SERPL W P-5'-P-CCNC: 22 U/L (ref 0–45)
ATRIAL RATE - MUSE: NORMAL BPM
BILIRUB SERPL-MCNC: 0.8 MG/DL
BLD PROD TYP BPU: NORMAL
BLD PROD TYP BPU: NORMAL
BLOOD COMPONENT TYPE: NORMAL
BLOOD COMPONENT TYPE: NORMAL
BUN SERPL-MCNC: 20 MG/DL (ref 8–23)
CALCIUM SERPL-MCNC: 8 MG/DL (ref 8.8–10.4)
CHLORIDE SERPL-SCNC: 99 MMOL/L (ref 98–107)
CODING SYSTEM: NORMAL
CODING SYSTEM: NORMAL
CREAT SERPL-MCNC: 0.83 MG/DL (ref 0.67–1.17)
CROSSMATCH: NORMAL
CROSSMATCH: NORMAL
DIASTOLIC BLOOD PRESSURE - MUSE: NORMAL MMHG
EGFRCR SERPLBLD CKD-EPI 2021: 87 ML/MIN/1.73M2
ERYTHROCYTE [DISTWIDTH] IN BLOOD BY AUTOMATED COUNT: 15 % (ref 10–15)
GLUCOSE SERPL-MCNC: 134 MG/DL (ref 70–99)
HCO3 SERPL-SCNC: 27 MMOL/L (ref 22–29)
HCT VFR BLD AUTO: 30.3 % (ref 40–53)
HGB BLD-MCNC: 9.9 G/DL (ref 13.3–17.7)
INTERPRETATION ECG - MUSE: NORMAL
IRON BINDING CAPACITY (ROCHE): 298 UG/DL (ref 240–430)
IRON SATN MFR SERPL: 33 % (ref 15–46)
IRON SERPL-MCNC: 97 UG/DL (ref 61–157)
LVEF ECHO: NORMAL
MAGNESIUM SERPL-MCNC: 2.2 MG/DL (ref 1.7–2.3)
MCH RBC QN AUTO: 33 PG (ref 26.5–33)
MCHC RBC AUTO-ENTMCNC: 32.7 G/DL (ref 31.5–36.5)
MCV RBC AUTO: 101 FL (ref 78–100)
P AXIS - MUSE: NORMAL DEGREES
PLATELET # BLD AUTO: 113 10E3/UL (ref 150–450)
POTASSIUM SERPL-SCNC: 4.2 MMOL/L (ref 3.4–5.3)
PR INTERVAL - MUSE: NORMAL MS
PROT SERPL-MCNC: 6.5 G/DL (ref 6.4–8.3)
QRS DURATION - MUSE: 112 MS
QT - MUSE: 460 MS
QTC - MUSE: 492 MS
R AXIS - MUSE: 139 DEGREES
RBC # BLD AUTO: 3 10E6/UL (ref 4.4–5.9)
SODIUM SERPL-SCNC: 134 MMOL/L (ref 135–145)
SYSTOLIC BLOOD PRESSURE - MUSE: NORMAL MMHG
T AXIS - MUSE: -13 DEGREES
UNIT ABO/RH: NORMAL
UNIT ABO/RH: NORMAL
UNIT NUMBER: NORMAL
UNIT NUMBER: NORMAL
UNIT STATUS: NORMAL
UNIT STATUS: NORMAL
UNIT TYPE ISBT: 6200
UNIT TYPE ISBT: 6200
VENTRICULAR RATE- MUSE: 69 BPM
VIT B12 SERPL-MCNC: 1143 PG/ML (ref 232–1245)
WBC # BLD AUTO: 6 10E3/UL (ref 4–11)

## 2025-05-07 PROCEDURE — 93010 ELECTROCARDIOGRAM REPORT: CPT | Performed by: STUDENT IN AN ORGANIZED HEALTH CARE EDUCATION/TRAINING PROGRAM

## 2025-05-07 PROCEDURE — 94660 CPAP INITIATION&MGMT: CPT

## 2025-05-07 PROCEDURE — C1769 GUIDE WIRE: HCPCS | Performed by: INTERNAL MEDICINE

## 2025-05-07 PROCEDURE — 93005 ELECTROCARDIOGRAM TRACING: CPT | Performed by: INTERNAL MEDICINE

## 2025-05-07 PROCEDURE — 86923 COMPATIBILITY TEST ELECTRIC: CPT | Performed by: INTERNAL MEDICINE

## 2025-05-07 PROCEDURE — 02UG3JZ SUPPLEMENT MITRAL VALVE WITH SYNTHETIC SUBSTITUTE, PERCUTANEOUS APPROACH: ICD-10-PCS | Performed by: INTERNAL MEDICINE

## 2025-05-07 PROCEDURE — 85347 COAGULATION TIME ACTIVATED: CPT

## 2025-05-07 PROCEDURE — 258N000003 HC RX IP 258 OP 636: Performed by: INTERNAL MEDICINE

## 2025-05-07 PROCEDURE — 370N000017 HC ANESTHESIA TECHNICAL FEE, PER MIN: Performed by: INTERNAL MEDICINE

## 2025-05-07 PROCEDURE — 99222 1ST HOSP IP/OBS MODERATE 55: CPT | Performed by: STUDENT IN AN ORGANIZED HEALTH CARE EDUCATION/TRAINING PROGRAM

## 2025-05-07 PROCEDURE — 82040 ASSAY OF SERUM ALBUMIN: CPT | Performed by: INTERNAL MEDICINE

## 2025-05-07 PROCEDURE — 999N000157 HC STATISTIC RCP TIME EA 10 MIN

## 2025-05-07 PROCEDURE — 272N000202 HC AEROBIKA WITH MANOMETER

## 2025-05-07 PROCEDURE — 82607 VITAMIN B-12: CPT | Performed by: STUDENT IN AN ORGANIZED HEALTH CARE EDUCATION/TRAINING PROGRAM

## 2025-05-07 PROCEDURE — 83540 ASSAY OF IRON: CPT | Performed by: STUDENT IN AN ORGANIZED HEALTH CARE EDUCATION/TRAINING PROGRAM

## 2025-05-07 PROCEDURE — 999N000156 HC STATISTIC RCP CONSULT EA 30 MIN

## 2025-05-07 PROCEDURE — C1760 CLOSURE DEV, VASC: HCPCS | Performed by: INTERNAL MEDICINE

## 2025-05-07 PROCEDURE — 250N000011 HC RX IP 250 OP 636: Performed by: INTERNAL MEDICINE

## 2025-05-07 PROCEDURE — 33418 REPAIR TCAT MITRAL VALVE: CPT | Performed by: INTERNAL MEDICINE

## 2025-05-07 PROCEDURE — 120N000004 HC R&B MS OVERFLOW

## 2025-05-07 PROCEDURE — 93355 ECHO TRANSESOPHAGEAL (TEE): CPT

## 2025-05-07 PROCEDURE — 250N000011 HC RX IP 250 OP 636: Performed by: NURSE ANESTHETIST, CERTIFIED REGISTERED

## 2025-05-07 PROCEDURE — 250N000009 HC RX 250: Performed by: NURSE ANESTHETIST, CERTIFIED REGISTERED

## 2025-05-07 PROCEDURE — 83735 ASSAY OF MAGNESIUM: CPT | Performed by: INTERNAL MEDICINE

## 2025-05-07 PROCEDURE — 85027 COMPLETE CBC AUTOMATED: CPT | Performed by: INTERNAL MEDICINE

## 2025-05-07 PROCEDURE — 93005 ELECTROCARDIOGRAM TRACING: CPT

## 2025-05-07 PROCEDURE — 36415 COLL VENOUS BLD VENIPUNCTURE: CPT | Performed by: INTERNAL MEDICINE

## 2025-05-07 PROCEDURE — 250N000013 HC RX MED GY IP 250 OP 250 PS 637: Performed by: INTERNAL MEDICINE

## 2025-05-07 PROCEDURE — 94799 UNLISTED PULMONARY SVC/PX: CPT

## 2025-05-07 PROCEDURE — 250N000013 HC RX MED GY IP 250 OP 250 PS 637: Performed by: STUDENT IN AN ORGANIZED HEALTH CARE EDUCATION/TRAINING PROGRAM

## 2025-05-07 PROCEDURE — 710N000010 HC RECOVERY PHASE 1, LEVEL 2, PER MIN

## 2025-05-07 PROCEDURE — 258N000003 HC RX IP 258 OP 636: Performed by: NURSE ANESTHETIST, CERTIFIED REGISTERED

## 2025-05-07 PROCEDURE — C1894 INTRO/SHEATH, NON-LASER: HCPCS | Performed by: INTERNAL MEDICINE

## 2025-05-07 PROCEDURE — 272N000001 HC OR GENERAL SUPPLY STERILE: Performed by: INTERNAL MEDICINE

## 2025-05-07 DEVICE — IMPLANTABLE DEVICE: Type: IMPLANTABLE DEVICE | Status: FUNCTIONAL

## 2025-05-07 RX ORDER — ONDANSETRON 2 MG/ML
4 INJECTION INTRAMUSCULAR; INTRAVENOUS EVERY 30 MIN PRN
Status: DISCONTINUED | OUTPATIENT
Start: 2025-05-07 | End: 2025-05-07 | Stop reason: HOSPADM

## 2025-05-07 RX ORDER — SODIUM CHLORIDE 9 MG/ML
INJECTION, SOLUTION INTRAVENOUS CONTINUOUS
Status: ACTIVE | OUTPATIENT
Start: 2025-05-07 | End: 2025-05-07

## 2025-05-07 RX ORDER — NALOXONE HYDROCHLORIDE 0.4 MG/ML
0.4 INJECTION, SOLUTION INTRAMUSCULAR; INTRAVENOUS; SUBCUTANEOUS
Status: ACTIVE | OUTPATIENT
Start: 2025-05-07

## 2025-05-07 RX ORDER — CEFAZOLIN SODIUM 1 G/3ML
INJECTION, POWDER, FOR SOLUTION INTRAMUSCULAR; INTRAVENOUS PRN
Status: DISCONTINUED | OUTPATIENT
Start: 2025-05-07 | End: 2025-05-07

## 2025-05-07 RX ORDER — QUETIAPINE FUMARATE 25 MG/1
25 TABLET, FILM COATED ORAL AT BEDTIME
Status: DISCONTINUED | OUTPATIENT
Start: 2025-05-07 | End: 2025-05-07

## 2025-05-07 RX ORDER — SODIUM CHLORIDE, SODIUM LACTATE, POTASSIUM CHLORIDE, CALCIUM CHLORIDE 600; 310; 30; 20 MG/100ML; MG/100ML; MG/100ML; MG/100ML
INJECTION, SOLUTION INTRAVENOUS CONTINUOUS
Status: DISCONTINUED | OUTPATIENT
Start: 2025-05-07 | End: 2025-05-07 | Stop reason: HOSPADM

## 2025-05-07 RX ORDER — OXYCODONE HYDROCHLORIDE 5 MG/1
10 TABLET ORAL
Status: DISCONTINUED | OUTPATIENT
Start: 2025-05-07 | End: 2025-05-07 | Stop reason: HOSPADM

## 2025-05-07 RX ORDER — NALOXONE HYDROCHLORIDE 0.4 MG/ML
0.1 INJECTION, SOLUTION INTRAMUSCULAR; INTRAVENOUS; SUBCUTANEOUS
Status: DISCONTINUED | OUTPATIENT
Start: 2025-05-07 | End: 2025-05-07 | Stop reason: HOSPADM

## 2025-05-07 RX ORDER — PAROXETINE 30 MG/1
30 TABLET, FILM COATED ORAL AT BEDTIME
Status: ON HOLD | COMMUNITY

## 2025-05-07 RX ORDER — ONDANSETRON 4 MG/1
4 TABLET, ORALLY DISINTEGRATING ORAL EVERY 30 MIN PRN
Status: DISCONTINUED | OUTPATIENT
Start: 2025-05-07 | End: 2025-05-07 | Stop reason: HOSPADM

## 2025-05-07 RX ORDER — OXYCODONE HYDROCHLORIDE 5 MG/1
10 TABLET ORAL EVERY 4 HOURS PRN
Status: ACTIVE | OUTPATIENT
Start: 2025-05-07

## 2025-05-07 RX ORDER — ACETAMINOPHEN 325 MG/1
650 TABLET ORAL EVERY 4 HOURS PRN
Status: DISPENSED | OUTPATIENT
Start: 2025-05-07

## 2025-05-07 RX ORDER — ASPIRIN 81 MG/1
81 TABLET ORAL DAILY
Status: DISPENSED | OUTPATIENT
Start: 2025-05-08

## 2025-05-07 RX ORDER — NITROGLYCERIN 0.4 MG/1
0.4 TABLET SUBLINGUAL EVERY 5 MIN PRN
Status: ACTIVE | OUTPATIENT
Start: 2025-05-07

## 2025-05-07 RX ORDER — TRAZODONE HYDROCHLORIDE 50 MG/1
100 TABLET ORAL
Status: DISPENSED | OUTPATIENT
Start: 2025-05-07

## 2025-05-07 RX ORDER — BUSPIRONE HYDROCHLORIDE 15 MG/1
30 TABLET ORAL 2 TIMES DAILY
Status: DISPENSED | OUTPATIENT
Start: 2025-05-07

## 2025-05-07 RX ORDER — DOXAZOSIN 4 MG/1
4 TABLET ORAL AT BEDTIME
Status: DISPENSED | OUTPATIENT
Start: 2025-05-07

## 2025-05-07 RX ORDER — SODIUM CHLORIDE 9 MG/ML
INJECTION, SOLUTION INTRAVENOUS CONTINUOUS
Status: DISCONTINUED | OUTPATIENT
Start: 2025-05-07 | End: 2025-05-07 | Stop reason: HOSPADM

## 2025-05-07 RX ORDER — LIDOCAINE HYDROCHLORIDE AND EPINEPHRINE 10; 10 MG/ML; UG/ML
INJECTION, SOLUTION INFILTRATION; PERINEURAL
Status: DISCONTINUED | OUTPATIENT
Start: 2025-05-07 | End: 2025-05-07 | Stop reason: HOSPADM

## 2025-05-07 RX ORDER — HYDROMORPHONE HCL IN WATER/PF 6 MG/30 ML
0.4 PATIENT CONTROLLED ANALGESIA SYRINGE INTRAVENOUS EVERY 5 MIN PRN
Status: DISCONTINUED | OUTPATIENT
Start: 2025-05-07 | End: 2025-05-07 | Stop reason: HOSPADM

## 2025-05-07 RX ORDER — SODIUM CHLORIDE, SODIUM LACTATE, POTASSIUM CHLORIDE, CALCIUM CHLORIDE 600; 310; 30; 20 MG/100ML; MG/100ML; MG/100ML; MG/100ML
INJECTION, SOLUTION INTRAVENOUS CONTINUOUS
Status: CANCELLED | OUTPATIENT
Start: 2025-05-07

## 2025-05-07 RX ORDER — LIDOCAINE 40 MG/G
CREAM TOPICAL
Status: ACTIVE | OUTPATIENT
Start: 2025-05-07

## 2025-05-07 RX ORDER — FINASTERIDE 5 MG/1
5 TABLET, FILM COATED ORAL DAILY
Status: DISPENSED | OUTPATIENT
Start: 2025-05-08

## 2025-05-07 RX ORDER — SODIUM CHLORIDE 9 MG/ML
INJECTION, SOLUTION INTRAVENOUS CONTINUOUS PRN
Status: DISCONTINUED | OUTPATIENT
Start: 2025-05-07 | End: 2025-05-07

## 2025-05-07 RX ORDER — PROPOFOL 10 MG/ML
INJECTION, EMULSION INTRAVENOUS PRN
Status: DISCONTINUED | OUTPATIENT
Start: 2025-05-07 | End: 2025-05-07

## 2025-05-07 RX ORDER — CEFAZOLIN SODIUM/WATER 2 G/20 ML
2 SYRINGE (ML) INTRAVENOUS
Status: DISCONTINUED | OUTPATIENT
Start: 2025-05-07 | End: 2025-05-07 | Stop reason: HOSPADM

## 2025-05-07 RX ORDER — LIDOCAINE 40 MG/G
CREAM TOPICAL
Status: DISCONTINUED | OUTPATIENT
Start: 2025-05-07 | End: 2025-05-07 | Stop reason: HOSPADM

## 2025-05-07 RX ORDER — FENTANYL CITRATE 50 UG/ML
25 INJECTION, SOLUTION INTRAMUSCULAR; INTRAVENOUS EVERY 5 MIN PRN
Status: DISCONTINUED | OUTPATIENT
Start: 2025-05-07 | End: 2025-05-07 | Stop reason: HOSPADM

## 2025-05-07 RX ORDER — FENTANYL CITRATE 50 UG/ML
50 INJECTION, SOLUTION INTRAMUSCULAR; INTRAVENOUS EVERY 5 MIN PRN
Status: DISCONTINUED | OUTPATIENT
Start: 2025-05-07 | End: 2025-05-07 | Stop reason: HOSPADM

## 2025-05-07 RX ORDER — NALOXONE HYDROCHLORIDE 0.4 MG/ML
0.2 INJECTION, SOLUTION INTRAMUSCULAR; INTRAVENOUS; SUBCUTANEOUS
Status: ACTIVE | OUTPATIENT
Start: 2025-05-07

## 2025-05-07 RX ORDER — PROTAMINE SULFATE 10 MG/ML
INJECTION, SOLUTION INTRAVENOUS PRN
Status: DISCONTINUED | OUTPATIENT
Start: 2025-05-07 | End: 2025-05-07

## 2025-05-07 RX ORDER — LOSARTAN POTASSIUM 50 MG/1
100 TABLET ORAL DAILY
Status: DISPENSED | OUTPATIENT
Start: 2025-05-08

## 2025-05-07 RX ORDER — FENTANYL CITRATE 50 UG/ML
INJECTION, SOLUTION INTRAMUSCULAR; INTRAVENOUS PRN
Status: DISCONTINUED | OUTPATIENT
Start: 2025-05-07 | End: 2025-05-07

## 2025-05-07 RX ORDER — ASPIRIN 325 MG
325 TABLET ORAL ONCE
Status: COMPLETED | OUTPATIENT
Start: 2025-05-07 | End: 2025-05-07

## 2025-05-07 RX ORDER — OXYCODONE HYDROCHLORIDE 5 MG/1
5 TABLET ORAL EVERY 4 HOURS PRN
Status: DISPENSED | OUTPATIENT
Start: 2025-05-07

## 2025-05-07 RX ORDER — LIDOCAINE HYDROCHLORIDE 10 MG/ML
INJECTION, SOLUTION INFILTRATION; PERINEURAL PRN
Status: DISCONTINUED | OUTPATIENT
Start: 2025-05-07 | End: 2025-05-07

## 2025-05-07 RX ORDER — DEXAMETHASONE SODIUM PHOSPHATE 4 MG/ML
4 INJECTION, SOLUTION INTRA-ARTICULAR; INTRALESIONAL; INTRAMUSCULAR; INTRAVENOUS; SOFT TISSUE
Status: DISCONTINUED | OUTPATIENT
Start: 2025-05-07 | End: 2025-05-07 | Stop reason: HOSPADM

## 2025-05-07 RX ORDER — OXYCODONE HYDROCHLORIDE 5 MG/1
5 TABLET ORAL
Status: DISCONTINUED | OUTPATIENT
Start: 2025-05-07 | End: 2025-05-07 | Stop reason: HOSPADM

## 2025-05-07 RX ORDER — HYDRALAZINE HYDROCHLORIDE 20 MG/ML
10 INJECTION INTRAMUSCULAR; INTRAVENOUS
Status: ACTIVE | OUTPATIENT
Start: 2025-05-07

## 2025-05-07 RX ORDER — PAROXETINE 30 MG/1
30 TABLET, FILM COATED ORAL AT BEDTIME
Status: DISPENSED | OUTPATIENT
Start: 2025-05-07

## 2025-05-07 RX ORDER — HEPARIN SODIUM 1000 [USP'U]/ML
INJECTION, SOLUTION INTRAVENOUS; SUBCUTANEOUS PRN
Status: DISCONTINUED | OUTPATIENT
Start: 2025-05-07 | End: 2025-05-07

## 2025-05-07 RX ORDER — HYDROMORPHONE HCL IN WATER/PF 6 MG/30 ML
0.2 PATIENT CONTROLLED ANALGESIA SYRINGE INTRAVENOUS EVERY 5 MIN PRN
Status: DISCONTINUED | OUTPATIENT
Start: 2025-05-07 | End: 2025-05-07 | Stop reason: HOSPADM

## 2025-05-07 RX ORDER — BUPROPION HYDROCHLORIDE 150 MG/1
300 TABLET ORAL EVERY MORNING
Status: DISPENSED | OUTPATIENT
Start: 2025-05-08

## 2025-05-07 RX ORDER — LIDOCAINE 40 MG/G
CREAM TOPICAL
Status: CANCELLED | OUTPATIENT
Start: 2025-05-07

## 2025-05-07 RX ORDER — ONDANSETRON 2 MG/ML
INJECTION INTRAMUSCULAR; INTRAVENOUS PRN
Status: DISCONTINUED | OUTPATIENT
Start: 2025-05-07 | End: 2025-05-07

## 2025-05-07 RX ORDER — LAMOTRIGINE 25 MG/1
50 TABLET ORAL 2 TIMES DAILY
Status: DISPENSED | OUTPATIENT
Start: 2025-05-07

## 2025-05-07 RX ORDER — METOPROLOL SUCCINATE 100 MG/1
200 TABLET, EXTENDED RELEASE ORAL EVERY EVENING
Status: DISPENSED | OUTPATIENT
Start: 2025-05-08

## 2025-05-07 RX ORDER — QUETIAPINE FUMARATE 25 MG/1
50 TABLET, FILM COATED ORAL AT BEDTIME
Status: DISCONTINUED | OUTPATIENT
Start: 2025-05-07 | End: 2025-05-08

## 2025-05-07 RX ORDER — DEXAMETHASONE SODIUM PHOSPHATE 10 MG/ML
INJECTION, SOLUTION INTRAMUSCULAR; INTRAVENOUS PRN
Status: DISCONTINUED | OUTPATIENT
Start: 2025-05-07 | End: 2025-05-07

## 2025-05-07 RX ADMIN — ROCURONIUM 50 MG: 50 INJECTION, SOLUTION INTRAVENOUS at 07:15

## 2025-05-07 RX ADMIN — SODIUM CHLORIDE: 0.9 INJECTION, SOLUTION INTRAVENOUS at 06:13

## 2025-05-07 RX ADMIN — PHENYLEPHRINE HYDROCHLORIDE 100 MCG: 10 INJECTION INTRAVENOUS at 07:32

## 2025-05-07 RX ADMIN — HEPARIN SODIUM 4000 UNITS: 1000 INJECTION, SOLUTION INTRAVENOUS; SUBCUTANEOUS at 08:15

## 2025-05-07 RX ADMIN — ACETAMINOPHEN 650 MG: 325 TABLET ORAL at 17:42

## 2025-05-07 RX ADMIN — PHENYLEPHRINE HYDROCHLORIDE 100 MCG: 10 INJECTION INTRAVENOUS at 08:47

## 2025-05-07 RX ADMIN — HEPARIN SODIUM 1000 UNITS: 1000 INJECTION, SOLUTION INTRAVENOUS; SUBCUTANEOUS at 08:52

## 2025-05-07 RX ADMIN — HEPARIN SODIUM 2000 UNITS: 1000 INJECTION, SOLUTION INTRAVENOUS; SUBCUTANEOUS at 08:27

## 2025-05-07 RX ADMIN — SODIUM CHLORIDE 75 ML/HR: 0.9 INJECTION, SOLUTION INTRAVENOUS at 11:49

## 2025-05-07 RX ADMIN — PHENYLEPHRINE HYDROCHLORIDE 200 MCG: 10 INJECTION INTRAVENOUS at 09:09

## 2025-05-07 RX ADMIN — HEPARIN SODIUM 2000 UNITS: 1000 INJECTION, SOLUTION INTRAVENOUS; SUBCUTANEOUS at 08:39

## 2025-05-07 RX ADMIN — ONDANSETRON 4 MG: 2 INJECTION INTRAMUSCULAR; INTRAVENOUS at 09:30

## 2025-05-07 RX ADMIN — FENTANYL CITRATE 50 MCG: 50 INJECTION, SOLUTION INTRAMUSCULAR; INTRAVENOUS at 07:15

## 2025-05-07 RX ADMIN — PHENYLEPHRINE HYDROCHLORIDE 0.6 MCG/KG/MIN: 10 INJECTION INTRAVENOUS at 07:31

## 2025-05-07 RX ADMIN — LAMOTRIGINE 50 MG: 25 TABLET ORAL at 21:22

## 2025-05-07 RX ADMIN — TRAZODONE HYDROCHLORIDE 100 MG: 50 TABLET ORAL at 20:20

## 2025-05-07 RX ADMIN — QUETIAPINE FUMARATE 50 MG: 25 TABLET ORAL at 20:20

## 2025-05-07 RX ADMIN — HEPARIN SODIUM 3000 UNITS: 1000 INJECTION, SOLUTION INTRAVENOUS; SUBCUTANEOUS at 08:06

## 2025-05-07 RX ADMIN — PAROXETINE 30 MG: 30 TABLET, FILM COATED ORAL at 20:20

## 2025-05-07 RX ADMIN — DEXAMETHASONE SODIUM PHOSPHATE 10 MG: 10 INJECTION, SOLUTION INTRAMUSCULAR; INTRAVENOUS at 07:27

## 2025-05-07 RX ADMIN — ASPIRIN 325 MG ORAL TABLET 325 MG: 325 PILL ORAL at 06:12

## 2025-05-07 RX ADMIN — BUSPIRONE HYDROCHLORIDE 30 MG: 15 TABLET ORAL at 20:20

## 2025-05-07 RX ADMIN — ROCURONIUM 20 MG: 50 INJECTION, SOLUTION INTRAVENOUS at 07:49

## 2025-05-07 RX ADMIN — DOXAZOSIN 4 MG: 4 TABLET ORAL at 20:20

## 2025-05-07 RX ADMIN — PROPOFOL 80 MG: 10 INJECTION, EMULSION INTRAVENOUS at 07:15

## 2025-05-07 RX ADMIN — HEPARIN SODIUM 7000 UNITS: 1000 INJECTION, SOLUTION INTRAVENOUS; SUBCUTANEOUS at 07:55

## 2025-05-07 RX ADMIN — ROCURONIUM 20 MG: 50 INJECTION, SOLUTION INTRAVENOUS at 08:31

## 2025-05-07 RX ADMIN — OXYCODONE HYDROCHLORIDE 5 MG: 5 TABLET ORAL at 23:18

## 2025-05-07 RX ADMIN — PHENYLEPHRINE HYDROCHLORIDE 200 MCG: 10 INJECTION INTRAVENOUS at 08:19

## 2025-05-07 RX ADMIN — LIDOCAINE HYDROCHLORIDE 20 MG: 10 INJECTION, SOLUTION INFILTRATION; PERINEURAL at 07:15

## 2025-05-07 RX ADMIN — SODIUM CHLORIDE: 9 INJECTION, SOLUTION INTRAVENOUS at 07:00

## 2025-05-07 RX ADMIN — CEFAZOLIN 2 G: 1 INJECTION, POWDER, FOR SOLUTION INTRAMUSCULAR; INTRAVENOUS at 07:26

## 2025-05-07 RX ADMIN — PROTAMINE SULFATE 25 MG: 10 INJECTION, SOLUTION INTRAVENOUS at 09:30

## 2025-05-07 RX ADMIN — FENTANYL CITRATE 50 MCG: 50 INJECTION, SOLUTION INTRAMUSCULAR; INTRAVENOUS at 07:46

## 2025-05-07 RX ADMIN — SUGAMMADEX 200 MG: 100 INJECTION, SOLUTION INTRAVENOUS at 09:28

## 2025-05-07 ASSESSMENT — ACTIVITIES OF DAILY LIVING (ADL)
ADLS_ACUITY_SCORE: 55
ADLS_ACUITY_SCORE: 38
ADLS_ACUITY_SCORE: 55
ADLS_ACUITY_SCORE: 38
ADLS_ACUITY_SCORE: 55
ADLS_ACUITY_SCORE: 55
ADLS_ACUITY_SCORE: 40
ADLS_ACUITY_SCORE: 55

## 2025-05-07 NOTE — PHARMACY-ADMISSION MEDICATION HISTORY
Pharmacist Admission Medication History    Admission medication history is complete. The information provided in this note is only as accurate as the sources available at the time of the update.    Information Source(s): Patient via in-person    Pertinent Information: Patient took 4 medications today PTA 5/7/25: Bupropion, Losartan, Metoprolol ER and Lamotrigine.     Metoprolol ER : patient usually takes in the evening, but today on 5/7/25 he took it this morning in preparation for the procedure.     Changes made to PTA medication list:  Added: Paroxetine  Deleted: Trazodone   Changed: None    Allergies reviewed with patient and updates made in EHR: yes    Medication History Completed By: RENETTA ORTEGA RPH 5/7/2025 7:11 AM    PTA Med List   Medication Sig Last Dose/Taking    buPROPion (WELLBUTRIN XL) 300 MG 24 hr tablet Take 1 tablet (300 mg) by mouth every morning. 5/7/2025 Morning    busPIRone HCl (BUSPAR) 30 MG tablet TAKE 1 TABLET TWICE A DAY 5/6/2025 Evening    doxazosin (CARDURA) 4 MG tablet Take 1 tablet (4 mg) by mouth at bedtime. 5/6/2025 Bedtime    finasteride (PROSCAR) 5 MG tablet TAKE 1 TABLET BY MOUTH EVERY DAY 5/6/2025 Morning    furosemide (LASIX) 20 MG tablet Take 2 tablets (40 mg) by mouth daily. 5/6/2025 Morning    lamoTRIgine (LAMICTAL) 25 MG tablet Take 2 tablets (50 mg) by mouth 2 times daily. 5/7/2025 Morning    losartan (COZAAR) 50 MG tablet Take 1 tablet (50 mg) by mouth daily. 5/7/2025 Morning    metoprolol succinate ER (TOPROL XL) 200 MG 24 hr tablet Take 1 tablet (200 mg) by mouth every evening. 5/7/2025 Morning    multivitamin (ONE-DAILY) tablet Take 1 tablet by mouth daily 5/6/2025 Morning    PARoxetine (PAXIL) 30 MG tablet Take 30 mg by mouth at bedtime. 5/6/2025 Bedtime    QUEtiapine (SEROQUEL) 25 MG tablet Take 1 tablet (25 mg) by mouth at bedtime. 5/6/2025 Bedtime    rivaroxaban ANTICOAGULANT (XARELTO ANTICOAGULANT) 20 MG TABS tablet Take 1 tablet (20 mg) by mouth daily (with  dinner). Past Week Evening

## 2025-05-07 NOTE — ANESTHESIA PREPROCEDURE EVALUATION
Anesthesia Pre-Procedure Evaluation    Patient: Josemanuel Edmond   MRN: [id number] : 1945          Procedure : Procedure(s):  Transcatheter Mitral Valve Repair with PHILIP device         Past Medical History:   Diagnosis Date    Acute on chronic diastolic (congestive) heart failure (H) 2023    Arthritis 2005    Benign neoplasm of prostate     Benign Prostate Nodule    Depressive disorder     past condition    Infection due to 2019 novel coronavirus 2021    Mitral regurgitation     S/P knee replacement 2012    S/P left knee arthroscopy 08/15/2011      Past Surgical History:   Procedure Laterality Date    ABDOMEN SURGERY  2003    ARTHROPLASTY KNEE Right 10/12/2018    Procedure: ARTHROPLASTY KNEE;  Right Total Knee Arthroplasty;  Surgeon: Jeb Peralta MD;  Location: [facility]    ARTHROPLASTY REVISION HIP Left 2023    Procedure: Revision total hip arthroplasty, left;  Surgeon: Chandler Leiva MD;  Location: [facility]    BIOPSY      COLONOSCOPY N/A 12/10/2015    Procedure: COMBINED COLONOSCOPY, SINGLE OR MULTIPLE BIOPSY/POLYPECTOMY BY BIOPSY;  Surgeon: Jyoti Figueroa MD;  Location: [facility]    CV CORONARY ANGIOGRAM N/A 2025    Procedure: Coronary Angiogram;  Surgeon: Tae Segovia MD;  Location: [facility] CATH LAB CV    CV LEFT HEART CATH N/A 2025    Procedure: Left Heart Catheterization;  Surgeon: Tae Segovia MD;  Location: [facility] CATH LAB CV    CV RIGHT HEART CATH MEASUREMENTS RECORDED N/A 2025    Procedure: Right Heart Catheterization;  Surgeon: Tae Segovia MD;  Location: [facility] CATH LAB CV    JOINT REPLACEMENT, HIP RT/LT  10/2007    Joint Replacement Hip LT    JOINT REPLACEMTN, KNEE RT/LT  2011    Joint Replacement knee /LT, [facility]    LAPAROSCOPIC HERNIORRHAPHY INGUINAL BILATERAL Bilateral 2018    Procedure: LAPAROSCOPIC HERNIORRHAPHY INGUINAL BILATERAL;  Laparoscopic bilateral inguinal hernia repair;  Surgeon: Martín  Josemanuel WHITMAN MD;  Location: [facility]    PHACOEMULSIFICATION WITH STANDARD INTRAOCULAR LENS IMPLANT Right 2021    Procedure: Cataract Removal with Implant;  Surgeon: Regan Kaufman MD;  Location: [facility]    PHACOEMULSIFICATION WITH STANDARD INTRAOCULAR LENS IMPLANT Left 2021    Procedure: Cataract Removal with Implant;  Surgeon: Regan Kaufman MD;  Location: [facility]    SURGICAL HISTORY OF -   1999    Umbilical Herniorrhaphy with mesh    SURGICAL HISTORY OF -  Left 2017    Thoracotomy and drainage of empyema      Allergies   Allergen Reactions    Bactrim [Sulfamethoxazole-Trimethoprim] Nausea and Vomiting      Social History     Tobacco Use    Smoking status: Former     Current packs/day: 0.00     Average packs/day: 1 pack/day for 17.8 years (17.8 ttl pk-yrs)     Types: Cigarettes     Start date: 1958     Quit date: 10/13/1975     Years since quittin.6    Smokeless tobacco: Never   Substance Use Topics    Alcohol use: Not Currently      Wt Readings from Last 1 Encounters:   25 71.7 kg (158 lb)        Anesthesia Evaluation            ROS/MED HX  ENT/Pulmonary:       Neurologic:       Cardiovascular:     (+)  hypertension- -   -  - -      CHF                     valvular problems/murmurs type: MR Massive TR.         METS/Exercise Tolerance:     Hematologic:       Musculoskeletal:       GI/Hepatic:     (+) GERD,                   Renal/Genitourinary:       Endo:       Psychiatric/Substance Use:       Infectious Disease:       Malignancy:       Other:              Physical Exam  Airway  Mallampati: II  TM distance: >3 FB  Neck ROM: full  Mouth opening: >= 4 cm    Cardiovascular   Rhythm: regular  Rate: normal rate     Dental     Pulmonary Breath sounds clear to auscultation        Neurological   He appears awake and alert.    Other Findings       OUTSIDE LABS:  CBC:   Lab Results   Component Value Date    WBC 3.8 (L) 2025    WBC 5.6 2025    HGB 10.5 (L) 2025  "   HGB 12.3 (L) 04/01/2025    HCT 32.5 (L) 05/06/2025    HCT 37.5 (L) 04/01/2025     (L) 05/06/2025     04/01/2025     BMP:   Lab Results   Component Value Date     05/06/2025     04/01/2025    POTASSIUM 4.3 05/06/2025    POTASSIUM 4.1 04/01/2025    CHLORIDE 100 05/06/2025    CHLORIDE 97 (L) 04/01/2025    CO2 32 (H) 05/06/2025    CO2 28 04/01/2025    BUN 20.8 05/06/2025    BUN 23.3 (H) 04/01/2025    CR 0.94 05/06/2025    CR 0.99 04/01/2025    GLC 74 05/06/2025     (H) 04/01/2025     COAGS:   Lab Results   Component Value Date    PTT 30 02/09/2005    INR 1.25 (H) 05/06/2025     POC: No results found for: \"BGM\", \"HCG\", \"HCGS\"  HEPATIC:   Lab Results   Component Value Date    ALBUMIN 4.2 05/06/2025    PROTTOTAL 7.1 05/06/2025    ALT 15 05/06/2025    AST 24 05/06/2025    GGT 69 10/09/2017    ALKPHOS 89 05/06/2025    BILITOTAL 0.8 05/06/2025     OTHER:   Lab Results   Component Value Date    PH 7.41 04/01/2025    LACT 1.0 05/25/2023    LUIS 8.8 05/06/2025    PHOS 3.1 02/28/2022    MAG 2.3 05/06/2025    LIPASE 90 07/26/2022    TSH 2.23 07/12/2024    SED 13 09/13/2023       Anesthesia Plan    ASA Status:  4    Anesthesia Type: general.   Induction: intravenous.   Techniques and Equipment:       - Monitoring Plan: standard ASA monitoring, AD, invasive blood pressure.     Consents    Anesthesia Plan(s) and associated risks, benefits, and realistic alternatives discussed. Questions answered and patient/representative(s) expressed understanding.     - Discussed:     - Discussed with:  Patient, family            Postoperative Care            Comments:                 Romero Cheng MD    I have reviewed the pertinent notes and labs in the chart from the past 30 days and (re)examined the patient.  Any updates or changes from those notes are reflected in this note.    Clinically Significant Risk Factors Present on Admission                # Drug Induced Coagulation Defect: home medication list " includes an anticoagulant medication    # Hypertension: Noted on problem list      # Anemia: based on hgb <11           # Financial/Environmental Concerns:

## 2025-05-07 NOTE — ANESTHESIA PROCEDURE NOTES
Airway       Patient location during procedure: OR       Procedure Start/Stop Times: 5/7/2025 7:23 AM  Staff -        CRNA: Femi Ramirez APRN CRNA       Performed By: CRNA  Consent for Airway        Urgency: elective  Indications and Patient Condition       Indications for airway management: alden-procedural       Induction type:intravenous       Mask difficulty assessment: 1 - vent by mask    Final Airway Details       Final airway type: endotracheal airway       Successful airway: ETT - single and Oral  Endotracheal Airway Details        ETT size (mm): 7.5       Cuffed: yes       Successful intubation technique: direct laryngoscopy       DL Blade Type: Rm 2       Grade View of Cords: 1       Adjucts: stylet       Position: Right       Measured from: lips       Secured at (cm): 22       Bite block used: None    Post intubation assessment        Placement verified by: capnometry, equal breath sounds and chest rise        Number of attempts at approach: 1       Number of other approaches attempted: 0       Secured with: tape       Ease of procedure: easy       Dentition: Intact and Unchanged    Medication(s) Administered   Medication Administration Time: 5/7/2025 7:23 AM

## 2025-05-07 NOTE — PROGRESS NOTES
BRIEF STRUCTURAL CARDIOLOGY PROCEDURE NOTE      Procedure: CHANTAL of mitral valve    Anesthesia type: GA    Clip type: Chelsea ACE    Access used:   1) Clip Delivery Site: right femoral vein  2) Pigtail site: left femoral artery  3) Temporary pacing wire: left femoral vein    Implanting physician: Dr. Juliette Cole PAKAYLENE, MPAS  Structural Heart Program  [facility]

## 2025-05-07 NOTE — ANESTHESIA CARE TRANSFER NOTE
Patient: Josemanuel Edmond    Procedure: Procedure(s):  Transcatheter Mitral Valve Repair with PHILIP device       Diagnosis: valvular disease  Diagnosis Additional Information: No value filed.    Anesthesia Type:   No value filed.     Note:    Oropharynx: oropharynx clear of all foreign objects and spontaneously breathing  Level of Consciousness: awake  Oxygen Supplementation: face mask  Level of Supplemental Oxygen (L/min / FiO2): 6  Independent Airway: airway patency satisfactory and stable  Dentition: dentition unchanged  Vital Signs Stable: post-procedure vital signs reviewed and stable  Report to RN Given: handoff report given  Patient transferred to: [facility]          Vitals:  Vitals Value Taken Time   /70 05/07/25 09:45   Temp 98.4 05/07/25   0945   Pulse 73 05/07/25 09:46   Resp 15 05/07/25 09:46   SpO2 100 % 05/07/25 09:46   Vitals shown include unfiled device data.    Electronically Signed By: KAYLA Cornelius CRNA  May 7, 2025  9:48 AM

## 2025-05-07 NOTE — ANESTHESIA PROCEDURE NOTES
Arterial Line Procedure Note    Pre-Procedure   Staff -        Anesthesiologist:  Romero Cheng MD       Performed By: anesthesiologist       Pre-Anesthestic Checklist: patient identified, IV checked, risks and benefits discussed, informed consent, monitors and equipment checked, pre-op evaluation and at physician/surgeon's request  Timeout:       Correct Patient: Yes        Correct Procedure: Yes        Correct Site: Yes        Correct Position: Yes   Line Placement:   This line was placed Post Induction starting at 5/7/2025 7:24 AM and ending at 5/7/2025 7:28 AM  Procedure   Procedure: arterial line       Diagnosis: Severe mitral regurgitation, severe tricuspid regurgitation       Laterality: left       Insertion Site: radial.  Sterile Prep        Standard elements of sterile barrier followed       Skin prep: Chloraprep  Insertion/Injection        Technique: ultrasound guided        Medical Necessity: anticipation of challenging anatomy       1. Ultrasound was used to evaluate the access site.       2. Artery evaluated via ultrasound for patency/adequacy.       3. Using real-time ultrasound the needle/catheter was observed entering the artery/vein.       Catheter Type/Size: 20 G, 1.75 in/4.5 cm quick cath (integral wire)  Narrative         Secured by: anchor securement device       Tegaderm dressing used.       Complications: Hematoma (first catheter passage was accidentally backwalled, removed and pressure held for several minutes followed by a pressure dressing with elastic foam tape. Second attempt without difficulty after first attempt.),

## 2025-05-07 NOTE — INTERVAL H&P NOTE
I have reviewed the surgical (or preoperative) H&P that is linked to this encounter, and examined the patient. There are no significant changes    Clinical Conditions Present on Arrival:  Clinically Significant Risk Factors Present on Admission                 # Drug Induced Coagulation Defect: home medication list includes an anticoagulant medication

## 2025-05-07 NOTE — CONSULTS
[facility]  Consult Note - Hospitalist Service  Date of Admission:  5/7/2025  Consult Requested by: Renuka Cole PA-C   Reason for Consult: Management of medical comorbidities.    Assessment & Plan   Josemanuel Edmond is a 80 year old male admitted on 5/7/2025. He admitted for elective procedure of mitral valve replacement.  Past medical history significant for hypertension, HFpEF, persistent A-fib, mitral valve insufficiency, GERD, anxiety, cerebral infarction    Hypertension  - Blood pressure is at goal  - Monitor blood pressure    HFpEF  - Appears clinically euvolemic  - Continue PTA torsemide, losartan ,  Toprol     Persistent A-fib  - Continue PTA Toprol 200 mg oral daily  - Hold Xarelto for now until cleared by cardiology    Anxiety  Major depressive disorder  - Patient requested that he gets a higher dose of sleep medication  - Plan to increase Seroquel to 50 mg oral at bedtime  - PTA Paxil 30 mg oral daily  - PTA lamotrigine 50 mg oral twice daily         Clinically Significant Risk Factors Present on Admission         # Hyponatremia: Lowest Na = 134 mmol/L in last 2 days, will monitor as appropriate   # Hypocalcemia: Lowest Ca = 8 mg/dL in last 2 days, will monitor and replace as appropriate      # Drug Induced Coagulation Defect: home medication list includes an anticoagulant medication  # Thrombocytopenia: Lowest platelets = 113 in last 2 days, will monitor for bleeding   # Hypertension: Noted on problem list      # Anemia: based on hgb <11           # Financial/Environmental Concerns:           Deepa Leong MD  Hospitalist Service  Securely message with [employer] (more info)  Text page via [facility] Paging/Directory   ______________________________________________________________________    Past Medical History    Past Medical History:   Diagnosis Date    Acute on chronic diastolic (congestive) heart failure (H) 11/29/2023    Arthritis 2005    Benign neoplasm of prostate  2000    Benign Prostate Nodule    Depressive disorder     past condition    Infection due to 2019 novel coronavirus 09/27/2021    Mitral regurgitation     S/P knee replacement 11/06/2012    S/P left knee arthroscopy 08/15/2011       Past Surgical History   Past Surgical History:   Procedure Laterality Date    ABDOMEN SURGERY  2003    ARTHROPLASTY KNEE Right 10/12/2018    Procedure: ARTHROPLASTY KNEE;  Right Total Knee Arthroplasty;  Surgeon: Jeb Peralta MD;  Location: [facility]    ARTHROPLASTY REVISION HIP Left 5/25/2023    Procedure: Revision total hip arthroplasty, left;  Surgeon: Chandler Leiva MD;  Location: [facility]    BIOPSY  2007    COLONOSCOPY N/A 12/10/2015    Procedure: COMBINED COLONOSCOPY, SINGLE OR MULTIPLE BIOPSY/POLYPECTOMY BY BIOPSY;  Surgeon: Jyoti Figueroa MD;  Location: [facility]    CV CORONARY ANGIOGRAM N/A 4/1/2025    Procedure: Coronary Angiogram;  Surgeon: Tae Segovia MD;  Location: [facility] CATH LAB CV    CV LEFT HEART CATH N/A 4/1/2025    Procedure: Left Heart Catheterization;  Surgeon: Tae Segovia MD;  Location: [facility] CATH LAB CV    CV RIGHT HEART CATH MEASUREMENTS RECORDED N/A 4/1/2025    Procedure: Right Heart Catheterization;  Surgeon: Tae Segovia MD;  Location: [facility] CATH LAB CV    JOINT REPLACEMENT, HIP RT/LT  10/2007    Joint Replacement Hip LT    JOINT REPLACEMTN, KNEE RT/LT  08/2011    Joint Replacement knee /LT, [facility]    LAPAROSCOPIC HERNIORRHAPHY INGUINAL BILATERAL Bilateral 04/24/2018    Procedure: LAPAROSCOPIC HERNIORRHAPHY INGUINAL BILATERAL;  Laparoscopic bilateral inguinal hernia repair;  Surgeon: Josemanuel Resendiz MD;  Location: [facility]    PHACOEMULSIFICATION WITH STANDARD INTRAOCULAR LENS IMPLANT Right 01/06/2021    Procedure: Cataract Removal with Implant;  Surgeon: Regan Kaufman MD;  Location: [facility]    PHACOEMULSIFICATION WITH STANDARD INTRAOCULAR LENS IMPLANT Left 02/17/2021    Procedure: Cataract Removal with  Implant;  Surgeon: Regan Kaufman MD;  Location: [facility]    SURGICAL HISTORY OF -   12/01/1999    Umbilical Herniorrhaphy with mesh    SURGICAL HISTORY OF -  Left 11/2017    Thoracotomy and drainage of empyema       Medications   I have reviewed this patient's current medications       Review of Systems    The 10 point Review of Systems is negative other than noted in the HPI or here.      Physical Exam   Vital Signs: Temp: 98  F (36.7  C) Temp src: Oral BP: 137/81 Pulse: 76   Resp: 18 SpO2: 99 % O2 Device: None (Room air) Oxygen Delivery: 1 LPM  Weight: 158 lbs 0 oz    General Appearance: No distress noted  Respiratory: Good air entry bilaterally  Cardiovascular: S1 and S2 well heard  GI: Soft abdomen, no tenderness, normoactive bowel sound  Skin: Intact and warm, left groin with no bleeding or hematoma      Medical Decision Making       50 MINUTES SPENT BY ME on the date of service doing chart review, history, exam, documentation & further activities per the note.      Data

## 2025-05-07 NOTE — PLAN OF CARE
[id number]  Implantation of mitral clip.  R and left femoral sites. No changes since pt came up to floor.  Left forearm bruised and swollen from art line.  Pulses WNL. 2 bandaids in place. No change since pt came to floor. BR until 1800. Pt has eaten and ordered another tray. HOB up.   neuros WNL    Problem: Adult Inpatient Plan of Care  Goal: Plan of Care Review  Description: The Plan of Care Review/Shift note should be completed every shift.  The Outcome Evaluation is a brief statement about your assessment that the patient is improving, declining, or no change.  This information will be displayed automatically on your shiftnote.  Outcome: Progressing

## 2025-05-08 ENCOUNTER — APPOINTMENT (OUTPATIENT)
Dept: CARDIOLOGY | Facility: HOSPITAL | Age: 80
DRG: 266 | End: 2025-05-08
Attending: INTERNAL MEDICINE
Payer: COMMERCIAL

## 2025-05-08 ENCOUNTER — APPOINTMENT (OUTPATIENT)
Dept: CT IMAGING | Facility: HOSPITAL | Age: 80
DRG: 266 | End: 2025-05-08
Attending: NURSE PRACTITIONER
Payer: COMMERCIAL

## 2025-05-08 ENCOUNTER — APPOINTMENT (OUTPATIENT)
Dept: RADIOLOGY | Facility: HOSPITAL | Age: 80
DRG: 266 | End: 2025-05-08
Attending: INTERNAL MEDICINE
Payer: COMMERCIAL

## 2025-05-08 ENCOUNTER — APPOINTMENT (OUTPATIENT)
Dept: OCCUPATIONAL THERAPY | Facility: HOSPITAL | Age: 80
DRG: 266 | End: 2025-05-08
Attending: INTERNAL MEDICINE
Payer: COMMERCIAL

## 2025-05-08 VITALS
OXYGEN SATURATION: 96 % | SYSTOLIC BLOOD PRESSURE: 107 MMHG | DIASTOLIC BLOOD PRESSURE: 66 MMHG | BODY MASS INDEX: 23.55 KG/M2 | TEMPERATURE: 97.6 F | HEART RATE: 89 BPM | HEIGHT: 68 IN | WEIGHT: 155.4 LBS | RESPIRATION RATE: 18 BRPM

## 2025-05-08 DIAGNOSIS — Z98.890 S/P MVR (MITRAL VALVE REPAIR): Primary | ICD-10-CM

## 2025-05-08 PROBLEM — F10.21 ALCOHOLISM IN REMISSION (H): Status: ACTIVE | Noted: 2017-11-28

## 2025-05-08 PROBLEM — I50.33 ACUTE ON CHRONIC DIASTOLIC HEART FAILURE (H): Status: ACTIVE | Noted: 2022-09-22

## 2025-05-08 PROBLEM — Z79.01 CHRONIC ANTICOAGULATION: Status: ACTIVE | Noted: 2023-11-24

## 2025-05-08 PROBLEM — R29.6 FALLS FREQUENTLY: Status: ACTIVE | Noted: 2024-10-28

## 2025-05-08 LAB
ALBUMIN UR-MCNC: NEGATIVE MG/DL
ANION GAP SERPL CALCULATED.3IONS-SCNC: 6 MMOL/L (ref 7–15)
APPEARANCE UR: CLEAR
BILIRUB UR QL STRIP: NEGATIVE
BUN SERPL-MCNC: 15.6 MG/DL (ref 8–23)
CALCIUM SERPL-MCNC: 8.8 MG/DL (ref 8.8–10.4)
CHLORIDE SERPL-SCNC: 101 MMOL/L (ref 98–107)
COLOR UR AUTO: COLORLESS
CREAT SERPL-MCNC: 0.81 MG/DL (ref 0.67–1.17)
EGFRCR SERPLBLD CKD-EPI 2021: 88 ML/MIN/1.73M2
ERYTHROCYTE [DISTWIDTH] IN BLOOD BY AUTOMATED COUNT: 15 % (ref 10–15)
FOLATE SERPL-MCNC: 18.9 NG/ML (ref 4.6–34.8)
GLUCOSE SERPL-MCNC: 99 MG/DL (ref 70–99)
GLUCOSE UR STRIP-MCNC: NEGATIVE MG/DL
HCO3 SERPL-SCNC: 28 MMOL/L (ref 22–29)
HCT VFR BLD AUTO: 29.1 % (ref 40–53)
HGB BLD-MCNC: 9.3 G/DL (ref 13.3–17.7)
HGB UR QL STRIP: ABNORMAL
HYALINE CASTS: 1 /LPF
KETONES UR STRIP-MCNC: NEGATIVE MG/DL
LEUKOCYTE ESTERASE UR QL STRIP: NEGATIVE
LVEF ECHO: NORMAL
MAGNESIUM SERPL-MCNC: 2.1 MG/DL (ref 1.7–2.3)
MCH RBC QN AUTO: 32.5 PG (ref 26.5–33)
MCHC RBC AUTO-ENTMCNC: 32 G/DL (ref 31.5–36.5)
MCV RBC AUTO: 102 FL (ref 78–100)
NITRATE UR QL: NEGATIVE
PH UR STRIP: 6.5 [PH] (ref 5–7)
PLATELET # BLD AUTO: 97 10E3/UL (ref 150–450)
POTASSIUM SERPL-SCNC: 4.1 MMOL/L (ref 3.4–5.3)
RBC # BLD AUTO: 2.86 10E6/UL (ref 4.4–5.9)
RBC URINE: 9 /HPF
SODIUM SERPL-SCNC: 135 MMOL/L (ref 135–145)
SP GR UR STRIP: 1.01 (ref 1–1.03)
UROBILINOGEN UR STRIP-MCNC: NORMAL MG/DL
WBC # BLD AUTO: 5.4 10E3/UL (ref 4–11)
WBC URINE: 1 /HPF

## 2025-05-08 PROCEDURE — 250N000013 HC RX MED GY IP 250 OP 250 PS 637: Performed by: INTERNAL MEDICINE

## 2025-05-08 PROCEDURE — 93306 TTE W/DOPPLER COMPLETE: CPT | Mod: 26 | Performed by: INTERNAL MEDICINE

## 2025-05-08 PROCEDURE — 81003 URINALYSIS AUTO W/O SCOPE: CPT | Performed by: HOSPITALIST

## 2025-05-08 PROCEDURE — 97535 SELF CARE MNGMENT TRAINING: CPT | Mod: GO

## 2025-05-08 PROCEDURE — 97165 OT EVAL LOW COMPLEX 30 MIN: CPT | Mod: GO

## 2025-05-08 PROCEDURE — 97110 THERAPEUTIC EXERCISES: CPT | Mod: GO

## 2025-05-08 PROCEDURE — 36415 COLL VENOUS BLD VENIPUNCTURE: CPT | Performed by: INTERNAL MEDICINE

## 2025-05-08 PROCEDURE — 120N000004 HC R&B MS OVERFLOW

## 2025-05-08 PROCEDURE — 999N000156 HC STATISTIC RCP CONSULT EA 30 MIN

## 2025-05-08 PROCEDURE — 255N000002 HC RX 255 OP 636: Performed by: INTERNAL MEDICINE

## 2025-05-08 PROCEDURE — 999N000157 HC STATISTIC RCP TIME EA 10 MIN

## 2025-05-08 PROCEDURE — 85018 HEMOGLOBIN: CPT | Performed by: INTERNAL MEDICINE

## 2025-05-08 PROCEDURE — 82746 ASSAY OF FOLIC ACID SERUM: CPT | Performed by: STUDENT IN AN ORGANIZED HEALTH CARE EDUCATION/TRAINING PROGRAM

## 2025-05-08 PROCEDURE — 83735 ASSAY OF MAGNESIUM: CPT | Performed by: INTERNAL MEDICINE

## 2025-05-08 PROCEDURE — 80048 BASIC METABOLIC PNL TOTAL CA: CPT | Performed by: INTERNAL MEDICINE

## 2025-05-08 PROCEDURE — 99232 SBSQ HOSP IP/OBS MODERATE 35: CPT | Performed by: HOSPITALIST

## 2025-05-08 PROCEDURE — 99233 SBSQ HOSP IP/OBS HIGH 50: CPT | Performed by: INTERNAL MEDICINE

## 2025-05-08 PROCEDURE — 250N000013 HC RX MED GY IP 250 OP 250 PS 637: Performed by: HOSPITALIST

## 2025-05-08 PROCEDURE — 250N000011 HC RX IP 250 OP 636: Performed by: NURSE PRACTITIONER

## 2025-05-08 PROCEDURE — 74178 CT ABD&PLV WO CNTR FLWD CNTR: CPT

## 2025-05-08 PROCEDURE — 71045 X-RAY EXAM CHEST 1 VIEW: CPT

## 2025-05-08 PROCEDURE — 94799 UNLISTED PULMONARY SVC/PX: CPT

## 2025-05-08 PROCEDURE — 93005 ELECTROCARDIOGRAM TRACING: CPT | Performed by: INTERNAL MEDICINE

## 2025-05-08 RX ORDER — QUETIAPINE FUMARATE 25 MG/1
25 TABLET, FILM COATED ORAL AT BEDTIME
Status: DISPENSED | OUTPATIENT
Start: 2025-05-08

## 2025-05-08 RX ORDER — TORSEMIDE 10 MG/1
30 TABLET ORAL DAILY
Qty: 90 TABLET | Refills: 3 | Status: SHIPPED | OUTPATIENT
Start: 2025-05-09

## 2025-05-08 RX ORDER — LOSARTAN POTASSIUM 50 MG/1
100 TABLET ORAL DAILY
Status: SHIPPED
Start: 2025-05-08

## 2025-05-08 RX ORDER — PRAMIPEXOLE DIHYDROCHLORIDE 0.12 MG/1
0.12 TABLET ORAL 3 TIMES DAILY
Status: DISPENSED | OUTPATIENT
Start: 2025-05-08

## 2025-05-08 RX ORDER — IOPAMIDOL 755 MG/ML
90 INJECTION, SOLUTION INTRAVASCULAR ONCE
Status: COMPLETED | OUTPATIENT
Start: 2025-05-08 | End: 2025-05-08

## 2025-05-08 RX ADMIN — BUSPIRONE HYDROCHLORIDE 30 MG: 15 TABLET ORAL at 08:47

## 2025-05-08 RX ADMIN — IOPAMIDOL 90 ML: 755 INJECTION, SOLUTION INTRAVENOUS at 18:25

## 2025-05-08 RX ADMIN — FINASTERIDE 5 MG: 5 TABLET, FILM COATED ORAL at 08:46

## 2025-05-08 RX ADMIN — ASPIRIN 81 MG: 81 TABLET, COATED ORAL at 08:45

## 2025-05-08 RX ADMIN — PAROXETINE 30 MG: 30 TABLET, FILM COATED ORAL at 21:08

## 2025-05-08 RX ADMIN — QUETIAPINE FUMARATE 25 MG: 25 TABLET ORAL at 21:08

## 2025-05-08 RX ADMIN — DOXAZOSIN 4 MG: 4 TABLET ORAL at 21:08

## 2025-05-08 RX ADMIN — PRAMIPEXOLE DIHYDROCHLORIDE 0.12 MG: 0.12 TABLET ORAL at 23:33

## 2025-05-08 RX ADMIN — PERFLUTREN 2 ML: 6.52 INJECTION, SUSPENSION INTRAVENOUS at 10:34

## 2025-05-08 RX ADMIN — TORSEMIDE 30 MG: 20 TABLET ORAL at 08:45

## 2025-05-08 RX ADMIN — LAMOTRIGINE 50 MG: 25 TABLET ORAL at 21:08

## 2025-05-08 RX ADMIN — BUSPIRONE HYDROCHLORIDE 30 MG: 15 TABLET ORAL at 21:08

## 2025-05-08 RX ADMIN — BUPROPION HYDROCHLORIDE 300 MG: 150 TABLET, FILM COATED, EXTENDED RELEASE ORAL at 08:45

## 2025-05-08 RX ADMIN — LAMOTRIGINE 50 MG: 25 TABLET ORAL at 08:46

## 2025-05-08 RX ADMIN — METOPROLOL SUCCINATE 200 MG: 100 TABLET, EXTENDED RELEASE ORAL at 21:08

## 2025-05-08 ASSESSMENT — ACTIVITIES OF DAILY LIVING (ADL)
ADLS_ACUITY_SCORE: 44
DEPENDENT_IADLS:: CLEANING;COOKING;LAUNDRY;SHOPPING;MEAL PREPARATION
ADLS_ACUITY_SCORE: 40
ADLS_ACUITY_SCORE: 47
ADLS_ACUITY_SCORE: 44
ADLS_ACUITY_SCORE: 40
ADLS_ACUITY_SCORE: 44
ADLS_ACUITY_SCORE: 40
ADLS_ACUITY_SCORE: 44
ADLS_ACUITY_SCORE: 40
ADLS_ACUITY_SCORE: 44
ADLS_ACUITY_SCORE: 44
ADLS_ACUITY_SCORE: 40
ADLS_ACUITY_SCORE: 40

## 2025-05-08 NOTE — ADDENDUM NOTE
Addendum  created 05/07/25 1918 by Romero Cheng MD    Attestation recorded in Intraprocedure, Intraprocedure Attestations filed

## 2025-05-08 NOTE — PLAN OF CARE
Heart Failure Care Map  GOALS TO BE MET BEFORE DISCHARGE:    1. Decrease congestion and/or edema with diuretic therapy to achieve near optimal volume status.     Dyspnea improved: Yes, satisfactory for discharge.   Edema improved: Yes, satisfactory for discharge.        Last 24 hour I/O:   Intake/Output Summary (Last 24 hours) at 5/8/2025 0230  Last data filed at 5/8/2025 0002  Gross per 24 hour   Intake 2350 ml   Output 1475 ml   Net 875 ml           Net I/O and Weights since admission:   04/08 0700 - 05/08 0659  In: 2350 [P.O.:1200; I.V.:1150]  Out: 1475 [Urine:1475]  Net: 875     Vitals:    05/07/25 0557   Weight: 71.7 kg (158 lb)       2.  O2 sats > 90% on room air, or at prior home O2 therapy level.      Able to wean O2 this shift to keep sats above 90%?: Yes, satisfactory for discharge.   Does patient use Home O2? No          Current oxygenation status:   SpO2: 96 %     O2 Device: None (Room air), Oxygen Delivery: 1 LPM    3.  Tolerates ambulation and mobility near baseline.     Ambulation: Yes, satisfactory for discharge.   Times patient ambulated with staff this shift: 1    Please review the Heart Failure Care Map for additional HF goal outcomes.      Patient A/Ox4, VSS on RA, afib. Patients sites are clean dry and intact without pain. Patient did have back and buttock pain, PRN oxycodone given with relief. Patient sat up in chair after bedrest and tolerated it well. SBA in room. Call light is within reach and patient is able to make his needs known.     Jono Barth, RN  5/8/2025   Goal Outcome Evaluation:      Plan of Care Reviewed With: patient    Overall Patient Progress: improvingOverall Patient Progress: improving

## 2025-05-08 NOTE — DISCHARGE SUMMARY
[facility]  CARDIOLOGY DISCHARGE SUMMARY     Primary Care Physician: Lizzy Leiva  Admission Date: 5/7/2025   Discharge Provider: SYDNEY DE LEON PA-C Discharge Date: 5/8/2025   Diet: resume previous home diet   Code Status: Full Code   Activity: No lifting > 10 lb, no driving for 5 days        Condition at Discharge: Stable      BRIEF HPI:   Josemanuel is an 80-year-old gentleman who has a past medical history significant for valvular disease including severe mitral regurgitation and severe tricuspid regurgitation and moderate AI, chronic diastolic heart failure, persistent atrial fibrillation, hypertension, RBBB, GERD, hyperlipidemia, history of EtOH abuse in remission, benzodiazepine dependence and cerebral infarction due to occlusion/stenosis of carotid artery     He has been followed by Dr. Chapin's team in [place] and was recently found to have progression of his valvular disease.  He was referred to structural heart for further evaluation of his valves.  He underwent further workup and evaluation by multidisciplinary team and was deemed a good candidate for CHANTAL for his mitral valve.      He came in yesterday for the elective, planned procedure.     PRINCIPAL & ACTIVE DISCHARGE DIAGNOSES     Active Problems:    Benign essential hypertension    GERD (gastroesophageal reflux disease)    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)    Depression with anxiety    Chronic anticoagulation    Acute on chronic heart failure with preserved ejection fraction (H)    Longstanding persistent atrial fibrillation (H)    Falls frequently    Status post implantation of mitral valve leaflet clip    Mitral valve insufficiency, unspecified etiology      SIGNIFICANT FINDINGS (Imaging, labs):   ECHO 5/8/2025  Interpretation Summary     1. The left ventricle is normal in size with mild concentric left ventricular  hypertrophy.  - Left ventricular function is decreased. The ejection fraction is 45-50%  (mildly  reduced).  - There is mild global hypokinesia of the left ventricle.  2. The right ventricle is mildly dilated with normal systolic function.  3. Both atria are severely dilated  4. There is a CHELSEA mitral valve repair device securetly attached to the  anterior and posterior leaflets of the mitral valve. Mild regurgitation is  present.  5. There is mild to moderate aortic valve regurgitation  6. Very severe (5+) tricuspid valve regurgitation is present  7. Estimated right ventricular systolic pressure is normal.  8. Compared to the AD of yesterday the left ventricular systolic function  appears slightly less vigorous.      CXR (personally reviewed and interpreted):  EXAM: XR CHEST PORT 1 VIEW  LOCATION: [facility]  DATE: 5/8/2025     INDICATION: POD #1 S P CHANTAL  COMPARISON: X-ray 11/24/2023                                                                      IMPRESSION: Bilateral calcified pleural plaques. Status post CHANTAL with a device in the left mitral region. No pneumothorax. Minimal left basilar atelectasis/scarring. No pleural effusion. Stable cardiomegaly.      EKG (personally reviewed and interpreted):  Atrial fibrillation    LABS or IMAGING REQUIRING FOLLOW-UP AFTER DISCHARGE:     Echocardiogram on June 19 at 10 AM    PROCEDURES ( this hospitalization only)      Procedure(s):  Transcatheter Mitral Valve Repair with CHELSEA device    RECOMMENDATIONS TO OUTPATIENT PROVIDER FOR F/U VISIT     With Renuka Cole PA-C on May 14 at 2:50 PM    With Sierra Lorenzo PA-C for 1 month visit on June 25 at 2:30 pm    DISPOSITION     Home    HOSPITAL COURSE BY DIAGNOSIS:      #Severe mitral regurgitation, now s/p CHANTAL with Chelsea ACE device x 1  #Severe tricuspid regurgitation  #Acute on chronic heart failure with preserved ejection fraction    He tolerated the procedure well yesterday.  ***.      Sheath sites soft without hematoma.  Stitch was removed without difficulty.  Neuro's intact. Pt  reports feeling ***  today and denies any SOB or ***.      - ASA for anti-platelet therapy  - OP Cardiac rehab  - Daily weights  - Lifelong antibiotic prophylaxis prior to all dental procedures.  - follow ups have been arranged as listed above    #CAD  ***  Pre-TAVR coronary angiogram showed ***    - continue *** daily  - continue PTA ***    #HTN  BP has been *** since procedure    - Continue PTA ***    #***    #***    #***    Clinically Significant Risk Factors   { TIP  Diagnoses will continue to appear throughout the admission.  :02547}      # Hyponatremia: Lowest Na = 134 mmol/L in last 2 days, will monitor as appropriate   # Hypocalcemia: Lowest Ca = 8 mg/dL in last 2 days, will monitor and replace as appropriate        # Coagulation Defect: INR = 1.25 (Ref range: 0.85 - 1.15) and/or PTT = N/A, will monitor for bleeding  # Thrombocytopenia: Lowest platelets = 97 in last 2 days, will monitor for bleeding   # Hypertension: Noted on problem list  # Heart failure, NOS: heart failure noted on the problem list and last echo with EF 40-50%               # Financial/Environmental Concerns: none        {Cardiovascular:022441}    {Endocrine:665480}    {Fluid & Electrolyte Disorders:543061}    {Gastroenterology:752970}    {Hematology/Oncology:028863}    {Nephrology:244626}    {Neurology:300030}    {Pulmonology:155548}    {Pulmonary Heart Disease (Pulmonary hypertension or Cor pulmonale):955971:::1}    {Limitations of activities/Fatigue:443325}        Discharge Medications with Med changes:     ***        Rationale for medication changes:      ***        Consults   Hospitalist  Cardiac rehab  ***       Pertinent Labs     {  Press F2 for choices                        :9581464}        Discharge Orders     Discharge Procedure Orders   Cardiac Rehab Referral   Standing Status: Future   Referral Priority: Routine: Next available opening Referral Type: Rehab Therapy Cardiac Therapy   Number of Visits Requested: 1     Reason for  your hospital stay   Order Comments: Mitral valve clip     Follow Up   Order Comments: Follow up with Renuka Cole PA-C next week (see AVS for details)     Activity   Order Comments: Activity restrictions as per AVS     Order Specific Question Answer Comments   Is discharge order? Yes      Diet   Order Comments: Resume previous home diet     Order Specific Question Answer Comments   Is discharge order? Yes      Examination     Vital Signs in last 24 hours:   ***      General Appearance:   Alert, cooperative and in no acute distress   ENT/Mouth: membranes moist, no oral lesions or bleeding gums.      EYES:  no scleral icterus, normal conjunctivae   Neck: Supple without lymphadenopathy.  Thyroid not visualized   Chest/Lungs:   lungs are *** to auscultation, no rales or wheezing   Cardiovascular:   ***Regular. Normal first and second heart sounds with ***no murmurs, rubs, or gallops; the carotid, radial and posterior tibial pulses are intact, *** edema bilaterally    Abdomen:  Soft and nontender. Bowel sounds are present in all quadrants   Extremities: no cyanosis or clubbing.  Puncture site is ***   Skin: no xanthelasma, warm.    Neurologic: normal gait, normal  bilateral, no tremors   Psychiatric: Normal mood and affect         Please see EMR for more detailed significant labs, imaging, consultant notes etc.    *** MINUTES SPENT BY ME on the date of service doing chart review, history, exam, documentation & further activities per the note.              at bedtime.  Quantity: 90 tablet  Refills: 1     finasteride 5 MG tablet  Commonly known as: PROSCAR  Used for: Benign prostatic hyperplasia with weak urinary stream      Dose: 1 tablet  TAKE 1 TABLET BY MOUTH EVERY DAY  Quantity: 90 tablet  Refills: 3     lamoTRIgine 25 MG tablet  Commonly known as: LaMICtal  Used for: Mild to Moderate episode of recurrent major depressive disorder (H)      Dose: 50 mg  Take 2 tablets (50 mg) by mouth 2 times daily.  Quantity: 360 tablet  Refills: 0     metoprolol succinate  MG 24 hr tablet  Commonly known as: TOPROL XL  Used for: Atrial fibrillation, unspecified type (H), Benign essential hypertension, Cardiomyopathy, unspecified type (H)      Dose: 200 mg  Take 1 tablet (200 mg) by mouth every evening.  Quantity: 90 tablet  Refills: 2     multivitamin tablet      Dose: 1 tablet  Take 1 tablet by mouth daily  Quantity: 30 tablet  Refills: 0     PARoxetine 30 MG tablet  Commonly known as: PAXIL      Dose: 30 mg  Take 30 mg by mouth at bedtime.  Refills: 0     QUEtiapine 25 MG tablet  Commonly known as: SEROquel  Used for: Moderate episode of recurrent major depressive disorder (H)      Dose: 25 mg  Take 1 tablet (25 mg) by mouth at bedtime.  Quantity: 30 tablet  Refills: 1     rivaroxaban ANTICOAGULANT 20 MG Tabs tablet  Commonly known as: XARELTO ANTICOAGULANT  Used for: Chronic atrial fibrillation (H)      Dose: 20 mg  Take 1 tablet (20 mg) by mouth daily (with dinner).  Quantity: 90 tablet  Refills: 2            STOP taking      furosemide 20 MG tablet  Commonly known as: LASIX  Replaced by: torsemide 10 MG tablet                  Where to get your medicines        These medications were sent to [facility], [place] - [address]  [address] [facility] [place], [facility] [address]      Phone: [phone]   pramipexole 0.125 MG tablet  torsemide 10 MG tablet               Rationale for medication changes:      Lasix changed to  torsemide  Losartan increased  Pramipexole added for probable RLS        Consults   Hospitalist  Cardiac rehab  Urology       Pertinent Labs     Lab Results   Component Value Date    NTBNPI 4,896 (H) 11/24/2023    NTBNP 1,642 05/06/2025     Lab Results   Component Value Date    WBC 4.7 05/09/2025    HGB 9.1 (L) 05/09/2025    HCT 28.4 (L) 05/09/2025     (H) 05/09/2025     (L) 05/09/2025     Lab Results   Component Value Date    CR 0.91 05/09/2025     Lab Results   Component Value Date     05/09/2025    POTASSIUM 3.7 05/09/2025    CHLORIDE 99 05/09/2025    CO2 32 (H) 05/09/2025    GLC 95 05/09/2025     Lab Results   Component Value Date    GFRESTIMATED 84 05/09/2025    GFRESTBLACK >90 06/03/2021     Lab Results   Component Value Date    GLC 95 05/09/2025     Lab Results   Component Value Date    INR 1.25 (H) 05/06/2025     Lab Results   Component Value Date    BUN 14.6 05/09/2025    CR 0.91 05/09/2025           Discharge Orders     Discharge Procedure Orders   Cardiac Rehab Referral   Standing Status: Future   Referral Priority: Routine: Next available opening Referral Type: Rehab Therapy Cardiac Therapy   Number of Visits Requested: 1     Home Care Referral   Referral Priority: Routine: Next available opening Referral Type: Home Health Therapies & Aides   Number of Visits Requested: 1     Reason for your hospital stay   Order Comments: Mitral valve clip     Follow Up   Order Comments: Follow up with Renuka Cole PA-C next week (see AVS for details)     Activity   Order Comments: Activity restrictions as per AVS     Order Specific Question Answer Comments   Is discharge order? Yes      Diet   Order Comments: Resume previous home diet     Order Specific Question Answer Comments   Is discharge order? Yes      Examination     Vital Signs in last 24 hours:   Temp:  [97.6  F (36.4  C)-97.7  F (36.5  C)] 97.7  F (36.5  C)  Pulse:  [73-96] 88  Resp:  [18-20] 20  BP: (101-133)/(60-81) 115/60  SpO2:   [96 %-99 %] 99 %        General Appearance:   Alert, cooperative and in no acute distress   ENT/Mouth: membranes moist, no oral lesions or bleeding gums.      EYES:  no scleral icterus, normal conjunctivae   Neck: Supple without lymphadenopathy.  Thyroid not visualized   Chest/Lungs:   lungs are clear to auscultation, no rales or wheezing   Cardiovascular:   Irregularly irregular. Normal first and second heart sounds with no murmurs, rubs, or gallops; the carotid, radial and posterior tibial pulses are intact, no edema bilaterally    Abdomen:  Soft and nontender. Bowel sounds are present in all quadrants   Extremities: no cyanosis or clubbing.  Puncture site is soft and nontender with minor bruising, but no hematoma   Skin: no xanthelasma, warm.    Neurologic: normal gait, normal  bilateral, no tremors   Psychiatric: Normal mood and affect         Please see EMR for more detailed significant labs, imaging, consultant notes etc.    35 MINUTES SPENT BY ME on the date of service doing chart review, history, exam, documentation & further activities per the note.

## 2025-05-08 NOTE — PROGRESS NOTES
Care Management Discharge Note    Discharge Date: 05/08/2025       Discharge Disposition: Home, Cardiac Rehab Referral     Discharge Services: Cardiac Rehab Referral     Discharge DME: None    Discharge Transportation: family or friend will provide    Private pay costs discussed: Not applicable    Does the patient's insurance plan have a 3 day qualifying hospital stay waiver?  Yes     Which insurance plan 3 day waiver is available? Alternative insurance waiver    Will the waiver be used for post-acute placement? No    PAS Confirmation Code: NA  Patient/family educated on Medicare website which has current facility and service quality ratings: NA    Education Provided on the Discharge Plan: Yes per team  Persons Notified of Discharge Plans: Patient  Patient/Family in Agreement with the Plan: yes    Handoff Referral Completed: No, handoff not indicated or clinically appropriate    Additional Information:  Patient discharge to home with Cardiac Rehab Referral. Family will transport.     Margie Lance RN

## 2025-05-08 NOTE — PROGRESS NOTES
Patient declined to do versapap and flutter overnight, patient would like to sleep. Currently on room air. RT will continue to monitor.

## 2025-05-08 NOTE — PROGRESS NOTES
Occupational Therapy      05/08/25 0930   Appointment Info   Signing Clinician's Name / Credentials (OT) Estefani Martel OTR/L   Living Environment   People in Home spouse   Current Living Arrangements condominium   Home Accessibility no concerns   Transportation Anticipated family or friend will provide   Living Environment Comments Pt lives in condo w/ spouse   Self-Care   Usual Activity Tolerance good   Current Activity Tolerance moderate   Regular Exercise No   Equipment Currently Used at Home cane, straight;grab bar, toilet;grab bar, tub/shower;walker, standard  (uses cane encouraged to use fww he has at home)   Fall history within last six months yes   Number of times patient has fallen within last six months 3   Activity/Exercise/Self-Care Comment Patient typically independent at baseline   General Information   Onset of Illness/Injury or Date of Surgery 05/07/25   Referring Physician Ubaldo Carrillo MD   Patient/Family Therapy Goal Statement (OT) Get home   Additional Occupational Profile Info/Pertinent History of Current Problem 80 year old male admitted on 5/7/2025. He admitted for elective procedure of mitral valve replacement.  Past medical history significant for hypertension, HFpEF, persistent A-fib, mitral valve insufficiency, GERD, anxiety, cerebral infarction   Existing Precautions/Restrictions cardiac   Cognitive Status Examination   Orientation Status orientation to person, place and time   Range of Motion Comprehensive   General Range of Motion no range of motion deficits identified   Strength Comprehensive (MMT)   General Manual Muscle Testing (MMT) Assessment no strength deficits identified   Bed Mobility   Bed Mobility No deficits identified   Transfers   Transfers sit-stand transfer   Sit-Stand Transfer   Sit-Stand [place] (Transfers) verbal cues;supervision;contact guard   Assistive Device (Sit-Stand Transfers) walker, front-wheeled   Balance   Balance Assessment standing dynamic  balance   Standing Balance: Dynamic contact guard;verbal cues;supervision   Activities of Daily Living   BADL Assessment/Intervention lower body dressing   Lower Body Dressing Assessment/Training   [place] (Lower Body Dressing) supervision;verbal cues   Clinical Impression   Criteria for Skilled Therapeutic Interventions Met (OT) Yes, treatment indicated   OT Diagnosis Decreased endurance for ADLs   Influenced by the following impairments s/p TAVR   OT Problem List-Impairments impacting ADL problems related to;activity tolerance impaired   Assessment of Occupational Performance 1-3 Performance Deficits   Identified Performance Deficits endurance for ADLs   Planned Therapy Interventions (OT) home program guidelines;progressive activity/exercise;risk factor education   Clinical Decision Making Complexity (OT) problem focused assessment/low complexity   Risk & Benefits of therapy have been explained evaluation/treatment results reviewed;care plan/treatment goals reviewed   OT Total Evaluation Time   OT Eval, Low Complexity Minutes (80744) 10   OT Goals   Therapy Frequency (OT) One time eval and treatment   OT Goals Cardiac Phase 1   OT: Understanding of cardiac education to maximize quality of life, condition management, and health outcomes Patient;Verbalize   OT: Functional/aerobic ambulation tolerance with stable cardiovascular response in order to return to home and community environment Independent;Greater than 300 feet   Interventions   Interventions Quick Adds Cardiac Rehab;Therapeutic Procedures/Exercise   Self-Care/Home Management   Self-Care/Home Mgmt/ADL, Compensatory, Meal Prep Minutes (39357) 8   Treatment Detail/Skilled Intervention Pt ed/trained in cardiac education s/p procedure, pt verb. understanding. CGA intial stand progressed to SBA w/ fww educated on use of device for added safety/stability   Therapeutic Procedures/Exercise   Therapeutic Procedure: strength, endurance, ROM, flexibillity  minutes (21672) 10   Treatment Detail/Skilled Intervention see ambulation   Treatment Time Includes (CR Only) See specific exercise details intervention group(s);Monitoring of vital signs (see vital signs flowsheet for details)   Ambulation   Workload 450ft   Symptoms Fatigue   Cardiovascular Response Normal   Exercise Details CGA-SBA w/ fww no LOB noted   Vital Signs Details pre/post WFL   Cardiac Education   Education Provided Diagnosis;Home exercise program;Outpatient Cardiac Rehab;Precautions;Resuming home activities;Risk factors;Signs and symptoms;Stop light tool   Education Packet Given to Patient Yes   All Patient Education Handouts Reviewed with Patient and/or Family Yes   OT Discharge Planning   OT Plan progress endurance/ambulation, toileting, TMVR education   OT Discharge Recommendation (DC Rec) home with outpatient cardiac rehab;home with home care occupational therapy;home with assist   OT Rationale for DC Rec Pt would benefit from home OT/PT to enhance balance/strengthing, and outpatient cardiac rehab. Pt lives in [facility] w/ spouse who can assist as needed. Pt moving well w/ SBA w/ fww no LOB during session but pt reports falls at home d/t jerky BLE at night.   OT Total Distance Amb During Session (feet) 450   Total Session Time   Timed Code Treatment Minutes 18   Total Session Time (sum of timed and untimed services) 28

## 2025-05-08 NOTE — PROGRESS NOTES
"[facility] Hospitalist Progress Note  [facility]  Admission date: 5/7/2025    Summary:    80M admitted for elective procedure of mitral valve replacement.  Past medical history significant for hypertension, HFpEF, persistent A-fib, mitral valve insufficiency, GERD, anxiety, cerebral infarction       Assessment/Plan    Addendum:  #Developed gross hematuria - cancel discharge for now.  check UA.  May need CBI.  Hold xarelto.  Consult urology  2:02 PM      #HFpEF - Euvolemic.  Continue PTA torsemide, losartan ,  Toprol   #Persistent A-fib  - Continue PTA Toprol 200 mg oral daily  - resumption of xarelto per cards     #Anxiety  #Major depressive disorder  -resume PTA seroquel (on higher dose inpatient for sleep?), paxil, lamictal on discharge Plan to increase Seroquel to 50 mg oral at bedtime  - PTA Paxil 30 mg oral daily  - PTA lamotrigine 50 mg oral twice daily    Checklist:  Code Status: Full Code    Diet: Regular Diet Adult     Cardiac Monitoring: ACTIVE order. Indication: Transcatheter structural interventions (24 hours)    Disposition and Discharge Planning    Barriers:  post-procedural care    Number of days selected below is from a list of system pre-approved options.   Medically Ready for Discharge: Anticipated Today      System Identified Risk Variables  Auto-populated based on system request.  If more complex management decisions required, to be addressed above.  \"  Clinically Significant Risk Factors         # Hyponatremia: Lowest Na = 134 mmol/L in last 2 days, will monitor as appropriate   # Hypocalcemia: Lowest Ca = 8 mg/dL in last 2 days, will monitor and replace as appropriate      # Coagulation Defect: INR = 1.25 (Ref range: 0.85 - 1.15) and/or PTT = N/A, will monitor for bleeding  # Thrombocytopenia: Lowest platelets = 97 in last 2 days, will monitor for bleeding   # Hypertension: Noted on problem list                # Financial/Environmental Concerns: none         \"    Interval " Events/Subjective/Notable results:    No physical complaints.  Seen while having ECHO.    Reviewed: BMP, CBC  Personally interpreted: Afib rate controlled 70s on tele        Objective    Vital signs in last 24 hours  Temp:  [98  F (36.7  C)-98.6  F (37  C)] 98.6  F (37  C)  Pulse:  [69-83] 83  Resp:  [13-25] 16  BP: ()/(58-84) 108/68  SpO2:  [94 %-100 %] 96 % O2 Device: None (Room air)    Weight:   155 lbs 6.4 oz  Body mass index is 23.63 kg/m .    Intake/Output last 3 shifts  I/O last 3 completed shifts:  In: 2350 [P.O.:1200; I.V.:1150]  Out: 2075 [Urine:2075]    Physical Exam  General:  Alert, cooperative, no distress    Neurologic:  oriented, facial symmetry preserved, fluent speech.   Psych: calm  HEENT:  Anicteric, MMM  CV: no edema, vascular access site without bleeding  Lungs: CTAB.  Easy respirations  Skin: no rashes noted on exposed skin.    Parr Catheter: Not present  Lines: PRESENT      [REMOVED] Arterial Line 05/07/25 Radial-Site Assessment: Ecchymotic;Edematous           Medical Decision Making               Duarte Ayala MD, [facility]  Internal Medicine Hospitalist

## 2025-05-08 NOTE — CONSULTS
[facility] CONSULT     Type of Consult: inpatient   Place of Service:  [facility]  Reason for Consultation: Gross hematuria  Consult Requested by: Dr. Ayala    History of Present Illness:    Josemanuel Edmond is a 80 year old male with hx of arthritis, cerebral infarction, depression, mitral regurgitation, CHF, Afib, GERD, BPH; Admitted following mitral valve repair 5/7/25. Urology has been consulted due to blood in the urine. History obtained through patient, spouse and chart review.     Blood noted today from urethral meatus, urine appeared bloody earlier. RN reports she noted some ecchymosis behind the scrotum today, suspects 2/2 femoral access with yesterday's procedure. Dripping blood from penis this afternoon. Voiding without difficulty, approx 225 ml PVR. Repeat  ml, urine now clear yellow.     Receiving ASA 81 mg daily and heparin gtt 5/7 only.     UA 5/8 benign for infection, 9 RBC. Creatinine 0.81. WBC 5.4. Hgb 9.3.     Pt denies abdominal and bilateral flank pain, penile/scrotal pain, fever, chills.     Quit smoking many years ago. Follows with PCP for BPH, takes tamsulosin. Denies personal and family hx of prostate/bladder/renal CA, kidney/bladder stones, gross hematuria, UTI's, urinary retention, known chemical exposures.     Pt has not been catheterized this hospitalization and no known trauma to flank, bladder/abdomen or penis.     Past Medical history  Past Medical History:   Diagnosis Date    Acute on chronic diastolic (congestive) heart failure (H) 11/29/2023    Arthritis 2005    Benign neoplasm of prostate 2000    Benign Prostate Nodule    Depressive disorder     past condition    Infection due to 2019 novel coronavirus 09/27/2021    Mitral regurgitation     S/P knee replacement 11/06/2012    S/P left knee arthroscopy 08/15/2011       Past Surgical history  Past Surgical History:   Procedure Laterality Date    ABDOMEN SURGERY  2003    ARTHROPLASTY KNEE Right 10/12/2018     Procedure: ARTHROPLASTY KNEE;  Right Total Knee Arthroplasty;  Surgeon: Jeb Peralta MD;  Location: [facility]    ARTHROPLASTY REVISION HIP Left 5/25/2023    Procedure: Revision total hip arthroplasty, left;  Surgeon: Chandler Leiva MD;  Location: [facility]    BIOPSY  2007    COLONOSCOPY N/A 12/10/2015    Procedure: COMBINED COLONOSCOPY, SINGLE OR MULTIPLE BIOPSY/POLYPECTOMY BY BIOPSY;  Surgeon: Jyoti Figueroa MD;  Location: [facility]    CV CORONARY ANGIOGRAM N/A 4/1/2025    Procedure: Coronary Angiogram;  Surgeon: Tae Segovia MD;  Location: [facility] CATH LAB CV    CV LEFT HEART CATH N/A 4/1/2025    Procedure: Left Heart Catheterization;  Surgeon: Tae Segovia MD;  Location: [facility] CATH LAB CV    CV RIGHT HEART CATH MEASUREMENTS RECORDED N/A 4/1/2025    Procedure: Right Heart Catheterization;  Surgeon: Tae Segovia MD;  Location: [facility] CATH LAB CV    JOINT REPLACEMENT, HIP RT/LT  10/2007    Joint Replacement Hip LT    JOINT REPLACEMTN, KNEE RT/LT  08/2011    Joint Replacement knee /LT, [facility]    LAPAROSCOPIC HERNIORRHAPHY INGUINAL BILATERAL Bilateral 04/24/2018    Procedure: LAPAROSCOPIC HERNIORRHAPHY INGUINAL BILATERAL;  Laparoscopic bilateral inguinal hernia repair;  Surgeon: Josemanuel Resendiz MD;  Location: [facility]    PHACOEMULSIFICATION WITH STANDARD INTRAOCULAR LENS IMPLANT Right 01/06/2021    Procedure: Cataract Removal with Implant;  Surgeon: Regan Kaufman MD;  Location: [facility]    PHACOEMULSIFICATION WITH STANDARD INTRAOCULAR LENS IMPLANT Left 02/17/2021    Procedure: Cataract Removal with Implant;  Surgeon: Regan Kaufman MD;  Location: [facility]    SURGICAL HISTORY OF -   12/01/1999    Umbilical Herniorrhaphy with mesh    SURGICAL HISTORY OF -  Left 11/2017    Thoracotomy and drainage of empyema    TRANSCATHETER MITRAL VALVE REPAIR N/A 5/7/2025    Procedure: Transcatheter Mitral Valve Repair with PHILIP device;  Surgeon: Tae Segovia MD;  Location:  [facility] LAB CV       Social History  Social History     Tobacco Use    Smoking status: Former     Current packs/day: 0.00     Average packs/day: 1 pack/day for 17.8 years (17.8 ttl pk-yrs)     Types: Cigarettes     Start date: 1958     Quit date: 10/13/1975     Years since quittin.6    Smokeless tobacco: Never   Vaping Use    Vaping status: Never Used   Substance Use Topics    Alcohol use: Not Currently    Drug use: No       Medications  Current Facility-Administered Medications   Medication Dose Route Frequency Provider Last Rate Last Admin    acetaminophen (TYLENOL) tablet 650 mg  650 mg Oral Q4H PRN Renuka Cole PA-C   650 mg at 25 1742    aspirin EC tablet 81 mg  81 mg Oral Daily Renuka Cole PA-C   81 mg at 25 0845    buPROPion (WELLBUTRIN XL) 24 hr tablet 300 mg  300 mg Oral QAM Renuka Cole PA-C   300 mg at 25 0845    busPIRone (BUSPAR) tablet 30 mg  30 mg Oral BID Renuka Cole PA-C   30 mg at 25 0847    doxazosin (CARDURA) tablet 4 mg  4 mg Oral At Bedtime Renuka Cole PA-C   4 mg at 25 2020    finasteride (PROSCAR) tablet 5 mg  5 mg Oral Daily Renuka Cole PA-C   5 mg at 25 0846    HOLD:  Metformin and metformin containing medications if patient received IV contrast with acute kidney injury or severe chronic kidney disease (stage IV or stage V; i.e., eGFR less than 30)   Does not apply HOLD Renuka Cole PA-C        hydrALAZINE (APRESOLINE) injection 10 mg  10 mg Intravenous Q20 Min PRN Renuka Cole PA-C        lamoTRIgine (LaMICtal) tablet 50 mg  50 mg Oral BID Renuka Cole PA-C   50 mg at 25 0846    lidocaine (LMX4) cream   Topical Q1H PRN Renuka Cole PA-C        lidocaine 1 % 0.1-1 mL  0.1-1 mL Other Q1H PRN Renuka Cole PA-C        losartan (COZAAR) tablet 100 mg  100 mg Oral Daily Renuka Cole PA-C        metoprolol succinate ER (TOPROL XL) 24 hr tablet 200 mg  200 mg Oral QPM Douglas,  "BHARGAVI Grayson        naloxone (NARCAN) injection 0.2 mg  0.2 mg Intravenous Q2 Min PRN Renuka Cole PA-C        naloxone (NARCAN) injection 0.2 mg  0.2 mg Intramuscular Q2 Min PRN Renuka Cole PA-C        naloxone (NARCAN) injection 0.4 mg  0.4 mg Intravenous Q2 Min PRN Renuka Cole PA-C        naloxone (NARCAN) injection 0.4 mg  0.4 mg Intramuscular Q2 Min PRN Renuka Cole PA-C        nitroGLYcerin (NITROSTAT) sublingual tablet 0.4 mg  0.4 mg Sublingual Q5 Min PRN Renuka Cole PA-C        oxyCODONE (ROXICODONE) tablet 5 mg  5 mg Oral Q4H PRN Renuka Cole PA-C   5 mg at 05/07/25 2318    Or    oxyCODONE (ROXICODONE) tablet 10 mg  10 mg Oral Q4H PRN Renuka Cole PA-C        PARoxetine (PAXIL) tablet 30 mg  30 mg Oral At Bedtime Renuka Cole PA-C   30 mg at 05/07/25 2020    QUEtiapine (SEROquel) tablet 25 mg  25 mg Oral At Bedtime Duarte Ayala MD        sodium chloride (PF) 0.9% PF flush 3 mL  3 mL Intracatheter Q8H PRN Renuka Cole PA-C        sodium chloride (PF) 0.9% PF flush 3 mL  3 mL Intracatheter q1 min prn Renuka Cole PA-C        torsemide (DEMADEX) tablet 30 mg  30 mg Oral Daily Renuka Cole PA-C   30 mg at 05/08/25 0845    traZODone (DESYREL) tablet 100 mg  100 mg Oral At Bedtime PRN Deepa Leong MD   100 mg at 05/07/25 2020       Allergies  Allergies   Allergen Reactions    Bactrim [Sulfamethoxazole-Trimethoprim] Nausea and Vomiting       ROS:   12 point review of systems was taken and is negative aside from what is noted above in the HPI.     Physical Exam:  /68 (BP Location: Right arm)   Pulse 83   Temp 98.6  F (37  C) (Oral)   Resp 16   Ht 1.727 m (5' 8\")   Wt 70.5 kg (155 lb 6.4 oz)   SpO2 96%   BMI 23.63 kg/m    General: NAD, alert, cooperative  Head: normocephalic, without abnormality / atraumatic   Abdomen: soft, non tender,  non distended. no suprapubic fullness or tenderness. No bilateral CVA tenderness   Geniturinary: " Uncircumcised penis and scrotum without ecchymosis, erythema, edema, tenderness. Drop of dark blood noted at urethral meatus and few drops of cherry blood noted on pull-up. Mild ecchymosis noted at left groin.   Skin: no rashes or lesions  Musculoskeletal: moves all extremities equally  Psychological: alert and oriented, answers questions appropriately.     Labs:   WBC   Date Value Ref Range Status   06/03/2021 5.7 4.0 - 11.0 10e9/L Final     WBC Count   Date Value Ref Range Status   05/08/2025 5.4 4.0 - 11.0 10e3/uL Final     Hemoglobin   Date Value Ref Range Status   05/08/2025 9.3 (L) 13.3 - 17.7 g/dL Final   06/03/2021 12.3 (L) 13.3 - 17.7 g/dL Final   ]  Creatinine   Date Value Ref Range Status   05/08/2025 0.81 0.67 - 1.17 mg/dL Final   06/03/2021 0.91 0.66 - 1.25 mg/dL Final     INR   Date Value Ref Range Status   05/06/2025 1.25 (H) 0.85 - 1.15 Final   11/15/2012 2.73 (H) 0.86 - 1.14 Final      Lab Results: personally reviewed     Imaging:  No recent pertinent imaging. Enlarged prostate noted on CT 10/28/24.     I have personally reviewed the imaging reports above.     Assessment/Plan: Josemanuel Edmond is being seen by [facility] for gross hematuria/blood dripping from penis     - 80 yr old male with hx of BPH, now acute onset gross hematuria 5/8/25 after cardiac cath with mitral valve repair 5/7/25.   - Pt has not had a morgan catheter or CIC this hospitalization, no other known trauma..   - Urine now yellow, still drops of dark blood from meatus.   - Hgb  9.3 today, 9.9 on 5/7. Monitor.   - UA benign for infection, 9 RBC.   - CT Urogram ordered/pending.   - Okay to continue ASA, hold anticoagulation until 5/9 (or when bleeding ceases) if safe to do so.   - Will need outpatient follow up with [facility] to include urine cytology and cystoscopy, to be completed 1-2 weeks following discharge  - [facility]  - Will continue to follow     The patient and I discussed some  common etiologies of hematuria including infection/UTI, trauma/heavy lifting/straining, urinary kidney stones, renal disease and use of anticoagulants.     It was discussed with the patient that any degree of hematuria should not be ignored, as it may be the sign of serious renal or urologic disease including malignancy.      Thank you for consulting [facility] regarding this patient's care. Please contact us with questions or concerns.     Edgar Jones, APRN, CNP  [facility]  [phone]

## 2025-05-08 NOTE — CONSULTS
Care Management Initial Consult    General Information  Assessment completed with: Patient, Patient  Type of CM/SW Visit: Initial Assessment    Primary Care Provider verified and updated as needed: Yes   Readmission within the last 30 days: no previous admission in last 30 days      Reason for Consult: other (see comments) (Elevated Risk Score)  Advance Care Planning: Advance Care Planning Reviewed: present on chart        Communication Assessment  Patient's communication style: spoken language (English or Bilingual)    Hearing Difficulty or Deaf: yes   Wear Glasses or Blind: yes    Cognitive  Cognitive/Neuro/Behavioral: WDL                      Living Environment:   People in home: spouse  Anna  Current living Arrangements: house      Able to return to prior arrangements: yes     Family/Social Support:  Care provided by: self, spouse/significant other  Provides care for: no one  Marital Status:   Support system: Wife  Anna       Description of Support System: Supportive    Support Assessment: Adequate family and caregiver support    Current Resources:   Patient receiving home care services: No     Community Resources: None  Equipment currently used at home: cane, straight, grab bar, toilet, grab bar, tub/shower  Supplies currently used at home: None    Employment/Financial:  Employment Status: retired        Financial Concerns: none   Referral to Financial Worker: No     Does the patient's insurance plan have a 3 day qualifying hospital stay waiver?  Yes     Which insurance plan 3 day waiver is available? Alternative insurance waiver    Will the waiver be used for post-acute placement? Undetermined at this time    Lifestyle & Psychosocial Needs:  Social Drivers of Health     Food Insecurity: Low Risk  (5/7/2025)    Food Insecurity     Within the past 12 months, did you worry that your food would run out before you got money to buy more?: No     Within the past 12 months, did the food you bought just not  last and you didn t have money to get more?: No   Depression: Not at risk (4/25/2025)    PHQ-2     PHQ-2 Score: 2   Housing Stability: Low Risk  (5/7/2025)    Housing Stability     Do you have housing? : Yes     Are you worried about losing your housing?: No   Tobacco Use: Medium Risk (5/7/2025)    Patient History     Smoking Tobacco Use: Former     Smokeless Tobacco Use: Never     Passive Exposure: Not on file   Financial Resource Strain: Low Risk  (5/7/2025)    Financial Resource Strain     Within the past 12 months, have you or your family members you live with been unable to get utilities (heat, electricity) when it was really needed?: No   Alcohol Use: Not on file   Transportation Needs: Low Risk  (5/7/2025)    Transportation Needs     Within the past 12 months, has lack of transportation kept you from medical appointments, getting your medicines, non-medical meetings or appointments, work, or from getting things that you need?: No   Physical Activity: Unknown (12/19/2024)    Exercise Vital Sign     Days of Exercise per Week: 0 days     Minutes of Exercise per Session: Not on file   Interpersonal Safety: Low Risk  (5/7/2025)    Interpersonal Safety     Do you feel physically and emotionally safe where you currently live?: Yes     Within the past 12 months, have you been hit, slapped, kicked or otherwise physically hurt by someone?: No     Within the past 12 months, have you been humiliated or emotionally abused in other ways by your partner or ex-partner?: No   Stress: No Stress Concern Present (12/19/2024)    [employer] [place] [employer] - Occupational Stress Questionnaire     Feeling of Stress : Only a little   Social Connections: Unknown (12/19/2024)    Social Connection and Isolation Panel [NHANES]     Frequency of Communication with Friends and Family: Not on file     Frequency of Social Gatherings with Friends and Family: Once a week     Attends Taoist Services: Not on file     Active  Member of Clubs or Organizations: Not on file     Attends Club or Organization Meetings: Not on file     Marital Status: Not on file   Health Literacy: Not on file     Functional Status:  Prior to admission patient needed assistance:   Dependent ADLs:: Ambulation-cane  Dependent IADLs:: Cleaning, Cooking, Laundry, Shopping, Meal Preparation     Discussed  Partnership in Safe Discharge Planning  document with patient/family: No    Additional Information:  Writer met with patient at bedside to review role of care management services, discuss goals of care and assess need for any possible services at discharge. Patient alert, answering questions appropriately and engaged in the conversation. Address, phone number and PCP confirmed. Patient has a HCD on file. Lives with spouse in a house. Report needs assist with ADLs/IADLs. Has DME. No community resources. Goal is to return home. Anticipate family will transport.       Next Steps: RNCM to follow for medical progression, recommendations, and final discharge plan.     Margie Lance RN

## 2025-05-08 NOTE — TREATMENT PLAN
"Respiratory Treatment Plan     Patient Score: 3  Patient Acuity: 5    Clinical Indication for Therapy: atelectasis and prevent atelectasis    Therapy Ordered:   Broncho-Pulmonary Hygiene: Flutter valve QID     Volume Expansion: Incentive spirometry QID - encourage pt to perform 10 reps Q1H while awake      History:   -Smoking History: former quit 48 years ago   -Home Medications: none   -Home Oxygen: none   -LESLEY Hx: none      Assessment Summary: Patient is post mitral valve replacement with no immediate pulmonary concerns. No history of pulmonary disorders. Remote smoking history as noted above.    Patient breath sounds are clear and equal bilaterally with a normal respiratory rate, depth, and pattern. He remains on room air.    Chest x-ray 5/8 showed \"Minimal left basilar atelectasis/scarring\"    Reviewed pulmonary hygiene with patient including IS and flutter valve, patient is independent with these therapies. Recommended he complete them every four hours while awake, patient is agreeable to this treatment plan.       Coleen Hugo, RT  5/8/2025    "

## 2025-05-09 ENCOUNTER — APPOINTMENT (OUTPATIENT)
Dept: OCCUPATIONAL THERAPY | Facility: HOSPITAL | Age: 80
DRG: 266 | End: 2025-05-09
Attending: INTERNAL MEDICINE
Payer: COMMERCIAL

## 2025-05-09 ENCOUNTER — TELEPHONE (OUTPATIENT)
Dept: FAMILY MEDICINE | Facility: CLINIC | Age: 80
End: 2025-05-09

## 2025-05-09 VITALS
HEIGHT: 68 IN | HEART RATE: 88 BPM | OXYGEN SATURATION: 99 % | SYSTOLIC BLOOD PRESSURE: 115 MMHG | WEIGHT: 150.3 LBS | TEMPERATURE: 97.7 F | DIASTOLIC BLOOD PRESSURE: 60 MMHG | BODY MASS INDEX: 22.78 KG/M2 | RESPIRATION RATE: 20 BRPM

## 2025-05-09 PROBLEM — G25.81 RESTLESS LEGS SYNDROME: Status: ACTIVE | Noted: 2025-05-09

## 2025-05-09 PROBLEM — R31.0 GROSS HEMATURIA: Status: ACTIVE | Noted: 2025-05-09

## 2025-05-09 LAB
ANION GAP SERPL CALCULATED.3IONS-SCNC: 6 MMOL/L (ref 7–15)
ATRIAL RATE - MUSE: 24 BPM
BUN SERPL-MCNC: 14.6 MG/DL (ref 8–23)
CALCIUM SERPL-MCNC: 8.3 MG/DL (ref 8.8–10.4)
CHLORIDE SERPL-SCNC: 99 MMOL/L (ref 98–107)
CREAT SERPL-MCNC: 0.91 MG/DL (ref 0.67–1.17)
DIASTOLIC BLOOD PRESSURE - MUSE: NORMAL MMHG
EGFRCR SERPLBLD CKD-EPI 2021: 84 ML/MIN/1.73M2
ERYTHROCYTE [DISTWIDTH] IN BLOOD BY AUTOMATED COUNT: 15.2 % (ref 10–15)
GLUCOSE SERPL-MCNC: 95 MG/DL (ref 70–99)
HCO3 SERPL-SCNC: 32 MMOL/L (ref 22–29)
HCT VFR BLD AUTO: 28.4 % (ref 40–53)
HGB BLD-MCNC: 9.1 G/DL (ref 13.3–17.7)
INTERPRETATION ECG - MUSE: NORMAL
MAGNESIUM SERPL-MCNC: 2 MG/DL (ref 1.7–2.3)
MCH RBC QN AUTO: 32.4 PG (ref 26.5–33)
MCHC RBC AUTO-ENTMCNC: 32 G/DL (ref 31.5–36.5)
MCV RBC AUTO: 101 FL (ref 78–100)
P AXIS - MUSE: NORMAL DEGREES
PLATELET # BLD AUTO: 105 10E3/UL (ref 150–450)
POTASSIUM SERPL-SCNC: 3.7 MMOL/L (ref 3.4–5.3)
PR INTERVAL - MUSE: NORMAL MS
QRS DURATION - MUSE: 112 MS
QT - MUSE: 434 MS
QTC - MUSE: 471 MS
R AXIS - MUSE: -7 DEGREES
RBC # BLD AUTO: 2.81 10E6/UL (ref 4.4–5.9)
SODIUM SERPL-SCNC: 137 MMOL/L (ref 135–145)
SYSTOLIC BLOOD PRESSURE - MUSE: NORMAL MMHG
T AXIS - MUSE: 0 DEGREES
VENTRICULAR RATE- MUSE: 71 BPM
WBC # BLD AUTO: 4.7 10E3/UL (ref 4–11)

## 2025-05-09 PROCEDURE — 93010 ELECTROCARDIOGRAM REPORT: CPT | Performed by: INTERNAL MEDICINE

## 2025-05-09 PROCEDURE — 99238 HOSP IP/OBS DSCHRG MGMT 30/<: CPT | Performed by: INTERNAL MEDICINE

## 2025-05-09 PROCEDURE — 36415 COLL VENOUS BLD VENIPUNCTURE: CPT | Performed by: INTERNAL MEDICINE

## 2025-05-09 PROCEDURE — 97110 THERAPEUTIC EXERCISES: CPT | Mod: GO

## 2025-05-09 PROCEDURE — 82435 ASSAY OF BLOOD CHLORIDE: CPT | Performed by: INTERNAL MEDICINE

## 2025-05-09 PROCEDURE — 83735 ASSAY OF MAGNESIUM: CPT | Performed by: INTERNAL MEDICINE

## 2025-05-09 PROCEDURE — 93005 ELECTROCARDIOGRAM TRACING: CPT

## 2025-05-09 PROCEDURE — 99232 SBSQ HOSP IP/OBS MODERATE 35: CPT | Performed by: HOSPITALIST

## 2025-05-09 PROCEDURE — 85027 COMPLETE CBC AUTOMATED: CPT | Performed by: INTERNAL MEDICINE

## 2025-05-09 PROCEDURE — 250N000013 HC RX MED GY IP 250 OP 250 PS 637: Performed by: INTERNAL MEDICINE

## 2025-05-09 PROCEDURE — 93005 ELECTROCARDIOGRAM TRACING: CPT | Performed by: INTERNAL MEDICINE

## 2025-05-09 RX ORDER — PRAMIPEXOLE DIHYDROCHLORIDE 0.12 MG/1
0.12 TABLET ORAL AT BEDTIME
Qty: 30 TABLET | Refills: 0 | Status: SHIPPED | OUTPATIENT
Start: 2025-05-09 | End: 2025-05-16 | Stop reason: DRUGHIGH

## 2025-05-09 RX ADMIN — TORSEMIDE 30 MG: 5 TABLET ORAL at 09:24

## 2025-05-09 RX ADMIN — FINASTERIDE 5 MG: 5 TABLET, FILM COATED ORAL at 09:24

## 2025-05-09 RX ADMIN — LAMOTRIGINE 50 MG: 25 TABLET ORAL at 09:24

## 2025-05-09 RX ADMIN — ASPIRIN 81 MG: 81 TABLET, COATED ORAL at 09:24

## 2025-05-09 RX ADMIN — BUSPIRONE HYDROCHLORIDE 30 MG: 15 TABLET ORAL at 09:24

## 2025-05-09 RX ADMIN — ACETAMINOPHEN 650 MG: 325 TABLET ORAL at 12:40

## 2025-05-09 RX ADMIN — BUPROPION HYDROCHLORIDE 300 MG: 150 TABLET, FILM COATED, EXTENDED RELEASE ORAL at 09:24

## 2025-05-09 RX ADMIN — LOSARTAN POTASSIUM 100 MG: 50 TABLET, FILM COATED ORAL at 09:24

## 2025-05-09 ASSESSMENT — ACTIVITIES OF DAILY LIVING (ADL)
ADLS_ACUITY_SCORE: 47

## 2025-05-09 NOTE — PROGRESS NOTES
Care Management Discharge Note    Discharge Date: 05/09/2025       Discharge Disposition: Home, Home Care - [facility] for RN/PT/OT    Discharge Services: Home Care - [facility] for RN/PT/OT    Discharge DME: None    Discharge Transportation: family or friend will provide    Private pay costs discussed: Not applicable    Does the patient's insurance plan have a 3 day qualifying hospital stay waiver?  Yes     Which insurance plan 3 day waiver is available? Alternative insurance waiver    Will the waiver be used for post-acute placement? No    PAS Confirmation Code: NA  Patient/family educated on Medicare website which has current facility and service quality ratings: NA    Education Provided on the Discharge Plan: Yes per team  Persons Notified of Discharge Plans: Patient  Patient/Family in Agreement with the Plan: yes    Handoff Referral Completed: No, handoff not indicated or clinically appropriate    Additional Information:  Patient discharge to home with Home Care - [facility] for RN/PT/OT. Family will transport.    Orders sent to Home Care - [facility]     Margie Lance RN

## 2025-05-09 NOTE — PLAN OF CARE
[id number]    Pt ox4. No pain.  Both femoral sites are good. Left slightly bruise.  Left fore arm bruised but softer than yesterday.  Walking with 1 assist with walker. PT/OT   echo done. Pt was about to be discharged.  Pt urinated than had red jose blood dripping from penis.  Bladder scanned 229 post residual void. No catheter was placed this admission.  Notified Ryanne YANES and Dr. Ayala.  Discharge cancelled.  UA sent. Urology consulted.  Urine has no blood but pt drips blood after.  Pt urinated 325cc bladder scannned 208 post void. CT Urogram done this evening.    Problem: Adult Inpatient Plan of Care  Goal: Plan of Care Review  Description: The Plan of Care Review/Shift note should be completed every shift.  The Outcome Evaluation is a brief statement about your assessment that the patient is improving, declining, or no change.  This information will be displayed automatically on your shiftnote.  Outcome: Progressing     Problem: Heart Failure  Goal: Optimal Coping  Outcome: Progressing   Goal Outcome Evaluation:

## 2025-05-09 NOTE — PLAN OF CARE
Cardiac Rehab Discharge Summary    Reason for therapy discharge:    Discharged to home with outpatient therapy.    Progress towards therapy goal(s). See goals on Care Plan in Epic electronic health record for goal details.  Goals partially met.  Barriers to achieving goals:   discharge from facility.    Therapy recommendation(s):    Continued therapy is recommended.  Rationale/Recommendations:  home w/ support from spouse outpatient cardiac rehab.    SAHARA Barth, OTR/L, 5/9/2025, 11:53 AM

## 2025-05-09 NOTE — PROGRESS NOTES
Cross cover    Pt c/o restless legs for several days and didn t have good sleep.    Try mirapex. Defer to rounding Hospitalist for further management.

## 2025-05-09 NOTE — PROGRESS NOTES
Care Management Follow Up    Length of Stay (days): 2    Expected Discharge Date: 05/09/2025     Concerns to be Addressed: Care progression - discharge planning     Patient plan of care discussed at interdisciplinary rounds: Yes    Anticipated Discharge Disposition: OT rec for home with outpatient cardiac rehab;home with home care occupational therapy;home with assist.     Anticipated Discharge Services: Home care for PT/OT  Anticipated Discharge DME: None    Patient/family educated on Medicare website which has current facility and service quality ratings: NA  Education Provided on the Discharge Plan: Yes per team  Patient/Family in Agreement with the Plan: yes    Referrals Placed by CM/SW:  Yes  Private pay costs discussed: Not applicable    Discussed  Partnership in Safe Discharge Planning  document with patient/family: Yes: patient's wifeAnna    Handoff Completed: No, handoff not indicated or clinically appropriate    Additional Information:  Met with patient and his wife, Anna, at bedside to discuss OT discharge rec for home with outpatient cardiac rehab;home with home care occupational therapy;home with assist.    Patient prefers to return home with home care for PT/OT. Declined RN.   Referral sent    Message sent to update Dr. Ayala    Next Steps: RNCM to follow for medical progression, recommendations, and final discharge plan.     Margie Lance RN

## 2025-05-09 NOTE — PROGRESS NOTES
Reviewed discharge paperwork and medications with pt and his wife.  Prescriptions for new medications faxed to pt's pharmacy for pick-up.  Pt already has follow-up appointments with primary MD and cardiology.  Reviewed post op [facility] education.  Pt has home care orders and referral for cardiac rehab.  Numbers provided in case pt needs to contact them.  Pt's wife verbalized an understanding of all discharge instructions.  Pt was brought down to his wife's car via w/c and left at 1310.  Belongings sent with.

## 2025-05-09 NOTE — TELEPHONE ENCOUNTER
Home Care is calling regarding an established patient with [facility].  Requesting orders from: Lizzy Leiva: RN able to provide verbal orders.  Sending as FYI only.  Is this a request for a temporary pause in the home care episode?  No    Orders Requested    Skilled Nursing  Request for delay in care, service to be provided within 7 days of previously   Service rescheduled to Monday May 12th  Patient request. Does appear that patient was just discharged from the hospital today.  RN gave verbal order: Yes      Contacts       Contact Date/Time Type Contact Phone/Fax    05/09/2025 03:44 PM CDT Phone (Incoming) Urszula LUNDY (Home Care) [phone]          Tanisha Lao, RN

## 2025-05-09 NOTE — PROGRESS NOTES
Place of Service:  [facility]     Reason for follow up: gross hematuria/blood dripping from penis        SUBJECTIVE:  Events: no acute events overnight    Patient reports he has noted some blood at the end of his urinary stream. Has not voided yet this AM, slept well last night.  Denies clots or issues voiding. Denies abdominal pain, n/v/f.    OBJECTIVE:  PHYSICAL EXAM:  Temp: 97.7  F (36.5  C) Temp src: Oral BP: 133/81 Pulse: 73   Resp: 20 SpO2: 99 % O2 Device: None (Room air)    General: NAD, alert, cooperative  Head: normocephalic, without abnormality / atraumatic  Abdomen: soft, non tender, non distended. no suprapubic fullness, no suprapubic tenderness. no CVA tenderness,   Genitourinary: voiding on his own  Skin: No rashes or lesions  Musculoskeletal: moves all four extremities equally; no calf edema or tenderness  Psychological: alert and oriented, answers questions appropriately    LABS:  Creatinine   Date Value Ref Range Status   05/09/2025 0.91 0.67 - 1.17 mg/dL Final   06/03/2021 0.91 0.66 - 1.25 mg/dL Final     WBC   Date Value Ref Range Status   06/03/2021 5.7 4.0 - 11.0 10e9/L Final     WBC Count   Date Value Ref Range Status   05/09/2025 4.7 4.0 - 11.0 10e3/uL Final     Hemoglobin   Date Value Ref Range Status   05/09/2025 9.1 (L) 13.3 - 17.7 g/dL Final   06/03/2021 12.3 (L) 13.3 - 17.7 g/dL Final   ]  Platelet Count   Date Value Ref Range Status   05/09/2025 105 (L) 150 - 450 10e3/uL Final   06/03/2021 184 150 - 450 10e9/L Final       Lab Results: personally reviewed.     ASSESSMENT/PLAN:  Josemanule Edmond is being seen by [facility] for gross hematuria/blood dripping from penis        -  80 yr old male with hx of BPH, now acute onset gross hematuria 5/8/25 after cardiac cath with mitral valve repair 5/7/25. Pt without known trauma.  - Hgb  9.1 today.   - UA benign for infection, 9 RBC.   - okay to continue anticoagulation.   - CT Urogram 5/8 without concern for renal or urothelial  malignancy.   - Will need outpatient follow up with [facility] to include urine cytology and cystoscopy, to be completed 1-2 weeks following discharge  - urology will sign off. Please call with questions or concerns. An e-mail has been sent to our schedulers to help facilitate scheduling a follow up appointment. Clinic information placed in the discharge navigator.    Callie Simons PA-C  [facility]   [phone]

## 2025-05-09 NOTE — PROGRESS NOTES
"[facility] Hospitalist Progress Note  [facility]  Admission date: 5/7/2025    Summary:    80M admitted for elective procedure of mitral valve replacement.  Past medical history significant for hypertension, HFpEF, persistent A-fib, mitral valve insufficiency, GERD, anxiety, cerebral infarction.  Developed gross hematuria which self-resolved and negative CT urogram.  Will follow-up with urology as outpatient for  work-up.     Assessment/Plan    #Gross Hematuria - Developed gross hematuria which self-resolved and negative CT urogram.  Will follow-up with urology as outpatient for  work-up.    #HFpEF - Euvolemic.  Continue PTA torsemide, losartan ,  Toprol     #Persistent A-fib  - Continue PTA Toprol 200 mg oral daily  - resumption of xarelto likely tonight     #Anxiety  #Major depressive disorder  - resume PTA seroque, paxil, lamictal on discharge  - PTA Paxil 30 mg oral daily  - PTA lamotrigine 50 mg oral twice daily    #Insomnia - noted mirapex was was given overnight for RLS.  Apparently helped.  But patient denies RLS symptoms and sounds more like sleep myoclonus.  Either way would not use mirapex as can lead to Augmentation.  Follow-up with PCP.    Anemia and thrombocytopenia - stable.  As seen hematology for this in the past.    Checklist:  Code Status: Full Code    Diet: Regular Diet Adult  Diet     Cardiac Monitoring: ACTIVE order. Indication: Open heart surgery (7 days)    Disposition and Discharge Planning    Barriers:  post-procedural care    Number of days selected below is from a list of system pre-approved options.   Medically Ready for Discharge: Ready Now      System Identified Risk Variables  Auto-populated based on system request.  If more complex management decisions required, to be addressed above.  \"  Clinically Significant Risk Factors                   # Thrombocytopenia: Lowest platelets = 97 in last 2 days, will monitor for bleeding   # Hypertension: Noted on problem list  # " "Heart failure, NOS: heart failure noted on the problem list and last echo with EF 40-50%               # Financial/Environmental Concerns: none         \"    Interval Events/Subjective/Notable results:    No hematuria overnight  Slept well          Objective    Vital signs in last 24 hours  Temp:  [97.6  F (36.4  C)-97.7  F (36.5  C)] 97.7  F (36.5  C)  Pulse:  [73-96] 73  Resp:  [18-20] 20  BP: (101-133)/(60-81) 133/81  SpO2:  [96 %-99 %] 99 % O2 Device: None (Room air)    Weight:   150 lbs 4.8 oz  Body mass index is 22.85 kg/m .    Intake/Output last 3 shifts  I/O last 3 completed shifts:  In: 720 [P.O.:720]  Out: 1845 [Urine:1845]    Physical Exam  General:  Alert, cooperative, no distress    Neurologic:  oriented, facial symmetry preserved, fluent speech.   Psych: calm  HEENT:  Anicteric, MMM  CV: no edema,   Lungs:  Easy respirations  Skin: no rashes noted on exposed skin.    Parr Catheter: Not present  Lines: PRESENT                  Medical Decision Making               Duarte Ayala MD, [facility]  Internal Medicine Hospitalist    "

## 2025-05-09 NOTE — PLAN OF CARE
Problem: Adult Inpatient Plan of Care  Goal: Readiness for Transition of Care  Outcome: Progressing     Problem: Heart Failure  Goal: Optimal Coping  Outcome: Progressing   Goal Outcome Evaluation:       VSS on RA. Pt didn't have any more bleeding from meatus. Pramipexole ordered for pt.'s restlessness while sleeping with effect. Pt slept majority of night. SBA w/ walker. No pain reported. K and Mg protocols ran, recheck in AM.

## 2025-05-10 ENCOUNTER — PATIENT OUTREACH (OUTPATIENT)
Dept: CARE COORDINATION | Facility: CLINIC | Age: 80
End: 2025-05-10
Payer: COMMERCIAL

## 2025-05-10 NOTE — PROGRESS NOTES
[facility]: Transitions of Care Outreach  Chief Complaint   Patient presents with    Clinic Care Coordination - Post Hospital       Most Recent Admission Date: 5/7/2025   Most Recent Admission Diagnosis: Mitral valve insufficiency, unspecified etiology - I34.0     Most Recent Discharge Date: 5/9/2025   Most Recent Discharge Diagnosis: Mitral valve insufficiency, unspecified etiology - I34.0  Status post implantation of mitral valve leaflet clip - Z98.890, Z95.818  Cardiomyopathy, unspecified type (H) - I42.9  Acute on chronic heart failure with preserved ejection fraction (H) - I50.33  Generalized muscle weakness - M62.81  Restless legs syndrome - G25.81     Transitions of Care Assessment    Discharge Assessment  How are you doing now that you are home?: I am doing good.  How are your symptoms? (Red Flag symptoms escalate to triage hotline per guidelines): Improved  Do you know how to contact your clinic care team if you have future questions or changes to your health status? : Yes  Does the patient have their discharge instructions? : Yes  Does the patient have questions regarding their discharge instructions? : No  Were you started on any new medications or were there changes to any of your previous medications? : Yes  Does the patient have all of their medications?: Yes  Do you have questions regarding any of your medications? : No    Post-op ([facility] CTA Only)  If the patient had a surgery or procedure, do they have any questions for a nurse?: No         CCRC Explained and offered Care Coordination support to eligible patients: Yes    Patient accepted? No    Follow up Plan     Discharge Follow-Up  Discharge follow up appointment scheduled in alignment with recommended follow up timeframe or Transitions of Risk Category? (Low = within 30 days; Moderate= within 14 days; High= within 7 days): Yes  Discharge Follow Up Appointment Date: 05/14/25  Discharge Follow Up Appointment Scheduled with?:  Specialty Care Provider    Future Appointments   Date Time Provider Department Center   5/14/2025  2:50 PM [facility]   5/16/2025  3:00 PM Angeles Sanders APRN CNP SPTC [facility]   5/21/2025  1:30 PM [facility] CARD RHB 2 ROGERS SALAMANCA [facility]   6/19/2025 10:00 AM JN HC ECHO STAFF JNCVTS [facility]   6/25/2025  2:30 PM HCC LAB SJN [facility]   6/25/2025  2:50 PM [facility]   10/1/2025  1:15 PM [facility]       Outpatient Plan as outlined on AVS reviewed with patient.    For any urgent concerns, please contact our 24 hour nurse triage line: [phone] ([phone])       MEGAN Best  [phone]  Connected Care Resource [facility]

## 2025-05-14 ENCOUNTER — OFFICE VISIT (OUTPATIENT)
Dept: CARDIOLOGY | Facility: CLINIC | Age: 80
End: 2025-05-14
Payer: COMMERCIAL

## 2025-05-14 VITALS
RESPIRATION RATE: 16 BRPM | SYSTOLIC BLOOD PRESSURE: 102 MMHG | BODY MASS INDEX: 23.43 KG/M2 | HEART RATE: 80 BPM | WEIGHT: 154.6 LBS | HEIGHT: 68 IN | DIASTOLIC BLOOD PRESSURE: 64 MMHG

## 2025-05-14 DIAGNOSIS — I10 BENIGN ESSENTIAL HYPERTENSION: ICD-10-CM

## 2025-05-14 DIAGNOSIS — I34.0 MITRAL VALVE INSUFFICIENCY, UNSPECIFIED ETIOLOGY: ICD-10-CM

## 2025-05-14 DIAGNOSIS — I38 VALVULAR HEART DISEASE: ICD-10-CM

## 2025-05-14 DIAGNOSIS — R29.6 FALLS FREQUENTLY: ICD-10-CM

## 2025-05-14 DIAGNOSIS — N40.1 HYPERTROPHY OF PROSTATE WITH URINARY OBSTRUCTION: ICD-10-CM

## 2025-05-14 DIAGNOSIS — N13.8 HYPERTROPHY OF PROSTATE WITH URINARY OBSTRUCTION: ICD-10-CM

## 2025-05-14 DIAGNOSIS — I48.11 LONGSTANDING PERSISTENT ATRIAL FIBRILLATION (H): ICD-10-CM

## 2025-05-14 DIAGNOSIS — I50.33 ACUTE ON CHRONIC HEART FAILURE WITH PRESERVED EJECTION FRACTION (H): Primary | ICD-10-CM

## 2025-05-14 DIAGNOSIS — F10.21 ALCOHOLISM IN REMISSION (H): ICD-10-CM

## 2025-05-14 DIAGNOSIS — R53.1 GENERALIZED WEAKNESS: ICD-10-CM

## 2025-05-14 LAB
ANION GAP SERPL CALCULATED.3IONS-SCNC: 9 MMOL/L (ref 7–15)
BUN SERPL-MCNC: 26.7 MG/DL (ref 8–23)
CALCIUM SERPL-MCNC: 9.4 MG/DL (ref 8.8–10.4)
CHLORIDE SERPL-SCNC: 95 MMOL/L (ref 98–107)
CREAT SERPL-MCNC: 0.89 MG/DL (ref 0.67–1.17)
EGFRCR SERPLBLD CKD-EPI 2021: 86 ML/MIN/1.73M2
GLUCOSE SERPL-MCNC: 85 MG/DL (ref 70–99)
HCO3 SERPL-SCNC: 27 MMOL/L (ref 22–29)
POTASSIUM SERPL-SCNC: 4.7 MMOL/L (ref 3.4–5.3)
SODIUM SERPL-SCNC: 131 MMOL/L (ref 135–145)

## 2025-05-14 PROCEDURE — 36415 COLL VENOUS BLD VENIPUNCTURE: CPT | Performed by: INTERNAL MEDICINE

## 2025-05-14 PROCEDURE — 80048 BASIC METABOLIC PNL TOTAL CA: CPT | Performed by: INTERNAL MEDICINE

## 2025-05-14 NOTE — PROGRESS NOTES
HEART CARE ENCOUNTER NOTE       [facility]  [phone]    Assessment/Recommendations   Assessment:  Severe mitral regurgitation - s/p CHANTAL on 5/7/25, using a [place] ACE device x 1                  - breathing has improved and he is sleeping better  Severe tricuspid regurgitation - 4+ on procedural AD  Acute on chronic heart failure with mildly reduced ejection fraction, NYHA class II - CVP 18 and LA pressure 29 before clip and 22 after clip                  - improved with change to torsemide  Hypertension - BP today is controlled  Hx of CVA - secondary to carotid artery stenosis  Longstanding persistent atrial fibrillation - rate controlled and asymptomatic.  [id number] is 6 for age greater than 75, heart failure, hypertension and previous CVA.  HAS-BLED is 3 for previous CVA, age and bleeding disposition.  He is not a good candidate for long-term anticoagulation due to easy bruising/bleeding, gait imbalance and falls.  BPH managed on finasteride and doxazosin  Gross hematuria - resolved; he canceled his visit with urology  Anxiety  Probable RLS - currently using pramipexole and says it's helping    Plan:  Cardiac rehab on May 21 in [place]  Okay to resume all activities at this time with no restrictions, including driving  Check BMP today  Continue torsemide 30 mg daily for now, unless kidney function has declined, then will down titrate  Discussed watchman with patient and sent him home with brochure so he can think about it in the future  He has a visit with the psychiatry team on Friday and I encouraged him to discuss his jerky legs and the pramipexole  Okay to have dental impressions at any time, but would wait for permanent implants until 6 months post procedure  Repeat echo on June 19 and 1 month follow up visit on June 25    Thank you for the opportunity to participate in the care of Josemanuel PAT Adamarismaniveronica. It's been a pleasure working with him.  Please do not hesitate to call with any  questions or concerns.      The longitudinal plan of care for valvular heart disease, hypertension, atrial fibrillation and heart failure was addressed during this visit.  Due to added complexity of care, we will continue to support Gavin and the subsequent management of this condition(s) and with the ongoing continuity of care of this condition(s).         History of Present Illness/Subjective    I had the opportunity to see Josemanuel Edmond at the [facility] for his 1 week follow up visit after transcatheter [id number] repair of the mitral valve.  His wife accompanies him to the visit today.    Josemanuel is an 80-year-old gentleman who has a past medical history significant for valvular disease including severe mitral regurgitation and severe tricuspid regurgitation and moderate AI, chronic diastolic heart failure, persistent atrial fibrillation, hypertension, RBBB, GERD, hyperlipidemia, history of EtOH abuse in remission, benzodiazepine dependence and cerebral infarction due to occlusion/stenosis of carotid artery     He has been followed by Dr. Chapin's team in [place] and was recently found to have progression of his valvular disease.  He was referred to structural heart for further evaluation of his valves.  He underwent further workup and evaluation by multidisciplinary team and was deemed a good candidate for CHANTAL for his mitral valve.      He had procedure last week and has been home since Thursday.  He is doing well with no major complaints.  He thinks his breathing is better and he is not as winded when he walks.  His wife thinks he is sleeping better with the medications for his jerky legs.  He does not complain about dizziness or lightheadedness.  He has no chest pain.  He has mild dry mouth, but this is no worse than when he was on the Lasix.    Josemanuel PAT Feli denies exertional chest discomfort, palpitations, shortness of breath at rest, PND, orthopnea, pre-syncope or syncope.  Josemanuel STEWART  "Balthazor also denies any recent weight loss, changes in appetite, nausea or vomiting.   ____________________________________________________________  Echo from 5/8/25 (report reviewed):  Interpretation Summary     1. The left ventricle is normal in size with mild concentric left ventricular  hypertrophy.  - Left ventricular function is decreased. The ejection fraction is 45-50%  (mildly reduced).  - There is mild global hypokinesia of the left ventricle.  2. The right ventricle is mildly dilated with normal systolic function.  3. Both atria are severely dilated  4. There is a PHILIP mitral valve repair device securetly attached to the  anterior and posterior leaflets of the mitral valve. Mild regurgitation is  present.  5. There is mild to moderate aortic valve regurgitation  6. Very severe (5+) tricuspid valve regurgitation is present  7. Estimated right ventricular systolic pressure is normal.  8. Compared to the AD of yesterday the left ventricular systolic function  appears slightly less vigorous.         Physical Examination Review of Systems   Vitals: /64 (BP Location: Left arm, Patient Position: Sitting, Cuff Size: Adult Regular)   Pulse 80   Resp 16   Ht 1.727 m (5' 8\")   Wt 70.1 kg (154 lb 9.6 oz)   BMI 23.51 kg/m    BMI= Body mass index is 23.51 kg/m .  Wt Readings from Last 3 Encounters:   05/14/25 70.1 kg (154 lb 9.6 oz)   05/09/25 68.2 kg (150 lb 4.8 oz)   05/06/25 71.7 kg (158 lb)       General Appearance:   Alert, cooperative and in no acute distress   ENT/Mouth: membranes moist, no oral lesions or bleeding gums.      EYES:  no scleral icterus, normal conjunctivae   Neck: Supple without lymphadenopathy.  Thyroid not visualized   Chest/Lungs:   lungs are clear to auscultation, no rales or wheezing   Cardiovascular:   Irregularly irregular. Normal first and second heart sounds with no murmurs, rubs, or gallops; the carotid, radial and posterior tibial pulses are intact, no edema bilaterally "    Abdomen:  Soft and nontender. Bowel sounds are present in all quadrants   Extremities: no cyanosis or clubbing.  Puncture site is soft and nontender with a moderate amount of bruising bilaterally.  No hematoma   Skin: no xanthelasma, warm.    Neurologic: normal gait, normal  bilateral, no tremors   Psychiatric: Normal mood and affect       Please refer above for cardiac ROS details.      Medical History  Surgical History Family History Social History   Past Medical History:   Diagnosis Date    Acute on chronic diastolic (congestive) heart failure (H) 11/29/2023    Arthritis 2005    Benign neoplasm of prostate 2000    Benign Prostate Nodule    Depressive disorder     past condition    Infection due to 2019 novel coronavirus 09/27/2021    Mitral regurgitation     S/P knee replacement 11/06/2012    S/P left knee arthroscopy 08/15/2011     Past Surgical History:   Procedure Laterality Date    ABDOMEN SURGERY  2003    ARTHROPLASTY KNEE Right 10/12/2018    Procedure: ARTHROPLASTY KNEE;  Right Total Knee Arthroplasty;  Surgeon: Jeb Peralta MD;  Location: [facility]    ARTHROPLASTY REVISION HIP Left 5/25/2023    Procedure: Revision total hip arthroplasty, left;  Surgeon: Chandler Leiva MD;  Location: [facility]    BIOPSY  2007    COLONOSCOPY N/A 12/10/2015    Procedure: COMBINED COLONOSCOPY, SINGLE OR MULTIPLE BIOPSY/POLYPECTOMY BY BIOPSY;  Surgeon: Jyoti Figueroa MD;  Location: [facility] GI    CV CORONARY ANGIOGRAM N/A 4/1/2025    Procedure: Coronary Angiogram;  Surgeon: Tae Segovia MD;  Location: [facility] CATH LAB CV    CV LEFT HEART CATH N/A 4/1/2025    Procedure: Left Heart Catheterization;  Surgeon: Tae Segovia MD;  Location: [facility] CATH LAB CV    CV RIGHT HEART CATH MEASUREMENTS RECORDED N/A 4/1/2025    Procedure: Right Heart Catheterization;  Surgeon: Tae Segovia MD;  Location: [facility] CATH LAB CV    JOINT REPLACEMENT, HIP RT/LT  10/2007    Joint Replacement Hip LT    JOINT  REPLACEMTN, KNEE RT/LT  2011    Joint Replacement knee /LT, [facility]    LAPAROSCOPIC HERNIORRHAPHY INGUINAL BILATERAL Bilateral 2018    Procedure: LAPAROSCOPIC HERNIORRHAPHY INGUINAL BILATERAL;  Laparoscopic bilateral inguinal hernia repair;  Surgeon: Josemanuel Resendiz MD;  Location: [facility]    PHACOEMULSIFICATION WITH STANDARD INTRAOCULAR LENS IMPLANT Right 2021    Procedure: Cataract Removal with Implant;  Surgeon: Regan Kaufman MD;  Location: [facility]    PHACOEMULSIFICATION WITH STANDARD INTRAOCULAR LENS IMPLANT Left 2021    Procedure: Cataract Removal with Implant;  Surgeon: Regan Kaufman MD;  Location: [facility]    SURGICAL HISTORY OF -   1999    Umbilical Herniorrhaphy with mesh    SURGICAL HISTORY OF -  Left 2017    Thoracotomy and drainage of empyema    TRANSCATHETER MITRAL VALVE REPAIR N/A 2025    Procedure: Transcatheter Mitral Valve Repair with PHILIP device;  Surgeon: Tae Segovia MD;  Location: [facility] CATH LAB CV     Family History   Problem Relation Age of Onset    Depression Mother     Cerebrovascular Disease Mother     Breast Cancer Mother     Neurologic Disorder Mother         parkinsons    Parkinsonism Mother     Respiratory Father         emphyzema    Diabetes Brother     Social History     Socioeconomic History    Marital status:      Spouse name: Not on file    Number of children: Not on file    Years of education: Not on file    Highest education level: Not on file   Occupational History    Not on file   Tobacco Use    Smoking status: Former     Current packs/day: 0.00     Average packs/day: 1 pack/day for 17.8 years (17.8 ttl pk-yrs)     Types: Cigarettes     Start date: 1958     Quit date: 10/13/1975     Years since quittin.6    Smokeless tobacco: Never   Vaping Use    Vaping status: Never Used   Substance and Sexual Activity    Alcohol use: Not Currently    Drug use: No    Sexual activity: Yes     Partners: Female     Birth  control/protection: None     Comment:  51 years   Other Topics Concern    Parent/sibling w/ CABG, MI or angioplasty before 65F 55M? No   Social History Narrative    Not on file     Social Drivers of Health     Financial Resource Strain: Low Risk  (5/7/2025)    Financial Resource Strain     Within the past 12 months, have you or your family members you live with been unable to get utilities (heat, electricity) when it was really needed?: No   Food Insecurity: Low Risk  (5/7/2025)    Food Insecurity     Within the past 12 months, did you worry that your food would run out before you got money to buy more?: No     Within the past 12 months, did the food you bought just not last and you didn t have money to get more?: No   Transportation Needs: Low Risk  (5/7/2025)    Transportation Needs     Within the past 12 months, has lack of transportation kept you from medical appointments, getting your medicines, non-medical meetings or appointments, work, or from getting things that you need?: No   Physical Activity: Unknown (12/19/2024)    Exercise Vital Sign     Days of Exercise per Week: 0 days     Minutes of Exercise per Session: Not on file   Stress: No Stress Concern Present (12/19/2024)    [employer] [place] [employer] - Occupational Stress Questionnaire     Feeling of Stress : Only a little   Social Connections: Unknown (12/19/2024)    Social Connection and Isolation Panel [NHANES]     Frequency of Communication with Friends and Family: Not on file     Frequency of Social Gatherings with Friends and Family: Once a week     Attends Yarsanism Services: Not on file     Active Member of Clubs or Organizations: Not on file     Attends Club or Organization Meetings: Not on file     Marital Status: Not on file   Interpersonal Safety: Low Risk  (5/7/2025)    Interpersonal Safety     Do you feel physically and emotionally safe where you currently live?: Yes     Within the past 12 months, have you been hit,  slapped, kicked or otherwise physically hurt by someone?: No     Within the past 12 months, have you been humiliated or emotionally abused in other ways by your partner or ex-partner?: No   Housing Stability: Low Risk  (5/7/2025)    Housing Stability     Do you have housing? : Yes     Are you worried about losing your housing?: No          Medications  Allergies   Current Outpatient Medications   Medication Sig Dispense Refill    buPROPion (WELLBUTRIN XL) 300 MG 24 hr tablet Take 1 tablet (300 mg) by mouth every morning. 90 tablet 1    busPIRone HCl (BUSPAR) 30 MG tablet TAKE 1 TABLET TWICE A  tablet 1    doxazosin (CARDURA) 4 MG tablet Take 1 tablet (4 mg) by mouth at bedtime. 90 tablet 1    finasteride (PROSCAR) 5 MG tablet TAKE 1 TABLET BY MOUTH EVERY DAY 90 tablet 3    lamoTRIgine (LAMICTAL) 25 MG tablet Take 2 tablets (50 mg) by mouth 2 times daily. 360 tablet 0    losartan (COZAAR) 50 MG tablet Take 2 tablets (100 mg) by mouth daily.      metoprolol succinate ER (TOPROL XL) 200 MG 24 hr tablet Take 1 tablet (200 mg) by mouth every evening. 90 tablet 2    multivitamin (ONE-DAILY) tablet Take 1 tablet by mouth daily 30 tablet 0    PARoxetine (PAXIL) 30 MG tablet Take 30 mg by mouth at bedtime.      pramipexole (MIRAPEX) 0.125 MG tablet Take 1 tablet (0.125 mg) by mouth at bedtime. 30 tablet 0    QUEtiapine (SEROQUEL) 25 MG tablet Take 1 tablet (25 mg) by mouth at bedtime. 30 tablet 1    rivaroxaban ANTICOAGULANT (XARELTO ANTICOAGULANT) 20 MG TABS tablet Take 1 tablet (20 mg) by mouth daily (with dinner). 90 tablet 2    torsemide (DEMADEX) 10 MG tablet Take 3 tablets (30 mg) by mouth daily. 90 tablet 3    Allergies   Allergen Reactions    Bactrim [Sulfamethoxazole-Trimethoprim] Nausea and Vomiting         Lab Results    Chemistry/lipid CBC Cardiac Enzymes/BNP/TSH/INR   Recent Labs   Lab Test 10/24/24  1033   CHOL 106   HDL 31*   LDL 60   TRIG 76     Recent Labs   Lab Test 10/24/24  1033 11/30/23  1130  "11/04/22  1027   LDL 60 64 88     Recent Labs   Lab Test 05/09/25  0418      POTASSIUM 3.7   CHLORIDE 99   CO2 32*   GLC 95   BUN 14.6   CR 0.91   GFRESTIMATED 84   LUIS 8.3*     Recent Labs   Lab Test 05/09/25  0418 05/08/25  0436 05/07/25  1004   CR 0.91 0.81 0.83     No results for input(s): \"A1C\" in the last 58107 hours. Recent Labs   Lab Test 05/09/25  0418   WBC 4.7   HGB 9.1*   HCT 28.4*   *   *     Recent Labs   Lab Test 05/09/25  0418 05/08/25  0436 05/07/25  1004   HGB 9.1* 9.3* 9.9*    No results for input(s): \"TROPONINI\" in the last 35224 hours.  Recent Labs   Lab Test 05/06/25  0834 11/30/23  1130 11/24/23  1340 04/06/23  0948 12/20/17  1315   NTBNPI  --   --  4,896* 1,329 957*   NTBNP 1,642 1,862*  --   --   --      Recent Labs   Lab Test 07/12/24  1552   TSH 2.23     Recent Labs   Lab Test 05/06/25  0834 11/04/22  1027   INR 1.25* 1.39*          This note has been dictated using voice recognition software. Any grammatical or context distortions are unintentional and inherent to the software.                "

## 2025-05-14 NOTE — LETTER
May 14, 2025      Josemanuel Edmond  [address]   [facility] [address]        To Whom It May Concern:    Josemanuel Edmond was seen in our clinic today.  He just recently underwent a transcatheter [id number] repair of his mitral valve, using a Chelsea ACE device.  Prior to this procedure he had teeth pulled and needs dental impressions done for permanent dental implants.  He may have these dental impressions at this time.  If any other dental work is needed, it would be best to wait 6 months after the procedure and he will need antibiotics prior    Sincerely,        SYDNEY DE LEON PA-C    Electronically signed

## 2025-05-14 NOTE — PATIENT INSTRUCTIONS
Josemanuel Edmond,    It was a pleasure to see you today in the clinic regarding your recent mitral valve repair procedure.     My recommendations after this visit include:     - no changes to medication for now  - I'll update you after I see the lab results  - ok to resume all activities at this time without restrictions    - may want to consider the Watchman device in order to get off Xarelto eventually    My treatment team includes: myself, Sierra Lorenzo PA-C, Dr. Carrillo and Dr. Segovia    You should followup with me or a member of my treatment team on June 25    **Remember you will need prophylactic antibiotics for all dental visits in the future.  You can get the Rx from your dentist, your PCP or from us.  If possible, still refrain from going to the dentist until 6 months or more after your valve replacement      If you have questions or concerns, please call using the numbers below:      Valve Clinic Phone   [phone]    After Hours/Scheduling  [phone]    Otherwise you can dial the nurse directly at:    Jhonatan Neville RN  [phone]                Maggi Bond RN  [phone]

## 2025-05-14 NOTE — LETTER
5/14/2025    Lizzy Leiva MD  [address]  [facility] [address]    RE: Josemanuel Edmond       Dear Colleague,     I had the pleasure of seeing Josemanuel Edmond in the [facility].  HEART CARE ENCOUNTER NOTE       [facility]  [phone]    Assessment/Recommendations   Assessment:  Severe mitral regurgitation - s/p CHANTAL on 5/7/25, using a Chelsea ACE device x 1                  - breathing has improved and he is sleeping better  Severe tricuspid regurgitation - 4+ on procedural AD  Acute on chronic heart failure with mildly reduced ejection fraction, NYHA class II - CVP 18 and LA pressure 29 before clip and 22 after clip                  - improved with change to torsemide  Hypertension - BP today is controlled  Hx of CVA - secondary to carotid artery stenosis  Longstanding persistent atrial fibrillation - rate controlled and asymptomatic.  [id number] is 6 for age greater than 75, heart failure, hypertension and previous CVA.  HAS-BLED is 3 for previous CVA, age and bleeding disposition.  He is not a good candidate for long-term anticoagulation due to easy bruising/bleeding, gait imbalance and falls.  BPH managed on finasteride and doxazosin  Gross hematuria - resolved; he canceled his visit with urology  Anxiety  Probable RLS - currently using pramipexole and says it's helping    Plan:  Cardiac rehab on May 21 in [place]  Okay to resume all activities at this time with no restrictions, including driving  Check BMP today  Continue torsemide 30 mg daily for now, unless kidney function has declined, then will down titrate  Discussed watchman with patient and sent him home with brochure so he can think about it in the future  He has a visit with the psychiatry team on Friday and I encouraged him to discuss his jerky legs and the pramipexole  Okay to have dental impressions at any time, but would wait for permanent implants until 6 months post procedure  Repeat  echo on June 19 and 1 month follow up visit on June 25    Thank you for the opportunity to participate in the care of Josemanuel Edmond. It's been a pleasure working with him.  Please do not hesitate to call with any questions or concerns.      The longitudinal plan of care for valvular heart disease, hypertension, atrial fibrillation and heart failure was addressed during this visit.  Due to added complexity of care, we will continue to support Gavin and the subsequent management of this condition(s) and with the ongoing continuity of care of this condition(s).         History of Present Illness/Subjective    I had the opportunity to see Josemanuel Edmond at the [facility] for his 1 week follow up visit after transcatheter [id number] repair of the mitral valve.  His wife accompanies him to the visit today.    Josemanuel is an 80-year-old gentleman who has a past medical history significant for valvular disease including severe mitral regurgitation and severe tricuspid regurgitation and moderate AI, chronic diastolic heart failure, persistent atrial fibrillation, hypertension, RBBB, GERD, hyperlipidemia, history of EtOH abuse in remission, benzodiazepine dependence and cerebral infarction due to occlusion/stenosis of carotid artery     He has been followed by Dr. Chapin's team in [place] and was recently found to have progression of his valvular disease.  He was referred to structural heart for further evaluation of his valves.  He underwent further workup and evaluation by multidisciplinary team and was deemed a good candidate for CHANTAL for his mitral valve.      He had procedure last week and has been home since Thursday.  He is doing well with no major complaints.  He thinks his breathing is better and he is not as winded when he walks.  His wife thinks he is sleeping better with the medications for his jerky legs.  He does not complain about dizziness or lightheadedness.  He has no chest pain.  He has  "mild dry mouth, but this is no worse than when he was on the Lasix.    Josemanuel Edmond denies exertional chest discomfort, palpitations, shortness of breath at rest, PND, orthopnea, pre-syncope or syncope.  Josemanuel Edmond also denies any recent weight loss, changes in appetite, nausea or vomiting.   ____________________________________________________________  Echo from 5/8/25 (report reviewed):  Interpretation Summary     1. The left ventricle is normal in size with mild concentric left ventricular  hypertrophy.  - Left ventricular function is decreased. The ejection fraction is 45-50%  (mildly reduced).  - There is mild global hypokinesia of the left ventricle.  2. The right ventricle is mildly dilated with normal systolic function.  3. Both atria are severely dilated  4. There is a PHILIP mitral valve repair device securetly attached to the  anterior and posterior leaflets of the mitral valve. Mild regurgitation is  present.  5. There is mild to moderate aortic valve regurgitation  6. Very severe (5+) tricuspid valve regurgitation is present  7. Estimated right ventricular systolic pressure is normal.  8. Compared to the AD of yesterday the left ventricular systolic function  appears slightly less vigorous.         Physical Examination Review of Systems   Vitals: /64 (BP Location: Left arm, Patient Position: Sitting, Cuff Size: Adult Regular)   Pulse 80   Resp 16   Ht 1.727 m (5' 8\")   Wt 70.1 kg (154 lb 9.6 oz)   BMI 23.51 kg/m    BMI= Body mass index is 23.51 kg/m .  Wt Readings from Last 3 Encounters:   05/14/25 70.1 kg (154 lb 9.6 oz)   05/09/25 68.2 kg (150 lb 4.8 oz)   05/06/25 71.7 kg (158 lb)       General Appearance:   Alert, cooperative and in no acute distress   ENT/Mouth: membranes moist, no oral lesions or bleeding gums.      EYES:  no scleral icterus, normal conjunctivae   Neck: Supple without lymphadenopathy.  Thyroid not visualized   Chest/Lungs:   lungs are clear to auscultation, " no rales or wheezing   Cardiovascular:   Irregularly irregular. Normal first and second heart sounds with no murmurs, rubs, or gallops; the carotid, radial and posterior tibial pulses are intact, no edema bilaterally    Abdomen:  Soft and nontender. Bowel sounds are present in all quadrants   Extremities: no cyanosis or clubbing.  Puncture site is soft and nontender with a moderate amount of bruising bilaterally.  No hematoma   Skin: no xanthelasma, warm.    Neurologic: normal gait, normal  bilateral, no tremors   Psychiatric: Normal mood and affect       Please refer above for cardiac ROS details.      Medical History  Surgical History Family History Social History   Past Medical History:   Diagnosis Date     Acute on chronic diastolic (congestive) heart failure (H) 11/29/2023     Arthritis 2005     Benign neoplasm of prostate 2000    Benign Prostate Nodule     Depressive disorder     past condition     Infection due to 2019 novel coronavirus 09/27/2021     Mitral regurgitation      S/P knee replacement 11/06/2012     S/P left knee arthroscopy 08/15/2011     Past Surgical History:   Procedure Laterality Date     ABDOMEN SURGERY  2003     ARTHROPLASTY KNEE Right 10/12/2018    Procedure: ARTHROPLASTY KNEE;  Right Total Knee Arthroplasty;  Surgeon: Jeb Peralta MD;  Location: [facility]     ARTHROPLASTY REVISION HIP Left 5/25/2023    Procedure: Revision total hip arthroplasty, left;  Surgeon: Chandler Leiva MD;  Location: [facility]     BIOPSY  2007     COLONOSCOPY N/A 12/10/2015    Procedure: COMBINED COLONOSCOPY, SINGLE OR MULTIPLE BIOPSY/POLYPECTOMY BY BIOPSY;  Surgeon: Jyoti Figueroa MD;  Location: [facility] GI     CV CORONARY ANGIOGRAM N/A 4/1/2025    Procedure: Coronary Angiogram;  Surgeon: Tae Segovia MD;  Location: [facility] LAB CV     CV LEFT HEART CATH N/A 4/1/2025    Procedure: Left Heart Catheterization;  Surgeon: Tae Segovia MD;  Location: [facility] CATH LAB CV     CV  RIGHT HEART CATH MEASUREMENTS RECORDED N/A 4/1/2025    Procedure: Right Heart Catheterization;  Surgeon: Tae Segovia MD;  Location: [facility] LAB CV     JOINT REPLACEMENT, HIP RT/LT  10/2007    Joint Replacement Hip LT     JOINT REPLACEMTN, KNEE RT/LT  08/2011    Joint Replacement knee /LT, [facility]     LAPAROSCOPIC HERNIORRHAPHY INGUINAL BILATERAL Bilateral 04/24/2018    Procedure: LAPAROSCOPIC HERNIORRHAPHY INGUINAL BILATERAL;  Laparoscopic bilateral inguinal hernia repair;  Surgeon: Josemanuel Resendiz MD;  Location: [facility]     PHACOEMULSIFICATION WITH STANDARD INTRAOCULAR LENS IMPLANT Right 01/06/2021    Procedure: Cataract Removal with Implant;  Surgeon: Regan Kaufman MD;  Location: [facility]     PHACOEMULSIFICATION WITH STANDARD INTRAOCULAR LENS IMPLANT Left 02/17/2021    Procedure: Cataract Removal with Implant;  Surgeon: Regan Kaufman MD;  Location: [facility]     SURGICAL HISTORY OF -   12/01/1999    Umbilical Herniorrhaphy with mesh     SURGICAL HISTORY OF -  Left 11/2017    Thoracotomy and drainage of empyema     TRANSCATHETER MITRAL VALVE REPAIR N/A 5/7/2025    Procedure: Transcatheter Mitral Valve Repair with PHILIP device;  Surgeon: Tae Segovia MD;  Location: [facility] CV     Family History   Problem Relation Age of Onset     Depression Mother      Cerebrovascular Disease Mother      Breast Cancer Mother      Neurologic Disorder Mother         parkinsons     Parkinsonism Mother      Respiratory Father         emphyzema     Diabetes Brother     Social History     Socioeconomic History     Marital status:      Spouse name: Not on file     Number of children: Not on file     Years of education: Not on file     Highest education level: Not on file   Occupational History     Not on file   Tobacco Use     Smoking status: Former     Current packs/day: 0.00     Average packs/day: 1 pack/day for 17.8 years (17.8 ttl pk-yrs)     Types: Cigarettes     Start date: 1/5/1958      Quit date: 10/13/1975     Years since quittin.6     Smokeless tobacco: Never   Vaping Use     Vaping status: Never Used   Substance and Sexual Activity     Alcohol use: Not Currently     Drug use: No     Sexual activity: Yes     Partners: Female     Birth control/protection: None     Comment:  51 years   Other Topics Concern     Parent/sibling w/ CABG, MI or angioplasty before 65F 55M? No   Social History Narrative     Not on file     Social Drivers of Health     Financial Resource Strain: Low Risk  (2025)    Financial Resource Strain      Within the past 12 months, have you or your family members you live with been unable to get utilities (heat, electricity) when it was really needed?: No   Food Insecurity: Low Risk  (2025)    Food Insecurity      Within the past 12 months, did you worry that your food would run out before you got money to buy more?: No      Within the past 12 months, did the food you bought just not last and you didn t have money to get more?: No   Transportation Needs: Low Risk  (2025)    Transportation Needs      Within the past 12 months, has lack of transportation kept you from medical appointments, getting your medicines, non-medical meetings or appointments, work, or from getting things that you need?: No   Physical Activity: Unknown (2024)    Exercise Vital Sign      Days of Exercise per Week: 0 days      Minutes of Exercise per Session: Not on file   Stress: No Stress Concern Present (2024)    [employer] [place] [employer] - Occupational Stress Questionnaire      Feeling of Stress : Only a little   Social Connections: Unknown (2024)    Social Connection and Isolation Panel [NHANES]      Frequency of Communication with Friends and Family: Not on file      Frequency of Social Gatherings with Friends and Family: Once a week      Attends Jehovah's witness Services: Not on file      Active Member of Clubs or Organizations: Not on file       Attends Club or Organization Meetings: Not on file      Marital Status: Not on file   Interpersonal Safety: Low Risk  (5/7/2025)    Interpersonal Safety      Do you feel physically and emotionally safe where you currently live?: Yes      Within the past 12 months, have you been hit, slapped, kicked or otherwise physically hurt by someone?: No      Within the past 12 months, have you been humiliated or emotionally abused in other ways by your partner or ex-partner?: No   Housing Stability: Low Risk  (5/7/2025)    Housing Stability      Do you have housing? : Yes      Are you worried about losing your housing?: No          Medications  Allergies   Current Outpatient Medications   Medication Sig Dispense Refill     buPROPion (WELLBUTRIN XL) 300 MG 24 hr tablet Take 1 tablet (300 mg) by mouth every morning. 90 tablet 1     busPIRone HCl (BUSPAR) 30 MG tablet TAKE 1 TABLET TWICE A  tablet 1     doxazosin (CARDURA) 4 MG tablet Take 1 tablet (4 mg) by mouth at bedtime. 90 tablet 1     finasteride (PROSCAR) 5 MG tablet TAKE 1 TABLET BY MOUTH EVERY DAY 90 tablet 3     lamoTRIgine (LAMICTAL) 25 MG tablet Take 2 tablets (50 mg) by mouth 2 times daily. 360 tablet 0     losartan (COZAAR) 50 MG tablet Take 2 tablets (100 mg) by mouth daily.       metoprolol succinate ER (TOPROL XL) 200 MG 24 hr tablet Take 1 tablet (200 mg) by mouth every evening. 90 tablet 2     multivitamin (ONE-DAILY) tablet Take 1 tablet by mouth daily 30 tablet 0     PARoxetine (PAXIL) 30 MG tablet Take 30 mg by mouth at bedtime.       pramipexole (MIRAPEX) 0.125 MG tablet Take 1 tablet (0.125 mg) by mouth at bedtime. 30 tablet 0     QUEtiapine (SEROQUEL) 25 MG tablet Take 1 tablet (25 mg) by mouth at bedtime. 30 tablet 1     rivaroxaban ANTICOAGULANT (XARELTO ANTICOAGULANT) 20 MG TABS tablet Take 1 tablet (20 mg) by mouth daily (with dinner). 90 tablet 2     torsemide (DEMADEX) 10 MG tablet Take 3 tablets (30 mg) by mouth daily. 90 tablet 3     "Allergies   Allergen Reactions     Bactrim [Sulfamethoxazole-Trimethoprim] Nausea and Vomiting         Lab Results    Chemistry/lipid CBC Cardiac Enzymes/BNP/TSH/INR   Recent Labs   Lab Test 10/24/24  1033   CHOL 106   HDL 31*   LDL 60   TRIG 76     Recent Labs   Lab Test 10/24/24  1033 11/30/23  1130 11/04/22  1027   LDL 60 64 88     Recent Labs   Lab Test 05/09/25  0418      POTASSIUM 3.7   CHLORIDE 99   CO2 32*   GLC 95   BUN 14.6   CR 0.91   GFRESTIMATED 84   LUIS 8.3*     Recent Labs   Lab Test 05/09/25  0418 05/08/25  0436 05/07/25  1004   CR 0.91 0.81 0.83     No results for input(s): \"A1C\" in the last 63369 hours. Recent Labs   Lab Test 05/09/25  0418   WBC 4.7   HGB 9.1*   HCT 28.4*   *   *     Recent Labs   Lab Test 05/09/25  0418 05/08/25  0436 05/07/25  1004   HGB 9.1* 9.3* 9.9*    No results for input(s): \"TROPONINI\" in the last 60174 hours.  Recent Labs   Lab Test 05/06/25  0834 11/30/23  1130 11/24/23  1340 04/06/23  0948 12/20/17  1315   NTBNPI  --   --  4,896* 1,329 957*   NTBNP 1,642 1,862*  --   --   --      Recent Labs   Lab Test 07/12/24  1552   TSH 2.23     Recent Labs   Lab Test 05/06/25  0834 11/04/22  1027   INR 1.25* 1.39*          This note has been dictated using voice recognition software. Any grammatical or context distortions are unintentional and inherent to the software.                  Thank you for allowing me to participate in the care of your patient.      Sincerely,     SYDNEY DE LEON PA-C     [facility]  cc:   Polo Guerra MD  [address]  [place] [address]      "

## 2025-05-16 ENCOUNTER — RESULTS FOLLOW-UP (OUTPATIENT)
Dept: CARDIOLOGY | Facility: CLINIC | Age: 80
End: 2025-05-16

## 2025-05-19 ENCOUNTER — TELEPHONE (OUTPATIENT)
Dept: CARDIOLOGY | Facility: CLINIC | Age: 80
End: 2025-05-19
Payer: COMMERCIAL

## 2025-05-19 NOTE — TELEPHONE ENCOUNTER
Received notification that patient had not read [employer] message with notification about his lab work. Called patient and left detailed VM explaining lab results are stable, no changes to diuretics. Left call back number for patient to contact if he has any questions.     Shirley Neville RN on 5/19/2025 at 8:28 AM

## 2025-05-21 ENCOUNTER — HOSPITAL ENCOUNTER (OUTPATIENT)
Dept: CARDIAC REHAB | Facility: CLINIC | Age: 80
Discharge: HOME | End: 2025-05-21
Attending: INTERNAL MEDICINE
Payer: COMMERCIAL

## 2025-05-21 DIAGNOSIS — Z98.890 S/P MVR (MITRAL VALVE REPAIR): ICD-10-CM

## 2025-05-21 NOTE — PROGRESS NOTES
Pt arrived for his initial evaluation for outpatient cardiac rehab. He told staff he was not interested in any rehab. Pt is exercising at home and is feeling significantly better. Gave pt some information on home exercise, including muscle conditioning exercises. Pt declined further information.      Callie Mckeon MS, CCRP  Cardiopulmonary Rehab Therapist

## 2025-06-03 ENCOUNTER — MYC MEDICAL ADVICE (OUTPATIENT)
Dept: FAMILY MEDICINE | Facility: CLINIC | Age: 80
End: 2025-06-03
Payer: COMMERCIAL

## 2025-06-04 ENCOUNTER — OFFICE VISIT (OUTPATIENT)
Dept: FAMILY MEDICINE | Facility: CLINIC | Age: 80
End: 2025-06-04
Payer: COMMERCIAL

## 2025-06-04 ENCOUNTER — RESULTS FOLLOW-UP (OUTPATIENT)
Dept: FAMILY MEDICINE | Facility: CLINIC | Age: 80
End: 2025-06-04

## 2025-06-04 VITALS
RESPIRATION RATE: 16 BRPM | OXYGEN SATURATION: 98 % | WEIGHT: 155 LBS | BODY MASS INDEX: 23.49 KG/M2 | HEART RATE: 89 BPM | SYSTOLIC BLOOD PRESSURE: 118 MMHG | DIASTOLIC BLOOD PRESSURE: 70 MMHG | TEMPERATURE: 97.6 F | HEIGHT: 68 IN

## 2025-06-04 DIAGNOSIS — D64.9 ANEMIA, UNSPECIFIED TYPE: ICD-10-CM

## 2025-06-04 DIAGNOSIS — I50.32 CHRONIC DIASTOLIC HEART FAILURE (H): ICD-10-CM

## 2025-06-04 DIAGNOSIS — I48.20 CHRONIC ATRIAL FIBRILLATION (H): ICD-10-CM

## 2025-06-04 DIAGNOSIS — G25.81 RESTLESS LEGS SYNDROME (RLS): Primary | ICD-10-CM

## 2025-06-04 LAB
ANION GAP SERPL CALCULATED.3IONS-SCNC: 13 MMOL/L (ref 7–15)
BASOPHILS # BLD AUTO: 0 10E3/UL (ref 0–0.2)
BASOPHILS NFR BLD AUTO: 1 %
BUN SERPL-MCNC: 23.9 MG/DL (ref 8–23)
CALCIUM SERPL-MCNC: 9.8 MG/DL (ref 8.8–10.4)
CHLORIDE SERPL-SCNC: 97 MMOL/L (ref 98–107)
CREAT SERPL-MCNC: 1.03 MG/DL (ref 0.67–1.17)
EGFRCR SERPLBLD CKD-EPI 2021: 73 ML/MIN/1.73M2
EOSINOPHIL # BLD AUTO: 0.2 10E3/UL (ref 0–0.7)
EOSINOPHIL NFR BLD AUTO: 5 %
ERYTHROCYTE [DISTWIDTH] IN BLOOD BY AUTOMATED COUNT: 14.4 % (ref 10–15)
FERRITIN SERPL-MCNC: 165 NG/ML (ref 31–409)
GLUCOSE SERPL-MCNC: 85 MG/DL (ref 70–99)
HCO3 SERPL-SCNC: 27 MMOL/L (ref 22–29)
HCT VFR BLD AUTO: 33.6 % (ref 40–53)
HGB BLD-MCNC: 11.1 G/DL (ref 13.3–17.7)
IMM GRANULOCYTES # BLD: 0 10E3/UL
IMM GRANULOCYTES NFR BLD: 0 %
LYMPHOCYTES # BLD AUTO: 1 10E3/UL (ref 0.8–5.3)
LYMPHOCYTES NFR BLD AUTO: 23 %
MCH RBC QN AUTO: 34.3 PG (ref 26.5–33)
MCHC RBC AUTO-ENTMCNC: 33 G/DL (ref 31.5–36.5)
MCV RBC AUTO: 104 FL (ref 78–100)
MONOCYTES # BLD AUTO: 0.5 10E3/UL (ref 0–1.3)
MONOCYTES NFR BLD AUTO: 11 %
NEUTROPHILS # BLD AUTO: 2.7 10E3/UL (ref 1.6–8.3)
NEUTROPHILS NFR BLD AUTO: 61 %
PLATELET # BLD AUTO: 126 10E3/UL (ref 150–450)
POTASSIUM SERPL-SCNC: 3.8 MMOL/L (ref 3.4–5.3)
RBC # BLD AUTO: 3.24 10E6/UL (ref 4.4–5.9)
SODIUM SERPL-SCNC: 137 MMOL/L (ref 135–145)
WBC # BLD AUTO: 4.5 10E3/UL (ref 4–11)

## 2025-06-04 PROCEDURE — 3074F SYST BP LT 130 MM HG: CPT | Performed by: FAMILY MEDICINE

## 2025-06-04 PROCEDURE — 1126F AMNT PAIN NOTED NONE PRSNT: CPT | Performed by: FAMILY MEDICINE

## 2025-06-04 PROCEDURE — 99214 OFFICE O/P EST MOD 30 MIN: CPT | Performed by: FAMILY MEDICINE

## 2025-06-04 PROCEDURE — 36415 COLL VENOUS BLD VENIPUNCTURE: CPT | Performed by: FAMILY MEDICINE

## 2025-06-04 PROCEDURE — 80048 BASIC METABOLIC PNL TOTAL CA: CPT | Performed by: FAMILY MEDICINE

## 2025-06-04 PROCEDURE — 82728 ASSAY OF FERRITIN: CPT | Performed by: FAMILY MEDICINE

## 2025-06-04 PROCEDURE — 1111F DSCHRG MED/CURRENT MED MERGE: CPT | Performed by: FAMILY MEDICINE

## 2025-06-04 PROCEDURE — 3078F DIAST BP <80 MM HG: CPT | Performed by: FAMILY MEDICINE

## 2025-06-04 PROCEDURE — 85025 COMPLETE CBC W/AUTO DIFF WBC: CPT | Performed by: FAMILY MEDICINE

## 2025-06-04 RX ORDER — PRAMIPEXOLE DIHYDROCHLORIDE 0.25 MG/1
0.5 TABLET ORAL AT BEDTIME
COMMUNITY
Start: 2025-06-04

## 2025-06-04 ASSESSMENT — PATIENT HEALTH QUESTIONNAIRE - PHQ9
10. IF YOU CHECKED OFF ANY PROBLEMS, HOW DIFFICULT HAVE THESE PROBLEMS MADE IT FOR YOU TO DO YOUR WORK, TAKE CARE OF THINGS AT HOME, OR GET ALONG WITH OTHER PEOPLE: SOMEWHAT DIFFICULT
SUM OF ALL RESPONSES TO PHQ QUESTIONS 1-9: 3
SUM OF ALL RESPONSES TO PHQ QUESTIONS 1-9: 3

## 2025-06-04 ASSESSMENT — PAIN SCALES - GENERAL: PAINLEVEL_OUTOF10: NO PAIN (0)

## 2025-06-04 NOTE — NURSING NOTE
"Initial /70   Pulse 89   Temp 97.6  F (36.4  C) (Tympanic)   Resp 16   Ht 1.727 m (5' 8\")   Wt 70.3 kg (155 lb)   SpO2 98%   BMI 23.57 kg/m   Estimated body mass index is 23.57 kg/m  as calculated from the following:    Height as of this encounter: 1.727 m (5' 8\").    Weight as of this encounter: 70.3 kg (155 lb). .    "

## 2025-06-04 NOTE — PATIENT INSTRUCTIONS
"Call to schedule a sleep consult. [phone]    We're going to check your iron levels as that might contribute.    You can increase pramipexole to 2 tabs or 0.5mg at night - DO NOT take more than that as it could actually worsen your symptoms.  * * *  If you have a MyChart account results will appear in your MyChart.  If you do not have a MyChart account results will be mailed or called to you.    Lab and imaging results are released \"real time\" into My Chart.  This may mean that you see the results before I have a chance to review them. My Chart will alert you again when I review the results and enter comments.  Sometimes with imaging or labs there may be serious or unexpected results. Critical results are paged to me or the after hours on-call provider so that they can be reviewed immediately.  This is not true of non-critical abnormal results. Unfortunately, this means that it's possible you may be alerted of a serious finding before I have a chance to review it.  If you ever receive a result that you are concerned about and I have not already contacted you, please feel free to reach out to me or the care team so that you get the answers you need. You can call the care team at [phone] and say \"Care Team\".    Additionally, it is my goal that you understand the care plan discussed at your visit and that any questions you have are answered.  Please feel free to reach out if you need clarification or explanation of any information addressed at your office visit.      "

## 2025-06-04 NOTE — PROGRESS NOTES
"  Assessment & Plan     Restless legs syndrome (RLS)  Longstanding history of chronic insomnia and untreated sleep apnea.  I saw him about 2 months ago with symptoms of periodic limb movement disorder/RLS.  Recommended sleep clinic consult.  He never scheduled this.  He did schedule a neurology visit which was scheduled for October.  He reported to my MA while being roomed that he had this appointment.  But when I reviewed in the chart it looked canceled and when I asked him about this he admitted that he canceled it because that was \"too far out\".  He is asking for something for his insomnia.  He has a history of benzodiazepine dependence.  He was also on Ambien but would take excess amounts above and beyond what was prescribed and had issues with cognitive clouding and falls.  He is objectively much clearer today and has not had any further falls since discontinuing this.  At a previous visit his wife shared that in his desperation to get sleep he would often take multiple different kinds of pills, what ever he had access to, to try to sleep.  So I been very cautious about prescribing controlled substances or sedatives/hypnotics.  He has a prescription currently for pramipexole that he was discharged from the hospital with.  He was given 90 tablets a month ago.  So he still has plenty.  Advised that he could try increasing this to 2 at night but stressed absolutely DO NOT take more than this.  Discussed risk of augmentation.  He does also have postprocedural anemia after his valve replacement surgery.  Low iron could certainly be playing a role.  Will recheck CBC and ferritin levels.  He may benefit from an iron infusion in the interim.  Stressed that unless he deals with the underlying issues i.e. at least in part there his untreated sleep apnea this is unlikely to get better.  He specifically requested medication \"for sleep\" which I declined prescribing due to risk of overdose given his history of inappropriate " "medication use.  - Ferritin; Future  - CBC with Platelets & Differential; Future  - pramipexole (MIRAPEX) 0.25 MG tablet; Take 2 tablets (0.5 mg) by mouth at bedtime.  - Ferritin  - CBC with Platelets & Differential    Anemia, unspecified type  Check labs.   - Ferritin; Future  - Ferritin    Chronic diastolic heart failure (H)  Significantly improved dyspnea since valve replacement.  He is doing quite well from a cardiac standpoint.  - Basic metabolic panel    Chronic atrial fibrillation (H)  Stable.  Due for labs from cardiology.  - Basic metabolic panel        MED REC REQUIRED  Post Medication Reconciliation Status:         Greg Alonso is a 80 year old, presenting for the following health issues:  Musculoskeletal Problem        4/18/2025    12:16 PM   Additional Questions   Roomed by Krystle ALAS CMA     History of Present Illness       Reason for visit:  Body jerks  Symptom onset:  3-4 weeks ago  Symptom intensity:  Severe   He is taking medications regularly.      He reported symptoms started 3 to 4 weeks ago but we have actually been addressing this in clinic for several months.  My estimation is that the myoclonus started at least 6 months ago.            ROS:   Constitutional, neuro, ENT, endocrine, pulmonary, cardiac, gastrointestinal, genitourinary, musculoskeletal, integument and psychiatric systems are negative, except as otherwise noted.       Objective    /70   Pulse 89   Temp 97.6  F (36.4  C) (Tympanic)   Resp 16   Ht 1.727 m (5' 8\")   Wt 70.3 kg (155 lb)   SpO2 98%   BMI 23.57 kg/m    Body mass index is 23.57 kg/m .  Physical Exam   GENERAL: Pleasant, well appearing male.              Signed Electronically by: Lizzy Leiva MD    "

## 2025-06-06 ENCOUNTER — RESULTS FOLLOW-UP (OUTPATIENT)
Dept: FAMILY MEDICINE | Facility: CLINIC | Age: 80
End: 2025-06-06

## 2025-06-19 ENCOUNTER — RESULTS FOLLOW-UP (OUTPATIENT)
Dept: CARDIOLOGY | Facility: CLINIC | Age: 80
End: 2025-06-19

## 2025-06-19 ENCOUNTER — HOSPITAL ENCOUNTER (OUTPATIENT)
Dept: CARDIOLOGY | Facility: HOSPITAL | Age: 80
End: 2025-06-19
Attending: INTERNAL MEDICINE
Payer: COMMERCIAL

## 2025-06-19 DIAGNOSIS — I34.0 MITRAL VALVE INSUFFICIENCY, UNSPECIFIED ETIOLOGY: ICD-10-CM

## 2025-06-19 LAB — LVEF ECHO: NORMAL

## 2025-06-19 PROCEDURE — 93306 TTE W/DOPPLER COMPLETE: CPT

## 2025-06-24 DIAGNOSIS — I34.0 MITRAL VALVE INSUFFICIENCY, UNSPECIFIED ETIOLOGY: Primary | ICD-10-CM

## 2025-06-24 NOTE — PROGRESS NOTES
HEART CARE ENCOUNTER NOTE       [facility]  [phone]    Assessment/Recommendations   Assessment:  Severe mitral regurgitation, now s/p CHANTAL with [place] ACE device x 1    - mild residual MR by most recent TTE  Acute on chronic heart failure with mildly reduced ejection fraction, NYHA II    - clinic weight is up, but weight seems relatively stable by home scale.  Currently denies shortness of breath, orthopnea, PND, abdominal fullness/bloating.  Reports a good appetite.  Severe tricuspid regurgitation - mod-severe by most recent TTE  Hypertension - controlled  Longstanding persistent atrial fibrillation   History of CVA - 2/2 Carotid artery stenosis     Plan:  Continue Xarelto  Continue Torsemide 30 mg daily for now, will check in with him later this week re: weights/symptoms. BNP ordered but was not added on to today's labs.   Continue losartan and metoprolol  Follow-up with Structural MD regarding Severe TR  Life long dental prophylaxis    Thank you for the opportunity to participate in the care of Josemanuel Edmond. It's been a pleasure working with him.  Please do not hesitate to call with any questions or concerns.       History of Present Illness/Subjective    I had the opportunity to see Josemanuel Edmond at the [facility] for 1 month follow-up after CHANTAL for his mitral valve.    Josemanuel is an 80-year-old gentleman who has a past medical history significant for valvular disease including severe mitral regurgitation and severe tricuspid regurgitation and moderate AI, chronic diastolic heart failure, persistent atrial fibrillation, hypertension, RBBB, GERD, hyperlipidemia, history of EtOH abuse in remission, benzodiazepine dependence and cerebral infarction due to occlusion/stenosis of carotid artery     He underwent successful CHANTAL for his mitral valve about 1 month ago.  Overall he is feeling better.  He does not get his fatigued as quickly and is able to walk further  distances.  He currently denies chest discomfort, shortness of breath, orthopnea, PND.  His clinic weight is up but he reports his home weight has been stable 150-153 pounds in the morning and usually 154-150 6 in the evening.  He was 156 pounds last evening.  He did not weigh himself this morning.  He reports a good appetite.  He denies chest discomfort, shortness of breath, orthopnea, PND, abdominal fullness/bloating, dizziness, lightheadedness, presyncope or syncope.    ____________________________________________________________  Echo: 6/19/2025  Interpretation Summary     The left ventricle is normal in size. There is normal left ventricular wall  thickness.  Left ventricular systolic function is normal. The visual ejection fraction is  60-65%. No regional wall motion abnormalities noted.     The right ventricle is mildly dilated. The right ventricular systolic function  is normal.  The left atrium is severely dilated. The right atrium is severely dilated.  There is a PHILIP mitral valve repair device securetly attached to the  anterior and posterior leaflets of the mitral valve. Mild residual  regurgitation is present. There is no significant stenosis with a mean  pressure gradient of 3 mmHg across the valve.  There is mod-severe to severe (3-4+) tricuspid regurgitation.  There is mild to moderate (1-2+) aortic regurgitation.  There is mild (1+) pulmonic valvular regurgitation.  IVC diameter and respiratory changes fall into an intermediate range  suggesting an RA pressure of 8 mmHg.  When compared with previous study from 5/8/2025 the LVEF has improved. TR is  slightly less. Mitral valve parameters are stable.    EKG (personally reviewed and interpreted):  Atrial fibrillation     Physical Examination Review of Systems   Vitals: /76 (BP Location: Left arm, Patient Position: Sitting, Cuff Size: Adult Regular)   Pulse 77   Resp 16   Wt 74.4 kg (164 lb)   SpO2 98%   BMI 24.94 kg/m    BMI= Body mass  index is 24.94 kg/m .  Wt Readings from Last 3 Encounters:   06/25/25 74.4 kg (164 lb)   06/04/25 70.3 kg (155 lb)   05/14/25 70.1 kg (154 lb 9.6 oz)       General Appearance:   Alert, cooperative and in no acute distress   ENT/Mouth: membranes moist, no oral lesions or bleeding gums.      EYES:  no scleral icterus, normal conjunctivae   Neck:  Thyroid not visualized   Chest/Lungs:   lungs are clear to auscultation, no rales or wheezing   Cardiovascular:   Irregular. Normal first and second heart sounds with no murmurs, rubs, or gallops;  no edema bilaterally    Abdomen:  Soft and nontender   Extremities: no cyanosis or clubbing.   Skin: no xanthelasma, warm.    Neurologic: normal gait, normal  bilateral, no tremors   Psychiatric: Normal mood and affect       Please refer above for cardiac ROS details.      Medical History  Surgical History Family History Social History   Past Medical History:   Diagnosis Date    Acute on chronic diastolic (congestive) heart failure (H) 11/29/2023    Arthritis 2005    Benign neoplasm of prostate 2000    Benign Prostate Nodule    Depressive disorder     past condition    Infection due to 2019 novel coronavirus 09/27/2021    Mitral regurgitation     S/P knee replacement 11/06/2012    S/P left knee arthroscopy 08/15/2011     Past Surgical History:   Procedure Laterality Date    ABDOMEN SURGERY  2003    ARTHROPLASTY KNEE Right 10/12/2018    Procedure: ARTHROPLASTY KNEE;  Right Total Knee Arthroplasty;  Surgeon: Jeb Peralta MD;  Location: [facility]    ARTHROPLASTY REVISION HIP Left 5/25/2023    Procedure: Revision total hip arthroplasty, left;  Surgeon: Chandler Leiva MD;  Location: [facility] OR    BIOPSY  2007    COLONOSCOPY N/A 12/10/2015    Procedure: COMBINED COLONOSCOPY, SINGLE OR MULTIPLE BIOPSY/POLYPECTOMY BY BIOPSY;  Surgeon: Jyoti Figueroa MD;  Location: [facility] GI    CV CORONARY ANGIOGRAM N/A 4/1/2025    Procedure: Coronary Angiogram;  Surgeon: Juliette  MD Tae;  Location: [facility] LAB CV    CV LEFT HEART CATH N/A 4/1/2025    Procedure: Left Heart Catheterization;  Surgeon: Tae Segovia MD;  Location: [facility] CV    CV RIGHT HEART CATH MEASUREMENTS RECORDED N/A 4/1/2025    Procedure: Right Heart Catheterization;  Surgeon: Tae Segovia MD;  Location: [facility] CATH LAB CV    JOINT REPLACEMENT, HIP RT/LT  10/2007    Joint Replacement Hip LT    JOINT REPLACEMTN, KNEE RT/LT  08/2011    Joint Replacement knee /LT, [facility]    LAPAROSCOPIC HERNIORRHAPHY INGUINAL BILATERAL Bilateral 04/24/2018    Procedure: LAPAROSCOPIC HERNIORRHAPHY INGUINAL BILATERAL;  Laparoscopic bilateral inguinal hernia repair;  Surgeon: Josemanuel Resendiz MD;  Location: [facility] OR    PHACOEMULSIFICATION WITH STANDARD INTRAOCULAR LENS IMPLANT Right 01/06/2021    Procedure: Cataract Removal with Implant;  Surgeon: Regan Kaufman MD;  Location: [facility]    PHACOEMULSIFICATION WITH STANDARD INTRAOCULAR LENS IMPLANT Left 02/17/2021    Procedure: Cataract Removal with Implant;  Surgeon: Regan Kaufman MD;  Location: [facility]    SURGICAL HISTORY OF -   12/01/1999    Umbilical Herniorrhaphy with mesh    SURGICAL HISTORY OF -  Left 11/2017    Thoracotomy and drainage of empyema    TRANSCATHETER MITRAL VALVE REPAIR N/A 5/7/2025    Procedure: Transcatheter Mitral Valve Repair with PHILIP device;  Surgeon: Tae Segovia MD;  Location: [facility] CV     Family History   Problem Relation Age of Onset    Depression Mother     Cerebrovascular Disease Mother     Breast Cancer Mother     Neurologic Disorder Mother         parkinsons    Parkinsonism Mother     Respiratory Father         emphyzema    Diabetes Brother     Social History     Socioeconomic History    Marital status:      Spouse name: Not on file    Number of children: Not on file    Years of education: Not on file    Highest education level: Not on file   Occupational History    Not on file   Tobacco Use     Smoking status: Former     Current packs/day: 0.00     Average packs/day: 1 pack/day for 17.8 years (17.8 ttl pk-yrs)     Types: Cigarettes     Start date: 1958     Quit date: 10/13/1975     Years since quittin.7    Smokeless tobacco: Never   Vaping Use    Vaping status: Never Used   Substance and Sexual Activity    Alcohol use: Not Currently    Drug use: No    Sexual activity: Yes     Partners: Female     Birth control/protection: None     Comment:  51 years   Other Topics Concern    Parent/sibling w/ CABG, MI or angioplasty before 65F 55M? No   Social History Narrative    Not on file     Social Drivers of Health     Financial Resource Strain: Low Risk  (2025)    Financial Resource Strain     Within the past 12 months, have you or your family members you live with been unable to get utilities (heat, electricity) when it was really needed?: No   Food Insecurity: Low Risk  (2025)    Food Insecurity     Within the past 12 months, did you worry that your food would run out before you got money to buy more?: No     Within the past 12 months, did the food you bought just not last and you didn t have money to get more?: No   Transportation Needs: Low Risk  (2025)    Transportation Needs     Within the past 12 months, has lack of transportation kept you from medical appointments, getting your medicines, non-medical meetings or appointments, work, or from getting things that you need?: No   Physical Activity: Unknown (2024)    Exercise Vital Sign     Days of Exercise per Week: 0 days     Minutes of Exercise per Session: Not on file   Stress: No Stress Concern Present (2024)    [employer] [employer] - Occupational Stress Questionnaire     Feeling of Stress : Only a little   Social Connections: Unknown (2024)    Social Connection and Isolation Panel [NHANES]     Frequency of Communication with Friends and Family: Not on file     Frequency of Social Gatherings  with Friends and Family: Once a week     Attends Orthodox Services: Not on file     Active Member of Clubs or Organizations: Not on file     Attends Club or Organization Meetings: Not on file     Marital Status: Not on file   Interpersonal Safety: Low Risk  (5/7/2025)    Interpersonal Safety     Do you feel physically and emotionally safe where you currently live?: Yes     Within the past 12 months, have you been hit, slapped, kicked or otherwise physically hurt by someone?: No     Within the past 12 months, have you been humiliated or emotionally abused in other ways by your partner or ex-partner?: No   Housing Stability: Low Risk  (5/7/2025)    Housing Stability     Do you have housing? : Yes     Are you worried about losing your housing?: No          Medications  Allergies   Current Outpatient Medications   Medication Sig Dispense Refill    buPROPion (WELLBUTRIN XL) 300 MG 24 hr tablet Take 1 tablet (300 mg) by mouth every morning. 90 tablet 0    busPIRone HCl (BUSPAR) 30 MG tablet Take 1 tablet (30 mg) by mouth 2 times daily. 180 tablet 0    doxazosin (CARDURA) 4 MG tablet Take 1 tablet (4 mg) by mouth at bedtime. 90 tablet 1    finasteride (PROSCAR) 5 MG tablet TAKE 1 TABLET BY MOUTH EVERY DAY 90 tablet 3    lamoTRIgine (LAMICTAL) 25 MG tablet Take 2 tablets (50 mg) by mouth 2 times daily. 360 tablet 0    losartan (COZAAR) 50 MG tablet Take 2 tablets (100 mg) by mouth daily.      metoprolol succinate ER (TOPROL XL) 200 MG 24 hr tablet Take 1 tablet (200 mg) by mouth every evening. 90 tablet 2    multivitamin (ONE-DAILY) tablet Take 1 tablet by mouth daily 30 tablet 0    pramipexole (MIRAPEX) 0.25 MG tablet Take 2 tablets (0.5 mg) by mouth at bedtime.      QUEtiapine (SEROQUEL) 25 MG tablet Take 1 tablet (25 mg) by mouth at bedtime. 90 tablet 0    rivaroxaban ANTICOAGULANT (XARELTO ANTICOAGULANT) 20 MG TABS tablet Take 1 tablet (20 mg) by mouth daily (with dinner). 90 tablet 2    torsemide (DEMADEX) 10 MG  "tablet Take 3 tablets (30 mg) by mouth daily. 90 tablet 3    Allergies   Allergen Reactions    Bactrim [Sulfamethoxazole-Trimethoprim] Nausea and Vomiting         Lab Results    Chemistry/lipid CBC Cardiac Enzymes/BNP/TSH/INR   Recent Labs   Lab Test 10/24/24  1033   CHOL 106   HDL 31*   LDL 60   TRIG 76     Recent Labs   Lab Test 10/24/24  1033 11/30/23  1130 11/04/22  1027   LDL 60 64 88     Recent Labs   Lab Test 06/04/25  0758      POTASSIUM 3.8   CHLORIDE 97*   CO2 27   GLC 85   BUN 23.9*   CR 1.03   GFRESTIMATED 73   LUIS 9.8     Recent Labs   Lab Test 06/04/25  0758 05/14/25  1535 05/09/25  0418   CR 1.03 0.89 0.91     No results for input(s): \"A1C\" in the last 62191 hours. Recent Labs   Lab Test 06/04/25  0758   WBC 4.5   HGB 11.1*   HCT 33.6*   *   *     Recent Labs   Lab Test 06/04/25  0758 05/09/25  0418 05/08/25  0436   HGB 11.1* 9.1* 9.3*    No results for input(s): \"TROPONINI\" in the last 30597 hours.  Recent Labs   Lab Test 05/06/25  0834 11/30/23  1130 11/24/23  1340 04/06/23  0948 12/20/17  1315   NTBNPI  --   --  4,896* 1,329 957*   NTBNP 1,642 1,862*  --   --   --      Recent Labs   Lab Test 07/12/24  1552   TSH 2.23     Recent Labs   Lab Test 05/06/25  0834 11/04/22  1027   INR 1.25* 1.39*        40 minutes spent on the date of encounter doing chart review, review of outside records, review of test results, interpretation with above tests, patient visit, documentation, and discussion with family.      This note has been dictated using voice recognition software. Any grammatical or context distortions are unintentional and inherent to the software.    Sierra Lorenzo PA-C  Structural Heart Program  [facility]           "

## 2025-06-25 ENCOUNTER — APPOINTMENT (OUTPATIENT)
Dept: CARDIOLOGY | Facility: CLINIC | Age: 80
End: 2025-06-25
Payer: COMMERCIAL

## 2025-06-25 ENCOUNTER — TELEPHONE (OUTPATIENT)
Dept: CARDIOLOGY | Facility: CLINIC | Age: 80
End: 2025-06-25

## 2025-06-25 ENCOUNTER — OFFICE VISIT (OUTPATIENT)
Dept: CARDIOLOGY | Facility: CLINIC | Age: 80
End: 2025-06-25
Payer: COMMERCIAL

## 2025-06-25 VITALS
OXYGEN SATURATION: 98 % | SYSTOLIC BLOOD PRESSURE: 119 MMHG | DIASTOLIC BLOOD PRESSURE: 76 MMHG | RESPIRATION RATE: 16 BRPM | BODY MASS INDEX: 24.94 KG/M2 | HEART RATE: 77 BPM | WEIGHT: 164 LBS

## 2025-06-25 DIAGNOSIS — I34.0 MITRAL VALVE INSUFFICIENCY, UNSPECIFIED ETIOLOGY: ICD-10-CM

## 2025-06-25 DIAGNOSIS — I42.9 CARDIOMYOPATHY, UNSPECIFIED TYPE (H): ICD-10-CM

## 2025-06-25 DIAGNOSIS — I50.22 CHRONIC SYSTOLIC HEART FAILURE (H): Primary | ICD-10-CM

## 2025-06-25 DIAGNOSIS — I07.1 TRICUSPID VALVE INSUFFICIENCY, UNSPECIFIED ETIOLOGY: ICD-10-CM

## 2025-06-25 LAB
ANION GAP SERPL CALCULATED.3IONS-SCNC: 9 MMOL/L (ref 7–15)
BUN SERPL-MCNC: 30.8 MG/DL (ref 8–23)
CALCIUM SERPL-MCNC: 9.2 MG/DL (ref 8.8–10.4)
CHLORIDE SERPL-SCNC: 97 MMOL/L (ref 98–107)
CREAT SERPL-MCNC: 1.08 MG/DL (ref 0.67–1.17)
EGFRCR SERPLBLD CKD-EPI 2021: 69 ML/MIN/1.73M2
ERYTHROCYTE [DISTWIDTH] IN BLOOD BY AUTOMATED COUNT: 13.8 % (ref 10–15)
GLUCOSE SERPL-MCNC: 67 MG/DL (ref 70–99)
HCO3 SERPL-SCNC: 28 MMOL/L (ref 22–29)
HCT VFR BLD AUTO: 31.8 % (ref 40–53)
HGB BLD-MCNC: 10.4 G/DL (ref 13.3–17.7)
MCH RBC QN AUTO: 33.7 PG (ref 26.5–33)
MCHC RBC AUTO-ENTMCNC: 32.7 G/DL (ref 31.5–36.5)
MCV RBC AUTO: 103 FL (ref 78–100)
PLATELET # BLD AUTO: 135 10E3/UL (ref 150–450)
POTASSIUM SERPL-SCNC: 4.6 MMOL/L (ref 3.4–5.3)
RBC # BLD AUTO: 3.09 10E6/UL (ref 4.4–5.9)
SODIUM SERPL-SCNC: 134 MMOL/L (ref 135–145)
WBC # BLD AUTO: 4.5 10E3/UL (ref 4–11)

## 2025-06-25 PROCEDURE — 93000 ELECTROCARDIOGRAM COMPLETE: CPT | Performed by: STUDENT IN AN ORGANIZED HEALTH CARE EDUCATION/TRAINING PROGRAM

## 2025-06-25 PROCEDURE — 3078F DIAST BP <80 MM HG: CPT | Performed by: PHYSICIAN ASSISTANT

## 2025-06-25 PROCEDURE — 3074F SYST BP LT 130 MM HG: CPT | Performed by: PHYSICIAN ASSISTANT

## 2025-06-25 PROCEDURE — 99215 OFFICE O/P EST HI 40 MIN: CPT | Performed by: PHYSICIAN ASSISTANT

## 2025-06-25 PROCEDURE — 85027 COMPLETE CBC AUTOMATED: CPT | Performed by: PHYSICIAN ASSISTANT

## 2025-06-25 PROCEDURE — 80048 BASIC METABOLIC PNL TOTAL CA: CPT | Performed by: PHYSICIAN ASSISTANT

## 2025-06-25 PROCEDURE — 36415 COLL VENOUS BLD VENIPUNCTURE: CPT | Performed by: PHYSICIAN ASSISTANT

## 2025-06-25 NOTE — TELEPHONE ENCOUNTER
Patient seen in clinic with Sierra Lorenzo PA-C today. Discussed potential referral for tricuspid regurgitation evaluation, patient agreeable. Will have  reach out to schedule.     Shirley Neville RN on 6/25/2025 at 3:34 PM

## 2025-06-25 NOTE — PATIENT INSTRUCTIONS
Josemanuel Edmond,    It was a pleasure to see you today in the clinic regarding your valve follow-up.     My recommendations after this visit include:     - continue to monitor weight daily. Please follow-up with us if you notice an increase more than 2-3 pounds in a day or 5 pounds in week or leg swelling or shortness of breath   - follow-up with valve clinic again for tricuspid valve     **Remember you will need prophylactic antibiotics for all dental visits in the future.  You can get the Rx from your dentist, your PCP or from us.  If possible, still refrain from going to the dentist until 6 months or more after your valve replacement      If you have questions or concerns, please call using the numbers below:    Valve Clinic Phone   [phone]    After Hours/Scheduling  [phone]    Otherwise you can dial the nurse directly at:    Maggi Bond RN  [phone]    Jhonatan Neville RN  [phone]                Sierra Lorenzo PA-C  Structural Heart Program  [facility]

## 2025-06-25 NOTE — LETTER
6/25/2025    Lizzy Leiva MD  [address]  [facility] [address]    RE: Josemanuel Nugentmarleyveronica       Dear Colleague,     I had the pleasure of seeing Josemanuel Edmond in the [facility].  HEART CARE ENCOUNTER NOTE       [facility]  [phone]    Assessment/Recommendations   Assessment:  Severe mitral regurgitation, now s/p CHANTAL with [place] ACE device x 1    - mild residual MR by most recent TTE  Acute on chronic heart failure with mildly reduced ejection fraction, NYHA II    - clinic weight is up, but weight seems relatively stable by home scale.  Currently denies shortness of breath, orthopnea, PND, abdominal fullness/bloating.  Reports a good appetite.  Severe tricuspid regurgitation - mod-severe by most recent TTE  Hypertension - controlled  Longstanding persistent atrial fibrillation   History of CVA - 2/2 Carotid artery stenosis     Plan:  Continue Xarelto  Continue Torsemide 30 mg daily for now, will check in with him later this week re: weights/symptoms. BNP ordered but was not added on to today's labs.   Continue losartan and metoprolol  Follow-up with Structural MD regarding Severe TR  Life long dental prophylaxis    Thank you for the opportunity to participate in the care of Josemanuel Edmond. It's been a pleasure working with him.  Please do not hesitate to call with any questions or concerns.       History of Present Illness/Subjective    I had the opportunity to see Josemanuel Edmond at the [facility] for 1 month follow-up after CHANTAL for his mitral valve.    Josemanuel is an 80-year-old gentleman who has a past medical history significant for valvular disease including severe mitral regurgitation and severe tricuspid regurgitation and moderate AI, chronic diastolic heart failure, persistent atrial fibrillation, hypertension, RBBB, GERD, hyperlipidemia, history of EtOH abuse in remission, benzodiazepine dependence and cerebral infarction due  to occlusion/stenosis of carotid artery     He underwent successful CHANTAL for his mitral valve about 1 month ago.  Overall he is feeling better.  He does not get his fatigued as quickly and is able to walk further distances.  He currently denies chest discomfort, shortness of breath, orthopnea, PND.  His clinic weight is up but he reports his home weight has been stable 150-153 pounds in the morning and usually 154-150 6 in the evening.  He was 156 pounds last evening.  He did not weigh himself this morning.  He reports a good appetite.  He denies chest discomfort, shortness of breath, orthopnea, PND, abdominal fullness/bloating, dizziness, lightheadedness, presyncope or syncope.    ____________________________________________________________  Echo: 6/19/2025  Interpretation Summary     The left ventricle is normal in size. There is normal left ventricular wall  thickness.  Left ventricular systolic function is normal. The visual ejection fraction is  60-65%. No regional wall motion abnormalities noted.     The right ventricle is mildly dilated. The right ventricular systolic function  is normal.  The left atrium is severely dilated. The right atrium is severely dilated.  There is a PHILIP mitral valve repair device securetly attached to the  anterior and posterior leaflets of the mitral valve. Mild residual  regurgitation is present. There is no significant stenosis with a mean  pressure gradient of 3 mmHg across the valve.  There is mod-severe to severe (3-4+) tricuspid regurgitation.  There is mild to moderate (1-2+) aortic regurgitation.  There is mild (1+) pulmonic valvular regurgitation.  IVC diameter and respiratory changes fall into an intermediate range  suggesting an RA pressure of 8 mmHg.  When compared with previous study from 5/8/2025 the LVEF has improved. TR is  slightly less. Mitral valve parameters are stable.    EKG (personally reviewed and interpreted):  Atrial fibrillation     Physical Examination  Review of Systems   Vitals: /76 (BP Location: Left arm, Patient Position: Sitting, Cuff Size: Adult Regular)   Pulse 77   Resp 16   Wt 74.4 kg (164 lb)   SpO2 98%   BMI 24.94 kg/m    BMI= Body mass index is 24.94 kg/m .  Wt Readings from Last 3 Encounters:   06/25/25 74.4 kg (164 lb)   06/04/25 70.3 kg (155 lb)   05/14/25 70.1 kg (154 lb 9.6 oz)       General Appearance:   Alert, cooperative and in no acute distress   ENT/Mouth: membranes moist, no oral lesions or bleeding gums.      EYES:  no scleral icterus, normal conjunctivae   Neck:  Thyroid not visualized   Chest/Lungs:   lungs are clear to auscultation, no rales or wheezing   Cardiovascular:   Irregular. Normal first and second heart sounds with no murmurs, rubs, or gallops;  no edema bilaterally    Abdomen:  Soft and nontender   Extremities: no cyanosis or clubbing.   Skin: no xanthelasma, warm.    Neurologic: normal gait, normal  bilateral, no tremors   Psychiatric: Normal mood and affect       Please refer above for cardiac ROS details.      Medical History  Surgical History Family History Social History   Past Medical History:   Diagnosis Date     Acute on chronic diastolic (congestive) heart failure (H) 11/29/2023     Arthritis 2005     Benign neoplasm of prostate 2000    Benign Prostate Nodule     Depressive disorder     past condition     Infection due to 2019 novel coronavirus 09/27/2021     Mitral regurgitation      S/P knee replacement 11/06/2012     S/P left knee arthroscopy 08/15/2011     Past Surgical History:   Procedure Laterality Date     ABDOMEN SURGERY  2003     ARTHROPLASTY KNEE Right 10/12/2018    Procedure: ARTHROPLASTY KNEE;  Right Total Knee Arthroplasty;  Surgeon: Jeb Peralta MD;  Location: [facility]     ARTHROPLASTY REVISION HIP Left 5/25/2023    Procedure: Revision total hip arthroplasty, left;  Surgeon: Chandler Leiva MD;  Location: [facility]     BIOPSY  2007     COLONOSCOPY N/A 12/10/2015    Procedure:  COMBINED COLONOSCOPY, SINGLE OR MULTIPLE BIOPSY/POLYPECTOMY BY BIOPSY;  Surgeon: Jyoti Figueroa MD;  Location: [facility]     CV CORONARY ANGIOGRAM N/A 4/1/2025    Procedure: Coronary Angiogram;  Surgeon: Tae Segovia MD;  Location: [facility] CV     CV LEFT HEART CATH N/A 4/1/2025    Procedure: Left Heart Catheterization;  Surgeon: Tae Segovia MD;  Location: [facility]     CV RIGHT HEART CATH MEASUREMENTS RECORDED N/A 4/1/2025    Procedure: Right Heart Catheterization;  Surgeon: Tae Segovia MD;  Location: [facility]     JOINT REPLACEMENT, HIP RT/LT  10/2007    Joint Replacement Hip LT     JOINT REPLACEMTN, KNEE RT/LT  08/2011    Joint Replacement knee /LT, [facility]     LAPAROSCOPIC HERNIORRHAPHY INGUINAL BILATERAL Bilateral 04/24/2018    Procedure: LAPAROSCOPIC HERNIORRHAPHY INGUINAL BILATERAL;  Laparoscopic bilateral inguinal hernia repair;  Surgeon: Josemanuel Resendiz MD;  Location: [facility]     PHACOEMULSIFICATION WITH STANDARD INTRAOCULAR LENS IMPLANT Right 01/06/2021    Procedure: Cataract Removal with Implant;  Surgeon: Regan Kaufman MD;  Location: [facility]     PHACOEMULSIFICATION WITH STANDARD INTRAOCULAR LENS IMPLANT Left 02/17/2021    Procedure: Cataract Removal with Implant;  Surgeon: Regan Kaufman MD;  Location: [facility]     SURGICAL HISTORY OF -   12/01/1999    Umbilical Herniorrhaphy with mesh     SURGICAL HISTORY OF -  Left 11/2017    Thoracotomy and drainage of empyema     TRANSCATHETER MITRAL VALVE REPAIR N/A 5/7/2025    Procedure: Transcatheter Mitral Valve Repair with PHILIP device;  Surgeon: Tae Segovia MD;  Location: [facility] CV     Family History   Problem Relation Age of Onset     Depression Mother      Cerebrovascular Disease Mother      Breast Cancer Mother      Neurologic Disorder Mother         parkinsons     Parkinsonism Mother      Respiratory Father         emphyzema     Diabetes Brother     Social History      Socioeconomic History     Marital status:      Spouse name: Not on file     Number of children: Not on file     Years of education: Not on file     Highest education level: Not on file   Occupational History     Not on file   Tobacco Use     Smoking status: Former     Current packs/day: 0.00     Average packs/day: 1 pack/day for 17.8 years (17.8 ttl pk-yrs)     Types: Cigarettes     Start date: 1958     Quit date: 10/13/1975     Years since quittin.7     Smokeless tobacco: Never   Vaping Use     Vaping status: Never Used   Substance and Sexual Activity     Alcohol use: Not Currently     Drug use: No     Sexual activity: Yes     Partners: Female     Birth control/protection: None     Comment:  51 years   Other Topics Concern     Parent/sibling w/ CABG, MI or angioplasty before 65F 55M? No   Social History Narrative     Not on file     Social Drivers of Health     Financial Resource Strain: Low Risk  (2025)    Financial Resource Strain      Within the past 12 months, have you or your family members you live with been unable to get utilities (heat, electricity) when it was really needed?: No   Food Insecurity: Low Risk  (2025)    Food Insecurity      Within the past 12 months, did you worry that your food would run out before you got money to buy more?: No      Within the past 12 months, did the food you bought just not last and you didn t have money to get more?: No   Transportation Needs: Low Risk  (2025)    Transportation Needs      Within the past 12 months, has lack of transportation kept you from medical appointments, getting your medicines, non-medical meetings or appointments, work, or from getting things that you need?: No   Physical Activity: Unknown (2024)    Exercise Vital Sign      Days of Exercise per Week: 0 days      Minutes of Exercise per Session: Not on file   Stress: No Stress Concern Present (2024)    [employer] [place] [employer] -  Occupational Stress Questionnaire      Feeling of Stress : Only a little   Social Connections: Unknown (12/19/2024)    Social Connection and Isolation Panel [NHANES]      Frequency of Communication with Friends and Family: Not on file      Frequency of Social Gatherings with Friends and Family: Once a week      Attends [employer] Services: Not on file      Active Member of Clubs or Organizations: Not on file      Attends Club or Organization Meetings: Not on file      Marital Status: Not on file   Interpersonal Safety: Low Risk  (5/7/2025)    Interpersonal Safety      Do you feel physically and emotionally safe where you currently live?: Yes      Within the past 12 months, have you been hit, slapped, kicked or otherwise physically hurt by someone?: No      Within the past 12 months, have you been humiliated or emotionally abused in other ways by your partner or ex-partner?: No   Housing Stability: Low Risk  (5/7/2025)    Housing Stability      Do you have housing? : Yes      Are you worried about losing your housing?: No          Medications  Allergies   Current Outpatient Medications   Medication Sig Dispense Refill     buPROPion (WELLBUTRIN XL) 300 MG 24 hr tablet Take 1 tablet (300 mg) by mouth every morning. 90 tablet 0     busPIRone HCl (BUSPAR) 30 MG tablet Take 1 tablet (30 mg) by mouth 2 times daily. 180 tablet 0     doxazosin (CARDURA) 4 MG tablet Take 1 tablet (4 mg) by mouth at bedtime. 90 tablet 1     finasteride (PROSCAR) 5 MG tablet TAKE 1 TABLET BY MOUTH EVERY DAY 90 tablet 3     lamoTRIgine (LAMICTAL) 25 MG tablet Take 2 tablets (50 mg) by mouth 2 times daily. 360 tablet 0     losartan (COZAAR) 50 MG tablet Take 2 tablets (100 mg) by mouth daily.       metoprolol succinate ER (TOPROL XL) 200 MG 24 hr tablet Take 1 tablet (200 mg) by mouth every evening. 90 tablet 2     multivitamin (ONE-DAILY) tablet Take 1 tablet by mouth daily 30 tablet 0     pramipexole (MIRAPEX) 0.25 MG tablet Take 2 tablets (0.5  "mg) by mouth at bedtime.       QUEtiapine (SEROQUEL) 25 MG tablet Take 1 tablet (25 mg) by mouth at bedtime. 90 tablet 0     rivaroxaban ANTICOAGULANT (XARELTO ANTICOAGULANT) 20 MG TABS tablet Take 1 tablet (20 mg) by mouth daily (with dinner). 90 tablet 2     torsemide (DEMADEX) 10 MG tablet Take 3 tablets (30 mg) by mouth daily. 90 tablet 3    Allergies   Allergen Reactions     Bactrim [Sulfamethoxazole-Trimethoprim] Nausea and Vomiting         Lab Results    Chemistry/lipid CBC Cardiac Enzymes/BNP/TSH/INR   Recent Labs   Lab Test 10/24/24  1033   CHOL 106   HDL 31*   LDL 60   TRIG 76     Recent Labs   Lab Test 10/24/24  1033 11/30/23  1130 11/04/22  1027   LDL 60 64 88     Recent Labs   Lab Test 06/04/25  0758      POTASSIUM 3.8   CHLORIDE 97*   CO2 27   GLC 85   BUN 23.9*   CR 1.03   GFRESTIMATED 73   LUIS 9.8     Recent Labs   Lab Test 06/04/25  0758 05/14/25  1535 05/09/25  0418   CR 1.03 0.89 0.91     No results for input(s): \"A1C\" in the last 25892 hours. Recent Labs   Lab Test 06/04/25  0758   WBC 4.5   HGB 11.1*   HCT 33.6*   *   *     Recent Labs   Lab Test 06/04/25  0758 05/09/25  0418 05/08/25  0436   HGB 11.1* 9.1* 9.3*    No results for input(s): \"TROPONINI\" in the last 90239 hours.  Recent Labs   Lab Test 05/06/25  0834 11/30/23  1130 11/24/23  1340 04/06/23  0948 12/20/17  1315   NTBNPI  --   --  4,896* 1,329 957*   NTBNP 1,642 1,862*  --   --   --      Recent Labs   Lab Test 07/12/24  1552   TSH 2.23     Recent Labs   Lab Test 05/06/25  0834 11/04/22  1027   INR 1.25* 1.39*        40 minutes spent on the date of encounter doing chart review, review of outside records, review of test results, interpretation with above tests, patient visit, documentation, and discussion with family.      This note has been dictated using voice recognition software. Any grammatical or context distortions are unintentional and inherent to the software.    Sierra Lorenzo PA-C  Structural Heart " Program  [facility]             Thank you for allowing me to participate in the care of your patient.      Sincerely,     BHARGAVI [facility]  cc:   No referring provider defined for this encounter.

## 2025-06-26 LAB
ATRIAL RATE - MUSE: 75 BPM
DIASTOLIC BLOOD PRESSURE - MUSE: NORMAL MMHG
INTERPRETATION ECG - MUSE: NORMAL
P AXIS - MUSE: NORMAL DEGREES
PR INTERVAL - MUSE: NORMAL MS
QRS DURATION - MUSE: 108 MS
QT - MUSE: 412 MS
QTC - MUSE: 466 MS
R AXIS - MUSE: 4 DEGREES
SYSTOLIC BLOOD PRESSURE - MUSE: NORMAL MMHG
T AXIS - MUSE: 16 DEGREES
VENTRICULAR RATE- MUSE: 77 BPM

## 2025-06-26 NOTE — TELEPHONE ENCOUNTER
===View-only below this line===  ----- Message -----  From: Annika Narvaez  Sent: 6/25/2025   5:46 PM CDT  To: Shirley Neville RN    8/15

## 2025-06-27 ENCOUNTER — TELEPHONE (OUTPATIENT)
Dept: CARDIOLOGY | Facility: CLINIC | Age: 80
End: 2025-06-27
Payer: COMMERCIAL

## 2025-06-27 NOTE — TELEPHONE ENCOUNTER
Called to check on patient's weight- left Vm to call back.     Maggi Bond RN on 6/27/2025 at 8:35 AM        ===View-only below this line===  ----- Message -----  From: Shirley eNville RN  Sent: 6/27/2025  12:00 AM CDT  To: [facility] Valve Clinic Pool - e    Call and make sure pts weight is stable per akhil

## 2025-06-30 NOTE — TELEPHONE ENCOUNTER
Valve Clinic RN Phone Call:  Call received on 2025 at 2:01 PM by Maggi Bond RN    Reason for call: Weight update    Summary of conversation: Patient called back to discuss recent daily weights at home.   He reports feeling really well since his procedure- He denies any swelling in his legs, doesn't get as short of breath walking anymore, and overall feeling well when walking and at rest. He reports his most recent weights as followin/23: 155  : 157  : 157   155  : 157  : 156  : 155    Informed him that I do not anticipate any need for follow up or changes but will send this data to [facility] for notification.     Additional comments/Actions: Will forward to [facility].     Instructions given to patient: Continue current home regimen unless instructed otherwise.  Call with questions or concerns or significant weight gain.     Maggi Bond RN on 2025 at 2:01 PM

## 2025-07-01 NOTE — TELEPHONE ENCOUNTER
===View-only below this line===  ----- Message -----  From: Sierra Lorenzo PA-C  Sent: 7/1/2025   8:25 AM CDT  To: Maggi Bond RN    Ok thank you - would keep him on current diuretics for now. He should keep monitoring and follow-up with us if his weight increases 2-3 pounds in 1-2 days or 5 in a week.     Thank you  ----- Message -----  From: Maggi Bond RN  Sent: 6/30/2025   2:13 PM CDT  To: Sierra Lorenzo PA-C    ----- Message from Maggi Bond RN sent at 6/30/2025  2:13 PM CDT -----    Sonam,  Update  on Gavin's weights. Looks very stable at home.     Thanks  Maggi

## 2025-07-03 ENCOUNTER — TELEPHONE (OUTPATIENT)
Dept: BEHAVIORAL HEALTH | Facility: CLINIC | Age: 80
End: 2025-07-03
Payer: COMMERCIAL

## 2025-07-03 DIAGNOSIS — I42.9 CARDIOMYOPATHY, UNSPECIFIED TYPE (H): ICD-10-CM

## 2025-07-03 NOTE — TELEPHONE ENCOUNTER
Last Office Visit: 06/25/25 KARLY Lorenzo ([facility])  Next Office Visit: 08/15/25  Last Fill Date: Mina Holguin MA Cardiology   7/3/2025 2:26 PM

## 2025-07-03 NOTE — TELEPHONE ENCOUNTER
Pt reminder call for 07/07 TC appointment. VM left with TC info.     Danielle Pardo  07/03/2025  317

## 2025-07-07 ENCOUNTER — VIRTUAL VISIT (OUTPATIENT)
Dept: BEHAVIORAL HEALTH | Facility: CLINIC | Age: 80
End: 2025-07-07
Payer: COMMERCIAL

## 2025-07-07 DIAGNOSIS — F40.10 SOCIAL ANXIETY DISORDER: ICD-10-CM

## 2025-07-07 DIAGNOSIS — F33.0 MILD EPISODE OF RECURRENT MAJOR DEPRESSIVE DISORDER: Primary | ICD-10-CM

## 2025-07-07 DIAGNOSIS — G25.81 RESTLESS LEG SYNDROME: ICD-10-CM

## 2025-07-07 RX ORDER — PRAMIPEXOLE DIHYDROCHLORIDE 0.5 MG/1
0.5 TABLET ORAL AT BEDTIME
Qty: 90 TABLET | Refills: 0 | Status: SHIPPED | OUTPATIENT
Start: 2025-07-07

## 2025-07-07 RX ORDER — QUETIAPINE FUMARATE 50 MG/1
50 TABLET, FILM COATED ORAL AT BEDTIME
Qty: 90 TABLET | Refills: 0 | Status: SHIPPED | OUTPATIENT
Start: 2025-07-07

## 2025-07-07 RX ORDER — LAMOTRIGINE 25 MG/1
50 TABLET ORAL 2 TIMES DAILY
Qty: 360 TABLET | Refills: 1 | Status: SHIPPED | OUTPATIENT
Start: 2025-07-07

## 2025-07-07 RX ORDER — PRAMIPEXOLE DIHYDROCHLORIDE 0.75 MG/1
0.75 TABLET ORAL AT BEDTIME
Qty: 90 TABLET | Refills: 0 | Status: SHIPPED | OUTPATIENT
Start: 2025-07-07 | End: 2025-07-07

## 2025-07-07 RX ORDER — LOSARTAN POTASSIUM 50 MG/1
100 TABLET ORAL DAILY
Qty: 120 TABLET | Refills: 5 | Status: SHIPPED | OUTPATIENT
Start: 2025-07-07

## 2025-07-07 ASSESSMENT — PATIENT HEALTH QUESTIONNAIRE - PHQ9
SUM OF ALL RESPONSES TO PHQ QUESTIONS 1-9: 3
10. IF YOU CHECKED OFF ANY PROBLEMS, HOW DIFFICULT HAVE THESE PROBLEMS MADE IT FOR YOU TO DO YOUR WORK, TAKE CARE OF THINGS AT HOME, OR GET ALONG WITH OTHER PEOPLE: SOMEWHAT DIFFICULT
SUM OF ALL RESPONSES TO PHQ QUESTIONS 1-9: 3

## 2025-07-07 ASSESSMENT — PAIN SCALES - GENERAL: PAINLEVEL_OUTOF10: NO PAIN (0)

## 2025-07-07 NOTE — NURSING NOTE
Current patient location: [address] [place] [address]   [facility] [address]    Is the patient currently in the state of [place]? YES    Visit mode: CONVERT TO TELEPHONE    If the visit is dropped, the patient can be reconnected by:VIDEO VISIT: Text to cell phone:   Telephone Information:   Mobile [phone]    and VIDEO VISIT: Send to e-mail at: [email or web address]    Will anyone else be joining the visit? NO  (If patient encounters technical issues they should call [phone]    Are changes needed to the allergy or medication list? Pt stated no changes to allergies and Pt stated no med changes    Are refills needed on medications prescribed by this physician? Discuss with provider    Rooming Documentation:  Questionnaire(s) completed    Reason for visit: RECHECK    Alexandra FERNÁNDEZ

## 2025-07-07 NOTE — PATIENT INSTRUCTIONS
Change:  Pramipexole/Mirapex .5 mg (1 tab) at bedtime; increase from .5 mg  -use 2 tabs of . 25 mg until gone; then change to 1 tab as above    Quetiapine/Seroquel 50 mg (1 tab) at bedtime; increase from 25 mg       Continue:  Bupropion/Wellbutrin 300 mg XL in AM     Buspirone/Buspar 30 mg twice daily; TDD = 60 mg     Lamotrigine/Lamictal 50 mg (2 tabs) twice daily; TDD: 100 mg    ------------------    -If there are questions/inquiries between now and the upcoming follow up appointment OR prior to transferring to the next level of care, please call or message the [facility] Psychiatry Department.    Clinic hours/phone number:   -Monday to Friday from 8:00 am to 4:30 pm  -[phone]      MyChart:  -IS NOT an emergency messaging service  -Should not be considered equivalent to text messages   -Please allow up to 3 business days for a reply   -If you do not receive a response within 3 business days, please do not hesitate to contact us again via calling (see above) or messaging to check on the status of your questions or concerns    -----------------------    Call:  -[phone] ([phone]) if guidance is needed after [facility] clinic hours about utilizing [facility] versus the Emergency Departments    ----------------------    Any time (during or after regular clinic hours) immediate and or life threatening symptoms occur (either medical or mental health), call:  [phone] and or go to the nearest Emergency Department      ---------------------    Please use the following websites to obtain a comprehensive list of all counties within the [facility] for adult and child mental health crisis numbers:    https://[place].gov/dhs/people-we-serve/people-with-disabilities/health-care/adult-mental-health/resources/crisis-contacts.jsp    https://[place].gov/dhs/people-we-serve/people-with-disabilities/health-care/childrens-mental-health/resources/crisis-contacts.jsp      -------------------      Below is a list of county  (also found on the above websites), state, and national crisis numbers.   These resources can provide real-time assistance if experiencing moderate to severe mental health symptoms.        Additionally, please visit your county website to find the most up-to-date list of local:  adult, child, family, and chemical dependency services available.        [facility]  [phone]    [facility]  [phone]    [facility]  [phone]    [facility]  [phone]    [facility]  [phone]: Adults 18 and over    [phone]: Children 17 and under    [facility] ([facility]), Acute Psychiatric Services (APS) Assessment & Referral:   [phone]    Community Outreach for Psychiatric Emergencies (COPE):  [phone]    [facility]  [phone]: Adults 18 and over    [phone]: Children 17 and under      Walk-in crisis services are available Monday to Friday from 8 am to 5:30 pm at Urgent Care for  Adult metal health:    [address] [place] [address]  Closed on Saturday, Sunday, and holidays    [place]  [phone]: Toll free    [facility]  [phone]      [facility] [place]  [phone]: Toll free    Text: [place] to 957836      [facility] and human services  Call 211 or visit https://www.211unitedway.org to obtain free and confidential h/hs information     [place] Warmline  If you are an adult needing support, you can talk to a specialist who has first hand experience living with a mental health condition:    [phone]    Text: Support to 62152    [employer] [place]:  domestic violence/LGBTQ+  208.280.7273 option 3    [employer]: LGBTQ+  1-682.672.8740    Text: START to 572078    [place] [facility]  4-378-XecbFec: (1-713.518.4371)    Crisis line: 1-831.704.1364    Text: 133246    [employer]: women and sexually exploited youth  [phone]    [employer] Domestic Violence  [facility] Hotline  531.984.5081    National Suicide Prevention Lifeline  988    National Youth Crisis Hotline  983

## 2025-07-07 NOTE — PROGRESS NOTES
[facility]      Mental Health & Addiction Service Line    Transition Clinic: Psychiatry Progress Note  Medication Management              ASSESSMENT/PLAN    Medications:  Change:  Pramipexole/Mirapex .5 mg (1 tab) at bedtime; increase from .5 mg  -use 2 tabs of . 25 mg until gone; then change to 1 tab as above    Quetiapine/Seroquel 50 mg (1 tab) at bedtime; increase from 25 mg       Continue:  Bupropion/Wellbutrin 300 mg XL in AM     Buspirone/Buspar 30 mg twice daily; TDD = 60 mg     Lamotrigine/Lamictal 50 mg (2 tabs) twice daily; TDD: 100 mg        Labs:  -None ordered      Therapy and or Non-pharmacological modalities:    Sleep:  -Get 7+ hours per night  -Avoid screens 60 minutes prior to bedtime    Nutrition:  -Anti-inflammatory diet: fruits, veggies, grains, plant proteins, lean animal protein (sparingly), dairy (small amounts)  -Omega 3's + fiber: food = first choice, supplements = second choice  -Avoid excessive amounts of: refined sugars + carbs, fried + fast foods    Activity:  -30 to 90 minutes (doesn't have to be consecutive) most days of the week  -Aerobic + strength/weight bearing  -Yoga + meditation/deep breathing      Disposition:  -Has been advised of consultative Transition Clinic model    -Please follow up at the Transition Clinic for medication management in:   8 to 10 weeks        Summary of Clinical Impressions:    Josemanuel/Gavin Edmond is an 78 y/o male with a previous mental health hx of: MDD, Social Anxiety Disorder,   Alcohol Abuse (remote + no consumption since 2017) and Benzodiazepine Dependence.     Medical decision making is complex based on: age, co-morbidities (see problem list), and polypharmacy.     Per chart review: depressive + anxiety symptoms began in 2002 after his son passed away during a MVA. Social   anxiety became problematic around 2013-14.     Initiated care at the Transition Clinic on 12/28/2023 for the management of psychotropics/bridging until able   to  establish long term outpatient psychiatric services.      Attended follow up in 2024 on: 1/18, 2/15, 3/28, 5/7, 5/31, and 11/8.     Attended follow up in 2025 on: 2/17, 4/25, and 5/16.    ----------------------------    Overall, Gavin notes that he's feeling better physically/emotionally/mentally since going through cardiac surgery this spring (see 6/25/2025 encounter by KARLY Lorenzo PA-C for summarization).    He still has some issues with sleeping therefore wants to increase Seroquel (medication list = states 25 mg HS, however Gavin notes that he actually intermittently uses 50 mg HS).    He denies any immediate safety concerns towards himself or others and is forward thinking/future oriented.      DSM-V and or working diagnosis:    1.  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  Social anxiety disorder     3.  RLS            SAFETY EVALUATION:  Suicidal ideations:  -denies  Homicidal ideations:  -denies  Risk factors:  -male, age  -chronic illnesses   Protective and mitigating factors:  -spouse, family  -no prior attempts  Risk assessment:  -low to medium         SAFETY PLAN:  -Has numbers of at least 1 family member + 1 friend in personal contact list  -Knows clinic number(s) + operation of regular business hours   -Reviewed to utilize 988 or text [place] to [phone] for mental health crisis  -Discussed to call 911 and or return to the nearest Emergency Department if unable to maintain safety of self or others (due to severity of suicidal and or homicidal ideations)      PSYCHIATRIC HISTORY    Suicide attempts:  -None reported      Inpatient psychiatric hospitalizations:  -None reported   -No hx of commitments      ECT:  -None reported            Medication Trials:      Per [employer] testing:  -Ultra-rapid metabolizer CYP2D6 = Paxil  -Poor metabolizer [id number] = Celexa, Zoloft     SSRIs:  -Lexapro: ineffective  -Paxil 50 mg: ineffective  -Prozac 10 mg: effective for depression but increased  anxiety  -Zoloft: low dosage     SNRIs:  -Cymbalta: side effects  -Effexor: side effects  -Fetzima: ineffective  -Pristiq: ineffective     TCAs:  -Nortriptyline: dry mouth, nightmares     Serotonin modulators/stimulators:  -Mirtazapine 30 mg: effective for sleep, however caused weight gain  -Trintellix 20 mg: worsened anxiety  + expensive  -Viibryd: ineffective     Antipsychotics:  -Abilify 10 mg  -Zyprexa 2.5 to 5 mg = urinary retention     Benzodiazepines = history of abuse  -Alprazolam  -Clonazepam  -Diazepam  -Lorazepam     Sleep aides:  -Trazodone 50 mg: over-sedation       MEDICAL HISTORY  -The problem list was reviewed via the Electronic Medical Record (EMR) prior to the appointment  -The patient denies any concerning physical and or medical symptoms during the interviewing process        MEDICATIONS      Current Outpatient Medications:     buPROPion (WELLBUTRIN XL) 300 MG 24 hr tablet, Take 1 tablet (300 mg) by mouth every morning., Disp: 90 tablet, Rfl: 0    busPIRone HCl (BUSPAR) 30 MG tablet, Take 1 tablet (30 mg) by mouth 2 times daily., Disp: 180 tablet, Rfl: 0    doxazosin (CARDURA) 4 MG tablet, Take 1 tablet (4 mg) by mouth at bedtime., Disp: 90 tablet, Rfl: 1    finasteride (PROSCAR) 5 MG tablet, TAKE 1 TABLET BY MOUTH EVERY DAY, Disp: 90 tablet, Rfl: 3    lamoTRIgine (LAMICTAL) 25 MG tablet, Take 2 tablets (50 mg) by mouth 2 times daily., Disp: 360 tablet, Rfl: 0    losartan (COZAAR) 50 MG tablet, Take 2 tablets (100 mg) by mouth daily., Disp: , Rfl:     metoprolol succinate ER (TOPROL XL) 200 MG 24 hr tablet, Take 1 tablet (200 mg) by mouth every evening., Disp: 90 tablet, Rfl: 2    multivitamin (ONE-DAILY) tablet, Take 1 tablet by mouth daily, Disp: 30 tablet, Rfl: 0    pramipexole (MIRAPEX) 0.25 MG tablet, Take 2 tablets (0.5 mg) by mouth at bedtime., Disp: , Rfl:     QUEtiapine (SEROQUEL) 25 MG tablet, Take 1 tablet (25 mg) by mouth at bedtime., Disp: 90 tablet, Rfl: 0    rivaroxaban  ANTICOAGULANT (XARELTO ANTICOAGULANT) 20 MG TABS tablet, Take 1 tablet (20 mg) by mouth daily (with dinner)., Disp: 90 tablet, Rfl: 2    torsemide (DEMADEX) 10 MG tablet, Take 3 tablets (30 mg) by mouth daily., Disp: 90 tablet, Rfl: 3      If a controlled substance is prescribed during today's appointment:    -The [place] Prescription Monitoring Program has been reviewed and there are no current concerns with: diversionary activity, early refill requests, and or obtaining the medication from multiple providers.        VITALS    BP Readings from Last 3 Encounters:   06/25/25 119/76   06/04/25 118/70   05/14/25 102/64       Pulse Readings from Last 3 Encounters:   06/25/25 77   06/04/25 89   05/14/25 80       Wt Readings from Last 3 Encounters:   06/25/25 74.4 kg (164 lb)   06/04/25 70.3 kg (155 lb)   05/14/25 70.1 kg (154 lb 9.6 oz)           SCALES        4/18/2025    12:26 PM 4/25/2025     2:48 PM 6/4/2025     7:04 AM   PHQ   PHQ-9 Total Score 6  6  3    Q9: Thoughts of better off dead/self-harm past 2 weeks Not at all Not at all Not at all       Patient-reported            11/8/2024    10:24 AM 12/19/2024    10:24 AM 3/21/2025    12:46 PM   CAROLYNN-7 SCORE   Total Score 8 (mild anxiety) 4 (minimal anxiety) 3 (minimal anxiety)   Total Score 8  4         Patient-reported    Proxy-reported           SUBSTANCE USE      Prior use:  -Denies any history of alcohol, recreational, or illicit substance use resulting in: legal issues, c/d programming, or   withdrawal symptoms     Per chart review 5/7/2020 encounter by ALENA Kyle APRN-CNP:  -On/off heavy alcohol use after son passed away in 2002  -Stopped drinking altogether in 2017        Current use:     Alcohol:   -None reported         Recreational Drugs:   -None reported         Medical Marijuana:  -None reported         Cigarettes per day:   -None reported         Chewing tobacco:   -None reported         Vaping:    -None reported         Caffeine intake:    -1 cup  coffee per day           MENTAL STATUS EXAMINATION    Appearance: Well Groomed, Attire Appropriate for the Season  General Behavior:  Cooperative, Direct Eye Contact  Speech: Fluent, Normal rate and volume  Musculoskeletal:    -Gait not observed during t.h. visit  -No facial tics/tremors observed   -Motor coordination is grossly intact   Mood: Alright  Affect: Appropriate to Content of Speech and Circumstances  Attention: Intact  Orientation:  Person, Place, Time, Situation  Thought Associations:  Intact  Thought Content: Reality based   Thought Processes: Organized, Normal rate  Memory: Recent and remote memory intact; no formal screenings or testing performed  Language: Intact  Judgement: Fair  Insight: Fair        INTERIM HX:    -Feeling better since the cardiac surgery  -Might have additional surgeries on other valves  -Have more energy and mood is better          PSYCHIATRIC ROS    Sleep:  -Still having body jerking in the evening   -Then it makes it hard to sleep although it is better than a few months ago  -Go to bed: 10:30 pm to 11:30 pm  -Get up: 7 am      Mood/Anxiety:  -See PHQ-9 score    -See CAROLYNN-7 score      Suicidal ideations:  -Denies passive or active thoughts at this time      SIB:  -Denies engaging in self harm       Side effects:  -None reported           VISIT INFORMATION    Date:  2025       Number:  -11th    Patient Identifying Information:  Legal name: Josemanuel Edmond  Preferred name: Gavin  : 1945    Participants:   -Patient  -Provider      Telehealth visit details:  Type of service:  Video Visit      Video Start Time:  1:38 PM  Video End Time:   2:02 PM    Originating Location (pt. Location): Home  Distant Location (provider location):  Off-site     Platform used for Video Visit: Jackson      Total time:  32 minutes per:    -Review of EMR including but not limited to: tabs within Chart Review + outside records if applicable   -Appointment time  -Documentation        [place]  Marilyn CARDONA [facility]        ----------------------------------------------------------------------------------------------------------------------        TREATMENT RISK STATEMENT    The risks, benefits, alternatives, and potential adverse effects have been explained and are understood by the patient.  The patient agrees to the treatment plan with their ability to do so.      The patient knows to call the clinic: [phone]  for any problems or concerns until the next psychiatry visit, regardless if it is within or outside of the [facility] system.     If unable to reach clinic staff (via phone call or medical messaging) during the normal business hours: 8:00 am to 4:30 pm then it is recommended accessing the nearest: emergency department, urgent care facility, or utilizing local (varies based on county of residence) and national crisis #'s or text messaging services for immediate assistance.          ----------------------------------------------------------------------------------------------------------------------      If applicable the following has been discussed with the patient, parent/guardian, and or attending family member during the appointment:      Risks of polypharmacy and possible drug interactions with current medication list + common OTC products, herbs, and supplements.  Moving forward, it is suggested to intermittently check-in with a clinic or retail pharmacist whenever new medications or OTC/h/s are consumed.    Risks of polypharmacy and possible drug + drug interactions with current medication list.  Moving forward, it is suggested to intermittently check-in with a clinic or retail pharmacist whenever new prescription medications are added to your treatment regimen.    Recommendation to adhere to CDC guidelines as it relates to alcohol consumption.  If taking benzodiazepines, you should abstain from alcohol intake due to increased risks of CNS and respiratory depression, as well as  psychomotor impairment.    If possible, it is recommended to avoid concurrent use of prescribed: opioids + benzodiazepines due to increased risks of CNS and respiratory depression, as well as the increased risk of overdose.     Recommendation to minimize and or abstain from THC use (unless you are prescribed medical marijuana).    Recommendation to abstain from illicit substances including but not limited to the following: heroin, street fentanyl, cocaine, methamphetamines, bath salts, and other synthetic products.    Recommendation to abstain from tobacco/smoking/nicotine, alcohol, THC, and all illicit substances if trying to become or are pregnant.    Do not take opioids, stimulants, and or other prescription medications unless they are specifically prescribed for you.     Black Box Warnings associated with the prescribed psychotropic(s).     Potential adverse effects of antipsychotics including but not limited to the following: weight gain, metabolic syndrome, EPS/Tardive Dyskinesias, and Neuroleptic Malignant Syndrome.    Potential adverse effects of stimulants including but not limited to the following: sudden death, MI, stroke, HTN, cardiomyopathy (long term use), seizures, ed, psychosis, and aggressive behaviors.

## 2025-07-10 ENCOUNTER — TELEPHONE (OUTPATIENT)
Dept: PHARMACY | Facility: OTHER | Age: 80
End: 2025-07-10
Payer: COMMERCIAL

## 2025-07-10 NOTE — TELEPHONE ENCOUNTER
JOHN Recruitment: [employer]     Referral outreach attempt #1 on July 10, 2025      Outcome: left voicemail- Call back number [phone] and [employer] message sent    JOHN Campbell

## 2025-07-14 ENCOUNTER — TELEPHONE (OUTPATIENT)
Dept: PHARMACY | Facility: OTHER | Age: 80
End: 2025-07-14
Payer: COMMERCIAL

## 2025-07-14 NOTE — TELEPHONE ENCOUNTER
JOHN Recruitment: [employer]     Referral outreach attempt #2 on July 14, 2025      Outcome: patient opted out- already did this with his pharmactist    Char Gómez [facility]

## 2025-08-12 ENCOUNTER — DOCUMENTATION ONLY (OUTPATIENT)
Dept: CARDIOLOGY | Facility: CLINIC | Age: 80
End: 2025-08-12
Payer: COMMERCIAL

## 2025-08-15 ENCOUNTER — APPOINTMENT (OUTPATIENT)
Dept: CARDIOLOGY | Facility: CLINIC | Age: 80
End: 2025-08-15
Payer: COMMERCIAL

## 2025-08-26 ENCOUNTER — TELEPHONE (OUTPATIENT)
Dept: CARDIOLOGY | Facility: CLINIC | Age: 80
End: 2025-08-26
Payer: COMMERCIAL

## 2025-08-26 DIAGNOSIS — I07.1 TRICUSPID VALVE INSUFFICIENCY, UNSPECIFIED ETIOLOGY: Primary | ICD-10-CM

## 2025-08-26 DIAGNOSIS — I38 VALVULAR HEART DISEASE: ICD-10-CM

## 2025-08-26 DIAGNOSIS — I50.32 CHRONIC HEART FAILURE WITH PRESERVED EJECTION FRACTION (H): ICD-10-CM

## 2025-08-27 ENCOUNTER — VIRTUAL VISIT (OUTPATIENT)
Dept: CARDIOLOGY | Facility: CLINIC | Age: 80
End: 2025-08-27
Payer: COMMERCIAL

## 2025-08-27 DIAGNOSIS — I50.32 CHRONIC HEART FAILURE WITH PRESERVED EJECTION FRACTION (H): Primary | ICD-10-CM

## 2025-08-27 DIAGNOSIS — I07.1 TRICUSPID VALVE INSUFFICIENCY, UNSPECIFIED ETIOLOGY: ICD-10-CM

## 2025-08-27 DIAGNOSIS — I42.9 CARDIOMYOPATHY, UNSPECIFIED TYPE (H): ICD-10-CM

## 2025-08-27 PROCEDURE — 98007 SYNCH AUDIO-VIDEO EST HI 40: CPT | Performed by: INTERNAL MEDICINE

## 2025-08-27 RX ORDER — SPIRONOLACTONE 25 MG/1
0.5 TABLET ORAL
COMMUNITY
Start: 2025-01-18

## 2025-09-04 ENCOUNTER — VIRTUAL VISIT (OUTPATIENT)
Dept: BEHAVIORAL HEALTH | Facility: CLINIC | Age: 80
End: 2025-09-04
Payer: COMMERCIAL

## 2025-09-04 VITALS — BODY MASS INDEX: 22.9 KG/M2 | HEIGHT: 70 IN | WEIGHT: 160 LBS

## 2025-09-04 DIAGNOSIS — F40.10 SOCIAL ANXIETY DISORDER: ICD-10-CM

## 2025-09-04 DIAGNOSIS — I48.91 ATRIAL FIBRILLATION, UNSPECIFIED TYPE (H): ICD-10-CM

## 2025-09-04 DIAGNOSIS — I42.9 CARDIOMYOPATHY, UNSPECIFIED TYPE (H): ICD-10-CM

## 2025-09-04 DIAGNOSIS — G25.81 RESTLESS LEG SYNDROME: ICD-10-CM

## 2025-09-04 DIAGNOSIS — F33.0 MILD EPISODE OF RECURRENT MAJOR DEPRESSIVE DISORDER: Primary | ICD-10-CM

## 2025-09-04 DIAGNOSIS — I10 BENIGN ESSENTIAL HYPERTENSION: ICD-10-CM

## 2025-09-04 RX ORDER — BUSPIRONE HYDROCHLORIDE 30 MG/1
30 TABLET ORAL 2 TIMES DAILY
Qty: 180 TABLET | Refills: 1 | Status: SHIPPED | OUTPATIENT
Start: 2025-09-04

## 2025-09-04 RX ORDER — BUPROPION HYDROCHLORIDE 300 MG/1
300 TABLET ORAL EVERY MORNING
Qty: 90 TABLET | Refills: 1 | Status: SHIPPED | OUTPATIENT
Start: 2025-09-04

## 2025-09-04 RX ORDER — METOPROLOL SUCCINATE 200 MG/1
200 TABLET, EXTENDED RELEASE ORAL EVERY EVENING
Qty: 90 TABLET | Refills: 2 | OUTPATIENT
Start: 2025-09-04

## 2025-09-04 RX ORDER — QUETIAPINE FUMARATE 50 MG/1
50 TABLET, FILM COATED ORAL AT BEDTIME
Qty: 90 TABLET | Refills: 1 | Status: SHIPPED | OUTPATIENT
Start: 2025-09-04

## 2025-09-04 RX ORDER — PRAMIPEXOLE DIHYDROCHLORIDE 0.5 MG/1
0.5 TABLET ORAL AT BEDTIME
Qty: 90 TABLET | Refills: 1 | Status: SHIPPED | OUTPATIENT
Start: 2025-09-04

## 2025-09-04 ASSESSMENT — PAIN SCALES - GENERAL: PAINLEVEL_OUTOF10: NO PAIN (0)

## (undated) DEVICE — Device

## (undated) DEVICE — SUCTION IRR SYSTEM W/TIP INTERPULSE

## (undated) DEVICE — SOL NACL 0.9% IRRIG 1000ML BOTTLE 07138-09

## (undated) DEVICE — INTRO SHEATH 4FRX10CM PINNACLE RSS402

## (undated) DEVICE — GLOVE BIOGEL PI MICRO SZ 8.0 48580

## (undated) DEVICE — DRAPE SHEET REV FOLD 3/4 9349

## (undated) DEVICE — SU PDO 1 STRATAFIX 36X36CM CTX TAPERPOINT SXPD2B405

## (undated) DEVICE — SU VICRYL 4-0 FS-2 27" J422-H

## (undated) DEVICE — ESU PENCIL SMOKE EVAC W/ROCKER SWITCH 0703-047-000

## (undated) DEVICE — ELECTRODE DEFIB CADENCE 22550R

## (undated) DEVICE — DILATOR VASCULAR 20FRX20CM G01471

## (undated) DEVICE — GOWN IMPERVIOUS SPECIALTY XLG/XLONG 32474

## (undated) DEVICE — INTRO SHEATH 6FRX10CM PINNACLE RSS602

## (undated) DEVICE — SLEEVE TR BAND RADIAL COMPRESSION DEVICE 24CM TRB24-REG

## (undated) DEVICE — SYR ANGIOGRAPHY MULTIUSE KIT ACIST 014612

## (undated) DEVICE — GOWN XLG DISP 9545

## (undated) DEVICE — INTRODUCER CHECK FLO 14FRX30CM G08957

## (undated) DEVICE — SU STRATAFIX 3-0 MH 12" PS-2 SXMD1B103

## (undated) DEVICE — SYR 20ML LL W/O NDL 302830

## (undated) DEVICE — DEVICE ABSORBABLE TACK 5MM SINGLE ABSTACK30

## (undated) DEVICE — DRSG AQUACEL AG 3.5X9.75" HYDROFIBER 412011

## (undated) DEVICE — ENDO TROCAR OPTICAL 05MM VERSAPORT PLUS W/FIX CAN ONB5STF

## (undated) DEVICE — SU PDO 3-0 STRATAFIX 24CM FS/FS REV CUT SXPD2B419

## (undated) DEVICE — GLOVE BIOGEL PI MICRO INDICATOR UNDERGLOVE SZ 8.0 48980

## (undated) DEVICE — SHEATH GUIDE SLENDER 7FRX10CM SS 80-1070

## (undated) DEVICE — INTRO SHEATH 8FRX10CM PINNACLE RSS802

## (undated) DEVICE — INTRO SHEATH 7FRX10CM PINNACLE RSS702

## (undated) DEVICE — TRANSDUCER TRAY ARTERIAL 42646-06

## (undated) DEVICE — CATH PULM ART 7FR X 110CM, SWA

## (undated) DEVICE — INTRO TERUMO 7FRX25CM W/MARKER RSB703

## (undated) DEVICE — VALVE MITRAL PASCAL PRECISION SYSTEM PAS2MA

## (undated) DEVICE — WIRE GUIDE 0.035"X260CM AMPTLAZ XSTIFF CVD THSCF-35-260-3-A

## (undated) DEVICE — SOL WATER IRRIG 1000ML BOTTLE 07139-09

## (undated) DEVICE — BONE CLEANING TIP INTERPULSE  0210-010-000

## (undated) DEVICE — ENDO SHEARS RENEW LAP ENDOCUT SCISSOR TIP 16.5MM 3142

## (undated) DEVICE — GLOVE PROTEXIS W/NEU-THERA 8.5  2D73TE85

## (undated) DEVICE — SHTH INTRO 0.021IN ID 6FR DIA

## (undated) DEVICE — BLADE CLIPPER 4406

## (undated) DEVICE — GUIDEWIRE VASC 0.014INX180CM RUNTHROUGH 25-1011

## (undated) DEVICE — CATH TRANSSEPTAL VERSACROSS 45D 63X230CM VXSK0132

## (undated) DEVICE — BNDG COBAN 6"X5YDS STERILE

## (undated) DEVICE — BONE CEMENT KIT BOWL AND SPATULA STRK 6201-3-410

## (undated) DEVICE — SUTURE ABSORBABLE VICRYL CT-B1 L36 IN BRAID VIOLET JB947H

## (undated) DEVICE — GLOVE BIOGEL PI MICRO INDICATOR UNDERGLOVE SZ 6.5 48965

## (undated) DEVICE — GLOVE BIOGEL PI MICRO SZ 6.5 48565

## (undated) DEVICE — MANIFOLD KIT ANGIO AUTOMATED 014613

## (undated) DEVICE — GLOVE PROTEXIS BLUE W/NEU-THERA 8.5  2D73EB85

## (undated) DEVICE — CATH ANGIO JUDKINS R4 6FRX100CM INFINITI 534621T

## (undated) DEVICE — ENDO TROCAR BALLOON TIP 10MM OMS-T10BT

## (undated) DEVICE — GUIDEWIRE VASC SAFARI2 0.035X275CM H74939406XS1

## (undated) DEVICE — DILATOR VASCULAR 16FRX20CM G01212

## (undated) DEVICE — DECANTER VIAL 2006S

## (undated) DEVICE — CUSTOM PACK CORONARY SAN5BCRHEA

## (undated) DEVICE — GLOVE PROTEXIS W/NEU-THERA 7.5  2D73TE75

## (undated) DEVICE — BLADE SAW SAGITTAL STRK 21X90X1.19MM HD SYS 6 6221-119-090

## (undated) DEVICE — PREP CHLORAPREP 26ML TINTED ORANGE  260815

## (undated) DEVICE — GLOVE PROTEXIS W/NEU-THERA 8.0  2D73TE80

## (undated) DEVICE — STOCKING SLEEVE COMPRESSION CALF LG

## (undated) DEVICE — ENDO CANNULA 05MM VERSAONE UNIVERSAL UNVCA5STF

## (undated) DEVICE — SU MONOCRYL 2-0 CT-1 36" UND Y945H

## (undated) DEVICE — SU ETHIBOND 5 V-37 4X30" MB66G

## (undated) DEVICE — INTRO MICRO MINI STICK 4FR STIFF NITINOL 45-753

## (undated) DEVICE — SPONGE LAP 18X18" 1515

## (undated) DEVICE — SU STRATAFIX MONOCRYL 3-0 SPIRAL PS-2 30CM SXMP1B106

## (undated) DEVICE — ENDO TROCAR DISSECTING BALLOON OMS-PDBS2

## (undated) DEVICE — KIT DRAIN CLOSED WOUND SUCTION MED 400ML RESVR

## (undated) DEVICE — CLOSURE DEVICE 6FR VASC PROGLIDE MEDICATED SUTURE 12673-03

## (undated) DEVICE — CATH ANGIO JUDKINS JL3.5 6FRX100CM INFINITI 534618T

## (undated) DEVICE — KIT HAND CONTROL ACIST 014644 AR-P54

## (undated) DEVICE — DRSG STERI STRIP 1X5" R1548

## (undated) DEVICE — SU DERMABOND PRINEO 22CM CLR222US

## (undated) DEVICE — DEVICE RETRIEVER HEWSON 71111579

## (undated) DEVICE — SU ETHIBOND 1 CT-1 30" X425H

## (undated) DEVICE — SOL NACL 0.9% IRRIG 3000ML BAG 07972-08

## (undated) DEVICE — SU VICRYL 0 UR-6 27" J603H

## (undated) DEVICE — NDL 18GA 1.5" 305196

## (undated) DEVICE — GOWN LG DISP 9515

## (undated) DEVICE — GUIDEWIRE STRT .025 SCN M001491001

## (undated) DEVICE — SUCTION MANIFOLD NEPTUNE 2 SYS 4 PORT 0702-020-000

## (undated) DEVICE — ADHESIVE SWIFTSET 0.8ML OCTYL SS6

## (undated) DEVICE — CATH THERMODILUTION 7.0FR 151F7

## (undated) DEVICE — ESU ELEC BLADE 4" COATED

## (undated) DEVICE — PACK HIP LAKES WITH POUCH

## (undated) RX ORDER — DEXAMETHASONE SODIUM PHOSPHATE 4 MG/ML
INJECTION, SOLUTION INTRA-ARTICULAR; INTRALESIONAL; INTRAMUSCULAR; INTRAVENOUS; SOFT TISSUE
Status: DISPENSED
Start: 2018-10-12

## (undated) RX ORDER — FENTANYL CITRATE 50 UG/ML
INJECTION, SOLUTION INTRAMUSCULAR; INTRAVENOUS
Status: DISPENSED
Start: 2018-04-24

## (undated) RX ORDER — FENTANYL CITRATE 50 UG/ML
INJECTION, SOLUTION INTRAMUSCULAR; INTRAVENOUS
Status: DISPENSED
Start: 2023-05-25

## (undated) RX ORDER — CEFAZOLIN SODIUM/WATER 2 G/20 ML
SYRINGE (ML) INTRAVENOUS
Status: DISPENSED
Start: 2023-05-25

## (undated) RX ORDER — LIDOCAINE HYDROCHLORIDE 10 MG/ML
INJECTION, SOLUTION EPIDURAL; INFILTRATION; INTRACAUDAL; PERINEURAL
Status: DISPENSED
Start: 2018-04-24

## (undated) RX ORDER — PROPOFOL 10 MG/ML
INJECTION, EMULSION INTRAVENOUS
Status: DISPENSED
Start: 2023-05-25

## (undated) RX ORDER — DEXAMETHASONE SODIUM PHOSPHATE 10 MG/ML
INJECTION, SOLUTION INTRAMUSCULAR; INTRAVENOUS
Status: DISPENSED
Start: 2023-05-25

## (undated) RX ORDER — LIDOCAINE HYDROCHLORIDE 10 MG/ML
INJECTION, SOLUTION EPIDURAL; INFILTRATION; INTRACAUDAL; PERINEURAL
Status: DISPENSED
Start: 2025-04-01

## (undated) RX ORDER — FENTANYL CITRATE-0.9 % NACL/PF 10 MCG/ML
PLASTIC BAG, INJECTION (ML) INTRAVENOUS
Status: DISPENSED
Start: 2023-05-25

## (undated) RX ORDER — CEFAZOLIN SODIUM 2 G/100ML
INJECTION, SOLUTION INTRAVENOUS
Status: DISPENSED
Start: 2018-10-12

## (undated) RX ORDER — TROPICAMIDE 10 MG/ML
SOLUTION/ DROPS OPHTHALMIC
Status: DISPENSED
Start: 2021-01-06

## (undated) RX ORDER — ASPIRIN 325 MG
TABLET ORAL
Status: DISPENSED
Start: 2025-05-07

## (undated) RX ORDER — ONDANSETRON 2 MG/ML
INJECTION INTRAMUSCULAR; INTRAVENOUS
Status: DISPENSED
Start: 2023-05-25

## (undated) RX ORDER — LIDOCAINE HYDROCHLORIDE AND EPINEPHRINE 10; 10 MG/ML; UG/ML
INJECTION, SOLUTION INFILTRATION; PERINEURAL
Status: DISPENSED
Start: 2025-05-07

## (undated) RX ORDER — DEXAMETHASONE SODIUM PHOSPHATE 10 MG/ML
INJECTION, SOLUTION INTRAMUSCULAR; INTRAVENOUS
Status: DISPENSED
Start: 2018-04-24

## (undated) RX ORDER — ONDANSETRON 2 MG/ML
INJECTION INTRAMUSCULAR; INTRAVENOUS
Status: DISPENSED
Start: 2018-10-12

## (undated) RX ORDER — HYDROMORPHONE HYDROCHLORIDE 1 MG/ML
INJECTION, SOLUTION INTRAMUSCULAR; INTRAVENOUS; SUBCUTANEOUS
Status: DISPENSED
Start: 2018-10-12

## (undated) RX ORDER — HEPARIN SODIUM 1000 [USP'U]/ML
INJECTION, SOLUTION INTRAVENOUS; SUBCUTANEOUS
Status: DISPENSED
Start: 2025-04-01

## (undated) RX ORDER — CEFAZOLIN SODIUM/WATER 2 G/20 ML
SYRINGE (ML) INTRAVENOUS
Status: DISPENSED
Start: 2025-05-07

## (undated) RX ORDER — LIDOCAINE HYDROCHLORIDE 10 MG/ML
INJECTION, SOLUTION EPIDURAL; INFILTRATION; INTRACAUDAL; PERINEURAL
Status: DISPENSED
Start: 2023-05-25

## (undated) RX ORDER — EPINEPHRINE 1 MG/ML
INJECTION, SOLUTION, CONCENTRATE INTRAVENOUS
Status: DISPENSED
Start: 2023-05-25

## (undated) RX ORDER — FENTANYL CITRATE 50 UG/ML
INJECTION, SOLUTION INTRAMUSCULAR; INTRAVENOUS
Status: DISPENSED
Start: 2025-05-07

## (undated) RX ORDER — PHENYLEPHRINE HYDROCHLORIDE 25 MG/ML
SOLUTION/ DROPS OPHTHALMIC
Status: DISPENSED
Start: 2021-01-06

## (undated) RX ORDER — TROPICAMIDE 10 MG/ML
SOLUTION/ DROPS OPHTHALMIC
Status: DISPENSED
Start: 2021-02-17

## (undated) RX ORDER — BUPIVACAINE HYDROCHLORIDE 5 MG/ML
INJECTION, SOLUTION EPIDURAL; INTRACAUDAL
Status: DISPENSED
Start: 2023-05-25

## (undated) RX ORDER — ACETAMINOPHEN 325 MG/1
TABLET ORAL
Status: DISPENSED
Start: 2023-05-25

## (undated) RX ORDER — PROPOFOL 10 MG/ML
INJECTION, EMULSION INTRAVENOUS
Status: DISPENSED
Start: 2025-05-07

## (undated) RX ORDER — TRANEXAMIC ACID 650 MG/1
TABLET ORAL
Status: DISPENSED
Start: 2023-05-25

## (undated) RX ORDER — REGADENOSON 0.08 MG/ML
INJECTION, SOLUTION INTRAVENOUS
Status: DISPENSED
Start: 2024-02-21

## (undated) RX ORDER — LIDOCAINE HYDROCHLORIDE 10 MG/ML
INJECTION, SOLUTION EPIDURAL; INFILTRATION; INTRACAUDAL; PERINEURAL
Status: DISPENSED
Start: 2018-10-12

## (undated) RX ORDER — FENTANYL CITRATE 50 UG/ML
INJECTION, SOLUTION INTRAMUSCULAR; INTRAVENOUS
Status: DISPENSED
Start: 2025-04-01

## (undated) RX ORDER — FENTANYL CITRATE 50 UG/ML
INJECTION, SOLUTION INTRAMUSCULAR; INTRAVENOUS
Status: DISPENSED
Start: 2018-10-12

## (undated) RX ORDER — BUPIVACAINE HYDROCHLORIDE AND EPINEPHRINE 5; 5 MG/ML; UG/ML
INJECTION, SOLUTION EPIDURAL; INTRACAUDAL; PERINEURAL
Status: DISPENSED
Start: 2018-04-24

## (undated) RX ORDER — GABAPENTIN 100 MG/1
CAPSULE ORAL
Status: DISPENSED
Start: 2018-10-12

## (undated) RX ORDER — PHENYLEPHRINE HCL IN 0.9% NACL 1 MG/10 ML
SYRINGE (ML) INTRAVENOUS
Status: DISPENSED
Start: 2018-04-24

## (undated) RX ORDER — GABAPENTIN 100 MG/1
CAPSULE ORAL
Status: DISPENSED
Start: 2023-05-25

## (undated) RX ORDER — PROPOFOL 10 MG/ML
INJECTION, EMULSION INTRAVENOUS
Status: DISPENSED
Start: 2018-04-24

## (undated) RX ORDER — REGADENOSON 0.08 MG/ML
INJECTION, SOLUTION INTRAVENOUS
Status: DISPENSED
Start: 2022-01-19

## (undated) RX ORDER — BUPIVACAINE HYDROCHLORIDE 7.5 MG/ML
INJECTION, SOLUTION INTRASPINAL
Status: DISPENSED
Start: 2018-10-12

## (undated) RX ORDER — NITROGLYCERIN 5 MG/ML
VIAL (ML) INTRAVENOUS
Status: DISPENSED
Start: 2025-04-01

## (undated) RX ORDER — ASPIRIN 325 MG
TABLET ORAL
Status: DISPENSED
Start: 2025-04-01

## (undated) RX ORDER — ONDANSETRON 2 MG/ML
INJECTION INTRAMUSCULAR; INTRAVENOUS
Status: DISPENSED
Start: 2018-04-24

## (undated) RX ORDER — PROPOFOL 10 MG/ML
INJECTION, EMULSION INTRAVENOUS
Status: DISPENSED
Start: 2018-10-12

## (undated) RX ORDER — HYDROXYZINE HYDROCHLORIDE 50 MG/1
TABLET, FILM COATED ORAL
Status: DISPENSED
Start: 2018-10-12